# Patient Record
Sex: FEMALE | Race: WHITE | NOT HISPANIC OR LATINO | Employment: OTHER | URBAN - METROPOLITAN AREA
[De-identification: names, ages, dates, MRNs, and addresses within clinical notes are randomized per-mention and may not be internally consistent; named-entity substitution may affect disease eponyms.]

---

## 2017-09-27 ENCOUNTER — TRANSFERRED RECORDS (OUTPATIENT)
Dept: HEALTH INFORMATION MANAGEMENT | Facility: CLINIC | Age: 62
End: 2017-09-27

## 2017-11-28 ENCOUNTER — TRANSFERRED RECORDS (OUTPATIENT)
Dept: HEALTH INFORMATION MANAGEMENT | Facility: CLINIC | Age: 62
End: 2017-11-28

## 2017-11-29 ENCOUNTER — TRANSFERRED RECORDS (OUTPATIENT)
Dept: HEALTH INFORMATION MANAGEMENT | Facility: CLINIC | Age: 62
End: 2017-11-29

## 2017-12-29 ENCOUNTER — TRANSFERRED RECORDS (OUTPATIENT)
Dept: HEALTH INFORMATION MANAGEMENT | Facility: CLINIC | Age: 62
End: 2017-12-29

## 2018-01-22 ENCOUNTER — DOCUMENTATION ONLY (OUTPATIENT)
Dept: TRANSPLANT | Facility: CLINIC | Age: 63
End: 2018-01-22

## 2018-01-22 DIAGNOSIS — J44.9 CHRONIC OBSTRUCTIVE PULMONARY DISEASE, UNSPECIFIED COPD TYPE (H): Primary | ICD-10-CM

## 2018-01-22 NOTE — PROGRESS NOTES
"SOT LUNG INTAKE    2018    Melissa Elder  [id number]  Referring Provider:Janette Hamlin  Source/Facility: [facility]    Diagnosis: COPD  62 year old    Height: 5'2\" Weight: 150 lbs BMI: 27.43    Social History:  Spouse, Tyrese is in good health. Hx of heart stents and knee replacement.  2 grown kids, each live 4 hours part. See them often.  Social History     Social History     Marital status:      Spouse name: N/A     Number of children: N/A     Years of education: N/A     Occupational History     Not on file.     Social History Main Topics     Smoking status: Former Smoker     Packs/day: 1.50     Years: 36.00     Types: Cigarettes     Quit date: 2006     Smokeless tobacco: Never Used     Alcohol use Yes      Comment: stated beer and wine occ     Drug use: Yes      Comment: stated hx of marijuana, quit in      Sexual activity: Not on file     Other Topics Concern     Not on file     Social History Narrative     No narrative on file       Smoking History:Former smoker, 1 ppd age 14 to 2006.    Quit Date: , no use of gum/lozenges/chew        Street Drug Use: Marijuana when much younger.      ETOH Use:\"couple of beers a couple times a week\"   Quit Date: States has had to cut back when taking antibiotics in the past and will not be an issue in not using post transplant.    Second-hand Smoke exposure: None    Family History:  Father - of lung cancer at 53 year old  Mother- Hx of breast and colon cancer and possible lung? Passed in 2017.    Family History   Problem Relation Age of Onset     Breast Cancer Mother      Colon Cancer Mother      Lung Cancer Mother      Lung Cancer Father      Lung Cancer Maternal Aunt      Pancreatic Cancer Maternal Uncle        Past Medical History  Pulmonary Manifestations date: Thinks started around -, noticed taking longer to recover from illnesses and more SOB. Had not seen a pulmonologist until consulted with Right middle lobe collapse on " "chest CT in 2013. Has followed with pulmonology since then.       Diabetes: None.  Coronary Artery Disease: States has not been told she has this. Never had ischemic workup. On a statin.  Hypertension: \"It goes up every now and then but comes back down.\" Never bee diagnosed with this per Melissa.    Previous transfusion(s): None  History of Cancer: None   GERD: Occasional symptoms of heart burn with spicy food.   Bleeding Disorders: None known    Other Past Medical History:   Paroxysmal A fib  Bifascicular heart block  Osteoarthritis  HTN?  Asthma? Was told she had this \"10 years ago when sick\" never formally worked up, diagnosed.    Past Medical History:   Diagnosis Date     Atrial fibrillation with RVR (H)     hx of A fib related to severe COPD, does not meet anticoagulation criteria as noted     COPD, severe (H)      Osteoporosis        Surgical History   Lung Biopsy: N/A  Pneumothorax: N/A  Chest Surgery: N/A    No past surgical history on file.    Mental Health History  Depression: Situational at times. Met with counselor a few times after death of mother.   Anxiety: Denies  Other: Denies (In chart history of mood changes with a negative CT head?)    Medications  Aspirin, Atorvastatin, Calcium chew, Cetirizine PRN, Flonase PRN, Xopenex nebulizer, Xopenex inhaler, Dulera, Prednisone PRN, Spiriva Respimat,  No current outpatient prescriptions on file.     No current facility-administered medications for this visit.      Blood thinner hx: Aspirin  Prednisone hx: PRN  Antibiotic hx: Azithromycin PRN  Narcotic hx: None    Allergies  Fosamax [alendronic acid] and Sulfa drugs  Fosamax (dizzy), Sulfa (rash), metal allergy    Pulmonary Tests and Status  PFT's   Date:09/16/2014  FVC 1.81 (63%)  FEV1 0.73 (34%) DLCO 7.53 (34%) TLC 6.07 (127%)    ABG's ?    6 Minute Walk:6/5/2014- O2to 82% on RA. With O2 titration, 2 liters to keep >88%. 1160 ft.    Oxygen use   At rest: 2 liters intermittently   Sleep: 2 liters   With " Activity: 2-3 L   Date of O2 initiation: About 3-4 years ago    Oxygen Company: [employer] [place].   Contact name/number:     Sleep Study: 04/2013 - Frequent desats as low as 82%. Almost an hour <88%. Repeated on 2 liters and majority of night maintained 90% or greater.  Nocturnal desats 2/2 COPD. Per Apnea eval. Minimal snoring and apneic episodes witnessed.    BiPAP/CPAP: None    Pulmonary Rehab: Completed what she called cardiac rehab about 1 year ago (early 2017?). Found it helpful   Date: Location:      Current activity: minimal per pt. Walks short distances in house. Will walk short distances in [employer].Tries to bike for a couple of minutes most days.    Current activity:  Basic ADLs    Feeding no problems  Toileting no problems  Selecting proper attire, dressing wears her out and she needs to take it slow  Grooming no issues  Maintaining continence ??  Putting on clothing   Bathing Mainly washes her hair in sink as showering has become difficult. She does not have a shower chair and does wear oxygen in the shower  Walking & Transferring minimal distances    Instrumental ADLs    Managing Finances Working on getting supplemental insurance  Handling Transportation Tyrese her  does most of driviing  Shopping minimal, takes breaks  Preparing meals cooks most days  Using telephone & communication devices no issues  Managing medications no issues  Housework & basic home maintenance minimal, able to vacuum and do light cleaning      Hospitalizations in prior 12 months  Date:  11/30/2017 Location: [place] Reason: COPD exacerbation. + H influenzae. Received prednisone, bronchodilators, doxycycline        Diagnostic Tests/Imaging  Heart cath: none  Stress Test: none  ECHO: 2013 -Study is technically difficult. Rhythm A fib. Normal left ventricular chamber size, wall thickness, and with low normal left ventricular systolic performance. Mild right sided chamber enlargement. Inferior vena cava findings suggest  mildly elevated right atrial perssure. Prominent moderator band is seem within the right atrial cavity.    Chest CT: 4/2/2013 (comparison 01/2013) Right middle lobe collapse demonstrated prevouisly has resolved. Minimal density within the lateral aspect of the middle lobe adjacent to the major interlobar fissure. No mediastinal adenopathy or masses. Pulmonary vasculature is unremarkable. No emboli. Atheromatous calcifications within the aorta and its tributaries. Heart and pericardium appear unremarkable. Upper abdominal solid organs are normal.  DEXA: 12/6/2011 - (hx remote tailbone fx) OSteroporosis.  Upper GI: never    Primary Care  Health Maintenance   Topic Date Due     HEPATITIS C SCREENING  11/27/1973     PAP SCREENING Q3 YR (SYSTEM ASSIGNED)  11/27/1976     LIPID SCREEN Q5 YR FEMALE (SYSTEM ASSIGNED)  11/27/2000     MAMMO SCREEN Q2 YR (SYSTEM ASSIGNED)  11/27/2005     COLON CANCER SCREEN (SYSTEM ASSIGNED)  11/27/2005     ADVANCE DIRECTIVE PLANNING Q5 YRS  11/27/2010     INFLUENZA VACCINE (SYSTEM ASSIGNED)  09/01/2017     TETANUS IMMUNIZATION (SYSTEM ASSIGNED)  09/16/2023     Mammogram:9/27/2017 - negative. F/u 1 year  PAP/PSA: Is scheduled for PAP coming up in the next month or two at [facility].   Colonoscopy: 3/31/2016 x2 polyps removed. Transverse colon, sigmoid colon. No malignancy.  Dental: Goes yearly, had some crowns and has some receding gumline but no active issues  Hepatitis A/B: Received 1st dose, will complete series  Pneumovax: 09/2013  Prevnar: 04/2015  TDAP 2013  FLu vaccine 2017    Labs  A1AT:Normal  Rheumatology:     Cultures: ???        Psycho-Social Assessment  Spouse/Significant Other/Partner: Tyrese,    Location:   Distance from [facility]: 4.5 hours  Support System: 2 kids, live about 4 hours from [place]   Location   Distance from [facility]:     Employment Status: On disability  (Full-time, part-time, disabled, etc.)  Occupation:   Work history: ,   Toxic Substance Exposure:  Numerous /chemicals. Didn't wear mask or gloves.  (Factory work, asbestos, pesticides, dust, etc.)    Home Environment: 3 stairs to get into home, everything else is on one level  (Apartment, house, stairs, mold, etc.)  Pets/Birds: dog    Home Health care utilized: None    Financial Concerns: None voiced      Notes:      Plan: NPV with Christin 3/5/18 w labs, PFTs, 6MW.    Concerns: Ischemic work up? Osteoporosis? Needs PAP. When to repeat colonoscopy with family hx and personal hx of polyps.

## 2018-01-26 ENCOUNTER — TELEPHONE (OUTPATIENT)
Dept: TRANSPLANT | Facility: CLINIC | Age: 63
End: 2018-01-26

## 2018-01-26 NOTE — TELEPHONE ENCOUNTER
Received the CD requested on the TTE image and report on 5/1/2013 from [facility]. Sent to Radiology.

## 2018-02-09 ENCOUNTER — TELEPHONE (OUTPATIENT)
Dept: TRANSPLANT | Facility: CLINIC | Age: 63
End: 2018-02-09

## 2018-02-26 NOTE — TELEPHONE ENCOUNTER
Pt was registered here as a pre kidney transplant candidate in error - crt in Nove 2017 was 0.6. Pt should be a pre lung transplant candidate only. Kidney Referral epidsode deleted.  Staff message sent to Kate Batres RN Lung Transplant Coordinator today.

## 2018-03-02 DIAGNOSIS — J44.9 COPD (CHRONIC OBSTRUCTIVE PULMONARY DISEASE) (H): Primary | ICD-10-CM

## 2018-03-02 DIAGNOSIS — Z51.81 ENCOUNTER FOR THERAPEUTIC DRUG LEVEL MONITORING: ICD-10-CM

## 2018-03-05 ENCOUNTER — OFFICE VISIT (OUTPATIENT)
Dept: TRANSPLANT | Facility: CLINIC | Age: 63
End: 2018-03-05
Attending: INTERNAL MEDICINE
Payer: MEDICARE

## 2018-03-05 VITALS
TEMPERATURE: 98.3 F | WEIGHT: 147 LBS | DIASTOLIC BLOOD PRESSURE: 83 MMHG | SYSTOLIC BLOOD PRESSURE: 153 MMHG | BODY MASS INDEX: 27.05 KG/M2 | HEIGHT: 62 IN | OXYGEN SATURATION: 96 % | HEART RATE: 91 BPM

## 2018-03-05 DIAGNOSIS — Z76.82 LUNG TRANSPLANT CANDIDATE: Primary | ICD-10-CM

## 2018-03-05 DIAGNOSIS — J44.9 COPD (CHRONIC OBSTRUCTIVE PULMONARY DISEASE) (H): ICD-10-CM

## 2018-03-05 DIAGNOSIS — Z51.81 ENCOUNTER FOR THERAPEUTIC DRUG LEVEL MONITORING: ICD-10-CM

## 2018-03-05 DIAGNOSIS — J43.9 PULMONARY EMPHYSEMA, UNSPECIFIED EMPHYSEMA TYPE (H): ICD-10-CM

## 2018-03-05 DIAGNOSIS — J44.9 CHRONIC OBSTRUCTIVE PULMONARY DISEASE, UNSPECIFIED COPD TYPE (H): ICD-10-CM

## 2018-03-05 DIAGNOSIS — J44.9 COPD (CHRONIC OBSTRUCTIVE PULMONARY DISEASE) (H): Primary | ICD-10-CM

## 2018-03-05 LAB
6 MIN WALK (FT): 550 FT
6 MIN WALK (M): 168 M
ALBUMIN SERPL-MCNC: 3.8 G/DL (ref 3.4–5)
ALP SERPL-CCNC: 100 U/L (ref 40–150)
ALT SERPL W P-5'-P-CCNC: 25 U/L (ref 0–50)
ANION GAP SERPL CALCULATED.3IONS-SCNC: 6 MMOL/L (ref 3–14)
AST SERPL W P-5'-P-CCNC: 16 U/L (ref 0–45)
BASE EXCESS BLDV CALC-SCNC: 4.4 MMOL/L
BILIRUB SERPL-MCNC: 0.4 MG/DL (ref 0.2–1.3)
BUN SERPL-MCNC: 11 MG/DL (ref 7–30)
CALCIUM SERPL-MCNC: 9.3 MG/DL (ref 8.5–10.1)
CHLORIDE SERPL-SCNC: 103 MMOL/L (ref 94–109)
CO2 SERPL-SCNC: 31 MMOL/L (ref 20–32)
CREAT SERPL-MCNC: 0.54 MG/DL (ref 0.52–1.04)
ERYTHROCYTE [DISTWIDTH] IN BLOOD BY AUTOMATED COUNT: 13.8 % (ref 10–15)
GFR SERPL CREATININE-BSD FRML MDRD: >90 ML/MIN/1.7M2
GLUCOSE SERPL-MCNC: 104 MG/DL (ref 70–99)
HCO3 BLDV-SCNC: 32 MMOL/L (ref 21–28)
HCT VFR BLD AUTO: 40.8 % (ref 35–47)
HGB BLD-MCNC: 13.3 G/DL (ref 11.7–15.7)
MCH RBC QN AUTO: 28.2 PG (ref 26.5–33)
MCHC RBC AUTO-ENTMCNC: 32.6 G/DL (ref 31.5–36.5)
MCV RBC AUTO: 87 FL (ref 78–100)
O2/TOTAL GAS SETTING VFR VENT: ABNORMAL %
PCO2 BLDV: 59 MM HG (ref 40–50)
PH BLDV: 7.34 PH (ref 7.32–7.43)
PLATELET # BLD AUTO: 183 10E9/L (ref 150–450)
PO2 BLDV: 26 MM HG (ref 25–47)
POTASSIUM SERPL-SCNC: 3.6 MMOL/L (ref 3.4–5.3)
PROT SERPL-MCNC: 7.6 G/DL (ref 6.8–8.8)
RBC # BLD AUTO: 4.71 10E12/L (ref 3.8–5.2)
SODIUM SERPL-SCNC: 139 MMOL/L (ref 133–144)
WBC # BLD AUTO: 8.9 10E9/L (ref 4–11)

## 2018-03-05 PROCEDURE — 82803 BLOOD GASES ANY COMBINATION: CPT | Performed by: INTERNAL MEDICINE

## 2018-03-05 PROCEDURE — 80053 COMPREHEN METABOLIC PANEL: CPT | Performed by: INTERNAL MEDICINE

## 2018-03-05 PROCEDURE — 85027 COMPLETE CBC AUTOMATED: CPT | Performed by: INTERNAL MEDICINE

## 2018-03-05 PROCEDURE — 36415 COLL VENOUS BLD VENIPUNCTURE: CPT | Performed by: INTERNAL MEDICINE

## 2018-03-05 PROCEDURE — 80323 ALKALOIDS NOS: CPT | Performed by: INTERNAL MEDICINE

## 2018-03-05 PROCEDURE — G0463 HOSPITAL OUTPT CLINIC VISIT: HCPCS | Mod: ZF

## 2018-03-05 RX ORDER — CETIRIZINE HYDROCHLORIDE 10 MG/1
10 TABLET ORAL
COMMUNITY
Start: 2017-03-06 | End: 2021-06-21

## 2018-03-05 RX ORDER — PREDNISONE 10 MG/1
10 TABLET ORAL
COMMUNITY
Start: 2017-12-29 | End: 2021-06-21

## 2018-03-05 RX ORDER — LEVALBUTEROL TARTRATE 45 UG/1
2 AEROSOL, METERED ORAL EVERY 4 HOURS PRN
Status: ON HOLD | COMMUNITY
Start: 2017-12-29 | End: 2022-03-16

## 2018-03-05 RX ORDER — LEVALBUTEROL INHALATION SOLUTION 1.25 MG/3ML
1.25 SOLUTION RESPIRATORY (INHALATION)
COMMUNITY
Start: 2017-12-29 | End: 2021-06-21

## 2018-03-05 RX ORDER — ATORVASTATIN CALCIUM 20 MG/1
20 TABLET, FILM COATED ORAL
COMMUNITY
Start: 2017-05-08 | End: 2021-06-21

## 2018-03-05 RX ORDER — BUDESONIDE AND FORMOTEROL FUMARATE DIHYDRATE 160; 4.5 UG/1; UG/1
2 AEROSOL RESPIRATORY (INHALATION)
COMMUNITY
Start: 2018-02-20 | End: 2019-12-09

## 2018-03-05 RX ORDER — FLUTICASONE PROPIONATE 50 MCG
1 SPRAY, SUSPENSION (ML) NASAL
COMMUNITY
End: 2021-06-21

## 2018-03-05 ASSESSMENT — PAIN SCALES - GENERAL: PAINLEVEL: NO PAIN (0)

## 2018-03-05 NOTE — MR AVS SNAPSHOT
After Visit Summary   3/5/2018    Melissa Elder    MRN: [id number]           Patient Information     Date Of Birth          1955        Visit Information        Provider Department      3/5/2018 10:20 AM Gabe Waller MD [facility] Solid Organ Transplant        Care Instructions    Type of Donors:   Post brain death   Post cardiac death   Increased risk donor    Type of Procedures:   Double or single   Sternotomy, clam shell or lateral thoracotomies    Median survival after transplant: 50% people will be alive   Primary - 5.7 years   Conditional - 7.9 years     Common causes of sickness / death after lung transplant:   Within 1 year: Infections, Graft failure   Within 5 years: Chronic rejection, Infections   Within 10 years: Chronic rejection, Infections, Malignancy      Common complications after lung transplant:    Complications 1 year 5 years 10 years    Hypertension 51% 80%    Chronic Kidney Disease 22% 53% 72%   Chronic Dialysis 1.5% 3% 7%   Hyperlipidemia 26% 58%    Diabetes Mellitus 23% 39%    Chronic Rejection 9% 41%    Malignancy (Skin cancers - mostly, Lymphoma - rarely) 3.5% 16% 29%     Medications:   Immunesupression   Prophylactic antibiotics   Acid suppression    Anti hypertensives, anti-arrythmics   Anticoagulation    Expectations:   1. Live within 50 miles of the hospital for at least 3 months.  2. Have a family member or friend 24 hours a day for 3 months.   3. Possible prolonged time on the ventilator and need for temporary tracheostomy.  4. Multiple medications several times a day.  5. Frequent clinic follow ups: 1-2 times per week.    6. Frequent appointments at other clinics, rehab.  7. Possible need for frequent hospitalizations due to complications.  8. Frequent bronchoscopies and biopsies - once a month for first 3 to 6 months.   9. Frequent lab draws, chest x ray or CT scans, lung function tests several times a month.       Process:    Evaluation - 1 week    Transplant  "surgeon    Social work    Lung transplant support group    Labs    Colonoscopy    Cardiac evaluation    Possible transplant pulmonologist       Transplant committee review   Listing - Lung Allocation Score (LAS) for transplant urgency and position on the wait list   Follow up while on wait list - every 3 to 6 months      Pulmonary Rehab: Please remember to stay active.  Continue exercises learned in pulmonary rehab or continue participating in pulmonary rehab, if able.     Current oxygen use: Rest 2L Activity 3L Sleep 2L     5' 2\" 147 lbs 0 oz Body mass index is 26.89 kg/(m^2).  Goal BMI greater than 18, less than 30 for lung transplant.     Medication changes:  No changes made this visit  Future orders: Patient to call when ready to schedule a transplant evaluation  Antibody blood test (PRA) due every 3 months: Will be completed during lung transplant evaluation  Next appointment: 6 month RTC visit     Test or procedures to be complete prior to next appointment:   PFTs: Completed today, spirometry only   6MW:  Completed today   CT Scan: Will complete during lung transplant or at the recommendation of the transplant pulmonologist.      Please remember to stay up to date with your primary care requirements including: Annual check-ups with primary care physician   Mammogram   Pap smear (as appropriate for women)    PSA (for men over 50)   Dental visits   Annual flu shots/ immunizations as needed   Colonoscopy (patients over 50).     Thank-you for allowing us to participate in your care.    If your condition should change, please contact your transplant coordinator. This includes: worsening symptoms, need for antibiotics, hospitalizations, transfusions.    Thoracic Transplant Office phone [phone], option 2, fax [phone]    Office Hours 8:30 - 5:00 pm                             Follow-ups after your visit        Who to contact     If you have questions or need follow up information about today's clinic visit " "or your schedule please contact [facility] directly at [phone].  Normal or non-critical lab and imaging results will be communicated to you by Laserlikehart, letter or phone within 4 business days after the clinic has received the results. If you do not hear from us within 7 days, please contact the clinic through Laserlikehart or phone. If you have a critical or abnormal lab result, we will notify you by phone as soon as possible.  Submit refill requests through Ulule or call your pharmacy and they will forward the refill request to us. Please allow 3 business days for your refill to be completed.          Additional Information About Your Visit        LaserlikeharIsomark Information     Ulule lets you send messages to your doctor, view your test results, renew your prescriptions, schedule appointments and more. To sign up, go to [email or web address] . Click on \"Log in\" on the left side of the screen, which will take you to the Welcome page. Then click on \"Sign up Now\" on the right side of the page.     You will be asked to enter the access code listed below, as well as some personal information. Please follow the directions to create your username and password.     Your access code is: [email or web address]  Expires: 6/3/2018 11:55 AM     Your access code will  in 90 days. If you need help or a new code, please call your [place] clinic or [phone].        Care EveryWhere ID     This is your Care EveryWhere ID. This could be used by other organizations to access your [place] medical records  [id number]        Your Vitals Were     Pulse Temperature Height Pulse Oximetry BMI (Body Mass Index)       91 98.3  F (36.8  C) (Oral) 1.575 m (5' 2\") 96% 26.89 kg/m2        Blood Pressure from Last 3 Encounters:   18 153/83    Weight from Last 3 Encounters:   18 66.7 kg (147 lb)   18 68 kg (150 lb)              Today, you had the following     No orders found for display       Primary Care " Provider Office Phone # Fax #    Natasha Rm [phone]       [address] [address]        Equal Access to Services     KAMI SARMIENTO : Hadalpa shawanda deleon michaelo Sogloriaali, waaxda luqadaha, qaybta kaalmada adesergeda, terence piedra laLoidaelvira pierre. So [facility] [phone].    ATENCIÓN: Si habla español, tiene a rincon disposición servicios gratuitos de asistencia lingüística. Llame al [phone].    We comply with applicable federal civil rights laws and [place] laws. We do not discriminate on the basis of race, color, national origin, age, disability, sex, sexual orientation, or gender identity.            Thank you!     Thank you for choosing [facility]  for your care. Our goal is always to provide you with excellent care. Hearing back from our patients is one way we can continue to improve our services. Please take a few minutes to complete the written survey that you may receive in the mail after your visit with us. Thank you!             Your Updated Medication List - Protect others around you: Learn how to safely use, store and throw away your medicines at www.disposemymeds.org.          This list is accurate as of 3/5/18 11:55 AM.  Always use your most recent med list.                   Brand Name Dispense Instructions for use Diagnosis    aspirin 81 MG EC tablet      Take 81 mg by mouth        atorvastatin 20 MG tablet    LIPITOR     Take 20 mg by mouth        budesonide-formoterol 160-4.5 MCG/ACT Inhaler    SYMBICORT     Inhale 2 puffs into the lungs        calcium 500 MG Chew           cetirizine 10 MG tablet    zyrTEC     Take 10 mg by mouth        fluticasone 50 MCG/ACT spray    FLONASE     Spray 1 spray in nostril        levalbuterol 1.25 MG/3ML neb solution    XOPENEX     Inhale 1.25 mg into the lungs        levalbuterol 45 MCG/ACT Inhaler    XOPENEX HFA     Inhale 2 puffs into the lungs        OXYGEN-HELIUM IN      2 lpm per nasal  canula nocturnal and with activity may use portable w/ conserving device        predniSONE 10 MG tablet    DELTASONE     Take 10 mg by mouth        tiotropium 2.5 MCG/ACT inhalation aerosol    SPIRIVA RESPIMAT     Inhale 2 puffs into the lungs

## 2018-03-05 NOTE — NURSING NOTE
"Chief Complaint   Patient presents with     Consult     consult with pre lung transplant, isatu hutchison       Initial /83  Pulse 91  Temp 98.3  F (36.8  C) (Oral)  Ht 1.575 m (5' 2\")  Wt 66.7 kg (147 lb)  SpO2 96%  BMI 26.89 kg/m2 Estimated body mass index is 26.89 kg/(m^2) as calculated from the following:    Height as of this encounter: 1.575 m (5' 2\").    Weight as of this encounter: 66.7 kg (147 lb).  Medication Reconciliation: complete    "

## 2018-03-05 NOTE — LETTER
3/5/2018       RE: Melissa Elder  [address] \[facility] [place] [facility]\"" [id number]     Dear Colleague,    Thank you for referring your patient, Melissa Elder, to the [facility] SOLID ORGAN TRANSPLANT at [facility]. Please see a copy of my visit note below.    [facility]  Melissa Elder 62 year old female  : 1955  MRN: [id number]  DATE OF SERVICE 3/5/2018      PRIMARY CARE PROVIDER: Natasha Rm    REFERRING PROVIDER: [place] [address]        ASSESSMENT AND PLAN:  The patient is a 62-year-old female with end-stage lung disease due to COPD.  She is currently on maximal medical therapy with a significant drop in lung function and significant restriction of her activities.  Her CALLI index at this point is 7 which suggests less than 20% survival probability over the next 4 years.  This makes lung transplantation a viable option for her.  The patient appears to be a good candidate for transplantation without any contraindication at this stage.     In addition to a complete history and physical, I discussed transplant at length with the patient.  We reviewed the risks and benefits of transplantation.  Our discussion included the recent survival statistics.  I discussed rejection, including hyperacute, acute, chronic and antibody mediated.  I discussed the need for surveillance biopsies.  I reviewed the standard immunosuppression and typical side effects, including renal failure.  I discussed the increased risk of infection and the use of prophylactic antibiotics. I reviewed the increased risk of malignancy. I discussed the listing system, including the Lung Allocation Score.  I discussed the typical operative and postoperative course.  I reviewed the usual clinic followup.  I explained to the patient that she is required to stay in the [facility] for three months  following transplantation and that she  will need to have someone accompanying her during that time.  I discussed the importance of strict medication and clinic adherence after transplantation. The patient had a number of questions which were answered to her apparent satisfaction. The patient was advised to discuss among family members regarding willingness to undergo the procedure in light of this new information that was provided today and stay in touch with our team through this process. We encouraged the patient to call us with any questions that may arise in future.      REASON FOR VISIT: Evaluation for lung transplantation for end-stage lung disease due to COPD.      HISTORY OF PRESENT ILLNESS:  The patient is a very pleasant 62-year-old female who is being evaluated for lung transplant candidacy for end-stage lung disease.  Her other medical history includes hyperlipidemia, history of paroxysmal atrial fibrillation, occasional GERD and hypertension, not currently on any medications for this.  She reports that she was diagnosed with COPD after an admission for pneumonia approximately 5 years ago.  Prior to this she had been having shortness of breath and was treated for asthma for at least 10 years.  She reports her current status as no shortness of breath at rest but dyspnea on even minimal exertion including dressing and showering.  She uses 2 liters of oxygen continuously, but goes up to 3 liters with significant exertion.  She denies daily cough and only coughs during exacerbations.  She gets 2-3 exacerbations per year requiring steroids and antibiotics.  Her last hospital admission was approximately 4 months ago.  She has never been intubated for COPD exacerbation or for other respiratory reasons.  She currently takes Spiriva daily, Dulera twice a day, Xopenex twice a day for shortness of breath, and a nebulizer with beta agonist up to 2 times a day.  She reports rare wheezing.  She denies any hemoptysis.  "     REVIEW OF SYSTEMS:     1.  She denies any recent fevers, chills or night sweats.  Her weight has been stable.  She has a good appetite.     2.  She denies any visual changes, hearing deficit or frequent upper airway congestion.     3.  She denies chest pain, palpitations or lightheadedness.   4.  She denies any nausea, vomiting or changes in stool.   5.  She denies any urinary symptoms.     6.  She denies frequent arthralgias or myalgias.     7.  She reports no significant anxiety or depression.     8.  She reports sleeping well at night.  She does take naps during the day and has some excessive daytime sleepiness.   9.  She denies frequent bruises or bleeding.     10.  She denies any numbness, weakness of any parts of her body or headaches.   11.  Rest of the review of system is negative except as noted above.      PULMONARY FUNCTION TESTS:  The patient's FEV1 is 0.41 liters, which is 18% predicted, FVC is 1.21 liters, which is 42% predicted, FEV1/FVC ratio is 34%.  Lung volumes and diffusion capacity is not available for review.  This test shows very severe airflow obstruction.  A 6-minute walk test was performed and this showed pre-walk oxygen saturation of 98%.  Post-walk minimal oxygen saturation of 84%.  This test was performed on 2-3 liters of supplemental oxygen.  The patient walked 168 meters with the lower limit of normal being 396 meters.  Compared to previous spirometry approximately 3 years ago, this test showed significant decline in FEV1 and FVC.  Her past FEV1 was 0.7 liters or 34% predicted.      CHEST IMAGING:  No chest x-ray or CT scan is available for review.      PHYSICAL EXAMINATION:   /83  Pulse 91  Temp 98.3  F (36.8  C) (Oral)  Ht 1.575 m (5' 2\")  Wt 66.7 kg (147 lb)  SpO2 96%  BMI 26.89 kg/m2  GENERAL:  Pleasant female sitting comfortably at rest, speaking in full sentences without any discomfort.     EYES:  No scleral icterus or conjunctival is present.   HENT:  Moist " mucous membranes.  No obstruction to flow in the nares.  No cervical or submandibular lymphadenopathy.   PULMONARY:  Normal intensity breath sounds bilaterally with significantly distant sounds.  No added sounds.   CARDIOVASCULAR:  S1, S2 normal with regular rate and rhythm.  No murmur, rubs or gallops.   ABDOMEN:  Soft, nontender, normoactive bowel sounds, no organomegaly appreciated.   MUSCULOSKELETAL:  No lower extremity edema, no upper extremity tremors, mild upper extremity clubbing noted.   SKIN:  No rashes on limited exam.   PSYCHIATRIC:  Normal affect.   NEUROLOGIC:  Grossly intact.       PMH:  Past Medical History:   Diagnosis Date     Atrial fibrillation with RVR (H)     hx of A fib related to severe COPD, does not meet anticoagulation criteria as noted     COPD, severe (H)      Osteoporosis    Hyperlipidemia, possible hypertension and GERD.     PAST SURGICAL HISTORY:  Includes tonsillectomy and adenoidectomy as a child, pilonidal cyst removal from buttocks, sebaceous cyst removal from the back.  No thoracic surgery is noted.       Allergies:  Allergies   Allergen Reactions     Fosamax [Alendronic Acid] Other (See Comments)     dizziness     Sulfa Drugs Rash       Social History: The patient is from [place], [place].  She has a 20-25 pack year history of smoking but quit 15 years ago.  She lives at home with her .  She has 2 grown children who live in different cities.  She reports up to 2 alcoholic drinks per week and reports a history of marijuana use, last use was 2 years ago.  Denies any illicit drug use.  She is currently retired due to health reasons.  Her previous occupations included nurse's aide,  or cleaning aide.       Medications:  Current Outpatient Prescriptions   Medication Sig Dispense Refill     aspirin 81 MG EC tablet Take 81 mg by mouth       atorvastatin (LIPITOR) 20 MG tablet Take 20 mg by mouth       budesonide-formoterol (SYMBICORT) 160-4.5 MCG/ACT Inhaler Inhale  2 puffs into the lungs       calcium 500 MG CHEW        cetirizine (ZYRTEC) 10 MG tablet Take 10 mg by mouth       fluticasone (FLONASE) 50 MCG/ACT spray Spray 1 spray in nostril       levalbuterol (XOPENEX HFA) 45 MCG/ACT Inhaler Inhale 2 puffs into the lungs       levalbuterol (XOPENEX) 1.25 MG/3ML neb solution Inhale 1.25 mg into the lungs       predniSONE (DELTASONE) 10 MG tablet Take 10 mg by mouth       OXYGEN-HELIUM IN 2 lpm per nasal canula nocturnal and with activity may use portable w/ conserving device       tiotropium (SPIRIVA RESPIMAT) 2.5 MCG/ACT inhalation aerosol Inhale 2 puffs into the lungs         Family History:  Family History   Problem Relation Age of Onset     Breast Cancer Mother      Colon Cancer Mother      Lung Cancer Mother      Lung Cancer Father      Lung Cancer Maternal Aunt      Pancreatic Cancer Maternal Uncle    She reports that her brother has bladder cancer.  Her father passed away from lung cancer in his 50s.  He was a smoker but also had asbestos exposure.  Her mother passed away recently with breast cancer and possible lung cancer.  She denies any history of coronary artery disease or neurological illnesses in her family.       I spent a total of 80 minutes face to face with Melissa Elder during today's office visit. Over 50% of this time was spent counseling the patient and/or coordinating care regarding lung transplant evaluation    Gabe Waller MD   of Medicine  Pulmonary, Critical Care and Sleep Medicine  [facility]  Pager: [phone]          Gabe Waller MD   of Medicine  Pulmonary, Critical Care and Sleep Medicine  [facility]  Pager: [phone]

## 2018-03-05 NOTE — PATIENT INSTRUCTIONS
Type of Donors:   Post brain death   Post cardiac death   Increased risk donor    Type of Procedures:   Double or single   Sternotomy, clam shell or lateral thoracotomies    Median survival after transplant: 50% people will be alive   Primary - 5.7 years   Conditional - 7.9 years     Common causes of sickness / death after lung transplant:   Within 1 year: Infections, Graft failure   Within 5 years: Chronic rejection, Infections   Within 10 years: Chronic rejection, Infections, Malignancy      Common complications after lung transplant:    Complications 1 year 5 years 10 years    Hypertension 51% 80%    Chronic Kidney Disease 22% 53% 72%   Chronic Dialysis 1.5% 3% 7%   Hyperlipidemia 26% 58%    Diabetes Mellitus 23% 39%    Chronic Rejection 9% 41%    Malignancy (Skin cancers - mostly, Lymphoma - rarely) 3.5% 16% 29%     Medications:   Immunesupression   Prophylactic antibiotics   Acid suppression    Anti hypertensives, anti-arrythmics   Anticoagulation    Expectations:   1. Live within 50 miles of the hospital for at least 3 months.  2. Have a family member or friend 24 hours a day for 3 months.   3. Possible prolonged time on the ventilator and need for temporary tracheostomy.  4. Multiple medications several times a day.  5. Frequent clinic follow ups: 1-2 times per week.    6. Frequent appointments at other clinics, rehab.  7. Possible need for frequent hospitalizations due to complications.  8. Frequent bronchoscopies and biopsies - once a month for first 3 to 6 months.   9. Frequent lab draws, chest x ray or CT scans, lung function tests several times a month.       Process:    Evaluation - 1 week    Transplant surgeon    Social work    Lung transplant support group    Labs    Colonoscopy    Cardiac evaluation    Possible transplant pulmonologist       Transplant committee review   Listing - Lung Allocation Score (LAS) for transplant urgency and position on the wait list   Follow up while on wait list - every 3  "to 6 months      Pulmonary Rehab: Please remember to stay active.  Continue exercises learned in pulmonary rehab or continue participating in pulmonary rehab, if able.     Current oxygen use: Rest 2L Activity 3L Sleep 2L     5' 2\" 147 lbs 0 oz Body mass index is 26.89 kg/(m^2).  Goal BMI greater than 18, less than 30 for lung transplant.     Medication changes:  No changes made this visit  Future orders: Patient to call when ready to schedule a transplant evaluation  Antibody blood test (PRA) due every 3 months: Will be completed during lung transplant evaluation  Next appointment: 6 month RTC visit     Test or procedures to be complete prior to next appointment:   PFTs: Completed today, spirometry only   6MW:  Completed today   CT Scan: Will complete during lung transplant or at the recommendation of the transplant pulmonologist.      Please remember to stay up to date with your primary care requirements including: Annual check-ups with primary care physician   Mammogram   Pap smear (as appropriate for women)    PSA (for men over 50)   Dental visits   Annual flu shots/ immunizations as needed   Colonoscopy (patients over 50).     Thank-you for allowing us to participate in your care.    If your condition should change, please contact your transplant coordinator. This includes: worsening symptoms, need for antibiotics, hospitalizations, transfusions.    Thoracic Transplant Office phone [phone], option 2, fax [phone]    Office Hours 8:30 - 5:00 pm                     "

## 2018-03-05 NOTE — PROGRESS NOTES
[facility]  Melissa Elder 62 year old female  : 1955  MRN: [id number]  DATE OF SERVICE 3/5/2018      PRIMARY CARE PROVIDER: Natasha Rm    REFERRING PROVIDER: Janette Hamlin MD  [address] [address]        ASSESSMENT AND PLAN:  The patient is a 62-year-old female with end-stage lung disease due to COPD.  She is currently on maximal medical therapy with a significant drop in lung function and significant restriction of her activities.  Her CALLI index at this point is 7 which suggests less than 20% survival probability over the next 4 years.  This makes lung transplantation a viable option for her.  The patient appears to be a good candidate for transplantation without any contraindication at this stage.     In addition to a complete history and physical, I discussed transplant at length with the patient.  We reviewed the risks and benefits of transplantation.  Our discussion included the recent survival statistics.  I discussed rejection, including hyperacute, acute, chronic and antibody mediated.  I discussed the need for surveillance biopsies.  I reviewed the standard immunosuppression and typical side effects, including renal failure.  I discussed the increased risk of infection and the use of prophylactic antibiotics. I reviewed the increased risk of malignancy. I discussed the listing system, including the Lung Allocation Score.  I discussed the typical operative and postoperative course.  I reviewed the usual clinic followup.  I explained to the patient that she is required to stay in the [facility] for three months following transplantation and that she  will need to have someone accompanying her during that time.  I discussed the importance of strict medication and clinic adherence after transplantation. The patient had a number of questions which were answered to her apparent  satisfaction. The patient was advised to discuss among family members regarding willingness to undergo the procedure in light of this new information that was provided today and stay in touch with our team through this process. We encouraged the patient to call us with any questions that may arise in future.      REASON FOR VISIT: Evaluation for lung transplantation for end-stage lung disease due to COPD.      HISTORY OF PRESENT ILLNESS:  The patient is a very pleasant 62-year-old female who is being evaluated for lung transplant candidacy for end-stage lung disease.  Her other medical history includes hyperlipidemia, history of paroxysmal atrial fibrillation, occasional GERD and hypertension, not currently on any medications for this.  She reports that she was diagnosed with COPD after an admission for pneumonia approximately 5 years ago.  Prior to this she had been having shortness of breath and was treated for asthma for at least 10 years.  She reports her current status as no shortness of breath at rest but dyspnea on even minimal exertion including dressing and showering.  She uses 2 liters of oxygen continuously, but goes up to 3 liters with significant exertion.  She denies daily cough and only coughs during exacerbations.  She gets 2-3 exacerbations per year requiring steroids and antibiotics.  Her last hospital admission was approximately 4 months ago.  She has never been intubated for COPD exacerbation or for other respiratory reasons.  She currently takes Spiriva daily, Dulera twice a day, Xopenex twice a day for shortness of breath, and a nebulizer with beta agonist up to 2 times a day.  She reports rare wheezing.  She denies any hemoptysis.      REVIEW OF SYSTEMS:     1.  She denies any recent fevers, chills or night sweats.  Her weight has been stable.  She has a good appetite.     2.  She denies any visual changes, hearing deficit or frequent upper airway congestion.     3.  She denies chest pain,  "palpitations or lightheadedness.   4.  She denies any nausea, vomiting or changes in stool.   5.  She denies any urinary symptoms.     6.  She denies frequent arthralgias or myalgias.     7.  She reports no significant anxiety or depression.     8.  She reports sleeping well at night.  She does take naps during the day and has some excessive daytime sleepiness.   9.  She denies frequent bruises or bleeding.     10.  She denies any numbness, weakness of any parts of her body or headaches.   11.  Rest of the review of system is negative except as noted above.      PULMONARY FUNCTION TESTS:  The patient's FEV1 is 0.41 liters, which is 18% predicted, FVC is 1.21 liters, which is 42% predicted, FEV1/FVC ratio is 34%.  Lung volumes and diffusion capacity is not available for review.  This test shows very severe airflow obstruction.  A 6-minute walk test was performed and this showed pre-walk oxygen saturation of 98%.  Post-walk minimal oxygen saturation of 84%.  This test was performed on 2-3 liters of supplemental oxygen.  The patient walked 168 meters with the lower limit of normal being 396 meters.  Compared to previous spirometry approximately 3 years ago, this test showed significant decline in FEV1 and FVC.  Her past FEV1 was 0.7 liters or 34% predicted.      CHEST IMAGING:  No chest x-ray or CT scan is available for review.      PHYSICAL EXAMINATION:   /83  Pulse 91  Temp 98.3  F (36.8  C) (Oral)  Ht 1.575 m (5' 2\")  Wt 66.7 kg (147 lb)  SpO2 96%  BMI 26.89 kg/m2  GENERAL:  Pleasant female sitting comfortably at rest, speaking in full sentences without any discomfort.     EYES:  No scleral icterus or conjunctival is present.   HENT:  Moist mucous membranes.  No obstruction to flow in the nares.  No cervical or submandibular lymphadenopathy.   PULMONARY:  Normal intensity breath sounds bilaterally with significantly distant sounds.  No added sounds.   CARDIOVASCULAR:  S1, S2 normal with regular rate and " rhythm.  No murmur, rubs or gallops.   ABDOMEN:  Soft, nontender, normoactive bowel sounds, no organomegaly appreciated.   MUSCULOSKELETAL:  No lower extremity edema, no upper extremity tremors, mild upper extremity clubbing noted.   SKIN:  No rashes on limited exam.   PSYCHIATRIC:  Normal affect.   NEUROLOGIC:  Grossly intact.       PMH:  Past Medical History:   Diagnosis Date     Atrial fibrillation with RVR (H)     hx of A fib related to severe COPD, does not meet anticoagulation criteria as noted     COPD, severe (H)      Osteoporosis    Hyperlipidemia, possible hypertension and GERD.     PAST SURGICAL HISTORY:  Includes tonsillectomy and adenoidectomy as a child, pilonidal cyst removal from buttocks, sebaceous cyst removal from the back.  No thoracic surgery is noted.       Allergies:  Allergies   Allergen Reactions     Fosamax [Alendronic Acid] Other (See Comments)     dizziness     Sulfa Drugs Rash       Social History: The patient is from [place], [place].  She has a 20-25 pack year history of smoking but quit 15 years ago.  She lives at home with her .  She has 2 grown children who live in different cities.  She reports up to 2 alcoholic drinks per week and reports a history of marijuana use, last use was 2 years ago.  Denies any illicit drug use.  She is currently retired due to health reasons.  Her previous occupations included nurse's aide,  or cleaning aide.       Medications:  Current Outpatient Prescriptions   Medication Sig Dispense Refill     aspirin 81 MG EC tablet Take 81 mg by mouth       atorvastatin (LIPITOR) 20 MG tablet Take 20 mg by mouth       budesonide-formoterol (SYMBICORT) 160-4.5 MCG/ACT Inhaler Inhale 2 puffs into the lungs       calcium 500 MG CHEW        cetirizine (ZYRTEC) 10 MG tablet Take 10 mg by mouth       fluticasone (FLONASE) 50 MCG/ACT spray Spray 1 spray in nostril       levalbuterol (XOPENEX HFA) 45 MCG/ACT Inhaler Inhale 2 puffs into the lungs        levalbuterol (XOPENEX) 1.25 MG/3ML neb solution Inhale 1.25 mg into the lungs       predniSONE (DELTASONE) 10 MG tablet Take 10 mg by mouth       OXYGEN-HELIUM IN 2 lpm per nasal canula nocturnal and with activity may use portable w/ conserving device       tiotropium (SPIRIVA RESPIMAT) 2.5 MCG/ACT inhalation aerosol Inhale 2 puffs into the lungs         Family History:  Family History   Problem Relation Age of Onset     Breast Cancer Mother      Colon Cancer Mother      Lung Cancer Mother      Lung Cancer Father      Lung Cancer Maternal Aunt      Pancreatic Cancer Maternal Uncle    She reports that her brother has bladder cancer.  Her father passed away from lung cancer in his 50s.  He was a smoker but also had asbestos exposure.  Her mother passed away recently with breast cancer and possible lung cancer.  She denies any history of coronary artery disease or neurological illnesses in her family.       I spent a total of 80 minutes face to face with Melissa Elder during today's office visit. Over 50% of this time was spent counseling the patient and/or coordinating care regarding lung transplant evaluation    Gabe Waller MD   of Medicine  Pulmonary, Critical Care and Sleep Medicine  [facility]  Pager: [phone]          Gabe Waller MD   of Medicine  Pulmonary, Critical Care and Sleep Medicine  [facility]  Pager: [phone]

## 2018-03-10 LAB
COTININE SERPL-MCNC: NORMAL NG/ML
NICOTINE SERPL-MCNC: NORMAL NG/ML

## 2018-03-12 ENCOUNTER — TELEPHONE (OUTPATIENT)
Dept: TRANSPLANT | Facility: CLINIC | Age: 63
End: 2018-03-12

## 2018-03-12 LAB
ERV-%PRED-PRE: 58 %
ERV-PRE: 0.38 L
ERV-PRED: 0.66 L
EXPTIME-PRE: 8.85 SEC
FEF2575-%PRED-PRE: 7 %
FEF2575-PRE: 0.16 L/SEC
FEF2575-PRED: 2.08 L/SEC
FEFMAX-%PRED-PRE: 30 %
FEFMAX-PRE: 1.8 L/SEC
FEFMAX-PRED: 5.86 L/SEC
FEV1-%PRED-PRE: 18 %
FEV1-PRE: 0.41 L
FEV1FEV6-PRE: 39 %
FEV1FEV6-PRED: 80 %
FEV1FVC-PRE: 34 %
FEV1FVC-PRED: 78 %
FEV1SVC-PRE: 36 %
FEV1SVC-PRED: 76 %
FIFMAX-PRE: 1.72 L/SEC
FVC-%PRED-PRE: 42 %
FVC-PRE: 1.21 L
FVC-PRED: 2.88 L
IC-%PRED-PRE: 33 %
IC-PRE: 0.77 L
IC-PRED: 2.31 L
VC-%PRED-PRE: 38 %
VC-PRE: 1.15 L
VC-PRED: 2.97 L

## 2018-03-12 NOTE — TELEPHONE ENCOUNTER
"Contacted Melissa for follow up post NPV and to inquire whether she would like to proceed with transplant evaluation.    She stated she is till watching the \"my transplant place\" videos and has a lot to think about.  She also stated that her brother has just started chem treatment for cancer and that she does not want to be gone for a full week at this time.  She would like to call the coordinator when she is ready to proceed with evaluation.      "

## 2019-02-07 DIAGNOSIS — Z51.81 ENCOUNTER FOR THERAPEUTIC DRUG LEVEL MONITORING: ICD-10-CM

## 2019-02-07 DIAGNOSIS — R06.09 DYSPNEA ON EXERTION: ICD-10-CM

## 2019-02-07 DIAGNOSIS — R06.02 SOB (SHORTNESS OF BREATH): ICD-10-CM

## 2019-02-07 DIAGNOSIS — R93.89 ABNORMAL CHEST CT: ICD-10-CM

## 2019-02-07 DIAGNOSIS — Z76.82 ORGAN TRANSPLANT CANDIDATE: ICD-10-CM

## 2019-02-07 DIAGNOSIS — E78.5 HYPERLIPIDEMIA, UNSPECIFIED HYPERLIPIDEMIA TYPE: ICD-10-CM

## 2019-02-07 DIAGNOSIS — R06.02 SHORTNESS OF BREATH: ICD-10-CM

## 2019-02-07 DIAGNOSIS — Z11.59 ENCOUNTER FOR SCREENING FOR OTHER VIRAL DISEASES: ICD-10-CM

## 2019-02-07 DIAGNOSIS — Z48.22 ENCOUNTER FOR AFTERCARE FOLLOWING KIDNEY TRANSPLANT: ICD-10-CM

## 2019-02-07 DIAGNOSIS — Z79.51 LONG TERM (CURRENT) USE OF INHALED STEROIDS: ICD-10-CM

## 2019-02-07 DIAGNOSIS — M12.9 ARTHROPATHY: ICD-10-CM

## 2019-02-07 DIAGNOSIS — J44.9 COPD (CHRONIC OBSTRUCTIVE PULMONARY DISEASE) (H): Primary | ICD-10-CM

## 2019-02-07 DIAGNOSIS — Z79.52 LONG TERM CURRENT USE OF SYSTEMIC STEROIDS: ICD-10-CM

## 2019-02-18 PROBLEM — R06.02 SOB (SHORTNESS OF BREATH): Status: ACTIVE | Noted: 2019-02-18

## 2019-02-18 PROBLEM — J44.9 COPD (CHRONIC OBSTRUCTIVE PULMONARY DISEASE) (H): Status: ACTIVE | Noted: 2019-02-18

## 2019-03-19 ENCOUNTER — TELEPHONE (OUTPATIENT)
Dept: TRANSPLANT | Facility: CLINIC | Age: 64
End: 2019-03-19

## 2019-03-25 ENCOUNTER — ANCILLARY PROCEDURE (OUTPATIENT)
Dept: BONE DENSITY | Facility: CLINIC | Age: 64
End: 2019-03-25
Attending: INTERNAL MEDICINE
Payer: COMMERCIAL

## 2019-03-25 ENCOUNTER — ANCILLARY PROCEDURE (OUTPATIENT)
Dept: GENERAL RADIOLOGY | Facility: CLINIC | Age: 64
End: 2019-03-25
Attending: INTERNAL MEDICINE
Payer: COMMERCIAL

## 2019-03-25 ENCOUNTER — HOSPITAL ENCOUNTER (OUTPATIENT)
Dept: CARDIOLOGY | Facility: CLINIC | Age: 64
Discharge: HOME | End: 2019-03-25
Attending: INTERNAL MEDICINE | Admitting: INTERNAL MEDICINE
Payer: MEDICARE

## 2019-03-25 ENCOUNTER — HOSPITAL ENCOUNTER (OUTPATIENT)
Dept: NUCLEAR MEDICINE | Facility: CLINIC | Age: 64
Setting detail: NUCLEAR MEDICINE
Discharge: HOME | End: 2019-03-25
Attending: INTERNAL MEDICINE | Admitting: INTERNAL MEDICINE
Payer: MEDICARE

## 2019-03-25 ENCOUNTER — ALLIED HEALTH/NURSE VISIT (OUTPATIENT)
Dept: TRANSPLANT | Facility: CLINIC | Age: 64
End: 2019-03-25
Attending: INTERNAL MEDICINE
Payer: MEDICARE

## 2019-03-25 VITALS
BODY MASS INDEX: 28.03 KG/M2 | SYSTOLIC BLOOD PRESSURE: 159 MMHG | TEMPERATURE: 97.5 F | OXYGEN SATURATION: 93 % | HEIGHT: 62 IN | HEART RATE: 92 BPM | WEIGHT: 152.3 LBS | DIASTOLIC BLOOD PRESSURE: 80 MMHG

## 2019-03-25 DIAGNOSIS — M12.9 ARTHROPATHY: ICD-10-CM

## 2019-03-25 DIAGNOSIS — Z76.82 ORGAN TRANSPLANT CANDIDATE: ICD-10-CM

## 2019-03-25 DIAGNOSIS — J44.9 COPD (CHRONIC OBSTRUCTIVE PULMONARY DISEASE) (H): ICD-10-CM

## 2019-03-25 DIAGNOSIS — Z79.52 LONG TERM CURRENT USE OF SYSTEMIC STEROIDS: ICD-10-CM

## 2019-03-25 DIAGNOSIS — Z51.81 ENCOUNTER FOR THERAPEUTIC DRUG LEVEL MONITORING: ICD-10-CM

## 2019-03-25 DIAGNOSIS — R06.02 SOB (SHORTNESS OF BREATH): ICD-10-CM

## 2019-03-25 DIAGNOSIS — Z11.59 ENCOUNTER FOR SCREENING FOR OTHER VIRAL DISEASES: ICD-10-CM

## 2019-03-25 DIAGNOSIS — R06.09 DYSPNEA ON EXERTION: ICD-10-CM

## 2019-03-25 DIAGNOSIS — E78.5 HYPERLIPIDEMIA, UNSPECIFIED HYPERLIPIDEMIA TYPE: ICD-10-CM

## 2019-03-25 DIAGNOSIS — Z79.51 LONG TERM (CURRENT) USE OF INHALED STEROIDS: ICD-10-CM

## 2019-03-25 DIAGNOSIS — Z48.22 ENCOUNTER FOR AFTERCARE FOLLOWING KIDNEY TRANSPLANT: ICD-10-CM

## 2019-03-25 DIAGNOSIS — R06.02 SHORTNESS OF BREATH: ICD-10-CM

## 2019-03-25 LAB
ABO + RH BLD: NORMAL
ALBUMIN SERPL-MCNC: 3.9 G/DL (ref 3.4–5)
ALBUMIN UR-MCNC: NEGATIVE MG/DL
ALP SERPL-CCNC: 104 U/L (ref 40–150)
ALT SERPL W P-5'-P-CCNC: 30 U/L (ref 0–50)
AMORPH CRY #/AREA URNS HPF: ABNORMAL /HPF
AMYLASE SERPL-CCNC: 63 U/L (ref 30–110)
ANION GAP SERPL CALCULATED.3IONS-SCNC: 6 MMOL/L (ref 3–14)
APPEARANCE UR: ABNORMAL
APTT PPP: 22 SEC (ref 22–37)
AST SERPL W P-5'-P-CCNC: 20 U/L (ref 0–45)
BACTERIA #/AREA URNS HPF: ABNORMAL /HPF
BASE EXCESS BLDV CALC-SCNC: 5.4 MMOL/L
BASOPHILS # BLD AUTO: 0 10E9/L (ref 0–0.2)
BASOPHILS NFR BLD AUTO: 0.5 %
BILIRUB SERPL-MCNC: 0.6 MG/DL (ref 0.2–1.3)
BILIRUB UR QL STRIP: NEGATIVE
BLD GP AB SCN SERPL QL: NORMAL
BLOOD BANK CMNT PATIENT-IMP: NORMAL
BLOOD BANK CMNT PATIENT-IMP: NORMAL
BUN SERPL-MCNC: 10 MG/DL (ref 7–30)
CALCIUM SERPL-MCNC: 9.2 MG/DL (ref 8.5–10.1)
CHLORIDE SERPL-SCNC: 103 MMOL/L (ref 94–109)
CHOLEST SERPL-MCNC: 155 MG/DL
CMV IGG SERPL QL IA: 0.2 AI (ref 0–0.8)
CO2 SERPL-SCNC: 29 MMOL/L (ref 20–32)
COLOR UR AUTO: YELLOW
CREAT SERPL-MCNC: 0.56 MG/DL (ref 0.52–1.04)
DEPRECATED CALCIDIOL+CALCIFEROL SERPL-MC: 49 UG/L (ref 20–75)
DIFFERENTIAL METHOD BLD: NORMAL
EBV VCA IGG SER QL IA: >8 AI (ref 0–0.8)
EOSINOPHIL # BLD AUTO: 0.1 10E9/L (ref 0–0.7)
EOSINOPHIL NFR BLD AUTO: 1.1 %
ERYTHROCYTE [DISTWIDTH] IN BLOOD BY AUTOMATED COUNT: 12.5 % (ref 10–15)
GFR SERPL CREATININE-BSD FRML MDRD: >90 ML/MIN/{1.73_M2}
GLUCOSE SERPL-MCNC: 102 MG/DL (ref 70–99)
GLUCOSE UR STRIP-MCNC: NEGATIVE MG/DL
HAV IGG SER QL IA: NONREACTIVE
HBV CORE AB SERPL QL IA: NONREACTIVE
HBV SURFACE AB SERPL IA-ACNC: 77.39 M[IU]/ML
HBV SURFACE AG SERPL QL IA: NONREACTIVE
HCO3 BLDV-SCNC: 31 MMOL/L (ref 21–28)
HCT VFR BLD AUTO: 41.5 % (ref 35–47)
HCV AB SERPL QL IA: NONREACTIVE
HDLC SERPL-MCNC: 64 MG/DL
HGB BLD-MCNC: 13.4 G/DL (ref 11.7–15.7)
HGB UR QL STRIP: NEGATIVE
HIV 1+2 AB+HIV1 P24 AG SERPL QL IA: NONREACTIVE
HSV1 IGG SERPL QL IA: <0.2 AI (ref 0–0.8)
HSV2 IGG SERPL QL IA: <0.2 AI (ref 0–0.8)
IGA SERPL-MCNC: 190 MG/DL (ref 70–380)
IGM SERPL-MCNC: 265 MG/DL (ref 60–265)
IMM GRANULOCYTES # BLD: 0 10E9/L (ref 0–0.4)
IMM GRANULOCYTES NFR BLD: 0.5 %
INR PPP: 0.96 (ref 0.86–1.14)
KETONES UR STRIP-MCNC: NEGATIVE MG/DL
LDLC SERPL CALC-MCNC: 74 MG/DL
LEUKOCYTE ESTERASE UR QL STRIP: ABNORMAL
LYMPHOCYTES # BLD AUTO: 1 10E9/L (ref 0.8–5.3)
LYMPHOCYTES NFR BLD AUTO: 12.8 %
MAGNESIUM SERPL-MCNC: 1.9 MG/DL (ref 1.6–2.3)
MCH RBC QN AUTO: 28.8 PG (ref 26.5–33)
MCHC RBC AUTO-ENTMCNC: 32.3 G/DL (ref 31.5–36.5)
MCV RBC AUTO: 89 FL (ref 78–100)
MONOCYTES # BLD AUTO: 0.4 10E9/L (ref 0–1.3)
MONOCYTES NFR BLD AUTO: 4.4 %
MUCOUS THREADS #/AREA URNS LPF: PRESENT /LPF
NEUTROPHILS # BLD AUTO: 6.5 10E9/L (ref 1.6–8.3)
NEUTROPHILS NFR BLD AUTO: 80.7 %
NITRATE UR QL: NEGATIVE
NONHDLC SERPL-MCNC: 91 MG/DL
NRBC # BLD AUTO: 0 10*3/UL
NRBC BLD AUTO-RTO: 0 /100
O2/TOTAL GAS SETTING VFR VENT: ABNORMAL %
OXYHGB MFR BLDV: 73 %
PCO2 BLDV: 50 MM HG (ref 40–50)
PH BLDV: 7.41 PH (ref 7.32–7.43)
PH UR STRIP: 5 PH (ref 5–7)
PHOSPHATE SERPL-MCNC: 3.7 MG/DL (ref 2.5–4.5)
PLATELET # BLD AUTO: 207 10E9/L (ref 150–450)
PO2 BLDV: 39 MM HG (ref 25–47)
POTASSIUM SERPL-SCNC: 3.3 MMOL/L (ref 3.4–5.3)
PREALB SERPL IA-MCNC: 23 MG/DL (ref 15–45)
PROT SERPL-MCNC: 7.8 G/DL (ref 6.8–8.8)
RBC # BLD AUTO: 4.65 10E12/L (ref 3.8–5.2)
RBC #/AREA URNS AUTO: 1 /HPF (ref 0–2)
SODIUM SERPL-SCNC: 139 MMOL/L (ref 133–144)
SOURCE: ABNORMAL
SP GR UR STRIP: 1.01 (ref 1–1.03)
SPECIMEN EXP DATE BLD: NORMAL
SPECIMEN EXP DATE BLD: NORMAL
SQUAMOUS #/AREA URNS AUTO: 1 /HPF (ref 0–1)
T GONDII IGG SER QL: <3 IU/ML (ref 0–7.1)
TRANSFERRIN SERPL-MCNC: 258 MG/DL (ref 210–360)
TRIGL SERPL-MCNC: 83 MG/DL
TSH SERPL DL<=0.005 MIU/L-ACNC: 1.38 MU/L (ref 0.4–4)
UROBILINOGEN UR STRIP-MCNC: 0 MG/DL (ref 0–2)
VZV IGG SER QL IA: 3.2 AI (ref 0–0.8)
WBC # BLD AUTO: 8.1 10E9/L (ref 4–11)
WBC #/AREA URNS AUTO: 1 /HPF (ref 0–5)

## 2019-03-25 PROCEDURE — 86706 HEP B SURFACE ANTIBODY: CPT | Performed by: INTERNAL MEDICINE

## 2019-03-25 PROCEDURE — 81001 URINALYSIS AUTO W/SCOPE: CPT | Performed by: INTERNAL MEDICINE

## 2019-03-25 PROCEDURE — 86708 HEPATITIS A ANTIBODY: CPT | Performed by: INTERNAL MEDICINE

## 2019-03-25 PROCEDURE — 84466 ASSAY OF TRANSFERRIN: CPT | Performed by: INTERNAL MEDICINE

## 2019-03-25 PROCEDURE — 86787 VARICELLA-ZOSTER ANTIBODY: CPT | Performed by: INTERNAL MEDICINE

## 2019-03-25 PROCEDURE — 80061 LIPID PANEL: CPT | Performed by: INTERNAL MEDICINE

## 2019-03-25 PROCEDURE — 86901 BLOOD TYPING SEROLOGIC RH(D): CPT | Performed by: INTERNAL MEDICINE

## 2019-03-25 PROCEDURE — 83735 ASSAY OF MAGNESIUM: CPT | Performed by: INTERNAL MEDICINE

## 2019-03-25 PROCEDURE — 40000269 ZZH STATISTIC NO CHARGE FACILITY FEE: Mod: ZF

## 2019-03-25 PROCEDURE — 86777 TOXOPLASMA ANTIBODY: CPT | Performed by: INTERNAL MEDICINE

## 2019-03-25 PROCEDURE — 82805 BLOOD GASES W/O2 SATURATION: CPT | Performed by: INTERNAL MEDICINE

## 2019-03-25 PROCEDURE — G0472 HEP C SCREEN HIGH RISK/OTHER: HCPCS | Performed by: INTERNAL MEDICINE

## 2019-03-25 PROCEDURE — 25500064 ZZH RX 255 OP 636: Performed by: INTERNAL MEDICINE

## 2019-03-25 PROCEDURE — 86696 HERPES SIMPLEX TYPE 2 TEST: CPT | Performed by: INTERNAL MEDICINE

## 2019-03-25 PROCEDURE — 86803 HEPATITIS C AB TEST: CPT | Performed by: INTERNAL MEDICINE

## 2019-03-25 PROCEDURE — 40000866 ZZHCL STATISTIC HIV 1/2 ANTIGEN/ANTIBODY PRETRANSPLANT ONLY: Performed by: INTERNAL MEDICINE

## 2019-03-25 PROCEDURE — 85610 PROTHROMBIN TIME: CPT | Performed by: INTERNAL MEDICINE

## 2019-03-25 PROCEDURE — 82306 VITAMIN D 25 HYDROXY: CPT | Performed by: INTERNAL MEDICINE

## 2019-03-25 PROCEDURE — 93306 TTE W/DOPPLER COMPLETE: CPT | Mod: 26 | Performed by: INTERNAL MEDICINE

## 2019-03-25 PROCEDURE — 78580 LUNG PERFUSION IMAGING: CPT

## 2019-03-25 PROCEDURE — 82150 ASSAY OF AMYLASE: CPT | Performed by: INTERNAL MEDICINE

## 2019-03-25 PROCEDURE — 80323 ALKALOIDS NOS: CPT | Performed by: INTERNAL MEDICINE

## 2019-03-25 PROCEDURE — 84134 ASSAY OF PREALBUMIN: CPT | Performed by: INTERNAL MEDICINE

## 2019-03-25 PROCEDURE — 86900 BLOOD TYPING SEROLOGIC ABO: CPT | Performed by: INTERNAL MEDICINE

## 2019-03-25 PROCEDURE — 40000264 ECHOCARDIOGRAM COMPLETE

## 2019-03-25 PROCEDURE — 82787 IGG 1 2 3 OR 4 EACH: CPT | Performed by: INTERNAL MEDICINE

## 2019-03-25 PROCEDURE — 85730 THROMBOPLASTIN TIME PARTIAL: CPT | Performed by: INTERNAL MEDICINE

## 2019-03-25 PROCEDURE — 86905 BLOOD TYPING RBC ANTIGENS: CPT | Performed by: INTERNAL MEDICINE

## 2019-03-25 PROCEDURE — 86644 CMV ANTIBODY: CPT | Performed by: INTERNAL MEDICINE

## 2019-03-25 PROCEDURE — 82784 ASSAY IGA/IGD/IGG/IGM EACH: CPT | Performed by: INTERNAL MEDICINE

## 2019-03-25 PROCEDURE — 86695 HERPES SIMPLEX TYPE 1 TEST: CPT | Performed by: INTERNAL MEDICINE

## 2019-03-25 PROCEDURE — 84443 ASSAY THYROID STIM HORMONE: CPT | Performed by: INTERNAL MEDICINE

## 2019-03-25 PROCEDURE — 85025 COMPLETE CBC W/AUTO DIFF WBC: CPT | Performed by: INTERNAL MEDICINE

## 2019-03-25 PROCEDURE — 86481 TB AG RESPONSE T-CELL SUSP: CPT | Performed by: INTERNAL MEDICINE

## 2019-03-25 PROCEDURE — 86886 COOMBS TEST INDIRECT TITER: CPT | Performed by: INTERNAL MEDICINE

## 2019-03-25 PROCEDURE — 36415 COLL VENOUS BLD VENIPUNCTURE: CPT | Performed by: INTERNAL MEDICINE

## 2019-03-25 PROCEDURE — A9540 TC99M MAA: HCPCS | Performed by: INTERNAL MEDICINE

## 2019-03-25 PROCEDURE — 34300033 ZZH RX 343: Performed by: INTERNAL MEDICINE

## 2019-03-25 PROCEDURE — 80053 COMPREHEN METABOLIC PANEL: CPT | Performed by: INTERNAL MEDICINE

## 2019-03-25 PROCEDURE — 87340 HEPATITIS B SURFACE AG IA: CPT | Performed by: INTERNAL MEDICINE

## 2019-03-25 PROCEDURE — 86850 RBC ANTIBODY SCREEN: CPT | Performed by: INTERNAL MEDICINE

## 2019-03-25 PROCEDURE — 84100 ASSAY OF PHOSPHORUS: CPT | Performed by: INTERNAL MEDICINE

## 2019-03-25 PROCEDURE — 40000141 ZZH STATISTIC PERIPHERAL IV START W/O US GUIDANCE

## 2019-03-25 PROCEDURE — 82785 ASSAY OF IGE: CPT | Performed by: INTERNAL MEDICINE

## 2019-03-25 PROCEDURE — 86704 HEP B CORE ANTIBODY TOTAL: CPT | Performed by: INTERNAL MEDICINE

## 2019-03-25 PROCEDURE — 86665 EPSTEIN-BARR CAPSID VCA: CPT | Performed by: INTERNAL MEDICINE

## 2019-03-25 RX ADMIN — HUMAN ALBUMIN MICROSPHERES AND PERFLUTREN 5 ML: 10; .22 INJECTION, SOLUTION INTRAVENOUS at 14:00

## 2019-03-25 RX ADMIN — Medication 6.3 MILLICURIE: at 14:30

## 2019-03-25 ASSESSMENT — MIFFLIN-ST. JEOR: SCORE: 1199.08

## 2019-03-25 NOTE — PROGRESS NOTES
"Chief Complaint   Patient presents with     Allied Health Visit     Pre Lung Eval     /80   Pulse 92   Temp 97.5  F (36.4  C) (Oral)   Ht 1.575 m (5' 2\")   Wt 69.1 kg (152 lb 4.8 oz)   SpO2 93%   BMI 27.86 kg/m    Talita Cannon CMA    "

## 2019-03-26 ENCOUNTER — ALLIED HEALTH/NURSE VISIT (OUTPATIENT)
Dept: TRANSPLANT | Facility: CLINIC | Age: 64
End: 2019-03-26
Attending: INTERNAL MEDICINE
Payer: MEDICARE

## 2019-03-26 ENCOUNTER — ALLIED HEALTH/NURSE VISIT (OUTPATIENT)
Dept: TRANSPLANT | Facility: CLINIC | Age: 64
End: 2019-03-26
Attending: INTERNAL MEDICINE
Payer: COMMERCIAL

## 2019-03-26 DIAGNOSIS — J44.9 COPD (CHRONIC OBSTRUCTIVE PULMONARY DISEASE) (H): ICD-10-CM

## 2019-03-26 DIAGNOSIS — J44.9 COPD (CHRONIC OBSTRUCTIVE PULMONARY DISEASE) (H): Primary | ICD-10-CM

## 2019-03-26 DIAGNOSIS — R07.9 CHEST PAIN: ICD-10-CM

## 2019-03-26 DIAGNOSIS — J44.9 CHRONIC OBSTRUCTIVE PULMONARY DISEASE, UNSPECIFIED COPD TYPE (H): ICD-10-CM

## 2019-03-26 DIAGNOSIS — Z01.818 PRE-TRANSPLANT EVALUATION FOR LUNG TRANSPLANT: Primary | ICD-10-CM

## 2019-03-26 DIAGNOSIS — Z76.82 ORGAN TRANSPLANT CANDIDATE: ICD-10-CM

## 2019-03-26 DIAGNOSIS — R93.89 ABNORMAL CHEST CT: ICD-10-CM

## 2019-03-26 LAB
6 MIN WALK (FT): 730 FT
6 MIN WALK (M): 223 M
A* LOCUS: NORMAL
ABTEST METHOD: NORMAL
B* LOCUS: NORMAL
B*: NORMAL
BW-1: NORMAL
C* LOCUS: NORMAL
C*: NORMAL
DLCOCOR-%PRED-PRE: 35 %
DLCOCOR-PRE: 6.69 ML/MIN/MMHG
DLCOUNC-%PRED-PRE: 35 %
DLCOUNC-PRE: 6.69 ML/MIN/MMHG
DLCOUNC-PRED: 18.66 ML/MIN/MMHG
DPA1* NMDP: NORMAL
DPA1*: NORMAL
DPB1* LOCUS NMDP: NORMAL
DPB1* NMDP: NORMAL
DPB1*: NORMAL
DPB1*LOCUS: NORMAL
DQA1*LOCUS: NORMAL
DQB1* LOCUS: NORMAL
DQB1*: NORMAL
DRB1* LOCUS: NORMAL
DRB1*: NORMAL
DRB3* LOCUS: NORMAL
DRB3*: NORMAL
DRSSO TEST METHOD: NORMAL
ERV-%PRED-PRE: 86 %
ERV-PRE: 0.54 L
ERV-PRED: 0.62 L
EXPTIME-PRE: 11.69 SEC
FEF2575-%PRED-PRE: 8 %
FEF2575-PRE: 0.18 L/SEC
FEF2575-PRED: 2.05 L/SEC
FEFMAX-%PRED-PRE: 33 %
FEFMAX-PRE: 1.92 L/SEC
FEFMAX-PRED: 5.82 L/SEC
FEV1-%PRED-PRE: 22 %
FEV1-PRE: 0.5 L
FEV1FEV6-PRE: 38 %
FEV1FEV6-PRED: 80 %
FEV1FVC-PRE: 30 %
FEV1FVC-PRED: 79 %
FEV1SVC-PRE: 35 %
FEV1SVC-PRED: 76 %
FIFMAX-PRE: 1.56 L/SEC
FRCPLETH-%PRED-PRE: 175 %
FRCPLETH-PRE: 4.55 L
FRCPLETH-PRED: 2.59 L
FVC-%PRED-PRE: 59 %
FVC-PRE: 1.69 L
FVC-PRED: 2.86 L
GAMMA INTERFERON BACKGROUND BLD IA-ACNC: 0.03 IU/ML
IC-%PRED-PRE: 38 %
IC-PRE: 0.89 L
IC-PRED: 2.33 L
IGG SERPL-MCNC: 901 MG/DL (ref 695–1620)
IGG1 SER-MCNC: 435 MG/DL (ref 300–856)
IGG2 SER-MCNC: 363 MG/DL (ref 158–761)
IGG3 SER-MCNC: 131 MG/DL (ref 24–192)
IGG4 SER-MCNC: 32 MG/DL (ref 11–86)
M TB IFN-G BLD-IMP: NEGATIVE
M TB IFN-G CD4+ BCKGRND COR BLD-ACNC: 4.96 IU/ML
MITOGEN IGNF BCKGRD COR BLD-ACNC: 0 IU/ML
MITOGEN IGNF BCKGRD COR BLD-ACNC: 0 IU/ML
RVPLETH-%PRED-PRE: 216 %
RVPLETH-PRE: 4.01 L
RVPLETH-PRED: 1.86 L
TLCPLETH-%PRED-PRE: 118 %
TLCPLETH-PRE: 5.44 L
TLCPLETH-PRED: 4.6 L
VA-%PRED-PRE: 71 %
VA-PRE: 3.15 L
VC-%PRED-PRE: 48 %
VC-PRE: 1.43 L
VC-PRED: 2.95 L

## 2019-03-26 PROCEDURE — 40000269 ZZH STATISTIC NO CHARGE FACILITY FEE: Mod: ZF

## 2019-03-26 NOTE — NURSING NOTE
Met with Melissa and  Tyrese to discuss the coronary angiogram/right heart cath scheduled for 03/27/2019. Will check in at 0830 to [facility] Waiting Room.  Reviewed rationale,  procedure and post care. Provided Coronary Angiogram booklet/Right heart cath booklet.  Instructed pt to continue aspirin 81 mg night before and morning of procedure, per Heart Cath Lab protocol.  K+ 3.3 (just slightly low).  Instructed patient not to eat or drink after midnight, but could take meds in AM with sips clear liquids.  Tyrese,  will be available to drive and care for patient after procedure.

## 2019-03-26 NOTE — NURSING NOTE
"Patient and spouse came to the pre lung class, content per [email or web address] videos. They were attentive, stated understanding, and asked good questions. They were given all relevant handouts.   Verified that patient has received the following items:      \"Questions and Answers for Transplant Candidates and Families about Multiple Listing Waiting Time Transfer\"       One-Year Survival Rates for Heart and Lung Transplant  for [facility].      Reviewed the following documents with the patient:      \"Guidelines for Being on the Transplant List\".      \" What You Need to Know about a Lung and Heart-Lung Transplant .      Provided Thoracic Transplant Patient Handbook.     Required signatures obtained and forms are scanned to EMR.  Addressed patient questions and concerns regarding transplant.    Discussed donor offers including increased risk, and DCD donors.     Discussed physician and coordinator management pre to post lung transplant.     HLA results pending.  Discussed PRA monitoring with patient.     Will review with Lung Transplant Team for transplant candidacy when evaluation complete.      Pt and family were encouraged to create login/password for [employer] to continue to view videos to reinforce education.      Patient received form for information for donor family at time of donation/transplant.  Will collect during closure visit.    "

## 2019-03-27 ENCOUNTER — HOSPITAL ENCOUNTER (OUTPATIENT)
Facility: CLINIC | Age: 64
Discharge: HOME | End: 2019-03-27
Attending: INTERNAL MEDICINE | Admitting: INTERNAL MEDICINE
Payer: MEDICARE

## 2019-03-27 ENCOUNTER — APPOINTMENT (OUTPATIENT)
Dept: MEDSURG UNIT | Facility: CLINIC | Age: 64
End: 2019-03-27
Attending: INTERNAL MEDICINE
Payer: MEDICARE

## 2019-03-27 VITALS
TEMPERATURE: 98.2 F | DIASTOLIC BLOOD PRESSURE: 83 MMHG | SYSTOLIC BLOOD PRESSURE: 156 MMHG | RESPIRATION RATE: 18 BRPM | HEART RATE: 89 BPM | OXYGEN SATURATION: 97 %

## 2019-03-27 DIAGNOSIS — Z98.890 STATUS POST CORONARY ANGIOGRAM: Primary | ICD-10-CM

## 2019-03-27 DIAGNOSIS — R06.02 SOB (SHORTNESS OF BREATH): ICD-10-CM

## 2019-03-27 DIAGNOSIS — J44.9 COPD (CHRONIC OBSTRUCTIVE PULMONARY DISEASE) (H): ICD-10-CM

## 2019-03-27 DIAGNOSIS — R07.9 CHEST PAIN: ICD-10-CM

## 2019-03-27 DIAGNOSIS — R93.89 ABNORMAL CHEST CT: ICD-10-CM

## 2019-03-27 LAB
BLD GP AB SCN TITR SERPL: NORMAL {TITER}
IGE SERPL-ACNC: 22 KIU/L (ref 0–114)
POTASSIUM SERPL-SCNC: 3.8 MMOL/L (ref 3.4–5.3)
PROTOCOL CUTOFF: NORMAL
SA1 CELL: NORMAL
SA1 COMMENTS: NORMAL
SA1 HI RISK ABY: NORMAL
SA1 MOD RISK ABY: NORMAL
SA1 TEST METHOD: NORMAL
SA2 CELL: NORMAL
SA2 COMMENTS: NORMAL
SA2 HI RISK ABY UA: NORMAL
SA2 MOD RISK ABY: NORMAL
SA2 TEST METHOD: NORMAL
UNACCEPTABLE ANTIGEN: NORMAL
UNOS CPRA: 0

## 2019-03-27 PROCEDURE — C1894 INTRO/SHEATH, NON-LASER: HCPCS | Performed by: INTERNAL MEDICINE

## 2019-03-27 PROCEDURE — 93005 ELECTROCARDIOGRAM TRACING: CPT

## 2019-03-27 PROCEDURE — 25000125 ZZHC RX 250: Performed by: INTERNAL MEDICINE

## 2019-03-27 PROCEDURE — 40000065 ZZH STATISTIC EKG NON-CHARGEABLE

## 2019-03-27 PROCEDURE — 99152 MOD SED SAME PHYS/QHP 5/>YRS: CPT | Performed by: INTERNAL MEDICINE

## 2019-03-27 PROCEDURE — 25000132 ZZH RX MED GY IP 250 OP 250 PS 637: Mod: GY | Performed by: INTERNAL MEDICINE

## 2019-03-27 PROCEDURE — 93010 ELECTROCARDIOGRAM REPORT: CPT | Performed by: INTERNAL MEDICINE

## 2019-03-27 PROCEDURE — 25000128 H RX IP 250 OP 636: Performed by: INTERNAL MEDICINE

## 2019-03-27 PROCEDURE — 93456 R HRT CORONARY ARTERY ANGIO: CPT | Mod: 26 | Performed by: INTERNAL MEDICINE

## 2019-03-27 PROCEDURE — 40000172 ZZH STATISTIC PROCEDURE PREP ONLY

## 2019-03-27 PROCEDURE — 27210794 ZZH OR GENERAL SUPPLY STERILE: Performed by: INTERNAL MEDICINE

## 2019-03-27 PROCEDURE — A9270 NON-COVERED ITEM OR SERVICE: HCPCS | Mod: GY | Performed by: INTERNAL MEDICINE

## 2019-03-27 PROCEDURE — C1769 GUIDE WIRE: HCPCS | Performed by: INTERNAL MEDICINE

## 2019-03-27 PROCEDURE — 84132 ASSAY OF SERUM POTASSIUM: CPT | Performed by: INTERNAL MEDICINE

## 2019-03-27 PROCEDURE — 93456 R HRT CORONARY ARTERY ANGIO: CPT | Performed by: INTERNAL MEDICINE

## 2019-03-27 RX ORDER — EPTIFIBATIDE 2 MG/ML
2 INJECTION, SOLUTION INTRAVENOUS CONTINUOUS PRN
Status: DISCONTINUED | OUTPATIENT
Start: 2019-03-27 | End: 2019-03-27 | Stop reason: HOSPADM

## 2019-03-27 RX ORDER — FENTANYL CITRATE 50 UG/ML
INJECTION, SOLUTION INTRAMUSCULAR; INTRAVENOUS
Status: DISCONTINUED | OUTPATIENT
Start: 2019-03-27 | End: 2019-03-27 | Stop reason: HOSPADM

## 2019-03-27 RX ORDER — NALOXONE HYDROCHLORIDE 0.4 MG/ML
.2-.4 INJECTION, SOLUTION INTRAMUSCULAR; INTRAVENOUS; SUBCUTANEOUS
Status: DISCONTINUED | OUTPATIENT
Start: 2019-03-27 | End: 2019-03-27 | Stop reason: HOSPADM

## 2019-03-27 RX ORDER — FENTANYL CITRATE 50 UG/ML
25-50 INJECTION, SOLUTION INTRAMUSCULAR; INTRAVENOUS
Status: DISCONTINUED | OUTPATIENT
Start: 2019-03-27 | End: 2019-03-27 | Stop reason: HOSPADM

## 2019-03-27 RX ORDER — NITROGLYCERIN 5 MG/ML
VIAL (ML) INTRAVENOUS
Status: DISCONTINUED | OUTPATIENT
Start: 2019-03-27 | End: 2019-03-27 | Stop reason: HOSPADM

## 2019-03-27 RX ORDER — SODIUM CHLORIDE 9 MG/ML
INJECTION, SOLUTION INTRAVENOUS CONTINUOUS
Status: DISCONTINUED | OUTPATIENT
Start: 2019-03-27 | End: 2019-03-27 | Stop reason: HOSPADM

## 2019-03-27 RX ORDER — LIDOCAINE 40 MG/G
CREAM TOPICAL
Status: DISCONTINUED | OUTPATIENT
Start: 2019-03-27 | End: 2019-03-27 | Stop reason: HOSPADM

## 2019-03-27 RX ORDER — NALOXONE HYDROCHLORIDE 0.4 MG/ML
.1-.4 INJECTION, SOLUTION INTRAMUSCULAR; INTRAVENOUS; SUBCUTANEOUS
Status: DISCONTINUED | OUTPATIENT
Start: 2019-03-27 | End: 2019-03-27 | Stop reason: HOSPADM

## 2019-03-27 RX ORDER — ACETAMINOPHEN 325 MG/1
650 TABLET ORAL EVERY 4 HOURS PRN
Status: DISCONTINUED | OUTPATIENT
Start: 2019-03-27 | End: 2019-03-27 | Stop reason: HOSPADM

## 2019-03-27 RX ORDER — NOREPINEPHRINE BITARTRATE/D5W 16MG/250ML
.03-.4 PLASTIC BAG, INJECTION (ML) INTRAVENOUS CONTINUOUS PRN
Status: DISCONTINUED | OUTPATIENT
Start: 2019-03-27 | End: 2019-03-27 | Stop reason: HOSPADM

## 2019-03-27 RX ORDER — HEPARIN SODIUM 1000 [USP'U]/ML
INJECTION, SOLUTION INTRAVENOUS; SUBCUTANEOUS
Status: DISCONTINUED | OUTPATIENT
Start: 2019-03-27 | End: 2019-03-27 | Stop reason: HOSPADM

## 2019-03-27 RX ORDER — FENTANYL CITRATE 50 UG/ML
25 INJECTION, SOLUTION INTRAMUSCULAR; INTRAVENOUS EVERY 5 MIN PRN
Status: DISCONTINUED | OUTPATIENT
Start: 2019-03-27 | End: 2019-03-27 | Stop reason: HOSPADM

## 2019-03-27 RX ORDER — DOPAMINE HYDROCHLORIDE 160 MG/100ML
2-20 INJECTION, SOLUTION INTRAVENOUS CONTINUOUS PRN
Status: DISCONTINUED | OUTPATIENT
Start: 2019-03-27 | End: 2019-03-27 | Stop reason: HOSPADM

## 2019-03-27 RX ORDER — FLUMAZENIL 0.1 MG/ML
0.2 INJECTION, SOLUTION INTRAVENOUS
Status: DISCONTINUED | OUTPATIENT
Start: 2019-03-27 | End: 2019-03-27 | Stop reason: HOSPADM

## 2019-03-27 RX ORDER — ARGATROBAN 1 MG/ML
350 INJECTION, SOLUTION INTRAVENOUS
Status: DISCONTINUED | OUTPATIENT
Start: 2019-03-27 | End: 2019-03-27 | Stop reason: HOSPADM

## 2019-03-27 RX ORDER — EPTIFIBATIDE 2 MG/ML
180 INJECTION, SOLUTION INTRAVENOUS EVERY 10 MIN PRN
Status: DISCONTINUED | OUTPATIENT
Start: 2019-03-27 | End: 2019-03-27 | Stop reason: HOSPADM

## 2019-03-27 RX ORDER — IOPAMIDOL 755 MG/ML
INJECTION, SOLUTION INTRAVASCULAR
Status: DISCONTINUED | OUTPATIENT
Start: 2019-03-27 | End: 2019-03-27 | Stop reason: HOSPADM

## 2019-03-27 RX ORDER — NICARDIPINE HYDROCHLORIDE 2.5 MG/ML
INJECTION INTRAVENOUS
Status: DISCONTINUED | OUTPATIENT
Start: 2019-03-27 | End: 2019-03-27 | Stop reason: HOSPADM

## 2019-03-27 RX ORDER — ATROPINE SULFATE 0.1 MG/ML
0.5 INJECTION INTRAVENOUS EVERY 5 MIN PRN
Status: DISCONTINUED | OUTPATIENT
Start: 2019-03-27 | End: 2019-03-27 | Stop reason: HOSPADM

## 2019-03-27 RX ORDER — NITROGLYCERIN 20 MG/100ML
.07-2 INJECTION INTRAVENOUS CONTINUOUS PRN
Status: DISCONTINUED | OUTPATIENT
Start: 2019-03-27 | End: 2019-03-27 | Stop reason: HOSPADM

## 2019-03-27 RX ORDER — DOBUTAMINE HYDROCHLORIDE 200 MG/100ML
2-20 INJECTION INTRAVENOUS CONTINUOUS PRN
Status: DISCONTINUED | OUTPATIENT
Start: 2019-03-27 | End: 2019-03-27 | Stop reason: HOSPADM

## 2019-03-27 RX ORDER — ARGATROBAN 1 MG/ML
150 INJECTION, SOLUTION INTRAVENOUS
Status: DISCONTINUED | OUTPATIENT
Start: 2019-03-27 | End: 2019-03-27 | Stop reason: HOSPADM

## 2019-03-27 RX ORDER — ONDANSETRON 2 MG/ML
4 INJECTION INTRAMUSCULAR; INTRAVENOUS EVERY 6 HOURS PRN
Status: DISCONTINUED | OUTPATIENT
Start: 2019-03-27 | End: 2019-03-27 | Stop reason: HOSPADM

## 2019-03-27 RX ORDER — FENTANYL CITRATE 50 UG/ML
50 INJECTION, SOLUTION INTRAMUSCULAR; INTRAVENOUS
Status: DISCONTINUED | OUTPATIENT
Start: 2019-03-27 | End: 2019-03-27 | Stop reason: HOSPADM

## 2019-03-27 RX ADMIN — ASPIRIN 325 MG: 325 TABLET, DELAYED RELEASE ORAL at 09:46

## 2019-03-27 NOTE — Clinical Note
Potential access sites were evaluated for patency using ultrasound.   The left radial artery was selected. Access was obtained under with Sonosite guidance using a micropuncture 21 guage needle with direct visualization of needle entry.

## 2019-03-27 NOTE — Clinical Note
dry, intact, no bleeding and no hematoma. 7Fr RIJV sheath removed, manual pressure held until hemostasis. 6Fr LRA sheath removed, TR band placed.

## 2019-03-27 NOTE — H&P
[facility]   Interventional Cardiology   History and Physical     Melissa Elder MRN# [id number]   YOB: 1955 Age: 63 year old     Chief Complaint: Presenting for coronary angiogram for transplant lanre    HPI: 64 yo female with PMH of COPD (on home O2), ongoing evaluation for lung transplant presenting today for coronary angiogram as part of transplant evaluation.    Pt has not had any changes to her health since seeing her doctor in January. She continues to have her stable shortness of breath which she has been managing as usual with her home regimen of inhalers and home O2. She did have a bout of diarrhea about 3 days ago which she attributed to nerves for upcoming transplant evaluation, but she has recovered and she has no ongoing symptoms. She took ASA 81 mg last night and this morning. Not on other anticoagulation.      ROS: The patient is currently denying fever/chills, chest pain, changes to cough, nausea/vomiting/diarrhea, and swelling in the legs. All other systems were reviewed and were negative.      Consenting/Education for Cardiac Cath Lab Procedure: Coronary Angiogram and Possible Percutaneous Intervention     Patient understands we would like to perform .Coronary Angiogram and Possible Percutaneous Intervention due to lung transplant evaluation. This procedure will be performed by Dr. Serrano.    The patient understands the following:     Coronary Angiogram with Possible Percutaneous Intervention: During the portion for the coronary angiogram a fine tube (catheter) is put into the artery in the groin/arm. The tube is carefully passed into each coronary artery. A series of pictures are taken using x-rays and a contrast medium (x-ray dye). The contrast medium is injected to look for any blockages or narrowing in the arteries of the heart. If necessary and indicated, a stent may be deployed during this procedure.  At the end of the procedure the artery may be  closed with a special plug, Angio Seal, to stop the bleeding.     Moderate sedation is required for this procedure, which the patient understands. Patient also understands risks and complications of the procedure which include, but are not limited to bruising/swelling around the incision site, infection, bleeding, allergic reaction to local anesthetic, air embolism, arterial puncture, stroke, heart attack.       Patient verbalized understanding of risks and benefits and has elected to proceed with the procedure or procedures listed above.      Past Medical History:   Diagnosis Date     Atrial fibrillation with RVR (H)     hx of A fib related to severe COPD, does not meet anticoagulation criteria as noted     COPD, severe (H)      Osteoporosis        History reviewed. No pertinent surgical history.      No current facility-administered medications on file prior to encounter.   Current Outpatient Medications on File Prior to Encounter:  aspirin 81 MG EC tablet Take 81 mg by mouth   atorvastatin (LIPITOR) 20 MG tablet Take 20 mg by mouth   budesonide-formoterol (SYMBICORT) 160-4.5 MCG/ACT Inhaler Inhale 2 puffs into the lungs   calcium 500 MG CHEW    cetirizine (ZYRTEC) 10 MG tablet Take 10 mg by mouth   fluticasone (FLONASE) 50 MCG/ACT spray Spray 1 spray in nostril   levalbuterol (XOPENEX HFA) 45 MCG/ACT Inhaler Inhale 2 puffs into the lungs   levalbuterol (XOPENEX) 1.25 MG/3ML neb solution Inhale 1.25 mg into the lungs   predniSONE (DELTASONE) 10 MG tablet Take 10 mg by mouth   tiotropium (SPIRIVA RESPIMAT) 2.5 MCG/ACT inhalation aerosol Inhale 2 puffs into the lungs   OXYGEN-HELIUM IN 2 lpm per nasal canula nocturnal and with activity may use portable w/ conserving device       Family History   Problem Relation Age of Onset     Breast Cancer Mother      Colon Cancer Mother      Lung Cancer Mother      Lung Cancer Father      Lung Cancer Maternal Aunt      Pancreatic Cancer Maternal Uncle        Social History      Tobacco Use     Smoking status: Former Smoker     Packs/day: 1.50     Years: 36.00     Pack years: 54.00     Types: Cigarettes     Last attempt to quit: 2006     Years since quittin.2     Smokeless tobacco: Never Used   Substance Use Topics     Alcohol use: Yes     Comment: stated beer and wine occ       Allergies   Allergen Reactions     Fosamax [Alendronic Acid] Other (See Comments)     dizziness     Sulfa Drugs Rash         Physical Examination:  Vitals: /70 (BP Location: Left arm)   Pulse 90   Temp 98.3  F (36.8  C) (Oral)   Resp 20   SpO2 98%   BMI= There is no height or weight on file to calculate BMI.    Constitutional: Alert and awake, chronically-ill appearing, in no significant pain or respiratory distress, NC in place, speaking in full sentences and able to communicate all needs  ENT: Mallampati III  Skin: No erythema or signs of infection, ecchymosis on left dorsal wrist  Cardiac: RRR, no r/m/g  Pulmonary: Reasonable air movement, bibasilar rhonchi, no wheezes, no areas of decreased lung sounds  GI: +BS, NTTP  Extremities: All extremities are warm and well-perfused, no significant edema    Laboratory:  CMP  Recent Labs   Lab 19  0834      POTASSIUM 3.3*   CHLORIDE 103   CO2 29   ANIONGAP 6   *   BUN 10   CR 0.56   GFRESTIMATED >90   GFRESTBLACK >90   MINISTERIO 9.2   MAG 1.9   PHOS 3.7   PROTTOTAL 7.8   ALBUMIN 3.9   BILITOTAL 0.6   ALKPHOS 104   AST 20   ALT 30     CBC  Recent Labs   Lab 19  0834   WBC 8.1   RBC 4.65   HGB 13.4   HCT 41.5   MCV 89   MCH 28.8   MCHC 32.3   RDW 12.5          No results found for: TROPI, TROPONIN, TROPR, TROPN      EKG RBBB with possible left posterior fascicular block, rate of 85, , no ischemic signs    Assessment and plan:     - Proceed with CORS with possible PCI as planned  - Will give  prior to procedure  - K low 3/25, rechecked today and within acceptable range  - Patient not on coumadin or other  AC    Lisa Catherine PA-C  [facility] Cardiology

## 2019-03-27 NOTE — H&P
[facility]   Interventional Cardiology   History and Physical     Melissa Elder MRN# [id number]   YOB: 1955 Age: 63 year old     Chief Complaint: Presenting for coronary angiogram for transplant lanre    HPI: 62 yo female with PMH of COPD (on home O2), ongoing evaluation for lung transplant presenting today for coronary angiogram as part of transplant evaluation.    Pt has not had any changes to her health since seeing her doctor in January. She continues to have her stable shortness of breath which she has been managing as usual with her home regimen of inhalers and home O2. She did have a bout of diarrhea about 3 days ago which she attributed to nerves for upcoming transplant evaluation, but she has recovered and she has no ongoing symptoms. She took ASA 81 mg last night and this morning. Not on other anticoagulation.      ROS: The patient is currently denying fever/chills, chest pain, changes to cough, nausea/vomiting/diarrhea, and swelling in the legs. All other systems were reviewed and were negative.      Consenting/Education for Cardiac Cath Lab Procedure: Coronary Angiogram and Possible Percutaneous Intervention     Patient understands we would like to perform .Coronary Angiogram and Possible Percutaneous Intervention due to lung transplant evaluation. This procedure will be performed by Dr. Serrano.    The patient understands the following:     Coronary Angiogram with Possible Percutaneous Intervention: During the portion for the coronary angiogram a fine tube (catheter) is put into the artery in the groin/arm. The tube is carefully passed into each coronary artery. A series of pictures are taken using x-rays and a contrast medium (x-ray dye). The contrast medium is injected to look for any blockages or narrowing in the arteries of the heart. If necessary and indicated, a stent may be deployed during this procedure.  At the end of the procedure the artery may be  closed with a special plug, Angio Seal, to stop the bleeding.     Moderate sedation is required for this procedure, which the patient understands. Patient also understands risks and complications of the procedure which include, but are not limited to bruising/swelling around the incision site, infection, bleeding, allergic reaction to local anesthetic, air embolism, arterial puncture, stroke, heart attack.       Patient verbalized understanding of risks and benefits and has elected to proceed with the procedure or procedures listed above.      Past Medical History:   Diagnosis Date     Atrial fibrillation with RVR (H)     hx of A fib related to severe COPD, does not meet anticoagulation criteria as noted     COPD, severe (H)      Osteoporosis        History reviewed. No pertinent surgical history.      No current facility-administered medications on file prior to encounter.   Current Outpatient Medications on File Prior to Encounter:  aspirin 81 MG EC tablet Take 81 mg by mouth   atorvastatin (LIPITOR) 20 MG tablet Take 20 mg by mouth   budesonide-formoterol (SYMBICORT) 160-4.5 MCG/ACT Inhaler Inhale 2 puffs into the lungs   calcium 500 MG CHEW    cetirizine (ZYRTEC) 10 MG tablet Take 10 mg by mouth   fluticasone (FLONASE) 50 MCG/ACT spray Spray 1 spray in nostril   levalbuterol (XOPENEX HFA) 45 MCG/ACT Inhaler Inhale 2 puffs into the lungs   levalbuterol (XOPENEX) 1.25 MG/3ML neb solution Inhale 1.25 mg into the lungs   predniSONE (DELTASONE) 10 MG tablet Take 10 mg by mouth   tiotropium (SPIRIVA RESPIMAT) 2.5 MCG/ACT inhalation aerosol Inhale 2 puffs into the lungs   OXYGEN-HELIUM IN 2 lpm per nasal canula nocturnal and with activity may use portable w/ conserving device       Family History   Problem Relation Age of Onset     Breast Cancer Mother      Colon Cancer Mother      Lung Cancer Mother      Lung Cancer Father      Lung Cancer Maternal Aunt      Pancreatic Cancer Maternal Uncle        Social History      Tobacco Use     Smoking status: Former Smoker     Packs/day: 1.50     Years: 36.00     Pack years: 54.00     Types: Cigarettes     Last attempt to quit: 2006     Years since quittin.2     Smokeless tobacco: Never Used   Substance Use Topics     Alcohol use: Yes     Comment: stated beer and wine occ       Allergies   Allergen Reactions     Fosamax [Alendronic Acid] Other (See Comments)     dizziness     Sulfa Drugs Rash         Physical Examination:  Vitals: /70 (BP Location: Left arm)   Pulse 90   Temp 98.3  F (36.8  C) (Oral)   Resp 20   SpO2 98%   BMI= There is no height or weight on file to calculate BMI.    Constitutional: Alert and awake, chronically-ill appearing, in no significant pain or respiratory distress, NC in place, speaking in full sentences and able to communicate all needs  ENT: Mallampati III  Skin: No erythema or signs of infection, ecchymosis on left dorsal wrist  Cardiac: RRR, no r/m/g  Pulmonary: Reasonable air movement, bibasilar rhonchi, no wheezes, no areas of decreased lung sounds  GI: +BS, NTTP  Extremities: All extremities are warm and well-perfused, no significant edema    Laboratory:  CMP  Recent Labs   Lab 19  0834      POTASSIUM 3.3*   CHLORIDE 103   CO2 29   ANIONGAP 6   *   BUN 10   CR 0.56   GFRESTIMATED >90   GFRESTBLACK >90   MINISTERIO 9.2   MAG 1.9   PHOS 3.7   PROTTOTAL 7.8   ALBUMIN 3.9   BILITOTAL 0.6   ALKPHOS 104   AST 20   ALT 30     CBC  Recent Labs   Lab 19  0834   WBC 8.1   RBC 4.65   HGB 13.4   HCT 41.5   MCV 89   MCH 28.8   MCHC 32.3   RDW 12.5          No results found for: TROPI, TROPONIN, TROPR, TROPN      EKG RBBB with possible left posterior fascicular block, rate of 85, , no ischemic signs    Assessment and plan:     - Proceed with CORS with possible PCI as planned  - Will give  prior to procedure  - K low 3/25, rechecked today and within acceptable range  - Patient not on coumadin or other  AC    Lisa Catherine PA-C  [facility] Cardiology

## 2019-03-27 NOTE — DISCHARGE INSTRUCTIONS
Going Home after Coronary Angiogram     FOR 24 HOURS:         Have an adult stay with you for 24 hours.         Relax and take it easy.         Drink plenty of fluids.         Do NOT make any important or legal decisions.         Do NOT drive or operate machines at home or at work.         Do NOT drink alcohol.     PROCEDURE SITE:  Care of wrist or arm site:         It is normal to have soreness at the puncture site and mild tingling in your hand for up to 3 days.           Remove the Band-Aid after 24 hours. If there is minor oozing, apply another Band-aid and remove it after 12 hours.          Do NOT take a bath, or use a hot tub or pool for the next 48 hours. You may shower.          It is normal to have a small bruise.  There should not be a lump at the site.         Do not scrub the site.         Do not use lotion or powder near the puncture site for 3 days.         For 2 days, do not use your hand or arm to support your weight (such as rising from a chair) or bend your wrist (such as lifting a garage door).         For 2 days, do not lift more than 5 pounds or exercise your arm (tennis, golf or bowling).    If you start bleeding from the site in your arm: Sit down and press firmly on the site with your fingers for 10 minutes. Call your doctor as soon as you can.    Call 911 right away if you have bleeding that is heavy or does not stop.      MEDICATIONS:  1. If you are on metformin (Glucophage), do not restart it until you have blood tests (within 2 to 3 days after discharge). When your doctor tells you it is safe, you may restart the metformin.   2. Please review your medication instructions in your discharge paperwork. If you have not been continued on other medications you were previously taking at home it is probably for reason though please discuss your concerns with any of your doctors.     DIET:  We recommend a diet low in saturated fat, trans fat and cholesterol. In addition it will be helpful to be  cautious of sodium intake, sugar and carbohydrates. Try to increase the amount of lean meats you eat like fish and chicken, but avoid frying; and reduce the amount of red meat you eat. Eat more fresh fruits and vegetables and try to avoid canned and processed food. Please reference the handouts you received for more specific information.    OTHER INFORMATION:  1. Consider having your family members learn CPR if they do not know it already.  2. If you are a smoker, quitting smoking will be one of the most important things you can do for yourself. There are nicotine replacement options or medications they might be able to be prescribed. Please discuss this with your doctors. Consider calling the [employer] at [phone]-ZYMQ ([phone]) as they can offer ongoing support after discharge. ***    CALL YOUR DOCTOR IF:  -You have a large or growing lump/bump around the procedure site  -The site is red, swollen, hot, tender or has drainage  -You have hives, a rash or unusual itching  -You have increasing or worsening shortness of breath or chest pain    FOLLOW UP:  We prefer you to follow up with your primary care provider within one week. You will be arranged to see the cardiologist (heart doctor) in clinic in about one month.     Should you need to contact us:  Cardiology clinic for scheduling or triage nurse questions/concerns:  [phone]

## 2019-03-27 NOTE — PROGRESS NOTES
Cors pt ok to dc home.   Is tolerating fluid and food, voiding spontaenously, and ambulated in hallway.  Site CDI, no hematoma noted at this time.  DC instructions given to pt and pt's , IV DC'd and documented, all belongings with patient.      /83 (BP Location: Right arm)   Pulse 89   Temp 98.2  F (36.8  C) (Oral)   Resp 18   SpO2 97%

## 2019-03-28 ENCOUNTER — OFFICE VISIT (OUTPATIENT)
Dept: CARDIOLOGY | Facility: CLINIC | Age: 64
End: 2019-03-28
Attending: INTERNAL MEDICINE
Payer: MEDICARE

## 2019-03-28 ENCOUNTER — HOSPITAL ENCOUNTER (OUTPATIENT)
Facility: CLINIC | Age: 64
Discharge: HOME | End: 2019-03-28
Attending: INTERNAL MEDICINE | Admitting: INTERNAL MEDICINE
Payer: MEDICARE

## 2019-03-28 VITALS
SYSTOLIC BLOOD PRESSURE: 159 MMHG | HEIGHT: 62 IN | OXYGEN SATURATION: 94 % | WEIGHT: 160.8 LBS | DIASTOLIC BLOOD PRESSURE: 81 MMHG | HEART RATE: 84 BPM | BODY MASS INDEX: 29.59 KG/M2

## 2019-03-28 DIAGNOSIS — J43.9 PULMONARY EMPHYSEMA, UNSPECIFIED EMPHYSEMA TYPE (H): Primary | ICD-10-CM

## 2019-03-28 LAB — INTERPRETATION ECG - MUSE: NORMAL

## 2019-03-28 PROCEDURE — 91038 ESOPH IMPED FUNCT TEST > 1HR: CPT | Performed by: INTERNAL MEDICINE

## 2019-03-28 ASSESSMENT — MIFFLIN-ST. JEOR: SCORE: 1237.63

## 2019-03-28 ASSESSMENT — PAIN SCALES - GENERAL: PAINLEVEL: NO PAIN (0)

## 2019-03-28 NOTE — LETTER
3/28/2019      RE: Melissa Elder  [address]  [facility] [address]       Dear Colleague,    Thank you for the opportunity to participate in the care of your patient, Melissa Elder, at the [facility]. Please see a copy of my visit note below.    I did not see the patient on this visit.    Please do not hesitate to contact me if you have any questions/concerns.     Sincerely,     Alberto Estevez MD

## 2019-03-28 NOTE — DISCHARGE INSTRUCTIONS
Motility Discharge Instructions    1. Resume regular diet.  2. You may have a bloody nose or sore throat after the procedure.  3. If you had a 24 hour probe placed, return  the next day to have the probe removed.  Removal will take 5 minutes.  4. If you have questions call [phone] from 7:00am-4:30pm.  For after hours questions call GI doctor on call at [phone].    Jelly Ray RN

## 2019-03-28 NOTE — OR NURSING
Pt here for manometry/ 24 hr ph. Pt arrived via w/c and on 3l 02 via NC and kept on O2 throughout procedure. Procedure explained and consent given. Manometry catheter placed easily via L nare to 52 cm for swallows. NS swallows given per protocol and pt tolerated well. Catheter then removed. Ph catheter then placed easily to 37 cm for study. Reviewed diary and discharge instructions with pt. Pt will return tomorrow to have catheter removed. Discharge to home without complaint on 2l 02 via NC and via w/c.

## 2019-03-29 ENCOUNTER — ALLIED HEALTH/NURSE VISIT (OUTPATIENT)
Dept: TRANSPLANT | Facility: CLINIC | Age: 64
End: 2019-03-29
Attending: INTERNAL MEDICINE
Payer: MEDICARE

## 2019-03-29 ENCOUNTER — HOSPITAL ENCOUNTER (OUTPATIENT)
Dept: CARDIAC REHAB | Facility: CLINIC | Age: 64
End: 2019-03-29
Attending: INTERNAL MEDICINE
Payer: MEDICARE

## 2019-03-29 ENCOUNTER — ANCILLARY PROCEDURE (OUTPATIENT)
Dept: CT IMAGING | Facility: CLINIC | Age: 64
End: 2019-03-29
Attending: INTERNAL MEDICINE
Payer: COMMERCIAL

## 2019-03-29 DIAGNOSIS — J44.9 COPD (CHRONIC OBSTRUCTIVE PULMONARY DISEASE) (H): Primary | ICD-10-CM

## 2019-03-29 DIAGNOSIS — J44.9 COPD (CHRONIC OBSTRUCTIVE PULMONARY DISEASE) (H): ICD-10-CM

## 2019-03-29 DIAGNOSIS — Z76.82 ORGAN TRANSPLANT CANDIDATE: ICD-10-CM

## 2019-03-29 DIAGNOSIS — Z76.82 ORGAN TRANSPLANT CANDIDATE: Primary | ICD-10-CM

## 2019-03-29 DIAGNOSIS — J44.9 CHRONIC OBSTRUCTIVE PULMONARY DISEASE, UNSPECIFIED COPD TYPE (H): ICD-10-CM

## 2019-03-29 DIAGNOSIS — R06.02 SOB (SHORTNESS OF BREATH): ICD-10-CM

## 2019-03-29 PROCEDURE — 40000269 ZZH STATISTIC NO CHARGE FACILITY FEE: Mod: ZF

## 2019-03-29 PROCEDURE — G0238 OTH RESP PROC, INDIV: HCPCS

## 2019-03-29 PROCEDURE — 40000244 ZZH STATISTIC VISIT PULM REHAB

## 2019-03-29 NOTE — NURSING NOTE
CLOSURE VISIT    Met with Melissa and  at completion of the evaluation testing. Reviewed lab and imaging results. Noted Osteoporosis.    Reviewed PRA results which showed 05 UNOS PRA.  Will continue to monitor PRA results every 3 months when listed for lung transplant.  Confirmed that pH study results will be reviewed when available.    Sputum culture:  Not provided  FIT test:  N/A   Diabetic status: Not diabetic    Pt functional status: 60%  (Patient status report updated)  Colonoscopy: 2016 with adenomas. Need to see when recc'd for repeat. Possibly 2019? Colon CA in Mother  Mammogram: 10/9/2018 Negative  PAP/PSA: 04/30/2018  Dental: Due for cleaning, native teeth. Has appt scheduled but sees dentist regularly  Immunizations: Needs Hep A series, shingrix series, updated pneumo 23 vaccine. Will facilitate locally.    Oxygen use: 2 L NOC, 2 L pulsed at rest, 3 L pulsed with activity.   Prednisone use: None daily. Has Rx for PRN  Ventilation status: No assisted ventilation  Prior chest surgery?: None per patient report  Need for carotid/LE ultrasounds?: No    ETOH intake: Wine x2/week. Aware of abstinence post txp and is agreeable     Pulmonary rehab: None. Previously attended a few years ago. Has equipment and weights at home and motivated to begin regiment. Defer at this time  My Chart: No                   Care Everywhere: Yes    OTC Medications/Herbal Supplements: None    Reinforced need for staying locally with full time caregiver for 3 months after transplant.  Encouraged patient and family to review [email or web address] for education reinforcement.    Did NOT collect information form for donor family at time of donation/transplant from patient.   Will contact Melissa with transplant team recommendation following committee review.    Can get updated records as needed from [facility]. Preferred location for vaccinations.

## 2019-03-29 NOTE — PROGRESS NOTES
OUTPATIENT PRE-LUNG TRANSPLANT EXERCISE EVALUATION        Medical Diagnosis: COPD    Medical History:  Pulmonologist: Dr. Hamlin  Current Signs and Symptoms: denies    Living Environment:  Living Arrangement: House  Number of stairs to enter home: 3  Number of stairs within home:   ADL Limitations: limited  Occupation: not employed  Social Status:     Current Home Exercise:  Type of Exercise: none, but some walking at [employer]  Frequency (days per week): 0  Duration (minutes per session): 5-10    Oxygen Usage:  Supplemental Oxygen Needed:  Yes,   Oxygen at Rest: 2  Oxygen with Activity: 4    Modalities Performed: Stair climbing   LE strengthening   UE muscle conditioning, Calisthenics    Exercise Prescription (Aerobic Exercise):  Mode: Treadmill, Recumbent bike,   Duration/Time: 15-30, intermittent  Rating of Perceived Exertion: 4-6 out of 10 OMNI Effort Scale and SOB scale  Progression of Exercise: Increase 0.1-0.2 METs per week  Oxygen Titration with Exercise: >88%    Exercise Prescription (Muscle Conditioning):  Mode: Upper and lower body muscle conditioning  Frequency (days per week): 2-3  Weights: 2-3      Reps: 0-15  Progress to: Increase by 1-2 pounds when 2-3 sets of 10-15 repetitions are no longer a challenge    Patient Education: PLB, Diaphragmatic Breathing, Work Simplification/Energy Conservation techniques, Home Exercise Program,      Outpatient Pulmonary Rehab/Respiratory Care Services:  Pt recommended to attend:  Yes    Location: [place], [place]   Comments: Patient has attended some rehab before, but not sure that local hospital offers pulmonary rehab. Patient may also be limited to rehab visits based on diagnosis. Patient is encouraged to attend rehab to assist in establishing an exercise program. If patient is unable to attend rehab, patient provided education on exercise principles, muscle conditioning exercise, and how to safely progress workloads to maintain an exercise program to improve  quality of life in preparation for possible lung transplant candidate. Patient also instructed on PLB technique, and proper inhaler with spacer use.

## 2019-03-29 NOTE — PROGRESS NOTES
Outpatient MNT: Lung Transplant Evaluation    Current BMI: 27.9 (HT 62 in,  lbs/69 kg)  BMI is within criteria of <30 for lung transplant     Time Spent: 15 minutes  Visit Type: Initial  Referring Physician: Christin   Pt accompanied by: her , Tyrese     History of previous txp: none    Nutrition Assessment  Appetite: good    Vitamins, Supplements, Pertinent Meds: calcium, prednisone   Herbal Medicines/Supplements: none     Diet Recall  Breakfast Raisin toast or 1-2 eggs or oatmeal    Lunch Yogurt or s/w or leftovers    Dinner Roast with potatoes and carrots or pork with sweet potato or coleslaw, some veggies or crackers with spinach dip    Snacks Ice cream, yogurt    Beverages Water, coffee, tea, milk, juice   Alcohol 1-2 glasses of wine/week    Dining out A few times/week      Physical Activity  None currently, but plans to change oxygen machines to allow better activity  Likes to walk around [employer]  Has stationary bike at home and may purchase a treadmill      Anthropometrics  Height:   62 in   BMI:    27.9    Weight Status:Overweight BMI 25-29.9   Weight:  152 lbs            IBW (lb): 110  % IBW: 138    Wt Hx: Wt stable     Adj/dosing BW: 121 lbs/55 kg       Frailty Screening   Weakness Meets criteria for frailty if  strength (average of 3 trials, dominant hand) is:    Men  ?29 kg for BMI ?24  ?30 kg for BMI 24.1-26  ?30 kg for BMI 26.1-28  ?32 kg for BMI >28 Women  ?17 kg for BMI ?23  ?17.3 kg for BMI 23.1-26  ?18 kg for BMI 26.1-29  ?21 kg for BMI >29    Equipment: Gerardo hand dynamometer  Participant attempts to squeeze the dynamometer maximally 3 times with the dominant hand.   Average of 3 trials: 18 kg with BMI of 27.9  Meets criteria for frailty based on handgrip strength: yes    Labs  Recent Labs   Lab Test 03/25/19  0834   CHOL 155   HDL 64   LDL 74   TRIG 83     No results found for: A1C    Malnutrition  % Intake: No decreased intake noted  % Weight Loss: None noted  Subcutaneous Fat  Loss: None  Muscle Loss: None  Fluid Accumulation/Edema: None noted  Malnutrition Diagnosis: Patient does not meet two of the above criteria necessary for diagnosing malnutrition    Estimated Nutrition Needs  Energy  [phone]     (25-30 kcal/kg for maintenance)     Protein  44-55    (0.8-1 g/kg for maintenance)         Fluid  1 ml/kcal or per MD     Nutrition Diagnosis  Food and nutrition related knowledge deficit r/t pre lung transplant eval AEB pt verbalized not hearing pre/post transplant diet guidelines.    Nutrition Intervention  Nutrition education provided:  Reviewed current diet. Pt has no concerns or questions at this time. We did discuss consuming adequate protein, especially with borderline hand  strength in addition to pt report of upper extremity muscle loss.     Reviewed post txp diet guidelines in brief (will review in further detail post txp):  (1) Review of proper food safety measures d/t immunosuppressant therapy post-op and increased risk for food-borne illness    (2) Avoid the following post txp d/t risk for rejection, unknown effects on the organs, and/or potential interactions with immunosuppressants:  - Herbal, Chinese, holistic, chiropractic, natural, alternative medicines and supplements  - Detoxes and cleanses  - Weight loss pills  - Protein powders or other products with extracts or herbs (ie green tea extract)    (3) Med regimen and possible side effects    Patient Understanding: Pt verbalized understanding of education provided.  Expected Compliance: Good  Follow-Up Plans: PRN     Nutrition Goals  1. Pt to verbalize understanding of 3 aspects of post txp education provided  2. Aim for minimum of 44 g protein/day     Provided pt with contact info.   Elisa Maria RD, LD  [facility] [phone]

## 2019-04-01 LAB
COTININE SERPL-MCNC: NORMAL NG/ML
NICOTINE SERPL-MCNC: NORMAL NG/ML

## 2019-04-19 ENCOUNTER — HEALTH MAINTENANCE LETTER (OUTPATIENT)
Age: 64
End: 2019-04-19

## 2019-04-25 ENCOUNTER — OFFICE VISIT (OUTPATIENT)
Dept: CARDIOLOGY | Facility: CLINIC | Age: 64
End: 2019-04-25
Attending: SURGERY
Payer: MEDICARE

## 2019-04-25 VITALS
WEIGHT: 154.3 LBS | HEIGHT: 62 IN | BODY MASS INDEX: 28.39 KG/M2 | DIASTOLIC BLOOD PRESSURE: 72 MMHG | HEART RATE: 95 BPM | OXYGEN SATURATION: 96 % | SYSTOLIC BLOOD PRESSURE: 148 MMHG

## 2019-04-25 DIAGNOSIS — J43.1 PANLOBULAR EMPHYSEMA (H): Primary | ICD-10-CM

## 2019-04-25 PROCEDURE — G0463 HOSPITAL OUTPT CLINIC VISIT: HCPCS | Mod: ZF

## 2019-04-25 ASSESSMENT — ENCOUNTER SYMPTOMS
SWOLLEN GLANDS: 0
MUSCLE WEAKNESS: 1
BACK PAIN: 1
NECK PAIN: 1
DYSPNEA ON EXERTION: 1
BRUISES/BLEEDS EASILY: 1
STIFFNESS: 1
WHEEZING: 1
SWOLLEN GLANDS: 0
HEMOPTYSIS: 0
SPUTUM PRODUCTION: 0
BRUISES/BLEEDS EASILY: 1
HEMOPTYSIS: 0
SHORTNESS OF BREATH: 1
MUSCLE CRAMPS: 1
COUGH: 0
COUGH: 0
SNORES LOUDLY: 1
BACK PAIN: 1
ARTHRALGIAS: 0
NECK PAIN: 1
MYALGIAS: 1
POSTURAL DYSPNEA: 0
JOINT SWELLING: 1
MYALGIAS: 1
ARTHRALGIAS: 0
WHEEZING: 1
MUSCLE CRAMPS: 1
SNORES LOUDLY: 1
MUSCLE WEAKNESS: 1
COUGH DISTURBING SLEEP: 0
SHORTNESS OF BREATH: 1
COUGH DISTURBING SLEEP: 0
JOINT SWELLING: 1
STIFFNESS: 1
DYSPNEA ON EXERTION: 1
SPUTUM PRODUCTION: 0
POSTURAL DYSPNEA: 0

## 2019-04-25 ASSESSMENT — MIFFLIN-ST. JEOR: SCORE: 1208.15

## 2019-04-25 ASSESSMENT — PAIN SCALES - GENERAL: PAINLEVEL: NO PAIN (0)

## 2019-04-25 NOTE — NURSING NOTE
Vitals completed successfully and medication reconciled.     Ruth Dunn, ELIOT  10:56 AM  Chief Complaint   Patient presents with     New Patient     pre lung eval

## 2019-04-25 NOTE — LETTER
4/25/2019      RE: Melissa Elder  [address]  [facility] [address]       Dear Colleague,    Thank you for the opportunity to participate in the care of your patient, Melissa Elder, at the [facility]. Please see a copy of my visit note below.    Cardiothoracic surgery lung transplant consultation      Melissa Elder MRN# [id number]   YOB: 1955 Age: 63 year old   Date of Service: April 25, 2019         Reason for consult: Melissa Elder is a 63 year old  female with life threatening end stage lung disease who is undergoing evaluation for lung transplant.           Assessment and Plan:   I recommend lung transplantation. I discussed the technical details of the procedure with the patient including the possibility of single versus double lung transplant, as well as the possibility of a sternotomy incision, bilateral anterolateral thoracotomy with transverse sternotomy (clamshell), and thoracotomy incisions. In her particular case, a bilateral lung transplant through a sternotomy is probably the best option. If her condition worsens or she is not getting any offers as she ages, a single lung transplant would be an option.    I also discussed the possibility of pre- or post-operative extracorporeal membrane oxygenation. I also explained the expected postoperative course and recovery, including the likelihood of a prolonged hospitalization. The patient understands the risks and benefits of the procedure. The risks include but are not limited to bleeding, infection including opportunistic infection, stroke, primary graft dysfunction, respiratory failure possibly requiring prolonged ventilation and tracheostomy, lifelong need for immunosuppression, acute or chronic rejection, renal failure, hepatic insufficiency, visceral or limb ischemia, and death. The patient understands these risks and wishes to undergo the operation. After discussion in  multidisciplinary lung transplant conference, listing may be finalized.             Chief Complaint:   Melissa Elder is a 63 year old female who complains of chronic dyspnea.         History of Present Illness:   This patient is a 63 year old female who presents with chronic dyspnea and hypoxia due to COPD. She was diagnosed with COPD about 5 years ago after she developed pneumonia. She had a history of asthma prior to that. She is now on 2 LPM O2 NC at rest and this increases to 3 LPM with activity. She has about 2-3 COPD exacerbations a year and this is treated with steroids and antibiotics. She is on Spiriva, Dulera and Xoponex. She endorses occasional wheezing. She has a chronic cough that is minimally productive and she denies hemoptysis. She has comorbidities including GERD, hypertension, and atrial fibrillation.             Past Medical History:     Past Medical History:   Diagnosis Date     Atrial fibrillation with RVR (H)     hx of A fib related to severe COPD, does not meet anticoagulation criteria as noted     COPD, severe (H)      Osteoporosis              Past Surgical History:     Past Surgical History:   Procedure Laterality Date     CV CORONARY ANGIOGRAM N/A 3/27/2019    Procedure: CV CORONARY ANGIOGRAM;  Surgeon: Thierry Serrano MD;  Location:  HEART CARDIAC CATH LAB     CV RIGHT HEART CATH N/A 3/27/2019    Procedure: CV RIGHT HEART CATH;  Surgeon: Thierry Serrano MD;  Location:  HEART CARDIAC CATH LAB     ESOPHAGEAL IMPEDENCE FUNCTION TEST WITH 24 HOUR PH GREATER THAN 1 HOUR N/A 3/28/2019    Procedure: ESOPHAGEAL IMPEDENCE FUNCTION TEST WITH 24 HOUR PH GREATER THAN 1 HOUR;  Surgeon: Mike Henry MD;  Location:  GI     EXCISE PILONIDAL CYST, SIMPLE       TONSILLECTOMY & ADENOIDECTOMY                 Social History:     Social History     Tobacco Use     Smoking status: Former Smoker     Packs/day: 1.50     Years: 36.00     Pack years: 54.00     Types: Cigarettes     Last attempt to  quit: 2006     Years since quittin.3     Smokeless tobacco: Never Used   Substance Use Topics     Alcohol use: Yes     Comment: stated beer and wine occ             Family History:     Family History   Problem Relation Age of Onset     Breast Cancer Mother      Colon Cancer Mother      Lung Cancer Mother      Lung Cancer Father      Lung Cancer Maternal Aunt      Pancreatic Cancer Maternal Uncle              Immunizations:     Immunization History   Administered Date(s) Administered     Influenza (IIV3) PF 10/02/2013, 2014, 2015     Pneumo Conj 13-V (2010&after) 04/15/2015     Pneumococcal 23 valent 10/30/2013     TDAP Vaccine (Adacel) 10/15/2008, 2012, 2013             Allergies:     Allergies   Allergen Reactions     Fosamax [Alendronic Acid] Other (See Comments)     dizziness     Sulfa Drugs Rash             Medications:     Current Outpatient Medications Ordered in Epic   Medication     aspirin 81 MG EC tablet     atorvastatin (LIPITOR) 20 MG tablet     budesonide-formoterol (SYMBICORT) 160-4.5 MCG/ACT Inhaler     calcium 500 MG CHEW     levalbuterol (XOPENEX HFA) 45 MCG/ACT Inhaler     OXYGEN-HELIUM IN     predniSONE (DELTASONE) 10 MG tablet     tiotropium (SPIRIVA RESPIMAT) 2.5 MCG/ACT inhalation aerosol     cetirizine (ZYRTEC) 10 MG tablet     fluticasone (FLONASE) 50 MCG/ACT spray     levalbuterol (XOPENEX) 1.25 MG/3ML neb solution     No current [facility]-ordered facility-administered medications on file.              Review of Systems:   The 10 point Review of Systems is negative other than noted in the HPI            Physical Exam:   Vitals were reviewed      BP: (!) 153/92 Pulse: 95     SpO2: 96 %(2 liters)      Constitutional:   awake, alert, cooperative, no apparent distress, and appears stated age     Eyes:   Lids and lashes normal, pupils equal, round and reactive to light, extra ocular muscles intact, sclera clear, conjunctiva normal     ENT:   normocepalic, without  obvious abnormality     Lungs:   no increased work of breathing, good air exchange and no retractions     Cardiovascular:   regular rate and rhythm     Abdomen:   soft and non-distended     Musculoskeletal:   no lower extremity pitting edema present  there is no redness, warmth, or swelling of the joints  full range of motion noted     Neurologic:   Mental Status Exam:  Level of Alertness:   awake  Orientation:   person, place, time  Memory:   normal  Motor Exam:  moves all extremities well and symmetrically     Neuropsychiatric:   General: normal, calm and normal eye contact  Level of consciousness: alert / normal  Affect: normal     Skin:   no bruising or bleeding, normal skin color, texture, turgor and no redness, warmth, or swelling          Data:   All laboratory data reviewed  All cardiac studies reviewed by me.  All imaging studies reviewed by me.    CT CHEST:  1. No acute airspace disease. No findings to suggest infection.  2. Moderate apical predominant centrilobular emphysematous changes. No  findings to suggest interstitial lung disease.  3. Scattered sub-4 mm solid pulmonary nodules are stable from outside  comparison dated 1/30/2013. These are statistically benign. No new or  enlarging pulmonary nodule.    NUCLEAR MEDICINE LUNG PERFUSION:     1. Quantitative evaluation shows 46% contribution of the right lung as  compared to 54% contribution of the left lung.     2. Diffuse inhomogeneous perfusion of the lungs bilaterally.      ECHOCARDIOGRAM:  Global and regional left ventricular function is normal with an EF of 60-65%.  Right ventricular function, chamber size, wall motion, and thickness are  normal.  The atrial septum is intact as assessed by color Doppler and agitated saline  bubble study .  Pulmonary artery systolic pressure cannot be assessed.  There is no prior study for direct comparison.    CARDIAC CATHETERIZATION:  Left Main   The vessel is angiographically normal.   Left Anterior Descending    The vessel is moderate in size.   Left Circumflex   The vessel is moderate in size.   Ost Cx to Prox Cx lesion is 30% stenosed.   First Obtuse Marginal Branch   The vessel is large.   Second Obtuse Marginal Branch   Right Coronary Artery   The vessel is large.   Prox RCA to Mid RCA lesion is 25% stenosed.   Mid RCA to Dist RCA lesion is 30% stenosed.     RA  A-wave: 13 mmHg  V-wave: 11 mmHg  Mean: 12 mmHg   HR: 82 bpm  RV  Systolic: 38 mmHg  Diastolic:  HR: 93 bpm    PA  Systolic:38 mmHg  Diasotlic: 20 mmHg  Mean: 27 mmHg  HR: 82 bpm      PCW  A-wave: 14 mmHg  V-wave: 15 mmHg  Mean: 13 mmHg  HR: 84 bpm      Cardiac Output  CO Yanet: 3.55 L/min  CI Yanet: 2.09 L/min/m2  CO TD: 3.37 L/min  CI TD: 1.98 L/min/m2    Vitals  BP: 206 mmHg / 93 mmHg  SpO2: 98 %      Resistance Metric  SVR: 1758.6 dsc-5  PVR HANNAH: 6.7 HANNAH/m2  Right sided filling pressures are high.Left sided filling pressures normal. Mild pulmonary artery hypertension.Normal cardiac output level.    Conflicting answers have been found for some questions. Please document the patient's answers manually.     Please do not hesitate to contact me if you have any questions/concerns.     Sincerely,     Raul Robin MD

## 2019-04-25 NOTE — PROGRESS NOTES
Cardiothoracic surgery lung transplant consultation      Melissa Elder MRN# [id number]   YOB: 1955 Age: 63 year old   Date of Service: April 25, 2019         Reason for consult: Melissa Elder is a 63 year old  female with life threatening end stage lung disease who is undergoing evaluation for lung transplant.           Assessment and Plan:   I recommend lung transplantation. I discussed the technical details of the procedure with the patient including the possibility of single versus double lung transplant, as well as the possibility of a sternotomy incision, bilateral anterolateral thoracotomy with transverse sternotomy (clamshell), and thoracotomy incisions. In her particular case, a bilateral lung transplant through a sternotomy is probably the best option. If her condition worsens or she is not getting any offers as she ages, a single lung transplant would be an option.    I also discussed the possibility of pre- or post-operative extracorporeal membrane oxygenation. I also explained the expected postoperative course and recovery, including the likelihood of a prolonged hospitalization. The patient understands the risks and benefits of the procedure. The risks include but are not limited to bleeding, infection including opportunistic infection, stroke, primary graft dysfunction, respiratory failure possibly requiring prolonged ventilation and tracheostomy, lifelong need for immunosuppression, acute or chronic rejection, renal failure, hepatic insufficiency, visceral or limb ischemia, and death. The patient understands these risks and wishes to undergo the operation. After discussion in multidisciplinary lung transplant conference, listing may be finalized.             Chief Complaint:   Melissa Elder is a 63 year old female who complains of chronic dyspnea.         History of Present Illness:   This patient is a 63 year old female who presents with chronic dyspnea and hypoxia due to COPD. She  was diagnosed with COPD about 5 years ago after she developed pneumonia. She had a history of asthma prior to that. She is now on 2 LPM O2 NC at rest and this increases to 3 LPM with activity. She has about 2-3 COPD exacerbations a year and this is treated with steroids and antibiotics. She is on Spiriva, Dulera and Xoponex. She endorses occasional wheezing. She has a chronic cough that is minimally productive and she denies hemoptysis. She has comorbidities including GERD, hypertension, and atrial fibrillation.             Past Medical History:     Past Medical History:   Diagnosis Date     Atrial fibrillation with RVR (H)     hx of A fib related to severe COPD, does not meet anticoagulation criteria as noted     COPD, severe (H)      Osteoporosis              Past Surgical History:     Past Surgical History:   Procedure Laterality Date     CV CORONARY ANGIOGRAM N/A 3/27/2019    Procedure: CV CORONARY ANGIOGRAM;  Surgeon: Thierry Serrano MD;  Location:  HEART CARDIAC CATH LAB     CV RIGHT HEART CATH N/A 3/27/2019    Procedure: CV RIGHT HEART CATH;  Surgeon: Thierry Serrano MD;  Location:  HEART CARDIAC CATH LAB     ESOPHAGEAL IMPEDENCE FUNCTION TEST WITH 24 HOUR PH GREATER THAN 1 HOUR N/A 3/28/2019    Procedure: ESOPHAGEAL IMPEDENCE FUNCTION TEST WITH 24 HOUR PH GREATER THAN 1 HOUR;  Surgeon: Mike Henry MD;  Location:  GI     EXCISE PILONIDAL CYST, SIMPLE       TONSILLECTOMY & ADENOIDECTOMY                 Social History:     Social History     Tobacco Use     Smoking status: Former Smoker     Packs/day: 1.50     Years: 36.00     Pack years: 54.00     Types: Cigarettes     Last attempt to quit: 2006     Years since quittin.3     Smokeless tobacco: Never Used   Substance Use Topics     Alcohol use: Yes     Comment: stated beer and wine occ             Family History:     Family History   Problem Relation Age of Onset     Breast Cancer Mother      Colon Cancer Mother      Lung Cancer  Mother      Lung Cancer Father      Lung Cancer Maternal Aunt      Pancreatic Cancer Maternal Uncle              Immunizations:     Immunization History   Administered Date(s) Administered     Influenza (IIV3) PF 10/02/2013, 09/16/2014, 11/04/2015     Pneumo Conj 13-V (2010&after) 04/15/2015     Pneumococcal 23 valent 10/30/2013     TDAP Vaccine (Adacel) 10/15/2008, 09/04/2012, 09/16/2013             Allergies:     Allergies   Allergen Reactions     Fosamax [Alendronic Acid] Other (See Comments)     dizziness     Sulfa Drugs Rash             Medications:     Current Outpatient Medications Ordered in Epic   Medication     aspirin 81 MG EC tablet     atorvastatin (LIPITOR) 20 MG tablet     budesonide-formoterol (SYMBICORT) 160-4.5 MCG/ACT Inhaler     calcium 500 MG CHEW     levalbuterol (XOPENEX HFA) 45 MCG/ACT Inhaler     OXYGEN-HELIUM IN     predniSONE (DELTASONE) 10 MG tablet     tiotropium (SPIRIVA RESPIMAT) 2.5 MCG/ACT inhalation aerosol     cetirizine (ZYRTEC) 10 MG tablet     fluticasone (FLONASE) 50 MCG/ACT spray     levalbuterol (XOPENEX) 1.25 MG/3ML neb solution     No current [facility]-ordered facility-administered medications on file.              Review of Systems:   The 10 point Review of Systems is negative other than noted in the HPI            Physical Exam:   Vitals were reviewed      BP: (!) 153/92 Pulse: 95     SpO2: 96 %(2 liters)      Constitutional:   awake, alert, cooperative, no apparent distress, and appears stated age     Eyes:   Lids and lashes normal, pupils equal, round and reactive to light, extra ocular muscles intact, sclera clear, conjunctiva normal     ENT:   normocepalic, without obvious abnormality     Lungs:   no increased work of breathing, good air exchange and no retractions     Cardiovascular:   regular rate and rhythm     Abdomen:   soft and non-distended     Musculoskeletal:   no lower extremity pitting edema present  there is no redness, warmth, or swelling of the joints  full  range of motion noted     Neurologic:   Mental Status Exam:  Level of Alertness:   awake  Orientation:   person, place, time  Memory:   normal  Motor Exam:  moves all extremities well and symmetrically     Neuropsychiatric:   General: normal, calm and normal eye contact  Level of consciousness: alert / normal  Affect: normal     Skin:   no bruising or bleeding, normal skin color, texture, turgor and no redness, warmth, or swelling          Data:   All laboratory data reviewed  All cardiac studies reviewed by me.  All imaging studies reviewed by me.    CT CHEST:  1. No acute airspace disease. No findings to suggest infection.  2. Moderate apical predominant centrilobular emphysematous changes. No  findings to suggest interstitial lung disease.  3. Scattered sub-4 mm solid pulmonary nodules are stable from outside  comparison dated 1/30/2013. These are statistically benign. No new or  enlarging pulmonary nodule.    NUCLEAR MEDICINE LUNG PERFUSION:     1. Quantitative evaluation shows 46% contribution of the right lung as  compared to 54% contribution of the left lung.     2. Diffuse inhomogeneous perfusion of the lungs bilaterally.      ECHOCARDIOGRAM:  Global and regional left ventricular function is normal with an EF of 60-65%.  Right ventricular function, chamber size, wall motion, and thickness are  normal.  The atrial septum is intact as assessed by color Doppler and agitated saline  bubble study .  Pulmonary artery systolic pressure cannot be assessed.  There is no prior study for direct comparison.    CARDIAC CATHETERIZATION:  Left Main   The vessel is angiographically normal.   Left Anterior Descending   The vessel is moderate in size.   Left Circumflex   The vessel is moderate in size.   Ost Cx to Prox Cx lesion is 30% stenosed.   First Obtuse Marginal Branch   The vessel is large.   Second Obtuse Marginal Branch   Right Coronary Artery   The vessel is large.   Prox RCA to Mid RCA lesion is 25% stenosed.    Mid RCA to Dist RCA lesion is 30% stenosed.     RA  A-wave: 13 mmHg  V-wave: 11 mmHg  Mean: 12 mmHg   HR: 82 bpm  RV  Systolic: 38 mmHg  Diastolic:  HR: 93 bpm    PA  Systolic:38 mmHg  Diasotlic: 20 mmHg  Mean: 27 mmHg  HR: 82 bpm      PCW  A-wave: 14 mmHg  V-wave: 15 mmHg  Mean: 13 mmHg  HR: 84 bpm      Cardiac Output  CO Yanet: 3.55 L/min  CI Yanet: 2.09 L/min/m2  CO TD: 3.37 L/min  CI TD: 1.98 L/min/m2    Vitals  BP: 206 mmHg / 93 mmHg  SpO2: 98 %      Resistance Metric  SVR: 1758.6 dsc-5  PVR HANNAH: 6.7 HANNAH/m2  Right sided filling pressures are high.Left sided filling pressures normal. Mild pulmonary artery hypertension.Normal cardiac output level.  Answers for HPI/ROS submitted by the patient on 4/25/2019   Cough: No  Sputum or phlegm: No  Coughing up blood: No  Difficulty breating or shortness of breath: Yes  Snoring: Yes  Wheezing: Yes  Difficulty breathing on exertion: Yes  Nighttime Cough: No  Difficulty breathing when lying flat: No  Back pain: Yes  Muscle aches: Yes  Neck pain: Yes  Swollen joints: Yes  Joint pain: No  Bone pain: No  Muscle cramps: Yes  Muscle weakness: Yes  Joint stiffness: Yes  Bone fracture: No  Anemia: No  Swollen glands: No  Easy bleeding or bruising: Yes  Edema or swelling: Yes    Conflicting answers have been found for some questions. Please document the patient's answers manually.

## 2019-04-26 ENCOUNTER — OFFICE VISIT (OUTPATIENT)
Dept: PALLIATIVE CARE | Facility: CLINIC | Age: 64
End: 2019-04-26
Attending: INTERNAL MEDICINE
Payer: COMMERCIAL

## 2019-04-26 VITALS
HEIGHT: 62 IN | WEIGHT: 156 LBS | BODY MASS INDEX: 28.71 KG/M2 | DIASTOLIC BLOOD PRESSURE: 82 MMHG | TEMPERATURE: 98.2 F | OXYGEN SATURATION: 97 % | SYSTOLIC BLOOD PRESSURE: 148 MMHG | HEART RATE: 88 BPM

## 2019-04-26 DIAGNOSIS — Z76.82 ORGAN TRANSPLANT CANDIDATE: ICD-10-CM

## 2019-04-26 DIAGNOSIS — J43.8 OTHER EMPHYSEMA (H): Primary | ICD-10-CM

## 2019-04-26 DIAGNOSIS — Z71.89 ADVANCE CARE PLANNING: ICD-10-CM

## 2019-04-26 PROCEDURE — 40000114 ZZH STATISTIC NO CHARGE CLINIC VISIT

## 2019-04-26 PROCEDURE — G0463 HOSPITAL OUTPT CLINIC VISIT: HCPCS | Mod: ZF

## 2019-04-26 PROCEDURE — 99204 OFFICE O/P NEW MOD 45 MIN: CPT | Performed by: INTERNAL MEDICINE

## 2019-04-26 ASSESSMENT — MIFFLIN-ST. JEOR: SCORE: 1215.86

## 2019-04-26 ASSESSMENT — PAIN SCALES - GENERAL: PAINLEVEL: NO PAIN (0)

## 2019-04-26 NOTE — LETTER
4/26/2019       RE: Melissa Elder  [address]  [facility] [address]     Dear Colleague,    Thank you for referring your patient, Melissa Elder, to the [facility]. Please see a copy of my visit note below.    Palliative Care Outpatient Clinic    (This note was transcribed using voice recognition software. While I review and edit the transcription, I may miss errors, and the software sometimes does unexpected capitalizations and formatting that I miss. Please let me know of any serious mistranscriptions and I will addend this note.)    Patient ID:  She has severe COPD with chronic hypoxic respiratory failure, being evaluated for lung transplantation    History:  She is with her  today who supplements the history.  She reports they have a healthcare directive at home, I asked them to bring it into the [employer] system at their convenience.  She would want her  to be her primary medical decision maker    I discussed with them the role of palliative care in seeing patients who are being evaluated for lung transplantation.  We talked about preparedness planning.      I had an extensive discussion about what the complications can look like sometimes after transplantation.  This included chronic ventilator dependence and needing tracheostomy and long-term ventilation including sometimes LTAC placement, other serious comorbidities including becoming severely debilitated after transplant, often including multiple infections as well, leading to the patient being unable to return to living independently in the community, but instead spending time in facilities, in and out of the hospital and going back and forth between hospital and rehab facilities, without making any sort of real recovery (to being able to live outside of a facility), and this going on for many months or even over a year.  When this happens, occasionally  patients do make good a recovery eventually long-term, but most often patients end up dying without ever having being able to return to independent living in the community.     Discussed with them that when this happens sometimes we can talk with the patient him or herself about what to do but other times we are really relying on family decision makers which can make these difficult decisions, of when enough is enough, and what sort of burdensome treatments someone would be willing to go through for a low but really uncertain chance of good recovery.    It was pretty clear she was flustered and somewhat overwhelmed by these questions.  Her  asked good, insightful questions.  I challenged her to think about a couple things and to talk with her  about a couple things. In particular, if she was not going to make a full recovery-what would be acceptable to her.  Some people say that they need for recovery, other people are okay with a less than full recovery as long as they can live at home, some people would rather be let go to live in a nursing home, other people are okay with living in a nursing home permanently if they could communicate, etc.  It is clear she is been trying to take in all the information about lung transplantation and is aware of the gravity of the decision and expresses ambivalence about it.  She also considers her quality of life right now to be poor and unacceptable and is really interested in it option which may very well significantly improve her quality of life such as a successful lung transplant.    Also discussed patients often have a lot of mucus, and constriction, around the time of transplant causing anxiety that can be quite problematic.  Can cause a lot of anxiety coughing and dyspnea which can lead a lot of suffering as well as complicate their recovery course.  Talked with them about how they typically have managed anxiety in the past including anxiety related to  "dyspnea and any concern they might have about doing that in the hospitalized setting. Discussed any history of chemical coping.  I was not able to identify any concerns here    SH: Lives in [facility].  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  2 adult children and one grandchild.  Heavy tobacco use in the past.     ROS: Patient completed comprehensive electronic ROS form reviewed and confirmed key results with patient today.     PE: /82   Pulse 88   Temp 98.2  F (36.8  C) (Oral)   Ht 1.575 m (5' 2\")   Wt 70.8 kg (156 lb)   SpO2 97%   Breastfeeding? No   BMI 28.53 kg/m      Wt Readings from Last 3 Encounters:   04/26/19 70.8 kg (156 lb)   04/25/19 70 kg (154 lb 4.8 oz)   03/28/19 72.9 kg (160 lb 12.8 oz)     Alert, comfortable appearing, NAD. Wearing O2.  Head NCAT.  Eyes anicteric without injection  Face symmetric, eyes conjugate  Mouth pink, moist, no lesions.  Neck supple without masses, thyromegaly, LAD  Lungs unlabored, diminished bilat; no wheezes, no prolonged exp  CV rrr s1s2  Abd soft, ntnd, benign  Back well aligned, no masses, tenderness  Small pitting LLE edema, none on R  Skin warm, dry, without lesions  MSK joints of hand normal; no clubbing  Neuro Face symmetricc  Neuropsych affect slightly anxious; thought processes sl tangential; clear sensorium    Data reviewed:  I reviewed recent labs and imaging, my comments:  Chest CT shows empysematous changes  PFTs FEV1 0.5L/22%  6MWT 223m    Impression & Recommendations:  63-year-old woman with advanced COPD, and chronic hypoxemic respiratory failure, being evaluated here for lung transportation.  Advance care planning, end-of-life preparedness planning discussion as above.  She is very friendly during the discussion but is clear she is overwhelmed by this entire process, including my questions regarding advance care planning.  Her  was very calm and supportive and asked insightful questions " and is able to have a good conversation with him.  I asked her to think about things to talk about it give her  any guidance that she can think of as to what she would, would be willing to go through, if she was not having a good outcome from the transplant.  Discussed end-of-life care and the possibility of not to get transplant or not getting one in time.    Chart data reviewed today:  Advance care planning:     Family History   Problem Relation Age of Onset     Breast Cancer Mother      Colon Cancer Mother      Lung Cancer Mother      Lung Cancer Father      Lung Cancer Maternal Aunt      Pancreatic Cancer Maternal Uncle      Past Medical History:   Diagnosis Date     Atrial fibrillation with RVR (H)     hx of A fib related to severe COPD, does not meet anticoagulation criteria as noted     COPD, severe (H)      Osteoporosis      Past Surgical History:   Procedure Laterality Date     CV CORONARY ANGIOGRAM N/A 3/27/2019    Procedure: CV CORONARY ANGIOGRAM;  Surgeon: Thierry Serrano MD;  Location:  HEART CARDIAC CATH LAB     CV RIGHT HEART CATH N/A 3/27/2019    Procedure: CV RIGHT HEART CATH;  Surgeon: Thierry Serrano MD;  Location:  HEART CARDIAC CATH LAB     ESOPHAGEAL IMPEDENCE FUNCTION TEST WITH 24 HOUR PH GREATER THAN 1 HOUR N/A 3/28/2019    Procedure: ESOPHAGEAL IMPEDENCE FUNCTION TEST WITH 24 HOUR PH GREATER THAN 1 HOUR;  Surgeon: Mike Henry MD;  Location:  GI     EXCISE PILONIDAL CYST, SIMPLE       TONSILLECTOMY & ADENOIDECTOMY       Allergies   Allergen Reactions     Alendronic Acid Other (See Comments)     dizziness  Other reaction(s): Dizziness     Nickel Rash     Sulfa Drugs Rash     Medications: I have reviewed the patient's medication profile.     Thank you for involving us in the patient's care.   Yogesh Li MD / Palliative Medicine / Pager [phone] / [facility] Inpatient Team Consult Pager [phone] (answered 8am-430pm M-F) - ok to text page via [employer] /  After-Hours Answering Service [phone] / Palliative Clinic in the [facility] at the [facility] - [phone] (scheduling); [phone] (triage).

## 2019-04-26 NOTE — PROGRESS NOTES
Palliative Care Outpatient Clinic    (This note was transcribed using voice recognition software. While I review and edit the transcription, I may miss errors, and the software sometimes does unexpected capitalizations and formatting that I miss. Please let me know of any serious mistranscriptions and I will addend this note.)    Patient ID:  She has severe COPD with chronic hypoxic respiratory failure, being evaluated for lung transplantation    History:  She is with her  today who supplements the history.  She reports they have a healthcare directive at home, I asked them to bring it into the [employer] system at their convenience.  She would want her  to be her primary medical decision maker    I discussed with them the role of palliative care in seeing patients who are being evaluated for lung transplantation.  We talked about preparedness planning.      I had an extensive discussion about what the complications can look like sometimes after transplantation.  This included chronic ventilator dependence and needing tracheostomy and long-term ventilation including sometimes LTAC placement, other serious comorbidities including becoming severely debilitated after transplant, often including multiple infections as well, leading to the patient being unable to return to living independently in the community, but instead spending time in facilities, in and out of the hospital and going back and forth between hospital and rehab facilities, without making any sort of real recovery (to being able to live outside of a facility), and this going on for many months or even over a year.  When this happens, occasionally patients do make good a recovery eventually long-term, but most often patients end up dying without ever having being able to return to independent living in the community.     Discussed with them that when this happens sometimes we can talk with the patient him or herself about what to do  but other times we are really relying on family decision makers which can make these difficult decisions, of when enough is enough, and what sort of burdensome treatments someone would be willing to go through for a low but really uncertain chance of good recovery.    It was pretty clear she was flustered and somewhat overwhelmed by these questions.  Her  asked good, insightful questions.  I challenged her to think about a couple things and to talk with her  about a couple things. In particular, if she was not going to make a full recovery-what would be acceptable to her.  Some people say that they need for recovery, other people are okay with a less than full recovery as long as they can live at home, some people would rather be let go to live in a nursing home, other people are okay with living in a nursing home permanently if they could communicate, etc.  It is clear she is been trying to take in all the information about lung transplantation and is aware of the gravity of the decision and expresses ambivalence about it.  She also considers her quality of life right now to be poor and unacceptable and is really interested in it option which may very well significantly improve her quality of life such as a successful lung transplant.    Also discussed patients often have a lot of mucus, and constriction, around the time of transplant causing anxiety that can be quite problematic.  Can cause a lot of anxiety coughing and dyspnea which can lead a lot of suffering as well as complicate their recovery course.  Talked with them about how they typically have managed anxiety in the past including anxiety related to dyspnea and any concern they might have about doing that in the hospitalized setting. Discussed any history of chemical coping.  I was not able to identify any concerns here    SH: Lives in [facility].  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  2  "adult children and one grandchild.  Heavy tobacco use in the past.     ROS: Patient completed comprehensive electronic ROS form reviewed and confirmed key results with patient today.     PE: /82   Pulse 88   Temp 98.2  F (36.8  C) (Oral)   Ht 1.575 m (5' 2\")   Wt 70.8 kg (156 lb)   SpO2 97%   Breastfeeding? No   BMI 28.53 kg/m     Wt Readings from Last 3 Encounters:   04/26/19 70.8 kg (156 lb)   04/25/19 70 kg (154 lb 4.8 oz)   03/28/19 72.9 kg (160 lb 12.8 oz)     Alert, comfortable appearing, NAD. Wearing O2.  Head NCAT.  Eyes anicteric without injection  Face symmetric, eyes conjugate  Mouth pink, moist, no lesions.  Neck supple without masses, thyromegaly, LAD  Lungs unlabored, diminished bilat; no wheezes, no prolonged exp  CV rrr s1s2  Abd soft, ntnd, benign  Back well aligned, no masses, tenderness  Small pitting LLE edema, none on R  Skin warm, dry, without lesions  MSK joints of hand normal; no clubbing  Neuro Face symmetricc  Neuropsych affect slightly anxious; thought processes sl tangential; clear sensorium    Data reviewed:  I reviewed recent labs and imaging, my comments:  Chest CT shows empysematous changes  PFTs FEV1 0.5L/22%  6MWT 223m    Impression & Recommendations:  63-year-old woman with advanced COPD, and chronic hypoxemic respiratory failure, being evaluated here for lung transportation.  Advance care planning, end-of-life preparedness planning discussion as above.  She is very friendly during the discussion but is clear she is overwhelmed by this entire process, including my questions regarding advance care planning.  Her  was very calm and supportive and asked insightful questions and is able to have a good conversation with him.  I asked her to think about things to talk about it give her  any guidance that she can think of as to what she would, would be willing to go through, if she was not having a good outcome from the transplant.  Discussed end-of-life care " and the possibility of not to get transplant or not getting one in time.    Chart data reviewed today:  Advance care planning:     Family History   Problem Relation Age of Onset     Breast Cancer Mother      Colon Cancer Mother      Lung Cancer Mother      Lung Cancer Father      Lung Cancer Maternal Aunt      Pancreatic Cancer Maternal Uncle      Past Medical History:   Diagnosis Date     Atrial fibrillation with RVR (H)     hx of A fib related to severe COPD, does not meet anticoagulation criteria as noted     COPD, severe (H)      Osteoporosis      Past Surgical History:   Procedure Laterality Date     CV CORONARY ANGIOGRAM N/A 3/27/2019    Procedure: CV CORONARY ANGIOGRAM;  Surgeon: Thierry Serrano MD;  Location:  HEART CARDIAC CATH LAB     CV RIGHT HEART CATH N/A 3/27/2019    Procedure: CV RIGHT HEART CATH;  Surgeon: Thierry Serrano MD;  Location:  HEART CARDIAC CATH LAB     ESOPHAGEAL IMPEDENCE FUNCTION TEST WITH 24 HOUR PH GREATER THAN 1 HOUR N/A 3/28/2019    Procedure: ESOPHAGEAL IMPEDENCE FUNCTION TEST WITH 24 HOUR PH GREATER THAN 1 HOUR;  Surgeon: Mike Henry MD;  Location:  GI     EXCISE PILONIDAL CYST, SIMPLE       TONSILLECTOMY & ADENOIDECTOMY       Allergies   Allergen Reactions     Alendronic Acid Other (See Comments)     dizziness  Other reaction(s): Dizziness     Nickel Rash     Sulfa Drugs Rash     Medications: I have reviewed the patient's medication profile.     Thank you for involving us in the patient's care.   Yogesh Li MD / Palliative Medicine / Pager [phone] / [facility] Inpatient Team Consult Pager [phone] (answered 8am-430pm M-F) - ok to text page via [employer] / After-Hours Answering Service [phone] / Palliative Clinic in the [facility] at the [facility] - [phone] (scheduling); [phone] (triage).

## 2019-05-01 ENCOUNTER — COMMITTEE REVIEW (OUTPATIENT)
Dept: TRANSPLANT | Facility: CLINIC | Age: 64
End: 2019-05-01

## 2019-05-01 NOTE — COMMITTEE REVIEW
Committee Review Note      Evaluation Date: 3/25/2019  Committee Review Date: 5/2/2019    Organ being evaluated for: Lung    Transplant Phase: Evaluation  Transplant Status: Active    Transplant Coordinator: Kate Batres  Transplant Surgeon:       Referring Physician: Janette Hamlin    Primary Diagnosis: COPD/Emphysema  Secondary Diagnosis:     Committee Review Members:  Nutrition Elisa Maria, RD   Pharmacy Meng Rosales, [facility]   Pulmonary Geovanny Kumar MD    - Clinical Larissa Asif, [facility]   Transplant Patience Osorio RN, Ritika Porter, JENNIFER, Kelly Vega MD, Kate Batres, JENNIFER, Carson Feng MD, Adela Gutierrez, JENNIFER, Fortunato Owen MD, Luz Marina Burger APRN CNP, Gerard Murray, JENNIFER, Jane Noyola, JENNIFER, Gabe Waller MD   Transplant Surgery Andrez Mosley MD, Raul Robin MD       Transplant Eligibility: Eligible Native Organ Diagnosis, Age 16 years or greater, Meets following physiologic criteria: FEV1<30% predicted, Meets following physiologic criteria: DLCO<40% predicted, Meets following physiologic criteria: Requires supplemental oxygen, Objective evidence of progressive disease despite optimal medical mgnt and rehab, Functional deterioration despite optimal med management and rehabilitation, Meets following physiologic criteria: Arterial pCO2>50 or venous pCO2>60, Meets following physiologic criteria: 6 min walk<800 feet    Committee Review Decision: Approved    Relative Contraindications: Current mild-moderate coronary, cerebral or PVD or hx of severe disease , Osteoporosis, severe and/or with symptomatic fractures    Absolute Contraindications:     Committee Chair Fortunato Owen MD verbally attested to the committee's decision.    Committee Discussion Details:     Approved for bilateral sequential lung transplant. Sternotomy approach.    Things to address however does not need to delay listing.  Endocrinology  consult for osteoporosis.  Vaccinations    Will need close follow up especially with dermatology and repeat colonoscopy with strong family history of cancer.    Pulm rehab as able as fraility noted on dietary assessment.

## 2019-05-01 NOTE — PROGRESS NOTES
Patient of Dr. Waller seen in clinic for psychosocial assessment.   60 minutes spent with patient. 100% of visit consisted of counseling related to issues surrounding COPD and Lung Transplant.    Psychosocial Assessment   Name: Melissa Elder     MRN:  [id number]        Patient Name / Age / Race: Melissa Elder 63 year old  and    Source of Information: Patient and Caregiver   : Larissa Asif   Interview Date: 2019   Reason for Referral: Lung Transplant     Current Living Situation   Location ([facility] [address] [place] [id number]   With Whom: Living arrangements - the patient lives with their spouse.   Type of Home: single family   Working Phone? Yes    [employer]? Yes    Distance from Hospital: 4.5 hours   Need to Relocate Post Surgery? Yes    Discussed? 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 No   Income: SSD and spouse's income   Insurance: Medicare, [employer] and [employer]   Name of Private Insurance: Has [employer] as her supplement   Medication Coverage: [employer]    In current situation, pt. can afford medication and supply costs:  Yes    Other Financial Concerns/Issues: Has money in savings and feels they can afford the costs.     Family/Social Support   Marital Status:    Partner Name: Tyrese   Length of Time : 41 years   Partner s Employment: On SSDI- no longer working. Was a .    Relationship: stable/supportive   Children: Son, Star in [place], [place] and Dtr- Kriss in [place].   Parents:    Siblings: 1 brother, Eusebio in [place], [place]   Other Family or Friends Close by: Her 's 7 siblings all live locally.   Support System: available, helpful   Primary Support Person: Her Tyrese   Issues: None      Activities/Functional Ability   Current Level: ambulatory and independent with ADL's   Daily Activities: Is able to complete her ADLs with time.    Transportation: self      Medical Status   Patient s understanding of need for surgical intervention: Melissa understands that she will need a lung transplant if she has any hope of meaningful recovery.   Advanced Directive? Yes     Details: Lists her  Tyrese as her decision maker.   Adherence History: Good.   Ability to Adhere to Complex Medical Regime: Good.     Behavioral   Chemical Dependency Issues: Melissa stated that she has no problems giving up alcohol. She stated that she drinks 1-2 glasses of wine a couple times per week currently.    Smoker? No   Psychiatric impairment: Has some situational depression but is not on any medications. Also stated that she often feels anxious about her breathing.,   Coping Style/Strategies: Melissa stated that her creative outlets help her manage. She also loves her dog.   Recent Legal History: No   Teaching Completed During Assessment Related To Transplant: 1. Housing and relocation needs post surgery.  2. Caregiver needs post surgery.  3. Financial issues related to surgery.  4. Risks of alcohol use post surgery.  5. Common psychosocial stressors pre/post surgery.  6. Support group availability.   Psychosocial Risks Reviewed Related To Transplant: 1. Increased stress related to your emotional, family, social, employment, or financial situation.  2. Affect on work and/or disability benefits.  3. Affect on future health and life insurance.  4. Outcome expectations may not be met.  5. Mental Health risks: anxiety, depression, PTSD, guilt, grief and chronic fatigue.     Observed Behavior   Well informed? Yes  Angry? No   Process info well? Yes  Hostile? No   Evasive? No Oriented X3? Yes    Cautious/Suspicious? No Motivated? Yes    Appropriate Behavior? Yes  Depressed? No   Appropriate Affect? Yes  Anxious? Yes    Interview  Observations: Melissa and her  asked good questions and were thoughtful about transplant.     Selection Criteria Met   Plan for Support Yes    Chemical Dependence No   Smoking No   Mental Health Yes    Adequate Finances Yes      Risk Issues:    1.) She has anxiety that will need to be closely monitored.    Final Evaluation/Assessment:     Melissa has great support from her  and family. She has adequate insurance and financial stability. She has some anxiety that she feels is directly correlated to her inability to breath. She has no chemical dependency issues but has been advised to stop drinking wine. She stated this would not be a problem. She will need to relocate to [facility] for her transplant.    Patient understands risks and benefits of Lung Transplant: Yes     Recommendation:    good    Signature: Larissa Asif     Title: Licensed Independent Clinical                Interview Date: March 26, 2019

## 2019-05-02 ENCOUNTER — TELEPHONE (OUTPATIENT)
Dept: TRANSPLANT | Facility: CLINIC | Age: 64
End: 2019-05-02

## 2019-05-03 NOTE — TELEPHONE ENCOUNTER
In conversation with Melissa morning of 05/15 she had agreed that she was ready and would like to be actively listed for lung transplant.     Upon calling back to review LAS, checklist and to activate, Melissa seemed overwhelmed, flustered and anxious. She would like some time to think about things and make sure she is ready to proceed.     She will contact coordinator after continued discussion with her family and when she is prepared to actively list.

## 2019-05-15 ENCOUNTER — TELEPHONE (OUTPATIENT)
Dept: TRANSPLANT | Facility: CLINIC | Age: 64
End: 2019-05-15

## 2019-05-15 NOTE — TELEPHONE ENCOUNTER
"Received insurance approval to list for lung transplant.  Confirmed that Melissa is ready to proceed with listing for lung transplant.  Verified blood type as B per transplant office protocol.   Listed in UNOS for bilateral lung transplant as was recommended by the lung transplant team.    Data used for listing:  Ht: 5'2\" Wt: 154 lbs Date: 04/25/2019  Data source: Surgeon/Sharda Note  Oxygen requirements: 2 L rest, closure visit 03/29/2019  Diabetic status: Not diabetic  Prior malignancies: None  Smoking quit date: 2006  On life support at time of listing: No  Prior cardiac surgery: None  Updated transplant tab phase status:Yes    Contacted patient to confirm listing and verified that LAS score is 32.7615 which puts her the only person on the transplant B list.  Melissa will require a virtual crossmatch, and is aware that we will continue to monitor HLA antibodies with quarterly PRA levels.    Reminded Melissa that the call may come at any time and that she will need to be ready when called. Melissa plans to drive 4 hours if time allows, with a flight plan as back up.  Melissa is aware that she should have medications and oxygen ready to bring to the hospital.    Discussed contacting coordinator for change in medical status such as treatment with steroids or antibiotics, need for hospitalization, or new blood transfusion; and with any travel plans.  Melissa is aware that she will need to be seen a minimum of every 6 months at [facility].  Next clinic appointment is scheduled on 06/03/2019  Notified surgeons and coordinators on call of new listing.   Wait list notification letter sent to Melissa and referring and primary care physicians.            "

## 2019-06-03 ENCOUNTER — OFFICE VISIT (OUTPATIENT)
Dept: TRANSPLANT | Facility: CLINIC | Age: 64
End: 2019-06-03
Attending: INTERNAL MEDICINE
Payer: MEDICARE

## 2019-06-03 VITALS
DIASTOLIC BLOOD PRESSURE: 81 MMHG | BODY MASS INDEX: 27.98 KG/M2 | SYSTOLIC BLOOD PRESSURE: 160 MMHG | TEMPERATURE: 98.3 F | OXYGEN SATURATION: 96 % | WEIGHT: 153 LBS | HEART RATE: 93 BPM

## 2019-06-03 DIAGNOSIS — J44.9 COPD (CHRONIC OBSTRUCTIVE PULMONARY DISEASE) (H): Primary | ICD-10-CM

## 2019-06-03 DIAGNOSIS — J44.9 COPD (CHRONIC OBSTRUCTIVE PULMONARY DISEASE) (H): ICD-10-CM

## 2019-06-03 DIAGNOSIS — Z76.82 LUNG TRANSPLANT CANDIDATE: Primary | ICD-10-CM

## 2019-06-03 DIAGNOSIS — Z76.82 ORGAN TRANSPLANT CANDIDATE: ICD-10-CM

## 2019-06-03 DIAGNOSIS — R06.02 SOB (SHORTNESS OF BREATH): Primary | ICD-10-CM

## 2019-06-03 LAB
6 MIN WALK (FT): 800 FT
6 MIN WALK (M): 244 M
BASE EXCESS BLDV CALC-SCNC: 4.6 MMOL/L
HCO3 BLDV-SCNC: 31 MMOL/L (ref 21–28)
O2/TOTAL GAS SETTING VFR VENT: ABNORMAL %
PCO2 BLDV: 52 MM HG (ref 40–50)
PH BLDV: 7.39 PH (ref 7.32–7.43)
PO2 BLDV: 44 MM HG (ref 25–47)

## 2019-06-03 PROCEDURE — G0463 HOSPITAL OUTPT CLINIC VISIT: HCPCS | Mod: ZF

## 2019-06-03 PROCEDURE — 82657 ENZYME CELL ACTIVITY: CPT | Performed by: INTERNAL MEDICINE

## 2019-06-03 PROCEDURE — 36415 COLL VENOUS BLD VENIPUNCTURE: CPT | Performed by: INTERNAL MEDICINE

## 2019-06-03 PROCEDURE — 82803 BLOOD GASES ANY COMBINATION: CPT | Performed by: INTERNAL MEDICINE

## 2019-06-03 ASSESSMENT — PAIN SCALES - GENERAL: PAINLEVEL: NO PAIN (0)

## 2019-06-03 NOTE — PATIENT INSTRUCTIONS
Pulmonary Rehab: Please remember to stay active.  Continue exercises learned in pulmonary rehab or continue participating in pulmonary rehab, if able.     Current oxygen use: Rest 2-3 L Activity 3 L Sleep 2 L     Data Unavailable 153 lbs 0 oz Body mass index is 27.98 kg/m .  Goal BMI greater than 18, less than 30 for lung transplant.     Medication changes:  None today  Future orders: Will complete PFTs, six minute walk and labs today.  Antibody blood test (PRA) due every 3 months - will receive  to complete  Next appointment: 6 months with Dr Waller    Test or procedures to be complete prior to next appointment:   PFTs: today    6MW: today    CT Scan: not due    Please remember to stay up to date with your primary care requirements including:  Annual check-ups with primary care physician   Mammogram   Pap smear (as appropriate for women)    Dental visits   Annual flu shots/ immunizations as needed   Colonoscopy (patients over 50).     Thank-you for allowing us to participate in your care.    If your condition should change, please contact your transplant coordinator. This includes: worsening symptoms, need for antibiotics, hospitalizations, transfusions.    Thoracic Transplant Office phone [phone], option 2, fax [phone]    Office Hours 8:30 - 5:00 pm

## 2019-06-03 NOTE — NURSING NOTE
"PRE LUNG TRANSPLANT FOLLOW UP CLINIC VISIT              TRANSPLANT COORDINATOR NURSE NOTE    Melissa Elder 63 year old  [id number]    Data Unavailable  153 lbs 0 oz  Body mass index is 27.98 kg/m .    Is patient currently active on lung transplant list: Tentative listing .       PSYCH/SOCIAL:  Social Support Present: Patient arrived to clinic with  , Tyrese and 11 year old therapy dog, David. Patient and support person were actively engaged in patient care and asking appropriate questions: yes    Denies need or desire for mental health counseling. Tyrese,  agrees and is very supportive. Discussed palliative social workers provide ongoing counseling, health psychology verus local therapist as might be helpful in expressing anxieties. She is aware of available resources. States she prefers \"natural remedies\" like her 11 year old dog who they recently had certified as a therapy dog (so she is allowed at [facility] in event of transplant). Of note David, her therapy pet recently had 21 kidney stones removed and a large mass removed.       NICOTINE USE: None   ETOH USE: Rarely   DIABETIC STATUS: Not diabetic  CURRENT OXYGEN USE:   Rest: 2-3 L  Activity: 3 L  Sleep:2 L    PRE-LUNG TRANSPLANT TESTIN.) PFT: today   Scheduled Next:6 months  2.) 6MW: today   Scheduled Next:6 months  3.) LABS: 2019   Scheduled Next:2019  4.) CT CHEST:    Last Completed: 2019    Scheduled Next:TBD        PHYSICAL STATUS (Karnofsky Score/Physical Capacity) 60%    CURRENT ACTIVITY TOLERATED PATIENT SUMMARY: Using light weights few days a week, walks at [employer] weekly, using at home bike few days/week.    MEDICATIONS:   Steroid Use: Just finished prednisone taper  Antibiotic Use: Just finished z pack  Narcotic Use (Pain Management): None  Medication Changes: None    PRIMARY CARE:   Dentist:    Last Completed:    Findings: No interventions needed. Has native teeth.   Next Scheduled: Sees dentist " regularly  Mammo:   Last Completed: 10/9/2018   Findings: negative   Next Scheduled: due in 10/2019  Pap Smear:    Last Completed: 04/30/2018   Findings: negative   Next Scheduled: 04/2021  Colonoscopy:    Last Completed: 2016   Findings: tubular adenomas (hx of colon cancer in mother) Recc'd 5 year follow up   Next Scheduled: TBD  Recent Hospitalizations: None in > 1 year       Plan: List, f/u with Dr Waller 6 months. Needs PRA .    Follow-up/records needed: 6 months with Christin.  Melissa is scheduled for an endocrinology work up locally in the next month to address her osteoporosis.  She will complete vaccination series locally.

## 2019-06-03 NOTE — PROGRESS NOTES
Chief Complaint   Patient presents with     RECHECK     1 year f/u for lung tx     Blood pressure 160/81, pulse 93, temperature 98.3  F (36.8  C), temperature source Oral, weight 69.4 kg (153 lb), SpO2 96 %, not currently breastfeeding.    Nona Bettencourt, CMA

## 2019-06-03 NOTE — LETTER
6/3/2019      RE: Melissa Elder  [address]  [facility] [address]       Chief Complaint   Patient presents with     RECHECK     1 year f/u for lung tx     Blood pressure 160/81, pulse 93, temperature 98.3  F (36.8  C), temperature source Oral, weight 69.4 kg (153 lb), SpO2 96 %, not currently breastfeeding.    Nona Bettencourt CMA      [facility]  Melissa Elder 62 year old female  : 1955  MRN: [id number]  DATE OF SERVICE Maria Fernanda 3, 2019        PRIMARY CARE PROVIDER: aNtasha Rm    REFERRING PROVIDER: [place] [address]      ASSESSMENT AND PLAN: The patient is a 63-year-old female with end-stage lung disease due to COPD.  Her lung function appears to be stable over last 1 year however her symptoms have worsened.  She recently completed her lung transplant evaluation and was found to be a good candidate for bilateral lung transplant.  The patient is agreeable to be put on lung transplant wait list.  Her lung allocation score is 32.874.  Her blood group is B.  She does not have significant HLA antibodies.  We will continue to follow her every 6 months in clinic while she is on the wait list.  She was advised to continue her current COPD treatment and inform us of any major changes to her health.  The patient does have significant anxiety, she was advised to primary care provider and consider establishing care with a mental health provider.    REASON FOR VISIT: Follow-up of lung transplant candidacy.     HISTORY OF PRESENT ILLNESS: The patient is a very pleasant 63-year-old female with history of end-stage lung disease due to COPD, hyperlipidemia, paroxysmal atrial fibrillation, occasional GERD and hypertension who was initially seen in clinic approximately 1 year ago for evaluation of lung transplant candidacy.  She underwent formal lung transplant evaluation earlier this year and  was approved for bilateral sequential lung transplant.      She reports that she has noticed significant decline in her exertional capacity and worsening of symptoms over last 1 year.  She reports at least one episode of COPD exacerbation for the last 6 months requiring prednisone and antibiotics.  She continues to be on inhalational medications for COPD.  She reports significant dyspnea while performing her ADLs.  She does remain physically active using a stationary bike at home.  She continues to have a cough which was initially productive of yellowish sputum however after recent antibiotic use the sputum has changed to clear.  She denies any wheezing or hemoptysis.  She is currently using 2 L of supplemental oxygen at rest and increases it up to 3 to 4 L with exertion.    REVIEW OF SYSTEMS:     1.  She denies any recent fevers, chills or night sweats.  Her appetite is good and weight has been stable.  2.  She denies any chest pains, palpitations or lightheadedness.  3.  She denies any nausea or vomiting.  She has chronic constipation.  4.  She reports some excessive daytime sleepiness.  She reports snoring and witnessed apneas although these are rare.  5.  She reports significant anxiety related to her medical illness.  Rest of the review of systems is negative except as noted above.     PULMONARY FUNCTION TESTS:    FEV1 0.47 L, 20% predicted.  FVC 1.68 L, 58% predicted.  FEV1/FVC 28%.  This test shows a very severe obstructive lung disease.  Both FEV1 and FVC are stable compared to 3 months ago.    6-minute walk test was performed with the use of 3.5 L of supplemental oxygen continuously.  She walked 244 m which is lower than the lower limit of normal.       PHYSICAL EXAMINATION:   /81   Pulse 93   Temp 98.3  F (36.8  C) (Oral)   Wt 69.4 kg (153 lb)   SpO2 96%   BMI 27.98 kg/m     General: Pleasant female sitting comfortably at rest, speaking in full sentences without any discomfort.  Eyes: No scleral  icterus or conjunctival pallor present.  HEENT: Moist mucous membranes.  No cervical or submandibular lymphadenopathy.  Pulmonary: Significantly decreased intensity of breath sounds bilaterally without any added sounds.  Cardiovascular: S1, S2 normal with regular rate and rhythm.  Distant heart sounds.  No murmurs noted.  Abdomen: Soft, nontender, nondistended.  Normoactive bowel sounds.  Musculoskeletal: No lower extremities edema noted.  No upper extremity tremors noted.  Skin: No rashes on limited exam.  Neurological: Grossly normal.  Psychiatric: Appears anxious.      Allergies:  Allergies   Allergen Reactions     Alendronic Acid Other (See Comments)     dizziness  Other reaction(s): Dizziness     Nickel Rash     Sulfa Drugs Rash       Medications:  Current Outpatient Medications   Medication Sig Dispense Refill     aspirin 81 MG EC tablet Take 81 mg by mouth       atorvastatin (LIPITOR) 20 MG tablet Take 20 mg by mouth       budesonide-formoterol (SYMBICORT) 160-4.5 MCG/ACT Inhaler Inhale 2 puffs into the lungs       calcium 500 MG CHEW        cetirizine (ZYRTEC) 10 MG tablet Take 10 mg by mouth       fluticasone (FLONASE) 50 MCG/ACT spray Spray 1 spray in nostril       levalbuterol (XOPENEX HFA) 45 MCG/ACT Inhaler Inhale 2 puffs into the lungs       levalbuterol (XOPENEX) 1.25 MG/3ML neb solution Inhale 1.25 mg into the lungs       OXYGEN-HELIUM IN 2 lpm per nasal canula nocturnal and with activity may use portable w/ conserving device       predniSONE (DELTASONE) 10 MG tablet Take 10 mg by mouth       tiotropium (SPIRIVA RESPIMAT) 2.5 MCG/ACT inhalation aerosol Inhale 2 puffs into the lungs           The above note was dictated using voice recognition software and may include typographical errors. Please contact the author for any clarifications.    Gabe Waller MD   of Medicine  Pulmonary, Critical Care and Sleep Medicine  [facility]  Pager: [phone]

## 2019-06-04 LAB
EXPTIME-PRE: 11.96 SEC
FEF2575-%PRED-PRE: 8 %
FEF2575-PRE: 0.18 L/SEC
FEF2575-PRED: 2.05 L/SEC
FEFMAX-%PRED-PRE: 29 %
FEFMAX-PRE: 1.72 L/SEC
FEFMAX-PRED: 5.82 L/SEC
FEV1-%PRED-PRE: 20 %
FEV1-PRE: 0.47 L
FEV1FEV6-PRE: 36 %
FEV1FEV6-PRED: 80 %
FEV1FVC-PRE: 28 %
FEV1FVC-PRED: 79 %
FIFMAX-PRE: 1.63 L/SEC
FVC-%PRED-PRE: 58 %
FVC-PRE: 1.68 L
FVC-PRED: 2.86 L

## 2019-06-04 NOTE — TELEPHONE ENCOUNTER
"Received insurance approval to list for lung transplant.  Confirmed that Melissa is ready to proceed with listing for lung transplant.  Verified blood type as B per transplant office protocol.   Listed in UNOS for bilateral lung transplant as was recommended by the lung transplant team.     Data used for listing:  Ht: 5'2\" Wt: 153 lbs Date: 06/03/2019  Data source: PFTS 06/03/2019  Oxygen requirements: 3 L rest, office visit with Dr Waller 06/03/2019  Diabetic status: Not diabetic  Prior malignancies: None  Smoking quit date: 2006  On life support at time of listing: No  Prior cardiac surgery: None  Updated transplant tab phase status:Yes     Contacted patient to confirm listing and verified that LAS score is 32.874 which puts her the top of the B list.  Melissa will require a virtual crossmatch, and is aware that we will continue to monitor HLA antibodies with quarterly PRA levels.    Reminded Melissa that the call may come at any time and that she will need to be ready when called. Melissa plans to drive 4 hours if time allows, with a flight plan as back up.  Melissa is aware that she should have medications and oxygen ready to bring to the hospital.    Discussed contacting coordinator for change in medical status such as treatment with steroids or antibiotics, need for hospitalization, or new blood transfusion; and with any travel plans.  Melissa is aware that she will need to be seen a minimum of every 6 months at [facility].  Next clinic appointment is scheduled on TBD, 6 months from 06/03/2019  Notified surgeons and coordinators on call of new listing.   Wait list notification letter sent to Melissa and referring and primary care physicians.        "

## 2019-06-06 LAB — TPMT BLD-CCNC: 25.1 U/ML (ref 24–44)

## 2019-06-06 NOTE — PROGRESS NOTES
[facility]  Melissa Elder 62 year old female  : 1955  MRN: [id number]  DATE OF SERVICE Maria Efrnanda 3, 2019        PRIMARY CARE PROVIDER: Natasha Rm    REFERRING PROVIDER: Janette Hamlin MD  [address] [address]      ASSESSMENT AND PLAN: The patient is a 63-year-old female with end-stage lung disease due to COPD.  Her lung function appears to be stable over last 1 year however her symptoms have worsened.  She recently completed her lung transplant evaluation and was found to be a good candidate for bilateral lung transplant.  The patient is agreeable to be put on lung transplant wait list.  Her lung allocation score is 32.874.  Her blood group is B.  She does not have significant HLA antibodies.  We will continue to follow her every 6 months in clinic while she is on the wait list.  She was advised to continue her current COPD treatment and inform us of any major changes to her health.  The patient does have significant anxiety, she was advised to primary care provider and consider establishing care with a mental health provider.    REASON FOR VISIT: Follow-up of lung transplant candidacy.     HISTORY OF PRESENT ILLNESS: The patient is a very pleasant 63-year-old female with history of end-stage lung disease due to COPD, hyperlipidemia, paroxysmal atrial fibrillation, occasional GERD and hypertension who was initially seen in clinic approximately 1 year ago for evaluation of lung transplant candidacy.  She underwent formal lung transplant evaluation earlier this year and was approved for bilateral sequential lung transplant.      She reports that she has noticed significant decline in her exertional capacity and worsening of symptoms over last 1 year.  She reports at least one episode of COPD exacerbation for the last 6 months requiring prednisone and antibiotics.  She continues to be on inhalational  medications for COPD.  She reports significant dyspnea while performing her ADLs.  She does remain physically active using a stationary bike at home.  She continues to have a cough which was initially productive of yellowish sputum however after recent antibiotic use the sputum has changed to clear.  She denies any wheezing or hemoptysis.  She is currently using 2 L of supplemental oxygen at rest and increases it up to 3 to 4 L with exertion.    REVIEW OF SYSTEMS:     1.  She denies any recent fevers, chills or night sweats.  Her appetite is good and weight has been stable.  2.  She denies any chest pains, palpitations or lightheadedness.  3.  She denies any nausea or vomiting.  She has chronic constipation.  4.  She reports some excessive daytime sleepiness.  She reports snoring and witnessed apneas although these are rare.  5.  She reports significant anxiety related to her medical illness.  Rest of the review of systems is negative except as noted above.     PULMONARY FUNCTION TESTS:    FEV1 0.47 L, 20% predicted.  FVC 1.68 L, 58% predicted.  FEV1/FVC 28%.  This test shows a very severe obstructive lung disease.  Both FEV1 and FVC are stable compared to 3 months ago.    6-minute walk test was performed with the use of 3.5 L of supplemental oxygen continuously.  She walked 244 m which is lower than the lower limit of normal.       PHYSICAL EXAMINATION:   /81   Pulse 93   Temp 98.3  F (36.8  C) (Oral)   Wt 69.4 kg (153 lb)   SpO2 96%   BMI 27.98 kg/m    General: Pleasant female sitting comfortably at rest, speaking in full sentences without any discomfort.  Eyes: No scleral icterus or conjunctival pallor present.  HEENT: Moist mucous membranes.  No cervical or submandibular lymphadenopathy.  Pulmonary: Significantly decreased intensity of breath sounds bilaterally without any added sounds.  Cardiovascular: S1, S2 normal with regular rate and rhythm.  Distant heart sounds.  No murmurs noted.  Abdomen:  Soft, nontender, nondistended.  Normoactive bowel sounds.  Musculoskeletal: No lower extremities edema noted.  No upper extremity tremors noted.  Skin: No rashes on limited exam.  Neurological: Grossly normal.  Psychiatric: Appears anxious.      Allergies:  Allergies   Allergen Reactions     Alendronic Acid Other (See Comments)     dizziness  Other reaction(s): Dizziness     Nickel Rash     Sulfa Drugs Rash       Medications:  Current Outpatient Medications   Medication Sig Dispense Refill     aspirin 81 MG EC tablet Take 81 mg by mouth       atorvastatin (LIPITOR) 20 MG tablet Take 20 mg by mouth       budesonide-formoterol (SYMBICORT) 160-4.5 MCG/ACT Inhaler Inhale 2 puffs into the lungs       calcium 500 MG CHEW        cetirizine (ZYRTEC) 10 MG tablet Take 10 mg by mouth       fluticasone (FLONASE) 50 MCG/ACT spray Spray 1 spray in nostril       levalbuterol (XOPENEX HFA) 45 MCG/ACT Inhaler Inhale 2 puffs into the lungs       levalbuterol (XOPENEX) 1.25 MG/3ML neb solution Inhale 1.25 mg into the lungs       OXYGEN-HELIUM IN 2 lpm per nasal canula nocturnal and with activity may use portable w/ conserving device       predniSONE (DELTASONE) 10 MG tablet Take 10 mg by mouth       tiotropium (SPIRIVA RESPIMAT) 2.5 MCG/ACT inhalation aerosol Inhale 2 puffs into the lungs           The above note was dictated using voice recognition software and may include typographical errors. Please contact the author for any clarifications.    Gabe Waller MD   of Medicine  Pulmonary, Critical Care and Sleep Medicine  [facility]  Pager: [phone]

## 2019-06-14 ENCOUNTER — TELEPHONE (OUTPATIENT)
Dept: TRANSPLANT | Facility: CLINIC | Age: 64
End: 2019-06-14

## 2019-06-14 NOTE — TELEPHONE ENCOUNTER
Call back made Melissa. She has started taking Ibandronate 150 mg once a month after seeing her physician as we recommended for osteoporosis found during eval week. No side effects at this point. Thanked Adri for the update.

## 2019-06-15 NOTE — TELEPHONE ENCOUNTER
Patient called back and confirmed for Follow-up Christin appt on Mon, Dec 2 and mailing schedule to patient

## 2019-07-26 DIAGNOSIS — J44.9 COPD (CHRONIC OBSTRUCTIVE PULMONARY DISEASE) (H): Primary | ICD-10-CM

## 2019-07-30 DIAGNOSIS — J44.9 COPD (CHRONIC OBSTRUCTIVE PULMONARY DISEASE) (H): ICD-10-CM

## 2019-08-05 LAB
PROTOCOL CUTOFF: NORMAL
SA1 CELL: NORMAL
SA1 COMMENTS: NORMAL
SA1 HI RISK ABY: NORMAL
SA1 MOD RISK ABY: NORMAL
SA1 TEST METHOD: NORMAL
SA2 CELL: NORMAL
SA2 COMMENTS: NORMAL
SA2 HI RISK ABY UA: NORMAL
SA2 MOD RISK ABY: NORMAL
SA2 TEST METHOD: NORMAL
UNACCEPTABLE ANTIGEN: NORMAL
UNOS CPRA: 0

## 2019-10-23 ENCOUNTER — DOCUMENTATION ONLY (OUTPATIENT)
Dept: PULMONOLOGY | Facility: CLINIC | Age: 64
End: 2019-10-23

## 2019-10-23 RX ORDER — FUROSEMIDE 20 MG
20 TABLET ORAL DAILY
Status: ON HOLD | COMMUNITY
Start: 2019-10-08 | End: 2022-03-16

## 2019-10-23 NOTE — NURSING NOTE
Call from Melissa with update. She has been feeling more SOB and fatigued with less of an activity tolerance. She was seen by her pulm Dr Hamlin recently in follow up. She was started on daily lasix for increased LE swelling and COPD medications changed to nebulizer forms and will continue on spiriva inhaler daily. She also recently received 2nd dose of Hep A vaccination on 10/14/2019 and will be due for an updated PRA around 11/14/2019.

## 2019-11-25 DIAGNOSIS — Z76.82 ORGAN TRANSPLANT CANDIDATE: Primary | ICD-10-CM

## 2019-11-25 DIAGNOSIS — J44.9 COPD (CHRONIC OBSTRUCTIVE PULMONARY DISEASE) (H): ICD-10-CM

## 2019-12-06 ENCOUNTER — TELEPHONE (OUTPATIENT)
Dept: PULMONOLOGY | Facility: CLINIC | Age: 64
End: 2019-12-06

## 2019-12-09 ENCOUNTER — APPOINTMENT (OUTPATIENT)
Dept: LAB | Facility: CLINIC | Age: 64
End: 2019-12-09
Payer: COMMERCIAL

## 2019-12-09 ENCOUNTER — OFFICE VISIT (OUTPATIENT)
Dept: TRANSPLANT | Facility: CLINIC | Age: 64
End: 2019-12-09
Attending: INTERNAL MEDICINE
Payer: MEDICARE

## 2019-12-09 VITALS
SYSTOLIC BLOOD PRESSURE: 133 MMHG | TEMPERATURE: 98.5 F | OXYGEN SATURATION: 96 % | HEART RATE: 97 BPM | DIASTOLIC BLOOD PRESSURE: 95 MMHG

## 2019-12-09 DIAGNOSIS — G25.81 RLS (RESTLESS LEGS SYNDROME): Primary | ICD-10-CM

## 2019-12-09 DIAGNOSIS — J44.9 COPD (CHRONIC OBSTRUCTIVE PULMONARY DISEASE) (H): Primary | ICD-10-CM

## 2019-12-09 DIAGNOSIS — Z76.82 ORGAN TRANSPLANT CANDIDATE: ICD-10-CM

## 2019-12-09 DIAGNOSIS — J44.9 COPD (CHRONIC OBSTRUCTIVE PULMONARY DISEASE) (H): ICD-10-CM

## 2019-12-09 DIAGNOSIS — J44.9 CHRONIC OBSTRUCTIVE PULMONARY DISEASE, UNSPECIFIED COPD TYPE (H): ICD-10-CM

## 2019-12-09 DIAGNOSIS — D50.9 IRON DEFICIENCY ANEMIA, UNSPECIFIED IRON DEFICIENCY ANEMIA TYPE: ICD-10-CM

## 2019-12-09 LAB
6 MIN WALK (FT): 670 FT
6 MIN WALK (M): 204 M
ALBUMIN SERPL-MCNC: 3.6 G/DL (ref 3.4–5)
ALP SERPL-CCNC: 94 U/L (ref 40–150)
ALT SERPL W P-5'-P-CCNC: 34 U/L (ref 0–50)
ANION GAP SERPL CALCULATED.3IONS-SCNC: 4 MMOL/L (ref 3–14)
AST SERPL W P-5'-P-CCNC: 17 U/L (ref 0–45)
BASE EXCESS BLDV CALC-SCNC: 5.9 MMOL/L
BASE EXCESS BLDV CALC-SCNC: 6.5 MMOL/L
BILIRUB SERPL-MCNC: 0.4 MG/DL (ref 0.2–1.3)
BUN SERPL-MCNC: 10 MG/DL (ref 7–30)
CALCIUM SERPL-MCNC: 9 MG/DL (ref 8.5–10.1)
CHLORIDE SERPL-SCNC: 102 MMOL/L (ref 94–109)
CO2 SERPL-SCNC: 32 MMOL/L (ref 20–32)
CREAT SERPL-MCNC: 0.51 MG/DL (ref 0.52–1.04)
ERYTHROCYTE [DISTWIDTH] IN BLOOD BY AUTOMATED COUNT: 13.2 % (ref 10–15)
EXPTIME-PRE: 9.7 SEC
FEF2575-%PRED-PRE: 8 %
FEF2575-PRE: 0.18 L/SEC
FEF2575-PRED: 2.01 L/SEC
FEFMAX-%PRED-PRE: 30 %
FEFMAX-PRE: 1.77 L/SEC
FEFMAX-PRED: 5.76 L/SEC
FEV1-%PRED-PRE: 21 %
FEV1-PRE: 0.49 L
FEV1FEV6-PRE: 37 %
FEV1FEV6-PRED: 80 %
FEV1FVC-PRE: 31 %
FEV1FVC-PRED: 79 %
FIFMAX-PRE: 1.48 L/SEC
FVC-%PRED-PRE: 56 %
FVC-PRE: 1.59 L
FVC-PRED: 2.83 L
GFR SERPL CREATININE-BSD FRML MDRD: >90 ML/MIN/{1.73_M2}
GLUCOSE SERPL-MCNC: 146 MG/DL (ref 70–99)
HCO3 BLDV-SCNC: 32 MMOL/L (ref 21–28)
HCO3 BLDV-SCNC: 33 MMOL/L (ref 21–28)
HCT VFR BLD AUTO: 40.2 % (ref 35–47)
HGB BLD-MCNC: 13 G/DL (ref 11.7–15.7)
MCH RBC QN AUTO: 28.2 PG (ref 26.5–33)
MCHC RBC AUTO-ENTMCNC: 32.3 G/DL (ref 31.5–36.5)
MCV RBC AUTO: 87 FL (ref 78–100)
O2/TOTAL GAS SETTING VFR VENT: ABNORMAL %
O2/TOTAL GAS SETTING VFR VENT: ABNORMAL %
PCO2 BLDV: 51 MM HG (ref 40–50)
PCO2 BLDV: 53 MM HG (ref 40–50)
PH BLDV: 7.4 PH (ref 7.32–7.43)
PH BLDV: 7.4 PH (ref 7.32–7.43)
PLATELET # BLD AUTO: 180 10E9/L (ref 150–450)
PO2 BLDV: 34 MM HG (ref 25–47)
PO2 BLDV: 44 MM HG (ref 25–47)
POTASSIUM SERPL-SCNC: 3.6 MMOL/L (ref 3.4–5.3)
PROT SERPL-MCNC: 7.5 G/DL (ref 6.8–8.8)
RBC # BLD AUTO: 4.61 10E12/L (ref 3.8–5.2)
SODIUM SERPL-SCNC: 138 MMOL/L (ref 133–144)
WBC # BLD AUTO: 6.7 10E9/L (ref 4–11)

## 2019-12-09 PROCEDURE — 85027 COMPLETE CBC AUTOMATED: CPT | Performed by: INTERNAL MEDICINE

## 2019-12-09 PROCEDURE — 80053 COMPREHEN METABOLIC PANEL: CPT | Performed by: INTERNAL MEDICINE

## 2019-12-09 PROCEDURE — 36415 COLL VENOUS BLD VENIPUNCTURE: CPT | Performed by: INTERNAL MEDICINE

## 2019-12-09 PROCEDURE — 82803 BLOOD GASES ANY COMBINATION: CPT | Performed by: INTERNAL MEDICINE

## 2019-12-09 PROCEDURE — G0463 HOSPITAL OUTPT CLINIC VISIT: HCPCS | Mod: ZF

## 2019-12-09 RX ORDER — AZITHROMYCIN 250 MG/1
TABLET, FILM COATED ORAL
COMMUNITY
Start: 2019-09-26 | End: 2021-06-21

## 2019-12-09 RX ORDER — BUDESONIDE 1 MG/2ML
1 INHALANT ORAL
COMMUNITY
Start: 2019-10-04 | End: 2021-06-21

## 2019-12-09 RX ORDER — ARFORMOTEROL TARTRATE 15 UG/2ML
15 SOLUTION RESPIRATORY (INHALATION)
COMMUNITY
Start: 2019-10-04 | End: 2021-06-21

## 2019-12-09 RX ORDER — IPRATROPIUM BROMIDE AND ALBUTEROL SULFATE 2.5; .5 MG/3ML; MG/3ML
3 SOLUTION RESPIRATORY (INHALATION)
COMMUNITY
Start: 2019-10-04 | End: 2021-06-21

## 2019-12-09 RX ORDER — BUDESONIDE 1 MG/2ML
INHALANT ORAL
Refills: 11 | COMMUNITY
Start: 2019-11-25 | End: 2021-06-21

## 2019-12-09 RX ORDER — GABAPENTIN 100 MG/1
CAPSULE ORAL
Qty: 30 CAPSULE | Refills: 11 | Status: SHIPPED | OUTPATIENT
Start: 2019-12-09 | End: 2021-06-21

## 2019-12-09 RX ORDER — ARFORMOTEROL TARTRATE 15 UG/2ML
SOLUTION RESPIRATORY (INHALATION)
Refills: 11 | COMMUNITY
Start: 2019-11-25 | End: 2021-06-21

## 2019-12-09 ASSESSMENT — PAIN SCALES - GENERAL: PAINLEVEL: NO PAIN (0)

## 2019-12-09 NOTE — LETTER
2019      RE: Melissa Elder  [address]  [facility] [address]       [facility]  Melissa Elder 62 year old female  : 1955  MRN: [id number]  DATE OF SERVICE 2019        PRIMARY CARE PROVIDER: Natasha Rm    REFERRING PROVIDER: [place] [address]      ASSESSMENT AND PLAN: The patient is a 64-year-old female with end-stage lung disease due to COPD, hyperlipidemia, paroxysmal A. fib, osteoporosis, hypertension and occasional GERD.  She remains active on lung transplant wait list as of 2019.  We will update her lung allocation score based on the recent PFTs.  Her COPD remained stable and she has not had any significant exacerbations within last 6 months.  Her blood group is B and she does not have any significant anti-HLA antibodies.  She will continue to use Brovana, Pulmicort, Spiriva for management of her COPD.  She is up-to-date on her vaccinations.    Given symptoms consistent of restless leg syndrome, we will start her on gabapentin 300 mg to be taken 2 hours before bedtime.  We will also check a ferritin level.    The patient will return to clinic in approximately 6 months.    REASON FOR VISIT: Follow-up of lung transplant candidacy.     HISTORY OF PRESENT ILLNESS:     The patient was last seen in our clinic approximately 6 months ago and comes in today for follow-up.  She has severe COPD with end-stage lung disease, hyperlipidemia, paroxysmal atrial fibrillation, osteoporosis, occasional GERD and hypertension.  She has completed the lung transplant evaluation and has been on lung transplant wait list for last 6 months.    She reports no shortness of breath at rest but significant limitation of her exertional capacity due to dyspnea.  She particularly reports difficulty with taking a shower.  She is not very physically active through the day.  She is  now using 3 L of supplemental oxygen at rest but her saturations remain in the 90s without movement.  She reports chronic dry cough and occasional wheezing.  She has not had any episodes of pulmonary exacerbation requiring antibiotics/prednisone or hospital admissions since her last clinic visit.    She was recently started on Lasix by her pulmonologist due to mild pedal edema.    REVIEW OF SYSTEMS:       1.  He denies any recent fevers, chills or night sweats.  2.  Her appetite is good and weight is stable.  3.  She denies any chest pain.  She reports occasional palpitations.  She reports occasional lightheadedness.  4.  She reports lower back pain which is improved by using a warm pack.  5.  She reports mild lower extremities edema.  6.  She reports significant motor restlessness symptoms which occur nightly and result in sleep maintenance insomnia.  These are typical of restless leg syndrome.  She had previous episodes of these during pregnancies.  7.  She denies any new rashes or bruises on her body.  8.  Her mood is good.    Rest of the review of systems as noted negative except as above.     PULMONARY FUNCTION TESTS:    FEV1 0.49 L, 21% predicted.  FVC 1.59, 56% predicted.  FEV1/FVC 31%.  This test shows very severe obstructive lung disease.  Both FEV1 and FVC are stable compared to last PFTs.    6-minute walk test performed on 3 L supplemental oxygen showed 670ft with lower limit of normal being 1252 feet. Kyrie oxygen saturation 93%, maximum heart rate 118 bpm.  Walk distance is significantly reduced and lower than previous PFTs.       PHYSICAL EXAMINATION:   BP (!) 133/95   Pulse 97   Temp 98.5  F (36.9  C)   SpO2 96%   General: Pleasant female, sitting comfortably, speaking in full sentences without any discomfort.  Eyes: No scleral icterus present.  Mild conjunctival pallor noted.  HEENT: Moist mucous membranes, no signs of oral candidiasis.  Lymphatics: No cervical or submandibular  lymphadenopathy.  Pulmonary: Significantly reduced breath sounds bilaterally.  No added sounds.  Cardiovascular: S1, S2 normal.  Regular rate and rhythm.  Distant heart sounds.  No murmurs identified.  Abdomen: Soft, nontender, nondistended.  Normoactive bowel sounds.  Musculoskeletal: No lower extremities edema noted.  No upper extremity tremors noted.  Skin: No rashes on limited exam.  Neurological: Grossly intact.  Psychiatric: Normal affect.    Allergies:  Allergies   Allergen Reactions     Alendronic Acid Other (See Comments)     dizziness  Other reaction(s): Dizziness     Nickel Rash     Sulfa Drugs Rash       Medications:  Current Outpatient Medications   Medication Sig Dispense Refill     arformoterol (BROVANA) 15 MCG/2ML NEBU neb solution Inhale 15 mcg into the lungs       aspirin 81 MG EC tablet Take 81 mg by mouth       atorvastatin (LIPITOR) 20 MG tablet Take 20 mg by mouth       azithromycin (ZITHROMAX) 250 MG tablet TAKE TWO (2) TABLETS BY MOUTH ON DAY 1, THEN TAKE ONE (1) TABLET ONCE DAILY FOR 4 DAYS.       BROVANA 15 MCG/2ML NEBU neb solution   11     budesonide (PULMICORT) 1 MG/2ML neb solution Inhale 1 mg into the lungs       budesonide (PULMICORT) 1 MG/2ML neb solution   11     calcium 500 MG CHEW        cetirizine (ZYRTEC) 10 MG tablet Take 10 mg by mouth       fluticasone (FLONASE) 50 MCG/ACT spray Spray 1 spray in nostril       furosemide (LASIX) 20 MG tablet Take 20 mg by mouth       gabapentin (NEURONTIN) 100 MG capsule Take 1 capsule at 8 PM each night 30 capsule 11     ipratropium - albuterol 0.5 mg/2.5 mg/3 mL (DUONEB) 0.5-2.5 (3) MG/3ML neb solution Inhale 3 mLs into the lungs       levalbuterol (XOPENEX HFA) 45 MCG/ACT Inhaler Inhale 2 puffs into the lungs       levalbuterol (XOPENEX) 1.25 MG/3ML neb solution Inhale 1.25 mg into the lungs       OXYGEN-HELIUM IN 2 lpm per nasal canula nocturnal and with activity may use portable w/ conserving device       predniSONE (DELTASONE) 10 MG  tablet Take 10 mg by mouth       tiotropium (SPIRIVA RESPIMAT) 2.5 MCG/ACT inhalation aerosol Inhale 2 puffs into the lungs           The above note was dictated using voice recognition software and may include typographical errors. Please contact the author for any clarifications.    Gabe Waller MD   of Medicine  Pulmonary, Critical Care and Sleep Medicine  [facility]  Pager: [phone]

## 2019-12-09 NOTE — NURSING NOTE
"Chief Complaint   Patient presents with     RECHECK     Follow up Pre Lung TX     Vital signs:  Temp: 98.5  F (36.9  C)   BP: (!) 133/95 Pulse: 97     SpO2: 96 %          Estimated body mass index is 27.98 kg/m  as calculated from the following:    Height as of 4/26/19: 1.575 m (5' 2\").    Weight as of 6/3/19: 69.4 kg (153 lb).      aSrah Gregorio, CMA    "

## 2019-12-09 NOTE — NURSING NOTE
"PRE LUNG TRANSPLANT WAITLISTED FOLLOW UP CLINIC VISIT              TRANSPLANT COORDINATOR NURSE NOTE    Melissa Elder 64 year old  [id number]    Is patient currently active on lung transplant list: Yes, list date 2019  LAS Score: 32.8914 Updated 2019    PSYCH/SOCIAL:  Social Support Present: Patient arrived to clinic with   Tyrese and therapy dog David. Patient and support person were actively engaged in patient care and asking appropriate questions: Yes    NICOTINE USE: None   ETOH USE: 1-2 drinks weekly   DIABETIC STATUS: Not diabetic  CURRENT OXYGEN USE:   Rest: 2-3 L  Activity: 2-4 L  Sleep:2 L  No assisted ventilation.    PRE-LUNG TRANSPLANT TESTIN.) PFT: 2019   Scheduled Next:2019  2.) 6MW: 2019   Scheduled Next:2019  3.) LABS: 2019   Scheduled Next:2019    PHYSICAL STATUS (Karnofsky Score/Physical Capacity) 60% Limited mobility    CURRENT ACTIVITY TOLERATED PATIENT SUMMARY: Leaves house once daily to run errands and for some walking. Typically entails a few errands such a post office, grocery shopping, etc but states gets around 20 minutes of walking. Using 3 lb weights most days for upper arm strength. Not using stationary bike as in basement and stairs difficult. Showers and daily activities have become increasingly difficult since last time seen in clinic.    MEDICATIONS:   Steroid Use: None  Antibiotic Use: None  Narcotic Use (Pain Management): None, does report increased lower back pain relieved by heat packs  Medication Changes: Start gabapentin 300 mg nightly to help with signs of RLS, \"cramping, jyoti horse pain in arch of foot and sometimes in legs mostly at night.\"    PRIMARY CARE:   Dentist:               Last completed: 2018   Next Scheduled: 2019  Mammo:   Last Completed: 2019   Findings: Negative   Next Scheduled: TBD  Pap Smear:    Last Completed: 2018   Findings: Negative   Next Scheduled: 2021  Colonoscopy:    Last Completed: " 2016   Findings: Adenomas with recc 5 year repeat   Next Scheduled: Due in 2021 with history of tubular adenomas in 2016.  Recent Hospitalizations: None    Plan:   Will add on iron studies today.  Follow up in transplant clinic in 6 months with Dr Waller.    Notes: ADLS increasingly difficult. Recent visit with Dr Hamlin, mediations changed to nebulizer form (initailly think it helped and now hard to tell if any improvement) and started lasix for increased LE swelling.

## 2019-12-09 NOTE — PATIENT INSTRUCTIONS
Transplant Instructions  Pulmonary Rehab: Please remember to stay active.  Continue exercises learned in pulmonary rehab or continue participating in pulmonary rehab, if able.     Current oxygen use: Rest 2 liters Activity 2-4 liters Sleep 2 liters    Data Unavailable 0 lbs 0 oz There is no height or weight on file to calculate BMI.  Goal BMI greater than 18, less than 30 for lung transplant.     Medication changes:  Start gabapentin 300 mg nightly. Take about an hour prior to sleep for relief of signs of restless legs syndrome.  Future orders: Testing in 6 months  Antibody blood test (PRA) due every 3 months:  03/09/2019  Next appointment: 6 months (June 2020)    Test or procedures to be complete prior to next appointment:      Please remember to stay up to date with your primary care requirements including:  Annual check-ups with primary care physician   Mammogram   Pap smear (as appropriate for women)    Dental visits   Annual flu shots/ immunizations as needed   Colonoscopy (patients over 50).     Thank-you for allowing us to participate in your care.    If your condition should change, please contact your transplant coordinator. This includes: worsening symptoms, need for antibiotics, hospitalizations, transfusions.    Thoracic Transplant Office phone [phone], option 2, fax [phone]    Office Hours 8:30 - 5:00 pm

## 2019-12-10 NOTE — PROGRESS NOTES
[facility]  Melissa Elder 62 year old female  : 1955  MRN: [id number]  DATE OF SERVICE 2019        PRIMARY CARE PROVIDER: Natasha Rm    REFERRING PROVIDER: Janette Hamlin MD  [address] [address]      ASSESSMENT AND PLAN: The patient is a 64-year-old female with end-stage lung disease due to COPD, hyperlipidemia, paroxysmal A. fib, osteoporosis, hypertension and occasional GERD.  She remains active on lung transplant wait list as of 2019.  We will update her lung allocation score based on the recent PFTs.  Her COPD remained stable and she has not had any significant exacerbations within last 6 months.  Her blood group is B and she does not have any significant anti-HLA antibodies.  She will continue to use Brovana, Pulmicort, Spiriva for management of her COPD.  She is up-to-date on her vaccinations.    Given symptoms consistent of restless leg syndrome, we will start her on gabapentin 300 mg to be taken 2 hours before bedtime.  We will also check a ferritin level.    The patient will return to clinic in approximately 6 months.    REASON FOR VISIT: Follow-up of lung transplant candidacy.     HISTORY OF PRESENT ILLNESS:     The patient was last seen in our clinic approximately 6 months ago and comes in today for follow-up.  She has severe COPD with end-stage lung disease, hyperlipidemia, paroxysmal atrial fibrillation, osteoporosis, occasional GERD and hypertension.  She has completed the lung transplant evaluation and has been on lung transplant wait list for last 6 months.    She reports no shortness of breath at rest but significant limitation of her exertional capacity due to dyspnea.  She particularly reports difficulty with taking a shower.  She is not very physically active through the day.  She is now using 3 L of supplemental oxygen at rest but her saturations remain in  the 90s without movement.  She reports chronic dry cough and occasional wheezing.  She has not had any episodes of pulmonary exacerbation requiring antibiotics/prednisone or hospital admissions since her last clinic visit.    She was recently started on Lasix by her pulmonologist due to mild pedal edema.    REVIEW OF SYSTEMS:       1.  He denies any recent fevers, chills or night sweats.  2.  Her appetite is good and weight is stable.  3.  She denies any chest pain.  She reports occasional palpitations.  She reports occasional lightheadedness.  4.  She reports lower back pain which is improved by using a warm pack.  5.  She reports mild lower extremities edema.  6.  She reports significant motor restlessness symptoms which occur nightly and result in sleep maintenance insomnia.  These are typical of restless leg syndrome.  She had previous episodes of these during pregnancies.  7.  She denies any new rashes or bruises on her body.  8.  Her mood is good.    Rest of the review of systems as noted negative except as above.     PULMONARY FUNCTION TESTS:    FEV1 0.49 L, 21% predicted.  FVC 1.59, 56% predicted.  FEV1/FVC 31%.  This test shows very severe obstructive lung disease.  Both FEV1 and FVC are stable compared to last PFTs.    6-minute walk test performed on 3 L supplemental oxygen showed 670ft with lower limit of normal being 1252 feet. Kyrie oxygen saturation 93%, maximum heart rate 118 bpm.  Walk distance is significantly reduced and lower than previous PFTs.       PHYSICAL EXAMINATION:   BP (!) 133/95   Pulse 97   Temp 98.5  F (36.9  C)   SpO2 96%   General: Pleasant female, sitting comfortably, speaking in full sentences without any discomfort.  Eyes: No scleral icterus present.  Mild conjunctival pallor noted.  HEENT: Moist mucous membranes, no signs of oral candidiasis.  Lymphatics: No cervical or submandibular lymphadenopathy.  Pulmonary: Significantly reduced breath sounds bilaterally.  No added  sounds.  Cardiovascular: S1, S2 normal.  Regular rate and rhythm.  Distant heart sounds.  No murmurs identified.  Abdomen: Soft, nontender, nondistended.  Normoactive bowel sounds.  Musculoskeletal: No lower extremities edema noted.  No upper extremity tremors noted.  Skin: No rashes on limited exam.  Neurological: Grossly intact.  Psychiatric: Normal affect.    Allergies:  Allergies   Allergen Reactions     Alendronic Acid Other (See Comments)     dizziness  Other reaction(s): Dizziness     Nickel Rash     Sulfa Drugs Rash       Medications:  Current Outpatient Medications   Medication Sig Dispense Refill     arformoterol (BROVANA) 15 MCG/2ML NEBU neb solution Inhale 15 mcg into the lungs       aspirin 81 MG EC tablet Take 81 mg by mouth       atorvastatin (LIPITOR) 20 MG tablet Take 20 mg by mouth       azithromycin (ZITHROMAX) 250 MG tablet TAKE TWO (2) TABLETS BY MOUTH ON DAY 1, THEN TAKE ONE (1) TABLET ONCE DAILY FOR 4 DAYS.       BROVANA 15 MCG/2ML NEBU neb solution   11     budesonide (PULMICORT) 1 MG/2ML neb solution Inhale 1 mg into the lungs       budesonide (PULMICORT) 1 MG/2ML neb solution   11     calcium 500 MG CHEW        cetirizine (ZYRTEC) 10 MG tablet Take 10 mg by mouth       fluticasone (FLONASE) 50 MCG/ACT spray Spray 1 spray in nostril       furosemide (LASIX) 20 MG tablet Take 20 mg by mouth       gabapentin (NEURONTIN) 100 MG capsule Take 1 capsule at 8 PM each night 30 capsule 11     ipratropium - albuterol 0.5 mg/2.5 mg/3 mL (DUONEB) 0.5-2.5 (3) MG/3ML neb solution Inhale 3 mLs into the lungs       levalbuterol (XOPENEX HFA) 45 MCG/ACT Inhaler Inhale 2 puffs into the lungs       levalbuterol (XOPENEX) 1.25 MG/3ML neb solution Inhale 1.25 mg into the lungs       OXYGEN-HELIUM IN 2 lpm per nasal canula nocturnal and with activity may use portable w/ conserving device       predniSONE (DELTASONE) 10 MG tablet Take 10 mg by mouth       tiotropium (SPIRIVA RESPIMAT) 2.5 MCG/ACT inhalation  aerosol Inhale 2 puffs into the lungs           The above note was dictated using voice recognition software and may include typographical errors. Please contact the author for any clarifications.    Gabe Waller MD   of Medicine  Pulmonary, Critical Care and Sleep Medicine  [facility]  Pager: [phone]

## 2019-12-23 ENCOUNTER — TELEPHONE (OUTPATIENT)
Dept: TRANSPLANT | Facility: CLINIC | Age: 64
End: 2019-12-23

## 2019-12-23 NOTE — TELEPHONE ENCOUNTER
Called patient to schedule June 2020 6 month Follow-up appt w/Christin, she confirmed for Mon, June 1, mailing schedule

## 2019-12-30 RX ORDER — IBANDRONATE SODIUM 150 MG/1
150 TABLET, FILM COATED ORAL
COMMUNITY
Start: 2019-06-15 | End: 2022-05-23

## 2020-03-11 ENCOUNTER — HEALTH MAINTENANCE LETTER (OUTPATIENT)
Age: 65
End: 2020-03-11

## 2020-03-17 ENCOUNTER — TELEPHONE (OUTPATIENT)
Dept: TRANSPLANT | Facility: CLINIC | Age: 65
End: 2020-03-17

## 2020-03-17 NOTE — LETTER
March 18, 2020    Melissa Elder  [address] [place] [id number]      Dear Ms. Elder,    This letter is sent to notify you that your listing status was changed from active to Inactive on the lung transplant waitlist at the [facility].      As we discussed on 3/17/2020 your status was changed in response to the ongoing COVID-19 (coronavirus) pandemic. At this time we're taking extra precaution to protect and care for our patients, staff, and community. Patients recovering from transplants are at higher risk of getting sick from COVID-19. For that reason, we're reviewing patients on the transplant list.     Our transplant team reviewed your medical history, and the team has decided to change your lung transplant wait-list status from  active  to  temporarily inactive .  Your name will remain on the waiting list, but you will not be receiving organ offers for lung transplant while you are  temporarily inactive .     As we continue to monitor the impact of COVID-19, we will reach out to you with an updated plan within the next two weeks.  If you have questions between now and then, please do not hesitate to call us.     We also encourage you to follow these self-care practices:    Self-quarantine and stay at home as much as possible.     Avoid close contact with people who are sick.    Practice good hand hygiene.     Avoid touching your eyes, nose, and mouth.     Plan how you will take care of sick family members. Make plans for childcare if you are sick or if your child is sick. Have a thermometer at home so you can check for fever if you or a loved one feels ill.     Head to [email or web address]/[email or web address]-19 or visit the CDC website for the latest information.    During this waiting period, we may still request periodic laboratory tests with your primary physician.  It will be your responsibility to remind your physician to forward your results to the Transplant  Office.    We still need to be kept informed of any changes such as:    o changes in your insurance coverage  o change in your phone number, address or emergency contact  o significant changes in your health  o significant infections (such as pneumonia or abscesses)  o blood transfusions  o any condition which requires hospitalization or surgery    Sincerely,        Lung Transplant Program    Enclosures: UNOS Letter    CC: MD Janette Hernandez MD Umesh Goswami, MD                        The Organ Procurement and Transplantation Network  Toll-free patient services line:     Your resource for organ transplant information    If you have a question regarding your own medical care, you always should call your transplant hospital first. However, for general organ transplant-related information, you can call the Organ Procurement and Transplantation Network (OPTN) toll-free patient services line at [phone]- [phone]. Anyone, including potential transplant candidates, candidates, recipients, family members, friends, living donors, and donor family members, can call this number to:          Talk about organ donation, living donation, the transplant process, the donation process, and transplant policies.    Get a free patient information kit with helpful booklets, waiting list and transplant information, and a list of all transplant hospitals.    Ask questions about the OPTN website (https://optn.transplant.hrsa.gov/), the United Network for Organ Sharing s (UNOS) website (https://unos.org/), or the UNOS website for living donors and transplant recipients. (https://www.transplantliving.org/).    Learn how the OPTN can help you.    Talk about any concerns that you may have with a transplant hospital.    The [facility] s transplant system, the OPTN, is managed under federal contract by the [employer] Network for Organ Sharing (UNOS), which is a non-profit charitable organization. The OPTN helps create and define  organ sharing policies that make the best use of donated organs. This process continuously evaluating new advances and discoveries so policies can be adapted to best serve patients waiting for transplants. To do so, the OPTN works closely with transplant professionals, transplant patients, transplant candidates, donor families, living donors, and the public. All transplant programs and organ procurement organizations throughout the country are OPTN members and are obligated to follow the policies the OPTN creates for allocating organs.    The OPTN also is responsible for:      Providing educational material for patients, the public, and professionals.    Raising awareness of the need for donated organs and tissue.    Coordinating organ procurement, matching, and placement.    Collecting information about every organ transplant and donation that occurs in the [place].    Remember, you should contact your transplant hospital directly if you have questions or concerns about your own medical care including medical records, work-up progress, and test results.    We are not your transplant hospital, and our staff will not be able to answer questions about your case, so please keep your transplant hospital s phone number handy.    However, while you research your transplant needs and learn as much as you can about transplantation and donation, we welcome your call to our toll-free patient services line at [phone].          Updated 4/1/2019

## 2020-03-17 NOTE — TELEPHONE ENCOUNTER
"At the guidance of the COVID19 command center leadership at the [facility] and the lung transplant team, the patient was contacted to provide the following update:     In response to the ongoing COVID-19 (coronavirus) pandemic, we're taking extra precaution to protect and care for our patients, staff, and community.   Patients recovering from transplants are at higher risk of getting sick from COVID-19.     For that reason, we're reviewing patients on the transplant list. Our transplant team reviewed your medical history, and the team has decided to change your lung transplant wait-list status from  active  to  temporarily inactive .  Your name will remain on the waiting list, but you will not be receiving organ offers for lung transplant while you are  temporarily inactive .     As we continue to monitor the impact of COVID-19, we will reach out to you with an updated plan within the next two weeks.  If you have questions between now and then, please do not hesitate to call us.     We also encourage you to follow these self-care practices:    Self-quarantine and stay at home as much as possible.     Avoid close contact with people who are sick.    Practice good hand hygiene.     Avoid touching your eyes, nose, and mouth.     Plan how you will take care of sick family members. Make plans for childcare if you are sick or if your child is sick. Have a thermometer at home so you can check for fever if you or a loved one feels ill.     Head to [email or web address]/Conditions/[email or web address] or visit the CDC website for the latest information.    Patent made inactive using reason: \"Too Sick for Transplant\" due to increased risk of elin COVID 19.       Per protocol an inactivation letter was sent to the patient, referring pulmonologist and primary care physician.     "

## 2020-04-27 ENCOUNTER — DOCUMENTATION ONLY (OUTPATIENT)
Dept: TRANSPLANT | Facility: CLINIC | Age: 65
End: 2020-04-27

## 2020-04-27 NOTE — PROGRESS NOTES
"Based on the following recommendation from [employer] on behalf of lung transplant candidates, the lung transplant team recommended that lung candidates currently listed as inactive due to COVID precautions be changed to active with the recommended acceptance criteria below:    \"To allow them to continue to accrue waiting time but temporarily render them unable to receive organ offers, programs may adjust their organ acceptance criteria instead of using the COVID-19 inactivation code using the following instructions.   Candidates can be listed at the appropriate medical urgency status, and transplant programs can eliminate organ offers for these candidates by ensuring that they are screened from matches. The recommendation is to set the acceptable donor age acceptance criteria to 98 years (minimum) and 99 years (maximum).\"    This candidate's [facility] listing status was changed to active with donor age criteria 98 years (minimum) and 99 years (maximum).    A status change letter is not being sent to patient at this time. It will be sent when the team recommends that candidate be activated on wait list and donor age acceptance criteria is changed to allow for donor offer.    Based on the following recommendation from [employer] on behalf of lung transplant candidates, the lung transplant team recommended that lung candidates currently listed as inactive due to COVID precautions be changed to active with the recommended acceptance criteria below:    \"To allow them to continue to accrue waiting time but temporarily render them unable to receive organ offers, programs may adjust their organ acceptance criteria instead of using the COVID-19 inactivation code using the following instructions.   Candidates can be listed at the appropriate medical urgency status, and transplant programs can eliminate organ offers for these candidates by ensuring that they are screened from matches. The recommendation is to set the acceptable donor age " "acceptance criteria to 98 years (minimum) and 99 years (maximum).\"    This candidate's [facility] listing status was changed to active with donor age criteria 98 years (minimum) and 99 years (maximum).    A status change letter is not being sent to patient at this time. It will be sent when the team recommends that candidate be activated on wait list and donor age acceptance criteria is changed to allow for donor offer.      "

## 2020-05-28 ENCOUNTER — TELEPHONE (OUTPATIENT)
Dept: TRANSPLANT | Facility: CLINIC | Age: 65
End: 2020-05-28

## 2020-06-01 ENCOUNTER — VIRTUAL VISIT (OUTPATIENT)
Dept: TRANSPLANT | Facility: CLINIC | Age: 65
End: 2020-06-01
Attending: INTERNAL MEDICINE
Payer: COMMERCIAL

## 2020-06-01 DIAGNOSIS — Z76.82 LUNG TRANSPLANT CANDIDATE: Primary | ICD-10-CM

## 2020-06-01 NOTE — NURSING NOTE
PRE LUNG TRANSPLANT WAITLISTED FOLLOW UP CLINIC VISIT              TRANSPLANT COORDINATOR NURSE NOTE    Melissa Elder 64 year old  [id number]    Is patient currently active on lung transplant list: Yes, temporarily inactivated due to COVID virus.  LAS Score:     PSYCH/SOCIAL:  Last seen by Transplant Social Work: 2019 Issues Noted: Anxiety.      NICOTINE USE: None   ETOH USE: Infrequent   DIABETIC STATUS: Not diabetic  CURRENT OXYGEN USE:   Rest: 2-3 L  Activity: 2-3 L  Sleep:2 L  No assisted ventilation    PRE-LUNG TRANSPLANT TESTIN.) PFT: 2019  2.) 6MW: 2019  3.) LABS: 2019  4.) CT CHEST: 2019  5.)ECHO: 2019       PHYSICAL STATUS (Karnofsky Score/Physical Capacity) 60%    MEDICATIONS:   Steroid Use: Prednisone PRN  Antibiotic Use: PRN  Narcotic Use (Pain Management): None  Medication Changes: None    PRIMARY CARE:   Dentist:  (Due )  Mammo: 2019 (Due 2020)  Pap Smear: 2018 (Due )  Colonoscopy:    Last Completed: 3/31/2016   Findings: One serrated adenoma, one tubular adenoma   Next Scheduled: Due 2021  Recent Hospitalizations: None     Plan: F/u 6 months with Dr Waller    Follow-up/records needed:   PFTs, 6MW, labs, echocardiogram, low dose screening CT chest to be completed as able locally. Coordinator to send orders to Vinh Schroeder for completion over the next few weeks.    PRA is overdue. Would need at least this updated prior to reactivating.    Per discussion with Melissa 2020- Requesting to remain inactive on lung txp list. She does not think she would want to undergo transplant and hospitalization without a caregiver (Tyrese) present.

## 2020-06-01 NOTE — PROGRESS NOTES
[facility]  [facility]  Melissa Elder 62 year old female  : 1955  MRN: [id number]  2020          PRIMARY CARE PROVIDER: Natasha Rm    REFERRING PROVIDER: Janette Hamlin MD  [address] [address]      ASSESSMENT AND PLAN: The patient is a 64-year-old female with end-stage lung disease due to COPD, hyperlipidemia, paroxysmal atrial fibrillation, osteoporosis, hypertension and occasional GERD.  She was evaluated for lung transplantation last year and was placed on wait list but subsequently made inactive on the wait list due to coronavirus problem according to our centers protocol.  We will offered her to be relisted at this time if she is willing.  Her PFTs and PRA will be updated in our system.  She will also undergo a low-dose chest CT per our protocol on an annual basis while on the wait list.    As far as her COPD is concerned, this appears to be under good control with Brovana, Pulmicort, Spiriva.    She will return to clinic in approximately 6 months.      REASON FOR TELEPHONE VISIT: Follow-up of lung transplant candidacy.     HISTORY OF PRESENT ILLNESS:     The patient is a 64-year-old female with end-stage lung disease due to very severe COPD, hyperlipidemia, paroxysmal atrial fibrillation, osteoporosis, occasional GERD and hypertension.  She has been on lung transplant wait list for approximately 1 year but was recently inactivated due to coronavirus pandemic.    She reports that her breathing has been stable within last 6 months and she has not had any episodes of COPD exacerbation during this period.  She describes occasional shortness of breath at rest, particularly with talking and eating.  She reports significant dyspnea with any exertion.  She continues to have limitations of her ADLs due to dyspnea.  She uses 2.5 to 3 L of oxygen during the day both at rest and activity and uses 2 L of  supplemental oxygen at night.  She has a dry cough that usually happens after she completes her neb treatment.  She denies any wheezes or hemoptysis.  She reports an episode of sinus congestion earlier this year which resolved on its own.  She has not had any changes in her medications since she was last seen in our clinic.      REVIEW OF SYSTEMS:       1.  She denies any recent fevers, chills or night sweats.  2.  Her appetite is good and weight is stable around 153 pounds.  3.  She denies any chest pains or lightheadedness.  She reports rare palpitations which are usually precipitated by anxiety.  4.  She reports left ankle swelling which improves by reducing salt intake in her diet.  5.  She reports frequent bruising.  She denies any rashes.  6.  She denies GERD, abdominal cramps, diarrhea or constipation.  7.  She reports sleeping well at night.  She does take naps during the day usually inadvertently.  8.  She reports significant situational anxiety.    Rest of the review of systems is negative except as noted above.    Allergies:  Allergies   Allergen Reactions     Alendronic Acid Other (See Comments)     dizziness  Other reaction(s): Dizziness     Nickel Rash     Sulfa Drugs Rash       Medications:  Current Outpatient Medications   Medication Sig Dispense Refill     arformoterol (BROVANA) 15 MCG/2ML NEBU neb solution Inhale 15 mcg into the lungs       aspirin 81 MG EC tablet Take 81 mg by mouth       atorvastatin (LIPITOR) 20 MG tablet Take 20 mg by mouth       BROVANA 15 MCG/2ML NEBU neb solution   11     budesonide (PULMICORT) 1 MG/2ML neb solution Inhale 1 mg into the lungs       budesonide (PULMICORT) 1 MG/2ML neb solution   11     calcium 500 MG CHEW        fluticasone (FLONASE) 50 MCG/ACT spray Spray 1 spray in nostril       furosemide (LASIX) 20 MG tablet Take 20 mg by mouth       IBANdronate (BONIVA) 150 MG tablet Take 150 mg by mouth every 30 days       ipratropium - albuterol 0.5 mg/2.5 mg/3 mL  (DUONEB) 0.5-2.5 (3) MG/3ML neb solution Inhale 3 mLs into the lungs       levalbuterol (XOPENEX HFA) 45 MCG/ACT Inhaler Inhale 2 puffs into the lungs       levalbuterol (XOPENEX) 1.25 MG/3ML neb solution Inhale 1.25 mg into the lungs       OXYGEN-HELIUM IN 2 lpm per nasal canula nocturnal and with activity may use portable w/ conserving device       tiotropium (SPIRIVA RESPIMAT) 2.5 MCG/ACT inhalation aerosol Inhale 2 puffs into the lungs       azithromycin (ZITHROMAX) 250 MG tablet TAKE TWO (2) TABLETS BY MOUTH ON DAY 1, THEN TAKE ONE (1) TABLET ONCE DAILY FOR 4 DAYS.       cetirizine (ZYRTEC) 10 MG tablet Take 10 mg by mouth       gabapentin (NEURONTIN) 100 MG capsule Take 1 capsule at 8 PM each night (Patient not taking: Reported on 6/1/2020) 30 capsule 11     predniSONE (DELTASONE) 10 MG tablet Take 10 mg by mouth           Gabe Waller MD   of Medicine  Pulmonary, Critical Care and Sleep Medicine  [facility]  Pager: [phone]

## 2020-06-01 NOTE — PROGRESS NOTES
"Melissa Elder is a 64 year old female who is being evaluated via a billable telephone visit.      The patient has been notified of following:     \"This telephone visit will be conducted via a call between you and your physician/provider. We have found that certain health care needs can be provided without the need for a physical exam.  This service lets us provide the care you need with a short phone conversation.  If a prescription is necessary we can send it directly to your pharmacy.  If lab work is needed we can place an order for that and you can then stop by our lab to have the test done at a later time.    Telephone visits are billed at different rates depending on your insurance coverage. During this emergency period, for some insurers they may be billed the same as an in-person visit.  Please reach out to your insurance provider with any questions.    If during the course of the call the physician/provider feels a telephone visit is not appropriate, you will not be charged for this service.\"    Patient has given verbal consent for Telephone visit?  Yes    What phone number would you like to be contacted at? [phone]    How would you like to [facility] your [facility]    Phone call duration: 26 minutes    Gabe Waller MD      "

## 2020-06-01 NOTE — LETTER
"    2020         RE: Melissa Elder  [address]  [facility] [address]        Dear Colleague,    Thank you for referring your patient, Melissa Elder, to the [facility]GAN TRANSPLANT. Please see a copy of my visit note below.    Melissa Elder is a 64 year old female who is being evaluated via a billable telephone visit.      The patient has been notified of following:     \"This telephone visit will be conducted via a call between you and your physician/provider. We have found that certain health care needs can be provided without the need for a physical exam.  This service lets us provide the care you need with a short phone conversation.  If a prescription is necessary we can send it directly to your pharmacy.  If lab work is needed we can place an order for that and you can then stop by our lab to have the test done at a later time.    Telephone visits are billed at different rates depending on your insurance coverage. During this emergency period, for some insurers they may be billed the same as an in-person visit.  Please reach out to your insurance provider with any questions.    If during the course of the call the physician/provider feels a telephone visit is not appropriate, you will not be charged for this service.\"    Patient has given verbal consent for Telephone visit?  Yes    What phone number would you like to be contacted at? [phone]    How would you [facility]    Phone call duration: 26 minutes    Gabe Waller MD        [facility]  [facility]  Melissa Elder 62 year old female  : 1955  MRN: [id number]  2020          PRIMARY CARE PROVIDER: Natasha Rm    REFERRING PROVIDER: Janette Hamlin MD  [address] [address]      ASSESSMENT AND PLAN: The patient is a 64-year-old female with end-stage lung disease due to COPD, hyperlipidemia, paroxysmal " atrial fibrillation, osteoporosis, hypertension and occasional GERD.  She was evaluated for lung transplantation last year and was placed on wait list but subsequently made inactive on the wait list due to coronavirus problem according to our centers protocol.  We will offered her to be relisted at this time if she is willing.  Her PFTs and PRA will be updated in our system.  She will also undergo a low-dose chest CT per our protocol on an annual basis while on the wait list.    As far as her COPD is concerned, this appears to be under good control with Brovana, Pulmicort, Spiriva.    She will return to clinic in approximately 6 months.      REASON FOR TELEPHONE VISIT: Follow-up of lung transplant candidacy.     HISTORY OF PRESENT ILLNESS:     The patient is a 64-year-old female with end-stage lung disease due to very severe COPD, hyperlipidemia, paroxysmal atrial fibrillation, osteoporosis, occasional GERD and hypertension.  She has been on lung transplant wait list for approximately 1 year but was recently inactivated due to coronavirus pandemic.    She reports that her breathing has been stable within last 6 months and she has not had any episodes of COPD exacerbation during this period.  She describes occasional shortness of breath at rest, particularly with talking and eating.  She reports significant dyspnea with any exertion.  She continues to have limitations of her ADLs due to dyspnea.  She uses 2.5 to 3 L of oxygen during the day both at rest and activity and uses 2 L of supplemental oxygen at night.  She has a dry cough that usually happens after she completes her neb treatment.  She denies any wheezes or hemoptysis.  She reports an episode of sinus congestion earlier this year which resolved on its own.  She has not had any changes in her medications since she was last seen in our clinic.      REVIEW OF SYSTEMS:       1.  She denies any recent fevers, chills or night sweats.  2.  Her appetite is good and  weight is stable around 153 pounds.  3.  She denies any chest pains or lightheadedness.  She reports rare palpitations which are usually precipitated by anxiety.  4.  She reports left ankle swelling which improves by reducing salt intake in her diet.  5.  She reports frequent bruising.  She denies any rashes.  6.  She denies GERD, abdominal cramps, diarrhea or constipation.  7.  She reports sleeping well at night.  She does take naps during the day usually inadvertently.  8.  She reports significant situational anxiety.    Rest of the review of systems is negative except as noted above.    Allergies:  Allergies   Allergen Reactions     Alendronic Acid Other (See Comments)     dizziness  Other reaction(s): Dizziness     Nickel Rash     Sulfa Drugs Rash       Medications:  Current Outpatient Medications   Medication Sig Dispense Refill     arformoterol (BROVANA) 15 MCG/2ML NEBU neb solution Inhale 15 mcg into the lungs       aspirin 81 MG EC tablet Take 81 mg by mouth       atorvastatin (LIPITOR) 20 MG tablet Take 20 mg by mouth       BROVANA 15 MCG/2ML NEBU neb solution   11     budesonide (PULMICORT) 1 MG/2ML neb solution Inhale 1 mg into the lungs       budesonide (PULMICORT) 1 MG/2ML neb solution   11     calcium 500 MG CHEW        fluticasone (FLONASE) 50 MCG/ACT spray Spray 1 spray in nostril       furosemide (LASIX) 20 MG tablet Take 20 mg by mouth       IBANdronate (BONIVA) 150 MG tablet Take 150 mg by mouth every 30 days       ipratropium - albuterol 0.5 mg/2.5 mg/3 mL (DUONEB) 0.5-2.5 (3) MG/3ML neb solution Inhale 3 mLs into the lungs       levalbuterol (XOPENEX HFA) 45 MCG/ACT Inhaler Inhale 2 puffs into the lungs       levalbuterol (XOPENEX) 1.25 MG/3ML neb solution Inhale 1.25 mg into the lungs       OXYGEN-HELIUM IN 2 lpm per nasal canula nocturnal and with activity may use portable w/ conserving device       tiotropium (SPIRIVA RESPIMAT) 2.5 MCG/ACT inhalation aerosol Inhale 2 puffs into the lungs        azithromycin (ZITHROMAX) 250 MG tablet TAKE TWO (2) TABLETS BY MOUTH ON DAY 1, THEN TAKE ONE (1) TABLET ONCE DAILY FOR 4 DAYS.       cetirizine (ZYRTEC) 10 MG tablet Take 10 mg by mouth       gabapentin (NEURONTIN) 100 MG capsule Take 1 capsule at 8 PM each night (Patient not taking: Reported on 6/1/2020) 30 capsule 11     predniSONE (DELTASONE) 10 MG tablet Take 10 mg by mouth           Gabe Waller MD   of Medicine  Pulmonary, Critical Care and Sleep Medicine  [facility]  Pager: [phone]

## 2020-06-11 DIAGNOSIS — R06.00 DYSPNEA: ICD-10-CM

## 2020-06-11 DIAGNOSIS — J44.9 COPD (CHRONIC OBSTRUCTIVE PULMONARY DISEASE) (H): Primary | ICD-10-CM

## 2020-06-11 DIAGNOSIS — Z76.82 ORGAN TRANSPLANT CANDIDATE: ICD-10-CM

## 2020-06-30 ENCOUNTER — TELEPHONE (OUTPATIENT)
Dept: TRANSPLANT | Facility: CLINIC | Age: 65
End: 2020-06-30

## 2020-06-30 NOTE — TELEPHONE ENCOUNTER
Second attempt to reach out to patient with updated visitor information. Had previously left messages with no call back. Was able to have a good conversation today with Jesus. Discussed loosened visitor restrictions for transplant/surgical patients. Discussed Jesus's concerns and nervousness around COVID and relocating to [place]. Did discuss that our hospital and clinic feel confident in our infection control practices as far as preventing transmission of COVID during hospital stay/follow up. D[place] Discussed their fears of transplant outcomes as they know someone who resides near to them who recently passed away after a poor lung transplant outcome.    Adri, although limited feels she is most comfortable at home currently and with her current routine and the prospect of transplant and relocation is not something she is wanting to pursue at the moment. She will keep coordinator updated with condition and will reach out when she feels comfortable reactivating on the lung transplant list.

## 2020-08-14 ENCOUNTER — TELEPHONE (OUTPATIENT)
Dept: TRANSPLANT | Facility: CLINIC | Age: 65
End: 2020-08-14

## 2020-09-28 DIAGNOSIS — J44.9 COPD (CHRONIC OBSTRUCTIVE PULMONARY DISEASE) (H): ICD-10-CM

## 2020-09-28 DIAGNOSIS — Z76.82 ORGAN TRANSPLANT CANDIDATE: ICD-10-CM

## 2020-10-02 ENCOUNTER — TRANSFERRED RECORDS (OUTPATIENT)
Dept: HEALTH INFORMATION MANAGEMENT | Facility: CLINIC | Age: 65
End: 2020-10-02

## 2021-01-03 ENCOUNTER — HEALTH MAINTENANCE LETTER (OUTPATIENT)
Age: 66
End: 2021-01-03

## 2021-05-12 ENCOUNTER — TELEPHONE (OUTPATIENT)
Dept: TRANSPLANT | Facility: CLINIC | Age: 66
End: 2021-05-12

## 2021-05-12 DIAGNOSIS — Z01.818 ENCOUNTER FOR PRE-TRANSPLANT EVALUATION FOR LUNG TRANSPLANT: Primary | ICD-10-CM

## 2021-05-12 DIAGNOSIS — J44.9 COPD (CHRONIC OBSTRUCTIVE PULMONARY DISEASE) (H): ICD-10-CM

## 2021-05-12 NOTE — TELEPHONE ENCOUNTER
"Follow up call as coordinator out last week and Adri had called in wondering about working towards reactivation on the transplant list.    She is feeling more limited over the past 6 months to a year, now using nebulizers vs just inhalers which she feels is helpful.     She will need a new transplant MD as Gabe Méndez has since left. She will also need updated PFTs, 6MW, labs and we will have an echocardiogram completed for an inperson clinic visit.    Adri remains quite anxious but describes this as \"very excited\" at times and I again encouraged possible counseling as well as participation in the lung transplant support group to get more exposure and maybe try to alleviate some anxiety around transplant with some involvement and exposure to others undergoing the same process as her.    She reports Tyrese and David (15 year old dog) both doing well.  "

## 2021-05-12 NOTE — Clinical Note
Please contact patient to schedule an RTC with Nicole Knox (next available that works for patient) along with PFTs, 6MW, lab and echocardiogram. Thank you.  Kate

## 2021-06-04 ENCOUNTER — TELEPHONE (OUTPATIENT)
Dept: TRANSPLANT | Facility: CLINIC | Age: 66
End: 2021-06-04

## 2021-06-20 NOTE — PROGRESS NOTES
Reason for Visit  Melissa Elder is a 65 year old year old female who is being seen for RECHECK (annual pre lung tx follow up)      Assessment and plan:   Melissa Elder is a 65-year-old female with COPD with very severe obstructive lung disease as well as a history of hyperlipidemia, paroxysmal atrial fibrillation, osteoporosis, reflux and hypertension.  She reports gradual progression in her dyspnea on exertion.  She requires constant supplemental oxygen with 2 L at rest at night and 3 L with activity.  PFTs show very severe obstructive lung disease although not significantly changed in the past few years.  Based on history and previous transplant evaluation, the patient appears to be a very good candidate for lung transplantation.  She will need to complete a follow-up colonoscopy due to polyps noted on her last exam and family history.  She should also review with her primary care to be sure she is up-to-date with other healthcare maintenance concerns.  On laboratory today, potassium is low, possibly related to her as needed use of diuretics.  She also notes constipation since starting diltiazem.  I have encouraged her to review both of these issues with her primary care physician who she will be seeing next week.  She was encouraged to remain active to maintain her general conditioning.  Unless there is a change in her health, she will be reactivated following her colonoscopy assuming there are no significant findings.    The patient appears to be on appropriate medication for her COPD.  I will defer to her primary care physician and primary pulmonologist for management of her COPD.    Follow-up in 6 months with labs, PFTs and 6-minute walk.    44 minutes required on the day of the visit to review chart, interview and examine patient, review labs and imaging, formulate a plan, submit orders and document.    Carson Feng MD           Lung TX HPI  Transplants:  N/A, Postoperative day:      The patient was  seen and examined by Carson Feng MD   Melissa Elder is a 65-year-old female with COPD with very severe obstructive lung disease as well as a history of hyperlipidemia, paroxysmal atrial fibrillation, osteoporosis, reflux and hypertension.  She has completed lung transplantation evaluation and was listed for transplantation but inactivated during the Covid pandemic.  She presents now to be considered for reactivation.  Patient reports gradual progression in her respiratory symptoms.  She denies dyspnea at rest but reports dyspnea with minimal activity and sometimes just with talking.  She tries to exercise regularly walking about 1 block but requires frequent stops.  She reports a dry cough mostly with her nebs and inhalers.  This is chronic, longstanding and unchanged.  No sputum production.  No hemoptysis.  No chest pain.  No recent fever, chills or night sweats.    Review of systems:  Appetite is good  Seasonal allergies with rhinorrhea itchy eyes and nasal congestion partially relieved with over-the-counter allergy medications.  Occasional palpitations, recently started on diltiazem through her primary care physician.  Constipation possibly related to initiation of diltiazem, partially relieved with increased fiber in the diet  Chronic low back pain  Easy bruising  A complete ROS was otherwise negative except as noted in the HPI.    Current Outpatient Medications   Medication     arformoterol (BROVANA) 15 MCG/2ML NEBU neb solution     atorvastatin (LIPITOR) 20 MG tablet     budesonide (PULMICORT) 1 MG/2ML neb solution     diltiazem ER (TIAZAC) 240 MG 24 hr ER beaded capsule     fexofenadine (ALLEGRA) 180 MG tablet     fluticasone (FLONASE) 50 MCG/ACT nasal spray     ipratropium - albuterol 0.5 mg/2.5 mg/3 mL (DUONEB) 0.5-2.5 (3) MG/3ML neb solution     predniSONE (DELTASONE) 10 MG tablet     aspirin 81 MG EC tablet     calcium 500 MG CHEW     furosemide (LASIX) 20 MG tablet     IBANdronate (BONIVA) 150  MG tablet     levalbuterol (XOPENEX HFA) 45 MCG/ACT Inhaler     OXYGEN-HELIUM IN     tiotropium (SPIRIVA RESPIMAT) 2.5 MCG/ACT inhalation aerosol     No current facility-administered medications for this visit.      Facility-Administered Medications Ordered in Other Visits   Medication     sodium chloride (PF) 0.9% PF flush 10 mL     Allergies   Allergen Reactions     Alendronic Acid Other (See Comments)     dizziness  Other reaction(s): Dizziness     Nickel Rash     Sulfa Drugs Rash     Past Medical History:   Diagnosis Date     Atrial fibrillation with RVR (H)     hx of A fib related to severe COPD, does not meet anticoagulation criteria as noted     COPD, severe (H)      Osteoporosis        Past Surgical History:   Procedure Laterality Date     CV CORONARY ANGIOGRAM N/A 3/27/2019    Procedure: CV CORONARY ANGIOGRAM;  Surgeon: Thierry Serrano MD;  Location:  [facility]ATH [facility]     CV RIGHT HEART CATH MEASUREMENTS RECORDED N/A 3/27/2019    Procedure: CV RIGHT HEART CATH;  Surgeon: Thierry Serrano MD;  Location:  [facility]ATH [facility]     ESOPHAGEAL IMPEDENCE FUNCTION TEST WITH 24 HOUR PH GREATER THAN 1 HOUR N/A 3/28/2019    Procedure: ESOPHAGEAL IMPEDENCE FUNCTION TEST WITH 24 HOUR PH GREATER THAN 1 HOUR;  Surgeon: Mike Henry MD;  Location:  GI     EXCISE PILONIDAL CYST, SIMPLE       TONSILLECTOMY & ADENOIDECTOMY         Social History     Socioeconomic History     Marital status:      Spouse name: Not on file     Number of children: Not on file     Years of education: Not on file     Highest education level: Not on file   Occupational History     Not on file   Social Needs     Financial resource strain: Not on file     Food insecurity     Worry: Not on file     Inability: Not on file     Transportation needs     Medical: Not on file     Non-medical: Not on file   Tobacco Use     Smoking status: Former Smoker     Packs/day: 1.50     Years: 36.00     Pack years: 54.00     Types:  Cigarettes     Quit date: 1/1/2006     Years since quitting: 15.4     Smokeless tobacco: Never Used   Substance and Sexual Activity     Alcohol use: Yes     Comment: stated beer and wine occ     Drug use: Yes     Comment: stated hx of marijuana, quit in 2006     Sexual activity: Not on file   Lifestyle     Physical activity     Days per week: Not on file     Minutes per session: Not on file     Stress: Not on file   Relationships     Social connections     Talks on phone: Not on file     Gets together: Not on file     Attends Mandaen service: Not on file     Active member of club or organization: Not on file     Attends meetings of clubs or organizations: Not on file     Relationship status: Not on file     Intimate partner violence     Fear of current or ex partner: Not on file     Emotionally abused: Not on file     Physically abused: Not on file     Forced sexual activity: Not on file   Other Topics Concern     Parent/sibling w/ CABG, MI or angioplasty before 65F 55M? Not Asked   Social History Narrative     Not on file         BP (!) 168/78   Pulse 86   Temp 98.3  F (36.8  C)   Wt 68 kg (150 lb)   SpO2 100%   BMI 27.44 kg/m    Body mass index is 27.44 kg/m .  Exam:   GENERAL APPEARANCE: Well developed, well nourished, alert, and in no apparent distress.  EYES: PERRL, EOMI  HENT: Nasal mucosa with no edema and no hyperemia. No nasal polyps.  EARS: Canals and tympanic membranes partially obscured by cerumen.  MOUTH: Oral mucosa is moist, without any lesions, no tonsillar enlargement, no oropharyngeal exudate.  NECK: supple, no masses, no thyromegaly.  LYMPHATICS: No significant axillary, cervical, or supraclavicular nodes.  RESP: normal percussion, markedly diminished air flow throughout.  No crackles. No rhonchi. No wheezes.  CV: Normal S1, S2, regular rhythm, normal rate. No murmur.  No rub. No gallop.  1+ LE edema.   ABDOMEN:  Bowel sounds normal, soft, nontender, no HSM or masses.   MS: extremities  normal. No clubbing. No cyanosis.  SKIN: no rash on limited exam  NEURO: Mentation intact, speech normal, normal strength and tone, normal gait and stance  PSYCH: mentation appears normal. and affect normal/bright  Results:  Recent Results (from the past 168 hour(s))   6 minute walk test    Collection Time: 06/21/21 12:00 AM   Result Value Ref Range    6 min walk (FT) 500 ft    6 Min Walk (M) 152 m   General PFT Lab (Please always keep checked)    Collection Time: 06/21/21  8:02 AM   Result Value Ref Range    FVC-Pred 2.80 L    FVC-Pre 1.46 L    FVC-%Pred-Pre 52 %    FEV1-Pre 0.50 L    FEV1-%Pred-Pre 22 %    FEV1FVC-Pred 79 %    FEV1FVC-Pre 34 %    FEFMax-Pred 5.69 L/sec    FEFMax-Pre 1.69 L/sec    FEFMax-%Pred-Pre 29 %    FEF2575-Pred 1.97 L/sec    FEF2575-Pre 0.19 L/sec    BVJ4499-%Pred-Pre 9 %    ExpTime-Pre 10.40 sec    FIFMax-Pre 2.32 L/sec    FEV1FEV6-Pred 80 %    FEV1FEV6-Pre 40 %   Comprehensive metabolic panel    Collection Time: 06/21/21  9:02 AM   Result Value Ref Range    Sodium 137 133 - 144 mmol/L    Potassium 3.0 (L) 3.4 - 5.3 mmol/L    Chloride 104 94 - 109 mmol/L    Carbon Dioxide 34 (H) 20 - 32 mmol/L    Anion Gap <1 (L) 3 - 14 mmol/L    Glucose 131 (H) 70 - 99 mg/dL    Urea Nitrogen 8 7 - 30 mg/dL    Creatinine 0.56 0.52 - 1.04 mg/dL    GFR Estimate >90 >60 mL/min/[1.73_m2]    GFR Estimate If Black >90 >60 mL/min/[1.73_m2]    Calcium 8.5 8.5 - 10.1 mg/dL    Bilirubin Total 0.3 0.2 - 1.3 mg/dL    Albumin 3.7 3.4 - 5.0 g/dL    Protein Total 7.6 6.8 - 8.8 g/dL    Alkaline Phosphatase 92 40 - 150 U/L    ALT 25 0 - 50 U/L    AST 17 0 - 45 U/L   CBC with platelets    Collection Time: 06/21/21  9:02 AM   Result Value Ref Range    WBC 8.4 4.0 - 11.0 10e9/L    RBC Count 4.41 3.8 - 5.2 10e12/L    Hemoglobin 12.5 11.7 - 15.7 g/dL    Hematocrit 38.8 35.0 - 47.0 %    MCV 88 78 - 100 fl    MCH 28.3 26.5 - 33.0 pg    MCHC 32.2 31.5 - 36.5 g/dL    RDW 12.4 10.0 - 15.0 %    Platelet Count 220 150 - 450 10e9/L    Blood gas venous    Collection Time: 06/21/21  9:03 AM   Result Value Ref Range    Ph Venous 7.36 7.32 - 7.43 pH    PCO2 Venous 65 (H) 40 - 50 mm Hg    PO2 Venous 36 25 - 47 mm Hg    Bicarbonate Venous 37 (H) 21 - 28 mmol/L    Base Excess Venous 8.8 mmol/L    FIO2 3liter                          Results as noted above.    PFT Interpretation:  Very severe obstructive ventilatory defect.  Unchanged from previous.   Similar to recent baseline.  Valid Maneuver

## 2021-06-21 ENCOUNTER — APPOINTMENT (OUTPATIENT)
Dept: TRANSPLANT | Facility: CLINIC | Age: 66
End: 2021-06-21
Attending: INTERNAL MEDICINE
Payer: MEDICARE

## 2021-06-21 ENCOUNTER — ANCILLARY PROCEDURE (OUTPATIENT)
Dept: CARDIOLOGY | Facility: CLINIC | Age: 66
End: 2021-06-21
Attending: INTERNAL MEDICINE
Payer: COMMERCIAL

## 2021-06-21 ENCOUNTER — OFFICE VISIT (OUTPATIENT)
Dept: TRANSPLANT | Facility: CLINIC | Age: 66
End: 2021-06-21
Attending: INTERNAL MEDICINE
Payer: COMMERCIAL

## 2021-06-21 VITALS
SYSTOLIC BLOOD PRESSURE: 168 MMHG | HEART RATE: 86 BPM | WEIGHT: 150 LBS | BODY MASS INDEX: 27.44 KG/M2 | DIASTOLIC BLOOD PRESSURE: 78 MMHG | TEMPERATURE: 98.3 F | OXYGEN SATURATION: 100 %

## 2021-06-21 DIAGNOSIS — Z01.818 ENCOUNTER FOR PRE-TRANSPLANT EVALUATION FOR LUNG TRANSPLANT: ICD-10-CM

## 2021-06-21 DIAGNOSIS — J44.9 COPD (CHRONIC OBSTRUCTIVE PULMONARY DISEASE) (H): ICD-10-CM

## 2021-06-21 DIAGNOSIS — J43.2 CENTRILOBULAR EMPHYSEMA (H): Primary | ICD-10-CM

## 2021-06-21 LAB
6 MIN WALK (FT): 500 FT
6 MIN WALK (M): 152 M
ALBUMIN SERPL-MCNC: 3.7 G/DL (ref 3.4–5)
ALP SERPL-CCNC: 92 U/L (ref 40–150)
ALT SERPL W P-5'-P-CCNC: 25 U/L (ref 0–50)
ANION GAP SERPL CALCULATED.3IONS-SCNC: <1 MMOL/L (ref 3–14)
AST SERPL W P-5'-P-CCNC: 17 U/L (ref 0–45)
BASE EXCESS BLDV CALC-SCNC: 8.8 MMOL/L
BILIRUB SERPL-MCNC: 0.3 MG/DL (ref 0.2–1.3)
BUN SERPL-MCNC: 8 MG/DL (ref 7–30)
CALCIUM SERPL-MCNC: 8.5 MG/DL (ref 8.5–10.1)
CHLORIDE SERPL-SCNC: 104 MMOL/L (ref 94–109)
CO2 SERPL-SCNC: 34 MMOL/L (ref 20–32)
CREAT SERPL-MCNC: 0.56 MG/DL (ref 0.52–1.04)
ERYTHROCYTE [DISTWIDTH] IN BLOOD BY AUTOMATED COUNT: 12.4 % (ref 10–15)
GFR SERPL CREATININE-BSD FRML MDRD: >90 ML/MIN/{1.73_M2}
GLUCOSE SERPL-MCNC: 131 MG/DL (ref 70–99)
HCO3 BLDV-SCNC: 37 MMOL/L (ref 21–28)
HCT VFR BLD AUTO: 38.8 % (ref 35–47)
HGB BLD-MCNC: 12.5 G/DL (ref 11.7–15.7)
MCH RBC QN AUTO: 28.3 PG (ref 26.5–33)
MCHC RBC AUTO-ENTMCNC: 32.2 G/DL (ref 31.5–36.5)
MCV RBC AUTO: 88 FL (ref 78–100)
O2/TOTAL GAS SETTING VFR VENT: ABNORMAL %
PCO2 BLDV: 65 MM HG (ref 40–50)
PH BLDV: 7.36 PH (ref 7.32–7.43)
PLATELET # BLD AUTO: 220 10E9/L (ref 150–450)
PO2 BLDV: 36 MM HG (ref 25–47)
POTASSIUM SERPL-SCNC: 3 MMOL/L (ref 3.4–5.3)
PROT SERPL-MCNC: 7.6 G/DL (ref 6.8–8.8)
RBC # BLD AUTO: 4.41 10E12/L (ref 3.8–5.2)
SODIUM SERPL-SCNC: 137 MMOL/L (ref 133–144)
WBC # BLD AUTO: 8.4 10E9/L (ref 4–11)

## 2021-06-21 PROCEDURE — 85027 COMPLETE CBC AUTOMATED: CPT | Performed by: PATHOLOGY

## 2021-06-21 PROCEDURE — 94375 RESPIRATORY FLOW VOLUME LOOP: CPT | Performed by: INTERNAL MEDICINE

## 2021-06-21 PROCEDURE — 99207 PR STATISTIC IV PUSH SINGLE INITIAL SUBSTANCE: CPT | Performed by: INTERNAL MEDICINE

## 2021-06-21 PROCEDURE — 99215 OFFICE O/P EST HI 40 MIN: CPT | Mod: 25 | Performed by: INTERNAL MEDICINE

## 2021-06-21 PROCEDURE — 80053 COMPREHEN METABOLIC PANEL: CPT | Performed by: PATHOLOGY

## 2021-06-21 PROCEDURE — 36415 COLL VENOUS BLD VENIPUNCTURE: CPT | Performed by: PATHOLOGY

## 2021-06-21 PROCEDURE — G0463 HOSPITAL OUTPT CLINIC VISIT: HCPCS

## 2021-06-21 PROCEDURE — 99207 PR NO CHARGE LOS: CPT

## 2021-06-21 PROCEDURE — 82803 BLOOD GASES ANY COMBINATION: CPT | Performed by: PATHOLOGY

## 2021-06-21 PROCEDURE — 93306 TTE W/DOPPLER COMPLETE: CPT | Performed by: INTERNAL MEDICINE

## 2021-06-21 PROCEDURE — 94618 PULMONARY STRESS TESTING: CPT | Performed by: INTERNAL MEDICINE

## 2021-06-21 RX ORDER — ATORVASTATIN CALCIUM 10 MG/1
10 TABLET, FILM COATED ORAL DAILY
Status: ON HOLD | COMMUNITY
Start: 2021-06-21 | End: 2022-03-16

## 2021-06-21 RX ORDER — FLUTICASONE PROPIONATE 50 MCG
1 SPRAY, SUSPENSION (ML) NASAL DAILY PRN
Status: ON HOLD | COMMUNITY
Start: 2021-06-21 | End: 2022-02-20

## 2021-06-21 RX ORDER — IPRATROPIUM BROMIDE AND ALBUTEROL SULFATE 2.5; .5 MG/3ML; MG/3ML
3 SOLUTION RESPIRATORY (INHALATION) 2 TIMES DAILY
Status: ON HOLD | COMMUNITY
Start: 2021-06-21 | End: 2022-03-16

## 2021-06-21 RX ORDER — DILTIAZEM HYDROCHLORIDE 240 MG/1
240 CAPSULE, EXTENDED RELEASE ORAL DAILY
COMMUNITY
End: 2022-03-21

## 2021-06-21 RX ORDER — BUDESONIDE 1 MG/2ML
1 INHALANT ORAL 2 TIMES DAILY
Status: ON HOLD | COMMUNITY
Start: 2021-06-21 | End: 2022-03-16

## 2021-06-21 RX ORDER — ACYCLOVIR 200 MG/1
16 CAPSULE ORAL ONCE
Status: COMPLETED | OUTPATIENT
Start: 2021-06-21 | End: 2021-06-21

## 2021-06-21 RX ORDER — PREDNISONE 10 MG/1
TABLET ORAL
Status: ON HOLD | COMMUNITY
Start: 2021-06-21 | End: 2022-02-20

## 2021-06-21 RX ORDER — FEXOFENADINE HCL 180 MG/1
180 TABLET ORAL DAILY PRN
Status: ON HOLD | COMMUNITY
End: 2022-02-20

## 2021-06-21 RX ORDER — ARFORMOTEROL TARTRATE 15 UG/2ML
15 SOLUTION RESPIRATORY (INHALATION) 2 TIMES DAILY
Status: ON HOLD | COMMUNITY
Start: 2021-06-21 | End: 2022-03-16

## 2021-06-21 RX ADMIN — Medication 5 ML: at 11:01

## 2021-06-21 RX ADMIN — ACYCLOVIR 14 ML: 200 CAPSULE ORAL at 11:01

## 2021-06-21 NOTE — PATIENT INSTRUCTIONS
Transplant Clinic Discharge Instructions    1. Please contact Kate, your transplant coordinator once you have been able to complete your colonoscopy and we can reactivate you on the lung transplant waiting list.    2. Please discuss low potassium and constipation issues (diltiazem has been known to cause constipation) with your upcoming primary care provider appointment.    3. Physical Activity/Pulmonary Rehab: Please remember to stay active.  Continue exercises learned in pulmonary rehab or continue participating in pulmonary rehab, if able. We encourage you to try and be active on days that you are not in pulmonary rehab as well. Walking is a great form of exercise. We encourage you to continue light weights as well to maintain muscle mass.    Weight Management:   Body mass index is 27.44 kg/m .  Goal BMI greater than 18, less than 30 for lung transplant.     Medication changes: None    Follow Up/Next appointment: 6 months with Dr Fneg along with pulmonary function testing, a six minute walk and labs.    Please remember to stay up to date with your primary care requirements including:                Annual check-ups with primary care physician   Mammogram   Pap smear (as appropriate for women)    PSA (for men over 50)   Dental visits   Annual flu shots/ immunizations as needed   Colonoscopy (patients over 50).     Thank-you for allowing us to participate in your care.    Please contact your transplant coordinator, Kate Batres at [phone] with any questions, concerns or updates (change in medications, illness, hospital admission, change in oxygen needs, change in insurance coverage, change of phone number or address, etc).    Thoracic Transplant Office phone [phone], fax [phone]    Office Hours 8:30 - 5:00 pm

## 2021-06-21 NOTE — NURSING NOTE
Chief Complaint   Patient presents with     RECHECK     annual pre lung tx follow up       BP (!) 168/78   Pulse 86   Temp 98.3  F (36.8  C)   Wt 68 kg (150 lb)   SpO2 100%   BMI 27.44 kg/m        Sara GRAJEDA, ELIOT

## 2021-06-21 NOTE — LETTER
6/21/2021         RE: Melissa Elder  [address] \[facility]\"" [id number]        Dear Colleague,    Thank you for referring your patient, Melissa Elder, to the [facility]. Please see a copy of my visit note below.    Reason for Visit  Melissa Elder is a 65 year old year old female who is being seen for RECHECK (annual pre lung tx follow up)      Assessment and plan:   Melissa Elder is a 65-year-old female with COPD with very severe obstructive lung disease as well as a history of hyperlipidemia, paroxysmal atrial fibrillation, osteoporosis, reflux and hypertension.  She reports gradual progression in her dyspnea on exertion.  She requires constant supplemental oxygen with 2 L at rest at night and 3 L with activity.  PFTs show very severe obstructive lung disease although not significantly changed in the past few years.  Based on history and previous transplant evaluation, the patient appears to be a very good candidate for lung transplantation.  She will need to complete a follow-up colonoscopy due to polyps noted on her last exam and family history.  She should also review with her primary care to be sure she is up-to-date with other healthcare maintenance concerns.  On laboratory today, potassium is low, possibly related to her as needed use of diuretics.  She also notes constipation since starting diltiazem.  I have encouraged her to review both of these issues with her primary care physician who she will be seeing next week.  She was encouraged to remain active to maintain her general conditioning.  Unless there is a change in her health, she will be reactivated following her colonoscopy assuming there are no significant findings.    The patient appears to be on appropriate medication for her COPD.  I will defer to her primary care physician and primary pulmonologist for management of her COPD.    Follow-up in 6 months with labs, PFTs and 6-minute walk.    44 minutes required on the  day of the visit to review chart, interview and examine patient, review labs and imaging, formulate a plan, submit orders and document.    Carson Feng MD           Lung TX HPI  Transplants:  N/A, Postoperative day:      The patient was seen and examined by Carson Feng MD   Melissa Elder is a 65-year-old female with COPD with very severe obstructive lung disease as well as a history of hyperlipidemia, paroxysmal atrial fibrillation, osteoporosis, reflux and hypertension.  She has completed lung transplantation evaluation and was listed for transplantation but inactivated during the Covid pandemic.  She presents now to be considered for reactivation.  Patient reports gradual progression in her respiratory symptoms.  She denies dyspnea at rest but reports dyspnea with minimal activity and sometimes just with talking.  She tries to exercise regularly walking about 1 block but requires frequent stops.  She reports a dry cough mostly with her nebs and inhalers.  This is chronic, longstanding and unchanged.  No sputum production.  No hemoptysis.  No chest pain.  No recent fever, chills or night sweats.    Review of systems:  Appetite is good  Seasonal allergies with rhinorrhea itchy eyes and nasal congestion partially relieved with over-the-counter allergy medications.  Occasional palpitations, recently started on diltiazem through her primary care physician.  Constipation possibly related to initiation of diltiazem, partially relieved with increased fiber in the diet  Chronic low back pain  Easy bruising  A complete ROS was otherwise negative except as noted in the HPI.    Current Outpatient Medications   Medication     arformoterol (BROVANA) 15 MCG/2ML NEBU neb solution     atorvastatin (LIPITOR) 20 MG tablet     budesonide (PULMICORT) 1 MG/2ML neb solution     diltiazem ER (TIAZAC) 240 MG 24 hr ER beaded capsule     fexofenadine (ALLEGRA) 180 MG tablet     fluticasone (FLONASE) 50 MCG/ACT nasal  spray     ipratropium - albuterol 0.5 mg/2.5 mg/3 mL (DUONEB) 0.5-2.5 (3) MG/3ML neb solution     predniSONE (DELTASONE) 10 MG tablet     aspirin 81 MG EC tablet     calcium 500 MG CHEW     furosemide (LASIX) 20 MG tablet     IBANdronate (BONIVA) 150 MG tablet     levalbuterol (XOPENEX HFA) 45 MCG/ACT Inhaler     OXYGEN-HELIUM IN     tiotropium (SPIRIVA RESPIMAT) 2.5 MCG/ACT inhalation aerosol     No current facility-administered medications for this visit.      Facility-Administered Medications Ordered in Other Visits   Medication     sodium chloride (PF) 0.9% PF flush 10 mL     Allergies   Allergen Reactions     Alendronic Acid Other (See Comments)     dizziness  Other reaction(s): Dizziness     Nickel Rash     Sulfa Drugs Rash     Past Medical History:   Diagnosis Date     Atrial fibrillation with RVR (H)     hx of A fib related to severe COPD, does not meet anticoagulation criteria as noted     COPD, severe (H)      Osteoporosis        Past Surgical History:   Procedure Laterality Date     CV CORONARY ANGIOGRAM N/A 3/27/2019    Procedure: CV CORONARY ANGIOGRAM;  Surgeon: Thierry Serrano MD;  Location:  HE[facility]ARD[facility] CATH LAB     CV RIGHT HEART CATH MEASUREMENTS RECORDED N/A 3/27/2019    Procedure: CV RIGHT HEART CATH;  Surgeon: Thierry Serrano MD;  Location:  HE[facility]ARD[facility] CATH LAB     ESOPHAGEAL IMPEDENCE FUNCTION TEST WITH 24 HOUR PH GREATER THAN 1 HOUR N/A 3/28/2019    Procedure: ESOPHAGEAL IMPEDENCE FUNCTION TEST WITH 24 HOUR PH GREATER THAN 1 HOUR;  Surgeon: Mike Henry MD;  Location:  GI     EXCISE PILONIDAL CYST, SIMPLE       TONSILLECTOMY & ADENOIDECTOMY         Social History     Socioeconomic History     Marital status:      Spouse name: Not on file     Number of children: Not on file     Years of education: Not on file     Highest education level: Not on file   Occupational History     Not on file   Social Needs     Financial resource strain: Not on file     Food insecurity      Worry: Not on file     Inability: Not on file     Transportation needs     Medical: Not on file     Non-medical: Not on file   Tobacco Use     Smoking status: Former Smoker     Packs/day: 1.50     Years: 36.00     Pack years: 54.00     Types: Cigarettes     Quit date: 1/1/2006     Years since quitting: 15.4     Smokeless tobacco: Never Used   Substance and Sexual Activity     Alcohol use: Yes     Comment: stated beer and wine occ     Drug use: Yes     Comment: stated hx of marijuana, quit in 2006     Sexual activity: Not on file   Lifestyle     Physical activity     Days per week: Not on file     Minutes per session: Not on file     Stress: Not on file   Relationships     Social connections     Talks on phone: Not on file     Gets together: Not on file     Attends Roman Catholic service: Not on file     Active member of club or organization: Not on file     Attends meetings of clubs or organizations: Not on file     Relationship status: Not on file     Intimate partner violence     Fear of current or ex partner: Not on file     Emotionally abused: Not on file     Physically abused: Not on file     Forced sexual activity: Not on file   Other Topics Concern     Parent/sibling w/ CABG, MI or angioplasty before 65F 55M? Not Asked   Social History Narrative     Not on file         BP (!) 168/78   Pulse 86   Temp 98.3  F (36.8  C)   Wt 68 kg (150 lb)   SpO2 100%   BMI 27.44 kg/m    Body mass index is 27.44 kg/m .  Exam:   GENERAL APPEARANCE: Well developed, well nourished, alert, and in no apparent distress.  EYES: PERRL, EOMI  HENT: Nasal mucosa with no edema and no hyperemia. No nasal polyps.  EARS: Canals and tympanic membranes partially obscured by cerumen.  MOUTH: Oral mucosa is moist, without any lesions, no tonsillar enlargement, no oropharyngeal exudate.  NECK: supple, no masses, no thyromegaly.  LYMPHATICS: No significant axillary, cervical, or supraclavicular nodes.  RESP: normal percussion, markedly  diminished air flow throughout.  No crackles. No rhonchi. No wheezes.  CV: Normal S1, S2, regular rhythm, normal rate. No murmur.  No rub. No gallop.  1+ LE edema.   ABDOMEN:  Bowel sounds normal, soft, nontender, no HSM or masses.   MS: extremities normal. No clubbing. No cyanosis.  SKIN: no rash on limited exam  NEURO: Mentation intact, speech normal, normal strength and tone, normal gait and stance  PSYCH: mentation appears normal. and affect normal/bright  Results:  Recent Results (from the past 168 hour(s))   6 minute walk test    Collection Time: 06/21/21 12:00 AM   Result Value Ref Range    6 min walk (FT) 500 ft    6 Min Walk (M) 152 m   General PFT Lab (Please always keep checked)    Collection Time: 06/21/21  8:02 AM   Result Value Ref Range    FVC-Pred 2.80 L    FVC-Pre 1.46 L    FVC-%Pred-Pre 52 %    FEV1-Pre 0.50 L    FEV1-%Pred-Pre 22 %    FEV1FVC-Pred 79 %    FEV1FVC-Pre 34 %    FEFMax-Pred 5.69 L/sec    FEFMax-Pre 1.69 L/sec    FEFMax-%Pred-Pre 29 %    FEF2575-Pred 1.97 L/sec    FEF2575-Pre 0.19 L/sec    [id number]-%Pred-Pre 9 %    ExpTime-Pre 10.40 sec    FIFMax-Pre 2.32 L/sec    FEV1FEV6-Pred 80 %    FEV1FEV6-Pre 40 %   Comprehensive metabolic panel    Collection Time: 06/21/21  9:02 AM   Result Value Ref Range    Sodium 137 133 - 144 mmol/L    Potassium 3.0 (L) 3.4 - 5.3 mmol/L    Chloride 104 94 - 109 mmol/L    Carbon Dioxide 34 (H) 20 - 32 mmol/L    Anion Gap <1 (L) 3 - 14 mmol/L    Glucose 131 (H) 70 - 99 mg/dL    Urea Nitrogen 8 7 - 30 mg/dL    Creatinine 0.56 0.52 - 1.04 mg/dL    GFR Estimate >90 >60 mL/min/[1.73_m2]    GFR Estimate If Black >90 >60 mL/min/[1.73_m2]    Calcium 8.5 8.5 - 10.1 mg/dL    Bilirubin Total 0.3 0.2 - 1.3 mg/dL    Albumin 3.7 3.4 - 5.0 g/dL    Protein Total 7.6 6.8 - 8.8 g/dL    Alkaline Phosphatase 92 40 - 150 U/L    ALT 25 0 - 50 U/L    AST 17 0 - 45 U/L   CBC with platelets    Collection Time: 06/21/21  9:02 AM   Result Value Ref Range    WBC 8.4 4.0 - 11.0 10e9/L     RBC Count 4.41 3.8 - 5.2 10e12/L    Hemoglobin 12.5 11.7 - 15.7 g/dL    Hematocrit 38.8 35.0 - 47.0 %    MCV 88 78 - 100 fl    MCH 28.3 26.5 - 33.0 pg    MCHC 32.2 31.5 - 36.5 g/dL    RDW 12.4 10.0 - 15.0 %    Platelet Count 220 150 - 450 10e9/L   Blood gas venous    Collection Time: 06/21/21  9:03 AM   Result Value Ref Range    Ph Venous 7.36 7.32 - 7.43 pH    PCO2 Venous 65 (H) 40 - 50 mm Hg    PO2 Venous 36 25 - 47 mm Hg    Bicarbonate Venous 37 (H) 21 - 28 mmol/L    Base Excess Venous 8.8 mmol/L    FIO2 3liter                          Results as noted above.    PFT Interpretation:  Very severe obstructive ventilatory defect.  Unchanged from previous.   Similar to recent baseline.  Valid Maneuver        Again, thank you for allowing me to participate in the care of your patient.        Sincerely,        Carson Feng MD

## 2021-06-21 NOTE — NURSING NOTE
Transplant Nurse Coordinator Note  Melissa Elder  1955    150 lbs 0 oz  Body mass index is 27.44 kg/m .     Patient has remained inactive on transplant list since COVID 03/2020 by choice. Now would like to reconsider reactivation.    Patient accompanied by: Patient came to NPT appointment with , Tyrese.     Current activity level: Feels very limited, all ADLs difficult, she tries to stay active but is only able to walk 1-2 blocks daily for intentional exercise.    SW: Will follow up with Oanh Asif today.     Pulmonary Rehab: Last completed 2016?  Karnofsky Score: 60%    PFT: 6/21/21 stable since 2018  6MW: 6/21/21 500 ft on 4 L w lowest sat 95%  Labs: 6/21/21  CT Scan: 10/1/2020  Echo: 6/21/21     Current oxygen use:    Rest 2-3   Activity 3  NOC 2       Assisted ventilation: No    Diabetic status: Not diabetic  Prednisone: No daily dose  GERD: Occasional symptoms w spicy foods  Smoking: Quit 2006  Drug use: Previous marijuana, denies any use in years  ETOH: beers occasionally  Mental Health concerns: anxiety     Primary Care:   Established with a PCP provider: Yes, Dr Rm  Mammogram: 11/2020  PAP: 2018 - will follow up with PCP this month and inquire if do now   Colonoscopy: 2016 x two small polyps (one tub adenoma, one serrated adenoma) Will arrange locally as long as providers feel comfortable doing so with currently lung fxn.  Dental: UTD, sees q 6 months consistently.    Vaccinations:  Pneumococcal: UTD  Prevnar 13: UTD  Hep A/B: UTD  Shingrix: UTD  COVID: Fully vaccinated    Patient status/transplant tab updated.     MD Recommendations:   Follow up 6 months with required testing

## 2021-06-22 LAB
EXPTIME-PRE: 10.4 SEC
FEF2575-%PRED-PRE: 9 %
FEF2575-PRE: 0.19 L/SEC
FEF2575-PRED: 1.97 L/SEC
FEFMAX-%PRED-PRE: 29 %
FEFMAX-PRE: 1.69 L/SEC
FEFMAX-PRED: 5.69 L/SEC
FEV1-%PRED-PRE: 22 %
FEV1-PRE: 0.5 L
FEV1FEV6-PRE: 40 %
FEV1FEV6-PRED: 80 %
FEV1FVC-PRE: 34 %
FEV1FVC-PRED: 79 %
FIFMAX-PRE: 2.32 L/SEC
FVC-%PRED-PRE: 52 %
FVC-PRE: 1.46 L
FVC-PRED: 2.8 L
PROTOCOL CUTOFF: NORMAL
SA1 CELL: NORMAL
SA1 COMMENTS: NORMAL
SA1 HI RISK ABY: NORMAL
SA1 MOD RISK ABY: NORMAL
SA1 TEST METHOD: NORMAL
SA2 CELL: NORMAL
SA2 COMMENTS: NORMAL
SA2 HI RISK ABY UA: NORMAL
SA2 MOD RISK ABY: NORMAL
SA2 TEST METHOD: NORMAL
UNACCEPTABLE ANTIGEN: NORMAL
UNOS CPRA: 0

## 2021-09-17 ENCOUNTER — TELEPHONE (OUTPATIENT)
Dept: TRANSPLANT | Facility: CLINIC | Age: 66
End: 2021-09-17

## 2021-09-17 NOTE — TELEPHONE ENCOUNTER
Patient Call: FYI:   called to let us know Melissa had coloscopy [facility] in [facility]; 08/31/2021    Call back needed? Yes    Return Call Needed  Same as documented in contacts section  When to return call?: Greater than one day: Route standard priority

## 2021-09-28 ENCOUNTER — LAB (OUTPATIENT)
Dept: LAB | Facility: CLINIC | Age: 66
End: 2021-09-28
Payer: MEDICARE

## 2021-09-28 DIAGNOSIS — J43.2 CENTRILOBULAR EMPHYSEMA (H): ICD-10-CM

## 2021-09-28 DIAGNOSIS — Z01.818 ENCOUNTER FOR PRE-TRANSPLANT EVALUATION FOR LUNG TRANSPLANT: ICD-10-CM

## 2021-09-28 PROCEDURE — 86832 HLA CLASS I HIGH DEFIN QUAL: CPT

## 2021-09-28 PROCEDURE — 86833 HLA CLASS II HIGH DEFIN QUAL: CPT

## 2021-10-04 LAB
SA 1 CELL: NORMAL
SA 1 TEST METHOD: NORMAL
SA1 HI RISK ABY: NORMAL
SA1 MOD RISK ABY: NORMAL
ZZZSA 1  COMMENTS: NORMAL

## 2021-10-05 LAB
PROTOCOL CUTOFF: NORMAL
SA 2 CELL: NORMAL
SA 2 TEST METHOD: NORMAL
SA2 HI RISK ABY: NORMAL
SA2 MOD RISK ABY: NORMAL
UNACCEPTABLE ANTIGENS: NORMAL
UNOS CPRA: 0
ZZZSA 2 COMMENTS: NORMAL

## 2021-10-10 ENCOUNTER — HEALTH MAINTENANCE LETTER (OUTPATIENT)
Age: 66
End: 2021-10-10

## 2021-11-02 ENCOUNTER — TELEPHONE (OUTPATIENT)
Dept: TRANSPLANT | Facility: CLINIC | Age: 66
End: 2021-11-02

## 2021-11-17 DIAGNOSIS — J44.9 CHRONIC OBSTRUCTIVE PULMONARY DISEASE, UNSPECIFIED COPD TYPE (H): Primary | ICD-10-CM

## 2021-11-17 DIAGNOSIS — Z01.818 ENCOUNTER FOR PRE-TRANSPLANT EVALUATION FOR LUNG TRANSPLANT: ICD-10-CM

## 2021-12-17 NOTE — PROGRESS NOTES
RD assessing frailty. Saw pt for txp eval in 2019.     Frail (3/5 points)- scored for reported exhaustion, slowness, and reduced          -- Weight: stable per pt  -- Exhaustion/Energy Levels: reports all ADLs are challenging and take a lot out of her; showering, shaking out a rug, etc require a break to regain energy  -- Activity: daily activity x 2-3 years:  - stationary bike and/or treadmill for 30 min/day  - weights/upper body activities 5-10 min 2x/week     Recommended Interventions to Address Frailty:  - Continue with activity to remain functional and active  - Plan to see pt again in 6 months to retest frailty  - Other components of frailty test may not be able to improve (exhaustion, walk speed) until txp

## 2021-12-20 ENCOUNTER — OFFICE VISIT (OUTPATIENT)
Dept: TRANSPLANT | Facility: CLINIC | Age: 66
End: 2021-12-20
Attending: INTERNAL MEDICINE
Payer: MEDICARE

## 2021-12-20 ENCOUNTER — LAB (OUTPATIENT)
Dept: LAB | Facility: CLINIC | Age: 66
End: 2021-12-20
Attending: INTERNAL MEDICINE
Payer: COMMERCIAL

## 2021-12-20 VITALS
WEIGHT: 147 LBS | SYSTOLIC BLOOD PRESSURE: 134 MMHG | BODY MASS INDEX: 26.89 KG/M2 | DIASTOLIC BLOOD PRESSURE: 77 MMHG | OXYGEN SATURATION: 97 % | HEART RATE: 92 BPM

## 2021-12-20 DIAGNOSIS — J43.2 CENTRILOBULAR EMPHYSEMA (H): ICD-10-CM

## 2021-12-20 DIAGNOSIS — J44.9 CHRONIC OBSTRUCTIVE PULMONARY DISEASE, UNSPECIFIED COPD TYPE (H): ICD-10-CM

## 2021-12-20 DIAGNOSIS — Z01.818 ENCOUNTER FOR PRE-TRANSPLANT EVALUATION FOR LUNG TRANSPLANT: ICD-10-CM

## 2021-12-20 DIAGNOSIS — Z76.82 LUNG TRANSPLANT CANDIDATE: ICD-10-CM

## 2021-12-20 DIAGNOSIS — J43.8 OTHER EMPHYSEMA (H): Primary | ICD-10-CM

## 2021-12-20 LAB
6 MIN WALK (FT): 585 FT
6 MIN WALK (M): 178 M
ALBUMIN SERPL-MCNC: 3.8 G/DL (ref 3.4–5)
ALP SERPL-CCNC: 89 U/L (ref 40–150)
ALT SERPL W P-5'-P-CCNC: 28 U/L (ref 0–50)
ANION GAP SERPL CALCULATED.3IONS-SCNC: 5 MMOL/L (ref 3–14)
AST SERPL W P-5'-P-CCNC: 16 U/L (ref 0–45)
BASE EXCESS BLDV CALC-SCNC: 8.5 MMOL/L (ref -7.7–1.9)
BILIRUB SERPL-MCNC: 0.5 MG/DL (ref 0.2–1.3)
BUN SERPL-MCNC: 11 MG/DL (ref 7–30)
CALCIUM SERPL-MCNC: 9.2 MG/DL (ref 8.5–10.1)
CHLORIDE BLD-SCNC: 102 MMOL/L (ref 94–109)
CO2 SERPL-SCNC: 34 MMOL/L (ref 20–32)
CREAT SERPL-MCNC: 0.54 MG/DL (ref 0.52–1.04)
ERYTHROCYTE [DISTWIDTH] IN BLOOD BY AUTOMATED COUNT: 12.4 % (ref 10–15)
EXPTIME-PRE: 12.56 SEC
FEF2575-%PRED-PRE: 8 %
FEF2575-PRE: 0.16 L/SEC
FEF2575-PRED: 1.93 L/SEC
FEFMAX-%PRED-PRE: 25 %
FEFMAX-PRE: 1.42 L/SEC
FEFMAX-PRED: 5.63 L/SEC
FEV1-%PRED-PRE: 22 %
FEV1-PRE: 0.49 L
FEV1FEV6-PRE: 37 %
FEV1FEV6-PRED: 80 %
FEV1FVC-PRE: 27 %
FEV1FVC-PRED: 79 %
FIFMAX-PRE: 1.7 L/SEC
FVC-%PRED-PRE: 65 %
FVC-PRE: 1.81 L
FVC-PRED: 2.77 L
GFR SERPL CREATININE-BSD FRML MDRD: >90 ML/MIN/1.73M2
GLUCOSE BLD-MCNC: 116 MG/DL (ref 70–99)
HCO3 BLDV-SCNC: 37 MMOL/L (ref 21–28)
HCT VFR BLD AUTO: 40.9 % (ref 35–47)
HGB BLD-MCNC: 12.9 G/DL (ref 11.7–15.7)
MCH RBC QN AUTO: 28.1 PG (ref 26.5–33)
MCHC RBC AUTO-ENTMCNC: 31.5 G/DL (ref 31.5–36.5)
MCV RBC AUTO: 89 FL (ref 78–100)
O2/TOTAL GAS SETTING VFR VENT: 2 %
PCO2 BLDV: 73 MM HG (ref 40–50)
PH BLDV: 7.32 [PH] (ref 7.32–7.43)
PLATELET # BLD AUTO: 186 10E3/UL (ref 150–450)
PO2 BLDV: 28 MM HG (ref 25–47)
POTASSIUM BLD-SCNC: 3.3 MMOL/L (ref 3.4–5.3)
PROT SERPL-MCNC: 7.7 G/DL (ref 6.8–8.8)
RBC # BLD AUTO: 4.59 10E6/UL (ref 3.8–5.2)
SODIUM SERPL-SCNC: 141 MMOL/L (ref 133–144)
WBC # BLD AUTO: 8.3 10E3/UL (ref 4–11)

## 2021-12-20 PROCEDURE — 86832 HLA CLASS I HIGH DEFIN QUAL: CPT | Performed by: INTERNAL MEDICINE

## 2021-12-20 PROCEDURE — 36415 COLL VENOUS BLD VENIPUNCTURE: CPT | Performed by: PATHOLOGY

## 2021-12-20 PROCEDURE — 85027 COMPLETE CBC AUTOMATED: CPT | Performed by: PATHOLOGY

## 2021-12-20 PROCEDURE — 80053 COMPREHEN METABOLIC PANEL: CPT | Performed by: PATHOLOGY

## 2021-12-20 PROCEDURE — 99207 PR NON-BILLABLE SERV PER CHARTING: CPT

## 2021-12-20 PROCEDURE — 86833 HLA CLASS II HIGH DEFIN QUAL: CPT | Performed by: INTERNAL MEDICINE

## 2021-12-20 PROCEDURE — 999N000207 HC UMP OPEN ENCOUNTER >50 DAYS

## 2021-12-20 PROCEDURE — 82803 BLOOD GASES ANY COMBINATION: CPT | Performed by: PATHOLOGY

## 2021-12-20 RX ORDER — POTASSIUM CHLORIDE 1500 MG/1
20 TABLET, EXTENDED RELEASE ORAL DAILY
COMMUNITY
Start: 2021-12-20 | End: 2022-04-14

## 2021-12-20 RX ORDER — POTASSIUM CHLORIDE 1500 MG/1
20 TABLET, EXTENDED RELEASE ORAL DAILY
COMMUNITY
End: 2021-12-20

## 2021-12-20 NOTE — NURSING NOTE
Chief Complaint   Patient presents with     RECHECK     pre-lung tx follow up        /77   Pulse 92   Wt 66.7 kg (147 lb)   SpO2 97%   BMI 26.89 kg/m        Sara GRAJEDA CMA

## 2021-12-20 NOTE — PROGRESS NOTES
Reason for Visit  Melissa Elder is a 66 year old year old female who is being seen for RECHECK (pre-lung tx follow up )      Assessment and plan: ***    Lung TX HPI  Transplants:  N/A, Postoperative day:      The patient was seen and examined by Carson Feng MD     A complete ROS was otherwise negative except as noted in the HPI.    Current Outpatient Medications   Medication     potassium chloride ER (KLOR-CON M) 20 MEQ CR tablet     arformoterol (BROVANA) 15 MCG/2ML NEBU neb solution     aspirin 81 MG EC tablet     atorvastatin (LIPITOR) 20 MG tablet     budesonide (PULMICORT) 1 MG/2ML neb solution     calcium 500 MG CHEW     diltiazem ER (TIAZAC) 240 MG 24 hr ER beaded capsule     fexofenadine (ALLEGRA) 180 MG tablet     fluticasone (FLONASE) 50 MCG/ACT nasal spray     furosemide (LASIX) 20 MG tablet     IBANdronate (BONIVA) 150 MG tablet     ipratropium - albuterol 0.5 mg/2.5 mg/3 mL (DUONEB) 0.5-2.5 (3) MG/3ML neb solution     levalbuterol (XOPENEX HFA) 45 MCG/ACT Inhaler     OXYGEN-HELIUM IN     predniSONE (DELTASONE) 10 MG tablet     tiotropium (SPIRIVA RESPIMAT) 2.5 MCG/ACT inhalation aerosol     No current facility-administered medications for this visit.     Facility-Administered Medications Ordered in Other Visits   Medication     sodium chloride (PF) 0.9% PF flush 10 mL     Allergies   Allergen Reactions     Alendronic Acid Other (See Comments)     dizziness  Other reaction(s): Dizziness     Nickel Rash     Sulfa Drugs Rash     Past Medical History:   Diagnosis Date     Atrial fibrillation with RVR (H)     hx of A fib related to severe COPD, does not meet anticoagulation criteria as noted     COPD, severe (H)      Osteoporosis        Past Surgical History:   Procedure Laterality Date     CV CORONARY ANGIOGRAM N/A 3/27/2019    Procedure: CV CORONARY ANGIOGRAM;  Surgeon: Thierry Serrano MD;  Location:  HEART CARDIAC CATH LAB     CV RIGHT HEART CATH MEASUREMENTS RECORDED N/A 3/27/2019     Procedure: CV RIGHT HEART CATH;  Surgeon: Thieryr Serrano MD;  Location:  HEART CARDIAC CATH LAB     ESOPHAGEAL IMPEDENCE FUNCTION TEST WITH 24 HOUR PH GREATER THAN 1 HOUR N/A 3/28/2019    Procedure: ESOPHAGEAL IMPEDENCE FUNCTION TEST WITH 24 HOUR PH GREATER THAN 1 HOUR;  Surgeon: Mike Henry MD;  Location:  GI     EXCISE PILONIDAL CYST, SIMPLE       TONSILLECTOMY & ADENOIDECTOMY         Social History     Socioeconomic History     Marital status:      Spouse name: Not on file     Number of children: Not on file     Years of education: Not on file     Highest education level: Not on file   Occupational History     Not on file   Tobacco Use     Smoking status: Former Smoker     Packs/day: 1.50     Years: 36.00     Pack years: 54.00     Types: Cigarettes     Quit date: 1/1/2006     Years since quitting: 15.9     Smokeless tobacco: Never Used   Substance and Sexual Activity     Alcohol use: Yes     Comment: stated beer and wine occ     Drug use: Yes     Comment: stated hx of marijuana, quit in 2006     Sexual activity: Not on file   Other Topics Concern     Parent/sibling w/ CABG, MI or angioplasty before 65F 55M? Not Asked   Social History Narrative     Not on file     Social Determinants of Health     Financial Resource Strain: Not on file   Food Insecurity: Not on file   Transportation Needs: Not on file   Physical Activity: Not on file   Stress: Not on file   Social Connections: Not on file   Intimate Partner Violence: Not on file   Housing Stability: Not on file         /77   Pulse 92   Wt 66.7 kg (147 lb)   SpO2 97%   BMI 26.89 kg/m    Body mass index is 26.89 kg/m .  Exam:   GENERAL APPEARANCE: Well developed, well nourished, alert, and in no apparent distress.  EYES: PERRL, EOMI  HENT: Nasal mucosa with no edema and no hyperemia. No nasal polyps.  EARS: Left  Canal clear, TM normal. rightTM and canal obscured by cerumen  MOUTH: Oral mucosa is moist, without any lesions, no  tonsillar enlargement, no oropharyngeal exudate.  NECK: supple, no masses, no thyromegaly.  LYMPHATICS: No significant axillary, cervical, or supraclavicular nodes.  RESP: normal percussion, diminished air flow throughout.  Faint basilar crackles. No rhonchi. No wheezes.  CV: Distant hrt sounds. Normal S1, S2, regular rhythm, normal rate. No murmur.  No rub. No gallop. No LE edema.   ABDOMEN:  Bowel sounds normal, soft, nontender, no HSM or masses.   MS: extremities normal. (+) clubbing. No cyanosis.  SKIN: no rash on limited exam  NEURO: Mentation intact, speech normal, normal strength and tone, normal gait and stance  PSYCH: mentation appears normal. and affect normal/bright  Results:  Recent Results (from the past 168 hour(s))   6 minute walk test    Collection Time: 12/20/21 12:00 AM   Result Value Ref Range    6 min walk (FT) 585 ft    6 Min Walk (M) 178 m   Comprehensive metabolic panel    Collection Time: 12/20/21  9:04 AM   Result Value Ref Range    Sodium 141 133 - 144 mmol/L    Potassium 3.3 (L) 3.4 - 5.3 mmol/L    Chloride 102 94 - 109 mmol/L    Carbon Dioxide (CO2) 34 (H) 20 - 32 mmol/L    Anion Gap 5 3 - 14 mmol/L    Urea Nitrogen 11 7 - 30 mg/dL    Creatinine 0.54 0.52 - 1.04 mg/dL    Calcium 9.2 8.5 - 10.1 mg/dL    Glucose 116 (H) 70 - 99 mg/dL    Alkaline Phosphatase 89 40 - 150 U/L    AST 16 0 - 45 U/L    ALT 28 0 - 50 U/L    Protein Total 7.7 6.8 - 8.8 g/dL    Albumin 3.8 3.4 - 5.0 g/dL    Bilirubin Total 0.5 0.2 - 1.3 mg/dL    GFR Estimate >90 >60 mL/min/1.73m2   CBC with platelets    Collection Time: 12/20/21  9:04 AM   Result Value Ref Range    WBC Count 8.3 4.0 - 11.0 10e3/uL    RBC Count 4.59 3.80 - 5.20 10e6/uL    Hemoglobin 12.9 11.7 - 15.7 g/dL    Hematocrit 40.9 35.0 - 47.0 %    MCV 89 78 - 100 fL    MCH 28.1 26.5 - 33.0 pg    MCHC 31.5 31.5 - 36.5 g/dL    RDW 12.4 10.0 - 15.0 %    Platelet Count 186 150 - 450 10e3/uL   Blood gas venous    Collection Time: 12/20/21  9:04 AM   Result Value  Ref Range    pH Venous 7.32 7.32 - 7.43    pCO2 Venous 73 (H) 40 - 50 mm Hg    pO2 Venous 28 25 - 47 mm Hg    Bicarbonate Venous 37 (H) 21 - 28 mmol/L    Base Excess/Deficit (+/-) 8.5 (H) -7.7 - 1.9 mmol/L    FIO2 2    General PFT Lab (Please always keep checked)    Collection Time: 12/20/21  9:32 AM   Result Value Ref Range    FVC-Pred 2.77 L    FVC-Pre 1.81 L    FVC-%Pred-Pre 65 %    FEV1-Pre 0.49 L    FEV1-%Pred-Pre 22 %    FEV1FVC-Pred 79 %    FEV1FVC-Pre 27 %    FEFMax-Pred 5.63 L/sec    FEFMax-Pre 1.42 L/sec    FEFMax-%Pred-Pre 25 %    FEF2575-Pred 1.93 L/sec    FEF2575-Pre 0.16 L/sec    APC7475-%Pred-Pre 8 %    ExpTime-Pre 12.56 sec    FIFMax-Pre 1.70 L/sec    FEV1FEV6-Pred 80 %    FEV1FEV6-Pre 37 %                         Results as noted above.    PFT Interpretation:  {Ping PFT interpretation:812265}  {Increase/decrease:735989}  {JDPFT:154320}  Valid Maneuver

## 2021-12-20 NOTE — PATIENT INSTRUCTIONS
Increase potassium to 1 1/2 tablets daily.  Otherwise continue current medication and exercise.  Send Kate your mammogram results.

## 2021-12-21 LAB
PROTOCOL CUTOFF: NORMAL
SA 1 CELL: NORMAL
SA 1 TEST METHOD: NORMAL
SA 2 CELL: NORMAL
SA 2 TEST METHOD: NORMAL
SA1 HI RISK ABY: NORMAL
SA1 MOD RISK ABY: NORMAL
SA2 HI RISK ABY: NORMAL
SA2 MOD RISK ABY: NORMAL
UNACCEPTABLE ANTIGENS: NORMAL
UNOS CPRA: 0
ZZZSA 1  COMMENTS: NORMAL
ZZZSA 2 COMMENTS: NORMAL

## 2022-01-29 ENCOUNTER — HEALTH MAINTENANCE LETTER (OUTPATIENT)
Age: 67
End: 2022-01-29

## 2022-02-01 ENCOUNTER — TELEPHONE (OUTPATIENT)
Dept: TRANSPLANT | Facility: CLINIC | Age: 67
End: 2022-02-01

## 2022-02-01 DIAGNOSIS — J44.9 COPD (CHRONIC OBSTRUCTIVE PULMONARY DISEASE) (H): Primary | ICD-10-CM

## 2022-02-01 DIAGNOSIS — Z01.818 ENCOUNTER FOR PRE-TRANSPLANT EVALUATION FOR LUNG TRANSPLANT: ICD-10-CM

## 2022-02-01 DIAGNOSIS — Z76.82 ORGAN TRANSPLANT CANDIDATE: ICD-10-CM

## 2022-02-01 DIAGNOSIS — R06.09 DYSPNEA ON EXERTION: ICD-10-CM

## 2022-02-01 NOTE — TELEPHONE ENCOUNTER
Adri Elder  COPD  Actively listed: YES    Primary Care Testing  Colonoscopy: 8/31/21  Due 08/2024  Dental:   Due   PAP: 04/2018  Due 04/2023 (Hx of abnormal)  Mammogram: 11/2020  Due 11/2021  DEXA 04/2021 Osteoporosis with further significant decline  Recommended repeat 04/2023  Follows with Endocrinology?  Medications?    Chest CT 10/2021  Due 10/2022    Echo 06/2021

## 2022-02-15 ENCOUNTER — ALLIED HEALTH/NURSE VISIT (OUTPATIENT)
Dept: RESEARCH | Facility: CLINIC | Age: 67
End: 2022-02-15
Payer: COMMERCIAL

## 2022-02-15 DIAGNOSIS — Z00.6 EXAMINATION OF PARTICIPANT OR CONTROL IN CLINICAL RESEARCH: Primary | ICD-10-CM

## 2022-02-15 NOTE — PROGRESS NOTES
"Surgery Clinical Trials Office Informed Consent Process Documentation  Tracking Graft Antigen-Specific CD4+ T-cells in Lung Transplant Recipients.  IRB#[id number]    ICF Version Date: 8/27/2021 / IRB Approval Date: 10/11/2021    Date of Approach/Consent: 2 /15/2022    The patient was approached regarding consent for the BAL  study, but declined to participate.     Patient stated they \"are not up for it right now.\"      Study Coordinators:  Madison Saba  [phone]    [email or web address]                                     Gladys Powers      [phone]    [email or web address]  :      Nehemias Hardwick       [phone]    [email or web address]    PI:  Sharda             "

## 2022-02-19 ENCOUNTER — ORGAN (OUTPATIENT)
Dept: TRANSPLANT | Facility: CLINIC | Age: 67
End: 2022-02-19
Payer: COMMERCIAL

## 2022-02-19 DIAGNOSIS — Z76.82 AWAITING ORGAN TRANSPLANT: Primary | ICD-10-CM

## 2022-02-20 ENCOUNTER — APPOINTMENT (OUTPATIENT)
Dept: GENERAL RADIOLOGY | Facility: CLINIC | Age: 67
DRG: 007 | End: 2022-02-20
Attending: SURGERY
Payer: MEDICARE

## 2022-02-20 ENCOUNTER — HOSPITAL ENCOUNTER (EMERGENCY)
Facility: CLINIC | Age: 67
Discharge: STILL ADMITTED OR ADMINISTRATIVE | End: 2022-02-20
Payer: COMMERCIAL

## 2022-02-20 ENCOUNTER — ANESTHESIA EVENT (OUTPATIENT)
Dept: SURGERY | Facility: CLINIC | Age: 67
DRG: 007 | End: 2022-02-20
Payer: MEDICARE

## 2022-02-20 ENCOUNTER — ANESTHESIA (OUTPATIENT)
Dept: SURGERY | Facility: CLINIC | Age: 67
DRG: 007 | End: 2022-02-20
Payer: MEDICARE

## 2022-02-20 ENCOUNTER — HOSPITAL ENCOUNTER (INPATIENT)
Facility: CLINIC | Age: 67
LOS: 24 days | Discharge: HOME | DRG: 007 | End: 2022-03-17
Attending: SURGERY | Admitting: SURGERY
Payer: MEDICARE

## 2022-02-20 DIAGNOSIS — Z94.2 S/P LUNG TRANSPLANT (H): Primary | ICD-10-CM

## 2022-02-20 DIAGNOSIS — E83.42 HYPOMAGNESEMIA: ICD-10-CM

## 2022-02-20 DIAGNOSIS — R73.9 STEROID-INDUCED HYPERGLYCEMIA: ICD-10-CM

## 2022-02-20 DIAGNOSIS — T38.0X5A STEROID-INDUCED HYPERGLYCEMIA: ICD-10-CM

## 2022-02-20 PROBLEM — Z76.82 LUNG TRANSPLANT CANDIDATE: Status: ACTIVE | Noted: 2022-02-20

## 2022-02-20 LAB
ABO/RH(D): NORMAL
ALBUMIN SERPL-MCNC: 3.7 G/DL (ref 3.4–5)
ALBUMIN UR-MCNC: 10 MG/DL
ALP SERPL-CCNC: 95 U/L (ref 40–150)
ALT SERPL W P-5'-P-CCNC: 25 U/L (ref 0–50)
ANION GAP SERPL CALCULATED.3IONS-SCNC: 7 MMOL/L (ref 3–14)
ANTIBODY SCREEN: NEGATIVE
APPEARANCE UR: CLEAR
APTT PPP: 31 SECONDS (ref 22–38)
AST SERPL W P-5'-P-CCNC: 14 U/L (ref 0–45)
BACTERIA SPT CULT: NORMAL
BASOPHILS # BLD AUTO: 0 10E3/UL (ref 0–0.2)
BASOPHILS NFR BLD AUTO: 0 %
BILIRUB DIRECT SERPL-MCNC: <0.1 MG/DL (ref 0–0.2)
BILIRUB SERPL-MCNC: 0.5 MG/DL (ref 0.2–1.3)
BILIRUB UR QL STRIP: NEGATIVE
BLD PROD TYP BPU: NORMAL
BLOOD COMPONENT TYPE: NORMAL
BUN SERPL-MCNC: 9 MG/DL (ref 7–30)
CALCIUM SERPL-MCNC: 9 MG/DL (ref 8.5–10.1)
CHLORIDE BLD-SCNC: 102 MMOL/L (ref 94–109)
CO2 SERPL-SCNC: 31 MMOL/L (ref 20–32)
CODING SYSTEM: NORMAL
COLOR UR AUTO: ABNORMAL
CREAT SERPL-MCNC: 0.45 MG/DL (ref 0.52–1.04)
CROSSMATCH: NORMAL
EOSINOPHIL # BLD AUTO: 0 10E3/UL (ref 0–0.7)
EOSINOPHIL NFR BLD AUTO: 0 %
ERYTHROCYTE [DISTWIDTH] IN BLOOD BY AUTOMATED COUNT: 12.8 % (ref 10–15)
GFR SERPL CREATININE-BSD FRML MDRD: >90 ML/MIN/1.73M2
GLUCOSE BLD-MCNC: 139 MG/DL (ref 70–99)
GLUCOSE UR STRIP-MCNC: NEGATIVE MG/DL
GRAM STAIN RESULT: NORMAL
HBA1C MFR BLD: 5.8 % (ref 0–5.6)
HBV CORE AB SERPL QL IA: NONREACTIVE
HBV SURFACE AB SERPL IA-ACNC: 3.16 M[IU]/ML
HBV SURFACE AG SERPL QL IA: NONREACTIVE
HCT VFR BLD AUTO: 40.5 % (ref 35–47)
HCV AB SERPL QL IA: NONREACTIVE
HGB BLD-MCNC: 13.2 G/DL (ref 11.7–15.7)
HGB UR QL STRIP: NEGATIVE
HIV 1+2 AB+HIV1 P24 AG SERPL QL IA: NONREACTIVE
HOLD SPECIMEN: NORMAL
HOLD SPECIMEN: NORMAL
IMM GRANULOCYTES # BLD: 0 10E3/UL
IMM GRANULOCYTES NFR BLD: 0 %
INR PPP: 1.02 (ref 0.85–1.15)
ISSUE DATE AND TIME: NORMAL
KETONES UR STRIP-MCNC: NEGATIVE MG/DL
LACTATE SERPL-SCNC: 0.7 MMOL/L (ref 0.7–2)
LEUKOCYTE ESTERASE UR QL STRIP: NEGATIVE
LYMPHOCYTES # BLD AUTO: 0.6 10E3/UL (ref 0.8–5.3)
LYMPHOCYTES NFR BLD AUTO: 6 %
MAGNESIUM SERPL-MCNC: 1.7 MG/DL (ref 1.8–2.6)
MCH RBC QN AUTO: 28.3 PG (ref 26.5–33)
MCHC RBC AUTO-ENTMCNC: 32.6 G/DL (ref 31.5–36.5)
MCV RBC AUTO: 87 FL (ref 78–100)
MONOCYTES # BLD AUTO: 0.1 10E3/UL (ref 0–1.3)
MONOCYTES NFR BLD AUTO: 1 %
MUCOUS THREADS #/AREA URNS LPF: PRESENT /LPF
NEUTROPHILS # BLD AUTO: 8.9 10E3/UL (ref 1.6–8.3)
NEUTROPHILS NFR BLD AUTO: 93 %
NITRATE UR QL: NEGATIVE
NRBC # BLD AUTO: 0 10E3/UL
NRBC BLD AUTO-RTO: 0 /100
PH UR STRIP: 7 [PH] (ref 5–7)
PHOSPHATE SERPL-MCNC: 3.5 MG/DL (ref 2.5–4.5)
PLATELET # BLD AUTO: 237 10E3/UL (ref 150–450)
POTASSIUM BLD-SCNC: 4 MMOL/L (ref 3.4–5.3)
PROT SERPL-MCNC: 7.8 G/DL (ref 6.8–8.8)
RBC # BLD AUTO: 4.66 10E6/UL (ref 3.8–5.2)
RBC URINE: <1 /HPF
SARS-COV-2 RNA RESP QL NAA+PROBE: NEGATIVE
SODIUM SERPL-SCNC: 140 MMOL/L (ref 133–144)
SP GR UR STRIP: 1.02 (ref 1–1.03)
SPECIMEN EXPIRATION DATE: NORMAL
SQUAMOUS EPITHELIAL: <1 /HPF
UNIT ABO/RH: NORMAL
UNIT NUMBER: NORMAL
UNIT STATUS: NORMAL
UNIT TYPE ISBT: 1700
UROBILINOGEN UR STRIP-MCNC: NORMAL MG/DL
WBC # BLD AUTO: 9.6 10E3/UL (ref 4–11)
WBC URINE: <1 /HPF

## 2022-02-20 PROCEDURE — 999N000128 HC STATISTIC PERIPHERAL IV START W/O US GUIDANCE

## 2022-02-20 PROCEDURE — 258N000003 HC RX IP 258 OP 636

## 2022-02-20 PROCEDURE — 82784 ASSAY IGA/IGD/IGG/IGM EACH: CPT

## 2022-02-20 PROCEDURE — 86901 BLOOD TYPING SEROLOGIC RH(D): CPT

## 2022-02-20 PROCEDURE — 999N000157 HC STATISTIC RCP TIME EA 10 MIN

## 2022-02-20 PROCEDURE — 250N000009 HC RX 250: Performed by: SURGERY

## 2022-02-20 PROCEDURE — 80053 COMPREHEN METABOLIC PANEL: CPT

## 2022-02-20 PROCEDURE — 87536 HIV-1 QUANT&REVRSE TRNSCRPJ: CPT

## 2022-02-20 PROCEDURE — 83735 ASSAY OF MAGNESIUM: CPT

## 2022-02-20 PROCEDURE — 87205 SMEAR GRAM STAIN: CPT

## 2022-02-20 PROCEDURE — 86704 HEP B CORE ANTIBODY TOTAL: CPT

## 2022-02-20 PROCEDURE — 87389 HIV-1 AG W/HIV-1&-2 AB AG IA: CPT

## 2022-02-20 PROCEDURE — 93005 ELECTROCARDIOGRAM TRACING: CPT

## 2022-02-20 PROCEDURE — 94640 AIRWAY INHALATION TREATMENT: CPT | Mod: 76

## 2022-02-20 PROCEDURE — 250N000012 HC RX MED GY IP 250 OP 636 PS 637

## 2022-02-20 PROCEDURE — 250N000011 HC RX IP 250 OP 636

## 2022-02-20 PROCEDURE — 87522 HEPATITIS C REVRS TRNSCRPJ: CPT

## 2022-02-20 PROCEDURE — 85730 THROMBOPLASTIN TIME PARTIAL: CPT

## 2022-02-20 PROCEDURE — 94640 AIRWAY INHALATION TREATMENT: CPT

## 2022-02-20 PROCEDURE — 85610 PROTHROMBIN TIME: CPT

## 2022-02-20 PROCEDURE — 83036 HEMOGLOBIN GLYCOSYLATED A1C: CPT

## 2022-02-20 PROCEDURE — 250N000009 HC RX 250

## 2022-02-20 PROCEDURE — 86696 HERPES SIMPLEX TYPE 2 TEST: CPT

## 2022-02-20 PROCEDURE — 81001 URINALYSIS AUTO W/SCOPE: CPT

## 2022-02-20 PROCEDURE — 86706 HEP B SURFACE ANTIBODY: CPT

## 2022-02-20 PROCEDURE — 86923 COMPATIBILITY TEST ELECTRIC: CPT | Performed by: SURGERY

## 2022-02-20 PROCEDURE — 82248 BILIRUBIN DIRECT: CPT

## 2022-02-20 PROCEDURE — 999N000248 HC STATISTIC IV INSERT WITH US BY RN

## 2022-02-20 PROCEDURE — 93010 ELECTROCARDIOGRAM REPORT: CPT | Performed by: INTERNAL MEDICINE

## 2022-02-20 PROCEDURE — 86825 HLA X-MATH NON-CYTOTOXIC: CPT

## 2022-02-20 PROCEDURE — 36415 COLL VENOUS BLD VENIPUNCTURE: CPT | Performed by: SURGERY

## 2022-02-20 PROCEDURE — 83605 ASSAY OF LACTIC ACID: CPT | Performed by: SURGERY

## 2022-02-20 PROCEDURE — 84100 ASSAY OF PHOSPHORUS: CPT

## 2022-02-20 PROCEDURE — 250N000013 HC RX MED GY IP 250 OP 250 PS 637

## 2022-02-20 PROCEDURE — 36415 COLL VENOUS BLD VENIPUNCTURE: CPT

## 2022-02-20 PROCEDURE — U0005 INFEC AGEN DETEC AMPLI PROBE: HCPCS

## 2022-02-20 PROCEDURE — 86644 CMV ANTIBODY: CPT

## 2022-02-20 PROCEDURE — 85025 COMPLETE CBC W/AUTO DIFF WBC: CPT

## 2022-02-20 PROCEDURE — 86665 EPSTEIN-BARR CAPSID VCA: CPT

## 2022-02-20 PROCEDURE — 71046 X-RAY EXAM CHEST 2 VIEWS: CPT | Mod: 26 | Performed by: STUDENT IN AN ORGANIZED HEALTH CARE EDUCATION/TRAINING PROGRAM

## 2022-02-20 PROCEDURE — 86833 HLA CLASS II HIGH DEFIN QUAL: CPT

## 2022-02-20 PROCEDURE — 86803 HEPATITIS C AB TEST: CPT

## 2022-02-20 PROCEDURE — 71046 X-RAY EXAM CHEST 2 VIEWS: CPT

## 2022-02-20 PROCEDURE — 86832 HLA CLASS I HIGH DEFIN QUAL: CPT

## 2022-02-20 PROCEDURE — 87517 HEPATITIS B DNA QUANT: CPT

## 2022-02-20 PROCEDURE — 87521 HEPATITIS C PROBE&RVRS TRNSC: CPT

## 2022-02-20 PROCEDURE — 87340 HEPATITIS B SURFACE AG IA: CPT

## 2022-02-20 PROCEDURE — 250N000013 HC RX MED GY IP 250 OP 250 PS 637: Performed by: SURGERY

## 2022-02-20 RX ORDER — DILTIAZEM HYDROCHLORIDE 240 MG/1
240 CAPSULE, COATED, EXTENDED RELEASE ORAL DAILY
Status: DISCONTINUED | OUTPATIENT
Start: 2022-02-20 | End: 2022-02-21

## 2022-02-20 RX ORDER — MYCOPHENOLATE MOFETIL 250 MG/1
1500 CAPSULE ORAL EVERY 12 HOURS
Status: DISCONTINUED | OUTPATIENT
Start: 2022-02-20 | End: 2022-02-21 | Stop reason: HOSPADM

## 2022-02-20 RX ORDER — LIDOCAINE 40 MG/G
CREAM TOPICAL
Status: DISCONTINUED | OUTPATIENT
Start: 2022-02-20 | End: 2022-02-21 | Stop reason: HOSPADM

## 2022-02-20 RX ORDER — IPRATROPIUM BROMIDE AND ALBUTEROL SULFATE 2.5; .5 MG/3ML; MG/3ML
3 SOLUTION RESPIRATORY (INHALATION) 2 TIMES DAILY
Status: DISCONTINUED | OUTPATIENT
Start: 2022-02-20 | End: 2022-02-21

## 2022-02-20 RX ORDER — CEFTAZIDIME 1 G/1
1 INJECTION, POWDER, FOR SOLUTION INTRAMUSCULAR; INTRAVENOUS SEE ADMIN INSTRUCTIONS
Status: DISCONTINUED | OUTPATIENT
Start: 2022-02-20 | End: 2022-02-21 | Stop reason: HOSPADM

## 2022-02-20 RX ORDER — BUDESONIDE 1 MG/2ML
1 INHALANT ORAL 2 TIMES DAILY
Status: DISCONTINUED | OUTPATIENT
Start: 2022-02-20 | End: 2022-02-21

## 2022-02-20 RX ORDER — SODIUM CHLORIDE, SODIUM GLUCONATE, SODIUM ACETATE, POTASSIUM CHLORIDE AND MAGNESIUM CHLORIDE 526; 502; 368; 37; 30 MG/100ML; MG/100ML; MG/100ML; MG/100ML; MG/100ML
250 INJECTION, SOLUTION INTRAVENOUS EVERY 5 MIN PRN
Status: DISCONTINUED | OUTPATIENT
Start: 2022-02-20 | End: 2022-02-21 | Stop reason: HOSPADM

## 2022-02-20 RX ORDER — SODIUM CHLORIDE, SODIUM GLUCONATE, SODIUM ACETATE, POTASSIUM CHLORIDE AND MAGNESIUM CHLORIDE 526; 502; 368; 37; 30 MG/100ML; MG/100ML; MG/100ML; MG/100ML; MG/100ML
250 INJECTION, SOLUTION INTRAVENOUS ONCE
Status: DISCONTINUED | OUTPATIENT
Start: 2022-02-20 | End: 2022-02-21 | Stop reason: HOSPADM

## 2022-02-20 RX ORDER — DEXMEDETOMIDINE HYDROCHLORIDE 4 UG/ML
.1-1.2 INJECTION, SOLUTION INTRAVENOUS CONTINUOUS
Status: DISCONTINUED | OUTPATIENT
Start: 2022-02-20 | End: 2022-02-21 | Stop reason: HOSPADM

## 2022-02-20 RX ORDER — FLUTICASONE PROPIONATE 50 MCG
1 SPRAY, SUSPENSION (ML) NASAL DAILY PRN
Status: DISCONTINUED | OUTPATIENT
Start: 2022-02-20 | End: 2022-03-01

## 2022-02-20 RX ORDER — POTASSIUM CHLORIDE 750 MG/1
30 TABLET, EXTENDED RELEASE ORAL DAILY
Status: DISCONTINUED | OUTPATIENT
Start: 2022-02-20 | End: 2022-02-21

## 2022-02-20 RX ORDER — NOREPINEPHRINE BITARTRATE 0.06 MG/ML
.01-.6 INJECTION, SOLUTION INTRAVENOUS CONTINUOUS
Status: DISCONTINUED | OUTPATIENT
Start: 2022-02-20 | End: 2022-02-21 | Stop reason: HOSPADM

## 2022-02-20 RX ORDER — FUROSEMIDE 20 MG
20 TABLET ORAL DAILY
Status: DISCONTINUED | OUTPATIENT
Start: 2022-02-20 | End: 2022-02-21

## 2022-02-20 RX ORDER — CEFTAZIDIME 1 G/1
1 INJECTION, POWDER, FOR SOLUTION INTRAMUSCULAR; INTRAVENOUS
Status: COMPLETED | OUTPATIENT
Start: 2022-02-20 | End: 2022-02-21

## 2022-02-20 RX ORDER — ARFORMOTEROL TARTRATE 15 UG/2ML
15 SOLUTION RESPIRATORY (INHALATION) 2 TIMES DAILY
Status: DISCONTINUED | OUTPATIENT
Start: 2022-02-20 | End: 2022-02-20

## 2022-02-20 RX ORDER — DILTIAZEM HYDROCHLORIDE 240 MG/1
240 CAPSULE, EXTENDED RELEASE ORAL DAILY
Status: DISCONTINUED | OUTPATIENT
Start: 2022-02-20 | End: 2022-02-20

## 2022-02-20 RX ORDER — FORMOTEROL FUMARATE DIHYDRATE 20 UG/2ML
20 SOLUTION RESPIRATORY (INHALATION) EVERY 12 HOURS
Status: DISCONTINUED | OUTPATIENT
Start: 2022-02-20 | End: 2022-02-21

## 2022-02-20 RX ADMIN — POTASSIUM CHLORIDE 30 MEQ: 750 TABLET, EXTENDED RELEASE ORAL at 08:01

## 2022-02-20 RX ADMIN — FORMOTEROL FUMARATE DIHYDRATE 20 MCG: 20 SOLUTION RESPIRATORY (INHALATION) at 09:17

## 2022-02-20 RX ADMIN — FUROSEMIDE 20 MG: 20 TABLET ORAL at 08:01

## 2022-02-20 RX ADMIN — DILTIAZEM HYDROCHLORIDE 240 MG: 120 CAPSULE, COATED, EXTENDED RELEASE ORAL at 08:01

## 2022-02-20 RX ADMIN — SODIUM CHLORIDE 1000 MG: 9 INJECTION, SOLUTION INTRAVENOUS at 03:20

## 2022-02-20 RX ADMIN — FORMOTEROL FUMARATE DIHYDRATE 20 MCG: 20 SOLUTION RESPIRATORY (INHALATION) at 20:06

## 2022-02-20 RX ADMIN — MYCOPHENOLATE MOFETIL 1500 MG: 250 CAPSULE ORAL at 15:57

## 2022-02-20 RX ADMIN — BUDESONIDE 1 MG: 1 SUSPENSION RESPIRATORY (INHALATION) at 09:18

## 2022-02-20 RX ADMIN — IPRATROPIUM BROMIDE AND ALBUTEROL SULFATE 3 ML: 2.5; .5 SOLUTION RESPIRATORY (INHALATION) at 09:17

## 2022-02-20 RX ADMIN — MYCOPHENOLATE MOFETIL 1500 MG: 250 CAPSULE ORAL at 03:24

## 2022-02-20 RX ADMIN — IPRATROPIUM BROMIDE AND ALBUTEROL SULFATE 3 ML: 2.5; .5 SOLUTION RESPIRATORY (INHALATION) at 20:06

## 2022-02-20 RX ADMIN — BUDESONIDE 1 MG: 1 SUSPENSION RESPIRATORY (INHALATION) at 20:06

## 2022-02-20 ASSESSMENT — COPD QUESTIONNAIRES
CAT_SEVERITY: SEVERE
COPD: 1

## 2022-02-20 ASSESSMENT — ENCOUNTER SYMPTOMS: DYSRHYTHMIAS: 1

## 2022-02-20 NOTE — PROGRESS NOTES
Final Crossmatch   Final crossmatch results for UNOS ID [id number] and recipient sample date 02/20/2022 and 12/20/2021  were both T cell and B cell, allo and auto negative.  DSA not noted.

## 2022-02-20 NOTE — TELEPHONE ENCOUNTER
BILATERAL LUNG donor was identified by Gonzalez Ashley and reviewed with Dr. Robin. The organ was accepted and pt was called in for transplant.    Donor UNOS ID [id number] and Match ID [id number] confirmed with Dr. Robin.    Donor and recipient blood type reviewed and found to be IDENTICAL.  Recipient with HLA antibodies: YES  Crossmatch required   Prospective: N/A   Virtual: Yes  Crossmatch reviewed with Dr. Fisher, immunology staff on call and deemed negative based on organ specific protocol.     Donor specific antibodies present, notified Dr. Robin.  Donor DOES NOT meet criteria to be classified as PHS INCREASED RISK.    Pt was contacted AT HOME.  Verified pt has not had any blood transfusions since their last PRA sample on 12/20/2021.   Pt Instructed to remain NPO for pre-op prep.  Instructed to bring medications, oxygen, or equipment.  Pt instructed to come to the [facility]  ER.   Pt on Coumadin: NO   Intervention: n/a  Pt has had a positive COVID test: No               Date of last positive COVID test: N/A  LUNG RECIPIENT: For CF patients:  contacted to discuss antibiotic therapy orders. - NA    ABO/CMV/EBV status note:   UNOS donor ID [id number]  Donor blood type is B: Verified by donor records   Recipient blood type is B: verified by blood bank [facility].   Donor CMV status is negative. Verified by donor records.   Recipient CMV status is negative. Verified in [facility] lab results.   Donor EBV status is positive. Verified by donor records.   Recipient EBV status is positive. Verified in [facility] lab results.   Recipient HSV status is negative. verified in [facility] lab results.   Recipient Toxoplasma status is negative. Verified in [facility] lab results.  Donor Toxoplasma status is negative. Verified by donor records.

## 2022-02-20 NOTE — PHARMACY-ADMISSION MEDICATION HISTORY
Admission Medication History Completed by Pharmacy    See [facility] Admission Navigator for allergy information, preferred outpatient pharmacy, prior to admission medications and immunization status.     Medication History Sources:     Patient    Dispense History    Changes made to PTA medication list (reason):    Added: None    Deleted:   o Fexofenadine 180 mg by mouth daily as needed for allergies; self-reported medication that patient said she has not used in awhile   o Fluticasone 50 mcg/act nasal spray; Spray 1 spray into both nostrils daily as needed for rhinitis or allergies; self-reported medication that patient said she does not use anymore    Changed: None    Additional Information:    Patient could confirm all of her medications with me (dose, frequency, last dose).    Prior to Admission medications    Medication Sig Last Dose Taking? Auth Provider   arformoterol (BROVANA) 15 MCG/2ML NEBU neb solution Take 2 mLs (15 mcg) by nebulization 2 times daily 2/18/2022 Yes Carson Feng MD   aspirin 81 MG EC tablet Take 81 mg by mouth daily  2/18/2022 Yes Reported, Patient   atorvastatin (LIPITOR) 10 MG tablet Take 10 mg by mouth daily  2/18/2022 Yes Carson Feng MD   budesonide (PULMICORT) 1 MG/2ML neb solution Take 2 mLs (1 mg) by nebulization 2 times daily 2/18/2022 Yes Carson Feng MD   calcium 500 MG CHEW Take 1 tablet by mouth daily  2/18/2022 Yes Reported, Patient   diltiazem ER (TIAZAC) 240 MG 24 hr ER beaded capsule Take 240 mg by mouth daily 2/18/2022 Yes Reported, Patient   furosemide (LASIX) 20 MG tablet Take 20 mg by mouth daily  2/18/2022 Yes Reported, Patient   IBANdronate (BONIVA) 150 MG tablet Take 150 mg by mouth every 30 days 2/1/2022 Yes Reported, Patient   ipratropium - albuterol 0.5 mg/2.5 mg/3 mL (DUONEB) 0.5-2.5 (3) MG/3ML neb solution Take 1 vial (3 mLs) by nebulization 2 times daily 2/18/2022 Yes Carson Feng MD   levalbuterol (XOPENEX HFA) 45  MCG/ACT Inhaler Inhale 2 puffs into the lungs every 4 hours as needed for shortness of breath / dyspnea or wheezing  Past Month Yes Reported, Patient   potassium chloride ER (KLOR-CON M) 20 MEQ CR tablet Take 20 mEq by mouth daily  2/18/2022 Yes Carson Feng MD   tiotropium (SPIRIVA RESPIMAT) 2.5 MCG/ACT inhalation aerosol Inhale 2 puffs into the lungs daily  2/18/2022 Yes Reported, Patient   OXYGEN-HELIUM IN 2 lpm per nasal canula nocturnal and with activity may use portable w/ conserving device   Reported, Patient       Date completed: 02/20/22    Medication history completed by: DENISE Sahu, Pharmacy Intern

## 2022-02-20 NOTE — PLAN OF CARE
Dx: Lung transplant work up.    Neuro: A&O x4.   Cardiac: No tele Order. S1, S2 audible. Hypertensive. Denied CP and palpitations.  Respiratory: Tolerating 2L NC at rest and 3-4L with activity. Very tachypnea. LS with crackles.   GI/: Denied nausea, bowel sounds normoactive. LBM PTA; 2/19. Voiding with good UO.  Diet: Reg diet; plan for NPO at 1500.   Skin: Bruising at LLE.    Pain: Denied  LDAs: R PIV  Electrolytes: Await AM Labs.   Mobility: SBA  Social:  in room with patient since admission    Plan: AM Chest Xray. Transplant meds given at 0330; next 12h would be for 1500. Continue to monitor and follow POC.

## 2022-02-20 NOTE — PLAN OF CARE
Admission          2/20/2022  1:39 AM  -----------------------------------------------------------  Diagnosis: Bilateral Lung transplant work up    Admitted from: Home [facility]  Report given from: Na  Via: Vehicle  Accompanied by: Spouse (Tyrese)  Family Aware of Admission: Yes  Belongings: With patients  Admission Profile: Complete  Teaching: Orientation to unit, call don't fall, use of call light, meal times, visiting hours,  when to call for the RN (angina/sob/dizzyness, etc.), and enforced importance of safety.  Access:   Telemetry: Placed on pt  Ht./Wt.: Complete  2 RN skin assessment: Ney RN  Skin assessment findings: Couple bruises on LLE.     Temp:  [98  F (36.7  C)] 98  F (36.7  C)  Pulse:  [110-117] 110  Resp:  [24] 24  BP: (166-203)/() 166/98  SpO2:  [95 %-96 %] 95 %

## 2022-02-20 NOTE — PLAN OF CARE
DX: lung transplant candidate  PMH:  severe COPD, HTN, HLD, paroxysmal Afib, osteoporosis, acid reflux.       Code Status: full  Team: CVTS    Cardiac: No tele orders. VSS, but BP is high   Respiratory: diminished lung sounds throughout lungs  Neuro: AxO x4  Pain: denies  GI/: urinates well and has adequate Bms   Diet: NPO  Skin: trace edema in BLE  Activity: independent    Gtts/fluid: n/a    Plan: transplant at 0200

## 2022-02-20 NOTE — ANESTHESIA PREPROCEDURE EVALUATION
Anesthesia Pre-Procedure Evaluation    Patient: Melissa Elder   MRN: [id number] : 1955        Preoperative Diagnosis: End stage chronic obstructive pulmonary disease (H) [J44.9]    Procedure : Procedure(s):  TRANSPLANT, LUNG, RECIPIENT, BILATERAL          Past Medical History:   Diagnosis Date     Atrial fibrillation with RVR (H)     hx of A fib related to severe COPD, does not meet anticoagulation criteria as noted     COPD, severe (H)      Osteoporosis       Past Surgical History:   Procedure Laterality Date     CV CORONARY ANGIOGRAM N/A 3/27/2019    Procedure: CV CORONARY ANGIOGRAM;  Surgeon: Thierry Serrano MD;  Location:  HEART CARDIAC CATH LAB     CV RIGHT HEART CATH MEASUREMENTS RECORDED N/A 3/27/2019    Procedure: CV RIGHT HEART CATH;  Surgeon: Thierry Serrano MD;  Location:  HEART CARDIAC CATH LAB     ESOPHAGEAL IMPEDENCE FUNCTION TEST WITH 24 HOUR PH GREATER THAN 1 HOUR N/A 3/28/2019    Procedure: ESOPHAGEAL IMPEDENCE FUNCTION TEST WITH 24 HOUR PH GREATER THAN 1 HOUR;  Surgeon: Mike Henry MD;  Location:  GI     EXCISE PILONIDAL CYST, SIMPLE       TONSILLECTOMY & ADENOIDECTOMY        Allergies   Allergen Reactions     Alendronic Acid Other (See Comments)     dizziness  Other reaction(s): Dizziness     Nickel Rash     Sulfa Drugs Rash      Social History     Tobacco Use     Smoking status: Former Smoker     Packs/day: 1.50     Years: 36.00     Pack years: 54.00     Types: Cigarettes     Quit date: 2006     Years since quittin.1     Smokeless tobacco: Never Used   Substance Use Topics     Alcohol use: Yes     Comment: stated beer and wine occ      Wt Readings from Last 1 Encounters:   22 66.7 kg (147 lb)        Anesthesia Evaluation   Pt has had prior anesthetic. Type: General.    No history of anesthetic complications       ROS/MED HX  ENT/Pulmonary:  - neg pulmonary ROS   (+) severe,  COPD, O2 dependent, during Both, 3 liters/min,  (-) recent URI and cystic  fibrosis   Neurologic:  - neg neurologic ROS     Cardiovascular: Comment: Paroxysmal afib    (+) hypertension-----dysrhythmias, a-fib,  (-) murmur   METS/Exercise Tolerance: 1 - Eating, dressing    Hematologic:  - neg hematologic  ROS     Musculoskeletal:  - neg musculoskeletal ROS     GI/Hepatic:     (+) GERD, Asymptomatic on medication,     Renal/Genitourinary:  - neg Renal ROS     Endo:  - neg endo ROS     Psychiatric/Substance Use:  - neg psychiatric ROS     Infectious Disease:  - neg infectious disease ROS     Malignancy:  - neg malignancy ROS     Other:            Physical Exam    Airway      Comment: Mucous membranes dry    Mallampati: III   TM distance: > 3 FB   Neck ROM: full   Mouth opening: > 3 cm    Respiratory Devices and Support     Face Mask 4  l/min.     Dental  no notable dental history         Cardiovascular          Rhythm and rate: regular and normal (-) no murmur    Pulmonary           breath sounds clear to auscultation   (+) decreased breath sounds           OUTSIDE LABS:  CBC:   Lab Results   Component Value Date    WBC 9.6 02/20/2022    WBC 8.3 12/20/2021    HGB 13.2 02/20/2022    HGB 12.9 12/20/2021    HCT 40.5 02/20/2022    HCT 40.9 12/20/2021     02/20/2022     12/20/2021     BMP:   Lab Results   Component Value Date     02/20/2022     12/20/2021    POTASSIUM 4.0 02/20/2022    POTASSIUM 3.3 (L) 12/20/2021    CHLORIDE 102 02/20/2022    CHLORIDE 102 12/20/2021    CO2 31 02/20/2022    CO2 34 (H) 12/20/2021    BUN 9 02/20/2022    BUN 11 12/20/2021    CR 0.45 (L) 02/20/2022    CR 0.54 12/20/2021     (H) 02/20/2022     (H) 12/20/2021     COAGS:   Lab Results   Component Value Date    PTT 31 02/20/2022    INR 1.02 02/20/2022     POC: No results found for: BGM, HCG, HCGS  HEPATIC:   Lab Results   Component Value Date    ALBUMIN 3.7 02/20/2022    PROTTOTAL 7.8 02/20/2022    ALT 25 02/20/2022    AST 14 02/20/2022    ALKPHOS 95 02/20/2022    BILITOTAL 0.5  02/20/2022     OTHER:   Lab Results   Component Value Date    LACT 0.7 02/20/2022    A1C 5.8 (H) 02/20/2022    MINISTERIO 9.0 02/20/2022    PHOS 3.5 02/20/2022    MAG 1.7 (L) 02/20/2022    AMYLASE 63 03/25/2019    TSH 1.38 03/25/2019       Anesthesia Plan    ASA Status:  4   NPO Status:  NPO Appropriate    Anesthesia Type: General.     - Airway: ETT   Induction: Intravenous, RSI.   Maintenance: Inhalation.   Techniques and Equipment:     - Airway: Video-Laryngoscope, Fiberoptic Bronchoscope, Double lumen ETT     - Lines/Monitors: 2nd IV, Arterial Line, Central Line, PAC, CVP, BIS, NIRS, SCOTT            SCOTT Absolute Contra-indication: NONE            SCOTT Relative Contra-indication: NONE     - Blood: Blood in Room, PRBC     - Drips/Meds: Norepi, Epinephrine, Nicardipine     Consents    Anesthesia Plan(s) and associated risks, benefits, and realistic alternatives discussed. Questions answered and patient/representative(s) expressed understanding.    - Discussed:     - Discussed with:  Patient      - Extended Intubation/Ventilatory Support Discussed: Yes.      - Patient is DNR/DNI Status: No    Use of blood products discussed: Yes.     - Discussed with: Patient.     - Consented: consented to blood products            Reason for refusal: other.     Postoperative Care    Pain management: IV analgesics, Oral pain medications, Peripheral nerve block (Continuous).   PONV prophylaxis: Ondansetron (or other 5HT-3), Dexamethasone or Solumedrol     Comments:    Other Comments: Patient seen and examined.  Risks, benefits and alternatives to GETA with ROSLYN maradiaga catheter discussed.  Questions answered and patient wishes to proceed            Jammie Cortez MD

## 2022-02-20 NOTE — H&P
Cardiothoracic Surgery   History and Physical     Melissa Elder   1955   MRN: [id number]             Reason for Admission: Lung transplant candidate         Assessment and Plan:     Melissa Elder is a 66 year old female with severe COPD, currently requiring 2-3L NC O2 at rest.  Other past medical history includes HTN, HLD, paroxysmal Afib, osteoporosis, acid reflux.  Patient was notified as acceptable donor lungs became available and were accepted, presented from home for further pre-operative work-up.       Plan:  -[place] to outpatient for pre-op work-up  -NPO   -Pre-op labs, including BMP, CBC, coag panel, viral serologies, cross match  -Covid PCR  -EKG, CXR  -Immunosuppression induction per protocol  -Operating surgeon to complete informed consent prior to OR     Patient discussed with staff.       Stephy Padilla MD  General Surgery PGY-1             History of Present Illness:     Melissa Elder is a 66 year old female with a history of COPD with very severe obstructive lung disease, HTN, HLD, paroxysmal Afib, osteoporosis, acid reflex, presenting for pre-operative work up prior to planned bilateral lung transplant. She has was first listed on lung transplant list in June 2019.   Patient seen at bedside with , Tyrese. She reports no recent changes in her health. She is requiring 2L NC at rest, up to 3L with activity. She continues to have dyspnea on exertion, no recent change from baseline. She tries to walk about a block outside regularly, but requires frequent breaks, often requires use of a wheelchair when she goes out due to the frequency she needs to stop when walking. Reports she has not been coughing or wheezing. She has been eating and drinking well with good appetite, having regular bowel function. Denies fever/chills, night sweats, chest pain, muscle aches. She does endorse some increased fatigue over the past few weeks, which she attributes to the weather and not wanting to go outside.  She received Covid-19 vaccine x3 doses as well as seasonal flue vaccine.       Home Meds:  Medications Prior to Admission   Medication Sig Dispense Refill Last Dose     arformoterol (BROVANA) 15 MCG/2ML NEBU neb solution Take 2 mLs (15 mcg) by nebulization 2 times daily        aspirin 81 MG EC tablet Take 81 mg by mouth        atorvastatin (LIPITOR) 20 MG tablet Take 0.5 tablets (10 mg) by mouth daily        budesonide (PULMICORT) 1 MG/2ML neb solution Take 2 mLs (1 mg) by nebulization 2 times daily        calcium 500 MG CHEW         diltiazem ER (TIAZAC) 240 MG 24 hr ER beaded capsule Take 240 mg by mouth daily        fexofenadine (ALLEGRA) 180 MG tablet Take 180 mg by mouth daily as needed for allergies        fluticasone (FLONASE) 50 MCG/ACT nasal spray Spray 1 spray into both nostrils daily as needed for rhinitis or allergies        furosemide (LASIX) 20 MG tablet Take 20 mg by mouth        IBANdronate (BONIVA) 150 MG tablet Take 150 mg by mouth every 30 days        ipratropium - albuterol 0.5 mg/2.5 mg/3 mL (DUONEB) 0.5-2.5 (3) MG/3ML neb solution Take 1 vial (3 mLs) by nebulization 2 times daily        levalbuterol (XOPENEX HFA) 45 MCG/ACT Inhaler Inhale 2 puffs into the lungs        OXYGEN-HELIUM IN 2 lpm per nasal canula nocturnal and with activity may use portable w/ conserving device        potassium chloride ER (KLOR-CON M) 20 MEQ CR tablet 30meq daily        predniSONE (DELTASONE) 10 MG tablet With exacerbations        tiotropium (SPIRIVA RESPIMAT) 2.5 MCG/ACT inhalation aerosol Inhale 2 puffs into the lungs          Allergies:  Allergies   Allergen Reactions     Alendronic Acid Other (See Comments)     dizziness  Other reaction(s): Dizziness     Nickel Rash     Sulfa Drugs Rash       PMH:  Past Medical History:   Diagnosis Date     Atrial fibrillation with RVR (H)     hx of A fib related to severe COPD, does not meet anticoagulation criteria as noted     COPD, severe (H)      Osteoporosis         PSH:  Past Surgical History:   Procedure Laterality Date     CV CORONARY ANGIOGRAM N/A 3/27/2019    Procedure: CV CORONARY ANGIOGRAM;  Surgeon: Thierry Serrano MD;  Location:  [facility]     CV RIGHT HEART CATH MEASUREMENTS RECORDED N/A 3/27/2019    Procedure: CV RIGHT HEART CATH;  Surgeon: Thierry Serrano MD;  Location:  [facility]ATH [facility]     ESOPHAGEAL IMPEDENCE FUNCTION TEST WITH 24 HOUR PH GREATER THAN 1 HOUR N/A 3/28/2019    Procedure: ESOPHAGEAL IMPEDENCE FUNCTION TEST WITH 24 HOUR PH GREATER THAN 1 HOUR;  Surgeon: Mike Henry MD;  Location:  GI     EXCISE PILONIDAL CYST, SIMPLE       TONSILLECTOMY & ADENOIDECTOMY         FH:  Family History   Problem Relation Age of Onset     Breast Cancer Mother      Colon Cancer Mother      Lung Cancer Mother      Lung Cancer Father      Lung Cancer Maternal Aunt      Pancreatic Cancer Maternal Uncle        SH:  Social History     Socioeconomic History     Marital status:      Spouse name: Not on file     Number of children: Not on file     Years of education: Not on file     Highest education level: Not on file   Occupational History     Not on file   Tobacco Use     Smoking status: Former Smoker     Packs/day: 1.50     Years: 36.00     Pack years: 54.00     Types: Cigarettes     Quit date: 2006     Years since quittin.1     Smokeless tobacco: Never Used   Substance and Sexual Activity     Alcohol use: Yes     Comment: stated beer and wine occ     Drug use: Yes     Comment: stated hx of marijuana, quit in      Sexual activity: Not on file   Other Topics Concern     Parent/sibling w/ CABG, MI or angioplasty before 65F 55M? Not Asked   Social History Narrative     Not on file     Social Determinants of Health     Financial Resource Strain: Not on file   Food Insecurity: Not on file   Transportation Needs: Not on file   Physical Activity: Not on file   Stress: Not on file   Social Connections: Not on file    Intimate Partner Violence: Not on file   Housing Stability: Not on file                Review of Systems:   A 10 point review of systems was completed and is negative unless otherwise noted in HPI.            Physical Exam:   Temp:  [98  F (36.7  C)] 98  F (36.7  C)  Pulse:  [110-117] 110  Resp:  [24] 24  BP: (166-203)/() 166/98  SpO2:  [95 %-96 %] 95 %  Gen: NAD, appears comfortable  Skin: no rashes or bruises, warm, dry  HEENT: normocephalic, atraumatic cranium, sclerae anicteric. Oral mucosa pink and moist, midline trachea.   Lungs: Breathing non-labored on 2L NC, no audible wheezing  CV: RRR. Trace LE edema bilaterally.   Abd:Soft, NT, ND, no guarding/rebound.   MSK: grossly intact, moves all extremities spontaneously  Neuro: AOx3, no gross deficits  Mental: normal mood and affect, appropriately anxious, regular rate of speech           LABS/IMAGING:     Labs and imaging ordered, pending.          Problem List:     Patient Active Problem List   Diagnosis     Other emphysema (H)     SOB (shortness of breath)     Chest pain     Abnormal chest CT     Status post coronary angiogram             2:14 AM   February 20, 2022

## 2022-02-21 ENCOUNTER — APPOINTMENT (OUTPATIENT)
Dept: GENERAL RADIOLOGY | Facility: CLINIC | Age: 67
DRG: 007 | End: 2022-02-21
Attending: SURGERY
Payer: MEDICARE

## 2022-02-21 ENCOUNTER — APPOINTMENT (OUTPATIENT)
Dept: GENERAL RADIOLOGY | Facility: CLINIC | Age: 67
DRG: 007 | End: 2022-02-21
Attending: ANESTHESIOLOGY
Payer: MEDICARE

## 2022-02-21 ENCOUNTER — APPOINTMENT (OUTPATIENT)
Dept: GENERAL RADIOLOGY | Facility: CLINIC | Age: 67
DRG: 007 | End: 2022-02-21
Attending: PHYSICIAN ASSISTANT
Payer: MEDICARE

## 2022-02-21 PROBLEM — Z94.2 S/P LUNG TRANSPLANT (H): Status: ACTIVE | Noted: 2022-02-21

## 2022-02-21 LAB
ALBUMIN SERPL-MCNC: 2.7 G/DL (ref 3.4–5)
ALBUMIN SERPL-MCNC: NORMAL G/DL
ALP SERPL-CCNC: 33 U/L (ref 40–150)
ALP SERPL-CCNC: NORMAL U/L
ALT SERPL W P-5'-P-CCNC: 15 U/L (ref 0–50)
ALT SERPL W P-5'-P-CCNC: NORMAL U/L
ANION GAP SERPL CALCULATED.3IONS-SCNC: 6 MMOL/L (ref 3–14)
ANION GAP SERPL CALCULATED.3IONS-SCNC: NORMAL MMOL/L
APTT PPP: 106 SECONDS (ref 22–38)
APTT PPP: 29 SECONDS (ref 22–38)
AST SERPL W P-5'-P-CCNC: 32 U/L (ref 0–45)
AST SERPL W P-5'-P-CCNC: NORMAL U/L
ATRIAL RATE - MUSE: 71 BPM
ATRIAL RATE - MUSE: 97 BPM
BASE EXCESS BLDA CALC-SCNC: 0.2 MMOL/L (ref -9.6–2)
BASE EXCESS BLDA CALC-SCNC: 0.2 MMOL/L (ref -9–1.8)
BASE EXCESS BLDA CALC-SCNC: 0.4 MMOL/L (ref -9.6–2)
BASE EXCESS BLDA CALC-SCNC: 1 MMOL/L (ref -9.6–2)
BASE EXCESS BLDA CALC-SCNC: 2.7 MMOL/L (ref -9.6–2)
BASE EXCESS BLDA CALC-SCNC: 3.7 MMOL/L (ref -9–1.8)
BASE EXCESS BLDA CALC-SCNC: 6.8 MMOL/L (ref -9.6–2)
BASE EXCESS BLDA CALC-SCNC: 7.4 MMOL/L (ref -9.6–2)
BASE EXCESS BLDV CALC-SCNC: -0.1 MMOL/L (ref -7.7–1.9)
BASE EXCESS BLDV CALC-SCNC: -2.9 MMOL/L (ref -7.7–1.9)
BASE EXCESS BLDV CALC-SCNC: 2.2 MMOL/L (ref -7.7–1.9)
BASE EXCESS BLDV CALC-SCNC: 3.2 MMOL/L (ref -7.7–1.9)
BASOPHILS # BLD AUTO: 0 10E3/UL (ref 0–0.2)
BASOPHILS NFR BLD AUTO: 0 %
BILIRUB SERPL-MCNC: 0.6 MG/DL (ref 0.2–1.3)
BILIRUB SERPL-MCNC: NORMAL MG/DL
BLD PROD TYP BPU: NORMAL
BLD PROD TYP BPU: NORMAL
BLOOD COMPONENT TYPE: NORMAL
BLOOD COMPONENT TYPE: NORMAL
BUN SERPL-MCNC: 11 MG/DL (ref 7–30)
BUN SERPL-MCNC: NORMAL MG/DL
CA-I BLD-MCNC: 3.9 MG/DL (ref 4.4–5.2)
CA-I BLD-MCNC: 4 MG/DL (ref 4.4–5.2)
CA-I BLD-MCNC: 4.6 MG/DL (ref 4.4–5.2)
CA-I BLD-MCNC: 4.9 MG/DL (ref 4.4–5.2)
CA-I BLD-MCNC: 5 MG/DL (ref 4.4–5.2)
CA-I BLD-MCNC: 6.3 MG/DL (ref 4.4–5.2)
CALCIUM SERPL-MCNC: 8.1 MG/DL (ref 8.5–10.1)
CALCIUM SERPL-MCNC: NORMAL MG/DL
CF REDUC 30M P MA P HEP LENFR BLD TEG: 0 % (ref 0–8)
CF REDUC 60M P MA LENFR BLD TEG: 0.1 % (ref 0–15)
CF REDUC 60M P MA LENFR BLD TEG: 0.8 % (ref 0–15)
CF REDUC 60M P MA P HEPASE LENFR BLD TEG: 0.1 % (ref 0–15)
CFT BLD TEG: 0.8 MINUTE (ref 1–3)
CFT BLD TEG: 1 MINUTE (ref 1–3)
CFT P HPASE BLD TEG: 0.8 MINUTE (ref 1–3)
CHLORIDE BLD-SCNC: 111 MMOL/L (ref 94–109)
CHLORIDE BLD-SCNC: NORMAL MMOL/L
CI (COAGULATION INDEX)(Z) NON NATIVE: 4.3 (ref -3–3)
CI (COAGULATION INDEX)(Z) NON NATIVE: 4.9 (ref -3–3)
CI (COAGULATION INDEX)(Z): 5.2 (ref -3–3)
CLOT ANGLE BLD TEG: 75.2 DEGREES (ref 53–72)
CLOT ANGLE BLD TEG: 78.3 DEGREES (ref 53–72)
CLOT ANGLE P HPASE BLD TEG: 79.3 DEGREES (ref 53–72)
CLOT INIT BLD TEG: 2.6 MINUTE (ref 5–10)
CLOT INIT BLD TEG: 3.6 MINUTE (ref 5–10)
CLOT INIT P HPASE BLD TEG: 2.7 MINUTE (ref 5–10)
CLOT LYSIS 30M P MA LENFR BLD TEG: 0 % (ref 0–8)
CLOT LYSIS 30M P MA LENFR BLD TEG: 0 % (ref 0–8)
CLOT STRENGTH BLD TEG: 12.6 KD/SC (ref 4.5–11)
CLOT STRENGTH BLD TEG: 14.6 KD/SC (ref 4.5–11)
CLOT STRENGTH P HPASE BLD TEG: 14.1 KD/SC (ref 4.5–11)
CMV IGG SERPL IA-ACNC: <0.2 U/ML
CMV IGG SERPL IA-ACNC: NORMAL
CO2 SERPL-SCNC: 27 MMOL/L (ref 20–32)
CO2 SERPL-SCNC: NORMAL MMOL/L
CODING SYSTEM: NORMAL
CODING SYSTEM: NORMAL
CREAT SERPL-MCNC: 0.41 MG/DL (ref 0.52–1.04)
CREAT SERPL-MCNC: NORMAL MG/DL
CROSSMATCH: NORMAL
CROSSMATCH: NORMAL
DIASTOLIC BLOOD PRESSURE - MUSE: NORMAL MMHG
DIASTOLIC BLOOD PRESSURE - MUSE: NORMAL MMHG
DONOR IDENTIFICATION: NORMAL
DSA COMMENTS: NORMAL
DSA PRESENT: NO
DSA TEST METHOD: NORMAL
EBV VCA IGG SER IA-ACNC: 300 U/ML
EBV VCA IGG SER IA-ACNC: POSITIVE
EOSINOPHIL # BLD AUTO: 0 10E3/UL (ref 0–0.7)
EOSINOPHIL NFR BLD AUTO: 0 %
ERYTHROCYTE [DISTWIDTH] IN BLOOD BY AUTOMATED COUNT: 12.7 % (ref 10–15)
ERYTHROCYTE [DISTWIDTH] IN BLOOD BY AUTOMATED COUNT: 12.7 % (ref 10–15)
ERYTHROCYTE [DISTWIDTH] IN BLOOD BY AUTOMATED COUNT: 13.4 % (ref 10–15)
FIBRINOGEN PPP-MCNC: 182 MG/DL (ref 170–490)
GFR SERPL CREATININE-BSD FRML MDRD: >90 ML/MIN/1.73M2
GFR SERPL CREATININE-BSD FRML MDRD: NORMAL ML/MIN/{1.73_M2}
GLUCOSE BLD-MCNC: 168 MG/DL (ref 70–99)
GLUCOSE BLD-MCNC: 204 MG/DL (ref 70–99)
GLUCOSE BLD-MCNC: 236 MG/DL (ref 70–99)
GLUCOSE BLD-MCNC: 253 MG/DL (ref 70–99)
GLUCOSE BLD-MCNC: 276 MG/DL (ref 70–99)
GLUCOSE BLD-MCNC: 285 MG/DL (ref 70–99)
GLUCOSE BLD-MCNC: 92 MG/DL (ref 70–99)
GLUCOSE BLD-MCNC: NORMAL MG/DL
GLUCOSE BLDC GLUCOMTR-MCNC: 109 MG/DL (ref 70–99)
GLUCOSE BLDC GLUCOMTR-MCNC: 109 MG/DL (ref 70–99)
GLUCOSE BLDC GLUCOMTR-MCNC: 128 MG/DL (ref 70–99)
GLUCOSE BLDC GLUCOMTR-MCNC: 135 MG/DL (ref 70–99)
GLUCOSE BLDC GLUCOMTR-MCNC: 139 MG/DL (ref 70–99)
GLUCOSE BLDC GLUCOMTR-MCNC: 145 MG/DL (ref 70–99)
GLUCOSE BLDC GLUCOMTR-MCNC: 161 MG/DL (ref 70–99)
GLUCOSE BLDC GLUCOMTR-MCNC: 189 MG/DL (ref 70–99)
GLUCOSE BLDC GLUCOMTR-MCNC: 201 MG/DL (ref 70–99)
GLUCOSE BLDC GLUCOMTR-MCNC: 237 MG/DL (ref 70–99)
GLUCOSE BLDC GLUCOMTR-MCNC: 242 MG/DL (ref 70–99)
GLUCOSE BLDC GLUCOMTR-MCNC: 86 MG/DL (ref 70–99)
GRAM STAIN RESULT: NORMAL
HBV DNA SERPL NAA+PROBE-ACNC: NOT DETECTED IU/ML
HCO3 BLD-SCNC: 23 MMOL/L (ref 21–28)
HCO3 BLD-SCNC: 28 MMOL/L (ref 21–28)
HCO3 BLDA-SCNC: 24 MMOL/L (ref 21–28)
HCO3 BLDA-SCNC: 26 MMOL/L (ref 21–28)
HCO3 BLDA-SCNC: 26 MMOL/L (ref 21–28)
HCO3 BLDA-SCNC: 28 MMOL/L (ref 21–28)
HCO3 BLDA-SCNC: 31 MMOL/L (ref 21–28)
HCO3 BLDA-SCNC: 34 MMOL/L (ref 21–28)
HCO3 BLDV-SCNC: 22 MMOL/L (ref 21–28)
HCO3 BLDV-SCNC: 24 MMOL/L (ref 21–28)
HCO3 BLDV-SCNC: 27 MMOL/L (ref 21–28)
HCO3 BLDV-SCNC: 27 MMOL/L (ref 21–28)
HCT VFR BLD AUTO: 21.8 % (ref 35–47)
HCT VFR BLD AUTO: 24.8 % (ref 35–47)
HCT VFR BLD AUTO: 29.1 % (ref 35–47)
HCV RNA SERPL NAA+PROBE-ACNC: NOT DETECTED IU/ML
HGB BLD-MCNC: 10 G/DL (ref 11.7–15.7)
HGB BLD-MCNC: 10.7 G/DL (ref 11.7–15.7)
HGB BLD-MCNC: 13.4 G/DL (ref 11.7–15.7)
HGB BLD-MCNC: 7 G/DL (ref 11.7–15.7)
HGB BLD-MCNC: 7.3 G/DL (ref 11.7–15.7)
HGB BLD-MCNC: 8.1 G/DL (ref 11.7–15.7)
HGB BLD-MCNC: 8.3 G/DL (ref 11.7–15.7)
HGB BLD-MCNC: 8.4 G/DL (ref 11.7–15.7)
HGB BLD-MCNC: 9.3 G/DL (ref 11.7–15.7)
HGB BLD-MCNC: 9.5 G/DL (ref 11.7–15.7)
HGB BLD-MCNC: 9.8 G/DL (ref 11.7–15.7)
HOLD SPECIMEN: NORMAL
HOLD SPECIMEN: NORMAL
HSV1 IGG SERPL QL IA: 1.3 INDEX
HSV1 IGG SERPL QL IA: ABNORMAL
HSV2 IGG SERPL QL IA: 0.07 INDEX
HSV2 IGG SERPL QL IA: ABNORMAL
IGG SERPL-MCNC: 1023 MG/DL (ref 610–1616)
IMM GRANULOCYTES # BLD: 0.1 10E3/UL
IMM GRANULOCYTES NFR BLD: 1 %
INR PPP: 1.58 (ref 0.85–1.15)
INR PPP: 1.96 (ref 0.85–1.15)
INR PPP: 5.23 (ref 0.85–1.15)
INTERPRETATION ECG - MUSE: NORMAL
INTERPRETATION ECG - MUSE: NORMAL
ISSUE DATE AND TIME: NORMAL
ISSUE DATE AND TIME: NORMAL
LACTATE BLD-SCNC: 0.8 MMOL/L
LACTATE BLD-SCNC: 1.2 MMOL/L
LACTATE BLD-SCNC: 2.1 MMOL/L
LACTATE BLD-SCNC: 2.2 MMOL/L
LACTATE BLD-SCNC: 2.5 MMOL/L
LACTATE BLD-SCNC: 2.9 MMOL/L
LACTATE SERPL-SCNC: 1.4 MMOL/L (ref 0.7–2)
LACTATE SERPL-SCNC: 2.9 MMOL/L (ref 0.7–2)
LACTATE SERPL-SCNC: 3.9 MMOL/L (ref 0.7–2)
LACTATE SERPL-SCNC: 4.4 MMOL/L (ref 0.7–2)
LYMPHOCYTES # BLD AUTO: 0.8 10E3/UL (ref 0.8–5.3)
LYMPHOCYTES NFR BLD AUTO: 8 %
MAGNESIUM SERPL-MCNC: 2 MG/DL (ref 1.8–2.6)
MAGNESIUM SERPL-MCNC: <0.8 MG/DL (ref 1.8–2.6)
MCF BLD TEG: 71.7 MM (ref 50–70)
MCF BLD TEG: 74.5 MM (ref 50–70)
MCF P HPASE BLD TEG: 73.8 MM (ref 50–70)
MCH RBC QN AUTO: 28.4 PG (ref 26.5–33)
MCH RBC QN AUTO: 28.6 PG (ref 26.5–33)
MCH RBC QN AUTO: 28.9 PG (ref 26.5–33)
MCHC RBC AUTO-ENTMCNC: 32.7 G/DL (ref 31.5–36.5)
MCHC RBC AUTO-ENTMCNC: 33.5 G/DL (ref 31.5–36.5)
MCHC RBC AUTO-ENTMCNC: 34.4 G/DL (ref 31.5–36.5)
MCV RBC AUTO: 84 FL (ref 78–100)
MCV RBC AUTO: 85 FL (ref 78–100)
MCV RBC AUTO: 88 FL (ref 78–100)
MONOCYTES # BLD AUTO: 0.3 10E3/UL (ref 0–1.3)
MONOCYTES NFR BLD AUTO: 3 %
MRSA DNA SPEC QL NAA+PROBE: NEGATIVE
NEUTROPHILS # BLD AUTO: 8.6 10E3/UL (ref 1.6–8.3)
NEUTROPHILS NFR BLD AUTO: 88 %
NRBC # BLD AUTO: 0 10E3/UL
NRBC BLD AUTO-RTO: 0 /100
O2/TOTAL GAS SETTING VFR VENT: 100 %
O2/TOTAL GAS SETTING VFR VENT: 30 %
O2/TOTAL GAS SETTING VFR VENT: 40 %
O2/TOTAL GAS SETTING VFR VENT: 40 %
O2/TOTAL GAS SETTING VFR VENT: 50 %
O2/TOTAL GAS SETTING VFR VENT: 60 %
ORGAN: NORMAL
OXYHGB MFR BLDA: 97 % (ref 92–100)
OXYHGB MFR BLDA: 98 % (ref 92–100)
OXYHGB MFR BLDV: 72 % (ref 70–75)
OXYHGB MFR BLDV: 73 % (ref 70–75)
OXYHGB MFR BLDV: 74 % (ref 70–75)
P AXIS - MUSE: 80 DEGREES
P AXIS - MUSE: 86 DEGREES
PCO2 BLD: 28 MM HG (ref 35–45)
PCO2 BLD: 39 MM HG (ref 35–45)
PCO2 BLDA: 37 MM HG (ref 35–45)
PCO2 BLDA: 42 MM HG (ref 35–45)
PCO2 BLDA: 43 MM HG (ref 35–45)
PCO2 BLDA: 46 MM HG (ref 35–45)
PCO2 BLDA: 47 MM HG (ref 35–45)
PCO2 BLDA: 55 MM HG (ref 35–45)
PCO2 BLDV: 35 MM HG (ref 40–50)
PCO2 BLDV: 37 MM HG (ref 40–50)
PCO2 BLDV: 38 MM HG (ref 40–50)
PCO2 BLDV: 45 MM HG (ref 40–50)
PH BLD: 7.46 [PH] (ref 7.35–7.45)
PH BLD: 7.52 [PH] (ref 7.35–7.45)
PH BLDA: 7.36 [PH] (ref 7.35–7.45)
PH BLDA: 7.38 [PH] (ref 7.35–7.45)
PH BLDA: 7.39 [PH] (ref 7.35–7.45)
PH BLDA: 7.4 [PH] (ref 7.35–7.45)
PH BLDA: 7.43 [PH] (ref 7.35–7.45)
PH BLDA: 7.48 [PH] (ref 7.35–7.45)
PH BLDV: 7.4 [PH] (ref 7.32–7.43)
PH BLDV: 7.4 [PH] (ref 7.32–7.43)
PH BLDV: 7.43 [PH] (ref 7.32–7.43)
PH BLDV: 7.46 [PH] (ref 7.32–7.43)
PHOSPHATE SERPL-MCNC: 1 MG/DL (ref 2.5–4.5)
PHOSPHATE SERPL-MCNC: 1.2 MG/DL (ref 2.5–4.5)
PHOSPHATE SERPL-MCNC: 2.8 MG/DL (ref 2.5–4.5)
PLAT MORPH BLD: NORMAL
PLATELET # BLD AUTO: 109 10E3/UL (ref 150–450)
PLATELET # BLD AUTO: 155 10E3/UL (ref 150–450)
PLATELET # BLD AUTO: 217 10E3/UL (ref 150–450)
PO2 BLD: 182 MM HG (ref 80–105)
PO2 BLD: 232 MM HG (ref 80–105)
PO2 BLDA: 122 MM HG (ref 80–105)
PO2 BLDA: 197 MM HG (ref 80–105)
PO2 BLDA: 258 MM HG (ref 80–105)
PO2 BLDA: 292 MM HG (ref 80–105)
PO2 BLDA: 493 MM HG (ref 80–105)
PO2 BLDA: 584 MM HG (ref 80–105)
PO2 BLDV: 38 MM HG (ref 25–47)
PO2 BLDV: 40 MM HG (ref 25–47)
PO2 BLDV: 40 MM HG (ref 25–47)
PO2 BLDV: 63 MM HG (ref 25–47)
POTASSIUM BLD-SCNC: 2.9 MMOL/L (ref 3.5–5)
POTASSIUM BLD-SCNC: 3 MMOL/L (ref 3.4–5.3)
POTASSIUM BLD-SCNC: 3 MMOL/L (ref 3.5–5)
POTASSIUM BLD-SCNC: 3.4 MMOL/L (ref 3.5–5)
POTASSIUM BLD-SCNC: 3.7 MMOL/L (ref 3.5–5)
POTASSIUM BLD-SCNC: 3.9 MMOL/L (ref 3.5–5)
POTASSIUM BLD-SCNC: 4.4 MMOL/L (ref 3.4–5.3)
POTASSIUM BLD-SCNC: 5 MMOL/L (ref 3.5–5)
POTASSIUM BLD-SCNC: NORMAL MMOL/L
PR INTERVAL - MUSE: 162 MS
PR INTERVAL - MUSE: 164 MS
PROT SERPL-MCNC: 4.3 G/DL (ref 6.8–8.8)
PROT SERPL-MCNC: NORMAL G/DL
QRS DURATION - MUSE: 140 MS
QRS DURATION - MUSE: 152 MS
QT - MUSE: 400 MS
QT - MUSE: 478 MS
QTC - MUSE: 508 MS
QTC - MUSE: 519 MS
R AXIS - MUSE: 123 DEGREES
R AXIS - MUSE: 97 DEGREES
RBC # BLD AUTO: 2.57 10E6/UL (ref 3.8–5.2)
RBC # BLD AUTO: 2.83 10E6/UL (ref 3.8–5.2)
RBC # BLD AUTO: 3.46 10E6/UL (ref 3.8–5.2)
RBC MORPH BLD: NORMAL
SA 1 CELL: NORMAL
SA 1 TEST METHOD: NORMAL
SA 2 CELL: NORMAL
SA 2 TEST METHOD: NORMAL
SA TARGET DNA: NEGATIVE
SA1 HI RISK ABY: NORMAL
SA1 MOD RISK ABY: NORMAL
SA2 HI RISK ABY: NORMAL
SA2 MOD RISK ABY: NORMAL
SODIUM BLD-SCNC: 138 MMOL/L (ref 133–144)
SODIUM BLD-SCNC: 140 MMOL/L (ref 133–144)
SODIUM BLD-SCNC: 141 MMOL/L (ref 133–144)
SODIUM BLD-SCNC: 144 MMOL/L (ref 133–144)
SODIUM SERPL-SCNC: 144 MMOL/L (ref 133–144)
SODIUM SERPL-SCNC: NORMAL MMOL/L
SYSTOLIC BLOOD PRESSURE - MUSE: NORMAL MMHG
SYSTOLIC BLOOD PRESSURE - MUSE: NORMAL MMHG
T AXIS - MUSE: 22 DEGREES
T AXIS - MUSE: 63 DEGREES
UNACCEPTABLE ANTIGENS: NORMAL
UNIT ABO/RH: NORMAL
UNIT ABO/RH: NORMAL
UNIT NUMBER: NORMAL
UNIT NUMBER: NORMAL
UNIT STATUS: NORMAL
UNIT STATUS: NORMAL
UNIT TYPE ISBT: 1700
UNIT TYPE ISBT: 1700
UNOS CPRA: 0
VENTRICULAR RATE- MUSE: 71 BPM
VENTRICULAR RATE- MUSE: 97 BPM
WBC # BLD AUTO: 11.8 10E3/UL (ref 4–11)
WBC # BLD AUTO: 17.7 10E3/UL (ref 4–11)
WBC # BLD AUTO: 9.8 10E3/UL (ref 4–11)
ZZZSA 1  COMMENTS: NORMAL
ZZZSA 2 COMMENTS: NORMAL

## 2022-02-21 PROCEDURE — 85384 FIBRINOGEN ACTIVITY: CPT | Performed by: ANESTHESIOLOGY

## 2022-02-21 PROCEDURE — 99291 CRITICAL CARE FIRST HOUR: CPT | Mod: 24 | Performed by: ANESTHESIOLOGY

## 2022-02-21 PROCEDURE — 258N000003 HC RX IP 258 OP 636: Performed by: STUDENT IN AN ORGANIZED HEALTH CARE EDUCATION/TRAINING PROGRAM

## 2022-02-21 PROCEDURE — 94668 MNPJ CHEST WALL SBSQ: CPT

## 2022-02-21 PROCEDURE — 85396 CLOTTING ASSAY WHOLE BLOOD: CPT | Performed by: ANESTHESIOLOGY

## 2022-02-21 PROCEDURE — 87106 FUNGI IDENTIFICATION YEAST: CPT | Performed by: SURGERY

## 2022-02-21 PROCEDURE — 258N000003 HC RX IP 258 OP 636: Performed by: NURSE ANESTHETIST, CERTIFIED REGISTERED

## 2022-02-21 PROCEDURE — 250N000013 HC RX MED GY IP 250 OP 250 PS 637: Performed by: ANESTHESIOLOGY

## 2022-02-21 PROCEDURE — 82803 BLOOD GASES ANY COMBINATION: CPT

## 2022-02-21 PROCEDURE — 250N000011 HC RX IP 250 OP 636: Performed by: STUDENT IN AN ORGANIZED HEALTH CARE EDUCATION/TRAINING PROGRAM

## 2022-02-21 PROCEDURE — 999N000065 XR ABDOMEN PORT 1 VIEWS

## 2022-02-21 PROCEDURE — 82805 BLOOD GASES W/O2 SATURATION: CPT | Performed by: PHYSICIAN ASSISTANT

## 2022-02-21 PROCEDURE — 812N000008 HC ACQUISITION LUNG CADAVER

## 2022-02-21 PROCEDURE — 0BYM0Z0 TRANSPLANTATION OF BILATERAL LUNGS, ALLOGENEIC, OPEN APPROACH: ICD-10-PCS | Performed by: SURGERY

## 2022-02-21 PROCEDURE — 83735 ASSAY OF MAGNESIUM: CPT | Performed by: SURGERY

## 2022-02-21 PROCEDURE — 99207 PR NON-BILLABLE SERV PER CHARTING: CPT | Performed by: PHYSICIAN ASSISTANT

## 2022-02-21 PROCEDURE — 85027 COMPLETE CBC AUTOMATED: CPT | Performed by: NURSE PRACTITIONER

## 2022-02-21 PROCEDURE — 272N000001 HC OR GENERAL SUPPLY STERILE: Performed by: SURGERY

## 2022-02-21 PROCEDURE — 84100 ASSAY OF PHOSPHORUS: CPT | Performed by: SURGERY

## 2022-02-21 PROCEDURE — 370N000017 HC ANESTHESIA TECHNICAL FEE, PER MIN: Performed by: SURGERY

## 2022-02-21 PROCEDURE — 99291 CRITICAL CARE FIRST HOUR: CPT | Mod: GC | Performed by: ANESTHESIOLOGY

## 2022-02-21 PROCEDURE — 32854 LUNG TRANSPLANT WITH BYPASS: CPT | Mod: 82 | Performed by: THORACIC SURGERY (CARDIOTHORACIC VASCULAR SURGERY)

## 2022-02-21 PROCEDURE — 94667 MNPJ CHEST WALL 1ST: CPT

## 2022-02-21 PROCEDURE — 250N000009 HC RX 250: Performed by: SURGERY

## 2022-02-21 PROCEDURE — 44500 INTRO GASTROINTESTINAL TUBE: CPT

## 2022-02-21 PROCEDURE — 85027 COMPLETE CBC AUTOMATED: CPT | Performed by: ANESTHESIOLOGY

## 2022-02-21 PROCEDURE — 94640 AIRWAY INHALATION TREATMENT: CPT | Mod: 76

## 2022-02-21 PROCEDURE — 82805 BLOOD GASES W/O2 SATURATION: CPT | Performed by: STUDENT IN AN ORGANIZED HEALTH CARE EDUCATION/TRAINING PROGRAM

## 2022-02-21 PROCEDURE — P9041 ALBUMIN (HUMAN),5%, 50ML: HCPCS | Performed by: NURSE ANESTHETIST, CERTIFIED REGISTERED

## 2022-02-21 PROCEDURE — 87641 MR-STAPH DNA AMP PROBE: CPT | Performed by: SURGERY

## 2022-02-21 PROCEDURE — 85730 THROMBOPLASTIN TIME PARTIAL: CPT | Performed by: ANESTHESIOLOGY

## 2022-02-21 PROCEDURE — P9016 RBC LEUKOCYTES REDUCED: HCPCS | Performed by: SURGERY

## 2022-02-21 PROCEDURE — 82810 BLOOD GASES O2 SAT ONLY: CPT

## 2022-02-21 PROCEDURE — 999N000141 HC STATISTIC PRE-PROCEDURE NURSING ASSESSMENT: Performed by: SURGERY

## 2022-02-21 PROCEDURE — 0BJ08ZZ INSPECTION OF TRACHEOBRONCHIAL TREE, VIA NATURAL OR ARTIFICIAL OPENING ENDOSCOPIC: ICD-10-PCS | Performed by: SURGERY

## 2022-02-21 PROCEDURE — 999N000065 XR CHEST PORT 1 VIEW

## 2022-02-21 PROCEDURE — 93005 ELECTROCARDIOGRAM TRACING: CPT

## 2022-02-21 PROCEDURE — 999N000157 HC STATISTIC RCP TIME EA 10 MIN

## 2022-02-21 PROCEDURE — 250N000011 HC RX IP 250 OP 636: Performed by: SURGERY

## 2022-02-21 PROCEDURE — 250N000009 HC RX 250: Performed by: STUDENT IN AN ORGANIZED HEALTH CARE EDUCATION/TRAINING PROGRAM

## 2022-02-21 PROCEDURE — 250N000012 HC RX MED GY IP 250 OP 636 PS 637: Performed by: SURGERY

## 2022-02-21 PROCEDURE — 82955 ASSAY OF G6PD ENZYME: CPT | Performed by: SURGERY

## 2022-02-21 PROCEDURE — 258N000003 HC RX IP 258 OP 636: Performed by: PHYSICIAN ASSISTANT

## 2022-02-21 PROCEDURE — 250N000025 HC SEVOFLURANE, PER MIN: Performed by: SURGERY

## 2022-02-21 PROCEDURE — 84100 ASSAY OF PHOSPHORUS: CPT | Performed by: ANESTHESIOLOGY

## 2022-02-21 PROCEDURE — 83605 ASSAY OF LACTIC ACID: CPT | Performed by: PHYSICIAN ASSISTANT

## 2022-02-21 PROCEDURE — C2618 PROBE/NEEDLE, CRYO: HCPCS | Performed by: SURGERY

## 2022-02-21 PROCEDURE — 74018 RADEX ABDOMEN 1 VIEW: CPT | Mod: 26 | Performed by: RADIOLOGY

## 2022-02-21 PROCEDURE — 84132 ASSAY OF SERUM POTASSIUM: CPT

## 2022-02-21 PROCEDURE — 250N000011 HC RX IP 250 OP 636: Performed by: NURSE ANESTHETIST, CERTIFIED REGISTERED

## 2022-02-21 PROCEDURE — 83605 ASSAY OF LACTIC ACID: CPT | Performed by: SURGERY

## 2022-02-21 PROCEDURE — 271N000002 HC RX 271

## 2022-02-21 PROCEDURE — 99223 1ST HOSP IP/OBS HIGH 75: CPT | Mod: 24 | Performed by: INTERNAL MEDICINE

## 2022-02-21 PROCEDURE — 87205 SMEAR GRAM STAIN: CPT | Performed by: SURGERY

## 2022-02-21 PROCEDURE — 87077 CULTURE AEROBIC IDENTIFY: CPT | Performed by: SURGERY

## 2022-02-21 PROCEDURE — 87070 CULTURE OTHR SPECIMN AEROBIC: CPT | Performed by: SURGERY

## 2022-02-21 PROCEDURE — 250N000009 HC RX 250: Performed by: NURSE ANESTHETIST, CERTIFIED REGISTERED

## 2022-02-21 PROCEDURE — 94002 VENT MGMT INPAT INIT DAY: CPT

## 2022-02-21 PROCEDURE — P9041 ALBUMIN (HUMAN),5%, 50ML: HCPCS | Performed by: SURGERY

## 2022-02-21 PROCEDURE — 250N000009 HC RX 250: Performed by: ANESTHESIOLOGY

## 2022-02-21 PROCEDURE — C9113 INJ PANTOPRAZOLE SODIUM, VIA: HCPCS | Performed by: SURGERY

## 2022-02-21 PROCEDURE — 85014 HEMATOCRIT: CPT | Performed by: ANESTHESIOLOGY

## 2022-02-21 PROCEDURE — 32854 LUNG TRANSPLANT WITH BYPASS: CPT | Mod: GC | Performed by: SURGERY

## 2022-02-21 PROCEDURE — 82803 BLOOD GASES ANY COMBINATION: CPT | Performed by: SURGERY

## 2022-02-21 PROCEDURE — 85730 THROMBOPLASTIN TIME PARTIAL: CPT | Performed by: SURGERY

## 2022-02-21 PROCEDURE — 85610 PROTHROMBIN TIME: CPT | Performed by: PHYSICIAN ASSISTANT

## 2022-02-21 PROCEDURE — 250N000013 HC RX MED GY IP 250 OP 250 PS 637: Performed by: SURGERY

## 2022-02-21 PROCEDURE — 200N000002 HC R&B ICU UMMC

## 2022-02-21 PROCEDURE — 83735 ASSAY OF MAGNESIUM: CPT | Performed by: ANESTHESIOLOGY

## 2022-02-21 PROCEDURE — 250N000011 HC RX IP 250 OP 636

## 2022-02-21 PROCEDURE — 94640 AIRWAY INHALATION TREATMENT: CPT

## 2022-02-21 PROCEDURE — 71045 X-RAY EXAM CHEST 1 VIEW: CPT | Mod: 26 | Performed by: RADIOLOGY

## 2022-02-21 PROCEDURE — 250N000013 HC RX MED GY IP 250 OP 250 PS 637: Performed by: PHYSICIAN ASSISTANT

## 2022-02-21 PROCEDURE — 82803 BLOOD GASES ANY COMBINATION: CPT | Performed by: ANESTHESIOLOGY

## 2022-02-21 PROCEDURE — 36415 COLL VENOUS BLD VENIPUNCTURE: CPT | Performed by: ANESTHESIOLOGY

## 2022-02-21 PROCEDURE — 3E043XZ INTRODUCTION OF VASOPRESSOR INTO CENTRAL VEIN, PERCUTANEOUS APPROACH: ICD-10-PCS | Performed by: STUDENT IN AN ORGANIZED HEALTH CARE EDUCATION/TRAINING PROGRAM

## 2022-02-21 PROCEDURE — 82962 GLUCOSE BLOOD TEST: CPT

## 2022-02-21 PROCEDURE — 250N000009 HC RX 250

## 2022-02-21 PROCEDURE — 87206 SMEAR FLUORESCENT/ACID STAI: CPT | Performed by: SURGERY

## 2022-02-21 PROCEDURE — 87252 VIRUS INOCULATION TISSUE: CPT | Performed by: SURGERY

## 2022-02-21 PROCEDURE — 85018 HEMOGLOBIN: CPT | Performed by: STUDENT IN AN ORGANIZED HEALTH CARE EDUCATION/TRAINING PROGRAM

## 2022-02-21 PROCEDURE — 85610 PROTHROMBIN TIME: CPT | Performed by: ANESTHESIOLOGY

## 2022-02-21 PROCEDURE — 84132 ASSAY OF SERUM POTASSIUM: CPT | Performed by: PHYSICIAN ASSISTANT

## 2022-02-21 PROCEDURE — 258N000003 HC RX IP 258 OP 636: Performed by: SURGERY

## 2022-02-21 PROCEDURE — 85018 HEMOGLOBIN: CPT | Performed by: PHYSICIAN ASSISTANT

## 2022-02-21 PROCEDURE — 84132 ASSAY OF SERUM POTASSIUM: CPT | Performed by: STUDENT IN AN ORGANIZED HEALTH CARE EDUCATION/TRAINING PROGRAM

## 2022-02-21 PROCEDURE — 88309 TISSUE EXAM BY PATHOLOGIST: CPT | Mod: TC | Performed by: SURGERY

## 2022-02-21 PROCEDURE — 93010 ELECTROCARDIOGRAM REPORT: CPT | Mod: 59 | Performed by: INTERNAL MEDICINE

## 2022-02-21 PROCEDURE — 82955 ASSAY OF G6PD ENZYME: CPT | Performed by: STUDENT IN AN ORGANIZED HEALTH CARE EDUCATION/TRAINING PROGRAM

## 2022-02-21 PROCEDURE — 258N000003 HC RX IP 258 OP 636

## 2022-02-21 PROCEDURE — 360N000079 HC SURGERY LEVEL 6, PER MIN: Performed by: SURGERY

## 2022-02-21 PROCEDURE — 85610 PROTHROMBIN TIME: CPT | Performed by: SURGERY

## 2022-02-21 RX ORDER — POTASSIUM CHLORIDE 750 MG/1
20 TABLET, EXTENDED RELEASE ORAL ONCE
Status: DISCONTINUED | OUTPATIENT
Start: 2022-02-21 | End: 2022-02-21

## 2022-02-21 RX ORDER — METHOCARBAMOL 500 MG/1
500 TABLET, FILM COATED ORAL EVERY 6 HOURS PRN
Status: DISCONTINUED | OUTPATIENT
Start: 2022-02-21 | End: 2022-03-04

## 2022-02-21 RX ORDER — FAMOTIDINE 10 MG
10 TABLET ORAL 2 TIMES DAILY
Status: DISPENSED | OUTPATIENT
Start: 2022-02-21 | End: 2022-03-03

## 2022-02-21 RX ORDER — NALOXONE HYDROCHLORIDE 0.4 MG/ML
0.2 INJECTION, SOLUTION INTRAMUSCULAR; INTRAVENOUS; SUBCUTANEOUS
Status: DISCONTINUED | OUTPATIENT
Start: 2022-02-21 | End: 2022-03-17 | Stop reason: HOSPADM

## 2022-02-21 RX ORDER — NOREPINEPHRINE BITARTRATE 0.06 MG/ML
.01-.6 INJECTION, SOLUTION INTRAVENOUS CONTINUOUS
Status: DISCONTINUED | OUTPATIENT
Start: 2022-02-21 | End: 2022-02-22

## 2022-02-21 RX ORDER — HEPARIN SODIUM 5000 [USP'U]/.5ML
5000 INJECTION, SOLUTION INTRAVENOUS; SUBCUTANEOUS EVERY 8 HOURS
Status: DISCONTINUED | OUTPATIENT
Start: 2022-02-22 | End: 2022-03-17 | Stop reason: HOSPADM

## 2022-02-21 RX ORDER — TACROLIMUS 0.5 MG/1
1 CAPSULE ORAL
Status: DISCONTINUED | OUTPATIENT
Start: 2022-02-21 | End: 2022-02-22

## 2022-02-21 RX ORDER — ACYCLOVIR 200 MG/1
400 CAPSULE ORAL 2 TIMES DAILY
Status: DISCONTINUED | OUTPATIENT
Start: 2022-03-01 | End: 2022-02-21

## 2022-02-21 RX ORDER — DEXTROSE MONOHYDRATE 25 G/50ML
25-50 INJECTION, SOLUTION INTRAVENOUS
Status: DISCONTINUED | OUTPATIENT
Start: 2022-02-21 | End: 2022-02-22

## 2022-02-21 RX ORDER — METHYLPREDNISOLONE SODIUM SUCCINATE 500 MG/8ML
INJECTION INTRAMUSCULAR; INTRAVENOUS PRN
Status: DISCONTINUED | OUTPATIENT
Start: 2022-02-21 | End: 2022-02-21

## 2022-02-21 RX ORDER — AMOXICILLIN 250 MG
1 CAPSULE ORAL 2 TIMES DAILY
Status: DISCONTINUED | OUTPATIENT
Start: 2022-02-21 | End: 2022-03-01

## 2022-02-21 RX ORDER — PROTAMINE SULFATE 10 MG/ML
INJECTION, SOLUTION INTRAVENOUS PRN
Status: DISCONTINUED | OUTPATIENT
Start: 2022-02-21 | End: 2022-02-21

## 2022-02-21 RX ORDER — ACETYLCYSTEINE 200 MG/ML
2 SOLUTION ORAL; RESPIRATORY (INHALATION) 4 TIMES DAILY
Status: DISCONTINUED | OUTPATIENT
Start: 2022-02-21 | End: 2022-03-11

## 2022-02-21 RX ORDER — ONDANSETRON 2 MG/ML
4 INJECTION INTRAMUSCULAR; INTRAVENOUS EVERY 6 HOURS PRN
Status: DISCONTINUED | OUTPATIENT
Start: 2022-02-21 | End: 2022-03-17 | Stop reason: HOSPADM

## 2022-02-21 RX ORDER — METHYLPREDNISOLONE SODIUM SUCCINATE 125 MG/2ML
125 INJECTION, POWDER, LYOPHILIZED, FOR SOLUTION INTRAMUSCULAR; INTRAVENOUS EVERY 8 HOURS
Status: COMPLETED | OUTPATIENT
Start: 2022-02-21 | End: 2022-02-21

## 2022-02-21 RX ORDER — OXYCODONE HYDROCHLORIDE 10 MG/1
10 TABLET ORAL EVERY 4 HOURS PRN
Status: DISCONTINUED | OUTPATIENT
Start: 2022-02-21 | End: 2022-03-12

## 2022-02-21 RX ORDER — HYDROMORPHONE HCL IN WATER/PF 6 MG/30 ML
0.4 PATIENT CONTROLLED ANALGESIA SYRINGE INTRAVENOUS
Status: DISCONTINUED | OUTPATIENT
Start: 2022-02-21 | End: 2022-03-01

## 2022-02-21 RX ORDER — SODIUM CHLORIDE, SODIUM GLUCONATE, SODIUM ACETATE, POTASSIUM CHLORIDE AND MAGNESIUM CHLORIDE 526; 502; 368; 37; 30 MG/100ML; MG/100ML; MG/100ML; MG/100ML; MG/100ML
INJECTION, SOLUTION INTRAVENOUS CONTINUOUS PRN
Status: DISCONTINUED | OUTPATIENT
Start: 2022-02-21 | End: 2022-02-21

## 2022-02-21 RX ORDER — ACETAMINOPHEN 325 MG/1
650 TABLET ORAL EVERY 4 HOURS PRN
Status: DISCONTINUED | OUTPATIENT
Start: 2022-02-24 | End: 2022-03-01

## 2022-02-21 RX ORDER — POTASSIUM PHOS IN 0.9 % NACL 15MMOL/250
15 PLASTIC BAG, INJECTION (ML) INTRAVENOUS
Status: DISCONTINUED | OUTPATIENT
Start: 2022-02-21 | End: 2022-02-21

## 2022-02-21 RX ORDER — LIDOCAINE HYDROCHLORIDE 20 MG/ML
5 SOLUTION OROPHARYNGEAL ONCE
Status: DISCONTINUED | OUTPATIENT
Start: 2022-02-21 | End: 2022-02-22

## 2022-02-21 RX ORDER — PREDNISOLONE SODIUM PHOSPHATE 15 MG/5ML
17.5 SOLUTION ORAL 2 TIMES DAILY
Status: COMPLETED | OUTPATIENT
Start: 2022-02-22 | End: 2022-02-28

## 2022-02-21 RX ORDER — GABAPENTIN 100 MG/1
100 CAPSULE ORAL AT BEDTIME
Status: DISCONTINUED | OUTPATIENT
Start: 2022-02-21 | End: 2022-03-11

## 2022-02-21 RX ORDER — POTASSIUM CHLORIDE 750 MG/1
40 TABLET, EXTENDED RELEASE ORAL ONCE
Status: DISCONTINUED | OUTPATIENT
Start: 2022-02-21 | End: 2022-02-21

## 2022-02-21 RX ORDER — POLYETHYLENE GLYCOL 3350 17 G/17G
17 POWDER, FOR SOLUTION ORAL DAILY
Status: DISCONTINUED | OUTPATIENT
Start: 2022-02-22 | End: 2022-03-01

## 2022-02-21 RX ORDER — ALBUMIN, HUMAN INJ 5% 5 %
SOLUTION INTRAVENOUS CONTINUOUS PRN
Status: DISCONTINUED | OUTPATIENT
Start: 2022-02-21 | End: 2022-02-21

## 2022-02-21 RX ORDER — ACYCLOVIR 200 MG/5ML
400 SUSPENSION ORAL 2 TIMES DAILY
Status: DISCONTINUED | OUTPATIENT
Start: 2022-03-01 | End: 2022-02-21

## 2022-02-21 RX ORDER — ACYCLOVIR 200 MG/1
400 CAPSULE ORAL 2 TIMES DAILY
Status: DISCONTINUED | OUTPATIENT
Start: 2022-02-22 | End: 2022-02-27 | Stop reason: ALTCHOICE

## 2022-02-21 RX ORDER — PROPOFOL 10 MG/ML
INJECTION, EMULSION INTRAVENOUS CONTINUOUS PRN
Status: DISCONTINUED | OUTPATIENT
Start: 2022-02-21 | End: 2022-02-21

## 2022-02-21 RX ORDER — NALOXONE HYDROCHLORIDE 0.4 MG/ML
0.4 INJECTION, SOLUTION INTRAMUSCULAR; INTRAVENOUS; SUBCUTANEOUS
Status: DISCONTINUED | OUTPATIENT
Start: 2022-02-21 | End: 2022-03-17 | Stop reason: HOSPADM

## 2022-02-21 RX ORDER — SODIUM CHLORIDE 9 MG/ML
INJECTION, SOLUTION INTRAVENOUS CONTINUOUS
Status: DISCONTINUED | OUTPATIENT
Start: 2022-02-21 | End: 2022-03-01

## 2022-02-21 RX ORDER — CEFTAZIDIME 2 G/1
2 INJECTION, POWDER, FOR SOLUTION INTRAVENOUS EVERY 8 HOURS
Status: DISCONTINUED | OUTPATIENT
Start: 2022-02-21 | End: 2022-02-22

## 2022-02-21 RX ORDER — PROPOFOL 10 MG/ML
5-75 INJECTION, EMULSION INTRAVENOUS CONTINUOUS
Status: DISCONTINUED | OUTPATIENT
Start: 2022-02-21 | End: 2022-02-22

## 2022-02-21 RX ORDER — MYCOPHENOLATE MOFETIL 200 MG/ML
1500 POWDER, FOR SUSPENSION ORAL 2 TIMES DAILY
Status: DISCONTINUED | OUTPATIENT
Start: 2022-02-21 | End: 2022-02-25

## 2022-02-21 RX ORDER — ONDANSETRON 4 MG/1
4 TABLET, ORALLY DISINTEGRATING ORAL EVERY 6 HOURS PRN
Status: DISCONTINUED | OUTPATIENT
Start: 2022-02-21 | End: 2022-03-17 | Stop reason: HOSPADM

## 2022-02-21 RX ORDER — OXYCODONE HYDROCHLORIDE 5 MG/1
5 TABLET ORAL EVERY 4 HOURS PRN
Status: DISCONTINUED | OUTPATIENT
Start: 2022-02-21 | End: 2022-03-12

## 2022-02-21 RX ORDER — DEXTROSE MONOHYDRATE 100 MG/ML
INJECTION, SOLUTION INTRAVENOUS CONTINUOUS PRN
Status: DISCONTINUED | OUTPATIENT
Start: 2022-02-21 | End: 2022-02-28

## 2022-02-21 RX ORDER — POTASSIUM CHLORIDE 1.5 G/1.58G
20 POWDER, FOR SOLUTION ORAL ONCE
Status: CANCELLED | OUTPATIENT
Start: 2022-02-21 | End: 2022-02-21

## 2022-02-21 RX ORDER — POTASSIUM CHLORIDE 1.5 G/1.58G
40 POWDER, FOR SOLUTION ORAL ONCE
Status: CANCELLED | OUTPATIENT
Start: 2022-02-21 | End: 2022-02-21

## 2022-02-21 RX ORDER — FENTANYL CITRATE 50 UG/ML
INJECTION, SOLUTION INTRAMUSCULAR; INTRAVENOUS PRN
Status: DISCONTINUED | OUTPATIENT
Start: 2022-02-21 | End: 2022-02-21

## 2022-02-21 RX ORDER — ACYCLOVIR 200 MG/5ML
400 SUSPENSION ORAL 2 TIMES DAILY
Status: DISCONTINUED | OUTPATIENT
Start: 2022-02-22 | End: 2022-02-27 | Stop reason: ALTCHOICE

## 2022-02-21 RX ORDER — LEVALBUTEROL INHALATION SOLUTION 1.25 MG/3ML
1.25 SOLUTION RESPIRATORY (INHALATION) 4 TIMES DAILY
Status: DISCONTINUED | OUTPATIENT
Start: 2022-02-21 | End: 2022-03-11

## 2022-02-21 RX ORDER — BISACODYL 10 MG
10 SUPPOSITORY, RECTAL RECTAL DAILY PRN
Status: DISCONTINUED | OUTPATIENT
Start: 2022-02-21 | End: 2022-03-17 | Stop reason: HOSPADM

## 2022-02-21 RX ORDER — LIDOCAINE 40 MG/G
CREAM TOPICAL
Status: DISCONTINUED | OUTPATIENT
Start: 2022-02-21 | End: 2022-03-11

## 2022-02-21 RX ORDER — HYDROMORPHONE HCL IN WATER/PF 6 MG/30 ML
0.2 PATIENT CONTROLLED ANALGESIA SYRINGE INTRAVENOUS
Status: DISCONTINUED | OUTPATIENT
Start: 2022-02-21 | End: 2022-03-01

## 2022-02-21 RX ORDER — FIBRINOGEN (HUMAN) 700-1300MG
1 KIT INTRAVENOUS ONCE
Status: DISCONTINUED | OUTPATIENT
Start: 2022-02-21 | End: 2022-02-21 | Stop reason: CLARIF

## 2022-02-21 RX ORDER — ACETAMINOPHEN 325 MG/1
975 TABLET ORAL EVERY 8 HOURS
Status: DISCONTINUED | OUTPATIENT
Start: 2022-02-21 | End: 2022-02-22

## 2022-02-21 RX ORDER — POTASSIUM CHLORIDE 29.8 MG/ML
INJECTION INTRAVENOUS PRN
Status: DISCONTINUED | OUTPATIENT
Start: 2022-02-21 | End: 2022-02-21

## 2022-02-21 RX ORDER — NYSTATIN 100000/ML
1000000 SUSPENSION, ORAL (FINAL DOSE FORM) ORAL 4 TIMES DAILY
Status: DISCONTINUED | OUTPATIENT
Start: 2022-02-21 | End: 2022-03-17 | Stop reason: HOSPADM

## 2022-02-21 RX ORDER — MYCOPHENOLATE MOFETIL 250 MG/1
1500 CAPSULE ORAL 2 TIMES DAILY
Status: DISCONTINUED | OUTPATIENT
Start: 2022-02-21 | End: 2022-02-25

## 2022-02-21 RX ORDER — NICOTINE POLACRILEX 4 MG
15-30 LOZENGE BUCCAL
Status: DISCONTINUED | OUTPATIENT
Start: 2022-02-21 | End: 2022-02-22

## 2022-02-21 RX ORDER — PROPOFOL 10 MG/ML
INJECTION, EMULSION INTRAVENOUS PRN
Status: DISCONTINUED | OUTPATIENT
Start: 2022-02-21 | End: 2022-02-21

## 2022-02-21 RX ORDER — ALBUMIN, HUMAN INJ 5% 5 %
500 SOLUTION INTRAVENOUS ONCE
Status: COMPLETED | OUTPATIENT
Start: 2022-02-21 | End: 2022-02-21

## 2022-02-21 RX ORDER — POTASSIUM CHLORIDE 1.5 G/1.58G
40 POWDER, FOR SOLUTION ORAL ONCE
Status: COMPLETED | OUTPATIENT
Start: 2022-02-21 | End: 2022-02-21

## 2022-02-21 RX ORDER — POTASSIUM CHLORIDE 1.5 G/1.58G
20 POWDER, FOR SOLUTION ORAL ONCE
Status: COMPLETED | OUTPATIENT
Start: 2022-02-21 | End: 2022-02-21

## 2022-02-21 RX ORDER — LIDOCAINE HYDROCHLORIDE 20 MG/ML
INJECTION, SOLUTION INFILTRATION; PERINEURAL PRN
Status: DISCONTINUED | OUTPATIENT
Start: 2022-02-21 | End: 2022-02-21

## 2022-02-21 RX ORDER — CALCIUM CHLORIDE 100 MG/ML
INJECTION INTRAVENOUS; INTRAVENTRICULAR PRN
Status: DISCONTINUED | OUTPATIENT
Start: 2022-02-21 | End: 2022-02-21

## 2022-02-21 RX ORDER — HEPARIN SODIUM 1000 [USP'U]/ML
INJECTION, SOLUTION INTRAVENOUS; SUBCUTANEOUS PRN
Status: DISCONTINUED | OUTPATIENT
Start: 2022-02-21 | End: 2022-02-21

## 2022-02-21 RX ORDER — MAGNESIUM SULFATE HEPTAHYDRATE 40 MG/ML
INJECTION, SOLUTION INTRAVENOUS PRN
Status: DISCONTINUED | OUTPATIENT
Start: 2022-02-21 | End: 2022-02-21

## 2022-02-21 RX ADMIN — ROCURONIUM BROMIDE 100 MG: 50 INJECTION, SOLUTION INTRAVENOUS at 01:29

## 2022-02-21 RX ADMIN — MYCOPHENOLATE MOFETIL 1500 MG: 200 POWDER, FOR SUSPENSION ORAL at 20:33

## 2022-02-21 RX ADMIN — CEFTAZIDIME 2 G: 2 INJECTION, POWDER, FOR SOLUTION INTRAVENOUS at 21:11

## 2022-02-21 RX ADMIN — TACROLIMUS 1 MG: 1 CAPSULE ORAL at 09:27

## 2022-02-21 RX ADMIN — TACROLIMUS 1 MG: 1 CAPSULE ORAL at 17:38

## 2022-02-21 RX ADMIN — FENTANYL CITRATE 150 MCG: 50 INJECTION, SOLUTION INTRAMUSCULAR; INTRAVENOUS at 02:21

## 2022-02-21 RX ADMIN — SODIUM CHLORIDE, POTASSIUM CHLORIDE, SODIUM LACTATE AND CALCIUM CHLORIDE 500 ML: 600; 310; 30; 20 INJECTION, SOLUTION INTRAVENOUS at 11:14

## 2022-02-21 RX ADMIN — SUGAMMADEX 200 MG: 100 INJECTION, SOLUTION INTRAVENOUS at 07:01

## 2022-02-21 RX ADMIN — MAGNESIUM SULFATE HEPTAHYDRATE 1 G: 40 INJECTION, SOLUTION INTRAVENOUS at 05:09

## 2022-02-21 RX ADMIN — FAMOTIDINE 10 MG: 10 TABLET, FILM COATED ORAL at 20:33

## 2022-02-21 RX ADMIN — LEVALBUTEROL HYDROCHLORIDE 1.25 MG: 1.25 SOLUTION RESPIRATORY (INHALATION) at 16:14

## 2022-02-21 RX ADMIN — FENTANYL CITRATE 150 MCG: 50 INJECTION, SOLUTION INTRAMUSCULAR; INTRAVENOUS at 02:15

## 2022-02-21 RX ADMIN — PHENYLEPHRINE HYDROCHLORIDE 200 MCG: 10 INJECTION INTRAVENOUS at 06:10

## 2022-02-21 RX ADMIN — CEFTAZIDIME 1 G: 1 INJECTION, POWDER, FOR SOLUTION INTRAMUSCULAR; INTRAVENOUS at 02:05

## 2022-02-21 RX ADMIN — ACETAMINOPHEN 975 MG: 325 TABLET, FILM COATED ORAL at 23:18

## 2022-02-21 RX ADMIN — POTASSIUM CHLORIDE 20 MEQ: 1.5 POWDER, FOR SOLUTION ORAL at 14:11

## 2022-02-21 RX ADMIN — SODIUM CHLORIDE, SODIUM GLUCONATE, SODIUM ACETATE, POTASSIUM CHLORIDE AND MAGNESIUM CHLORIDE: 526; 502; 368; 37; 30 INJECTION, SOLUTION INTRAVENOUS at 02:02

## 2022-02-21 RX ADMIN — LEVALBUTEROL HYDROCHLORIDE 1.25 MG: 1.25 SOLUTION RESPIRATORY (INHALATION) at 12:05

## 2022-02-21 RX ADMIN — DEXMEDETOMIDINE HYDROCHLORIDE 0.4 MCG/KG/HR: 400 INJECTION INTRAVENOUS at 02:26

## 2022-02-21 RX ADMIN — POTASSIUM CHLORIDE 20 MEQ: 29.8 INJECTION, SOLUTION INTRAVENOUS at 05:20

## 2022-02-21 RX ADMIN — Medication 0.03 MCG/KG/MIN: at 02:49

## 2022-02-21 RX ADMIN — GABAPENTIN 100 MG: 100 CAPSULE ORAL at 21:45

## 2022-02-21 RX ADMIN — ALBUMIN HUMAN 500 ML: 0.05 INJECTION, SOLUTION INTRAVENOUS at 07:42

## 2022-02-21 RX ADMIN — PROPOFOL 50 MCG/KG/MIN: 10 INJECTION, EMULSION INTRAVENOUS at 06:00

## 2022-02-21 RX ADMIN — ACETAMINOPHEN 975 MG: 325 TABLET, FILM COATED ORAL at 15:17

## 2022-02-21 RX ADMIN — PROPOFOL 50 MCG/KG/MIN: 10 INJECTION, EMULSION INTRAVENOUS at 09:25

## 2022-02-21 RX ADMIN — VANCOMYCIN HYDROCHLORIDE 1000 MG: 10 INJECTION, POWDER, LYOPHILIZED, FOR SOLUTION INTRAVENOUS at 14:12

## 2022-02-21 RX ADMIN — SODIUM CHLORIDE, POTASSIUM CHLORIDE, SODIUM LACTATE AND CALCIUM CHLORIDE 500 ML: 600; 310; 30; 20 INJECTION, SOLUTION INTRAVENOUS at 22:45

## 2022-02-21 RX ADMIN — SODIUM CHLORIDE, SODIUM GLUCONATE, SODIUM ACETATE, POTASSIUM CHLORIDE AND MAGNESIUM CHLORIDE: 526; 502; 368; 37; 30 INJECTION, SOLUTION INTRAVENOUS at 01:10

## 2022-02-21 RX ADMIN — LIDOCAINE HYDROCHLORIDE 100 MG: 20 INJECTION, SOLUTION INFILTRATION; PERINEURAL at 01:28

## 2022-02-21 RX ADMIN — Medication 10 MG: at 13:10

## 2022-02-21 RX ADMIN — Medication: at 12:38

## 2022-02-21 RX ADMIN — NYSTATIN 1000000 UNITS: 100000 SUSPENSION ORAL at 20:32

## 2022-02-21 RX ADMIN — ROCURONIUM BROMIDE 20 MG: 50 INJECTION, SOLUTION INTRAVENOUS at 05:44

## 2022-02-21 RX ADMIN — CEFTAZIDIME 2 G: 2 INJECTION, POWDER, FOR SOLUTION INTRAVENOUS at 12:09

## 2022-02-21 RX ADMIN — POTASSIUM PHOSPHATE, MONOBASIC AND POTASSIUM PHOSPHATE, DIBASIC 15 MMOL: 224; 236 INJECTION, SOLUTION, CONCENTRATE INTRAVENOUS at 15:01

## 2022-02-21 RX ADMIN — Medication 1 UNITS: at 06:23

## 2022-02-21 RX ADMIN — FENTANYL CITRATE 200 MCG: 50 INJECTION, SOLUTION INTRAMUSCULAR; INTRAVENOUS at 01:28

## 2022-02-21 RX ADMIN — ROCURONIUM BROMIDE 50 MG: 50 INJECTION, SOLUTION INTRAVENOUS at 02:15

## 2022-02-21 RX ADMIN — POTASSIUM PHOSPHATE, MONOBASIC AND POTASSIUM PHOSPHATE, DIBASIC 15 MMOL: 224; 236 INJECTION, SOLUTION, CONCENTRATE INTRAVENOUS at 12:57

## 2022-02-21 RX ADMIN — CEFTAZIDIME 1 G: 1 INJECTION, POWDER, FOR SOLUTION INTRAMUSCULAR; INTRAVENOUS at 05:05

## 2022-02-21 RX ADMIN — SENNOSIDES AND DOCUSATE SODIUM 1 TABLET: 8.6; 5 TABLET ORAL at 20:31

## 2022-02-21 RX ADMIN — PANTOPRAZOLE SODIUM 40 MG: 40 INJECTION, POWDER, FOR SOLUTION INTRAVENOUS at 12:08

## 2022-02-21 RX ADMIN — FAMOTIDINE 10 MG: 10 TABLET, FILM COATED ORAL at 10:51

## 2022-02-21 RX ADMIN — EPINEPHRINE 0.01 MCG/KG/MIN: 1 INJECTION PARENTERAL at 02:51

## 2022-02-21 RX ADMIN — NYSTATIN 1000000 UNITS: 100000 SUSPENSION ORAL at 15:29

## 2022-02-21 RX ADMIN — LEVALBUTEROL HYDROCHLORIDE 1.25 MG: 1.25 SOLUTION RESPIRATORY (INHALATION) at 19:35

## 2022-02-21 RX ADMIN — MYCOPHENOLATE MOFETIL 1500 MG: 200 POWDER, FOR SUSPENSION ORAL at 08:16

## 2022-02-21 RX ADMIN — HUMAN INSULIN 2.5 UNITS/HR: 100 INJECTION, SOLUTION SUBCUTANEOUS at 17:30

## 2022-02-21 RX ADMIN — ACETAMINOPHEN 975 MG: 325 TABLET, FILM COATED ORAL at 08:16

## 2022-02-21 RX ADMIN — VANCOMYCIN HYDROCHLORIDE 1000 MG: 10 INJECTION, POWDER, LYOPHILIZED, FOR SOLUTION INTRAVENOUS at 02:05

## 2022-02-21 RX ADMIN — FENTANYL CITRATE 200 MCG: 50 INJECTION, SOLUTION INTRAMUSCULAR; INTRAVENOUS at 04:41

## 2022-02-21 RX ADMIN — ACETYLCYSTEINE 2 ML: 200 SOLUTION ORAL; RESPIRATORY (INHALATION) at 16:15

## 2022-02-21 RX ADMIN — NYSTATIN 1000000 UNITS: 100000 SUSPENSION ORAL at 12:08

## 2022-02-21 RX ADMIN — HEPARIN SODIUM 10000 UNITS: 1000 INJECTION INTRAVENOUS; SUBCUTANEOUS at 04:00

## 2022-02-21 RX ADMIN — PROPOFOL 40 MCG/KG/MIN: 10 INJECTION, EMULSION INTRAVENOUS at 20:48

## 2022-02-21 RX ADMIN — CALCIUM CHLORIDE 1 G: 100 INJECTION INTRAVENOUS; INTRAVENTRICULAR at 05:03

## 2022-02-21 RX ADMIN — METHYLPREDNISOLONE SODIUM SUCCINATE 500 MG: 500 INJECTION, POWDER, FOR SOLUTION INTRAMUSCULAR; INTRAVENOUS at 03:55

## 2022-02-21 RX ADMIN — Medication 2 UNITS: at 05:53

## 2022-02-21 RX ADMIN — HEPARIN SODIUM 5000 UNITS: 1000 INJECTION INTRAVENOUS; SUBCUTANEOUS at 02:43

## 2022-02-21 RX ADMIN — Medication 25 MCG: at 13:31

## 2022-02-21 RX ADMIN — METHYLPREDNISOLONE SODIUM SUCCINATE 125 MG: 125 INJECTION, POWDER, FOR SOLUTION INTRAMUSCULAR; INTRAVENOUS at 08:15

## 2022-02-21 RX ADMIN — POTASSIUM CHLORIDE 40 MEQ: 1.5 POWDER, FOR SOLUTION ORAL at 12:08

## 2022-02-21 RX ADMIN — POTASSIUM CHLORIDE 20 MEQ: 29.8 INJECTION, SOLUTION INTRAVENOUS at 05:09

## 2022-02-21 RX ADMIN — PROPOFOL 40 MCG/KG/MIN: 10 INJECTION, EMULSION INTRAVENOUS at 16:37

## 2022-02-21 RX ADMIN — FENTANYL CITRATE 50 MCG/HR: 50 INJECTION INTRAVENOUS at 08:23

## 2022-02-21 RX ADMIN — METHYLPREDNISOLONE SODIUM SUCCINATE 125 MG: 125 INJECTION, POWDER, FOR SOLUTION INTRAMUSCULAR; INTRAVENOUS at 15:17

## 2022-02-21 RX ADMIN — SODIUM CHLORIDE, POTASSIUM CHLORIDE, SODIUM LACTATE AND CALCIUM CHLORIDE 500 ML: 600; 310; 30; 20 INJECTION, SOLUTION INTRAVENOUS at 09:30

## 2022-02-21 RX ADMIN — PROPOFOL 150 MG: 10 INJECTION, EMULSION INTRAVENOUS at 01:28

## 2022-02-21 RX ADMIN — ACETYLCYSTEINE 2 ML: 200 SOLUTION ORAL; RESPIRATORY (INHALATION) at 12:05

## 2022-02-21 RX ADMIN — SODIUM CHLORIDE 20 MG: 9 INJECTION, SOLUTION INTRAVENOUS at 02:18

## 2022-02-21 RX ADMIN — ALBUMIN (HUMAN): 12.5 SOLUTION INTRAVENOUS at 06:13

## 2022-02-21 RX ADMIN — HUMAN INSULIN 3 UNITS/HR: 100 INJECTION, SOLUTION SUBCUTANEOUS at 04:15

## 2022-02-21 RX ADMIN — ACETYLCYSTEINE 2 ML: 200 SOLUTION ORAL; RESPIRATORY (INHALATION) at 19:35

## 2022-02-21 RX ADMIN — ROCURONIUM BROMIDE 50 MG: 50 INJECTION, SOLUTION INTRAVENOUS at 03:45

## 2022-02-21 RX ADMIN — SENNOSIDES AND DOCUSATE SODIUM 1 TABLET: 8.6; 5 TABLET ORAL at 08:15

## 2022-02-21 RX ADMIN — PROTAMINE SULFATE 80 MG: 10 INJECTION, SOLUTION INTRAVENOUS at 05:16

## 2022-02-21 RX ADMIN — METHYLPREDNISOLONE SODIUM SUCCINATE 125 MG: 125 INJECTION, POWDER, FOR SOLUTION INTRAMUSCULAR; INTRAVENOUS at 23:18

## 2022-02-21 ASSESSMENT — ACTIVITIES OF DAILY LIVING (ADL)
ADLS_ACUITY_SCORE: 7
ADLS_ACUITY_SCORE: 5
ADLS_ACUITY_SCORE: 7

## 2022-02-21 NOTE — PROGRESS NOTES
Donor Culture Results:    Final positive donor urine/sputum culture results have been uploaded into [facility]. Donor ID [id number].  Dr. Lala notified of results.

## 2022-02-21 NOTE — H&P
CV ICU H&P  2/21/2022      CO-MORBIDITIES:   Patient Active Problem List   Diagnosis     Other emphysema (H)     SOB (shortness of breath)     Chest pain     Abnormal chest CT     Status post coronary angiogram     Lung transplant candidate     S/P lung transplant (H)       ASSESSMENT: Melissa Elder is a 66 year old female with PMHx HTN, HLD, paroxysmal Afib, osteoporosis, GERD, severe COPD, previously requiring 2-3L NC O2 at rest.  Underwent b/l lung transplant with Dr. Robin on 02/21.    INTRAOPERATIVE EVENTS:  - Procedure was uncomplicated  - Got 1 L LR bolus and 500 mL albumin post procedure  - Propofol, insulin, fentanyl gtt  - Norepinephrine at 0.02  - Cellsaver 250 during procedure    Goals 02/21  - ESPs   - f/u elevated INR 02/21 AM  - Bronch on 02/22  - NJ  - Start subcutaneous heparin 02/22 AM (24h post-procedure)  - Another 500 mL LR and f/u lactate    PLAN:  Neuro/ pain/ sedation:  Acute Postoperative pain  - Monitor neurological status. Notify the MD for any acute changes in exam.  - Pain:    - fentanyl gtt   - scheduled acetaminophen, gabapentin   - PRN tylenol, dilaudid, oxycodone, robaxin   - E/S catheters placed 02/21  - Sedation: propofol gtt, fentanyl gtt  - PRN bolus propofol and fentanyl    Pulmonary care:   Postoperative ventilation management  - Intubated, ventilated  - Titrate supplemental oxygen to maintain saturation above 92%.  - Pulmonary hygiene: Incentive spirometer every 15- 30 minutes when awake, flutter valve, C&DB   - Mucomyst QID, levalbuterol QID   - discontinue budesonide and formoterol    - bronch planned 02/22  - Immune suppressive regimen: tacrolimus, MMF, pred---> methylpred, basiliximab    Cardiovascular:    History of HTN, HLD  Recent echo on 06/2021 with LVEF of 55-60%  - Monitor hemodynamic status.   - Goal MAP>65, SBP<140  - Giving 500mL LR for high lactate   - f/u lactate and pressor requirements post LR bolus  - hold PTA atorvastatin, hold diltiazem,  furosemide  - ASA: plan to re-start ASA 02/22  - Norepi gtt   - wean as tolerated  - lactate q6    GI care/ Nutrition:   - NPO   - NJ 02/21  - PPI (pantoprazole)   - no PTA PPI, will bridge with famotidine (end on 03/03/21)  - Continue bowel regimen: miralax, senna    Renal/ Fluid Balance/ Electrolytes:   BL creat appears to be ~ 0.5  - Strict I/O, daily weights   - + 1.3L net since admission as of 02/21   - getting 500mL LR for lactate 3.9  - Avoid/limit nephrotoxins as able  - Replete lytes PRN per protocol  - f/u CMP (lab error, labs are delayed this AM)    Endocrine:    Stress induced hyperglycemia  Preop A1c 5.8%  - Insulin gtt  - Goal BG <180 for optimal healing   - PTA meds: no Hx T2DM    ID/ Antibiotics:  Stress induced leukocytosis  Donor had staph  - To complete perioperative regimen (ceftaz + vanc for 48hr postop)  - Dapsone, pending G6PD screen   - patient has Sulfa allergy  - Prophylaxis:   - acyclovir starting 02/22 (does not need valacyclovir)   - hold dapsone    - awaiting G6PD screen before starting dapsone for PJP   - donor lung cultured (+) for staph, continue Vanco until speciates   - nystatin  - Continue to monitor fever curve, WBC and inflammatory markers as appropriate    Heme:     Stress induced leukocytosis  Acute blood loss anemia  Acute blood loss thrombocytopenia  No s/sx active bleeding  - Continue to monitor  - CBC and platelets every day    # Elevated INR  - ordered re-check, will f/u and reach out to anesthesia pain team as indicated    MSK/ Skin:  Sternotomy  Surgical Incision  - Sternal precautions  - Postoperative incision management per protocol  - PT/OT/CR    Prophylaxis:    - Mechanical prophylaxis for DVT  - Chemical DVT prophylaxis - start subcutaneous heparin 02/22 AM (24 hr after procedure)  - PPI    Lines/ tubes/ drains:  - Arterial Line, PIV x 2, ETT, CTs x 5, PA catheter, RIJ, gallo, OG    Disposition:  - CVICU    Patient seen, findings and plan discussed with CVICU  staff    Froylan Mchugh, MS4    Resident/Fellow Attestation   I, Raudel Webster, was present with Michael Mchugh who participated in the service and in the documentation of the note.  I have verified the history and personally performed the physical exam and medical decision making.  I agree with the assessment and plan of care as documented in the note.      Raudel Webster MD  Anesthesiology, CA-2, PGY3    ====================================    HPI:   Melissa Elder is a 66 year old female with PMHx HTN, HLD, paroxysmal Afib, osteoporosis, GERD, severe COPD, previously requiring 2-3L NC O2 at rest.  Underwent b/l lung transplant with Dr. Robin on 02/21.    PAST MEDICAL HISTORY:   Past Medical History:   Diagnosis Date     Atrial fibrillation with RVR (H)     hx of A fib related to severe COPD, does not meet anticoagulation criteria as noted     COPD, severe (H)      Osteoporosis        PAST SURGICAL HISTORY:   Past Surgical History:   Procedure Laterality Date     CV CORONARY ANGIOGRAM N/A 3/27/2019    Procedure: CV CORONARY ANGIOGRAM;  Surgeon: Thierry Serrano MD;  Location:  [facility]     CV RIGHT HEART CATH MEASUREMENTS RECORDED N/A 3/27/2019    Procedure: CV RIGHT HEART CATH;  Surgeon: Thierry Serrano MD;  Location:  [facility]ATH [facility]     ESOPHAGEAL IMPEDENCE FUNCTION TEST WITH 24 HOUR PH GREATER THAN 1 HOUR N/A 3/28/2019    Procedure: ESOPHAGEAL IMPEDENCE FUNCTION TEST WITH 24 HOUR PH GREATER THAN 1 HOUR;  Surgeon: Mike Henry MD;  Location:  GI     EXCISE PILONIDAL CYST, SIMPLE       TONSILLECTOMY & ADENOIDECTOMY         FAMILY HISTORY:   Family History   Problem Relation Age of Onset     Breast Cancer Mother      Colon Cancer Mother      Lung Cancer Mother      Lung Cancer Father      Lung Cancer Maternal Aunt      Pancreatic Cancer Maternal Uncle        SOCIAL HISTORY:   Social History     Tobacco Use     Smoking status: Former Smoker     Packs/day: 1.50      "Years: 36.00     Pack years: 54.00     Types: Cigarettes     Quit date: 2006     Years since quittin.1     Smokeless tobacco: Never Used   Substance Use Topics     Alcohol use: Yes     Comment: stated beer and wine occ       OBJECTIVE:   1. VITAL SIGNS:    BP (!) 163/78   Pulse 71   Temp 36.7  C (98  F) (Axillary)   Resp 16   Ht 1.575 m (5' 2\")   Wt 66.7 kg (147 lb)   SpO2 100%   BMI 26.89 kg/m      2. INTAKE/ OUTPUT:      Intake/Output Summary (Last 24 hours) at 2022 0807  Last data filed at 2022 0800  Gross per 24 hour   Intake 3178.75 ml   Output 1440 ml   Net 1738.75 ml       In +670ml  Out -650ml  UOP -650ml  CT n/a  Net +20ml      In +2.7L  Out -1.4L  UOP -250ml  CT -190ml  EBL -1L  Net +1.3L    3. PHYSICAL EXAMINATION:     General: sedated female patient lying flat in bed  Neuro: deeply sedated  Resp: intubated, ventilated, there is a fixed wheeze (inspiratory > expiratory) in all lung fields  CV: S1, S2, RRR, there is a soft pericardial rub audible  Abdomen: Soft, non-distended, non-tender  Incisions: c/d/i  Extremities: warm and well perfused, pulses 3+ x4  CT: To suction, serosang output, no airleak, crepitus    4. INVESTIGATIONS:   Arterial Blood Gases   Recent Labs   Lab 22  1527 22  0713 22  0625 22  0548   PH 7.46* 7.52* 7.43 7.36   PCO2 39 28* 37 46*   PO2 182* 232* 197* 258*   HCO3 28 23 24 26     Complete Blood Count   Recent Labs   Lab 22  1135 22  0809 22  0625 22  0548 22  0530 22  0123 22  0617   WBC  --  9.8  --   --  17.7*  --  9.6   HGB 7.0* 7.3* 9.3* 9.8* 8.1*   < > 13.2   PLT  --  109*  --   --  217  --  237    < > = values in this interval not displayed.     Basic Metabolic Panel  Recent Labs   Lab 22  1530 22  1450 22  1325 22  1118 22  1045 22  0717 22  0625 22  0548 22  0528 22  0123 22  0617   NA  --   --   --   --  144  " --  141 141 138   < > 140   POTASSIUM 4.4  --   --   --  3.0*  --  3.0* 3.4* 5.0   < > 4.0   CHLORIDE  --   --   --   --  111*  --   --   --   --   --  102   CO2  --   --   --   --  27  --   --   --   --   --  31   BUN  --   --   --   --  11  --   --   --   --   --  9   CR  --   --   --   --  0.41*  --   --   --   --   --  0.45*   GLC  --  189* 135* 86 92   < > 204* 253* 276*   < > 139*    < > = values in this interval not displayed.     Liver Function Tests  Recent Labs   Lab 02/21/22  1045 02/21/22  0808 02/21/22  0714 02/21/22  0530 02/20/22  0617   AST 32  --   --   --  14   ALT 15  --   --   --  25   ALKPHOS 33*  --   --   --  95   BILITOTAL 0.6  --   --   --  0.5   ALBUMIN 2.7*  --   --   --  3.7   INR  --  1.58* 5.23* 1.96* 1.02     Pancreatic Enzymes  No lab results found in last 7 days.  Coagulation Profile  Recent Labs   Lab 02/21/22  0808 02/21/22  0714 02/21/22  0530 02/20/22  0617   INR 1.58* 5.23* 1.96* 1.02   PTT  --  106* 29 31         5. RADIOLOGY:   Recent Results (from the past 24 hour(s))   XR Abdomen Port 1 View    Narrative    Exam: XR ABDOMEN PORT 1 VIEWS, 2/21/2022 7:48 AM    Indication: OG placement    Comparison: X-ray 3/25/2019    Findings:   Frontal radiograph of the abdomen. Patient is slightly rotated.  Gastric tube tip and sidehole project over the stomach. [place]  catheter. Mediastinal drain and chest tubes are partially visualized.  Nonobstructive bowel gas pattern. Scattered hemostatic clips overlying  the right and left mid abdomen. No pneumatosis or portal venous gas.  No aggressive osseous lesions.      Impression    Impression:   Gastric tube tip and sidehole project over the stomach.    I have personally reviewed the examination and initial interpretation  and I agree with the findings.    SATNAM SIMON MD         SYSTEM ID:  [id number]   XR Chest Port 1 View    Narrative    Exam: XR CHEST PORT 1 VIEW, 2/21/2022 7:48 AM    Comparison: 2/20/2022    History: post lung  transplant    Findings:  AP view of the chest. Endotracheal tube projects just above the level  of the jaimee. [place] catheter is in the pulmonary artery. Enteric  tube is in stomach. Right right chest tubes x2 projected over the  right hemithorax. Left chest tubes x2 with more lateral chest tube  with sidehole projecting over the subcutaneous tissue. Pericardial  chest tube along the base of the heart.    Trachea is midline. Cardiac silhouette is within normal limits. Aortic  knob calcifications.. Intimal perihilar and bibasilar opacities. There  is no pneumothorax or pleural effusion. Small amount of left chest  wall subcutaneous emphysema.      Impression    Impression:   1. Status post bilateral lung transplant  2. Endotracheal tube at the level of the jaimee, recommend slight  retraction.  3. Left chest tubes with left lateral chest tube sidehole projecting  over the subcutaneous tissue and associated subcutaneous emphysema.  Recommend reposition.  4. [place] catheter with tip in the main pulmonary artery.  5. Minimal perihilar and bibasilar atelectasis.    Findings FINDINGS discussed with Dr. Raudel Webster by Dr. Meng Suh at 810 AM on 2/21/2022.    I have personally reviewed the examination and initial interpretation  and I agree with the findings.    FELIPA MARIE MD         SYSTEM ID:  [id number]   POC US Guidance Needle Placement    Narrative    Bilateral erector spinae plane block   XR Chest Port 1 View    Narrative    Portable chest 2/21/22 at 1105 hours    INDICATION: Endotracheal tube pulled back    COMPARISON: Earlier same day 0735 hours    FINDINGS: Heart size normal. A few patchy densities in the lower lungs  appear similar. An endotracheal tube tip is approximately 2.2 cm above  the jaimee. Right IJ [place] catheter tip is in the main pulmonary  artery. There is calcification at the aortic knob. Clamshell  sternotomy. Enteric tube progress beyond the inferior margin of  the  image.      Impression    IMPRESSION: Endotracheal tube tip approximately 2.2 cm above the  jaimee. Atherosclerosis. Few patchy densities in the lower lungs which  may indicate atelectasis or edema, recommend follow-up to clearing to  exclude infection. [place] catheter tip in the main pulmonary  artery.    FELIPA MARIE MD         SYSTEM ID:  [id number]   XR Abdomen Port 1 View    Narrative    EXAMINATION:  XR ABDOMEN PORT 1 VIEWS 2/21/2022 3:31 PM     INDICATION: Verify small bowel feeding tube bedside placement    COMPARISON: Abdominal radiograph 2/21/2022    FINDINGS: Single AP supine view of the abdomen.     Feeding tube tip projects over the right upper quadrant, not  definitively postpyloric. Enteric tube with tip projecting in the  stomach. The small intestine and colon are nondistended. Scattered  hemostatic colonoscopy clips in the bowel. No evidence of pneumatosis  or portal venous gas. Multiple chest tubes visualized. The lung bases  are unremarkable.      Impression    IMPRESSION:  Feeding tube with tip projected in the right upper quadrant. Not  definitively postpyloric.    I have personally reviewed the examination and initial interpretation  and I agree with the findings.    SATNAM SIMON MD         SYSTEM ID:  [id number]       =========================================

## 2022-02-21 NOTE — PLAN OF CARE
SLP: SLP orders received.  Pt remains intubated s/p BSLT.  SLP cancel, will re-attempt SLP swallow assessment 2/22/22 (POD 2-3) as appropriate.

## 2022-02-21 NOTE — CONSULTS
Pulmonary Medicine  Cystic Fibrosis - Lung Transplant Team  Initial Consultation  2022      Patient: Melissa Elder  MRN: [id number]  : 1955 (age 66 year old)  Transplant: 2022 (Lung), POD#0  Admission date: 2022  Primary Care Provider: Natasha Rm    Assessment & Plan:     Melissa Elder is a 66 year old female with a PMH significant for severe COPD, HTN, mild non-obstructive CAD, paroxysmal afib, osteoporosis, GERD, and colonic polyps.  Pt. is now s/p BSLT on 2022.  Surgery relatively uncomplicated.  The patient is currently intubated and sedated, POD #0 in the CVICU.    S/p bilateral sequential lung transplant for COPD:  Acute hypoxic/hypercapneic respiratory failure: Scant pleural-pleural adhesions and mild-moderate bilateral hilar lymphadenopathy per op note.  Patient is currently on norepinephrine, oxygenating well on CMV 16/350/8/40.  Lactic acid elevated to 4.4 (from 3.9).  - Nebs: levalbuterol and Mucomyst QID  - Aggressive pulmonary toilet with chest physiotherapy QID (addition of Aerobika and incentive spirometry once extubated)  - DSA at one week (ordered ) then one month post-transplant, additionally per protocol  - Ventilator management per ICU team, defer extubation today, revisit tomorrow  - Our team will perform bronchoscopy on day of anticipated extubation, tentative plan for tomorrow  - Tentative plan for anesthesia to place erector spinae catheters today pending INR  - Chest tubes managed by surgical team  - Daily CXR, today with few patchy densities in lower lungs, may indicate atelectasis or edema (personally reviewed with Dr. Lala)  - Volume management per ICU team  - Post-pyloric nasal feeding tube placement today for enteral nutrition, trickle feed rate only (max 20 ml/hr) with gastric access  - SLP consult as pt. nears extubation for swallowing evaluation  - Await explant pathology    Immunosuppression:  - Induction therapy with basiliximab (and  high dose IV steroid) given intraoperatively, repeating basiliximab dose on POD#4 (ordered 2/25)  - Tacrolimus 1 mg SL BID.  Goal level 8-12.  Continuing SL dosing until goal is achieved (target: within 7 days post-op) and return of bowel function post-op.  Daily tacrolimus levels for the first 1-2 weeks with dose adjustments prn based on levels and changes in medications/patient condition.  See below for initial levels and dosing trends:  Date [place]ro Level Intervention   2/22 ordered              - MMF 1500 mg BID  - Methylprednisolone 125 mg x 3 doses, then prednisolone 17.5 mg BID ordered to begin 2/22 with taper per lung transplant protocol (due 3/1, not yet ordered)  Date AM dose (mg) PM dose (mg)   2/22 17.5 17.5   3/1 15 15   3/8 15 12.5   3/15 12.5 12.5   3/29 12.5 10   4/12 10 10   5/10 10 7.5   6/7 7.5 7.5   7/5 7.5 5   8/2 5 5   8/30 5 2.5     Prophylaxis:   - Daponse for PJP ppx on hold pending G6PD (2/21), noted: sulfa allergy  - Nystatin for oral candidiasis ppx, 6 month course  - See below for serologies and viral ppx:   Donor Recipient Intervention   CMV status Negative Negative N/A, CMV monthly (3/21, not yet ordered)   EBV status Positive Positive N/A, EBV at one month (3/21, not yet ordered) then q3 months   HSV status N/A Positive (as of 2/20 labs) Acyclovir POD #1-30 (given ?recently positive)     Primary graft dysfunction (per ISHLT guidelines):  See chart below:   POD #0  (~0 hours) POD #1  (~24 hours) POD #2   (~48 hours) POD#3   (~72 hours)   Date 2/21      Time 0713      Intubated Y Y/N Y/N Y/N   PaO2 232      FiO2 50      P/F Ratio 464      PGD Grade   (0=mild, 3=severe) 0      ECMO N Y/N Y/N Y/N   Inhaled NO/Flolan N Y/N Y/N Y/N   ISHLT PGD Scoring  Grade 0 - PaO2/FiO2 >300 and normal chest radiograph (absence of diffuse allograft infiltrates)  Grade 1 - PaO2/FiO2 >300 and diffuse allograft infiltrates on chest radiograph  Grade 2 - PaO2/FiO2 between 200 and 300  Grade 3 - PaO2/FiO2 <200  and/or on ECMO    ID: Prior history of infection/colonization with Haemophilus influenzae (2017).  IgG adequate at 1,023 on 2/20.  MRSA nares negative 2/21.      - IgG repeat at one month (3/21, not yet ordered)  - Donor (OSH) respiratory culture with MSSA, urine culture with P-S Enterococcus faecalis, and blood cultures NGTD (personally reviewed in [facility])  - Donor cultures ([facility]) pending  - Recipient cultures pending  - Continue IV ceftaz/vancomycin for at least 48h per protocol, will adjust antibiotic plan based on results of the above cultures    PHS risk criteria donor: Additional labs required post-transplant (between 4-8 weeks post-op): Hepatitis B, Hepatitis C, and HIV by COLT ([id number], ordered POD #30), also plan to repeat hepatitis B surface antibody at that time (ordered) given discrepancy with recent result.    Other relevant problems managed by primary team:    CAD: Noted to have mild non-obstructive CAD without hemodynamically significant lesions on cardiac cath 3/27/19.  PTA ASA 81 mg and atorvastatin.   - Once appropriate to resume statin recommend rosuvastatin (less interaction with immunosuppression)    Paroxysmal afib: PTA diltiazem, not on AC.    - Management per primary team, will need to closely monitor tacrolimus levels/dosing if diltiazem resumed    GERD: Not on PPI PTA.  Negative pH/manometry study 3/28/19, noted small hiatal hernia.   - PPI daily (2/21), famotidine BID (2/21) x10d as bridge    Anxiety: Per lung transplant committee review, noted high anxiety in anticipation of transplant.   - Monitor need for palliative care and/or health psychology consult post-op    We appreciate the excellent care provided by the CVTS and CVICU teams.  Recommendations communicated via in person rounding and this note.  Will continue to follow along closely, please do not hesitate to call with any questions or concerns.    Patient seen and discussed with Dr. Lala.    Lola Mann PA-C  Inpatient  [facility]  Pulmonary CF/Transplant     Chief Complaint:     S/p BSLT    History of Present Illness:     History obtained from chart review as patient intubated and sedated.    History of COPD, PFTs with very severe obstructive lung disease, listed for lung transplant in 2019 (LAS 32.2265).  On Duoneb, Brovana, and Pulmicort nebs and Xopenex and Spiriva inhalers PTA.  Also with paroxysmal afib on diltiazem (no AC) and mild non-obstructive CAD on atorvastatin and ASA 81 mg PTA.  Requiring 2L NC at rest, 3L NC with activity.  Noted to have LUTHER, able to walk about a block outside regularly although with frequent breaks and often using wheelchair.  Endorsed increased fatigue over the past few weeks, attributed to weather/not wanting to go outside.  Denied cough/wheezing, fevers/chills, or decreased appetite at time of admission.  S/p COVID-19 vaccine x3 and seasonal influenza vaccine.     Now s/p BSLT earlier today, seen in the CVICU this morning.  Currently sedated with propofol and fentanyl, oxygenating well on full mechanical ventilation with CMV settings 16/350/8/40.  On norepinephrine and receiving IV fluids, lactic acid elevated (3.9 --> 4.4).  Hemoglobin down to 7.3.  Afebrile, improved leukocytosis.       Review of Systems:     Complete ROS as above, otherwise severely limited by sedation and intubation.    Medical and Surgical History:     Past Medical History:   Diagnosis Date     Atrial fibrillation with RVR (H)     hx of A fib related to severe COPD, does not meet anticoagulation criteria as noted     COPD, severe (H)      Osteoporosis      Past Surgical History:   Procedure Laterality Date     CV CORONARY ANGIOGRAM N/A 3/27/2019    Procedure: CV CORONARY ANGIOGRAM;  Surgeon: Thierry Serrano MD;  Location:  HEART CARDIAC CATH LAB     CV RIGHT HEART CATH MEASUREMENTS RECORDED N/A 3/27/2019    Procedure: CV RIGHT HEART CATH;  Surgeon: Thierry Serrano MD;  Location:  HEART CARDIAC CATH LAB     ESOPHAGEAL  IMPEDENCE FUNCTION TEST WITH 24 HOUR PH GREATER THAN 1 HOUR N/A 3/28/2019    Procedure: ESOPHAGEAL IMPEDENCE FUNCTION TEST WITH 24 HOUR PH GREATER THAN 1 HOUR;  Surgeon: Mike Henry MD;  Location:  GI     EXCISE PILONIDAL CYST, SIMPLE       TONSILLECTOMY & ADENOIDECTOMY       Social and Family History:     Social History     Socioeconomic History     Marital status:      Spouse name: Not on file     Number of children: Not on file     Years of education: Not on file     Highest education level: Not on file   Occupational History     Not on file   Tobacco Use     Smoking status: Former Smoker     Packs/day: 1.50     Years: 36.00     Pack years: 54.00     Types: Cigarettes     Quit date: 2006     Years since quittin.1     Smokeless tobacco: Never Used   Substance and Sexual Activity     Alcohol use: Yes     Comment: stated beer and wine occ     Drug use: Yes     Comment: stated hx of marijuana, quit in      Sexual activity: Not on file   Other Topics Concern     Parent/sibling w/ CABG, MI or angioplasty before 65F 55M? Not Asked   Social History Narrative     Not on file     Social Determinants of Health     Financial Resource Strain: Not on file   Food Insecurity: Not on file   Transportation Needs: Not on file   Physical Activity: Not on file   Stress: Not on file   Social Connections: Not on file   Intimate Partner Violence: Not on file   Housing Stability: Not on file     Family History   Problem Relation Age of Onset     Breast Cancer Mother      Colon Cancer Mother      Lung Cancer Mother      Lung Cancer Father      Lung Cancer Maternal Aunt      Pancreatic Cancer Maternal Uncle      Allergies and Home Medications:     Allergies   Allergen Reactions     Alendronic Acid Other (See Comments)     dizziness  Other reaction(s): Dizziness     Nickel Rash     Sulfa Drugs Rash     Medications Prior to Admission   Medication Sig Dispense Refill Last Dose     arformoterol (BROVANA) 15  MCG/2ML NEBU neb solution Take 2 mLs (15 mcg) by nebulization 2 times daily   2/18/2022     aspirin 81 MG EC tablet Take 81 mg by mouth daily    2/18/2022     atorvastatin (LIPITOR) 10 MG tablet Take 10 mg by mouth daily    2/18/2022     budesonide (PULMICORT) 1 MG/2ML neb solution Take 2 mLs (1 mg) by nebulization 2 times daily   2/18/2022     calcium 500 MG CHEW Take 1 tablet by mouth daily    2/18/2022     diltiazem ER (TIAZAC) 240 MG 24 hr ER beaded capsule Take 240 mg by mouth daily   2/18/2022     furosemide (LASIX) 20 MG tablet Take 20 mg by mouth daily    2/18/2022     IBANdronate (BONIVA) 150 MG tablet Take 150 mg by mouth every 30 days   2/1/2022     ipratropium - albuterol 0.5 mg/2.5 mg/3 mL (DUONEB) 0.5-2.5 (3) MG/3ML neb solution Take 1 vial (3 mLs) by nebulization 2 times daily   2/18/2022     levalbuterol (XOPENEX HFA) 45 MCG/ACT Inhaler Inhale 2 puffs into the lungs every 4 hours as needed for shortness of breath / dyspnea or wheezing    Past Month     potassium chloride ER (KLOR-CON M) 20 MEQ CR tablet Take 20 mEq by mouth daily    2/18/2022     tiotropium (SPIRIVA RESPIMAT) 2.5 MCG/ACT inhalation aerosol Inhale 2 puffs into the lungs daily    2/18/2022     OXYGEN-HELIUM IN 2 lpm per nasal canula nocturnal and with activity may use portable w/ conserving device        Current Scheduled Meds    acetaminophen  975 mg Oral or Feeding Tube Q8H     acetylcysteine  2 mL Nebulization 4x Daily     [START ON 2/22/2022] acyclovir  400 mg Oral or NG Tube BID    Or     [START ON 2/22/2022] acyclovir  400 mg Oral or NG Tube BID     [START ON 2/25/2022] basiliximab (SIMULECT) infusion  20 mg Intravenous Once     [START ON 3/1/2022] calcium carbonate 600 mg-vitamin D 400 units  1 tablet Oral BID w/meals     cefTAZidime  2 g Intravenous Q8H     [Held by provider] dapsone  50 mg Oral or NG Tube Once per day on Mon Wed Fri     famotidine  10 mg Oral or Feeding Tube BID     gabapentin  100 mg Oral or Feeding Tube At  "Bedtime     [START ON 2/22/2022] heparin ANTICOAGULANT  5,000 Units Subcutaneous Q8H     levalbuterol  1.25 mg Nebulization 4x Daily     lidocaine (viscous)  5 mL Topical Once     methylPREDNISolone  125 mg Intravenous Q8H    Followed by     [START ON 2/22/2022] prednisoLONE  17.5 mg Oral or NG Tube BID     mycophenolate  1,500 mg Oral BID    Or     mycophenolate  1,500 mg Oral or NG Tube BID     nystatin  1,000,000 Units Swish & Swallow 4x Daily     pantoprazole (PROTONIX) IV  40 mg Intravenous Daily     [START ON 2/22/2022] polyethylene glycol  17 g Oral or Feeding Tube Daily     senna-docusate  1 tablet Oral or Feeding Tube BID     sodium chloride (PF)  3 mL Intracatheter Q8H     tacrolimus  1 mg Sublingual BID IS     vancomycin  1,000 mg Intravenous Q12H      Current PRN Meds  [START ON 2/24/2022] acetaminophen, bisacodyl, dextrose, glucose **OR** dextrose **OR** glucagon, fentaNYL, fluticasone, HYDROmorphone **OR** HYDROmorphone, lidocaine 4%, lidocaine (buffered or not buffered), magnesium hydroxide, methocarbamol, naloxone **OR** naloxone **OR** naloxone **OR** naloxone, ondansetron **OR** ondansetron, oxyCODONE **OR** oxyCODONE, propofol, BETA BLOCKER NOT PRESCRIBED, sodium chloride (PF)     Physical Exam:     Vital signs:  Temp: 98  F (36.7  C) Temp src: Axillary BP: (!) 163/78 Pulse: 64   Resp: 12 SpO2: 100 % O2 Device: Oxymask Oxygen Delivery: 4 LPM Height: 157.5 cm (5' 2\") Weight: 66.7 kg (147 lb)  I/O:     Intake/Output Summary (Last 24 hours) at 2/21/2022 1031  Last data filed at 2/21/2022 1000  Gross per 24 hour   Intake 3793.07 ml   Output 2000 ml   Net 1793.07 ml     Constitutional: Lying in bed, in no apparent distress.   HEENT: Eyes with pink conjunctivae, anicteric.  Pupils equal.  Orally intubated.  OG tube.  PULM: Good air flow bilaterally.  Few crackles on left.  No rhonchi, no wheezes.  Non-labored breathing on full vent support.  Chest tubes without air leak.  CV: Normal S1 and S2.  RRR.  No " murmur, gallop, or rub.  No peripheral edema.   ABD: BS hypoactive, soft, nondistended.    MSK: No apparent muscle wasting.   NEURO: Sedated.   SKIN: Warm, dry. No rash on limited exam.  Incision covered.  PSYCH: Calm. ?     Lines, Drains, and Devices:  Peripheral IV 02/20/22 Anterior;Right Lower forearm (Active)   Site Assessment [facility] 02/21/22 0800   Line Status Saline locked 02/21/22 0800   Dressing Intervention New dressing  02/20/22 2140   Phlebitis Scale 0-->no symptoms 02/21/22 0800   Infiltration Scale 0 02/21/22 0800   Infiltration Site Treatment Method  None 02/21/22 0800   Number of days: 1       Peripheral IV 02/20/22 Posterior Lower forearm (Active)   Site Assessment [facility] 02/21/22 0800   Line Status Saline locked 02/21/22 0800   Phlebitis Scale 0-->no symptoms 02/21/22 0800   Infiltration Scale 0 02/21/22 0800   Number of days: 1       Introducer 02/21/22 Internal jugular Right (Active)   Specific Qualities [place] in place 02/21/22 0800   (Retired) Dressing Status Clean, dry, intact 02/21/22 0800   Dressing Change Due 02/27/22 02/21/22 0800   Number of days: 0       Arterial Line 02/21/22 (Active)   Site Assessment [facility] 02/21/22 0800   Line Status Pulsatile blood flow 02/21/22 0800   Arterine Line Cap Change Due 02/22/22 02/21/22 0800   Art Line Waveform Appropriate 02/21/22 0800   Art Line Interventions Zeroed and calibrated;Leveled;Connections checked and tightened 02/21/22 0800   Color/Movement/Sensation Capillary refill less than 3 sec 02/21/22 0800   Line Necessity Yes, meets criteria 02/21/22 0800   Dressing Type Transparent 02/21/22 0800   Dressing Status Clean, dry, intact 02/21/22 0800   Number of days: 0       CVC Double Lumen Right Internal jugular (Active)   Site Assessment [facility] 02/21/22 0800   Dressing Type Chlorhexidine sponge;Transparent 02/21/22 0800   Dressing Status clean;dry;intact 02/21/22 0800   Dressing Change Due 02/27/22 02/21/22 0800   Line Necessity yes, meets criteria 02/21/22 0800    Brown - Status infusing 02/21/22 0800   Brown - Cap Change Due 02/22/22 02/21/22 0800   White - Status infusing 02/21/22 0800   White - Cap Change Due 02/22/22 02/21/22 0800   Number of days: 0       Pulmonary Artery Catheter - Single Lumen 02/21/22 0330 Right internal jugular vein continuous hemodynamic catheter (Active)   Dressing dressing dry and intact 02/21/22 0800   Securement secured with sutures 02/21/22 0800   PA Catheter Markings (cm) 51 02/21/22 0800   Phlebitis 0-->no symptoms 02/21/22 0800   Infiltration 0-->no symptoms 02/21/22 0800   Waveform normal 02/21/22 0800   Number of days: 0     Results:     LABS    CMP:   Recent Labs   Lab 02/21/22  0910 02/21/22  0808 02/21/22  0717 02/21/22  0625 02/21/22  0548 02/21/22  0528 02/21/22  0455 02/21/22  0123 02/20/22  0617   NA  --   --   --  141 141 138 140   < > 140   POTASSIUM  --   --   --  3.0* 3.4* 5.0 2.9*   < > 4.0   CHLORIDE  --   --   --   --   --   --   --   --  102   CO2  --   --   --   --   --   --   --   --  31   ANIONGAP  --   --   --   --   --   --   --   --  7   *  --  145* 204* 253* 276* 285*   < > 139*   BUN  --   --   --   --   --   --   --   --  9   CR  --   --   --   --   --   --   --   --  0.45*   GFRESTIMATED  --   --   --   --   --   --   --   --  >90   MINISTERIO  --   --   --   --   --   --   --   --  9.0   MAG  --   --   --   --   --   --   --   --  1.7*   PHOS  --  1.2*  --   --   --   --   --   --  3.5   PROTTOTAL  --   --   --   --   --   --   --   --  7.8   ALBUMIN  --   --   --   --   --   --   --   --  3.7   BILITOTAL  --   --   --   --   --   --   --   --  0.5   ALKPHOS  --   --   --   --   --   --   --   --  95   AST  --   --   --   --   --   --   --   --  14   ALT  --   --   --   --   --   --   --   --  25    < > = values in this interval not displayed.     CBC:   Recent Labs   Lab 02/21/22  0809 02/21/22  0625 02/21/22  0548 02/21/22  0530 02/21/22  0123 02/20/22  0617   WBC 9.8  --   --  17.7*  --  9.6   RBC 2.57*  --    --  2.83*  --  4.66   HGB 7.3* 9.3* 9.8* 8.1*   < > 13.2   HCT 21.8*  --   --  24.8*  --  40.5   MCV 85  --   --  88  --  87   MCH 28.4  --   --  28.6  --  28.3   MCHC 33.5  --   --  32.7  --  32.6   RDW 12.7  --   --  12.7  --  12.8   *  --   --  217  --  237    < > = values in this interval not displayed.       INR:   Recent Labs   Lab 02/21/22  0808 02/21/22  0714 02/21/22  0530 02/20/22  0617   INR 1.58* 5.23* 1.96* 1.02       Glucose:   Recent Labs   Lab 02/21/22  0910 02/21/22  0717 02/21/22  0625 02/21/22  0548 02/21/22  0528 02/21/22  0455   * 145* 204* 253* 276* 285*       Blood Gas:   Recent Labs   Lab 02/21/22  0714 02/21/22  0713 02/21/22  0625   PHV 7.46*  --   --    PCO2V 38*  --   --    PO2V 63*  --   --    HCO3V 27  --   --    ARIEL 3.2*  --   --    O2PER 50 50 50.0       Culture Data No results for input(s): CULT in the last 168 hours.    Virology Data: No results found for: INFLUA, FLUAH1, FLUAH3, TH3686, IFLUB, RSVA, RSVB, PIV1, PIV2, PIV3, HMPV, HRVS, ADVBE, ADVC    Historical CMV results (last 3 of prior testing):  No results found for: CMVQNT  No results found for: CMVLOG    Urine Studies    Recent Labs   Lab Test 02/20/22  0248 03/25/19  1043   URINEPH 7.0 5.0   NITRITE Negative Negative   LEUKEST Negative Trace*   WBCU <1 1       Most Recent [place] Pulmonary Function Testing (FVC/FEV1 only)  FVC-Pre   Date Value Ref Range Status   12/20/2021 1.81 L    06/21/2021 1.46 L    12/09/2019 1.59 L    06/03/2019 1.68 L      FVC-%Pred-Pre   Date Value Ref Range Status   12/20/2021 65 %    06/21/2021 52 %    12/09/2019 56 %    06/03/2019 58 %      FEV1-Pre   Date Value Ref Range Status   12/20/2021 0.49 L    06/21/2021 0.50 L    12/09/2019 0.49 L    06/03/2019 0.47 L      FEV1-%Pred-Pre   Date Value Ref Range Status   12/20/2021 22 %    06/21/2021 22 %    12/09/2019 21 %    06/03/2019 20 %        IMAGING    Recent Results (from the past 48 hour(s))   XR Chest 2 Views    Narrative    EXAM: XR  CHEST 2 VW  2/20/2022 7:29 AM     HISTORY:  pre-op lung transplant recipient       COMPARISON:  3/29/2019    TECHNIQUE: PA and lateral radiographs of the chest    FINDINGS:     Midline trachea. Well-defined cardiomediastinal silhouette. Distinct  pulmonary vasculature. No consolidation, pleural effusion or  appreciable pneumothorax.      Impression    IMPRESSION: No acute consolidation.     I have personally reviewed the examination and initial interpretation  and I agree with the findings.    ADRIANA VELAZQUEZ MD         SYSTEM ID:  [id number]   XR Abdomen Port 1 View    Narrative    Exam: XR ABDOMEN PORT 1 VIEWS, 2/21/2022 7:48 AM    Indication: OG placement    Comparison: X-ray 3/25/2019    Findings:   Frontal radiograph of the abdomen. Patient is slightly rotated.  Gastric tube tip and sidehole project over the stomach. Ratliff  catheter. Mediastinal drain and chest tubes are partially visualized.  Nonobstructive bowel gas pattern. Scattered hemostatic clips overlying  the right and left mid abdomen. No pneumatosis or portal venous gas.  No aggressive osseous lesions.      Impression    Impression:   Gastric tube tip and sidehole project over the stomach.    I have personally reviewed the examination and initial interpretation  and I agree with the findings.    SATNAM SIMON MD         SYSTEM ID:  [id number]

## 2022-02-21 NOTE — ANESTHESIA PROCEDURE NOTES
Arterial Line Procedure Note    Pre-Procedure   Staff -        Anesthesiologist:  Gerard Coleman MD       Resident/Fellow: Gerard Marin MD       Performed By: resident       Location: OR       Pre-Anesthestic Checklist: patient identified, IV checked, risks and benefits discussed, informed consent, monitors and equipment checked, pre-op evaluation and at physician/surgeon's request  Timeout:       Correct Patient: Yes        Correct Procedure: Yes        Correct Site: Yes        Correct Position: Yes   Line Placement:   This line was placed Pre Induction starting at 2/21/2022 1:15 AM and ending at 2/21/2022 1:20 AM  Procedure   Procedure: arterial line       Laterality: left       Insertion Site: radial.  Sterile Prep        Standard elements of sterile barrier followed       Skin prep: Chloraprep  Insertion/Injection        Technique: ultrasound guided and Seldinger Technique        1. Ultrasound was used to evaluate the access site.       2. Artery evaluated via ultrasound for patency/adequacy.       3. Using real-time ultrasound the needle/catheter was observed entering the artery/vein.       Catheter Type/Size: 20 G, 12 cm  Narrative        Tegaderm and Biopatch dressing used.       Complications: None apparent,        Arterial waveform: Yes        IBP within 10% of NIBP: Yes

## 2022-02-21 NOTE — OP NOTE
Procedure Date: 02/21/2022    PREOPERATIVE DIAGNOSIS:  End-stage lung disease secondary to chronic obstructive lung disease (COPD).    POSTOPERATIVE DIAGNOSIS:  End-stage lung disease secondary to chronic obstructive lung disease (COPD).    PROCEDURES PERFORMED:    1.  Flexible bronchoscopy.  2.  Bilateral anterolateral thoracotomy with transverse sternotomy (clamshell incision).  3.  Bilateral recipient pneumonectomies.  4.  Off pump bilateral single lung transplant.  4.  Back table preparation of organ donor.  5.  Intercostal nerve cryoablation of spaces 3 through 5.    ANESTHESIA:  General endotracheal anesthesia.    SURGEON:  Raul Robin MD    COSURGEON:  Faby Maguire MD    Two staff surgeons were required given the high complexity of the case and given the fact that there was no qualified resident or fellow available.  Please see separate dictation for Dr. Robin.  I personally performed the pneumonectomy and lung implantation on the left side and assisted Dr. Robin throughout the case.  I also performed intercostal nerve cryoablation of the intercostal nerves.    DESCRIPTION OF PROCEDURE IN DETAIL:  After the right lung had been implanted, we proceeded to dissect out the left lung.  We took down the inferior pulmonary ligament, dissected out the left superior and inferior pulmonary veins.  These were then divided with a vascular load of the Ethicon stapler.  Next, the left main pulmonary artery was dissected out.  Cedar clamps was applied proximally and the pulmonary artery was divided distally.  The peribronchial tissue was divided with LigaSure device.  The left main stem bronchus was divided at the bifurcation.  Hemostasis was confirmed at the hilum.  The lung was then resected and sent to pathology.  We did not palpate any masses in the lung.  The left lung was brought to the field after it was prepared on the back table.    First, we started with the bronchial anastomosis, which  was done with a 4-0 Prolene in a running fashion.  A bronchoscopy was performed at the completion of the anastomosis to confirm patency.  Next, a pericardial hilar release was performed around the pulmonary veins.  A K clamp clamp was applied at its base and the staple lines were excised.  The 2 openings were connected creating a common sewing ring.  Pulmonary vein, left atrial anastomosis was performed in an end-to-end fashion with 4-0 Prolene.  Next, the left main pulmonary artery was cut to appropriate length and an end-to-end anastomosis was done with 5-0 Prolene.  Deairing maneuvers were performed.  Hemostasis was confirmed, which was excellent.    We then proceeded to perform intercostal nerve cryoablation of the spaces, 3, 4, and 5 bilaterally.  Once this was performed, we placed pleural tubes and closed the clamshell in the usual fashion.  Please see separate dictation from Dr. Robin for details of this part of the operation.  We did not require cardiopulmonary bypass.  All instrument and sponge counts were correct at the end of the case.    Faby Maguire MD        D: 2022   T: 2022   MT: HARLAN    Name:     DONYA CASTELLANO  MRN:      [id number]        Account:        [id number]   :      1955           Procedure Date: 2022     Document: [id number]

## 2022-02-21 NOTE — OP NOTE
DATE OF SERVICE:  2/21/2022.      PREOPERATIVE DIAGNOSES:   1.  Severe end-stage life-threatening lung disease.   2.  Chronic obstructive pulmonary disease.      POSTOPERATIVE DIAGNOSES:     1.  Severe end-stage life-threatening lung disease.   2.  Chronic obstructive pulmonary disease. .      PROCEDURES:   1.  Bilateral anterolateral thoracotomies with transverse sternotomy.   2.  Bilateral transplant pneumonectomies.   3.  Back table preparation of bilateral single lung for sequential lung transplantation.   4.  Bilateral sequential lung transplantation.      SURGEON:  Raul Robin MD, PhD.    CO-SURGEON: Faby Maguire MD.      RESIDENT SURGEON:  Ruth Tong MD.      ANESTHESIA:  General endotracheal anesthesia with a double-lumen endotracheal tube.      ESTIMATED BLOOD LOSS:  1  liter.      SPECIMENS:  Bilateral native lung.      INDICATIONS:  Mrs. Elder is a 66-year-old woman with progressively worsening chronic obstructive pulmonary diease.  She has undergone multidisciplinary evaluation by the lung transplant team and is found to be an appropriate candidate for lung transplant.  A suitable donor is available.  She understands the risks and benefits of the procedure.       OPERATIVE FINDINGS:  There were scant pleural-pleural adhesions.  There was a mild-moderate bilateral hilar lymphadenopathy.  After the completion of each bronchial anastomosis, flexible fiberoptic bronchoscopy was performed and demonstrated intact patent anastomoses.  The patient was ventilating and oxygenating well at the end of the procedure.  Hemostasis was noted at the end of the procedure.  She was is and out of paroxysmal atrial fibrillation (this is baseline for her) and she required moderate dose inotropic support.      FINAL ABO CROSSMATCH VERIFICATION:  After the donor organ arrived to the operating room and prior to anastomosis, I participated in the transplant preverification upon organ receipt timeout by  visually verifying the donor organ, organ and laterality, donor blood type, recipient unique identifier, recipient blood type, and that the donor and recipient are blood type compatible.      OPERATIVE DESCRIPTION:  After obtaining informed consent, the patient was brought to the operating room and placed in the supine position on the operating room table.  Appropriate lines and devices for monitoring were placed by the anesthesia team.  The patient underwent smooth induction of general anesthesia and the trachea was intubated orally with a double-lumen endotracheal tube.  A timeout was performed to confirm the correct patient identity as well as the procedure to be performed. The arms were raised, padded, and secured to the ether screen.      Bilateral anterolateral thoracotomies and a transverse sternotomy were performed.  The pericardium was opened.       The right inferior pulmonary ligament was divided with electrocautery.  Hilar dissection was carried out around the right side circumferentially.  The patient was given 5000 units of intravenousd heparin. The right superior, middle and inferior pulmonary veins were ligated and divided with a vascular load of an endoscopic stapler.  The pulmonary artery was dissected out circumferentially, and a Derra clamp was placed as far proximally as possible.  The pulmonary artery was divided distally at the point of takeoff of the anterior apical trunk.  The lymphatics surrounding the right main stem bronchus were ligated with Hemoclips and divided distally with electrocautery.  The right main stem bronchus was divided with a #10 blade scalpel.  Hemostasis of the bronchiolar arteries was achieved with the LigaSure device and Hemoclips.  The pulmonary vein stumps were grasped and retracted laterally.  The pericardium was opened circumferentially around the pulmonary veins.      The donor lungs were prepared on the back table as follows:  The attached pericardium and the  lymphatics were divided in the midline.  The left atrial cuff was divided in the midline.  The main pulmonary artery was divided along the raphe.  The left main stem bronchus was divided at the level of jaimee.  The right lung was then prepared by  the right pulmonary artery from the atrial cuff with sharp dissection and the bronchus was divided 2 tracheal rings distal to the takeoff of the right upper lobe bronchus.      The right donor lung was brought into the right pleural space in appropriate orientation.  The bronchial anastomosis was performed in end-to-end fashion using running 4-0 Prolene.  A Tammy clamp was applied proximally across the left atrial cuff.  The pulmonary veins stumps were resected sharply and connected to form a sewing cuff.  The pulmonary vein/left atrial sewing cuff anastomosis was then sewn in an end-to-end fashion using running 4-0 Prolene, taking care to imbricate the posterior wall while the anterior wall was completed in running continuous fashion. The right pulmonary artery anastomosis was performed in end-to-end fashion using running 5-0 Prolene.   The pulmonary vein cuff anastomosis was deaired by releasing the Derra clamp on the pulmonary artery and then the Tammy clamp before tying down the sutures.  Hemostasis was confirmed.     The left donor lung was prepared on the back table by  the left main pulmonary artery from the pulmonary veins sharply.  The left main stem bronchus was divided with a #11 blade scalpel 2 rings distal to the bifurcation.      A left native lung pneumonectomy was performed as follows:  The left inferior pulmonary vein was divided with electrocautery.  The left superior and inferior pulmonary veins were dissected out circumferentially and ligated and divided with endoscopic staplers.  A Derra clamp was placed proximally on the left pulmonary artery and this was divided distally at the point of takeoff of the anteroapical trunk.  Once  lymphatics were dissected off of the left main stem bronchus, the left main stem bronchus was divided sharply with a #10 blade scalpel.  Hemostasis of the bronchiolar arteries was achieved with the LigaSure device and Hemoclips.  The left native lung was delivered from the field as a specimen.  The left pulmonary vein stumps were grasped with Allis clamps and retracted laterally.  The pericardium was opened circumferentially around the sewing cuff and the ligament of Fortunato was divided with electrocautery.      The left lung was brought into left pleural space in appropriate orientation.  The left bronchial anastomosis was performed in end-to-end fashion using running 4-0 Prolene.  A Tammy clamp was applied proximally across the left atrium and the left pulmonary vein stumps were resected sharply and the venotomies were connected sharply to create a left atrial sewing cuff.  This was sewn in an end-to-end fashion to the donor left pulmonary vein sewing cuff with 4-0 Prolene.  The posterior wall was imbricated while the anterior wall was run in continuous fashion.   The left pulmonary artery anastomosis was performed in end-to-end fashion using running 5-0 Prolene. The pulmonary vein cuff anastomosis was deaired by releasing the Derra clamp on the pulmonary artery to confirm flow through the pulmonary artery anastomosis.  The Tammy clamp was released on the left atrium for further de-airing before tying down the sutures.      Ventilation was commenced.  Protamine was given.  All bleeding points were controlled.  Topical hemostatic agents were applied.  A 28-Afghan straight chest tube was placed anteriorly and apically on the right side and brought out through a separate stab incision.  A 28-Afghan right-angle chest tube was placed in the diaphragmatic sulcus on the right side and brought out through a separate stab incision.  A 28-Afghan straight chest tube was placed anteriorly and apically on the left side and  brought out through a separate stab incision.  A 28-Bermudian right-angle chest tube was placed in the left diaphragmatic sulcus and brought out through a separate stab incision.  A 24-Bermudian Prince drain was placed in the pericardial space and brought out through a separate stab incision.  The transverse sternotomy was closed with 3 simple interrupted #6 stainless steel sternal wires to reapproximate the edges of the sternum. Heavy pericostal sutures were placed in figure-of-eight fashion around the ribs on each side.  The muscle, fascia and skin were closed in layers with absorbable suture.  Dermabond was applied.  All sponge, needle and instrument counts were correct at the end of case. Right lung total ischemic time was 2 hours and 28 minutes and warm ischemic time was 40 minutes. Left lung total ischemic time was 3 hours and 45 minutes and cold ischemic time was 36 minutes.        RAMON ROSA MD

## 2022-02-21 NOTE — ANESTHESIA PROCEDURE NOTES
Airway       Patient location during procedure: OR       Procedure Start/Stop Times: 2/21/2022 1:35 AM  Staff -        Anesthesiologist:  Gerard Coleman MD       Resident/Fellow: Gerard Marin MD       Performed By: resident  Consent for Airway        Urgency: elective  Indications and Patient Condition       Indications for airway management: kashif-procedural       Induction type:intravenous       Mask difficulty assessment: 1 - vent by mask    Final Airway Details       Final airway type: endotracheal airway       Successful airway: ETT - double lumen left  Endotracheal Airway Details        Cuffed: yes       Successful intubation technique: video laryngoscopy       VL Blade Size: MAC 4       Grade View of Cords: 2       Adjucts: stylet       Position: Right       Measured from: lips       ETT Double lumen (fr): 37    Post intubation assessment        Placement verified by: capnometry, equal breath sounds and chest rise        Number of attempts at approach: 2       Secured with: pink tape       Ease of procedure: easy       Dentition: Intact and Unchanged

## 2022-02-21 NOTE — ANESTHESIA PROCEDURE NOTES
Erector spinae Procedure Note    Pre-Procedure   Staff -        Anesthesiologist:  Thierry Smart MD       Resident/Fellow: Bindu Fisher MD       Performed By: resident       Location: ICU       Pre-Anesthestic Checklist: patient identified, IV checked, site marked, risks and benefits discussed, informed consent, monitors and equipment checked, pre-op evaluation, at physician/surgeon's request and post-op pain management  Timeout:       Correct Patient: Yes        Correct Procedure: Yes        Correct Site: Yes        Correct Position: Yes        Correct Laterality: Yes        Site Marked: Yes  Procedure Documentation  Procedure: Erector spinae       Diagnosis: POSTOP PAIN       Laterality: bilateral       Patient Position: lateral       Patient Prep/Sterile Barriers: sterile gloves, mask, patient draped       Skin prep: Betadine       Local skin infiltrated with 3 mL of 2% lidocaine.        Insertion Site: T6-7.       Needle Type: Touhy needle       Needle Gauge: 17.        Needle Length (Inches): 3.13        Catheter: 19 G.         Catheter threaded easily.         Ultrasound guided       1. Ultrasound was used to identify targeted nerve, plexus, vascular marker, or fascial plane and place a needle adjacent to it in real-time.       2. Ultrasound was used to visualize the spread of anesthetic in close proximity to the above referenced structure.       3. A permanent image is entered into the patient's record.    Assessment/Narrative         The placement was negative for: blood aspirated, painful injection and site bleeding       Paresthesias: No.      Bolus given via. No blood aspirated via catheter.        Secured via Tegaderm and Dermabond.        Complications: none

## 2022-02-21 NOTE — ANESTHESIA PROCEDURE NOTES
Central Line/PA Catheter Placement    Pre-Procedure   Staff -        Anesthesiologist:  Gerard Coleman MD       Resident/Fellow: Gerard Marin MD       Performed By: resident       Location: OR       Pre-Anesthestic Checklist: patient identified, IV checked, site marked, risks and benefits discussed, informed consent, monitors and equipment checked, pre-op evaluation and at physician/surgeon's request  Timeout:       Correct Patient: Yes        Correct Procedure: Yes        Correct Site: Yes        Correct Position: Yes        Correct Laterality: Yes   Line Placement:   This line was placed Post Induction    Procedure   Procedure: central line       Laterality: right       Insertion Site: internal jugular.  Sterile Prep        All elements of maximal sterile barrier technique followed       Patient Prep/Sterile Barriers: draped, hand hygiene, gloves , hat , mask , draped, gown, sterile gel and probe cover       Skin prep: Chloraprep  Insertion/Injection        Technique: ultrasound guided and Seldinger Technique        1. Ultrasound was used to evaluate the access site.       2. Vein evaluated via ultrasound for patency/adequacy.       3. Using real-time ultrasound the needle/catheter was observed entering the artery/vein.       Introducer Type: 9 Fr, 2-lumen MAC        Type: PA/CVC with Introducer       Number of Lumens: double lumen       PA Catheter Type: CCO         Appropriate RV, RA and PA waveforms noted:  Yes            Withdrawn and Locked at cm: 49  Narrative         Secured by: suture       Tegaderm and Biopatch dressing used.       Complications: None apparent,        blood aspirated from all lumens,        All lumens flushed: Yes       Verification method: Ultrasound and Placement to be verified post-op

## 2022-02-21 NOTE — ANESTHESIA PROCEDURE NOTES
Airway       Patient location during procedure: OR       Procedure Start/Stop Times: 2/21/2022 1:35 AM  Staff -        CRNA: Ursula Mcghee APRN CRNA       Performed By: CRNA  Consent for Airway        Urgency: elective  Indications and Patient Condition       Indications for airway management: kashif-procedural       Induction type:other (comments) (tube exchange)       Mask difficulty assessment: 0 - not attempted    Final Airway Details       Final airway type: endotracheal airway       Successful airway: ETT - single and Oral  Endotracheal Airway Details        ETT size (mm): 7.5       Cuffed: yes       Successful intubation technique: video laryngoscopy       VL Blade Size: MAC 4       Grade View of Cords: 1       Adjucts: exchange catheter       Position: Right       Measured from: gums/teeth       Secured at (cm): 23       Bite block used: None    Post intubation assessment        Placement verified by: capnometry and equal breath sounds        Number of attempts at approach: 1       Secured with: pink tape       Ease of procedure: easy       Dentition: Intact and Unchanged

## 2022-02-21 NOTE — PROGRESS NOTES
"CLINICAL NUTRITION SERVICES - ASSESSMENT NOTE     Nutrition Prescription    Malnutrition Status:    Does not meet malnutrition criteria     Future Recommendations:   Pending hemodynamics, begin enteral nutrition support with Osmolite 1.5 Attila @ 50ml/hr  (1200ml/day) + Prosource (1 pkt TID) will provide: 1920 kcals (36 kcal/kg), 108 g protein (2 g/kg), 914 ml free H20, 244 g CHO, and 0 g fiber daily.       REASON FOR ASSESSMENT  Melissa Elder is a 66 year old female assessed by dietitian for Provider order - RD to assess and order     NUTRITION HISTORY  Pt met with outpatient dietitian (Dec 2021), at that time, pt met criteria for frailty d/t reported exhaustion, slowness and reduced . Unable to obtain further nutrition hx from patient at present.     CURRENT NUTRITION ORDERS  Diet: NPO (intubated)     LABS  Labs reviewed  K+ 3.0, Cr 0.41, Phos 1.0, lactate 4.4 (rising)     MEDICATIONS  Medications reviewed    ANTHROPOMETRICS  Height: 157.5 cm (5' 2\")  Most Recent Weight: 66.7 kg (147 lb)    IBW: 50 kg  BMI: Overweight BMI 25-29.9  Weight History: Weight appears stable around ~67 kg.   Wt Readings from Last 10 Encounters:   02/20/22 66.7 kg (147 lb)   12/20/21 66.7 kg (147 lb)   06/21/21 68 kg (150 lb)   06/03/19 69.4 kg (153 lb)   04/26/19 70.8 kg (156 lb)   04/25/19 70 kg (154 lb 4.8 oz)   03/28/19 72.9 kg (160 lb 12.8 oz)   03/25/19 69.1 kg (152 lb 4.8 oz)   03/05/18 66.7 kg (147 lb)   01/12/18 68 kg (150 lb)       Dosing Weight: 54 kg (adjusted for overweight status, based on IBW of 50 kg and admit weight of 66.7 kg on 2/20)     ASSESSED NUTRITION NEEDS  Estimated Energy Needs: 1600 - 1900 kcals/day (30 - 35 kcals/kg )  Justification: Increased needs post-transplant   Estimated Protein Needs: 80 - 110 grams protein/day (1.5 - 2 grams of pro/kg)  Justification: Increased needs post-transplant   Estimated Fluid Needs: 1 mL/kcal  Justification: Maintenance    PHYSICAL FINDINGS  See malnutrition section " below.  OGT in place   NGT placed at bedside (AXR pending)      MALNUTRITION  % Intake: Unable to assess - Lack of hx   % Weight Loss: None noted  Subcutaneous Fat Loss: None observed  Muscle Loss: Upper arm (bicep, tricep): Mild, Lower arm  (forearm): Mild, Dorsal hand: Mild and Posterior calf: Mild  Fluid Accumulation/Edema: Mild  Malnutrition Diagnosis: Patient does not meet two of the established criteria necessary for diagnosing malnutrition    NUTRITION DIAGNOSIS  Inadequate protein-energy intake related to NPO (intubated) as evidenced by without enteral nutrition support       INTERVENTIONS  See interventions at top of progress note.     Goals  Total avg nutritional intake to meet a minimum of 30 kcal/kg and 1.5 g PRO/kg daily (per dosing wt 54 kg).     Monitoring/Evaluation  Progress toward goals will be monitored and evaluated per protocol.  Krystyna Schreiber RD, LD  [id number]  Pgr: [phone]

## 2022-02-21 NOTE — PROCEDURES
Small Bowel Feeding Tube Placement Assessment  Reason for Feeding Tube Placement: Team request for post pyloric FT   Cortrak Start Time: 1330   Cortrak End Time: 1420  Medicine Delivered During Procedure: Lubricating jelly   Placement Successful: Presume FT tip within distal stomach (AXR pending)     Procedure Complications: Unable to advance to small bowel, repeatedly coiling within stomach   Final Placement Sreekanth at exit of nare: 89 cm  Face to Face time with patient: 50 min       Bridle Placement:   Reason for bridle placement: Securement of FT    Medicine delivered during procedure: lubricating jelly    Procedure: Successful  Location of top of clip on FT: @ 90 cm marker   Condition of nose/skin at time of bridle placement: Unremarkable   Face to Face time with patient: 5 minutes.    Krystyna Schreiber, LULU, LD  [id number]  Pgr: [phone]

## 2022-02-21 NOTE — PHARMACY-TRANSPLANT NOTE
Adult Lung Transplant Post Operative Note    66 year old female s/p lung transplant on 02/21/22 for end stage COPD.      Planned immunosuppression regimen to include basilixumab POD #0 & #4, tacrolimus (goal 8-12 mcg/L), mycophenolate and prednisone.      Opportunistic pathogen prophylaxis includes: nystatin, dapsone (sulfa allergy) and acyclovir.  Patient is not enrolled in medication study.    Patient with planned immunosuppression and prophylaxis as above.  Pharmacy will monitor for medication interactions and immunosuppression levels in conjunction with the team. Medication therapy needs for discharge planning will continue to be addressed throughout the current admission via multidisciplinary rounds and order review. Pharmacy will make recommendations as appropriate.    Isis Mendez, Lv  [facility] and Advanced Heart Failure Pharmacist  Pager [phone]

## 2022-02-21 NOTE — PLAN OF CARE
Transfer  Transferred to: preop  Via: bed with RN  Reason for transfer: Lung Transplant  Family: Aware of transfer  Belongings:  on 6C  Chart: Sent with pt    See Flowsheet for full assessment

## 2022-02-21 NOTE — ANESTHESIA CARE TRANSFER NOTE
Patient: Melissa Elder    Procedure: Procedure(s):  Bilateral Sequential Lung Transplantation, Clamshell Incision, Extracorporeal Membrane Oxgenation, Bronchoscopy, Cryoablation of Intercostal Nerves       Diagnosis: End stage chronic obstructive pulmonary disease (H) [J44.9]  Diagnosis Additional Information: No value filed.    Anesthesia Type:   General     Note:    Oropharynx: endotracheal tube in place and ventilatory support  Level of Consciousness: iatrogenic sedation    Level of Supplemental Oxygen (L/min / FiO2): 50%  Independent Airway: airway patency not satisfactory and stable  Dentition: dentition unchanged  Vital Signs Stable: post-procedure vital signs reviewed and stable  Report to RN Given: handoff report given  Patient transferred to: ICU  Comments: Patient transported to ICU without complication. Report given by fellow and all questions/concerned addressed. See chart for vitals/medications.   ICU Handoff: Call for PAUSE to initiate/utilize ICU HANDOFF, Identified Patient, Identified Responsible Provider, Reviewed the Pertinent Medical History, Discussed Surgical Course, Reviewed Intra-OP Anesthesia Management and Issues during Anesthesia, Set Expectations for Post Procedure Period and Allowed Opportunity for Questions and Acknowledgement of Understanding      Vitals:  Vitals Value Taken Time   BP 99/48 02/21/22 0703   Temp     Pulse 69 02/21/22 0703   Resp 20 02/21/22 0703   SpO2 100 % 02/21/22 0703   Vitals shown include unvalidated device data.    Electronically Signed By: MANUEL Rahman CRNA  February 21, 2022  7:04 AM

## 2022-02-21 NOTE — PROGRESS NOTES
Pt received bilateral lung transplant on 02/21/2022, donor ID [id number], removed from [facility] transplant waitlist.

## 2022-02-21 NOTE — BRIEF OP NOTE
[facility]    Brief Operative Note    Pre-operative diagnosis: End stage chronic obstructive pulmonary disease (H) [J44.9]  Post-operative diagnosis Same as pre-operative diagnosis    Procedure: Procedure(s):  TRANSPLANT, LUNG, RECIPIENT, BILATERAL  Surgeon: Surgeon(s) and Role:     * Raul Robin MD - Primary     * Faby Novak MD - Assisting     * Ruth Agosto MD - Fellow - Assisting     * Sherman Wu MD - Fellow - Assisting  Anesthesia: General   Estimated Blood Loss: 500 ml    Drains: Bilateral straight pleural, bilateral angled pleural, mediastinal  Specimens:   ID Type Source Tests Collected by Time Destination   1 : NATIVE RIGHT LUNG Tissue Lung, Right SURGICAL PATHOLOGY EXAM Raul Robin MD 2/21/2022  3:04 AM    2 : NATIVE LEFT LUNG Tissue Lung, Left SURGICAL PATHOLOGY EXAM Rual Robin MD 2/21/2022  3:05 AM    A : DONOR RIGHT LUNG Washings Lung, Right VIRAL CULTURE RESPIRATORY Raul Robin MD 2/21/2022  2:51 AM    B : DONOR RIGHT LUNG Washings Lung, Right AFB CULTURE AND STAIN NON BLOOD, GRAM STAIN, FUNGAL OR YEAST CULTURE ROUTINE, AEROBIC BACTERIAL CULTURE ROUTINE Raul Robin MD 2/21/2022  2:57 AM    C : NATIVE RIGHT LUNG Washings Lung, Right VIRAL CULTURE RESPIRATORY Raul Robin MD 2/21/2022  3:02 AM    D : NATIVE RIGHT LUNG Washings Lung, Right AFB CULTURE AND STAIN NON BLOOD, GRAM STAIN, FUNGAL OR YEAST CULTURE ROUTINE, AEROBIC BACTERIAL CULTURE ROUTINE Raul Robin MD 2/21/2022  3:02 AM      Findings:   see op note.  Complications: None.  Implants: * No implants in log *

## 2022-02-22 ENCOUNTER — APPOINTMENT (OUTPATIENT)
Dept: PHYSICAL THERAPY | Facility: CLINIC | Age: 67
DRG: 007 | End: 2022-02-22
Attending: SURGERY
Payer: MEDICARE

## 2022-02-22 ENCOUNTER — APPOINTMENT (OUTPATIENT)
Dept: SPEECH THERAPY | Facility: CLINIC | Age: 67
DRG: 007 | End: 2022-02-22
Attending: SURGERY
Payer: MEDICARE

## 2022-02-22 ENCOUNTER — APPOINTMENT (OUTPATIENT)
Dept: GENERAL RADIOLOGY | Facility: CLINIC | Age: 67
DRG: 007 | End: 2022-02-22
Attending: SURGERY
Payer: MEDICARE

## 2022-02-22 ENCOUNTER — APPOINTMENT (OUTPATIENT)
Dept: GENERAL RADIOLOGY | Facility: CLINIC | Age: 67
DRG: 007 | End: 2022-02-22
Attending: PHYSICIAN ASSISTANT
Payer: MEDICARE

## 2022-02-22 PROBLEM — R06.02 SOB (SHORTNESS OF BREATH): Status: RESOLVED | Noted: 2019-02-18 | Resolved: 2022-02-22

## 2022-02-22 PROBLEM — R93.89 ABNORMAL CHEST CT: Status: RESOLVED | Noted: 2019-02-18 | Resolved: 2022-02-22

## 2022-02-22 PROBLEM — J43.8 OTHER EMPHYSEMA (H): Status: RESOLVED | Noted: 2019-02-18 | Resolved: 2022-02-22

## 2022-02-22 PROBLEM — Z98.890 STATUS POST CORONARY ANGIOGRAM: Status: RESOLVED | Noted: 2019-03-27 | Resolved: 2022-02-22

## 2022-02-22 PROBLEM — Z76.82 LUNG TRANSPLANT CANDIDATE: Status: RESOLVED | Noted: 2022-02-20 | Resolved: 2022-02-22

## 2022-02-22 PROBLEM — R07.9 CHEST PAIN: Status: RESOLVED | Noted: 2019-02-18 | Resolved: 2022-02-22

## 2022-02-22 LAB
ALBUMIN SERPL-MCNC: 2.3 G/DL (ref 3.4–5)
ALP SERPL-CCNC: 36 U/L (ref 40–150)
ALT SERPL W P-5'-P-CCNC: 17 U/L (ref 0–50)
ANION GAP SERPL CALCULATED.3IONS-SCNC: 3 MMOL/L (ref 3–14)
APPEARANCE FLD: ABNORMAL
AST SERPL W P-5'-P-CCNC: 40 U/L (ref 0–45)
BASE EXCESS BLDA CALC-SCNC: -1.3 MMOL/L (ref -9–1.8)
BASE EXCESS BLDA CALC-SCNC: -3.4 MMOL/L (ref -9–1.8)
BASE EXCESS BLDA CALC-SCNC: 1.4 MMOL/L (ref -9–1.8)
BASE EXCESS BLDA CALC-SCNC: 3.6 MMOL/L (ref -9–1.8)
BASE EXCESS BLDV CALC-SCNC: -5.5 MMOL/L (ref -7.7–1.9)
BASE EXCESS BLDV CALC-SCNC: 1.9 MMOL/L (ref -7.7–1.9)
BASE EXCESS BLDV CALC-SCNC: 3.8 MMOL/L (ref -7.7–1.9)
BASE EXCESS BLDV CALC-SCNC: 3.8 MMOL/L (ref -7.7–1.9)
BILIRUB SERPL-MCNC: 0.4 MG/DL (ref 0.2–1.3)
BUN SERPL-MCNC: 10 MG/DL (ref 7–30)
CA-I BLD-MCNC: 4.2 MG/DL (ref 4.4–5.2)
CALCIUM SERPL-MCNC: 7.2 MG/DL (ref 8.5–10.1)
CELL TYPE ALLO: NORMAL
CELL TYPE AUTO: NORMAL
CHANNELSHIFTALLOB1: -35
CHANNELSHIFTALLOB2: -39
CHANNELSHIFTALLOT1: -20
CHANNELSHIFTALLOT2: -22
CHANNELSHIFTAUTOB1: -44
CHANNELSHIFTAUTOB2: -42
CHANNELSHIFTAUTOT1: -30
CHANNELSHIFTAUTOT2: -32
CHLORIDE BLD-SCNC: 111 MMOL/L (ref 94–109)
CO2 SERPL-SCNC: 26 MMOL/L (ref 20–32)
COLOR FLD: ABNORMAL
CREAT SERPL-MCNC: 0.53 MG/DL (ref 0.52–1.04)
CROSSMATCHDATEALLO: NORMAL
CROSSMATCHDATEAUTO: NORMAL
DONOR ALLO: NORMAL
DONOR AUTO: NORMAL
DONORCELLDATE ALLO: NORMAL
DONORCELLDATE AUTO: NORMAL
ERYTHROCYTE [DISTWIDTH] IN BLOOD BY AUTOMATED COUNT: 13.5 % (ref 10–15)
ERYTHROCYTE [DISTWIDTH] IN BLOOD BY AUTOMATED COUNT: 13.9 % (ref 10–15)
GFR SERPL CREATININE-BSD FRML MDRD: >90 ML/MIN/1.73M2
GLUCOSE BLD-MCNC: 135 MG/DL (ref 70–99)
GLUCOSE BLDC GLUCOMTR-MCNC: 101 MG/DL (ref 70–99)
GLUCOSE BLDC GLUCOMTR-MCNC: 114 MG/DL (ref 70–99)
GLUCOSE BLDC GLUCOMTR-MCNC: 117 MG/DL (ref 70–99)
GLUCOSE BLDC GLUCOMTR-MCNC: 118 MG/DL (ref 70–99)
GLUCOSE BLDC GLUCOMTR-MCNC: 119 MG/DL (ref 70–99)
GLUCOSE BLDC GLUCOMTR-MCNC: 119 MG/DL (ref 70–99)
GLUCOSE BLDC GLUCOMTR-MCNC: 130 MG/DL (ref 70–99)
GLUCOSE BLDC GLUCOMTR-MCNC: 152 MG/DL (ref 70–99)
GLUCOSE BLDC GLUCOMTR-MCNC: 157 MG/DL (ref 70–99)
GLUCOSE BLDC GLUCOMTR-MCNC: 169 MG/DL (ref 70–99)
GLUCOSE BLDC GLUCOMTR-MCNC: 213 MG/DL (ref 70–99)
GRAM STAIN RESULT: ABNORMAL
GRAM STAIN RESULT: ABNORMAL
HBA1C MFR BLD: 5.7 % (ref 0–5.6)
HBV DNA SERPL QL NAA+PROBE: NORMAL
HCO3 BLD-SCNC: 21 MMOL/L (ref 21–28)
HCO3 BLD-SCNC: 25 MMOL/L (ref 21–28)
HCO3 BLD-SCNC: 26 MMOL/L (ref 21–28)
HCO3 BLD-SCNC: 27 MMOL/L (ref 21–28)
HCO3 BLDV-SCNC: 21 MMOL/L (ref 21–28)
HCO3 BLDV-SCNC: 26 MMOL/L (ref 21–28)
HCO3 BLDV-SCNC: 28 MMOL/L (ref 21–28)
HCO3 BLDV-SCNC: 28 MMOL/L (ref 21–28)
HCT VFR BLD AUTO: 26.2 % (ref 35–47)
HCT VFR BLD AUTO: 30.3 % (ref 35–47)
HCV RNA SERPL QL NAA+PROBE: NORMAL
HGB BLD-MCNC: 10.2 G/DL (ref 11.7–15.7)
HGB BLD-MCNC: 8.8 G/DL (ref 11.7–15.7)
HIV1 RNA # PLAS NAA DL=20: NOT DETECTED COPIES/ML
HIV1+2 RNA SERPL QL NAA+PROBE: NORMAL
INR PPP: 1.31 (ref 0.85–1.15)
KOH PREPARATION: NORMAL
KOH PREPARATION: NORMAL
LACTATE SERPL-SCNC: 1.1 MMOL/L (ref 0.7–2)
LACTATE SERPL-SCNC: 1.2 MMOL/L (ref 0.7–2)
LACTATE SERPL-SCNC: 2.4 MMOL/L (ref 0.7–2)
LYMPHOCYTES NFR FLD MANUAL: 1 %
MAGNESIUM SERPL-MCNC: 1.7 MG/DL (ref 1.8–2.6)
MCH RBC QN AUTO: 28.4 PG (ref 26.5–33)
MCH RBC QN AUTO: 29.1 PG (ref 26.5–33)
MCHC RBC AUTO-ENTMCNC: 33.6 G/DL (ref 31.5–36.5)
MCHC RBC AUTO-ENTMCNC: 33.7 G/DL (ref 31.5–36.5)
MCV RBC AUTO: 85 FL (ref 78–100)
MCV RBC AUTO: 87 FL (ref 78–100)
MONOS+MACROS NFR FLD MANUAL: 10 %
NEUTS BAND NFR FLD MANUAL: 90 %
O2/TOTAL GAS SETTING VFR VENT: 30 %
O2/TOTAL GAS SETTING VFR VENT: 40 %
OXYHGB MFR BLD: 96 % (ref 92–100)
OXYHGB MFR BLD: 98 % (ref 92–100)
OXYHGB MFR BLD: 99 % (ref 92–100)
OXYHGB MFR BLDV: 71 % (ref 70–75)
OXYHGB MFR BLDV: 73 % (ref 70–75)
OXYHGB MFR BLDV: 74 % (ref 70–75)
OXYHGB MFR BLDV: 76 % (ref 70–75)
PATH REPORT.COMMENTS IMP SPEC: ABNORMAL
PATH REPORT.COMMENTS IMP SPEC: ABNORMAL
PATH REPORT.COMMENTS IMP SPEC: YES
PATH REPORT.FINAL DX SPEC: ABNORMAL
PATH REPORT.GROSS SPEC: ABNORMAL
PATH REPORT.MICROSCOPIC SPEC OTHER STN: ABNORMAL
PATH REPORT.RELEVANT HX SPEC: ABNORMAL
PCO2 BLD: 35 MM HG (ref 35–45)
PCO2 BLD: 35 MM HG (ref 35–45)
PCO2 BLD: 37 MM HG (ref 35–45)
PCO2 BLD: 47 MM HG (ref 35–45)
PCO2 BLDV: 37 MM HG (ref 40–50)
PCO2 BLDV: 38 MM HG (ref 40–50)
PCO2 BLDV: 40 MM HG (ref 40–50)
PCO2 BLDV: 42 MM HG (ref 40–50)
PH BLD: 7.33 [PH] (ref 7.35–7.45)
PH BLD: 7.39 [PH] (ref 7.35–7.45)
PH BLD: 7.45 [PH] (ref 7.35–7.45)
PH BLD: 7.49 [PH] (ref 7.35–7.45)
PH BLDV: 7.3 [PH] (ref 7.32–7.43)
PH BLDV: 7.45 [PH] (ref 7.32–7.43)
PH BLDV: 7.45 [PH] (ref 7.32–7.43)
PH BLDV: 7.48 [PH] (ref 7.32–7.43)
PHOSPHATE SERPL-MCNC: 3 MG/DL (ref 2.5–4.5)
PLATELET # BLD AUTO: 128 10E3/UL (ref 150–450)
PLATELET # BLD AUTO: 135 10E3/UL (ref 150–450)
PO2 BLD: 127 MM HG (ref 80–105)
PO2 BLD: 152 MM HG (ref 80–105)
PO2 BLD: 184 MM HG (ref 80–105)
PO2 BLD: 98 MM HG (ref 80–105)
PO2 BLDV: 36 MM HG (ref 25–47)
PO2 BLDV: 38 MM HG (ref 25–47)
PO2 BLDV: 40 MM HG (ref 25–47)
PO2 BLDV: 43 MM HG (ref 25–47)
POS CUT OFF ALLO B: >93
POS CUT OFF ALLO T: >79
POS CUT OFF AUTO B: >93
POS CUT OFF AUTO T: >79
POTASSIUM BLD-SCNC: 4.6 MMOL/L (ref 3.4–5.3)
PROT SERPL-MCNC: 4.2 G/DL (ref 6.8–8.8)
RBC # BLD AUTO: 3.1 10E6/UL (ref 3.8–5.2)
RBC # BLD AUTO: 3.5 10E6/UL (ref 3.8–5.2)
RESULT ALLO B1: NORMAL
RESULT ALLO B2: NORMAL
RESULT ALLO T1: NORMAL
RESULT ALLO T2: NORMAL
RESULT AUTO B1: NORMAL
RESULT AUTO B2: NORMAL
RESULT AUTO T1: NORMAL
RESULT AUTO T2: NORMAL
SERUM DATE ALLO B1: NORMAL
SERUM DATE ALLO B2: NORMAL
SERUM DATE ALLO T1: NORMAL
SERUM DATE ALLO T2: NORMAL
SERUM DATE AUTO B1: NORMAL
SERUM DATE AUTO B2: NORMAL
SERUM DATE AUTO T1: NORMAL
SERUM DATE AUTO T2: NORMAL
SODIUM SERPL-SCNC: 140 MMOL/L (ref 133–144)
TACROLIMUS BLD-MCNC: 3.3 UG/L (ref 5–15)
TESTMETHODALLO: NORMAL
TESTMETHODAUTO: NORMAL
TME LAST DOSE: ABNORMAL H
TME LAST DOSE: ABNORMAL H
TREATMENT ALLO B1: NORMAL
TREATMENT ALLO B2: NORMAL
TREATMENT ALLO T1: NORMAL
TREATMENT ALLO T2: NORMAL
TREATMENT AUTO B1: NORMAL
TREATMENT AUTO B2: NORMAL
TREATMENT AUTO T1: NORMAL
TREATMENT AUTO T2: NORMAL
WBC # BLD AUTO: 11.5 10E3/UL (ref 4–11)
WBC # BLD AUTO: 18.4 10E3/UL (ref 4–11)
WBC # FLD AUTO: ABNORMAL /UL
ZZZCOMMENT ALLOB2: NORMAL

## 2022-02-22 PROCEDURE — 82805 BLOOD GASES W/O2 SATURATION: CPT | Performed by: PHYSICIAN ASSISTANT

## 2022-02-22 PROCEDURE — 87070 CULTURE OTHR SPECIMN AEROBIC: CPT | Performed by: STUDENT IN AN ORGANIZED HEALTH CARE EDUCATION/TRAINING PROGRAM

## 2022-02-22 PROCEDURE — 999N000045 HC STATISTIC DAILY SWAN MONITORING

## 2022-02-22 PROCEDURE — 999N000155 HC STATISTIC RAPCV CVP MONITORING

## 2022-02-22 PROCEDURE — 80197 ASSAY OF TACROLIMUS: CPT | Performed by: PHYSICIAN ASSISTANT

## 2022-02-22 PROCEDURE — 97530 THERAPEUTIC ACTIVITIES: CPT | Mod: GP

## 2022-02-22 PROCEDURE — 88312 SPECIAL STAINS GROUP 1: CPT | Mod: 26 | Performed by: PATHOLOGY

## 2022-02-22 PROCEDURE — 97162 PT EVAL MOD COMPLEX 30 MIN: CPT | Mod: GP

## 2022-02-22 PROCEDURE — 250N000009 HC RX 250: Performed by: SURGERY

## 2022-02-22 PROCEDURE — 250N000013 HC RX MED GY IP 250 OP 250 PS 637: Performed by: PHYSICIAN ASSISTANT

## 2022-02-22 PROCEDURE — 200N000002 HC R&B ICU UMMC

## 2022-02-22 PROCEDURE — 258N000003 HC RX IP 258 OP 636: Performed by: PHYSICIAN ASSISTANT

## 2022-02-22 PROCEDURE — 82330 ASSAY OF CALCIUM: CPT | Performed by: STUDENT IN AN ORGANIZED HEALTH CARE EDUCATION/TRAINING PROGRAM

## 2022-02-22 PROCEDURE — 87106 FUNGI IDENTIFICATION YEAST: CPT | Performed by: STUDENT IN AN ORGANIZED HEALTH CARE EDUCATION/TRAINING PROGRAM

## 2022-02-22 PROCEDURE — 85014 HEMATOCRIT: CPT | Performed by: PHYSICIAN ASSISTANT

## 2022-02-22 PROCEDURE — 71045 X-RAY EXAM CHEST 1 VIEW: CPT

## 2022-02-22 PROCEDURE — 99291 CRITICAL CARE FIRST HOUR: CPT | Mod: 24 | Performed by: ANESTHESIOLOGY

## 2022-02-22 PROCEDURE — 88312 SPECIAL STAINS GROUP 1: CPT | Mod: TC | Performed by: STUDENT IN AN ORGANIZED HEALTH CARE EDUCATION/TRAINING PROGRAM

## 2022-02-22 PROCEDURE — 82803 BLOOD GASES ANY COMBINATION: CPT | Performed by: PHYSICIAN ASSISTANT

## 2022-02-22 PROCEDURE — 94640 AIRWAY INHALATION TREATMENT: CPT | Mod: 76

## 2022-02-22 PROCEDURE — 83605 ASSAY OF LACTIC ACID: CPT | Performed by: PHYSICIAN ASSISTANT

## 2022-02-22 PROCEDURE — 258N000003 HC RX IP 258 OP 636: Performed by: STUDENT IN AN ORGANIZED HEALTH CARE EDUCATION/TRAINING PROGRAM

## 2022-02-22 PROCEDURE — 85027 COMPLETE CBC AUTOMATED: CPT | Performed by: PHYSICIAN ASSISTANT

## 2022-02-22 PROCEDURE — C9113 INJ PANTOPRAZOLE SODIUM, VIA: HCPCS | Performed by: SURGERY

## 2022-02-22 PROCEDURE — 250N000011 HC RX IP 250 OP 636: Performed by: SURGERY

## 2022-02-22 PROCEDURE — 76000 FLUOROSCOPY <1 HR PHYS/QHP: CPT

## 2022-02-22 PROCEDURE — 99233 SBSQ HOSP IP/OBS HIGH 50: CPT | Performed by: NURSE PRACTITIONER

## 2022-02-22 PROCEDURE — 250N000009 HC RX 250: Performed by: PHYSICIAN ASSISTANT

## 2022-02-22 PROCEDURE — 87206 SMEAR FLUORESCENT/ACID STAI: CPT | Performed by: STUDENT IN AN ORGANIZED HEALTH CARE EDUCATION/TRAINING PROGRAM

## 2022-02-22 PROCEDURE — 999N000253 HC STATISTIC WEANING TRIALS

## 2022-02-22 PROCEDURE — 999N000157 HC STATISTIC RCP TIME EA 10 MIN

## 2022-02-22 PROCEDURE — 250N000011 HC RX IP 250 OP 636: Performed by: PHYSICIAN ASSISTANT

## 2022-02-22 PROCEDURE — 94003 VENT MGMT INPAT SUBQ DAY: CPT

## 2022-02-22 PROCEDURE — 84100 ASSAY OF PHOSPHORUS: CPT | Performed by: SURGERY

## 2022-02-22 PROCEDURE — 88108 CYTOPATH CONCENTRATE TECH: CPT | Mod: 26 | Performed by: PATHOLOGY

## 2022-02-22 PROCEDURE — 258N000003 HC RX IP 258 OP 636: Performed by: SURGERY

## 2022-02-22 PROCEDURE — 89051 BODY FLUID CELL COUNT: CPT | Performed by: STUDENT IN AN ORGANIZED HEALTH CARE EDUCATION/TRAINING PROGRAM

## 2022-02-22 PROCEDURE — 94668 MNPJ CHEST WALL SBSQ: CPT

## 2022-02-22 PROCEDURE — 250N000011 HC RX IP 250 OP 636: Performed by: STUDENT IN AN ORGANIZED HEALTH CARE EDUCATION/TRAINING PROGRAM

## 2022-02-22 PROCEDURE — 87205 SMEAR GRAM STAIN: CPT | Performed by: STUDENT IN AN ORGANIZED HEALTH CARE EDUCATION/TRAINING PROGRAM

## 2022-02-22 PROCEDURE — 43761 REPOSITION GASTROSTOMY TUBE: CPT | Mod: GC | Performed by: RADIOLOGY

## 2022-02-22 PROCEDURE — 31645 BRNCHSC W/THER ASPIR 1ST: CPT | Mod: GC | Performed by: INTERNAL MEDICINE

## 2022-02-22 PROCEDURE — 83036 HEMOGLOBIN GLYCOSYLATED A1C: CPT | Performed by: STUDENT IN AN ORGANIZED HEALTH CARE EDUCATION/TRAINING PROGRAM

## 2022-02-22 PROCEDURE — 250N000009 HC RX 250: Performed by: RADIOLOGY

## 2022-02-22 PROCEDURE — 94640 AIRWAY INHALATION TREATMENT: CPT

## 2022-02-22 PROCEDURE — 250N000012 HC RX MED GY IP 250 OP 636 PS 637: Performed by: STUDENT IN AN ORGANIZED HEALTH CARE EDUCATION/TRAINING PROGRAM

## 2022-02-22 PROCEDURE — 92610 EVALUATE SWALLOWING FUNCTION: CPT | Mod: GN

## 2022-02-22 PROCEDURE — 999N000015 HC STATISTIC ARTERIAL MONITORING DAILY

## 2022-02-22 PROCEDURE — 250N000013 HC RX MED GY IP 250 OP 250 PS 637: Performed by: SURGERY

## 2022-02-22 PROCEDURE — 71045 X-RAY EXAM CHEST 1 VIEW: CPT | Mod: 26 | Performed by: RADIOLOGY

## 2022-02-22 PROCEDURE — 250N000012 HC RX MED GY IP 250 OP 636 PS 637: Performed by: NURSE PRACTITIONER

## 2022-02-22 PROCEDURE — 92526 ORAL FUNCTION THERAPY: CPT | Mod: GN

## 2022-02-22 PROCEDURE — 83735 ASSAY OF MAGNESIUM: CPT | Performed by: SURGERY

## 2022-02-22 PROCEDURE — 80053 COMPREHEN METABOLIC PANEL: CPT | Performed by: PHYSICIAN ASSISTANT

## 2022-02-22 PROCEDURE — 94667 MNPJ CHEST WALL 1ST: CPT

## 2022-02-22 PROCEDURE — 85610 PROTHROMBIN TIME: CPT | Performed by: PHYSICIAN ASSISTANT

## 2022-02-22 PROCEDURE — 82805 BLOOD GASES W/O2 SATURATION: CPT | Performed by: STUDENT IN AN ORGANIZED HEALTH CARE EDUCATION/TRAINING PROGRAM

## 2022-02-22 PROCEDURE — 87210 SMEAR WET MOUNT SALINE/INK: CPT | Performed by: STUDENT IN AN ORGANIZED HEALTH CARE EDUCATION/TRAINING PROGRAM

## 2022-02-22 PROCEDURE — 250N000009 HC RX 250

## 2022-02-22 PROCEDURE — 250N000012 HC RX MED GY IP 250 OP 636 PS 637: Performed by: SURGERY

## 2022-02-22 PROCEDURE — 76000 FLUOROSCOPY <1 HR PHYS/QHP: CPT | Mod: 26 | Performed by: RADIOLOGY

## 2022-02-22 PROCEDURE — 99233 SBSQ HOSP IP/OBS HIGH 50: CPT | Mod: FS | Performed by: INTERNAL MEDICINE

## 2022-02-22 RX ORDER — LABETALOL HYDROCHLORIDE 5 MG/ML
20 INJECTION, SOLUTION INTRAVENOUS EVERY 4 HOURS PRN
Status: DISCONTINUED | OUTPATIENT
Start: 2022-02-22 | End: 2022-02-24

## 2022-02-22 RX ORDER — NICOTINE POLACRILEX 4 MG
15-30 LOZENGE BUCCAL
Status: DISCONTINUED | OUTPATIENT
Start: 2022-02-22 | End: 2022-02-23

## 2022-02-22 RX ORDER — LIDOCAINE HYDROCHLORIDE 10 MG/ML
INJECTION, SOLUTION EPIDURAL; INFILTRATION; INTRACAUDAL; PERINEURAL
Status: COMPLETED
Start: 2022-02-22 | End: 2022-02-22

## 2022-02-22 RX ORDER — ACETAMINOPHEN 325 MG/1
975 TABLET ORAL EVERY 8 HOURS SCHEDULED
Status: DISCONTINUED | OUTPATIENT
Start: 2022-02-22 | End: 2022-02-27

## 2022-02-22 RX ORDER — CEFAZOLIN SODIUM 1 G/50ML
2 SOLUTION INTRAVENOUS EVERY 8 HOURS
Status: DISCONTINUED | OUTPATIENT
Start: 2022-02-22 | End: 2022-02-23

## 2022-02-22 RX ORDER — HYDRALAZINE HYDROCHLORIDE 20 MG/ML
10 INJECTION INTRAMUSCULAR; INTRAVENOUS
Status: DISCONTINUED | OUTPATIENT
Start: 2022-02-22 | End: 2022-02-23

## 2022-02-22 RX ORDER — MAGNESIUM SULFATE HEPTAHYDRATE 40 MG/ML
2 INJECTION, SOLUTION INTRAVENOUS ONCE
Status: COMPLETED | OUTPATIENT
Start: 2022-02-22 | End: 2022-02-22

## 2022-02-22 RX ORDER — DEXTROSE MONOHYDRATE 25 G/50ML
25-50 INJECTION, SOLUTION INTRAVENOUS
Status: DISCONTINUED | OUTPATIENT
Start: 2022-02-22 | End: 2022-02-23

## 2022-02-22 RX ORDER — CALCIUM GLUCONATE 20 MG/ML
2 INJECTION, SOLUTION INTRAVENOUS ONCE
Status: COMPLETED | OUTPATIENT
Start: 2022-02-22 | End: 2022-02-22

## 2022-02-22 RX ORDER — LIDOCAINE HYDROCHLORIDE 20 MG/ML
5 SOLUTION OROPHARYNGEAL ONCE
Status: COMPLETED | OUTPATIENT
Start: 2022-02-22 | End: 2022-02-22

## 2022-02-22 RX ORDER — TACROLIMUS 0.5 MG/1
2 CAPSULE ORAL
Status: DISCONTINUED | OUTPATIENT
Start: 2022-02-22 | End: 2022-02-25

## 2022-02-22 RX ADMIN — ACETYLCYSTEINE 2 ML: 200 SOLUTION ORAL; RESPIRATORY (INHALATION) at 11:58

## 2022-02-22 RX ADMIN — NYSTATIN 1000000 UNITS: 100000 SUSPENSION ORAL at 07:57

## 2022-02-22 RX ADMIN — ACETAMINOPHEN 975 MG: 325 TABLET, FILM COATED ORAL at 22:04

## 2022-02-22 RX ADMIN — PROPOFOL 40 MCG/KG/MIN: 10 INJECTION, EMULSION INTRAVENOUS at 04:49

## 2022-02-22 RX ADMIN — LIDOCAINE HYDROCHLORIDE 30 MG: 10 INJECTION, SOLUTION EPIDURAL; INFILTRATION; INTRACAUDAL; PERINEURAL at 10:02

## 2022-02-22 RX ADMIN — NYSTATIN 1000000 UNITS: 100000 SUSPENSION ORAL at 12:02

## 2022-02-22 RX ADMIN — VANCOMYCIN HYDROCHLORIDE 1000 MG: 10 INJECTION, POWDER, LYOPHILIZED, FOR SOLUTION INTRAVENOUS at 02:13

## 2022-02-22 RX ADMIN — MYCOPHENOLATE MOFETIL 1500 MG: 200 POWDER, FOR SUSPENSION ORAL at 07:57

## 2022-02-22 RX ADMIN — CEFTAZIDIME 2 G: 2 INJECTION, POWDER, FOR SOLUTION INTRAVENOUS at 04:49

## 2022-02-22 RX ADMIN — NYSTATIN 1000000 UNITS: 100000 SUSPENSION ORAL at 19:56

## 2022-02-22 RX ADMIN — LEVALBUTEROL HYDROCHLORIDE 1.25 MG: 1.25 SOLUTION RESPIRATORY (INHALATION) at 08:01

## 2022-02-22 RX ADMIN — HYDRALAZINE HYDROCHLORIDE 10 MG: 20 INJECTION INTRAMUSCULAR; INTRAVENOUS at 13:36

## 2022-02-22 RX ADMIN — CEFAZOLIN SODIUM 2 G: 10 INJECTION, POWDER, FOR SOLUTION INTRAVENOUS at 12:02

## 2022-02-22 RX ADMIN — FAMOTIDINE 10 MG: 10 TABLET, FILM COATED ORAL at 07:58

## 2022-02-22 RX ADMIN — ACETAMINOPHEN 975 MG: 325 TABLET, FILM COATED ORAL at 13:23

## 2022-02-22 RX ADMIN — LEVALBUTEROL HYDROCHLORIDE 1.25 MG: 1.25 SOLUTION RESPIRATORY (INHALATION) at 20:00

## 2022-02-22 RX ADMIN — PROPOFOL 40 MCG/KG/MIN: 10 INJECTION, EMULSION INTRAVENOUS at 07:58

## 2022-02-22 RX ADMIN — ACETYLCYSTEINE 2 ML: 200 SOLUTION ORAL; RESPIRATORY (INHALATION) at 16:31

## 2022-02-22 RX ADMIN — FAMOTIDINE 10 MG: 10 TABLET, FILM COATED ORAL at 19:55

## 2022-02-22 RX ADMIN — LEVALBUTEROL HYDROCHLORIDE 1.25 MG: 1.25 SOLUTION RESPIRATORY (INHALATION) at 11:58

## 2022-02-22 RX ADMIN — PANTOPRAZOLE SODIUM 40 MG: 40 INJECTION, POWDER, FOR SOLUTION INTRAVENOUS at 07:58

## 2022-02-22 RX ADMIN — LIDOCAINE HYDROCHLORIDE 15 ML: 20 SOLUTION ORAL; TOPICAL at 11:17

## 2022-02-22 RX ADMIN — MYCOPHENOLATE MOFETIL 1500 MG: 200 POWDER, FOR SUSPENSION ORAL at 19:55

## 2022-02-22 RX ADMIN — ACETAMINOPHEN 975 MG: 325 TABLET, FILM COATED ORAL at 07:26

## 2022-02-22 RX ADMIN — PREDNISOLONE 17.5 MG: 15 SOLUTION ORAL at 19:57

## 2022-02-22 RX ADMIN — GABAPENTIN 100 MG: 100 CAPSULE ORAL at 22:05

## 2022-02-22 RX ADMIN — LEVALBUTEROL HYDROCHLORIDE 1.25 MG: 1.25 SOLUTION RESPIRATORY (INHALATION) at 16:31

## 2022-02-22 RX ADMIN — SENNOSIDES AND DOCUSATE SODIUM 1 TABLET: 8.6; 5 TABLET ORAL at 07:56

## 2022-02-22 RX ADMIN — HEPARIN SODIUM 5000 UNITS: 5000 INJECTION, SOLUTION INTRAVENOUS; SUBCUTANEOUS at 22:05

## 2022-02-22 RX ADMIN — CEFAZOLIN SODIUM 2 G: 10 INJECTION, POWDER, FOR SOLUTION INTRAVENOUS at 20:26

## 2022-02-22 RX ADMIN — HYDROMORPHONE HYDROCHLORIDE 0.4 MG: 0.2 INJECTION, SOLUTION INTRAMUSCULAR; INTRAVENOUS; SUBCUTANEOUS at 22:05

## 2022-02-22 RX ADMIN — SENNOSIDES AND DOCUSATE SODIUM 1 TABLET: 8.6; 5 TABLET ORAL at 19:56

## 2022-02-22 RX ADMIN — HEPARIN SODIUM 5000 UNITS: 5000 INJECTION, SOLUTION INTRAVENOUS; SUBCUTANEOUS at 13:23

## 2022-02-22 RX ADMIN — SODIUM CHLORIDE: 9 INJECTION, SOLUTION INTRAVENOUS at 04:46

## 2022-02-22 RX ADMIN — TACROLIMUS 1 MG: 1 CAPSULE ORAL at 09:18

## 2022-02-22 RX ADMIN — POLYETHYLENE GLYCOL 3350 17 G: 17 POWDER, FOR SOLUTION ORAL at 07:57

## 2022-02-22 RX ADMIN — ACYCLOVIR 400 MG: 200 SUSPENSION ORAL at 19:56

## 2022-02-22 RX ADMIN — ACETYLCYSTEINE 2 ML: 200 SOLUTION ORAL; RESPIRATORY (INHALATION) at 08:02

## 2022-02-22 RX ADMIN — NYSTATIN 1000000 UNITS: 100000 SUSPENSION ORAL at 16:12

## 2022-02-22 RX ADMIN — ACYCLOVIR 400 MG: 200 SUSPENSION ORAL at 07:57

## 2022-02-22 RX ADMIN — TACROLIMUS 2 MG: 1 CAPSULE ORAL at 18:42

## 2022-02-22 RX ADMIN — MAGNESIUM SULFATE IN WATER 2 G: 40 INJECTION, SOLUTION INTRAVENOUS at 09:16

## 2022-02-22 RX ADMIN — HEPARIN SODIUM 5000 UNITS: 5000 INJECTION, SOLUTION INTRAVENOUS; SUBCUTANEOUS at 05:57

## 2022-02-22 RX ADMIN — PREDNISOLONE 17.5 MG: 15 SOLUTION ORAL at 07:59

## 2022-02-22 RX ADMIN — CALCIUM GLUCONATE 2 G: 20 INJECTION, SOLUTION INTRAVENOUS at 09:18

## 2022-02-22 RX ADMIN — ACETYLCYSTEINE 2 ML: 200 SOLUTION ORAL; RESPIRATORY (INHALATION) at 20:00

## 2022-02-22 RX ADMIN — INSULIN ASPART 2 UNITS: 100 INJECTION, SOLUTION INTRAVENOUS; SUBCUTANEOUS at 22:57

## 2022-02-22 RX ADMIN — HYDRALAZINE HYDROCHLORIDE 10 MG: 20 INJECTION INTRAMUSCULAR; INTRAVENOUS at 19:55

## 2022-02-22 ASSESSMENT — ACTIVITIES OF DAILY LIVING (ADL)
ADLS_ACUITY_SCORE: 7
ADLS_ACUITY_SCORE: 8
ADLS_ACUITY_SCORE: 7
ADLS_ACUITY_SCORE: 8
ADLS_ACUITY_SCORE: 7
ADLS_ACUITY_SCORE: 7
ADLS_ACUITY_SCORE: 10
ADLS_ACUITY_SCORE: 8
ADLS_ACUITY_SCORE: 7
ADLS_ACUITY_SCORE: 5
ADLS_ACUITY_SCORE: 7
ADLS_ACUITY_SCORE: 7
ADLS_ACUITY_SCORE: 8
ADLS_ACUITY_SCORE: 10

## 2022-02-22 NOTE — PLAN OF CARE
Goal Outcome Evaluation:    Plan of Care Reviewed With: patient, spouse     Overall Patient Progress: as expected    Outcome Evaluation: Patient tolerated procedure well, and remained normothermic.    D: s/p bilateral lung transplant 2/2 COPD (2/21) w/ Dr. Robin. Follows commands, moves all extremities. Successful PST following bronch by pulm and feeding tube repositioning by IR. Now extubated (2/22), on 4L NC. Off norepi this AM. Hypertensive at time of extubation, prn IV hydralazine given, effective. Swallow study by speech, icechips for now. Chest tube output ~500 ml so far. Oliguric 15-20 ml/h, MD notified. Pt denied pain. [place] cath removed per order. Insulin S/S. Up in chair.  I: As above. MD updated re: status. Spouse updated re: plan/status at bedside. Tube feeding initiated. Lytes replaced as ordered.  A: No acute event this shift.  P: Continue to monitor. Notify MD of changes/concerns. Encourage pulm toilet/rehab.

## 2022-02-22 NOTE — PROGRESS NOTES
"Pain Service Progress Note  [facility]  Date: February 22, 2022      Patient Name: Melissa Elder  MRN: [id number]  Age: 66 year old  Sex: female      Assessment:  Melissa Elder is a 66 year old female with a PMH significant for severe COPD on chronic home O2, mild non-obstructive CAD, paroxysmal A-fib, osteoporosis on treatment    Procedure: bilateral lung transplant    Date of Surgery: 2/21/22    Date of Bilateral ES (erector spinae) T6-7 Catheter Placement: 2/21/22     Plan/Recommendations:  1. Regional Anesthesia/Analgesia  -Continuous Catheter Type/Site: Bilateral ES T6-7 catheters   Continuous infusion of Ropvicaine 0.2% at 7 mL/hr each catheter, total 14mL/hr    Plan to maintain catheters while chest tubes in place, max of 7 days    2. Anticoagulation  Okay to continue heparin SC 5,000 units Q 8 hrs   -Please contact Inpatient Pain Service (pager [phone]) before ordering or making any medication changes    3. Multimodal Analgesia  - per primary service, currently on Propofol and Fentanyl    Pain Service will continue to follow.    Discussed with attending anesthesiologist      Overnight Events: None, remains intubated    Tubes/Drains: Yes  Chest tubes x 5    Subjective: Intubated, on CMV. Awake and able to clearly communicate with mouthing words and gestures, moving upper and lower extremities independently. Denies pain  Nausea: No  Symptoms of LAST: No      Diet: NPO for Medical/Clinical Reasons Except for: Meds    Relevant Labs:  Recent Labs   Lab Test 02/22/22  0355 02/21/22  0808 02/21/22  0714   INR 1.31*   < > 5.23*   *   < >  --    PTT  --   --  106*   BUN 10   < >  --     < > = values in this interval not displayed.       Physical Exam:  Vitals: /60   Pulse 111   Temp 99.5  F (37.5  C)   Resp 17   Ht 1.575 m (5' 2\")   Wt 72.3 kg (159 lb 6.3 oz)   SpO2 100%   BMI 29.15 kg/m      Physical Exam:   Orientation:  Alert, oriented, and in no acute distress: " "Yes  Sedation: No    Motor Examination:  5/5 Strength in lower extremities: Yes    Catheter Site:   Catheter entry site is clean/dry/intact: Yes    Tender: No      Relevant Medications:  Current Pain Medications:  Medications related to Pain Management (From now, onward)    Start     Dose/Rate Route Frequency Ordered Stop    02/24/22 0000  acetaminophen (TYLENOL) tablet 650 mg         650 mg Oral EVERY 4 HOURS PRN 02/21/22 0712      02/22/22 1400  acetaminophen (TYLENOL) tablet 975 mg         975 mg Oral or Feeding Tube EVERY 8 HOURS SCHEDULED 02/22/22 0840      02/22/22 0800  polyethylene glycol (MIRALAX) Packet 17 g         17 g Oral or Feeding Tube DAILY 02/21/22 0712      02/21/22 2200  gabapentin (NEURONTIN) capsule 100 mg         100 mg Oral or Feeding Tube AT BEDTIME 02/21/22 0712      02/21/22 1130  ropivacaine 0.2% (NAROPIN) 750 mL in ON-Q C-Bloc select flow (BP1240 holds 600-750 mL) dual cath disposable pump         7 mL/hr  Irrigation CONTINUOUS 02/21/22 1106      02/21/22 0915  propofol (DIPRIVAN) bolus from infusion pump 10-20 mg         10-20 mg Intravenous EVERY 30 MIN PRN 02/21/22 0919      02/21/22 0909  fentaNYL (SUBLIMAZE) 50 mcg/mL bolus from infusion pump 25-50 mcg         25-50 mcg Intravenous EVERY 1 HOUR PRN 02/21/22 0919      02/21/22 0800  senna-docusate (SENOKOT-S/PERICOLACE) 8.6-50 MG per tablet 1 tablet         1 tablet Oral or Feeding Tube 2 TIMES DAILY 02/21/22 0712      02/21/22 0800  fentaNYL (SUBLIMAZE) infusion          mcg/hr  0.5-4 mL/hr  Intravenous CONTINUOUS 02/21/22 0752      02/21/22 0800  propofol (DIPRIVAN) infusion         5-75 mcg/kg/min × 66.7 kg (Dosing Weight)  2-30 mL/hr  Intravenous CONTINUOUS 02/21/22 0752      02/21/22 0712  HYDROmorphone (DILAUDID) injection 0.2 mg        \"Or\" Linked Group Details    0.2 mg Intravenous EVERY 2 HOURS PRN 02/21/22 0712      02/21/22 0712  HYDROmorphone (DILAUDID) injection 0.4 mg        \"Or\" Linked Group Details    0.4 mg " "Intravenous EVERY 2 HOURS PRN 02/21/22 0712      02/21/22 0712  oxyCODONE (ROXICODONE) tablet 5 mg        \"Or\" Linked Group Details    5 mg Oral EVERY 4 HOURS PRN 02/21/22 0712      02/21/22 0712  oxyCODONE IR (ROXICODONE) tablet 10 mg        \"Or\" Linked Group Details    10 mg Oral EVERY 4 HOURS PRN 02/21/22 0712      02/21/22 0712  magnesium hydroxide (MILK OF MAGNESIA) suspension 30 mL         30 mL Oral DAILY PRN 02/21/22 0712      02/21/22 0712  bisacodyl (DULCOLAX) Suppository 10 mg         10 mg Rectal DAILY PRN 02/21/22 0712      02/21/22 0712  methocarbamol (ROBAXIN) tablet 500 mg         500 mg Oral EVERY 6 HOURS PRN 02/21/22 0712      02/21/22 0712  lidocaine 1 % 0.1-1 mL         0.1-1 mL Other EVERY 1 HOUR PRN 02/21/22 0712      02/21/22 0712  lidocaine (LMX4) cream          Topical EVERY 1 HOUR PRN 02/21/22 0712            Primary Service Contacted with Recommendations? No    Perla Rivera, APRN CNP  2/22/2022    Time/Communication:  I personally spent 15 minutes with greater than 50% in consultation, education, counseliing and coordination of care.  Also discussed with RN    Contact Info (24 hour job code pager is the last 4 digits) For in-house use only:   Job code ID: [id number]   [facility] [id number]  [facility] [address]  [employer] phone: dial * * * [phone], enter jobcode ID, then enter call-back number.    Text: Use [employer] on the Intranet <Paging/Directory> tab and enter Jobcode ID.   If no call back at any time, contact the hospital  and ask for Regional Anesthesia attending or backup     "

## 2022-02-22 NOTE — PROGRESS NOTES
"Bronchoscopy Risk Assessment Guidelines      A. Patient symptoms to consider when assessing pulmonary TB risk are:    I. Cough greater than 3 weeks; and fever, hemoptysis, pleuritic chest    pain, weight loss greater than 10 lbs, night sweats, fatigue, infiltrates on    upper lobes or superior segments of lower lobes, cavitation on chest    x-ray.   B. Patient risk factors to consider when assessing pulmonary TB risk are:    I. Exposure to known TB case, foreign-born persons (within 5 years of    arrival to [place]), residence in a crowded setting (correctional facility,     long-term care center, etc.), persons with HIV or immunosuppression.    Patients with symptoms and risk factors should generally be considered \"suspect risk\" and bronchoscopies should be performed in airborne precautions.    This patient has NO KNOWN RISK of Tuberculosis (proceed with bronchoscopy)    Specimens sent: yes  Complications: None  Scope used: #[id number]  Slim  Attending Physician: Dr. Spike Keita, RT on 2/22/2022 at 10:28 AM  "

## 2022-02-22 NOTE — CONSULTS
"Pain Service - Regional Anesthesia Note  Provider ordering consult: CVICU Service  Reason for consult: \"Requesting BL SANDER catheters s/p BL lung transplant\"  Location of patient:  4501-01  Date of encounter: 2/21/22    Briefly, Melissa Elder is a 66 year old female with a PMH significant for severe COPD on chronic home O2, mild non-obstructive CAD, paroxysmal A-fib, osteoporosis on treatment who underwent bilateral lung transplantation of 2/21/22    Plan  - Bilateral ES T6-7 catheters were placed with continuous infusion of Ropvicaine 0.2% at 7 mL/hr each catheter, total 14mL/hr as part of her analgesia plan  - Please see Anesthesia Procedure Note on 2/21/22 by CHRISTOPHE Smart MD for full details of the procedure  - RAPS will continue to follow       Thank you for the opportunity to participate in the care of Melissa Elder      MANUEL Zheng [facility]  Regional Anesthesia Pain Service  2/22/2022 10:10 AM    Contact Info (for in-house use only):  Job code ID: [id number]    [employer] phone: dial [phone], enter jobcode ID, then enter call-back number.    Text: Use [employer] on the Intranet <Paging/Directory> tab and enter Jobcode ID.   If no call back at any time, contact the hospital  and ask for RAPS attending or backup             "

## 2022-02-22 NOTE — PLAN OF CARE
Major Shift Events:  Continues on Propofol and Fentanyl. Moves all extremities with cares and opens her eyes. Was following commands at 0500. Lungs are clear to coarse; minimum ETT secretions/moderate oral/OP secretions. T-max 37.1 Sinus rhythm with occasional PVCs. Dopplerable pedal pulses (weaker on the right) Continues on nor-Epi of 0.04 to keep MAPs above 65. 500ml LR bolus given for increased lactic acid. Last CVP 9, Pa=21/10 and SVR= 831 Ci=3.4/3.5/and most recent 3.6 Svo2s have been in the 70s. Calcium 7.2 this am so sent ionized calcium. Urine output 30-75ml/hr most of the night but trending down. Off insulin since midnight.   Plan: Bronch           Advance NG to post pyloric           Extubate?  For vital signs and complete assessments, please see documentation flowsheets.

## 2022-02-22 NOTE — PROGRESS NOTES
CV ICU PROGRESS NOTE  February 22, 2022      CO-MORBIDITIES:   No diagnosis found.    ASSESSMENT: Melissa Elder is a 66 year old female with PMHx HTN, HLD, paroxysmal Afib, osteoporosis, GERD, severe COPD, previously requiring 2-3L NC O2 at rest.  Underwent b/l lung transplant with Dr. Robin on 02/21.    TODAY'S PROGRESS:     Overnight events:  - Got 500 LR overnight for lactate of 2.4  - Had resolution on re-check (1.2)    Goals:  - Ensure XR to place NJ (tentative for 2PM)  - Bronch (9AM)   - Add ancef 2g q8 for coverage of MSSA in donor lung   - discontinue vancomycin, ceftazidime  - Extubated  - f/u G6PD assay if it becomes available (start dapsone if negative)     PLAN:     Neuro/ pain/ sedation:  Acute Postoperative pain  - Pain:               - E/S catheters              - scheduled acetaminophen, gabapentin              - PRN tylenol, dilaudid, oxycodone, robaxin  - Sedation: fentanyl gtt, propofol gtt   - Weaning propofol gtt as above after bronch  - PRN bolus propofol and fentanyl    Pulmonary care:   Postoperative ventilation management  - Titrate supplemental oxygen to maintain saturation above 92%.  - Pulmonary hygiene: Incentive spirometer every 15- 30 minutes when awake, flutter valve, C&DB              - Mucomyst QID, levalbuterol QID              - Bronch planned 02/22    - plan extubation ~12PM pending bronch  - Immune suppressive regimen: tacrolimus, MMF, steroid, basiliximab     Cardiovascular:    History of HTN, HLD  Recent echo on 06/2021 with LVEF of 55-60%  - Goal MAP>65, SBP<140  - hold PTA atorvastatin, diltiazem, & furosemide  - ASA 81  - Norepi gtt              - wean as tolerated while coming off sedation   - lactate q6   - lactate trended past peak    - likely 2/2 vasoplegia (which is persisting in s/o sedation)     GI care/ Nutrition:   - NPO              - XR to place NJ 02/22   - stay NPO until after extubation  - PPI (pantoprazole)              - no PTA PPI, bridging with  famotidine (end on 03/03/21)  - Continue bowel regimen: miralax, senna    Renal/ Fluid Balance/ Electrolytes:   BL creat appears to be ~ 0.5  - Strict I/O, daily weights   - giving LR for lactate elevations   - otherwise net 0 fluid balance   - 300 mL UOP since midnight (~50/hr)    - continue to monitor UOP  - Avoid/limit nephrotoxins as able  - Replete lytes PRN per protocol (high dose protocol for K, Mg, Phos)   - Repleting Calcium manually (2g given 02/22)    Endocrine:    Stress induced hyperglycemia  Preop A1c 5.8%  - Insulin gtt  - Goal BG <180 for optimal healing  - PTA meds: no Hx T2DM    ID/ Antibiotics:  Stress induced leukocytosis  # Donor Lung Growing Staph - Speciated to MSSA  - discontinue Vancomycin, Ceftazidime  - Ancef 2g q8 for 13d for MSSA coverage  - Holding off on dapsone, pending G6PD screen (still in process 02/22)  - Prophylaxis:              - acyclovir starting 02/22 - to continue for 30d post-op              - nystatin  - Continue to monitor fever curve, WBC and inflammatory markers as appropriate    Heme:     Stress induced leukocytosis  Acute blood loss anemia  Acute blood loss thrombocytopenia  Epistaxis - resolved  Had an episode of profuse epistaxis with attempt at NJ placement 02/21. Was given 1u pRBC 02/21, monitoring Hgb.   - CBC and platelets q12  - transfuse for Hgb goal > 7    MSK/ Skin:  Sternotomy  Surgical Incision  - Sternal precautions  - Postoperative incision management per protocol  - PT/OT/CR     Prophylaxis:    - Mechanical prophylaxis for DVT  - Chemical DVT prophylaxis - subcutaneous heparin  - PPI     Lines/ tubes/ drains:  - Arterial Line, PIV x 2, ETT, CTs x 5, PA catheter, RIJ, gallo, NG     Disposition:  - CVICU     Patient seen, findings and plan discussed with CVICU staff     Froylan Mchugh, MS4    ====================================    SUBJECTIVE:   Had a high lactate overnight (2.4) got 500 LR with resolution on recheck.    OBJECTIVE:   1. VITAL SIGNS:    Temp:  [96.3  F (35.7  C)-98.8  F (37.1  C)] 98.8  F (37.1  C)  Pulse:  [60-91] 85  Resp:  [12-30] 23  BP: ()/(53-58) 116/57  MAP:  [55 mmHg-104 mmHg] 80 mmHg  Arterial Line BP: ()/(39-70) 120/56  FiO2 (%):  [30 %-50 %] 30 %  SpO2:  [95 %-100 %] 100 %  Vent Mode: CMV/AC  (Continuous Mandatory Ventilation/ Assist Control)  FiO2 (%): 30 %  Resp Rate (Set): 16 breaths/min  Tidal Volume (Set, mL): 350 mL  PEEP (cm H2O): 8 cmH2O  Resp: 23      2. INTAKE/ OUTPUT:   I/O last 3 completed shifts:  In: 5741.28 [I.V.:4111.28; Other:230; NG/GT:300; IV Piggyback:500]  Out: 3683 [Urine:1473; Blood:1000; Chest Tube:1210]    3. PHYSICAL EXAMINATION:   General: sedated female patient lying flat in bed  Neuro: deeply sedated  Resp: intubated, ventilated, there is a fixed wheeze (inspiratory > expiratory) in all lung fields  CV: S1, S2, RRR, there is a soft pericardial rub audible  Abdomen: Soft, non-distended, non-tender  Incisions: c/d/i  Extremities: warm and well perfused, pulses 3+ x4  CT: To suction, serosang output, no airleak, crepitus    4. INVESTIGATIONS:   Arterial Blood Gases   Recent Labs   Lab 02/21/22  2351 02/21/22  1527 02/21/22  0713 02/21/22  0625   PH 7.49* 7.46* 7.52* 7.43   PCO2 35 39 28* 37   PO2 152* 182* 232* 197*   HCO3 27 28 23 24     Complete Blood Count   Recent Labs   Lab 02/22/22  0355 02/21/22 2012 02/21/22  1621 02/21/22  1135 02/21/22  0809 02/21/22  0548 02/21/22  0530   WBC 11.5* 11.8*  --   --  9.8  --  17.7*   HGB 8.8* 10.0* 8.4* 7.0* 7.3*   < > 8.1*   * 155  --   --  109*  --  217    < > = values in this interval not displayed.     Basic Metabolic Panel  Recent Labs   Lab 02/22/22  0543 02/22/22  0402 02/22/22  0355 02/22/22  0222 02/21/22  1616 02/21/22  1530 02/21/22  1118 02/21/22  1045 02/21/22  0717 02/21/22  0625 02/21/22  0548 02/21/22  0123 02/20/22  0617   NA  --   --  140  --   --   --   --  144  --  141 141   < > 140   POTASSIUM  --   --  4.6  --   --  4.4  --   3.0*  --  3.0* 3.4*   < > 4.0   CHLORIDE  --   --  111*  --   --   --   --  111*  --   --   --   --  102   CO2  --   --  26  --   --   --   --  27  --   --   --   --  31   BUN  --   --  10  --   --   --   --  11  --   --   --   --  9   CR  --   --  0.53  --   --   --   --  0.41*  --   --   --   --  0.45*   * 119* 135* 118*   < >  --    < > 92   < > 204* 253*   < > 139*    < > = values in this interval not displayed.     Liver Function Tests  Recent Labs   Lab 02/22/22  0355 02/21/22  1045 02/21/22  0808 02/21/22  0714 02/21/22  0530 02/20/22  0617   AST 40 32  --   --   --  14   ALT 17 15  --   --   --  25   ALKPHOS 36* 33*  --   --   --  95   BILITOTAL 0.4 0.6  --   --   --  0.5   ALBUMIN 2.3* 2.7*  --   --   --  3.7   INR 1.31*  --  1.58* 5.23* 1.96* 1.02     Pancreatic Enzymes  No lab results found in last 7 days.  Coagulation Profile  Recent Labs   Lab 02/22/22  0355 02/21/22  0808 02/21/22  0714 02/21/22  0530 02/20/22  0617   INR 1.31* 1.58* 5.23* 1.96* 1.02   PTT  --   --  106* 29 31         5. RADIOLOGY:   Recent Results (from the past 24 hour(s))   XR Abdomen Port 1 View    Narrative    Exam: XR ABDOMEN PORT 1 VIEWS, 2/21/2022 7:48 AM    Indication: OG placement    Comparison: X-ray 3/25/2019    Findings:   Frontal radiograph of the abdomen. Patient is slightly rotated.  Gastric tube tip and sidehole project over the stomach. Ratliff  catheter. Mediastinal drain and chest tubes are partially visualized.  Nonobstructive bowel gas pattern. Scattered hemostatic clips overlying  the right and left mid abdomen. No pneumatosis or portal venous gas.  No aggressive osseous lesions.      Impression    Impression:   Gastric tube tip and sidehole project over the stomach.    I have personally reviewed the examination and initial interpretation  and I agree with the findings.    SATNAM SIMON MD         SYSTEM ID:  [id number]   XR Chest Port 1 View    Narrative    Exam: XR CHEST PORT 1 VIEW, 2/21/2022 7:48  AM    Comparison: 2/20/2022    History: post lung transplant    Findings:  AP view of the chest. Endotracheal tube projects just above the level  of the jaimee. [place] catheter is in the pulmonary artery. Enteric  tube is in stomach. Right right chest tubes x2 projected over the  right hemithorax. Left chest tubes x2 with more lateral chest tube  with sidehole projecting over the subcutaneous tissue. Pericardial  chest tube along the base of the heart.    Trachea is midline. Cardiac silhouette is within normal limits. Aortic  knob calcifications.. Intimal perihilar and bibasilar opacities. There  is no pneumothorax or pleural effusion. Small amount of left chest  wall subcutaneous emphysema.      Impression    Impression:   1. Status post bilateral lung transplant  2. Endotracheal tube at the level of the jaimee, recommend slight  retraction.  3. Left chest tubes with left lateral chest tube sidehole projecting  over the subcutaneous tissue and associated subcutaneous emphysema.  Recommend reposition.  4. [place] catheter with tip in the main pulmonary artery.  5. Minimal perihilar and bibasilar atelectasis.    Findings FINDINGS discussed with Dr. Raudel Webster by Dr. Meng Suh at 810 AM on 2/21/2022.    I have personally reviewed the examination and initial interpretation  and I agree with the findings.    FELIPA MARIE MD         SYSTEM ID:  [id number]   POC US Guidance Needle Placement    Narrative    Bilateral erector spinae plane block   XR Chest Port 1 View    Narrative    Portable chest 2/21/22 at 1105 hours    INDICATION: Endotracheal tube pulled back    COMPARISON: Earlier same day 0735 hours    FINDINGS: Heart size normal. A few patchy densities in the lower lungs  appear similar. An endotracheal tube tip is approximately 2.2 cm above  the jaimee. Right IJ [place] catheter tip is in the main pulmonary  artery. There is calcification at the aortic knob. Clamshell  sternotomy. Enteric  tube progress beyond the inferior margin of the  image.      Impression    IMPRESSION: Endotracheal tube tip approximately 2.2 cm above the  jaimee. Atherosclerosis. Few patchy densities in the lower lungs which  may indicate atelectasis or edema, recommend follow-up to clearing to  exclude infection. [place] catheter tip in the main pulmonary  artery.    FELIPA MARIE MD         SYSTEM ID:  [id number]   XR Abdomen Port 1 View    Narrative    EXAMINATION:  XR ABDOMEN PORT 1 VIEWS 2/21/2022 3:31 PM     INDICATION: Verify small bowel feeding tube bedside placement    COMPARISON: Abdominal radiograph 2/21/2022    FINDINGS: Single AP supine view of the abdomen.     Feeding tube tip projects over the right upper quadrant, not  definitively postpyloric. Enteric tube with tip projecting in the  stomach. The small intestine and colon are nondistended. Scattered  hemostatic colonoscopy clips in the bowel. No evidence of pneumatosis  or portal venous gas. Multiple chest tubes visualized. The lung bases  are unremarkable.      Impression    IMPRESSION:  Feeding tube with tip projected in the right upper quadrant. Not  definitively postpyloric.    I have personally reviewed the examination and initial interpretation  and I agree with the findings.    SATNAM SIMON MD         SYSTEM ID:  [id number]   XR Chest Port 1 View    Impression    RESIDENT PRELIMINARY INTERPRETATION  IMPRESSION: Postoperative sequelae of bilateral lung transplant with  intact clamshell sternotomy wires and lines/tubes as below. Probable  small left pleural effusion. No new airspace disease, pneumothorax, or  overt abnormality of the heart, bones, or upper abdomen..    Lines/tubes/Devices as follows:   --Endotracheal tube tip is 6.2 cm above the jaimee.   --[place] catheter tip projects near the proximal right main  pulmonary artery.   --Mediastinal and pleural drains.   --Partially visualized enteric and feeding tubes.   --Spinal analgesia  catheters       =========================================

## 2022-02-22 NOTE — PLAN OF CARE
ICU End of Shift Summary. See flowsheets for vital signs and detailed assessment.    Changes this shift:     Arrival from OR this AM with double lung transplant. Fluid resuscitated with 1.5 liters LR, 500cc Albumin for rising lactic acid and hypotension. Both fluid responsive. Hemodynamics trended as ordered via flotrac and CCO monitor. Initial labs contaminated, all redrawn and electrolytes replaced: K 60meq and Phos 30mmol.     Neuro: Sedation holiday x 2, purposeful movement x 4, nods appropriately, follows commands. Sedated on propofol 40, fentanyl 50. Awake and agitated at 30mck/kg/min. ES catheters placed by anesthesia.     Respiratory: initially alkalotic, responsive to ventilator changes. FiO2 titrated to 30% per ABGs. PA pressures trended. Chest tubes x 5. Team notified with right atrium output of 210cc (serousanguineous) following turn.     Cardiac: CVP trended, Norepi 0-0.04mcg/kg/min.     Endo: continuous insulin, currently advancing algorithms with BG still > 200 but trending down.     GI: bowel sounds faint but hypoactive and audible. Senna-S started. NJ attempted but x-ray confirm gastric.     : Ratliff in place, UOP adequate.     Plan: NG reposition to NJ tomorrow via IR, bronchoscopy and possible extubation tomorrow. CVP remains 6-8 and intermittently hypotensive, per team, favor SVR via norepi vs fluids.

## 2022-02-22 NOTE — PROGRESS NOTES
Pulmonary Medicine  Cystic Fibrosis - Lung Transplant Team  Progress Note  2022       Patient: Melissa Elder  MRN: [id number]  : 1955 (age 66 year old)  Transplant: 2022 (Lung), POD#1  Admission date: 2022    Assessment & Plan:     Melissa Elder is a 66 year old female with a PMH significant for severe COPD, HTN, mild non-obstructive CAD, paroxysmal afib, osteoporosis, GERD, and colonic polyps.  Pt. is now s/p BSLT on 22, surgery relatively uncomplicated.  The patient is currently intubated and sedated on one pressor, POD #1 in the CVICU.    Today's recommendations:  - Continue aggressive airway clearance QID (addition of Aerobika and incentive spirometry once extubated, ordered)  - Our team will perform bronchoscopy today in anticipation of extubation  - Post-pyloric nasal feeding tube placement today for enteral nutrition, trickle feed rate only (max 20 ml/hr) with gastric access  - SLP consult post-extubation for swallowing evaluation  - Await explant pathology  - Repeat basiliximab   - Tacro level today to trend, subtherapeutic, dose increased, daily levels  - Prednisolone started today with taper  - Daponse for PJP ppx on hold pending G6PD  - Acyclovir for HSV ppx (newly positive )  - Following cultures ([facility] personally reviewed), okay to narrow today to cover donor MSSA (per Dr. Lala), plan to complete 2 week course based on results of cultures  - Once appropriate to resume statin recommend rosuvastatin (less interaction with immunosuppression)  - Resume afib treatment when able (metoprolol vs diltiazem - close monitoring of tacro if diltiazem is resumed)     S/p bilateral sequential lung transplant for COPD:  Acute hypoxic/hypercapneic respiratory failure: Scant pleural-pleural adhesions and mild-moderate bilateral hilar lymphadenopathy per op note.  Patient is currently on norepinephrine, oxygenating well on CMV 16/350/8/40.  Lactic acid elevated to 4.4 (from  3.9).  - Nebs: levalbuterol and Mucomyst QID  - Aggressive pulmonary toilet with chest physiotherapy QID (addition of Aerobika and incentive spirometry once extubated, ordered)  - DSA at one week (ordered 2/28) then one month post-transplant, additionally per protocol  - Ventilator management per ICU team  - Our team will perform bronchoscopy today in anticipation of extubation (notable only for slight edema on the left)  - Anesthesia to manage erector spinae catheters (placed 2/21)  - Chest tubes managed by surgical team  - Daily CXR, today with stable slight left pleural effusion (personally reviewed)  - Volume management per primary team  - Post-pyloric nasal feeding tube placement today for enteral nutrition, trickle feed rate only (max 20 ml/hr) with gastric access  - SLP consult post-extubation for swallowing evaluation  - Await explant pathology     Immunosuppression:  - Induction therapy with basiliximab (and high dose IV steroid) given intraoperatively, repeating basiliximab dose on POD #4 (ordered 2/25)  - Tacrolimus SL BID.  Goal level 8-12.  Continuing SL dosing until goal is achieved (target: within 7 days post-op) and return of bowel function post-op.  Daily tacrolimus levels for the first 1-2 weeks with dose adjustments prn based on levels and changes in medications/patient condition.  See below for initial levels and dosing trends:  Date Tacro Level Intervention   2/22 3.3 Dose increased from 1 mg to 2 mg BID   2/23 ordered             - MMF 1500 mg BID  - Prednisolone 17.5 mg BID ordered to begin 2/22 with taper per lung transplant protocol (due 3/1, not yet ordered)  Date AM dose (mg) PM dose (mg)   2/22 17.5 17.5   3/1 15 15   3/8 15 12.5   3/15 12.5 12.5   3/29 12.5 10   4/12 10 10   5/10 10 7.5   6/7 7.5 7.5   7/5 7.5 5   8/2 5 5   8/30 5 2.5      Prophylaxis:   - Daponse for PJP ppx on hold pending G6PD (2/21), noted: sulfa allergy  - Acyclovir for HSV ppx (newly positive 2/20)  - Nystatin for  oral candidiasis ppx, 6 month course  - See below for serologies and viral ppx:    Donor Recipient Intervention   CMV status Negative Negative N/A, CMV monthly (3/21)   EBV status Positive Positive EBV at one month (3/21) then q3 months   HSV status N/A (Newly) Positive Acyclovir POD #1-30      Primary graft dysfunction (per ISHLT guidelines):  See chart below:    POD #0  (~0 hours) POD #1  (~24 hours) POD #2   (~48 hours) POD#3   (~72 hours)   Date 2/21 2/22 2/23 2/24   Time 0713 0843       Intubated Y Y Y/N Y/N   PaO2 232 127       FiO2 50 30       P/F Ratio 464 423       PGD Grade   (0=mild, 3=severe) 0 0       ECMO N N N N   Inhaled NO/Flolan N N N N   ISHLT PGD Scoring  Grade 0 - PaO2/FiO2 >300 and normal chest radiograph (absence of diffuse allograft infiltrates)  Grade 1 - PaO2/FiO2 >300 and diffuse allograft infiltrates on chest radiograph  Grade 2 - PaO2/FiO2 between 200 and 300  Grade 3 - PaO2/FiO2 <200 and/or on ECMO     ID: Prior history of infection/colonization with Haemophilus influenzae (2017).  IgG adequate (2/20).  MRSA nares negative 2/21.      - IgG repeat at one month (3/21, not yet ordered)  - Donor ([facility]) respiratory culture with P-S MSSA, urine culture with P-S VSE, and blood cultures NGTD (personally reviewed in [facility])  - Donor cultures ([facility]) with yeast, following  - Recipient cultures NGTD  - IV ceftaz/vancomycin originally planned for at least 48h per protocol, okay to narrow today to Ancef to cover donor MSSA (per Dr. Lala), plan to complete 2 week course based on results of cultures     PHS risk criteria donor: Additional labs required post-transplant (between 4-8 weeks post-op): Hepatitis B, Hepatitis C, and HIV by COLT ([id number], ordered POD #30), also plan to repeat hepatitis B surface antibody at that time (ordered) given discrepancy with recent result.     Other relevant problems managed by primary team:     CAD: Noted to have mild non-obstructive CAD without hemodynamically  "significant lesions on cardiac cath 3/27/19.  PTA ASA 81 mg and atorvastatin.   - Once appropriate to resume statin recommend rosuvastatin (less interaction with immunosuppression)     Paroxysmal afib: PTA diltiazem, not on AC.    - Resume afib treatment when able (metoprolol vs diltiazem - close monitoring of tacro if diltiazem is resumed)     GERD: Not on PPI PTA.  Negative pH/manometry study 3/28/19, noted small hiatal hernia.   - PPI daily (2/21), famotidine BID (2/21) x10d as bridge     Anxiety: Per lung transplant committee review, noted high anxiety in anticipation of transplant.   - Monitor need for palliative care and/or health psychology consult post-op     We appreciate the excellent care provided by the CVTS and CVICU teams.  Recommendations communicated via in person rounding and this note.  Will continue to follow along closely, please do not hesitate to call with any questions or concerns.    Patient discussed with Dr. Lala.  Medical decision making by me (except for ventilator management and extubation timing and ABX narrowing, per staff).    Iman Jane, DNP, APRN, CNP  Inpatient Nurse Practitioner  Pulmonary CF/Transplant     Subjective & Interval History:     Remains intubated and lightly sedated.  Minimal secretions from ETT.  Continues on norepinephrine.  ES catheters placed by anesthesia yesterday in anticipation of extubation.    Review of Systems:     ROS limited by intubation and sedation    C: No fever, + increased weight  INTEGUMENTARY/SKIN: No rash or obvious new lesions  ENT/MOUTH: No nasal drainage  RESP: See interval history  CV: No orthopnea  GI: No vomiting, no post-op stools  : UO adequate  ENDOCRINE: Blood sugars intermittently elevated  PSYCHIATRIC: Mood stable    Physical Exam:     Vital signs:  Temp: 98.8  F (37.1  C) Temp src: Bladder BP: 112/55 Pulse: 80   Resp: 16 SpO2: 100 % O2 Device: Mechanical Ventilator Oxygen Delivery: 4 LPM Height: 157.5 cm (5' 2\") Weight: 72.3 kg " (159 lb 6.3 oz)  I/O:     Intake/Output Summary (Last 24 hours) at 2/22/2022 0741  Last data filed at 2/22/2022 0700  Gross per 24 hour   Intake 4183.55 ml   Output 3105 ml   Net 1078.55 ml     Constitutional: Lying supine in bed, in no apparent distress.   HEENT: Eyes with pink conjunctivae, anicteric.  Orally intubated.  PULM: Diminished bases bilaterally.  No crackles, no rhonchi, no wheezes.  Non-labored breathing on full vent support --> PST.  CV: Normal S1 and S2.  RRR.  No murmur, gallop.  Loud pericardial rub.  Trace peripheral edema.   ABD: BS hypoactive, soft, nontender, nondistended.    MSK: Moves all extremities.  No apparent muscle wasting.   NEURO: Lightly sedated, answering simple questions.   SKIN: Warm, dry.  No rash on limited exam.   PSYCH: Mood stable.     Lines, Drains, and Devices:  Peripheral IV 02/20/22 Anterior;Right Lower forearm (Active)   Site Assessment WDL 02/22/22 0400   Line Status Saline locked 02/22/22 0400   Dressing Intervention New dressing  02/22/22 0400   Phlebitis Scale 0-->no symptoms 02/22/22 0400   Infiltration Scale 0 02/22/22 0400   Infiltration Site Treatment Method  None 02/21/22 1600   Number of days: 2       Peripheral IV 02/20/22 Posterior Lower forearm (Active)   Site Assessment [facility] 02/22/22 0400   Line Status Saline locked 02/22/22 0400   Phlebitis Scale 0-->no symptoms 02/22/22 0400   Infiltration Scale 0 02/22/22 0400   Number of days: 2       Introducer 02/21/22 Internal jugular Right (Active)   Specific Qualities [place] in place 02/22/22 0400   (Retired) Dressing Status Clean, dry, intact 02/22/22 0400   Dressing Change Due 02/27/22 02/22/22 0400   Number of days: 1       Arterial Line 02/21/22 (Active)   Site Assessment [facility] 02/22/22 0400   Line Status Pulsatile blood flow 02/22/22 0400   Arterine Line Cap Change Due 02/25/22 02/22/22 0400   Art Line Waveform Dampened 02/22/22 0400   Art Line Interventions Zeroed and calibrated;Leveled;Connections checked and  tightened;Flushed per protocol;Line pulled back 02/22/22 0400   Color/Movement/Sensation Capillary refill less than 3 sec 02/22/22 0400   Line Necessity Yes, meets criteria 02/22/22 0400   Dressing Type Transparent 02/22/22 0400   Dressing Status Clean, dry, intact 02/22/22 0400   Dressing Intervention Dressing changed/new dressing 02/22/22 0400   Number of days: 1       CVC Double Lumen Right Internal jugular (Active)   Site Assessment WDL 02/22/22 0400   Dressing Type Chlorhexidine sponge;Transparent 02/22/22 0400   Dressing Status clean;dry;intact 02/22/22 0400   Dressing Change Due 02/27/22 02/22/22 0400   Line Necessity yes, meets criteria 02/21/22 0800   Brown - Status infusing 02/22/22 0400   Brown - Cap Change Due 02/25/22 02/22/22 0400   White - Status infusing 02/22/22 0400   White - Cap Change Due 02/25/22 02/22/22 0400   Infiltration? no 02/22/22 0400   Infiltration Scale 0 02/22/22 0400   Number of days: 1       Pulmonary Artery Catheter - Single Lumen 02/21/22 0330 Right internal jugular vein continuous hemodynamic catheter (Active)   Dressing dressing dry and intact 02/22/22 0400   Securement secured with sutures 02/22/22 0400   PA Catheter Markings (cm) 51 02/22/22 0400   Phlebitis 0-->no symptoms 02/22/22 0400   Infiltration 0-->no symptoms 02/22/22 0400   Waveform normal 02/22/22 0400   Number of days: 1     Data:     LABS    CMP:   Recent Labs   Lab 02/22/22  0543 02/22/22  0402 02/22/22  0355 02/22/22  0222 02/21/22  2016 02/21/22  2013 02/21/22  1616 02/21/22  1530 02/21/22  1325 02/21/22  1254 02/21/22  1118 02/21/22  1045 02/21/22  0910 02/21/22  0808 02/21/22  0717 02/21/22  0714 02/21/22  0625 02/21/22  0548 02/21/22  0123 02/20/22  0617   NA  --   --  140  --   --   --   --   --   --   --   --  144  --   --   --   --  141 141   < > 140   POTASSIUM  --   --  4.6  --   --   --   --  4.4  --   --   --  3.0*  --   --   --   --  3.0* 3.4*   < > 4.0   CHLORIDE  --   --  111*  --   --   --   --    --   --   --   --  111*  --   --   --   --   --   --   --  102   CO2  --   --  26  --   --   --   --   --   --   --   --  27  --   --   --   --   --   --   --  31   ANIONGAP  --   --  3  --   --   --   --   --   --   --   --  6  --   --   --   --   --   --   --  7   * 119* 135* 118*   < >  --    < >  --    < >  --    < > 92   < >  --    < >  --  204* 253*   < > 139*   BUN  --   --  10  --   --   --   --   --   --   --   --  11  --   --   --   --   --   --   --  9   CR  --   --  0.53  --   --   --   --   --   --   --   --  0.41*  --   --   --   --   --   --   --  0.45*   GFRESTIMATED  --   --  >90  --   --   --   --   --   --   --   --  >90  --   --   --   --   --   --   --  >90   MINISTERIO  --   --  7.2*  --   --   --   --   --   --   --   --  8.1*  --   --   --   --   --   --   --  9.0   MAG  --   --  1.7*  --   --   --   --   --   --  2.0  --   --   --   --   --  <0.8*  --   --   --  1.7*   PHOS  --   --  3.0  --   --  2.8  --   --   --   --   --  1.0*  --  1.2*  --   --   --   --   --  3.5   PROTTOTAL  --   --  4.2*  --   --   --   --   --   --   --   --  4.3*  --   --   --   --   --   --   --  7.8   ALBUMIN  --   --  2.3*  --   --   --   --   --   --   --   --  2.7*  --   --   --   --   --   --   --  3.7   BILITOTAL  --   --  0.4  --   --   --   --   --   --   --   --  0.6  --   --   --   --   --   --   --  0.5   ALKPHOS  --   --  36*  --   --   --   --   --   --   --   --  33*  --   --   --   --   --   --   --  95   AST  --   --  40  --   --   --   --   --   --   --   --  32  --   --   --   --   --   --   --  14   ALT  --   --  17  --   --   --   --   --   --   --   --  15  --   --   --   --   --   --   --  25    < > = values in this interval not displayed.     CBC:   Recent Labs   Lab 02/22/22  0355 02/21/22 2012 02/21/22  1621 02/21/22  1135 02/21/22  0809 02/21/22  0548 02/21/22  0530   WBC 11.5* 11.8*  --   --  9.8  --  17.7*   RBC 3.10* 3.46*  --   --  2.57*  --  2.83*   HGB 8.8* 10.0* 8.4* 7.0* 7.3*   <  > 8.1*   HCT 26.2* 29.1*  --   --  21.8*  --  24.8*   MCV 85 84  --   --  85  --  88   MCH 28.4 28.9  --   --  28.4  --  28.6   MCHC 33.6 34.4  --   --  33.5  --  32.7   RDW 13.5 13.4  --   --  12.7  --  12.7   * 155  --   --  109*  --  217    < > = values in this interval not displayed.       INR:   Recent Labs   Lab 02/22/22  0355 02/21/22  0808 02/21/22  0714 02/21/22  0530   INR 1.31* 1.58* 5.23* 1.96*       Glucose:   Recent Labs   Lab 02/22/22  0543 02/22/22  0402 02/22/22  0355 02/22/22  0222 02/22/22  0052 02/22/22  0006   * 119* 135* 118* 114* 101*       Blood Gas:   Recent Labs   Lab 02/22/22  0356 02/21/22  2352 02/21/22  2351 02/21/22 2012   PHV 7.48* 7.45*  --  7.40   PCO2V 37* 40  --  45   PO2V 36 38  --  40   HCO3V 28 28  --  27   ARIEL 3.8* 3.8*  --  2.2*   O2PER 30 30 30 30       Culture Data No results for input(s): CULT in the last 168 hours.    Virology Data: No results found for: INFLUA, FLUAH1, FLUAH3, AL1481, IFLUB, RSVA, RSVB, PIV1, PIV2, PIV3, HMPV, HRVS, ADVBE, ADVC    Historical CMV results (last 3 of prior testing):  No results found for: CMVQNT  No results found for: CMVLOG    Urine Studies    Recent Labs   Lab Test 02/20/22  0248 03/25/19  1043   URINEPH 7.0 5.0   NITRITE Negative Negative   LEUKEST Negative Trace*   WBCU <1 1       Most Recent [place] Pulmonary Function Testing (FVC/FEV1 only)  FVC-Pre   Date Value Ref Range Status   12/20/2021 1.81 L    06/21/2021 1.46 L    12/09/2019 1.59 L    06/03/2019 1.68 L      FVC-%Pred-Pre   Date Value Ref Range Status   12/20/2021 65 %    06/21/2021 52 %    12/09/2019 56 %    06/03/2019 58 %      FEV1-Pre   Date Value Ref Range Status   12/20/2021 0.49 L    06/21/2021 0.50 L    12/09/2019 0.49 L    06/03/2019 0.47 L      FEV1-%Pred-Pre   Date Value Ref Range Status   12/20/2021 22 %    06/21/2021 22 %    12/09/2019 21 %    06/03/2019 20 %        IMAGING    Recent Results (from the past 48 hour(s))   XR Abdomen Port 1 View     Narrative    Exam: XR ABDOMEN PORT 1 VIEWS, 2/21/2022 7:48 AM    Indication: OG placement    Comparison: X-ray 3/25/2019    Findings:   Frontal radiograph of the abdomen. Patient is slightly rotated.  Gastric tube tip and sidehole project over the stomach. Ratliff  catheter. Mediastinal drain and chest tubes are partially visualized.  Nonobstructive bowel gas pattern. Scattered hemostatic clips overlying  the right and left mid abdomen. No pneumatosis or portal venous gas.  No aggressive osseous lesions.      Impression    Impression:   Gastric tube tip and sidehole project over the stomach.    I have personally reviewed the examination and initial interpretation  and I agree with the findings.    SATNAM SIMON MD         SYSTEM ID:  [id number]   XR Chest Port 1 View    Narrative    Exam: XR CHEST PORT 1 VIEW, 2/21/2022 7:48 AM    Comparison: 2/20/2022    History: post lung transplant    Findings:  AP view of the chest. Endotracheal tube projects just above the level  of the jaimee. [place]-Brenden catheter is in the pulmonary artery. Enteric  tube is in stomach. Right right chest tubes x2 projected over the  right hemithorax. Left chest tubes x2 with more lateral chest tube  with sidehole projecting over the subcutaneous tissue. Pericardial  chest tube along the base of the heart.    Trachea is midline. Cardiac silhouette is within normal limits. Aortic  knob calcifications.. Intimal perihilar and bibasilar opacities. There  is no pneumothorax or pleural effusion. Small amount of left chest  wall subcutaneous emphysema.      Impression    Impression:   1. Status post bilateral lung transplant  2. Endotracheal tube at the level of the jaimee, recommend slight  retraction.  3. Left chest tubes with left lateral chest tube sidehole projecting  over the subcutaneous tissue and associated subcutaneous emphysema.  Recommend reposition.  4. Fl[place]-Brenden catheter with tip in the main pulmonary artery.  5. Minimal perihilar and  bibasilar atelectasis.    Findings FINDINGS discussed with Dr. Raudel Webster by Dr. Meng Suh at 810 AM on 2/21/2022.    I have personally reviewed the examination and initial interpretation  and I agree with the findings.    FELIPA MARIE MD         SYSTEM ID:  [id number]   POC US Guidance Needle Placement    Narrative    Bilateral erector spinae plane block   XR Chest Port 1 View    Narrative    Portable chest 2/21/22 at 1105 hours    INDICATION: Endotracheal tube pulled back    COMPARISON: Earlier same day 0735 hours    FINDINGS: Heart size normal. A few patchy densities in the lower lungs  appear similar. An endotracheal tube tip is approximately 2.2 cm above  the jaimee. Right IJ [place]-Brenden catheter tip is in the main pulmonary  artery. There is calcification at the aortic knob. Clamshell  sternotomy. Enteric tube progress beyond the inferior margin of the  image.      Impression    IMPRESSION: Endotracheal tube tip approximately 2.2 cm above the  jaimee. Atherosclerosis. Few patchy densities in the lower lungs which  may indicate atelectasis or edema, recommend follow-up to clearing to  exclude infection. [place]-Brenden catheter tip in the main pulmonary  artery.    FELIPA MARIE MD         SYSTEM ID:  [id number]   XR Abdomen Port 1 View    Narrative    EXAMINATION:  XR ABDOMEN PORT 1 VIEWS 2/21/2022 3:31 PM     INDICATION: Verify small bowel feeding tube bedside placement    COMPARISON: Abdominal radiograph 2/21/2022    FINDINGS: Single AP supine view of the abdomen.     Feeding tube tip projects over the right upper quadrant, not  definitively postpyloric. Enteric tube with tip projecting in the  stomach. The small intestine and colon are nondistended. Scattered  hemostatic colonoscopy clips in the bowel. No evidence of pneumatosis  or portal venous gas. Multiple chest tubes visualized. The lung bases  are unremarkable.      Impression    IMPRESSION:  Feeding tube with tip projected in the right  upper quadrant. Not  definitively postpyloric.    I have personally reviewed the examination and initial interpretation  and I agree with the findings.    SATNAM SIMON MD         SYSTEM ID:  [id number]   XR Chest Port 1 View    Impression    RESIDENT PRELIMINARY INTERPRETATION  IMPRESSION: Postoperative sequelae of bilateral lung transplant with  intact clamshell sternotomy wires and lines/tubes as below. Probable  small left pleural effusion. No new airspace disease, pneumothorax, or  overt abnormality of the heart, bones, or upper abdomen..    Lines/tubes/Devices as follows:   --Endotracheal tube tip is 6.2 cm above the jaimee.   --[place]-Brenden catheter tip projects near the proximal right main  pulmonary artery.   --Mediastinal and pleural drains.   --Partially visualized enteric and feeding tubes.   --Spinal analgesia catheters

## 2022-02-22 NOTE — CONSULTS
Transplant Admission Psychosocial Assessment    Patient Name: Melissa Elder  : 1955  Age: 66 year old  MRN: [id number]  Date of Initial Social Work Evaluation: 22    Patient underwent bilateral lung transplant on 22.  Met with Pt's  and then eventually Melissa to update psychosocial assessment and provide education about SW role while inpatient, and to begin discussion of expectations/requirements, caregiver needs and follow up needs post-transplant.     Presenting Information   Living Situation: At home with her .  If not local, plans for short term stay:  Tyrese is working on renting an apartment locally. [place] [address]  Previous Functional Status: Independent with ADLs  Cultural/Language/Spiritual Considerations: Pt is Buddhism    Support System  Primary Support Person , Tyrese  Other support:  Daughter- Kriss  Plan for support in immediate post-transplant period: , Tyrese will do the majority.    Health Care Directive  Decision Maker: The patient.  Alternate Decision Maker: , Tyrese.  Health Care Directive: Will bring in copy    Mental Health/Coping:   History of Mental Health: History of situational depression but overall very stable.   History of Chemical Health: none  Current status: Stable  Coping: Melissa seems to be coping well. She is very happy to have her transplant and to be recovering well.   Services Needed/Recommended: None at this time. Will continue to monitor.    Financial   Income: Social Security and husbands income. He is also retired.  Impact of transplant on income: Pt will have some out of pocket costs but is not concerned at this time.   Insurance and medication coverage: Pt has Medicare and [employer].   Financial concerns: None at this time.  Resources needed: None at this time.    Education provided by SW: Writer reminded pt and her  of Social Work role inpatient setting, availability of support groups, parking  information and availability of grants and other funds if they need it. Writer also briefly discussed discharge planning with the intent to address more specific needs as patient progresses.    Assessment and recommendations and plan:  Overall, pt is doing very well with recovery. She states that her pain is well managed and she is so glad to be through surgery and extubated. She is hopeful she will be able to work well with therapies and will move through her hospitalization quickly.    Care Management Initial Consult    General Information  Assessment completed with: Patient,  (Melissa )  Type of CM/SW Visit: Initial Assessment    Primary Care Provider verified and updated as needed: No   Readmission within the last 30 days: no previous admission in last 30 days   Return Category: Planned Surgery  Reason for Consult: other (see comments) (Post Transplant Psychosocial Assessment)  Advance Care Planning: Advance Care Planning Reviewed: no concerns identified          Communication Assessment  Patient's communication style: spoken language (English or Bilingual)    Hearing Difficulty or Deaf: no   Wear Glasses or Blind: yes    Cognitive  Cognitive/Neuro/Behavioral: WDL  Level of Consciousness: alert  Arousal Level: opens eyes spontaneously  Orientation: oriented x 4  Mood/Behavior: calm, cooperative  Best Language: 0 - No aphasia  Speech: slow, hoarse    Living Environment:   People in home: spouse  Tyrese  Current living Arrangements: house      Able to return to prior arrangements:    Living Arrangement Comments:  (Pt will need to remain local for the next 3 months)    Family/Social Support:  Care provided by: spouse/significant other, self  Provides care for:    Marital Status:     Tyrese       Description of Support System: Supportive, Involved    Support Assessment: Adequate family and caregiver support    Current Resources:   Patient receiving home care services:       Community Resources:     Equipment currently used at home: walker, rolling, shower chair  Supplies currently used at home:      Employment/Financial:  Employment Status:          Financial Concerns: No concerns identified           Lifestyle & Psychosocial Needs:  Social Determinants of Health     Tobacco Use: Medium Risk     Smoking Tobacco Use: Former Smoker     Smokeless Tobacco Use: Never Used   Alcohol Use: Not on file   Financial Resource Strain: Not on file   Food Insecurity: Not on file   Transportation Needs: Not on file   Physical Activity: Not on file   Stress: Not on file   Social Connections: Not on file   Intimate Partner Violence: Not on file   Depression: Not at risk     PHQ-2 Score: 0   Housing Stability: Not on file       Functional Status:  Prior to admission patient needed assistance: with chores that involved a lot of walking. Was able to do basic ADLs             Mental Health Status:   See above SOT SW assessment.                Values/Beliefs:  Spiritual, Cultural Beliefs, Judaism Practices, Values that affect care:    Description of Beliefs that Will Affect Care: Jewish            Additional Information:  See above assessment.    ANTONIO George , [facility] HospitalSW  [id number]

## 2022-02-22 NOTE — PROGRESS NOTES
02/22/22 1400   Quick Adds   Type of Visit Initial PT Evaluation   Living Environment   People in Home spouse   Current Living Arrangements house   Home Accessibility stairs to enter home   Number of Stairs, Main Entrance 3   Stair Railings, Main Entrance railings on both sides of stairs   Transportation Anticipated family or friend will provide   Living Environment Comments Patient lives with spouse in single story home with basement, all needs on main level. Patient will be staying locally after discharge. Both patient and  are retired. Have 2 adult children in [place], [place] and [place], [place].   Self-Care   Usual Activity Tolerance moderate   Current Activity Tolerance poor   Equipment Currently Used at Home walker, rolling;shower chair  (4WW)   Fall history within last six months no   Activity/Exercise/Self-Care Comment Patient is IND with all mobility and self cares at baseline, owns 4WW but has not used. Limited by actvity tolerance/LUTHER, completes UE/LE exercises and walking as able, on 2-3L O2 at baseline.    General Information   Onset of Illness/Injury or Date of Surgery 02/21/22   Referring Physician Ruth Agosto MD   Pertinent History of Current Problem (include personal factors and/or comorbidities that impact the POC) Melissa Elder is a 66 year old female with a PMH significant for severe COPD, HTN, mild non-obstructive CAD, paroxysmal afib, osteoporosis, GERD, and colonic polyps.  Pt. is now s/p BSLT on 2/21/2022.   Existing Precautions/Restrictions fall  (Bilateral thorac/clamshell)   Cognition   Affect/Mental Status (Cognition) WFL;low arousal/lethargic   Orientation Status (Cognition) oriented x 4   Pain Assessment   Patient Currently in Pain Yes, see Vital Sign flowsheet   Posture    Posture Protracted shoulders   Range of Motion (ROM)   ROM Comment BLE WFL   Strength (Manual Muscle Testing)   Strength Comments generalized post-op deconditioning, BLE WFL with mobility   Bed  Mobility   Comment, (Bed Mobility) supine>sit mod A   Transfers   Comment, (Transfers) sit<>stand min Ax2   Gait/Stairs (Locomotion)   Comment, (Gait/Stairs) Small steps in room min Ax2   Balance   Balance Comments good sitting balance, fair standing balance- min Ax2 for UE support   Sensory Examination   Sensory Perception patient reports no sensory changes   Clinical Impression   Criteria for Skilled Therapeutic Intervention Yes, treatment indicated   PT Diagnosis (PT) impaired functional mobility   Influenced by the following impairments strength, balance, activity tolerance, pain, post-op precautions   Functional limitations due to impairments bed mobility, transfers, gait, stairs, functional endurance   Clinical Presentation (PT Evaluation Complexity) Evolving/Changing   Clinical Presentation Rationale PMH/comorbidities, clinical judgement   Clinical Decision Making (Complexity) moderate complexity   Planned Therapy Interventions (PT) balance training;bed mobility training;gait training;home exercise program;patient/family education;postural re-education;stair training;strengthening;transfer training   Risk & Benefits of therapy have been explained evaluation/treatment results reviewed;care plan/treatment goals reviewed;risks/benefits reviewed;participants voiced agreement with care plan;participants included;patient;spouse/significant other   PT Discharge Planning   PT Discharge Recommendation (DC Rec) [facility] patient will benefit from intensive, interdisciplinary therapy.  Anticipate will be able to tolerate 3 hours of therapy per day   PT Rationale for DC Rec Patient well below IND baseline, motivated for therapy. WIll benefit from intesive therapy to maximize strength, activity tolerance and functional independence.    Plan of Care Review   Plan of Care Reviewed With patient;spouse   Total Evaluation Time   Total Evaluation Time (Minutes) 10   Physical Therapy Goals   PT Frequency  6x/week   PT Predicated Duration/Target Date for Goal Attainment 03/08/22   PT Goals Bed Mobility;Transfers;Gait;Aerobic Activity   PT: Bed Mobility Independent;Supine to/from sit;Within precautions   PT: Transfers Independent;Sit to/from stand;Within precautions   PT: Gait Independent;Greater than 200 feet   PT: Perform aerobic activity with stable cardiovascular response intermittent activity;25 minutes;ambulation;NuStep;treadmill

## 2022-02-22 NOTE — PROGRESS NOTES
Patient suctioned and electively extubated per physician order. Placed on 4L nasal cannula, breath sounds were diminished bilaterally, labs pending. Patient tolerated procedure well without any immediate complications.    Britta Valdez, RT, RT  2/22/2022 1:35 PM

## 2022-02-22 NOTE — PROGRESS NOTES
02/22/22 1430   General Information   Onset of Illness/Injury or Date of Surgery 02/21/22   Referring Physician Ruth Agosto MD   Patient/Family Therapy Goal Statement (SLP) Pt did not state   Pertinent History of Current Problem Melissa Elder is a 66 year old female with a PMH significant for severe COPD, HTN, mild non-obstructive CAD, paroxysmal afib, osteoporosis, GERD, and colonic polyps.  Pt. is now s/p BSLT on 2/21/22, surgery relatively uncomplicated.  The patient is currently intubated and sedated on one pressor, POD #1 in the CVICU.  Clinical swallow assessment completed per MD order.   General Observations Pt awake and alert   Past History of Dysphagia Pt without hx of oropharyngeal dysphagia per EMR or pt report   Type of Evaluation   Type of Evaluation Swallow Evaluation   Oral Motor   Oral Musculature generally intact   Structural Abnormalities none present   Dentition (Oral Motor)   Dentition (Oral Motor) natural dentition;adequate dentition   Facial Symmetry (Oral Motor)   Facial Symmetry (Oral Motor) WNL   Lip Function (Oral Motor)   Lip Range of Motion (Oral Motor) WNL   Tongue Function (Oral Motor)   Tongue ROM (Oral Motor) WNL   Jaw Function (Oral Motor)   Jaw Function (Oral Motor) WNL   Cough/Swallow/Gag Reflex (Oral Motor)   Volitional Throat Clear/Cough (Oral Motor) WNL   Volitional Swallow (Oral Motor) WNL   Vocal Quality/Secretion Management (Oral Motor)   Vocal Quality (Oral Motor) dysphonic  (mild)   General Swallowing Observations   Current Diet/Method of Nutritional Intake (General Swallowing Observations, NIS) NPO;nasogastric tube (NG)   Respiratory Support (General Swallowing Observations) nasal cannula  (4 LPM)   Swallowing Evaluation Clinical swallow evaluation   Clinical Swallow Evaluation   Feeding Assistance frequent cues/help required   Clinical Swallow Evaluation Textures Trialed thin liquids   Clinical Swallow Eval: Thin Liquid Texture Trial   Mode of Presentation,  Thin Liquids spoon;cup   Volume of Liquid or Food Presented 3 ice chips, less than 2 oz thin water   Oral Phase of Swallow WFL   Pharyngeal Phase of Swallow intact   Diagnostic Statement No overt s/sx of aspiration, high risk for silent aspiration s/p BSLT   Esophageal Phase of Swallow   Patient reports or presents with symptoms of esophageal dysphagia Yes   Esophageal comments Hx of GERD per EMR   Swallowing Recommendations   Diet Consistency Recommendations NPO;ice chips only   Medication Administration Recommendations, Swallowing (SLP) Defer to MD   Instrumental Assessment Recommendations FEES (fiberoptic endoscopic evaluation of swallowing)   General Therapy Interventions   Planned Therapy Interventions Dysphagia Treatment   Dysphagia treatment Oropharyngeal exercise training;Modified diet education;Instruction of safe swallow strategies;Compensatory strategies for swallowing   Clinical Impression   Criteria for Skilled Therapeutic Interventions Met (SLP Eval) Yes, treatment indicated   SLP Diagnosis Functional oropharyngeal swallowing mechanism, high risk for silent aspiration s/p BSLT   Risks & Benefits of therapy have been explained evaluation/treatment results reviewed;care plan/treatment goals reviewed;current/potential barriers reviewed;risks/benefits reviewed;participants voiced agreement with care plan;participants included;patient   Clinical Impression Comments Clinical swallow assessment completed per MD order.  Pt presents with a high silent aspiration risk s/p BSLT.  Recommend NPO status, with excellent oral cares 2-3x per day.  Pt okay for ice chips after oral cares when alert and upright.  Pt awake and alert, able to engage in conversation.  Mildly dysphonic, otherwise oral mechanism unremarkable.   No overt s/sx of aspiration with ice chips, thin water via spoon/small cup sips.  Oral phase WFL.  Recommend FEES to rule out silent aspiration s/p BSLT.  SLP to follow.   SLP Discharge Planning   SLP  Discharge Recommendation Acute [facility]hab [facility]-Motivated patient will benefit from intensive, interdisciplinary therapy.  Anticipate will be able to tolerate 3 hours of therapy per day;Transitional Care Facility   SLP Rationale for DC Rec Swallow below baseline   SLP Brief overview of current status  Recommend NPO status, with excellent oral cares 2-3x per day.  Pt okay for ice chips after oral cares when alert and upright.  Recommend FEES to rule out silent aspiration s/p BSLT.  SLP to follow.    Total Evaluation Time   Total Evaluation Time (Minutes) 12   SLP Goals   Therapy Frequency (SLP Eval) daily   SLP Predicated Duration/Target Date for Goal Attainment 03/01/22   SLP Goals Swallow   SLP: Safely tolerate diet without signs/symptoms of aspiration Regular diet;Independently;With use of swallow precautions;Thin liquids

## 2022-02-23 ENCOUNTER — APPOINTMENT (OUTPATIENT)
Dept: GENERAL RADIOLOGY | Facility: CLINIC | Age: 67
DRG: 007 | End: 2022-02-23
Attending: SURGERY
Payer: MEDICARE

## 2022-02-23 ENCOUNTER — APPOINTMENT (OUTPATIENT)
Dept: OCCUPATIONAL THERAPY | Facility: CLINIC | Age: 67
DRG: 007 | End: 2022-02-23
Attending: SURGERY
Payer: MEDICARE

## 2022-02-23 ENCOUNTER — APPOINTMENT (OUTPATIENT)
Dept: GENERAL RADIOLOGY | Facility: CLINIC | Age: 67
DRG: 007 | End: 2022-02-23
Attending: NURSE PRACTITIONER
Payer: MEDICARE

## 2022-02-23 ENCOUNTER — APPOINTMENT (OUTPATIENT)
Dept: PHYSICAL THERAPY | Facility: CLINIC | Age: 67
DRG: 007 | End: 2022-02-23
Attending: SURGERY
Payer: MEDICARE

## 2022-02-23 ENCOUNTER — APPOINTMENT (OUTPATIENT)
Dept: SPEECH THERAPY | Facility: CLINIC | Age: 67
DRG: 007 | End: 2022-02-23
Attending: SURGERY
Payer: MEDICARE

## 2022-02-23 LAB
ALBUMIN SERPL-MCNC: 2.5 G/DL (ref 3.4–5)
ALP SERPL-CCNC: 46 U/L (ref 40–150)
ALT SERPL W P-5'-P-CCNC: 27 U/L (ref 0–50)
ANION GAP SERPL CALCULATED.3IONS-SCNC: 3 MMOL/L (ref 3–14)
AST SERPL W P-5'-P-CCNC: 36 U/L (ref 0–45)
BACTERIA SPEC CULT: ABNORMAL
BASE EXCESS BLDA CALC-SCNC: -0.1 MMOL/L (ref -9–1.8)
BASE EXCESS BLDV CALC-SCNC: 1.2 MMOL/L (ref -7.7–1.9)
BILIRUB SERPL-MCNC: 0.2 MG/DL (ref 0.2–1.3)
BUN SERPL-MCNC: 25 MG/DL (ref 7–30)
CALCIUM SERPL-MCNC: 7.7 MG/DL (ref 8.5–10.1)
CHLORIDE BLD-SCNC: 110 MMOL/L (ref 94–109)
CO2 SERPL-SCNC: 27 MMOL/L (ref 20–32)
CREAT SERPL-MCNC: 0.68 MG/DL (ref 0.52–1.04)
ERYTHROCYTE [DISTWIDTH] IN BLOOD BY AUTOMATED COUNT: 14.2 % (ref 10–15)
ERYTHROCYTE [DISTWIDTH] IN BLOOD BY AUTOMATED COUNT: 14.3 % (ref 10–15)
G6PD RBC-CCNT: 13.3 U/G HB
G6PD RBC-CCNT: 14.3 U/G HB
GFR SERPL CREATININE-BSD FRML MDRD: >90 ML/MIN/1.73M2
GLUCOSE BLD-MCNC: 168 MG/DL (ref 70–99)
GLUCOSE BLDC GLUCOMTR-MCNC: 129 MG/DL (ref 70–99)
GLUCOSE BLDC GLUCOMTR-MCNC: 148 MG/DL (ref 70–99)
GLUCOSE BLDC GLUCOMTR-MCNC: 150 MG/DL (ref 70–99)
GLUCOSE BLDC GLUCOMTR-MCNC: 156 MG/DL (ref 70–99)
GLUCOSE BLDC GLUCOMTR-MCNC: 158 MG/DL (ref 70–99)
GLUCOSE BLDC GLUCOMTR-MCNC: 160 MG/DL (ref 70–99)
GLUCOSE BLDC GLUCOMTR-MCNC: 162 MG/DL (ref 70–99)
GLUCOSE BLDC GLUCOMTR-MCNC: 184 MG/DL (ref 70–99)
HCO3 BLD-SCNC: 27 MMOL/L (ref 21–28)
HCO3 BLDV-SCNC: 28 MMOL/L (ref 21–28)
HCT VFR BLD AUTO: 28.8 % (ref 35–47)
HCT VFR BLD AUTO: 29.6 % (ref 35–47)
HGB BLD-MCNC: 9.2 G/DL (ref 11.7–15.7)
HGB BLD-MCNC: 9.7 G/DL (ref 11.7–15.7)
LACTATE SERPL-SCNC: 0.9 MMOL/L (ref 0.7–2)
LACTATE SERPL-SCNC: 2.3 MMOL/L (ref 0.7–2)
MAGNESIUM SERPL-MCNC: 3 MG/DL (ref 1.6–2.3)
MCH RBC QN AUTO: 28.9 PG (ref 26.5–33)
MCH RBC QN AUTO: 29 PG (ref 26.5–33)
MCHC RBC AUTO-ENTMCNC: 31.9 G/DL (ref 31.5–36.5)
MCHC RBC AUTO-ENTMCNC: 32.8 G/DL (ref 31.5–36.5)
MCV RBC AUTO: 89 FL (ref 78–100)
MCV RBC AUTO: 91 FL (ref 78–100)
O2/TOTAL GAS SETTING VFR VENT: 2 %
O2/TOTAL GAS SETTING VFR VENT: 30 %
OXYHGB MFR BLDV: 79 % (ref 70–75)
PCO2 BLD: 54 MM HG (ref 35–45)
PCO2 BLDV: 55 MM HG (ref 40–50)
PH BLD: 7.3 [PH] (ref 7.35–7.45)
PH BLDV: 7.32 [PH] (ref 7.32–7.43)
PHOSPHATE SERPL-MCNC: 3.8 MG/DL (ref 2.5–4.5)
PLATELET # BLD AUTO: 142 10E3/UL (ref 150–450)
PLATELET # BLD AUTO: 153 10E3/UL (ref 150–450)
PO2 BLD: 142 MM HG (ref 80–105)
PO2 BLDV: 46 MM HG (ref 25–47)
POTASSIUM BLD-SCNC: 4.5 MMOL/L (ref 3.4–5.3)
PROT SERPL-MCNC: 5.3 G/DL (ref 6.8–8.8)
RBC # BLD AUTO: 3.18 10E6/UL (ref 3.8–5.2)
RBC # BLD AUTO: 3.34 10E6/UL (ref 3.8–5.2)
SODIUM SERPL-SCNC: 140 MMOL/L (ref 133–144)
TACROLIMUS BLD-MCNC: 10.6 UG/L (ref 5–15)
TME LAST DOSE: NORMAL H
TME LAST DOSE: NORMAL H
WBC # BLD AUTO: 18.5 10E3/UL (ref 4–11)
WBC # BLD AUTO: 18.7 10E3/UL (ref 4–11)

## 2022-02-23 PROCEDURE — 250N000013 HC RX MED GY IP 250 OP 250 PS 637: Performed by: PHYSICIAN ASSISTANT

## 2022-02-23 PROCEDURE — 85027 COMPLETE CBC AUTOMATED: CPT | Performed by: PHYSICIAN ASSISTANT

## 2022-02-23 PROCEDURE — 250N000011 HC RX IP 250 OP 636: Performed by: SURGERY

## 2022-02-23 PROCEDURE — 250N000013 HC RX MED GY IP 250 OP 250 PS 637: Performed by: STUDENT IN AN ORGANIZED HEALTH CARE EDUCATION/TRAINING PROGRAM

## 2022-02-23 PROCEDURE — 71045 X-RAY EXAM CHEST 1 VIEW: CPT | Mod: 77

## 2022-02-23 PROCEDURE — 250N000012 HC RX MED GY IP 250 OP 636 PS 637: Performed by: SURGERY

## 2022-02-23 PROCEDURE — 82805 BLOOD GASES W/O2 SATURATION: CPT | Performed by: STUDENT IN AN ORGANIZED HEALTH CARE EDUCATION/TRAINING PROGRAM

## 2022-02-23 PROCEDURE — 36415 COLL VENOUS BLD VENIPUNCTURE: CPT | Performed by: STUDENT IN AN ORGANIZED HEALTH CARE EDUCATION/TRAINING PROGRAM

## 2022-02-23 PROCEDURE — 250N000011 HC RX IP 250 OP 636: Performed by: NURSE PRACTITIONER

## 2022-02-23 PROCEDURE — 250N000012 HC RX MED GY IP 250 OP 636 PS 637: Performed by: NURSE PRACTITIONER

## 2022-02-23 PROCEDURE — 94668 MNPJ CHEST WALL SBSQ: CPT

## 2022-02-23 PROCEDURE — 84100 ASSAY OF PHOSPHORUS: CPT | Performed by: SURGERY

## 2022-02-23 PROCEDURE — C9113 INJ PANTOPRAZOLE SODIUM, VIA: HCPCS | Performed by: SURGERY

## 2022-02-23 PROCEDURE — 97530 THERAPEUTIC ACTIVITIES: CPT | Mod: GO

## 2022-02-23 PROCEDURE — 99233 SBSQ HOSP IP/OBS HIGH 50: CPT | Mod: FS | Performed by: INTERNAL MEDICINE

## 2022-02-23 PROCEDURE — 99233 SBSQ HOSP IP/OBS HIGH 50: CPT | Performed by: NURSE PRACTITIONER

## 2022-02-23 PROCEDURE — 94640 AIRWAY INHALATION TREATMENT: CPT

## 2022-02-23 PROCEDURE — 999N000128 HC STATISTIC PERIPHERAL IV START W/O US GUIDANCE

## 2022-02-23 PROCEDURE — 999N000155 HC STATISTIC RAPCV CVP MONITORING

## 2022-02-23 PROCEDURE — 250N000011 HC RX IP 250 OP 636: Performed by: PHYSICIAN ASSISTANT

## 2022-02-23 PROCEDURE — 92526 ORAL FUNCTION THERAPY: CPT | Mod: GN

## 2022-02-23 PROCEDURE — 94640 AIRWAY INHALATION TREATMENT: CPT | Mod: 76

## 2022-02-23 PROCEDURE — 80053 COMPREHEN METABOLIC PANEL: CPT | Performed by: PHYSICIAN ASSISTANT

## 2022-02-23 PROCEDURE — 250N000013 HC RX MED GY IP 250 OP 250 PS 637: Performed by: NURSE PRACTITIONER

## 2022-02-23 PROCEDURE — 97530 THERAPEUTIC ACTIVITIES: CPT | Mod: GP

## 2022-02-23 PROCEDURE — 250N000013 HC RX MED GY IP 250 OP 250 PS 637: Performed by: SURGERY

## 2022-02-23 PROCEDURE — 71045 X-RAY EXAM CHEST 1 VIEW: CPT | Mod: 26 | Performed by: RADIOLOGY

## 2022-02-23 PROCEDURE — 92612 ENDOSCOPY SWALLOW (FEES) VID: CPT | Mod: GN

## 2022-02-23 PROCEDURE — 999N000157 HC STATISTIC RCP TIME EA 10 MIN

## 2022-02-23 PROCEDURE — 999N000015 HC STATISTIC ARTERIAL MONITORING DAILY

## 2022-02-23 PROCEDURE — 258N000003 HC RX IP 258 OP 636: Performed by: STUDENT IN AN ORGANIZED HEALTH CARE EDUCATION/TRAINING PROGRAM

## 2022-02-23 PROCEDURE — 99207 PR NON-BILLABLE SERV PER CHARTING: CPT | Performed by: NURSE PRACTITIONER

## 2022-02-23 PROCEDURE — 82803 BLOOD GASES ANY COMBINATION: CPT | Performed by: PHYSICIAN ASSISTANT

## 2022-02-23 PROCEDURE — 99233 SBSQ HOSP IP/OBS HIGH 50: CPT | Performed by: STUDENT IN AN ORGANIZED HEALTH CARE EDUCATION/TRAINING PROGRAM

## 2022-02-23 PROCEDURE — 71045 X-RAY EXAM CHEST 1 VIEW: CPT | Mod: 26 | Performed by: STUDENT IN AN ORGANIZED HEALTH CARE EDUCATION/TRAINING PROGRAM

## 2022-02-23 PROCEDURE — 83735 ASSAY OF MAGNESIUM: CPT | Performed by: SURGERY

## 2022-02-23 PROCEDURE — 258N000003 HC RX IP 258 OP 636: Performed by: PHYSICIAN ASSISTANT

## 2022-02-23 PROCEDURE — 120N000003 HC R&B IMCU UMMC

## 2022-02-23 PROCEDURE — 999N000065 XR CHEST PORT 1 VIEW

## 2022-02-23 PROCEDURE — 94667 MNPJ CHEST WALL 1ST: CPT

## 2022-02-23 PROCEDURE — 97116 GAIT TRAINING THERAPY: CPT | Mod: GP

## 2022-02-23 PROCEDURE — 250N000011 HC RX IP 250 OP 636: Performed by: STUDENT IN AN ORGANIZED HEALTH CARE EDUCATION/TRAINING PROGRAM

## 2022-02-23 PROCEDURE — 999N000045 HC STATISTIC DAILY SWAN MONITORING

## 2022-02-23 PROCEDURE — 83605 ASSAY OF LACTIC ACID: CPT | Performed by: STUDENT IN AN ORGANIZED HEALTH CARE EDUCATION/TRAINING PROGRAM

## 2022-02-23 PROCEDURE — 83605 ASSAY OF LACTIC ACID: CPT | Performed by: NURSE PRACTITIONER

## 2022-02-23 PROCEDURE — 80197 ASSAY OF TACROLIMUS: CPT | Performed by: PHYSICIAN ASSISTANT

## 2022-02-23 PROCEDURE — 97165 OT EVAL LOW COMPLEX 30 MIN: CPT | Mod: GO

## 2022-02-23 PROCEDURE — 250N000009 HC RX 250: Performed by: SURGERY

## 2022-02-23 PROCEDURE — 250N000009 HC RX 250: Performed by: NURSE PRACTITIONER

## 2022-02-23 RX ORDER — AMINO AC/PROTEIN HYDR/WHEY PRO 10G-100/30
1 LIQUID (ML) ORAL DAILY
Status: DISCONTINUED | OUTPATIENT
Start: 2022-02-23 | End: 2022-02-23

## 2022-02-23 RX ORDER — DAPSONE 25 MG/1
50 TABLET ORAL
Status: DISCONTINUED | OUTPATIENT
Start: 2022-02-23 | End: 2022-03-01

## 2022-02-23 RX ORDER — AMINO AC/PROTEIN HYDR/WHEY PRO 10G-100/30
1 LIQUID (ML) ORAL 3 TIMES DAILY
Status: DISCONTINUED | OUTPATIENT
Start: 2022-02-23 | End: 2022-03-07

## 2022-02-23 RX ORDER — CALCIUM GLUCONATE 20 MG/ML
1 INJECTION, SOLUTION INTRAVENOUS ONCE
Status: COMPLETED | OUTPATIENT
Start: 2022-02-23 | End: 2022-02-23

## 2022-02-23 RX ORDER — BUPIVACAINE HYDROCHLORIDE 2.5 MG/ML
12 INJECTION, SOLUTION EPIDURAL; INFILTRATION; INTRACAUDAL ONCE
Status: COMPLETED | OUTPATIENT
Start: 2022-02-23 | End: 2022-02-23

## 2022-02-23 RX ORDER — CEFTRIAXONE 2 G/1
2 INJECTION, POWDER, FOR SOLUTION INTRAMUSCULAR; INTRAVENOUS EVERY 24 HOURS
Status: COMPLETED | OUTPATIENT
Start: 2022-02-23 | End: 2022-03-08

## 2022-02-23 RX ORDER — DEXTROSE MONOHYDRATE 25 G/50ML
25-50 INJECTION, SOLUTION INTRAVENOUS
Status: DISCONTINUED | OUTPATIENT
Start: 2022-02-23 | End: 2022-03-17 | Stop reason: HOSPADM

## 2022-02-23 RX ORDER — NICOTINE POLACRILEX 4 MG
15-30 LOZENGE BUCCAL
Status: DISCONTINUED | OUTPATIENT
Start: 2022-02-23 | End: 2022-03-17 | Stop reason: HOSPADM

## 2022-02-23 RX ORDER — CALCIUM GLUCONATE 20 MG/ML
1 INJECTION, SOLUTION INTRAVENOUS ONCE
Status: DISCONTINUED | OUTPATIENT
Start: 2022-02-23 | End: 2022-02-23

## 2022-02-23 RX ORDER — HYDRALAZINE HYDROCHLORIDE 20 MG/ML
10 INJECTION INTRAMUSCULAR; INTRAVENOUS
Status: DISCONTINUED | OUTPATIENT
Start: 2022-02-23 | End: 2022-02-24

## 2022-02-23 RX ORDER — GUAIFENESIN 600 MG/1
15 TABLET, EXTENDED RELEASE ORAL DAILY
Status: DISCONTINUED | OUTPATIENT
Start: 2022-02-23 | End: 2022-03-07

## 2022-02-23 RX ADMIN — CEFAZOLIN SODIUM 2 G: 10 INJECTION, POWDER, FOR SOLUTION INTRAVENOUS at 04:04

## 2022-02-23 RX ADMIN — SODIUM CHLORIDE, POTASSIUM CHLORIDE, SODIUM LACTATE AND CALCIUM CHLORIDE 500 ML: 600; 310; 30; 20 INJECTION, SOLUTION INTRAVENOUS at 10:12

## 2022-02-23 RX ADMIN — PREDNISOLONE 17.5 MG: 15 SOLUTION ORAL at 08:17

## 2022-02-23 RX ADMIN — FAMOTIDINE 10 MG: 10 TABLET, FILM COATED ORAL at 08:28

## 2022-02-23 RX ADMIN — TACROLIMUS 2 MG: 1 CAPSULE ORAL at 10:47

## 2022-02-23 RX ADMIN — ACETYLCYSTEINE 2 ML: 200 SOLUTION ORAL; RESPIRATORY (INHALATION) at 12:15

## 2022-02-23 RX ADMIN — ACETAMINOPHEN 975 MG: 325 TABLET, FILM COATED ORAL at 14:27

## 2022-02-23 RX ADMIN — BUPIVACAINE HYDROCHLORIDE 30 MG: 2.5 INJECTION, SOLUTION EPIDURAL; INFILTRATION; INTRACAUDAL at 07:58

## 2022-02-23 RX ADMIN — SENNOSIDES AND DOCUSATE SODIUM 1 TABLET: 8.6; 5 TABLET ORAL at 19:39

## 2022-02-23 RX ADMIN — TACROLIMUS 2 MG: 1 CAPSULE ORAL at 18:30

## 2022-02-23 RX ADMIN — ACETAMINOPHEN 975 MG: 325 TABLET, FILM COATED ORAL at 21:50

## 2022-02-23 RX ADMIN — HEPARIN SODIUM 5000 UNITS: 5000 INJECTION, SOLUTION INTRAVENOUS; SUBCUTANEOUS at 14:28

## 2022-02-23 RX ADMIN — Medication 1 PACKET: at 19:49

## 2022-02-23 RX ADMIN — GABAPENTIN 100 MG: 100 CAPSULE ORAL at 21:50

## 2022-02-23 RX ADMIN — PREDNISOLONE 17.5 MG: 15 SOLUTION ORAL at 21:50

## 2022-02-23 RX ADMIN — NYSTATIN 1000000 UNITS: 100000 SUSPENSION ORAL at 12:17

## 2022-02-23 RX ADMIN — LEVALBUTEROL HYDROCHLORIDE 1.25 MG: 1.25 SOLUTION RESPIRATORY (INHALATION) at 12:15

## 2022-02-23 RX ADMIN — OXYCODONE HYDROCHLORIDE 5 MG: 5 TABLET ORAL at 12:16

## 2022-02-23 RX ADMIN — CEFTRIAXONE SODIUM 2 G: 2 INJECTION, POWDER, FOR SOLUTION INTRAMUSCULAR; INTRAVENOUS at 10:11

## 2022-02-23 RX ADMIN — FAMOTIDINE 10 MG: 10 TABLET, FILM COATED ORAL at 19:39

## 2022-02-23 RX ADMIN — Medication 15 ML: at 12:17

## 2022-02-23 RX ADMIN — HEPARIN SODIUM 5000 UNITS: 5000 INJECTION, SOLUTION INTRAVENOUS; SUBCUTANEOUS at 05:56

## 2022-02-23 RX ADMIN — NYSTATIN 1000000 UNITS: 100000 SUSPENSION ORAL at 16:20

## 2022-02-23 RX ADMIN — ACYCLOVIR 400 MG: 200 SUSPENSION ORAL at 08:17

## 2022-02-23 RX ADMIN — ACETAMINOPHEN 975 MG: 325 TABLET, FILM COATED ORAL at 05:56

## 2022-02-23 RX ADMIN — PANTOPRAZOLE SODIUM 40 MG: 40 INJECTION, POWDER, FOR SOLUTION INTRAVENOUS at 08:07

## 2022-02-23 RX ADMIN — MYCOPHENOLATE MOFETIL 1500 MG: 250 CAPSULE ORAL at 19:39

## 2022-02-23 RX ADMIN — SENNOSIDES AND DOCUSATE SODIUM 1 TABLET: 8.6; 5 TABLET ORAL at 08:28

## 2022-02-23 RX ADMIN — Medication 1 PACKET: at 16:05

## 2022-02-23 RX ADMIN — MYCOPHENOLATE MOFETIL 1500 MG: 200 POWDER, FOR SUSPENSION ORAL at 08:17

## 2022-02-23 RX ADMIN — HEPARIN SODIUM 5000 UNITS: 5000 INJECTION, SOLUTION INTRAVENOUS; SUBCUTANEOUS at 21:50

## 2022-02-23 RX ADMIN — ACYCLOVIR 400 MG: 200 CAPSULE ORAL at 19:39

## 2022-02-23 RX ADMIN — Medication 12.5 MG: at 10:23

## 2022-02-23 RX ADMIN — LEVALBUTEROL HYDROCHLORIDE 1.25 MG: 1.25 SOLUTION RESPIRATORY (INHALATION) at 09:14

## 2022-02-23 RX ADMIN — Medication 12.5 MG: at 19:38

## 2022-02-23 RX ADMIN — CALCIUM GLUCONATE 1 G: 20 INJECTION, SOLUTION INTRAVENOUS at 18:36

## 2022-02-23 RX ADMIN — INSULIN ASPART 1 UNITS: 100 INJECTION, SOLUTION INTRAVENOUS; SUBCUTANEOUS at 02:02

## 2022-02-23 RX ADMIN — NYSTATIN 1000000 UNITS: 100000 SUSPENSION ORAL at 08:07

## 2022-02-23 RX ADMIN — ACETYLCYSTEINE 2 ML: 200 SOLUTION ORAL; RESPIRATORY (INHALATION) at 09:14

## 2022-02-23 RX ADMIN — POLYETHYLENE GLYCOL 3350 17 G: 17 POWDER, FOR SOLUTION ORAL at 08:28

## 2022-02-23 RX ADMIN — INSULIN ASPART 1 UNITS: 100 INJECTION, SOLUTION INTRAVENOUS; SUBCUTANEOUS at 10:16

## 2022-02-23 RX ADMIN — NYSTATIN 1000000 UNITS: 100000 SUSPENSION ORAL at 19:38

## 2022-02-23 RX ADMIN — DAPSONE 50 MG: 25 TABLET ORAL at 14:27

## 2022-02-23 ASSESSMENT — ACTIVITIES OF DAILY LIVING (ADL)
ADLS_ACUITY_SCORE: 10
IADLS,_PREVIOUS_FUNCTIONAL_LEVEL: INDEPENDENT
ADLS_ACUITY_SCORE: 10
BADLS,_PREVIOUS_FUNCTIONAL_LEVEL: INDEPENDENT
ADLS_ACUITY_SCORE: 10

## 2022-02-23 NOTE — PROGRESS NOTES
CLINICAL NUTRITION SERVICES - BRIEF NOTE   (see RD note on 2/21 for full assessment)    Post pyloric FT placed by Radiology (2/22). Currently receiving trophic feeds. Plan to advance toward goal rate.     Nutrition changes today:  Adv Osmolite 1.5 by 10 ml q8h to eventual goal @ 50 ml/hr + Prosource (1 pkt TID)     Krystyna Schreiber, LULU, LD  [id number]  Pgr: [id number]

## 2022-02-23 NOTE — PROGRESS NOTES
Pulmonary Medicine  Cystic Fibrosis - Lung Transplant Team  Progress Note  2022       Patient: Melissa Elder  MRN: [id number]  : 1955 (age 66 year old)  Transplant: 2022 (Lung), POD#2  Admission date: 2022    Assessment & Plan:     Melissa Elder is a 66 year old female with a PMH significant for severe COPD, HTN, mild non-obstructive CAD, paroxysmal afib, osteoporosis, GERD, and colonic polyps.  Pt. is now s/p BSLT on 22, surgery relatively uncomplicated.  The patient was extubated POD #1 with minimal hypoxia.     Today's recommendations:  - Continue aggressive airway clearance QID with Aerobika and incentive spirometry q1h w/a  - Wean O2 to keep >92%  - DSA ordered   - Tube feeding should be advanced to goal now that pt. has post-pyloric nasal feeding tube (placed by radiology yesterday)  - FEES today per SLP (ordered)  - ENT consult  if vocal changes post-extubation persist  - Await explant pathology  - Repeat basiliximab   - Tacro level today to trend (near steady state) therapeutic, defer dose adjustment (continue SL dosing until return of bowel function), daily levels continue  - Prednisolone taper next due 3/1  - Daponse for PJP ppx to start today at decreased MWF dose, increase to daily after one week if CBC remains stable  - Acyclovir for HSV ppx through POD #30  - ABX modified today based on new culture updates, plan for 2 week course   - Once appropriate to resume statin recommend rosuvastatin (less interaction with immunosuppression)  - Resume afib treatment (metoprolol vs diltiazem - close monitoring of tacro if diltiazem is resumed)     S/p bilateral sequential lung transplant for COPD:  Acute hypoxic/hypercapneic respiratory failure: Scant pleural-pleural adhesions and mild-moderate bilateral hilar lymphadenopathy per op note.  Pressor weaned off  and pt. extubated.  Able to be weaned to RA at rest during morning rounds.   - Nebs: levalbuterol and  Mucomyst QID  - Aggressive pulmonary toilet with chest physiotherapy QID as well as Aerobika and incentive spirometry q1h w/a  - Wean supplemental O2 to keep >92%  - DSA at one week (ordered 2/28) then one month post-transplant, additionally per protocol  - Anesthesia to manage erector spinae catheters (placed 2/21)  - Chest tubes managed by surgical team  - Daily CXR, today with stable small left pleural effusion (personally reviewed)  - Volume management per primary team  - Tube feeding should be advanced to goal now that pt. has post-pyloric nasal feeding tube (placed by radiology yesterday)  - FEES today per SLP (ordered)  - ENT consult 2/24 if vocal changes post-extubation persist  - Await explant pathology     Immunosuppression:  - Induction therapy with basiliximab (and high dose IV steroid) given intraoperatively, repeating basiliximab dose on POD #4 (ordered 2/25)  - Tacrolimus SL BID.  Goal level 8-12.  Continuing SL dosing until return of bowel function post-op.  Tacro level today to trend (near steady state) therapeutic, defer dose adjustment, daily levels continue as below:  Date Tacro Level Intervention   2/22 3.3 Dose increased from 1 mg to 2 mg BID   2/23 10.6 Near steady state, no dose change    2/24 ordered     - MMF 1500 mg BID  - Prednisolone 17.5 mg BID ordered to begin 2/22 with taper per lung transplant protocol (due 3/1, not yet ordered):  Date AM dose (mg) PM dose (mg)   2/22 17.5 17.5   3/1 15 15   3/8 15 12.5   3/15 12.5 12.5   3/29 12.5 10   4/12 10 10   5/10 10 7.5   6/7 7.5 7.5   7/5 7.5 5   8/2 5 5   8/30 5 2.5      Prophylaxis:   - Daponse for PJP ppx to start today at decreased MWF dose, increase to daily after one week if CBC remains stable  - Acyclovir for HSV ppx (newly positive 2/20)  - Nystatin for oral candidiasis ppx, 6 month course  - See below for serologies and viral ppx:    Donor Recipient Intervention   CMV status Negative Negative N/A, CMV monthly (3/21)   EBV status  Positive Positive EBV at one month (3/21) then q3 months   HSV status N/A (Newly) Positive Acyclovir POD #1-30      Primary graft dysfunction (per ISHLT guidelines):  See chart below:    POD #0  (~0 hours) POD #1  (~24 hours) POD #2   (~48 hours) POD#3   (~72 hours)   Date 2/21 2/22 2/23 2/24   Time 0713 0843  0347     Intubated Y Y N Y/N   PaO2 232 127 142     FiO2 50% 30% 27%     P/F Ratio 464 423 526     PGD Grade   (0=mild, 3=severe) 0 0 1     ECMO N N N N   Inhaled NO/Flolan N N N N   ISHLT PGD Scoring  Grade 0 - PaO2/FiO2 >300 and normal chest radiograph (absence of diffuse allograft infiltrates)  Grade 1 - PaO2/FiO2 >300 and diffuse allograft infiltrates on chest radiograph  Grade 2 - PaO2/FiO2 between 200 and 300  Grade 3 - PaO2/FiO2 <200 and/or on ECMO     ID: Prior history of infection/colonization with Haemophilus influenzae (2017).  IgG adequate (2/20).  MRSA nares negative 2/21.  Broad spectrum ABX empirically post-op with vanc and ceftaz (2/21-2/22), stopped after 24h per Dr. Lala to target known donor cultures on POD #1 (transitioned to cefazolin).  Now with new updates to cultures as below.  - IgG repeat at one month (3/21, not yet ordered)  - Donor ([facility]) respiratory culture with P-S MSSA (final)  - Donor cultures ([facility]) with Strep mitis, Actinomyces odontolyticus, and Kenyatta kruseii  - Recipient cultures with Actinomyces odonotolyticus  - Bronch cultures (2/22) with GPC, following  - Recommend to re-broaden ABX to ceftriaxone to cover both donor Strep and Actinomyces, low threshold to also add vancomycin for clinical decline to cover pending GPC on bronch culture from yesterday, plan to complete 2 week course      PHS risk criteria donor: Additional labs required post-transplant (between 4-8 weeks post-op): Hepatitis B, Hepatitis C, and HIV by COLT ([id number], ordered POD #30), also plan to repeat hepatitis B surface antibody at that time (ordered) given discrepancy with recent result.     Other  relevant problems managed by primary team:     CAD: Noted to have mild non-obstructive CAD without hemodynamically significant lesions on cardiac cath 3/27/19.  PTA ASA 81 mg and atorvastatin.   - Once appropriate to resume statin recommend rosuvastatin (less interaction with immunosuppression)     Paroxysmal afib: PTA diltiazem, not on AC.  Post-op tachycardia, off pressor since 2/22.  - Resume afib treatment (metoprolol vs diltiazem - close monitoring of tacro if diltiazem is resumed)     GERD: Not on PPI PTA.  Negative pH/manometry study 3/28/19, noted small hiatal hernia.   - PPI daily (2/21), famotidine BID (2/21) x10d as bridge     Anxiety: Per lung transplant committee review, noted high anxiety in anticipation of transplant.   - Monitor need for palliative care and/or health psychology consult post-op     We appreciate the excellent care provided by the CVTS and CVICU teams.  Recommendations communicated via in person rounding and this note.  Will continue to follow along closely, please do not hesitate to call with any questions or concerns.     Patient discussed with Dr. Lala.  Medical decision making in this note by me.    Iman Jane, DNP, APRN, CNP  Inpatient Nurse Practitioner  Pulmonary CF/Transplant     Subjective & Interval History:     Extubated yesterday to 4L NC, weaned to 1.5L NC this morning but easily decreased to RA without desaturation at rest.  Cough effort very weak but tolerating CPT, IS and Aerobika effort also weak.  Persistently tachycardic, off pressor and now borderline hypertensive.  XR tube feeding advancement yesterday, now post-pyloric but remains on half strength trickle feed rate of 10 ml/hr.  Chest tube output moderate.  Slept well, good pain management.    Review of Systems:     C: + intermittent low grade fever, no chills, + increased weight  INTEGUMENTARY/SKIN: No rash or obvious new lesions  ENT/MOUTH: No sore throat, no nasal congestion or drainage  RESP: See interval  "history  CV: No chest pain, no palpitations, no peripheral edema, no orthopnea  GI: No nausea, no vomiting, no post-op BM  : + decreased UO  MUSCULOSKELETAL: N+ incisional/chest tube site pain  ENDOCRINE: Blood sugars with adequate control  NEURO: No headache  PSYCHIATRIC: Mood stable    Physical Exam:     Vital signs:  Temp: 97.9  F (36.6  C) Temp src: Axillary BP: (!) 160/74 Pulse: 103   Resp: 26 SpO2: 100 % O2 Device: Nasal cannula Oxygen Delivery: 1.5 LPM Height: 157.5 cm (5' 2\") Weight: 72.3 kg (159 lb 6.3 oz)  I/O:     Intake/Output Summary (Last 24 hours) at 2/23/2022 0727  Last data filed at 2/23/2022 0700  Gross per 24 hour   Intake 1043.18 ml   Output 1429 ml   Net -385.82 ml     Constitutional: Sitting up in a chair, spouse at bedside, in no apparent distress.   HEENT: Eyes with pink conjunctivae, anicteric.  Oral mucosa moist without lesions.  Voice hoarse/squeaky  PULM: Diminished bases bilaterally.  No crackles, no rhonchi, no wheezes.  Non-labored breathing on 1.5L NC --> weaned to RA.  Chest tubes without air leak or tidaling.  CV: Normal S1 and S2.  Tachycardia.  No murmur, gallop, or rub.  No peripheral edema.   ABD: BS hypoactive, soft, nontender, nondistended.    MSK: Moves all extremities.  No apparent muscle wasting.   NEURO: Alert and oriented, conversant.   SKIN: Warm, dry.  No rash on limited exam.   PSYCH: Mood stable.     Lines, Drains, and Devices:  Peripheral IV 02/20/22 Anterior;Right Lower forearm (Active)   Site Assessment [facility] 02/23/22 0400   Line Status Saline locked 02/23/22 0400   Dressing Intervention New dressing  02/22/22 0800   Phlebitis Scale 0-->no symptoms 02/23/22 0400   Infiltration Scale 0 02/23/22 0400   Infiltration Site Treatment Method  None 02/21/22 1600   Number of days: 3       Peripheral IV 02/20/22 Posterior Lower forearm (Active)   Site Assessment [facility] 02/23/22 0400   Line Status Saline locked 02/23/22 0400   Phlebitis Scale 0-->no symptoms 02/23/22 0400 "   Infiltration Scale 0 02/23/22 0400   Number of days: 3       Arterial Line 02/21/22 (Active)   Site Assessment [facility] 02/23/22 0400   Line Status Pulsatile blood flow 02/23/22 0400   Arterine Line Cap Change Due 02/25/22 02/23/22 0400   Art Line Waveform Appropriate 02/23/22 0400   Art Line Interventions Zeroed and calibrated;Leveled;Connections checked and tightened;Flushed per protocol 02/23/22 0400   Color/Movement/Sensation Capillary refill less than 3 sec 02/23/22 0400   Line Necessity Yes, meets criteria 02/23/22 0400   Dressing Type Transparent 02/23/22 0400   Dressing Status Clean, dry, intact 02/23/22 0400   Dressing Intervention Dressing changed/new dressing 02/22/22 0800   Dressing Change Due 02/27/22 02/23/22 0400   Number of days: 2       CVC Double Lumen Right Internal jugular (Active)   Site Assessment [facility]DL 02/23/22 0400   Dressing Type Chlorhexidine sponge;Transparent 02/23/22 0400   Dressing Status clean;dry;intact 02/23/22 0400   Dressing Change Due 02/27/22 02/23/22 0400   Line Necessity yes, meets criteria 02/21/22 0800   Brown - Status saline locked 02/23/22 0400   Brown - Cap Change Due 02/25/22 02/23/22 0400   White - Status infusing 02/23/22 0400   White - Cap Change Due 02/25/22 02/23/22 0400   Phlebitis Scale 0-->no symptoms 02/23/22 0400   Infiltration? no 02/23/22 0400   Infiltration Scale 0 02/23/22 0400   CVC Comment Cordis 02/23/22 0400   Number of days: 2     Data:     LABS    CMP:   Recent Labs   Lab 02/23/22  0611 02/23/22  0403 02/23/22  0346 02/23/22  0200 02/22/22  0402 02/22/22  0355 02/21/22  2016 02/21/22  2013 02/21/22  1616 02/21/22  1530 02/21/22  1325 02/21/22  1254 02/21/22  1118 02/21/22  1045 02/21/22  0717 02/21/22  0714 02/21/22  0625 02/21/22  0123 02/20/22  0617   NA  --   --  140  --   --  140  --   --   --   --   --   --   --  144  --   --  141   < > 140   POTASSIUM  --   --  4.5  --   --  4.6  --   --   --  4.4  --   --   --  3.0*  --   --  3.0*   < > 4.0    CHLORIDE  --   --  110*  --   --  111*  --   --   --   --   --   --   --  111*  --   --   --   --  102   CO2  --   --  27  --   --  26  --   --   --   --   --   --   --  27  --   --   --   --  31   ANIONGAP  --   --  3  --   --  3  --   --   --   --   --   --   --  6  --   --   --   --  7   * 150* 168* 160*   < > 135*   < >  --    < >  --    < >  --    < > 92   < >  --  204*   < > 139*   BUN  --   --  25  --   --  10  --   --   --   --   --   --   --  11  --   --   --   --  9   CR  --   --  0.68  --   --  0.53  --   --   --   --   --   --   --  0.41*  --   --   --   --  0.45*   GFRESTIMATED  --   --  >90  --   --  >90  --   --   --   --   --   --   --  >90  --   --   --   --  >90   MINISTERIO  --   --  7.7*  --   --  7.2*  --   --   --   --   --   --   --  8.1*  --   --   --   --  9.0   MAG  --   --  3.0*  --   --  1.7*  --   --   --   --   --  2.0  --   --   --  <0.8*  --   --  1.7*   PHOS  --   --  3.8  --   --  3.0  --  2.8  --   --   --   --   --  1.0*   < >  --   --   --  3.5   PROTTOTAL  --   --  5.3*  --   --  4.2*  --   --   --   --   --   --   --  4.3*  --   --   --   --  7.8   ALBUMIN  --   --  2.5*  --   --  2.3*  --   --   --   --   --   --   --  2.7*  --   --   --   --  3.7   BILITOTAL  --   --  0.2  --   --  0.4  --   --   --   --   --   --   --  0.6  --   --   --   --  0.5   ALKPHOS  --   --  46  --   --  36*  --   --   --   --   --   --   --  33*  --   --   --   --  95   AST  --   --  36  --   --  40  --   --   --   --   --   --   --  32  --   --   --   --  14   ALT  --   --  27  --   --  17  --   --   --   --   --   --   --  15  --   --   --   --  25    < > = values in this interval not displayed.     CBC:   Recent Labs   Lab 02/23/22  0346 02/22/22  1746 02/22/22  0355 02/21/22 2012   WBC 18.5* 18.4* 11.5* 11.8*   RBC 3.34* 3.50* 3.10* 3.46*   HGB 9.7* 10.2* 8.8* 10.0*   HCT 29.6* 30.3* 26.2* 29.1*   MCV 89 87 85 84   MCH 29.0 29.1 28.4 28.9   MCHC 32.8 33.7 33.6 34.4   RDW 14.2 13.9 13.5 13.4    * 135* 128* 155       INR:   Recent Labs   Lab 02/22/22  0355 02/21/22  0808 02/21/22  0714 02/21/22  0530   INR 1.31* 1.58* 5.23* 1.96*       Glucose:   Recent Labs   Lab 02/23/22  0611 02/23/22  0403 02/23/22  0346 02/23/22  0200 02/23/22  0014 02/22/22  2256   * 150* 168* 160* 184* 213*       Blood Gas:   Recent Labs   Lab 02/23/22  0347 02/22/22  1747 02/22/22  1304 02/22/22  0843 02/22/22  0800 02/22/22  0356   PHV  --  7.30*  --   --  7.45* 7.48*   PCO2V  --  42  --   --  38* 37*   PO2V  --  43  --   --  40 36   HCO3V  --  21  --   --  26 28   ARIEL  --  -5.5  --   --  1.9 3.8*   O2PER 2 30  30 40   < > 30 30    < > = values in this interval not displayed.       Culture Data No results for input(s): CULT in the last 168 hours.    Virology Data: No results found for: INFLUA, FLUAH1, FLUAH3, EQ9737, IFLUB, RSVA, RSVB, PIV1, PIV2, PIV3, HMPV, HRVS, ADVBE, ADVC    Historical CMV results (last 3 of prior testing):  No results found for: CMVQNT  No results found for: CMVLOG    Urine Studies    Recent Labs   Lab Test 02/20/22  0248 03/25/19  1043   URINEPH 7.0 5.0   NITRITE Negative Negative   LEUKEST Negative Trace*   WBCU <1 1       Most Recent [place] Pulmonary Function Testing (FVC/FEV1 only)  FVC-Pre   Date Value Ref Range Status   12/20/2021 1.81 L    06/21/2021 1.46 L    12/09/2019 1.59 L    06/03/2019 1.68 L      FVC-%Pred-Pre   Date Value Ref Range Status   12/20/2021 65 %    06/21/2021 52 %    12/09/2019 56 %    06/03/2019 58 %      FEV1-Pre   Date Value Ref Range Status   12/20/2021 0.49 L    06/21/2021 0.50 L    12/09/2019 0.49 L    06/03/2019 0.47 L      FEV1-%Pred-Pre   Date Value Ref Range Status   12/20/2021 22 %    06/21/2021 22 %    12/09/2019 21 %    06/03/2019 20 %        IMAGING    Recent Results (from the past 48 hour(s))   XR Abdomen Port 1 View    Narrative    Exam: XR ABDOMEN PORT 1 VIEWS, 2/21/2022 7:48 AM    Indication: OG placement    Comparison: X-ray 3/25/2019    Findings:    Frontal radiograph of the abdomen. Patient is slightly rotated.  Gastric tube tip and sidehole project over the stomach. Ratliff  catheter. Mediastinal drain and chest tubes are partially visualized.  Nonobstructive bowel gas pattern. Scattered hemostatic clips overlying  the right and left mid abdomen. No pneumatosis or portal venous gas.  No aggressive osseous lesions.      Impression    Impression:   Gastric tube tip and sidehole project over the stomach.    I have personally reviewed the examination and initial interpretation  and I agree with the findings.    SATNAM SIMON MD         SYSTEM ID:  [id number]   XR Chest Port 1 View    Narrative    Exam: XR CHEST PORT 1 VIEW, 2/21/2022 7:48 AM    Comparison: 2/20/2022    History: post lung transplant    Findings:  AP view of the chest. Endotracheal tube projects just above the level  of the jaimee. [place]-Brenden catheter is in the pulmonary artery. Enteric  tube is in stomach. Right right chest tubes x2 projected over the  right hemithorax. Left chest tubes x2 with more lateral chest tube  with sidehole projecting over the subcutaneous tissue. Pericardial  chest tube along the base of the heart.    Trachea is midline. Cardiac silhouette is within normal limits. Aortic  knob calcifications.. Intimal perihilar and bibasilar opacities. There  is no pneumothorax or pleural effusion. Small amount of left chest  wall subcutaneous emphysema.      Impression    Impression:   1. Status post bilateral lung transplant  2. Endotracheal tube at the level of the jaimee, recommend slight  retraction.  3. Left chest tubes with left lateral chest tube sidehole projecting  over the subcutaneous tissue and associated subcutaneous emphysema.  Recommend reposition.  4. [place]-Brenden catheter with tip in the main pulmonary artery.  5. Minimal perihilar and bibasilar atelectasis.    Findings FINDINGS discussed with Dr. Raudel Webster by Dr. Meng Suh at 810 AM on 2/21/2022.    I  have personally reviewed the examination and initial interpretation  and I agree with the findings.    FELIPA MARIE MD         SYSTEM ID:  [id number]   POC US Guidance Needle Placement    Narrative    Bilateral erector spinae plane block   XR Chest Port 1 View    Narrative    Portable chest 2/21/22 at 1105 hours    INDICATION: Endotracheal tube pulled back    COMPARISON: Earlier same day 0735 hours    FINDINGS: Heart size normal. A few patchy densities in the lower lungs  appear similar. An endotracheal tube tip is approximately 2.2 cm above  the jaimee. Right IJ [place]-Brenden catheter tip is in the main pulmonary  artery. There is calcification at the aortic knob. Clamshell  sternotomy. Enteric tube progress beyond the inferior margin of the  image.      Impression    IMPRESSION: Endotracheal tube tip approximately 2.2 cm above the  jaimee. Atherosclerosis. Few patchy densities in the lower lungs which  may indicate atelectasis or edema, recommend follow-up to clearing to  exclude infection. [place]-Brenden catheter tip in the main pulmonary  artery.    FELIPA MARIE MD         SYSTEM ID:  [id number]   XR Abdomen Port 1 View    Narrative    EXAMINATION:  XR ABDOMEN PORT 1 VIEWS 2/21/2022 3:31 PM     INDICATION: Verify small bowel feeding tube bedside placement    COMPARISON: Abdominal radiograph 2/21/2022    FINDINGS: Single AP supine view of the abdomen.     Feeding tube tip projects over the right upper quadrant, not  definitively postpyloric. Enteric tube with tip projecting in the  stomach. The small intestine and colon are nondistended. Scattered  hemostatic colonoscopy clips in the bowel. No evidence of pneumatosis  or portal venous gas. Multiple chest tubes visualized. The lung bases  are unremarkable.      Impression    IMPRESSION:  Feeding tube with tip projected in the right upper quadrant. Not  definitively postpyloric.    I have personally reviewed the examination and initial interpretation  and I agree  with the findings.    SATNAM SIMON MD         SYSTEM ID:  [id number]   XR Chest Port 1 View    Narrative    EXAM: XR CHEST 1 VW 2/22/2022      HISTORY: Post-Op Lung.    COMPARISON: Previous day.     TECHNIQUE: Frontal view of the chest.    FINDINGS/    Impression    IMPRESSION: Postoperative sequelae of bilateral lung transplant with  intact clamshell sternotomy wires and lines/tubes as below. Probable  small left pleural effusion. No new airspace disease, pneumothorax, or  overt abnormality of the heart, bones, or upper abdomen.    Lines/tubes/Devices as follows:   --Endotracheal tube tip is 6.2 cm above the jaimee.   --[place]-Brenden catheter tip projects near the main pulmonary artery.   --Mediastinal and pleural drains.   --Partially visualized enteric and feeding tubes.   --Spinal analgesia catheters    I have personally reviewed the examination and initial interpretation  and I agree with the findings.    MURIEL WALLACE MD         SYSTEM ID:  [id number]   XR Feeding Tube Reposition    Narrative    Feeding tube placement. 2/22/2022 12:59 PM    Comparison: Radiograph 2/21/2022. CT 3/29/2019.    History: Dietitian placed NG but needs to be advanced a postpyloric  positioning.    Fluoroscopy time: 0.3 minutes    Technique: After injection of Xylocaine gel into the right nostril,  the existing feeding tube was advanced under fluoroscopic guidance.    Findings: The feeding tube is advanced with the tip in the third  portion of the duodenum. A small amount of barium was injected to  demonstrate placement within the small bowel. The feeding tube was  then flushed with normal saline. The feeding tube was secured using a  nasal bridle.      Impression    Impression:   Uncomplicated feeding tube repositioning with tip in the distal third  portion of the duodenum.    I, KEN CHAVARRIA MD, attest that I was present for all critical  portions of the procedure and was immediately available to provide  guidance and  assistance during the remainder of the procedure.    I have personally reviewed the examination and initial interpretation  and I agree with the findings.    KEN CHAVARRIA MD         SYSTEM ID:  [id number]   XR Chest Port 1 View    Impression    RESIDENT PRELIMINARY INTERPRETATION  IMPRESSION: Postoperative sequelae of bilateral lung transplant with  intact clamshell sternotomy wires and lines/tubes as below. Unchanged  small left pleural effusion. Low lung volumes with increased streaky  opacities, likely atelectasis. No convincing new airspace disease,  pneumothorax, or overt abnormality of the heart, bones, or upper  abdomen.    Lines/tubes/Devices as follows:   --Endotracheal tube, [place]-Brenden catheter, and enteric tube have been  removed.   --Mediastinal and pleural drains.   --Partially visualized feeding tubes.   --Spinal analgesia catheters

## 2022-02-23 NOTE — PLAN OF CARE
Major Shift Events:  Alert and oriented x 4. Pleasant and cooperative. Lungs are coarse/diminished. On 1.5 LPM Oxygen with sats %. Tachypneic at times but RR mostly in the teens/mid 20s and shallow. Encouraged coughing and deep breathing. Weak/ineffective cough. Incentive Spirometer done x 4 during the shift; up to 250ml max. MD updated on ABG results this morning. In Sinus tach; no ectopy. Hydralazine given x 1 for Sustained SBP greater than 140. Warm and well perfused. T-max 37 Low UO 15-25ml/hr; MD aware. Tolerates ice chips well. Feeds at 10ml/hr per order. No new skin issues. No drips.    Plan: continue aggressive pulmonary toilet.    For vital signs and complete assessments, please see documentation flowsheets.

## 2022-02-23 NOTE — PROGRESS NOTES
02/23/22 0900   Quick Adds   Type of Visit Initial Occupational Therapy Evaluation   Living Environment   People in Home spouse   Current Living Arrangements house   Home Accessibility stairs to enter home   Number of Stairs, Main Entrance 3   Stair Railings, Main Entrance railings on both sides of stairs   Transportation Anticipated family or friend will provide   Living Environment Comments Patient lives with spouse in single story home with basement, all needs on main level. Patient will be staying locally after discharge. Both patient and  are retired. Have 2 adult children in [place], [place] and [place], [place].   Self-Care   Usual Activity Tolerance moderate   Current Activity Tolerance poor   Equipment Currently Used at Home walker, rolling;shower chair   Fall history within last six months no   Activity/Exercise/Self-Care Comment Patient is IND with all mobility and self cares at baseline, owns 4WW but has not used. Limited by actvity tolerance/LUTHER, completes UE/LE exercises and walking as able, on 2-3L O2 at baseline.    Post-Acute Assessment Only   Post-Acute Functional Assessment See IP Rehab Daily Documentation Flowsheet for Functional Mobility/ADL Assessment   Previous Level of Function/Home Environm   Bathing/Grooming, Premorbid Functional Level uses device or equipment   Dressing, Premorbid Functional Level independent   Eating/Feeding, Premorbid Functional Level independent   Toileting, Premorbid Functional Level independent   BADLs, Premorbid Functional Level independent   IADLs, Premorbid Functional Level independent   Bed Mobility, Premorbid Functional Level independent   Transfers, Premorbid Functional Level independent   Household Ambulation, Premorbid Functional Level uses device or equipment   Stairs, Premorbid Functional Level uses device or equipment   Community Ambulation, Premorbid Functional Level uses device or equipment   General Information   Onset of Illness/Injury or Date of  Surgery 02/21/22   Referring Physician Ruth Agosto   Patient/Family Therapy Goal Statement (OT) none stated    Additional Occupational Profile Info/Pertinent History of Current Problem Melissa Elder is a 66 year old female with a PMH significant for severe COPD, HTN, mild non-obstructive CAD, paroxysmal afib, osteoporosis, GERD, and colonic polyps.  Pt. is now s/p BSLT on 2/21/22, surgery relatively uncomplicated.  The patient is currently intubated and sedated on one pressor, POD #1 in the CVICU.   Existing Precautions/Restrictions   (clamshell incision)   Cognitive Status Examination   Orientation Status orientation to person, place and time   Affect/Mental Status (Cognitive) WNL   Follows Commands follows one-step commands;delayed response/completion;increased processing time needed   Visual Perception   Visual Impairment/Limitations WNL   Sensory   Sensory Quick Adds No deficits were identified   Pain Assessment   Patient Currently in Pain Yes, see Vital Sign flowsheet   Posture   Posture protracted shoulders   Range of Motion Comprehensive   Comment, General Range of Motion BUEs WFL    Strength Comprehensive (MMT)   Comment, General Manual Muscle Testing (MMT) Assessment Generalized weakness, grossly 3/5 in UEs    Bed Mobility   Comment (Bed Mobility) supine > sit mod A    Transfers   Transfer Comments min A x2 for sit > stand and transfer bed > chair, min A x1 for static balance    Activities of Daily Living   BADL Assessment/Intervention lower body dressing   Lower Body Dressing Assessment/Training   Bacon Level (Lower Body Dressing) maximum assist (25% patient effort)   Clinical Impression   Criteria for Skilled Therapeutic Interventions Met (OT) Yes, treatment indicated   OT Diagnosis decreased ADL I    OT Problem List-Impairments impacting ADL activity tolerance impaired;balance;strength;pain;post-surgical precautions   Assessment of Occupational Performance 5 or more Performance Deficits    Identified Performance Deficits bed mobility, g/h, dressing, bathing, toileting, home management    Planned Therapy Interventions (OT) ADL retraining;IADL retraining;balance training;bed mobility training;strengthening;transfer training;home program guidelines;progressive activity/exercise;risk factor education   Clinical Decision Making Complexity (OT) low complexity   Risk & Benefits of therapy have been explained evaluation/treatment results reviewed   Clinical Impression Comments Pt presents below baseline in ADLs. Currently recommend ARU to maximize ADL I. Pending further progress with therapies pt may be safe to discharge locally with assist of .    OT Discharge Planning   OT Discharge Recommendation (DC Rec) [facility]-Motivated patient will benefit from intensive, interdisciplinary therapy.  Anticipate will be able to tolerate 3 hours of therapy per day   OT Rationale for DC Rec Pt presents below baseline in ADLs. Currently recommend ARU to maximize ADL I. Pending further progress with therapies pt may be safe to discharge locally with assist of .    OT Brief overview of current status Ax2 for line management    Total Evaluation Time (Minutes)   Total Evaluation Time (Minutes) 5   OT Goals   Therapy Frequency (OT) 6 times/wk   OT Predicated Duration/Target Date for Goal Attainment 03/09/22   OT Goals Hygiene/Grooming;Upper Body Dressing;Lower Body Dressing;Toilet Transfer/Toileting;Cognition   OT: Hygiene/Grooming modified independent;within precautions;while standing   OT: Upper Body Dressing Modified independent;within precautions;using adaptive equipment;including set-up/clothing retrieval   OT: Lower Body Dressing Modified independent;within precautions;including set-up/clothing retrieval;using adaptive equipment   OT: Toilet Transfer/Toileting Modified independent;toilet transfer;cleaning and garment management;using adaptive equipment;within precautions   OT: Cognitive  Patient/caregiver will verbalize understanding of cognitive assessment results/recommendations as needed for safe discharge planning

## 2022-02-23 NOTE — PROGRESS NOTES
CV ICU PROGRESS NOTE  February 23, 2022      CO-MORBIDITIES:   No diagnosis found.    ASSESSMENT: Melissa Elder is a 66 year old female with PMHx HTN, HLD, paroxysmal Afib, osteoporosis, GERD, severe COPD, previously requiring 2-3L NC O2 at rest.  Underwent b/l lung transplant with Dr. Robin on 02/21. Got 1u pRBC post-op, no overt bleeding source, monitoring. Extubated 02//22 to O2 NC.    TODAY'S PROGRESS:     Overnight events:  - No acute events, hypercapnic overnight (pCO2 in 50's)     Goals:  - Aggressive pulmonary toileting, up to chair w/PT, Incentive Spirometry   - POCUS for size of pleural effusion on L  - SLP to do FEEs   - Advance diet to goal for fluid intake   - 500 LR to encourage UOP  - f/u G6PD assay if it becomes available (start dapsone if negative)  - Ceftriaxone for anaerobic coverage of Actinomyces  - Metoprolol BID for Atrial Fibrillation     PLAN:    Neuro/ pain/ sedation:  # Acute Postoperative pain  - Pain:              - E/S catheters    - take out after chest tubes are out              - Scheduled acetaminophen, gabapentin              - PRN tylenol, dilaudid, oxycodone, robaxin  - Sedation: none    Pulmonary care:   # S/p Extubation  # Respiratory Acidosis  # S/p b/l Lung Transplant  - Extubated saturating well on minimal O2 by NC  - Titrate supplemental oxygen to maintain saturation above 92%.  - Pulmonary hygiene: Incentive spirometer every 15- 30 minutes when awake, flutter valve, C&DB  - CXR with progression of L pleural effusion, no e/o consolidation              - Mucomyst QID, levalbuterol QID   - Flutter, inspiratory spirometry, PT, up to chair   - POCUS for L pleural effusion  - Immune suppressive regimen: tacrolimus, MMF, steroid, basiliximab      Cardiovascular:    History of HTN, HLD  Recent echo on 06/2021 with LVEF of 55-60%  - Goal MAP>65, SBP<130   - hydralazine PRN for HTN   - no PTA HTN medications  - hold PTA atorvastatin, diltiazem, & furosemide   - resume  atorvastatin after transfer   - start ASA 81 after transfer  - change lactate to qday  Paroxysmal Atrial Fibrillation  - Rate control with metoprolol BID    GI care/ Nutrition:   - NJ   - advance feeds to goal per Nutrition   - FEEs by SLP 02/23 to ADAT  - PPI (pantoprazole)              - no PTA PPI, bridging with famotidine (end on 03/03/21)  - Continue bowel regimen: miralax, senna   - No charted BMs in last 2 shifts    Renal/ Fluid Balance/ Electrolytes:   BL creat appears to be ~ 0.5  - Strict I/O, daily weights              - decreasing 100 mL UOP since midnight (~20/hr)                          - give 500 LR 02/23    - will get more fluid with advancement of feeds per nutrition  - Avoid/limit nephrotoxins as able  - Replete lytes PRN per protocol (high dose protocol for K, Mg, Phos)              - Replete Ca again 02/23 (give 2g)    Endocrine:    Stress induced hyperglycemia  Preop A1c 5.8%  - MDSSI  - Goal BG <180 for optimal healing  - PTA meds: no Hx T2DM    ID/ Antibiotics:  # Stress induced leukocytosis  # Donor Lung Growing Staph - Speciated to MSSA  # Budding Yeast in Respiratory Bronch Sample - Rare  # Actinomyces in Respiratory Culture  - Ancef (13d) for MSSA coverage  - Actinomyces resulted this AM   - Add ceftriaxone for coverage of anaerobes  - Prophylaxis:   - Holding off on dapsone prophylaxis, pending G6PD screen              - acyclovir starting 02/22 - to continue for 30d post-op              - nystatin swish and swallow  - Rare budding yeast in BAL is likely colonization, asymptomatic, no e/o consolidations   - Continue to monitor  - Continue to monitor fever curve, WBC and inflammatory markers as appropriate   - WBC likely also elevated from steroid doses    Heme:     Stress induced leukocytosis  Acute blood loss anemia  Acute blood loss thrombocytopenia  Epistaxis - resolved  Had an episode of profuse epistaxis with attempt at NJ placement 02/21. Was given 1u pRBC 02/21, monitoring Hgb.  -  CBC and platelets q12  - transfuse for Hgb goal > 7    MSK/ Skin:  Sternotomy  Surgical Incision  - Sternal precautions  - Postoperative incision management per protocol  - PT/OT/CR     Prophylaxis:    - Abx prophylaxis as above  - Mechanical prophylaxis for DVT  - Chemical DVT prophylaxis - subcutaneous heparin  - PPI     Lines/ tubes/ drains:  - Arterial Line, PIV x 2,, CTs x 5, RIJ, gallo, NJ   - keep Gallo for now (monitoring UOP closely)   - remove Arterial line     Disposition:  - Transfer     Patient seen, findings and plan discussed with CVICU staff    HILARIO Galindo    Resident/Fellow Attestation   I, Raudel Webster, was present with HILARIO Galindo who participated in the service and in the documentation of the note.  I have verified the history and personally performed the physical exam and medical decision making.  I agree with the assessment and plan of care as documented in the note.      Raudel Webster MD  Anesthesiology, CA-2, PGY3    ====================================    SUBJECTIVE:   Feeling good this AM, just feels tired. No issues with pain management.    OBJECTIVE:   1. VITAL SIGNS:   Temp:  [97.9  F (36.6  C)-100  F (37.8  C)] 97.9  F (36.6  C)  Pulse:  [] 103  Resp:  [12-33] 26  BP: (127-167)/(60-75) 160/74  MAP:  [67 mmHg-146 mmHg] 79 mmHg  Arterial Line BP: ()/() 127/52  SpO2:  [97 %-100 %] 100 %  Vent Mode: (S) CPAP/PS  (Continuous positive airway pressure with Pressure Support)  FiO2 (%): 30 %  Resp Rate (Set): (S) 14 breaths/min (per MD)  Tidal Volume (Set, mL): 350 mL  PEEP (cm H2O): (S) 5 cmH2O (PEEP changed by MD)  Pressure Support (cm H2O): 8 cmH2O  Resp: 26      2. INTAKE/ OUTPUT:   I/O last 3 completed shifts:  In: 1063.68 [I.V.:753.68; NG/GT:195]  Out: 1464 [Urine:474; Chest Tube:990]    3. PHYSICAL EXAMINATION:   General: awake female patient seated at an angle in bed  Neuro: AOx3, NAD  Resp: intubated, ventilated, there is a fixed wheeze  (inspiratory > expiratory) in all lung fields  CV: S1, S2, RRR, there is a soft pericardial rub audible  Abdomen: Soft, non-distended, non-tender  Incisions: c/d/i  Extremities: warm and well perfused, pulses 3+ x4  CT: To suction, serosang output, no airleak, crepitus    4. INVESTIGATIONS:   Arterial Blood Gases   Recent Labs   Lab 02/23/22  0347 02/22/22  1747 02/22/22  1304 02/22/22  0843   PH 7.30* 7.33* 7.39 7.45   PCO2 54* 47* 35 37   PO2 142* 98 184* 127*   HCO3 27 25 21 26     Complete Blood Count   Recent Labs   Lab 02/23/22  0346 02/22/22  1746 02/22/22  0355 02/21/22 2012   WBC 18.5* 18.4* 11.5* 11.8*   HGB 9.7* 10.2* 8.8* 10.0*   * 135* 128* 155     Basic Metabolic Panel  Recent Labs   Lab 02/23/22  0611 02/23/22  0403 02/23/22  0346 02/23/22  0200 02/22/22  0402 02/22/22  0355 02/21/22  1616 02/21/22  1530 02/21/22  1118 02/21/22  1045 02/21/22  0717 02/21/22  0625 02/21/22  0123 02/20/22  0617   NA  --   --  140  --   --  140  --   --   --  144  --  141   < > 140   POTASSIUM  --   --  4.5  --   --  4.6  --  4.4  --  3.0*  --  3.0*   < > 4.0   CHLORIDE  --   --  110*  --   --  111*  --   --   --  111*  --   --   --  102   CO2  --   --  27  --   --  26  --   --   --  27  --   --   --  31   BUN  --   --  25  --   --  10  --   --   --  11  --   --   --  9   CR  --   --  0.68  --   --  0.53  --   --   --  0.41*  --   --   --  0.45*   * 150* 168* 160*   < > 135*   < >  --    < > 92   < > 204*   < > 139*    < > = values in this interval not displayed.     Liver Function Tests  Recent Labs   Lab 02/23/22  0346 02/22/22  0355 02/21/22  1045 02/21/22  0808 02/21/22  0714 02/21/22  0530 02/20/22  0617   AST 36 40 32  --   --   --  14   ALT 27 17 15  --   --   --  25   ALKPHOS 46 36* 33*  --   --   --  95   BILITOTAL 0.2 0.4 0.6  --   --   --  0.5   ALBUMIN 2.5* 2.3* 2.7*  --   --   --  3.7   INR  --  1.31*  --  1.58* 5.23* 1.96* 1.02     Pancreatic Enzymes  No lab results found in last 7  days.  Coagulation Profile  Recent Labs   Lab 02/22/22  0355 02/21/22  0808 02/21/22  0714 02/21/22  0530 02/20/22  0617   INR 1.31* 1.58* 5.23* 1.96* 1.02   PTT  --   --  106* 29 31         5. RADIOLOGY:   Recent Results (from the past 24 hour(s))   XR Feeding Tube Reposition    Narrative    Feeding tube placement. 2/22/2022 12:59 PM    Comparison: Radiograph 2/21/2022. CT 3/29/2019.    History: Dietitian placed NG but needs to be advanced a postpyloric  positioning.    Fluoroscopy time: 0.3 minutes    Technique: After injection of Xylocaine gel into the right nostril,  the existing feeding tube was advanced under fluoroscopic guidance.    Findings: The feeding tube is advanced with the tip in the third  portion of the duodenum. A small amount of barium was injected to  demonstrate placement within the small bowel. The feeding tube was  then flushed with normal saline. The feeding tube was secured using a  nasal bridle.      Impression    Impression:   Uncomplicated feeding tube repositioning with tip in the distal third  portion of the duodenum.    I, KEN CHAVARRIA MD, attest that I was present for all critical  portions of the procedure and was immediately available to provide  guidance and assistance during the remainder of the procedure.    I have personally reviewed the examination and initial interpretation  and I agree with the findings.    KEN CHAVARRIA MD         SYSTEM ID:  [id number]   XR Chest Port 1 View    Impression    RESIDENT PRELIMINARY INTERPRETATION  IMPRESSION: Postoperative sequelae of bilateral lung transplant with  intact clamshell sternotomy wires and lines/tubes as below. Unchanged  small left pleural effusion. Low lung volumes with increased streaky  opacities, likely atelectasis. No convincing new airspace disease,  pneumothorax, or overt abnormality of the heart, bones, or upper  abdomen.    Lines/tubes/Devices as follows:   --Endotracheal tube, Peoa-Brenden catheter, and enteric  tube have been  removed.   --Mediastinal and pleural drains.   --Partially visualized feeding tubes.   --Spinal analgesia catheters       =========================================

## 2022-02-23 NOTE — PROGRESS NOTES
Pain Service Progress Note  [facility]  Date: February 23, 2022      Patient Name: Melissa Elder  MRN: [id number]  Age: 66 year old  Sex: female      Assessment:  Melissa Elder is a 66 year old female with a PMH significant for severe COPD on chronic home O2, mild non-obstructive CAD, paroxysmal A-fib, osteoporosis on treatment    Procedure: bilateral lung transplant    Date of Surgery: 2/21/22    Date of Bilateral ES (erector spinae) T6-7 Catheter Placement: 2/21/22     Plan/Recommendations:  1. Regional Anesthesia/Analgesia  -Continuous Catheter Type/Site: Bilateral ES T6-7 catheters     0805 Cinician administered nerve block bolus. VSS, MAP 83.  PF BUPivacaine 0.25%, total bolus 12 mL, 6 mL via right and left catheters, administered without complication, negative aspirate before and during administration.  No symptoms of local anesthetic systemic toxicity (LAST). Remained with and assessed patient for 10 min post-injection. BP, P, and MAP stable.  Bedside RN aware of need to continue to monitoring and document BP, P, and MAP Q 10 min for an additional 20 min. Contact RAPS (jobcode ID [id number]) if any of the following: patient experiencing any untoward effects, SBP < 90, P < 50 or > 120, MAP < 60     1045 Follow up: Patient sitting in recliner at bedside, reports pain adequately controlled at rest and with activity.      - Continuous infusion of Ropvicaine 0.2% at 7 mL/hr each catheter, total 14mL/hr    - patient can be evaluated to receive local anesthetic bolus Q 12 hr PRN pain not controlled with continuous infusion.  Bedside RN must page RAPS to request bolus    Plan to maintain catheters while chest tubes in place, max of 7 days    2. Anticoagulation  Okay to continue heparin SC 5,000 units Q 8 hrs   -Please contact Inpatient Pain Service (pager [phone]) before ordering or making any medication changes     3. Multimodal Analgesia  - per primary service.  Since extubated yesterday, has  "received one dose of Dilaudid 0.4 mg IV, on scheduled Tylenol Q 8 hr and gabapentin at bedtime, also has PRN oxycodone and robaxin available if needed.      Pain Service will continue to follow.    Discussed with attending anesthesiologist      Overnight Events: None, extubated yesterday at 1335    Tubes/Drains: Yes  CT x 5    Subjective: Reports she has upper abdomen pain \"only a small amount of discomfort, its not bad\". Discussed use of local anesthetic bolus through nerve catheters for nonopioid analgesia, patient agrees.  Bolus given at 0805.        Diet: NPO for Medical/Clinical Reasons Except for: Ice Chips  Adult Formula Drip Feeding: Continuous Osmolite 1.5; Nasojejunal; Goal Rate: 10; mL/hr; Medication - Feeding Tube Flush Frequency: At least 15-30 mL water before and after medication administration and with tube clogging; Trophic feeds 10 only do ...    Relevant Labs:  Recent Labs   Lab Test 02/23/22  0346 02/22/22  1746 02/22/22  0355 02/21/22  0808 02/21/22  0714   INR  --   --  1.31*   < > 5.23*   *   < > 128*   < >  --    PTT  --   --   --   --  106*   BUN 25  --  10   < >  --     < > = values in this interval not displayed.       Physical Exam:  Vitals: BP (!) 160/74 (BP Location: Right arm)   Pulse 103   Temp 97.9  F (36.6  C) (Axillary)   Resp 26   Ht 1.575 m (5' 2\")   Wt 72.3 kg (159 lb 6.3 oz)   SpO2 100%   BMI 29.15 kg/m      Physical Exam:   Orientation:  Alert, oriented, and in no acute distress: Yes  Sedation: No    Motor Examination:  5/5 Strength in lower extremities: Yes    Catheter Site:   Catheter entry site is clean/dry/intact: Yes    Tender: No      Relevant Medications:  Current Pain Medications:  Medications related to Pain Management (From now, onward)    Start     Dose/Rate Route Frequency Ordered Stop    02/24/22 0000  acetaminophen (TYLENOL) tablet 650 mg         650 mg Oral EVERY 4 HOURS PRN 02/21/22 0712      02/22/22 1400  acetaminophen (TYLENOL) tablet 975 mg     " "    975 mg Oral or Feeding Tube EVERY 8 HOURS SCHEDULED 02/22/22 0840      02/22/22 0800  polyethylene glycol (MIRALAX) Packet 17 g         17 g Oral or Feeding Tube DAILY 02/21/22 0712      02/21/22 2200  gabapentin (NEURONTIN) capsule 100 mg         100 mg Oral or Feeding Tube AT BEDTIME 02/21/22 0712      02/21/22 1130  ropivacaine 0.2% (NAROPIN) 750 mL in ON-Q C-Bloc select flow ([id number] holds 600-750 mL) dual cath disposable pump         7 mL/hr  Irrigation CONTINUOUS 02/21/22 1106      02/21/22 0800  senna-docusate (SENOKOT-S/PERICOLACE) 8.6-50 MG per tablet 1 tablet         1 tablet Oral or Feeding Tube 2 TIMES DAILY 02/21/22 0712      02/21/22 0712  HYDROmorphone (DILAUDID) injection 0.2 mg        \"Or\" Linked Group Details    0.2 mg Intravenous EVERY 2 HOURS PRN 02/21/22 0712      02/21/22 0712  HYDROmorphone (DILAUDID) injection 0.4 mg        \"Or\" Linked Group Details    0.4 mg Intravenous EVERY 2 HOURS PRN 02/21/22 0712      02/21/22 0712  oxyCODONE (ROXICODONE) tablet 5 mg        \"Or\" Linked Group Details    5 mg Oral EVERY 4 HOURS PRN 02/21/22 0712      02/21/22 0712  oxyCODONE IR (ROXICODONE) tablet 10 mg        \"Or\" Linked Group Details    10 mg Oral EVERY 4 HOURS PRN 02/21/22 0712      02/21/22 0712  magnesium hydroxide (MILK OF MAGNESIA) suspension 30 mL         30 mL Oral DAILY PRN 02/21/22 0712      02/21/22 0712  bisacodyl (DULCOLAX) Suppository 10 mg         10 mg Rectal DAILY PRN 02/21/22 0712      02/21/22 0712  methocarbamol (ROBAXIN) tablet 500 mg         500 mg Oral EVERY 6 HOURS PRN 02/21/22 0712      02/21/22 0712  lidocaine 1 % 0.1-1 mL         0.1-1 mL Other EVERY 1 HOUR PRN 02/21/22 0712      02/21/22 0712  lidocaine (LMX4) cream          Topical EVERY 1 HOUR PRN 02/21/22 0712            Primary Service Contacted with Recommendations? No    Perla Rivera, MANUEL CNP  2/23/2022    Time/Communication:  I personally spent 20 minutes with greater than 50% in consultation, education, " medication administration, counseliing and coordination of care.  Also discussed with RN      Contact Info (24 hour job code pager is the last 4 digits) For in-house use only:   Job code ID: [id number]   [place] [id number]  Peds [phone]  [place] phone: dial * [phone] * [phone], enter jobcode ID, then enter call-back number.    Text: Use [employer] on the Intranet <Paging/Directory> tab and enter Jobcode ID.   If no call back at any time, contact the hospital  and ask for Regional Anesthesia attending or backup

## 2022-02-23 NOTE — ANESTHESIA POSTPROCEDURE EVALUATION
Patient: Melissa Elder    Procedure: Procedure(s):  Bilateral Sequential Lung Transplantation, Clamshell Incision, Extracorporeal Membrane Oxgenation, Bronchoscopy, Cryoablation of Intercostal Nerves       Anesthesia Type:  General    Note:  Disposition: ICU            ICU Sign Out: Unable to perform physician to physician sign out   Postop Pain Control: Uneventful            Sign Out: Well controlled pain   PONV: No   Neuro/Psych: Uneventful            Sign Out: Acceptable/Baseline neuro status   Airway/Respiratory: Uneventful            Sign Out: Acceptable/Baseline resp. status; O2 supplementation               Oxygen: Nasal Cannula   CV/Hemodynamics: Uneventful            Sign Out: Acceptable CV status; No obvious hypovolemia; No obvious fluid overload   Other NRE: NONE   DID A NON-ROUTINE EVENT OCCUR? No    Event details/Postop Comments:  Seen up in bedside chair           Last vitals:  Vitals Value Taken Time   /53 02/22/22 2023   Temp 36.9  C (98.4  F) 02/23/22 0000   Pulse 102 02/23/22 0229   Resp 15 02/23/22 0229   SpO2 99 % 02/23/22 0229   Vitals shown include unvalidated device data.    Electronically Signed By: Gerard Coleman MD  February 23, 2022  2:30 AM

## 2022-02-23 NOTE — SIGNIFICANT EVENT
SPIRITUAL HEALTH SERVICES Significant Event  [employer] [facility] [employer]  [facility] 4e    Pt anointed by Father Esther Perdomo   Pager [phone]

## 2022-02-23 NOTE — PLAN OF CARE
Transferred to: Unit 6B rm 23  at 1545.   Belongings: Hospital supplies sent with patients. Personal belongings brought home by cleve  Evy removed? No: monitor UOP closely  Central line removed? Yes  Chart and medications sent with patient Yes  Family notified: Yes- Tyrese notified of transfer- will be back at hospital around 1700.    Up to chair this morning. Tolerating activity well. Pain managed with robupivicaine. Givenb 5mg oxycodone x1 with relief. Continues to be tachypneic rates up 40s- did improve to upper 20s-low 30s after oxy. IS poor up to 250, using aerobika and acapella therapy with nebs.   BP stable. Central lines out.   TF going 20/hr (goal 50/hr). Next increase to 30ml @ 1600. Soft diet- thin liquids. Ate jello and half a bowl of broth.   2 CT pulled- 3 remaining hooked to 3 atriums. -20 waterseal. Serosang drainage.   Up Ax2.      Problem: Respiratory Compromise COPD (Chronic Obstructive Pulmonary Disease)  Goal: Effective Oxygenation and Ventilation  Outcome: Ongoing, Progressing  Intervention: Promote Airway Secretion Clearance  Recent Flowsheet Documentation  Taken 2/23/2022 0800 by Ivon Limon, RN  Cough And Deep Breathing: done with encouragement  Activity Management:   standing at bedside   activity adjusted per tolerance   activity encouraged  Intervention: Optimize Oxygenation and Ventilation  Recent Flowsheet Documentation  Taken 2/23/2022 0800 by Ivon Limon, RN  Head of Bed (HOB) Positioning: HOB at 30-45 degrees   Goal Outcome Evaluation:    Plan of Care Reviewed With: patient, spouse     Overall Patient Progress: improving    Outcome Evaluation: Patient tolerated procedure well, and remained normothermic.

## 2022-02-23 NOTE — PROGRESS NOTES
[facility]    Medicine Progress Note - Hospitalist Service, GOLD TEAM 10    Date of Admission:  2/20/2022    Assessment & Plan        Melissa Elder is a 66 year old female with PMHx HTN, HLD, paroxysmal Afib, osteoporosis, GERD, severe COPD, previously requiring 2-3L NC O2 at rest.  Underwent b/l lung transplant with Dr. Robin on 02/21. Got 1u pRBC post-op, no overt bleeding source, monitoring. Extubated 02//22 to O2 NC.      S/p bilateral sequential lung transplant for COPD:  Acute hypoxic/hypercapneic respiratory failure  Donor lung growing MSSA   Actinomyces in respiratory culture  Scant pleural-pleural adhesions and mild-moderate bilateral hilar lymphadenopathy per op note.  Pressor weaned off 2/22 and pt. extubated. Prior history of infection/colonization with Haemophilus influenzae (2017).  IgG adequate (2/20).  MRSA nares negative 2/21.  Broad spectrum ABX empirically post-op with vanc and ceftaz (2/21-2/22), stopped after 24h per Dr. Lala to target known donor cultures on POD #1 (transitioned to cefazolin).  Now with new updates to cultures as below. Donor cultures ([facility]) with Strep mitis, Actinomyces odontolyticus, and Kenyatta kruseii. Recipient cultures with Actinomyces odonotolyticus. She has now been weaned down to room air.  - Nebs: levalbuterol and Mucomyst QID  - Aggressive pulmonary toilet with chest physiotherapy QID as well as Aerobika and incentive spirometry q1h w/a  - Wean supplemental O2 to keep >92%  - Anesthesia to manage erector spinae catheters (placed 2/21)  - Chest tubes managed by surgical team  - Daily CXR  - Tube feeding should be advanced to goal now that pt. has post-pyloric nasal feeding tube (placed by radiology yesterday)  - FEES today per SLP (ordered)  - Await explant pathology  - Continue ceftriaxone, low threshold for vanco plan for two week course  - Immune suppression per daily transplant pulm note     ID  Prophylaxis  - Daponse for PJP ppx to start today at decreased MWF dose, increase to daily after one week if CBC remains stable  - Acyclovir for HSV ppx (newly positive 2/20)  - Nystatin for oral candidiasis ppx, 6 month course    Post op pain   - Erector spinae catheters placed 2/21 managed by anesthesia   - gabapentin 100 mg nightly  - tylenol 975 mg Q8H   - Dilaudid 0.2-0.4 mg Q2H PRN   - oxycodone 5-10 mg Q4H PRN   - robaxin 500 mg Q6H PRN     Paroxysmal Afib   She was on diltiazem prior to lung transplantation and had not been on anticoagulation. She has not been transitioned to metoprolol which will be continued.  - Metoprolol tartrate 12.5 mg BID   - no anticoagulation at this time     Stress induced hyperglycemia  Will continue to monitor currently not needing any insulin but placed on LDISS    HTN   Hx of htn but was recently on pressors will continue to monitor     HLD  Mild CAD   Noted on transplant workup. Will restart statin as able     Acute blood loss anemia  Acute blood loss thrombocytopenia  Secondary to surgery. Will continue to monitor   - Transfuse Hgb < 7, platelets < 50     Sternotomy  Surgical Incision  - Sternal precautions  - Postoperative incision management per protocol  - PT/OT/CR       Diet: Adult Formula Drip Feeding: Continuous Osmolite 1.5; Nasoduodenal tube; Goal Rate: 50; mL/hr; Medication - Feeding Tube Flush Frequency: At least 15-30 mL water before and after medication administration and with tube clogging; Amount to Send (Nut...  Soft & Bite Sized Diet (level 6) Thin Liquids (level 0)    DVT Prophylaxis: Pneumatic Compression Devices  [place] Catheter: PRESENT, indication: Strict 1-2 Hour I&O  Central Lines: None  Cardiac Monitoring: None  Code Status: Full Code      Disposition Plan   Expected Discharge:    Anticipated discharge location: home;inpatient rehabilitation facility    Delays:            The patient's care was discussed with the Bedside Nurse, Patient and transplant  "pulm  Consultant.    Ross Farrar DO  Hospitalist Service, GOLD TEAM 10  [facility]  Securely message with the [employer] Web Console (learn more here)  Text page via [facility] Paging/Directory   Please see signed in provider for up to date coverage information      Clinically Significant Risk Factors Present on Admission             # Overweight: Estimated body mass index is 29.15 kg/m  as calculated from the following:    Height as of this encounter: 1.575 m (5' 2\").    Weight as of this encounter: 72.3 kg (159 lb 6.3 oz).      ______________________________________________________________________    Interval History     Patient being transferred out of the ICU after lung transplantation. She is doing remarkably well and told me she was feeling quite good when I spoke to her. She is on room air right now. Pain is tolerable, breathing rapidly but comfortably, no LE edema.     Four point ROS completed and negative unless listed above     Data reviewed today: I reviewed all medications, new labs and imaging results over the last 24 hours. I personally reviewed the chest x-ray image(s) showing small left pleural effusion.    Physical Exam   Vital Signs: Temp: 98.2  F (36.8  C) Temp src: Oral BP: 119/57 Pulse: 96   Resp: (!) 33 SpO2: 98 % O2 Device: Nasal cannula Oxygen Delivery: 1 LPM  Weight: 159 lbs 6.28 oz    Constitutional: Sitting up in a chair, spouse at bedside, in no apparent distress.   HEENT: Eyes with pink conjunctivae, anicteric.  Oral mucosa moist without lesions.  Voice hoarse/squeaky  PULM: Diminished bases bilaterally.  No crackles, no rhonchi, no wheezes.  Chest tubes without air leak or tidaling.  CV: Normal S1 and S2.  Tachycardia.  No murmur, gallop, or rub.  No peripheral edema.   ABD: BS hypoactive, soft, nontender, nondistended.    MSK: Moves all extremities.  No apparent muscle wasting.   NEURO: Alert and oriented, conversant.   SKIN: Warm, dry.  No rash on " limited exam.   PSYCH: Mood stable.     Data   Recent Labs   Lab 02/23/22  1015 02/23/22  0611 02/23/22  0403 02/23/22  0346 02/22/22  1806 02/22/22  1746 02/22/22  0402 02/22/22  0355 02/21/22  1616 02/21/22  1530 02/21/22  1118 02/21/22  1045 02/21/22  0809 02/21/22  0808 02/21/22  0717 02/21/22  0714   WBC  --   --   --  18.5*  --  18.4*  --  11.5*   < >  --   --   --    < >  --   --   --    HGB  --   --   --  9.7*  --  10.2*  --  8.8*   < >  --    < >  --    < >  --   --   --    MCV  --   --   --  89  --  87  --  85   < >  --   --   --    < >  --   --   --    PLT  --   --   --  142*  --  135*  --  128*   < >  --   --   --    < >  --   --   --    INR  --   --   --   --   --   --   --  1.31*  --   --   --   --   --  1.58*  --  5.23*   NA  --   --   --  140  --   --   --  140  --   --   --  144  --   --   --   --    POTASSIUM  --   --   --  4.5  --   --   --  4.6  --  4.4  --  3.0*  --   --   --   --    CHLORIDE  --   --   --  110*  --   --   --  111*  --   --   --  111*  --   --   --   --    CO2  --   --   --  27  --   --   --  26  --   --   --  27  --   --   --   --    BUN  --   --   --  25  --   --   --  10  --   --   --  11  --   --   --   --    CR  --   --   --  0.68  --   --   --  0.53  --   --   --  0.41*  --   --   --   --    ANIONGAP  --   --   --  3  --   --   --  3  --   --   --  6  --   --   --   --    MINISTERIO  --   --   --  7.7*  --   --   --  7.2*  --   --   --  8.1*  --   --   --   --    * 129* 150* 168*   < >  --    < > 135*   < >  --    < > 92   < >  --    < >  --    ALBUMIN  --   --   --  2.5*  --   --   --  2.3*  --   --   --  2.7*  --   --   --   --    PROTTOTAL  --   --   --  5.3*  --   --   --  4.2*  --   --   --  4.3*  --   --   --   --    BILITOTAL  --   --   --  0.2  --   --   --  0.4  --   --   --  0.6  --   --   --   --    ALKPHOS  --   --   --  46  --   --   --  36*  --   --   --  33*  --   --   --   --    ALT  --   --   --  27  --   --   --  17  --   --   --  15  --   --   --   --     AST  --   --   --  36  --   --   --  40  --   --   --  32  --   --   --   --     < > = values in this interval not displayed.     Recent Results (from the past 24 hour(s))   XR Chest Port 1 View    Narrative    EXAM: XR CHEST 1 VW 2/23/2022      HISTORY: Post-Op Lung.    COMPARISON: X-ray. 2/22/2022    TECHNIQUE: Frontal view of the chest.    FINDINGS/    Impression    IMPRESSION: Postoperative sequelae of  lung transplant with intact  clamshell sternotomy wires and lines/tubes as below. Unchanged small  left pleural effusion. Low lung volumes with increased streaky  opacities, likely atelectasis. No convincing new airspace disease,  pneumothorax, or overt abnormality of the heart, bones, or upper  abdomen.    Lines/tubes/Devices as follows:   --Endotracheal tube, [place] catheter, and enteric tube have been  removed.   --Unchanged Mediastinal and pleural drains.   --Partially visualized feeding tube.   -- Unchanged Spinal analgesia catheters  -Right IJ venous sheath projecting over the brachiocephalic  confluence/proximal SVC.    I have personally reviewed the examination and initial interpretation  and I agree with the findings.    ADRIANA VELAZQUEZ MD         SYSTEM ID:  [id number]     Medications     dextrose 10 mL/hr at 02/21/22 1900     BETA BLOCKER NOT PRESCRIBED       disposable pump w/ anesthetic 7 mL/hr at 02/21/22 1238     sodium chloride 10 mL/hr at 02/22/22 0446       acetaminophen  975 mg Oral or Feeding Tube Q8H JUANA     acetylcysteine  2 mL Nebulization 4x Daily     acyclovir  400 mg Oral or NG Tube BID    Or     acyclovir  400 mg Oral or NG Tube BID     [START ON 2/25/2022] basiliximab (SIMULECT) infusion  20 mg Intravenous Once     [START ON 3/1/2022] calcium carbonate 600 mg-vitamin D 400 units  1 tablet Oral BID w/meals     cefTRIAXone  2 g Intravenous Q24H     dapsone  50 mg Oral Once per day on Mon Wed Fri     [Held by provider] dapsone  50 mg Oral or NG Tube Once per day on Mon Wed Fri      famotidine  10 mg Oral or Feeding Tube BID     gabapentin  100 mg Oral or Feeding Tube At Bedtime     heparin ANTICOAGULANT  5,000 Units Subcutaneous Q8H     insulin aspart  1-4 Units Subcutaneous Q4H     insulin aspart  1-6 Units Subcutaneous Q4H     levalbuterol  1.25 mg Nebulization 4x Daily     metoprolol tartrate  12.5 mg Oral or Feeding Tube BID     multivitamins w/minerals  15 mL Oral Daily     mycophenolate  1,500 mg Oral BID    Or     mycophenolate  1,500 mg Oral or NG Tube BID     nystatin  1,000,000 Units Swish & Swallow 4x Daily     [START ON 2/24/2022] pantoprazole  40 mg Oral or Feeding Tube Daily     polyethylene glycol  17 g Oral or Feeding Tube Daily     prednisoLONE  17.5 mg Oral or NG Tube BID     protein modular  1 packet Per Feeding Tube TID     senna-docusate  1 tablet Oral or Feeding Tube BID     sodium chloride (PF)  3 mL Intracatheter Q8H     tacrolimus  2 mg Sublingual BID IS

## 2022-02-24 ENCOUNTER — APPOINTMENT (OUTPATIENT)
Dept: GENERAL RADIOLOGY | Facility: CLINIC | Age: 67
DRG: 007 | End: 2022-02-24
Attending: SURGERY
Payer: MEDICARE

## 2022-02-24 ENCOUNTER — APPOINTMENT (OUTPATIENT)
Dept: GENERAL RADIOLOGY | Facility: CLINIC | Age: 67
DRG: 007 | End: 2022-02-24
Attending: PHYSICIAN ASSISTANT
Payer: MEDICARE

## 2022-02-24 ENCOUNTER — APPOINTMENT (OUTPATIENT)
Dept: PHYSICAL THERAPY | Facility: CLINIC | Age: 67
DRG: 007 | End: 2022-02-24
Attending: SURGERY
Payer: MEDICARE

## 2022-02-24 ENCOUNTER — APPOINTMENT (OUTPATIENT)
Dept: SPEECH THERAPY | Facility: CLINIC | Age: 67
DRG: 007 | End: 2022-02-24
Attending: SURGERY
Payer: MEDICARE

## 2022-02-24 LAB
ALBUMIN SERPL-MCNC: 2.3 G/DL (ref 3.4–5)
ALP SERPL-CCNC: 58 U/L (ref 40–150)
ALT SERPL W P-5'-P-CCNC: 24 U/L (ref 0–50)
AMMONIA PLAS-SCNC: 43 UMOL/L (ref 10–50)
ANION GAP SERPL CALCULATED.3IONS-SCNC: 4 MMOL/L (ref 3–14)
AST SERPL W P-5'-P-CCNC: 26 U/L (ref 0–45)
BACTERIA SPEC CULT: ABNORMAL
BACTERIA SPEC CULT: NORMAL
BILIRUB DIRECT SERPL-MCNC: <0.1 MG/DL (ref 0–0.2)
BILIRUB SERPL-MCNC: 0.2 MG/DL (ref 0.2–1.3)
BUN SERPL-MCNC: 28 MG/DL (ref 7–30)
CALCIUM SERPL-MCNC: 8.2 MG/DL (ref 8.5–10.1)
CHLORIDE BLD-SCNC: 107 MMOL/L (ref 94–109)
CO2 SERPL-SCNC: 26 MMOL/L (ref 20–32)
CREAT SERPL-MCNC: 0.53 MG/DL (ref 0.52–1.04)
ERYTHROCYTE [DISTWIDTH] IN BLOOD BY AUTOMATED COUNT: 14.1 % (ref 10–15)
GFR SERPL CREATININE-BSD FRML MDRD: >90 ML/MIN/1.73M2
GLUCOSE BLD-MCNC: 200 MG/DL (ref 70–99)
GLUCOSE BLDC GLUCOMTR-MCNC: 162 MG/DL (ref 70–99)
GLUCOSE BLDC GLUCOMTR-MCNC: 193 MG/DL (ref 70–99)
GLUCOSE BLDC GLUCOMTR-MCNC: 201 MG/DL (ref 70–99)
GLUCOSE BLDC GLUCOMTR-MCNC: 215 MG/DL (ref 70–99)
GLUCOSE BLDC GLUCOMTR-MCNC: 236 MG/DL (ref 70–99)
GLUCOSE BLDC GLUCOMTR-MCNC: 237 MG/DL (ref 70–99)
GLUCOSE BLDC GLUCOMTR-MCNC: 259 MG/DL (ref 70–99)
HCT VFR BLD AUTO: 27.9 % (ref 35–47)
HGB BLD-MCNC: 8.9 G/DL (ref 11.7–15.7)
LACTATE SERPL-SCNC: 1.2 MMOL/L (ref 0.7–2)
LACTATE SERPL-SCNC: 2.6 MMOL/L (ref 0.7–2)
MAGNESIUM SERPL-MCNC: 2.7 MG/DL (ref 1.6–2.3)
MCH RBC QN AUTO: 29.2 PG (ref 26.5–33)
MCHC RBC AUTO-ENTMCNC: 31.9 G/DL (ref 31.5–36.5)
MCV RBC AUTO: 92 FL (ref 78–100)
PHOSPHATE SERPL-MCNC: 2.1 MG/DL (ref 2.5–4.5)
PLATELET # BLD AUTO: 136 10E3/UL (ref 150–450)
POTASSIUM BLD-SCNC: 4.8 MMOL/L (ref 3.4–5.3)
PROT SERPL-MCNC: 5.2 G/DL (ref 6.8–8.8)
RADIOLOGIST FLAGS: ABNORMAL
RBC # BLD AUTO: 3.05 10E6/UL (ref 3.8–5.2)
SODIUM SERPL-SCNC: 137 MMOL/L (ref 133–144)
TACROLIMUS BLD-MCNC: 8.7 UG/L (ref 5–15)
TME LAST DOSE: NORMAL H
TME LAST DOSE: NORMAL H
WBC # BLD AUTO: 15.9 10E3/UL (ref 4–11)

## 2022-02-24 PROCEDURE — 83735 ASSAY OF MAGNESIUM: CPT | Performed by: SURGERY

## 2022-02-24 PROCEDURE — 83605 ASSAY OF LACTIC ACID: CPT | Performed by: STUDENT IN AN ORGANIZED HEALTH CARE EDUCATION/TRAINING PROGRAM

## 2022-02-24 PROCEDURE — 92526 ORAL FUNCTION THERAPY: CPT | Mod: GN

## 2022-02-24 PROCEDURE — 250N000009 HC RX 250

## 2022-02-24 PROCEDURE — 99233 SBSQ HOSP IP/OBS HIGH 50: CPT | Performed by: STUDENT IN AN ORGANIZED HEALTH CARE EDUCATION/TRAINING PROGRAM

## 2022-02-24 PROCEDURE — 94640 AIRWAY INHALATION TREATMENT: CPT

## 2022-02-24 PROCEDURE — 250N000012 HC RX MED GY IP 250 OP 636 PS 637: Performed by: SURGERY

## 2022-02-24 PROCEDURE — 250N000011 HC RX IP 250 OP 636: Performed by: STUDENT IN AN ORGANIZED HEALTH CARE EDUCATION/TRAINING PROGRAM

## 2022-02-24 PROCEDURE — 97530 THERAPEUTIC ACTIVITIES: CPT | Mod: GP | Performed by: PHYSICAL THERAPIST

## 2022-02-24 PROCEDURE — 94640 AIRWAY INHALATION TREATMENT: CPT | Mod: 76

## 2022-02-24 PROCEDURE — 250N000013 HC RX MED GY IP 250 OP 250 PS 637: Performed by: STUDENT IN AN ORGANIZED HEALTH CARE EDUCATION/TRAINING PROGRAM

## 2022-02-24 PROCEDURE — 271N000002 HC RX 271

## 2022-02-24 PROCEDURE — 250N000013 HC RX MED GY IP 250 OP 250 PS 637: Performed by: SURGERY

## 2022-02-24 PROCEDURE — 97116 GAIT TRAINING THERAPY: CPT | Mod: GP | Performed by: PHYSICAL THERAPIST

## 2022-02-24 PROCEDURE — 36415 COLL VENOUS BLD VENIPUNCTURE: CPT | Performed by: PHYSICIAN ASSISTANT

## 2022-02-24 PROCEDURE — 82140 ASSAY OF AMMONIA: CPT | Performed by: PHYSICIAN ASSISTANT

## 2022-02-24 PROCEDURE — 84100 ASSAY OF PHOSPHORUS: CPT | Performed by: SURGERY

## 2022-02-24 PROCEDURE — 258N000003 HC RX IP 258 OP 636: Performed by: INTERNAL MEDICINE

## 2022-02-24 PROCEDURE — 36415 COLL VENOUS BLD VENIPUNCTURE: CPT | Performed by: STUDENT IN AN ORGANIZED HEALTH CARE EDUCATION/TRAINING PROGRAM

## 2022-02-24 PROCEDURE — 80197 ASSAY OF TACROLIMUS: CPT | Performed by: PHYSICIAN ASSISTANT

## 2022-02-24 PROCEDURE — 80053 COMPREHEN METABOLIC PANEL: CPT | Performed by: PHYSICIAN ASSISTANT

## 2022-02-24 PROCEDURE — 250N000012 HC RX MED GY IP 250 OP 636 PS 637: Performed by: NURSE PRACTITIONER

## 2022-02-24 PROCEDURE — 99233 SBSQ HOSP IP/OBS HIGH 50: CPT | Mod: 24 | Performed by: PHYSICIAN ASSISTANT

## 2022-02-24 PROCEDURE — 71045 X-RAY EXAM CHEST 1 VIEW: CPT | Mod: 26 | Performed by: STUDENT IN AN ORGANIZED HEALTH CARE EDUCATION/TRAINING PROGRAM

## 2022-02-24 PROCEDURE — 250N000011 HC RX IP 250 OP 636: Performed by: PHYSICIAN ASSISTANT

## 2022-02-24 PROCEDURE — 94668 MNPJ CHEST WALL SBSQ: CPT

## 2022-02-24 PROCEDURE — 999N000157 HC STATISTIC RCP TIME EA 10 MIN

## 2022-02-24 PROCEDURE — 250N000011 HC RX IP 250 OP 636: Performed by: SURGERY

## 2022-02-24 PROCEDURE — 83605 ASSAY OF LACTIC ACID: CPT | Performed by: NURSE PRACTITIONER

## 2022-02-24 PROCEDURE — 250N000012 HC RX MED GY IP 250 OP 636 PS 637: Performed by: PHYSICIAN ASSISTANT

## 2022-02-24 PROCEDURE — 99233 SBSQ HOSP IP/OBS HIGH 50: CPT | Performed by: PHYSICIAN ASSISTANT

## 2022-02-24 PROCEDURE — 250N000013 HC RX MED GY IP 250 OP 250 PS 637: Performed by: PHYSICIAN ASSISTANT

## 2022-02-24 PROCEDURE — 250N000009 HC RX 250: Performed by: SURGERY

## 2022-02-24 PROCEDURE — 71045 X-RAY EXAM CHEST 1 VIEW: CPT | Mod: 77

## 2022-02-24 PROCEDURE — 36415 COLL VENOUS BLD VENIPUNCTURE: CPT | Performed by: NURSE PRACTITIONER

## 2022-02-24 PROCEDURE — 71045 X-RAY EXAM CHEST 1 VIEW: CPT | Mod: 26 | Performed by: RADIOLOGY

## 2022-02-24 PROCEDURE — 120N000003 HC R&B IMCU UMMC

## 2022-02-24 PROCEDURE — 85027 COMPLETE CBC AUTOMATED: CPT | Performed by: PHYSICIAN ASSISTANT

## 2022-02-24 PROCEDURE — 82248 BILIRUBIN DIRECT: CPT | Performed by: SURGERY

## 2022-02-24 PROCEDURE — 71045 X-RAY EXAM CHEST 1 VIEW: CPT

## 2022-02-24 RX ORDER — FUROSEMIDE 10 MG/ML
40 INJECTION INTRAMUSCULAR; INTRAVENOUS ONCE
Status: COMPLETED | OUTPATIENT
Start: 2022-02-24 | End: 2022-02-24

## 2022-02-24 RX ORDER — BUPIVACAINE HYDROCHLORIDE 2.5 MG/ML
10 INJECTION, SOLUTION EPIDURAL; INFILTRATION; INTRACAUDAL ONCE
Status: COMPLETED | OUTPATIENT
Start: 2022-02-24 | End: 2022-02-24

## 2022-02-24 RX ORDER — LABETALOL HYDROCHLORIDE 5 MG/ML
20 INJECTION, SOLUTION INTRAVENOUS EVERY 6 HOURS PRN
Status: DISCONTINUED | OUTPATIENT
Start: 2022-02-24 | End: 2022-02-26

## 2022-02-24 RX ORDER — FUROSEMIDE 10 MG/ML
20 INJECTION INTRAMUSCULAR; INTRAVENOUS ONCE
Status: COMPLETED | OUTPATIENT
Start: 2022-02-24 | End: 2022-02-24

## 2022-02-24 RX ORDER — ROSUVASTATIN CALCIUM 40 MG/1
40 TABLET, COATED ORAL DAILY
Status: DISCONTINUED | OUTPATIENT
Start: 2022-02-24 | End: 2022-03-12

## 2022-02-24 RX ADMIN — Medication 40 MG: at 09:13

## 2022-02-24 RX ADMIN — PREDNISOLONE 17.5 MG: 15 SOLUTION ORAL at 09:13

## 2022-02-24 RX ADMIN — ACETAMINOPHEN 975 MG: 325 TABLET, FILM COATED ORAL at 13:08

## 2022-02-24 RX ADMIN — HEPARIN SODIUM 5000 UNITS: 5000 INJECTION, SOLUTION INTRAVENOUS; SUBCUTANEOUS at 21:37

## 2022-02-24 RX ADMIN — FUROSEMIDE 40 MG: 10 INJECTION, SOLUTION INTRAVENOUS at 15:32

## 2022-02-24 RX ADMIN — FUROSEMIDE 20 MG: 10 INJECTION, SOLUTION INTRAVENOUS at 12:53

## 2022-02-24 RX ADMIN — MYCOPHENOLATE MOFETIL 1500 MG: 250 CAPSULE ORAL at 19:45

## 2022-02-24 RX ADMIN — NYSTATIN 1000000 UNITS: 100000 SUSPENSION ORAL at 11:57

## 2022-02-24 RX ADMIN — ACETAMINOPHEN 975 MG: 325 TABLET, FILM COATED ORAL at 21:36

## 2022-02-24 RX ADMIN — BUPIVACAINE HYDROCHLORIDE 25 MG: 2.5 INJECTION, SOLUTION EPIDURAL; INFILTRATION; INTRACAUDAL at 10:43

## 2022-02-24 RX ADMIN — PREDNISOLONE 17.5 MG: 15 SOLUTION ORAL at 19:44

## 2022-02-24 RX ADMIN — POLYETHYLENE GLYCOL 3350 17 G: 17 POWDER, FOR SOLUTION ORAL at 09:11

## 2022-02-24 RX ADMIN — LABETALOL HYDROCHLORIDE 20 MG: 5 INJECTION, SOLUTION INTRAVENOUS at 05:58

## 2022-02-24 RX ADMIN — SODIUM CHLORIDE, POTASSIUM CHLORIDE, SODIUM LACTATE AND CALCIUM CHLORIDE 250 ML: 600; 310; 30; 20 INJECTION, SOLUTION INTRAVENOUS at 02:59

## 2022-02-24 RX ADMIN — Medication 15 ML: at 09:13

## 2022-02-24 RX ADMIN — ACYCLOVIR 400 MG: 200 CAPSULE ORAL at 09:12

## 2022-02-24 RX ADMIN — TACROLIMUS 2 MG: 1 CAPSULE ORAL at 10:14

## 2022-02-24 RX ADMIN — ACETYLCYSTEINE 2 ML: 200 SOLUTION ORAL; RESPIRATORY (INHALATION) at 21:16

## 2022-02-24 RX ADMIN — Medication 1 PACKET: at 09:14

## 2022-02-24 RX ADMIN — TACROLIMUS 2 MG: 1 CAPSULE ORAL at 18:27

## 2022-02-24 RX ADMIN — ACETYLCYSTEINE 2 ML: 200 SOLUTION ORAL; RESPIRATORY (INHALATION) at 12:02

## 2022-02-24 RX ADMIN — POTASSIUM & SODIUM PHOSPHATES POWDER PACK 280-160-250 MG 1 PACKET: 280-160-250 PACK at 13:09

## 2022-02-24 RX ADMIN — GABAPENTIN 100 MG: 100 CAPSULE ORAL at 21:37

## 2022-02-24 RX ADMIN — LEVALBUTEROL HYDROCHLORIDE 1.25 MG: 1.25 SOLUTION RESPIRATORY (INHALATION) at 12:02

## 2022-02-24 RX ADMIN — HEPARIN SODIUM 5000 UNITS: 5000 INJECTION, SOLUTION INTRAVENOUS; SUBCUTANEOUS at 05:58

## 2022-02-24 RX ADMIN — Medication 1 PACKET: at 13:09

## 2022-02-24 RX ADMIN — FAMOTIDINE 10 MG: 10 TABLET, FILM COATED ORAL at 09:11

## 2022-02-24 RX ADMIN — POTASSIUM & SODIUM PHOSPHATES POWDER PACK 280-160-250 MG 1 PACKET: 280-160-250 PACK at 09:12

## 2022-02-24 RX ADMIN — ONDANSETRON 4 MG: 2 INJECTION INTRAMUSCULAR; INTRAVENOUS at 06:14

## 2022-02-24 RX ADMIN — LEVALBUTEROL HYDROCHLORIDE 1.25 MG: 1.25 SOLUTION RESPIRATORY (INHALATION) at 16:12

## 2022-02-24 RX ADMIN — Medication: at 20:10

## 2022-02-24 RX ADMIN — NYSTATIN 1000000 UNITS: 100000 SUSPENSION ORAL at 15:34

## 2022-02-24 RX ADMIN — POTASSIUM & SODIUM PHOSPHATES POWDER PACK 280-160-250 MG 1 PACKET: 280-160-250 PACK at 19:44

## 2022-02-24 RX ADMIN — LEVALBUTEROL HYDROCHLORIDE 1.25 MG: 1.25 SOLUTION RESPIRATORY (INHALATION) at 21:16

## 2022-02-24 RX ADMIN — FAMOTIDINE 10 MG: 10 TABLET, FILM COATED ORAL at 19:44

## 2022-02-24 RX ADMIN — Medication 12.5 MG: at 19:44

## 2022-02-24 RX ADMIN — NYSTATIN 1000000 UNITS: 100000 SUSPENSION ORAL at 09:14

## 2022-02-24 RX ADMIN — HYDRALAZINE HYDROCHLORIDE 10 MG: 20 INJECTION INTRAMUSCULAR; INTRAVENOUS at 04:49

## 2022-02-24 RX ADMIN — LABETALOL HYDROCHLORIDE 20 MG: 5 INJECTION, SOLUTION INTRAVENOUS at 11:49

## 2022-02-24 RX ADMIN — Medication 12.5 MG: at 09:11

## 2022-02-24 RX ADMIN — ACETYLCYSTEINE 2 ML: 200 SOLUTION ORAL; RESPIRATORY (INHALATION) at 16:12

## 2022-02-24 RX ADMIN — NYSTATIN 1000000 UNITS: 100000 SUSPENSION ORAL at 19:45

## 2022-02-24 RX ADMIN — CEFTRIAXONE SODIUM 2 G: 2 INJECTION, POWDER, FOR SOLUTION INTRAMUSCULAR; INTRAVENOUS at 10:15

## 2022-02-24 RX ADMIN — SENNOSIDES AND DOCUSATE SODIUM 1 TABLET: 8.6; 5 TABLET ORAL at 19:44

## 2022-02-24 RX ADMIN — SENNOSIDES AND DOCUSATE SODIUM 1 TABLET: 8.6; 5 TABLET ORAL at 09:11

## 2022-02-24 RX ADMIN — Medication 1 PACKET: at 19:45

## 2022-02-24 RX ADMIN — MYCOPHENOLATE MOFETIL 1500 MG: 250 CAPSULE ORAL at 09:08

## 2022-02-24 RX ADMIN — ACYCLOVIR 400 MG: 200 SUSPENSION ORAL at 19:44

## 2022-02-24 RX ADMIN — ROSUVASTATIN CALCIUM 40 MG: 10 TABLET, FILM COATED ORAL at 15:32

## 2022-02-24 ASSESSMENT — ACTIVITIES OF DAILY LIVING (ADL)
ADLS_ACUITY_SCORE: 11
ADLS_ACUITY_SCORE: 12
ADLS_ACUITY_SCORE: 11
ADLS_ACUITY_SCORE: 12
ADLS_ACUITY_SCORE: 11
ADLS_ACUITY_SCORE: 11
ADLS_ACUITY_SCORE: 12
ADLS_ACUITY_SCORE: 11
ADLS_ACUITY_SCORE: 12
ADLS_ACUITY_SCORE: 11
ADLS_ACUITY_SCORE: 11
ADLS_ACUITY_SCORE: 12
ADLS_ACUITY_SCORE: 11
ADLS_ACUITY_SCORE: 12
ADLS_ACUITY_SCORE: 12

## 2022-02-24 NOTE — PROGRESS NOTES
Pulmonary Medicine  Cystic Fibrosis - Lung Transplant Team  Progress Note  2022       Patient: Melissa Elder  MRN: [id number]  : 1955 (age 66 year old)  Transplant: 2022 (Lung), POD#3  Admission date: 2022    Assessment & Plan:     Melissa Elder is a 66 year old female with a PMH significant for severe COPD, HTN, mild non-obstructive CAD, paroxysmal afib, osteoporosis, GERD, and colonic polyps.  Pt. is now s/p BSLT on 22, surgery relatively uncomplicated.  The patient was extubated , intermittently on RA otherwise using 1-2L NC.     Today's recommendations:  - Continue chest physiotherapy QID with Aerobika and incentive spirometry q1h w/a  - Wean O2 to keep >92%  - DSA ordered   - CXR daily, 2 view ordered tomorrow, discussed left chest tube findings with CVTS who will evaluate  - Agree with diuresis today  - Defer ENT consult today, revisit tomorrow if vocal changes persist  - Await explant pathology  - Inspection bronch next week, timing TBD  - Basiliximab ordered tomorrow  - Tacrolimus level today likely nearly therapeutic (10h), defer dose adjustment (continue SL dosing until return of bowel function), daily levels continue  - Prednisone taper ordered 3/1  - Consider increasing dapsone to daily 3/2 if CBC remains stable  - Acyclovir for HSV ppx through POD #30  - CMV, EBV, and IgG 3/21 (not yet ordered)  - Follow pending cultures  - Continue ceftrixaone for 2 week course through 3/8     S/p bilateral sequential lung transplant for COPD:  Acute hypoxic/hypercapneic respiratory failure: Scant pleural-pleural adhesions and mild-moderate bilateral hilar lymphadenopathy per op note.  Pressor weaned off  and pt. extubated.  Able to be weaned to RA at rest during morning rounds, intermittently using 1-2L NC.   - Nebs: levalbuterol and Mucomyst QID  - Aggressive pulmonary toilet with chest physiotherapy QID as well as Aerobika and incentive spirometry q1h w/a  - Wean  supplemental O2 to keep >92%  - DSA at one week (ordered 2/28) then one month post-transplant, additionally per protocol  - Anesthesia to manage erector spinae catheters (placed 2/21)  - Chest tubes managed by surgical team, CXR today noted left basilar tube with sidehole projecting over subcutaneous tissue, CVTS to evaluate  - Daily CXR, today with findings as above and low lung volumes with bibasilar and perihilar atelectasis/edema, small bilateral pleural effusions, and small right pneumothorax (personally reviewed with Dr. Lala), adjust to 2 view 2/25 (ordered)  - Volume management per primary team, agree with diuresis  - Post-pyloric nasal FT (repositioned by radiology 2/22), TF per RD and primary team  - SLP following for diet advacement  - Consider ENT consult 2/25 if vocal changes post-extubation persist (starting to improve 2/24 per pt.)  - Await explant pathology  - Inspection bronch next week, timing TBD     Immunosuppression:  - Induction therapy with basiliximab (and high dose IV steroid) given intraoperatively, repeating basiliximab dose on POD #4 (ordered 2/25)  - Tacrolimus SL BID.  Goal level 8-12.  Continuing SL dosing until return of bowel function post-op, daily levels continue as below:  Date [place] Level Intervention   2/22 3.3 Dose increased from 1 mg to 2 mg BID   2/23 10.6 Near steady state, no dose change    2/24 8.7 (10.5h)  Continue current dose   2/25 ordered    - MMF 1500 mg BID  - Prednisolone 17.5 mg BID with taper per lung transplant protocol (due 3/1, ordered):  Date AM dose (mg) PM dose (mg)   2/22 17.5 17.5   3/1 15 15   3/8 15 12.5   3/15 12.5 12.5   3/29 12.5 10   4/12 10 10   5/10 10 7.5   6/7 7.5 7.5   7/5 7.5 5   8/2 5 5   8/30 5 2.5      Prophylaxis:   - Daponse for PJP ppx (started 2/23 at decreased MWF dose, G6PD 13.3 2/21), increase to daily after one week (3/2) if CBC remains stable  - Acyclovir for HSV ppx (newly positive 2/20)  - Nystatin for oral candidiasis ppx, 6  month course  - See below for serologies and viral ppx:    Donor Recipient Intervention   CMV status Negative Negative N/A, CMV monthly (3/21, not yet ordered)   EBV status Positive Positive EBV at one month (3/21, not yet ordered) then q3 months   HSV status N/A (Newly) Positive Acyclovir POD #1-30      Primary graft dysfunction (per ISHLT guidelines):  See chart below:    POD #0  (~0 hours) POD #1  (~24 hours) POD #2   (~48 hours) POD#3   (~72 hours)   Date 2/21 2/22 2/23 2/24   Time 0713 0843  0347 N/A    Intubated Y Y N N   PaO2 232 127 142     FiO2 50% 30% 27%     P/F Ratio 464 423 526     PGD Grade   (0=mild, 3=severe) 0 0 1     ECMO N N N N   Inhaled NO/Flolan N N N N   ISHLT PGD Scoring  Grade 0 - PaO2/FiO2 >300 and normal chest radiograph (absence of diffuse allograft infiltrates)  Grade 1 - PaO2/FiO2 >300 and diffuse allograft infiltrates on chest radiograph  Grade 2 - PaO2/FiO2 between 200 and 300  Grade 3 - PaO2/FiO2 <200 and/or on ECMO     ID: Prior history of infection/colonization with Haemophilus influenzae (2017).  IgG adequate (2/20).  MRSA nares negative 2/21.  Broad spectrum ABX empirically post-op with vanc and ceftaz (2/21-2/22), stopped after 24h per Dr. Lala to target known donor cultures on POD #1 (transitioned to cefazolin).  Now with new updates to cultures as below.  - IgG repeat at one month (3/21, not yet ordered)  - Donor ([facility]) respiratory culture with P-S MSSA (final)  - Donor cultures ([facility]) with Strep mitis, Actinomyces odontolyticus, MSSA x2, and Candida kruseii  - Recipient cultures with Actinomyces odonotolyticus  - Bronch cultures (2/22) with GPC and yeast  - ABX: IV ceftriaxone (2/23) to cover donor cultures as above, low threshold to also add vancomycin for clinical decline (GPC on bronch culture 2/22), plan to complete 2 week course through 3/8; s/p cefazolin 2/22-2/23, ceftazidime 2/21-2/22     PHS risk criteria donor: Additional labs required post-transplant (between 4-8  weeks post-op): Hepatitis B, Hepatitis C, and HIV by COLT ([id number], ordered POD #30), also plan to repeat hepatitis B surface antibody at that time (ordered) given discrepancy with recent result.     Other relevant problems managed by primary team:     CAD: Noted to have mild non-obstructive CAD without hemodynamically significant lesions on cardiac cath 3/27/19.  PTA ASA 81 mg and atorvastatin.   - Rosuvastatin (2/24, less interaction with immunosuppression)     Paroxysmal afib: PTA diltiazem, not on AC.  Post-op tachycardia, off pressor since 2/22.  Metoprolol started 2/23.     GERD: Not on PPI PTA.  Negative pH/manometry study 3/28/19, noted small hiatal hernia.   - PPI daily (2/21), famotidine BID (2/21) x10d as bridge     Anxiety: Per lung transplant committee review, noted high anxiety in anticipation of transplant.   - Monitor need for palliative care and/or health psychology consult post-op    We appreciate the excellent care provided by the [place] [id number] team.  Recommendations communicated via in person rounding and this note.  Will continue to follow along closely, please do not hesitate to call with any questions or concerns.    Patient seen and discussed with Dr. Lala.    Lola Mann PA-C  Inpatient [facility]  Pulmonary CF/Transplant     Subjective & Interval History:     RRT called for lactic acid of 2.6 overnight, received 250 ml LR bolus, lactic acid normal this morning.  Placed on 2L NC overnight, able to wean to RA during visit and maintained SpO2 >95%.  Endorses dyspnea and taking shallow, rapid breaths during visit.  Voice still soft although improving per pt.  Cough weak, brown sputum.  Received chest physiotherapy TID yesterday.  Incision pain managed.  Reports abdominal pain and nausea, + flatus although no BM yet post-op.  TF advancing toward goal, diet advanced per SLP recommendations.    Review of Systems:     C: + intermittent low grade temps, + increased weight, + decreased  "appetite  INTEGUMENTARY/SKIN: No rash or obvious new lesions  ENT/MOUTH: + dry throat, no sinus pain, no nasal congestion or drainage  RESP: See interval history  CV: No chest pain, no palpitations, no peripheral edema, no orthopnea  GI: No vomiting, no reflux symptoms  : + Ratliff  MUSCULOSKELETAL: See interval history  ENDOCRINE: + blood sugars intermittently elevated  NEURO: No headache, no numbness or tingling  PSYCHIATRIC: Mood stable    Physical Exam:     Vital signs:  Temp: (!) 95.9  F (35.5  C) Temp src: Axillary BP: 128/67 Pulse: 95   Resp: (!) 34 SpO2: 92 %   Oxygen Delivery: 1 LPM Height: 157.5 cm (5' 2\") Weight: 72.3 kg (159 lb 6.3 oz)  I/O:     Intake/Output Summary (Last 24 hours) at 2/24/2022 1508  Last data filed at 2/24/2022 1200  Gross per 24 hour   Intake 2210 ml   Output 1340 ml   Net 870 ml     Constitutional: Lying in bed, in no apparent distress.   HEENT: Eyes with pink conjunctivae, anicteric.  Oral mucosa moist without lesions.  Voice hoarse/squeaky.  Nasal FT.  PULM: Diminished air flow to bases bilaterally.  No crackles, no rhonchi, no wheezes.  Mildly labored breathing (with talking) on 1L NC --> RA.  Chest tubes without air leak.  CV: Normal S1 and S2.  Tachycardic.  No murmur, gallop, or rub.  1+ BLE edema.   ABD: NABS, soft, nontender, nondistended.    MSK: Moves all extremities.  No apparent muscle wasting.   NEURO: Alert, conversant.   SKIN: Warm, dry.  No rash on limited exam.   PSYCH: Mood stable, calm.     Lines, Drains, and Devices:  Peripheral IV 02/20/22 Posterior Lower forearm (Active)   Site Assessment [facility] 02/24/22 0754   Line Status Saline locked 02/24/22 0754   Phlebitis Scale 0-->no symptoms 02/24/22 0754   Infiltration Scale 0 02/24/22 0754   Number of days: 4       Peripheral IV 02/23/22 Left;Anterior Upper forearm (Active)   Site Assessment [facility] 02/24/22 0754   Line Status Saline locked 02/24/22 0754   Phlebitis Scale 0-->no symptoms 02/24/22 0754   Infiltration Scale 0 " 02/24/22 0754   Number of days: 1     Data:     LABS    CMP:   Recent Labs   Lab 02/24/22  1200 02/24/22  0923 02/24/22  0722 02/24/22  0454 02/24/22  0449 02/23/22  0403 02/23/22  0346 02/22/22  0402 02/22/22  0355 02/21/22 2016 02/21/22 2013 02/21/22  1616 02/21/22  1530 02/21/22  1325 02/21/22  1254 02/21/22  1118 02/21/22  1045   NA  --   --   --   --  137  --  140  --  140  --   --   --   --   --   --   --  144   POTASSIUM  --   --   --   --  4.8  --  4.5  --  4.6  --   --   --  4.4  --   --   --  3.0*   CHLORIDE  --   --   --   --  107  --  110*  --  111*  --   --   --   --   --   --   --  111*   CO2  --   --   --   --  26  --  27  --  26  --   --   --   --   --   --   --  27   ANIONGAP  --   --   --   --  4  --  3  --  3  --   --   --   --   --   --   --  6   * 201* 162* 193* 200*   < > 168*   < > 135*   < >  --    < >  --    < >  --    < > 92   BUN  --   --   --   --  28  --  25  --  10  --   --   --   --   --   --   --  11   CR  --   --   --   --  0.53  --  0.68  --  0.53  --   --   --   --   --   --   --  0.41*   GFRESTIMATED  --   --   --   --  >90  --  >90  --  >90  --   --   --   --   --   --   --  >90   MINISTERIO  --   --   --   --  8.2*  --  7.7*  --  7.2*  --   --   --   --   --   --   --  8.1*   MAG  --   --   --   --  2.7*  --  3.0*  --  1.7*  --   --   --   --   --  2.0  --   --    PHOS  --   --   --   --  2.1*  --  3.8  --  3.0  --  2.8  --   --   --   --   --  1.0*   PROTTOTAL  --   --   --   --  5.2*  --  5.3*  --  4.2*  --   --   --   --   --   --   --  4.3*   ALBUMIN  --   --   --   --  2.3*  --  2.5*  --  2.3*  --   --   --   --   --   --   --  2.7*   BILITOTAL  --   --   --   --  0.2  --  0.2  --  0.4  --   --   --   --   --   --   --  0.6   ALKPHOS  --   --   --   --  58  --  46  --  36*  --   --   --   --   --   --   --  33*   AST  --   --   --   --  26  --  36  --  40  --   --   --   --   --   --   --  32   ALT  --   --   --   --  24  --  27  --  17  --   --   --   --   --   --   --   15    < > = values in this interval not displayed.     CBC:   Recent Labs   Lab 02/24/22  0449 02/23/22  2054 02/23/22  0346 02/22/22  1746   WBC 15.9* 18.7* 18.5* 18.4*   RBC 3.05* 3.18* 3.34* 3.50*   HGB 8.9* 9.2* 9.7* 10.2*   HCT 27.9* 28.8* 29.6* 30.3*   MCV 92 91 89 87   MCH 29.2 28.9 29.0 29.1   MCHC 31.9 31.9 32.8 33.7   RDW 14.1 14.3 14.2 13.9   * 153 142* 135*       INR:   Recent Labs   Lab 02/22/22  0355 02/21/22  0808 02/21/22  0714 02/21/22  0530   INR 1.31* 1.58* 5.23* 1.96*       Glucose:   Recent Labs   Lab 02/24/22  1200 02/24/22  0923 02/24/22  0722 02/24/22  0454 02/24/22  0449 02/23/22  2312   * 201* 162* 193* 200* 148*       Blood Gas:   Recent Labs   Lab 02/23/22  0834 02/23/22  0347 02/22/22  1747 02/22/22  0843 02/22/22  0800   PHV 7.32  --  7.30*  --  7.45*   PCO2V 55*  --  42  --  38*   PO2V 46  --  43  --  40   HCO3V 28  --  21  --  26   ARIEL 1.2  --  -5.5  --  1.9   O2PER 30 2 30  30   < > 30    < > = values in this interval not displayed.       Culture Data No results for input(s): CULT in the last 168 hours.    Virology Data: No results found for: INFLUA, FLUAH1, FLUAH3, EO3621, IFLUB, RSVA, RSVB, PIV1, PIV2, PIV3, HMPV, HRVS, ADVBE, ADVC    Historical CMV results (last 3 of prior testing):  No results found for: CMVQNT  No results found for: CMVLOG    Urine Studies    Recent Labs   Lab Test 02/20/22  0248 03/25/19  1043   URINEPH 7.0 5.0   NITRITE Negative Negative   LEUKEST Negative Trace*   WBCU <1 1       Most Recent [place] Pulmonary Function Testing (FVC/FEV1 only)  FVC-Pre   Date Value Ref Range Status   12/20/2021 1.81 L    06/21/2021 1.46 L    12/09/2019 1.59 L    06/03/2019 1.68 L      FVC-%Pred-Pre   Date Value Ref Range Status   12/20/2021 65 %    06/21/2021 52 %    12/09/2019 56 %    06/03/2019 58 %      FEV1-Pre   Date Value Ref Range Status   12/20/2021 0.49 L    06/21/2021 0.50 L    12/09/2019 0.49 L    06/03/2019 0.47 L      FEV1-%Pred-Pre   Date Value Ref  Range Status   12/20/2021 22 %    06/21/2021 22 %    12/09/2019 21 %    06/03/2019 20 %        IMAGING    Recent Results (from the past 48 hour(s))   XR Chest Port 1 View    Narrative    EXAM: XR CHEST 1 VW 2/23/2022      HISTORY: Post-Op Lung.    COMPARISON: X-ray. 2/22/2022    TECHNIQUE: Frontal view of the chest.    FINDINGS/    Impression    IMPRESSION: Postoperative sequelae of  lung transplant with intact  clamshell sternotomy wires and lines/tubes as below. Unchanged small  left pleural effusion. Low lung volumes with increased streaky  opacities, likely atelectasis. No convincing new airspace disease,  pneumothorax, or overt abnormality of the heart, bones, or upper  abdomen.    Lines/tubes/Devices as follows:   --Endotracheal tube, Azle-Brenden catheter, and enteric tube have been  removed.   --Unchanged Mediastinal and pleural drains.   --Partially visualized feeding tube.   -- Unchanged Spinal analgesia catheters  -Right IJ venous sheath projecting over the brachiocephalic  confluence/proximal SVC.    I have personally reviewed the examination and initial interpretation  and I agree with the findings.    ADRIANA VELAZQUEZ MD         SYSTEM ID:  [id number]   XR Chest Port 1 View    Narrative    XR CHEST PORT 1 VIEW on 2/23/2022 5:56 PM.    INDICATION: Discontinuation of chest tubes.    COMPARISON: Radiograph same day    FINDINGS:   Portable AP semiupright radiograph of the chest. Clamshell sternotomy  wires are intact. Mediastinal surgical clips. Interval removal of the  bilateral vertically oriented chest tubes. Bibasilar chest tubes are  stable. Mediastinal drain. Analgesic catheters. Partially visualized  NG tube course into the left upper quadrant of the field-of-view.    Trachea is clear. Cardiac silhouette is stable. Pulmonary vasculature  is somewhat indistinct. Trace right apical pneumothorax. No  appreciable left pneumothorax. No large pleural effusion. Diffuse  interstitial and airspace opacities are  unchanged. Upper abdomen is  unremarkable.      Impression    IMPRESSION:     1. Interval removal of bilateral vertically oriented chest tubes.  Increased tiny right apical pneumothorax. No appreciable left  pneumothorax.  2. Unchanged diffuse interstitial and airspace opacities.    I have personally reviewed the examination and initial interpretation  and I agree with the findings.    DANIELA BERKOWITZ MD         SYSTEM ID:  [id number]   XR Chest Port 1 View    Narrative    EXAM: XR CHEST 1 VW 2/23/2022      HISTORY: tachypnea, dyspnea s/p Lung transplant. please comment on  volume status.    COMPARISON: Multiple radiographs from same day    TECHNIQUE: Frontal view of the chest.    FINDINGS: Postoperative sequelae of  lung transplant with intact  clamshell sternotomy wires. Pericardial drains. Partially visualized  feeding tube. Spinal analgesia catheter. Change small left pleural  effusion. Unchanged streaky basilar atelectasis. No convincing new  airspace disease, pneumothorax, or overt abnormality of the heart,  bones. Partially visualized gaseous distention.      Impression    Impression: Lung volume appears adequate and unchanged.    I have personally reviewed the examination and initial interpretation  and I agree with the findings.    KEN CHAVARRIA MD         SYSTEM ID:  [id number]   XR Chest Port 1 View   Result Value    Radiologist flags (Urgent)     Left basilar tube with sidehole projecting over the    Narrative    Exam: XR CHEST PORT 1 VIEW, 2/24/2022 8:57 AM    Comparison: 2/23/2019    History: Post-Op Lung    Findings:  AP view the chest. Status post bilateral lung transplant with intact  clam shell sternotomy wires. Right basilar chest tube. Left basilar  chest tube with sidehole not visualized Cardiomegaly. Enteric tube  traverses below the field of view.    Trachea is midline. Stable cardiomediastinal silhouette. Bilateral  perihilar and bibasilar opacities. Blunting of the right and  left  costophrenic angles.. Small right pneumothorax. No left pneumothorax.  No acute osseous abnormalities.      Impression    Impression:   1. Low lung volumes with bibasilar and perihilar atelectasis/edema and  small right and left pleural effusions.   2. Small right pneumothorax.  3. Stable left basilar chest tube with sidehole not visualized and  likely projecting over the subcutaneous tissues.    [Access Center: Left basilar tube with sidehole projecting over the  subcutaneous tissues.]    This report will be copied to the [facility] [place] to ensure a  provider acknowledges the finding. Access Center is available Monday  through Friday 8am-3:30 pm.     I have personally reviewed the examination and initial interpretation  and I agree with the findings.    VARGAS DUKE MD         SYSTEM ID:  [id number]

## 2022-02-24 NOTE — PROVIDER NOTIFICATION
2330: Paged Gold cross-coverage to update regarding code sepsis called at 2100 for lactic acid 2.3. RR 28-30 and WBC 18.7.    0225: Paged Gold cross-coverage to update regarding lactic acid recheck increasing to 2.6.

## 2022-02-24 NOTE — CODE/RAPID RESPONSE
"   02/23/22 2100   Call Information   Date of Call 02/23/22   Time of Call 2108   Name of person requesting the team Reginald MCDONOUGH   Title of person requesting team RN   RRT Arrival time 2111   Time RRT ended 2125   Reason for call   Type of RRT Adult   Primary reason for call Sepsis suspected   Sepsis Suspected Elevated Lactate level;Heart Rate > 100;RR > 20, SaO2 <90% OR increasing O2 need;WBC <4 or >12   Was patient transferred from the ED, ICU, or PACU within last 24 hours prior to RRT call? Yes   SBAR   Situation Lactic Acid 2.3   Background Per provier note: \"PMHx HTN, HLD, paroxysmal Afib, osteoporosis, GERD, severe COPD, previously requiring 2-3L NC O2 at rest.  Underwent b/l lung transplant with Dr. Robin on 02/21. Extubated 02//22.\" Transfer to  2/23   Notable History/Conditions COPD;End-Stage disease;Hypertension;Recent surgery;Transplant;Cardiac   Assessment Alert and able to make needs known. Lungs clear and diminshed in the bases. Tachypnea with increased work of breathing. Chest tubes patent. Ratliff patent with good output. Lower extremites with edema.   Interventions CXR;Labs   Patient Outcome   Patient Outcome Stabilized on unit   RRT Team   Date Attending Physician notified 02/23/22   Physician(s) MANUEL Gutierrez CNP   Lead RN Austin Perdomo   Post RRT Intervention Assessment   Post RRT Assessment Stable/Improved   Date Follow Up Done 02/24/22   Time Follow Up Done 0020   Comments Plan to place on O2 overnight while sleeping       "

## 2022-02-24 NOTE — PLAN OF CARE
"/68   Pulse 90   Temp 97.8  F (36.6  C) (Oral)   Resp (!) 32   Ht 1.575 m (5' 2\")   Wt 72.3 kg (159 lb 6.3 oz)   SpO2 97%   BMI 29.15 kg/m      Shift events: Code sepsis called in evening d/t lactic acid 2.3. Lactic 1.3 in AM. 250 mL LR bolus. BP elevated after bolus; PRN hydralazine and labetalol.     Admission diagnoses: S/p bilateral lung transplant    Neuro: A&Ox4  CV: SR/ST. Blood pressures elevated starting approx. 0400. PRN hydralazine ineffective. PRN labetalol effective.  Resp: Shallow breaths with abdominal breathing. RA when awake; 2L NC while sleeping.  GI: No BM since surgery. Endorses flatus. Nausea in AM; zofran effective.  : Evy. Good UOP.  Endo: Q4 BG monitoring with sliding scale.  Skin: Clamshell incision covered with minimal drainage visible. Bruising to extremities.  LDA: PIVx2. Chest tube x3. Ratliff.       Reginald De Paz RN on 2/24/2022 at 6:50 AM            "

## 2022-02-24 NOTE — PROGRESS NOTES
[facility]    Medicine Progress Note - Hospitalist Service, GOLD TEAM 10    Date of Admission:  2/20/2022    Assessment & Plan          Melissa Elder is a 66 year old female with PMHx HTN, HLD, paroxysmal Afib, osteoporosis, GERD, severe COPD, previously requiring 2-3L NC O2 at rest.  Underwent b/l lung transplant with Dr. Robin on 02/21. Got 1u pRBC post-op, no overt bleeding source, monitoring. Extubated 02//22 to O2 NC. She is now transferred onto the floor. Patient still has two chest tubes in but pericardial drain was removed 2/24 without issues. Continuing on antibiotics for cultures growing from donor and recipient lungs.     S/p bilateral sequential lung transplant for COPD:  Acute hypoxic/hypercapneic respiratory failure  Donor lung growing MSSA   Actinomyces in respiratory culture  Scant pleural-pleural adhesions and mild-moderate bilateral hilar lymphadenopathy per op note.  Pressor weaned off 2/22 and pt. extubated. Prior history of infection/colonization with Haemophilus influenzae (2017).  IgG adequate (2/20).  MRSA nares negative 2/21.  Broad spectrum ABX empirically post-op with vanc and ceftaz (2/21-2/22), stopped after 24h per Dr. Lala to target known donor cultures on POD #1 (transitioned to cefazolin).  Now with new updates to cultures as below. Donor cultures ([facility]) with Strep mitis, Actinomyces odontolyticus, and Kenyatta kruseii. Recipient cultures with Actinomyces odonotolyticus. She has now been weaned down to room air.  - Nebs: levalbuterol and Mucomyst QID  - Aggressive pulmonary toilet with chest physiotherapy QID as well as Aerobika and incentive spirometry q1h w/a  - Wean supplemental O2 to keep >92%  - Anesthesia to manage erector spinae catheters (placed 2/21)  - Chest tubes managed by surgical team  - Daily CXR  - Tube feeding should be advanced to goal now that pt. has post-pyloric nasal feeding tube (placed by radiology  yesterday)  - Await explant pathology  - Continue ceftriaxone, low threshold for vanco plan for two week course  - Immune suppression per daily transplant pulm note  - 20 mg IV lasix x 1 with minimal response so 40 mg IV given today    ID Prophylaxis  - Daponse for PJP ppx to start today at decreased MWF dose, increase to daily after one week if CBC remains stable  - Acyclovir for HSV ppx (newly positive 2/20)  - Nystatin for oral candidiasis ppx, 6 month course    Post op pain   - Erector spinae catheters placed 2/21 managed by anesthesia   - gabapentin 100 mg nightly  - tylenol 975 mg Q8H   - Dilaudid 0.2-0.4 mg Q2H PRN   - oxycodone 5-10 mg Q4H PRN   - robaxin 500 mg Q6H PRN     Paroxysmal Afib   She was on diltiazem prior to lung transplantation and had not been on anticoagulation. She has not been transitioned to metoprolol which will be continued.  - Metoprolol tartrate 12.5 mg BID   - no anticoagulation at this time     Stress induced hyperglycemia  Did not need insulin prior to admission.   - Started on LDISS now increased to MDISS  - sugar slowly rising will consider starting longer acting agent tomorrow    HTN   Hx of htn but was recently on pressors will continue to monitor     HLD  Mild CAD   Noted on transplant workup.   - started rosuvastatin 40 mg daily     Acute blood loss anemia  Acute blood loss thrombocytopenia  Secondary to surgery. Will continue to monitor   - Transfuse Hgb < 7, platelets < 50     Sternotomy  Surgical Incision  - Sternal precautions  - Postoperative incision management per protocol  - PT/OT/CR       Diet: Adult Formula Drip Feeding: Continuous Osmolite 1.5; Nasoduodenal tube; Goal Rate: 50; mL/hr; Medication - Feeding Tube Flush Frequency: At least 15-30 mL water before and after medication administration and with tube clogging; Amount to Send (Nut...  Soft & Bite Sized Diet (level 6) Thin Liquids (level 0)    DVT Prophylaxis: Pneumatic Compression Devices  [place] Catheter: Not  "present  Central Lines: None  Cardiac Monitoring: None  Code Status: Full Code      Disposition Plan   Expected Discharge: 03/04/2022   Anticipated discharge location: home;inpatient rehabilitation facility    Delays:            The patient's care was discussed with the Bedside Nurse, Patient and transplant pulm  Consultant.    Ross Farrar DO  Hospitalist Service, GOLD TEAM 10  [facility]  Securely message with the Vocera Web Console (learn more here)  Text page via [facility] [facility]TR Paging/Directory   Please see signed in provider for up to date coverage information      Clinically Significant Risk Factors Present on Admission             # Overweight: Estimated body mass index is 29.15 kg/m  as calculated from the following:    Height as of this encounter: 1.575 m (5' 2\").    Weight as of this encounter: 72.3 kg (159 lb 6.3 oz).      ______________________________________________________________________    Interval History     Had a rapid last night for tachypnea and lactic acid elevation which improved without intervention. She is doing well today from a pain stand point but feels as if her breathing is a little labored and fast. No chest pain, no change in cough. Does feel warm but non febrile.    Four point ROS completed and negative unless listed above     Data reviewed today: I reviewed all medications, new labs and imaging results over the last 24 hours.     Physical Exam   Vital Signs: Temp: 97.6  F (36.4  C) Temp src: Oral BP: 128/67 Pulse: 95   Resp: 30 SpO2: 92 %   Oxygen Delivery: 1 LPM  Weight: 159 lbs 6.28 oz    Constitutional: Laying in bed breathing rapidly but no distress  HEENT: Eyes with pink conjunctivae, anicteric.  Oral mucosa moist without lesions.  Voice hoarse/squeaky  PULM: Diminished bases bilaterally.  No crackles, no rhonchi, no wheezes.  Chest tubes without air leak or tidaling.  CV: Normal S1 and S2.  Tachycardia.  No murmur, gallop, or rub.  No " peripheral edema.   ABD: BS hypoactive, soft, nontender, nondistended.    MSK: Moves all extremities.  No apparent muscle wasting.   NEURO: Alert and oriented, conversant.   SKIN: Warm, dry.  No rash on limited exam.   PSYCH: Mood stable.     Data   Recent Labs   Lab 02/24/22  1200 02/24/22  0923 02/24/22  0722 02/24/22  0454 02/24/22  0449 02/23/22  2312 02/23/22  2054 02/23/22  0403 02/23/22  0346 02/22/22  0402 02/22/22  0355 02/21/22  0809 02/21/22  0808 02/21/22  0717 02/21/22  0714   WBC  --   --   --   --  15.9*  --  18.7*  --  18.5*   < > 11.5*   < >  --   --   --    HGB  --   --   --   --  8.9*  --  9.2*  --  9.7*   < > 8.8*   < >  --   --   --    MCV  --   --   --   --  92  --  91  --  89   < > 85   < >  --   --   --    PLT  --   --   --   --  136*  --  153  --  142*   < > 128*   < >  --   --   --    INR  --   --   --   --   --   --   --   --   --   --  1.31*  --  1.58*  --  5.23*   NA  --   --   --   --  137  --   --   --  140  --  140   < >  --   --   --    POTASSIUM  --   --   --   --  4.8  --   --   --  4.5  --  4.6   < >  --   --   --    CHLORIDE  --   --   --   --  107  --   --   --  110*  --  111*   < >  --   --   --    CO2  --   --   --   --  26  --   --   --  27  --  26   < >  --   --   --    BUN  --   --   --   --  28  --   --   --  25  --  10   < >  --   --   --    CR  --   --   --   --  0.53  --   --   --  0.68  --  0.53   < >  --   --   --    ANIONGAP  --   --   --   --  4  --   --   --  3  --  3   < >  --   --   --    MINISTERIO  --   --   --   --  8.2*  --   --   --  7.7*  --  7.2*   < >  --   --   --    * 201* 162*   < > 200*   < >  --    < > 168*   < > 135*   < >  --    < >  --    ALBUMIN  --   --   --   --  2.3*  --   --   --  2.5*  --  2.3*   < >  --   --   --    PROTTOTAL  --   --   --   --  5.2*  --   --   --  5.3*  --  4.2*   < >  --   --   --    BILITOTAL  --   --   --   --  0.2  --   --   --  0.2  --  0.4   < >  --   --   --    ALKPHOS  --   --   --   --  58  --   --   --  46  --   36*   < >  --   --   --    ALT  --   --   --   --  24  --   --   --  27  --  17   < >  --   --   --    AST  --   --   --   --  26  --   --   --  36  --  40   < >  --   --   --     < > = values in this interval not displayed.     Recent Results (from the past 24 hour(s))   XR Chest Port 1 View    Narrative    XR CHEST PORT 1 VIEW on 2/23/2022 5:56 PM.    INDICATION: Discontinuation of chest tubes.    COMPARISON: Radiograph same day    FINDINGS:   Portable AP semiupright radiograph of the chest. Clamshell sternotomy  wires are intact. Mediastinal surgical clips. Interval removal of the  bilateral vertically oriented chest tubes. Bibasilar chest tubes are  stable. Mediastinal drain. Analgesic catheters. Partially visualized  NG tube course into the left upper quadrant of the field-of-view.    Trachea is clear. Cardiac silhouette is stable. Pulmonary vasculature  is somewhat indistinct. Trace right apical pneumothorax. No  appreciable left pneumothorax. No large pleural effusion. Diffuse  interstitial and airspace opacities are unchanged. Upper abdomen is  unremarkable.      Impression    IMPRESSION:     1. Interval removal of bilateral vertically oriented chest tubes.  Increased tiny right apical pneumothorax. No appreciable left  pneumothorax.  2. Unchanged diffuse interstitial and airspace opacities.    I have personally reviewed the examination and initial interpretation  and I agree with the findings.    DANIELA BERKOWITZ MD         SYSTEM ID:  [id number]   XR Chest Port 1 View    Narrative    EXAM: XR CHEST 1 VW 2/23/2022      HISTORY: tachypnea, dyspnea s/p Lung transplant. please comment on  volume status.    COMPARISON: Multiple radiographs from same day    TECHNIQUE: Frontal view of the chest.    FINDINGS: Postoperative sequelae of  lung transplant with intact  clamshell sternotomy wires. Pericardial drains. Partially visualized  feeding tube. Spinal analgesia catheter. Change small left pleural  effusion.  Unchanged streaky basilar atelectasis. No convincing new  airspace disease, pneumothorax, or overt abnormality of the heart,  bones. Partially visualized gaseous distention.      Impression    Impression: Lung volume appears adequate and unchanged.    I have personally reviewed the examination and initial interpretation  and I agree with the findings.    KEN CHAVARRIA MD         SYSTEM ID:  [id number]   XR Chest Port 1 View   Result Value    Radiologist flags (Urgent)     Left basilar tube with sidehole projecting over the    Narrative    Exam: XR CHEST PORT 1 VIEW, 2/24/2022 8:57 AM    Comparison: 2/23/2019    History: Post-Op Lung    Findings:  AP view the chest. Status post bilateral lung transplant with intact  clam shell sternotomy wires. Right basilar chest tube. Left basilar  chest tube with sidehole not visualized Cardiomegaly. Enteric tube  traverses below the field of view.    Trachea is midline. Stable cardiomediastinal silhouette. Bilateral  perihilar and bibasilar opacities. Blunting of the right and left  costophrenic angles.. Small right pneumothorax. No left pneumothorax.  No acute osseous abnormalities.      Impression    Impression:   1. Low lung volumes with bibasilar and perihilar atelectasis/edema and  small right and left pleural effusions.   2. Small right pneumothorax.  3. Stable left basilar chest tube with sidehole not visualized and  likely projecting over the subcutaneous tissues.    [Access Center: Left basilar tube with sidehole projecting over the  subcutaneous tissues.]    This report will be copied to the [facility] to ensure a  provider acknowledges the finding. Access Center is available Monday  through Friday 8am-3:30 pm.     I have personally reviewed the examination and initial interpretation  and I agree with the findings.    VARGAS DUKE MD         SYSTEM ID:  [id number]     Medications     dextrose 10 mL/hr at 02/21/22 1900     BETA BLOCKER NOT PRESCRIBED        disposable pump w/ anesthetic 7 mL/hr at 02/24/22 0958     sodium chloride 10 mL/hr at 02/23/22 1947       acetaminophen  975 mg Oral or Feeding Tube Q8H JUANA     acetylcysteine  2 mL Nebulization 4x Daily     acyclovir  400 mg Oral or NG Tube BID    Or     acyclovir  400 mg Oral or NG Tube BID     [START ON 2/25/2022] basiliximab (SIMULECT) infusion  20 mg Intravenous Once     [START ON 3/1/2022] calcium carbonate 600 mg-vitamin D 400 units  1 tablet Oral BID w/meals     cefTRIAXone  2 g Intravenous Q24H     dapsone  50 mg Oral Once per day on Mon Wed Fri     [Held by provider] dapsone  50 mg Oral or NG Tube Once per day on Mon Wed Fri     famotidine  10 mg Oral or Feeding Tube BID     furosemide  40 mg Intravenous Once     gabapentin  100 mg Oral or Feeding Tube At Bedtime     heparin ANTICOAGULANT  5,000 Units Subcutaneous Q8H     insulin aspart  1-6 Units Subcutaneous Q4H     levalbuterol  1.25 mg Nebulization 4x Daily     metoprolol tartrate  12.5 mg Oral or Feeding Tube BID     multivitamins w/minerals  15 mL Oral Daily     mycophenolate  1,500 mg Oral BID    Or     mycophenolate  1,500 mg Oral or NG Tube BID     nystatin  1,000,000 Units Swish & Swallow 4x Daily     pantoprazole  40 mg Oral or Feeding Tube Daily     polyethylene glycol  17 g Oral or Feeding Tube Daily     potassium & sodium phosphates  1 packet Oral TID     prednisoLONE  17.5 mg Oral or NG Tube BID     [START ON 3/1/2022] predniSONE  15 mg Oral or Feeding Tube BID     protein modular  1 packet Per Feeding Tube TID     rosuvastatin  40 mg Oral Daily     senna-docusate  1 tablet Oral or Feeding Tube BID     sodium chloride (PF)  3 mL Intracatheter Q8H     tacrolimus  2 mg Sublingual BID IS

## 2022-02-24 NOTE — PROGRESS NOTES
Pain Service Progress Note  [facility]  Date: February 24, 2022      Patient Name: Melissa Elder  MRN: [id number]  Age: 66 year old  Sex: female      Assessment:  Melissa Elder is a 66 year old female with a PMH significant for severe COPD on chronic home O2, mild non-obstructive CAD, paroxysmal A-fib, osteoporosis on treatment     Procedure: bilateral lung transplant     Date of Surgery: 2/21/22     Date of Bilateral ES (erector spinae) T6-7 Catheter Placement: 2/21/22      Plan/Recommendations:  1.-Continuous infusion of Ropvicaine 0.2% at 7 mL/hr each catheter, total 14mL/hrPlan to maintain catheters, max of 7 days    Bolus administered at 1045    MEDICATION: PF bupivacaine  0.25%, total bolus 10mL, via 5ml each catheter.    PROCEDURE: Clinician bolus administered via left and right nerve block catheter, without complication, negative aspirate before and between each mL.  No symptoms of local anesthetic systemic toxicity (LAST). Remained with and assessed patient for 10 min post-injection. BP, P and MAP stable    POST-PROCEDURE: Bedside RN aware of need to continue BP, P and MAP monitoring Q 10 min for an additional 20 min. Contact RAPS (jobcode ID [id number]) if any of the following: patient experiencing any untoward effects, SBP< 90, P < 50 or > 120, MAP < 60     - patient can be evaluated to receive local anesthetic bolus Q 12 hr PRN pain not controlled with continuous infusion.  Bedside nurse must page RAPS to request bolus.    2. Anticoagulation   -heparin SC 5,000 units Q 8 hrs   -Please contact Inpatient Pain Service (pager [phone]) before ordering or making any medication changes     3. Multimodal Analgesia  Per primary team: PO oxycodone 5-10mg Q 4 hours PRN.    Pain Service will continue to follow.    Discussed with attending anesthesiologist      Overnight Events: No acute event.    Tubes/Drains: Yes: 2 chest tubes    Subjective: She states her pain is more at midline of chest.  "States pain is 4/10 and is tolerable.  She feels nerve block boluses are helpful.    Nausea: No  Vomiting: No  Pruritus: No  Symptoms of LAST: No    Pain Location:  Midline chest    Pain Intensity:    Pain at Rest: 4/10   Pain with Activity: 5/10    Satisfied with your level of pain control: Yes    Diet: Adult Formula Drip Feeding: Continuous Osmolite 1.5; Nasoduodenal tube; Goal Rate: 50; mL/hr; Medication - Feeding Tube Flush Frequency: At least 15-30 mL water before and after medication administration and with tube clogging; Amount to Send (Nut...  Soft & Bite Sized Diet (level 6) Thin Liquids (level 0)    Relevant Labs:  Recent Labs   Lab Test 02/24/22  0449 02/22/22  1746 02/22/22  0355 02/21/22  0808 02/21/22  0714   INR  --   --  1.31*   < > 5.23*   *   < > 128*   < >  --    PTT  --   --   --   --  106*   BUN 28   < > 10   < >  --     < > = values in this interval not displayed.       Physical Exam:  Vitals: /63 (BP Location: Right arm)   Pulse 94   Temp (!) 95.9  F (35.5  C) (Axillary)   Resp (!) 32   Ht 1.575 m (5' 2\")   Wt 72.3 kg (159 lb 6.3 oz)   SpO2 97%   BMI 29.15 kg/m      Physical Exam:   Orientation:  Alert, oriented, and in no acute distress: Yes  Sedation: No    Motor Examination:  5/5 Strength in lower extremities: Yes      Catheter Site:   Catheter entry site is clean/dry/intact: Yes    Tender: No      Relevant Medications:  Current Pain Medications:  Medications related to Pain Management (From now, onward)    Start     Dose/Rate Route Frequency Ordered Stop    02/24/22 0000  acetaminophen (TYLENOL) tablet 650 mg         650 mg Oral EVERY 4 HOURS PRN 02/21/22 0712      02/22/22 1400  acetaminophen (TYLENOL) tablet 975 mg         975 mg Oral or Feeding Tube EVERY 8 HOURS SCHEDULED 02/22/22 0840      02/22/22 0800  polyethylene glycol (MIRALAX) Packet 17 g         17 g Oral or Feeding Tube DAILY 02/21/22 0712      02/21/22 2200  gabapentin (NEURONTIN) capsule 100 mg         " "100 mg Oral or Feeding Tube AT BEDTIME 02/21/22 0712      02/21/22 1130  ropivacaine 0.2% (NAROPIN) 750 mL in ON-Q C-Bloc select flow ([id number] holds 600-750 mL) dual cath disposable pump         7 mL/hr  Irrigation CONTINUOUS 02/21/22 1106      02/21/22 0800  senna-docusate (SENOKOT-S/PERICOLACE) 8.6-50 MG per tablet 1 tablet         1 tablet Oral or Feeding Tube 2 TIMES DAILY 02/21/22 0712      02/21/22 0712  HYDROmorphone (DILAUDID) injection 0.2 mg        \"Or\" Linked Group Details    0.2 mg Intravenous EVERY 2 HOURS PRN 02/21/22 0712      02/21/22 0712  HYDROmorphone (DILAUDID) injection 0.4 mg        \"Or\" Linked Group Details    0.4 mg Intravenous EVERY 2 HOURS PRN 02/21/22 0712      02/21/22 0712  oxyCODONE (ROXICODONE) tablet 5 mg        \"Or\" Linked Group Details    5 mg Oral EVERY 4 HOURS PRN 02/21/22 0712      02/21/22 0712  oxyCODONE IR (ROXICODONE) tablet 10 mg        \"Or\" Linked Group Details    10 mg Oral EVERY 4 HOURS PRN 02/21/22 0712      02/21/22 0712  magnesium hydroxide (MILK OF MAGNESIA) suspension 30 mL         30 mL Oral DAILY PRN 02/21/22 0712      02/21/22 0712  bisacodyl (DULCOLAX) Suppository 10 mg         10 mg Rectal DAILY PRN 02/21/22 0712      02/21/22 0712  methocarbamol (ROBAXIN) tablet 500 mg         500 mg Oral EVERY 6 HOURS PRN 02/21/22 0712      02/21/22 0712  lidocaine 1 % 0.1-1 mL         0.1-1 mL Other EVERY 1 HOUR PRN 02/21/22 0712      02/21/22 0712  lidocaine (LMX4) cream          Topical EVERY 1 HOUR PRN 02/21/22 0712            Primary Service Contacted with Recommendations? Yes    Mis Tobar PA-C  2/24/2022    Time/Communication:  I personally spent 20  minutes with greater than 50% in consultation, education, counseliing and coordination of care.  Also discussed with RN.      Contact Info (24 hour job code pager is the last 4 digits) For in-house use only:   Job code ID: [place] [id number]   [facility] 0599  Peds [phone]  [place] phone: dial * * * [phone], enter jobcode ID, then " enter call-back number.    Text: Use [employer] on the Intranet <Paging/Directory> tab and enter Jobcode ID.   If no call back at any time, contact the hospital  and ask for Regional Anesthesia attending or backup

## 2022-02-24 NOTE — PROGRESS NOTES
Pain Service Progress Note  [facility]  Date: February 23, 2022      Patient Name: Melissa Elder  MRN: [id number]  Age: 66 year old  Sex: female      Assessment:  Melissa Elder is a 66 year old female with a PMH significant for severe COPD on chronic home O2, mild non-obstructive CAD, paroxysmal A-fib, osteoporosis on treatment    Procedure: bilateral lung transplant    Date of Surgery: 2/21/22    Date of Bilateral ES (erector spinae) T6-7 Catheter Placement: 2/21/22     Plan/Recommendations:  1. Regional Anesthesia/Analgesia  -Continuous Catheter Type/Site: Bilateral ES T6-7 catheters     1900 Cinician administered nerve block bolus. VSS.  PF BUPivacaine 0.25%, total bolus 10 mL, 5 mL via right and left catheters, administered without complication, negative aspirate before and during administration.  No symptoms of local anesthetic systemic toxicity (LAST). Remained with and assessed patient for 10 min post-injection. BP, P, and MAP stable.  Bedside RN aware of need to continue to monitoring and document BP, P, and MAP Q 10 min for an additional 20 min. Contact RAPS (jobcode ID [id number]) if any of the following: patient experiencing any untoward effects, SBP < 90, P < 50 or > 120, MAP < 60    - Continuous infusion of Ropvicaine 0.2% at 7 mL/hr each catheter, total 14mL/hr    - patient can be evaluated to receive local anesthetic bolus Q 12 hr PRN pain not controlled with continuous infusion.  Bedside RN must page RAPS to request bolus    Plan to maintain catheters while chest tubes in place, max of 7 days    2. Anticoagulation  Okay to continue heparin SC 5,000 units Q 8 hrs   -Please contact Inpatient Pain Service (pager [phone]) before ordering or making any medication changes     3. Multimodal Analgesia  - per primary service.  Since extubated yesterday, has received one dose of Dilaudid 0.4 mg IV, on scheduled Tylenol Q 8 hr and gabapentin at bedtime, also has PRN oxycodone and robaxin  "available if needed.      Pain Service will continue to follow.    Discussed with attending anesthesiologist      Overnight Events: None, extubated yesterday at 1335    Tubes/Drains: Yes  CT x 5    Subjective: Reports good pain control but exacerbated with movement. Didn't necessarily need bolus patient said but may keep her more comfy over night.       Diet: Adult Formula Drip Feeding: Continuous Osmolite 1.5; Nasoduodenal tube; Goal Rate: 50; mL/hr; Medication - Feeding Tube Flush Frequency: At least 15-30 mL water before and after medication administration and with tube clogging; Amount to Send (Nut...  Soft & Bite Sized Diet (level 6) Thin Liquids (level 0)    Relevant Labs:  Recent Labs   Lab Test 02/23/22  0346 02/22/22  1746 02/22/22  0355 02/21/22  0808 02/21/22  0714   INR  --   --  1.31*   < > 5.23*   *   < > 128*   < >  --    PTT  --   --   --   --  106*   BUN 25  --  10   < >  --     < > = values in this interval not displayed.       Physical Exam:  Vitals: /66   Pulse 108   Temp 36.4  C (97.5  F) (Oral)   Resp 30   Ht 1.575 m (5' 2\")   Wt 72.3 kg (159 lb 6.3 oz)   SpO2 95%   BMI 29.15 kg/m      Physical Exam:   Orientation:  Alert, oriented, and in no acute distress: Yes  Sedation: No    Motor Examination:  5/5 Strength in lower extremities: Yes    Catheter Site:   Catheter entry site is clean/dry/intact: Yes    Tender: No      Relevant Medications:  Current Pain Medications:  Medications related to Pain Management (From now, onward)    Start     Dose/Rate Route Frequency Ordered Stop    02/24/22 0000  acetaminophen (TYLENOL) tablet 650 mg         650 mg Oral EVERY 4 HOURS PRN 02/21/22 0712      02/22/22 1400  acetaminophen (TYLENOL) tablet 975 mg         975 mg Oral or Feeding Tube EVERY 8 HOURS SCHEDULED 02/22/22 0840      02/22/22 0800  polyethylene glycol (MIRALAX) Packet 17 g         17 g Oral or Feeding Tube DAILY 02/21/22 0712      02/21/22 2200  gabapentin (NEURONTIN) capsule " "100 mg         100 mg Oral or Feeding Tube AT BEDTIME 02/21/22 0712      02/21/22 1130  ropivacaine 0.2% (NAROPIN) 750 mL in ON-Q C-Bloc select flow ([id number] holds 600-750 mL) dual cath disposable pump         7 mL/hr  Irrigation CONTINUOUS 02/21/22 1106      02/21/22 0800  senna-docusate (SENOKOT-S/PERICOLACE) 8.6-50 MG per tablet 1 tablet         1 tablet Oral or Feeding Tube 2 TIMES DAILY 02/21/22 0712      02/21/22 0712  HYDROmorphone (DILAUDID) injection 0.2 mg        \"Or\" Linked Group Details    0.2 mg Intravenous EVERY 2 HOURS PRN 02/21/22 0712      02/21/22 0712  HYDROmorphone (DILAUDID) injection 0.4 mg        \"Or\" Linked Group Details    0.4 mg Intravenous EVERY 2 HOURS PRN 02/21/22 0712      02/21/22 0712  oxyCODONE (ROXICODONE) tablet 5 mg        \"Or\" Linked Group Details    5 mg Oral EVERY 4 HOURS PRN 02/21/22 0712      02/21/22 0712  oxyCODONE IR (ROXICODONE) tablet 10 mg        \"Or\" Linked Group Details    10 mg Oral EVERY 4 HOURS PRN 02/21/22 0712      02/21/22 0712  magnesium hydroxide (MILK OF MAGNESIA) suspension 30 mL         30 mL Oral DAILY PRN 02/21/22 0712      02/21/22 0712  bisacodyl (DULCOLAX) Suppository 10 mg         10 mg Rectal DAILY PRN 02/21/22 0712      02/21/22 0712  methocarbamol (ROBAXIN) tablet 500 mg         500 mg Oral EVERY 6 HOURS PRN 02/21/22 0712      02/21/22 0712  lidocaine 1 % 0.1-1 mL         0.1-1 mL Other EVERY 1 HOUR PRN 02/21/22 0712      02/21/22 0712  lidocaine (LMX4) cream          Topical EVERY 1 HOUR PRN 02/21/22 0712            Primary Service Contacted with Recommendations? No    Lj Bradshaw MD, CA-2  2/23/2022    Time/Communication:  I personally spent 20 minutes with greater than 50% in consultation, education, medication administration, counseliing and coordination of care.  Also discussed with RN      Contact Info (24 hour job code pager is the last 4 digits) For in-house use only:   Job code ID: [id number]   [facility] 8127  Peds [phone]  [place] phone: " dial * * * [phone], enter jobcode ID, then enter call-back number.    Text: Use [employer] on the Intranet <Paging/Directory> tab and enter Jobcode ID.   If no call back at any time, contact the hospital  and ask for Regional Anesthesia attending or backup

## 2022-02-24 NOTE — PROGRESS NOTES
CVTS Progress note 2/24/22:    Melissa Elder is a 66 year old female with PMHx HTN, HLD, paroxysmal Afib, osteoporosis, GERD, severe COPD, previously requiring 2-3L NC O2 at rest. She underwent b/l lung transplant with Dr. Robin on 02/21. Got 1u pRBC post-op, no overt bleeding source, monitoring. Extubated 02/22 to O2 NC.    CVTS is following in consideration of the clamshell incision as well as chest tube management.    A/P: # S/p Bilateral lung transplant  # Clamshell incision  - Continue to adhere to sternal precautions  - Postoperative incision management, continue open to air. Exofin applied  - Clamshell incision appears clean, dry. Incision appears without erythema, or drainage. Wound edges are well approximated.  - Encourage activity/OOB, IS/pulmonary toilet.  Maintain strict sleep hygiene and delirium precautions.    # Chest tube management  - Continue to suction.  - Left Pleural output: 380mL no air leak, serosanguinous output  - Right Pleural output: 360mL no air leak, serosanguinous output  - Pericardial output: Removed 2/24/22 without immediate complication  - Continue diuresis and nutrition per primary team  - Other cares per primary team    S: Patient fatigued and feeling hot, but not febrile. Feels slightly SOB, but no chest pain and no increased incisional pain. Conversational while  Lying in bed    O: GENERAL: Alert and oriented x 3. NAD. Feels good today.  HEENT: Anicteric sclera. Mucous membranes moist. NC. AT.   CV: Tachycardic. S1, S2. No murmurs appreciated.   RESPIRATORY: Resp non-labored on room air.  Chest tubes in place.  GI: Abdomen soft and non distended with normoactive bowel sounds present in all quadrants. No tenderness, rebound, guarding. No lesions. Small BM today. Nauseated.   NEUROLOGICAL: No focal deficits. Moves all extremities.  CN 2-12 grossly intact.  EXTREMITIES: No peripheral edema. Intact bilateral pedal pulses.   SKIN: No jaundice. No rashes.  Clamshell  incision: Clean, dry, without erythema, and wound edges well approximated.   Chest tube sites: clean, dry, without extra-tubular drainage    Discussed with Surgeon, Dr. Robin via written and verbal commnication.     Venessa Amaya PA-c  Pager: [phone]  11:48 AM February 24, 2022

## 2022-02-24 NOTE — PLAN OF CARE
Neuro: A&Ox4. Speech is slow, mildly hoarse  Cardiac: SR. VSS. Pericardial chest tube removed today by MD.  Respiratory: Sating >92% on 1L NC. LS clear/diminished. Chest tube x 2, increased output after activity. IS and acapella encouraged.  GI/: Ratliff removed at 1030am, met DTV with adequate urine output. IV lasix x 2 this shift per MD orders.  Loose/watery BM x 3.  Diet/appetite: Tolerating soft, bite sized diet with thin liquids. Fair appetite. Tube feed at goal rate of 50cc/hr with FWF 30cc Q4.   Activity:  Assist of 1-2, up to chair and ambulated in halls with therapy  Pain: At acceptable level on current regimen.   Skin: No new deficits noted. Per MD, clamshell incision is ANGELA as long as there is no drainage. Chest tube dressing changed by MD today.  LDA's: LUE PIV x 2 - SL    Plan: Continue with POC. Notify primary team with changes.

## 2022-02-24 NOTE — CODE/RAPID RESPONSE
"Rapid Response Team Note    Assessment   In assessment a rapid response was called on Melissa Elder due to lactic acidosis. This presentation is likely due toRecent lung transplant surgery for COPD, now 2 days post operative. In addition, patient donor with MSSA; patient on ceftriaxone. Patient I/O net positive 2.5L. Sepsis code called for lactic acidosis 2.4. LA previously wnl.    Plan   -  CXR  -  Based on CXR results, consider giving fluid [not currently ordered]  -  Repeat lactic acid in 2 hr    -  The Internal Medicine primary team was able to be reached and they are in agreement with the above plan.  -  Disposition: The patient will remain on the current unit. We will continue to monitor this patient closely.  -  Reassessment and plan follow-up will be performed by the primary team    MANUEL Johns [facility] RRT AMCOM Job Code Contact #[phone]    Hospital Course   Brief Summary of events leading to rapid response:   Tachypnea, tachycardia led to sepsis protocol trigger. Tachypneic to 30 with accessory muscle use. Feeling \"tired\".    Admission Diagnosis:   Lung transplant candidate [Z76.82]  S/P lung transplant (H) [Z94.2]     Physical Exam   Temp: 99.3  F (37.4  C) Temp  Min: 97.5  F (36.4  C)  Max: 99.3  F (37.4  C)  Resp: 30 Resp  Min: 14  Max: 41  SpO2: 95 % SpO2  Min: 95 %  Max: 100 %  Pulse: 98 Pulse  Min: 91  Max: 116    No data recorded  BP: 122/49 Systolic (24hrs), Av , Min:118 , Max:132   Diastolic (24hrs), Av, Min:49, Max:76     I/Os: I/O last 3 completed shifts:  In: 1623 [I.V.:348; NG/GT:550; IV Piggyback:500]  Out: 1257 [Urine:447; Chest Tube:810]     Physical Exam  Eyes:      Pupils: Pupils are equal, round, and reactive to light.   Cardiovascular:      Rate and Rhythm: Regular rhythm. Tachycardia present.      Heart sounds: No murmur heard.  Pulmonary:      Breath sounds: No wheezing.      Comments: Coarse lung sounds. Tachypnea, accessory muscle use  Abdominal:      " "General: There is no distension.      Palpations: Abdomen is soft.   Musculoskeletal:         General: Swelling present.   Neurological:      General: No focal deficit present.      Mental Status: She is alert.   Psychiatric:         Mood and Affect: Mood normal.         Behavior: Behavior normal.       Significant Results and Procedures   Lactic Acid:   Recent Labs   Lab Test 02/23/22 2054 02/23/22 0347 02/22/22  1304   LACT 2.3* 0.9 1.1     CBC:   Recent Labs   Lab Test 02/23/22 2054 02/23/22  0346 02/22/22  1746   WBC 18.7* 18.5* 18.4*   HGB 9.2* 9.7* 10.2*   HCT 28.8* 29.6* 30.3*    142* 135*      Sepsis Evaluation   The patient is known to have an infection.  Melissa Elder meets SIRS criteria AND has a lactate >2 or other evidence of acute organ damage.  These vital signs, lab and physical exam findings constitute a diagnosis of SEVERE SEPSIS.    Sepsis Time-Zero (time severe sepsis diagnosis confirmed):   02/23/22 as this was the time when Lactate resulted, and the level was > 2.0     Anti-infectives (From now, onward)    Start     Dose/Rate Route Frequency Ordered Stop    02/23/22 1330  dapsone (ACZONE) tablet 50 mg         50 mg Oral Once per day on Mon Wed Fri 02/23/22 1328      02/23/22 1000  cefTRIAXone (ROCEPHIN) 2 g vial to attach to  ml bag for ADULTS or NS 50 ml bag for PEDS         2 g  over 30 Minutes Intravenous EVERY 24 HOURS 02/23/22 0951 03/08/22 0959    02/22/22 0800  acyclovir (ZOVIRAX) capsule 400 mg        Note to Pharmacy: Please change start date of acyclovir to POD #2 for HSV antibody negative patients.   \"Or\" Linked Group Details    400 mg Oral or NG Tube 2 TIMES DAILY 02/21/22 0919 03/24/22 0759    02/22/22 0800  acyclovir (ZOVIRAX) suspension 400 mg        Note to Pharmacy: Please change start date of acyclovir to POD #2 for HSV antibody negative patients.   \"Or\" Linked Group Details    400 mg Oral or NG Tube 2 TIMES DAILY 02/21/22 0919 03/24/22 0759    02/21/22 0900  " [Held by provider]  dapsone suspension 50 mg        (Held by provider since Mon 2/21/2022 at 0747 by Lola Mann PA-C.Hold Reason: Other.)    50 mg Oral or NG Tube Once per day on Mon Wed Fri 02/21/22 0712          Current antibiotic coverage is appropriate for source of infection.    3 Hour Severe Sepsis Bundle Completion:  1. Initial Lactic Acid result shown above. Repeat lactic acid ordered for 2 hours from now.   2. Blood Cultures before Antibiotics: No, antibiotics were started prior to BCx collection b/c waiting for BCx to be collected would have been detrimental to the patient  3. Broad Spectrum Antibiotics Administered: yes  4. Fluids: Fluid bolus not indicated due to: c/f volume overload

## 2022-02-24 NOTE — PROGRESS NOTES
Patient transferred to  with previous RN receiving. 2 RN skin assessment performed by Reginald MCDONOUGH and Corie LUNA

## 2022-02-25 ENCOUNTER — APPOINTMENT (OUTPATIENT)
Dept: GENERAL RADIOLOGY | Facility: CLINIC | Age: 67
DRG: 007 | End: 2022-02-25
Attending: PHYSICIAN ASSISTANT
Payer: MEDICARE

## 2022-02-25 ENCOUNTER — APPOINTMENT (OUTPATIENT)
Dept: CT IMAGING | Facility: CLINIC | Age: 67
DRG: 007 | End: 2022-02-25
Attending: SURGERY
Payer: MEDICARE

## 2022-02-25 ENCOUNTER — APPOINTMENT (OUTPATIENT)
Dept: PHYSICAL THERAPY | Facility: CLINIC | Age: 67
DRG: 007 | End: 2022-02-25
Attending: SURGERY
Payer: MEDICARE

## 2022-02-25 LAB
ALBUMIN SERPL-MCNC: 2 G/DL (ref 3.4–5)
ALP SERPL-CCNC: 80 U/L (ref 40–150)
ALT SERPL W P-5'-P-CCNC: 31 U/L (ref 0–50)
ANION GAP SERPL CALCULATED.3IONS-SCNC: 4 MMOL/L (ref 3–14)
AST SERPL W P-5'-P-CCNC: 28 U/L (ref 0–45)
BILIRUB SERPL-MCNC: 0.2 MG/DL (ref 0.2–1.3)
BRONCHOSCOPY: NORMAL
BUN SERPL-MCNC: 26 MG/DL (ref 7–30)
CALCIUM SERPL-MCNC: 7.7 MG/DL (ref 8.5–10.1)
CHLORIDE BLD-SCNC: 105 MMOL/L (ref 94–109)
CO2 SERPL-SCNC: 31 MMOL/L (ref 20–32)
CREAT SERPL-MCNC: 0.46 MG/DL (ref 0.52–1.04)
ERYTHROCYTE [DISTWIDTH] IN BLOOD BY AUTOMATED COUNT: 13.9 % (ref 10–15)
GFR SERPL CREATININE-BSD FRML MDRD: >90 ML/MIN/1.73M2
GLUCOSE BLD-MCNC: 200 MG/DL (ref 70–99)
GLUCOSE BLDC GLUCOMTR-MCNC: 153 MG/DL (ref 70–99)
GLUCOSE BLDC GLUCOMTR-MCNC: 192 MG/DL (ref 70–99)
GLUCOSE BLDC GLUCOMTR-MCNC: 192 MG/DL (ref 70–99)
GLUCOSE BLDC GLUCOMTR-MCNC: 203 MG/DL (ref 70–99)
GLUCOSE BLDC GLUCOMTR-MCNC: 214 MG/DL (ref 70–99)
HCT VFR BLD AUTO: 27.7 % (ref 35–47)
HGB BLD-MCNC: 8.7 G/DL (ref 11.7–15.7)
HOLD SPECIMEN: NORMAL
LACTATE SERPL-SCNC: 1.3 MMOL/L (ref 0.7–2)
MAGNESIUM SERPL-MCNC: 2.4 MG/DL (ref 1.6–2.3)
MCH RBC QN AUTO: 29.5 PG (ref 26.5–33)
MCHC RBC AUTO-ENTMCNC: 31.4 G/DL (ref 31.5–36.5)
MCV RBC AUTO: 94 FL (ref 78–100)
PHOSPHATE SERPL-MCNC: 2.1 MG/DL (ref 2.5–4.5)
PLATELET # BLD AUTO: 160 10E3/UL (ref 150–450)
POTASSIUM BLD-SCNC: 4.1 MMOL/L (ref 3.4–5.3)
PROT SERPL-MCNC: 5 G/DL (ref 6.8–8.8)
RADIOLOGIST FLAGS: ABNORMAL
RBC # BLD AUTO: 2.95 10E6/UL (ref 3.8–5.2)
SODIUM SERPL-SCNC: 140 MMOL/L (ref 133–144)
TACROLIMUS BLD-MCNC: 8.5 UG/L (ref 5–15)
TME LAST DOSE: NORMAL H
TME LAST DOSE: NORMAL H
WBC # BLD AUTO: 12.2 10E3/UL (ref 4–11)

## 2022-02-25 PROCEDURE — 250N000009 HC RX 250: Performed by: SURGERY

## 2022-02-25 PROCEDURE — 250N000013 HC RX MED GY IP 250 OP 250 PS 637: Performed by: STUDENT IN AN ORGANIZED HEALTH CARE EDUCATION/TRAINING PROGRAM

## 2022-02-25 PROCEDURE — 36415 COLL VENOUS BLD VENIPUNCTURE: CPT | Performed by: PHYSICIAN ASSISTANT

## 2022-02-25 PROCEDURE — 999N000128 HC STATISTIC PERIPHERAL IV START W/O US GUIDANCE

## 2022-02-25 PROCEDURE — 250N000011 HC RX IP 250 OP 636: Performed by: PHYSICIAN ASSISTANT

## 2022-02-25 PROCEDURE — 999N000157 HC STATISTIC RCP TIME EA 10 MIN

## 2022-02-25 PROCEDURE — 97530 THERAPEUTIC ACTIVITIES: CPT | Mod: GP | Performed by: PHYSICAL THERAPIST

## 2022-02-25 PROCEDURE — 71250 CT THORAX DX C-: CPT | Mod: 26 | Performed by: RADIOLOGY

## 2022-02-25 PROCEDURE — 250N000012 HC RX MED GY IP 250 OP 636 PS 637: Performed by: STUDENT IN AN ORGANIZED HEALTH CARE EDUCATION/TRAINING PROGRAM

## 2022-02-25 PROCEDURE — 84100 ASSAY OF PHOSPHORUS: CPT | Performed by: SURGERY

## 2022-02-25 PROCEDURE — 250N000013 HC RX MED GY IP 250 OP 250 PS 637: Performed by: NURSE PRACTITIONER

## 2022-02-25 PROCEDURE — 71045 X-RAY EXAM CHEST 1 VIEW: CPT | Mod: 26 | Performed by: RADIOLOGY

## 2022-02-25 PROCEDURE — 250N000012 HC RX MED GY IP 250 OP 636 PS 637: Performed by: SURGERY

## 2022-02-25 PROCEDURE — 80053 COMPREHEN METABOLIC PANEL: CPT | Performed by: PHYSICIAN ASSISTANT

## 2022-02-25 PROCEDURE — 94668 MNPJ CHEST WALL SBSQ: CPT

## 2022-02-25 PROCEDURE — 71250 CT THORAX DX C-: CPT

## 2022-02-25 PROCEDURE — 250N000013 HC RX MED GY IP 250 OP 250 PS 637: Performed by: SURGERY

## 2022-02-25 PROCEDURE — 250N000011 HC RX IP 250 OP 636: Performed by: STUDENT IN AN ORGANIZED HEALTH CARE EDUCATION/TRAINING PROGRAM

## 2022-02-25 PROCEDURE — 120N000005 HC R&B MS OVERFLOW UMMC

## 2022-02-25 PROCEDURE — 71046 X-RAY EXAM CHEST 2 VIEWS: CPT

## 2022-02-25 PROCEDURE — 71046 X-RAY EXAM CHEST 2 VIEWS: CPT | Mod: 26 | Performed by: RADIOLOGY

## 2022-02-25 PROCEDURE — 85027 COMPLETE CBC AUTOMATED: CPT | Performed by: STUDENT IN AN ORGANIZED HEALTH CARE EDUCATION/TRAINING PROGRAM

## 2022-02-25 PROCEDURE — 250N000012 HC RX MED GY IP 250 OP 636 PS 637: Performed by: PHYSICIAN ASSISTANT

## 2022-02-25 PROCEDURE — 258N000003 HC RX IP 258 OP 636: Performed by: SURGERY

## 2022-02-25 PROCEDURE — 83735 ASSAY OF MAGNESIUM: CPT | Performed by: SURGERY

## 2022-02-25 PROCEDURE — 99233 SBSQ HOSP IP/OBS HIGH 50: CPT | Mod: 24 | Performed by: INTERNAL MEDICINE

## 2022-02-25 PROCEDURE — 999N000065 XR CHEST PORT 1 VIEW

## 2022-02-25 PROCEDURE — 250N000013 HC RX MED GY IP 250 OP 250 PS 637: Performed by: PHYSICIAN ASSISTANT

## 2022-02-25 PROCEDURE — 94640 AIRWAY INHALATION TREATMENT: CPT

## 2022-02-25 PROCEDURE — 94640 AIRWAY INHALATION TREATMENT: CPT | Mod: 76

## 2022-02-25 PROCEDURE — 97116 GAIT TRAINING THERAPY: CPT | Mod: GP | Performed by: PHYSICAL THERAPIST

## 2022-02-25 PROCEDURE — 250N000011 HC RX IP 250 OP 636: Performed by: SURGERY

## 2022-02-25 PROCEDURE — 80197 ASSAY OF TACROLIMUS: CPT | Performed by: PHYSICIAN ASSISTANT

## 2022-02-25 PROCEDURE — 99233 SBSQ HOSP IP/OBS HIGH 50: CPT | Performed by: STUDENT IN AN ORGANIZED HEALTH CARE EDUCATION/TRAINING PROGRAM

## 2022-02-25 PROCEDURE — 250N000012 HC RX MED GY IP 250 OP 636 PS 637: Performed by: NURSE PRACTITIONER

## 2022-02-25 RX ORDER — FUROSEMIDE 10 MG/ML
40 INJECTION INTRAMUSCULAR; INTRAVENOUS ONCE
Status: COMPLETED | OUTPATIENT
Start: 2022-02-25 | End: 2022-02-25

## 2022-02-25 RX ORDER — TACROLIMUS 1 MG/1
4 CAPSULE ORAL
Status: DISCONTINUED | OUTPATIENT
Start: 2022-02-25 | End: 2022-02-27

## 2022-02-25 RX ORDER — MYCOPHENOLATE MOFETIL 250 MG/1
1000 CAPSULE ORAL 2 TIMES DAILY
Status: DISCONTINUED | OUTPATIENT
Start: 2022-02-25 | End: 2022-03-11

## 2022-02-25 RX ORDER — MYCOPHENOLATE MOFETIL 200 MG/ML
1000 POWDER, FOR SUSPENSION ORAL 2 TIMES DAILY
Status: DISCONTINUED | OUTPATIENT
Start: 2022-02-25 | End: 2022-03-11 | Stop reason: CLARIF

## 2022-02-25 RX ORDER — GUAR GUM
1 PACKET (EA) ORAL 2 TIMES DAILY
Status: DISCONTINUED | OUTPATIENT
Start: 2022-02-25 | End: 2022-02-28

## 2022-02-25 RX ADMIN — LEVALBUTEROL HYDROCHLORIDE 1.25 MG: 1.25 SOLUTION RESPIRATORY (INHALATION) at 13:02

## 2022-02-25 RX ADMIN — ACETYLCYSTEINE 2 ML: 200 SOLUTION ORAL; RESPIRATORY (INHALATION) at 13:02

## 2022-02-25 RX ADMIN — OXYCODONE HYDROCHLORIDE 5 MG: 5 TABLET ORAL at 18:50

## 2022-02-25 RX ADMIN — POTASSIUM & SODIUM PHOSPHATES POWDER PACK 280-160-250 MG 1 PACKET: 280-160-250 PACK at 16:57

## 2022-02-25 RX ADMIN — ONDANSETRON 4 MG: 2 INJECTION INTRAMUSCULAR; INTRAVENOUS at 17:29

## 2022-02-25 RX ADMIN — CEFTRIAXONE SODIUM 2 G: 2 INJECTION, POWDER, FOR SOLUTION INTRAMUSCULAR; INTRAVENOUS at 10:23

## 2022-02-25 RX ADMIN — ACYCLOVIR 400 MG: 200 SUSPENSION ORAL at 21:20

## 2022-02-25 RX ADMIN — Medication 1 PACKET: at 21:21

## 2022-02-25 RX ADMIN — NYSTATIN 1000000 UNITS: 100000 SUSPENSION ORAL at 16:57

## 2022-02-25 RX ADMIN — HEPARIN SODIUM 5000 UNITS: 5000 INJECTION, SOLUTION INTRAVENOUS; SUBCUTANEOUS at 05:56

## 2022-02-25 RX ADMIN — TACROLIMUS 2 MG: 1 CAPSULE ORAL at 08:20

## 2022-02-25 RX ADMIN — ACETAMINOPHEN 975 MG: 325 TABLET, FILM COATED ORAL at 13:12

## 2022-02-25 RX ADMIN — ACETAMINOPHEN 975 MG: 325 TABLET, FILM COATED ORAL at 21:20

## 2022-02-25 RX ADMIN — PREDNISOLONE 17.5 MG: 15 SOLUTION ORAL at 21:20

## 2022-02-25 RX ADMIN — FUROSEMIDE 40 MG: 10 INJECTION, SOLUTION INTRAVENOUS at 13:13

## 2022-02-25 RX ADMIN — INSULIN HUMAN 5 UNITS: 100 INJECTION, SUSPENSION SUBCUTANEOUS at 13:24

## 2022-02-25 RX ADMIN — HEPARIN SODIUM 5000 UNITS: 5000 INJECTION, SOLUTION INTRAVENOUS; SUBCUTANEOUS at 13:13

## 2022-02-25 RX ADMIN — Medication 1 PACKET: at 08:24

## 2022-02-25 RX ADMIN — POTASSIUM & SODIUM PHOSPHATES POWDER PACK 280-160-250 MG 1 PACKET: 280-160-250 PACK at 11:44

## 2022-02-25 RX ADMIN — TACROLIMUS 4 MG: 5 CAPSULE ORAL at 18:36

## 2022-02-25 RX ADMIN — ACYCLOVIR 400 MG: 200 CAPSULE ORAL at 08:22

## 2022-02-25 RX ADMIN — MYCOPHENOLATE MOFETIL 1500 MG: 250 CAPSULE ORAL at 08:20

## 2022-02-25 RX ADMIN — ACETAMINOPHEN 975 MG: 325 TABLET, FILM COATED ORAL at 05:55

## 2022-02-25 RX ADMIN — LEVALBUTEROL HYDROCHLORIDE 1.25 MG: 1.25 SOLUTION RESPIRATORY (INHALATION) at 08:00

## 2022-02-25 RX ADMIN — ROSUVASTATIN CALCIUM 40 MG: 10 TABLET, FILM COATED ORAL at 08:19

## 2022-02-25 RX ADMIN — NYSTATIN 1000000 UNITS: 100000 SUSPENSION ORAL at 21:19

## 2022-02-25 RX ADMIN — ACETYLCYSTEINE 2 ML: 200 SOLUTION ORAL; RESPIRATORY (INHALATION) at 19:23

## 2022-02-25 RX ADMIN — ACETYLCYSTEINE 2 ML: 200 SOLUTION ORAL; RESPIRATORY (INHALATION) at 08:00

## 2022-02-25 RX ADMIN — MYCOPHENOLATE MOFETIL 1000 MG: 250 CAPSULE ORAL at 21:27

## 2022-02-25 RX ADMIN — Medication 40 MG: at 08:23

## 2022-02-25 RX ADMIN — PREDNISOLONE 17.5 MG: 15 SOLUTION ORAL at 08:23

## 2022-02-25 RX ADMIN — LEVALBUTEROL HYDROCHLORIDE 1.25 MG: 1.25 SOLUTION RESPIRATORY (INHALATION) at 16:47

## 2022-02-25 RX ADMIN — FAMOTIDINE 10 MG: 10 TABLET, FILM COATED ORAL at 21:20

## 2022-02-25 RX ADMIN — Medication 15 ML: at 08:23

## 2022-02-25 RX ADMIN — Medication 1 PACKET: at 13:25

## 2022-02-25 RX ADMIN — HEPARIN SODIUM 5000 UNITS: 5000 INJECTION, SOLUTION INTRAVENOUS; SUBCUTANEOUS at 21:20

## 2022-02-25 RX ADMIN — SODIUM CHLORIDE 20 MG: 9 INJECTION, SOLUTION INTRAVENOUS at 09:46

## 2022-02-25 RX ADMIN — Medication 12.5 MG: at 08:19

## 2022-02-25 RX ADMIN — POTASSIUM & SODIUM PHOSPHATES POWDER PACK 280-160-250 MG 1 PACKET: 280-160-250 PACK at 21:19

## 2022-02-25 RX ADMIN — Medication 1 PACKET: at 21:28

## 2022-02-25 RX ADMIN — NYSTATIN 1000000 UNITS: 100000 SUSPENSION ORAL at 11:44

## 2022-02-25 RX ADMIN — NYSTATIN 1000000 UNITS: 100000 SUSPENSION ORAL at 08:24

## 2022-02-25 RX ADMIN — DAPSONE 50 MG: 25 TABLET ORAL at 09:46

## 2022-02-25 RX ADMIN — ACETYLCYSTEINE 2 ML: 200 SOLUTION ORAL; RESPIRATORY (INHALATION) at 16:47

## 2022-02-25 RX ADMIN — FAMOTIDINE 10 MG: 10 TABLET, FILM COATED ORAL at 08:21

## 2022-02-25 RX ADMIN — GABAPENTIN 100 MG: 100 CAPSULE ORAL at 21:20

## 2022-02-25 RX ADMIN — LEVALBUTEROL HYDROCHLORIDE 1.25 MG: 1.25 SOLUTION RESPIRATORY (INHALATION) at 19:22

## 2022-02-25 RX ADMIN — OXYCODONE HYDROCHLORIDE 5 MG: 5 TABLET ORAL at 04:06

## 2022-02-25 RX ADMIN — Medication 12.5 MG: at 21:20

## 2022-02-25 ASSESSMENT — ACTIVITIES OF DAILY LIVING (ADL)
ADLS_ACUITY_SCORE: 11
ADLS_ACUITY_SCORE: 13
ADLS_ACUITY_SCORE: 11
ADLS_ACUITY_SCORE: 13
ADLS_ACUITY_SCORE: 11

## 2022-02-25 NOTE — PROGRESS NOTES
CVTS Progress note 2/25/22:     Melissa Elder is a 66 year old female with PMHx HTN, HLD, paroxysmal Afib, osteoporosis, GERD, severe COPD, previously requiring 2-3L NC O2 at rest. She underwent b/l lung transplant with Dr. Robin on 02/21. Got 1u pRBC post-op, no overt bleeding source, monitoring. Extubated 02/22 to O2 NC.     CVTS is following in consideration of the clamshell incision as well as chest tube management.     A/P: # S/p Bilateral lung transplant  # Clamshell incision  - Continue to adhere to sternal precautions  - Postoperative incision management, continue open to air. Exofin applied  - Clamshell incision appears clean, dry. Incision appears without erythema, or drainage. Wound edges are well approximated.  - Encourage activity/OOB, IS/pulmonary toilet.  Maintain strict sleep hygiene and delirium precautions.     # Chest tube management  - Continue to suction.  - Left Pleural output: 370mL no air leak, serosanguinous output. Concern for chest tube slowly slipping out. Anchored with gallo anchor and will continue.  - Right Pleural output: 640mL no air leak, serosanguinous output  - Pericardial output: Removed 2/24/22 without immediate complication  - Continue diuresis and nutrition per primary team  - Other cares per primary team     S: Patient fatigued and feeling hot, but not febrile. Feels slightly SOB, but no chest pain and no increased incisional pain. Conversational while  Lying in bed     O: GENERAL: Alert and oriented x 3. NAD. Feels good today.  HEENT: Anicteric sclera. Mucous membranes moist. NC. AT.   CV: Tachycardic. S1, S2. No murmurs appreciated.   RESPIRATORY: Resp non-labored on room air.  Chest tubes in place.  GI: Abdomen soft and non distended with normoactive bowel sounds present in all quadrants. No tenderness, rebound, guarding. No lesions. Small BM today. Nauseated.   NEUROLOGICAL: No focal deficits. Moves all extremities.  CN 2-12 grossly intact.  EXTREMITIES: No  peripheral edema. Intact bilateral pedal pulses.   SKIN: No jaundice. No rashes.  Clamshell incision: Clean, dry, without erythema, and wound edges well approximated.   Chest tube sites: clean, dry, without extra-tubular drainage      Discussed with Surgeon, Dr. Robin via written and verbal commnication.     Venessa Amaya PA-c  Pager: [phone]  10:56 AM February 25, 2022

## 2022-02-25 NOTE — PROGRESS NOTES
Pain Service Progress Note  [facility]  Date: February 25, 2022      Patient Name: Melissa Elder  MRN: [id number]  Age: 66 year old  Sex: female      Assessment:  Melissa Elder is a 66 year old female with a PMH significant for severe COPD on chronic home O2, mild non-obstructive CAD, paroxysmal A-fib, osteoporosis on treatment     Procedure: bilateral lung transplant     Date of Surgery: 2/21/22     Date of Bilateral ES (erector spinae) T6-7 Catheter Placement: 2/21/22     Plan/Recommendations:  1. Regional Anesthesia/Analgesia  1.-Continuous infusion of Ropvicaine 0.2% at 7 mL/hr each catheter, total 14mL/hr    Due to questionable tinnitus (baseline tinnitus vs new) will decrease rate to 5 ml/hr and continue to monitor closely. No other s/s of LAST at this time.     Plan to maintain catheters, max of 7 days    2. Anticoagulation  -heparin SC 5,000 units Q 8 hrs   -Please contact Inpatient Pain Service (pager [phone]) before ordering or making any medication changes       3. Multimodal Analgesia  - Per primary service    Pain Service will continue to follow.    Discussed with attending anesthesiologist      Overnight Events: No acute events overnight; received bolus at 1800    Tubes/Drains: Yes  Chest tubes    Subjective: Reports mild ringing in ears. Does state that she has history of ear ringing at home prior to admission. Is unsure if this is similar to ringing prior to admission or this is new.     Nausea: No  Vomiting: No  Pruritus: No  Symptoms of LAST: No other than baseline ringing in ears, which she is unsure is baseline or new. Will decrease rate and continue to monitor closely.     Pain Location:  Midline, sternal     Pain Intensity:    Pain at Rest: 4/10   Pain with Activity: 4/10    Satisfied with your level of pain control: Yes    Diet: Adult Formula Drip Feeding: Continuous Osmolite 1.5; Nasoduodenal tube; Goal Rate: 50; mL/hr; Medication - Feeding Tube Flush Frequency: At  "least 15-30 mL water before and after medication administration and with tube clogging; Amount to Send (Nut...  Soft & Bite Sized Diet (level 6) Thin Liquids (level 0)    Relevant Labs:  Recent Labs   Lab Test 02/25/22  0727 02/22/22  1746 02/22/22  0355 02/21/22  0808 02/21/22  0714   INR  --   --  1.31*   < > 5.23*      < > 128*   < >  --    PTT  --   --   --   --  106*   BUN 26   < > 10   < >  --     < > = values in this interval not displayed.       Physical Exam:  Vitals: BP (!) 149/73 (BP Location: Left arm)   Pulse 97   Temp 98.3  F (36.8  C) (Oral)   Resp 24   Ht 1.575 m (5' 2\")   Wt 75 kg (165 lb 4.8 oz)   SpO2 98%   BMI 30.23 kg/m      Physical Exam:   Orientation:  Alert, oriented, and in no acute distress: Yes  Sedation: No    Motor Examination:  5/5 Strength in lower extremities: Yes    Sensory Level:   Decrease in sensation: Yes    Catheter Site:   Catheter entry site is clean/dry/intact: Yes    Tender: No      Relevant Medications:  Current Pain Medications:  Medications related to Pain Management (From now, onward)    Start     Dose/Rate Route Frequency Ordered Stop    02/24/22 0000  acetaminophen (TYLENOL) tablet 650 mg         650 mg Oral EVERY 4 HOURS PRN 02/21/22 0712      02/22/22 1400  acetaminophen (TYLENOL) tablet 975 mg         975 mg Oral or Feeding Tube EVERY 8 HOURS SCHEDULED 02/22/22 0840      02/22/22 0800  polyethylene glycol (MIRALAX) Packet 17 g         17 g Oral or Feeding Tube DAILY 02/21/22 0712      02/21/22 2200  gabapentin (NEURONTIN) capsule 100 mg         100 mg Oral or Feeding Tube AT BEDTIME 02/21/22 0712      02/21/22 1130  ropivacaine 0.2% (NAROPIN) 750 mL in ON-Q C-Bloc select flow ([id number] holds 600-750 mL) dual cath disposable pump         7 mL/hr  Irrigation CONTINUOUS 02/21/22 1106      02/21/22 0800  senna-docusate (SENOKOT-S/PERICOLACE) 8.6-50 MG per tablet 1 tablet         1 tablet Oral or Feeding Tube 2 TIMES DAILY 02/21/22 0712 02/21/22 0712  " "HYDROmorphone (DILAUDID) injection 0.2 mg        \"Or\" Linked Group Details    0.2 mg Intravenous EVERY 2 HOURS PRN 02/21/22 0712      02/21/22 0712  HYDROmorphone (DILAUDID) injection 0.4 mg        \"Or\" Linked Group Details    0.4 mg Intravenous EVERY 2 HOURS PRN 02/21/22 0712      02/21/22 0712  oxyCODONE (ROXICODONE) tablet 5 mg        \"Or\" Linked Group Details    5 mg Oral EVERY 4 HOURS PRN 02/21/22 0712      02/21/22 0712  oxyCODONE IR (ROXICODONE) tablet 10 mg        \"Or\" Linked Group Details    10 mg Oral EVERY 4 HOURS PRN 02/21/22 0712      02/21/22 0712  magnesium hydroxide (MILK OF MAGNESIA) suspension 30 mL         30 mL Oral DAILY PRN 02/21/22 0712      02/21/22 0712  bisacodyl (DULCOLAX) Suppository 10 mg         10 mg Rectal DAILY PRN 02/21/22 0712      02/21/22 0712  methocarbamol (ROBAXIN) tablet 500 mg         500 mg Oral EVERY 6 HOURS PRN 02/21/22 0712      02/21/22 0712  lidocaine 1 % 0.1-1 mL         0.1-1 mL Other EVERY 1 HOUR PRN 02/21/22 0712      02/21/22 0712  lidocaine (LMX4) cream          Topical EVERY 1 HOUR PRN 02/21/22 0712            Primary Service Contacted with Recommendations? Yes    Ashanti Iniguez MD  2/25/2022    Time/Communication:  I personally spent 30 minutes with greater than 50% in consultation, education, counseliing and coordination of care.      Ashanti Iniguez MD    Pain Medicine  Department of Anesthesiology  [facility]        Contact Info (24 hour job code pager is the last 4 digits) For in-house use only:   Job code ID: [id number]   [place] 0599  Peds [phone]  [employer] phone: dial * * * [phone], enter jobcode ID, then enter call-back number.    Text: Use AMCOM on the Intranet <Paging/Directory> tab and enter Jobcode ID.   If no call back at any time, contact the hospital  and ask for Regional Anesthesia attending or backup     "

## 2022-02-25 NOTE — PLAN OF CARE
"BP (!) 143/67 (BP Location: Right arm)   Pulse 102   Temp 98.6  F (37  C) (Oral)   Resp 26   Ht 1.575 m (5' 2\")   Wt 75 kg (165 lb 4.8 oz)   SpO2 100%   BMI 30.23 kg/m      Admission diagnoses: S/p BSLT    Neuro: A&Ox4  CV: SR overnight. ST with turning/activity.  Resp: 2L overnight, %. Tachypneic and shallow respirations.  GI: Loose stool x2. Continent. TF at goal 50 mL/hr. 30 FWF q4 hours  : Voiding spontaneously with good UOP. Continent.  Endo: Q4 hour BS with sliding scale  Skin: Clamshell incision ANGELA with no drainage. Moderate drainage from R remove CT sites; dressings changed x1.  LDA: PIV x2. NJ. Pleural chest tube x2.      Regianld De Paz RN on 2/25/2022 at 7:33 AM          "

## 2022-02-25 NOTE — PLAN OF CARE
Neuro: A&Ox4 c/o ringing in the ears, ropivacaine adjusted by anesthesia with resolution of symptoms noted.  Cardiac: HR . VSS.   Respiratory: Sating >92% on RA. Upper lung sound clear, Lower lungs coarse/crackles. Chest tube x 2 to suction with serosanguinous drainage. CT showed increased hydropneumothorax and concern for malpositioned chest tubes.  GI/: Adequate urine output, IV lasix x 1. Loose/watery BM x 3 (MD aware)  Diet/appetite: Tolerating soft, bite sized diet with thin liquids. Fair appetite. Tube feed at goal rate of 50cc/hr with FWF 30cc Q4.   Activity:  Assist of 1-2, up to chair and ambulated in halls with therapy  Pain: At acceptable level on current regimen.   Skin: No new deficits noted.   LDA's: RUE PIV x 1 - SL     Plan: Plan for IR this afternoon to replace chest tubes. Notify primary team with changes.

## 2022-02-25 NOTE — PROGRESS NOTES
[facility]    Medicine Progress Note - Hospitalist Service, GOLD TEAM 10    Date of Admission:  2/20/2022    Assessment & Plan          Melissa Elder is a 66 year old female with PMHx HTN, HLD, paroxysmal Afib, osteoporosis, GERD, severe COPD, previously requiring 2-3L NC O2 at rest.  Underwent b/l lung transplant with Dr. Robin on 02/21. Got 1u pRBC post-op, no overt bleeding source, monitoring. Extubated 02//22 to O2 NC. She is now transferred onto the floor. Patient still has two chest tubes in but pericardial drain was removed 2/24 without issues. Continuing on antibiotics for cultures growing from donor and recipient lungs.     S/p bilateral sequential lung transplant for COPD  Acute hypoxic/hypercapneic respiratory failure  Donor lung growing MSSA   Actinomyces in respiratory culture  Scant pleural-pleural adhesions and mild-moderate bilateral hilar lymphadenopathy per op note.  Pressor weaned off 2/22 and pt. extubated. Prior history of infection/colonization with Haemophilus influenzae (2017).  IgG adequate (2/20).  MRSA nares negative 2/21.  Broad spectrum ABX empirically post-op with vanc and ceftaz (2/21-2/22), stopped after 24h per Dr. Lala to target known donor cultures on POD #1. Donor cultures ([facility]) with Strep mitis, Actinomyces odontolyticus, and Kenyatta kruseii. Recipient cultures with Actinomyces odonotolyticus.  So currently on ceftriaxone for coverage. She has now been weaned down to room air intermittently needing 1 L O2.  - Nebs: levalbuterol and Mucomyst QID  - Aggressive pulmonary toilet with chest physiotherapy QID as well as Aerobika and incentive spirometry q1h w/a  - Wean supplemental O2 to keep >92%  - Anesthesia to manage erector spinae catheters (placed 2/21)  - Chest tubes managed by surgical team  - Daily CXR  - Tube feeding per nutirtion    - Adding fiber today for loose stools   - Await explant pathology  - Continue ceftriaxone,  low threshold for vanco plan for two week course  - Immune suppression per daily transplant pulm note  -  40 mg IV given today    ID Prophylaxis  - Daponse for PJP ppx MWF dose, increase to daily after one week (on 2/28) CBC remains stable   - Acyclovir for HSV ppx (newly positive 2/20)  - Nystatin for oral candidiasis ppx, 6 month course    Non infectious diarrhea   Noted to have increased loose stools 2/25 likely due to mmf and TF. No rise in white count or abdominal pain to suggest c. Diff.   - discussed with dietician and will add fiber     Post op pain   - Erector spinae catheters placed 2/21 managed by anesthesia   - gabapentin 100 mg nightly  - tylenol 975 mg Q8H   - Dilaudid 0.2-0.4 mg Q2H PRN   - oxycodone 5-10 mg Q4H PRN   - robaxin 500 mg Q6H PRN     Paroxysmal Afib   She was on diltiazem prior to lung transplantation and had not been on anticoagulation. She has not been transitioned to metoprolol which will be continued.  - Metoprolol tartrate 12.5 mg BID   - no anticoagulation at this time     Stress induced hyperglycemia  Did not need insulin prior to admission but sugars have been rising despite sliding scale coverage.  - Endocrinology consulted for assistance    HTN   Hx of htn but was recently on pressors will continue to monitor     HLD  Mild CAD   Noted on transplant workup.   - started rosuvastatin 40 mg daily     Acute blood loss anemia  Acute blood loss thrombocytopenia  Secondary to surgery. Will continue to monitor   - Transfuse Hgb < 7, platelets < 50     Sternotomy  Surgical Incision  - Sternal precautions  - Postoperative incision management per protocol  - PT/OT/CR       Diet: Adult Formula Drip Feeding: Continuous Osmolite 1.5; Nasoduodenal tube; Goal Rate: 50; mL/hr; Medication - Feeding Tube Flush Frequency: At least 15-30 mL water before and after medication administration and with tube clogging; Amount to Send (Nut...  Soft & Bite Sized Diet (level 6) Thin Liquids (level 0)    DVT  Prophylaxis: Pneumatic Compression Devices  R[place]iff Catheter: Not present  Central Lines: None  Cardiac Monitoring: None  Code Status: Full Code      Disposition Plan   Expected Discharge: 03/04/2022   Anticipated discharge location: home;inpatient rehabilitation facility    Delays:            The patient's care was discussed with the Bedside Nurse, Patient and transplant pulm  Consultant.    Ross Farrar DO  Hospitalist Service, GOLD TEAM 10  [facility]  Securely message with the Vocera Web Console (learn more here)  Text page via [facility] Paging/Directory   Please see signed in provider for up to date coverage information      Clinically Significant Risk Factors Present on Admission                    ______________________________________________________________________    Interval History     No acute events overnight. Her breathing feels improved today. Pain is tolerable with current regimen. She endorses new bad diarrhea that feels like stomach flu almost. No pain in abdomen, no fevers or chills. Voice is improving.    Four point ROS completed and negative unless listed above     Data reviewed today: I reviewed all medications, new labs and imaging results over the last 24 hours.     Physical Exam   Vital Signs: Temp: 98.3  F (36.8  C) Temp src: Oral BP: (!) 149/73 Pulse: 97   Resp: 24 SpO2: 100 % O2 Device: Nasal cannula Oxygen Delivery: 1 LPM  Weight: 165 lbs 4.8 oz    Constitutional: Laying in bed breathing rapidly but no distress  HEENT: Eyes with pink conjunctivae, anicteric.  Oral mucosa moist without lesions.  Voice hoarse/squeaky  PULM: Diminished bases bilaterally.  No crackles, no rhonchi, no wheezes.  Chest tubes without air leak or tidaling.  CV: Normal S1 and S2.  Tachycardia.  No murmur, gallop, or rub.  No peripheral edema.   ABD: BS hypoactive, soft, nontender, nondistended.    MSK: Moves all extremities.  No apparent muscle wasting.   NEURO: Alert and  oriented, conversant.   SKIN: Warm, dry.  No rash on limited exam.   PSYCH: Mood stable.     Data   Recent Labs   Lab 02/25/22  1153 02/25/22  0912 02/25/22  0727 02/24/22  0454 02/24/22  0449 02/23/22  2312 02/23/22  2054 02/23/22  0403 02/23/22  0346 02/22/22  0402 02/22/22  0355 02/21/22  0809 02/21/22  0808 02/21/22  0717 02/21/22  0714   WBC  --   --  12.2*  --  15.9*  --  18.7*  --  18.5*   < > 11.5*   < >  --   --   --    HGB  --   --  8.7*  --  8.9*  --  9.2*  --  9.7*   < > 8.8*   < >  --   --   --    MCV  --   --  94  --  92  --  91  --  89   < > 85   < >  --   --   --    PLT  --   --  160  --  136*  --  153  --  142*   < > 128*   < >  --   --   --    INR  --   --   --   --   --   --   --   --   --   --  1.31*  --  1.58*  --  5.23*   NA  --   --  140  --  137  --   --   --  140  --  140   < >  --   --   --    POTASSIUM  --   --  4.1  --  4.8  --   --   --  4.5  --  4.6   < >  --   --   --    CHLORIDE  --   --  105  --  107  --   --   --  110*  --  111*   < >  --   --   --    CO2  --   --  31  --  26  --   --   --  27  --  26   < >  --   --   --    BUN  --   --  26  --  28  --   --   --  25  --  10   < >  --   --   --    CR  --   --  0.46*  --  0.53  --   --   --  0.68  --  0.53   < >  --   --   --    ANIONGAP  --   --  4  --  4  --   --   --  3  --  3   < >  --   --   --    MINISTERIO  --   --  7.7*  --  8.2*  --   --   --  7.7*  --  7.2*   < >  --   --   --    * 153* 200*   < > 200*   < >  --    < > 168*   < > 135*   < >  --    < >  --    ALBUMIN  --   --  2.0*  --  2.3*  --   --   --  2.5*  --  2.3*   < >  --   --   --    PROTTOTAL  --   --  5.0*  --  5.2*  --   --   --  5.3*  --  4.2*   < >  --   --   --    BILITOTAL  --   --  0.2  --  0.2  --   --   --  0.2  --  0.4   < >  --   --   --    ALKPHOS  --   --  80  --  58  --   --   --  46  --  36*   < >  --   --   --    ALT  --   --  31  --  24  --   --   --  27  --  17   < >  --   --   --    AST  --   --  28  --  26  --   --   --  36  --  40   < >  --    --   --     < > = values in this interval not displayed.     Recent Results (from the past 24 hour(s))   XR Chest Port 1 View    Narrative    XR CHEST PORT 1 VIEW  2/24/2022 2:14 PM     HISTORY:  chest tube removal       COMPARISON:  Checks x-ray 2/24/2022, 2/23/2022    FINDINGS:   Frontal view of the chest. Interval removal of left basilar chest  tube. Right basilar chest tube appears slightly retracted compared to  prior. Feeding tube courses below the diaphragm and beyond the  field-of-view. Post sternotomy wires. Mediastinum surgical  clips.Trachea is midline. Cardiomediastinal silhouette and pulmonary  vasculature are within normal limits. Unchanged small right apical  pneumothorax. Mildly decreased perihilar opacities. Unchanged small  right pleural effusion with associated atelectasis.      Impression    IMPRESSION:    1. Interval removal of left chest tube without appreciable  pneumothorax.  2. Slight retraction of right basilar chest tube with unchanged right  hydropneumothorax with small apical pneumothorax component.  3. Mildly decreased perihilar opacities..    I have personally reviewed the examination and initial interpretation  and I agree with the findings.    ADRIANA VELAZQUEZ MD         SYSTEM ID:  [id number]   XR Chest 2 Views    Narrative    EXAM: XR CHEST 2 VW  2/25/2022 8:58 AM     HISTORY:  interval follow up, post-op lung transplant       COMPARISON:  Chest x-ray 2/24/2022    FINDINGS: AP radiograph of the chest. Postoperative changes bilateral  lung transplant with intact median clamshell sternotomy wires. Stable  positioning of bibasilar chest tubes. Partly visualized feeding tube  with the tip overlying the distal duodenum/proximal jejunum. Left  anterior loculated hydropneumothorax.    Stable cardiomediastinal silhouette. Atherosclerotic calcifications of  the aortic arch. Unchanged small right greater than left pleural  effusions. Decreased hazy and streaky opacities in the right lung  base  with continued perihilar interstitial opacities and Kerley B lines at  the lung bases. Visualized upper abdomen is unremarkable.      Impression    IMPRESSION:  1. Decreased perihilar and right basilar interstitial opacities  suggesting improving pulmonary edema.  2. Unchanged small right greater than left pleural effusions.  3. Stable support devices.    I have personally reviewed the examination and initial interpretation  and I agree with the findings.    VARGAS DUKE MD         SYSTEM ID:  [id number]     Medications     dextrose 10 mL/hr at 02/21/22 1900     BETA BLOCKER NOT PRESCRIBED       disposable pump w/ anesthetic 7 mL/hr at 02/24/22 2010     sodium chloride 10 mL/hr at 02/23/22 1947       acetaminophen  975 mg Oral or Feeding Tube Q8H JUANA     acetylcysteine  2 mL Nebulization 4x Daily     acyclovir  400 mg Oral or NG Tube BID    Or     acyclovir  400 mg Oral or NG Tube BID     [START ON 3/1/2022] calcium carbonate 600 mg-vitamin D 400 units  1 tablet Oral BID w/meals     cefTRIAXone  2 g Intravenous Q24H     dapsone  50 mg Oral Once per day on Mon Wed Fri     [Held by provider] dapsone  50 mg Oral or NG Tube Once per day on Mon Wed Fri     famotidine  10 mg Oral or Feeding Tube BID     furosemide  40 mg Intravenous Once     gabapentin  100 mg Oral or Feeding Tube At Bedtime     heparin ANTICOAGULANT  5,000 Units Subcutaneous Q8H     insulin aspart  1-7 Units Subcutaneous TID AC     insulin aspart  1-5 Units Subcutaneous BID     [START ON 2/26/2022] insulin NPH  4 Units Subcutaneous Daily with supper     insulin NPH  5 Units Subcutaneous Once     [START ON 2/26/2022] insulin NPH  6 Units Subcutaneous QAM     levalbuterol  1.25 mg Nebulization 4x Daily     metoprolol tartrate  12.5 mg Oral or Feeding Tube BID     multivitamins w/minerals  15 mL Oral Daily     mycophenolate  1,500 mg Oral BID    Or     mycophenolate  1,500 mg Oral or NG Tube BID     nystatin  1,000,000 Units Swish & Swallow 4x Daily      pantoprazole  40 mg Oral or Feeding Tube Daily     polyethylene glycol  17 g Oral or Feeding Tube Daily     potassium & sodium phosphates  1 packet Oral TID     prednisoLONE  17.5 mg Oral or NG Tube BID     [START ON 3/1/2022] predniSONE  15 mg Oral or Feeding Tube BID     protein modular  1 packet Per Feeding Tube TID     rosuvastatin  40 mg Oral Daily     senna-docusate  1 tablet Oral or Feeding Tube BID     sodium chloride (PF)  3 mL Intracatheter Q8H     tacrolimus  4 mg Oral BID IS    Or     tacrolimus  4 mg Per Feeding Tube BID IS

## 2022-02-25 NOTE — PROGRESS NOTES
Pulmonary Medicine  Cystic Fibrosis - Lung Transplant Team  Progress Note  2022       Patient: Melissa Elder  MRN: [id number]  : 1955 (age 66 year old)  Transplant: 2022 (Lung), POD#4  Admission date: 2022    Assessment & Plan:     Melissa Elder is a 66 year old female with a PMH significant for severe COPD, HTN, mild non-obstructive CAD, paroxysmal afib, osteoporosis, GERD, and colonic polyps.  Pt. is now s/p BSLT on 22, surgery relatively uncomplicated.  The patient was extubated , intermittently on RA otherwise using 1-2L NC.     Today's recommendations:  - Continue chest physiotherapy QID with Aerobika and incentive spirometry q1h w/a  - Wean O2 to keep >92%  - DSA ordered   - CXR daily  - Agree with diuresis today  - Await explant pathology  - Inspection bronch next week, timing TBD  - Basiliximab today  - Tacrolimus level today therapeutic.  Transitioning to PO at dose equivalence.  - Prednisone taper ordered 3/1  - Consider increasing dapsone to daily 3/ if CBC remains stable  - Acyclovir for HSV ppx through POD #30  - CMV, EBV, and IgG 3/21 (not yet ordered)  - Follow pending cultures  - Continue ceftrixaone for 2 week course through 3/8     S/p bilateral sequential lung transplant for COPD:  Acute hypoxic/hypercapneic respiratory failure: Scant pleural-pleural adhesions and mild-moderate bilateral hilar lymphadenopathy per op note.  Pressor weaned off  and pt. extubated.  Able to be weaned to RA at rest during morning rounds, intermittently using 1-2L NC.   - Nebs: levalbuterol and Mucomyst QID  - Aggressive pulmonary toilet with chest physiotherapy QID as well as Aerobika and incentive spirometry q1h w/a  - Wean supplemental O2 to keep >92%  - DSA at one week (ordered ) then one month post-transplant, additionally per protocol  - Anesthesia to manage erector spinae catheters (placed )  - Chest tubes managed by surgical team.  - Daily CXR, today with  anterior hydropneumothorax.  Left chest tube appears out of pleural cavity.  Obtained CT.  - Chest CT today: showing anterior hydropneumothorax.  Right chest tube in fissure.  Left chest tube with functioning port outside of pleural cavity.  CVTS team aware.  Plan for IR guided chest tube placement.  - Volume management per primary team, agree with diuresis  - Post-pyloric nasal FT (repositioned by radiology 2/22), TF per RD and primary team  - SLP following for diet advacement  - Await explant pathology  - Inspection bronch next week, timing TBD     Immunosuppression:  - Induction therapy with basiliximab (and high dose IV steroid) given intraoperatively, repeating basiliximab dose today  - Transition from 2mg SL to PO Tacrolimus 4 BID.  Goal level 8-12.  Date [place] Level Intervention   2/22 3.3 Dose increased from 1 mg to 2 mg BID   2/23 10.6 Near steady state, no dose change    2/24 8.7 (10.5h)  Continue current dose   2/25 8.5    - MMF 1000 mg BID (dec due to diarrhea)  - Prednisolone 17.5 mg BID with taper per lung transplant protocol (due 3/1, ordered):  Date AM dose (mg) PM dose (mg)   2/22 17.5 17.5   3/1 15 15   3/8 15 12.5   3/15 12.5 12.5   3/29 12.5 10   4/12 10 10   5/10 10 7.5   6/7 7.5 7.5   7/5 7.5 5   8/2 5 5   8/30 5 2.5      Prophylaxis:   - Daponse for PJP ppx (started 2/23 at decreased MWF dose, G6PD 13.3 2/21), increase to daily after one week (3/2) if CBC remains stable  - Acyclovir for HSV ppx (newly positive 2/20)  - Nystatin for oral candidiasis ppx, 6 month course  - See below for serologies and viral ppx:    Donor Recipient Intervention   CMV status Negative Negative N/A, CMV monthly (3/21, not yet ordered)   EBV status Positive Positive EBV at one month (3/21, not yet ordered) then q3 months   HSV status N/A (Newly) Positive Acyclovir POD #1-30      Primary graft dysfunction (per ISHLT guidelines):  See chart below:    POD #0  (~0 hours) POD #1  (~24 hours) POD #2   (~48 hours) POD#3    (~72 hours)   Date 2/21 2/22 2/23 2/24   Time 0713 0843  0347 N/A    Intubated Y Y N N   PaO2 232 127 142     FiO2 50% 30% 27%     P/F Ratio 464 423 526     PGD Grade   (0=mild, 3=severe) 0 0 1     ECMO N N N N   Inhaled NO/Flolan N N N N   ISHLT PGD Scoring  Grade 0 - PaO2/FiO2 >300 and normal chest radiograph (absence of diffuse allograft infiltrates)  Grade 1 - PaO2/FiO2 >300 and diffuse allograft infiltrates on chest radiograph  Grade 2 - PaO2/FiO2 between 200 and 300  Grade 3 - PaO2/FiO2 <200 and/or on ECMO     ID: Prior history of infection/colonization with Haemophilus influenzae (2017).  IgG adequate (2/20).  MRSA nares negative 2/21.  Broad spectrum ABX empirically post-op with vanc and ceftaz (2/21-2/22), stopped after 24h to target known donor cultures on POD #1 (transitioned to cefazolin).   - IgG repeat at one month (3/21, not yet ordered)  - Donor ([facility]) respiratory culture with P-S MSSA (final)  - Donor cultures ([facility]) with Strep mitis, Actinomyces odontolyticus, MSSA x2, and Candida kruseii  - Recipient cultures with Actinomyces odonotolyticus  - Bronch cultures (2/22) with GPC and yeast  - ABX: IV ceftriaxone (2/23) to cover donor cultures as above, low threshold to also add vancomycin for clinical decline (GPC on bronch culture 2/22), plan to complete 2 week course through 3/8; s/p cefazolin 2/22-2/23, ceftazidime 2/21-2/22     PHS risk criteria donor: Additional labs required post-transplant (between 4-8 weeks post-op): Hepatitis B, Hepatitis C, and HIV by COLT ([id number], ordered POD #30), also plan to repeat hepatitis B surface antibody at that time (ordered) given discrepancy with recent result.     Other relevant problems managed by primary team:    Diarrhea: decreased MMF to 1000, will plan on Myfortiq when able to take large pills orally.  Fiber added to tube feeds.     CAD: Noted to have mild non-obstructive CAD without hemodynamically significant lesions on cardiac cath 3/27/19.  PTA ASA 81  "mg and atorvastatin.   - Rosuvastatin (2/24, less interaction with immunosuppression)     Paroxysmal afib: PTA diltiazem, not on AC.  Post-op tachycardia, off pressor since 2/22.  Metoprolol started 2/23.     GERD: Not on PPI PTA.  Negative pH/manometry study 3/28/19, noted small hiatal hernia.   - PPI daily (2/21), famotidine BID (2/21) x10d as bridge     Anxiety: Per lung transplant committee review, noted high anxiety in anticipation of transplant.   - Monitor need for palliative care and/or health psychology consult post-op    We appreciate the excellent care provided by the [place] [id number] team.  Recommendations communicated via in person rounding and this note.  Will continue to follow along closely, please do not hesitate to call with any questions or concerns.    Lupe Guerrero MD  Pulmonary / Critical Care Fellow  02/25/2022 at 3:03 PM      Subjective & Interval History:     Patient reports feeling like her breathing is getting stronger each day.  Her cough is still weak, but she is able to get up secretions with chest physiotherapy.  She isn't getting much up with her Aerobika.  She is taking small volumes with her incentive spirometer, but is doing it regularly.  She denies any chest pain or pain around her chest tubes.  She reports having diarrhea which started yesterday evening.    Review of Systems:     Complete 10-system review of systems negative except where noted in HPI above.    Physical Exam:     Vital signs:  Temp: 98.3  F (36.8  C) Temp src: Oral BP: (!) 149/73 Pulse: 97   Resp: 24 SpO2: 98 % O2 Device: Nasal cannula Oxygen Delivery: 1 LPM Height: 157.5 cm (5' 2\") Weight: 75 kg (165 lb 4.8 oz)    I/O:   I/O last 3 completed shifts:  In: 2750 [P.O.:1200; NG/GT:570]  Out: 2035 [Urine:985; Chest Tube:1050]    Constitutional: sitting in bedside chair, in no apparent distress.   HEENT: Eyes with pink conjunctivae, anicteric.  Oral mucosa moist without lesions.  Voice hoarse.  Nasal FT.  PULM: " coarse bilaterally with pleural rub.  Very weak cough.    Chest: Chest tubes without air leak, draining serosang fluid  CV: Normal S1 and S2.  Tachycardic.  No murmur, gallop, or rub.  1+ BLE edema.   ABD: NABS, soft, nontender, nondistended.    MSK: Moves all extremities.  No apparent muscle wasting.   NEURO: Alert, conversant. Moving all 4 extremities spontaneously.  No tremors.  SKIN: Warm, dry.  No rash on limited exam.   PSYCH: Mood stable, calm.     Data:     LABS    CMP:   Recent Labs   Lab 02/25/22  1153 02/25/22  0912 02/25/22  0727 02/25/22  0339 02/24/22  0454 02/24/22  0449 02/23/22  0403 02/23/22  0346 02/22/22  0402 02/22/22  0355   NA  --   --  140  --   --  137  --  140  --  140   POTASSIUM  --   --  4.1  --   --  4.8  --  4.5  --  4.6   CHLORIDE  --   --  105  --   --  107  --  110*  --  111*   CO2  --   --  31  --   --  26  --  27  --  26   ANIONGAP  --   --  4  --   --  4  --  3  --  3   * 153* 200* 214*   < > 200*   < > 168*   < > 135*   BUN  --   --  26  --   --  28  --  25  --  10   CR  --   --  0.46*  --   --  0.53  --  0.68  --  0.53   GFRESTIMATED  --   --  >90  --   --  >90  --  >90  --  >90   MINISTERIO  --   --  7.7*  --   --  8.2*  --  7.7*  --  7.2*   MAG  --   --  2.4*  --   --  2.7*  --  3.0*  --  1.7*   PHOS  --   --  2.1*  --   --  2.1*  --  3.8  --  3.0   PROTTOTAL  --   --  5.0*  --   --  5.2*  --  5.3*  --  4.2*   ALBUMIN  --   --  2.0*  --   --  2.3*  --  2.5*  --  2.3*   BILITOTAL  --   --  0.2  --   --  0.2  --  0.2  --  0.4   ALKPHOS  --   --  80  --   --  58  --  46  --  36*   AST  --   --  28  --   --  26  --  36  --  40   ALT  --   --  31  --   --  24  --  27  --  17    < > = values in this interval not displayed.     CBC:   Recent Labs   Lab 02/25/22  0727 02/24/22  0449 02/23/22 2054 02/23/22  8806   WBC 12.2* 15.9* 18.7* 18.5*   RBC 2.95* 3.05* 3.18* 3.34*   HGB 8.7* 8.9* 9.2* 9.7*   HCT 27.7* 27.9* 28.8* 29.6*   MCV 94 92 91 89   MCH 29.5 29.2 28.9 29.0   MCHC 31.4*  31.9 31.9 32.8   RDW 13.9 14.1 14.3 14.2    136* 153 142*       INR:   Recent Labs   Lab 02/22/22  0355 02/21/22  0808 02/21/22  0714 02/21/22  0530   INR 1.31* 1.58* 5.23* 1.96*       Glucose:   Recent Labs   Lab 02/25/22  1153 02/25/22  0912 02/25/22  0727 02/25/22  0339 02/24/22  2321 02/24/22  1941   * 153* 200* 214* 237* 259*       Blood Gas:   Recent Labs   Lab 02/23/22  0834 02/23/22  0347 02/22/22  1747 02/22/22  0843 02/22/22  0800   PHV 7.32  --  7.30*  --  7.45*   PCO2V 55*  --  42  --  38*   PO2V 46  --  43  --  40   HCO3V 28  --  21  --  26   ARIEL 1.2  --  -5.5  --  1.9   O2PER 30 2 30  30   < > 30    < > = values in this interval not displayed.       Culture Data No results for input(s): CULT in the last 168 hours.    Virology Data: No results found for: INFLUA, FLUAH1, FLUAH3, EO8431, IFLUB, RSVA, RSVB, PIV1, PIV2, PIV3, HMPV, HRVS, ADVBE, ADVC    Historical CMV results (last 3 of prior testing):  No results found for: CMVQNT  No results found for: CMVLOG    Urine Studies    Recent Labs   Lab Test 02/20/22  0248 03/25/19  1043   URINEPH 7.0 5.0   NITRITE Negative Negative   LEUKEST Negative Trace*   WBCU <1 1       Most Recent [place] Pulmonary Function Testing (FVC/FEV1 only)  FVC-Pre   Date Value Ref Range Status   12/20/2021 1.81 L    06/21/2021 1.46 L    12/09/2019 1.59 L    06/03/2019 1.68 L      FVC-%Pred-Pre   Date Value Ref Range Status   12/20/2021 65 %    06/21/2021 52 %    12/09/2019 56 %    06/03/2019 58 %      FEV1-Pre   Date Value Ref Range Status   12/20/2021 0.49 L    06/21/2021 0.50 L    12/09/2019 0.49 L    06/03/2019 0.47 L      FEV1-%Pred-Pre   Date Value Ref Range Status   12/20/2021 22 %    06/21/2021 22 %    12/09/2019 21 %    06/03/2019 20 %        IMAGING    Recent Results (from the past 48 hour(s))   XR Chest Port 1 View    Narrative    EXAM: XR CHEST 1 VW 2/23/2022      HISTORY: Post-Op Lung.    COMPARISON: X-ray. 2/22/2022    TECHNIQUE: Frontal view of the  chest.    FINDINGS/    Impression    IMPRESSION: Postoperative sequelae of  lung transplant with intact  clamshell sternotomy wires and lines/tubes as below. Unchanged small  left pleural effusion. Low lung volumes with increased streaky  opacities, likely atelectasis. No convincing new airspace disease,  pneumothorax, or overt abnormality of the heart, bones, or upper  abdomen.    Lines/tubes/Devices as follows:   --Endotracheal tube, [place] catheter, and enteric tube have been  removed.   --Unchanged Mediastinal and pleural drains.   --Partially visualized feeding tube.   -- Unchanged Spinal analgesia catheters  -Right IJ venous sheath projecting over the brachiocephalic  confluence/proximal SVC.    I have personally reviewed the examination and initial interpretation  and I agree with the findings.    ADRIANA VELAZQUEZ MD         SYSTEM ID:  [id number]   XR Chest Port 1 View    Narrative    XR CHEST PORT 1 VIEW on 2/23/2022 5:56 PM.    INDICATION: Discontinuation of chest tubes.    COMPARISON: Radiograph same day    FINDINGS:   Portable AP semiupright radiograph of the chest. Clamshell sternotomy  wires are intact. Mediastinal surgical clips. Interval removal of the  bilateral vertically oriented chest tubes. Bibasilar chest tubes are  stable. Mediastinal drain. Analgesic catheters. Partially visualized  NG tube course into the left upper quadrant of the field-of-view.    Trachea is clear. Cardiac silhouette is stable. Pulmonary vasculature  is somewhat indistinct. Trace right apical pneumothorax. No  appreciable left pneumothorax. No large pleural effusion. Diffuse  interstitial and airspace opacities are unchanged. Upper abdomen is  unremarkable.      Impression    IMPRESSION:     1. Interval removal of bilateral vertically oriented chest tubes.  Increased tiny right apical pneumothorax. No appreciable left  pneumothorax.  2. Unchanged diffuse interstitial and airspace opacities.    I have personally reviewed  the examination and initial interpretation  and I agree with the findings.    DANIELA BERKOWITZ MD         SYSTEM ID:  [id number]   XR Chest Port 1 View    Narrative    EXAM: XR CHEST 1 VW 2/23/2022      HISTORY: tachypnea, dyspnea s/p Lung transplant. please comment on  volume status.    COMPARISON: Multiple radiographs from same day    TECHNIQUE: Frontal view of the chest.    FINDINGS: Postoperative sequelae of  lung transplant with intact  clamshell sternotomy wires. Pericardial drains. Partially visualized  feeding tube. Spinal analgesia catheter. Change small left pleural  effusion. Unchanged streaky basilar atelectasis. No convincing new  airspace disease, pneumothorax, or overt abnormality of the heart,  bones. Partially visualized gaseous distention.      Impression    Impression: Lung volume appears adequate and unchanged.    I have personally reviewed the examination and initial interpretation  and I agree with the findings.    KEN CHAVARRIA MD         SYSTEM ID:  [id number]   XR Chest Port 1 View   Result Value    Radiologist flags (Urgent)     Left basilar tube with sidehole projecting over the    Narrative    Exam: XR CHEST PORT 1 VIEW, 2/24/2022 8:57 AM    Comparison: 2/23/2019    History: Post-Op Lung    Findings:  AP view the chest. Status post bilateral lung transplant with intact  clam shell sternotomy wires. Right basilar chest tube. Left basilar  chest tube with sidehole not visualized Cardiomegaly. Enteric tube  traverses below the field of view.    Trachea is midline. Stable cardiomediastinal silhouette. Bilateral  perihilar and bibasilar opacities. Blunting of the right and left  costophrenic angles.. Small right pneumothorax. No left pneumothorax.  No acute osseous abnormalities.      Impression    Impression:   1. Low lung volumes with bibasilar and perihilar atelectasis/edema and  small right and left pleural effusions.   2. Small right pneumothorax.  3. Stable left basilar chest tube with  sidehole not visualized and  likely projecting over the subcutaneous tissues.    [Access Center: Left basilar tube with sidehole projecting over the  subcutaneous tissues.]    This report will be copied to the [facility] to ensure a  provider acknowledges the finding. Access Center is available Monday  through Friday 8am-3:30 pm.     I have personally reviewed the examination and initial interpretation  and I agree with the findings.    VARGAS DUKE MD         SYSTEM ID:  [id number]

## 2022-02-25 NOTE — CONSULTS
"    Interventional Radiology Consult Service Note    Consult Request: hydropneumothorax    History: Melissa is a 66 year old female with PMHx HTN, HLD, paroxysmal Afib, osteoporosis, GERD, severe COPD, now s/p bilateral lung transplant 2/21/22. She has two pleural based surgical chest tubes in place which are reportedly still having high volume outputs with relatively small effusions present. The pneumothorax is small on the left and right. There is air in the mediastinum. Request for review of placement of additional chest tubes. Patient is currently stable on 1 liter O2.    Imaging Reviewed: CT CHEST W/O CONTRAST 2/25/2022. XR CHEST 2 VW  2/25/2022, XR CHEST PORT 1 VIEW  2/24/2022     Vitals:   BP (!) 149/73 (BP Location: Left arm)   Pulse 97   Temp 98.3  F (36.8  C) (Oral)   Resp 24   Ht 1.575 m (5' 2\")   Wt 75 kg (165 lb 4.8 oz)   SpO2 98%   BMI 30.23 kg/m      Pertinent Labs:     Lab Results   Component Value Date    WBC 12.2 (H) 02/25/2022    WBC 15.9 (H) 02/24/2022    WBC 18.7 (H) 02/23/2022    WBC 8.4 06/21/2021    WBC 6.7 12/09/2019    WBC 8.1 03/25/2019       Lab Results   Component Value Date    HGB 8.7 02/25/2022    HGB 8.9 02/24/2022    HGB 9.2 02/23/2022    HGB 12.5 06/21/2021    HGB 13.0 12/09/2019    HGB 13.4 03/25/2019       Lab Results   Component Value Date     02/25/2022     02/24/2022     02/23/2022     06/21/2021     12/09/2019     03/25/2019       Lab Results   Component Value Date    INR 1.31 (H) 02/22/2022    INR 0.96 03/25/2019     (H) 02/21/2022    PTT 22 03/25/2019       Plan:  The pleural based tubes are draining well and the effusions do not look enlarged over time. The pneumothorax is small bilaterally with a portion of the air in the mediastinum. The patient appears stable at this time on 1L O2. There is risk to injury of the lung with aspiration of the small pneumothorax. No indication for additional chest tube at this time.  " Please contact IR again if any further concerns.    Case discussed with Dr. Farrar and Dr. Weems.     Adilia Castellon PA-C  Interventional Radiology  Pager: [phone]

## 2022-02-25 NOTE — PROGRESS NOTES
Pain Service Progress Note  [facility]  Date: February 24, 2022      Patient Name: Melissa Elder  MRN: [id number]  Age: 66 year old  Sex: female      Assessment:  Melissa Elder is a 66 year old female with a PMH significant for severe COPD on chronic home O2, mild non-obstructive CAD, paroxysmal A-fib, osteoporosis on treatment     Procedure: bilateral lung transplant     Date of Surgery: 2/21/22     Date of Bilateral ES (erector spinae) T6-7 Catheter Placement: 2/21/22      Plan/Recommendations:  1.-Continuous infusion of Ropvicaine 0.2% at 7 mL/hr each catheter, total 14mL/hrPlan to maintain catheters, max of 7 days    Bolus administered at 1800    MEDICATION: PF bupivacaine  0.25%, total bolus 10mL, via 5ml each catheter.    PROCEDURE: Clinician bolus administered via left and right nerve block catheter, without complication, negative aspirate before and between each mL.  No symptoms of local anesthetic systemic toxicity (LAST). Remained with and assessed patient for 10 min post-injection. BP, P and MAP stable    POST-PROCEDURE: Bedside RN aware of need to continue BP, P and MAP monitoring Q 10 min for an additional 20 min. Contact RAPS (jobcode ID [id number]) if any of the following: patient experiencing any untoward effects, SBP< 90, P < 50 or > 120, MAP < 60     - patient can be evaluated to receive local anesthetic bolus Q 12 hr PRN pain not controlled with continuous infusion.  Bedside nurse must page RAPS to request bolus.    2. Anticoagulation   -heparin SC 5,000 units Q 8 hrs   -Please contact Inpatient Pain Service (pager [phone]) before ordering or making any medication changes     3. Multimodal Analgesia  Per primary team: PO oxycodone 5-10mg Q 4 hours PRN.    Pain Service will continue to follow.    Discussed with attending anesthesiologist      Overnight Events: Got up and walked for fisrt time earlier today, pain exacerbated with walking but overall well controlled.  "    Tubes/Drains: Yes: 2 chest tubes    Subjective: She states her pain is more at midline of chest. States pain is 4/10 and is tolerable.  She feels nerve block boluses are helpful.    Nausea: No  Vomiting: No  Pruritus: No  Symptoms of LAST: No    Pain Location:  Midline chest    Pain Intensity:    Pain at Rest: 4/10   Pain with Activity: 5/10    Satisfied with your level of pain control: Yes    Diet: Adult Formula Drip Feeding: Continuous Osmolite 1.5; Nasoduodenal tube; Goal Rate: 50; mL/hr; Medication - Feeding Tube Flush Frequency: At least 15-30 mL water before and after medication administration and with tube clogging; Amount to Send (Nut...  Soft & Bite Sized Diet (level 6) Thin Liquids (level 0)    Relevant Labs:  Recent Labs   Lab Test 02/24/22  0449 02/22/22  1746 02/22/22  0355 02/21/22  0808 02/21/22  0714   INR  --   --  1.31*   < > 5.23*   *   < > 128*   < >  --    PTT  --   --   --   --  106*   BUN 28   < > 10   < >  --     < > = values in this interval not displayed.       Physical Exam:  Vitals: /67 (BP Location: Right arm)   Pulse 101   Temp 37.1  C (98.8  F) (Oral)   Resp 24   Ht 1.575 m (5' 2\")   Wt 72.3 kg (159 lb 6.3 oz)   SpO2 98%   BMI 29.15 kg/m      Physical Exam:   Orientation:  Alert, oriented, and in no acute distress: Yes  Sedation: No    Motor Examination:  5/5 Strength in lower extremities: Yes      Catheter Site:   Catheter entry site is clean/dry/intact: Yes    Tender: No      Relevant Medications:  Current Pain Medications:  Medications related to Pain Management (From now, onward)    Start     Dose/Rate Route Frequency Ordered Stop    02/24/22 0000  acetaminophen (TYLENOL) tablet 650 mg         650 mg Oral EVERY 4 HOURS PRN 02/21/22 0712      02/22/22 1400  acetaminophen (TYLENOL) tablet 975 mg         975 mg Oral or Feeding Tube EVERY 8 HOURS SCHEDULED 02/22/22 0840      02/22/22 0800  polyethylene glycol (MIRALAX) Packet 17 g         17 g Oral or Feeding " "Tube DAILY 02/21/22 0712      02/21/22 2200  gabapentin (NEURONTIN) capsule 100 mg         100 mg Oral or Feeding Tube AT BEDTIME 02/21/22 0712      02/21/22 1130  ropivacaine 0.2% (NAROPIN) 750 mL in ON-Q C-Bloc select flow ([id number] holds 600-750 mL) dual cath disposable pump         7 mL/hr  Irrigation CONTINUOUS 02/21/22 1106      02/21/22 0800  senna-docusate (SENOKOT-S/PERICOLACE) 8.6-50 MG per tablet 1 tablet         1 tablet Oral or Feeding Tube 2 TIMES DAILY 02/21/22 0712      02/21/22 0712  HYDROmorphone (DILAUDID) injection 0.2 mg        \"Or\" Linked Group Details    0.2 mg Intravenous EVERY 2 HOURS PRN 02/21/22 0712      02/21/22 0712  HYDROmorphone (DILAUDID) injection 0.4 mg        \"Or\" Linked Group Details    0.4 mg Intravenous EVERY 2 HOURS PRN 02/21/22 0712      02/21/22 0712  oxyCODONE (ROXICODONE) tablet 5 mg        \"Or\" Linked Group Details    5 mg Oral EVERY 4 HOURS PRN 02/21/22 0712      02/21/22 0712  oxyCODONE IR (ROXICODONE) tablet 10 mg        \"Or\" Linked Group Details    10 mg Oral EVERY 4 HOURS PRN 02/21/22 0712      02/21/22 0712  magnesium hydroxide (MILK OF MAGNESIA) suspension 30 mL         30 mL Oral DAILY PRN 02/21/22 0712      02/21/22 0712  bisacodyl (DULCOLAX) Suppository 10 mg         10 mg Rectal DAILY PRN 02/21/22 0712      02/21/22 0712  methocarbamol (ROBAXIN) tablet 500 mg         500 mg Oral EVERY 6 HOURS PRN 02/21/22 0712      02/21/22 0712  lidocaine 1 % 0.1-1 mL         0.1-1 mL Other EVERY 1 HOUR PRN 02/21/22 0712      02/21/22 0712  lidocaine (LMX4) cream          Topical EVERY 1 HOUR PRN 02/21/22 0712            Primary Service Contacted with Recommendations? Yes  Lj Bradshaw MD, CA-2  2/24/2022    Time/Communication:  I personally spent 20  minutes with greater than 50% in consultation, education, counseliing and coordination of care.  Also discussed with RN.      Contact Info (24 hour job code pager is the last 4 digits) For in-house use only:   Job code ID: [place] " [address] [place] [id number]  [employer] phone: dial * [phone] * [phone], enter jobcode ID, then enter call-back number.    Text: Use [employer]! on the Intranet <Paging/Directory> tab and enter Jobcode ID.   If no call back at any time, contact the hospital  and ask for Regional Anesthesia attending or backup

## 2022-02-25 NOTE — PROVIDER NOTIFICATION
Time of notification: 11:17 AM  Provider notified: Pain Service  Patient status: Serous drainage noted from upper ropivacaine insertion site  Temp:  [97.6  F (36.4  C)-98.8  F (37.1  C)] 98.5  F (36.9  C)  Pulse:  [] 106  Resp:  [22-30] 28  BP: (130-144)/(60-77) 138/75  SpO2:  [92 %-100 %] 100 %  Orders received: will come to bedside to assess and change dressing.

## 2022-02-25 NOTE — CONSULTS
"NEW INPATIENT DIABETES MANAGEMENT CONSULT  Melissa Elder  Age: 66 year old  MRN # [id number]   YOB: 1955    Chief Complaint: lung transplant   Reason for Consult: \"post transplant hyperglycemia on tube feeds\"  Consulting Provider: Ross Farrar DO    History of Present Illness:   Melissa is a 66 year old female with a past medical history of severe COPD, HTN, paroxysmal a fib, GERD, osteoporosis, who was admitted 2/20/22 for BSLT.     Melissa does not carry a known prior history of diabetes, nor a family history. I do see, however, pre diabetic range A1c done preoperatively (5.8%). She has been intermittently on chronic steroids for years, due to her underlying COPD.     Following transplant, she was started on Methylprednisolone taper, along with IV insulin. A feeding tube was placed 2/21 and she was started on continuous TF. The feeding tube was replaced t o post pyloric FT on 2/23, and TF have been tolerated at goal rate of 50cc/hour (for 244g CHO daily). Her diet is being slowly advanced, but her appetite is poor and she has not been eating much. She is now on PO Pred taper, with next taper on 3/1.    Following transition from Methylpred to prednisone, her IV insulin drip was stopped. She was started on q4h sliding scale Novolog. Her BG were initially reasonably controlled with just the sliding scale (before the tube feeds reached goal rate). Since reaching goal rate consistently, her BG have persisted in the 200's, not improved with sliding scale.         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   Current Diet: Orders Placed This Encounter      Soft & Bite Sized Diet (level 6) Thin Liquids (level 0)     10 point ROS completed with pertinent positives and negatives noted in the HPI  Past medical, family and social histories " "are reviewed and updated.    Social History    Tobacco: former    Alcohol: occasional     Marital Status: , Tyrese    Place of Residence: [place], [place]    Family History   denies FHx diabetes     Physical Exam   BP (!) 149/73 (BP Location: Left arm)   Pulse 97   Temp 98.3  F (36.8  C) (Oral)   Resp 24   Ht 1.575 m (5' 2\")   Wt 75 kg (165 lb 4.8 oz)   SpO2 98%   BMI 30.23 kg/m    General: pleasant, in no distress, sitting up in bed.    HEENT: normocephalic, atraumatic. Oral mucous membranes moist. NGT in place.   Lungs: mildly labored respiration on NC 2, no cough  ABD: rounded, nondistended  Skin: warm and dry, no obvious lesions  MSK:  moves all extremities  Lymp:  no LE edema   Mental status:  alert, oriented to self, place, time  Psych: calm and appropriate interaction     Most Recent Laboratory Tests:  Recent Labs   Lab 02/25/22  0727   HGB 8.7*     Recent Labs   Lab 02/22/22  1946   A1C 5.7*     Recent Labs   Lab 02/25/22  0727   CR 0.46*     Recent Labs   Lab 02/25/22  1153 02/25/22  0912 02/25/22  0727 02/25/22  0339 02/24/22  2321 02/24/22  1941   * 153* 200* 214* 237* 259*       Assessment:   1) Steroid and TF induced hyperglycemia   2) Underlying pre-DM, versus steroid diabetes     Plan:    -today, NPH 5 units x1- late in the day, will not give 2 doses.   -tomorrow, anticipating NPH 6 units AM (give with AM Prednisone) and 4 units PM (give with dinner   -monitor need to initiate Novolog CHO coverage   -Novolog moderate intensity sliding scale >140 AC/HS/0200, on continuous TF   -BG monitoring TID AC, HS, 0200   -hypoglycemia protocol   -recommend regular diet with carb counting protocol   -diabetes education needs will be assessed closer to discharge   -on discharge, will recommend outpatient follow up with [facility]ology service, if requiring insulin     Discussed plan of care with patient, nursing, and primary team via this note.     Thank you for this consult; Inpatient Diabetes " will continue to follow.     To contact Endocrine Diabetes service:   From 8AM-4PM: page inpatient diabetes provider that is following the patient  For questions or updates from 4PM-8AM: page the diabetes job code for on call fellow: [phone]    80 minutes spent on the date of the encounter doing chart review, history and exam, documentation and further activities per the note      Over 50% of my time on the unit was spent counseling the patient and/or coordinating care regarding acute hyperglycemia management.  See note for details.    Sherie Lira PA-C  Inpatient Diabetes Management Service  Pager [phone]

## 2022-02-25 NOTE — PROGRESS NOTES
CVTS:     Reviewed CT scan. L pleural tube appears to be slipping out of pleural space.   Removed L pleural ch tube.     Recommend IR pigtail with CT guided placement.   Cannot place bedside CT in this fresh lung transplant patient.   Discussed with CVTS Fellow.     Luis A Nava PA-C  Cardiothoracic Surgery  Pager [phone]    4:49 PM   February 25, 2022

## 2022-02-25 NOTE — PROVIDER NOTIFICATION
Time of notification: 2:30 PM  Provider notified: CVTS  Patient status: Per CT scan, pt has increased hydropneumothorax and concern for nonfunctional bilateral chest tubes.   Temp:  [98.3  F (36.8  C)-98.8  F (37.1  C)] 98.3  F (36.8  C)  Pulse:  [] 97  Resp:  [22-28] 24  BP: (130-149)/(60-73) 149/73  SpO2:  [95 %-100 %] 98 %  Orders received: Per CVTS, medicine team also notified. Possible plan to replace tubes in IR   Patient/Family/Nursing

## 2022-02-25 NOTE — PROGRESS NOTES
CLINICAL NUTRITION SERVICES - BRIEF NOTE     Nutrition Prescription    Recommendations already ordered by Registered Dietitian (RD):  1. Continue TFs and flushes as presently ordered  2. Add Nutrisource fiber modular, 2 pkts daily to help reduce stooling frequency (adds 30 Kcals, 8 gm CHO, 6 gm fiber)  3. Send Glucerna ONS, once daily at 10 AM [220 Kcals, 10 gm protein, 26 gm CHO per carton]  4. Post SOT education materials given, but needs verbal ed when appropriate     Future/Additional Recommendations:  Monitor PO intake, appropriate time to transition TFs, labs, BG trends, ONS preferences, POC   For last full RD assessment, see note dated 2/21/22     NEW FINDINGS   --[place] [facility] [place]  --Diet advanced to level-6 soft-and-bite-sized on 2/23 with level 0/thin liquids. Appetite so far poor to fair; eating small amounts 1-2 times per day. Is on continuous TFs to meet nutrition needs. Does not feel ready to make modifications to her TF regimen yet. Would like to try an ONS once daily over weekend.   --Will be starting a fiber modular with TFs per provider request; increased loose stooling over past 1-2 days. Monitor for improvements.   --Provided pt with menu for current diet order. Provided pt with post txp diet education materials but did not review yet. Will assess appropriateness for full post SOT diet education once pt is taking more PO.     INTERVENTIONS  Implementation  Collaboration with other providers - MD, RN  Enteral nutrition - continue as ordered, + fiber modular   Medical food supplement therapy - ONS once daily   Nutrition education for recommended modifications - Handouts provided, but not full education yet. Will add discharge instructions for high Kcal/high protein diet.     Monitoring/Evaluation  Will continue to monitor and evaluate per protocol.    Alen Johnson, DEEPIKA, LD, CNSC  [address] RD pager: [phone]   [address] Phone: *[phone]

## 2022-02-26 ENCOUNTER — APPOINTMENT (OUTPATIENT)
Dept: ULTRASOUND IMAGING | Facility: CLINIC | Age: 67
DRG: 007 | End: 2022-02-26
Attending: STUDENT IN AN ORGANIZED HEALTH CARE EDUCATION/TRAINING PROGRAM
Payer: MEDICARE

## 2022-02-26 ENCOUNTER — APPOINTMENT (OUTPATIENT)
Dept: INTERVENTIONAL RADIOLOGY/VASCULAR | Facility: CLINIC | Age: 67
DRG: 007 | End: 2022-02-26
Attending: RADIOLOGY
Payer: MEDICARE

## 2022-02-26 ENCOUNTER — APPOINTMENT (OUTPATIENT)
Dept: GENERAL RADIOLOGY | Facility: CLINIC | Age: 67
DRG: 007 | End: 2022-02-26
Attending: PHYSICIAN ASSISTANT
Payer: MEDICARE

## 2022-02-26 ENCOUNTER — APPOINTMENT (OUTPATIENT)
Dept: PHYSICAL THERAPY | Facility: CLINIC | Age: 67
DRG: 007 | End: 2022-02-26
Attending: SURGERY
Payer: MEDICARE

## 2022-02-26 LAB
ALBUMIN SERPL-MCNC: 1.9 G/DL (ref 3.4–5)
ALP SERPL-CCNC: 89 U/L (ref 40–150)
ALT SERPL W P-5'-P-CCNC: 53 U/L (ref 0–50)
ANION GAP SERPL CALCULATED.3IONS-SCNC: 2 MMOL/L (ref 3–14)
AST SERPL W P-5'-P-CCNC: 33 U/L (ref 0–45)
BILIRUB SERPL-MCNC: 0.3 MG/DL (ref 0.2–1.3)
BUN SERPL-MCNC: 20 MG/DL (ref 7–30)
CALCIUM SERPL-MCNC: 7.4 MG/DL (ref 8.5–10.1)
CHLORIDE BLD-SCNC: 102 MMOL/L (ref 94–109)
CO2 SERPL-SCNC: 37 MMOL/L (ref 20–32)
CREAT SERPL-MCNC: 0.41 MG/DL (ref 0.52–1.04)
ERYTHROCYTE [DISTWIDTH] IN BLOOD BY AUTOMATED COUNT: 13.3 % (ref 10–15)
GFR SERPL CREATININE-BSD FRML MDRD: >90 ML/MIN/1.73M2
GLUCOSE BLD-MCNC: 173 MG/DL (ref 70–99)
GLUCOSE BLDC GLUCOMTR-MCNC: 106 MG/DL (ref 70–99)
GLUCOSE BLDC GLUCOMTR-MCNC: 117 MG/DL (ref 70–99)
GLUCOSE BLDC GLUCOMTR-MCNC: 157 MG/DL (ref 70–99)
GLUCOSE BLDC GLUCOMTR-MCNC: 180 MG/DL (ref 70–99)
GLUCOSE BLDC GLUCOMTR-MCNC: 188 MG/DL (ref 70–99)
GLUCOSE BLDC GLUCOMTR-MCNC: 215 MG/DL (ref 70–99)
HCT VFR BLD AUTO: 26.7 % (ref 35–47)
HGB BLD-MCNC: 8.4 G/DL (ref 11.7–15.7)
LACTATE SERPL-SCNC: 1.3 MMOL/L (ref 0.7–2)
MAGNESIUM SERPL-MCNC: 2 MG/DL (ref 1.6–2.3)
MCH RBC QN AUTO: 29.1 PG (ref 26.5–33)
MCHC RBC AUTO-ENTMCNC: 31.5 G/DL (ref 31.5–36.5)
MCV RBC AUTO: 92 FL (ref 78–100)
ORGANISM (ARUP): ABNORMAL
PHOSPHATE SERPL-MCNC: 2.3 MG/DL (ref 2.5–4.5)
PLATELET # BLD AUTO: 165 10E3/UL (ref 150–450)
POTASSIUM BLD-SCNC: 4.2 MMOL/L (ref 3.4–5.3)
PROT SERPL-MCNC: 4.9 G/DL (ref 6.8–8.8)
RBC # BLD AUTO: 2.89 10E6/UL (ref 3.8–5.2)
SODIUM SERPL-SCNC: 141 MMOL/L (ref 133–144)
TACROLIMUS BLD-MCNC: 6.1 UG/L (ref 5–15)
TME LAST DOSE: NORMAL H
TME LAST DOSE: NORMAL H
WBC # BLD AUTO: 9.6 10E3/UL (ref 4–11)

## 2022-02-26 PROCEDURE — 94668 MNPJ CHEST WALL SBSQ: CPT

## 2022-02-26 PROCEDURE — 36415 COLL VENOUS BLD VENIPUNCTURE: CPT | Performed by: STUDENT IN AN ORGANIZED HEALTH CARE EDUCATION/TRAINING PROGRAM

## 2022-02-26 PROCEDURE — 32557 INSERT CATH PLEURA W/ IMAGE: CPT

## 2022-02-26 PROCEDURE — 36415 COLL VENOUS BLD VENIPUNCTURE: CPT | Performed by: INTERNAL MEDICINE

## 2022-02-26 PROCEDURE — 250N000013 HC RX MED GY IP 250 OP 250 PS 637: Performed by: PHYSICIAN ASSISTANT

## 2022-02-26 PROCEDURE — 250N000012 HC RX MED GY IP 250 OP 636 PS 637: Performed by: STUDENT IN AN ORGANIZED HEALTH CARE EDUCATION/TRAINING PROGRAM

## 2022-02-26 PROCEDURE — 272N000196 HC ACCESSORY CR5

## 2022-02-26 PROCEDURE — 80197 ASSAY OF TACROLIMUS: CPT | Performed by: PHYSICIAN ASSISTANT

## 2022-02-26 PROCEDURE — 99233 SBSQ HOSP IP/OBS HIGH 50: CPT | Mod: 24 | Performed by: INTERNAL MEDICINE

## 2022-02-26 PROCEDURE — 84100 ASSAY OF PHOSPHORUS: CPT | Performed by: SURGERY

## 2022-02-26 PROCEDURE — 250N000013 HC RX MED GY IP 250 OP 250 PS 637: Performed by: STUDENT IN AN ORGANIZED HEALTH CARE EDUCATION/TRAINING PROGRAM

## 2022-02-26 PROCEDURE — 97116 GAIT TRAINING THERAPY: CPT | Mod: GP | Performed by: PHYSICAL THERAPIST

## 2022-02-26 PROCEDURE — 32557 INSERT CATH PLEURA W/ IMAGE: CPT | Mod: XS | Performed by: RADIOLOGY

## 2022-02-26 PROCEDURE — 272N000500 HC NEEDLE CR2

## 2022-02-26 PROCEDURE — 97530 THERAPEUTIC ACTIVITIES: CPT | Mod: GP | Performed by: PHYSICAL THERAPIST

## 2022-02-26 PROCEDURE — 93010 ELECTROCARDIOGRAM REPORT: CPT | Performed by: INTERNAL MEDICINE

## 2022-02-26 PROCEDURE — 80053 COMPREHEN METABOLIC PANEL: CPT | Performed by: PHYSICIAN ASSISTANT

## 2022-02-26 PROCEDURE — 272N000566 HC SHEATH CR3

## 2022-02-26 PROCEDURE — 250N000011 HC RX IP 250 OP 636: Performed by: SURGERY

## 2022-02-26 PROCEDURE — 250N000013 HC RX MED GY IP 250 OP 250 PS 637: Performed by: SURGERY

## 2022-02-26 PROCEDURE — 71046 X-RAY EXAM CHEST 2 VIEWS: CPT | Mod: 26 | Performed by: RADIOLOGY

## 2022-02-26 PROCEDURE — 94640 AIRWAY INHALATION TREATMENT: CPT | Mod: 76

## 2022-02-26 PROCEDURE — 250N000011 HC RX IP 250 OP 636: Performed by: STUDENT IN AN ORGANIZED HEALTH CARE EDUCATION/TRAINING PROGRAM

## 2022-02-26 PROCEDURE — 93971 EXTREMITY STUDY: CPT | Mod: 26 | Performed by: RADIOLOGY

## 2022-02-26 PROCEDURE — 120N000005 HC R&B MS OVERFLOW UMMC

## 2022-02-26 PROCEDURE — 250N000012 HC RX MED GY IP 250 OP 636 PS 637: Performed by: PHYSICIAN ASSISTANT

## 2022-02-26 PROCEDURE — 71046 X-RAY EXAM CHEST 2 VIEWS: CPT

## 2022-02-26 PROCEDURE — 93971 EXTREMITY STUDY: CPT | Mod: LT

## 2022-02-26 PROCEDURE — 250N000009 HC RX 250: Performed by: STUDENT IN AN ORGANIZED HEALTH CARE EDUCATION/TRAINING PROGRAM

## 2022-02-26 PROCEDURE — 32557 INSERT CATH PLEURA W/ IMAGE: CPT | Mod: LT | Performed by: RADIOLOGY

## 2022-02-26 PROCEDURE — C1729 CATH, DRAINAGE: HCPCS

## 2022-02-26 PROCEDURE — 250N000009 HC RX 250: Performed by: SURGERY

## 2022-02-26 PROCEDURE — C1769 GUIDE WIRE: HCPCS

## 2022-02-26 PROCEDURE — 0W9B30Z DRAINAGE OF LEFT PLEURAL CAVITY WITH DRAINAGE DEVICE, PERCUTANEOUS APPROACH: ICD-10-PCS | Performed by: RADIOLOGY

## 2022-02-26 PROCEDURE — 250N000011 HC RX IP 250 OP 636: Performed by: PHYSICIAN ASSISTANT

## 2022-02-26 PROCEDURE — 83735 ASSAY OF MAGNESIUM: CPT | Performed by: SURGERY

## 2022-02-26 PROCEDURE — 272N000192 HC ACCESSORY CR2

## 2022-02-26 PROCEDURE — 93005 ELECTROCARDIOGRAM TRACING: CPT

## 2022-02-26 PROCEDURE — 85027 COMPLETE CBC AUTOMATED: CPT | Performed by: STUDENT IN AN ORGANIZED HEALTH CARE EDUCATION/TRAINING PROGRAM

## 2022-02-26 PROCEDURE — 83605 ASSAY OF LACTIC ACID: CPT | Performed by: INTERNAL MEDICINE

## 2022-02-26 PROCEDURE — 99233 SBSQ HOSP IP/OBS HIGH 50: CPT | Performed by: STUDENT IN AN ORGANIZED HEALTH CARE EDUCATION/TRAINING PROGRAM

## 2022-02-26 PROCEDURE — 999N000157 HC STATISTIC RCP TIME EA 10 MIN

## 2022-02-26 PROCEDURE — 94640 AIRWAY INHALATION TREATMENT: CPT

## 2022-02-26 RX ORDER — LABETALOL HYDROCHLORIDE 5 MG/ML
10 INJECTION, SOLUTION INTRAVENOUS EVERY 6 HOURS PRN
Status: DISCONTINUED | OUTPATIENT
Start: 2022-02-26 | End: 2022-03-17 | Stop reason: HOSPADM

## 2022-02-26 RX ORDER — METOPROLOL TARTRATE 25 MG/1
25 TABLET, FILM COATED ORAL 2 TIMES DAILY
Status: DISCONTINUED | OUTPATIENT
Start: 2022-02-26 | End: 2022-03-11

## 2022-02-26 RX ORDER — LIDOCAINE HYDROCHLORIDE 10 MG/ML
1-30 INJECTION, SOLUTION EPIDURAL; INFILTRATION; INTRACAUDAL; PERINEURAL
Status: COMPLETED | OUTPATIENT
Start: 2022-02-26 | End: 2022-02-26

## 2022-02-26 RX ADMIN — ACETYLCYSTEINE 2 ML: 200 SOLUTION ORAL; RESPIRATORY (INHALATION) at 08:33

## 2022-02-26 RX ADMIN — NYSTATIN 1000000 UNITS: 100000 SUSPENSION ORAL at 08:23

## 2022-02-26 RX ADMIN — ACETYLCYSTEINE 2 ML: 200 SOLUTION ORAL; RESPIRATORY (INHALATION) at 19:35

## 2022-02-26 RX ADMIN — TACROLIMUS 4 MG: 1 CAPSULE ORAL at 08:23

## 2022-02-26 RX ADMIN — ROSUVASTATIN CALCIUM 40 MG: 10 TABLET, FILM COATED ORAL at 08:23

## 2022-02-26 RX ADMIN — LEVALBUTEROL HYDROCHLORIDE 1.25 MG: 1.25 SOLUTION RESPIRATORY (INHALATION) at 19:35

## 2022-02-26 RX ADMIN — GABAPENTIN 100 MG: 100 CAPSULE ORAL at 21:27

## 2022-02-26 RX ADMIN — Medication 1 PACKET: at 20:21

## 2022-02-26 RX ADMIN — ONDANSETRON 4 MG: 2 INJECTION INTRAMUSCULAR; INTRAVENOUS at 06:41

## 2022-02-26 RX ADMIN — FAMOTIDINE 10 MG: 10 TABLET, FILM COATED ORAL at 20:21

## 2022-02-26 RX ADMIN — ACYCLOVIR 400 MG: 200 SUSPENSION ORAL at 08:23

## 2022-02-26 RX ADMIN — METOPROLOL TARTRATE 25 MG: 25 TABLET, FILM COATED ORAL at 20:21

## 2022-02-26 RX ADMIN — LEVALBUTEROL HYDROCHLORIDE 1.25 MG: 1.25 SOLUTION RESPIRATORY (INHALATION) at 12:54

## 2022-02-26 RX ADMIN — MYCOPHENOLATE MOFETIL 1000 MG: 200 POWDER, FOR SUSPENSION ORAL at 08:22

## 2022-02-26 RX ADMIN — Medication 15 ML: at 08:23

## 2022-02-26 RX ADMIN — LEVALBUTEROL HYDROCHLORIDE 1.25 MG: 1.25 SOLUTION RESPIRATORY (INHALATION) at 08:33

## 2022-02-26 RX ADMIN — OXYCODONE HYDROCHLORIDE 5 MG: 5 TABLET ORAL at 20:22

## 2022-02-26 RX ADMIN — OXYCODONE HYDROCHLORIDE 5 MG: 5 TABLET ORAL at 15:20

## 2022-02-26 RX ADMIN — PREDNISOLONE 17.5 MG: 15 SOLUTION ORAL at 20:21

## 2022-02-26 RX ADMIN — ACETAMINOPHEN 975 MG: 325 TABLET, FILM COATED ORAL at 14:20

## 2022-02-26 RX ADMIN — METHOCARBAMOL 500 MG: 500 TABLET ORAL at 04:30

## 2022-02-26 RX ADMIN — Medication 1 PACKET: at 08:24

## 2022-02-26 RX ADMIN — CEFTRIAXONE SODIUM 2 G: 2 INJECTION, POWDER, FOR SOLUTION INTRAMUSCULAR; INTRAVENOUS at 10:24

## 2022-02-26 RX ADMIN — ACETYLCYSTEINE 2 ML: 200 SOLUTION ORAL; RESPIRATORY (INHALATION) at 12:54

## 2022-02-26 RX ADMIN — LIDOCAINE HYDROCHLORIDE 25 ML: 10 INJECTION, SOLUTION EPIDURAL; INFILTRATION; INTRACAUDAL; PERINEURAL at 17:07

## 2022-02-26 RX ADMIN — NYSTATIN 1000000 UNITS: 100000 SUSPENSION ORAL at 11:41

## 2022-02-26 RX ADMIN — METHOCARBAMOL 500 MG: 500 TABLET ORAL at 18:32

## 2022-02-26 RX ADMIN — HEPARIN SODIUM 5000 UNITS: 5000 INJECTION, SOLUTION INTRAVENOUS; SUBCUTANEOUS at 06:30

## 2022-02-26 RX ADMIN — Medication 40 MG: at 08:23

## 2022-02-26 RX ADMIN — PREDNISOLONE 17.5 MG: 15 SOLUTION ORAL at 08:23

## 2022-02-26 RX ADMIN — NYSTATIN 1000000 UNITS: 100000 SUSPENSION ORAL at 20:20

## 2022-02-26 RX ADMIN — ACETAMINOPHEN 975 MG: 325 TABLET, FILM COATED ORAL at 21:27

## 2022-02-26 RX ADMIN — ACETAMINOPHEN 975 MG: 325 TABLET, FILM COATED ORAL at 06:30

## 2022-02-26 RX ADMIN — FAMOTIDINE 10 MG: 10 TABLET, FILM COATED ORAL at 08:23

## 2022-02-26 RX ADMIN — OXYCODONE HYDROCHLORIDE 5 MG: 5 TABLET ORAL at 00:06

## 2022-02-26 RX ADMIN — MYCOPHENOLATE MOFETIL 1000 MG: 200 POWDER, FOR SUSPENSION ORAL at 20:21

## 2022-02-26 RX ADMIN — HEPARIN SODIUM 5000 UNITS: 5000 INJECTION, SOLUTION INTRAVENOUS; SUBCUTANEOUS at 14:20

## 2022-02-26 RX ADMIN — ACYCLOVIR 400 MG: 200 SUSPENSION ORAL at 20:20

## 2022-02-26 RX ADMIN — TACROLIMUS 4 MG: 5 CAPSULE ORAL at 18:32

## 2022-02-26 RX ADMIN — Medication 1 PACKET: at 14:20

## 2022-02-26 RX ADMIN — ACETAMINOPHEN 650 MG: 325 TABLET ORAL at 18:32

## 2022-02-26 RX ADMIN — Medication 12.5 MG: at 08:23

## 2022-02-26 ASSESSMENT — ACTIVITIES OF DAILY LIVING (ADL)
ADLS_ACUITY_SCORE: 11

## 2022-02-26 NOTE — PROGRESS NOTES
[facility]    Medicine Progress Note - Hospitalist Service, GOLD TEAM 10    Date of Admission:  2/20/2022    Assessment & Plan          Melissa Elder is a 66 year old female with PMHx HTN, HLD, paroxysmal Afib, osteoporosis, GERD, severe COPD, previously requiring 2-3L NC O2 at rest.  Underwent b/l lung transplant with Dr. Robin on 02/21. Got 1u pRBC post-op, no overt bleeding source, monitoring. Extubated 02//22 to O2 NC. She is now transferred onto the floor. Patient still has two chest tubes in but pericardial drain was removed 2/24 without issues. Continuing on antibiotics for cultures growing from donor and recipient lungs. Patient now with increasing hydropneumothorax with left pleural chest tube being pulled after it was falling out. IR consulted but felt no need for further intervention as two chest tubes still in but will talk to them again as one is out now.    S/p bilateral sequential lung transplant for COPD  Acute hypoxic/hypercapneic respiratory failure  Donor lung growing MSSA   Actinomyces in respiratory culture  Scant pleural-pleural adhesions and mild-moderate bilateral hilar lymphadenopathy per op note.  Pressor weaned off 2/22 and pt. extubated. Prior history of infection/colonization with Haemophilus influenzae (2017).  IgG adequate (2/20).  MRSA nares negative 2/21.  Broad spectrum ABX empirically post-op with vanc and ceftaz (2/21-2/22), stopped after 24h per Dr. Lala to target known donor cultures on POD #1. Donor cultures ([facility]) with Strep mitis, Actinomyces odontolyticus, and Kenyatta kruseii. Recipient cultures with Actinomyces odonotolyticus.  So currently on ceftriaxone for coverage. She has now been weaned down to room air intermittently needing 1 L O2.  - Nebs: levalbuterol and Mucomyst QID  - Aggressive pulmonary toilet with chest physiotherapy QID as well as Aerobika and incentive spirometry q1h w/a  - Wean supplemental O2 to keep  >92%  - Anesthesia to manage erector spinae catheters (placed 2/21)  - Chest tubes managed by surgical team   - left pleural tube out will need new chest tube placed by IR   - Daily CXR  - Tube feeding per nutirtion    - Adding fiber today for loose stools   - Await explant pathology  - Continue ceftriaxone for 2 weeks through 3/8 followed by amoxicillin for 3 months  - Immune suppression per daily transplant pulm note  - no lasix today     ID Prophylaxis  - Daponse for PJP ppx MWF dose, increase to daily after one week (on 2/28) CBC remains stable   - Acyclovir for HSV ppx (newly positive 2/20)  - Nystatin for oral candidiasis ppx, 6 month course    Non infectious diarrhea   Noted to have increased loose stools 2/25 likely due to mmf and TF. No rise in white count or abdominal pain to suggest c. Diff.   - discussed with dietician and will add fiber     Post op pain   - Erector spinae catheters placed 2/21 managed by anesthesia   - gabapentin 100 mg nightly  - tylenol 975 mg Q8H   - Dilaudid 0.2-0.4 mg Q2H PRN   - oxycodone 5-10 mg Q4H PRN   - robaxin 500 mg Q6H PRN     Paroxysmal Afib   She was on diltiazem prior to lung transplantation and had not been on anticoagulation. She has not been transitioned to metoprolol which will be continued.  -  Increased Metoprolol tartrate to 25 mg BID   - no anticoagulation at this time     Stress induced hyperglycemia  Did not need insulin prior to admission but sugars have been rising despite sliding scale coverage.  - Endocrinology consulted for assistance    HTN   Hx of htn but was recently on pressors will continue to monitor     HLD  Mild CAD   Noted on transplant workup.   - started rosuvastatin 40 mg daily     Acute blood loss anemia  Acute blood loss thrombocytopenia  Secondary to surgery. Will continue to monitor   - Transfuse Hgb < 7, platelets < 50     Sternotomy  Surgical Incision  - Sternal precautions  - Postoperative incision management per protocol  -  PT/OT/CR       Diet: Soft & Bite Sized Diet (level 6) Thin Liquids (level 0)  Adult Formula Drip Feeding: Continuous Osmolite 1.5; Nasoduodenal tube; Goal Rate: 50; mL/hr; Medication - Feeding Tube Flush Frequency: At least 15-30 mL water before and after medication administration and with tube clogging; Amount to Send (Nut...  Snacks/Supplements Adult: Glucerna; Between Meals    DVT Prophylaxis: Pneumatic Compression Devices  [place]iff Catheter: Not present  Central Lines: None  Cardiac Monitoring: None  Code Status: Full Code      Disposition Plan   Expected Discharge: 03/04/2022   Anticipated discharge location: home;inpatient rehabilitation facility    Delays:            The patient's care was discussed with the Bedside Nurse, Patient and transplant pulm  Consultant.    Ross Farrar DO  Hospitalist Service, GOLD TEAM [address] [place] [address]  Securely message with the Vocera Web Console (learn more here)  Text page via [employer] Paging/Directory   Please see signed in provider for up to date coverage information      Clinically Significant Risk Factors Present on Admission                    ______________________________________________________________________    Interval History     No acute events overnight. Feels like diarrhea is improving. Breathing is about the same. Pain is about the same.    Four point ROS completed and negative unless listed above     Data reviewed today: I reviewed all medications, new labs and imaging results over the last 24 hours.     Physical Exam   Vital Signs: Temp: 98.5  F (36.9  C) Temp src: Oral BP: (!) 146/57 Pulse: 106   Resp: 24 SpO2: 95 % O2 Device: Nasal cannula Oxygen Delivery: 1 LPM  Weight: 162 lbs 11.19 oz    Constitutional: Laying in bed breathing rapidly but no distress  HEENT: Eyes with pink conjunctivae, anicteric.  Oral mucosa moist without lesions.  Voice hoarse/squeaky  PULM: Diminished bases bilaterally.  No crackles, no rhonchi, no  wheezes.  Chest tubes without air leak or tidaling.  CV: Normal S1 and S2.  Tachycardia.  No murmur, gallop, or rub.  No peripheral edema.   ABD: BS hypoactive, soft, nontender, nondistended.    MSK: Moves all extremities.  No apparent muscle wasting.   NEURO: Alert and oriented, conversant.   SKIN: Warm, dry.  No rash on limited exam.   PSYCH: Mood stable.     Data   Recent Labs   Lab 02/26/22  1136 02/26/22  0752 02/26/22  0530 02/25/22  0912 02/25/22  0727 02/24/22  0454 02/24/22  0449 02/22/22  0402 02/22/22  0355 02/21/22  0809 02/21/22  0808 02/21/22  0717 02/21/22  0714   WBC  --   --  9.6  --  12.2*  --  15.9*   < > 11.5*   < >  --   --   --    HGB  --   --  8.4*  --  8.7*  --  8.9*   < > 8.8*   < >  --   --   --    MCV  --   --  92  --  94  --  92   < > 85   < >  --   --   --    PLT  --   --  165  --  160  --  136*   < > 128*   < >  --   --   --    INR  --   --   --   --   --   --   --   --  1.31*  --  1.58*  --  5.23*   NA  --   --  141  --  140  --  137   < > 140   < >  --   --   --    POTASSIUM  --   --  4.2  --  4.1  --  4.8   < > 4.6   < >  --   --   --    CHLORIDE  --   --  102  --  105  --  107   < > 111*   < >  --   --   --    CO2  --   --  37*  --  31  --  26   < > 26   < >  --   --   --    BUN  --   --  20  --  26  --  28   < > 10   < >  --   --   --    CR  --   --  0.41*  --  0.46*  --  0.53   < > 0.53   < >  --   --   --    ANIONGAP  --   --  2*  --  4  --  4   < > 3   < >  --   --   --    MINISTERIO  --   --  7.4*  --  7.7*  --  8.2*   < > 7.2*   < >  --   --   --    * 157* 173*   < > 200*   < > 200*   < > 135*   < >  --    < >  --    ALBUMIN  --   --  1.9*  --  2.0*  --  2.3*   < > 2.3*   < >  --   --   --    PROTTOTAL  --   --  4.9*  --  5.0*  --  5.2*   < > 4.2*   < >  --   --   --    BILITOTAL  --   --  0.3  --  0.2  --  0.2   < > 0.4   < >  --   --   --    ALKPHOS  --   --  89  --  80  --  58   < > 36*   < >  --   --   --    ALT  --   --  53*  --  31  --  24   < > 17   < >  --   --   --     AST  --   --  33  --  28  --  26   < > 40   < >  --   --   --     < > = values in this interval not displayed.     Recent Results (from the past 24 hour(s))   XR Chest Port 1 View    Narrative    XR CHEST PORT 1 VIEW  2/25/2022 4:30 PM     HISTORY:  Chest tube removal       COMPARISON:  Chest CT 2/25/2022, chest x-ray 2/25/2022    FINDINGS:   Frontal view of the chest. Interval removal of left basilar chest  tube. Possible residual small medial left pneumothorax. Small right  apical pneumothorax with right basilar chest tube in similar position.  Feeding tube courses below the diaphragm and beyond the field of view.  Postoperative changes of bilateral lung transplant with clamshell  sternotomy wires and mediastinal clips.  Trachea is midline. Cardiac silhouette is stable. Mild perihilar and  bibasilar streaky opacities. Possible trace bilateral pleural  effusions.      Impression    IMPRESSION:  1. Interval removal of left chest tube with residual small medial left  pneumothorax.  2. Small right apical pneumothorax with right chest tube in place.  3. Trace bilateral pleural effusions with associated atelectasis.    I have personally reviewed the examination and initial interpretation  and I agree with the findings.    KEN CHAVARRIA MD         SYSTEM ID:  [id number]   XR Chest 2 Views    Narrative    Exam: XR CHEST 2 VW, 2/26/2022 9:08 AM    Indication: interval follow up, post-op lung transplant    Comparison: 2/25/2022 chest x-ray at 16:20    Findings:   Upright, PA and lateral views of the chest. Enteric tube tip appears  to be postpyloric. Right basilar chest tube is stable in position.  Midline trachea. Stable elevation of the left hemidiaphragm. Stable  cardiac silhouette. Mild pulmonary venous congestion with small  bilateral pleural effusions. Stable size of right apical pneumothorax.  No discernible left-sided pneumothorax.       Impression    Impression:   1. Stable size of right apical pneumothorax.  No discernible left-sided  pneumothorax.  2. Small bilateral pleural effusions with new, mild pulmonary venous  congestion.     I have personally reviewed the examination and initial interpretation  and I agree with the findings.    FELIPA MARIE MD         SYSTEM ID:  [id number]     Medications     dextrose 10 mL/hr at 02/21/22 1900     BETA BLOCKER NOT PRESCRIBED       disposable pump w/ anesthetic 10 mL/hr at 02/26/22 0000     sodium chloride 10 mL/hr at 02/23/22 1947       acetaminophen  975 mg Oral or Feeding Tube Q8H JUANA     acetylcysteine  2 mL Nebulization 4x Daily     acyclovir  400 mg Oral or NG Tube BID    Or     acyclovir  400 mg Oral or NG Tube BID     [START ON 3/1/2022] calcium carbonate 600 mg-vitamin D 400 units  1 tablet Oral BID w/meals     cefTRIAXone  2 g Intravenous Q24H     dapsone  50 mg Oral Once per day on Mon Wed Fri     [Held by provider] dapsone  50 mg Oral or NG Tube Once per day on Mon Wed Fri     famotidine  10 mg Oral or Feeding Tube BID     fiber modular (NUTRISOURCE FIBER)  1 packet Per Feeding Tube BID     gabapentin  100 mg Oral or Feeding Tube At Bedtime     heparin ANTICOAGULANT  5,000 Units Subcutaneous Q8H     insulin aspart  1-7 Units Subcutaneous TID AC     insulin aspart  1-5 Units Subcutaneous BID     insulin NPH  4 Units Subcutaneous Daily with supper     insulin NPH  6 Units Subcutaneous QAM     levalbuterol  1.25 mg Nebulization 4x Daily     metoprolol tartrate  25 mg Oral or Feeding Tube BID     multivitamins w/minerals  15 mL Oral Daily     mycophenolate  1,000 mg Oral BID    Or     mycophenolate  1,000 mg Oral or NG Tube BID     nystatin  1,000,000 Units Swish & Swallow 4x Daily     pantoprazole  40 mg Oral or Feeding Tube Daily     polyethylene glycol  17 g Oral or Feeding Tube Daily     prednisoLONE  17.5 mg Oral or NG Tube BID     [START ON 3/1/2022] predniSONE  15 mg Oral or Feeding Tube BID     protein modular  1 packet Per Feeding Tube TID     rosuvastatin   40 mg Oral Daily     senna-docusate  1 tablet Oral or Feeding Tube BID     sodium chloride (PF)  3 mL Intracatheter Q8H     tacrolimus  4 mg Oral BID IS    Or     tacrolimus  4 mg Per Feeding Tube BID IS

## 2022-02-26 NOTE — IR NOTE
Patient Name: Melissa Elder  Medical Record Number: [id number]  Today's Date: 2/26/2022    Procedure: Image guided left chest tube placement  Proceduralist: Dr. Weems and Dr. John  Pathology present: No    Procedure Start: 1650  Procedure end: 1730  Sedation medications administered: None     Report given to: Pza RODRIGUEZ 6B  : None    Other Notes: Pt arrived to IR room CT 2 from . Consent reviewed. Pt denies any questions or concerns regarding procedure. Pt positioned supine and monitored per protocol. Pt tolerated procedure without any noted complications. Pt transferred back to .

## 2022-02-26 NOTE — PLAN OF CARE
Neuro: A&Ox4. Voice hoarse.   Cardiac: ST, HR 100s. /70s- gold CC notified, PRN labetalol order modified.    Respiratory: Sating 90-93% on RA, > 94% on 1L NC- required after activity. Tachypneic, RR 20s, up to 30s w/ activity, improved with 1L NC. LUTHER, denies shortness of breath at rest.   GI/: Adequate urine output. BM X3, watery. Fiber packets started this evening. PRN zofran given for nausea this AM.   Diet/appetite: Tolerating soft, bite sized diet with thin liquids. Fair appetite. TF @ 50 mL/hr with 30 mL FWF Q4.   Activity:  Assist of 1 with walker, up to commode.   Pain: At acceptable level on current regimen. C/o generalized back pain/abdominal discomfort, PRN oxy x1 and robaxin x1 with relief.  Skin: No new deficits noted. Clamshell incision ANGELA. R CT and old CT sites CDI.   LDA's: PIV x1. R CT to -20 suction, sero sang output. On-Q sites CDI. NJ with cont feeds.     Plan: Continue with POC. Notify primary team with changes.

## 2022-02-26 NOTE — PROGRESS NOTES
Diabetes Consult Daily  Progress Note          Assessment/Plan:     1) Steroid and TF induced hyperglycemia   2) Underlying pre-DM, versus steroid diabetes      Plan:     Started NPH 6 units QAM and 4 units QPM today    Will monitor need to initiate Novolog CHO coverage    Novolog moderate intensity sliding scale >140 AC/HS/0200, on continuous TF    BG monitoring TID AC, HS, 0200    Patient care was discussed with MD Maria Julian MD  Endocrinology fellow           Interval History:     Glycemic control over the last 24 hours was reviewed  adjustment made to basal insulin starting today  will monitor the effect of these changes  continues to have poor oral intake, on continues tube feeds    The last 24 hours progress and nursing notes reviewed.    PTA Regimen:      Recent Labs   Lab 02/26/22  1136 02/26/22  0752 02/26/22  0530 02/26/22  0155 02/25/22  2201 02/25/22  1845   * 157* 173* 180* 192* 192*               Review of Systems:   See interval hx          Medications:     Orders Placed This Encounter      Soft & Bite Sized Diet (level 6) Thin Liquids (level 0)    Physical Exam:  not performed          Data:     Lab Results   Component Value Date    A1C 5.7 02/22/2022    A1C 5.8 02/20/2022            Recent Labs   Lab Test 02/26/22  1136 02/26/22  0752 02/26/22  0530 02/25/22  0912 02/25/22  0727   NA  --   --  141  --  140   POTASSIUM  --   --  4.2  --  4.1   CHLORIDE  --   --  102  --  105   CO2  --   --  37*  --  31   ANIONGAP  --   --  2*  --  4   * 157* 173*   < > 200*   BUN  --   --  20  --  26   CR  --   --  0.41*  --  0.46*   MINISTERIO  --   --  7.4*  --  7.7*    < > = values in this interval not displayed.

## 2022-02-26 NOTE — CONSULTS
"INTERVENTIONAL RADIOLOGY CONSULT    HPI: Patient is a 66 yo F HTN, HLD, paroxysmal Afib, osteoporosis, GERD, s/p lung transplant 2/21 for severe CPOD. Had bilateral surgical chest tubes for hydropneumothoracis with small air component. Left chest tube was \"slipping out\" so it was removed. Team requests left chest tube. Today's CXR after left chest tube removal shows no appreciable pneumothorax and small residual pleural fluid.     Patient is on IR schedule for 14 [place] left chest tube placement, if CT during procedure shows adequate fluid to place the drain. No sedation planned.    Consent will be done prior to procedure.     Lab Results   Component Value Date    INR 1.31 02/22/2022    INR 1.58 02/21/2022    INR 0.96 03/25/2019     Lab Results   Component Value Date     02/26/2022     02/25/2022     06/21/2021     12/09/2019       Selma Weems MD  Interventional Radiology   Service pager [phone]    "

## 2022-02-26 NOTE — PLAN OF CARE
[id number]    Neuro: A&Ox4 , voice hoarse, improving per pt   Cardiac: ST, HR 100s,  OVSS.   Respiratory: Sating 90-92% on RA, 95% on 1L NC, tachypneic at times, RR 24, improved w 1L NC O2  GI/: Adequate urine output, has been having loose stools  Diet/appetite: Tolerating soft, bite sized diet with thin liquids. Fair appetite. Tube feed at goal rate of 50mL/hr with FWF 30 mL Q4.   Activity:  Assist of 1-2, up to commode   Pain: C/o nausea, zofran given with some relief, oxy x 1 for abd fullness/cramping   Skin: Clamshell incision, CDI, ANGELA. Old CT sites and current R CT, covered, CDI.   LDA's: R PIV SL. R CT -20 suction, no air leak noted. On-Q sites CDI      Plan: Continue POC. Notify primary team with changes.

## 2022-02-26 NOTE — PROGRESS NOTES
Cardiovascular Surgery Progress Note  02/26/2022         Assessment and Plan:     Melissa Elder is a 66 year old female with PMHx HTN, HLD, paroxysmal Afib, osteoporosis, GERD, severe COPD, previously requiring 2-3L NC O2 at rest. She underwent b/l lung transplant with Dr. Robin on 02/21. Got 1u pRBC post-op, no overt bleeding source, monitoring. Extubated 02/22 to O2 NC.    CVTS is following in consideration of the clamshell incision as well as chest tube management.    # Clamshell incision  - Continue to adhere to sternal precautions  - Postoperative incision management, continue open to air but needs to be kept very dry under her breasts. Skin glue applied  - Clamshell incision appears clean, dry. Incision appears without erythema, or drainage. Wound edges are well approximated.  - Encourage activity/OOB, IS/pulmonary toilet.  Maintain strict sleep hygiene and delirium precautions.     # Chest tube management  - Continue to suction.  - Left Pleural output: 370mL no air leak, serosanguinous output. Concern for chest tube slowly slipping out. Removed 2/25 due to new pneumothorax. Will need new Left pleural placed with IR, she cannot have bedside CT placement.   - Right Pleural output: 690 mL no air leak, serosanguinous output. Await <200 mL daily before removing.   - Pericardial output: Removed 2/24/22 without immediate complication  - Continue diuresis and nutrition per primary team  - Other cares per primary team    Discussed with Dr Vargas through both written and verbal communication.    20 minutes was spent reviewing chart, imaging, labs, and in discussion with patient.     Luis A Nava PA-C  Cardiothoracic Surgery  Pager [phone]    7:30 AM   February 26, 2022          Interval History:     No overnight events.  States pain is well managed on current regimen. Slept well overnight.  Getting up to commode a lot. Loose stools.         Physical Exam:   Blood pressure (!) 146/63, pulse 106, temperature  "98.6  F (37  C), temperature source Oral, resp. rate 30, height 1.575 m (5' 2\"), weight 73.8 kg (162 lb 11.2 oz), SpO2 94 %, not currently breastfeeding.  Vitals:    02/23/22 0000 02/25/22 0400 02/26/22 0000   Weight: 72.3 kg (159 lb 6.3 oz) 75 kg (165 lb 4.8 oz) 73.8 kg (162 lb 11.2 oz)      Gen: A&Ox4, NAD  Neuro: no focal deficits   CV: HD stable   Pulm: normal breathing on 1 lpm  Abd: nondistended, normal BS, soft, nontender  Ext: trace peripheral edema, 1+ pitting  Incision: clean, dry, intact, no erythema, sternum stable  Tubes/drain sites: dressing clean and dry, serosanguinous output, no air leak. 24 hr output 690 mL.          Data:    Imaging:  reviewed recent imaging, recent increased pneumothorax. Await AM CXR.     Labs:  BMP  Recent Labs   Lab 02/26/22  0530 02/26/22  0155 02/25/22  2201 02/25/22  1845 02/25/22  0912 02/25/22  0727 02/24/22  0454 02/24/22  0449 02/23/22  0403 02/23/22  0346     --   --   --   --  140  --  137  --  140   POTASSIUM 4.2  --   --   --   --  4.1  --  4.8  --  4.5   CHLORIDE 102  --   --   --   --  105  --  107  --  110*   MINISTERIO 7.4*  --   --   --   --  7.7*  --  8.2*  --  7.7*   CO2 37*  --   --   --   --  31  --  26  --  27   BUN 20  --   --   --   --  26  --  28  --  25   CR 0.41*  --   --   --   --  0.46*  --  0.53  --  0.68   * 180* 192* 192*   < > 200*   < > 200*   < > 168*    < > = values in this interval not displayed.     CBC  Recent Labs   Lab 02/26/22  0530 02/25/22  0727 02/24/22  0449 02/23/22 2054   WBC 9.6 12.2* 15.9* 18.7*   RBC 2.89* 2.95* 3.05* 3.18*   HGB 8.4* 8.7* 8.9* 9.2*   HCT 26.7* 27.7* 27.9* 28.8*   MCV 92 94 92 91   MCH 29.1 29.5 29.2 28.9   MCHC 31.5 31.4* 31.9 31.9   RDW 13.3 13.9 14.1 14.3    160 136* 153     "

## 2022-02-26 NOTE — PROGRESS NOTES
Lung Transplant Consult Follow Up Note   February 26, 2022            Assessment and Plan:   Melissa Elder is a 66 year old female with a PMH significant for severe COPD, HTN, mild non-obstructive CAD, paroxysmal afib, osteoporosis, GERD, and colonic polyps.  Pt. is now s/p BSLT on 2/21/22, surgery relatively uncomplicated.  The patient was extubated 2/22, intermittently on RA otherwise using 1-2L NC.     Today's recommendations:  - Continue chest physiotherapy QID with Aerobika and incentive spirometry q1h w/a  - Wean O2 to keep >92%  - DSA ordered 2/28  - CXR daily  - Reassess daily for diuresis  - Await explant pathology  - Inspection bronch next week, timing TBD  - Tacrolimus level low but not steady state,  Continue current dose and daily levels.  - Prednisone taper ordered 3/1  - Consider increasing dapsone to daily 3/2 if CBC remains stable  - Acyclovir for HSV ppx through POD #30  - CMV, EBV, and IgG 3/21 (not yet ordered)  - Follow pending cultures  - Continue ceftrixaone for 2 week course through 3/8 then amoxicillin for total of 3 months for actinomyces.  - LLE Doppler to assess asymmetric LE edema     S/p bilateral sequential lung transplant for COPD:  Acute hypoxic/hypercapneic respiratory failure: Scant pleural-pleural adhesions and mild-moderate bilateral hilar lymphadenopathy per op note.  Pressor weaned off 2/22 and pt. extubated.  Able to be weaned to RA at rest during morning rounds, intermittently using 1-2L NC. Gradual clinical improvement.  - Nebs: levalbuterol and Mucomyst QID  - Aggressive pulmonary toilet with chest physiotherapy QID as well as Aerobika and incentive spirometry q1h w/a  - Wean supplemental O2 to keep >92%  - DSA at one week (ordered 2/28) then one month post-transplant, additionally per protocol  - Anesthesia to manage erector spinae catheters (placed 2/21)  - 2/26 CXR reviewed by me with slight increase in anterior hydropneumothorax, most evident on lateral CXR.   - Chest  CT (2/25): showing anterior hydropneumothorax.  Right chest tube in fissure.  Left chest tube with functioning port outside of pleural cavity.  CVTS team aware.  Recommend IR guided chest tube placement for anterior air/fluid colledtion.  - Volume management per primary team.  - Post-pyloric nasal FT (repositioned by radiology 2/22), TF per RD and primary team  - SLP following for diet advacement  - Await explant pathology  - Inspection bronch next week, timing TBD     Immunosuppression:  - Induction therapy with basiliximab (and high dose IV steroid) given intraoperatively, repeating basiliximab dose today  - Tacrolimus goal level 8-12.  Subtherapeutic today but not steady state.  Continue current dose and follow daily level.  Date Tacro Level Intervention   2/22 3.3 Dose increased from 1 mg to 2 mg BID   2/23 10.6 Near steady state, no dose change    2/24 8.7 (10.5h)  Continue current dose   2/25 8.5 Transitioned from SL to PO    2/26 6.1 Not [place], con't current dose             - MMF 1000 mg BID (dec due to diarrhea)  - Prednisolone 17.5 mg BID with taper per lung transplant protocol (due 3/1, ordered):  Date AM dose (mg) PM dose (mg)   2/22 17.5 17.5   3/1 15 15   3/8 15 12.5   3/15 12.5 12.5   3/29 12.5 10   4/12 10 10   5/10 10 7.5   6/7 7.5 7.5   7/5 7.5 5   8/2 5 5   8/30 5 2.5      Prophylaxis:   - Daponse for PJP ppx (started 2/23 at decreased MWF dose, G6PD 13.3 2/21), increase to daily after one week (3/2) if CBC remains stable  - Acyclovir for HSV ppx (newly positive 2/20)  - Nystatin for oral candidiasis ppx, 6 month course  - See below for serologies and viral ppx:    Donor Recipient Intervention   CMV status Negative Negative N/A, CMV monthly (3/21, not yet ordered)   EBV status Positive Positive EBV at one month (3/21, not yet ordered) then q3 months   HSV status N/A (Newly) Positive Acyclovir POD #1-30      Primary graft dysfunction (per ISHLT guidelines):  See chart below:    POD #0  (~0  hours) POD #1  (~24 hours) POD #2   (~48 hours) POD#3   (~72 hours)   Date 2/21 2/22 2/23 2/24   Time 0713 0843  0347 N/A    Intubated Y Y N N   PaO2 232 127 142     FiO2 50% 30% 27%     P/F Ratio 464 423 526     PGD Grade   (0=mild, 3=severe) 0 0 1     ECMO N N N N   Inhaled NO/Flolan N N N N   ISHLT PGD Scoring  Grade 0 - PaO2/FiO2 >300 and normal chest radiograph (absence of diffuse allograft infiltrates)  Grade 1 - PaO2/FiO2 >300 and diffuse allograft infiltrates on chest radiograph  Grade 2 - PaO2/FiO2 between 200 and 300  Grade 3 - PaO2/FiO2 <200 and/or on ECMO     ID: Prior history of infection/colonization with Haemophilus influenzae (2017).  IgG adequate (2/20).  MRSA nares negative 2/21.  Broad spectrum ABX empirically post-op with vanc and ceftaz (2/21-2/22), stopped after 24h to target known donor cultures on POD #1 (transitioned to cefazolin).   - IgG repeat at one month (3/21, not yet ordered)  - Donor (OSH) respiratory culture with P-S MSSA (final)  - Donor cultures ([facility]) with Strep mitis, Actinomyces odontolyticus, MSSA x2, and Candida kruseii  - Recipient cultures with Actinomyces odonotolyticus  - Bronch cultures (2/22) with GPC and yeast  - ABX: Continue ceftriaxone (2/23) for 2 week course through 3/8 then amoxicillin for total of 3 months for actinomyces. Low threshold to also add vancomycin for clinical decline (GPC on bronch culture 2/22), plan to complete 2 week course through 3/8; s/p cefazolin 2/22-2/23, ceftazidime 2/21-2/22     PHS risk criteria donor: Additional labs required post-transplant (between 4-8 weeks post-op): Hepatitis B, Hepatitis C, and HIV by COLT ([id number], ordered POD #30), also plan to repeat hepatitis B surface antibody at that time (ordered) given discrepancy with recent result.    Asymmetric LE edema: Recommend LLE doppler to assess for DVT.     Other relevant problems managed by primary team:     Diarrhea: decreased MMF to 1000, will plan on Myfortic when able to take  large pills orally.  Fiber added to tube feeds.     CAD: Noted to have mild non-obstructive CAD without hemodynamically significant lesions on cardiac cath 3/27/19.  PTA ASA 81 mg and atorvastatin.   - Rosuvastatin (2/24, less interaction with immunosuppression)     Paroxysmal afib: PTA diltiazem, not on AC.  Post-op tachycardia, off pressor since 2/22.  Metoprolol started 2/23.     GERD: Not on PPI PTA.  Negative pH/manometry study 3/28/19, noted small hiatal hernia.   - PPI daily (2/21), famotidine BID (2/21) x10d as bridge     Anxiety: Per lung transplant committee review, noted high anxiety in anticipation of transplant.   - Monitor need for palliative care and/or health psychology consult post-op     We appreciate the excellent care provided by the [place] [id number] team.  Recommendations communicated via in person rounding and this note.  Will continue to follow along closely, please do not hesitate to call with any questions or concerns.    Carson Feng MD  [phone]            Interval History:   Frequent loose stool with incontinence.   Dyspnea at rest: Mild intermittent  Dyspnea on exertion: with mild exertion and when supine  Cough:  inermittent  Sputum: small amt of tan  Hemoptysis: none   Chest Pain: Incisional, adequately controlled with current medication            Review of Systems:   C: NEGATIVE for fever, chills  INTEGUMENTARY/SKIN: no rash or obvious new lesions  ENT/MOUTH: no sore throat, new sinus pain or nasal drainage  RESP: see interval history  CV: NEGATIVE for chest pain, palpitations or peripheral edema  GI: nausea and abd pain yesterday, better today.  Frequent loose stool with incontinence  : no dysuria  MUSCULOSKELETAL: no myalgias, arthralgias            Medications:       acetaminophen  975 mg Oral or Feeding Tube Q8H JUANA     acetylcysteine  2 mL Nebulization 4x Daily     acyclovir  400 mg Oral or NG Tube BID    Or     acyclovir  400 mg Oral or NG Tube BID     [START ON 3/1/2022]  calcium carbonate 600 mg-vitamin D 400 units  1 tablet Oral BID w/meals     cefTRIAXone  2 g Intravenous Q24H     dapsone  50 mg Oral Once per day on Mon Wed Fri     [Held by provider] dapsone  50 mg Oral or NG Tube Once per day on Mon Wed Fri     famotidine  10 mg Oral or Feeding Tube BID     fiber modular (NUTRISOURCE FIBER)  1 packet Per Feeding Tube BID     gabapentin  100 mg Oral or Feeding Tube At Bedtime     heparin ANTICOAGULANT  5,000 Units Subcutaneous Q8H     insulin aspart  1-7 Units Subcutaneous TID AC     insulin aspart  1-5 Units Subcutaneous BID     insulin NPH  4 Units Subcutaneous Daily with supper     insulin NPH  6 Units Subcutaneous QAM     levalbuterol  1.25 mg Nebulization 4x Daily     metoprolol tartrate  12.5 mg Oral or Feeding Tube BID     multivitamins w/minerals  15 mL Oral Daily     mycophenolate  1,000 mg Oral BID    Or     mycophenolate  1,000 mg Oral or NG Tube BID     nystatin  1,000,000 Units Swish & Swallow 4x Daily     pantoprazole  40 mg Oral or Feeding Tube Daily     polyethylene glycol  17 g Oral or Feeding Tube Daily     prednisoLONE  17.5 mg Oral or NG Tube BID     [START ON 3/1/2022] predniSONE  15 mg Oral or Feeding Tube BID     protein modular  1 packet Per Feeding Tube TID     rosuvastatin  40 mg Oral Daily     senna-docusate  1 tablet Oral or Feeding Tube BID     sodium chloride (PF)  3 mL Intracatheter Q8H     tacrolimus  4 mg Oral BID IS    Or     tacrolimus  4 mg Per Feeding Tube BID IS     acetaminophen, bisacodyl, dextrose, glucose **OR** dextrose **OR** glucagon, fluticasone, HYDROmorphone **OR** HYDROmorphone, labetalol, lidocaine 4%, lidocaine (buffered or not buffered), magnesium hydroxide, methocarbamol, naloxone **OR** naloxone **OR** naloxone **OR** naloxone, ondansetron **OR** ondansetron, oxyCODONE **OR** oxyCODONE, BETA BLOCKER NOT PRESCRIBED, sodium chloride (PF)         Physical Exam:   Temp:  [98.2  F (36.8  C)-98.7  F (37.1  C)] 98.6  F (37  C)  Pulse:   [] 106  Resp:  [22-32] 30  BP: (133-158)/() 146/63  SpO2:  [89 %-100 %] 94 %    Intake/Output Summary (Last 24 hours) at 2/26/2022 0728  Last data filed at 2/26/2022 0700  Gross per 24 hour   Intake 2580 ml   Output 2860 ml   Net -280 ml     Constitutional:   Awake, alert and in no apparent distress     Eyes:   nonicteric     ENT:    oral mucosa moist without lesions     Neck:   Supple without supraclavicular or cervical lymphadenopathy     Lungs:   Diminished air flow on the right. Left pleural rub.  No crackles. No rhonchi.  No wheezes.     Cardiovascular:   Normal S1 and S2.  RRR.  No murmur. No gallop. No rub.     Abdomen:   NABS, soft, nondistended, nontender.  No HSM.     Musculoskeletal:   Tr edema on the right, 1+ on the left     Neurologic:   Alert and conversant.     Skin:   Warm, dry.  No rash on limited exam.             Data:   All laboratory and imaging data reviewed.    Results for orders placed or performed during the hospital encounter of 02/20/22 (from the past 24 hour(s))   XR Chest 2 Views    Narrative    EXAM: XR CHEST 2 VW  2/25/2022 8:58 AM     HISTORY:  interval follow up, post-op lung transplant       COMPARISON:  Chest x-ray 2/24/2022    FINDINGS: AP radiograph of the chest. Postoperative changes bilateral  lung transplant with intact median clamshell sternotomy wires. Stable  positioning of bibasilar chest tubes. Partly visualized feeding tube  with the tip overlying the distal duodenum/proximal jejunum. Left  anterior loculated hydropneumothorax.    Stable cardiomediastinal silhouette. Atherosclerotic calcifications of  the aortic arch. Unchanged small right greater than left pleural  effusions. Decreased hazy and streaky opacities in the right lung base  with continued perihilar interstitial opacities and Kerley B lines at  the lung bases. Visualized upper abdomen is unremarkable.      Impression    IMPRESSION:  1. Decreased perihilar and right basilar interstitial  opacities  suggesting improving pulmonary edema.  2. Unchanged small right greater than left pleural effusions.  3. Stable support devices.    I have personally reviewed the examination and initial interpretation  and I agree with the findings.    VARGAS DUKE MD         SYSTEM ID:  [id number]   Glucose by meter   Result Value Ref Range    GLUCOSE BY METER POCT 153 (H) 70 - 99 mg/dL   Glucose by meter   Result Value Ref Range    GLUCOSE BY METER POCT 203 (H) 70 - 99 mg/dL   CT Chest w/o Contrast   Result Value Ref Range    Radiologist flags (Urgent)      intercommunicating pneumothorax and pneumomediastinum    Narrative    CT CHEST W/O CONTRAST 2/25/2022 12:21 PM    History: Pneumothorax; Abnormal xray - pleural effusion; s/p lung  transplant 2/21/2022, new hydropneumothorax evolving on chest x-ray    Comparison: CT chest 3/29/2019    Technique: CT of the chest was obtained without intravenous contrast.  Axial, coronal, and sagittal reconstructions were obtained and  reviewed.    Contrast: None    Findings:     Lungs: Postoperative changes of bilateral lung transplantation,  clamshell sternotomy wires are intact. There is a confluent  hydropneumothorax and pneumomediastinum best appreciated on axial  series 4 image 105 which intercommunicates between each hemithorax and  the mediastinum. Bilateral chest tubes are in place, however the  right-sided chest tube is placed within the right major fissure and  the left-sided chest tube has been retracted is within the most  inferior portion of the anterior diaphragmatic recess with a sidehole  external to the pleural space. Small amount of bibasilar atelectasis.  Groundglass the lung bases may represent pulmonary edema in the  immediate postoperative period. No suspicious pulmonary nodule. Focus  of loculated fluid containing gas along the right hilum (series 2  image 30) measures 1.5 x 2 cm.  Airways: Mucous plugging within the lower lobes bilaterally (series 4  image 123  and series 4 image 125)  Vessels: Main pulmonary artery and aorta are normal in caliber. Normal  three-vessel arch. Aortic atherosclerosis  Heart: Heart size is normal without pericardial effusion mild coronary  calcium.  Lymph nodes: Mildly prominent mediastinal nodes are likely reactive.  Thyroid: Within normal limits.  Esophagus: Gastric tube courses through the esophagus.    Upper abdomen: Within normal limits.    Bones and soft tissues: No suspicious axillary lymphadenopathy or soft  tissue mass. No suspicious osseous lesion. Partially visualized spinal  stimulator.        Impression    Impression:   1. Increase in size of confluent hydropneumothorax with  intercommunication between both hemithoraces anteriorly, and  pneumomediastinum. The right-sided chest tube is located within the  major fissure and therefore may be nonfunctional. The left-sided chest  tube has been retracted and is in the most inferior aspect of the  anterior costophrenic sulcus with a sidehole in the subcutaneous soft  tissues and therefore is nonfunctional.  2. Somewhat loculated appearing fluid and gas collection along the  right hilum measures up to 2 cm this may represents postsurgical fluid  collection short interval follow-up is recommended.  3. Bibasilar atelectasis and mucous plugging.      [Access Center: intercommunicating pneumothorax and pneumomediastinum  with both chest tubes suboptimally positioned, with the left below the  diaphragm]    This report will be copied to the [facility] to ensure a  provider acknowledges the finding. Access Center is available Monday  through Friday 8am-3:30 pm.     I have personally reviewed the examination and initial interpretation  and I agree with the findings.    VARGAS DUKE MD         SYSTEM ID:  [id number]   XR Chest Port 1 View    Narrative    XR CHEST PORT 1 VIEW  2/25/2022 4:30 PM     HISTORY:  Chest tube removal       COMPARISON:  Chest CT 2/25/2022, chest x-ray  2/25/2022    FINDINGS:   Frontal view of the chest. Interval removal of left basilar chest  tube. Possible residual small medial left pneumothorax. Small right  apical pneumothorax with right basilar chest tube in similar position.  Feeding tube courses below the diaphragm and beyond the field of view.  Postoperative changes of bilateral lung transplant with clamshell  sternotomy wires and mediastinal clips.  Trachea is midline. Cardiac silhouette is stable. Mild perihilar and  bibasilar streaky opacities. Possible trace bilateral pleural  effusions.      Impression    IMPRESSION:  1. Interval removal of left chest tube with residual small medial left  pneumothorax.  2. Small right apical pneumothorax with right chest tube in place.  3. Trace bilateral pleural effusions with associated atelectasis.    I have personally reviewed the examination and initial interpretation  and I agree with the findings.    KEN CHAVARRIA MD         SYSTEM ID:  [id number]   Glucose by meter   Result Value Ref Range    GLUCOSE BY METER POCT 192 (H) 70 - 99 mg/dL   Glucose by meter   Result Value Ref Range    GLUCOSE BY METER POCT 192 (H) 70 - 99 mg/dL   Lactic Acid STAT   Result Value Ref Range    Lactic Acid 1.3 0.7 - 2.0 mmol/L   Glucose by meter   Result Value Ref Range    GLUCOSE BY METER POCT 180 (H) 70 - 99 mg/dL   Magnesium   Result Value Ref Range    Magnesium 2.0 1.6 - 2.3 mg/dL   Phosphorus   Result Value Ref Range    Phosphorus 2.3 (L) 2.5 - 4.5 mg/dL   Comprehensive metabolic panel   Result Value Ref Range    Sodium 141 133 - 144 mmol/L    Potassium 4.2 3.4 - 5.3 mmol/L    Chloride 102 94 - 109 mmol/L    Carbon Dioxide (CO2) 37 (H) 20 - 32 mmol/L    Anion Gap 2 (L) 3 - 14 mmol/L    Urea Nitrogen 20 7 - 30 mg/dL    Creatinine 0.41 (L) 0.52 - 1.04 mg/dL    Calcium 7.4 (L) 8.5 - 10.1 mg/dL    Glucose 173 (H) 70 - 99 mg/dL    Alkaline Phosphatase 89 40 - 150 U/L    AST 33 0 - 45 U/L    ALT 53 (H) 0 - 50 U/L    Protein Total  4.9 (L) 6.8 - 8.8 g/dL    Albumin 1.9 (L) 3.4 - 5.0 g/dL    Bilirubin Total 0.3 0.2 - 1.3 mg/dL    GFR Estimate >90 >60 mL/min/1.73m2   CBC with platelets   Result Value Ref Range    WBC Count 9.6 4.0 - 11.0 10e3/uL    RBC Count 2.89 (L) 3.80 - 5.20 10e6/uL    Hemoglobin 8.4 (L) 11.7 - 15.7 g/dL    Hematocrit 26.7 (L) 35.0 - 47.0 %    MCV 92 78 - 100 fL    MCH 29.1 26.5 - 33.0 pg    MCHC 31.5 31.5 - 36.5 g/dL    RDW 13.3 10.0 - 15.0 %    Platelet Count 165 150 - 450 10e3/uL

## 2022-02-26 NOTE — PROGRESS NOTES
Pain Service Progress Note  [facility]      Patient Name: Melissa Elder  MRN: [id number]  Age: 66 year old  Sex: female      Assessment:  Melissa Elder is a 66 year old female with a PMH significant for severe COPD on chronic home O2, mild non-obstructive CAD, paroxysmal A-fib, osteoporosis on treatment     Procedure: bilateral lung transplant     Date of Surgery: 2/21/22     Date of Bilateral ES (erector spinae) T6-7 Catheter Placement: 2/21/22     Plan/Recommendations:  1. Regional Anesthesia/Analgesia  1.-Continuous infusion of Ropvicaine 0.2% at 7 mL/hr each catheter, total 14mL/hr    Due to questionable tinnitus (baseline tinnitus vs new) will decrease rate to 5 ml/hr and continue to monitor closely. No other s/s of LAST at this time.     Plan to maintain catheters, max of 7 days    2. Anticoagulation  -heparin SC 5,000 units Q 8 hrs   -Please contact Inpatient Pain Service (pager [phone]) before ordering or making any medication changes       3. Multimodal Analgesia  - Per primary service    Pain Service will continue to follow.    Discussed with attending anesthesiologist      Overnight Events: No acute events overnight; No evening bolus.    Tubes/Drains: Yes  Chest tubes    Subjective: Patient is doing well this morning. Reports adequate pain relief. Denies any ringing in her ears. Tolerating PO intake and ambulating with assistance.     Nausea: No  Vomiting: No  Pruritus: No  Symptoms of LAST: No other than baseline ringing in ears, which she is unsure is baseline or new. Will decrease rate and continue to monitor closely.     Pain Location:  Midline, sternal     Pain Intensity:    Pain at Rest: 0/10   Pain with Activity: 3/10    Satisfied with your level of pain control: Yes    Diet: Soft & Bite Sized Diet (level 6) Thin Liquids (level 0)  Adult Formula Drip Feeding: Continuous Osmolite 1.5; Nasoduodenal tube; Goal Rate: 50; mL/hr; Medication - Feeding Tube Flush Frequency: At  "least 15-30 mL water before and after medication administration and with tube clogging; Amount to Send (Nut...  Snacks/Supplements Adult: Glucerna; Between Meals    Relevant Labs:  Recent Labs   Lab Test 02/25/22  0727 02/22/22  1746 02/22/22  0355 02/21/22  0808 02/21/22  0714   INR  --   --  1.31*   < > 5.23*      < > 128*   < >  --    PTT  --   --   --   --  106*   BUN 26   < > 10   < >  --     < > = values in this interval not displayed.       Physical Exam:  Vitals: BP (!) 141/72 (BP Location: Left arm)   Pulse 112   Temp 36.3  C (97.3  F) (Oral)   Resp 22   Ht 1.575 m (5' 2\")   Wt 73.8 kg (162 lb 11.2 oz)   SpO2 97%   BMI 29.76 kg/m      Physical Exam:   Orientation:  Alert, oriented, and in no acute distress: Yes  Sedation: No    Motor Examination:  5/5 Strength in lower extremities: Yes    Sensory Level:   Decrease in sensation: Yes    Catheter Site:   Catheter entry site is clean/dry/intact: Yes    Tender: No      Relevant Medications:  Current Pain Medications:  Medications related to Pain Management (From now, onward)    Start     Dose/Rate Route Frequency Ordered Stop    02/24/22 0000  acetaminophen (TYLENOL) tablet 650 mg         650 mg Oral EVERY 4 HOURS PRN 02/21/22 0712      02/22/22 1400  acetaminophen (TYLENOL) tablet 975 mg         975 mg Oral or Feeding Tube EVERY 8 HOURS SCHEDULED 02/22/22 0840      02/22/22 0800  polyethylene glycol (MIRALAX) Packet 17 g         17 g Oral or Feeding Tube DAILY 02/21/22 0712      02/21/22 2200  gabapentin (NEURONTIN) capsule 100 mg         100 mg Oral or Feeding Tube AT BEDTIME 02/21/22 0712      02/21/22 1130  ropivacaine 0.2% (NAROPIN) 750 mL in ON-Q C-Bloc select flow ([id number] holds 600-750 mL) dual cath disposable pump         7 mL/hr  Irrigation CONTINUOUS 02/21/22 1106      02/21/22 0800  senna-docusate (SENOKOT-S/PERICOLACE) 8.6-50 MG per tablet 1 tablet         1 tablet Oral or Feeding Tube 2 TIMES DAILY 02/21/22 0712 02/21/22 0712  " "HYDROmorphone (DILAUDID) injection 0.2 mg        \"Or\" Linked Group Details    0.2 mg Intravenous EVERY 2 HOURS PRN 02/21/22 0712      02/21/22 0712  HYDROmorphone (DILAUDID) injection 0.4 mg        \"Or\" Linked Group Details    0.4 mg Intravenous EVERY 2 HOURS PRN 02/21/22 0712      02/21/22 0712  oxyCODONE (ROXICODONE) tablet 5 mg        \"Or\" Linked Group Details    5 mg Oral EVERY 4 HOURS PRN 02/21/22 0712      02/21/22 0712  oxyCODONE IR (ROXICODONE) tablet 10 mg        \"Or\" Linked Group Details    10 mg Oral EVERY 4 HOURS PRN 02/21/22 0712      02/21/22 0712  magnesium hydroxide (MILK OF MAGNESIA) suspension 30 mL         30 mL Oral DAILY PRN 02/21/22 0712      02/21/22 0712  bisacodyl (DULCOLAX) Suppository 10 mg         10 mg Rectal DAILY PRN 02/21/22 0712      02/21/22 0712  methocarbamol (ROBAXIN) tablet 500 mg         500 mg Oral EVERY 6 HOURS PRN 02/21/22 0712      02/21/22 0712  lidocaine 1 % 0.1-1 mL         0.1-1 mL Other EVERY 1 HOUR PRN 02/21/22 0712      02/21/22 0712  lidocaine (LMX4) cream          Topical EVERY 1 HOUR PRN 02/21/22 0712            Primary Service Contacted with Recommendations? Yes    Noy Ballesteros MD  Anesthesiology Resident PGY4  Noy Ballesteros MD on 2/26/2022 at 10:58 AM          Contact Info (24 hour job code pager is the last 4 digits) For in-house use only:   Job code ID: [id number]   [place] [id number]  Peds [phone]  [place] phone: dial * * * [phone], enter jobcode ID, then enter call-back number.    Text: Use [employer] on the Intranet <Paging/Directory> tab and enter Jobcode ID.   If no call back at any time, contact the hospital  and ask for Regional Anesthesia attending or backup     "

## 2022-02-26 NOTE — PROGRESS NOTES
Medicine cross cover note  -I changed the limit on the beta-blocker intravenous to be administered when systolic blood pressure is above 150.

## 2022-02-27 ENCOUNTER — APPOINTMENT (OUTPATIENT)
Dept: SPEECH THERAPY | Facility: CLINIC | Age: 67
DRG: 007 | End: 2022-02-27
Attending: SURGERY
Payer: MEDICARE

## 2022-02-27 ENCOUNTER — APPOINTMENT (OUTPATIENT)
Dept: GENERAL RADIOLOGY | Facility: CLINIC | Age: 67
DRG: 007 | End: 2022-02-27
Attending: PHYSICIAN ASSISTANT
Payer: MEDICARE

## 2022-02-27 LAB
ALBUMIN SERPL-MCNC: 1.9 G/DL (ref 3.4–5)
ALP SERPL-CCNC: 106 U/L (ref 40–150)
ALT SERPL W P-5'-P-CCNC: 79 U/L (ref 0–50)
ANION GAP SERPL CALCULATED.3IONS-SCNC: 2 MMOL/L (ref 3–14)
AST SERPL W P-5'-P-CCNC: 41 U/L (ref 0–45)
BILIRUB SERPL-MCNC: 0.2 MG/DL (ref 0.2–1.3)
BUN SERPL-MCNC: 20 MG/DL (ref 7–30)
CALCIUM SERPL-MCNC: 7.9 MG/DL (ref 8.5–10.1)
CHLORIDE BLD-SCNC: 101 MMOL/L (ref 94–109)
CO2 SERPL-SCNC: 36 MMOL/L (ref 20–32)
CREAT SERPL-MCNC: 0.41 MG/DL (ref 0.52–1.04)
ERYTHROCYTE [DISTWIDTH] IN BLOOD BY AUTOMATED COUNT: 13.2 % (ref 10–15)
GFR SERPL CREATININE-BSD FRML MDRD: >90 ML/MIN/1.73M2
GLUCOSE BLD-MCNC: 184 MG/DL (ref 70–99)
GLUCOSE BLDC GLUCOMTR-MCNC: 171 MG/DL (ref 70–99)
GLUCOSE BLDC GLUCOMTR-MCNC: 178 MG/DL (ref 70–99)
GLUCOSE BLDC GLUCOMTR-MCNC: 181 MG/DL (ref 70–99)
GLUCOSE BLDC GLUCOMTR-MCNC: 183 MG/DL (ref 70–99)
GLUCOSE BLDC GLUCOMTR-MCNC: 220 MG/DL (ref 70–99)
GLUCOSE BLDC GLUCOMTR-MCNC: 247 MG/DL (ref 70–99)
HCT VFR BLD AUTO: 28.2 % (ref 35–47)
HGB BLD-MCNC: 8.9 G/DL (ref 11.7–15.7)
LACTATE SERPL-SCNC: 1 MMOL/L (ref 0.7–2)
MAGNESIUM SERPL-MCNC: 1.9 MG/DL (ref 1.6–2.3)
MCH RBC QN AUTO: 29.5 PG (ref 26.5–33)
MCHC RBC AUTO-ENTMCNC: 31.6 G/DL (ref 31.5–36.5)
MCV RBC AUTO: 93 FL (ref 78–100)
PATH REPORT.COMMENTS IMP SPEC: NORMAL
PATH REPORT.FINAL DX SPEC: NORMAL
PATH REPORT.GROSS SPEC: NORMAL
PATH REPORT.MICROSCOPIC SPEC OTHER STN: NORMAL
PATH REPORT.RELEVANT HX SPEC: NORMAL
PHOSPHATE SERPL-MCNC: 2 MG/DL (ref 2.5–4.5)
PHOTO IMAGE: NORMAL
PLATELET # BLD AUTO: 191 10E3/UL (ref 150–450)
POTASSIUM BLD-SCNC: 4.2 MMOL/L (ref 3.4–5.3)
PROT SERPL-MCNC: 5.2 G/DL (ref 6.8–8.8)
RBC # BLD AUTO: 3.02 10E6/UL (ref 3.8–5.2)
SARS-COV-2 RNA RESP QL NAA+PROBE: NEGATIVE
SODIUM SERPL-SCNC: 139 MMOL/L (ref 133–144)
TACROLIMUS BLD-MCNC: 4.7 UG/L (ref 5–15)
TME LAST DOSE: ABNORMAL H
TME LAST DOSE: ABNORMAL H
WBC # BLD AUTO: 14.6 10E3/UL (ref 4–11)

## 2022-02-27 PROCEDURE — 250N000013 HC RX MED GY IP 250 OP 250 PS 637: Performed by: SURGERY

## 2022-02-27 PROCEDURE — 36415 COLL VENOUS BLD VENIPUNCTURE: CPT | Performed by: NURSE PRACTITIONER

## 2022-02-27 PROCEDURE — 120N000005 HC R&B MS OVERFLOW UMMC

## 2022-02-27 PROCEDURE — 250N000012 HC RX MED GY IP 250 OP 636 PS 637: Performed by: STUDENT IN AN ORGANIZED HEALTH CARE EDUCATION/TRAINING PROGRAM

## 2022-02-27 PROCEDURE — 94640 AIRWAY INHALATION TREATMENT: CPT | Mod: 76

## 2022-02-27 PROCEDURE — 85027 COMPLETE CBC AUTOMATED: CPT | Performed by: STUDENT IN AN ORGANIZED HEALTH CARE EDUCATION/TRAINING PROGRAM

## 2022-02-27 PROCEDURE — 83735 ASSAY OF MAGNESIUM: CPT | Performed by: SURGERY

## 2022-02-27 PROCEDURE — 80197 ASSAY OF TACROLIMUS: CPT | Performed by: PHYSICIAN ASSISTANT

## 2022-02-27 PROCEDURE — 94668 MNPJ CHEST WALL SBSQ: CPT

## 2022-02-27 PROCEDURE — 82040 ASSAY OF SERUM ALBUMIN: CPT | Performed by: PHYSICIAN ASSISTANT

## 2022-02-27 PROCEDURE — 250N000009 HC RX 250: Performed by: SURGERY

## 2022-02-27 PROCEDURE — 83605 ASSAY OF LACTIC ACID: CPT | Performed by: NURSE PRACTITIONER

## 2022-02-27 PROCEDURE — 80053 COMPREHEN METABOLIC PANEL: CPT | Performed by: PHYSICIAN ASSISTANT

## 2022-02-27 PROCEDURE — 250N000012 HC RX MED GY IP 250 OP 636 PS 637: Performed by: PHYSICIAN ASSISTANT

## 2022-02-27 PROCEDURE — 84100 ASSAY OF PHOSPHORUS: CPT | Performed by: SURGERY

## 2022-02-27 PROCEDURE — 71046 X-RAY EXAM CHEST 2 VIEWS: CPT | Mod: 26 | Performed by: RADIOLOGY

## 2022-02-27 PROCEDURE — 250N000012 HC RX MED GY IP 250 OP 636 PS 637: Performed by: INTERNAL MEDICINE

## 2022-02-27 PROCEDURE — U0005 INFEC AGEN DETEC AMPLI PROBE: HCPCS | Performed by: STUDENT IN AN ORGANIZED HEALTH CARE EDUCATION/TRAINING PROGRAM

## 2022-02-27 PROCEDURE — 999N000157 HC STATISTIC RCP TIME EA 10 MIN

## 2022-02-27 PROCEDURE — 88309 TISSUE EXAM BY PATHOLOGIST: CPT | Mod: 26 | Performed by: PATHOLOGY

## 2022-02-27 PROCEDURE — 99233 SBSQ HOSP IP/OBS HIGH 50: CPT | Performed by: STUDENT IN AN ORGANIZED HEALTH CARE EDUCATION/TRAINING PROGRAM

## 2022-02-27 PROCEDURE — 250N000011 HC RX IP 250 OP 636: Performed by: INTERNAL MEDICINE

## 2022-02-27 PROCEDURE — 71046 X-RAY EXAM CHEST 2 VIEWS: CPT

## 2022-02-27 PROCEDURE — 250N000011 HC RX IP 250 OP 636: Performed by: STUDENT IN AN ORGANIZED HEALTH CARE EDUCATION/TRAINING PROGRAM

## 2022-02-27 PROCEDURE — 94667 MNPJ CHEST WALL 1ST: CPT

## 2022-02-27 PROCEDURE — 92526 ORAL FUNCTION THERAPY: CPT | Mod: GN

## 2022-02-27 PROCEDURE — 250N000013 HC RX MED GY IP 250 OP 250 PS 637: Performed by: PHYSICIAN ASSISTANT

## 2022-02-27 PROCEDURE — 94640 AIRWAY INHALATION TREATMENT: CPT

## 2022-02-27 PROCEDURE — 250N000013 HC RX MED GY IP 250 OP 250 PS 637: Performed by: INTERNAL MEDICINE

## 2022-02-27 PROCEDURE — 250N000013 HC RX MED GY IP 250 OP 250 PS 637: Performed by: STUDENT IN AN ORGANIZED HEALTH CARE EDUCATION/TRAINING PROGRAM

## 2022-02-27 PROCEDURE — 36415 COLL VENOUS BLD VENIPUNCTURE: CPT | Performed by: STUDENT IN AN ORGANIZED HEALTH CARE EDUCATION/TRAINING PROGRAM

## 2022-02-27 PROCEDURE — 99233 SBSQ HOSP IP/OBS HIGH 50: CPT | Mod: 24 | Performed by: INTERNAL MEDICINE

## 2022-02-27 RX ORDER — ALBUTEROL SULFATE 90 UG/1
2 AEROSOL, METERED RESPIRATORY (INHALATION) EVERY 6 HOURS PRN
Status: DISCONTINUED | OUTPATIENT
Start: 2022-02-27 | End: 2022-03-17 | Stop reason: HOSPADM

## 2022-02-27 RX ORDER — VALACYCLOVIR HYDROCHLORIDE 1 G/1
1000 TABLET, FILM COATED ORAL EVERY 8 HOURS
Status: DISCONTINUED | OUTPATIENT
Start: 2022-02-27 | End: 2022-03-03 | Stop reason: DRUGHIGH

## 2022-02-27 RX ORDER — ACETAMINOPHEN 325 MG/1
975 TABLET ORAL EVERY 8 HOURS PRN
Status: DISCONTINUED | OUTPATIENT
Start: 2022-02-27 | End: 2022-03-11

## 2022-02-27 RX ADMIN — Medication 40 MG: at 08:54

## 2022-02-27 RX ADMIN — TACROLIMUS 4 MG: 1 CAPSULE ORAL at 08:54

## 2022-02-27 RX ADMIN — METOPROLOL TARTRATE 25 MG: 25 TABLET, FILM COATED ORAL at 19:44

## 2022-02-27 RX ADMIN — GABAPENTIN 100 MG: 100 CAPSULE ORAL at 22:18

## 2022-02-27 RX ADMIN — METHOCARBAMOL 500 MG: 500 TABLET ORAL at 22:17

## 2022-02-27 RX ADMIN — LEVALBUTEROL HYDROCHLORIDE 1.25 MG: 1.25 SOLUTION RESPIRATORY (INHALATION) at 16:16

## 2022-02-27 RX ADMIN — OXYCODONE HYDROCHLORIDE 5 MG: 5 TABLET ORAL at 14:02

## 2022-02-27 RX ADMIN — ACETYLCYSTEINE 2 ML: 200 SOLUTION ORAL; RESPIRATORY (INHALATION) at 11:22

## 2022-02-27 RX ADMIN — LEVALBUTEROL HYDROCHLORIDE 1.25 MG: 1.25 SOLUTION RESPIRATORY (INHALATION) at 11:22

## 2022-02-27 RX ADMIN — OXYCODONE HYDROCHLORIDE 5 MG: 5 TABLET ORAL at 03:52

## 2022-02-27 RX ADMIN — OXYCODONE HYDROCHLORIDE 5 MG: 5 TABLET ORAL at 00:33

## 2022-02-27 RX ADMIN — ACETYLCYSTEINE 2 ML: 200 SOLUTION ORAL; RESPIRATORY (INHALATION) at 20:08

## 2022-02-27 RX ADMIN — METHOCARBAMOL 500 MG: 500 TABLET ORAL at 00:33

## 2022-02-27 RX ADMIN — ACETAMINOPHEN 975 MG: 325 TABLET, FILM COATED ORAL at 08:54

## 2022-02-27 RX ADMIN — METOPROLOL TARTRATE 25 MG: 25 TABLET, FILM COATED ORAL at 08:55

## 2022-02-27 RX ADMIN — PREDNISOLONE 17.5 MG: 15 SOLUTION ORAL at 19:43

## 2022-02-27 RX ADMIN — VALACYCLOVIR HYDROCHLORIDE 1000 MG: 1 TABLET, FILM COATED ORAL at 17:04

## 2022-02-27 RX ADMIN — METHOCARBAMOL 500 MG: 500 TABLET ORAL at 08:55

## 2022-02-27 RX ADMIN — FAMOTIDINE 10 MG: 10 TABLET, FILM COATED ORAL at 08:54

## 2022-02-27 RX ADMIN — LABETALOL HYDROCHLORIDE 10 MG: 5 INJECTION, SOLUTION INTRAVENOUS at 18:35

## 2022-02-27 RX ADMIN — LEVALBUTEROL HYDROCHLORIDE 1.25 MG: 1.25 SOLUTION RESPIRATORY (INHALATION) at 07:53

## 2022-02-27 RX ADMIN — POTASSIUM & SODIUM PHOSPHATES POWDER PACK 280-160-250 MG 1 PACKET: 280-160-250 PACK at 19:44

## 2022-02-27 RX ADMIN — ACETYLCYSTEINE 2 ML: 200 SOLUTION ORAL; RESPIRATORY (INHALATION) at 07:53

## 2022-02-27 RX ADMIN — Medication 15 ML: at 08:54

## 2022-02-27 RX ADMIN — NYSTATIN 1000000 UNITS: 100000 SUSPENSION ORAL at 19:43

## 2022-02-27 RX ADMIN — ACETYLCYSTEINE 2 ML: 200 SOLUTION ORAL; RESPIRATORY (INHALATION) at 16:16

## 2022-02-27 RX ADMIN — Medication 1 PACKET: at 14:09

## 2022-02-27 RX ADMIN — LEVALBUTEROL HYDROCHLORIDE 1.25 MG: 1.25 SOLUTION RESPIRATORY (INHALATION) at 20:08

## 2022-02-27 RX ADMIN — Medication 1 PACKET: at 09:02

## 2022-02-27 RX ADMIN — Medication 1 PACKET: at 19:44

## 2022-02-27 RX ADMIN — OXYCODONE HYDROCHLORIDE 10 MG: 10 TABLET ORAL at 19:44

## 2022-02-27 RX ADMIN — NYSTATIN 1000000 UNITS: 100000 SUSPENSION ORAL at 17:05

## 2022-02-27 RX ADMIN — MYCOPHENOLATE MOFETIL 1000 MG: 200 POWDER, FOR SUSPENSION ORAL at 08:54

## 2022-02-27 RX ADMIN — PREDNISOLONE 17.5 MG: 15 SOLUTION ORAL at 08:54

## 2022-02-27 RX ADMIN — MYCOPHENOLATE MOFETIL 1000 MG: 250 CAPSULE ORAL at 19:43

## 2022-02-27 RX ADMIN — ROSUVASTATIN CALCIUM 40 MG: 10 TABLET, FILM COATED ORAL at 08:55

## 2022-02-27 RX ADMIN — VALACYCLOVIR HYDROCHLORIDE 1000 MG: 1 TABLET, FILM COATED ORAL at 09:08

## 2022-02-27 RX ADMIN — CEFTRIAXONE SODIUM 2 G: 2 INJECTION, POWDER, FOR SOLUTION INTRAMUSCULAR; INTRAVENOUS at 09:23

## 2022-02-27 RX ADMIN — FAMOTIDINE 10 MG: 10 TABLET, FILM COATED ORAL at 19:44

## 2022-02-27 RX ADMIN — TACROLIMUS 6 MG: 5 CAPSULE ORAL at 17:33

## 2022-02-27 RX ADMIN — NYSTATIN 1000000 UNITS: 100000 SUSPENSION ORAL at 09:02

## 2022-02-27 ASSESSMENT — ACTIVITIES OF DAILY LIVING (ADL)
ADLS_ACUITY_SCORE: 11

## 2022-02-27 NOTE — PLAN OF CARE
Cared for pt from [id number]  Neuro: A&Ox4. Voice hoarse, improving.   Cardiac: SR/ST, HR 90-100s. VSS.   Respiratory: Sating > 92% on RA/1L NC. Remains tachypneic, RR 20s, up to 30s with activity.   GI/: Adequate urine output. No stool this shift.   Diet/appetite: Tolerating soft, bite sized diet with thin liquids. Fair appetite. TF @ 50 mL/hr with 30 mL FWF Q4.   Activity:  Assist of 1 with walker, up to commode.   Pain: At acceptable level on current regimen. C/o pain/tenderness at new CT site on L breast, PRN oxy and robaxin given with relief.   Skin: No new deficits noted. Clamshell incision ANGELA. CT dressings CDI- L axillary CT having mild leakage at site, gold CC notified.   LDA's: PIV x1. CT x3, all to -20 suction, see flowsheets for output. On-Q sites CDI. NJ with cont feeds.     Plan: Continue with POC. Notify primary team with changes.

## 2022-02-27 NOTE — PROGRESS NOTES
Diabetes Consult Daily  Progress Note          Assessment/Plan:   Melissa Elder is a 66 year old female with PMHx HTN, HLD, paroxysmal Afib, osteoporosis, GERD, severe COPD, previously requiring 2-3L NC O2 at rest.  Underwent b/l lung transplant with Dr. Robin on 02/21  has ongoing issues with Hydropneumothorax, chest tubes in place. 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    1) Steroid and TF induced hyperglycemia   2) Underlying pre-DM, versus steroid diabetes      on prednisolone 17.5 mg BID    Plan:     Continue NPH 6 units QAM and 4 units QPM today    Will monitor need to initiate Novolog CHO coverage    Novolog moderate intensity sliding scale >140 AC/HS/0200, on continuous TF    BG monitoring TID AC, HS, 0200    Patient care was discussed with MD Maria Julian MD  Endocrinology fellow           Interval History:     Glycemic control over the last 24 hours was reviewed  did not receive PM dose of NPH yesterday  will monitor the effect of these changes  continues to have poor oral intake, on continues tube feeds    The last 24 hours progress and nursing notes reviewed.    PTA Regimen:      Recent Labs   Lab 02/27/22  1115 02/27/22  0813 02/27/22  0732 02/27/22  0601 02/27/22  0245 02/26/22  2209   * 171* 184* 181* 183* 215*               Review of Systems:   See interval hx          Medications:     Orders Placed This Encounter      Soft & Bite Sized Diet (level 6) Thin Liquids (level 0)    Physical Exam:  not performed          Data:     Lab Results   Component Value Date    A1C 5.7 02/22/2022    A1C 5.8 02/20/2022            Recent Labs   Lab Test 02/26/22  1136 02/26/22  0752 02/26/22  0530 02/25/22  0912 02/25/22  0727   NA  --   --  141  --  140   POTASSIUM  --   --  4.2  --  4.1   CHLORIDE  --   --  102  --  105   CO2  --   --  37*  --  31   ANIONGAP  --   --  2*  --  4   * 157* 173*   < > 200*   BUN  --   --  20  --  26   CR  --   --   0.41*  --  0.46*   MINISTERIO  --   --  7.4*  --  7.7*    < > = values in this interval not displayed.

## 2022-02-27 NOTE — PROCEDURES
[facility]    Procedure: IR Procedure Note    Date/Time: 2/26/2022 6:11 PM  Performed by: Selma Weems MD  Authorized by: Selma Weems MD       UNIVERSAL PROTOCOL   Site Marked: NA  Prior Images Obtained and Reviewed:  Yes  Required items: Required blood products, implants, devices and special equipment available    Patient identity confirmed:  Verbally with patient, arm band, provided demographic data and hospital-assigned identification number  Patient was reevaluated immediately before administering moderate or deep sedation or anesthesia  Confirmation Checklist:  Patient's identity using two indicators, relevant allergies, procedure was appropriate and matched the consent or emergent situation and correct equipment/implants were available  Time out: Immediately prior to the procedure a time out was called    Universal Protocol: the Joint Commission Universal Protocol was followed    Preparation: Patient was prepped and draped in usual sterile fashion    ESBL (mL):  2     ANESTHESIA    Anesthesia: Local infiltration  Local Anesthetic:  Lidocaine 1% without epinephrine  Anesthetic Total (mL):  15      SEDATION    Patient Sedated: No    See dictated procedure note for full details.  Findings: 12 Fr anterior apical chest tube into pneumothorax  14 Fr lateral basilar  Chest tube into small effusion    Specimens: none    Complications: None    Condition: Stable    Plan: Both tubes to pleurVAC at -20 mmHg suction  Tube cares per orders  Consult IR for tube dysfunction and once removal is desired. Please do not remove these tubes without discussing with IR      PROCEDURE  Describe Procedure: 12 Fr anterior apical chest tube into pneumothorax  14 Fr lateral basilar  Chest tube into small effusion  Patient Tolerance:  Patient tolerated the procedure well with no immediate complications  Length of time physician/provider present for 1:1 monitoring during  sedation: 0

## 2022-02-27 NOTE — PROGRESS NOTES
Cardiovascular Surgery Progress Note  02/27/2022         Assessment and Plan:     Melissa Elder is a 66 year old female with PMHx HTN, HLD, paroxysmal Afib, osteoporosis, GERD, severe COPD, previously requiring 2-3L NC O2 at rest. She underwent b/l lung transplant with Dr. Robin on 02/21. Got 1u pRBC post-op, no overt bleeding source, monitoring. Extubated 02/22 to O2 NC.    CVTS is following in consideration of the clamshell incision as well as chest tube management.    # Clamshell incision  - Continue to adhere to sternal precautions  - Postoperative incision management, continue open to air but needs to be kept very dry under her breasts. Would closed with skin glue.  - Clamshell incision appears clean, dry. Incision appears without erythema, or drainage. Wound edges are well approximated.  - Encourage activity/OOB, IS/pulmonary toilet.  Maintain strict sleep hygiene and delirium precautions.     # Chest tube management  - Continue to suction while in room, can ambulate off suction.  - Left Pleural output: 295 mL no air leak, serosanguinous output. L chest tube slowly slipped out, removed 2/25 due to new pneumothorax.   - IR placed 2 new Left pleural tubes 2/26. Consult IR for tube dysfunction and once removal is desired.   * * Please do not remove these tubes without discussing with IR * *  - Right Pleural output: 690 mL no air leak, serosanguinous output. Await <200 mL daily before removing.   - Pericardial output: Removed 2/24/22 without immediate complication    Discussed with Dr Vargas through both written and verbal communication.    20 minutes was spent reviewing chart, imaging, labs, and in discussion with patient.     Luis A Nava PA-C  Cardiothoracic Surgery  Pager [phone]    7:30 AM   February 26, 2022          Interval History:     No overnight events.  Tolerated chest tube placement.   States pain is well managed on current regimen. Slept well overnight.         Physical Exam:   Blood  "pressure 131/58, pulse 103, temperature 97.7  F (36.5  C), temperature source Oral, resp. rate 24, height 1.575 m (5' 2\"), weight 73.1 kg (161 lb 2.5 oz), SpO2 (P) 98 %, not currently breastfeeding.  Vitals:    02/25/22 0400 02/26/22 0000 02/27/22 0030   Weight: 75 kg (165 lb 4.8 oz) 73.8 kg (162 lb 11.2 oz) 73.1 kg (161 lb 2.5 oz)      Gen: A&Ox4, NAD  Neuro: no focal deficits   CV: HD stable   Pulm: normal breathing on 1 lpm  Abd: nondistended, normal BS, soft, nontender  Ext: trace peripheral edema, 1+ pitting  Incision: clean, dry, intact, no erythema, sternum stable  Tubes/drain sites: dressing clean and dry, serosanguinous output, no air leak. 24 hr output 690 mL. Left anterior CT placed through anterior chest wall.          Data:    Imaging:  reviewed recent imaging, recent increased pneumothorax. Await AM CXR.     Labs:  BMP  Recent Labs   Lab 02/27/22  0601 02/27/22  0245 02/26/22  2209 02/26/22  1908 02/26/22  0752 02/26/22  0530 02/25/22  0912 02/25/22  0727 02/24/22  0454 02/24/22  0449 02/23/22  0403 02/23/22  0346   NA  --   --   --   --   --  141  --  140  --  137  --  140   POTASSIUM  --   --   --   --   --  4.2  --  4.1  --  4.8  --  4.5   CHLORIDE  --   --   --   --   --  102  --  105  --  107  --  110*   MINISTERIO  --   --   --   --   --  7.4*  --  7.7*  --  8.2*  --  7.7*   CO2  --   --   --   --   --  37*  --  31  --  26  --  27   BUN  --   --   --   --   --  20  --  26  --  28  --  25   CR  --   --   --   --   --  0.41*  --  0.46*  --  0.53  --  0.68   * 183* 215* 117*   < > 173*   < > 200*   < > 200*   < > 168*    < > = values in this interval not displayed.     CBC  Recent Labs   Lab 02/26/22  0530 02/25/22  0727 02/24/22  0449 02/23/22 2054   WBC 9.6 12.2* 15.9* 18.7*   RBC 2.89* 2.95* 3.05* 3.18*   HGB 8.4* 8.7* 8.9* 9.2*   HCT 26.7* 27.7* 27.9* 28.8*   MCV 92 94 92 91   MCH 29.1 29.5 29.2 28.9   MCHC 31.5 31.4* 31.9 31.9   RDW 13.3 13.9 14.1 14.3    160 136* 153     "

## 2022-02-27 NOTE — PROGRESS NOTES
[facility]    Medicine Progress Note - Hospitalist Service, GOLD TEAM 10    Date of Admission:  2/20/2022    Assessment & Plan          Melissa Elder is a 66 year old female with PMHx HTN, HLD, paroxysmal Afib, osteoporosis, GERD, severe COPD, previously requiring 2-3L NC O2 at rest.  Underwent b/l lung transplant with Dr. Robin on 02/21. Got 1u pRBC post-op, no overt bleeding source, monitoring. Extubated 02//22 to O2 NC. She is now transferred onto the floor. Patient still has two chest tubes in but pericardial drain was removed 2/24 without issues. Continuing on antibiotics for cultures growing from donor and recipient lungs. Patient now with increasing hydropneumothorax with left pleural chest tube being pulled after it was falling out. IR consulted and two new chest tubes placed and draining. Now cultures from bronch growing HSV so increased valtrex to treatment dose.    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  Pressor weaned off 2/22 and pt. extubated. Prior history of infection/colonization with Haemophilus influenzae (2017).  IgG adequate (2/20).  MRSA nares negative 2/21.  Broad spectrum ABX empirically post-op with vanc and ceftaz (2/21-2/22), stopped after 24h per Dr. Lala to target known donor cultures on POD #1. Donor cultures ([facility]) with Strep mitis, Actinomyces odontolyticus, and Kenyatta kruseii. Recipient cultures with Actinomyces odonotolyticus.  So currently on ceftriaxone for coverage. She has now been weaned down to room air intermittently needing 1 L O2.  - Nebs: levalbuterol and Mucomyst QID  - Aggressive pulmonary toilet with chest physiotherapy QID as well as Aerobika and incentive spirometry q1h w/a  -  Wean supplemental O2 to keep >92%  - Anesthesia to manage erector spinae catheters (placed 2/21)  - Chest tubes managed by surgical team   - Right pleural chest tube by CT surgery   - Two left pleural tubes placed 2/26 by IR  - Daily CXR  - Tube feeding per nutirtion   - Await explant pathology  - Continue ceftriaxone for 2 weeks through 3/8 followed by amoxicillin for 3 months  - Immune suppression per daily transplant pulm note  - increased valtrex to 1000 mg TID x 14 days     ID Prophylaxis  - Daponse for PJP ppx MWF dose, increase to daily after one week (on 2/28) CBC remains stable   - Acyclovir increased to treatment dose x 14 days   - Nystatin for oral candidiasis ppx, 6 month course    Non infectious diarrhea   Noted to have increased loose stools 2/25 likely due to mmf and TF. No rise in white count or abdominal pain to suggest c. Diff.   - discussed with dietician and will add fiber     Post op pain   - Erector spinae catheters placed 2/21 managed by anesthesia   - gabapentin 100 mg nightly  - tylenol 975 mg Q8H   - Dilaudid 0.2-0.4 mg Q2H PRN   - oxycodone 5-10 mg Q4H PRN   - robaxin 500 mg Q6H PRN     Paroxysmal Afib   She was on diltiazem prior to lung transplantation and had not been on anticoagulation. She has not been transitioned to metoprolol which will be continued.  -  Increased Metoprolol tartrate to 25 mg BID   - no anticoagulation at this time     Stress induced hyperglycemia  Did not need insulin prior to admission but sugars have been rising despite sliding scale coverage.  - Endocrinology consulted for assistance    HTN   Hx of htn but was recently on pressors will continue to monitor     HLD  Mild CAD   Noted on transplant workup.   - started rosuvastatin 40 mg daily     Acute blood loss anemia  Acute blood loss thrombocytopenia  Secondary to surgery. Will continue to monitor   - Transfuse Hgb < 7, platelets < 50     Sternotomy  Surgical Incision  - Sternal precautions  - Postoperative  incision management per protocol  - PT/OT/CR    Elevated ALT   Noted on labs 2/26 and rising. AST, ALP, Bili WNL. No RUQ or abdominal pain. Will continue to monitor.  - CMP in AM        Diet: Soft & Bite Sized Diet (level 6) Thin Liquids (level 0)  Adult Formula Drip Feeding: Continuous Osmolite 1.5; Nasoduodenal tube; Goal Rate: 50; mL/hr; Medication - Feeding Tube Flush Frequency: At least 15-30 mL water before and after medication administration and with tube clogging; Amount to Send (Nut...  Snacks/Supplements Adult: Glucerna; Between Meals    DVT Prophylaxis: Pneumatic Compression Devices  [place]iff Catheter: Not present  Central Lines: None  Cardiac Monitoring: None  Code Status: Full Code      Disposition Plan   Expected Discharge: 03/04/2022   Anticipated discharge location: home;inpatient rehabilitation facility    Delays:            The patient's care was discussed with the Bedside Nurse, Patient and transplant pulm  Consultant.    Ross Farrar DO  Hospitalist Service, GOLD TEAM [address] [place] [address]  Securely message with the Vocera Web Console (learn more here)  Text page via [facility] Paging/Directory   Please see signed in provider for up to date coverage information      Clinically Significant Risk Factors Present on Admission                    ______________________________________________________________________    Interval History     Two new chest  Tubes placed by IR yesterday. Breathing feels about the same. Some pain over chest tube site but manageable. No fevers or chills.     Four point ROS completed and negative unless listed above     Data reviewed today: I reviewed all medications, new labs and imaging results over the last 24 hours.     Physical Exam   Vital Signs: Temp: 98.1  F (36.7  C) Temp src: Oral BP: (!) 144/72 Pulse: 104   Resp: 30 SpO2: 96 % O2 Device: None (Room air) Oxygen Delivery: 1 LPM  Weight: 161 lbs 2.5 oz    Constitutional: Laying in bed  breathing rapidly but no distress  HEENT: Eyes with pink conjunctivae, anicteric.  Oral mucosa moist without lesions.  Voice hoarse/squeaky  PULM: Diminished bases bilaterally.  No crackles, no rhonchi, no wheezes.  Chest tubes without air leak or tidaling.  CV: Normal S1 and S2.  Tachycardia.  No murmur, gallop, or rub.  No peripheral edema.   ABD: BS hypoactive, soft, nontender, nondistended.    MSK: Moves all extremities.  No apparent muscle wasting.   NEURO: Alert and oriented, conversant.   SKIN: Warm, dry.  No rash on limited exam.   PSYCH: Mood stable.     Data   Recent Labs   Lab 02/27/22  1115 02/27/22  0813 02/27/22  0732 02/26/22  0752 02/26/22  0530 02/25/22  0912 02/25/22  0727 02/22/22  0402 02/22/22  0355 02/21/22  0809 02/21/22  0808 02/21/22  0717 02/21/22  0714   WBC  --   --  14.6*  --  9.6  --  12.2*   < > 11.5*   < >  --   --   --    HGB  --   --  8.9*  --  8.4*  --  8.7*   < > 8.8*   < >  --   --   --    MCV  --   --  93  --  92  --  94   < > 85   < >  --   --   --    PLT  --   --  191  --  165  --  160   < > 128*   < >  --   --   --    INR  --   --   --   --   --   --   --   --  1.31*  --  1.58*  --  5.23*   NA  --   --  139  --  141  --  140   < > 140   < >  --   --   --    POTASSIUM  --   --  4.2  --  4.2  --  4.1   < > 4.6   < >  --   --   --    CHLORIDE  --   --  101  --  102  --  105   < > 111*   < >  --   --   --    CO2  --   --  36*  --  37*  --  31   < > 26   < >  --   --   --    BUN  --   --  20  --  20  --  26   < > 10   < >  --   --   --    CR  --   --  0.41*  --  0.41*  --  0.46*   < > 0.53   < >  --   --   --    ANIONGAP  --   --  2*  --  2*  --  4   < > 3   < >  --   --   --    MINISTERIO  --   --  7.9*  --  7.4*  --  7.7*   < > 7.2*   < >  --   --   --    * 171* 184*   < > 173*   < > 200*   < > 135*   < >  --    < >  --    ALBUMIN  --   --  1.9*  --  1.9*  --  2.0*   < > 2.3*   < >  --   --   --    PROTTOTAL  --   --  5.2*  --  4.9*  --  5.0*   < > 4.2*   < >  --   --   --     BILITOTAL  --   --  0.2  --  0.3  --  0.2   < > 0.4   < >  --   --   --    ALKPHOS  --   --  106  --  89  --  80   < > 36*   < >  --   --   --    ALT  --   --  79*  --  53*  --  31   < > 17   < >  --   --   --    AST  --   --  41  --  33  --  28   < > 40   < >  --   --   --     < > = values in this interval not displayed.     Recent Results (from the past 24 hour(s))   US Lower Extremity Venous Duplex Left    Narrative    EXAMINATION: DOPPLER VENOUS ULTRASOUND OF THE LEFT LOWER EXTREMITY,  2/26/2022 3:11 PM     COMPARISON: None.    HISTORY: Left greater than right lower extremity swelling concern for  DVT    TECHNIQUE:  Gray-scale evaluation with compression, spectral flow, and  color Doppler assessment of the deep venous system of the left leg  from groin to knee, and then at the ankle.    FINDINGS:  In the left lower extremity, the common femoral, femoral, popliteal  and posterior tibial veins demonstrate normal compressibility and  blood flow.      Impression    IMPRESSION:  No evidence left lower extremity deep venous thrombosis.    I have personally reviewed the examination and initial interpretation  and I agree with the findings.    MURIEL WALLACE MD         SYSTEM ID:  [id number]   CT Chest Tube with Cath Placement    Narrative    PROCEDURES 2/26/22:  1. CT guided basal chest tube placement.  2. CT guided apical chest tube placement.     Clinical History: Left chest tube pulled out. Has residual fluid  post-transplant.     Comparisons: 2/26/22    Staff Radiologist: Selma Weems MD    Resident(s): Rangel John MD      Medications: The patient was placed on continuous monitoring. No  intravenous sedation administered. Vital signs monitored by nursing  staff under Interventional Radiologists supervision. The patient  remained stable throughout the procedure.    CTDIvol: 261.85 mGy.    Contrast: No intravenous contrast administered.    PROCEDURE: The patient understood the limitations, alternatives,  and  risks of the procedure and requested the procedure be performed. Both  written and oral consent were obtained.    The patient was placed in the supine position. Preprocedural scanning  demonstrated adequate window for basilar and apical drain placement  with associated fluid and air.    The left chest was prepped and draped in the usual sterile fashion. 1%  lidocaine without epinephrine was used for local anesthesia.     Under CT fluoroscopy guidance, a [employer] [employer] centesis  catheter needle was advanced into the left basilar pleural  space.Needle removed. Guidewire coiled within the pleural cavity.  Tract was dilated up to 10 Sudanese over guidewire. Catheter exchanged  over guidewire for a 10 F catheter. Guidewire removed. Catheter placed  to water seal chest tube drainage. 2-0 nylon catheter-retaining suture  and sterile dressing applied.     Next attention was directed to the left apical air collection. Under  CT fluoroscopy guidance, a [employer] [employer] centesis  catheter needle was advanced into the left apical pleural space.Needle  removed. Guidewire coiled within the pleural cavity. Tract was dilated  up to 10 Sudanese over guidewire. Catheter exchanged over guidewire for  a 12 F catheter. Guidewire removed. Catheter placed to water seal  chest tube drainage. 2-0 nylon catheter-retaining suture and sterile  dressing applied.     No immediate complication.      Impression    IMPRESSION:    1. Successful CT guided left chest tubes placed as detailed above.  Catheters placed to water seal chest drainage, 20 cm water suction.    PLAN:  -Return to patient care unit.  -Catheters placed to water seal chest drainage, 20 cm water suction.       XR Chest 2 Views    Narrative    Chest 2 views    INDICATION: Interval follow-up, post lung transplant    COMPARISON: 2/26/2022    FINDINGS: Clamshell sternotomy from prior bilateral lung transplant  again noted. Left chest catheters are present. Heart  size normal.  Atherosclerosis at the aortic arch. Haziness and costophrenic angle  blunting bilaterally, suggestive of atelectasis and/or small pleural  effusions. Feeding tube tip appears in the duodenal/jejunal junction.      Impression    IMPRESSION: Bibasilar trace pleural effusions or atelectasis.  Atherosclerosis. Prior bilateral lung transplant.    FELIPA MARIE MD         SYSTEM ID:  [id number]     Medications     dextrose 10 mL/hr at 02/21/22 1900     BETA BLOCKER NOT PRESCRIBED       disposable pump w/ anesthetic 10 mL/hr at 02/26/22 0000     sodium chloride 10 mL/hr at 02/23/22 1947       acetylcysteine  2 mL Nebulization 4x Daily     [START ON 3/1/2022] calcium carbonate 600 mg-vitamin D 400 units  1 tablet Oral BID w/meals     cefTRIAXone  2 g Intravenous Q24H     dapsone  50 mg Oral Once per day on Mon Wed Fri     [Held by provider] dapsone  50 mg Oral or NG Tube Once per day on Mon Wed Fri     famotidine  10 mg Oral or Feeding Tube BID     fiber modular (NUTRISOURCE FIBER)  1 packet Per Feeding Tube BID     gabapentin  100 mg Oral or Feeding Tube At Bedtime     [Held by provider] heparin ANTICOAGULANT  5,000 Units Subcutaneous Q8H     insulin aspart  1-7 Units Subcutaneous TID AC     insulin aspart  1-5 Units Subcutaneous BID     insulin NPH  4 Units Subcutaneous Daily with supper     insulin NPH  6 Units Subcutaneous QAM     levalbuterol  1.25 mg Nebulization 4x Daily     metoprolol tartrate  25 mg Oral or Feeding Tube BID     multivitamins w/minerals  15 mL Oral Daily     mycophenolate  1,000 mg Oral BID    Or     mycophenolate  1,000 mg Oral or NG Tube BID     nystatin  1,000,000 Units Swish & Swallow 4x Daily     pantoprazole  40 mg Oral or Feeding Tube Daily     polyethylene glycol  17 g Oral or Feeding Tube Daily     prednisoLONE  17.5 mg Oral or NG Tube BID     [START ON 3/1/2022] predniSONE  15 mg Oral or Feeding Tube BID     protein modular  1 packet Per Feeding Tube TID     [Held by  provider] rosuvastatin  40 mg Oral Daily     senna-docusate  1 tablet Oral or Feeding Tube BID     sodium chloride (PF)  3 mL Intracatheter Q8H     tacrolimus  6 mg Oral BID IS    Or     tacrolimus  6 mg Per Feeding Tube BID IS     valACYclovir  1,000 mg Oral Q8H

## 2022-02-27 NOTE — PROGRESS NOTES
Lung Transplant Consult Follow Up Note   February 27, 2022            Assessment and Plan:   Melissa Elder is a 66 year old female with a PMH significant for severe COPD, HTN, mild non-obstructive CAD, paroxysmal afib, osteoporosis, GERD, and colonic polyps.  Pt. is now s/p BSLT on 2/21/22, surgery relatively uncomplicated.  The patient was extubated 2/22, intermittently on RA otherwise using 1-2L NC.     Today's recommendations:  - 2/21 bronchoscopy with HSV - Switch to valtrex 1000mg TID X 14d (ordered). Then complete acyclovir prophylaxis per protocol.  - ALT increasing, consider reducing frequency of acetaminophen and/or dose of Crestor.  - Tacrolimus level 4.7, increase dose to 6mg BID and con't following levels closely (ordered)  - Continue chest physiotherapy QID with Aerobika and incentive spirometry q1h w/a  - Wean O2 to keep >92%  - DSA ordered 2/28  - CXR daily  - Reassess daily for diuresis  - Await explant pathology  - Inspection bronch next week, timing TBD  - Prednisone taper ordered 3/1  - Consider increasing dapsone to daily 3/2 if CBC remains stable  - CMV, EBV, and IgG 3/21 (not yet ordered)  - Follow pending cultures  - Continue ceftrixaone for 2 week course through 3/8 then amoxicillin for total of 3 months for actinomyces.       S/p bilateral sequential lung transplant for COPD:  Acute hypoxic/hypercapneic respiratory failure: Scant pleural-pleural adhesions and mild-moderate bilateral hilar lymphadenopathy per op note.  Pressor weaned off 2/22 and pt. extubated.  Able to be weaned to RA at rest during morning rounds, intermittently using 1-2L NC. Gradual clinical improvement.  - 2/27 CXR, reviewed by me. 2 new chest tubes on the left, in good position. Small increase in right effusion. No acute infiltrate, no other change from previous.   - Nebs: levalbuterol and Mucomyst QID  - Aggressive pulmonary toilet with chest physiotherapy QID as well as Aerobika and incentive spirometry q1h w/a  -  Wean supplemental O2 to keep >92%  - DSA at one week (ordered 2/28) then one month post-transplant, additionally per protocol  - Anesthesia to manage erector spinae catheters (placed 2/21)  - Chest CT (2/25): showing anterior hydropneumothorax.  Right chest tube in fissure.  Left chest tube with functioning port outside of pleural cavity. Left chest tube removed and replaced with 2 left sided pigtails (2/26).  - Volume management per primary team.  - Post-pyloric nasal FT (repositioned by radiology 2/22), TF per RD and primary team  - SLP following for diet advacement  - Await explant pathology  - Inspection bronch next week, timing TBD     Immunosuppression:  - Induction therapy with basiliximab X 2 doses (and high dose IV steroid).  - Tacrolimus goal level 8-12.  - Tacrolimus level 4.7, increase dose to 6mg BID and con't following levels closely (ordered)  Date [place] Level Intervention   2/22 3.3 Dose increased from 1 mg to 2 mg BID   2/23 10.6 Near steady state, no dose change    2/24 8.7 (10.5h)  Continue current dose   2/25 8.5 Transitioned from SL to PO    2/26 6.1 Not [place], con't current dose    2/27 4.7 Increase to 6mg BID           - MMF 1000 mg BID (dec due to diarrhea)  - Prednisolone 17.5 mg BID with taper per lung transplant protocol (due 3/1, ordered):  Date AM dose (mg) PM dose (mg)   2/22 17.5 17.5   3/1 15 15   3/8 15 12.5   3/15 12.5 12.5   3/29 12.5 10   4/12 10 10   5/10 10 7.5   6/7 7.5 7.5   7/5 7.5 5   8/2 5 5   8/30 5 2.5      Prophylaxis:   - Daponse for PJP ppx (started 2/23 at decreased MWF dose, G6PD 13.3 2/21), increase to daily after one week (3/2) if CBC remains stable  - 2/21 bronchoscopy with HSV - Switch to valtrex 1000mg TID X 14d (ordered). Then complete acyclovir prophylaxis per protocol.  - Nystatin for oral candidiasis ppx, 6 month course  - See below for serologies and viral ppx:    Donor Recipient Intervention   CMV status Negative Negative N/A, CMV monthly (3/21,  not yet ordered)   EBV status Positive Positive EBV at one month (3/21, not yet ordered) then q3 months   HSV status N/A (Newly) Positive Valtrex x 14d for HSV on bronch then acyclovir thru d30      Primary graft dysfunction (per ISHLT guidelines):  See chart below:    POD #0  (~0 hours) POD #1  (~24 hours) POD #2   (~48 hours) POD#3   (~72 hours)   Date 2/21 2/22 2/23 2/24   Time 0713 0843  0347 N/A    Intubated Y Y N N   PaO2 232 127 142     FiO2 50% 30% 27%     P/F Ratio 464 423 526     PGD Grade   (0=mild, 3=severe) 0 0 1     ECMO N N N N   Inhaled NO/Flolan N N N N   ISHLT PGD Scoring  Grade 0 - PaO2/FiO2 >300 and normal chest radiograph (absence of diffuse allograft infiltrates)  Grade 1 - PaO2/FiO2 >300 and diffuse allograft infiltrates on chest radiograph  Grade 2 - PaO2/FiO2 between 200 and 300  Grade 3 - PaO2/FiO2 <200 and/or on ECMO     ID: Prior history of infection/colonization with Haemophilus influenzae (2017).  IgG adequate (2/20).  MRSA nares negative 2/21.  Broad spectrum ABX empirically post-op with vanc and ceftaz (2/21-2/22), stopped after 24h to target known donor cultures on POD #1 (transitioned to cefazolin).   - IgG repeat at one month (3/21, not yet ordered)  - Donor ([facility]) respiratory culture with P-S MSSA (final)  - Donor cultures ([facility]) with Strep mitis, Actinomyces odontolyticus, MSSA x2, and Candida kruseii  - Recipient cultures with Actinomyces odonotolyticus  - Bronch cultures (2/22) with GPC and yeast  - ABX: Continue ceftriaxone (2/23) for 2 week course through 3/8 then amoxicillin for total of 3 months for actinomyces.   --Low threshold to also add vancomycin for clinical decline (GPC on bronch culture 2/22), plan to complete 2 week course through 3/8; s/p cefazolin 2/22-2/23, ceftazidime 2/21-2/22  - 2/21 bronchoscopy with HSV - Switch to valtrex 1000mg TID X 14d (ordered). Then complete acyclovir prophylaxis per protocol.     PHS risk criteria donor: Additional labs required  post-transplant (between 4-8 weeks post-op): Hepatitis B, Hepatitis C, and HIV by COLT ([id number], ordered POD #30), also plan to repeat hepatitis B surface antibody at that time (ordered) given discrepancy with recent result.     Asymmetric LE edema: Recommend LLE doppler to assess for DVT.  - No DVT on LE doppler    Increasing LFTS: ALT increasing.  Alk phos and AST increasing but still WNL.  Asymptomatic.   - Consider reducing frequency of acetaminophen and/or dose of Crestor.   - If progressive increase consider hepatic imaging, viral serology and pharm to review med list.      Other relevant problems managed by primary team:     Diarrhea: decreased MMF to 1000, will plan on Myfortic when able to take large pills orally.  Fiber added to tube feeds.     CAD: Noted to have mild non-obstructive CAD without hemodynamically significant lesions on cardiac cath 3/27/19.  PTA ASA 81 mg and atorvastatin.   - Rosuvastatin (2/24, less interaction with immunosuppression)     Paroxysmal afib: PTA diltiazem, not on AC.  Post-op tachycardia, off pressor since 2/22.  Metoprolol started 2/23.     GERD: Not on PPI PTA.  Negative pH/manometry study 3/28/19, noted small hiatal hernia.   - PPI daily (2/21), famotidine BID (2/21) x10d as bridge     Anxiety: Per lung transplant committee review, noted high anxiety in anticipation of transplant.   - Monitor need for palliative care and/or health psychology consult post-op     We appreciate the excellent care provided by the [place] [id number] team.  Recommendations communicated via in person rounding and this note.  Will continue to follow along closely, please do not hesitate to call with any questions or concerns.     Carson Feng MD  [phone]                Interval History:   IR Chest tube placed X 2 yesterday afternoon.   Dyspnea at rest: Mild, intermittent, unchanged  Dyspnea on exertion: with mild exertion, gradually improving  Cough: intermittent catia with activity  Sputum: small  amt of brown  Hemoptysis: none   Chest Pain: Incisional, increased with new chest tubes, adequately controlled with current medication            Review of Systems:   C: NEGATIVE for fever, chills, appetite improving  INTEGUMENTARY/SKIN: no rash or obvious new lesions  ENT/MOUTH: no sore throat, new sinus pain or nasal drainage  RESP: see interval history  CV: NEGATIVE for palpitations   GI: nauseam, no vomiting yesterday, better today. Abd discomfort yesterday, better today.  Loose stool, last BM yesterday  : no dysuria  MUSCULOSKELETAL: no myalgias, arthralgias            Medications:       acetaminophen  975 mg Oral or Feeding Tube Q8H JUANA     acetylcysteine  2 mL Nebulization 4x Daily     acyclovir  400 mg Oral or NG Tube BID    Or     acyclovir  400 mg Oral or NG Tube BID     [START ON 3/1/2022] calcium carbonate 600 mg-vitamin D 400 units  1 tablet Oral BID w/meals     cefTRIAXone  2 g Intravenous Q24H     dapsone  50 mg Oral Once per day on Mon Wed Fri     [Held by provider] dapsone  50 mg Oral or NG Tube Once per day on Mon Wed Fri     famotidine  10 mg Oral or Feeding Tube BID     fiber modular (NUTRISOURCE FIBER)  1 packet Per Feeding Tube BID     gabapentin  100 mg Oral or Feeding Tube At Bedtime     [Held by provider] heparin ANTICOAGULANT  5,000 Units Subcutaneous Q8H     insulin aspart  1-7 Units Subcutaneous TID AC     insulin aspart  1-5 Units Subcutaneous BID     insulin NPH  4 Units Subcutaneous Daily with supper     insulin NPH  6 Units Subcutaneous QAM     levalbuterol  1.25 mg Nebulization 4x Daily     metoprolol tartrate  25 mg Oral or Feeding Tube BID     multivitamins w/minerals  15 mL Oral Daily     mycophenolate  1,000 mg Oral BID    Or     mycophenolate  1,000 mg Oral or NG Tube BID     nystatin  1,000,000 Units Swish & Swallow 4x Daily     pantoprazole  40 mg Oral or Feeding Tube Daily     polyethylene glycol  17 g Oral or Feeding Tube Daily     prednisoLONE  17.5 mg Oral or NG Tube BID      [START ON 3/1/2022] predniSONE  15 mg Oral or Feeding Tube BID     protein modular  1 packet Per Feeding Tube TID     rosuvastatin  40 mg Oral Daily     senna-docusate  1 tablet Oral or Feeding Tube BID     sodium chloride (PF)  3 mL Intracatheter Q8H     tacrolimus  4 mg Oral BID IS    Or     tacrolimus  4 mg Per Feeding Tube BID IS     acetaminophen, bisacodyl, dextrose, glucose **OR** dextrose **OR** glucagon, fluticasone, HYDROmorphone **OR** HYDROmorphone, labetalol, lidocaine 4%, lidocaine (buffered or not buffered), magnesium hydroxide, methocarbamol, naloxone **OR** naloxone **OR** naloxone **OR** naloxone, ondansetron **OR** ondansetron, oxyCODONE **OR** oxyCODONE, BETA BLOCKER NOT PRESCRIBED, sodium chloride (PF)         Physical Exam:   Temp:  [97.3  F (36.3  C)-98.5  F (36.9  C)] 97.7  F (36.5  C)  Pulse:  [] 103  Resp:  [22-32] 24  BP: (114-176)/(55-94) 131/58  SpO2:  [95 %-100 %] (P) 98 %    Intake/Output Summary (Last 24 hours) at 2/27/2022 0733  Last data filed at 2/27/2022 0600  Gross per 24 hour   Intake 2390 ml   Output 1455 ml   Net 935 ml     Constitutional:   Awake, alert and in no apparent distress     Eyes:   nonicteric     ENT:    oral mucosa moist without lesions     Neck:   Supple without supraclavicular or cervical lymphadenopathy     Lungs:   Diminished air flow throughout.  No crackles. No rhonchi.  No wheezes.     Cardiovascular:   Normal S1 and S2.  RRR. Pericardial rub.   No murmur. No gallop.      Abdomen:   NABS, softly distended, nontender.  No HSM.     Musculoskeletal:   1+ edema     Neurologic:   Alert and conversant.     Skin:   Warm, dry.  No rash on limited exam.             Data:   All laboratory and imaging data reviewed.    Results for orders placed or performed during the hospital encounter of 02/20/22 (from the past 24 hour(s))   Glucose by meter   Result Value Ref Range    GLUCOSE BY METER POCT 157 (H) 70 - 99 mg/dL   XR Chest 2 Views    Narrative    Exam: XR  CHEST 2 VW, 2/26/2022 9:08 AM    Indication: interval follow up, post-op lung transplant    Comparison: 2/25/2022 chest x-ray at 16:20    Findings:   Upright, PA and lateral views of the chest. Enteric tube tip appears  to be postpyloric. Right basilar chest tube is stable in position.  Midline trachea. Stable elevation of the left hemidiaphragm. Stable  cardiac silhouette. Mild pulmonary venous congestion with small  bilateral pleural effusions. Stable size of right apical pneumothorax.  No discernible left-sided pneumothorax.       Impression    Impression:   1. Stable size of right apical pneumothorax. No discernible left-sided  pneumothorax.  2. Small bilateral pleural effusions with new, mild pulmonary venous  congestion.     I have personally reviewed the examination and initial interpretation  and I agree with the findings.    FELIPA MARIE MD         SYSTEM ID:  [id number]   EKG 12-lead, complete   Result Value Ref Range    Systolic Blood Pressure  mmHg    Diastolic Blood Pressure  mmHg    Ventricular Rate 116 BPM    Atrial Rate 116 BPM    PA Interval 122 ms    QRS Duration 130 ms     ms    QTc 494 ms    P Axis 52 degrees    R AXIS 82 degrees    T Axis -18 degrees    Interpretation ECG       Sinus tachycardia with occasional Premature ventricular complexes  Possible Left atrial enlargement  Right bundle branch block  T wave abnormality, consider inferolateral ischemia  Abnormal ECG  When compared with ECG of 21-FEB-2022 07:29,  Significant changes have occurred     Glucose by meter   Result Value Ref Range    GLUCOSE BY METER POCT 188 (H) 70 - 99 mg/dL   US Lower Extremity Venous Duplex Left    Narrative    EXAMINATION: DOPPLER VENOUS ULTRASOUND OF THE LEFT LOWER EXTREMITY,  2/26/2022 3:11 PM     COMPARISON: None.    HISTORY: Left greater than right lower extremity swelling concern for  DVT    TECHNIQUE:  Gray-scale evaluation with compression, spectral flow, and  color Doppler assessment of the  deep venous system of the left leg  from groin to knee, and then at the ankle.    FINDINGS:  In the left lower extremity, the common femoral, femoral, popliteal  and posterior tibial veins demonstrate normal compressibility and  blood flow.      Impression    IMPRESSION:  No evidence left lower extremity deep venous thrombosis.    I have personally reviewed the examination and initial interpretation  and I agree with the findings.    MURIEL WALLACE MD         SYSTEM ID:  [id number]   CT Chest Tube with Cath Placement    Narrative    PROCEDURES 2/26/22:  1. CT guided basal chest tube placement.  2. CT guided apical chest tube placement.     Clinical History: Left chest tube pulled out. Has residual fluid  post-transplant.     Comparisons: 2/26/22    Staff Radiologist: Selma Weems MD    Resident(s): Rangel John MD      Medications: The patient was placed on continuous monitoring. No  intravenous sedation administered. Vital signs monitored by nursing  staff under Interventional Radiologists supervision. The patient  remained stable throughout the procedure.    CTDIvol: 261.85 mGy.    Contrast: No intravenous contrast administered.    PROCEDURE: The patient understood the limitations, alternatives, and  risks of the procedure and requested the procedure be performed. Both  written and oral consent were obtained.    The patient was placed in the supine position. Preprocedural scanning  demonstrated adequate window for basilar and apical drain placement  with associated fluid and air.    The left chest was prepped and draped in the usual sterile fashion. 1%  lidocaine without epinephrine was used for local anesthesia.     Under CT fluoroscopy guidance, a 5 [place] imbookin ([place]) centesis  catheter needle was advanced into the left basilar pleural  space.Needle removed. Guidewire coiled within the pleural cavity.  Tract was dilated up to 10 Cuban over guidewire. Catheter exchanged  over guidewire for a 10 F  catheter. Guidewire removed. Catheter placed  to water seal chest tube drainage. 2-0 nylon catheter-retaining suture  and sterile dressing applied.     Next attention was directed to the left apical air collection. Under  CT fluoroscopy guidance, a 5 Citizen [place] Quanterix centesis  catheter needle was advanced into the left apical pleural space.Needle  removed. Guidewire coiled within the pleural cavity. Tract was dilated  up to 10 Citizen [place] over guidewire. Catheter exchanged over guidewire for  a 12 F catheter. Guidewire removed. Catheter placed to water seal  chest tube drainage. 2-0 nylon catheter-retaining suture and sterile  dressing applied.     No immediate complication.      Impression    IMPRESSION:    1. Successful CT guided left chest tubes placed as detailed above.  Catheters placed to water seal chest drainage, 20 cm water suction.    PLAN:  -Return to patient care unit.  -Catheters placed to water seal chest drainage, 20 cm water suction.       IR Procedure Note    Narrative    Selma Weems MD     2/26/2022  6:15 PM  [facility]    Procedure: IR Procedure Note    Date/Time: 2/26/2022 6:11 PM  Performed by: Selma Weems MD  Authorized by: Selma Weems MD       UNIVERSAL PROTOCOL   Site Marked: NA  Prior Images Obtained and Reviewed:  Yes  Required items: Required blood products, implants, devices and special   equipment available    Patient identity confirmed:  Verbally with patient, arm band, provided   demographic data and hospital-assigned identification number  Patient was reevaluated immediately before administering moderate or deep   sedation or anesthesia  Confirmation Checklist:  Patient's identity using two indicators, relevant   allergies, procedure was appropriate and matched the consent or emergent   situation and correct equipment/implants were available  Time out: Immediately prior to the procedure a time out  was called    Universal Protocol: the Joint Commission Universal Protocol was followed    Preparation: Patient was prepped and draped in usual sterile fashion    ESBL (mL):  2     ANESTHESIA    Anesthesia: Local infiltration  Local Anesthetic:  Lidocaine 1% without epinephrine  Anesthetic Total (mL):  15      SEDATION    Patient Sedated: No    See dictated procedure note for full details.  Findings: 12 Fr anterior apical chest tube into pneumothorax  14 Fr lateral basilar  Chest tube into small effusion    Specimens: none    Complications: None    Condition: Stable    Plan: Both tubes to pleurVAC at -20 mmHg suction  Tube cares per orders  Consult IR for tube dysfunction and once removal is desired. Please do not   remove these tubes without discussing with IR      PROCEDURE  Describe Procedure: 12 Fr anterior apical chest tube into pneumothorax  14 Fr lateral basilar  Chest tube into small effusion  Patient Tolerance:  Patient tolerated the procedure well with no immediate   complications  Length of time physician/provider present for 1:1 monitoring during   sedation: 0   Glucose by meter   Result Value Ref Range    GLUCOSE BY METER POCT 106 (H) 70 - 99 mg/dL   Glucose by meter   Result Value Ref Range    GLUCOSE BY METER POCT 117 (H) 70 - 99 mg/dL   Glucose by meter   Result Value Ref Range    GLUCOSE BY METER POCT 215 (H) 70 - 99 mg/dL   Lactic Acid STAT   Result Value Ref Range    Lactic Acid 1.0 0.7 - 2.0 mmol/L   Glucose by meter   Result Value Ref Range    GLUCOSE BY METER POCT 183 (H) 70 - 99 mg/dL   Glucose by meter   Result Value Ref Range    GLUCOSE BY METER POCT 181 (H) 70 - 99 mg/dL

## 2022-02-27 NOTE — PLAN OF CARE
Neuro: A&Ox4.   Cardiac: ST, rates in 100s.  VSS.   Respiratory: Sating 97% on RA-1L.   GI/: Adequate urine output. BM still loose.   Diet/appetite: Tolerating diet. Eating fair. TF going via NJ at goal.  Activity:  Assist of 1, up to chair and in halls.  Pain: At acceptable level on current regimen. Oxycodone given x1 prior to going down to IR, upon return pt given tylenol and robaxin. Pt pretty sore after CT insertions.   Skin: Clamshell incision ANGELA, C/D/I. Chest tube sites dressed.   LDA's: PIV, NJ now CT x3 to 3 atriums all to suction.     Plan: Pt down to IR around 1600 returning around 1800 with 2 additional CT. Both placed to suctions. See flow sheet for output. Pt up in chair almost entire shift.  at bedside most of day. Did call  after pt returned from IR to inform him of pt's new CT. Continue with POC. Notify primary team with changes.

## 2022-02-27 NOTE — PROGRESS NOTES
REGIONAL ANESTHESIA PAIN SERVICE FOLLOW UP NOTE  Patient Name: Melissa Elder  MRN: [id number]  Age: 66 year old  Sex: female    Assessment:  Melissa Elder is a 66 year old female with a PMH significant for severe COPD on chronic home O2, mild non-obstructive CAD, paroxysmal A-fib, osteoporosis on treatment     Procedure: bilateral lung transplant     Date of Surgery: 2/21/22     Date of Bilateral ES (erector spinae) T6-7 Catheter Placement: 2/21/22   Has been on continuous infusion of Ropvicaine 0.2% at 7 mL/hr each catheter, total 14mL/hr however due to questionable tinnitus (baseline tinnitus vs new) her rate was decreased to 5 ml/hr and plan to hold additional boluses and continuing to monitor closely. No s/s of LAST at this time. Plan to maintain catheters, max of 7 days    2. Anticoagulation  -heparin SC 5,000 units Q 8 hrs      3. Multimodal Analgesia  - Per primary service    Subjective and Interval History:     Overnight Events: No acute events overnight; She did receive 2 new CT placed by IR yesterday afternoon for pneumothorax after displacement of previous CT. 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    Tubes/Drains: Yes  Chest tubes    Subjective: Patient is doing well this morning. Reports adequate pain relief. Denies any ringing in her ears. Tolerating PO intake, getting up and ambulating with assistance. States her pain is localized to her back and mild and she feels good when standing up.     Antithrombotic/Thrombolytic Therapy: Last dose Heparin SQ q8h    Medications related to Pain Management (From now, onward)    Start     Dose/Rate Route Frequency Ordered Stop    02/27/22 1300  acetaminophen (TYLENOL) tablet 975 mg         975 mg Oral or Feeding Tube EVERY 8 HOURS PRN 02/27/22 1257      02/25/22 2000  fiber modular (NUTRISOURCE FIBER) packet 1 packet         1 packet Per Feeding Tube 2 TIMES DAILY 02/25/22 1509      02/24/22 0000  acetaminophen (TYLENOL) tablet 650 mg          "650 mg Oral EVERY 4 HOURS PRN 02/21/22 0712      02/22/22 0800  polyethylene glycol (MIRALAX) Packet 17 g         17 g Oral or Feeding Tube DAILY 02/21/22 0712      02/21/22 2200  gabapentin (NEURONTIN) capsule 100 mg         100 mg Oral or Feeding Tube AT BEDTIME 02/21/22 0712      02/21/22 1130  ropivacaine 0.2% (NAROPIN) 750 mL in ON-Q C-Bloc select flow ([id number] holds 600-750 mL) dual cath disposable pump         10 mL/hr  Irrigation CONTINUOUS 02/21/22 1106      02/21/22 0800  senna-docusate (SENOKOT-S/PERICOLACE) 8.6-50 MG per tablet 1 tablet         1 tablet Oral or Feeding Tube 2 TIMES DAILY 02/21/22 0712      02/21/22 0712  HYDROmorphone (DILAUDID) injection 0.2 mg        \"Or\" Linked Group Details    0.2 mg Intravenous EVERY 2 HOURS PRN 02/21/22 0712      02/21/22 0712  HYDROmorphone (DILAUDID) injection 0.4 mg        \"Or\" Linked Group Details    0.4 mg Intravenous EVERY 2 HOURS PRN 02/21/22 0712      02/21/22 0712  oxyCODONE (ROXICODONE) tablet 5 mg        \"Or\" Linked Group Details    5 mg Oral EVERY 4 HOURS PRN 02/21/22 0712      02/21/22 0712  oxyCODONE IR (ROXICODONE) tablet 10 mg        \"Or\" Linked Group Details    10 mg Oral EVERY 4 HOURS PRN 02/21/22 0712      02/21/22 0712  magnesium hydroxide (MILK OF MAGNESIA) suspension 30 mL         30 mL Oral DAILY PRN 02/21/22 0712      02/21/22 0712  bisacodyl (DULCOLAX) Suppository 10 mg         10 mg Rectal DAILY PRN 02/21/22 0712      02/21/22 0712  methocarbamol (ROBAXIN) tablet 500 mg         500 mg Oral EVERY 6 HOURS PRN 02/21/22 0712      02/21/22 0712  lidocaine 1 % 0.1-1 mL         0.1-1 mL Other EVERY 1 HOUR PRN 02/21/22 0712      02/21/22 0712  lidocaine (LMX4) cream          Topical EVERY 1 HOUR PRN 02/21/22 0712              Objective  Lab Results     Recent Labs   Lab Test 02/27/22  0732   WBC 14.6*   RBC 3.02*   HGB 8.9*   HCT 28.2*   MCV 93   MCH 29.5   MCHC 31.6   RDW 13.2        Lab Results   Component Value Date    INR 1.31 02/22/2022 " "   INR 1.58 02/21/2022    INR 5.23 02/21/2022    INR 1.96 02/21/2022    INR 0.96 03/25/2019         Physical Exam:  Vitals: BP (!) 146/68 (BP Location: Left arm)   Pulse 103   Temp 37.1  C (98.8  F) (Oral)   Resp 22   Ht 1.575 m (5' 2\")   Wt 73.1 kg (161 lb 2.5 oz)   SpO2 98%   BMI 29.48 kg/m      Exam:  Constitutional: alert, no distress and cooperative  Neuro/MSK:  Strength E 5/5  and overall symmetric  Skin: erector spinae (ES) catheter sites c/d/i, no tenderness, erythema, heme, edema.      Assessment  Patient currently achieving adequate pain control with erector spinae (ES) catheter /infusion and multimodal analgesia.  Moving towards meeting activity goals. No weakness or paresthesias.  evidence of adverse side effects associated with local anesthetic. She is now at day 7, which is the maximum number of days we will leave her catheters in. As such, after communication with primary team, we will proceed with removal. Last dose of heparin 5k units subcutaneous was 2/26/22 @ 1420    Plan    Time 12:45. B/L ES catheters removed without complication, dark tip intact.  Insertion site c/d/i. Small bandage applied    Okay to administer Heparin SQ at 2 hours after catheter removal.    o Primary service and Bedside RN aware of plans.    RAPS will sign off    Thank you for allowing us the opportunity to participate in the care of Melissa Elder  - discussed plan with attending anesthesiologist    Nancy Castillo MD   Regional Anesthesia Pain Service    RAPS Contact Info (for in-house use only):  Job code ID: [id number]   [place] 0599  Peds [phone]  [employer] phone: dial * [phone], enter jobcode ID, then enter call-back number.    Text: Use [employer]COM on the Intranet <Paging/Directory> tab and enter Jobcode ID.   If no call back at any time, contact the hospital  and ask for RAPS attending or backup   "

## 2022-02-28 ENCOUNTER — APPOINTMENT (OUTPATIENT)
Dept: GENERAL RADIOLOGY | Facility: CLINIC | Age: 67
DRG: 007 | End: 2022-02-28
Attending: PHYSICIAN ASSISTANT
Payer: MEDICARE

## 2022-02-28 ENCOUNTER — APPOINTMENT (OUTPATIENT)
Dept: SPEECH THERAPY | Facility: CLINIC | Age: 67
DRG: 007 | End: 2022-02-28
Attending: SURGERY
Payer: MEDICARE

## 2022-02-28 ENCOUNTER — APPOINTMENT (OUTPATIENT)
Dept: PHYSICAL THERAPY | Facility: CLINIC | Age: 67
DRG: 007 | End: 2022-02-28
Attending: SURGERY
Payer: MEDICARE

## 2022-02-28 LAB
ALBUMIN SERPL-MCNC: 1.9 G/DL (ref 3.4–5)
ALP SERPL-CCNC: 117 U/L (ref 40–150)
ALT SERPL W P-5'-P-CCNC: 87 U/L (ref 0–50)
ANION GAP SERPL CALCULATED.3IONS-SCNC: 5 MMOL/L (ref 3–14)
AST SERPL W P-5'-P-CCNC: 36 U/L (ref 0–45)
ATRIAL RATE - MUSE: 116 BPM
BILIRUB DIRECT SERPL-MCNC: <0.1 MG/DL (ref 0–0.2)
BILIRUB SERPL-MCNC: 0.4 MG/DL (ref 0.2–1.3)
BUN SERPL-MCNC: 18 MG/DL (ref 7–30)
CALCIUM SERPL-MCNC: 8 MG/DL (ref 8.5–10.1)
CHLORIDE BLD-SCNC: 99 MMOL/L (ref 94–109)
CO2 SERPL-SCNC: 34 MMOL/L (ref 20–32)
CREAT SERPL-MCNC: 0.36 MG/DL (ref 0.52–1.04)
DIASTOLIC BLOOD PRESSURE - MUSE: NORMAL MMHG
ERYTHROCYTE [DISTWIDTH] IN BLOOD BY AUTOMATED COUNT: 13.2 % (ref 10–15)
GFR SERPL CREATININE-BSD FRML MDRD: >90 ML/MIN/1.73M2
GLUCOSE BLD-MCNC: 225 MG/DL (ref 70–99)
GLUCOSE BLDC GLUCOMTR-MCNC: 197 MG/DL (ref 70–99)
GLUCOSE BLDC GLUCOMTR-MCNC: 219 MG/DL (ref 70–99)
GLUCOSE BLDC GLUCOMTR-MCNC: 241 MG/DL (ref 70–99)
GLUCOSE BLDC GLUCOMTR-MCNC: 276 MG/DL (ref 70–99)
HCT VFR BLD AUTO: 27 % (ref 35–47)
HGB BLD-MCNC: 8.8 G/DL (ref 11.7–15.7)
INTERPRETATION ECG - MUSE: NORMAL
LACTATE SERPL-SCNC: 1.5 MMOL/L (ref 0.7–2)
MAGNESIUM SERPL-MCNC: 1.6 MG/DL (ref 1.6–2.3)
MCH RBC QN AUTO: 29.5 PG (ref 26.5–33)
MCHC RBC AUTO-ENTMCNC: 32.6 G/DL (ref 31.5–36.5)
MCV RBC AUTO: 91 FL (ref 78–100)
P AXIS - MUSE: 52 DEGREES
PHOSPHATE SERPL-MCNC: 2.8 MG/DL (ref 2.5–4.5)
PLATELET # BLD AUTO: 247 10E3/UL (ref 150–450)
POTASSIUM BLD-SCNC: 4.2 MMOL/L (ref 3.4–5.3)
PR INTERVAL - MUSE: 122 MS
PROT SERPL-MCNC: 5.2 G/DL (ref 6.8–8.8)
QRS DURATION - MUSE: 130 MS
QT - MUSE: 356 MS
QTC - MUSE: 494 MS
R AXIS - MUSE: 82 DEGREES
RBC # BLD AUTO: 2.98 10E6/UL (ref 3.8–5.2)
SODIUM SERPL-SCNC: 138 MMOL/L (ref 133–144)
SYSTOLIC BLOOD PRESSURE - MUSE: NORMAL MMHG
T AXIS - MUSE: -18 DEGREES
TACROLIMUS BLD-MCNC: 7.2 UG/L (ref 5–15)
TME LAST DOSE: NORMAL H
TME LAST DOSE: NORMAL H
VENTRICULAR RATE- MUSE: 116 BPM
WBC # BLD AUTO: 17.8 10E3/UL (ref 4–11)

## 2022-02-28 PROCEDURE — 71046 X-RAY EXAM CHEST 2 VIEWS: CPT

## 2022-02-28 PROCEDURE — 99233 SBSQ HOSP IP/OBS HIGH 50: CPT | Mod: 24 | Performed by: CLINICAL NURSE SPECIALIST

## 2022-02-28 PROCEDURE — 94640 AIRWAY INHALATION TREATMENT: CPT | Mod: 76

## 2022-02-28 PROCEDURE — 36415 COLL VENOUS BLD VENIPUNCTURE: CPT | Performed by: PHYSICIAN ASSISTANT

## 2022-02-28 PROCEDURE — 250N000012 HC RX MED GY IP 250 OP 636 PS 637: Performed by: INTERNAL MEDICINE

## 2022-02-28 PROCEDURE — 250N000013 HC RX MED GY IP 250 OP 250 PS 637: Performed by: STUDENT IN AN ORGANIZED HEALTH CARE EDUCATION/TRAINING PROGRAM

## 2022-02-28 PROCEDURE — 94667 MNPJ CHEST WALL 1ST: CPT

## 2022-02-28 PROCEDURE — 250N000011 HC RX IP 250 OP 636: Performed by: PHYSICIAN ASSISTANT

## 2022-02-28 PROCEDURE — 250N000013 HC RX MED GY IP 250 OP 250 PS 637: Performed by: SURGERY

## 2022-02-28 PROCEDURE — 82248 BILIRUBIN DIRECT: CPT | Performed by: SURGERY

## 2022-02-28 PROCEDURE — 71046 X-RAY EXAM CHEST 2 VIEWS: CPT | Mod: 26 | Performed by: RADIOLOGY

## 2022-02-28 PROCEDURE — 80197 ASSAY OF TACROLIMUS: CPT | Performed by: PHYSICIAN ASSISTANT

## 2022-02-28 PROCEDURE — 250N000013 HC RX MED GY IP 250 OP 250 PS 637: Performed by: NURSE PRACTITIONER

## 2022-02-28 PROCEDURE — 86833 HLA CLASS II HIGH DEFIN QUAL: CPT | Performed by: SURGERY

## 2022-02-28 PROCEDURE — 82040 ASSAY OF SERUM ALBUMIN: CPT | Performed by: PHYSICIAN ASSISTANT

## 2022-02-28 PROCEDURE — 250N000009 HC RX 250: Performed by: SURGERY

## 2022-02-28 PROCEDURE — 250N000013 HC RX MED GY IP 250 OP 250 PS 637: Performed by: INTERNAL MEDICINE

## 2022-02-28 PROCEDURE — 83735 ASSAY OF MAGNESIUM: CPT | Performed by: SURGERY

## 2022-02-28 PROCEDURE — 99233 SBSQ HOSP IP/OBS HIGH 50: CPT | Mod: 24 | Performed by: INTERNAL MEDICINE

## 2022-02-28 PROCEDURE — 250N000011 HC RX IP 250 OP 636: Performed by: STUDENT IN AN ORGANIZED HEALTH CARE EDUCATION/TRAINING PROGRAM

## 2022-02-28 PROCEDURE — 36415 COLL VENOUS BLD VENIPUNCTURE: CPT | Performed by: SURGERY

## 2022-02-28 PROCEDURE — 250N000011 HC RX IP 250 OP 636: Performed by: INTERNAL MEDICINE

## 2022-02-28 PROCEDURE — 86832 HLA CLASS I HIGH DEFIN QUAL: CPT | Performed by: SURGERY

## 2022-02-28 PROCEDURE — 999N000248 HC STATISTIC IV INSERT WITH US BY RN

## 2022-02-28 PROCEDURE — 97116 GAIT TRAINING THERAPY: CPT | Mod: GP | Performed by: PHYSICAL THERAPIST

## 2022-02-28 PROCEDURE — 83605 ASSAY OF LACTIC ACID: CPT | Performed by: STUDENT IN AN ORGANIZED HEALTH CARE EDUCATION/TRAINING PROGRAM

## 2022-02-28 PROCEDURE — 84100 ASSAY OF PHOSPHORUS: CPT | Performed by: SURGERY

## 2022-02-28 PROCEDURE — 250N000012 HC RX MED GY IP 250 OP 636 PS 637: Performed by: PHYSICIAN ASSISTANT

## 2022-02-28 PROCEDURE — 250N000013 HC RX MED GY IP 250 OP 250 PS 637: Performed by: PHYSICIAN ASSISTANT

## 2022-02-28 PROCEDURE — 120N000005 HC R&B MS OVERFLOW UMMC

## 2022-02-28 PROCEDURE — 92526 ORAL FUNCTION THERAPY: CPT | Mod: GN

## 2022-02-28 PROCEDURE — 36415 COLL VENOUS BLD VENIPUNCTURE: CPT | Performed by: STUDENT IN AN ORGANIZED HEALTH CARE EDUCATION/TRAINING PROGRAM

## 2022-02-28 PROCEDURE — 99233 SBSQ HOSP IP/OBS HIGH 50: CPT | Performed by: STUDENT IN AN ORGANIZED HEALTH CARE EDUCATION/TRAINING PROGRAM

## 2022-02-28 PROCEDURE — 94640 AIRWAY INHALATION TREATMENT: CPT

## 2022-02-28 PROCEDURE — 94668 MNPJ CHEST WALL SBSQ: CPT

## 2022-02-28 PROCEDURE — 999N000157 HC STATISTIC RCP TIME EA 10 MIN

## 2022-02-28 PROCEDURE — 85027 COMPLETE CBC AUTOMATED: CPT | Performed by: STUDENT IN AN ORGANIZED HEALTH CARE EDUCATION/TRAINING PROGRAM

## 2022-02-28 PROCEDURE — 250N000012 HC RX MED GY IP 250 OP 636 PS 637: Performed by: STUDENT IN AN ORGANIZED HEALTH CARE EDUCATION/TRAINING PROGRAM

## 2022-02-28 PROCEDURE — 80053 COMPREHEN METABOLIC PANEL: CPT | Performed by: PHYSICIAN ASSISTANT

## 2022-02-28 PROCEDURE — 97110 THERAPEUTIC EXERCISES: CPT | Mod: GP | Performed by: PHYSICAL THERAPIST

## 2022-02-28 RX ORDER — LORATADINE 10 MG/1
10 TABLET ORAL DAILY
Status: DISCONTINUED | OUTPATIENT
Start: 2022-02-28 | End: 2022-03-17 | Stop reason: HOSPADM

## 2022-02-28 RX ORDER — MAGNESIUM SULFATE HEPTAHYDRATE 40 MG/ML
2 INJECTION, SOLUTION INTRAVENOUS ONCE
Status: COMPLETED | OUTPATIENT
Start: 2022-02-28 | End: 2022-02-28

## 2022-02-28 RX ORDER — GUAR GUM
1 PACKET (EA) ORAL 3 TIMES DAILY
Status: DISCONTINUED | OUTPATIENT
Start: 2022-02-28 | End: 2022-03-07 | Stop reason: DRUGHIGH

## 2022-02-28 RX ORDER — DEXTROSE MONOHYDRATE 100 MG/ML
INJECTION, SOLUTION INTRAVENOUS CONTINUOUS PRN
Status: DISCONTINUED | OUTPATIENT
Start: 2022-02-28 | End: 2022-03-17 | Stop reason: HOSPADM

## 2022-02-28 RX ADMIN — METOPROLOL TARTRATE 25 MG: 25 TABLET, FILM COATED ORAL at 08:15

## 2022-02-28 RX ADMIN — ACETAMINOPHEN 975 MG: 325 TABLET, FILM COATED ORAL at 04:24

## 2022-02-28 RX ADMIN — PREDNISOLONE 17.5 MG: 15 SOLUTION ORAL at 20:10

## 2022-02-28 RX ADMIN — CEFTRIAXONE SODIUM 2 G: 2 INJECTION, POWDER, FOR SOLUTION INTRAMUSCULAR; INTRAVENOUS at 10:01

## 2022-02-28 RX ADMIN — LABETALOL HYDROCHLORIDE 10 MG: 5 INJECTION, SOLUTION INTRAVENOUS at 00:55

## 2022-02-28 RX ADMIN — FAMOTIDINE 10 MG: 10 TABLET, FILM COATED ORAL at 20:10

## 2022-02-28 RX ADMIN — Medication 1 PACKET: at 08:17

## 2022-02-28 RX ADMIN — FAMOTIDINE 10 MG: 10 TABLET, FILM COATED ORAL at 08:15

## 2022-02-28 RX ADMIN — LEVALBUTEROL HYDROCHLORIDE 1.25 MG: 1.25 SOLUTION RESPIRATORY (INHALATION) at 12:17

## 2022-02-28 RX ADMIN — NYSTATIN 1000000 UNITS: 100000 SUSPENSION ORAL at 11:50

## 2022-02-28 RX ADMIN — ACETYLCYSTEINE 2 ML: 200 SOLUTION ORAL; RESPIRATORY (INHALATION) at 17:04

## 2022-02-28 RX ADMIN — PREDNISOLONE 17.5 MG: 15 SOLUTION ORAL at 08:13

## 2022-02-28 RX ADMIN — Medication 15 ML: at 08:13

## 2022-02-28 RX ADMIN — POTASSIUM & SODIUM PHOSPHATES POWDER PACK 280-160-250 MG 1 PACKET: 280-160-250 PACK at 14:37

## 2022-02-28 RX ADMIN — LEVALBUTEROL HYDROCHLORIDE 1.25 MG: 1.25 SOLUTION RESPIRATORY (INHALATION) at 08:08

## 2022-02-28 RX ADMIN — NYSTATIN 1000000 UNITS: 100000 SUSPENSION ORAL at 15:30

## 2022-02-28 RX ADMIN — METOPROLOL TARTRATE 25 MG: 25 TABLET, FILM COATED ORAL at 20:10

## 2022-02-28 RX ADMIN — HEPARIN SODIUM 5000 UNITS: 5000 INJECTION, SOLUTION INTRAVENOUS; SUBCUTANEOUS at 14:37

## 2022-02-28 RX ADMIN — Medication 1 PACKET: at 14:37

## 2022-02-28 RX ADMIN — Medication 40 MG: at 08:13

## 2022-02-28 RX ADMIN — MAGNESIUM SULFATE IN WATER 2 G: 40 INJECTION, SOLUTION INTRAVENOUS at 08:12

## 2022-02-28 RX ADMIN — VALACYCLOVIR HYDROCHLORIDE 1000 MG: 1 TABLET, FILM COATED ORAL at 15:31

## 2022-02-28 RX ADMIN — LEVALBUTEROL HYDROCHLORIDE 1.25 MG: 1.25 SOLUTION RESPIRATORY (INHALATION) at 17:05

## 2022-02-28 RX ADMIN — DAPSONE 50 MG: 25 TABLET ORAL at 08:15

## 2022-02-28 RX ADMIN — POTASSIUM & SODIUM PHOSPHATES POWDER PACK 280-160-250 MG 1 PACKET: 280-160-250 PACK at 08:13

## 2022-02-28 RX ADMIN — VALACYCLOVIR HYDROCHLORIDE 1000 MG: 1 TABLET, FILM COATED ORAL at 00:03

## 2022-02-28 RX ADMIN — LORATADINE 10 MG: 10 TABLET ORAL at 14:37

## 2022-02-28 RX ADMIN — ACETYLCYSTEINE 2 ML: 200 SOLUTION ORAL; RESPIRATORY (INHALATION) at 08:08

## 2022-02-28 RX ADMIN — GABAPENTIN 100 MG: 100 CAPSULE ORAL at 22:02

## 2022-02-28 RX ADMIN — VALACYCLOVIR HYDROCHLORIDE 1000 MG: 1 TABLET, FILM COATED ORAL at 08:16

## 2022-02-28 RX ADMIN — MYCOPHENOLATE MOFETIL 1000 MG: 250 CAPSULE ORAL at 20:10

## 2022-02-28 RX ADMIN — Medication 1 PACKET: at 20:11

## 2022-02-28 RX ADMIN — MYCOPHENOLATE MOFETIL 1000 MG: 200 POWDER, FOR SUSPENSION ORAL at 08:13

## 2022-02-28 RX ADMIN — NYSTATIN 1000000 UNITS: 100000 SUSPENSION ORAL at 20:10

## 2022-02-28 RX ADMIN — ACETYLCYSTEINE 2 ML: 200 SOLUTION ORAL; RESPIRATORY (INHALATION) at 21:15

## 2022-02-28 RX ADMIN — ACETYLCYSTEINE 2 ML: 200 SOLUTION ORAL; RESPIRATORY (INHALATION) at 12:18

## 2022-02-28 RX ADMIN — LEVALBUTEROL HYDROCHLORIDE 1.25 MG: 1.25 SOLUTION RESPIRATORY (INHALATION) at 21:15

## 2022-02-28 RX ADMIN — HEPARIN SODIUM 5000 UNITS: 5000 INJECTION, SOLUTION INTRAVENOUS; SUBCUTANEOUS at 22:02

## 2022-02-28 RX ADMIN — NYSTATIN 1000000 UNITS: 100000 SUSPENSION ORAL at 08:13

## 2022-02-28 RX ADMIN — TACROLIMUS 6 MG: 5 CAPSULE ORAL at 08:14

## 2022-02-28 RX ADMIN — TACROLIMUS 6 MG: 5 CAPSULE ORAL at 18:53

## 2022-02-28 ASSESSMENT — ACTIVITIES OF DAILY LIVING (ADL)
ADLS_ACUITY_SCORE: 11

## 2022-02-28 NOTE — PLAN OF CARE
Neuro: A&Ox4. Calm and able to follow commands. Able to make needs known.   Cardiac: SR- ST HR . Hypertensive. -164. PRN labetalol given 1x with no improvement.   Respiratory: Sating >92% on RA. Tachypneic. RR 20s.   GI/: Adequate urine output. BM X1, watery.   Diet/appetite: Tolerating soft, bite sized diet with thin liquids. Fair appetite. Able to swallow pills better when taken with applesauce. TF running at 50ml/hr with 30ml FWF q4hr.   Activity:  Assist of 1 with walker, up to bedside commode.   Pain: At acceptable level on current regimen. PRN tylenol given 1x, see MAR  Skin: No new deficits noted. Clamshell incision ANGELA. CT x3   LDA's: Chest tubes x3 to -20 suction, see flowsheets for output. PIV x1, NJ with TF.     Infection Prevention to address today what isolation is needed d/t HSV growing from bronch.     Plan: Continue with POC. Notify primary team with changes.

## 2022-02-28 NOTE — PROGRESS NOTES
Lung Transplant Consult Follow Up Note   February 27, 2022            Assessment and Plan:   Melissa Elder is a 66 year old female with a PMH significant for severe COPD, HTN, mild non-obstructive CAD, paroxysmal afib, osteoporosis, GERD, and colonic polyps.  Pt. is now s/p BSLT on 2/21/22, surgery relatively uncomplicated.  The patient was extubated 2/22, intermittently on RA otherwise using 1-2L NC.  Post-op course complicated by right chest tube lodged into fissure and left chest tube pulled out requiring placement of bilateral pigtail chest tubes to drain anterior hydropneumothorax and bilateral effusions.     Today's recommendations:  - ALT increasing, consider reducing frequency of acetaminophen and/or dose of Crestor.  - Tacrolimus level 7.2, continue current dose, following levels closely (ordered)  - Continue chest physiotherapy QID with Aerobika and incentive spirometry q1h w/a  - Wean O2 to keep >92%  - DSA pending  - CXR daily  - Reassess daily for diuresis  - Inspection bronch 3/1 10:00 AM  - Prednisone taper ordered 3/1  - Consider increasing dapsone to daily 3/2 if CBC remains stable  - CMV, EBV, and IgG 3/21 (not yet ordered)  - Follow pending cultures  - Continue ceftrixaone for 2 week course through 3/8 then amoxicillin for total of 3 months for actinomyces.       COPD s/p bilateral sequential lung transplant (2/21/2022):  Acute hypoxic/hypercapneic respiratory failure: Scant pleural-pleural adhesions and mild-moderate bilateral hilar lymphadenopathy per op note.  Pressor weaned off 2/22 and pt. extubated.  Able to be weaned to RA at rest during morning rounds, intermittently using 1-2L NC. Gradual clinical improvement.  Pathology positive for emphysema, consistent with COPD.  - 2/28 CXR, reviewed by me. Improvement in effusions, but effusions still persist.  - Nebs: levalbuterol and Mucomyst QID  - Aggressive pulmonary toilet with chest physiotherapy QID as well as Aerobika and incentive  spirometry q1h w/a  - Wean supplemental O2 to keep >92%  - DSA at one week (ordered 2/28) then one month post-transplant, additionally per protocol  - Anesthesia to manage erector spinae catheters (placed 2/21)  - Chest CT (2/25): showing anterior hydropneumothorax.  Right chest tube in fissure.  Left chest tube with functioning port outside of pleural cavity. Left chest tube removed and replaced with 2 left sided pigtails (2/26).  - Volume management per primary team.  - Post-pyloric nasal FT (repositioned by radiology 2/22), TF per RD and primary team  - SLP following for diet advacement  - Inspection bronch 3/1 10:00 am - NPO after 0400     Immunosuppression:  - Induction therapy with basiliximab X 2 doses (and high dose IV steroid).  - Tacrolimus goal level 8-12.  - Tacrolimus level 7.2, continue 6mg BID and con't following levels closely (ordered)  Date [place] Level Intervention   2/22 3.3 Dose increased from 1 mg to 2 mg BID   2/23 10.6 Near steady state, no dose change    2/24 8.7 (10.5h)  Continue current dose   2/25 8.5 Transitioned from SL to PO    2/26 6.1 Not Steady State, con't current dose    2/27 4.7 Increase to 6mg BID   2/28 7.2 Not steady state, cont current dose   - MMF 1000 mg BID (dec due to diarrhea)  - Prednisolone 17.5 mg BID with taper per lung transplant protocol (due 3/1, ordered):  Date AM dose (mg) PM dose (mg)   2/22 17.5 17.5   3/1 15 15   3/8 15 12.5   3/15 12.5 12.5   3/29 12.5 10   4/12 10 10   5/10 10 7.5   6/7 7.5 7.5   7/5 7.5 5   8/2 5 5   8/30 5 2.5      Prophylaxis:   - Daponse for PJP ppx (started 2/23 at decreased MWF dose, G6PD 13.3 2/21), increase to daily after one week (3/2) if CBC remains stable  - 2/21 bronchoscopy with HSV - Switch to valtrex 1000mg TID X 14d (ordered). Then complete acyclovir prophylaxis per protocol.  - Nystatin for oral candidiasis ppx, 6 month course  - See below for serologies and viral ppx:    Donor Recipient Intervention   CMV status Negative  Negative N/A, CMV monthly (3/21, not yet ordered)   EBV status Positive Positive EBV at one month (3/21, not yet ordered) then q3 months   HSV status N/A (Newly) Positive Valtrex x 14d for HSV on bronch then acyclovir thru d30      Primary graft dysfunction (per ISHLT guidelines):  See chart below:    POD #0  (~0 hours) POD #1  (~24 hours) POD #2   (~48 hours) POD#3   (~72 hours)   Date 2/21 2/22 2/23 2/24   Time 0713 0843  0347 N/A    Intubated Y Y N N   PaO2 232 127 142     FiO2 50% 30% 27%     P/F Ratio 464 423 526     PGD Grade   (0=mild, 3=severe) 0 0 1     ECMO N N N N   Inhaled NO/Flolan N N N N   ISHLT PGD Scoring  Grade 0 - PaO2/FiO2 >300 and normal chest radiograph (absence of diffuse allograft infiltrates)  Grade 1 - PaO2/FiO2 >300 and diffuse allograft infiltrates on chest radiograph  Grade 2 - PaO2/FiO2 between 200 and 300  Grade 3 - PaO2/FiO2 <200 and/or on ECMO     ID: Prior history of infection/colonization with Haemophilus influenzae (2017).  IgG adequate (2/20).  MRSA nares negative 2/21.  Broad spectrum ABX empirically post-op with vanc and ceftaz (2/21-2/22), stopped after 24h to target known donor cultures on POD #1 (transitioned to cefazolin).   - IgG repeat at one month (3/21, not yet ordered)  - Donor ([facility]) respiratory culture with P-S MSSA (final)  - Donor cultures ([facility]) with Strep mitis, Actinomyces odontolyticus, MSSA x2, and Candida kruseii  - Recipient cultures with Actinomyces odonotolyticus  - Bronch cultures (2/22) with GPC and yeast  - ABX: Continue ceftriaxone (2/23-3/8) then amoxicillin for total of 3 months for actinomyces.   --Low threshold to also add vancomycin for clinical decline (GPC on bronch culture 2/22), plan to complete 2 week course through 3/8; s/p cefazolin 2/22-2/23, ceftazidime 2/21-2/22  - 2/21 bronchoscopy with HSV - Switched to valtrex 1000mg TID X 14d (2/27-3/12), then complete acyclovir prophylaxis (3/13-) per protocol.     [facility] risk criteria donor: Additional  labs required post-transplant (between 4-8 weeks post-op): Hepatitis B, Hepatitis C, and HIV by COLT ([id number], ordered POD #30), also plan to repeat hepatitis B surface antibody at that time (ordered) given discrepancy with recent result.     Asymmetric LE edema:  No DVT on LE doppler    Increasing LFTS: ALT increasing.  Alk phos and AST increasing but still WNL.  Asymptomatic.   - Consider reducing frequency of acetaminophen and/or dose of Crestor.   - If progressive increase consider hepatic imaging, viral serology and pharm to review med list.      Other relevant problems managed by primary team:     Diarrhea: decreased MMF to 1000, will plan on Myfortic when able to take large pills orally.  Fiber added to tube feeds.     CAD: Noted to have mild non-obstructive CAD without hemodynamically significant lesions on cardiac cath 3/27/19.  PTA ASA 81 mg and atorvastatin.   - Rosuvastatin (2/24, less interaction with immunosuppression)     Paroxysmal afib: PTA diltiazem, not on AC.  Post-op tachycardia, off pressor since 2/22.  Metoprolol started 2/23.  Persistent low level tachycardia, but currently sinus tach.     GERD: Not on PPI PTA.  Negative pH/manometry study 3/28/19, noted small hiatal hernia.   - PPI daily (2/21), famotidine BID (2/21) x10d as bridge     Anxiety: Per lung transplant committee review, noted high anxiety in anticipation of transplant.   - Monitor need for palliative care and/or health psychology consult post-op     We appreciate the excellent care provided by the [place] [address] team.  Recommendations communicated via in person rounding and this note.  Will continue to follow along closely, please do not hesitate to call with any questions or concerns.     Lupe Guerrero MD  Pulmonary / Critical Care Fellow         Interval History:     Left chest tube into her breast is uncomfortable.  Having drainage around the right chest tube.  Having some sputum production, having difficulty bringing it  up.  Using her IS, Aerobika.  Having chest physio about 2 times a day.  Has been up ambulating with PT, but has only gone to the bathroom so far today.           Review of Systems:     Complete 10-system ROS completed with pertinent positives and negatives noted in HPI.          Medications:       acetylcysteine  2 mL Nebulization 4x Daily     [START ON 3/1/2022] calcium carbonate 600 mg-vitamin D 400 units  1 tablet Oral BID w/meals     cefTRIAXone  2 g Intravenous Q24H     dapsone  50 mg Oral Once per day on Mon Wed Fri     [Held by provider] dapsone  50 mg Oral or NG Tube Once per day on Mon Wed Fri     famotidine  10 mg Oral or Feeding Tube BID     fiber modular (NUTRISOURCE FIBER)  1 packet Per Feeding Tube BID     gabapentin  100 mg Oral or Feeding Tube At Bedtime     heparin ANTICOAGULANT  5,000 Units Subcutaneous Q8H     insulin aspart  1-7 Units Subcutaneous TID AC     insulin aspart  1-5 Units Subcutaneous BID     insulin NPH  4 Units Subcutaneous Daily with supper     insulin NPH  8 Units Subcutaneous QAM     levalbuterol  1.25 mg Nebulization 4x Daily     metoprolol tartrate  25 mg Oral or Feeding Tube BID     multivitamins w/minerals  15 mL Oral Daily     mycophenolate  1,000 mg Oral BID    Or     mycophenolate  1,000 mg Oral or NG Tube BID     nystatin  1,000,000 Units Swish & Swallow 4x Daily     pantoprazole  40 mg Oral or Feeding Tube Daily     polyethylene glycol  17 g Oral or Feeding Tube Daily     potassium & sodium phosphates  1 packet Oral TID     prednisoLONE  17.5 mg Oral or NG Tube BID     [START ON 3/1/2022] predniSONE  15 mg Oral or Feeding Tube BID     protein modular  1 packet Per Feeding Tube TID     [Held by provider] rosuvastatin  40 mg Oral Daily     senna-docusate  1 tablet Oral or Feeding Tube BID     sodium chloride (PF)  3 mL Intracatheter Q8H     tacrolimus  6 mg Oral BID IS    Or     tacrolimus  6 mg Per Feeding Tube BID IS     valACYclovir  1,000 mg Oral Q8H     acetaminophen,  acetaminophen, albuterol, bisacodyl, dextrose, glucose **OR** dextrose **OR** glucagon, fluticasone, HYDROmorphone **OR** HYDROmorphone, labetalol, lidocaine 4%, lidocaine (buffered or not buffered), magnesium hydroxide, methocarbamol, naloxone **OR** naloxone **OR** naloxone **OR** naloxone, ondansetron **OR** ondansetron, oxyCODONE **OR** oxyCODONE, BETA BLOCKER NOT PRESCRIBED, sodium chloride (PF)         Physical Exam:   Temp:  [98.1  F (36.7  C)-98.6  F (37  C)] 98.3  F (36.8  C)  Pulse:  [] 108  Resp:  [20-26] 22  BP: (138-171)/(67-84) 171/84  SpO2:  [89 %-98 %] 94 %    I/O last 3 completed shifts:  In: 1220 [P.O.:480; NG/GT:90]  Out: 1805 [Urine:700; Other:690; Chest Tube:415]    Constitutional:   Awake, alert and in no apparent distress     Eyes:   nonicteric     ENT:    oral mucosa moist without lesions     Neck:   Supple without supraclavicular or cervical lymphadenopathy     Lungs:   Diminished air flow throughout.  No crackles. No rhonchi.  No wheezes.     Cardiovascular:   Normal S1 and S2.  RRR. Pericardial rub.   No murmur. No gallop.      Abdomen:   NABS, softly distended, nontender.  No HSM.     Musculoskeletal:   2+ bilateral LE edema, no significant muscle wasting     Neurologic:   Alert and conversant.     Skin:   Warm, dry.  No rash on limited exam.             Data:   All laboratory and imaging data reviewed.    Results for orders placed or performed during the hospital encounter of 02/20/22 (from the past 24 hour(s))   Glucose by meter   Result Value Ref Range    GLUCOSE BY METER POCT 247 (H) 70 - 99 mg/dL   Glucose by meter   Result Value Ref Range    GLUCOSE BY METER POCT 220 (H) 70 - 99 mg/dL   PRA Donor Specific Antibody    Narrative    The following orders were created for panel order PRA Donor Specific Antibody.  Procedure                               Abnormality         Status                     ---------                               -----------         ------                      PRA Donor Specific Antibody[[id number]]                      In process                   Please view results for these tests on the individual orders.   Lactic Acid STAT   Result Value Ref Range    Lactic Acid 1.5 0.7 - 2.0 mmol/L   Glucose by meter   Result Value Ref Range    GLUCOSE BY METER POCT 219 (H) 70 - 99 mg/dL   Magnesium   Result Value Ref Range    Magnesium 1.6 1.6 - 2.3 mg/dL   Phosphorus   Result Value Ref Range    Phosphorus 2.8 2.5 - 4.5 mg/dL   Comprehensive metabolic panel   Result Value Ref Range    Sodium 138 133 - 144 mmol/L    Potassium 4.2 3.4 - 5.3 mmol/L    Chloride 99 94 - 109 mmol/L    Carbon Dioxide (CO2) 34 (H) 20 - 32 mmol/L    Anion Gap 5 3 - 14 mmol/L    Urea Nitrogen 18 7 - 30 mg/dL    Creatinine 0.36 (L) 0.52 - 1.04 mg/dL    Calcium 8.0 (L) 8.5 - 10.1 mg/dL    Glucose 225 (H) 70 - 99 mg/dL    Alkaline Phosphatase 117 40 - 150 U/L    AST 36 0 - 45 U/L    ALT 87 (H) 0 - 50 U/L    Protein Total 5.2 (L) 6.8 - 8.8 g/dL    Albumin 1.9 (L) 3.4 - 5.0 g/dL    Bilirubin Total 0.4 0.2 - 1.3 mg/dL    GFR Estimate >90 >60 mL/min/1.73m2   CBC with platelets   Result Value Ref Range    WBC Count 17.8 (H) 4.0 - 11.0 10e3/uL    RBC Count 2.98 (L) 3.80 - 5.20 10e6/uL    Hemoglobin 8.8 (L) 11.7 - 15.7 g/dL    Hematocrit 27.0 (L) 35.0 - 47.0 %    MCV 91 78 - 100 fL    MCH 29.5 26.5 - 33.0 pg    MCHC 32.6 31.5 - 36.5 g/dL    RDW 13.2 10.0 - 15.0 %    Platelet Count 247 150 - 450 10e3/uL   Bilirubin direct   Result Value Ref Range    Bilirubin Direct <0.1 0.0 - 0.2 mg/dL   Tacrolimus by Tandem Mass Spectrometry   Result Value Ref Range    Tacrolimus by Tandem Mass Spectrometry 7.2 5.0 - 15.0 ug/L    Tacrolimus Last Dose Date      Tacrolimus Last Dose Time      Narrative    This test was developed and its performance characteristics determined by the [facility],  Special Chemistry Laboratory. It has not been cleared or approved by the FDA. The laboratory is regulated under  CLIA as qualified to perform high-complexity testing. This test is used for clinical purposes. It should not be regarded as investigational or for research.   Glucose by meter   Result Value Ref Range    GLUCOSE BY METER POCT 197 (H) 70 - 99 mg/dL

## 2022-02-28 NOTE — PLAN OF CARE
Neuro: A&Ox4.   Cardiac: SR-ST. VSS. Afebrile.    Respiratory: Sating >92% on RA.  GI/: Adequate urine output. BM X1  Diet/appetite: Tolerating level 6 diet, fair appetite. TF vvia NJ @ 50mL/hr w/ q4hr 30mL FWF.  Activity:  Assist of 1 w/ gait belt and walker, up to chair and in halls.  Pain: At acceptable level on current regimen.   Skin: No new deficits noted.  LDA's: PIV TKO    Plan: Continue with POC. Notify primary team with changes.

## 2022-02-28 NOTE — PLAN OF CARE
"Goal Outcome Evaluation:    Plan of Care Reviewed With: patient     Overall Patient Progress: improving    Outcome Evaluation: Patient tolerated procedure well, and remained normothermic.    Blood pressure (!) 163/79, pulse 107, temperature 98.6  F (37  C), temperature source Oral, resp. rate 26, height 1.575 m (5' 2\"), weight 73.1 kg (161 lb 2.5 oz), SpO2 95 %, not currently breastfeeding.  Neuro: A&Ox4.   Cardiac: ST with HR 100s -120s, hypertensive this evening- Labetalol IV given once with some improvement then B/P went back up, patient tachypneic all day.   Respiratory: Sating >92%  on RA.  GI/: Adequate urine output- c/o frequenc- may benefit from UA/UC. BM X1  Diet/appetite: Tolerating soft diet. Fair intake. Tolerating tube feeds as ordered.   Activity:  Assist of 1, up to chair.  Pain: At acceptable level on current regimen.   Skin: No new deficits noted.  LDA's:  Chest tubes X 3 to suction- no air leak, PIV x1, NJ tube with feeds.    Plan: Phosphorus low today, see replacement protocol. Continue with POC. Notify primary team with changes.        "

## 2022-02-28 NOTE — PROGRESS NOTES
"Melissa Elder is a 66 year old female with PMHx HTN, HLD, paroxysmal Afib, osteoporosis, GERD, severe COPD, previously requiring 2-3L NC O2 at rest. She underwent b/l lung transplant with Dr. Robin on 02/21. Got 1u pRBC post-op, no overt bleeding source, monitoring. Extubated 02/22 to O2 NC.     CVTS is following in consideration of the clamshell incision as well as chest tube management.     # Clamshell incision  - Continue to adhere to sternal precautions  - Postoperative incision management, continue open to air but needs to be kept very dry under her breasts. Would closed with skin glue.  - Clamshell incision appears clean, dry. Incision appears without erythema, or drainage. Wound edges are well approximated.  - Encourage activity/OOB, IS/pulmonary toilet.  Maintain strict sleep hygiene and delirium precautions.     # Chest tube management  - Continue to suction while in room, can ambulate off suction.  - Left Pleural output: 295 mL no air leak, serosanguinous output. L chest tube slowly slipped out, removed 2/25 due to new pneumothorax.   - IR placed 2 new Left pleural tubes 2/26. Consult IR for tube dysfunction and once removal is desired.   * * Please do not remove these tubes without discussing with IR * *  - Right Pleural output:150 mL no air leak, serosanguinous output. Await <200 mL daily for 3 consecutive days before removing.   - Pericardial output: Removed 2/24/22 without immediate complication    Discussed with Surgeon, Dr. Robin via written and verbal commnication.     Venessa Amaya PA-c  Pager: [phone]  12:11 PM February 28, 2022           Interval History:      No overnight events.  Tolerated chest tube placement.   States pain is well managed on current regimen. Slept well overnight.          Physical Exam:   Blood pressure 131/58, pulse 103, temperature 97.7  F (36.5  C), temperature source Oral, resp. rate 24, height 1.575 m (5' 2\"), weight 73.1 kg (161 lb 2.5 oz), SpO2 (P) 98 %, " not currently breastfeeding.        Vitals:     02/25/22 0400 02/26/22 0000 02/27/22 0030   Weight: 75 kg (165 lb 4.8 oz) 73.8 kg (162 lb 11.2 oz) 73.1 kg (161 lb 2.5 oz)      Gen: A&Ox4, NAD  Neuro: no focal deficits   CV: HD stable   Pulm: normal breathing on 1 lpm  Abd: nondistended, normal BS, soft, nontender  Ext: trace peripheral edema, 1+ pitting  Incision: clean, dry, intact, no erythema, sternum stable  Tubes/drain sites: dressing clean and dry, serosanguinous output, no air leak. 24 hr output 150mL. Left anterior CT placed through anterior chest wall.

## 2022-02-28 NOTE — PROGRESS NOTES
[facility]    Medicine Progress Note - Hospitalist Service, GOLD TEAM 10    Date of Admission:  2/20/2022    Assessment & Plan          Melissa Elder is a 66 year old female with PMHx HTN, HLD, paroxysmal Afib, osteoporosis, GERD, severe COPD, previously requiring 2-3L NC O2 at rest.  Underwent b/l lung transplant with Dr. Robin on 02/21. Got 1u pRBC post-op, no overt bleeding source, monitoring. Extubated 02//22 to O2 NC. She is now transferred onto the floor. Patient still has two chest tubes in but pericardial drain was removed 2/24 without issues. Continuing on antibiotics for cultures growing from donor and recipient lungs. Patient now with increasing hydropneumothorax with left pleural chest tube being pulled after it was falling out. IR consulted and two new chest tubes placed and draining. Now cultures from bronch growing HSV so increased valtrex to treatment dose.    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  Pressor weaned off 2/22 and pt. extubated. Prior history of infection/colonization with Haemophilus influenzae (2017).  IgG adequate (2/20).  MRSA nares negative 2/21.  Broad spectrum ABX empirically post-op with vanc and ceftaz (2/21-2/22), stopped after 24h per Dr. Lala to target known donor cultures on POD #1. Donor cultures ([facility]) with Strep mitis, Actinomyces odontolyticus, and Kenyatta kruseii. Recipient cultures with Actinomyces odonotolyticus.  So currently on ceftriaxone for coverage. She has now been weaned down to room air intermittently needing 1 L O2.  - Nebs: levalbuterol and Mucomyst QID  - Aggressive pulmonary toilet with chest physiotherapy QID as well as Aerobika and incentive spirometry q1h w/a  -  Wean supplemental O2 to keep >92%  - Anesthesia to manage erector spinae catheters (placed 2/21)  - Chest tubes managed by surgical team   - Right pleural chest tube by CT surgery   - Two left pleural tubes placed 2/26 by IR  - Daily CXR  - Tube feeding per nutirtion   - Await explant pathology  - Continue ceftriaxone for 2 weeks through 3/8 followed by amoxicillin for 3 months  - Immune suppression per daily transplant pulm note  - increased valtrex to 1000 mg TID x 14 days   - No diuresis today but will evaluate daily for need     ID Prophylaxis  - Daponse for PJP ppx MWF dose, increase to daily after one week (on 2/28) CBC remains stable   - Acyclovir increased to treatment dose x 14 days   - Nystatin for oral candidiasis ppx, 6 month course    Non infectious diarrhea   Noted to have increased loose stools 2/25 likely due to mmf and TF. No rise in white count or abdominal pain to suggest c. Diff.   - discussed with dietician and will add fiber     Post op pain   - Erector spinae catheters placed 2/21 managed by anesthesia   - gabapentin 100 mg nightly  - tylenol 975 mg Q8H   - Dilaudid 0.2-0.4 mg Q2H PRN   - oxycodone 5-10 mg Q4H PRN   - robaxin 500 mg Q6H PRN     Paroxysmal Afib   She was on diltiazem prior to lung transplantation and had not been on anticoagulation. She has not been transitioned to metoprolol which will be continued.  - Continue Metoprolol tartrate to 25 mg BID    - will increase if HR consitently > 100  - no anticoagulation at this time     Stress induced hyperglycemia  Did not need insulin prior to admission but sugars have been rising despite sliding scale coverage.  - Endocrinology consulted for assistance    HTN   Hx of htn but was recently on pressors will continue to monitor     HLD  Mild CAD   Noted on transplant workup.   - started rosuvastatin 40 mg daily     Acute blood loss anemia  Acute blood loss thrombocytopenia  Secondary to surgery. Will continue to monitor   - Transfuse Hgb <  7, platelets < 50     Sternotomy  Surgical Incision  - Sternal precautions  - Postoperative incision management per protocol  - PT/OT/CR    Elevated ALT   Noted on labs 2/26 and rising. AST, ALP, Bili WNL. No RUQ or abdominal pain. Will continue to monitor.  - CMP in AM        Diet: Soft & Bite Sized Diet (level 6) Thin Liquids (level 0)  Snacks/Supplements Adult: Glucerna; Between Meals  NPO per Anesthesia Guidelines for Procedure/Surgery Except for: No Exceptions  Adult Formula Drip Feeding: Continuous Osmolite 1.5; Nasoduodenal tube; Goal Rate: 50; mL/hr; Medication - Feeding Tube Flush Frequency: At least 15-30 mL water before and after medication administration and with tube clogging; Amount to Send (Nut...    DVT Prophylaxis: Pneumatic Compression Devices  Ratliff Catheter: Not present  Central Lines: None  Cardiac Monitoring: None  Code Status: Full Code      Disposition Plan   Expected Discharge: 03/04/2022   Anticipated discharge location: home;inpatient rehabilitation facility    Delays:            The patient's care was discussed with the Bedside Nurse, Patient and transplant pulm  Consultant.    Ross Farrar DO  Hospitalist Service, GOLD TEAM [address] [place] [address]  Securely message with the Vocera Web Console (learn more here)  Text page via [facility] Paging/Directory   Please see signed in provider for up to date coverage information      Clinically Significant Risk Factors Present on Admission                    ______________________________________________________________________    Interval History     No acute events overnight. Having a little extra nasal congestion. Breathing is the same. Her lower extremity edema is a little increased. There is some pain around chest tubes but that is manageable. No fevers or chills.    Four point ROS completed and negative unless listed above     Data reviewed today: I reviewed all medications, new labs and imaging results over  the last 24 hours.     Physical Exam   Vital Signs: Temp: 98.3  F (36.8  C) Temp src: Oral BP: (!) 171/84 Pulse: 108   Resp: 22 SpO2: 94 % O2 Device: None (Room air)    Weight: 162 lbs 4.14 oz    Constitutional: Laying in bed breathing rapidly but no distress  HEENT: Eyes with pink conjunctivae, anicteric.  Oral mucosa moist without lesions.  Voice hoarse/squeaky  PULM: Diminished bases bilaterally.  No crackles, no rhonchi, no wheezes.  Chest tubes without air leak or tidaling.  CV: Normal S1 and S2.  Tachycardia.  No murmur, gallop, or rub.  1-2+ bilateral LE edema   ABD: BS hypoactive, soft, nontender, nondistended.    MSK: Moves all extremities.  No apparent muscle wasting.   NEURO: Alert and oriented, conversant.   SKIN: Warm, dry.  No rash on limited exam.   PSYCH: Mood stable.     Data   Recent Labs   Lab 02/28/22  0727 02/28/22  0623 02/28/22  0156 02/27/22  0813 02/27/22  0732 02/26/22  0752 02/26/22  0530 02/22/22  0402 02/22/22  0355   WBC  --  17.8*  --   --  14.6*  --  9.6   < > 11.5*   HGB  --  8.8*  --   --  8.9*  --  8.4*   < > 8.8*   MCV  --  91  --   --  93  --  92   < > 85   PLT  --  247  --   --  191  --  165   < > 128*   INR  --   --   --   --   --   --   --   --  1.31*   NA  --  138  --   --  139  --  141   < > 140   POTASSIUM  --  4.2  --   --  4.2  --  4.2   < > 4.6   CHLORIDE  --  99  --   --  101  --  102   < > 111*   CO2  --  34*  --   --  36*  --  37*   < > 26   BUN  --  18  --   --  20  --  20   < > 10   CR  --  0.36*  --   --  0.41*  --  0.41*   < > 0.53   ANIONGAP  --  5  --   --  2*  --  2*   < > 3   MINISTERIO  --  8.0*  --   --  7.9*  --  7.4*   < > 7.2*   * 225* 219*   < > 184*   < > 173*   < > 135*   ALBUMIN  --  1.9*  --   --  1.9*  --  1.9*   < > 2.3*   PROTTOTAL  --  5.2*  --   --  5.2*  --  4.9*   < > 4.2*   BILITOTAL  --  0.4  --   --  0.2  --  0.3   < > 0.4   ALKPHOS  --  117  --   --  106  --  89   < > 36*   ALT  --  87*  --   --  79*  --  53*   < > 17   AST  --  36  --    --  41  --  33   < > 40    < > = values in this interval not displayed.     Recent Results (from the past 24 hour(s))   XR Chest 2 Views    Narrative    Exam: XR CHEST 2 VW, 2/28/2022 10:34 AM    Comparison: 2/27/2022    History: interval follow up, post-op lung transplant    Findings:  PA and lateral views the chest. Postoperative chest status post  bilateral lung transplant with intact clam shell sternotomy wires. 2  left chest tubes. Right basilar chest tube. Enteric tube traverses  below the field-of-view.    Trachea is midline. Mediastinum is within normal limits.  Cardiopulmonary silhouette is within normal limits. Aortic arch  calcifications. Perihilar and bibasilar opacities. No pneumothorax.  Probable small bilateral pleural effusions.. The upper abdomen is  unremarkable.      Impression    Impression: Bibasilar and perihilar atelectasis/edema with probable  small bilateral pleural effusions. Status post bilateral lung  transplant.    I have personally reviewed the examination and initial interpretation  and I agree with the findings.    FELIPA MARIE MD         SYSTEM ID:  [id number]     Medications     dextrose 10 mL/hr at 02/21/22 1900     BETA BLOCKER NOT PRESCRIBED       disposable pump w/ anesthetic 10 mL/hr at 02/26/22 0000     sodium chloride 10 mL/hr at 02/23/22 1947       acetylcysteine  2 mL Nebulization 4x Daily     [START ON 3/1/2022] calcium carbonate 600 mg-vitamin D 400 units  1 tablet Oral BID w/meals     cefTRIAXone  2 g Intravenous Q24H     dapsone  50 mg Oral Once per day on Mon Wed Fri     [Held by provider] dapsone  50 mg Oral or NG Tube Once per day on Mon Wed Fri     famotidine  10 mg Oral or Feeding Tube BID     fiber modular (NUTRISOURCE FIBER)  1 packet Per Feeding Tube TID     gabapentin  100 mg Oral or Feeding Tube At Bedtime     heparin ANTICOAGULANT  5,000 Units Subcutaneous Q8H     insulin aspart  1-7 Units Subcutaneous TID AC     insulin aspart  1-5 Units Subcutaneous  BID     insulin NPH  4 Units Subcutaneous Daily with supper     insulin NPH  8 Units Subcutaneous QAM     levalbuterol  1.25 mg Nebulization 4x Daily     loratadine  10 mg Oral Daily     metoprolol tartrate  25 mg Oral or Feeding Tube BID     multivitamins w/minerals  15 mL Oral Daily     mycophenolate  1,000 mg Oral BID    Or     mycophenolate  1,000 mg Oral or NG Tube BID     nystatin  1,000,000 Units Swish & Swallow 4x Daily     pantoprazole  40 mg Oral or Feeding Tube Daily     polyethylene glycol  17 g Oral or Feeding Tube Daily     potassium & sodium phosphates  1 packet Oral TID     prednisoLONE  17.5 mg Oral or NG Tube BID     [START ON 3/1/2022] predniSONE  15 mg Oral or Feeding Tube BID     protein modular  1 packet Per Feeding Tube TID     [Held by provider] rosuvastatin  40 mg Oral Daily     senna-docusate  1 tablet Oral or Feeding Tube BID     sodium chloride (PF)  3 mL Intracatheter Q8H     tacrolimus  6 mg Oral BID IS    Or     tacrolimus  6 mg Per Feeding Tube BID IS     valACYclovir  1,000 mg Oral Q8H

## 2022-02-28 NOTE — PROGRESS NOTES
CLINICAL NUTRITION SERVICES - REASSESSMENT NOTE     Nutrition Prescription    RECOMMENDATIONS FOR MDs/PROVIDERS TO ORDER:  -Keep continuous TFs per pt request. RD following for appropriate time to cycle.     Malnutrition Status:    Moderate malnutrition in the context of acute illness     Recommendations already ordered by Registered Dietitian (RD):  -Continue TFs as presently ordered  -Increase fiber modular by 1 pkt/day  -Continue ONS once daily per current orders (does not want to increase)    Future/Additional Recommendations:  --Monitor PO intake, ability to cycle TFs once pt interested (wants to re-address mid-week)  --Once transitions to cycled TF, start attila cts, consider increasing ONS frequency  --Monitor stooling trends for improvement w/ fiber modular     EVALUATION OF THE PROGRESS TOWARD GOALS   Diet: Level 6: Soft & Bite-Sized Dysphagia Diet, level 0 thin liquids; Glucerna at 10 AM between meals.     PO Intake: % of meals consumed, per I/O. Had ONS added once daily 2/25. Is ordering 2-3 meals per day, per review of room-service selections. Wants to take PO slow, knowing TFs meeting her needs. Is not interested in transitioning to a cycled TF regimen yet.    Nutrition Support:   EN access via NDT (bridled)   Osmolite 1.5 Attila @ 50ml/hr (1200ml/day) + Prosource (1 pkt TID) will provide: 1920 kcals (36 kcal/kg), 108 g protein (2 g/kg), 914 ml free H20, 244 g CHO, and 0 g fiber daily. FWF currently 30 mL Q4hrs.     EN Intake: Average 7-day TF intakes: 634 mL formula, 2 packets Prosource =  1031 Kcals (19 Kcal/kg), and 62 g pro (1.1 g/kg). This is meeting 64% of low-end est Kcal needs, and 76% of low-end est protein needs + PO intake.      NEW FINDINGS   General:    Endo team now following for BG management     New pneumothorax noted; 2 new CTs placed 2/26    Nutrition/GI:    Nutrisource fiber modular added BID 2/25 per C/O loose stools, provider request.    0-3 BMs per day recorded to I/O since fiber  modular added (today has had more frequent) Will increase by 1 packet today.     Weights:    Weight up 6.9 kg since admission. Unsure of admit wt accuracy (appears may have been a reported wt), if disregarding admit wt then trends stable.     Pt up 3.7L fluid since admit, so wt gain possibly fluid-related, if admit wt is accurate.    Labs:    Na 138 (WNL), stable    BUN WNL, creatinine low    PO4 levels low until AM draw (2.8), monitor for stability    BG levels >200, endo now following     Medications:    Caltrate, 1 tablet BID    Nutrisource fiber, 1 pkt BID     Medium sliding scale insulin (TF and before meals), NPH with supper and AM (see MAR)    MVI with minerals, 15 mL    Mycophenolate    Miralax    Neutraphos TID (end date today)    Prosource, TID     Prednisone    Senna (not given)     Tacrolimus     MALNUTRITION  % Intake: < 75% for > 7 days (moderate) --Per TF infusion, but also takes PO diet  % Weight Loss: None noted  Subcutaneous Fat Loss: Facial region:  Mild, Upper arm:  Mild to moderate and Thoracic/intercostal:  Mild   Muscle Loss: Upper arm (bicep, tricep):  Mild and Lower arm  (forearm):  Mild  Fluid Accumulation/Edema: Mild  Malnutrition Diagnosis: Moderate malnutrition in the context of acute illness     Previous Goals   Total avg nutritional intake to meet a minimum of 30 kcal/kg and 1.5 g PRO/kg daily (per dosing wt 54 kg).  Evaluation: Not met    Previous Nutrition Diagnosis  Inadequate protein-energy intake related to NPO (intubated) as evidenced by without enteral nutrition support     Evaluation: Improving    CURRENT NUTRITION DIAGNOSIS  Increased nutrient needs (Kcals/protein) related to s/p bilateral lung txp as evidenced by est nutrition needs 30-35 Kcal/kg and 1.5-2 gm/kg protein daily.     INTERVENTIONS  Implementation  Collaboration with other providers - Paged MD FYI of pt not interested in cycled TF yet  Enteral Nutrition - Continue, increase fiber modular by 1 pkt   Nutrition  education for nutrition relationship to health/disease - Pt/ have started to look at post SOT diet handouts provided at last assess (diet post txp and food safety book). Have not gone through education in detail yet verbally, too early.    Goals  Total avg nutritional intake to meet a minimum of 30 kcal/kg and 1.5 g PRO/kg daily (per dosing wt 54 kg).    Monitoring/Evaluation  Progress toward goals will be monitored and evaluated per protocol.    Alen Johnson RDN, LD, [facility]  [address] RD pager: [phone] Phone: *[phone]

## 2022-02-28 NOTE — PROGRESS NOTES
Diabetes Consult Daily  Progress Note          Assessment/Plan:   Melissa Elder is a 66 year old female with PMHx HTN, HLD, paroxysmal Afib, osteoporosis, GERD, severe COPD, previously requiring 2-3L NC O2 at rest.  Underwent b/l lung transplant with Dr. Robin on 02/21  has ongoing issues with Hydropneumothorax, chest tubes in place. 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    1) Steroid and TF induced hyperglycemia   2) Underlying pre-DM, versus steroid diabetes          Plan:     NPH 6 units QAM and 4 units QPM today--> increase to 8 units in AM and PM dose TBD once another glucose is filed    Will monitor for need to initiate Novolog CHO coverage    Novolog moderate intensity sliding scale >140 AC/HS/0200, on continuous TF    BG monitoring TID AC, HS, 0200    Hypoglycemia protocol    PRN D10W for hypoglyc prevention if TF stops-- rate is  70 to replace about 70% of TF carbs-- will be needed when NPO for bronch                   Interval History:     Glycemic control over the last 24 hours was reviewed    On continuous tube feeds. Osmolite at 50-- provides about 10 grams carb/h  Pred 17.5 BID, tapers tomorrow---> 15mg  - Inspection bronch 3/1 10:00 AM    The last 24 hours progress and nursing notes reviewed.    Expected Discharge: 03/04/2022   Anticipated discharge location: home;inpatient rehabilitation facility        Recent Labs   Lab 02/28/22  0727 02/28/22  0623 02/28/22  0156 02/27/22  2217 02/27/22  1621 02/27/22  1115   * 225* 219* 220* 247* 178*               Review of Systems:   See interval hx          Medications:     Orders Placed This Encounter      NPO per Anesthesia Guidelines for Procedure/Surgery Except for: No Exceptions      Regular Diet Adult    Physical Exam:  Gen: up in wheelchair, NAD  Resp: unlabored, supp O2  Mental status: alert, communicating clearly         Data:     Lab Results   Component Value Date    A1C 5.7 02/22/2022    A1C 5.8  02/20/2022            Recent Labs   Lab Test 02/28/22  0727 02/28/22  0623 02/27/22  0813 02/27/22  0732   NA  --  138  --  139   POTASSIUM  --  4.2  --  4.2   CHLORIDE  --  99  --  101   CO2  --  34*  --  36*   ANIONGAP  --  5  --  2*   * 225*   < > 184*   BUN  --  18  --  20   CR  --  0.36*  --  0.41*   MINISTERIO  --  8.0*  --  7.9*    < > = values in this interval not displayed.     CBC RESULTS: Recent Labs   Lab Test 02/28/22  0623   WBC 17.8*   RBC 2.98*   HGB 8.8*   HCT 27.0*   MCV 91   MCH 29.5   MCHC 32.6   RDW 13.2      I spent a total of 35 minutes bedside and on the inpatient unit managing the glycemic care of Melissa Elder. Over 50% of my time on the unit was spent counseling the patient  and/or coordinating care regarding acute TF/prednisone hyperglycemia plan.  See note for details.    Zita Dominguez APRN -[id number]  To contact Endocrine Diabetes service:   From 8AM-4PM: page inpatient diabetes provider that is following the patient that day (see filed or incomplete progress notes.consult notes).  If uncertain of provider assignment: page job code [phone].  For questions or updates from 4PM-8AM: page the diabetes job code for on call fellow: [phone]    Please notify inpatient diabetes service if changes are planned to steroids, enteral feeding, parenteral feeding, or if procedures are planned requiring prolonged NPO status.

## 2022-03-01 ENCOUNTER — APPOINTMENT (OUTPATIENT)
Dept: GENERAL RADIOLOGY | Facility: CLINIC | Age: 67
DRG: 007 | End: 2022-03-01
Attending: PHYSICIAN ASSISTANT
Payer: MEDICARE

## 2022-03-01 LAB
ALBUMIN SERPL-MCNC: 2 G/DL (ref 3.4–5)
ALBUMIN UR-MCNC: 30 MG/DL
ALP SERPL-CCNC: 164 U/L (ref 40–150)
ALT SERPL W P-5'-P-CCNC: 140 U/L (ref 0–50)
ANION GAP SERPL CALCULATED.3IONS-SCNC: <1 MMOL/L (ref 3–14)
APPEARANCE UR: CLEAR
AST SERPL W P-5'-P-CCNC: 56 U/L (ref 0–45)
BASE EXCESS BLDA CALC-SCNC: 14.9 MMOL/L (ref -9–1.8)
BASE EXCESS BLDV CALC-SCNC: 14.1 MMOL/L (ref -7.7–1.9)
BASE EXCESS BLDV CALC-SCNC: 15 MMOL/L (ref -7.7–1.9)
BILIRUB SERPL-MCNC: 0.2 MG/DL (ref 0.2–1.3)
BILIRUB UR QL STRIP: NEGATIVE
BRONCHOSCOPY: NORMAL
BUN SERPL-MCNC: 22 MG/DL (ref 7–30)
CALCIUM SERPL-MCNC: 8.2 MG/DL (ref 8.5–10.1)
CHLORIDE BLD-SCNC: 97 MMOL/L (ref 94–109)
CO2 SERPL-SCNC: 40 MMOL/L (ref 20–32)
COLOR UR AUTO: YELLOW
CREAT SERPL-MCNC: 0.37 MG/DL (ref 0.52–1.04)
CRP SERPL-MCNC: 66 MG/L (ref 0–8)
DONOR IDENTIFICATION: NORMAL
DSA COMMENTS: NORMAL
DSA PRESENT: NO
DSA TEST METHOD: NORMAL
ERYTHROCYTE [DISTWIDTH] IN BLOOD BY AUTOMATED COUNT: 13.6 % (ref 10–15)
GFR SERPL CREATININE-BSD FRML MDRD: >90 ML/MIN/1.73M2
GLUCOSE BLD-MCNC: 259 MG/DL (ref 70–99)
GLUCOSE BLDC GLUCOMTR-MCNC: 124 MG/DL (ref 70–99)
GLUCOSE BLDC GLUCOMTR-MCNC: 181 MG/DL (ref 70–99)
GLUCOSE BLDC GLUCOMTR-MCNC: 185 MG/DL (ref 70–99)
GLUCOSE BLDC GLUCOMTR-MCNC: 237 MG/DL (ref 70–99)
GLUCOSE BLDC GLUCOMTR-MCNC: 242 MG/DL (ref 70–99)
GLUCOSE UR STRIP-MCNC: >=1000 MG/DL
HCO3 BLD-SCNC: 43 MMOL/L (ref 21–28)
HCO3 BLDV-SCNC: 44 MMOL/L (ref 21–28)
HCO3 BLDV-SCNC: 45 MMOL/L (ref 21–28)
HCT VFR BLD AUTO: 27.3 % (ref 35–47)
HGB BLD-MCNC: 8.9 G/DL (ref 11.7–15.7)
HGB UR QL STRIP: NEGATIVE
HOLD SPECIMEN: NORMAL
HOLD SPECIMEN: NORMAL
HYALINE CASTS: 6 /LPF
KETONES UR STRIP-MCNC: NEGATIVE MG/DL
LACTATE SERPL-SCNC: 1.3 MMOL/L (ref 0.7–2)
LEUKOCYTE ESTERASE UR QL STRIP: NEGATIVE
MAGNESIUM SERPL-MCNC: 1.8 MG/DL (ref 1.6–2.3)
MCH RBC QN AUTO: 29.4 PG (ref 26.5–33)
MCHC RBC AUTO-ENTMCNC: 32.6 G/DL (ref 31.5–36.5)
MCV RBC AUTO: 90 FL (ref 78–100)
NITRATE UR QL: NEGATIVE
O2/TOTAL GAS SETTING VFR VENT: 4 %
O2/TOTAL GAS SETTING VFR VENT: 45 %
O2/TOTAL GAS SETTING VFR VENT: 5 %
ORGAN: NORMAL
PCO2 BLD: 79 MM HG (ref 35–45)
PCO2 BLDV: 91 MM HG (ref 40–50)
PCO2 BLDV: 99 MM HG (ref 40–50)
PH BLD: 7.34 [PH] (ref 7.35–7.45)
PH BLDV: 7.26 [PH] (ref 7.32–7.43)
PH BLDV: 7.3 [PH] (ref 7.32–7.43)
PH UR STRIP: 6 [PH] (ref 5–7)
PHOSPHATE SERPL-MCNC: 3.4 MG/DL (ref 2.5–4.5)
PLATELET # BLD AUTO: 305 10E3/UL (ref 150–450)
PO2 BLD: 149 MM HG (ref 80–105)
PO2 BLDV: 62 MM HG (ref 25–47)
PO2 BLDV: 64 MM HG (ref 25–47)
POTASSIUM BLD-SCNC: 4.4 MMOL/L (ref 3.4–5.3)
PROCALCITONIN SERPL-MCNC: 0.18 NG/ML
PROT SERPL-MCNC: 5.7 G/DL (ref 6.8–8.8)
RBC # BLD AUTO: 3.03 10E6/UL (ref 3.8–5.2)
RBC URINE: 0 /HPF
SA 1 CELL: NORMAL
SA 1 TEST METHOD: NORMAL
SA 2 CELL: NORMAL
SA 2 TEST METHOD: NORMAL
SA1 HI RISK ABY: NORMAL
SA1 MOD RISK ABY: NORMAL
SA2 HI RISK ABY: NORMAL
SA2 MOD RISK ABY: NORMAL
SODIUM SERPL-SCNC: 137 MMOL/L (ref 133–144)
SP GR UR STRIP: 1.03 (ref 1–1.03)
TACROLIMUS BLD-MCNC: 10.1 UG/L (ref 5–15)
TME LAST DOSE: NORMAL H
TME LAST DOSE: NORMAL H
TRANSITIONAL EPI: 2 /HPF
UNACCEPTABLE ANTIGENS: NORMAL
UNOS CPRA: 0
UROBILINOGEN UR STRIP-MCNC: NORMAL MG/DL
WBC # BLD AUTO: 21.2 10E3/UL (ref 4–11)
WBC URINE: 0 /HPF
ZZZSA 1  COMMENTS: NORMAL
ZZZSA 2 COMMENTS: NORMAL

## 2022-03-01 PROCEDURE — 250N000011 HC RX IP 250 OP 636: Performed by: INTERNAL MEDICINE

## 2022-03-01 PROCEDURE — 31622 DX BRONCHOSCOPE/WASH: CPT | Mod: GC | Performed by: INTERNAL MEDICINE

## 2022-03-01 PROCEDURE — 99153 MOD SED SAME PHYS/QHP EA: CPT | Performed by: INTERNAL MEDICINE

## 2022-03-01 PROCEDURE — 36415 COLL VENOUS BLD VENIPUNCTURE: CPT | Performed by: STUDENT IN AN ORGANIZED HEALTH CARE EDUCATION/TRAINING PROGRAM

## 2022-03-01 PROCEDURE — 272N000054 HC CANNULA HIGH FLOW, ADULT

## 2022-03-01 PROCEDURE — 84145 PROCALCITONIN (PCT): CPT | Performed by: PHYSICIAN ASSISTANT

## 2022-03-01 PROCEDURE — 99233 SBSQ HOSP IP/OBS HIGH 50: CPT | Mod: FS | Performed by: INTERNAL MEDICINE

## 2022-03-01 PROCEDURE — 94640 AIRWAY INHALATION TREATMENT: CPT

## 2022-03-01 PROCEDURE — 82803 BLOOD GASES ANY COMBINATION: CPT | Performed by: INTERNAL MEDICINE

## 2022-03-01 PROCEDURE — 81001 URINALYSIS AUTO W/SCOPE: CPT | Performed by: NURSE PRACTITIONER

## 2022-03-01 PROCEDURE — 94660 CPAP INITIATION&MGMT: CPT

## 2022-03-01 PROCEDURE — 250N000009 HC RX 250: Performed by: INTERNAL MEDICINE

## 2022-03-01 PROCEDURE — 71046 X-RAY EXAM CHEST 2 VIEWS: CPT

## 2022-03-01 PROCEDURE — 0BJ08ZZ INSPECTION OF TRACHEOBRONCHIAL TREE, VIA NATURAL OR ARTIFICIAL OPENING ENDOSCOPIC: ICD-10-PCS | Performed by: INTERNAL MEDICINE

## 2022-03-01 PROCEDURE — 999N000157 HC STATISTIC RCP TIME EA 10 MIN

## 2022-03-01 PROCEDURE — 80197 ASSAY OF TACROLIMUS: CPT | Performed by: PHYSICIAN ASSISTANT

## 2022-03-01 PROCEDURE — 94640 AIRWAY INHALATION TREATMENT: CPT | Mod: 76

## 2022-03-01 PROCEDURE — 99233 SBSQ HOSP IP/OBS HIGH 50: CPT | Performed by: STUDENT IN AN ORGANIZED HEALTH CARE EDUCATION/TRAINING PROGRAM

## 2022-03-01 PROCEDURE — 250N000013 HC RX MED GY IP 250 OP 250 PS 637: Performed by: NURSE PRACTITIONER

## 2022-03-01 PROCEDURE — 36415 COLL VENOUS BLD VENIPUNCTURE: CPT | Performed by: PHYSICIAN ASSISTANT

## 2022-03-01 PROCEDURE — 99207 PR NON-BILLABLE SERV PER CHARTING: CPT | Performed by: NURSE PRACTITIONER

## 2022-03-01 PROCEDURE — 250N000013 HC RX MED GY IP 250 OP 250 PS 637: Performed by: SURGERY

## 2022-03-01 PROCEDURE — 36415 COLL VENOUS BLD VENIPUNCTURE: CPT | Performed by: NURSE PRACTITIONER

## 2022-03-01 PROCEDURE — 250N000013 HC RX MED GY IP 250 OP 250 PS 637: Performed by: STUDENT IN AN ORGANIZED HEALTH CARE EDUCATION/TRAINING PROGRAM

## 2022-03-01 PROCEDURE — 250N000012 HC RX MED GY IP 250 OP 636 PS 637: Performed by: INTERNAL MEDICINE

## 2022-03-01 PROCEDURE — 250N000011 HC RX IP 250 OP 636: Performed by: PHYSICIAN ASSISTANT

## 2022-03-01 PROCEDURE — 85027 COMPLETE CBC AUTOMATED: CPT | Performed by: STUDENT IN AN ORGANIZED HEALTH CARE EDUCATION/TRAINING PROGRAM

## 2022-03-01 PROCEDURE — 80053 COMPREHEN METABOLIC PANEL: CPT | Performed by: PHYSICIAN ASSISTANT

## 2022-03-01 PROCEDURE — 87040 BLOOD CULTURE FOR BACTERIA: CPT | Performed by: NURSE PRACTITIONER

## 2022-03-01 PROCEDURE — 71046 X-RAY EXAM CHEST 2 VIEWS: CPT | Mod: 26 | Performed by: RADIOLOGY

## 2022-03-01 PROCEDURE — 250N000013 HC RX MED GY IP 250 OP 250 PS 637: Performed by: PHYSICIAN ASSISTANT

## 2022-03-01 PROCEDURE — 250N000012 HC RX MED GY IP 250 OP 636 PS 637: Performed by: PHYSICIAN ASSISTANT

## 2022-03-01 PROCEDURE — 82803 BLOOD GASES ANY COMBINATION: CPT | Performed by: STUDENT IN AN ORGANIZED HEALTH CARE EDUCATION/TRAINING PROGRAM

## 2022-03-01 PROCEDURE — 5A09357 ASSISTANCE WITH RESPIRATORY VENTILATION, LESS THAN 24 CONSECUTIVE HOURS, CONTINUOUS POSITIVE AIRWAY PRESSURE: ICD-10-PCS | Performed by: STUDENT IN AN ORGANIZED HEALTH CARE EDUCATION/TRAINING PROGRAM

## 2022-03-01 PROCEDURE — G0500 MOD SEDAT ENDO SERVICE >5YRS: HCPCS | Performed by: INTERNAL MEDICINE

## 2022-03-01 PROCEDURE — 82803 BLOOD GASES ANY COMBINATION: CPT | Performed by: NURSE PRACTITIONER

## 2022-03-01 PROCEDURE — 99233 SBSQ HOSP IP/OBS HIGH 50: CPT | Mod: 24 | Performed by: CLINICAL NURSE SPECIALIST

## 2022-03-01 PROCEDURE — 31622 DX BRONCHOSCOPE/WASH: CPT | Performed by: INTERNAL MEDICINE

## 2022-03-01 PROCEDURE — 83605 ASSAY OF LACTIC ACID: CPT | Performed by: STUDENT IN AN ORGANIZED HEALTH CARE EDUCATION/TRAINING PROGRAM

## 2022-03-01 PROCEDURE — 120N000005 HC R&B MS OVERFLOW UMMC

## 2022-03-01 PROCEDURE — 258N000001 HC RX 258: Performed by: CLINICAL NURSE SPECIALIST

## 2022-03-01 PROCEDURE — 250N000009 HC RX 250: Performed by: SURGERY

## 2022-03-01 PROCEDURE — 250N000011 HC RX IP 250 OP 636: Performed by: STUDENT IN AN ORGANIZED HEALTH CARE EDUCATION/TRAINING PROGRAM

## 2022-03-01 PROCEDURE — 250N000012 HC RX MED GY IP 250 OP 636 PS 637: Performed by: STUDENT IN AN ORGANIZED HEALTH CARE EDUCATION/TRAINING PROGRAM

## 2022-03-01 PROCEDURE — 83735 ASSAY OF MAGNESIUM: CPT | Performed by: SURGERY

## 2022-03-01 PROCEDURE — 84100 ASSAY OF PHOSPHORUS: CPT | Performed by: SURGERY

## 2022-03-01 PROCEDURE — 86140 C-REACTIVE PROTEIN: CPT | Performed by: PHYSICIAN ASSISTANT

## 2022-03-01 RX ORDER — CARBOXYMETHYLCELLULOSE SODIUM 5 MG/ML
1 SOLUTION/ DROPS OPHTHALMIC
Status: DISCONTINUED | OUTPATIENT
Start: 2022-03-01 | End: 2022-03-17 | Stop reason: HOSPADM

## 2022-03-01 RX ORDER — FENTANYL CITRATE 50 UG/ML
INJECTION, SOLUTION INTRAMUSCULAR; INTRAVENOUS PRN
Status: COMPLETED | OUTPATIENT
Start: 2022-03-01 | End: 2022-03-01

## 2022-03-01 RX ORDER — DILTIAZEM HYDROCHLORIDE 30 MG/1
30 TABLET, FILM COATED ORAL EVERY 6 HOURS SCHEDULED
Status: DISCONTINUED | OUTPATIENT
Start: 2022-03-01 | End: 2022-03-07

## 2022-03-01 RX ORDER — NALOXONE HYDROCHLORIDE 0.4 MG/ML
INJECTION, SOLUTION INTRAMUSCULAR; INTRAVENOUS; SUBCUTANEOUS PRN
Status: COMPLETED | OUTPATIENT
Start: 2022-03-01 | End: 2022-03-01

## 2022-03-01 RX ORDER — LIDOCAINE HYDROCHLORIDE 40 MG/ML
INJECTION, SOLUTION RETROBULBAR PRN
Status: COMPLETED | OUTPATIENT
Start: 2022-03-01 | End: 2022-03-01

## 2022-03-01 RX ORDER — LIDOCAINE HYDROCHLORIDE 10 MG/ML
INJECTION, SOLUTION INFILTRATION; PERINEURAL PRN
Status: COMPLETED | OUTPATIENT
Start: 2022-03-01 | End: 2022-03-01

## 2022-03-01 RX ORDER — POLYETHYLENE GLYCOL 3350 17 G/17G
17 POWDER, FOR SOLUTION ORAL DAILY PRN
Status: DISCONTINUED | OUTPATIENT
Start: 2022-03-01 | End: 2022-03-11

## 2022-03-01 RX ORDER — DAPSONE 25 MG/1
50 TABLET ORAL DAILY
Status: DISCONTINUED | OUTPATIENT
Start: 2022-03-01 | End: 2022-03-17 | Stop reason: HOSPADM

## 2022-03-01 RX ORDER — AMOXICILLIN 250 MG
1 CAPSULE ORAL 2 TIMES DAILY PRN
Status: DISCONTINUED | OUTPATIENT
Start: 2022-03-01 | End: 2022-03-11

## 2022-03-01 RX ORDER — ASPIRIN 81 MG/1
81 TABLET ORAL DAILY
Status: CANCELLED | OUTPATIENT
Start: 2022-03-01

## 2022-03-01 RX ORDER — MAGNESIUM SULFATE HEPTAHYDRATE 40 MG/ML
2 INJECTION, SOLUTION INTRAVENOUS ONCE
Status: COMPLETED | OUTPATIENT
Start: 2022-03-01 | End: 2022-03-01

## 2022-03-01 RX ORDER — ASPIRIN 81 MG/1
81 TABLET ORAL DAILY
Status: DISCONTINUED | OUTPATIENT
Start: 2022-03-01 | End: 2022-03-17 | Stop reason: HOSPADM

## 2022-03-01 RX ADMIN — LEVALBUTEROL HYDROCHLORIDE 1.25 MG: 1.25 SOLUTION RESPIRATORY (INHALATION) at 08:08

## 2022-03-01 RX ADMIN — CALCIUM CARBONATE 600 MG (1,500 MG)-VITAMIN D3 400 UNIT TABLET 1 TABLET: at 17:09

## 2022-03-01 RX ADMIN — ACETYLCYSTEINE 2 ML: 200 SOLUTION ORAL; RESPIRATORY (INHALATION) at 08:08

## 2022-03-01 RX ADMIN — NALOXONE HYDROCHLORIDE 0.2 MG: 0.4 INJECTION, SOLUTION INTRAMUSCULAR; INTRAVENOUS; SUBCUTANEOUS at 10:32

## 2022-03-01 RX ADMIN — TACROLIMUS 6 MG: 5 CAPSULE ORAL at 17:29

## 2022-03-01 RX ADMIN — LIDOCAINE HYDROCHLORIDE 9 ML: 10 INJECTION, SOLUTION INFILTRATION; PERINEURAL at 10:20

## 2022-03-01 RX ADMIN — ASPIRIN 81 MG: 81 TABLET, COATED ORAL at 15:36

## 2022-03-01 RX ADMIN — PREDNISONE 15 MG: 5 TABLET ORAL at 20:18

## 2022-03-01 RX ADMIN — ACETYLCYSTEINE 2 ML: 200 SOLUTION ORAL; RESPIRATORY (INHALATION) at 20:41

## 2022-03-01 RX ADMIN — Medication 1 PACKET: at 15:43

## 2022-03-01 RX ADMIN — ACETYLCYSTEINE 2 ML: 200 SOLUTION ORAL; RESPIRATORY (INHALATION) at 11:46

## 2022-03-01 RX ADMIN — ACETYLCYSTEINE 2 ML: 200 SOLUTION ORAL; RESPIRATORY (INHALATION) at 15:51

## 2022-03-01 RX ADMIN — LABETALOL HYDROCHLORIDE 10 MG: 5 INJECTION, SOLUTION INTRAVENOUS at 00:08

## 2022-03-01 RX ADMIN — METOPROLOL TARTRATE 25 MG: 25 TABLET, FILM COATED ORAL at 12:20

## 2022-03-01 RX ADMIN — FAMOTIDINE 10 MG: 10 TABLET, FILM COATED ORAL at 20:18

## 2022-03-01 RX ADMIN — DAPSONE 50 MG: 25 TABLET ORAL at 12:31

## 2022-03-01 RX ADMIN — GABAPENTIN 100 MG: 100 CAPSULE ORAL at 22:15

## 2022-03-01 RX ADMIN — VALACYCLOVIR HYDROCHLORIDE 1000 MG: 1 TABLET, FILM COATED ORAL at 00:08

## 2022-03-01 RX ADMIN — HEPARIN SODIUM 5000 UNITS: 5000 INJECTION, SOLUTION INTRAVENOUS; SUBCUTANEOUS at 15:36

## 2022-03-01 RX ADMIN — LIDOCAINE HYDROCHLORIDE 10 ML: 40 INJECTION, SOLUTION RETROBULBAR; TOPICAL at 10:13

## 2022-03-01 RX ADMIN — Medication 1 PACKET: at 20:24

## 2022-03-01 RX ADMIN — VALACYCLOVIR HYDROCHLORIDE 1000 MG: 1 TABLET, FILM COATED ORAL at 16:14

## 2022-03-01 RX ADMIN — TOPICAL ANESTHETIC 1 SPRAY: 200 SPRAY DENTAL; PERIODONTAL at 10:14

## 2022-03-01 RX ADMIN — PREDNISONE 15 MG: 5 TABLET ORAL at 12:20

## 2022-03-01 RX ADMIN — FENTANYL CITRATE 50 MCG: 50 INJECTION, SOLUTION INTRAMUSCULAR; INTRAVENOUS at 10:16

## 2022-03-01 RX ADMIN — LEVALBUTEROL HYDROCHLORIDE 1.25 MG: 1.25 SOLUTION RESPIRATORY (INHALATION) at 11:46

## 2022-03-01 RX ADMIN — MIDAZOLAM 0.5 MG: 1 INJECTION INTRAMUSCULAR; INTRAVENOUS at 10:16

## 2022-03-01 RX ADMIN — TACROLIMUS 6 MG: 5 CAPSULE ORAL at 12:22

## 2022-03-01 RX ADMIN — DEXTROSE MONOHYDRATE: 100 INJECTION, SOLUTION INTRAVENOUS at 08:48

## 2022-03-01 RX ADMIN — HEPARIN SODIUM 5000 UNITS: 5000 INJECTION, SOLUTION INTRAVENOUS; SUBCUTANEOUS at 05:53

## 2022-03-01 RX ADMIN — Medication 40 MG: at 12:22

## 2022-03-01 RX ADMIN — LEVALBUTEROL HYDROCHLORIDE 1.25 MG: 1.25 SOLUTION RESPIRATORY (INHALATION) at 15:51

## 2022-03-01 RX ADMIN — NALOXONE HYDROCHLORIDE 0.2 MG: 0.4 INJECTION, SOLUTION INTRAMUSCULAR; INTRAVENOUS; SUBCUTANEOUS at 10:35

## 2022-03-01 RX ADMIN — NYSTATIN 1000000 UNITS: 100000 SUSPENSION ORAL at 20:18

## 2022-03-01 RX ADMIN — FENTANYL CITRATE 50 MCG: 50 INJECTION, SOLUTION INTRAMUSCULAR; INTRAVENOUS at 10:20

## 2022-03-01 RX ADMIN — ACETAMINOPHEN 975 MG: 325 TABLET, FILM COATED ORAL at 12:30

## 2022-03-01 RX ADMIN — METOPROLOL TARTRATE 25 MG: 25 TABLET, FILM COATED ORAL at 20:18

## 2022-03-01 RX ADMIN — LIDOCAINE HYDROCHLORIDE 6 ML: 40 INJECTION, SOLUTION RETROBULBAR; TOPICAL at 10:18

## 2022-03-01 RX ADMIN — MYCOPHENOLATE MOFETIL 1000 MG: 200 POWDER, FOR SUSPENSION ORAL at 12:22

## 2022-03-01 RX ADMIN — MYCOPHENOLATE MOFETIL 1000 MG: 200 POWDER, FOR SUSPENSION ORAL at 20:21

## 2022-03-01 RX ADMIN — NYSTATIN 1000000 UNITS: 100000 SUSPENSION ORAL at 15:36

## 2022-03-01 RX ADMIN — LEVALBUTEROL HYDROCHLORIDE 1.25 MG: 1.25 SOLUTION RESPIRATORY (INHALATION) at 20:41

## 2022-03-01 RX ADMIN — MAGNESIUM SULFATE IN WATER 2 G: 40 INJECTION, SOLUTION INTRAVENOUS at 08:05

## 2022-03-01 RX ADMIN — HEPARIN SODIUM 5000 UNITS: 5000 INJECTION, SOLUTION INTRAVENOUS; SUBCUTANEOUS at 22:15

## 2022-03-01 RX ADMIN — NALOXONE HYDROCHLORIDE 0.4 MG: 0.4 INJECTION, SOLUTION INTRAMUSCULAR; INTRAVENOUS; SUBCUTANEOUS at 10:46

## 2022-03-01 ASSESSMENT — ACTIVITIES OF DAILY LIVING (ADL)
ADLS_ACUITY_SCORE: 11

## 2022-03-01 NOTE — PROVIDER NOTIFICATION
Notified provider that VBG are back and CO2 91. Pt placed on HFNC by RT for 2 hours and will then recheck VBG at 1500.   Pt still lethargic, but arousable since returning from endo at 1135. Pt is able to answer all orientation questions, but will also talk nonsensical. Pt on frequent vitals. TF restarted and D10 stopped. Will cont to monitor.

## 2022-03-01 NOTE — PROGRESS NOTES
Pulmonary Medicine  Cystic Fibrosis - Lung Transplant Team  Progress Note  2022       Patient: Melissa Elder  MRN: [id number]  : 1955 (age 66 year old)  Transplant: 2022 (Lung), POD#8  Admission date: 2022    Assessment & Plan:     Melissa Elder is a 66 year old female with a PMH significant for severe COPD, HTN, mild non-obstructive CAD, paroxysmal afib, osteoporosis, GERD, and colonic polyps.  Pt. is now s/p BSLT on 22, surgery relatively uncomplicated.  The patient was extubated , post-op course complicated by right chest tube lodged into fissure and left chest tube displacement s/p bilateral pigtail chest tube placement in IR to drain anterior hydropneumothorax and bilateral effusions.  Only intermittently requiring supplemental oxygen.      Today's recommendations:  - Continue aggressive airway clearance  - DSA pending  - Anesthesia managing ES cath  - Daily CXR with chest tubes  - Consider gentle diuresis today  - Inspection bronch this morning  - Tacro level today collected too early, repeat level tomorrow  - Next prednisone taper due 3/8  - Dapsone dose increased today  - CMV, EBV, IgG, and donor labs (+hep B) due 3/21  - Ceftriaxone through 3/8, then transition to amoxicillin for total of 3 months course (through ) for actinomyces treatment  - VACV through 3/12, then resume acyclovir prophylaxis through 3/23 per protocol  - Consider hepatic imaging, viral serology and pharm to review med list if LFTs are further elevated tomorrow  - Consider resuming PTA ASA 81 mg  - Consider resuming PTA diltiazem, and if resumed will need to closely monitor for interaction with tacrolimus (increased drug level)    COPD s/p bilateral sequential lung transplant (BSLT):  Acute hypoxic/hypercapneic respiratory failure:   Bilateral pleural effusions:  Scant pleural-pleural adhesions and mild-moderate bilateral hilar lymphadenopathy per op note.  Pathology with CPFE.  Pressor weaned off  and pt. extubated on 2/22.  Left chest tube inadvertently dislodged, f/u chest CT (2/25) with anterior hydroPTX, right chest tube in fissure.  Left chest tube removed and replaced with 2 left-sided pigtail chest tubes by IR (2/26).  Hypoxia generally resolved, only intermittently requiring supplemental oxygen.     - Nebs: levalbuterol and Mucomyst QID  - Aggressive pulmonary toilet with chest physiotherapy QID as well as Aerobika and incentive spirometry q1h w/a  - Wean supplemental O2 to keep >92%  - DSA pending 2/28, repeat one month post-transplant  - Chest tube management by CVTS, IR has requested to be contacted prior to removal of left chest tube(s)  - CXR daily with chest tubes, today with stable BLL atelectasis and pleural effusions (trace to small)  - Volume management per primary team, consider gentle diuresis today  - Post-pyloric nasal FT, TF per RD and primary team  - SLP following for diet advacement  - Inspection bronch this morning (notable for small RLL secretions, no cultures sent, procedure complicated by oversedation)     Immunosuppression:  S/p induction therapy with basiliximab x2 doses (with high dose IV steroid).  - Tacrolimus 6 mg BID.  Goal level 8-12.  Level today to trend collected inappropriately early so will be disregarded.  Repeat level tomorrow for steady state (ordered).  - MMF 1000 mg BID (decreased 2/25 due to diarrhea)  - Prednisone 15 mg BID with taper per lung transplant protocol (due 3/8, not ordered):  Date AM dose (mg) PM dose (mg)   3/1 15 15   3/8 15 12.5   3/15 12.5 12.5   3/29 12.5 10   4/12 10 10   5/10 10 7.5   6/7 7.5 7.5   7/5 7.5 5   8/2 5 5   8/30 5 2.5      Prophylaxis:   - Daponse for PJP ppx (started 2/23 at decreased MWF dose, G6PD 13.3 2/21) increased to daily today (ordered)  - Nystatin for oral candidiasis ppx, 6 month course  - See below for serologies and viral ppx:    Donor Recipient Intervention   CMV status Negative Negative CMV monthly (3/21, not yet  ordered)   EBV status Positive Positive EBV at one month (3/21, not yet ordered)   HSV status N/A (Newly) Positive As below      ID: Prior history of infection/colonization with Haemophilus influenzae (2017).  Broad spectrum ABX empirically post-op with vanc and ceftaz (2/21-2/22), stopped after 24h to target known donor cultures at that time on POD #1 per Dr. Lala (transitioned to cefazolin).  Broadened again on POD #2 with culture updates as below.  Donor ([facility]) cultures with P-S MSSA; ([facility]) with Strep mitis, Actinomyces odontolyticus, MSSA x2, and C. kruseii.  Recipient cultures at time of transplant with Actinomyces odonotolyticus.  Bronch cultures (2/22) with Saccharomyces cerevisiae (no indication to treat per Dr. Ghosh unless pt. acutely declines clinically, 3/1) and C. albicans  - IgG repeat at one month (3/21, not yet ordered)  - ABX: ceftriaxone (2/23-3/8), then transition to amoxicillin for total of 3 months course (through 5/23) for actinomyces treatment    HSV: Pt. with recent seroconversion at time of transplant.  HSV also noted on donor cultures.  Initial plan for 30 days of ppx with ACV post-op per Dr. Lala.  Then with HSV+ bronch at time of transplant (2/21), transitioned to treatment dosing with VACV  - VACV 1000 mg TID X 14d (2/27-3/12), then resume acyclovir prophylaxis (3/13-3/23) per protocol     PHS risk criteria donor: Additional labs required post-transplant (between 4-8 weeks post-op): Hepatitis B, Hepatitis C, and HIV by COLT ([id number], ordered POD #30), also plan to repeat hepatitis B surface antibody at that time (ordered) given discrepancy with recent result.     Other relevant problems managed by primary team:     Diarrhea: Likely 2/2 medications and tube feedings.  Fiber added to tube feeds.  MMF decreased as above.  Loose stools now improved.    - Will consider transition to Myfortic if loose stools increase again, defer for now    Increasing LFTS: Rising 3/1 despite medication  adjustments (APAP and statin stopped 2/27).  Of note, pt. had a discrepant hep B surface Ab at time of transplant as above.  - Daily hepatic panel  - Consider hepatic imaging, viral serology and pharm to review med list if further elevated tomorrow    Leukocytosis: WBC trending upward now on POD #10, frequently noted at this stage post-op (pathology not entirely clear, but may be at least partially d/t bone marrow surge post-transplant).  Procal moderately elevated at 0.18, but may still be somewhat non-specific at this point post-op.  - Pan culture ordered (no bronch cultures obtained this morning)     CAD: Noted to have mild non-obstructive CAD without hemodynamically significant lesions on cardiac cath 3/27/19.  PTA ASA 81 mg and atorvastatin, started on rosuvastatin post-op but now held for elevated LFTs (as above).   - Consider resuming PTA ASA 81 mg     Paroxysmal afib:   HTN: PTA diltiazem, not on AC.  Post-op tachycardia, off pressor since 2/22.  Metoprolol started 2/23.  Persistent low level tachycardia, but currently sinus tach with continued HTN.  - Consider resuming PTA diltiazem, and if resumed will need to closely monitor for interaction with tacrolimus (increased drug level)     GERD: Not on PPI PTA.  Negative pH/manometry study 3/28/19, noted small hiatal hernia.   - PPI daily (2/21), famotidine BID (2/21-3/2) x10d as bridge     We appreciate the excellent care provided by the [place] [id number] team.  Recommendations communicated via in person rounding and this note.  Will continue to follow along closely, please do not hesitate to call with any questions or concerns.    Patient discussed with Dr. Knox.    Iman Jane, DNP, APRN, CNP  Inpatient Nurse Practitioner  Pulmonary CF/Transplant     Subjective & Interval History:     Generally on RA with occasional cough, minimal sputum.  Primary c/o sinus drainage and PND.  Pain minimal.  Chest tubes x3 with moderate output.  PO intake remains poor to fair,  "on cycled TF, loose stool frequency greatly improved (~2 episodes yesterday).    Review of Systems:     C: No fever, no chills, + slight increase in weight,  INTEGUMENTARY/SKIN: No rash or obvious new lesions  ENT/MOUTH: See interval history  RESP: See interval history  CV: No chest pain, no palpitations  GI: No nausea, no vomiting, no reflux symptoms  : No dysuria  MUSCULOSKELETAL: No myalgias, no arthralgias  ENDOCRINE: Blood sugars intermittently elevated  NEURO: No headache  PSYCHIATRIC: Mood stable    Physical Exam:     All notes, images, and labs from past 24 hours (at minimum) were reviewed.    Vital signs:  Temp: 98.5  F (36.9  C) Temp src: Oral BP: (!) 162/81 Pulse: 106   Resp: 20 SpO2: 91 % O2 Device: None (Room air) Oxygen Delivery: 3 LPM Height: 157.5 cm (5' 2\") Weight: 74.8 kg (164 lb 14.5 oz)  I/O:     Intake/Output Summary (Last 24 hours) at 3/1/2022 0712  Last data filed at 3/1/2022 0700  Gross per 24 hour   Intake 1785 ml   Output 1760 ml   Net 25 ml     Constitutional: Lying in bed, in no apparent distress.   HEENT: Eyes with pink conjunctivae, anicteric.  Oral mucosa moist without lesions.  Voice soft/hoarse.  PULM: Diminished bases bilaterally.  Scattered coarse crackles, no rhonchi, no wheezes.  Non-labored breathing on RA.  Chest tubes x3, to suction, no air leak noted.  CV: Normal S1 and S2.  Trachycardia.  No murmur, gallop, or rub.  Trace peripheral edema.   ABD: NABS, soft, nontender, nondistended.    MSK: Moves all extremities.  No apparent muscle wasting.   NEURO: Alert and oriented, conversant.   SKIN: Warm, dry.  No rash on limited exam.   PSYCH: Mood stable.     Lines, Drains, and Devices:  Peripheral IV 02/28/22 Left;Lateral Lower forearm (Active)   Site Assessment WDL 03/01/22 0400   Line Status Infusing 03/01/22 0400   Dressing Intervention New dressing  02/28/22 0051   Phlebitis Scale 0-->no symptoms 03/01/22 0400   Infiltration Scale 0 03/01/22 0400   Number of days: 1     Data: "     LABS    CMP:   Recent Labs   Lab 03/01/22  0318 03/01/22  0203 02/28/22  2142 02/28/22  1601 02/28/22  0727 02/28/22  0623 02/27/22  0813 02/27/22  0732 02/26/22  0752 02/26/22  0530     --   --   --   --  138  --  139  --  141   POTASSIUM 4.4  --   --   --   --  4.2  --  4.2  --  4.2   CHLORIDE 97  --   --   --   --  99  --  101  --  102   CO2 40*  --   --   --   --  34*  --  36*  --  37*   ANIONGAP <1*  --   --   --   --  5  --  2*  --  2*   * 242* 241* 276*   < > 225*   < > 184*   < > 173*   BUN 22  --   --   --   --  18  --  20  --  20   CR 0.37*  --   --   --   --  0.36*  --  0.41*  --  0.41*   GFRESTIMATED >90  --   --   --   --  >90  --  >90  --  >90   MINISTERIO 8.2*  --   --   --   --  8.0*  --  7.9*  --  7.4*   MAG 1.8  --   --   --   --  1.6  --  1.9  --  2.0   PHOS 3.4  --   --   --   --  2.8  --  2.0*  --  2.3*   PROTTOTAL 5.7*  --   --   --   --  5.2*  --  5.2*  --  4.9*   ALBUMIN 2.0*  --   --   --   --  1.9*  --  1.9*  --  1.9*   BILITOTAL 0.2  --   --   --   --  0.4  --  0.2  --  0.3   ALKPHOS 164*  --   --   --   --  117  --  106  --  89   AST 56*  --   --   --   --  36  --  41  --  33   *  --   --   --   --  87*  --  79*  --  53*    < > = values in this interval not displayed.     CBC:   Recent Labs   Lab 03/01/22  0318 02/28/22  0623 02/27/22  0732 02/26/22  0530   WBC 21.2* 17.8* 14.6* 9.6   RBC 3.03* 2.98* 3.02* 2.89*   HGB 8.9* 8.8* 8.9* 8.4*   HCT 27.3* 27.0* 28.2* 26.7*   MCV 90 91 93 92   MCH 29.4 29.5 29.5 29.1   MCHC 32.6 32.6 31.6 31.5   RDW 13.6 13.2 13.2 13.3    247 191 165       INR: No lab results found in last 7 days.    Glucose:   Recent Labs   Lab 03/01/22  0318 03/01/22  0203 02/28/22  2142 02/28/22  1601 02/28/22  0727 02/28/22  0623   * 242* 241* 276* 197* 225*       Blood Gas:   Recent Labs   Lab 02/23/22  0834 02/23/22  0347 02/22/22  1747 02/22/22  0843 02/22/22  0800   PHV 7.32  --  7.30*  --  7.45*   PCO2V 55*  --  42  --  38*   PO2V 46  --   43  --  40   HCO3V 28  --  21  --  26   ARIEL 1.2  --  -5.5  --  1.9   O2PER 30 2 30  30   < > 30    < > = values in this interval not displayed.       Culture Data No results for input(s): CULT in the last 168 hours.    Virology Data: No results found for: INFLUA, FLUAH1, FLUAH3, LR6264, IFLUB, RSVA, RSVB, PIV1, PIV2, PIV3, HMPV, HRVS, ADVBE, ADVC    Historical CMV results (last 3 of prior testing):  No results found for: CMVQNT  No results found for: CMVLOG    Urine Studies    Recent Labs   Lab Test 02/20/22  0248 03/25/19  1043   URINEPH 7.0 5.0   NITRITE Negative Negative   LEUKEST Negative Trace*   WBCU <1 1       Most Recent [place] Pulmonary Function Testing (FVC/FEV1 only)  FVC-Pre   Date Value Ref Range Status   12/20/2021 1.81 L    06/21/2021 1.46 L    12/09/2019 1.59 L    06/03/2019 1.68 L      FVC-%Pred-Pre   Date Value Ref Range Status   12/20/2021 65 %    06/21/2021 52 %    12/09/2019 56 %    06/03/2019 58 %      FEV1-Pre   Date Value Ref Range Status   12/20/2021 0.49 L    06/21/2021 0.50 L    12/09/2019 0.49 L    06/03/2019 0.47 L      FEV1-%Pred-Pre   Date Value Ref Range Status   12/20/2021 22 %    06/21/2021 22 %    12/09/2019 21 %    06/03/2019 20 %        IMAGING    Recent Results (from the past 48 hour(s))   XR Chest 2 Views    Narrative    Chest 2 views    INDICATION: Interval follow-up, post lung transplant    COMPARISON: 2/26/2022    FINDINGS: Clamshell sternotomy from prior bilateral lung transplant  again noted. Left chest catheters are present. Heart size normal.  Atherosclerosis at the aortic arch. Haziness and costophrenic angle  blunting bilaterally, suggestive of atelectasis and/or small pleural  effusions. Feeding tube tip appears in the duodenal/jejunal junction.      Impression    IMPRESSION: Bibasilar trace pleural effusions or atelectasis.  Atherosclerosis. Prior bilateral lung transplant.    FELIPA MARIE MD         SYSTEM ID:  [id number]   XR Chest 2 Views    Narrative     Exam: XR CHEST 2 VW, 2/28/2022 10:34 AM    Comparison: 2/27/2022    History: interval follow up, post-op lung transplant    Findings:  PA and lateral views the chest. Postoperative chest status post  bilateral lung transplant with intact clam shell sternotomy wires. 2  left chest tubes. Right basilar chest tube. Enteric tube traverses  below the field-of-view.    Trachea is midline. Mediastinum is within normal limits.  Cardiopulmonary silhouette is within normal limits. Aortic arch  calcifications. Perihilar and bibasilar opacities. No pneumothorax.  Probable small bilateral pleural effusions.. The upper abdomen is  unremarkable.      Impression    Impression: Bibasilar and perihilar atelectasis/edema with probable  small bilateral pleural effusions. Status post bilateral lung  transplant.    I have personally reviewed the examination and initial interpretation  and I agree with the findings.    FELIPA MARIE MD         SYSTEM ID:  [id number]

## 2022-03-01 NOTE — PROGRESS NOTES
Cardiovascular Surgery Progress Note  03/01/2022         Assessment and Plan:     Melissa Elder is a 66 year old female with PMHx HTN, HLD, paroxysmal Afib, osteoporosis, GERD, severe COPD, previously requiring 2-3L NC O2 at rest. She underwent b/l lung transplant with Dr. Robin on 02/21. Got 1u pRBC post-op, no overt bleeding source, monitoring. Extubated 02/22 to O2 NC.     CVTS is following in consideration of the clamshell incision as well as chest tube management.    Leukocytosis  - WBC up to 21 this am.   - Consider pan-culture     Clamshell incision  - Continue to adhere to sternal precautions  - Postoperative incision management, continue open to air but needs to be kept very dry under her breasts. Would closed with skin glue.  - Clamshell incision appears clean, dry. Incision appears without erythema, or drainage. Wound edges are well approximated.  - Encourage activity/OOB, IS/pulmonary toilet.  Maintain strict sleep hygiene and delirium precautions.     Chest tube management  - Continue to suction while in room, can ambulate off suction.  - Pericardial output: Removed 2/24/22 without immediate complication  - L chest tube slowly slipped out, removed 2/25 due to new pneumothorax.   - IR placed 2 new Left pleural tubes 2/26. Consult IR for tube dysfunction and once removal is desired.   * * Please do not remove these tubes without discussing with IR * *  - Left Pleural output: 175 mL no air leak, serosanguinous output.  Consider asking IR for removal of anterior pleural tube.  - Right Pleural output: 280 mL no air leak, serosanguinous output. Await <200 mL daily for 3 consecutive days before removing.     Discussed with Dr Robin through both written and verbal communication.      Luis A Nava PA-C  Cardiothoracic Surgery  Pager [phone]    8:14 AM   March 1, 2022          Interval History:     No overnight events.  Bronch this AM and required some narcan x 3 this AM.   States pain is well  "managed on current regimen. Slept well overnight.         Physical Exam:   Blood pressure (!) 151/67, pulse 103, temperature 98.3  F (36.8  C), temperature source Oral, resp. rate 28, height 1.575 m (5' 2\"), weight 74.8 kg (164 lb 14.5 oz), SpO2 95 %, not currently breastfeeding.  Vitals:    02/27/22 0030 02/28/22 0722 03/01/22 0334   Weight: 73.1 kg (161 lb 2.5 oz) 73.6 kg (162 lb 4.1 oz) 74.8 kg (164 lb 14.5 oz)      Gen: A&Ox4, NAD  Neuro: no focal deficits, still sleepy from sedation   CV: RRR, normal S1 S2, no murmurs, rubs or gallops.   Pulm: CTA, no gross wheezing or rhonchi  Abd: nondistended, normal BS, soft, nontender  Incision: clean, dry, intact, no erythema, sternum stable  Tubes/drain sites: dressing clean and dry, serosanguinous output, no air leak. 24 hr output 455 mL total from all tubes.         Data:    Imaging:  reviewed recent imaging, no acute concerns    Labs:  BMP  Recent Labs   Lab 03/01/22  0802 03/01/22  0318 03/01/22  0203 02/28/22  2142 02/28/22  0727 02/28/22  0623 02/27/22  0813 02/27/22  0732 02/26/22  0752 02/26/22  0530   NA  --  137  --   --   --  138  --  139  --  141   POTASSIUM  --  4.4  --   --   --  4.2  --  4.2  --  4.2   CHLORIDE  --  97  --   --   --  99  --  101  --  102   MINISTERIO  --  8.2*  --   --   --  8.0*  --  7.9*  --  7.4*   CO2  --  40*  --   --   --  34*  --  36*  --  37*   BUN  --  22  --   --   --  18  --  20  --  20   CR  --  0.37*  --   --   --  0.36*  --  0.41*  --  0.41*   * 259* 242* 241*   < > 225*   < > 184*   < > 173*    < > = values in this interval not displayed.     CBC  Recent Labs   Lab 03/01/22  0318 02/28/22  0623 02/27/22  0732 02/26/22  0530   WBC 21.2* 17.8* 14.6* 9.6   RBC 3.03* 2.98* 3.02* 2.89*   HGB 8.9* 8.8* 8.9* 8.4*   HCT 27.3* 27.0* 28.2* 26.7*   MCV 90 91 93 92   MCH 29.4 29.5 29.5 29.1   MCHC 32.6 32.6 31.6 31.5   RDW 13.6 13.2 13.2 13.3    247 191 165     INR  No lab results found in last 7 days.   Hepatic Panel  Recent " Labs   Lab 03/01/22  0318 02/28/22  0623 02/27/22  0732 02/26/22  0530   AST 56* 36 41 33   * 87* 79* 53*   ALKPHOS 164* 117 106 89   BILITOTAL 0.2 0.4 0.2 0.3   ALBUMIN 2.0* 1.9* 1.9* 1.9*     GLUCOSE:   Recent Labs   Lab 03/01/22  0802 03/01/22  0318 03/01/22  0203 02/28/22  2142 02/28/22  1601 02/28/22  0727   * 259* 242* 241* 276* 197*

## 2022-03-01 NOTE — PROGRESS NOTES
[facility]    Medicine Progress Note - Hospitalist Service, GOLD TEAM 10    Date of Admission:  2/20/2022    Assessment & Plan            Melissa Elder is a 66 year old female with PMHx HTN, HLD, paroxysmal Afib, osteoporosis, GERD, severe COPD, previously requiring 2-3L NC O2 at rest.  Underwent b/l lung transplant with Dr. Robin on 02/21. Got 1u pRBC post-op, no overt bleeding source, monitoring. Extubated 02//22 to O2 NC and transferred to the floor. Pericardial drain pulled 2/24/22 without issue. Continuing on antibiotics for cultures growing from donor and recipient lungs. Has bilateral pleural effusions and anterior hydropneumothorax requiring 3 chest tubes currently.      Changes today:  - VBG checked for rising bicarb, somnolence after procedure (required narcan). CO2 91. Start HFNC and repeat gas in 2 hours; if still elevated will start Bipap  - Pan-culture for rising WBC (can be expected during this time post-op, but to be cautious).   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  Pressor weaned off 2/22 and pt. extubated. Prior history of infection/colonization with Haemophilus influenzae (2017).  IgG adequate (2/20).  MRSA nares negative 2/21.  Broad spectrum ABX empirically post-op with vanc and ceftaz (2/21-2/22), stopped after 24h per Dr. Lala to target known donor cultures on POD #1. Donor cultures ([facility]) with Strep mitis, Actinomyces odontolyticus, and Kenyatta kruseii. Recipient cultures with Actinomyces odonotolyticus.  So currently on ceftriaxone for coverage.  She has now been weaned down to room air intermittently needing 1 L O2.  - Nebs: levalbuterol and Mucomyst QID  - Aggressive pulmonary toilet with chest physiotherapy QID as well as Aerobika and incentive spirometry q1h w/a  - Wean supplemental O2 to keep >92%  - Chest tubes managed by surgical team   - Right pleural chest tube by CT surgery   - Two left pleural tubes placed 2/26 by IR  - Daily CXR  - Tube feeding per nutrition   - Await explant pathology  - Continue ceftriaxone for 2 weeks through 3/8 followed by amoxicillin for 3 months  - Immune suppression per daily transplant pulm note  - increased valtrex to 1000 mg TID x 14 days (through 3/11) then complete ppx per protocol (see pulm note)  - Inspection bronch today     Hypercarbic respiratory failure 3/1  Likely worsened in the setting of oversedation for bronchoscopy (required multiple doses of narcan), however it has been noted that she has a metabolic alkalosis for the last few days with rising bicarb (today 40). Could have a more chronic CO2 retention underlying.   - Repeat VBG at 1500; if CO2 still elevated, start BiPAP  - Consider starting diuresis tomorrow     ID Prophylaxis  - Dapsone for PJP ppx, CBC remains stable.   - Valtrex TID x 14 days for HSV + from bronchoscopy. Then resume ppx acyclovir per protocol.   - Nystatin for oral candidiasis ppx, 6 month course    Non infectious diarrhea   Noted to have increased loose stools 2/25 likely due to mmf and TF. No rise in white count or abdominal pain to suggest C. Diff.   - discussed with dietician and will add fiber     Post op pain   - Erector spinae catheters removed   - gabapentin 100 mg nightly  - tylenol 975 mg Q8H   - Dilaudid 0.2-0.4 mg Q2H PRN   - oxycodone 5-10 mg Q4H PRN   - robaxin 500 mg Q6H PRN     Paroxysmal Afib   She was on diltiazem prior to lung transplantation and had not been on anticoagulation. She was  transitioned to metoprolol in the ICU.  - Continue Metoprolol tartrate to 25  mg BID    - will increase if HR consitently > 100  - Restart PTA diltiazem at lower dose (PTA on 240 mg XL)  - No anticoagulation at this time     Stress induced hyperglycemia  Did not need insulin prior to admission but sugars have been rising despite sliding scale coverage.  - Endocrinology consulted for assistance, appreciate recommendations   - Please see DM team daily note     HTN   Hx of htn but was required pressors in ICU.  - Will continue to monitor     HLD  Mild CAD   Noted on transplant workup.   - started rosuvastatin 40 mg daily--Held 2/28 due to rising LFTs      Acute blood loss anemia  Acute blood loss thrombocytopenia  Secondary to surgery. Will continue to monitor   - Transfuse Hgb < 7, platelets < 50     Sternotomy  Surgical Incision  - Sternal precautions  - Postoperative incision management per protocol  - PT/OT/CR    Elevated ALT   Noted on labs 2/26 and rising. AST, ALP, Bili WNL. No RUQ or abdominal pain. Will continue to monitor.  - CMP daily   -Consider viral studies and RUQ US tomorrow if LFTs continue to rise  - Hold statin        Diet: Snacks/Supplements Adult: Glucerna; Between Meals  NPO per Anesthesia Guidelines for Procedure/Surgery Except for: No Exceptions  Adult Formula Drip Feeding: Continuous Osmolite 1.5; Nasoduodenal tube; Goal Rate: 50; mL/hr; Medication - Feeding Tube Flush Frequency: At least 15-30 mL water before and after medication administration and with tube clogging; Amount to Send (Nut...    DVT Prophylaxis: Pneumatic Compression Devices  Ratliff Catheter: Not present  Central Lines: None  Cardiac Monitoring: None  Code Status: Full Code      Disposition Plan   Expected Discharge: 03/04/2022   Anticipated discharge location: home;inpatient rehabilitation facility    Delays:            The patient's care was discussed with the Bedside Nurse, Patient and transplant pulm  Consultant.    Sharri Townsend MD  Hospitalist Service, GOLD TEAM 10  [facility]  [facility]  Securely message with the [employer] Web Console (learn more here)  Text page via [facility] Paging/Directory   Please see signed in provider for up to date coverage information      Clinically Significant Risk Factors Present on Admission                    ______________________________________________________________________    Interval History     No acute events overnight. Feeling well this morning, but tired. Not sleeping well at night. Has some pain at the site of the chest tubes but managed okay with  Pain medications. Denies changes in breathing, no worse or better, just the same. No fevers or chills. No abdominal pain. Sometimes has indigestion, was relieved with an IV medication earlier.     Four point ROS completed and negative unless listed above     Data reviewed today: I reviewed all medications, new labs and imaging results over the last 24 hours.     Physical Exam   Vital Signs: Temp: 98.5  F (36.9  C) Temp src: Oral BP: (!) 162/81 Pulse: 106   Resp: 20 SpO2: 91 % O2 Device: None (Room air) Oxygen Delivery: 3 LPM  Weight: 164 lbs 14.47 oz    Constitutional: Laying in bed sleeping, wakes to voice  HEENT: MMM. Sclera clear.   PULM: Diminished bases bilaterally.  No crackles, no rhonchi, no wheezes.  Chest tubes without air leak or tidaling.  CV: Normal S1 and S2.  Tachycardia.  No murmur, gallop, or rub.  1-2+ bilateral LE edema   ABD: BS hypoactive, soft, nontender, nondistended.    MSK: Moves all extremities.  No apparent muscle wasting.   NEURO: Alert and oriented, conversant.   SKIN: Warm, dry.  No rash on limited exam.   PSYCH: Mood stable.     Data   Recent Labs   Lab 03/01/22  0318 03/01/22  0203 02/28/22  2142 02/28/22  0727 02/28/22  0623 02/27/22  0813 02/27/22  0732   WBC 21.2*  --   --   --  17.8*  --  14.6*   HGB 8.9*  --   --   --  8.8*  --  8.9*   MCV 90  --   --   --  91  --  93     --   --   --  247  --  191     --   --   --  138  --  139   POTASSIUM 4.4   --   --   --  4.2  --  4.2   CHLORIDE 97  --   --   --  99  --  101   CO2 40*  --   --   --  34*  --  36*   BUN 22  --   --   --  18  --  20   CR 0.37*  --   --   --  0.36*  --  0.41*   ANIONGAP <1*  --   --   --  5  --  2*   MINISTERIO 8.2*  --   --   --  8.0*  --  7.9*   * 242* 241*   < > 225*   < > 184*   ALBUMIN 2.0*  --   --   --  1.9*  --  1.9*   PROTTOTAL 5.7*  --   --   --  5.2*  --  5.2*   BILITOTAL 0.2  --   --   --  0.4  --  0.2   ALKPHOS 164*  --   --   --  117  --  106   *  --   --   --  87*  --  79*   AST 56*  --   --   --  36  --  41    < > = values in this interval not displayed.     Recent Results (from the past 24 hour(s))   XR Chest 2 Views    Narrative    Exam: XR CHEST 2 VW, 2/28/2022 10:34 AM    Comparison: 2/27/2022    History: interval follow up, post-op lung transplant    Findings:  PA and lateral views the chest. Postoperative chest status post  bilateral lung transplant with intact clam shell sternotomy wires. 2  left chest tubes. Right basilar chest tube. Enteric tube traverses  below the field-of-view.    Trachea is midline. Mediastinum is within normal limits.  Cardiopulmonary silhouette is within normal limits. Aortic arch  calcifications. Perihilar and bibasilar opacities. No pneumothorax.  Probable small bilateral pleural effusions.. The upper abdomen is  unremarkable.      Impression    Impression: Bibasilar and perihilar atelectasis/edema with probable  small bilateral pleural effusions. Status post bilateral lung  transplant.    I have personally reviewed the examination and initial interpretation  and I agree with the findings.    FELIPA MARIE MD         SYSTEM ID:  [id number]     Medications     dextrose       BETA BLOCKER NOT PRESCRIBED       disposable pump w/ anesthetic 10 mL/hr at 02/26/22 0000     sodium chloride 10 mL/hr at 02/23/22 1947       acetylcysteine  2 mL Nebulization 4x Daily     calcium carbonate 600 mg-vitamin D 400 units  1 tablet Oral BID w/meals      cefTRIAXone  2 g Intravenous Q24H     dapsone  50 mg Oral Once per day on Mon Wed Fri     [Held by provider] dapsone  50 mg Oral or NG Tube Once per day on Mon Wed Fri     famotidine  10 mg Oral or Feeding Tube BID     fiber modular (NUTRISOURCE FIBER)  1 packet Per Feeding Tube TID     gabapentin  100 mg Oral or Feeding Tube At Bedtime     heparin ANTICOAGULANT  5,000 Units Subcutaneous Q8H     insulin aspart  1-7 Units Subcutaneous TID AC     insulin aspart  1-5 Units Subcutaneous BID     insulin NPH  8 Units Subcutaneous QAM     insulin NPH  8 Units Subcutaneous Daily with supper     levalbuterol  1.25 mg Nebulization 4x Daily     loratadine  10 mg Oral Daily     metoprolol tartrate  25 mg Oral or Feeding Tube BID     multivitamins w/minerals  15 mL Oral Daily     mycophenolate  1,000 mg Oral BID    Or     mycophenolate  1,000 mg Oral or NG Tube BID     nystatin  1,000,000 Units Swish & Swallow 4x Daily     pantoprazole  40 mg Oral or Feeding Tube Daily     polyethylene glycol  17 g Oral or Feeding Tube Daily     predniSONE  15 mg Oral or Feeding Tube BID     protein modular  1 packet Per Feeding Tube TID     [Held by provider] rosuvastatin  40 mg Oral Daily     senna-docusate  1 tablet Oral or Feeding Tube BID     sodium chloride (PF)  3 mL Intracatheter Q8H     tacrolimus  6 mg Oral BID IS    Or     tacrolimus  6 mg Per Feeding Tube BID IS     valACYclovir  1,000 mg Oral Q8H

## 2022-03-01 NOTE — PROGRESS NOTES
Diabetes Consult Daily  Progress Note          Assessment/Plan:   Melissa Elder is a 66 year old female with PMHx HTN, HLD, paroxysmal Afib, osteoporosis, GERD, severe COPD, previously requiring 2-3L NC O2 at rest.  Underwent b/l lung transplant with Dr. Robin on 02/21  has ongoing issues with Hydropneumothorax, chest tubes in place. 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  Increase dose to 12 units timed at noon and 2200 today      initiate Novolog CHO coverage-- 1 unit per 15 grams    Novolog moderate intensity sliding scale >140 AC/HS/0200, on continuous TF--> to custom 1 per 35 mg/dL Q4h until BG intoi target    BG monitoring TID AC, HS, 0200    Hypoglycemia protocol    PRN D10W for hypoglyc prevention if TF stops-- rate is  70 to replace about 70% of TF carbs-- will be needed when NPO for bronch---> given persistently high glucose, rate adjusted to 50 ml/h--- Note this was not started until 0900, while TF stopped at 0400.   Now on higher dose insulin, will return PRN rate to 70.      Discussed w/ RN, primary, pulm, CVTS             Interval History:     Glycemic control over the last 24 hours was reviewed    On continuous tube feeds. Osmolite at 50-- provides about 10 grams carb/h  Pred 17.5 BID, tapers today> >15mg  - Inspection bronch 3/1 10:00 AM    The last 24 hours progress and nursing notes reviewed.  TF stopped 0400, but D10W not started.  BG dropped 135 mg/dL.  D10W started at 0900.  TF back on around 1130.  Oversedated during bronch, not reporting much.  RN observation of pt's intake is that she will sip on supplement over extended time.  Not a lot of  volume of oral intake.  That level of PO intake doesn't explain the increasing insulin need.  WBC rise notable.  May be just the typical for POD#10,  per pulm.        Expected Discharge: 03/04/2022   Anticipated discharge location: home;inpatient rehabilitation facility        Recent Labs   Lab 03/01/22  0802 03/01/22  0318 03/01/22  0203 02/28/22  2142 02/28/22  1601 02/28/22  0727   * 259* 242* 241* 276* 197*               Review of Systems:   See interval hx          Medications:     Orders Placed This Encounter      NPO per Anesthesia Guidelines for Procedure/Surgery Except for: No Exceptions    Physical Exam:  Gen: up in bed NAD,   Resp: unlabored  Mental status: alert to drowsy         Data:     Lab Results   Component Value Date    A1C 5.7 02/22/2022    A1C 5.8 02/20/2022            Recent Labs   Lab Test 03/01/22  0802 03/01/22 0318 02/28/22  0727 02/28/22  0623   NA  --  137  --  138   POTASSIUM  --  4.4  --  4.2   CHLORIDE  --  97  --  99   CO2  --  40*  --  34*   ANIONGAP  --  <1*  --  5   * 259*   < > 225*   BUN  --  22  --  18   CR  --  0.37*  --  0.36*   MINISTERIO  --  8.2*  --  8.0*    < > = values in this interval not displayed.     CBC RESULTS: Recent Labs   Lab Test 03/01/22 0318   WBC 21.2*   RBC 3.03*   HGB 8.9*   HCT 27.3*   MCV 90   MCH 29.4   MCHC 32.6   RDW 13.6          I spent a total of 35 minutes bedside and on the inpatient unit managing the glycemic care of Melissa Elder. Over 50% of my time on the unit was spent counseling the patient  and/or coordinating care regarding acute TF/prednisone hyperglycemia plan.  See note for details.    Zita Dominguez APRN -[id number]  To contact Endocrine Diabetes service:   From 8AM-4PM: page inpatient diabetes provider that is following the patient that day (see filed or incomplete progress notes.consult notes).  If uncertain of provider assignment: page job code [phone].  For questions or updates from 4PM-8AM: page the diabetes job  code for on call fellow: [phone]    Please notify inpatient diabetes service if changes are planned to steroids, enteral feeding, parenteral feeding, or if procedures are planned requiring prolonged NPO status.

## 2022-03-01 NOTE — PLAN OF CARE
"BP (!) 158/80 (BP Location: Left arm)   Pulse 108   Temp 98.1  F (36.7  C) (Oral)   Resp 22   Ht 1.575 m (5' 2\")   Wt 73.6 kg (162 lb 4.1 oz)   SpO2 94%   BMI 29.68 kg/m      Neuro: A&Ox4  Cardiac: ST  Respiratory:  Sating 95% on RA  GI/: voiding spontaneously, BM x1  Diet/appetite:  Regular diet orders, poor appetite   Activity:  Assist of 1  Pain: Denies  Skin: No new deficits noted  LDA's: PIV, NG- TF, CT x3 -20 suc    Plan:  Nursing will continue to follow the POC and update the MD team with concerns.    Nicole Sethi RN  6B Intermediate Care      "

## 2022-03-01 NOTE — PLAN OF CARE
Neuro: A&Ox4. Calm and able to follow commands. Able to make needs known.   Cardiac: SR- ST HR . Hypertensive. -164. PRN labetalol given 1x with no improvement.   Respiratory: Sating >92% on RA. Tachypneic. RR 20s.   GI/: Adequate urine output. BM X1, watery.   Diet/appetite: Tolerating regular diet. NPO at 0400 for procedure. Fair appetite. Able to swallow pills better when taken with applesauce. TF running at 50ml/hr with 30ml FWF q4hr. TF also stopped for procedure  Activity:  Assist of 1 with walker, up to bedside commode.   Pain: Denied pain during shift.   Skin: No new deficits noted. Clamshell incision ANGELA. CT x3   LDA's: Chest tubes x3 to -20 suction, see flowsheets for output. PIV x1, NJ with TF.     Inspection Bronch scheduled for 10am today.     Plan: Continue with POC. Notify primary team with changes.

## 2022-03-01 NOTE — PLAN OF CARE
Pt returned from [facility] oversedated. Pt able to answer all orientation questions, but would then talk about something very unrelated. Pt stated she her brain was foggy. Pt on 5L oxyplus mask, then placed on HFNC after CO2 91. Plan for 2 hours on HFNC, then recheck VBG.   Dressings to CT site and old CT sites changed. All 3 CT remain to -20 suctions. Clamshell incision C/D/I and open to air. UA to be collected with next void.   Report given at 1430 to oncoming nurse.  called and updated with plan of care and daughter at bedside.

## 2022-03-01 NOTE — OR NURSING
"Bronch inspection under conscious sedation.  Pt was oversedated, narcan 0.2mg x 2, 0.4mg x 1.  Pt responded well to 3rd dose of narcan.  Pt CT\"s to suction during procedure and for transporting to room.  Resource RN given report, she's taking pt to room.  Report called to Samina at 1146hrs.    "

## 2022-03-02 ENCOUNTER — APPOINTMENT (OUTPATIENT)
Dept: PHYSICAL THERAPY | Facility: CLINIC | Age: 67
DRG: 007 | End: 2022-03-02
Attending: SURGERY
Payer: MEDICARE

## 2022-03-02 ENCOUNTER — APPOINTMENT (OUTPATIENT)
Dept: CT IMAGING | Facility: CLINIC | Age: 67
DRG: 007 | End: 2022-03-02
Attending: STUDENT IN AN ORGANIZED HEALTH CARE EDUCATION/TRAINING PROGRAM
Payer: MEDICARE

## 2022-03-02 ENCOUNTER — APPOINTMENT (OUTPATIENT)
Dept: GENERAL RADIOLOGY | Facility: CLINIC | Age: 67
DRG: 007 | End: 2022-03-02
Attending: HOSPITALIST
Payer: MEDICARE

## 2022-03-02 LAB
ALBUMIN SERPL-MCNC: 1.9 G/DL (ref 3.4–5)
ALP SERPL-CCNC: 153 U/L (ref 40–150)
ALT SERPL W P-5'-P-CCNC: 130 U/L (ref 0–50)
AMMONIA PLAS-SCNC: 49 UMOL/L (ref 10–50)
AMMONIA PLAS-SCNC: 57 UMOL/L (ref 10–50)
ANION GAP SERPL CALCULATED.3IONS-SCNC: 3 MMOL/L (ref 3–14)
APPEARANCE FLD: ABNORMAL
AST SERPL W P-5'-P-CCNC: 41 U/L (ref 0–45)
BASE EXCESS BLDA CALC-SCNC: 13.5 MMOL/L (ref -9–1.8)
BASE EXCESS BLDV CALC-SCNC: 15 MMOL/L (ref -7.7–1.9)
BASE EXCESS BLDV CALC-SCNC: 15.9 MMOL/L (ref -7.7–1.9)
BASE EXCESS BLDV CALC-SCNC: 17 MMOL/L (ref -7.7–1.9)
BASOPHILS # BLD AUTO: 0 10E3/UL (ref 0–0.2)
BASOPHILS NFR BLD AUTO: 0 %
BILIRUB SERPL-MCNC: 0.2 MG/DL (ref 0.2–1.3)
BUN SERPL-MCNC: 27 MG/DL (ref 7–30)
CALCIUM SERPL-MCNC: 8.2 MG/DL (ref 8.5–10.1)
CHLORIDE BLD-SCNC: 98 MMOL/L (ref 94–109)
CO2 SERPL-SCNC: 39 MMOL/L (ref 20–32)
COLOR FLD: YELLOW
CREAT SERPL-MCNC: 0.56 MG/DL (ref 0.52–1.04)
EOSINOPHIL # BLD AUTO: 0 10E3/UL (ref 0–0.7)
EOSINOPHIL NFR BLD AUTO: 0 %
ERYTHROCYTE [DISTWIDTH] IN BLOOD BY AUTOMATED COUNT: 13.8 % (ref 10–15)
GFR SERPL CREATININE-BSD FRML MDRD: >90 ML/MIN/1.73M2
GLUCOSE BLD-MCNC: 194 MG/DL (ref 70–99)
GLUCOSE BLDC GLUCOMTR-MCNC: 150 MG/DL (ref 70–99)
GLUCOSE BLDC GLUCOMTR-MCNC: 161 MG/DL (ref 70–99)
GLUCOSE BLDC GLUCOMTR-MCNC: 179 MG/DL (ref 70–99)
GLUCOSE BLDC GLUCOMTR-MCNC: 180 MG/DL (ref 70–99)
GLUCOSE BLDC GLUCOMTR-MCNC: 194 MG/DL (ref 70–99)
GLUCOSE BLDC GLUCOMTR-MCNC: 195 MG/DL (ref 70–99)
GLUCOSE BLDC GLUCOMTR-MCNC: 211 MG/DL (ref 70–99)
HCO3 BLD-SCNC: 43 MMOL/L (ref 21–28)
HCO3 BLDV-SCNC: 43 MMOL/L (ref 21–28)
HCO3 BLDV-SCNC: 44 MMOL/L (ref 21–28)
HCO3 BLDV-SCNC: 45 MMOL/L (ref 21–28)
HCT VFR BLD AUTO: 25.9 % (ref 35–47)
HGB BLD-MCNC: 7.9 G/DL (ref 11.7–15.7)
IMM GRANULOCYTES # BLD: 0.6 10E3/UL
IMM GRANULOCYTES NFR BLD: 2 %
LACTATE SERPL-SCNC: 1.1 MMOL/L (ref 0.7–2)
LDH FLD L TO P-CCNC: 266 U/L
LDH SERPL L TO P-CCNC: 344 U/L (ref 81–234)
LYMPHOCYTES # BLD AUTO: 1.3 10E3/UL (ref 0.8–5.3)
LYMPHOCYTES NFR BLD AUTO: 6 %
LYMPHOCYTES NFR FLD MANUAL: 4 %
MAGNESIUM SERPL-MCNC: 2.2 MG/DL (ref 1.6–2.3)
MCH RBC QN AUTO: 28.9 PG (ref 26.5–33)
MCHC RBC AUTO-ENTMCNC: 30.5 G/DL (ref 31.5–36.5)
MCV RBC AUTO: 95 FL (ref 78–100)
MONOCYTES # BLD AUTO: 1.1 10E3/UL (ref 0–1.3)
MONOCYTES NFR BLD AUTO: 5 %
MONOS+MACROS NFR FLD MANUAL: 29 %
NEUTROPHILS # BLD AUTO: 20.2 10E3/UL (ref 1.6–8.3)
NEUTROPHILS NFR BLD AUTO: 87 %
NEUTS BAND NFR FLD MANUAL: 67 %
NRBC # BLD AUTO: 0 10E3/UL
NRBC BLD AUTO-RTO: 0 /100
O2/TOTAL GAS SETTING VFR VENT: 40 %
O2/TOTAL GAS SETTING VFR VENT: 45 %
PCO2 BLD: 98 MM HG (ref 35–45)
PCO2 BLDV: 81 MM HG (ref 40–50)
PCO2 BLDV: 81 MM HG (ref 40–50)
PCO2 BLDV: 97 MM HG (ref 40–50)
PH BLD: 7.26 [PH] (ref 7.35–7.45)
PH BLDV: 7.27 [PH] (ref 7.32–7.43)
PH BLDV: 7.34 [PH] (ref 7.32–7.43)
PH BLDV: 7.35 [PH] (ref 7.32–7.43)
PHOSPHATE SERPL-MCNC: 3.7 MG/DL (ref 2.5–4.5)
PLATELET # BLD AUTO: 347 10E3/UL (ref 150–450)
PO2 BLD: 96 MM HG (ref 80–105)
PO2 BLDV: 22 MM HG (ref 25–47)
PO2 BLDV: 27 MM HG (ref 25–47)
PO2 BLDV: 34 MM HG (ref 25–47)
POTASSIUM BLD-SCNC: 4.8 MMOL/L (ref 3.4–5.3)
PROT SERPL-MCNC: 5.3 G/DL (ref 6.8–8.8)
RBC # BLD AUTO: 2.73 10E6/UL (ref 3.8–5.2)
SODIUM SERPL-SCNC: 140 MMOL/L (ref 133–144)
TACROLIMUS BLD-MCNC: 11.5 UG/L (ref 5–15)
TME LAST DOSE: NORMAL H
TME LAST DOSE: NORMAL H
WBC # BLD AUTO: 23.1 10E3/UL (ref 4–11)
WBC # FLD AUTO: 282 /UL

## 2022-03-02 PROCEDURE — 82803 BLOOD GASES ANY COMBINATION: CPT | Performed by: NURSE PRACTITIONER

## 2022-03-02 PROCEDURE — 97110 THERAPEUTIC EXERCISES: CPT | Mod: GP | Performed by: PHYSICAL THERAPIST

## 2022-03-02 PROCEDURE — 74177 CT ABD & PELVIS W/CONTRAST: CPT | Mod: 26 | Performed by: RADIOLOGY

## 2022-03-02 PROCEDURE — 83605 ASSAY OF LACTIC ACID: CPT | Performed by: INTERNAL MEDICINE

## 2022-03-02 PROCEDURE — 87102 FUNGUS ISOLATION CULTURE: CPT | Performed by: NURSE PRACTITIONER

## 2022-03-02 PROCEDURE — 250N000013 HC RX MED GY IP 250 OP 250 PS 637: Performed by: STUDENT IN AN ORGANIZED HEALTH CARE EDUCATION/TRAINING PROGRAM

## 2022-03-02 PROCEDURE — 250N000011 HC RX IP 250 OP 636: Performed by: STUDENT IN AN ORGANIZED HEALTH CARE EDUCATION/TRAINING PROGRAM

## 2022-03-02 PROCEDURE — 82803 BLOOD GASES ANY COMBINATION: CPT | Performed by: HOSPITALIST

## 2022-03-02 PROCEDURE — 85025 COMPLETE CBC W/AUTO DIFF WBC: CPT | Performed by: NURSE PRACTITIONER

## 2022-03-02 PROCEDURE — 99233 SBSQ HOSP IP/OBS HIGH 50: CPT | Mod: FS | Performed by: INTERNAL MEDICINE

## 2022-03-02 PROCEDURE — 250N000009 HC RX 250: Performed by: STUDENT IN AN ORGANIZED HEALTH CARE EDUCATION/TRAINING PROGRAM

## 2022-03-02 PROCEDURE — 999N000043 HC STATISTIC CTO2 CONT OXYGEN TECH TIME EA 90 MIN

## 2022-03-02 PROCEDURE — 99223 1ST HOSP IP/OBS HIGH 75: CPT | Mod: 24 | Performed by: INTERNAL MEDICINE

## 2022-03-02 PROCEDURE — 94660 CPAP INITIATION&MGMT: CPT

## 2022-03-02 PROCEDURE — 83735 ASSAY OF MAGNESIUM: CPT | Performed by: STUDENT IN AN ORGANIZED HEALTH CARE EDUCATION/TRAINING PROGRAM

## 2022-03-02 PROCEDURE — 250N000013 HC RX MED GY IP 250 OP 250 PS 637: Performed by: HOSPITALIST

## 2022-03-02 PROCEDURE — 250N000011 HC RX IP 250 OP 636: Performed by: PHYSICIAN ASSISTANT

## 2022-03-02 PROCEDURE — 71275 CT ANGIOGRAPHY CHEST: CPT

## 2022-03-02 PROCEDURE — 120N000005 HC R&B MS OVERFLOW UMMC

## 2022-03-02 PROCEDURE — 71045 X-RAY EXAM CHEST 1 VIEW: CPT | Mod: 26 | Performed by: STUDENT IN AN ORGANIZED HEALTH CARE EDUCATION/TRAINING PROGRAM

## 2022-03-02 PROCEDURE — 99207 PR NON-BILLABLE SERV PER CHARTING: CPT | Performed by: PHYSICIAN ASSISTANT

## 2022-03-02 PROCEDURE — 250N000009 HC RX 250: Performed by: SURGERY

## 2022-03-02 PROCEDURE — 74177 CT ABD & PELVIS W/CONTRAST: CPT

## 2022-03-02 PROCEDURE — 94640 AIRWAY INHALATION TREATMENT: CPT | Mod: 76

## 2022-03-02 PROCEDURE — 99233 SBSQ HOSP IP/OBS HIGH 50: CPT | Mod: 24 | Performed by: CLINICAL NURSE SPECIALIST

## 2022-03-02 PROCEDURE — 999N000157 HC STATISTIC RCP TIME EA 10 MIN

## 2022-03-02 PROCEDURE — 87109 MYCOPLASMA: CPT | Performed by: PHYSICIAN ASSISTANT

## 2022-03-02 PROCEDURE — 999N000127 HC STATISTIC PERIPHERAL IV START W US GUIDANCE

## 2022-03-02 PROCEDURE — 87205 SMEAR GRAM STAIN: CPT | Performed by: NURSE PRACTITIONER

## 2022-03-02 PROCEDURE — 94640 AIRWAY INHALATION TREATMENT: CPT

## 2022-03-02 PROCEDURE — 250N000012 HC RX MED GY IP 250 OP 636 PS 637: Performed by: INTERNAL MEDICINE

## 2022-03-02 PROCEDURE — 87798 DETECT AGENT NOS DNA AMP: CPT | Performed by: PHYSICIAN ASSISTANT

## 2022-03-02 PROCEDURE — 999N000215 HC STATISTIC HFNC ADULT NON-CPAP

## 2022-03-02 PROCEDURE — 80197 ASSAY OF TACROLIMUS: CPT | Performed by: NURSE PRACTITIONER

## 2022-03-02 PROCEDURE — 82040 ASSAY OF SERUM ALBUMIN: CPT | Performed by: NURSE PRACTITIONER

## 2022-03-02 PROCEDURE — 99233 SBSQ HOSP IP/OBS HIGH 50: CPT | Performed by: STUDENT IN AN ORGANIZED HEALTH CARE EDUCATION/TRAINING PROGRAM

## 2022-03-02 PROCEDURE — 83615 LACTATE (LD) (LDH) ENZYME: CPT | Performed by: PHYSICIAN ASSISTANT

## 2022-03-02 PROCEDURE — 82803 BLOOD GASES ANY COMBINATION: CPT | Performed by: STUDENT IN AN ORGANIZED HEALTH CARE EDUCATION/TRAINING PROGRAM

## 2022-03-02 PROCEDURE — 250N000013 HC RX MED GY IP 250 OP 250 PS 637: Performed by: SURGERY

## 2022-03-02 PROCEDURE — 36415 COLL VENOUS BLD VENIPUNCTURE: CPT | Performed by: PHYSICIAN ASSISTANT

## 2022-03-02 PROCEDURE — 89051 BODY FLUID CELL COUNT: CPT | Performed by: PHYSICIAN ASSISTANT

## 2022-03-02 PROCEDURE — 250N000012 HC RX MED GY IP 250 OP 636 PS 637: Performed by: PHYSICIAN ASSISTANT

## 2022-03-02 PROCEDURE — 36415 COLL VENOUS BLD VENIPUNCTURE: CPT | Performed by: NURSE PRACTITIONER

## 2022-03-02 PROCEDURE — 250N000012 HC RX MED GY IP 250 OP 636 PS 637: Performed by: STUDENT IN AN ORGANIZED HEALTH CARE EDUCATION/TRAINING PROGRAM

## 2022-03-02 PROCEDURE — 71275 CT ANGIOGRAPHY CHEST: CPT | Mod: 26 | Performed by: RADIOLOGY

## 2022-03-02 PROCEDURE — 82140 ASSAY OF AMMONIA: CPT | Performed by: PHYSICIAN ASSISTANT

## 2022-03-02 PROCEDURE — 84100 ASSAY OF PHOSPHORUS: CPT | Performed by: STUDENT IN AN ORGANIZED HEALTH CARE EDUCATION/TRAINING PROGRAM

## 2022-03-02 PROCEDURE — 250N000013 HC RX MED GY IP 250 OP 250 PS 637: Performed by: NURSE PRACTITIONER

## 2022-03-02 PROCEDURE — 80053 COMPREHEN METABOLIC PANEL: CPT | Performed by: NURSE PRACTITIONER

## 2022-03-02 PROCEDURE — 87070 CULTURE OTHR SPECIMN AEROBIC: CPT | Performed by: PHYSICIAN ASSISTANT

## 2022-03-02 PROCEDURE — 71045 X-RAY EXAM CHEST 1 VIEW: CPT

## 2022-03-02 PROCEDURE — 250N000013 HC RX MED GY IP 250 OP 250 PS 637: Performed by: PHYSICIAN ASSISTANT

## 2022-03-02 PROCEDURE — 87102 FUNGUS ISOLATION CULTURE: CPT | Performed by: PHYSICIAN ASSISTANT

## 2022-03-02 RX ORDER — FUROSEMIDE 10 MG/ML
40 INJECTION INTRAMUSCULAR; INTRAVENOUS ONCE
Status: COMPLETED | OUTPATIENT
Start: 2022-03-02 | End: 2022-03-02

## 2022-03-02 RX ORDER — LEVALBUTEROL INHALATION SOLUTION 1.25 MG/3ML
1.25 SOLUTION RESPIRATORY (INHALATION) ONCE
Status: DISCONTINUED | OUTPATIENT
Start: 2022-03-02 | End: 2022-03-03 | Stop reason: CLARIF

## 2022-03-02 RX ORDER — DOXYCYCLINE 100 MG/10ML
100 INJECTION, POWDER, LYOPHILIZED, FOR SOLUTION INTRAVENOUS EVERY 12 HOURS
Status: DISCONTINUED | OUTPATIENT
Start: 2022-03-02 | End: 2022-03-02

## 2022-03-02 RX ORDER — TACROLIMUS 5 MG/1
5 CAPSULE ORAL
Status: DISCONTINUED | OUTPATIENT
Start: 2022-03-02 | End: 2022-03-07

## 2022-03-02 RX ORDER — HYDROXYZINE HYDROCHLORIDE 25 MG/1
25 TABLET, FILM COATED ORAL EVERY 6 HOURS PRN
Status: DISCONTINUED | OUTPATIENT
Start: 2022-03-02 | End: 2022-03-17 | Stop reason: HOSPADM

## 2022-03-02 RX ORDER — IOPAMIDOL 755 MG/ML
99 INJECTION, SOLUTION INTRAVASCULAR ONCE
Status: COMPLETED | OUTPATIENT
Start: 2022-03-02 | End: 2022-03-02

## 2022-03-02 RX ADMIN — Medication 1 PACKET: at 13:01

## 2022-03-02 RX ADMIN — ACETAMINOPHEN 975 MG: 325 TABLET, FILM COATED ORAL at 09:27

## 2022-03-02 RX ADMIN — ACETYLCYSTEINE 2 ML: 200 SOLUTION ORAL; RESPIRATORY (INHALATION) at 07:30

## 2022-03-02 RX ADMIN — VALACYCLOVIR HYDROCHLORIDE 1000 MG: 1 TABLET, FILM COATED ORAL at 16:05

## 2022-03-02 RX ADMIN — NYSTATIN 1000000 UNITS: 100000 SUSPENSION ORAL at 20:24

## 2022-03-02 RX ADMIN — FAMOTIDINE 10 MG: 10 TABLET, FILM COATED ORAL at 20:24

## 2022-03-02 RX ADMIN — TACROLIMUS 5 MG: 5 CAPSULE ORAL at 17:38

## 2022-03-02 RX ADMIN — HEPARIN SODIUM 5000 UNITS: 5000 INJECTION, SOLUTION INTRAVENOUS; SUBCUTANEOUS at 06:14

## 2022-03-02 RX ADMIN — DILTIAZEM HYDROCHLORIDE 30 MG: 30 TABLET, FILM COATED ORAL at 23:22

## 2022-03-02 RX ADMIN — LEVALBUTEROL HYDROCHLORIDE 1.25 MG: 1.25 SOLUTION RESPIRATORY (INHALATION) at 12:36

## 2022-03-02 RX ADMIN — LEVALBUTEROL HYDROCHLORIDE 1.25 MG: 1.25 SOLUTION RESPIRATORY (INHALATION) at 16:35

## 2022-03-02 RX ADMIN — GABAPENTIN 100 MG: 100 CAPSULE ORAL at 22:26

## 2022-03-02 RX ADMIN — CEFTRIAXONE SODIUM 2 G: 2 INJECTION, POWDER, FOR SOLUTION INTRAMUSCULAR; INTRAVENOUS at 09:08

## 2022-03-02 RX ADMIN — Medication 40 MG: at 09:36

## 2022-03-02 RX ADMIN — NYSTATIN 1000000 UNITS: 100000 SUSPENSION ORAL at 09:13

## 2022-03-02 RX ADMIN — Medication 1 PACKET: at 09:28

## 2022-03-02 RX ADMIN — Medication 1 PACKET: at 09:27

## 2022-03-02 RX ADMIN — TACROLIMUS 6 MG: 5 CAPSULE ORAL at 09:28

## 2022-03-02 RX ADMIN — PREDNISONE 15 MG: 5 TABLET ORAL at 09:13

## 2022-03-02 RX ADMIN — ACETAMINOPHEN 975 MG: 325 TABLET, FILM COATED ORAL at 16:02

## 2022-03-02 RX ADMIN — PREDNISONE 15 MG: 5 TABLET ORAL at 20:24

## 2022-03-02 RX ADMIN — METHOCARBAMOL 500 MG: 500 TABLET ORAL at 23:22

## 2022-03-02 RX ADMIN — ACETYLCYSTEINE 2 ML: 200 SOLUTION ORAL; RESPIRATORY (INHALATION) at 19:37

## 2022-03-02 RX ADMIN — METHOCARBAMOL 500 MG: 500 TABLET ORAL at 04:37

## 2022-03-02 RX ADMIN — ACETYLCYSTEINE 2 ML: 200 SOLUTION ORAL; RESPIRATORY (INHALATION) at 12:36

## 2022-03-02 RX ADMIN — DILTIAZEM HYDROCHLORIDE 30 MG: 30 TABLET, FILM COATED ORAL at 12:24

## 2022-03-02 RX ADMIN — CALCIUM CARBONATE 600 MG (1,500 MG)-VITAMIN D3 400 UNIT TABLET 1 TABLET: at 17:38

## 2022-03-02 RX ADMIN — DOXYCYCLINE 100 MG: 100 INJECTION, POWDER, LYOPHILIZED, FOR SOLUTION INTRAVENOUS at 13:01

## 2022-03-02 RX ADMIN — METOPROLOL TARTRATE 25 MG: 25 TABLET, FILM COATED ORAL at 20:24

## 2022-03-02 RX ADMIN — MYCOPHENOLATE MOFETIL 1000 MG: 200 POWDER, FOR SUSPENSION ORAL at 20:24

## 2022-03-02 RX ADMIN — LEVALBUTEROL HYDROCHLORIDE 1.25 MG: 1.25 SOLUTION RESPIRATORY (INHALATION) at 19:37

## 2022-03-02 RX ADMIN — ACETYLCYSTEINE 2 ML: 200 SOLUTION ORAL; RESPIRATORY (INHALATION) at 16:35

## 2022-03-02 RX ADMIN — NYSTATIN 1000000 UNITS: 100000 SUSPENSION ORAL at 16:02

## 2022-03-02 RX ADMIN — DILTIAZEM HYDROCHLORIDE 30 MG: 30 TABLET, FILM COATED ORAL at 00:00

## 2022-03-02 RX ADMIN — LORATADINE 10 MG: 10 TABLET ORAL at 09:14

## 2022-03-02 RX ADMIN — VALACYCLOVIR HYDROCHLORIDE 1000 MG: 1 TABLET, FILM COATED ORAL at 00:00

## 2022-03-02 RX ADMIN — METOPROLOL TARTRATE 25 MG: 25 TABLET, FILM COATED ORAL at 09:13

## 2022-03-02 RX ADMIN — LEVALBUTEROL HYDROCHLORIDE 1.25 MG: 1.25 SOLUTION RESPIRATORY (INHALATION) at 07:29

## 2022-03-02 RX ADMIN — IOPAMIDOL 99 ML: 755 INJECTION, SOLUTION INTRAVENOUS at 17:15

## 2022-03-02 RX ADMIN — DILTIAZEM HYDROCHLORIDE 30 MG: 30 TABLET, FILM COATED ORAL at 17:38

## 2022-03-02 RX ADMIN — FUROSEMIDE 40 MG: 10 INJECTION, SOLUTION INTRAVENOUS at 12:50

## 2022-03-02 RX ADMIN — DILTIAZEM HYDROCHLORIDE 30 MG: 30 TABLET, FILM COATED ORAL at 06:14

## 2022-03-02 RX ADMIN — MYCOPHENOLATE MOFETIL 1000 MG: 250 CAPSULE ORAL at 09:14

## 2022-03-02 RX ADMIN — ASPIRIN 81 MG: 81 TABLET, COATED ORAL at 09:14

## 2022-03-02 RX ADMIN — HYDROXYZINE HYDROCHLORIDE 25 MG: 25 TABLET ORAL at 04:37

## 2022-03-02 RX ADMIN — FAMOTIDINE 10 MG: 10 TABLET, FILM COATED ORAL at 09:14

## 2022-03-02 RX ADMIN — DAPSONE 50 MG: 25 TABLET ORAL at 09:14

## 2022-03-02 RX ADMIN — NYSTATIN 1000000 UNITS: 100000 SUSPENSION ORAL at 12:23

## 2022-03-02 RX ADMIN — CALCIUM CARBONATE 600 MG (1,500 MG)-VITAMIN D3 400 UNIT TABLET 1 TABLET: at 09:14

## 2022-03-02 RX ADMIN — VALACYCLOVIR HYDROCHLORIDE 1000 MG: 1 TABLET, FILM COATED ORAL at 09:27

## 2022-03-02 RX ADMIN — Medication 1 PACKET: at 20:27

## 2022-03-02 RX ADMIN — Medication 15 ML: at 09:14

## 2022-03-02 ASSESSMENT — ACTIVITIES OF DAILY LIVING (ADL)
ADLS_ACUITY_SCORE: 11

## 2022-03-02 NOTE — PROGRESS NOTES
Pulmonary Medicine  Cystic Fibrosis - Lung Transplant Team  Progress Note  2022       Patient: Melissa Elder  MRN: [id number]  : 1955 (age 66 year old)  Transplant: 2022 (Lung), POD#9  Admission date: 2022    Assessment & Plan:     Melissa Elder is a 66 year old female with a PMH significant for severe COPD, HTN, mild non-obstructive CAD, paroxysmal afib, osteoporosis, GERD, and colonic polyps.  Pt. is now s/p BSLT on 22, surgery relatively uncomplicated.  The patient was extubated , post-op course complicated by right chest tube lodged into fissure and left chest tube displacement s/p bilateral pigtail chest tube placement in IR to drain anterior hydropneumothorax and bilateral effusions.  Intermittently requiring supplemental oxygen although with hypoventilation in setting of oversedation during bronch 3/1, received multiple Narcan doses with some improvement although persistently hypercapnic, started on BiPAP.     Today's recommendations:  - Continue aggressive airway clearance  - Continue BiPAP, repeat VBG PRN with mentation changes and again 3/3 AM  - DSA, CMV, EBV, IgG all due 3/21 (not yet ordered)  - Daily CXR with chest tubes  - Chest CT with and without contrast  - Diuresis today  - Tacrolimus level therapeutic, dose decreased with diltiazem start, repeat level ordered tomorrow for close monitoring  - Prednisone taper due 3/8 (not yet ordered)  - Ceftriaxone through 3/8, then transition to amoxicillin for total of 3 months course (through ) for Actinomyces treatment  - Valacyclovir through 3/12, then resume acyclovir prophylaxis through 3/23 per protocol  - Obtain Mycoplasma and Ureaplasma studies/cultures as below given mildly elevated ammonia level this morning (although normal repeat ammonia level this afternoon, head CT deferred and doxycycline discontinued given normal repeat level), repeat ammonia level PRN with mentation changes  - Follow pending cultures,  pleural cultures ordered  - BDG fungitell and Aspergillus galactomannan ordered tomorrow  - Agree with hepatic imaging  - Hemolysis work up     COPD s/p bilateral sequential lung transplant (BSLT):  Acute hypoxic/hypercapneic respiratory failure:   Bilateral pleural effusions:   Right apical PTX: Scant pleural-pleural adhesions and mild-moderate bilateral hilar lymphadenopathy per op note.  Pathology with CPFE.  Pressor weaned off and pt. extubated on 2/22.  Left chest tube inadvertently dislodged, f/u chest CT (2/25) with anterior hydroPTX, right chest tube in fissure.  Left chest tube removed and replaced with 2 left-sided pigtail chest tubes by IR (2/26).  DSA negative 2/28.  Hypoxia had generally resolved, only intermittently requiring supplemental oxygen up until bronch 3/1.  S/p bronch 3/1 without evidence of dehiscence, mild amount of thin pale/tan secretions noted in RLL bronchus.  Noted hypoventilating with oversedation during bronch, received Narcan x3 with improvement.  Although with persistent hypercapnia (unclear etiology), started on BiPAP following procedure, most recent pCO2 81 (from 97).       - Nebs: levalbuterol and Mucomyst QID  - Aggressive pulmonary toilet with chest physiotherapy QID as well as Aerobika and incentive spirometry q1h w/a  - Wean FiO2 to keep >92%, continue BiPAP given hypercapnia, repeat VBG PRN with mentation changes and again 3/3 AM  - DSA 3/21 (not yet ordered), sooner if indicated  - Chest tube management by CVTS, IR has requested to be contacted prior to removal of left chest tube(s)  - CXR daily with chest tubes, today with small right apical PTX and increased bibasilar streaky opacities (personally reviewed with Dr. Knox)  - Chest CT with and without contrast for further evaluation given hypercapnia/leukocytosis  - Volume management per primary team, diuresis today  - Post-pyloric nasal FT, TF per RD and primary team  - SLP following for diet  advacement     Immunosuppression:  S/p induction therapy with basiliximab x2 doses (with high dose IV steroid).  - Tacrolimus 6 mg BID.  Goal level 8-12.  Level 3/2 therapeutic at 11.5, dose decreased to 5 mg BID given diltiazem start, repeat level 3/3 for close monitoring (ordered).  - MMF 1000 mg BID (decreased 2/25 due to diarrhea)  - Prednisone 15 mg BID with taper per lung transplant protocol (due 3/8, not yet ordered):  Date AM dose (mg) PM dose (mg)   3/1 15 15   3/8 15 12.5   3/15 12.5 12.5   3/29 12.5 10   4/12 10 10   5/10 10 7.5   6/7 7.5 7.5   7/5 7.5 5   8/2 5 5   8/30 5 2.5      Prophylaxis:   - Dapsone for PJP ppx (started 2/23 at decreased MWF dose and increased to daily 3/1, G6PD 13.3 2/21)  - Nystatin for oral candidiasis ppx, 6 month course  - See below for serologies and viral ppx:    Donor Recipient Intervention   CMV status Negative Negative CMV monthly (3/21, not yet ordered)   EBV status Positive Positive EBV at one month (3/21, not yet ordered)   HSV status N/A (Newly) Positive As below      ID: Prior history of infection/colonization with Haemophilus influenzae (2017).  Broad spectrum ABX empirically post-op with vanc and ceftaz (2/21-2/22), stopped after 24h to target known donor cultures at that time on POD #1 per Dr. Lala (transitioned to cefazolin).  Broadened again on POD #2 with culture updates as below.  Donor ([facility]) cultures with P-S MSSA; ([facility]) with Strep mitis, Actinomyces odontolyticus, MSSA x2, and C. kruseii.  Recipient cultures at time of transplant with Actinomyces odonotolyticus.  Bronch cultures (2/22) with Saccharomyces cerevisiae (no indication to treat per Dr. Ghosh unless pt. acutely declines clinically, 3/1) and C. albicans.  - IgG repeat at one month (3/21, not yet ordered)  - ABX: ceftriaxone (2/23-3/8), then transition to amoxicillin for total of 3 months course (through 5/23) for Actinomyces treatment     HSV: Pt. with recent seroconversion at time of transplant.   HSV also noted on donor cultures.  Initial plan for 30 days of ppx with ACV post-op per Dr. Lala.  Then with HSV+ bronch at time of transplant (2/21), transitioned to treatment dosing with VACV  - Valacyclovir 1000 mg TID X 14d (2/27-3/12), then resume acyclovir prophylaxis (3/13-3/23) per protocol    Hyperammonemia: Ammonia initially normal (43) on 2/24.  Had been somnolent with some confusion/visual hallucinations in setting of hypercapnia as above.  Repeat ammonia mildly elevated (57) then normal (49) on 3/2.  - Repeat ammonia level PRN with change in mentation  - Ureaplasma PCR urine (ordered)  - Mycoplasma and Ureaplasma sputum, pleural fluid, and urine cultures (ordered)  - Head CT deferred for now and empiric doxycycline discontinued given normal repeat ammonia level   - Transplant ID consult pending repeat ammonia levels/cultures     Leukocytosis: WBC trending upward now on POD #10, frequently noted at this stage post-op (pathology not entirely clear, but may be at least partially d/t bone marrow surge post-transplant).  Procal moderately elevated at 0.18, but may still be somewhat non-specific at this point post-op.  - Blood cultures (3/1) NGTD  - Sputum cultures (3/2) pending  - Pleural cultures (ordered)  - BDG fugitell and Aspergillus galactomannan (ordered)  - ABX as above    PHS risk criteria donor: Additional labs required post-transplant (between 4-8 weeks post-op): Hepatitis B, Hepatitis C, and HIV by COLT ([id number], ordered POD #30), also plan to repeat hepatitis B surface antibody at that time (ordered) given discrepancy with recent result.     Other relevant problems managed by primary team:     Diarrhea: Likely 2/2 medications and tube feedings.  Fiber added to tube feeds.  MMF decreased as above.  Loose stools now improved.    - Will consider transition to Myfortic if loose stools increase again, defer for now    Increasing LFTS: Rising 3/1 despite medication adjustments (APAP and statin  stopped 2/27).  Of note, pt. had a discrepant hep B surface Ab at time of transplant as above.  LFTs remain elevated although improved 3/2.  Also with elevated ammonia as above.  - Daily hepatic panel  - Agree with RUQ US, defer viral serology and pharm to review med list for now given some improvement in LFTs     CAD: Noted to have mild non-obstructive CAD without hemodynamically significant lesions on cardiac cath 3/27/19.  PTA ASA 81 mg and atorvastatin, started on rosuvastatin post-op but held 2/28 for elevated LFTs (as above).  ASA resumed 3/1.  - Management per primary team     Paroxysmal afib:   HTN: PTA diltiazem, not on AC.  Post-op tachycardia, off pressor since 2/22.  Metoprolol started 2/23.  Persistent low level tachycardia, but currently sinus tach with continued HTN.  Diltiazem resumed 3/2.  - Tacrolimus monitoring as above (with diltiazem)  - Management per primary team     GERD: Not on PPI PTA.  Negative pH/manometry study 3/28/19, noted small hiatal hernia.   - PPI daily (2/21), famotidine BID (2/21-3/2) x10d as bridge    We appreciate the excellent care provided by the [place] [id number] team.  Recommendations communicated via in person rounding and this note.  Will continue to follow along closely, please do not hesitate to call with any questions or concerns.    Patient discussed with Dr. Knox.    Lola Mann PA-C  [facility]patient [facility]  Pulmonary CF/Transplant     Subjective & Interval History:     Remains on continuous BiPAP with brief breaks following bronch yesterday.  VBG with ongoing hypercapnia 97 --> 81.  Some confusion and visual hallucinations upon waking.  Endorses ongoing dyspnea.  Cough weak, unable to produce much sputum although denies chest congestion.  Received chest physiotherapy TID yesterday.  Endorses back pain and abdominal fullness/discomfort.  NPO given BiPAP requirements, receiving TF.  Chest tubes x3 with decreased output.    Review of Systems:     C: No fever, no chills, +  "overall increase in weight from admission  INTEGUMENTARY/SKIN: No rash or obvious new lesions  ENT/MOUTH: + dry mouth and throat, no sinus pain, no nasal congestion or drainage  RESP: See interval history  CV: No chest pain, no palpitations, + peripheral edema  GI: No nausea, no vomiting, + improving loose stools, no reflux symptoms  : No dysuria  MUSCULOSKELETAL: See interval history  ENDOCRINE: + blood sugars intermittently elevated  NEURO: No headache, no numbness or tingling  PSYCHIATRIC: See interval history    Physical Exam:     All notes, images, and labs from past 24 hours (at minimum) were reviewed.    Vital signs:  Temp: 98.5  F (36.9  C) Temp src: Axillary BP: 128/67 Pulse: 95   Resp: 24 SpO2: 98 % O2 Device: BiPAP/CPAP Oxygen Delivery: 6 LPM Height: 157.5 cm (5' 2\") Weight: 73.9 kg (162 lb 14.7 oz)  I/O:     Intake/Output Summary (Last 24 hours) at 3/2/2022 1217  Last data filed at 3/2/2022 0952  Gross per 24 hour   Intake 1133 ml   Output 680 ml   Net 453 ml     Constitutional: Lying in bed, in no apparent distress.   HEENT: Eyes with pink conjunctivae, anicteric.  Oral mucosa moist without obvious lesions.  Voice soft/hoarse.  Nasal FT.  PULM: Diminished air flow, left > right.  Scattered coarse crackles.  No rhonchi, no wheezes.  Non-labored breathing with BiPAP --> oxyplus mask.  CV: Normal S1 and S2.  Tachycardic.  No murmur, gallop, or rub.  1+ BLE edema.   ABD: NABS, soft, mildly tender across lower abdomen, nondistended.    MSK: Moves all extremities.  No apparent muscle wasting.   NEURO: Alert, oriented to person, place, and month (reports year as 2020), conversant.  SKIN: Warm, dry.  No rash on limited exam.  Clamshell incision intact without drainage.  PSYCH: Mood stable.     Lines, Drains, and Devices:  Peripheral IV 02/28/22 Left;Lateral Lower forearm (Active)   Site Assessment WDL 03/02/22 0950   Line Status Saline locked 03/02/22 0950   Dressing Intervention New dressing  02/28/22 0051 "   Phlebitis Scale 0-->no symptoms 03/02/22 0950   Infiltration Scale 0 03/02/22 0400   Number of days: 2     Data:     LABS    CMP:   Recent Labs   Lab 03/02/22  1125 03/02/22  0933 03/02/22  0455 03/02/22  0444 03/01/22  0802 03/01/22  0318 02/28/22  0727 02/28/22  0623 02/27/22  0813 02/27/22  0732   NA  --   --  140  --   --  137  --  138  --  139   POTASSIUM  --   --  4.8  --   --  4.4  --  4.2  --  4.2   CHLORIDE  --   --  98  --   --  97  --  99  --  101   CO2  --   --  39*  --   --  40*  --  34*  --  36*   ANIONGAP  --   --  3  --   --  <1*  --  5  --  2*   * 150* 194* 180*   < > 259*   < > 225*   < > 184*   BUN  --   --  27  --   --  22  --  18  --  20   CR  --   --  0.56  --   --  0.37*  --  0.36*  --  0.41*   GFRESTIMATED  --   --  >90  --   --  >90  --  >90  --  >90   MINISTERIO  --   --  8.2*  --   --  8.2*  --  8.0*  --  7.9*   MAG  --   --  2.2  --   --  1.8  --  1.6  --  1.9   PHOS  --   --  3.7  --   --  3.4  --  2.8  --  2.0*   PROTTOTAL  --   --  5.3*  --   --  5.7*  --  5.2*  --  5.2*   ALBUMIN  --   --  1.9*  --   --  2.0*  --  1.9*  --  1.9*   BILITOTAL  --   --  0.2  --   --  0.2  --  0.4  --  0.2   ALKPHOS  --   --  153*  --   --  164*  --  117  --  106   AST  --   --  41  --   --  56*  --  36  --  41   ALT  --   --  130*  --   --  140*  --  87*  --  79*    < > = values in this interval not displayed.     CBC:   Recent Labs   Lab 03/02/22  0455 03/01/22  0318 02/28/22  0623 02/27/22  0732   WBC 23.1* 21.2* 17.8* 14.6*   RBC 2.73* 3.03* 2.98* 3.02*   HGB 7.9* 8.9* 8.8* 8.9*   HCT 25.9* 27.3* 27.0* 28.2*   MCV 95 90 91 93   MCH 28.9 29.4 29.5 29.5   MCHC 30.5* 32.6 32.6 31.6   RDW 13.8 13.6 13.2 13.2    305 247 191       INR: No lab results found in last 7 days.    Glucose:   Recent Labs   Lab 03/02/22  1125 03/02/22  0933 03/02/22  0455 03/02/22  0444 03/02/22  0006 03/01/22  2030   * 150* 194* 180* 194* 181*       Blood Gas:   Recent Labs   Lab 03/02/22  0852 03/02/22  0455  03/01/22  2347 03/01/22  1731 03/01/22  1508   PHV 7.35 7.27*  --   --  7.26*   PCO2V 81* 97*  --   --  99*   PO2V 27 34  --   --  64*   HCO3V 44* 45*  --   --  44*   ARIEL 17.0* 15.0*  --   --  14.1*   O2PER 45 45 40   < > 4    < > = values in this interval not displayed.       Culture Data No results for input(s): CULT in the last 168 hours.    Virology Data: No results found for: INFLUA, FLUAH1, FLUAH3, II3940, IFLUB, RSVA, RSVB, PIV1, PIV2, PIV3, HMPV, HRVS, ADVBE, ADVC    Historical CMV results (last 3 of prior testing):  No results found for: CMVQNT  No results found for: CMVLOG    Urine Studies    Recent Labs   Lab Test 03/01/22  1549 02/20/22  0248   URINEPH 6.0 7.0   NITRITE Negative Negative   LEUKEST Negative Negative   WBCU 0 <1       Most Recent [place] Pulmonary Function Testing (FVC/FEV1 only)  FVC-Pre   Date Value Ref Range Status   12/20/2021 1.81 L    06/21/2021 1.46 L    12/09/2019 1.59 L    06/03/2019 1.68 L      FVC-%Pred-Pre   Date Value Ref Range Status   12/20/2021 65 %    06/21/2021 52 %    12/09/2019 56 %    06/03/2019 58 %      FEV1-Pre   Date Value Ref Range Status   12/20/2021 0.49 L    06/21/2021 0.50 L    12/09/2019 0.49 L    06/03/2019 0.47 L      FEV1-%Pred-Pre   Date Value Ref Range Status   12/20/2021 22 %    06/21/2021 22 %    12/09/2019 21 %    06/03/2019 20 %        IMAGING    Recent Results (from the past 48 hour(s))   XR Chest 2 Views    Narrative    Chest 2 views    INDICATION: Postop one transplant    COMPARISON: 2/28/2022    FINDINGS: Heart borderline enlarged. Clamshell sternotomy from prior  bilateral lung transplant. Left chest catheters again present.  Calcification at the aortic knob. Osteopenia. Bibasilar atelectasis  unchanged. Feeding tube beyond the inferior margin of the image.      Impression    IMPRESSION: Prior bilateral lung transplant. Atherosclerosis.    FELIPA MARIE MD         SYSTEM ID:  [id number]   XR Chest Port 1 View    Narrative    EXAM: XR CHEST  PORT 1 VIEW  3/2/2022 6:10 AM     HISTORY:  Persistent htypoxia/hypercapnia post-bronch on 3/1/22-r/o  collapse/ PTX or other causes       COMPARISON:  Chest radiograph 3/1/2022    FINDINGS:     Portable semiupright view of the chest. Postsurgical changes of  bilateral lung transplant with intact sternotomy wires. Pleural drains  in similar position. Feeding tube tip courses below the diaphragm and  beyond field of view. Stable cardiac silhouette. Increased bibasilar  streaky opacities. Possible small pleural effusions. Small right  apical pneumothorax.    No acute osseous abnormality. Visualized upper abdomen shows partially  visualized prominent bowel loops.        Impression    IMPRESSION:     1. Small right apical pneumothorax. Right-sided chest tube in place.  2. Postoperative chest with increased bibasilar streaky opacities more  likely to represent atelectasis versus pulmonary edema or infection.      Findings discussed with patient's Nurse by Braydon at 10:29 AM 3/2/2022     I have personally reviewed the examination and initial interpretation  and I agree with the findings.    ADRIANA VELAZQUEZ MD         SYSTEM ID:  [id number]

## 2022-03-02 NOTE — PLAN OF CARE
Neuro: A&Ox4. Able to make needs known. Speech garbled/hoarse.   Cardiac: SR/ST. 90s-115s. VSS.   Respiratory: Sating >92% on Bipap.   GI/: Adequate urine output. BM X1. Uses BSC at bedside.   Diet/appetite: NPO d/t bipap.   Activity:  Assist of 1 with GB  Pain: At acceptable level on current regimen. Robaxin x1  Skin: No new deficits noted. Incision approximated ANGELA.   LDA's: Chest tube x3 to -20 suction.   PIV L arm SL      Plan: Continue with POC. Notify primary team with changes.

## 2022-03-02 NOTE — PROVIDER NOTIFICATION
-------------------CRITICAL LAB VALUE-------------------    Lab Value: ABG CO2  Time of notification: 12:27 AM  MD notified: Gold 10 CC  Patient status: CO2 98. Pt alert. Oriented.   Temp:  [97  F (36.1  C)-98.5  F (36.9  C)] 98  F (36.7  C)  Pulse:  [] 102  Resp:  [14-40] 30  BP: (100-165)/(38-88) 116/62  FiO2 (%):  [45 %-60 %] 45 %  SpO2:  [88 %-99 %] 96 %  Orders received: Bipap overnight. Re check in the AM. PRN atarax ordered for anxiety related to the bipap.     -------------------CRITICAL LAB VALUE-------------------    Lab Value: VBG CO2   Time of notification: 5:20 AM  MD notified: Gold 10 CC  Patient status: CO2 97, despite wearing BiPAP overnight.    Temp:  [97  F (36.1  C)-98.3  F (36.8  C)] 98.3  F (36.8  C)  Pulse:  [] 103  Resp:  [14-40] 25  BP: (100-165)/(38-88) 125/47  FiO2 (%):  [45 %-60 %] 45 %  SpO2:  [88 %-99 %] 99 %  Orders received: CC called writer back and discussed pt status. Writer also informed Dr. Rubio pt feeling very anxious about mask and situation. Wanting to eat and drink as she has not been able to since the bronch. Has already received PRN atarax.   -Bipap settings changed  -STAT chest x ray ordered  -Neb treatments

## 2022-03-02 NOTE — PROGRESS NOTES
SPIRITUAL HEALTH SERVICES Progress Note  [facility] ([place]) 6B    Visited patient, Melissa, per length of stay.    Melissa is on day 8 of hospitalization for lung transplant. Engaged her in reflective conversation and offered her emotional/spiritual support. Melissa has a bipap machine so it is difficult to hear her. She says she has had ups and downs and today has been a down. She mentions her family as a source of support and she understands that she is moving in the right direction so this helps her cope.    Will continue to follow this patient as she remains in the unit.    Alhaji Waggoner MDiv  Chaplain Resident  Pager [phone]    * [facility] remains available 24/7 for emergent requests/referrals, either by having the switchboard page the on-call  or by entering an ASAP/STAT consult in Epic (this will also page the on-call ). Routine Epic consults receive an initial response within 24 hours.*

## 2022-03-02 NOTE — PROGRESS NOTES
[facility]    Medicine Progress Note - Hospitalist Service, GOLD TEAM 10    Date of Admission:  2/20/2022    Assessment & Plan            Melissa Elder is a 66 year old female with PMHx HTN, HLD, paroxysmal Afib, osteoporosis, GERD, severe COPD, previously requiring 2-3L NC O2 at rest.  Underwent b/l lung transplant with Dr. Robin on 02/21. Got 1u pRBC post-op, no overt bleeding source, monitoring. Extubated 02//22 to O2 NC and transferred to the floor. Pericardial drain pulled 2/24/22 without issue. Continuing on antibiotics for cultures growing from donor and recipient lungs. Has bilateral pleural effusions and anterior hydropneumothorax requiring 3 chest tubes currently.      Changes today:  - Mildly elevated ammonia today, 57, but on repeat was 49. Continue to monitor carefully. If any worsening encephalopathy will repeat.   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  Pressor weaned off 2/22 and pt. extubated. Prior history of infection/colonization with Haemophilus influenzae (2017).  IgG adequate (2/20).  MRSA nares negative 2/21.  Broad spectrum ABX empirically post-op with vanc and ceftaz (2/21-2/22), stopped after 24h per Dr. Lala to target known donor cultures on POD #1. Donor cultures ([facility]) with Strep mitis, Actinomyces  odontolyticus, and Kenyatta kruseii. Recipient cultures with Actinomyces odonotolyticus.  So currently on ceftriaxone for coverage.   - Continue BiPAP (discussed below)  - Nebs: levalbuterol and Mucomyst QID  - Aggressive pulmonary toilet with chest physiotherapy QID as well as Aerobika and incentive spirometry q1h w/a  - Wean supplemental O2 to keep >92%  - Chest tubes managed by surgical team   - Right pleural chest tube by CT surgery   - Two left pleural tubes placed 2/26 by IR  - Daily CXR  - Tube feeding per nutrition   - Await explant pathology  - Continue ceftriaxone for 2 weeks through 3/8 followed by amoxicillin for 3 months  - Immune suppression per daily transplant pulm note  - Continue Valtrex to 1000 mg TID x 14 days for HSV (through 3/11) then complete ppx per protocol (see pulm note)  - Check CT Chest w/w/o Contrast today     Hypercarbic respiratory failure 3/1  3/1 noted to have slowly rising bicarb on daily labs. Had inspection bronchoscopy 3/1 as well and became oversedated and required multiple doses of narcan. VBG obtained post procedure showed hypercapnea to 91, which did not improve with time after patient had woken up more (repeat CO2 99). Started on BiPAP.   - Continue BiPAP, follow VBG  - Check CT Chest w/w/o Contrast  - Repeat VBG in AM     ID Prophylaxis  - Dapsone for PJP ppx, CBC remains stable.   - Valtrex TID x 14 days for HSV + from bronchoscopy. Then resume ppx acyclovir per protocol.   - Nystatin for oral candidiasis ppx, 6 month course    Non infectious diarrhea   Noted to have increased loose stools 2/25 likely due to mmf and TF. No rise in white count or abdominal pain to suggest C. Diff.   - discussed with dietician and will add fiber     Post op pain   - Erector spinae catheters removed   - gabapentin 100 mg nightly  - tylenol 975 mg Q8H   - Dilaudid 0.2-0.4 mg Q2H PRN   - oxycodone 5-10 mg Q4H PRN   - robaxin 500 mg Q6H PRN     Paroxysmal Afib   She was on diltiazem prior to  lung transplantation and had not been on anticoagulation. She was  transitioned to metoprolol in the ICU.  - Continue Metoprolol tartrate to 25 mg BID   - Restart PTA diltiazem at lower dose (PTA on 240 mg XL) for better BP/HR control. Start at 30mg Q6hr and monitor.   - No anticoagulation at this time     Stress induced hyperglycemia  Did not need insulin prior to admission but sugars have been rising despite sliding scale coverage.  - Endocrinology consulted for assistance, appreciate recommendations   - Please see DM team daily note     HTN   Hx of htn but was required pressors in ICU.  - Will continue to monitor     HLD  Mild CAD   Noted on transplant workup.   - started rosuvastatin 40 mg daily--Held 2/28 due to rising LFTs      Acute blood loss anemia  Acute blood loss thrombocytopenia  Secondary to surgery. Will continue to monitor   - Transfuse Hgb < 7, platelets < 50     Sternotomy  Surgical Incision  - Sternal precautions  - Postoperative incision management per protocol  - PT/OT/CR    Elevated LFTs  Noted on labs 2/26 and rising. AST, ALP, Bili WNL. No RUQ or abdominal pain. Will continue to monitor.  - Stable to slightly improved today   - CMP daily   -Consider viral studies and RUQ US  if LFTs continue to rise  - Hold statin        Diet: Snacks/Supplements Adult: Glucerna; Between Meals  NPO per Anesthesia Guidelines for Procedure/Surgery Except for: No Exceptions  Adult Formula Drip Feeding: Continuous Osmolite 1.5; Nasoduodenal tube; Goal Rate: 50; mL/hr; Medication - Feeding Tube Flush Frequency: At least 15-30 mL water before and after medication administration and with tube clogging; Amount to Send (Nut...    DVT Prophylaxis: Pneumatic Compression Devices  Ratliff Catheter: Not present  Central Lines: None  Cardiac Monitoring: None  Code Status: Full Code      Disposition Plan   Expected Discharge: 03/08/2022   Anticipated discharge location: home;inpatient rehabilitation facility    Delays:             The patient's care was discussed with the Bedside Nurse, Patient and transplant pulm  Consultant.    Sharri Townsend MD  Hospitalist Service, GOLD TEAM [address] [place] [address]  Securely message with the Vocera Web Console (learn more here)  Text page via [facility] Paging/Directory   Please see signed in provider for up to date coverage information      Clinically Significant Risk Factors Present on Admission                    ______________________________________________________________________    Interval History     Continued on BiPAP overnight for persistent hypercarbia  Sleepy this morning  Later in the day was more awake. Has been alert and oriented, but does endorse some visual hallucinations   No chest pain   Does feel short of breath   Leg swelling lightly worse     Four point ROS completed and negative unless listed above     Data reviewed today: I reviewed all medications, new labs and imaging results over the last 24 hours.     Physical Exam   Vital Signs: Temp: 98.3  F (36.8  C) Temp src: Axillary BP: 125/47 Pulse: 103   Resp: 25 SpO2: 99 % O2 Device: Oxymask Oxygen Delivery: 6 LPM  Weight: 162 lbs 14.72 oz    Constitutional: Laying in bed sleeping, wakes to voice and exam   HEENT: MMM. Sclera clear.   PULM: Diminished bases bilaterally.  No crackles, no rhonchi, no wheezes.  Chest tubes in place.  CV: Normal S1 and S2.  Tachycardia.  No murmur, gallop, or rub.  1-2+ bilateral LE edema, slightly increased from yesterday's exam    ABD: BS hypoactive, soft, nontender, nondistended.    MSK: Moves all extremities.  No apparent muscle wasting.   NEURO: Alert and oriented, conversant.   SKIN: Warm, dry.  No rash on limited exam.   PSYCH: Mood stable.     Data   Recent Labs   Lab 03/02/22  0455 03/02/22  0444 03/02/22  0006 03/01/22  0802 03/01/22  0318 02/28/22  0727 02/28/22  0623   WBC 23.1*  --   --   --  21.2*  --  17.8*   HGB 7.9*  --   --   --  8.9*  --  8.8*   MCV  95  --   --   --  90  --  91     --   --   --  305  --  247     --   --   --  137  --  138   POTASSIUM 4.8  --   --   --  4.4  --  4.2   CHLORIDE 98  --   --   --  97  --  99   CO2 39*  --   --   --  40*  --  34*   BUN 27  --   --   --  22  --  18   CR 0.56  --   --   --  0.37*  --  0.36*   ANIONGAP 3  --   --   --  <1*  --  5   MINISTERIO 8.2*  --   --   --  8.2*  --  8.0*   * 180* 194*   < > 259*   < > 225*   ALBUMIN 1.9*  --   --   --  2.0*  --  1.9*   PROTTOTAL 5.3*  --   --   --  5.7*  --  5.2*   BILITOTAL 0.2  --   --   --  0.2  --  0.4   ALKPHOS 153*  --   --   --  164*  --  117   *  --   --   --  140*  --  87*   AST 41  --   --   --  56*  --  36    < > = values in this interval not displayed.     Recent Results (from the past 24 hour(s))   XR Chest 2 Views    Narrative    Chest 2 views    INDICATION: Postop one transplant    COMPARISON: 2/28/2022    FINDINGS: Heart borderline enlarged. Clamshell sternotomy from prior  bilateral lung transplant. Left chest catheters again present.  Calcification at the aortic knob. Osteopenia. Bibasilar atelectasis  unchanged. Feeding tube beyond the inferior margin of the image.      Impression    IMPRESSION: Prior bilateral lung transplant. Atherosclerosis.    FELIPA MARIE MD         SYSTEM ID:  [id number]   XR Chest Port 1 View    Impression    RESIDENT PRELIMINARY INTERPRETATION  IMPRESSION: Postoperative chest with increased bibasilar streaky  atelectasis.     Medications     dextrose Stopped (03/01/22 1125)     - MEDICATION INSTRUCTIONS -       - MEDICATION INSTRUCTIONS -         acetylcysteine  2 mL Nebulization 4x Daily     aspirin  81 mg Oral Daily     calcium carbonate 600 mg-vitamin D 400 units  1 tablet Oral BID w/meals     cefTRIAXone  2 g Intravenous Q24H     dapsone  50 mg Oral Daily     diltiazem  30 mg Oral Q6H JUANA     famotidine  10 mg Oral or Feeding Tube BID     fiber modular (NUTRISOURCE FIBER)  1 packet Per Feeding Tube TID      gabapentin  100 mg Oral or Feeding Tube At Bedtime     heparin ANTICOAGULANT  5,000 Units Subcutaneous Q8H     insulin aspart  1-9 Units Subcutaneous Q4H     insulin aspart   Subcutaneous TID w/meals     insulin NPH  12 Units Subcutaneous Q24H     insulin NPH  12 Units Subcutaneous At Bedtime     levalbuterol  1.25 mg Nebulization Once     levalbuterol  1.25 mg Nebulization 4x Daily     loratadine  10 mg Oral Daily     metoprolol tartrate  25 mg Oral or Feeding Tube BID     multivitamins w/minerals  15 mL Oral Daily     mycophenolate  1,000 mg Oral BID    Or     mycophenolate  1,000 mg Oral or NG Tube BID     nystatin  1,000,000 Units Swish & Swallow 4x Daily     pantoprazole  40 mg Oral or Feeding Tube Daily     predniSONE  15 mg Oral or Feeding Tube BID     protein modular  1 packet Per Feeding Tube TID     [Held by provider] rosuvastatin  40 mg Oral Daily     sodium chloride (PF)  3 mL Intracatheter Q8H     tacrolimus  6 mg Oral BID IS    Or     tacrolimus  6 mg Per Feeding Tube BID IS     valACYclovir  1,000 mg Oral Q8H

## 2022-03-02 NOTE — CONSULTS
[facility]    Transplant Infectious Diseases Inpatient Consultation      Melissa Elder MRN# [id number]   YOB: 1955 Age: 66 year old   Date of Admission and time: 2/20/2022  1:39 AM     Reason for consult: I was asked by Dr. Townsend to evaluate this patient for double coverage for hyperammonemia.             Recommendations:   1. Doxycycline per pulmonary.   2. Consider curbside or consult cardiology regarding the significance of prolonged QTc in the setting of right BBB.   3. Continue to monitor CBC given declining Hgb.   4. Continue ceftriaxone.   5. If with fever or with worsening respiratory status change from ceftriaxone to unasyn and micafungin.   6. Low threshold to repeat CT chest to rule out occult abscess/empyema in the setting of leukocytosis.         Summary of Presentation:   Transplants:  2/21/2022 (Lung), Postoperative day:  9     This patient is a 66 year old female with COPD s/p Bi lung transplant 2/2022. Induction with high dose steroids and basiliximab. On TAC/MMF/prednidonse.   Now with leukocytosis and high ammonia.         Active Problems and Infectious Diseases Issues:   1. High ammonia level in a lung transplant recipient.   I am not sure of the significance of ammonia level of 57 in a lung transplant recipient who's currently not encephalopathic. Whether this represents early hyperammonemia syndrome (HS) is unknown.   Typically the hyperammonemia syndrome occurs with a higher ammonia level of a >=100 umol/L.   I doubt the indications for double coverage for HS.     There are no safe alternative to cyclines in the setting of QTc prolongation. The only drugs available are macrolides, fluoroquinolones and cyclines.     2. QTc prolongation.   In the setting of RBBB.     3. Acute respiratory failure with hypercapnia.   Etiology not known.   The patient has significant LE edema and could be due to fluid overload.   No radiological evidence of new  infiltrate but a repeat CT may be warranted in the future if no improvement.     4. Leukocytosis.   I am not sure of etiology and whether it is due to infection vs bleed and anemia of blood loss.   I am not able to localize any infectious process.   A CT chest may be indicated in the future to rule out occult abscess or empyema.     5. 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   The most significant pathogens are the MSSA and the S constellatus and are covered by ceftriaxone.     Unless the donor aspirated, the significance of Actinomyces sp and S mitis is questionable since they represent oral michael and they did not grow on subsequent respiratory sample the next day.   The C krusei also did not grow again.  Saccharomyces sp is not typically pathogenic.     C albicans may or may not result in empyema.    If the clinical picture deteriorates, may change antimicrobials to unasyn and micafungin from ceftriaxone.       Other Infectious Disease issues include:  - on VACV for viral ppx.   - PCP prophylaxis: dapsone.   - Serostatus: CMV D-/R-, EBV D+/R+, HSV1+/2-, VZV +  - Immunization status: This patient received the third dose of COVID-19 vaccine on 11/29/2021.  - Gamma globulin status: 1000 as of 2/20/2022.       Thank you very much Dr. Townsend for involving me in the care of Mr. Melissa Elder. Please do not hesitate to call me for any question.     Attestation:  I interviewed the patient and obtained history from the patient, the  who's at the bedside, and by reviewing the patient's chart including outside records, microbiological data, and radiological data. All data are summarized in this notes.  Ileana Ghosh MD  [facility]  Contact information available via [facility] Paging/Directory    03/02/2022             History of Present Illness:   Transplants:  2/21/2022 (Lung), Postoperative day:  9     This patient is a 66 year old female with  COPD s/p Bi lung transplant.   The hospital course was not complicated until 3/1/2022 when she suffered from respiratory deterioration with hypercapnia and mental status changes. The encephalopathy resolved when the patient was placed on Bipap. The ammonia was checked and was elevated. The patent was started on doxycycline. ID consulted for double coverage advice in the setting of QTc prolongation.   The patient remains short of breath on Bipap. No significant cough or chest pain. No fever.   The  stated that his wife's mental status are at baseline.             Review of Systems:      As mentioned in the HPI otherwise negative by reviewing constitutional symptoms, central and peripheral neurological systems, respiratory system, cardiac system, GI system,  system, musculoskeletal, skin, allergy, and lymphatics.                  Past Medical History:     Past Medical History:   Diagnosis Date     Atrial fibrillation with RVR (H)     hx of A fib related to severe COPD, does not meet anticoagulation criteria as noted     COPD, severe (H)      Osteoporosis             Past Surgical History:     Past Surgical History:   Procedure Laterality Date     BRONCHOSCOPY FLEXIBLE AND RIGID N/A 3/1/2022    Procedure: BRONCHOSCOPY INSPECTION;  Surgeon: Melissa Landin MD;  Location:  GI     CV CORONARY ANGIOGRAM N/A 3/27/2019    Procedure: CV CORONARY ANGIOGRAM;  Surgeon: Thierry Serrano MD;  Location:  HEART CARDIAC CATH LAB     CV RIGHT HEART CATH MEASUREMENTS RECORDED N/A 3/27/2019    Procedure: CV RIGHT HEART CATH;  Surgeon: Thierry Serrano MD;  Location:  HEART CARDIAC CATH LAB     ESOPHAGEAL IMPEDENCE FUNCTION TEST WITH 24 HOUR PH GREATER THAN 1 HOUR N/A 3/28/2019    Procedure: ESOPHAGEAL IMPEDENCE FUNCTION TEST WITH 24 HOUR PH GREATER THAN 1 HOUR;  Surgeon: Mike Henry MD;  Location:  GI     EXCISE PILONIDAL CYST, SIMPLE       TONSILLECTOMY & ADENOIDECTOMY       TRANSPLANT LUNG RECIPIENT  SINGLE X2 Bilateral 2022    Procedure: Bilateral Sequential Lung Transplantation, Clamshell Incision, Extracorporeal Membrane Oxgenation, Bronchoscopy, Cryoablation of Intercostal Nerves;  Surgeon: Raul Robin MD;  Location:  OR            Social History:     Social History     Tobacco Use     Smoking status: Former Smoker     Packs/day: 1.50     Years: 36.00     Pack years: 54.00     Types: Cigarettes     Quit date: 2006     Years since quittin.1     Smokeless tobacco: Never Used   Substance Use Topics     Alcohol use: Yes     Comment: stated beer and wine occ            Family History:   I have reviewed this patient's family history  Family History   Problem Relation Age of Onset     Breast Cancer Mother      Colon Cancer Mother      Lung Cancer Mother      Lung Cancer Father      Lung Cancer Maternal Aunt      Pancreatic Cancer Maternal Uncle             Immunizations:     Immunization History   Administered Date(s) Administered     COVID-19,PF,Moderna 2010, 2021, 2021     FLU 6-35 months 10/15/2008     Flu, Unspecified 10/02/2013     L5b0-21 Novel Flu 2009     HepA-Adult 2019, 10/14/2019     HepA-ped 2 Dose 10/14/2019     HepB-Adult 2018, 2018, 2018     Influenza (High Dose) 3 valent vaccine 2015, 10/31/2017, 10/04/2019     Influenza (IIV3) PF 2006, 10/19/2010, 2011, 2012, 10/02/2013, 10/05/2013, 2014, 2015     Influenza Vaccine IM > 6 months Valent IIV4 (Alfuria,Fluzone) 2014, 10/27/2016, 2016     Influenza Vaccine, 6+MO IM (QUADRIVALENT W/PRESERVATIVES) 10/11/2018     Influenza, Quad, High Dose, Pf, 65yr+ (Fluzone HD) 2020, 2021     Pneumo Conj 13-V (2010&after) 04/15/2015     Pneumococcal 23 valent 2013, 10/30/2013, 2019     TDAP Vaccine (Adacel) 10/15/2008, 2012, 2013     Tdap (Adacel,Boostrix) 2013     Zoster vaccine recombinant adjuvanted  (SHINGRIX) 04/08/2019, 06/10/2019            Allergies:     Allergies   Allergen Reactions     Alendronic Acid Other (See Comments)     dizziness  Other reaction(s): Dizziness     Nickel Rash     Sulfa Drugs Rash             Medications:   Medications that Require Transfusion:     dextrose Stopped (03/01/22 1125)     - MEDICATION INSTRUCTIONS -       - MEDICATION INSTRUCTIONS -       Scheduled Medications:     acetylcysteine  2 mL Nebulization 4x Daily     aspirin  81 mg Oral Daily     calcium carbonate 600 mg-vitamin D 400 units  1 tablet Oral BID w/meals     cefTRIAXone  2 g Intravenous Q24H     dapsone  50 mg Oral Daily     diltiazem  30 mg Oral Q6H JUANA     doxycycline (VIBRAMYCIN) IV  100 mg Intravenous Q12H     famotidine  10 mg Oral or Feeding Tube BID     fiber modular (NUTRISOURCE FIBER)  1 packet Per Feeding Tube TID     furosemide  40 mg Intravenous Once     gabapentin  100 mg Oral or Feeding Tube At Bedtime     [Held by provider] heparin ANTICOAGULANT  5,000 Units Subcutaneous Q8H     insulin aspart  1-9 Units Subcutaneous Q4H     insulin aspart   Subcutaneous TID w/meals     insulin NPH  12 Units Subcutaneous Q24H     insulin NPH  12 Units Subcutaneous At Bedtime     levalbuterol  1.25 mg Nebulization Once     levalbuterol  1.25 mg Nebulization 4x Daily     loratadine  10 mg Oral Daily     metoprolol tartrate  25 mg Oral or Feeding Tube BID     multivitamins w/minerals  15 mL Oral Daily     mycophenolate  1,000 mg Oral BID    Or     mycophenolate  1,000 mg Oral or NG Tube BID     nystatin  1,000,000 Units Swish & Swallow 4x Daily     pantoprazole  40 mg Oral or Feeding Tube Daily     predniSONE  15 mg Oral or Feeding Tube BID     protein modular  1 packet Per Feeding Tube TID     [Held by provider] rosuvastatin  40 mg Oral Daily     sodium chloride (PF)  3 mL Intracatheter Q8H     tacrolimus  6 mg Oral BID IS    Or     tacrolimus  6 mg Per Feeding Tube BID IS     valACYclovir  1,000 mg Oral Q8H                Physical Exam:   Temp: 98.5  F (36.9  C) Temp src: Axillary BP: 128/67 Pulse: 95   Resp: 24 SpO2: 98 % O2 Device: BiPAP/CPAP Oxygen Delivery: 6 LPM    Wt Readings from Last 4 Encounters:   03/02/22 73.9 kg (162 lb 14.7 oz)   12/20/21 66.7 kg (147 lb)   06/21/21 68 kg (150 lb)   06/03/19 69.4 kg (153 lb)     Constitutional: awake, alert, cooperative, wearing bipap mask, appears at stated age, well nourished.   Head, ENT, Eyes, and Neck: Normocephalic, sinuses non-tender to palpation, external ears without lesions, unable to assess the oral mucosa due to Bipap mask.   PERRL, EOMI, pink conjunctivae, non-icteric sclera.   Neck supple without rigidity, no cervical/axillary/inguinal LA bilaterally.    Neurologic: Patient is moving all extremities without focal deficit, no focal sensory loss.   Lungs: coarse BS bilaterally, no accessory muscle use, no dullness to percussion and no abnormal tactile fremitus.   CVS: RRR, normal S1/S2, no murmur, PMI was not displaced.   Abdomen: non-tender, non-distended, no masses, no bruit, no shifting dullness, normal BS.   Extremities: +4 pitting edema of bilateral lower extremities, no ulcers, normal ROM of all joints, no swelling or erythema of any of joints and no tenderness to palpation.   Skin: no induration, fluctuation or discharge at the surgical site, and no rash            Data:   No results found for: ACD4    Inflammatory Markers    Recent Labs   Lab Test 03/01/22  0318   CRP 66.0*       Immune Globulin Studies     Recent Labs   Lab Test 02/20/22  0617 03/25/19  0834   IGG 1,023 901   IGM  --  265   IGE  --  22   IGA  --  190   IGG1  --  435   IGG2  --  363   IGG3  --  131   IGG4  --  32       Metabolic Studies       Recent Labs   Lab Test 03/02/22  1125 03/02/22  0933 03/02/22  0455 03/02/22  0444 03/02/22  0006 03/01/22  2030 03/01/22  0802 03/01/22  0318 02/28/22  0727 02/28/22  0623 02/27/22  0813 02/27/22  0732 02/26/22  0752 02/26/22  0530 02/25/22  0912  02/25/22  0727 02/22/22  1950 02/22/22 1946   NA  --   --  140  --   --   --   --  137  --  138  --  139  --  141  --  140   < >  --    POTASSIUM  --   --  4.8  --   --   --   --  4.4  --  4.2  --  4.2  --  4.2  --  4.1   < >  --    CHLORIDE  --   --  98  --   --   --   --  97  --  99  --  101  --  102  --  105   < >  --    CO2  --   --  39*  --   --   --   --  40*  --  34*  --  36*  --  37*  --  31   < >  --    ANIONGAP  --   --  3  --   --   --   --  <1*  --  5  --  2*  --  2*  --  4   < >  --    BUN  --   --  27  --   --   --   --  22  --  18  --  20  --  20  --  26   < >  --    CR  --   --  0.56  --   --   --   --  0.37*  --  0.36*  --  0.41*  --  0.41*  --  0.46*   < >  --    GFRESTIMATED  --   --  >90  --   --   --   --  >90  --  >90  --  >90  --  >90  --  >90   < >  --    * 150* 194* 180* 194* 181*   < > 259*   < > 225*   < > 184*   < > 173*   < > 200*   < >  --    A1C  --   --   --   --   --   --   --   --   --   --   --   --   --   --   --   --   --  5.7*   MINISTERIO  --   --  8.2*  --   --   --   --  8.2*  --  8.0*  --  7.9*  --  7.4*  --  7.7*   < >  --    PHOS  --   --  3.7  --   --   --   --  3.4  --  2.8  --  2.0*  --  2.3*  --  2.1*   < >  --    MAG  --   --  2.2  --   --   --   --  1.8  --  1.6  --  1.9  --  2.0  --  2.4*   < >  --    LACT  --   --  1.1  --   --   --   --  1.3  --   --    < >  --    < >  --    < >  --    < >  --     < > = values in this interval not displayed.       Hepatic Studies    Recent Labs   Lab Test 03/02/22 0455 03/01/22 0318 02/28/22  0623 02/27/22  0732 02/26/22  0530 02/25/22  0727   BILITOTAL 0.2 0.2 0.4 0.2 0.3 0.2   ALKPHOS 153* 164* 117 106 89 80   ALBUMIN 1.9* 2.0* 1.9* 1.9* 1.9* 2.0*   AST 41 56* 36 41 33 28   * 140* 87* 79* 53* 31       Pancreatitis testing    Recent Labs   Lab Test 03/25/19  0834   AMYLASE 63   TRIG 83       Hematology Studies      Recent Labs   Lab Test 03/02/22 0455 03/01/22 0318 02/28/22  0623 02/27/22  0732 02/26/22  0530  02/25/22  0727 12/09/19  0923 03/25/19  0834   WBC 23.1* 21.2* 17.8* 14.6* 9.6 12.2*   < > 8.1   ANEU  --   --   --   --   --   --   --  6.5   ALYM  --   --   --   --   --   --   --  1.0   EVAN  --   --   --   --   --   --   --  0.4   AEOS  --   --   --   --   --   --   --  0.1   HGB 7.9* 8.9* 8.8* 8.9* 8.4* 8.7*   < > 13.4   HCT 25.9* 27.3* 27.0* 28.2* 26.7* 27.7*   < > 41.5    305 247 191 165 160   < > 207    < > = values in this interval not displayed.       Clotting Studies    Recent Labs   Lab Test 02/22/22  0355 02/21/22  0808 02/21/22  0714 02/21/22  0530 02/20/22  0617 02/20/22  0617 03/25/19  0834   INR 1.31* 1.58* 5.23* 1.96*   < > 1.02 0.96   PTT  --   --  106* 29  --  31 22    < > = values in this interval not displayed.       Arterial Blood Gas Testing    Recent Labs   Lab Test 03/02/22  0852 03/02/22  0455 03/01/22  2347 03/01/22  1731 02/23/22  0834 02/23/22  0347 02/22/22  1747 02/22/22  1304   PH  --   --  7.26* 7.34*  --  7.30* 7.33* 7.39   PCO2  --   --  98* 79*  --  54* 47* 35   PO2  --   --  96 149*  --  142* 98 184*   HCO3  --   --  43* 43*  --  27 25 21   O2PER 45 45 40 45   < > 2 30  30 40    < > = values in this interval not displayed.        Urine Studies     Recent Labs   Lab Test 03/01/22  1549 02/20/22  0248 03/25/19  1043   URINEPH 6.0 7.0 5.0   NITRITE Negative Negative Negative   LEUKEST Negative Negative Trace*   WBCU 0 <1 1       Tacrolimus levels    Invalid input(s): TACROLIMUS, TAC, TACR  Transplant Immunosuppression Labs Latest Ref Rng & Units 3/2/2022 3/1/2022 2/28/2022 2/27/2022 2/26/2022   Creat 0.52 - 1.04 mg/dL 0.56 0.37(L) 0.36(L) 0.41(L) 0.41(L)   BUN 7 - 30 mg/dL 27 22 18 20 20   WBC 4.0 - 11.0 10e3/uL 23.1(H) 21.2(H) 17.8(H) 14.6(H) 9.6   Neutrophil % 87 - - - -   ANEU 1.6 - 8.3 10e9/L - - - - -       Microbiology:  Blood cx negative.   Last check of C difficile  No results found for: CDBPCT    Virology:  CMV viral loads  No results found for: CMVQNT, CMVRESINST,  32824, 03572, 95166, 77957, CMVQAL  CMV viral loads  No lab results found.    CMV viral loads  No results found for: CMVQNT, CMVRESINST, CMVLOG, 38467, 25258, 67341, 43953    CMV resistance testing  No lab results found.  No results found for: CMVCID, CMVFOS, CMVGAN     No results found for: H6RES    No results found for: EBVDN, EBRES, EBVDN, EBVSP, EBVPC, EBVPCR    CMV Antibody IgG   Date Value Ref Range Status   02/20/2022 No detectable antibody. No detectable antibody.  Final   03/25/2019 0.2 0.0 - 0.8 AI Final     Comment:     Negative  Antibody index (AI) values reflect qualitative changes in antibody   concentration that cannot be directly associated with clinical condition or   disease state.         Toxoplasma Antibody IgG   Date Value Ref Range Status   03/25/2019 <3.0 0.0 - 7.1 IU/mL Final     Comment:     Negative- Absence of detectable Toxoplasma gondii IgG antibodies. A negative   result does not rule out acute infection.  The magnitude of the measured result is not indicative of the amount of   antibody present. The concentrations of anti-Toxoplasma gondii IgG in a given   specimen determined with assays from different manufacturers can vary due to   differences in assay methods and reagent specificity.           Imaging:  CXR 3/2/22  IMPRESSION:   1. Small right apical pneumothorax. Right-sided chest tube in place.  2. Postoperative chest with increased bibasilar streaky opacities more  likely to represent atelectasis versus pulmonary edema or infection.    CT chest 2/25/2022  Impression:   1. Increase in size of confluent hydropneumothorax with  intercommunication between both hemithoraces anteriorly, and  pneumomediastinum. The right-sided chest tube is located within the  major fissure and therefore may be nonfunctional. The left-sided chest  tube has been retracted and is in the most inferior aspect of the  anterior costophrenic sulcus with a sidehole in the subcutaneous soft  tissues and  therefore is nonfunctional.  2. Somewhat loculated appearing fluid and gas collection along the  right hilum measures up to 2 cm this may represents postsurgical fluid  collection short interval follow-up is recommended.  3. Bibasilar atelectasis and mucous plugging.         Ileana Ghosh MD  [facility]  Contact information available via [facility] Paging/Directory     03/02/2022

## 2022-03-02 NOTE — PROGRESS NOTES
Per sign out from swing provider, patient became somnolent after her bronchoscopy on 3/1. She had CO2 retention on her gas and was started on a Bipap.   Sign out from primary team was to review VBG and reassess if Bipap can be removed.   However, around 10pm, per RN report, she was less somnolent, was awake, and agitated about her Bipap. So, bipap was removed as her mental status indicated clinical improvement of CO2.   - However, an ABG that was checked to verify her status, 2 hours from removal of Bipap showed increase in CO2 to 98. An absolute increase from ABG pCO2 of 79 with a drop in pH  - Patient was re-initiated on Bipap at 16/8, given a dose of hydroxyzine to assist with her anxiety, and despite this, her pCO2 continued to remain elevated on repeat assessment at 97. However, the repeat gas was a VBG. So, unclear if this actually indicates a slow improvement (given VBG typically has a higher pCO2).   - I examined the patient who was awake, alert, and oriented x 3. She was mildly tachypneic, reporting anxiety due to not being able to have anything PO since her procedure and also having to wear the Bipap. Reassured patient that she has been receiving nutrition with tube feeds even though no PO  - O/e- Poor air entry on the left compared to right posteriorly, although, patient is unable to move adequately in bed to examine well, anteriorly equal bilateral entry. No wheezing, some bilateral base inspiratory crackles heard  Plan  - Increase Bipap settings to 20/6 to help facilitate ventilation. Plan for repeat VBG in 2 hours. Ordered for 8:00AM- primary team to follow-up  - Stat CXR shows more vascular prominence and increased atelectasis on my review. Chest tubes in appropriate position.  - Administer AM doses of the nebiluzers early in case she has bronchospasm.   - Consider NIF in AM if concern for poor inspiratory effort leading to hypercapnia    - Unclear why patient has such elevated pCO2- ? Post-bronch  derecruitment as noted by pulmonary on 3/1 vs hypoventilation 2/2 poor muscular effort. Other etiologies -good mental status, no stridor/wheeze to indicate upper/lower airway obstruction  Her VBG at 5am on 3/2 indicates that she has an acute respiratory acidosis. In addition, there also appears to be a metabolic alkalosis, possibly from contraction from diuresis that developed over the past several days. So, likely she had developed a compensatory resp acidosis with elevated pCO2 in the range of 70s for a bicarb of 42-45 even prior to her acute decompensation yesterday.   - This AM her bicarb is 39. If primary team plans to diurese patient, she might benefit from co-administration of acetazolamide for preferential diuresis of bicarb

## 2022-03-02 NOTE — PROGRESS NOTES
Cardiovascular Surgery Progress Note  03/02/2022    Melissa Elder is a 66 year old female with PMHx HTN, HLD, paroxysmal Afib, osteoporosis, GERD, severe COPD, previously requiring 2-3L NC O2 at rest. She underwent b/l lung transplant with Dr. Robin on 02/21. Got 1u pRBC post-op, no overt bleeding source, monitoring. Extubated 02/22 to O2 NC.     CVTS is following in consideration of the clamshell incision as well as chest tube management.     Leukocytosis  - WBC up to 23.1 this am.   - Consider pan-culture     Clamshell incision  - Continue to adhere to sternal precautions  - Postoperative incision management, continue open to air but needs to be kept very dry under her breasts. Would closed with skin glue.  - Clamshell incision appears clean, dry. Incision appears without erythema, or drainage. Wound edges are well approximated.  - Encourage activity/OOB, IS/pulmonary toilet.  Maintain strict sleep hygiene and delirium precautions.     Chest tube management  - Continue to suction while in room, can ambulate off suction.  - Pericardial Removed 2/24/22  - L chest tube slowly slipped out, removed 2/25 due to new pneumothorax.   - IR placed 2 new Left pleural tubes 2/26. Consult IR for tube dysfunction and once removal is desired.   * * Please do not remove these tubes without discussing with IR * *  - Left Pleural output: 385 mL no air leak, serosanguinous output.  Consider asking IR for removal of anterior pleural tube.  - Right Pleural PLAN FOR CLAMPING TRIAL morning of 3/3 with chest x-ray 6 hours after     Discussed with Dr Robin through both written and verbal communication.       Luis A Nava PA-C  Cardiothoracic Surgery  Pager [phone]     8:14 AM   March 2, 2022            Interval History:      No overnight events.  Bronch this AM and required some narcan x 3 this AM.   States pain is well managed on current regimen. Slept well overnight.          Physical Exam:   Blood pressure (!) 151/67,  "pulse 103, temperature 98.3  F (36.8  C), temperature source Oral, resp. rate 28, height 1.575 m (5' 2\"), weight 74.8 kg (164 lb 14.5 oz), SpO2 95 %, not currently breastfeeding.        Vitals:     02/27/22 0030 02/28/22 0722 03/01/22 0334   Weight: 73.1 kg (161 lb 2.5 oz) 73.6 kg (162 lb 4.1 oz) 74.8 kg (164 lb 14.5 oz)      Gen: A&Ox4, NAD  Neuro: no focal deficits, still sleepy from sedation   CV: RRR, normal S1 S2, no murmurs, rubs or gallops.   Pulm: CTA, no gross wheezing or rhonchi  Abd: nondistended, normal BS, soft, nontender  Incision: clean, dry, intact, no erythema, sternum stable  Tubes/drain sites: dressing clean and dry, serosanguinous output, no air leak. 24 hr output 455 mL total from all tubes.          Data:    Imaging:  reviewed recent imaging, no acute concerns     Labs:  BMP               Recent Labs   Lab 03/01/22  0802 03/01/22  0318 03/01/22  0203 02/28/22  2142 02/28/22  0727 02/28/22  0623 02/27/22  0813 02/27/22  0732 02/26/22  0752 02/26/22  0530   NA  --  137  --   --   --  138  --  139  --  141   POTASSIUM  --  4.4  --   --   --  4.2  --  4.2  --  4.2   CHLORIDE  --  97  --   --   --  99  --  101  --  102   MINISTERIO  --  8.2*  --   --   --  8.0*  --  7.9*  --  7.4*   CO2  --  40*  --   --   --  34*  --  36*  --  37*   BUN  --  22  --   --   --  18  --  20  --  20   CR  --  0.37*  --   --   --  0.36*  --  0.41*  --  0.41*   * 259* 242* 241*   < > 225*   < > 184*   < > 173*    < > = values in this interval not displayed.      CBC  Recent Labs   Lab 03/01/22 0318 02/28/22  0623 02/27/22  0732 02/26/22  0530   WBC 21.2* 17.8* 14.6* 9.6   RBC 3.03* 2.98* 3.02* 2.89*   HGB 8.9* 8.8* 8.9* 8.4*   HCT 27.3* 27.0* 28.2* 26.7*   MCV 90 91 93 92   MCH 29.4 29.5 29.5 29.1   MCHC 32.6 32.6 31.6 31.5   RDW 13.6 13.2 13.2 13.3    247 191 165      INR  No lab results found in last 7 days.   Hepatic Panel         Recent Labs   Lab 03/01/22 0318 02/28/22 0623 02/27/22  0732 02/26/22  0530 "   AST 56* 36 41 33   * 87* 79* 53*   ALKPHOS 164* 117 106 89   BILITOTAL 0.2 0.4 0.2 0.3   ALBUMIN 2.0* 1.9* 1.9* 1.9*      GLUCOSE:            Recent Labs   Lab 03/01/22  0802 03/01/22  0318 03/01/22  0203 02/28/22  2142 02/28/22  1601 02/28/22  0727   * 259* 242* 241* 276* 197*

## 2022-03-03 ENCOUNTER — APPOINTMENT (OUTPATIENT)
Dept: GENERAL RADIOLOGY | Facility: CLINIC | Age: 67
DRG: 007 | End: 2022-03-03
Attending: PHYSICIAN ASSISTANT
Payer: MEDICARE

## 2022-03-03 ENCOUNTER — APPOINTMENT (OUTPATIENT)
Dept: INTERVENTIONAL RADIOLOGY/VASCULAR | Facility: CLINIC | Age: 67
DRG: 007 | End: 2022-03-03
Attending: STUDENT IN AN ORGANIZED HEALTH CARE EDUCATION/TRAINING PROGRAM
Payer: MEDICARE

## 2022-03-03 ENCOUNTER — APPOINTMENT (OUTPATIENT)
Dept: PHYSICAL THERAPY | Facility: CLINIC | Age: 67
DRG: 007 | End: 2022-03-03
Attending: SURGERY
Payer: MEDICARE

## 2022-03-03 ENCOUNTER — APPOINTMENT (OUTPATIENT)
Dept: GENERAL RADIOLOGY | Facility: CLINIC | Age: 67
DRG: 007 | End: 2022-03-03
Attending: NURSE PRACTITIONER
Payer: MEDICARE

## 2022-03-03 ENCOUNTER — APPOINTMENT (OUTPATIENT)
Dept: ULTRASOUND IMAGING | Facility: CLINIC | Age: 67
DRG: 007 | End: 2022-03-03
Attending: STUDENT IN AN ORGANIZED HEALTH CARE EDUCATION/TRAINING PROGRAM
Payer: MEDICARE

## 2022-03-03 LAB
ABO/RH(D): NORMAL
ALBUMIN SERPL-MCNC: 1.8 G/DL (ref 3.4–5)
ALP SERPL-CCNC: 121 U/L (ref 40–150)
ALT SERPL W P-5'-P-CCNC: 99 U/L (ref 0–50)
ANION GAP SERPL CALCULATED.3IONS-SCNC: 2 MMOL/L (ref 3–14)
ANTIBODY SCREEN: NEGATIVE
APPEARANCE FLD: ABNORMAL
AST SERPL W P-5'-P-CCNC: 26 U/L (ref 0–45)
BASE EXCESS BLDV CALC-SCNC: 16.6 MMOL/L (ref -7.7–1.9)
BASOPHILS # BLD AUTO: 0 10E3/UL (ref 0–0.2)
BASOPHILS # BLD AUTO: 0 10E3/UL (ref 0–0.2)
BASOPHILS NFR BLD AUTO: 0 %
BASOPHILS NFR BLD AUTO: 0 %
BILIRUB SERPL-MCNC: 0.1 MG/DL (ref 0.2–1.3)
BLD PROD TYP BPU: NORMAL
BLOOD COMPONENT TYPE: NORMAL
BUN SERPL-MCNC: 27 MG/DL (ref 7–30)
CALCIUM SERPL-MCNC: 7.8 MG/DL (ref 8.5–10.1)
CHLORIDE BLD-SCNC: 98 MMOL/L (ref 94–109)
CHOLEST FLD-MCNC: <50 MG/DL
CK SERPL-CCNC: 138 U/L (ref 30–225)
CO2 SERPL-SCNC: 40 MMOL/L (ref 20–32)
CODING SYSTEM: NORMAL
COLOR FLD: ABNORMAL
CREAT SERPL-MCNC: 0.49 MG/DL (ref 0.52–1.04)
CROSSMATCH: NORMAL
EOSINOPHIL # BLD AUTO: 0 10E3/UL (ref 0–0.7)
EOSINOPHIL # BLD AUTO: 0 10E3/UL (ref 0–0.7)
EOSINOPHIL NFR BLD AUTO: 0 %
EOSINOPHIL NFR BLD AUTO: 0 %
ERYTHROCYTE [DISTWIDTH] IN BLOOD BY AUTOMATED COUNT: 13.7 % (ref 10–15)
ERYTHROCYTE [DISTWIDTH] IN BLOOD BY AUTOMATED COUNT: 13.8 % (ref 10–15)
GFR SERPL CREATININE-BSD FRML MDRD: >90 ML/MIN/1.73M2
GLUCOSE BLD-MCNC: 166 MG/DL (ref 70–99)
GLUCOSE BLDC GLUCOMTR-MCNC: 194 MG/DL (ref 70–99)
GLUCOSE BLDC GLUCOMTR-MCNC: 195 MG/DL (ref 70–99)
GLUCOSE BLDC GLUCOMTR-MCNC: 206 MG/DL (ref 70–99)
GLUCOSE BLDC GLUCOMTR-MCNC: 231 MG/DL (ref 70–99)
GLUCOSE BLDC GLUCOMTR-MCNC: 97 MG/DL (ref 70–99)
HAPTOGLOB SERPL-MCNC: 439 MG/DL (ref 32–197)
HCO3 BLDV-SCNC: 44 MMOL/L (ref 21–28)
HCT VFR BLD AUTO: 22.5 % (ref 35–47)
HCT VFR BLD AUTO: 24.5 % (ref 35–47)
HGB BLD-MCNC: 6.9 G/DL (ref 11.7–15.7)
HGB BLD-MCNC: 7.6 G/DL (ref 11.7–15.7)
HGB BLD-MCNC: 8.2 G/DL (ref 11.7–15.7)
IMM GRANULOCYTES # BLD: 0.5 10E3/UL
IMM GRANULOCYTES # BLD: 0.6 10E3/UL
IMM GRANULOCYTES NFR BLD: 3 %
IMM GRANULOCYTES NFR BLD: 3 %
ISSUE DATE AND TIME: NORMAL
LACTATE SERPL-SCNC: 0.8 MMOL/L (ref 0.7–2)
LDH FLD L TO P-CCNC: 676 U/L
LDH SERPL L TO P-CCNC: 293 U/L (ref 81–234)
LYMPHOCYTES # BLD AUTO: 1.2 10E3/UL (ref 0.8–5.3)
LYMPHOCYTES # BLD AUTO: 1.6 10E3/UL (ref 0.8–5.3)
LYMPHOCYTES NFR BLD AUTO: 6 %
LYMPHOCYTES NFR BLD AUTO: 8 %
LYMPHOCYTES NFR FLD MANUAL: 12 %
MAGNESIUM SERPL-MCNC: 1.9 MG/DL (ref 1.6–2.3)
MCH RBC QN AUTO: 29.1 PG (ref 26.5–33)
MCH RBC QN AUTO: 29.2 PG (ref 26.5–33)
MCHC RBC AUTO-ENTMCNC: 30.7 G/DL (ref 31.5–36.5)
MCHC RBC AUTO-ENTMCNC: 31 G/DL (ref 31.5–36.5)
MCV RBC AUTO: 94 FL (ref 78–100)
MCV RBC AUTO: 95 FL (ref 78–100)
MONOCYTES # BLD AUTO: 0.8 10E3/UL (ref 0–1.3)
MONOCYTES # BLD AUTO: 1.1 10E3/UL (ref 0–1.3)
MONOCYTES NFR BLD AUTO: 4 %
MONOCYTES NFR BLD AUTO: 5 %
MONOS+MACROS NFR FLD MANUAL: NORMAL %
NEUTROPHILS # BLD AUTO: 16.9 10E3/UL (ref 1.6–8.3)
NEUTROPHILS # BLD AUTO: 17.1 10E3/UL (ref 1.6–8.3)
NEUTROPHILS NFR BLD AUTO: 84 %
NEUTROPHILS NFR BLD AUTO: 87 %
NEUTS BAND NFR FLD MANUAL: 81 %
NRBC # BLD AUTO: 0 10E3/UL
NRBC # BLD AUTO: 0 10E3/UL
NRBC BLD AUTO-RTO: 0 /100
NRBC BLD AUTO-RTO: 0 /100
O2/TOTAL GAS SETTING VFR VENT: 45 %
OTHER CELLS FLD MANUAL: 8 %
PATH REPORT.COMMENTS IMP SPEC: NORMAL
PATH REPORT.COMMENTS IMP SPEC: NORMAL
PATH REPORT.FINAL DX SPEC: NORMAL
PATH REPORT.MICROSCOPIC SPEC OTHER STN: NORMAL
PATH REPORT.MICROSCOPIC SPEC OTHER STN: NORMAL
PATH REPORT.RELEVANT HX SPEC: NORMAL
PCO2 BLDV: 76 MM HG (ref 40–50)
PH BLDV: 7.37 [PH] (ref 7.32–7.43)
PHOSPHATE SERPL-MCNC: 3.2 MG/DL (ref 2.5–4.5)
PLATELET # BLD AUTO: 298 10E3/UL (ref 150–450)
PLATELET # BLD AUTO: 301 10E3/UL (ref 150–450)
PO2 BLDV: 113 MM HG (ref 25–47)
POTASSIUM BLD-SCNC: 4.8 MMOL/L (ref 3.4–5.3)
PROT FLD-MCNC: 1.6 G/DL
PROT SERPL-MCNC: 4.8 G/DL (ref 6.8–8.8)
RBC # BLD AUTO: 2.36 10E6/UL (ref 3.8–5.2)
RBC # BLD AUTO: 2.61 10E6/UL (ref 3.8–5.2)
RETICS # AUTO: 0.09 10E6/UL (ref 0.03–0.1)
RETICS # AUTO: 0.1 10E6/UL (ref 0.03–0.1)
RETICS/RBC NFR AUTO: 3.6 % (ref 0.5–2)
RETICS/RBC NFR AUTO: 3.7 % (ref 0.5–2)
SODIUM SERPL-SCNC: 140 MMOL/L (ref 133–144)
SPECIMEN EXPIRATION DATE: NORMAL
TACROLIMUS BLD-MCNC: 11.7 UG/L (ref 5–15)
TME LAST DOSE: NORMAL H
TME LAST DOSE: NORMAL H
TRIGL FLD-MCNC: 10 MG/DL
UNIT ABO/RH: NORMAL
UNIT NUMBER: NORMAL
UNIT STATUS: NORMAL
UNIT TYPE ISBT: 1700
WBC # BLD AUTO: 19.7 10E3/UL (ref 4–11)
WBC # BLD AUTO: 20.1 10E3/UL (ref 4–11)
WBC # FLD AUTO: 740 /UL

## 2022-03-03 PROCEDURE — 87305 ASPERGILLUS AG IA: CPT | Performed by: PHYSICIAN ASSISTANT

## 2022-03-03 PROCEDURE — 120N000005 HC R&B MS OVERFLOW UMMC

## 2022-03-03 PROCEDURE — 86850 RBC ANTIBODY SCREEN: CPT | Performed by: HOSPITALIST

## 2022-03-03 PROCEDURE — 250N000013 HC RX MED GY IP 250 OP 250 PS 637: Performed by: STUDENT IN AN ORGANIZED HEALTH CARE EDUCATION/TRAINING PROGRAM

## 2022-03-03 PROCEDURE — 250N000012 HC RX MED GY IP 250 OP 636 PS 637: Performed by: PHYSICIAN ASSISTANT

## 2022-03-03 PROCEDURE — P9016 RBC LEUKOCYTES REDUCED: HCPCS | Performed by: HOSPITALIST

## 2022-03-03 PROCEDURE — C1769 GUIDE WIRE: HCPCS

## 2022-03-03 PROCEDURE — 250N000011 HC RX IP 250 OP 636: Performed by: STUDENT IN AN ORGANIZED HEALTH CARE EDUCATION/TRAINING PROGRAM

## 2022-03-03 PROCEDURE — 94640 AIRWAY INHALATION TREATMENT: CPT | Mod: 76

## 2022-03-03 PROCEDURE — 71046 X-RAY EXAM CHEST 2 VIEWS: CPT | Mod: 26 | Performed by: RADIOLOGY

## 2022-03-03 PROCEDURE — 88305 TISSUE EXAM BY PATHOLOGIST: CPT | Mod: TC | Performed by: NURSE PRACTITIONER

## 2022-03-03 PROCEDURE — 250N000009 HC RX 250: Performed by: SURGERY

## 2022-03-03 PROCEDURE — 82803 BLOOD GASES ANY COMBINATION: CPT | Performed by: STUDENT IN AN ORGANIZED HEALTH CARE EDUCATION/TRAINING PROGRAM

## 2022-03-03 PROCEDURE — 83615 LACTATE (LD) (LDH) ENZYME: CPT | Performed by: HOSPITALIST

## 2022-03-03 PROCEDURE — 83605 ASSAY OF LACTIC ACID: CPT | Performed by: INTERNAL MEDICINE

## 2022-03-03 PROCEDURE — 272N000192 HC ACCESSORY CR2

## 2022-03-03 PROCEDURE — 250N000012 HC RX MED GY IP 250 OP 636 PS 637: Performed by: STUDENT IN AN ORGANIZED HEALTH CARE EDUCATION/TRAINING PROGRAM

## 2022-03-03 PROCEDURE — 87109 MYCOPLASMA: CPT | Performed by: PHYSICIAN ASSISTANT

## 2022-03-03 PROCEDURE — 82550 ASSAY OF CK (CPK): CPT | Performed by: STUDENT IN AN ORGANIZED HEALTH CARE EDUCATION/TRAINING PROGRAM

## 2022-03-03 PROCEDURE — 250N000009 HC RX 250: Performed by: PHYSICIAN ASSISTANT

## 2022-03-03 PROCEDURE — 87070 CULTURE OTHR SPECIMN AEROBIC: CPT | Performed by: PHYSICIAN ASSISTANT

## 2022-03-03 PROCEDURE — 88184 FLOWCYTOMETRY/ TC 1 MARKER: CPT | Performed by: NURSE PRACTITIONER

## 2022-03-03 PROCEDURE — 87449 NOS EACH ORGANISM AG IA: CPT | Performed by: PHYSICIAN ASSISTANT

## 2022-03-03 PROCEDURE — 272N000500 HC NEEDLE CR2

## 2022-03-03 PROCEDURE — 99233 SBSQ HOSP IP/OBS HIGH 50: CPT | Performed by: STUDENT IN AN ORGANIZED HEALTH CARE EDUCATION/TRAINING PROGRAM

## 2022-03-03 PROCEDURE — 36415 COLL VENOUS BLD VENIPUNCTURE: CPT | Performed by: HOSPITALIST

## 2022-03-03 PROCEDURE — 87102 FUNGUS ISOLATION CULTURE: CPT | Performed by: NURSE PRACTITIONER

## 2022-03-03 PROCEDURE — 87205 SMEAR GRAM STAIN: CPT | Performed by: NURSE PRACTITIONER

## 2022-03-03 PROCEDURE — 82465 ASSAY BLD/SERUM CHOLESTEROL: CPT | Performed by: NURSE PRACTITIONER

## 2022-03-03 PROCEDURE — 32557 INSERT CATH PLEURA W/ IMAGE: CPT | Mod: RT | Performed by: PHYSICIAN ASSISTANT

## 2022-03-03 PROCEDURE — 84478 ASSAY OF TRIGLYCERIDES: CPT | Performed by: NURSE PRACTITIONER

## 2022-03-03 PROCEDURE — 85060 BLOOD SMEAR INTERPRETATION: CPT | Performed by: PATHOLOGY

## 2022-03-03 PROCEDURE — 71046 X-RAY EXAM CHEST 2 VIEWS: CPT

## 2022-03-03 PROCEDURE — 85018 HEMOGLOBIN: CPT | Performed by: STUDENT IN AN ORGANIZED HEALTH CARE EDUCATION/TRAINING PROGRAM

## 2022-03-03 PROCEDURE — 999N000157 HC STATISTIC RCP TIME EA 10 MIN

## 2022-03-03 PROCEDURE — 76705 ECHO EXAM OF ABDOMEN: CPT

## 2022-03-03 PROCEDURE — 87102 FUNGUS ISOLATION CULTURE: CPT | Performed by: PHYSICIAN ASSISTANT

## 2022-03-03 PROCEDURE — 88189 FLOWCYTOMETRY/READ 16 & >: CPT | Mod: GC | Performed by: PATHOLOGY

## 2022-03-03 PROCEDURE — 80197 ASSAY OF TACROLIMUS: CPT | Performed by: PHYSICIAN ASSISTANT

## 2022-03-03 PROCEDURE — 87206 SMEAR FLUORESCENT/ACID STAI: CPT | Performed by: NURSE PRACTITIONER

## 2022-03-03 PROCEDURE — 250N000013 HC RX MED GY IP 250 OP 250 PS 637: Performed by: SURGERY

## 2022-03-03 PROCEDURE — 82040 ASSAY OF SERUM ALBUMIN: CPT | Performed by: NURSE PRACTITIONER

## 2022-03-03 PROCEDURE — 83615 LACTATE (LD) (LDH) ENZYME: CPT | Performed by: NURSE PRACTITIONER

## 2022-03-03 PROCEDURE — 85025 COMPLETE CBC W/AUTO DIFF WBC: CPT | Performed by: NURSE PRACTITIONER

## 2022-03-03 PROCEDURE — 80053 COMPREHEN METABOLIC PANEL: CPT | Performed by: NURSE PRACTITIONER

## 2022-03-03 PROCEDURE — 94640 AIRWAY INHALATION TREATMENT: CPT

## 2022-03-03 PROCEDURE — 87070 CULTURE OTHR SPECIMN AEROBIC: CPT | Performed by: NURSE PRACTITIONER

## 2022-03-03 PROCEDURE — 71045 X-RAY EXAM CHEST 1 VIEW: CPT | Mod: 26 | Performed by: RADIOLOGY

## 2022-03-03 PROCEDURE — 32557 INSERT CATH PLEURA W/ IMAGE: CPT

## 2022-03-03 PROCEDURE — 83010 ASSAY OF HAPTOGLOBIN QUANT: CPT | Performed by: STUDENT IN AN ORGANIZED HEALTH CARE EDUCATION/TRAINING PROGRAM

## 2022-03-03 PROCEDURE — 89050 BODY FLUID CELL COUNT: CPT | Performed by: NURSE PRACTITIONER

## 2022-03-03 PROCEDURE — 99233 SBSQ HOSP IP/OBS HIGH 50: CPT | Mod: 24 | Performed by: INTERNAL MEDICINE

## 2022-03-03 PROCEDURE — 250N000013 HC RX MED GY IP 250 OP 250 PS 637: Performed by: PHYSICIAN ASSISTANT

## 2022-03-03 PROCEDURE — 250N000013 HC RX MED GY IP 250 OP 250 PS 637: Performed by: NURSE PRACTITIONER

## 2022-03-03 PROCEDURE — 99233 SBSQ HOSP IP/OBS HIGH 50: CPT | Performed by: NURSE PRACTITIONER

## 2022-03-03 PROCEDURE — 97116 GAIT TRAINING THERAPY: CPT | Mod: GP | Performed by: PHYSICAL THERAPIST

## 2022-03-03 PROCEDURE — 86901 BLOOD TYPING SEROLOGIC RH(D): CPT | Performed by: HOSPITALIST

## 2022-03-03 PROCEDURE — 76705 ECHO EXAM OF ABDOMEN: CPT | Mod: 26 | Performed by: RADIOLOGY

## 2022-03-03 PROCEDURE — 86923 COMPATIBILITY TEST ELECTRIC: CPT | Performed by: HOSPITALIST

## 2022-03-03 PROCEDURE — 0W9930Z DRAINAGE OF RIGHT PLEURAL CAVITY WITH DRAINAGE DEVICE, PERCUTANEOUS APPROACH: ICD-10-PCS | Performed by: PHYSICIAN ASSISTANT

## 2022-03-03 PROCEDURE — 84157 ASSAY OF PROTEIN OTHER: CPT | Performed by: NURSE PRACTITIONER

## 2022-03-03 PROCEDURE — 36415 COLL VENOUS BLD VENIPUNCTURE: CPT | Performed by: STUDENT IN AN ORGANIZED HEALTH CARE EDUCATION/TRAINING PROGRAM

## 2022-03-03 PROCEDURE — C1729 CATH, DRAINAGE: HCPCS

## 2022-03-03 PROCEDURE — 94660 CPAP INITIATION&MGMT: CPT

## 2022-03-03 PROCEDURE — 84100 ASSAY OF PHOSPHORUS: CPT | Performed by: NURSE PRACTITIONER

## 2022-03-03 PROCEDURE — 85004 AUTOMATED DIFF WBC COUNT: CPT | Performed by: HOSPITALIST

## 2022-03-03 PROCEDURE — 83735 ASSAY OF MAGNESIUM: CPT | Performed by: NURSE PRACTITIONER

## 2022-03-03 PROCEDURE — 88185 FLOWCYTOMETRY/TC ADD-ON: CPT | Performed by: NURSE PRACTITIONER

## 2022-03-03 PROCEDURE — 85045 AUTOMATED RETICULOCYTE COUNT: CPT | Performed by: STUDENT IN AN ORGANIZED HEALTH CARE EDUCATION/TRAINING PROGRAM

## 2022-03-03 PROCEDURE — 76000 FLUOROSCOPY <1 HR PHYS/QHP: CPT

## 2022-03-03 PROCEDURE — 99233 SBSQ HOSP IP/OBS HIGH 50: CPT | Mod: 24 | Performed by: CLINICAL NURSE SPECIALIST

## 2022-03-03 RX ORDER — FUROSEMIDE 10 MG/ML
40 INJECTION INTRAMUSCULAR; INTRAVENOUS ONCE
Status: COMPLETED | OUTPATIENT
Start: 2022-03-03 | End: 2022-03-03

## 2022-03-03 RX ORDER — MAGNESIUM SULFATE HEPTAHYDRATE 40 MG/ML
2 INJECTION, SOLUTION INTRAVENOUS ONCE
Status: COMPLETED | OUTPATIENT
Start: 2022-03-03 | End: 2022-03-03

## 2022-03-03 RX ORDER — LIDOCAINE HYDROCHLORIDE 10 MG/ML
1-30 INJECTION, SOLUTION EPIDURAL; INFILTRATION; INTRACAUDAL; PERINEURAL
Status: COMPLETED | OUTPATIENT
Start: 2022-03-03 | End: 2022-03-03

## 2022-03-03 RX ORDER — LIDOCAINE 40 MG/G
CREAM TOPICAL
Status: DISCONTINUED | OUTPATIENT
Start: 2022-03-03 | End: 2022-03-17 | Stop reason: HOSPADM

## 2022-03-03 RX ADMIN — METOPROLOL TARTRATE 25 MG: 25 TABLET, FILM COATED ORAL at 19:23

## 2022-03-03 RX ADMIN — MYCOPHENOLATE MOFETIL 1000 MG: 200 POWDER, FOR SUSPENSION ORAL at 07:58

## 2022-03-03 RX ADMIN — LIDOCAINE HYDROCHLORIDE 11 ML: 10 INJECTION, SOLUTION EPIDURAL; INFILTRATION; INTRACAUDAL; PERINEURAL at 14:54

## 2022-03-03 RX ADMIN — Medication 15 ML: at 07:57

## 2022-03-03 RX ADMIN — TACROLIMUS 5 MG: 5 CAPSULE ORAL at 07:59

## 2022-03-03 RX ADMIN — LEVALBUTEROL HYDROCHLORIDE 1.25 MG: 1.25 SOLUTION RESPIRATORY (INHALATION) at 07:03

## 2022-03-03 RX ADMIN — Medication 1 PACKET: at 14:13

## 2022-03-03 RX ADMIN — LEVALBUTEROL HYDROCHLORIDE 1.25 MG: 1.25 SOLUTION RESPIRATORY (INHALATION) at 20:00

## 2022-03-03 RX ADMIN — PREDNISONE 15 MG: 5 TABLET ORAL at 19:23

## 2022-03-03 RX ADMIN — OXYCODONE HYDROCHLORIDE 10 MG: 10 TABLET ORAL at 04:23

## 2022-03-03 RX ADMIN — ACETYLCYSTEINE 2 ML: 200 SOLUTION ORAL; RESPIRATORY (INHALATION) at 11:55

## 2022-03-03 RX ADMIN — PREDNISONE 15 MG: 5 TABLET ORAL at 07:56

## 2022-03-03 RX ADMIN — TACROLIMUS 5 MG: 5 CAPSULE ORAL at 18:17

## 2022-03-03 RX ADMIN — Medication 1 PACKET: at 10:07

## 2022-03-03 RX ADMIN — DAPSONE 50 MG: 25 TABLET ORAL at 08:37

## 2022-03-03 RX ADMIN — OXYCODONE HYDROCHLORIDE 10 MG: 10 TABLET ORAL at 08:37

## 2022-03-03 RX ADMIN — ACETYLCYSTEINE 2 ML: 200 SOLUTION ORAL; RESPIRATORY (INHALATION) at 20:01

## 2022-03-03 RX ADMIN — ASPIRIN 81 MG: 81 TABLET, COATED ORAL at 08:01

## 2022-03-03 RX ADMIN — MAGNESIUM SULFATE IN WATER 2 G: 40 INJECTION, SOLUTION INTRAVENOUS at 12:10

## 2022-03-03 RX ADMIN — GABAPENTIN 100 MG: 100 CAPSULE ORAL at 23:49

## 2022-03-03 RX ADMIN — FUROSEMIDE 40 MG: 10 INJECTION, SOLUTION INTRAVENOUS at 12:09

## 2022-03-03 RX ADMIN — CALCIUM CARBONATE 600 MG (1,500 MG)-VITAMIN D3 400 UNIT TABLET 1 TABLET: at 08:37

## 2022-03-03 RX ADMIN — Medication 1 PACKET: at 19:23

## 2022-03-03 RX ADMIN — LEVALBUTEROL HYDROCHLORIDE 1.25 MG: 1.25 SOLUTION RESPIRATORY (INHALATION) at 11:55

## 2022-03-03 RX ADMIN — INSULIN HUMAN 16 UNITS: 100 INJECTION, SUSPENSION SUBCUTANEOUS at 12:15

## 2022-03-03 RX ADMIN — INSULIN ASPART 2 UNITS: 100 INJECTION, SOLUTION INTRAVENOUS; SUBCUTANEOUS at 18:17

## 2022-03-03 RX ADMIN — NYSTATIN 1000000 UNITS: 100000 SUSPENSION ORAL at 08:04

## 2022-03-03 RX ADMIN — CALCIUM CARBONATE 600 MG (1,500 MG)-VITAMIN D3 400 UNIT TABLET 1 TABLET: at 18:23

## 2022-03-03 RX ADMIN — ACETAMINOPHEN 975 MG: 325 TABLET, FILM COATED ORAL at 07:56

## 2022-03-03 RX ADMIN — INSULIN ASPART 2 UNITS: 100 INJECTION, SOLUTION INTRAVENOUS; SUBCUTANEOUS at 12:17

## 2022-03-03 RX ADMIN — NYSTATIN 1000000 UNITS: 100000 SUSPENSION ORAL at 12:17

## 2022-03-03 RX ADMIN — ACETAMINOPHEN 975 MG: 325 TABLET, FILM COATED ORAL at 19:43

## 2022-03-03 RX ADMIN — METOPROLOL TARTRATE 25 MG: 25 TABLET, FILM COATED ORAL at 07:56

## 2022-03-03 RX ADMIN — DILTIAZEM HYDROCHLORIDE 30 MG: 30 TABLET, FILM COATED ORAL at 06:27

## 2022-03-03 RX ADMIN — NYSTATIN 1000000 UNITS: 100000 SUSPENSION ORAL at 19:23

## 2022-03-03 RX ADMIN — VALACYCLOVIR HYDROCHLORIDE 1000 MG: 1 TABLET, FILM COATED ORAL at 12:10

## 2022-03-03 RX ADMIN — DILTIAZEM HYDROCHLORIDE 30 MG: 30 TABLET, FILM COATED ORAL at 18:17

## 2022-03-03 RX ADMIN — VALACYCLOVIR HYDROCHLORIDE 1000 MG: 1 TABLET, FILM COATED ORAL at 04:00

## 2022-03-03 RX ADMIN — MYCOPHENOLATE MOFETIL 1000 MG: 200 POWDER, FOR SUSPENSION ORAL at 19:23

## 2022-03-03 RX ADMIN — CEFTRIAXONE SODIUM 2 G: 2 INJECTION, POWDER, FOR SOLUTION INTRAMUSCULAR; INTRAVENOUS at 10:04

## 2022-03-03 RX ADMIN — VALACYCLOVIR HYDROCHLORIDE 1000 MG: 1 TABLET, FILM COATED ORAL at 19:23

## 2022-03-03 RX ADMIN — METHOCARBAMOL 500 MG: 500 TABLET ORAL at 19:43

## 2022-03-03 RX ADMIN — DILTIAZEM HYDROCHLORIDE 30 MG: 30 TABLET, FILM COATED ORAL at 12:09

## 2022-03-03 RX ADMIN — ACETYLCYSTEINE 2 ML: 200 SOLUTION ORAL; RESPIRATORY (INHALATION) at 07:03

## 2022-03-03 RX ADMIN — LORATADINE 10 MG: 10 TABLET ORAL at 08:01

## 2022-03-03 RX ADMIN — OXYCODONE HYDROCHLORIDE 5 MG: 5 TABLET ORAL at 12:27

## 2022-03-03 RX ADMIN — Medication 40 MG: at 07:57

## 2022-03-03 ASSESSMENT — ACTIVITIES OF DAILY LIVING (ADL)
ADLS_ACUITY_SCORE: 11
ADLS_ACUITY_SCORE: 15
ADLS_ACUITY_SCORE: 15
ADLS_ACUITY_SCORE: 11
ADLS_ACUITY_SCORE: 11
ADLS_ACUITY_SCORE: 15
ADLS_ACUITY_SCORE: 11
ADLS_ACUITY_SCORE: 15
ADLS_ACUITY_SCORE: 11
ADLS_ACUITY_SCORE: 15
ADLS_ACUITY_SCORE: 11
ADLS_ACUITY_SCORE: 15
ADLS_ACUITY_SCORE: 15
ADLS_ACUITY_SCORE: 11

## 2022-03-03 NOTE — PROGRESS NOTES
[facility]    Transplant Infectious Diseases Inpatient Progress Note      Melissa Elder MRN# [id number]   YOB: 1955 Age: 66 year old   Date of Admission and time: 2/20/2022  1:39 AM             Recommendations:   1. Continue ceftriaxone.   2. Continue to monitor CBC given declining Hgb.   3. Thank you for sending pleural fluid studies. Will follow.   4. If C albicans continues to grow on subsequent respiratory samples, I would consider adding fluconazole or inhaled ampho B.         Summary of Presentation:   Transplants:  2/21/2022 (Lung), Postoperative day:  10     This patient is a 66 year old female with COPD s/p Bi lung transplant 2/2022. Induction with high dose steroids and basiliximab. On TAC/MMF/prednidonse.   Now with leukocytosis and high ammonia.         Active Problems and Infectious Diseases Issues:   1. Leukocytosis.   It is possible to be due to leukomoid reaction to anemia.   Will follow on left pleural fluid cx (3/2/2022) and right pleural effusion analysis and cx (3/3/2022).     2. Acute respiratory failure with hypercapnia.   Etiology not known.   The patient has significant LE edema and could be due to fluid overload.     3. 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   The most significant pathogens are the MSSA and the S constellatus and are covered by ceftriaxone.     Unless the donor aspirated, the significance of Actinomyces sp and S mitis is questionable since they represent oral michael and they did not grow on subsequent respiratory sample the next day.   The C krusei also did not grow again.  Saccharomyces sp is not typically pathogenic.     C albicans may or may not result in empyema. Will follow on pleural fluid studies.   If with further growth on subsequent BALs, would either treat with fluconazole or inhaled ampho B.     If the clinical picture deteriorates, may change antimicrobials to unasyn and micafungin from  ceftriaxone.       Other Infectious Disease issues include:  - on VACV for viral ppx.   - PCP prophylaxis: dapsone.   - Serostatus: CMV D-/R-, EBV D+/R+, HSV1+/2-, VZV +  - Immunization status: This patient received the third dose of COVID-19 vaccine on 11/29/2021.  - Gamma globulin status: 1000 as of 2/20/2022.       Attestation:  I interviewed the patient and obtained history from the patient, the  who's at the bedside, and by reviewing the patient's chart including outside records, microbiological data, and radiological data. All data are summarized in this notes.  Ileana Ghosh MD  [facility]  Contact information available via [facility] Paging/Directory    03/03/2022             Interim History:   On high flow.   Feels that the breathing and the cough have both improved.   Underwent right chest tube placement.          History of Present Illness:   Transplants:  2/21/2022 (Lung), Postoperative day:  10     This patient is a 66 year old female with COPD s/p Bi lung transplant.   The hospital course was not complicated until 3/1/2022 when she suffered from respiratory deterioration with hypercapnia and mental status changes. The encephalopathy resolved when the patient was placed on Bipap. The ammonia was checked and was elevated. The patent was started on doxycycline. ID consulted for double coverage advice in the setting of QTc prolongation.   The patient remains short of breath on Bipap. No significant cough or chest pain. No fever.   The  stated that his wife's mental status are at baseline.             Review of Systems:      As mentioned in the HPI otherwise negative by reviewing constitutional symptoms, central and peripheral neurological systems, respiratory system, cardiac system, GI system,  system, musculoskeletal, skin, allergy, and lymphatics.                Immunizations:     Immunization History   Administered Date(s) Administered      COVID-19,PF,Moderna 11/29/2010, 02/16/2021, 03/12/2021     FLU 6-35 months 10/15/2008     Flu, Unspecified 10/02/2013     [id number] Novel Flu 11/12/2009     HepA-Adult 04/08/2019, 10/14/2019     HepA-ped 2 Dose 10/14/2019     HepB-Adult 01/17/2018, 03/20/2018, 08/20/2018     Influenza (High Dose) 3 valent vaccine 11/04/2015, 10/31/2017, 10/04/2019     Influenza (IIV3) PF 11/13/2006, 10/19/2010, 11/07/2011, 09/04/2012, 10/02/2013, 10/05/2013, 09/16/2014, 11/04/2015     Influenza Vaccine IM > 6 months Valent IIV4 (Alfuria,Fluzone) 09/16/2014, 10/27/2016, 11/07/2016     Influenza Vaccine, 6+MO IM (QUADRIVALENT W/PRESERVATIVES) 10/11/2018     Influenza, Quad, High Dose, Pf, 65yr+ (Fluzone HD) 09/28/2020, 09/28/2021     Pneumo Conj 13-V (2010&after) 04/15/2015     Pneumococcal 23 valent 09/16/2013, 10/30/2013, 04/08/2019     TDAP Vaccine (Adacel) 10/15/2008, 09/04/2012, 09/16/2013     Tdap (Adacel,Boostrix) 09/16/2013     Zoster vaccine recombinant adjuvanted (SHINGRIX) 04/08/2019, 06/10/2019            Allergies:     Allergies   Allergen Reactions     Alendronic Acid Other (See Comments)     dizziness  Other reaction(s): Dizziness     Nickel Rash     Sulfa Drugs Rash             Medications:   Medications that Require Transfusion:     dextrose Stopped (03/01/22 1125)     - MEDICATION INSTRUCTIONS -       - MEDICATION INSTRUCTIONS -       Scheduled Medications:     acetylcysteine  2 mL Nebulization 4x Daily     aspirin  81 mg Oral Daily     calcium carbonate 600 mg-vitamin D 400 units  1 tablet Oral BID w/meals     cefTRIAXone  2 g Intravenous Q24H     dapsone  50 mg Oral Daily     diltiazem  30 mg Oral Q6H JUANA     fiber modular (NUTRISOURCE FIBER)  1 packet Per Feeding Tube TID     gabapentin  100 mg Oral or Feeding Tube At Bedtime     [Held by provider] heparin ANTICOAGULANT  5,000 Units Subcutaneous Q8H     insulin aspart  1-9 Units Subcutaneous Q4H     insulin aspart   Subcutaneous TID w/meals     insulin NPH  16 Units  Subcutaneous Q24H     [START ON 3/4/2022] insulin NPH  16 Units Subcutaneous Daily     levalbuterol  1.25 mg Nebulization 4x Daily     loratadine  10 mg Oral Daily     metoprolol tartrate  25 mg Oral or Feeding Tube BID     multivitamins w/minerals  15 mL Oral Daily     mycophenolate  1,000 mg Oral BID    Or     mycophenolate  1,000 mg Oral or NG Tube BID     nystatin  1,000,000 Units Swish & Swallow 4x Daily     pantoprazole  40 mg Oral or Feeding Tube Daily     predniSONE  15 mg Oral or Feeding Tube BID     protein modular  1 packet Per Feeding Tube TID     [Held by provider] rosuvastatin  40 mg Oral Daily     sodium chloride (PF)  3 mL Intracatheter Q8H     tacrolimus  5 mg Per Feeding Tube BID IS    Or     tacrolimus  5 mg Oral BID IS     valACYclovir  1,000 mg Oral Q8H            Physical Exam:   Temp: 98  F (36.7  C) Temp src: Oral BP: 113/56 Pulse: 95   Resp: 24 SpO2: 96 % O2 Device: High Flow Nasal Cannula (HFNC) Oxygen Delivery: 35 LPM    Wt Readings from Last 4 Encounters:   03/03/22 73.5 kg (162 lb 0.6 oz)   12/20/21 66.7 kg (147 lb)   06/21/21 68 kg (150 lb)   06/03/19 69.4 kg (153 lb)     Constitutional: awake, alert, cooperative, wearing high flow, appears at stated age, well nourished.   Neurologic: Patient is moving all extremities without focal deficit, no focal sensory loss.   Lungs: coarse BS bilaterally with decrease BS on the right, no accessory muscle use, no dullness to percussion and no abnormal tactile fremitus.   CVS: RRR, normal S1/S2, no murmur, PMI was not displaced.   Abdomen: non-tender, non-distended, no masses, no bruit, no shifting dullness, normal BS.   Extremities: +4 pitting edema of bilateral lower extremities, no ulcers, normal ROM of all joints, no swelling or erythema of any of joints and no tenderness to palpation.   Skin: no induration, fluctuation or discharge at the surgical site, and no rash            Data:   No results found for: ACD4    Inflammatory Markers    Recent  Labs   Lab Test 03/01/22  0318   CRP 66.0*       Immune Globulin Studies     Recent Labs   Lab Test 02/20/22  0617 03/25/19  0834   IGG 1,023 901   IGM  --  265   IGE  --  22   IGA  --  190   IGG1  --  435   IGG2  --  363   IGG3  --  131   IGG4  --  32       Metabolic Studies       Recent Labs   Lab Test 03/03/22  1223 03/03/22  1158 03/03/22  0743 03/03/22  0509 03/03/22  0403 03/02/22  2329 03/02/22  2037 03/02/22  0933 03/02/22  0455 03/01/22  0802 03/01/22  0318 02/28/22  0727 02/28/22  0623 02/27/22  0813 02/27/22  0732 02/26/22  0752 02/26/22  0530 02/22/22  1950 02/22/22  1946   NA  --   --   --  140  --   --   --   --  140  --  137  --  138  --  139  --  141   < >  --    POTASSIUM  --   --   --  4.8  --   --   --   --  4.8  --  4.4  --  4.2  --  4.2  --  4.2   < >  --    CHLORIDE  --   --   --  98  --   --   --   --  98  --  97  --  99  --  101  --  102   < >  --    CO2  --   --   --  40*  --   --   --   --  39*  --  40*  --  34*  --  36*  --  37*   < >  --    ANIONGAP  --   --   --  2*  --   --   --   --  3  --  <1*  --  5  --  2*  --  2*   < >  --    BUN  --   --   --  27  --   --   --   --  27  --  22  --  18  --  20  --  20   < >  --    CR  --   --   --  0.49*  --   --   --   --  0.56  --  0.37*  --  0.36*  --  0.41*  --  0.41*   < >  --    GFRESTIMATED  --   --   --  >90  --   --   --   --  >90  --  >90  --  >90  --  >90  --  >90   < >  --    GLC  --  206* 97 166* 195* 195* 161*   < > 194*   < > 259*   < > 225*   < > 184*   < > 173*   < >  --    A1C  --   --   --   --   --   --   --   --   --   --   --   --   --   --   --   --   --   --  5.7*   MINISTERIO  --   --   --  7.8*  --   --   --   --  8.2*  --  8.2*  --  8.0*  --  7.9*  --  7.4*   < >  --    PHOS  --   --   --  3.2  --   --   --   --  3.7  --  3.4  --  2.8  --  2.0*  --  2.3*   < >  --    MAG  --   --   --  1.9  --   --   --   --  2.2  --  1.8  --  1.6  --  1.9  --  2.0   < >  --    LACT  --   --   --  0.8  --   --   --   --  1.1  --  1.3  --   --     < >  --    < >  --    < >  --      --   --   --   --   --   --   --   --   --   --   --   --   --   --   --   --   --   --     < > = values in this interval not displayed.       Hepatic Studies    Recent Labs   Lab Test 03/03/22  0509 03/02/22  0455 03/01/22 0318 02/28/22  0623 02/27/22  0732 02/26/22  0530   BILITOTAL 0.1* 0.2 0.2 0.4 0.2 0.3   ALKPHOS 121 153* 164* 117 106 89   ALBUMIN 1.8* 1.9* 2.0* 1.9* 1.9* 1.9*   AST 26 41 56* 36 41 33   ALT 99* 130* 140* 87* 79* 53*   * 344*  --   --   --   --        Pancreatitis testing    Recent Labs   Lab Test 03/25/19  0834   AMYLASE 63   TRIG 83       Hematology Studies      Recent Labs   Lab Test 03/03/22  1223 03/03/22  0631 03/03/22  0509 03/02/22 0455 03/01/22 0318 02/28/22  0623 02/27/22  0732 12/09/19  0923 03/25/19  0834   WBC  --  20.1* 19.7* 23.1* 21.2* 17.8* 14.6*   < > 8.1   ANEU  --   --   --   --   --   --   --   --  6.5   ALYM  --   --   --   --   --   --   --   --  1.0   EVAN  --   --   --   --   --   --   --   --  0.4   AEOS  --   --   --   --   --   --   --   --  0.1   HGB 8.2* 7.6* 6.9* 7.9* 8.9* 8.8* 8.9*   < > 13.4   HCT  --  24.5* 22.5* 25.9* 27.3* 27.0* 28.2*   < > 41.5   PLT  --  301 298 347 305 247 191   < > 207    < > = values in this interval not displayed.       Clotting Studies    Recent Labs   Lab Test 02/22/22  0355 02/21/22  0808 02/21/22  0714 02/21/22  0530 02/20/22  0617 02/20/22  0617 03/25/19  0834   INR 1.31* 1.58* 5.23* 1.96*   < > 1.02 0.96   PTT  --   --  106* 29  --  31 22    < > = values in this interval not displayed.       Arterial Blood Gas Testing    Recent Labs   Lab Test 03/03/22  0509 03/02/22  1430 03/02/22  0852 03/02/22  0455 03/01/22  2347 03/01/22  1731 02/23/22  0834 02/23/22  0347 02/22/22  1747 02/22/22  1304   PH  --   --   --   --  7.26* 7.34*  --  7.30* 7.33* 7.39   PCO2  --   --   --   --  98* 79*  --  54* 47* 35   PO2  --   --   --   --  96 149*  --  142* 98 184*   HCO3  --   --   --   --  43*  43*  --  27 25 21   O2PER 45 45 45 45 40 45   < > 2 30  30 40    < > = values in this interval not displayed.        Urine Studies     Recent Labs   Lab Test 03/01/22  1549 02/20/22  0248 03/25/19  1043   URINEPH 6.0 7.0 5.0   NITRITE Negative Negative Negative   LEUKEST Negative Negative Trace*   WBCU 0 <1 1       Tacrolimus levels    Invalid input(s): TACROLIMUS, TAC, TACR  Transplant Immunosuppression Labs Latest Ref Rng & Units 3/3/2022 3/3/2022 3/2/2022 3/1/2022 2/28/2022   Creat 0.52 - 1.04 mg/dL - 0.49(L) 0.56 0.37(L) 0.36(L)   BUN 7 - 30 mg/dL - 27 27 22 18   WBC 4.0 - 11.0 10e3/uL 20.1(H) 19.7(H) 23.1(H) 21.2(H) 17.8(H)   Neutrophil % 84 87 87 - -   ANEU 1.6 - 8.3 10e9/L - - - - -       Microbiology:  Blood cx negative.   Last check of C difficile  No results found for: CDBPCT    Virology:  CMV viral loads  No results found for: CMVQNT, CMVRESINST, 31655, 67421, 59960, 77345, CMVQAL  CMV viral loads  No lab results found.    CMV viral loads  No results found for: CMVQNT, CMVRESINST, CMVLOG, 32673, 99423, 84481, 98951    CMV resistance testing  No lab results found.  No results found for: CMVCID, CMVFOS, CMVGAN     No results found for: H6RES    No results found for: EBVDN, EBRES, EBVDN, EBVSP, EBVPC, EBVPCR    CMV Antibody IgG   Date Value Ref Range Status   02/20/2022 No detectable antibody. No detectable antibody.  Final   03/25/2019 0.2 0.0 - 0.8 AI Final     Comment:     Negative  Antibody index (AI) values reflect qualitative changes in antibody   concentration that cannot be directly associated with clinical condition or   disease state.         Toxoplasma Antibody IgG   Date Value Ref Range Status   03/25/2019 <3.0 0.0 - 7.1 IU/mL Final     Comment:     Negative- Absence of detectable Toxoplasma gondii IgG antibodies. A negative   result does not rule out acute infection.  The magnitude of the measured result is not indicative of the amount of   antibody present. The concentrations of  anti-Toxoplasma gondii IgG in a given   specimen determined with assays from different manufacturers can vary due to   differences in assay methods and reagent specificity.           Imaging:  CT c/a/p 3/2/22   IMPRESSION:   1. No central filling defect consistent with a pulmonary embolism.   2. Improvement in the bilateral hydropneumothorax. A right-sided chest  tube is located in the right major fissure. 2 left-sided chest tubes,  one in the apex and one in the left lung base.  3. Again noted is fluid and gas collection along the right hilum,  which may represent postsurgical fluid collection. Follow as needed to  exclude infection.  4. Bibasilar atelectasis with mucus plugging. Groundglass opacities in  the bilateral lung bases again noted.  IMPRESSION: Mild periportal edema. Cystic area in left hemipelvis,  likely adnexal in origin. Fluid-filled small bowel with distended  loops of bowel suggestive of enteritis/ileitis. Recommend continued  follow-up to exclude developing obstruction. Aortoiliac  atherosclerosis. Mild periportal edema with a benign focal fatty  infiltration in the liver. Soft tissue prominence surrounding the  common hepatic artery an above above the level of the pancreatic head,  differential of inflammation/infection as opposed to neoplasm.  Bilateral lung transplant. Possible postoperative changes versus  inflammation/infection no pericardial tissues and right infrahilar  tissues with right larger than left pleural effusions with associated  atelectasis, recommend follow-up to clearing to exclude infection.  CT chest 2/25/2022  Impression:   1. Increase in size of confluent hydropneumothorax with  intercommunication between both hemithoraces anteriorly, and  pneumomediastinum. The right-sided chest tube is located within the  major fissure and therefore may be nonfunctional. The left-sided chest  tube has been retracted and is in the most inferior aspect of the  anterior costophrenic sulcus  with a sidehole in the subcutaneous soft  tissues and therefore is nonfunctional.  2. Somewhat loculated appearing fluid and gas collection along the  right hilum measures up to 2 cm this may represents postsurgical fluid  collection short interval follow-up is recommended.  3. Bibasilar atelectasis and mucous plugging.         Ileana Ghosh MD  [facility]  Contact information available via [facility] Paging/Directory     03/03/2022

## 2022-03-03 NOTE — PROGRESS NOTES
Cardiovascular Surgery Progress Note  03/03/2022         Assessment and Plan:     Melissa Eldre is a 66 year old female with PMHx HTN, HLD, paroxysmal Afib, osteoporosis, GERD, severe COPD, previously requiring 2-3L NC O2 at rest. She underwent b/l lung transplant with Dr. Robin on 02/21. Got 1u pRBC post-op, no overt bleeding source, monitoring. Extubated 02/22 to O2 NC.     CVTS is following in consideration of the clamshell incision as well as chest tube management.     Leukocytosis  - WBC stable around 20.   - Consider pan-culture if febrile     Clamshell incision  - Continue to adhere to sternal precautions.  - Postoperative incision management, continue open to air but needs to be kept very dry under her breasts. Wound closed with skin glue in OR.  - Clamshell incision appears clean, dry. Incision appears without erythema, or drainage. Wound edges are well approximated.  - Encourage activity/OOB, IS/pulmonary toilet.  Maintain strict sleep hygiene and delirium precautions.     Chest tube management  - Continue to suction while in room, can ambulate off suction.  - Pericardial Removed 2/24/22  - L chest tube slowly slipped out, removed 2/25 due to new pneumothorax.   - IR placed 2 new Left pleural tubes 2/26. Consult IR for tube dysfunction and once removal is desired.   * * Please do not remove these tubes without discussing with IR * *  - Left Pleural output: 110 mL no air leak, serosanguinous output.  Consider asking IR for removal of anterior pleural tube.   - Right Pleural removed 3/3 am due to low output.  - Basilar RIGHT pleural effusion, please call IR for pigtail placement.    Discussed with Dr Robin through both written and verbal communication.      Luis A Nava PA-C  Cardiothoracic Surgery  Pager [phone]    9:01 AM   March 3, 2022        Interval History:     No overnight events.  States pain is well managed on current regimen. Slept well overnight.  Tolerating tube feeds, is  "passing flatus, + BM. No nausea or vomiting.  Denies chest pain, palpitations, dizziness, syncopal symptoms, fevers, chills, myalgias, or sternal popping/clicking.         Physical Exam:   Blood pressure 131/55, pulse 85, temperature 97.9  F (36.6  C), temperature source Axillary, resp. rate 24, height 1.575 m (5' 2\"), weight 73.5 kg (162 lb 0.6 oz), SpO2 98 %, not currently breastfeeding.  Vitals:    03/01/22 0334 03/02/22 0633 03/03/22 0627   Weight: 74.8 kg (164 lb 14.5 oz) 73.9 kg (162 lb 14.7 oz) 73.5 kg (162 lb 0.6 oz)      Gen: A&Ox4, NAD  Neuro: no focal deficits, conversational   CV: HD stable  Pulm: stable on BiPAP   Incision: clean, dry, intact, no erythema, sternum stable  Tubes/drain sites: dressing clean and dry, serosanguinous output, no air leaks from any tubes. 24 hr output minimal.      * removed R pleural tube without immediate complication          Data:    Imaging:  reviewed recent imaging, no acute concerns but does have R basilar pleural effusion that may be amenable to Pigtail catheter drainage.     Labs:  [facility]  Recent Labs   Lab 03/03/22  0743 03/03/22  0509 03/03/22  0403 03/02/22  2329 03/02/22  0933 03/02/22  0455 03/01/22  0802 03/01/22  0318 02/28/22  0727 02/28/22  0623   NA  --  140  --   --   --  140  --  137  --  138   POTASSIUM  --  4.8  --   --   --  4.8  --  4.4  --  4.2   CHLORIDE  --  98  --   --   --  98  --  97  --  99   MINISTERIO  --  7.8*  --   --   --  8.2*  --  8.2*  --  8.0*   CO2  --  40*  --   --   --  39*  --  40*  --  34*   BUN  --  27  --   --   --  27  --  22  --  18   CR  --  0.49*  --   --   --  0.56  --  0.37*  --  0.36*   GLC 97 166* 195* 195*   < > 194*   < > 259*   < > 225*    < > = values in this interval not displayed.     CBC  Recent Labs   Lab 03/03/22  0631 03/03/22  0509 03/02/22  0455 03/01/22  0318   WBC 20.1* 19.7* 23.1* 21.2*   RBC 2.61* 2.36* 2.73* 3.03*   HGB 7.6* 6.9* 7.9* 8.9*   HCT 24.5* 22.5* 25.9* 27.3*   MCV 94 95 95 90   MCH 29.1 29.2 28.9 29.4 "   MCHC 31.0* 30.7* 30.5* 32.6   RDW 13.8 13.7 13.8 13.6    298 347 305

## 2022-03-03 NOTE — CONSULTS
Interventional Radiology Inpatient Consult Service Note      ===  CONSULT DETAILS    Reason for Consult R sided pig tail into loculated effusion     Interventional Radiology Adult/Peds IP Consult: Patient to be seen: Routine within 24 hours; Call back #: [phone]; R sided pig tail into loculated effusion; Requesting provider? Attending physician [[id number]]    Electronically signed by: Sharri Townsend MD on 03/03/22 1137  Call back # [phone]     Is the patient on an anticoagulant ? No  Is the patient currently NPO ? No    INR - 1.31  Plts - 305  COVID - neg  AC - heparin q8h    Hx - [  S/p bilateral sequential lung transplant for COPD  Acute hypoxic/hypercapneic respiratory failure ]      ===  IMAGING    CT abdomen pelvis with contrast 3/2/22        ===  PLAN    Add-on TODAY; RIGHT chest tube placement, 14 Fr with stopcock.      CRISTOFER Bruno, PACharlesC  Interventional Radiology  IR pager [phone]

## 2022-03-03 NOTE — PROGRESS NOTES
Diabetes Consult Daily  Progress Note          Assessment/Plan:   Melissa Elder is a 66 year old female with PMHx HTN, HLD, paroxysmal Afib, osteoporosis, GERD, severe COPD, previously requiring 2-3L NC O2 at rest.  Underwent b/l lung transplant with Dr. Robin on 02/21  has ongoing issues with Hydropneumothorax, chest tubes in place. 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    1) Steroid and TF induced hyperglycemia   2) Underlying pre-DM, versus steroid diabetes              Plan:     Humulin N 12 units twice daily, currently 1200 and 2200, dosed for TF and prednisone--> increase night dose to 16 units     Novolog CHO coverage-- 1 unit per 15 grams    Novolog  custom correction 1 per 35 mg/dL Q4h     BG monitoring TID AC, HS, 0200    Hypoglycemia protocol  PRN D10W for hypoglycemia prevention if TF stops-- rate is  70 to replace about 70% of TF carbs                 Interval History:     Glycemic control over the last 24 hours was reviewed    On continuous tube feeds. Osmolite at 50-- provides about 10 grams carb/h.  No interruptions this morning prior to the .    Pred 15 mg BID    Yesterday BG dipped between 0400 and 0800 when TF stopped withOUT the start of D10W.    Worsened respiratory and mental status.  On BiPAP steadily.  WBC rise notable- higher yet.  May be just the typical for POD#10,  per pulm.        Expected Discharge: 03/08/2022   Anticipated discharge location: home;inpatient rehabilitation facility        Recent Labs   Lab 03/02/22  1516 03/02/22  1125 03/02/22  0933 03/02/22  0455 03/02/22  0444 03/02/22  0006   * 179* 150* 194* 180* 194*               Review of Systems:   See interval hx          Medications:     Orders Placed This Encounter      NPO per Anesthesia Guidelines for Procedure/Surgery Except for: No Exceptions    Physical Exam:  Gen: sleeping in bed NAD,   Resp: on BiPAP  Mental status: not easily aroused         Data:     Lab  Results   Component Value Date    A1C 5.7 02/22/2022    A1C 5.8 02/20/2022            Recent Labs   Lab Test 03/02/22  1516 03/02/22  1125 03/02/22  0933 03/02/22  0455 03/01/22  0802 03/01/22  0318   NA  --   --   --  140  --  137   POTASSIUM  --   --   --  4.8  --  4.4   CHLORIDE  --   --   --  98  --  97   CO2  --   --   --  39*  --  40*   ANIONGAP  --   --   --  3  --  <1*   * 179*   < > 194*   < > 259*   BUN  --   --   --  27  --  22   CR  --   --   --  0.56  --  0.37*   MINISTERIO  --   --   --  8.2*  --  8.2*    < > = values in this interval not displayed.     CBC RESULTS: Recent Labs   Lab Test 03/02/22  0455   WBC 23.1*   RBC 2.73*   HGB 7.9*   HCT 25.9*   MCV 95   MCH 28.9   MCHC 30.5*   RDW 13.8          I spent a total of 35 minutes bedside and on the inpatient unit managing the glycemic care of Melissa Elder. Over 50% of my time on the unit was spent counseling the patient  and/or coordinating care regarding acute TF/prednisone hyperglycemia plan.  See note for details.    Zita Dominguez APRN -[id number]  To contact Endocrine Diabetes service:   From 8AM-4PM: page inpatient diabetes provider that is following the patient that day (see filed or incomplete progress notes.consult notes).  If uncertain of provider assignment: page job code [phone].  For questions or updates from 4PM-8AM: page the diabetes job code for on call fellow: [phone]    Please notify inpatient diabetes service if changes are planned to steroids, enteral feeding, parenteral feeding, or if procedures are planned requiring prolonged NPO status.

## 2022-03-03 NOTE — PROGRESS NOTES
[facility]    Medicine Progress Note - Hospitalist Service, GOLD TEAM 10    Date of Admission:  2/20/2022    Assessment & Plan            Melissa Elder is a 66 year old female with PMHx HTN, HLD, paroxysmal Afib, osteoporosis, GERD, severe COPD, previously requiring 2-3L NC O2 at rest.  Underwent b/l lung transplant with Dr. Robin on 02/21. Got 1u pRBC post-op, no overt bleeding source, monitoring. Extubated 02//22 to O2 NC and transferred to the floor. Pericardial drain pulled 2/24/22 without issue. Continuing on antibiotics for cultures growing from donor and recipient lungs. Has bilateral pleural effusions and anterior hydropneumothorax requiring 3 chest tubes currently.      Changes today:  - Hgb 6.9 but on repeat 7.6 and 8.2 so blood transfusion cancelled  - R pleural chest tube removed 3/3 by CT surgery; recommend IR consult for R pleural chest tube placement  - Continue BiPAP, can take breaks during the day, okay to eat, should be on all night   - Check CK to eval for steroid myopathy/myopathy of critical illness possibly contributing to hypercapnea   - Continue diuresis, 40 mg IV Lasix today       S/p bilateral sequential lung transplant for COPD  Acute hypoxic/hypercapneic respiratory failure  Donor lung growing MSSA   Actinomyces in respiratory culture  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  Pressor weaned off 2/22 and pt. extubated. Prior history of infection/colonization with Haemophilus influenzae (2017).  IgG adequate (2/20).  MRSA nares negative 2/21.  Broad spectrum ABX empirically post-op with vanc and ceftaz (2/21-2/22), stopped after 24h per Dr. Lala to target known donor cultures on POD #1. Donor cultures ([facility]) with Strep mitis, Actinomyces odontolyticus, and Kenyatta kruseii. Recipient cultures with Actinomyces odonotolyticus.  So currently on ceftriaxone for  coverage.   - Continue BiPAP (discussed below)  - Nebs: levalbuterol and Mucomyst QID  - Aggressive pulmonary toilet with chest physiotherapy QID as well as Aerobika and incentive spirometry q1h w/a  - Wean supplemental O2 to keep >92%  - Chest tubes managed by surgical team   - Right pleural chest tube by CT surgery   - Two left pleural tubes placed 2/26 by IR   - IR consult for R chest tube placement   - Daily CXR  - Tube feeding per nutrition   - Await explant pathology  - Continue ceftriaxone for 2 weeks through 3/8 followed by amoxicillin for 3 months  - Immune suppression and microbial ppx per daily transplant pulm note  - Continue Valtrex to 1000 mg TID x 14 days for HSV (through 3/11) then complete ppx per protocol (see pulm note)   - Continue intermittent diuresis, give 40 mg Lasix today     Hypercarbic respiratory failure 3/1  3/1 noted to have slowly rising bicarb on daily labs. Had inspection bronchoscopy 3/1 as well and became oversedated and required multiple doses of narcan. VBG obtained post procedure showed hypercapnea to 91, which did not improve with time after patient had woken up more (repeat CO2 99). Started on BiPAP.   - Continue BiPAP, follow VBG  - CT Chest negative for PE, improvement in b/l hydropneumothorax, fluid and gas collection along the right hilum (?) post-surgical change vs. Infection, and bibasilar atelectasis with mucous plugging with GGO bilateral lung bases     Acute toxic metabolic encephalopathy, likely due to hypercapnea  Intermittent somnolence, hallucinations starting around 3/1 with rise in CO2. Ammonia level checked due to concern for post-transplant hyperammonemia which was mildly elevated at 57, but repeat 49.   - Mycoplasma/ureaplasma cultures collected and Transplant ID consulted     Leukocytosis  Afebrile, but WBC started rising 2/27 from 14.6 to 23.1 3/2. Concern for possible lung source given increased hypercapnea and need for BiPAP. CT Chest/A/P check and  pan-cultured. Transplant ID consulted.   - CT chest with findings as above  - CT abdomen shows mild periportal edema, fluid filled small bowel with distended loops suggestive of enteritis/ileitis, soft tissue prominence surrounding the common hepatic artery above the level of the pancreatic head, ddx inflammation/infection vs. Malignancy.    - Radiology report recommends continued follow up to exclude developing obstruction. Will closely monitor abdominal exam and stool output.   - Blood and pleural fluid cultures obtained and NGTD     Non infectious diarrhea, resolved    Noted to have increased loose stools 2/25 likely due to mmf and TF. Fiber added to TF with improvement.     Post op pain   - Erector spinae catheters removed   - gabapentin 100 mg nightly  - tylenol 975 mg Q8H   - Dilaudid 0.2-0.4 mg Q2H PRN   - oxycodone 5-10 mg Q4H PRN   - robaxin 500 mg Q6H PRN     Paroxysmal Afib   She was on diltiazem prior to lung transplantation and had not been on anticoagulation. She was  transitioned to metoprolol in the ICU.  - Continue Metoprolol tartrate to 25 mg BID   - Restart PTA diltiazem at lower dose (PTA on 240 mg XL) for better BP/HR control. Start at 30mg Q6hr and monitor.   - No anticoagulation at this time     Stress induced hyperglycemia  Did not need insulin prior to admission but sugars have been rising despite sliding scale coverage.  - Endocrinology consulted for assistance, appreciate recommendations   - Please see DM team daily note     HTN   Hx of htn but was required pressors in ICU.  - Will continue to monitor     HLD  Mild CAD   Noted on transplant workup.   - started rosuvastatin 40 mg daily--Held 2/28 due to rising LFTs      Acute blood loss anemia  Acute blood loss thrombocytopenia  Secondary to surgery. Will continue to monitor   - Transfuse Hgb < 7, platelets < 50     Sternotomy  Surgical Incision  - Sternal precautions  - Postoperative incision management per protocol  - PT/OT/CR    Elevated  "LFTs  Noted on labs 2/26 and rising. AST, ALP, Bili WNL. No RUQ or abdominal pain. Will continue to monitor.  - Stable to slightly improved today   - CMP daily   -Consider viral studies and RUQ US  if LFTs continue to rise  - Hold statin        Diet: Snacks/Supplements Adult: Glucerna; Between Meals  Adult Formula Drip Feeding: Continuous Osmolite 1.5; Nasoduodenal tube; Goal Rate: 50; mL/hr; Medication - Feeding Tube Flush Frequency: At least 15-30 mL water before and after medication administration and with tube clogging; Amount to Send (Nut...  NPO per Anesthesia Guidelines for Procedure/Surgery Except for: Meds, Ice Chips    DVT Prophylaxis: Pneumatic Compression Devices  [place] Catheter: Not present  Central Lines: None  Cardiac Monitoring: None  Code Status: Full Code      Disposition Plan   Expected Discharge: 03/08/2022   Anticipated discharge location: home;inpatient rehabilitation facility    Delays:            The patient's care was discussed with the Bedside Nurse, Patient and transplant pulm  Consultant.    Sharri Townsend MD  Hospitalist Service, GOLD TEAM [address] [place] [address]  Securely message with the Vocera Web Console (learn more here)  Text page via [facility] Paging/Directory   Please see signed in provider for up to date coverage information      Clinically Significant Risk Factors Present on Admission                    ______________________________________________________________________    Interval History     Feeling better this morning  Would love a break from bipap   Denies abdominal pain, nausea, or vomiting  Wants to try eating   Breathing feels \"ok\"     Four point ROS completed and negative unless listed above     Data reviewed today: I reviewed all medications, new labs and imaging results over the last 24 hours.     Physical Exam   Vital Signs: Temp: 97.5  F (36.4  C) Temp src: Oral BP: 125/73 Pulse: 98   Resp: 24 SpO2: 96 % O2 Device: BiPAP/CPAP " Oxygen Delivery: 4 LPM  Weight: 162 lbs .61 oz    Constitutional: Sitting up in bed, awake and alert   HEENT: MMM. Sclera clear.   PULM: Diminished bases bilaterally.  No crackles, no rhonchi, no wheezes.  Chest tubes in place.  CV: Normal S1 and S2.  Tachycardia.  No murmur, gallop, or rub.  1-2+ bilateral LE edema   ABD: BS hypoactive, soft, nontender, nondistended.    MSK: Moves all extremities.  No apparent muscle wasting.   NEURO: Alert and oriented, conversant.   SKIN: Warm, dry.  No rash on limited exam.   PSYCH: Mood stable.     Data   Recent Labs   Lab 03/03/22  0509 03/03/22  0403 03/02/22  2329 03/02/22  0933 03/02/22  0455 03/01/22  0802 03/01/22  0318   WBC 19.7*  --   --   --  23.1*  --  21.2*   HGB 6.9*  --   --   --  7.9*  --  8.9*   MCV 95  --   --   --  95  --  90     --   --   --  347  --  305     --   --   --  140  --  137   POTASSIUM 4.8  --   --   --  4.8  --  4.4   CHLORIDE 98  --   --   --  98  --  97   CO2 40*  --   --   --  39*  --  40*   BUN 27  --   --   --  27  --  22   CR 0.49*  --   --   --  0.56  --  0.37*   ANIONGAP 2*  --   --   --  3  --  <1*   MINISTERIO 7.8*  --   --   --  8.2*  --  8.2*   * 195* 195*   < > 194*   < > 259*   ALBUMIN 1.8*  --   --   --  1.9*  --  2.0*   PROTTOTAL 4.8*  --   --   --  5.3*  --  5.7*   BILITOTAL 0.1*  --   --   --  0.2  --  0.2   ALKPHOS 121  --   --   --  153*  --  164*   ALT 99*  --   --   --  130*  --  140*   AST 26  --   --   --  41  --  56*    < > = values in this interval not displayed.     Recent Results (from the past 24 hour(s))   CT Chest Pulmonary Embolism w Contrast    Narrative    EXAMINATION: CT CHEST PULMONARY EMBOLISM W CONTRAST on 3/2/2022 5:44  PM.    INDICATION: PE suspected, high prob; Needs CT Chest with and without  contrast to look at lung parenchyma per Pulm    COMPARISON: CT chest 2/26/2022.    TECHNIQUE: CT imaging obtained through the chest with contrast.  Coronal and axial MIP reformatted images obtained.  Three-dimensional  (3D) post-processed angiographic images were reconstructed, archived  to PACS and used in interpretation of this study.     CONTRAST: 99 ml isovue 370 IV    FINDINGS:    Lines and tubes: Right-sided chest tube with the tip in the right  major fissure. 2 left-sided chest tubes, one with the tip in the  superior left lung, one at the base. Enteric tube with the tip looping  around in the stomach.    Vessels: No central filling defect consistent with a pulmonary  embolism. Main pulmonary artery normal caliber of 3.0 cm. No right  heart strain. No reflux of contrast. No aortic aneurysm.     Mediastinum: No thyroid nodules. Mucous plugging within the lower  lobes bilaterally. Heart size is within normal limits. There is a  trace pericardial effusion.  Normal thoracic vasculature.  Atherosclerotic calcifications in the thoracic aorta. Mild prominent  mediastinal lymph nodes, likely reactive.     Lungs: Postoperative changes of bilateral lung transplant. Clamshell  sternotomy wires are intact. Again noted is a small fluid collection  containing gas in the right hilum. Improvement in the bilateral  hydropneumothorax. Small right greater than left pleural effusions  with associated bibasilar atelectasis. Groundglass opacities in the  lung bases.    Bones and soft tissues: No suspicious bone findings.     Upper abdomen: Limited.      Impression    IMPRESSION:   1. No central filling defect consistent with a pulmonary embolism.     2. Improvement in the bilateral hydropneumothorax. A right-sided chest  tube is located in the right major fissure. 2 left-sided chest tubes,  one in the apex and one in the left lung base.    3. Again noted is fluid and gas collection along the right hilum,  which may represent postsurgical fluid collection. Follow as needed to  exclude infection.    4. Bibasilar atelectasis with mucus plugging. Groundglass opacities in  the bilateral lung bases again noted.    I have personally  reviewed the examination and initial interpretation  and I agree with the findings.    FELIPA MARIE MD         SYSTEM ID:  [id number]   CT Abdomen Pelvis w Contrast    Narrative    CT abdomen pelvis with contrast    INDICATION: Checking for intra-abdominal infection post transplant    COMPARISON: CT chest pulmonary angiogram today comment please  correlate with that report. No recent abdomen CT.    FINDINGS: 99 mL intravenous Isovue 370 contrast. Right larger than  left pleural effusions. Associated bilateral atelectasis, recommend  follow-up to clearing to exclude infection. Thoracostomy tube on the  left. Benign focal fatty infiltration in the liver. Periportal edema.  Right thoracostomy tube. Feeding tube tip in the jejunum.  Calcification in the aorta. Iliac atherosclerosis. Fluid-filled small  bowel pelvis could indicate ileus and/or enteritis. Well-circumscribed  pelvic cystic area measuring 8.6 x 5.2 cm. Urinary bladder appears  normal. Pancreas appears grossly normal. Spleen appears normal.  Bilateral adrenals appear normal. 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 Mild body wall edema which may  indicate malnutrition/anasarca. No dominant lymphadenopathy. Bones  show osteopenia. Benign-appearing hemangioma of L2 as well as T12 and  T9.  Also noted is thickening in the right pericardium an right perihilar  region which could be fluid with without inflammation/infection. Air  density noted (series 3 image 13) which could represent small abscess  or necrotic tissue. Gallbladder incompletely distended. Soft tissue  stranding surrounding common hepatic artery adjacent to but above the  pancreatic head pelvis could indicate inflammatory or fibrotic tissue.  Follow-up to clearing however is recommended to exclude underlying  neoplasm. Bilateral lung transplant.      Impression    IMPRESSION: Mild periportal edema. Cystic area in left hemipelvis,  likely adnexal in origin.  Fluid-filled small bowel with distended  loops of bowel suggestive of enteritis/ileitis. Recommend continued  follow-up to exclude developing obstruction. Aortoiliac  atherosclerosis. Mild periportal edema with a benign focal fatty  infiltration in the liver. Soft tissue prominence surrounding the  common hepatic artery an above above the level of the pancreatic head,  differential of inflammation/infection as opposed to neoplasm.  Bilateral lung transplant. Possible postoperative changes versus  inflammation/infection no pericardial tissues and right infrahilar  tissues with right larger than left pleural effusions with associated  atelectasis, recommend follow-up to clearing to exclude infection.    FELIPA MARIE MD         SYSTEM ID:  [id number]     Medications     dextrose Stopped (03/01/22 1125)     - MEDICATION INSTRUCTIONS -       - MEDICATION INSTRUCTIONS -         acetylcysteine  2 mL Nebulization 4x Daily     aspirin  81 mg Oral Daily     calcium carbonate 600 mg-vitamin D 400 units  1 tablet Oral BID w/meals     cefTRIAXone  2 g Intravenous Q24H     dapsone  50 mg Oral Daily     diltiazem  30 mg Oral Q6H JUANA     famotidine  10 mg Oral or Feeding Tube BID     fiber modular (NUTRISOURCE FIBER)  1 packet Per Feeding Tube TID     gabapentin  100 mg Oral or Feeding Tube At Bedtime     [Held by provider] heparin ANTICOAGULANT  5,000 Units Subcutaneous Q8H     insulin aspart  1-9 Units Subcutaneous Q4H     insulin aspart   Subcutaneous TID w/meals     insulin NPH  12 Units Subcutaneous Q24H     insulin NPH  16 Units Subcutaneous At Bedtime     levalbuterol  1.25 mg Nebulization Once     levalbuterol  1.25 mg Nebulization 4x Daily     loratadine  10 mg Oral Daily     metoprolol tartrate  25 mg Oral or Feeding Tube BID     multivitamins w/minerals  15 mL Oral Daily     mycophenolate  1,000 mg Oral BID    Or     mycophenolate  1,000 mg Oral or NG Tube BID     nystatin  1,000,000 Units Swish & Swallow 4x Daily      pantoprazole  40 mg Oral or Feeding Tube Daily     predniSONE  15 mg Oral or Feeding Tube BID     protein modular  1 packet Per Feeding Tube TID     [Held by provider] rosuvastatin  40 mg Oral Daily     sodium chloride (PF)  3 mL Intracatheter Q8H     tacrolimus  5 mg Per Feeding Tube BID IS    Or     tacrolimus  5 mg Oral BID IS     valACYclovir  1,000 mg Oral Q8H

## 2022-03-03 NOTE — PROGRESS NOTES
Patient Name: Melissa Elder  Medical Record Number: [id number]  Today's Date: 3/3/2022    Procedure: Right Sided Chest Tube Placement  Proceduralist: Leo Mckeon PA-C  Pathology present: N/A    Procedure Start: 1453  Procedure end: 1504  Sedation medications administered: N/A     Report given to: Bernice ESTRADA RN  : N/A    Other Notes: Pt arrived to IR room 3 from 6B. Consent reviewed. Pt denies any questions or concerns regarding procedure. Pt positioned sitting and monitored per protocol. Pt tolerated procedure without any noted complications. Pt transferred back to 6B.

## 2022-03-03 NOTE — PROVIDER NOTIFICATION
-------------------CRITICAL LAB VALUE-------------------    Lab Value: Hgb 6.9 and PCO2 76  Time of notification: 5:45 AM  MD notified: Dr. Rubio   Patient status: Stable   Temp:  [97.5  F (36.4  C)-99  F (37.2  C)] 97.5  F (36.4  C)  Pulse:  [] 98  Resp:  [24-26] 24  BP: (114-138)/(48-87) 138/57  FiO2 (%):  [45 %] 45 %  SpO2:  [95 %-100 %] 96 %  Orders received: Prepare and tranfuse 1 unit PRBC   Continue BiPAP

## 2022-03-03 NOTE — PROCEDURES
Interventional Radiology Brief Post Procedure Note    Procedure: IR CHEST TUBE PLACEMENT NON-TUNNELED RIGHT    Proceduralist: Leo Mckeon PA-C    Assistant: None    Time Out: Prior to the start of the procedure and with procedural staff participation, I verbally confirmed the patient s identity using two indicators, relevant allergies, that the procedure was appropriate and matched the consent or emergent situation, and that the correct equipment/implants were available. Immediately prior to starting the procedure I conducted the Time Out with the procedural staff and re-confirmed the patient s name, procedure, and site/side. (The Joint Commission universal protocol was followed.)  Yes    Medications   Medication Event Details Admin User Admin Time       Sedation: None. Local Anesthestic used    Findings: Completed image guided non-tunneled 14 Finnish chest tube placement on the right. Patient tolerated the procedure well. No immediate complication.     Estimated Blood Loss: Minimal    SPECIMENS: None    Complications: 1. None     Condition: Stable    Plan: Follow-up per primary team.     Comments: See dictated procedure note for full details.    Leo Mckeon PA-C

## 2022-03-03 NOTE — PROGRESS NOTES
Pulmonary Medicine  Cystic Fibrosis - Lung Transplant Team  Progress Note  2022       Patient: Melissa Elder  MRN: [id number]  : 1955 (age 66 year old)  Transplant: 2022 (Lung), POD#10  Admission date: 2022    Assessment & Plan:     Melissa Elder is a 66 year old female with a PMH significant for severe COPD, HTN, mild non-obstructive CAD, paroxysmal afib, osteoporosis, GERD, and colonic polyps.  Pt. is now s/p BSLT on 22, surgery relatively uncomplicated.  The patient was extubated , post-op course complicated by right chest tube lodged into fissure and left chest tube displacement s/p bilateral pigtail chest tube placement in IR to drain anterior hydropneumothorax and bilateral effusions.  Routine inspection bronch on 3/1 complicated by hypoventilation in setting of oversedation requiring multiple Narcan doses.  Slow improvement in hypercapnia, continues to require NIPPV.     Today's recommendations:  - Continue aggressive airway clearance  - Strict BiPAP overnight and with daytime naps, prn during the day (may transition to HFNC for PO meds or diet)  - VBG daily in the mornings for now (and additionally prn)   - IR has requested to be contacted prior to removal of left chest tube(s)  - IR consult for new right pigtail chest tube with labs today (ordered for you)  - CXR daily with chest tubes  - SNIFF test today to evaluate diaphragms (grossly normal)  - Tacrolimus level today to trend, therapeutic, repeat level 3/5 (ordered)  - Prednisone taper due 3/8 (not yet ordered)  - Ceftriaxone through 3/8, then transition to amoxicillin for total of 3 months course (through ) for Actinomyces treatment  - Low threshold to treat Candida if it recurs in respiratory cultures, per ID  - Valacyclovir through 3/12, then resume acyclovir prophylaxis through 3/23 per protocol  - Ureaplasma and mycoplasma studies (urine and pleural) pending  - BDG fungitell and Aspergillus galactomannan  pending  - RUQ US today     COPD s/p bilateral sequential lung transplant (BSLT):  Acute hypoxic/hypercapneic respiratory failure:   Bilateral pleural effusions:   Right apical PTX: Scant pleural-pleural adhesions and mild-moderate bilateral hilar lymphadenopathy per op note.  Pathology with CPFE.  Pressor weaned off and pt. extubated on 2/22.  Left chest tube inadvertently dislodged, f/u chest CT (2/25) with anterior hydroPTX, right chest tube in fissure.  Left chest tube removed and replaced with 2 left-sided pigtail chest tubes by IR (2/26).  DSA negative 2/28.  Hypoxia had generally resolved, only intermittently requiring supplemental oxygen up until bronch 3/1.  S/p bronch 3/1 without evidence of dehiscence, mild amount of thin pale/tan secretions noted in RLL bronchus.  Noted hypoventilating with oversedation during bronch, received Narcan x3 with improvement in sedation but hypercapnea initially severe (>90) and persisting with very gradual improvements.  Chest CT (3/2) with improved bilateral hydroPTX, bibasilar atectasis with mucous plugging, and increased right loculated pleural effusion (personally reviewed).  - Nebs: levalbuterol and Mucomyst QID  - Aggressive pulmonary toilet with chest physiotherapy QID as well as Aerobika and incentive spirometry q1h w/a  - Strict BiPAP overnight and with daytime naps, prn during the day (may transition to HFNC for PO meds or diet)  - VBG daily in the mornings for now (and additionally prn)   - DSA 3/21 (not yet ordered)  - Chest tube management by CVTS, IR has requested to be contacted prior to removal of left chest tube(s)  - IR consult for new right pigtail chest tube with labs today (ordered for you)  - CXR daily with chest tubes, today with continued small bibasilar effusions (personally reviewed)  - SNIFF test today to evaluate diaphragms (personally reviewed, sluggish bilaterally but grossly normal)  - Volume management per primary team  - Post-pyloric nasal  FT, TF per RD and primary team  - SLP following for diet advacement     Immunosuppression:  S/p induction therapy with basiliximab x2 doses (with high dose IV steroid).  - Tacrolimus 5 mg BID (decreased 3/2).  Goal level 8-12.  Level today to trend, therapeutic.  Steady state level 3/5 (ordered).   - MMF 1000 mg BID (decreased 2/25 due to diarrhea)  - Prednisone 15 mg BID with taper per lung transplant protocol (due 3/8, not yet ordered):  Date AM dose (mg) PM dose (mg)   3/1 15 15   3/8 15 12.5   3/15 12.5 12.5   3/29 12.5 10   4/12 10 10   5/10 10 7.5   6/7 7.5 7.5   7/5 7.5 5   8/2 5 5   8/30 5 2.5      Prophylaxis:   - Dapsone for PJP ppx (started 2/23 at decreased MWF dose and increased to daily 3/1, G6PD 13.3 2/21)  - Nystatin for oral candidiasis ppx, 6 month course  - See below for serologies and viral ppx:    Donor Recipient Intervention   CMV status Negative Negative CMV monthly (3/21, not yet ordered)   EBV status Positive Positive EBV at one month (3/21, not yet ordered)   HSV status N/A (Newly) Positive As below      ID: Prior history of infection/colonization with Haemophilus influenzae (2017).  Broad spectrum ABX empirically post-op with vanc and ceftaz (2/21-2/22), stopped after 24h to target known donor cultures at that time on POD #1 per Dr. Lala (transitioned to cefazolin).  Broadened again on POD #2 with culture updates as below.  Donor ([facility]) cultures with P-S MSSA; ([facility]) with Strep mitis, Actinomyces odontolyticus, MSSA x2, and C. kruseii (concern for possible aspiration).  Recipient cultures at time of transplant with Actinomyces odonotolyticus.  Bronch cultures (2/22) with Saccharomyces cerevisiae (no indication to treat per Dr. Ghosh unless pt. acutely declines clinically, 3/1) and C. albicans.  - IgG repeat at one month (3/21, not yet ordered)  - ABX: ceftriaxone (2/23-3/8), then transition to amoxicillin for total of 3 months course (through 5/23) for Actinomyces treatment  - Low  threshold to treat Candida if it recurs in respiratory cultures, per ID     HSV: Pt. with recent seroconversion at time of transplant.  HSV also noted on donor cultures.  Initial plan for 30 days of ppx with ACV post-op per Dr. Lala.  Then with HSV+ bronch at time of transplant (2/21), transitioned to treatment dosing with VACV  - Valacyclovir 1000 mg TID X 14d (2/27-3/12), then resume acyclovir prophylaxis (3/13-3/23) per protocol     Hyperammonemia: Ammonia initially normal (43) on 2/24.  Had been somnolent with some confusion/visual hallucinations in setting of hypercapnia as above.  Repeat ammonia mildly elevated on 3/2 but quickly normalized, clinical relevance unclear.  - Repeat ammonia level PRN with change in mentation  - Ureaplasma and mycoplasma studies (urine and pleural) pending     Leukocytosis: WBC trending upward now on POD #10, frequently noted at this stage post-op (pathology not entirely clear, but may be at least partially d/t bone marrow surge post-transplant).  Procal moderately elevated at 0.18, but may still be somewhat non-specific at this point post-op.  - Blood cultures (3/1) NGTD  - Sputum cultures (3/2) NGTD  - Pleural cultures (3/2, 3/3) NGTD  - BDG fugitell and Aspergillus galactomannan pending (3/3)  - ABX as above     PHS risk criteria donor: Additional labs required post-transplant (between 4-8 weeks post-op): Hepatitis B, Hepatitis C, and HIV by COLT ([id number], ordered POD #30), also plan to repeat hepatitis B surface antibody at that time (ordered) given discrepancy with recent result.     Other relevant problems managed by primary team:     Diarrhea: Likely 2/2 medications and tube feedings.  Fiber added to tube feeds.  MMF decreased as above.  Loose stools now improved.    - Will consider transition to Myfortic if loose stools increase again, defer for now     Increasing LFTS: Rising 3/1 despite medication adjustments (APAP and statin stopped 2/27).  Of note, pt. had a discrepant  hep B surface Ab at time of transplant as above.  LFTs remain elevated although improved 3/2.  Also with elevated ammonia as above.  RUQ US (3/3) with only hepatic steatosis.  LFTs gradually improving.  - Daily hepatic panel     CAD: Noted to have mild non-obstructive CAD without hemodynamically significant lesions on cardiac cath 3/27/19.  PTA ASA 81 mg and atorvastatin, started on rosuvastatin post-op but held 2/28 for elevated LFTs (as above).  ASA resumed 3/1.  - Management per primary team     Paroxysmal afib:   HTN: PTA diltiazem, not on AC.  Post-op tachycardia, off pressor since 2/22.  Metoprolol started 2/23.  Persistent low level tachycardia, but currently sinus tach with continued HTN.  Diltiazem resumed 3/2.  - Tacrolimus monitoring as above (with diltiazem)  - Management per primary team     GERD: Not on PPI PTA.  Negative pH/manometry study 3/28/19, noted small hiatal hernia.   - PPI daily     We appreciate the excellent care provided by the [place] [id number] team.  Recommendations communicated via in person rounding and this note.  Will continue to follow along closely, please do not hesitate to call with any questions or concerns.     Patient discussed with Dr. Knox.    Iman Jane, DNP, APRN, CNP  Inpatient Nurse Practitioner  Pulmonary CF/Transplant     Subjective & Interval History:     Stable on BiPAP 18/6 35% this morning, was able to tolerate 1 hour of NC earlier this morning without issue.  Minimal cough and almost no sputum production, cough effort remains weak.  Mental status remains stable, no reports of hallucinations.  Chest tubes (x3) with notable decrease in output, no air leak.  Appetite remains poor, also limited by BiPAP, but tolerating TF with minimal nausea and stable infrequent loose stools.    Review of Systems:     C: No fever, no chills, no change in weight  INTEGUMENTARY/SKIN: No rash or obvious new lesions  ENT/MOUTH: No sore throat, no sinus pain, no nasal congestion or  "drainage  RESP: See interval history  CV: No chest pain, no palpitations, + peripheral edema, no orthopnea  GI: No reflux symptoms  : No dysuria  MUSCULOSKELETAL: No myalgias, no arthralgias  ENDOCRINE: Blood sugars intermittently elevated  NEURO: No headache, no numbness or tingling  PSYCHIATRIC: Mood stable    Physical Exam:     All notes, images, and labs from past 24 hours (at minimum) were reviewed.    Vital signs:  Temp: 98  F (36.7  C) Temp src: Oral BP: 113/56 Pulse: 105   Resp: 24 SpO2: 95 % O2 Device: High Flow Nasal Cannula (HFNC) Oxygen Delivery: 35 LPM Height: 157.5 cm (5' 2\") Weight: 73.5 kg (162 lb 0.6 oz)  I/O:     Intake/Output Summary (Last 24 hours) at 3/3/2022 1444  Last data filed at 3/3/2022 1400  Gross per 24 hour   Intake 2230 ml   Output 1285 ml   Net 945 ml     Constitutional: Lying in bed, in no apparent distress.   HEENT: Eyes with pink conjunctivae, anicteric.  Oral mucosa moist without lesions.    PULM: Very diminished air flow bilaterally to mid and lower lung fields.  No crackles, no rhonchi, no wheezes.  Non-labored breathing on BiPAP 18/6 35%.  CV: Normal S1 and S2.  RRR.  No murmur, gallop, or rub.  + generalized edema.   ABD: NABS, soft, nontender, nondistended.    MSK: Moves all extremities.    NEURO: Alert and oriented, conversant.   SKIN: Warm, dry.  No rash on limited exam.   PSYCH: Mood stable.     Lines, Drains, and Devices:  Peripheral IV 02/28/22 Left;Lateral Lower forearm (Active)   Site Assessment [facility] 03/03/22 1200   Line Status Infusing 03/03/22 1200   Dressing Intervention New dressing  02/28/22 0051   Phlebitis Scale 0-->no symptoms 03/03/22 1200   Infiltration Scale 0 03/03/22 1200   Number of days: 3       Peripheral IV 03/02/22 Right;Anterior;Lateral Upper forearm (Active)   Site Assessment [facility] 03/03/22 1200   Line Status Saline locked 03/03/22 1200   Phlebitis Scale 0-->no symptoms 03/03/22 1200   Infiltration Scale 0 03/03/22 1200   Number of days: 1     Data: "     LABS    CMP:   Recent Labs   Lab 03/03/22  1158 03/03/22  0743 03/03/22  0509 03/03/22  0403 03/02/22  0933 03/02/22  0455 03/01/22  0802 03/01/22  0318 02/28/22  0727 02/28/22  0623   NA  --   --  140  --   --  140  --  137  --  138   POTASSIUM  --   --  4.8  --   --  4.8  --  4.4  --  4.2   CHLORIDE  --   --  98  --   --  98  --  97  --  99   CO2  --   --  40*  --   --  39*  --  40*  --  34*   ANIONGAP  --   --  2*  --   --  3  --  <1*  --  5   * 97 166* 195*   < > 194*   < > 259*   < > 225*   BUN  --   --  27  --   --  27  --  22  --  18   CR  --   --  0.49*  --   --  0.56  --  0.37*  --  0.36*   GFRESTIMATED  --   --  >90  --   --  >90  --  >90  --  >90   MINISTERIO  --   --  7.8*  --   --  8.2*  --  8.2*  --  8.0*   MAG  --   --  1.9  --   --  2.2  --  1.8  --  1.6   PHOS  --   --  3.2  --   --  3.7  --  3.4  --  2.8   PROTTOTAL  --   --  4.8*  --   --  5.3*  --  5.7*  --  5.2*   ALBUMIN  --   --  1.8*  --   --  1.9*  --  2.0*  --  1.9*   BILITOTAL  --   --  0.1*  --   --  0.2  --  0.2  --  0.4   ALKPHOS  --   --  121  --   --  153*  --  164*  --  117   AST  --   --  26  --   --  41  --  56*  --  36   ALT  --   --  99*  --   --  130*  --  140*  --  87*    < > = values in this interval not displayed.     CBC:   Recent Labs   Lab 03/03/22  1223 03/03/22  0631 03/03/22  0509 03/02/22  0455 03/01/22  0318   WBC  --  20.1* 19.7* 23.1* 21.2*   RBC  --  2.61* 2.36* 2.73* 3.03*   HGB 8.2* 7.6* 6.9* 7.9* 8.9*   HCT  --  24.5* 22.5* 25.9* 27.3*   MCV  --  94 95 95 90   MCH  --  29.1 29.2 28.9 29.4   MCHC  --  31.0* 30.7* 30.5* 32.6   RDW  --  13.8 13.7 13.8 13.6   PLT  --  301 298 347 305       INR: No lab results found in last 7 days.    Glucose:   Recent Labs   Lab 03/03/22  1158 03/03/22  0743 03/03/22  0509 03/03/22  0403 03/02/22  2329 03/02/22  2037   * 97 166* 195* 195* 161*       Blood Gas:   Recent Labs   Lab 03/03/22  0509 03/02/22  1430 03/02/22  0852   PHV 7.37 7.34 7.35   PCO2V 76* 81* 81*   PO2V  113* 22* 27   HCO3V 44* 43* 44*   ARIEL 16.6* 15.9* 17.0*   O2PER 45 45 45       Culture Data No results for input(s): CULT in the last 168 hours.    Virology Data: No results found for: INFLUA, FLUAH1, FLUAH3, LT9384, IFLUB, RSVA, RSVB, PIV1, PIV2, PIV3, HMPV, HRVS, ADVBE, ADVC    Historical CMV results (last 3 of prior testing):  No results found for: CMVQNT  No results found for: CMVLOG    Urine Studies    Recent Labs   Lab Test 03/01/22  1549 02/20/22  0248   URINEPH 6.0 7.0   NITRITE Negative Negative   LEUKEST Negative Negative   WBCU 0 <1       Most Recent [place] Pulmonary Function Testing (FVC/FEV1 only)  FVC-Pre   Date Value Ref Range Status   12/20/2021 1.81 L    06/21/2021 1.46 L    12/09/2019 1.59 L    06/03/2019 1.68 L      FVC-%Pred-Pre   Date Value Ref Range Status   12/20/2021 65 %    06/21/2021 52 %    12/09/2019 56 %    06/03/2019 58 %      FEV1-Pre   Date Value Ref Range Status   12/20/2021 0.49 L    06/21/2021 0.50 L    12/09/2019 0.49 L    06/03/2019 0.47 L      FEV1-%Pred-Pre   Date Value Ref Range Status   12/20/2021 22 %    06/21/2021 22 %    12/09/2019 21 %    06/03/2019 20 %        IMAGING    Recent Results (from the past 48 hour(s))   XR Chest Port 1 View    Narrative    EXAM: XR CHEST PORT 1 VIEW  3/2/2022 6:10 AM     HISTORY:  Persistent htypoxia/hypercapnia post-bronch on 3/1/22-r/o  collapse/ PTX or other causes       COMPARISON:  Chest radiograph 3/1/2022    FINDINGS:     Portable semiupright view of the chest. Postsurgical changes of  bilateral lung transplant with intact sternotomy wires. Pleural drains  in similar position. Feeding tube tip courses below the diaphragm and  beyond field of view. Stable cardiac silhouette. Increased bibasilar  streaky opacities. Possible small pleural effusions. Small right  apical pneumothorax.    No acute osseous abnormality. Visualized upper abdomen shows partially  visualized prominent bowel loops.        Impression    IMPRESSION:     1. Small  right apical pneumothorax. Right-sided chest tube in place.  2. Postoperative chest with increased bibasilar streaky opacities more  likely to represent atelectasis versus pulmonary edema or infection.      Findings discussed with patient's Nurse by [place] at 10:29 AM 3/2/2022     I have personally reviewed the examination and initial interpretation  and I agree with the findings.    ADRIANA VELAZQUEZ MD         SYSTEM ID:  [id number]   CT Chest Pulmonary Embolism w Contrast    Narrative    EXAMINATION: CT CHEST PULMONARY EMBOLISM W CONTRAST on 3/2/2022 5:44  PM.    INDICATION: PE suspected, high prob; Needs CT Chest with and without  contrast to look at lung parenchyma per Pulm    COMPARISON: CT chest 2/26/2022.    TECHNIQUE: CT imaging obtained through the chest with contrast.  Coronal and axial MIP reformatted images obtained. Three-dimensional  (3D) post-processed angiographic images were reconstructed, archived  to PACS and used in interpretation of this study.     CONTRAST: 99 ml isovue 370 IV    FINDINGS:    Lines and tubes: Right-sided chest tube with the tip in the right  major fissure. 2 left-sided chest tubes, one with the tip in the  superior left lung, one at the base. Enteric tube with the tip looping  around in the stomach.    Vessels: No central filling defect consistent with a pulmonary  embolism. Main pulmonary artery normal caliber of 3.0 cm. No right  heart strain. No reflux of contrast. No aortic aneurysm.     Mediastinum: No thyroid nodules. Mucous plugging within the lower  lobes bilaterally. Heart size is within normal limits. There is a  trace pericardial effusion.  Normal thoracic vasculature.  Atherosclerotic calcifications in the thoracic aorta. Mild prominent  mediastinal lymph nodes, likely reactive.     Lungs: Postoperative changes of bilateral lung transplant. Clamshell  sternotomy wires are intact. Again noted is a small fluid collection  containing gas in the right hilum. Improvement in  the bilateral  hydropneumothorax. Small right greater than left pleural effusions  with associated bibasilar atelectasis. Groundglass opacities in the  lung bases.    Bones and soft tissues: No suspicious bone findings.     Upper abdomen: Limited.      Impression    IMPRESSION:   1. No central filling defect consistent with a pulmonary embolism.     2. Improvement in the bilateral hydropneumothorax. A right-sided chest  tube is located in the right major fissure. 2 left-sided chest tubes,  one in the apex and one in the left lung base.    3. Again noted is fluid and gas collection along the right hilum,  which may represent postsurgical fluid collection. Follow as needed to  exclude infection.    4. Bibasilar atelectasis with mucus plugging. Groundglass opacities in  the bilateral lung bases again noted.    I have personally reviewed the examination and initial interpretation  and I agree with the findings.    FELIPA MARIE MD         SYSTEM ID:  [id number]   CT Abdomen Pelvis w Contrast    Narrative    CT abdomen pelvis with contrast    INDICATION: Checking for intra-abdominal infection post transplant    COMPARISON: CT chest pulmonary angiogram today comment please  correlate with that report. No recent abdomen CT.    FINDINGS: 99 mL intravenous Isovue 370 contrast. Right larger than  left pleural effusions. Associated bilateral atelectasis, recommend  follow-up to clearing to exclude infection. Thoracostomy tube on the  left. Benign focal fatty infiltration in the liver. Periportal edema.  Right thoracostomy tube. Feeding tube tip in the jejunum.  Calcification in the aorta. Iliac atherosclerosis. Fluid-filled small  bowel pelvis could indicate ileus and/or enteritis. Well-circumscribed  pelvic cystic area measuring 8.6 x 5.2 cm. Urinary bladder appears  normal. Pancreas appears grossly normal. Spleen appears normal.  Bilateral adrenals appear normal. Angiomyolipoma of the right kidney  measuring  approximately 1.2 cm. Mild body wall edema which may  indicate malnutrition/anasarca. No dominant lymphadenopathy. Bones  show osteopenia. Benign-appearing hemangioma of L2 as well as T12 and  T9.  Also noted is thickening in the right pericardium an right perihilar  region which could be fluid with without inflammation/infection. Air  density noted (series 3 image 13) which could represent small abscess  or necrotic tissue. Gallbladder incompletely distended. Soft tissue  stranding surrounding common hepatic artery adjacent to but above the  pancreatic head pelvis could indicate inflammatory or fibrotic tissue.  Follow-up to clearing however is recommended to exclude underlying  neoplasm. Bilateral lung transplant.      Impression    IMPRESSION: Mild periportal edema. Cystic area in left hemipelvis,  likely adnexal in origin. Fluid-filled small bowel with distended  loops of bowel suggestive of enteritis/ileitis. Recommend continued  follow-up to exclude developing obstruction. Aortoiliac  atherosclerosis. Mild periportal edema with a benign focal fatty  infiltration in the liver. Soft tissue prominence surrounding the  common hepatic artery an above above the level of the pancreatic head,  differential of inflammation/infection as opposed to neoplasm.  Bilateral lung transplant. Possible postoperative changes versus  inflammation/infection no pericardial tissues and right infrahilar  tissues with right larger than left pleural effusions with associated  atelectasis, recommend follow-up to clearing to exclude infection.    FELIPA MARIE MD         SYSTEM ID:  [id number]   US Abdomen Limited    Narrative    EXAMINATION: Limited Abdominal Ultrasound, 3/3/2022 7:44 AM     COMPARISON: 3/2/2022    HISTORY: Rising LFTs    FINDINGS:   Fluid: No evidence of ascites or pleural effusions.    Liver: The liver demonstrates hyperechoic and coarsened parenchyma,  measuring 17.7 cm in craniocaudal dimension. There is no focal  mass.     Gallbladder: There is no wall thickening, pericholecystic fluid,  positive sonographic Guo's sign or evidence for cholelithiasis.    Bile Ducts: Both the intra- and extrahepatic biliary system are of  normal caliber.  The common bile duct measures mm in diameter.    Pancreas: Visualized portions of the head and body of the pancreas are  unremarkable.     Kidney: The right kidney measures 11.4 cm long. There is no  hydronephrosis or hydroureter, no shadowing renal calculi. 1.4 cm  cortical hyperechoic mass without significant acoustic shadowing. Most  likely angiomyolipoma. This correlates with the dominant CT  abnormality from yesterday which showed a partially fatty mass in this  area.    Stable small right pleural effusion.      Impression    IMPRESSION:   1.  Hepatic steatosis.  2.  Revisualization of right kidney angiomyolipoma.  3.  Small right pleural effusion.    I have personally reviewed the examination and initial interpretation  and I agree with the findings.    FELIPA MARIE MD         SYSTEM ID:  [id number]   XR Chest 2 Views    Narrative    PA and lateral chest    HISTORY: Follow-up lung transplant    COMPARISON STUDY: 3/2/2022    FINDINGS: Cardiac silhouette is nonenlarged. Surgical changes  bilateral lung transplant. Pigtail chest tubes on the left. Right  chest tube is been removed. Feeding tube collimated off film abdomen.  Small pleural effusions with bibasilar atelectasis and consolidation      Impression    IMPRESSION: Small pleural effusions with bibasilar atelectasis and  consolidation.    VARGAS DUKE MD         SYSTEM ID:  [id number]

## 2022-03-03 NOTE — PROGRESS NOTES
CLINICAL NUTRITION SERVICES - BRIEF NOTE      Nutrition Prescription     RECOMMENDATIONS FOR MDs/PROVIDERS TO ORDER:  Diet advancement as medically able      Recommendations already ordered by Registered Dietitian (RD):  Discontinued Glucerna order due to NPO  Continue Enteral nutrition     Future/Additional Recommendations:  Monitor respiratory status, ability to advance diet order and restart supplements  Monitor ability to achieve goal TF infusion volume  Ability to provide transplant education as appropriate    *Please see full assessment note from 2/28/22    New Findings:  Diet Order: NPO for bipap     Nutrition Support: Osmolite 1.5 Attila @ 50ml/hr (1200ml/day) + Prosource (1 pkt TID) will provide: 1920 kcals (36 kcal/kg), 108 g protein (2 g/kg), 914 ml free H20, 244 g CHO, and 0 g fiber daily.   + 3 packets Nutrisource fiber daily   Intake: Average enteral nutrition received 2/28-3/2 per I/O + porousrce provided 850 mL (TF), 1368 kcal and 79 g protein meeting 70% of goal TF volume, 85% of low end energy needs and 99% of low end protein needs.  --TF intermittently held for Bronch on 3/1 and ultrasound on 3/2 now at goal volume    Interventions  Collaboration with other providers    RD to follow per protocol.    Kelsy Chan RD, LD  5C/BMT pager: [phone]  6B pager [phone]

## 2022-03-03 NOTE — PROGRESS NOTES
Diabetes Consult Daily  Progress Note          Assessment/Plan:   Melissa Elder is a 66 year old female with PMHx HTN, HLD, paroxysmal Afib, osteoporosis, GERD, severe COPD, previously requiring 2-3L NC O2 at rest.  Underwent b/l lung transplant with Dr. Robin on 02/21  has ongoing issues with Hydropneumothorax, chest tubes in place. 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    1) Steroid and TF induced hyperglycemia   2) Underlying pre-DM, versus steroid diabetes              Plan:     Humulin N 12 + 16 units units at 1200 and 2200 respectively, dosed for TF and prednisone--> increase to 16 + 16, spaced 12 hours apart, so 1200 and 0000     Novolog CHO coverage-- 1 unit per 15 grams w/ meals and snacks    Novolog  custom correction 1 per 35 mg/dL Q4h     BG monitoring TID AC, HS, 0200    Hypoglycemia protocol  PRN D10W for hypoglycemia prevention if TF stops-- rate is  70 to replace about 70% of TF carbs                 Interval History:     Glycemic control over the last 24 hours was reviewed    On continuous tube feeds. Osmolite at 50-- provides about 10 grams carb/h.  TF turned off 0400 for US.  NO D10W--> 0500 166 mg/dL on labs---> 97 just before TF resumed.    Tues-- BG dipped between 0400 and 0800 when TF stopped withOUT the start of D10W.      Pred 15 mg BID    Worsened respiratory and mental status yesterday and was on BiPAP steadily.  Much improved today.  On HFNC and moving around room.  Able to tolerate PO.    WBC improved today. (peak on 3/2--  May be just the typical for POD#10,  per pulm.      Pt asks if her need for insulin or other diabetes medications will resolve--  Reasonably good chance and will eval once off TF and on maintenance dose pred.  Could expect to leave ARU w/ BG monitoring, though.      Expected Discharge: 03/08/2022   Anticipated discharge location: home;inpatient rehabilitation facility        Recent Labs   Lab 03/03/22  0509 03/03/22  0407  03/02/22  2329 03/02/22 2037 03/02/22  1516 03/02/22  1125   * 195* 195* 161* 211* 179*               Review of Systems:   See interval hx          Medications:     Orders Placed This Encounter      NPO per Anesthesia Guidelines for Procedure/Surgery Except for: Meds, Ice Chips    Physical Exam:  Gen: up in chair, NAD,  present and engaged  Resp: unlabored, full sentences, HFNC  Mental status: alert, communicating clearly, oriented x 3         Data:     Lab Results   Component Value Date    A1C 5.7 02/22/2022    A1C 5.8 02/20/2022            Recent Labs   Lab Test 03/03/22  0509 03/03/22  0403 03/02/22  0933 03/02/22  0455     --   --  140   POTASSIUM 4.8  --   --  4.8   CHLORIDE 98  --   --  98   CO2 40*  --   --  39*   ANIONGAP 2*  --   --  3   * 195*   < > 194*   BUN 27  --   --  27   CR 0.49*  --   --  0.56   MINISTERIO 7.8*  --   --  8.2*    < > = values in this interval not displayed.     CBC RESULTS: Recent Labs   Lab Test 03/03/22  0509   WBC 19.7*   RBC 2.36*   HGB 6.9*   HCT 22.5*   MCV 95   MCH 29.2   MCHC 30.7*   RDW 13.7          I spent a total of 35 minutes bedside and on the inpatient unit managing the glycemic care of Melissa Elder. Over 50% of my time on the unit was spent counseling the patient  and/or coordinating care regarding acute TF/prednisone hyperglycemia plan.  See note for details.    Zita Dominguez APRN -[phone]  To contact Endocrine Diabetes service:   From 8AM-4PM: page inpatient diabetes provider that is following the patient that day (see filed or incomplete progress notes.consult notes).  If uncertain of provider assignment: page job code [phone].  For questions or updates from 4PM-8AM: page the diabetes job code for on call fellow: [phone]    Please notify inpatient diabetes service if changes are planned to steroids, enteral feeding, parenteral feeding, or if procedures are planned requiring prolonged NPO status.

## 2022-03-03 NOTE — PLAN OF CARE
Neuro: A&Ox4. Able to make needs known.   Cardiac: SR. VSS.    Respiratory: Sating > 92% on Bipap 34%. Can tolerate 3 L oxyplus mask when taking breaks from bipap.   GI/: Adequate urine output. Using purewick. BM X1  Diet/appetite: NPO x meds and ice chips. TF held at 0400 for US.   Activity:  Assist of 1-2 to BSC.   Pain: At acceptable level on current regimen. Robaxin and oxycodone x1 for incisional and back pain.   Skin: No new deficits noted. Incision ANGELA, approximated.   LDA's: CT x3 to -20 suction  PIV x2 R & L SL  Purewick    Plan: Continue with POC. Notify primary team with changes. Abdominal US this AM. Potential for clamping trial of R chest tube and X-ray 6 hours after.

## 2022-03-03 NOTE — PLAN OF CARE
"Goal Outcome Evaluation:  /56 (BP Location: Left arm)   Pulse 105   Temp 98  F (36.7  C) (Oral)   Resp 24   Ht 1.575 m (5' 2\")   Wt 73.5 kg (162 lb 0.6 oz)   SpO2 95%   BMI 29.64 kg/m      VSS. SR. HFNC or Bipap @ 35% Incisional pain tolerable, oxy given x2. A&O x4. NJ with TF @ 50/hr, BS slightly elevated. Diet order placed, pt drank 1 ensure this shift. Voiding adequately. Loose/watery BMs. Clamshell incision with dermabond. L CT x2 to (-20), R CT removed-plan for new CT placement in IR. Mag replaced. Up in chair, SBA with walker. Continue to monitor.                       "

## 2022-03-04 ENCOUNTER — APPOINTMENT (OUTPATIENT)
Dept: SPEECH THERAPY | Facility: CLINIC | Age: 67
DRG: 007 | End: 2022-03-04
Attending: SURGERY
Payer: MEDICARE

## 2022-03-04 ENCOUNTER — APPOINTMENT (OUTPATIENT)
Dept: GENERAL RADIOLOGY | Facility: CLINIC | Age: 67
DRG: 007 | End: 2022-03-04
Attending: NURSE PRACTITIONER
Payer: MEDICARE

## 2022-03-04 LAB
1,3 BETA GLUCAN SER-MCNC: <31 PG/ML
ALBUMIN SERPL-MCNC: 1.9 G/DL (ref 3.4–5)
ALP SERPL-CCNC: 131 U/L (ref 40–150)
ALT SERPL W P-5'-P-CCNC: 84 U/L (ref 0–50)
AMMONIA PLAS-SCNC: 50 UMOL/L (ref 10–50)
ANION GAP SERPL CALCULATED.3IONS-SCNC: 3 MMOL/L (ref 3–14)
AST SERPL W P-5'-P-CCNC: 22 U/L (ref 0–45)
ATRIAL RATE - MUSE: 92 BPM
BACTERIA SPT CULT: NORMAL
BASE EXCESS BLDV CALC-SCNC: 17.4 MMOL/L (ref -7.7–1.9)
BASOPHILS # BLD AUTO: 0 10E3/UL (ref 0–0.2)
BASOPHILS NFR BLD AUTO: 0 %
BILIRUB SERPL-MCNC: 0.2 MG/DL (ref 0.2–1.3)
BUN SERPL-MCNC: 38 MG/DL (ref 7–30)
CALCIUM SERPL-MCNC: 8.4 MG/DL (ref 8.5–10.1)
CHLORIDE BLD-SCNC: 94 MMOL/L (ref 94–109)
CO2 SERPL-SCNC: 39 MMOL/L (ref 20–32)
CREAT SERPL-MCNC: 0.52 MG/DL (ref 0.52–1.04)
DIASTOLIC BLOOD PRESSURE - MUSE: NORMAL MMHG
EOSINOPHIL # BLD AUTO: 0 10E3/UL (ref 0–0.7)
EOSINOPHIL NFR BLD AUTO: 0 %
ERYTHROCYTE [DISTWIDTH] IN BLOOD BY AUTOMATED COUNT: 13.6 % (ref 10–15)
GALACTOMANNAN AG SERPL QL IA: NEGATIVE
GALACTOMANNAN AG SPEC IA-ACNC: 0.04
GFR SERPL CREATININE-BSD FRML MDRD: >90 ML/MIN/1.73M2
GLUCOSE BLD-MCNC: 232 MG/DL (ref 70–99)
GLUCOSE BLDC GLUCOMTR-MCNC: 153 MG/DL (ref 70–99)
GLUCOSE BLDC GLUCOMTR-MCNC: 188 MG/DL (ref 70–99)
GLUCOSE BLDC GLUCOMTR-MCNC: 189 MG/DL (ref 70–99)
GLUCOSE BLDC GLUCOMTR-MCNC: 191 MG/DL (ref 70–99)
GLUCOSE BLDC GLUCOMTR-MCNC: 191 MG/DL (ref 70–99)
GLUCOSE BLDC GLUCOMTR-MCNC: 194 MG/DL (ref 70–99)
GLUCOSE BLDC GLUCOMTR-MCNC: 208 MG/DL (ref 70–99)
GLUCOSE BLDC GLUCOMTR-MCNC: 240 MG/DL (ref 70–99)
GRAM STAIN RESULT: NORMAL
HCO3 BLDV-SCNC: 45 MMOL/L (ref 21–28)
HCT VFR BLD AUTO: 23.8 % (ref 35–47)
HGB BLD-MCNC: 7.3 G/DL (ref 11.7–15.7)
IMM GRANULOCYTES # BLD: 0.5 10E3/UL
IMM GRANULOCYTES NFR BLD: 3 %
INTERPRETATION ECG - MUSE: NORMAL
LACTATE SERPL-SCNC: 1.5 MMOL/L (ref 0.7–2)
LYMPHOCYTES # BLD AUTO: 0.9 10E3/UL (ref 0.8–5.3)
LYMPHOCYTES NFR BLD AUTO: 4 %
MAGNESIUM SERPL-MCNC: 2.1 MG/DL (ref 1.6–2.3)
MCH RBC QN AUTO: 29.4 PG (ref 26.5–33)
MCHC RBC AUTO-ENTMCNC: 30.7 G/DL (ref 31.5–36.5)
MCV RBC AUTO: 96 FL (ref 78–100)
MONOCYTES # BLD AUTO: 0.6 10E3/UL (ref 0–1.3)
MONOCYTES NFR BLD AUTO: 3 %
NEUTROPHILS # BLD AUTO: 17.9 10E3/UL (ref 1.6–8.3)
NEUTROPHILS NFR BLD AUTO: 90 %
NRBC # BLD AUTO: 0 10E3/UL
NRBC BLD AUTO-RTO: 0 /100
O2/TOTAL GAS SETTING VFR VENT: 35 %
OBSERVATION IMP: NEGATIVE
P AXIS - MUSE: 59 DEGREES
PATH REPORT.COMMENTS IMP SPEC: NORMAL
PATH REPORT.FINAL DX SPEC: NORMAL
PATH REPORT.MICROSCOPIC SPEC OTHER STN: NORMAL
PATH REPORT.RELEVANT HX SPEC: NORMAL
PCO2 BLDV: 83 MM HG (ref 40–50)
PH BLDV: 7.35 [PH] (ref 7.32–7.43)
PHOSPHATE SERPL-MCNC: 4.1 MG/DL (ref 2.5–4.5)
PLATELET # BLD AUTO: 309 10E3/UL (ref 150–450)
PO2 BLDV: 34 MM HG (ref 25–47)
POTASSIUM BLD-SCNC: 4.4 MMOL/L (ref 3.4–5.3)
POTASSIUM BLD-SCNC: 5.8 MMOL/L (ref 3.4–5.3)
PR INTERVAL - MUSE: 134 MS
PROT SERPL-MCNC: 5.2 G/DL (ref 6.8–8.8)
QRS DURATION - MUSE: 114 MS
QT - MUSE: 370 MS
QTC - MUSE: 457 MS
R AXIS - MUSE: 91 DEGREES
RBC # BLD AUTO: 2.48 10E6/UL (ref 3.8–5.2)
SODIUM SERPL-SCNC: 136 MMOL/L (ref 133–144)
SPECIMEN SOURCE: NORMAL
SYSTOLIC BLOOD PRESSURE - MUSE: NORMAL MMHG
T AXIS - MUSE: -5 DEGREES
U PARVUM DNA SPEC QL NAA+PROBE: NEGATIVE
U UREALYTICUM DNA SPEC QL NAA+PROBE: NEGATIVE
VENTRICULAR RATE- MUSE: 92 BPM
WBC # BLD AUTO: 19.9 10E3/UL (ref 4–11)

## 2022-03-04 PROCEDURE — 84100 ASSAY OF PHOSPHORUS: CPT | Performed by: NURSE PRACTITIONER

## 2022-03-04 PROCEDURE — 99232 SBSQ HOSP IP/OBS MODERATE 35: CPT | Mod: 24 | Performed by: INTERNAL MEDICINE

## 2022-03-04 PROCEDURE — 250N000013 HC RX MED GY IP 250 OP 250 PS 637: Performed by: STUDENT IN AN ORGANIZED HEALTH CARE EDUCATION/TRAINING PROGRAM

## 2022-03-04 PROCEDURE — 250N000011 HC RX IP 250 OP 636: Performed by: STUDENT IN AN ORGANIZED HEALTH CARE EDUCATION/TRAINING PROGRAM

## 2022-03-04 PROCEDURE — 250N000009 HC RX 250: Performed by: STUDENT IN AN ORGANIZED HEALTH CARE EDUCATION/TRAINING PROGRAM

## 2022-03-04 PROCEDURE — 250N000013 HC RX MED GY IP 250 OP 250 PS 637: Performed by: NURSE PRACTITIONER

## 2022-03-04 PROCEDURE — 999N000157 HC STATISTIC RCP TIME EA 10 MIN

## 2022-03-04 PROCEDURE — 250N000013 HC RX MED GY IP 250 OP 250 PS 637: Performed by: SURGERY

## 2022-03-04 PROCEDURE — 250N000012 HC RX MED GY IP 250 OP 636 PS 637: Performed by: CLINICAL NURSE SPECIALIST

## 2022-03-04 PROCEDURE — 80053 COMPREHEN METABOLIC PANEL: CPT | Performed by: NURSE PRACTITIONER

## 2022-03-04 PROCEDURE — 84132 ASSAY OF SERUM POTASSIUM: CPT | Performed by: HOSPITALIST

## 2022-03-04 PROCEDURE — 85025 COMPLETE CBC W/AUTO DIFF WBC: CPT | Performed by: NURSE PRACTITIONER

## 2022-03-04 PROCEDURE — 250N000011 HC RX IP 250 OP 636: Performed by: HOSPITALIST

## 2022-03-04 PROCEDURE — 83735 ASSAY OF MAGNESIUM: CPT | Performed by: NURSE PRACTITIONER

## 2022-03-04 PROCEDURE — 93005 ELECTROCARDIOGRAM TRACING: CPT

## 2022-03-04 PROCEDURE — 82803 BLOOD GASES ANY COMBINATION: CPT | Performed by: NURSE PRACTITIONER

## 2022-03-04 PROCEDURE — 250N000009 HC RX 250: Performed by: SURGERY

## 2022-03-04 PROCEDURE — 71046 X-RAY EXAM CHEST 2 VIEWS: CPT | Mod: 26 | Performed by: RADIOLOGY

## 2022-03-04 PROCEDURE — 250N000011 HC RX IP 250 OP 636: Performed by: PHYSICIAN ASSISTANT

## 2022-03-04 PROCEDURE — 36415 COLL VENOUS BLD VENIPUNCTURE: CPT | Performed by: HOSPITALIST

## 2022-03-04 PROCEDURE — 71046 X-RAY EXAM CHEST 2 VIEWS: CPT

## 2022-03-04 PROCEDURE — 94660 CPAP INITIATION&MGMT: CPT

## 2022-03-04 PROCEDURE — 250N000012 HC RX MED GY IP 250 OP 636 PS 637: Performed by: PHYSICIAN ASSISTANT

## 2022-03-04 PROCEDURE — 36415 COLL VENOUS BLD VENIPUNCTURE: CPT | Performed by: STUDENT IN AN ORGANIZED HEALTH CARE EDUCATION/TRAINING PROGRAM

## 2022-03-04 PROCEDURE — 99233 SBSQ HOSP IP/OBS HIGH 50: CPT | Performed by: NURSE PRACTITIONER

## 2022-03-04 PROCEDURE — 120N000003 HC R&B IMCU UMMC

## 2022-03-04 PROCEDURE — 250N000012 HC RX MED GY IP 250 OP 636 PS 637: Performed by: STUDENT IN AN ORGANIZED HEALTH CARE EDUCATION/TRAINING PROGRAM

## 2022-03-04 PROCEDURE — 94640 AIRWAY INHALATION TREATMENT: CPT | Mod: 76

## 2022-03-04 PROCEDURE — 93010 ELECTROCARDIOGRAM REPORT: CPT | Performed by: INTERNAL MEDICINE

## 2022-03-04 PROCEDURE — 36415 COLL VENOUS BLD VENIPUNCTURE: CPT | Performed by: NURSE PRACTITIONER

## 2022-03-04 PROCEDURE — 92526 ORAL FUNCTION THERAPY: CPT | Mod: GN

## 2022-03-04 PROCEDURE — 36415 COLL VENOUS BLD VENIPUNCTURE: CPT | Performed by: PHYSICIAN ASSISTANT

## 2022-03-04 PROCEDURE — 82140 ASSAY OF AMMONIA: CPT | Performed by: STUDENT IN AN ORGANIZED HEALTH CARE EDUCATION/TRAINING PROGRAM

## 2022-03-04 PROCEDURE — 99233 SBSQ HOSP IP/OBS HIGH 50: CPT | Performed by: STUDENT IN AN ORGANIZED HEALTH CARE EDUCATION/TRAINING PROGRAM

## 2022-03-04 PROCEDURE — 94640 AIRWAY INHALATION TREATMENT: CPT

## 2022-03-04 PROCEDURE — 250N000013 HC RX MED GY IP 250 OP 250 PS 637: Performed by: PHYSICIAN ASSISTANT

## 2022-03-04 PROCEDURE — 83605 ASSAY OF LACTIC ACID: CPT | Performed by: PHYSICIAN ASSISTANT

## 2022-03-04 RX ORDER — NICOTINE POLACRILEX 4 MG
15-30 LOZENGE BUCCAL
Status: DISCONTINUED | OUTPATIENT
Start: 2022-03-04 | End: 2022-03-04

## 2022-03-04 RX ORDER — FUROSEMIDE 10 MG/ML
40 INJECTION INTRAMUSCULAR; INTRAVENOUS ONCE
Status: COMPLETED | OUTPATIENT
Start: 2022-03-04 | End: 2022-03-04

## 2022-03-04 RX ORDER — DOXYCYCLINE 100 MG/10ML
100 INJECTION, POWDER, LYOPHILIZED, FOR SOLUTION INTRAVENOUS EVERY 12 HOURS
Status: DISCONTINUED | OUTPATIENT
Start: 2022-03-04 | End: 2022-03-10

## 2022-03-04 RX ORDER — DEXTROSE MONOHYDRATE 25 G/50ML
25-50 INJECTION, SOLUTION INTRAVENOUS
Status: DISCONTINUED | OUTPATIENT
Start: 2022-03-04 | End: 2022-03-04

## 2022-03-04 RX ORDER — METHOCARBAMOL 500 MG/1
500 TABLET, FILM COATED ORAL 4 TIMES DAILY
Status: DISCONTINUED | OUTPATIENT
Start: 2022-03-04 | End: 2022-03-17 | Stop reason: HOSPADM

## 2022-03-04 RX ORDER — FUROSEMIDE 10 MG/ML
40 INJECTION INTRAMUSCULAR; INTRAVENOUS 2 TIMES DAILY
Status: DISCONTINUED | OUTPATIENT
Start: 2022-03-04 | End: 2022-03-05

## 2022-03-04 RX ORDER — METHOCARBAMOL 500 MG/1
500 TABLET, FILM COATED ORAL EVERY 6 HOURS
Status: DISCONTINUED | OUTPATIENT
Start: 2022-03-04 | End: 2022-03-04

## 2022-03-04 RX ORDER — LEVOFLOXACIN 5 MG/ML
500 INJECTION, SOLUTION INTRAVENOUS EVERY 24 HOURS
Status: DISCONTINUED | OUTPATIENT
Start: 2022-03-04 | End: 2022-03-07

## 2022-03-04 RX ADMIN — OXYCODONE HYDROCHLORIDE 5 MG: 5 TABLET ORAL at 03:48

## 2022-03-04 RX ADMIN — MYCOPHENOLATE MOFETIL 1000 MG: 200 POWDER, FOR SUSPENSION ORAL at 21:11

## 2022-03-04 RX ADMIN — DOXYCYCLINE 100 MG: 100 INJECTION, POWDER, LYOPHILIZED, FOR SOLUTION INTRAVENOUS at 12:43

## 2022-03-04 RX ADMIN — TACROLIMUS 5 MG: 5 CAPSULE ORAL at 17:44

## 2022-03-04 RX ADMIN — MYCOPHENOLATE MOFETIL 1000 MG: 200 POWDER, FOR SUSPENSION ORAL at 09:34

## 2022-03-04 RX ADMIN — ACETYLCYSTEINE 2 ML: 200 SOLUTION ORAL; RESPIRATORY (INHALATION) at 21:03

## 2022-03-04 RX ADMIN — Medication 1 PACKET: at 20:59

## 2022-03-04 RX ADMIN — METHOCARBAMOL TABLETS 500 MG: 500 TABLET, COATED ORAL at 16:40

## 2022-03-04 RX ADMIN — LEVALBUTEROL HYDROCHLORIDE 1.25 MG: 1.25 SOLUTION RESPIRATORY (INHALATION) at 21:03

## 2022-03-04 RX ADMIN — OXYCODONE HYDROCHLORIDE 5 MG: 5 TABLET ORAL at 13:08

## 2022-03-04 RX ADMIN — ACETAMINOPHEN 975 MG: 325 TABLET, FILM COATED ORAL at 21:11

## 2022-03-04 RX ADMIN — METOPROLOL TARTRATE 25 MG: 25 TABLET, FILM COATED ORAL at 20:59

## 2022-03-04 RX ADMIN — PREDNISONE 15 MG: 5 TABLET ORAL at 20:59

## 2022-03-04 RX ADMIN — ACETYLCYSTEINE 2 ML: 200 SOLUTION ORAL; RESPIRATORY (INHALATION) at 08:13

## 2022-03-04 RX ADMIN — HEPARIN SODIUM 5000 UNITS: 5000 INJECTION, SOLUTION INTRAVENOUS; SUBCUTANEOUS at 13:59

## 2022-03-04 RX ADMIN — LEVALBUTEROL HYDROCHLORIDE 1.25 MG: 1.25 SOLUTION RESPIRATORY (INHALATION) at 15:07

## 2022-03-04 RX ADMIN — GABAPENTIN 100 MG: 100 CAPSULE ORAL at 21:11

## 2022-03-04 RX ADMIN — Medication 15 ML: at 09:27

## 2022-03-04 RX ADMIN — FUROSEMIDE 40 MG: 10 INJECTION, SOLUTION INTRAVENOUS at 09:33

## 2022-03-04 RX ADMIN — METHOCARBAMOL TABLETS 500 MG: 500 TABLET, COATED ORAL at 21:00

## 2022-03-04 RX ADMIN — Medication 1 PACKET: at 13:59

## 2022-03-04 RX ADMIN — Medication 1 PACKET: at 09:28

## 2022-03-04 RX ADMIN — DAPSONE 50 MG: 25 TABLET ORAL at 09:25

## 2022-03-04 RX ADMIN — METHOCARBAMOL 500 MG: 500 TABLET ORAL at 06:26

## 2022-03-04 RX ADMIN — NYSTATIN 1000000 UNITS: 100000 SUSPENSION ORAL at 21:41

## 2022-03-04 RX ADMIN — HEPARIN SODIUM 5000 UNITS: 5000 INJECTION, SOLUTION INTRAVENOUS; SUBCUTANEOUS at 21:11

## 2022-03-04 RX ADMIN — CEFTRIAXONE SODIUM 2 G: 2 INJECTION, POWDER, FOR SOLUTION INTRAMUSCULAR; INTRAVENOUS at 09:34

## 2022-03-04 RX ADMIN — Medication 40 MG: at 09:27

## 2022-03-04 RX ADMIN — INSULIN HUMAN 16 UNITS: 100 INJECTION, SUSPENSION SUBCUTANEOUS at 14:00

## 2022-03-04 RX ADMIN — FUROSEMIDE 40 MG: 10 INJECTION, SOLUTION INTRAVENOUS at 14:00

## 2022-03-04 RX ADMIN — ACETYLCYSTEINE 2 ML: 200 SOLUTION ORAL; RESPIRATORY (INHALATION) at 11:03

## 2022-03-04 RX ADMIN — PREDNISONE 15 MG: 5 TABLET ORAL at 09:04

## 2022-03-04 RX ADMIN — VALACYCLOVIR HYDROCHLORIDE 1000 MG: 1 TABLET, FILM COATED ORAL at 20:59

## 2022-03-04 RX ADMIN — ACETAMINOPHEN 975 MG: 325 TABLET, FILM COATED ORAL at 06:25

## 2022-03-04 RX ADMIN — LEVOFLOXACIN 500 MG: 5 INJECTION, SOLUTION INTRAVENOUS at 14:02

## 2022-03-04 RX ADMIN — VALACYCLOVIR HYDROCHLORIDE 1000 MG: 1 TABLET, FILM COATED ORAL at 12:45

## 2022-03-04 RX ADMIN — DILTIAZEM HYDROCHLORIDE 30 MG: 30 TABLET, FILM COATED ORAL at 17:24

## 2022-03-04 RX ADMIN — LEVALBUTEROL HYDROCHLORIDE 1.25 MG: 1.25 SOLUTION RESPIRATORY (INHALATION) at 11:03

## 2022-03-04 RX ADMIN — DILTIAZEM HYDROCHLORIDE 30 MG: 30 TABLET, FILM COATED ORAL at 12:41

## 2022-03-04 RX ADMIN — ACETAMINOPHEN 975 MG: 325 TABLET, FILM COATED ORAL at 12:41

## 2022-03-04 RX ADMIN — LEVALBUTEROL HYDROCHLORIDE 1.25 MG: 1.25 SOLUTION RESPIRATORY (INHALATION) at 08:13

## 2022-03-04 RX ADMIN — METHOCARBAMOL 500 MG: 500 TABLET ORAL at 09:18

## 2022-03-04 RX ADMIN — NYSTATIN 1000000 UNITS: 100000 SUSPENSION ORAL at 09:04

## 2022-03-04 RX ADMIN — NYSTATIN 1000000 UNITS: 100000 SUSPENSION ORAL at 13:09

## 2022-03-04 RX ADMIN — CALCIUM CARBONATE 600 MG (1,500 MG)-VITAMIN D3 400 UNIT TABLET 1 TABLET: at 09:24

## 2022-03-04 RX ADMIN — INSULIN ASPART: 100 INJECTION, SOLUTION INTRAVENOUS; SUBCUTANEOUS at 12:51

## 2022-03-04 RX ADMIN — CALCIUM CARBONATE 600 MG (1,500 MG)-VITAMIN D3 400 UNIT TABLET 1 TABLET: at 17:25

## 2022-03-04 RX ADMIN — VALACYCLOVIR HYDROCHLORIDE 1000 MG: 1 TABLET, FILM COATED ORAL at 03:48

## 2022-03-04 RX ADMIN — TACROLIMUS 5 MG: 5 CAPSULE ORAL at 09:35

## 2022-03-04 RX ADMIN — ACETYLCYSTEINE 2 ML: 200 SOLUTION ORAL; RESPIRATORY (INHALATION) at 15:07

## 2022-03-04 RX ADMIN — OXYCODONE HYDROCHLORIDE 5 MG: 5 TABLET ORAL at 09:18

## 2022-03-04 RX ADMIN — INSULIN ASPART 1 UNITS: 100 INJECTION, SOLUTION INTRAVENOUS; SUBCUTANEOUS at 19:01

## 2022-03-04 RX ADMIN — LORATADINE 10 MG: 10 TABLET ORAL at 09:05

## 2022-03-04 RX ADMIN — DILTIAZEM HYDROCHLORIDE 30 MG: 30 TABLET, FILM COATED ORAL at 06:01

## 2022-03-04 RX ADMIN — NYSTATIN 1000000 UNITS: 100000 SUSPENSION ORAL at 16:45

## 2022-03-04 RX ADMIN — Medication 1 PACKET: at 09:24

## 2022-03-04 RX ADMIN — METOPROLOL TARTRATE 25 MG: 25 TABLET, FILM COATED ORAL at 09:25

## 2022-03-04 RX ADMIN — ASPIRIN 81 MG: 81 TABLET, COATED ORAL at 09:23

## 2022-03-04 ASSESSMENT — ACTIVITIES OF DAILY LIVING (ADL)
ADLS_ACUITY_SCORE: 11

## 2022-03-04 NOTE — PROVIDER NOTIFICATION
March 2- Positive for Urea Plasma Species in L pleural fluid  March 2- Positive for Urea Plasma Spiecies in suptum  Paged Dr. Townsend  [phone]    MD ordered Doxycycline and will check an ammonia level  1133

## 2022-03-04 NOTE — PROGRESS NOTES
Cardiovascular Surgery Progress Note  03/04/2022          Assessment and Plan:      Melissa Elder is a 66 year old female with PMHx HTN, HLD, paroxysmal Afib, osteoporosis, GERD, severe COPD, previously requiring 2-3L NC O2 at rest. She underwent b/l lung transplant with Dr. Robin on 02/21. Got 1u pRBC post-op, no overt bleeding source, monitoring. Extubated 02/22 to O2 NC.     CVTS is following in consideration of the clamshell incision as well as chest tube management.     # Leukocytosis  - WBC stable 19.9.   - Consider pan-culture if febrile     # Clamshell incision  - Continue to adhere to sternal precautions.  - Postoperative incision management, continue open to air but needs to be kept very dry under her breasts. Wound closed with skin glue in OR.  - Clamshell incision appears clean, dry. Incision appears without erythema, or drainage. Wound edges are well approximated.  - Encourage activity/OOB, IS/pulmonary toilet.  Maintain strict sleep hygiene and delirium precautions.     # Chest tube management  - Continue to suction while in room, can ambulate off suction.  - Pericardial Removed 2/24  - L chest tube removed 2/25  - Right Pleural removed 3/3 am due to low output.  - IR placed 2 new Left pleural tubes 2/26. Consult IR for tube dysfunction and once removal is desired.   * * Please do not remove these tubes without discussing with IR * *  - Left Pleural output: 90 mL no air leak, serosanguinous output.  Consider asking IR for removal of anterior pleural tube.   - Basilar right pleural pigtail chest tube placed 3/3/22. Output: 110mL      Discussed with Surgeon, Dr. Robin via written and verbal commnication.     Venessa Amaya PA-c  Pager: [phone]  10:43 AM March 4, 2022            Interval History:      No overnight events.  States pain is well managed on current regimen. Slept well overnight.  Tolerating tube feeds, is passing flatus, + BM. No nausea or vomiting.  Denies chest pain,  "palpitations, dizziness, syncopal symptoms, fevers, chills, myalgias, or sternal popping/clicking.          Physical Exam:   Blood pressure 131/55, pulse 85, temperature 97.9  F (36.6  C), temperature source Axillary, resp. rate 24, height 1.575 m (5' 2\"), weight 73.5 kg (162 lb 0.6 oz), SpO2 98 %, not currently breastfeeding.        Vitals:     03/01/22 0334 03/02/22 0633 03/03/22 0627   Weight: 74.8 kg (164 lb 14.5 oz) 73.9 kg (162 lb 14.7 oz) 73.5 kg (162 lb 0.6 oz)      Gen: A&Ox4, NAD  Neuro: no focal deficits, conversational   CV: HD stable  Pulm: stable on BiPAP   Incision: clean, dry, intact, no erythema, sternum stable  Tubes/drain sites: dressing clean and dry, serosanguinous output, no air leaks from any tubes.          Data:    Imaging:  reviewed recent imaging, no acute concerns. Right basilar chest tube appreciated.     Labs:  BMP               Recent Labs   Lab 03/03/22  0743 03/03/22  0509 03/03/22  0403 03/02/22  2329 03/02/22  0933 03/02/22  0455 03/01/22  0802 03/01/22  0318 02/28/22  0727 02/28/22  0623   NA  --  140  --   --   --  140  --  137  --  138   POTASSIUM  --  4.8  --   --   --  4.8  --  4.4  --  4.2   CHLORIDE  --  98  --   --   --  98  --  97  --  99   MINISTERIO  --  7.8*  --   --   --  8.2*  --  8.2*  --  8.0*   CO2  --  40*  --   --   --  39*  --  40*  --  34*   BUN  --  27  --   --   --  27  --  22  --  18   CR  --  0.49*  --   --   --  0.56  --  0.37*  --  0.36*   GLC 97 166* 195* 195*   < > 194*   < > 259*   < > 225*    < > = values in this interval not displayed.      CBC         Recent Labs   Lab 03/03/22  0631 03/03/22  0509 03/02/22  0455 03/01/22  0318   WBC 20.1* 19.7* 23.1* 21.2*   RBC 2.61* 2.36* 2.73* 3.03*   HGB 7.6* 6.9* 7.9* 8.9*   HCT 24.5* 22.5* 25.9* 27.3*   MCV 94 95 95 90   MCH 29.1 29.2 28.9 29.4   MCHC 31.0* 30.7* 30.5* 32.6   RDW 13.8 13.7 13.8 13.6    298 347 305              "

## 2022-03-04 NOTE — PROVIDER NOTIFICATION
-------------------CRITICAL LAB VALUE-------------------    Lab Value: CO2 83  Time of notification: 6:10 AM  MD notified: Gold 10 overnight  Patient status: stable  Temp:  [97.4  F (36.3  C)-98.4  F (36.9  C)] 98.1  F (36.7  C)  Pulse:  [] 97  Resp:  [20-24] 20  BP: ()/(36-73) 146/60  Cuff Mean (mmHg):  [83] 83  FiO2 (%):  [35 %] 35 %  SpO2:  [92 %-100 %] 98 %  Orders received: No new orders, will continue to monitor

## 2022-03-04 NOTE — PLAN OF CARE
Neuro: A&Ox4.   Cardiac: SR/ST w/ activity. VSS. Diltiazem held for SBP<100   Respiratory: Sating >92 on 35% HFNC or BiPAP while sleeping  GI/: Adequate urine output via pure wick. No BM this shift  Diet/appetite: Tolerating regular diet. Fair appetite. TF at 50ml/hr w/ q4h 30ml FWF  Activity:  Assist of 1, up to chair and in halls.  Pain: At acceptable level on current regimen. PRN meds per MAR  Skin: No new deficits noted.  LDA's: PIVx2, NJ, CTx3    Plan: Continue with POC. Notify primary team with changes.    Provider notified at 0640-Potassium 5.8. 12 lead EKG ordered as well as orders to shift potassium

## 2022-03-04 NOTE — PROGRESS NOTES
Diabetes Consult Daily  Progress Note          Assessment/Plan:   Melissa Elder is a 66 year old female with PMHx HTN, HLD, paroxysmal Afib, osteoporosis, GERD, severe COPD, previously requiring 2-3L NC O2 at rest.  Underwent b/l lung transplant with Dr. Robin on 02/21  has ongoing issues with Hydropneumothorax, chest tubes in place. 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 Endocrinology is being consulted for DM management.    1) Steroid and TF induced hyperglycemia   2) Underlying pre-DM, versus steroid diabetes      Plan:   Increase Humulin N 20 + 20 units, spaced 12 hours apart, so 1200 and 0000  Novolog CHO coverage-- 1 unit per 15 grams w/ meals and snacks  Novolog  custom correction 1 per 35 mg/dL Q4h   BG monitoring TID AC, HS, 0200  Hypoglycemia protocol    PRN D10W for hypoglycemia prevention if TF stops-- rate is  70 to replace about 70% of TF carbs                 Interval History:   Patient was not seen. Glucose and chart data were reviewed.  Glycemic control over the last 24 hours was reviewed        On continuous tube feeds. Osmolite at 50-- provides about 10 grams carb/h.    Pred 15 mg BID    Expected Discharge: 03/08/2022   Anticipated discharge location: home;inpatient rehabilitation facility        Recent Labs   Lab 03/04/22  1123 03/04/22  0741 03/04/22  0553 03/04/22  0352 03/03/22  2355 03/03/22  2119   * 189* 232* 191* 191* 231*               Review of Systems:   See interval hx          Medications:     Orders Placed This Encounter      Soft & Bite Sized Diet (level 6) Thin Liquids (level 0)    Physical Exam: Not examined          Data:     Lab Results   Component Value Date    A1C 5.7 02/22/2022    A1C 5.8 02/20/2022              Plan of care discussed with Dr. Zaira Monroy.    Jevon Wills MD  Internal Medicine Resident (PGY-1)  [facility]       I reviewed patient's records, discussed care with resident and  reviewed/edited the resident's note. I agree with the plan of care.    Zaira Monroy MD   Division of Diabetes, Endocrinology and Metabolism  Department of Medicine      To contact Endocrine Diabetes service:   From 8AM-4PM: page inpatient diabetes provider that is following the patient that day (see filed or incomplete progress notes.consult notes).  If uncertain of provider assignment: page job code [phone].  For questions or updates from 4PM-8AM: page the diabetes job code for on call fellow: [phone]    Please notify inpatient diabetes service if changes are planned to steroids, enteral feeding, parenteral feeding, or if procedures are planned requiring prolonged NPO status.

## 2022-03-04 NOTE — PROGRESS NOTES
Transplant Social Work Services Progress Note      Date of Initial Social Work Evaluation: Pt is well known to writer through the transplant program  Collaborated with: Writer spoke with patient's , Tyrese via phone.    Data: Tyrese asked to speak with writer about financial assistance with rent while Melissa has to remain locally for the next couple of months.  Intervention: Writer spoke with Tyrsee and offered him a [employer] otto that will assist with one months rent. Tyrese has [address] an [address]  Assessment: Tyrese was thankful for the assistance. He stated that they have some in savings but having the otto will greatly help them manage over the next couple of months.   Education provided by SW: Educated Tyrese (and Melissa) on the otto and how we will get it processed.   Plan:    Discharge Plans in Progress: Ongoing. Current recommendations are for ARU. SW will monitor how she progresses with PT and OT.    Barriers to d/c plan: Medical readiness. Pt still has chest tubes.    Follow up Plan: SW will continue to follow for resources, education and support.    Oanh Asif, [facility]  [id number]

## 2022-03-04 NOTE — PROGRESS NOTES
Pulmonary Medicine  Cystic Fibrosis - Lung Transplant Team  Progress Note  2022       Patient: Melissa Elder  MRN: [id number]  : 1955 (age 66 year old)  Transplant: 2022 (Lung), POD#11  Admission date: 2022    Assessment & Plan:     Melissa Elder is a 66 year old female with a PMH significant for severe COPD, HTN, mild non-obstructive CAD, paroxysmal afib, osteoporosis, GERD, and colonic polyps.  Pt. is now s/p BSLT on 22, surgery relatively uncomplicated.  The patient was extubated , post-op course complicated by right chest tube lodged into fissure and left chest tube displacement s/p bilateral pigtail chest tube placement in IR to drain anterior hydropneumothorax and bilateral effusions.  Routine inspection bronch on 3/1 complicated by hypoventilation in setting of oversedation requiring multiple Narcan doses.  Slow improvement in hypercapnia, continues to require NIPPV with occasional use of HFNC.     Today's recommendations:  - Continue aggressive airway clearance  - Strict BiPAP overnight and with daytime naps, prn during the day (may transition to HFNC for PO meds or diet)  - VBG daily in the mornings for now (and additionally prn)   - CXR daily with chest tubes  - Defer lytics to right chest tube at this time d/t size of effusion on imaging, reevaluate daily  - IR has requested to be contacted prior to removal of left chest tube(s), recommend for primary team to discuss removal of left apical chest tube with IR today, left basilar to remain given ureaplasma empyema  - Repeat chest CT on 3/7 (ordered)  - Agree with increase in diuresis today  - Tacrolimus level 3/5 (ordered)  - Prednisone taper due 3/8 (not yet ordered)  - Ceftriaxone through 3/8, then transition to amoxicillin for total of 3 months course (through ) for Actinomyces treatment  - Low threshold to treat Candida if it recurs in respiratory cultures, per ID  - Valacyclovir through 3/12,  then resume acyclovir prophylaxis through 3/23 per protocol  - Doxycycline and levofloxacin for ureaplasma, course duration TBD, QTc monitoring per primary  - BDG fungitell and Aspergillus galactomannan pending     COPD s/p bilateral sequential lung transplant (BSLT):  Acute hypoxic/hypercapneic respiratory failure:   Bilateral pleural effusions:   Right apical PTX: Scant pleural-pleural adhesions and mild-moderate bilateral hilar lymphadenopathy per op note.  Pathology with CPFE.  Pressor weaned off and pt. extubated on 2/22.  Left chest tube inadvertently dislodged, f/u chest CT (2/25) with anterior hydroPTX, right chest tube in fissure.  Left chest tube removed and replaced with 2 left-sided pigtail chest tubes by IR (2/26).  DSA negative 2/28.  Hypoxia had generally resolved, only intermittently requiring supplemental oxygen up until bronch 3/1.  S/p bronch 3/1 without evidence of dehiscence, mild amount of thin pale/tan secretions noted in RLL bronchus.  Noted hypoventilating with oversedation during bronch, received Narcan x3 with improvement in sedation but hypercapnea initially severe (>90) and persisting with very gradual improvements.  Chest CT (3/2) with improved bilateral hydroPTX, bibasilar atectasis with mucous plugging, and increased right loculated pleural effusion, s/p right pigtail chest tube (3/3, exudative, 81%N, positive for ureaplasma as below).  - Nebs: levalbuterol and Mucomyst QID  - Aggressive pulmonary toilet with chest physiotherapy QID as well as Aerobika and incentive spirometry q1h w/a  - Strict BiPAP overnight and with daytime naps, prn during the day (may transition to HFNC for PO meds or diet)  - VBG daily in the mornings for now (and additionally prn)   - DSA 3/21 (not yet ordered)  - CXR daily with chest tubes, today with improving bibasilar effusions (personally reviewed)  - Chest tube management by CVTS       * Right chest tube to remain to suction, defer lytics at this time d/t  size of effusion on imaging, reevaluate daily       * IR has requested to be contacted prior to removal of left chest tubes, recommend for primary team to discuss removal of left apical chest tube with IR today, left basilar to remain given ureaplasma empyema (as below)  - Repeat chest CT on 3/7 (ordered)  - Volume management per primary team, agree with increase in diuresis today  - Post-pyloric nasal FT, TF per RD and primary team  - SLP following for diet advacement     Immunosuppression:  S/p induction therapy with basiliximab x2 doses (with high dose IV steroid).  - Tacrolimus 5 mg BID (decreased 3/2).  Goal level 8-12.  Steady state level 3/5 (ordered).   - MMF 1000 mg BID (decreased 2/25 due to diarrhea)  - Prednisone 15 mg BID with taper per lung transplant protocol (due 3/8, not yet ordered):  Date AM dose (mg) PM dose (mg)   3/1 15 15   3/8 15 12.5   3/15 12.5 12.5   3/29 12.5 10   4/12 10 10   5/10 10 7.5   6/7 7.5 7.5   7/5 7.5 5   8/2 5 5   8/30 5 2.5      Prophylaxis:   - Dapsone for PJP ppx (started 2/23 at decreased MWF dose and increased to daily 3/1, G6PD 13.3 2/21)  - Nystatin for oral candidiasis ppx, 6 month course  - See below for serologies and viral ppx:    Donor Recipient Intervention   CMV status Negative Negative CMV monthly (3/21, not yet ordered)   EBV status Positive Positive EBV at one month (3/21, not yet ordered)   HSV status N/A (Newly) Positive As below      ID: Prior history of infection/colonization with Haemophilus influenzae (2017).  Broad spectrum ABX empirically post-op with vanc and ceftaz (2/21-2/22), stopped after 24h to target known donor cultures at that time on POD #1 per Dr. Lala (transitioned to cefazolin).  Broadened again on POD #2 with culture updates as below.  Donor ([facility]) cultures with P-S MSSA; ([facility]) with Strep mitis, Actinomyces odontolyticus, MSSA x2, and C. kruseii (concern for possible aspiration).  Recipient cultures at time of transplant with  Actinomyces odonotolyticus.  Bronch cultures (2/22) with Saccharomyces cerevisiae (no indication to treat per Dr. Ghosh unless pt. acutely declines clinically, 3/1) and C. albicans.  - IgG repeat at one month (3/21, not yet ordered)  - ABX: ceftriaxone (2/23-3/8), then transition to amoxicillin for total of 3 months course (through 5/23) for Actinomyces treatment  - Low threshold to treat Candida if it recurs in respiratory cultures, per ID     HSV: Pt. with recent seroconversion at time of transplant.  HSV also noted on donor cultures.  Initial plan for 30 days of ppx with ACV post-op per Dr. Lala.  Then with HSV+ bronch at time of transplant (2/21), transitioned to treatment dosing with VACV  - Valacyclovir 1000 mg TID X 14d (2/27-3/12), then resume acyclovir prophylaxis (3/13-3/23) per protocol     Hyperammonemia:   Disseminated ureaplasma:  Had been somnolent with some confusion/visual hallucinations in setting of hypercapnia as above.  Ammonia mildly elevated on 3/2 but quickly normalized.  Ureaplasma and mycoplasma studies sent, pt. noted to have ureaplasma in both pleural and sputum cultures from 3/2.  Repeat ammonia level remains normal (on the higher end) on 3/4.  - Doxycycline and levofloxacin, course duration TBD, QTc monitoring per primary  - In light of normal ammonia level, defer more aggressive interventions for hyperammonemia at this time (stopping CNI, iHD, etc.)     Leukocytosis: WBC trending upward now on POD #10, frequently noted at this stage post-op (pathology not entirely clear, but may be at least partially d/t bone marrow surge post-transplant).  Procal moderately elevated at 0.18, but may still be somewhat non-specific at this point post-op.  - Blood cultures (3/1) NGTD  - Sputum cultures (3/2) as above, otherwise NGTD  - Pleural cultures (3/2, 3/3) as above, otherwise NGTD  - BDG fugitell and Aspergillus galactomannan pending (3/3)  - ABX as above     PHS risk criteria donor: Additional  labs required post-transplant (between 4-8 weeks post-op): Hepatitis B, Hepatitis C, and HIV by COLT ([id number], ordered POD #30), also plan to repeat hepatitis B surface antibody at that time (ordered) given discrepancy with recent result.     Other relevant problems managed by primary team:     Diarrhea: Likely 2/2 medications and tube feedings.  Fiber added to tube feeds.  MMF decreased as above.  Loose stools now improved.  Will consider transition to Myfortic if loose stools increase again, deferred for now.     Increasing LFTS: Rising 3/1 despite medication adjustments (APAP and statin stopped 2/27).  Of note, pt. had a discrepant hep B surface Ab at time of transplant as above.  LFTs remain elevated although improved 3/2.  Also with elevated ammonia as above.  RUQ US (3/3) with only hepatic steatosis.  LFTs gradually improving, monitoring daily hepatic panel.     CAD: Noted to have mild non-obstructive CAD without hemodynamically significant lesions on cardiac cath 3/27/19.  PTA ASA 81 mg and atorvastatin, started on rosuvastatin post-op but held 2/28 for elevated LFTs (as above).  ASA resumed 3/1.     Paroxysmal afib:   HTN: PTA diltiazem, not on AC.  Post-op tachycardia, off pressor since 2/22.  Metoprolol started 2/23.  Persistent low level tachycardia, but currently sinus tach with continued HTN.  Diltiazem resumed 3/2.     GERD: Not on PPI PTA.  Negative pH/manometry study 3/28/19, noted small hiatal hernia. On PPI daily.     We appreciate the excellent care provided by the [place] [id number] team.  Recommendations communicated via in person rounding and this note.  Will continue to follow along closely, please do not hesitate to call with any questions or concerns.     Pt. discussed with Dr. Knox and as a joint visit with APRN student Franki Stephens.    Iman Jane, DNP, APRN, CNP  Inpatient Nurse Practitioner  Pulmonary CF/Transplant     Subjective & Interval History:     On HFNC during the day and BiPAP  "18/6 with sleep.  Hypercapnea improved some but persisting.  Cough effort remains weak but slowly improving, occasionally able to produce white/thin sputum.  Some LUTHER but not ambulating.  C/o acute on chronic back pain and chest tube site discomfort.  Appetite remains poor, working on supplemental shakes, on continuous TF.      Review of Systems:     C: No fever, no chills, no change in weight, no change in appetite  INTEGUMENTARY/SKIN: No rash or obvious new lesions  ENT/MOUTH: No sore throat, no sinus pain, + rhinorrhea  RESP: See interval history  CV: No chest pain, no palpitations, no peripheral edema, no orthopnea  GI: No nausea, no vomiting, + loose stools, no reflux symptoms  : No dysuria  MUSCULOSKELETAL: No myalgias, no arthralgias  ENDOCRINE: Blood sugars intermittently elevated  NEURO: No headache, no numbness or tingling  PSYCHIATRIC: Tearful when discussing her back pain and breathing effort    Physical Exam:     All notes, images, and labs from past 24 hours (at minimum) were reviewed.    Vital signs:  Temp: 98.1  F (36.7  C) Temp src: Axillary BP: (!) 141/58 Pulse: 91   Resp: 23 SpO2: 98 % O2 Device: BiPAP/CPAP Oxygen Delivery: 35 LPM Height: 157.5 cm (5' 2\") Weight: 76.9 kg (169 lb 8.5 oz)  I/O:     Intake/Output Summary (Last 24 hours) at 3/4/2022 1056  Last data filed at 3/4/2022 1000  Gross per 24 hour   Intake 2440 ml   Output 1490 ml   Net 950 ml     Constitutional: Sitting up in a chair,  at bedside, in no apparent distress.   HEENT: Eyes with pink conjunctivae, anicteric.  Oral mucosa moist without lesions.  Right NJ feeding tube I place.  PULM: Very diminished mid and lower lung fields bilaterally.  No crackles, no rhonchi, no wheezes.  Non-labored breathing on HFNC 35%/35L.  CV: Normal S1 and S2.  RRR.  No murmur, gallop, or rub.  2+ BLE pitting edema.   ABD: NABS, soft, nontender, nondistended.    MSK: Moves all extremities.     NEURO: Alert and oriented, conversant.   SKIN: Warm, " dry.  No rash on limited exam.   PSYCH: Mood stable.     Lines, Drains, and Devices:  Peripheral IV 02/28/22 Left;Lateral Lower forearm (Active)   Site Assessment [facility] 03/04/22 0400   Line Status Saline locked 03/04/22 0400   Dressing Intervention New dressing  02/28/22 0051   Phlebitis Scale 0-->no symptoms 03/04/22 0400   Infiltration Scale 0 03/04/22 0400   Number of days: 4       Peripheral IV 03/02/22 Right;Anterior;Lateral Upper forearm (Active)   Site Assessment [facility] 03/04/22 0400   Line Status Saline locked 03/04/22 0400   Phlebitis Scale 0-->no symptoms 03/04/22 0400   Infiltration Scale 0 03/04/22 0400   Number of days: 2     Data:     LABS    CMP:   Recent Labs   Lab 03/04/22  0741 03/04/22  0553 03/04/22  0352 03/03/22  2355 03/03/22  0743 03/03/22  0509 03/02/22  0933 03/02/22  0455 03/01/22  0802 03/01/22  0318   NA  --  136  --   --   --  140  --  140  --  137   POTASSIUM  --  5.8*  --   --   --  4.8  --  4.8  --  4.4   CHLORIDE  --  94  --   --   --  98  --  98  --  97   CO2  --  39*  --   --   --  40*  --  39*  --  40*   ANIONGAP  --  3  --   --   --  2*  --  3  --  <1*   * 232* 191* 191*   < > 166*   < > 194*   < > 259*   BUN  --  38*  --   --   --  27  --  27  --  22   CR  --  0.52  --   --   --  0.49*  --  0.56  --  0.37*   GFRESTIMATED  --  >90  --   --   --  >90  --  >90  --  >90   MINISTERIO  --  8.4*  --   --   --  7.8*  --  8.2*  --  8.2*   MAG  --  2.1  --   --   --  1.9  --  2.2  --  1.8   PHOS  --  4.1  --   --   --  3.2  --  3.7  --  3.4   PROTTOTAL  --  5.2*  --   --   --  4.8*  --  5.3*  --  5.7*   ALBUMIN  --  1.9*  --   --   --  1.8*  --  1.9*  --  2.0*   BILITOTAL  --  0.2  --   --   --  0.1*  --  0.2  --  0.2   ALKPHOS  --  131  --   --   --  121  --  153*  --  164*   AST  --  22  --   --   --  26  --  41  --  56*   ALT  --  84*  --   --   --  99*  --  130*  --  140*    < > = values in this interval not displayed.     CBC:   Recent Labs   Lab 03/04/22  0553 03/03/22  1222  03/03/22  0631 03/03/22  0509 03/02/22  0455   WBC 19.9*  --  20.1* 19.7* 23.1*   RBC 2.48*  --  2.61* 2.36* 2.73*   HGB 7.3* 8.2* 7.6* 6.9* 7.9*   HCT 23.8*  --  24.5* 22.5* 25.9*   MCV 96  --  94 95 95   MCH 29.4  --  29.1 29.2 28.9   MCHC 30.7*  --  31.0* 30.7* 30.5*   RDW 13.6  --  13.8 13.7 13.8     --  301 298 347       INR: No lab results found in last 7 days.    Glucose:   Recent Labs   Lab 03/04/22  0741 03/04/22  0553 03/04/22  0352 03/03/22  2355 03/03/22  2119 03/03/22  1618   * 232* 191* 191* 231* 194*       Blood Gas:   Recent Labs   Lab 03/04/22  0553 03/03/22  0509 03/02/22  1430   PHV 7.35 7.37 7.34   PCO2V 83* 76* 81*   PO2V 34 113* 22*   HCO3V 45* 44* 43*   ARIEL 17.4* 16.6* 15.9*   O2PER 35 45 45       Culture Data No results for input(s): CULT in the last 168 hours.    Virology Data: No results found for: INFLUA, FLUAH1, FLUAH3, [id number], IFLUB, RSVA, RSVB, PIV1, PIV2, PIV3, HMPV, HRVS, ADVBE, ADVC    Historical CMV results (last 3 of prior testing):  No results found for: CMVQNT  No results found for: CMVLOG    Urine Studies    Recent Labs   Lab Test 03/01/22  1549 02/20/22  0248   URINEPH 6.0 7.0   NITRITE Negative Negative   LEUKEST Negative Negative   WBCU 0 <1       Most Recent [place] Pulmonary Function Testing (FVC/FEV1 only)  FVC-Pre   Date Value Ref Range Status   12/20/2021 1.81 L    06/21/2021 1.46 L    12/09/2019 1.59 L    06/03/2019 1.68 L      FVC-%Pred-Pre   Date Value Ref Range Status   12/20/2021 65 %    06/21/2021 52 %    12/09/2019 56 %    06/03/2019 58 %      FEV1-Pre   Date Value Ref Range Status   12/20/2021 0.49 L    06/21/2021 0.50 L    12/09/2019 0.49 L    06/03/2019 0.47 L      FEV1-%Pred-Pre   Date Value Ref Range Status   12/20/2021 22 %    06/21/2021 22 %    12/09/2019 21 %    06/03/2019 20 %        IMAGING    Recent Results (from the past 48 hour(s))   CT Chest Pulmonary Embolism w Contrast    Narrative    EXAMINATION: CT CHEST PULMONARY EMBOLISM W CONTRAST  on 3/2/2022 5:44  PM.    INDICATION: PE suspected, high prob; Needs CT Chest with and without  contrast to look at lung parenchyma per Pulm    COMPARISON: CT chest 2/26/2022.    TECHNIQUE: CT imaging obtained through the chest with contrast.  Coronal and axial MIP reformatted images obtained. Three-dimensional  (3D) post-processed angiographic images were reconstructed, archived  to PACS and used in interpretation of this study.     CONTRAST: 99 ml isovue 370 IV    FINDINGS:    Lines and tubes: Right-sided chest tube with the tip in the right  major fissure. 2 left-sided chest tubes, one with the tip in the  superior left lung, one at the base. Enteric tube with the tip looping  around in the stomach.    Vessels: No central filling defect consistent with a pulmonary  embolism. Main pulmonary artery normal caliber of 3.0 cm. No right  heart strain. No reflux of contrast. No aortic aneurysm.     Mediastinum: No thyroid nodules. Mucous plugging within the lower  lobes bilaterally. Heart size is within normal limits. There is a  trace pericardial effusion.  Normal thoracic vasculature.  Atherosclerotic calcifications in the thoracic aorta. Mild prominent  mediastinal lymph nodes, likely reactive.     Lungs: Postoperative changes of bilateral lung transplant. Clamshell  sternotomy wires are intact. Again noted is a small fluid collection  containing gas in the right hilum. Improvement in the bilateral  hydropneumothorax. Small right greater than left pleural effusions  with associated bibasilar atelectasis. Groundglass opacities in the  lung bases.    Bones and soft tissues: No suspicious bone findings.     Upper abdomen: Limited.      Impression    IMPRESSION:   1. No central filling defect consistent with a pulmonary embolism.     2. Improvement in the bilateral hydropneumothorax. A right-sided chest  tube is located in the right major fissure. 2 left-sided chest tubes,  one in the apex and one in the left lung  base.    3. Again noted is fluid and gas collection along the right hilum,  which may represent postsurgical fluid collection. Follow as needed to  exclude infection.    4. Bibasilar atelectasis with mucus plugging. Groundglass opacities in  the bilateral lung bases again noted.    I have personally reviewed the examination and initial interpretation  and I agree with the findings.    FELIPA MARIE MD         SYSTEM ID:  [id number]   CT Abdomen Pelvis w Contrast    Narrative    CT abdomen pelvis with contrast    INDICATION: Checking for intra-abdominal infection post transplant    COMPARISON: CT chest pulmonary angiogram today comment please  correlate with that report. No recent abdomen CT.    FINDINGS: 99 mL intravenous Isovue 370 contrast. Right larger than  left pleural effusions. Associated bilateral atelectasis, recommend  follow-up to clearing to exclude infection. Thoracostomy tube on the  left. Benign focal fatty infiltration in the liver. Periportal edema.  Right thoracostomy tube. Feeding tube tip in the jejunum.  Calcification in the aorta. Iliac atherosclerosis. Fluid-filled small  bowel pelvis could indicate ileus and/or enteritis. Well-circumscribed  pelvic cystic area measuring 8.6 x 5.2 cm. Urinary bladder appears  normal. Pancreas appears grossly normal. Spleen appears normal.  Bilateral adrenals appear normal. 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 Mild body wall edema which may  indicate malnutrition/anasarca. No dominant lymphadenopathy. Bones  show osteopenia. Benign-appearing hemangioma of L2 as well as T12 and  T9.  Also noted is thickening in the right pericardium an right perihilar  region which could be fluid with without inflammation/infection. Air  density noted (series 3 image 13) which could represent small abscess  or necrotic tissue. Gallbladder incompletely distended. Soft tissue  stranding surrounding common hepatic artery adjacent to but above  the  pancreatic head pelvis could indicate inflammatory or fibrotic tissue.  Follow-up to clearing however is recommended to exclude underlying  neoplasm. Bilateral lung transplant.      Impression    IMPRESSION: Mild periportal edema. Cystic area in left hemipelvis,  likely adnexal in origin. Fluid-filled small bowel with distended  loops of bowel suggestive of enteritis/ileitis. Recommend continued  follow-up to exclude developing obstruction. Aortoiliac  atherosclerosis. Mild periportal edema with a benign focal fatty  infiltration in the liver. Soft tissue prominence surrounding the  common hepatic artery an above above the level of the pancreatic head,  differential of inflammation/infection as opposed to neoplasm.  Bilateral lung transplant. Possible postoperative changes versus  inflammation/infection no pericardial tissues and right infrahilar  tissues with right larger than left pleural effusions with associated  atelectasis, recommend follow-up to clearing to exclude infection.    FELIPA MARIE MD         SYSTEM ID:  [id number]   US Abdomen Limited    Narrative    EXAMINATION: Limited Abdominal Ultrasound, 3/3/2022 7:44 AM     COMPARISON: 3/2/2022    HISTORY: Rising LFTs    FINDINGS:   Fluid: No evidence of ascites or pleural effusions.    Liver: The liver demonstrates hyperechoic and coarsened parenchyma,  measuring 17.7 cm in craniocaudal dimension. There is no focal mass.     Gallbladder: There is no wall thickening, pericholecystic fluid,  positive sonographic Guo's sign or evidence for cholelithiasis.    Bile Ducts: Both the intra- and extrahepatic biliary system are of  normal caliber.  The common bile duct measures mm in diameter.    Pancreas: Visualized portions of the head and body of the pancreas are  unremarkable.     Kidney: The right kidney measures 11.4 cm long. There is no  hydronephrosis or hydroureter, no shadowing renal calculi. 1.4 cm  cortical hyperechoic mass without significant  acoustic shadowing. Most  likely angiomyolipoma. This correlates with the dominant CT  abnormality from yesterday which showed a partially fatty mass in this  area.    Stable small right pleural effusion.      Impression    IMPRESSION:   1.  Hepatic steatosis.  2.  Revisualization of right kidney angiomyolipoma.  3.  Small right pleural effusion.    I have personally reviewed the examination and initial interpretation  and I agree with the findings.    FELIPA MARIE MD         SYSTEM ID:  [id number]   XR Chest 2 Views    Narrative    PA and lateral chest    HISTORY: Follow-up lung transplant    COMPARISON STUDY: 3/2/2022    FINDINGS: Cardiac silhouette is nonenlarged. Surgical changes  bilateral lung transplant. Pigtail chest tubes on the left. Right  chest tube is been removed. Feeding tube collimated off film abdomen.  Small pleural effusions with bibasilar atelectasis and consolidation      Impression    IMPRESSION: Small pleural effusions with bibasilar atelectasis and  consolidation.    VARGAS DUKE MD         SYSTEM ID:  [id number]   XR Chest/Heart Fluoro    Narrative    Chest fluoroscopy sniff test    INDICATION: Post lung transplant. Evaluate diaphragm function.    COMPARISON: Plain radiographs earlier same day.    FINDINGS: Patient was placed in supine position on the fluoroscopic  table. Clamshell sternotomy from prior bilateral lung transplant  noted. Left basilar chest catheter present. Apparent feeding tube also  present progressing into the upper abdomen beyond the inferior margin  of the image field.  On instruction, the patient attempted inspiratory/sniff maneuvers and  there is very little excursion of either hemidiaphragm. In fact, upon  inspiration, the accessory chest wall musculature was the primary  method of respiratory motion.      Impression    IMPRESSION: Poor diaphragm excursion bilaterally with extensive  accessory respiratory chest wall musculature effort to aid  in  inspiration.    FELIPA MARIE MD         SYSTEM ID:  [id number]   IR Chest Tube Place Non Tunneled Right    Narrative    PRE-PROCEDURE DIAGNOSIS: Pleural effusion    POST-PROCEDURE DIAGNOSIS: Same    PROCEDURE: Chest tube placement non-tunneled    Impression    IMPRESSION: Completed ultrasound-guided nontunneled chest tube  placement. 14 Pan[place]ian chest tube placed in the right pleural space and  connected to water seal drainage. No immediate complication.    ----------    CLINICAL HISTORY: The patient has a history of pleural effusion. Chest  tube placement requested.    PERFORMED BY: Leo Mckeon PA-C    CONSENT: Written informed consent was obtained and is documented in  the patient record.    MEDICATIONS: 1% lidocaine for local anesthesia    NURSING: The patient was placed on continuous vital signs monitoring.  Vital signs were monitored by nursing staff under my supervision.      DESCRIPTION: The skin overlying the pleural effusion was prepped and  draped in the usual sterile fashion. Under continuous ultrasound  visualization, the skin and pleural was anesthetized before a small  skin nick was made with a #11 blade. A 5 Panamanian centesis  needle/catheter was advanced into the pleural space before the  catheter was advanced off the needle and the needle was removed. A  0.035 superstiff Amplatzwire was advanced through the catheter and 5  Panamanian catheter was exchanged over wire for a non-locking pigtail  chest tube after tissue dilation. The drain was secured to the skin  with a 2-0 nylon suture and a sterile bandage was applied. The chest  tube drain was connected to drainage system under water seal.  Ultrasound images were stored in the patient's record.    COMPLICATIONS: No immediate concerns, the patient remained stable  throughout the procedure and tolerated it well.    ESTIMATED BLOOD LOSS: Minimal    SPECIMENS: None    LEO MCKEON PA-C         SYSTEM ID:  [id number]

## 2022-03-04 NOTE — PROGRESS NOTES
[facility]    Medicine Progress Note - Hospitalist Service, GOLD TEAM 10    Date of Admission:  2/20/2022    Assessment & Plan            Melissa Elder is a 66 year old female with PMHx HTN, HLD, paroxysmal Afib, osteoporosis, GERD, severe COPD, previously requiring 2-3L NC O2 at rest.  Underwent b/l lung transplant with Dr. Robin on 02/21. Got 1u pRBC post-op, no overt bleeding source, monitoring. Extubated 02//22 to O2 NC and transferred to the floor. Pericardial drain pulled 2/24/22 without issue. Continuing on antibiotics for cultures growing from donor and recipient lungs. Has bilateral pleural effusions and anterior hydropneumothorax requiring 3 chest tubes currently.      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  Pressor weaned off 2/22 and pt. extubated. Prior history of infection/colonization with Haemophilus influenzae (2017).  IgG adequate (2/20).  MRSA nares negative 2/21.  Broad spectrum ABX empirically post-op with vanc and ceftaz (2/21-2/22), stopped after 24h per Dr. Lala to target known donor cultures on POD #1. Donor cultures ([facility]) with Strep mitis, Actinomyces odontolyticus, and Kenyatta kruseii. Recipient cultures with Actinomyces odonotolyticus.  So currently on ceftriaxone for coverage.   - Continue BiPAP (discussed below)  - Nebs: levalbuterol and Mucomyst QID  - Aggressive pulmonary toilet with  chest physiotherapy QID as well as Aerobika and incentive spirometry q1h w/a  - Wean supplemental O2 to keep >92%  - Chest tubes managed by surgical team   - Right pleural chest tube by CT surgery   - Two left pleural tubes placed 2/26 by IR   - IR consult for R chest tube placement   - Daily CXR  - Tube feeding per nutrition   - Await explant pathology  - Continue ceftriaxone for 2 weeks through 3/8 followed by amoxicillin for 3 months  - Immune suppression and microbial ppx per daily transplant pulm note  - Continue Valtrex to 1000 mg TID x 14 days for HSV (through 3/11) then complete ppx per protocol (see pulm note)   - Continue diuresis, increase to 40 BID today, monitor I/O, weight     Hypercarbic respiratory failure 3/1, improving   3/1 noted to have slowly rising bicarb on daily labs. Had inspection bronchoscopy 3/1 as well and became oversedated and required multiple doses of narcan. VBG obtained post procedure showed hypercapnea to 91, which did not improve with time after patient had woken up more (repeat CO2 99). Started on BiPAP.   - Continue BiPAP, follow VBG  - CT Chest negative for PE, improvement in b/l hydropneumothorax, fluid and gas collection along the right hilum (?) post-surgical change vs. Infection, and bibasilar atelectasis with mucous plugging with GGO bilateral lung bases     Acute toxic metabolic encephalopathy, likely due to hypercapnea, resolved  Mildly elevated ammonia, pleural fluid/sputum +Ureaplasma 3/4   Intermittent somnolence, hallucinations starting around 3/1 with rise in CO2. Ammonia level checked due to concern for post-transplant hyperammonemia which was mildly elevated at 57, but repeat 49.   - Mycoplasma/ureaplasma cultures collected and Transplant ID consulted    - Sputum/Pleural fluid +ureaplasma on 3/4. Started on Levofloxacin/Doxycycline.     Leukocytosis  Afebrile, but WBC started rising 2/27 from 14.6 to 23.1 3/2. Concern for possible lung source given increased  hypercapnea and need for BiPAP. CT Chest/A/P check and pan-cultured. Transplant ID consulted.   - CT chest with findings as above  - CT abdomen shows mild periportal edema, fluid filled small bowel with distended loops suggestive of enteritis/ileitis, soft tissue prominence surrounding the common hepatic artery above the level of the pancreatic head, ddx inflammation/infection vs. Malignancy.    - Radiology report recommends continued follow up to exclude developing obstruction. Will closely monitor abdominal exam and stool output.   - Blood and pleural fluid cultures obtained and NGTD     Non infectious diarrhea, resolved    Noted to have increased loose stools 2/25 likely due to mmf and TF. Fiber added to TF with improvement.     Post op pain   - Erector spinae catheters removed   - gabapentin 100 mg nightly  - tylenol 975 mg Q8H   - Dilaudid 0.2-0.4 mg Q2H PRN   - oxycodone 5-10 mg Q4H PRN   - robaxin 500 mg Q6H scheduled     Paroxysmal Afib   HTN  She was on diltiazem prior to lung transplantation and had not been on anticoagulation. She was  transitioned to metoprolol in the ICU.  - Continue Metoprolol tartrate to 25 mg BID   - Restart PTA diltiazem at lower dose (PTA on 240 mg XL) for better BP/HR control. Start at 30mg Q6hr and monitor.   - No anticoagulation at this time     Stress induced hyperglycemia  Did not need insulin prior to admission but sugars have been rising despite sliding scale coverage.  - Endocrinology consulted for assistance, appreciate recommendations   - Please see DM team daily note     HLD  Mild CAD   Noted on transplant workup.   - started rosuvastatin 40 mg daily--Held 2/28 due to rising LFTs      Acute blood loss anemia  Acute blood loss thrombocytopenia  Secondary to surgery. Will continue to monitor.   - Transfuse Hgb < 7, platelets < 50   - Hemolysis labs negative 3/3, on dapsone, continue to monitor     Sternotomy  Surgical Incision  - Sternal precautions  - Postoperative  "incision management per protocol  - PT/OT/CR     Elevated LFTs, resolved   Noted on labs 2/26 and rising. AST, ALP, Bili WNL. No RUQ or abdominal pain. Imaging including RUQ ultrasound WNL. LFTs have since normalized. Will continue to monitor.  - CMP daily   - Hold statin        Diet: Adult Formula Drip Feeding: Continuous Osmolite 1.5; Nasoduodenal tube; Goal Rate: 50; mL/hr; Medication - Feeding Tube Flush Frequency: At least 15-30 mL water before and after medication administration and with tube clogging; Amount to Send (Nut...  Soft & Bite Sized Diet (level 6) Thin Liquids (level 0)    DVT Prophylaxis: Pneumatic Compression Devices  R[place]iff Catheter: Not present  Central Lines: None  Cardiac Monitoring: None  Code Status: Full Code      Disposition Plan   Expected Discharge: 03/08/2022   Anticipated discharge location: home;inpatient rehabilitation facility    Delays:            The patient's care was discussed with the Bedside Nurse, Patient and transplant pulm  Consultant.    Sharri Townsend MD  Hospitalist Service, GOLD TEAM 10  [facility]  Securely message with the Vocera Web Console (learn more here)  Text page via [facility] Paging/Directory   Please see signed in provider for up to date coverage information      Clinically Significant Risk Factors Present on Admission                    ______________________________________________________________________    Interval History     Feeling better today  Denies abdominal pain, nausea, vomiting  Breathing \"ok\"   Coughing up some sputum   Back hurting, had a hard time sleeping last night     Four point ROS completed and negative unless listed above     Data reviewed today: I reviewed all medications, new labs and imaging results over the last 24 hours.     Physical Exam   Vital Signs: Temp: 98.1  F (36.7  C) Temp src: Oral BP: (!) 146/60 Pulse: 97   Resp: 20 SpO2: 98 % O2 Device: BiPAP/CPAP Oxygen Delivery: 35 " LPM  Weight: 169 lbs 8.54 oz    Constitutional: Sitting up in bed, awake and alert   HEENT: MMM. Sclera clear.   PULM: Diminished bases bilaterally.  No crackles, no rhonchi, no wheezes.  Chest tubes in place.  CV: Normal S1 and S2.  Tachycardia.  No murmur, gallop, or rub.  Significant pitting edema of the bilateral LE   ABD: BS hypoactive, soft, nontender, nondistended.    MSK: Moves all extremities.  No apparent muscle wasting.   NEURO: Alert and oriented, conversant.   SKIN: Warm, dry.  No rash on limited exam.   PSYCH: Mood stable.     Data   Recent Labs   Lab 03/04/22  0553 03/04/22  0352 03/03/22  2355 03/03/22  1618 03/03/22  1223 03/03/22  0743 03/03/22  0631 03/03/22  0509 03/02/22  0933 03/02/22  0455   WBC 19.9*  --   --   --   --   --  20.1* 19.7*  --  23.1*   HGB 7.3*  --   --   --  8.2*  --  7.6* 6.9*  --  7.9*   MCV 96  --   --   --   --   --  94 95  --  95     --   --   --   --   --  301 298  --  347     --   --   --   --   --   --  140  --  140   POTASSIUM 5.8*  --   --   --   --   --   --  4.8  --  4.8   CHLORIDE 94  --   --   --   --   --   --  98  --  98   CO2 39*  --   --   --   --   --   --  40*  --  39*   BUN 38*  --   --   --   --   --   --  27  --  27   CR 0.52  --   --   --   --   --   --  0.49*  --  0.56   ANIONGAP 3  --   --   --   --   --   --  2*  --  3   MINISTERIO 8.4*  --   --   --   --   --   --  7.8*  --  8.2*   * 191* 191*   < >  --    < >  --  166*   < > 194*   ALBUMIN 1.9*  --   --   --   --   --   --  1.8*  --  1.9*   PROTTOTAL 5.2*  --   --   --   --   --   --  4.8*  --  5.3*   BILITOTAL 0.2  --   --   --   --   --   --  0.1*  --  0.2   ALKPHOS 131  --   --   --   --   --   --  121  --  153*   ALT 84*  --   --   --   --   --   --  99*  --  130*   AST 22  --   --   --   --   --   --  26  --  41    < > = values in this interval not displayed.     Recent Results (from the past 24 hour(s))   US Abdomen Limited    Narrative    EXAMINATION: Limited Abdominal  Ultrasound, 3/3/2022 7:44 AM     COMPARISON: 3/2/2022    HISTORY: Rising LFTs    FINDINGS:   Fluid: No evidence of ascites or pleural effusions.    Liver: The liver demonstrates hyperechoic and coarsened parenchyma,  measuring 17.7 cm in craniocaudal dimension. There is no focal mass.     Gallbladder: There is no wall thickening, pericholecystic fluid,  positive sonographic Guo's sign or evidence for cholelithiasis.    Bile Ducts: Both the intra- and extrahepatic biliary system are of  normal caliber.  The common bile duct measures mm in diameter.    Pancreas: Visualized portions of the head and body of the pancreas are  unremarkable.     Kidney: The right kidney measures 11.4 cm long. There is no  hydronephrosis or hydroureter, no shadowing renal calculi. 1.4 cm  cortical hyperechoic mass without significant acoustic shadowing. Most  likely angiomyolipoma. This correlates with the dominant CT  abnormality from yesterday which showed a partially fatty mass in this  area.    Stable small right pleural effusion.      Impression    IMPRESSION:   1.  Hepatic steatosis.  2.  Revisualization of right kidney angiomyolipoma.  3.  Small right pleural effusion.    I have personally reviewed the examination and initial interpretation  and I agree with the findings.    FELIPA MARIE MD         SYSTEM ID:  [id number]   XR Chest 2 Views    Narrative    PA and lateral chest    HISTORY: Follow-up lung transplant    COMPARISON STUDY: 3/2/2022    FINDINGS: Cardiac silhouette is nonenlarged. Surgical changes  bilateral lung transplant. Pigtail chest tubes on the left. Right  chest tube is been removed. Feeding tube collimated off film abdomen.  Small pleural effusions with bibasilar atelectasis and consolidation      Impression    IMPRESSION: Small pleural effusions with bibasilar atelectasis and  consolidation.    VARGAS DUKE MD         SYSTEM ID:  [id number]   IR Chest Tube Place Non Tunneled Right    Narrative     PRE-PROCEDURE DIAGNOSIS: Pleural effusion    POST-PROCEDURE DIAGNOSIS: Same    PROCEDURE: Chest tube placement non-tunneled    Impression    IMPRESSION: Completed ultrasound-guided nontunneled chest tube  placement. 14 Mexican chest tube placed in the right pleural space and  connected to water seal drainage. No immediate complication.    ----------    CLINICAL HISTORY: The patient has a history of pleural effusion. Chest  tube placement requested.    PERFORMED BY: Sandra Mckeon PA-C    CONSENT: Written informed consent was obtained and is documented in  the patient record.    MEDICATIONS: 1% lidocaine for local anesthesia    NURSING: The patient was placed on continuous vital signs monitoring.  Vital signs were monitored by nursing staff under my supervision.      DESCRIPTION: The skin overlying the pleural effusion was prepped and  draped in the usual sterile fashion. Under continuous ultrasound  visualization, the skin and pleural was anesthetized before a small  skin nick was made with a #11 blade. A 5 Mexican centesis  needle/catheter was advanced into the pleural space before the  catheter was advanced off the needle and the needle was removed. A  0.035 superstiff Amplatzwire was advanced through the catheter and 5  Mexican catheter was exchanged over wire for a non-locking pigtail  chest tube after tissue dilation. The drain was secured to the skin  with a 2-0 nylon suture and a sterile bandage was applied. The chest  tube drain was connected to drainage system under water seal.  Ultrasound images were stored in the patient's record.    COMPLICATIONS: No immediate concerns, the patient remained stable  throughout the procedure and tolerated it well.    ESTIMATED BLOOD LOSS: Minimal    SPECIMENS: None    SANDRA MCKEON PA-C         SYSTEM ID:  [id number]     Medications     dextrose Stopped (03/01/22 1795)     - MEDICATION INSTRUCTIONS -       - MEDICATION INSTRUCTIONS -       - MEDICATION INSTRUCTIONS -          acetylcysteine  2 mL Nebulization 4x Daily     aspirin  81 mg Oral Daily     calcium carbonate 600 mg-vitamin D 400 units  1 tablet Oral BID w/meals     cefTRIAXone  2 g Intravenous Q24H     dapsone  50 mg Oral Daily     diltiazem  30 mg Oral Q6H JUANA     fiber modular (NUTRISOURCE FIBER)  1 packet Per Feeding Tube TID     furosemide  40 mg Intravenous Once     gabapentin  100 mg Oral or Feeding Tube At Bedtime     [Held by provider] heparin ANTICOAGULANT  5,000 Units Subcutaneous Q8H     insulin aspart  1-9 Units Subcutaneous Q4H     insulin aspart   Subcutaneous TID w/meals     insulin NPH  16 Units Subcutaneous Q24H     insulin NPH  16 Units Subcutaneous Daily     levalbuterol  1.25 mg Nebulization 4x Daily     loratadine  10 mg Oral Daily     metoprolol tartrate  25 mg Oral or Feeding Tube BID     multivitamins w/minerals  15 mL Oral Daily     mycophenolate  1,000 mg Oral BID    Or     mycophenolate  1,000 mg Oral or NG Tube BID     nystatin  1,000,000 Units Swish & Swallow 4x Daily     pantoprazole  40 mg Oral or Feeding Tube Daily     predniSONE  15 mg Oral or Feeding Tube BID     protein modular  1 packet Per Feeding Tube TID     [Held by provider] rosuvastatin  40 mg Oral Daily     sodium chloride (PF)  3 mL Intracatheter Q8H     sodium chloride (PF)  3 mL Intracatheter Q8H     tacrolimus  5 mg Per Feeding Tube BID IS    Or     tacrolimus  5 mg Oral BID IS     valACYclovir  1,000 mg Oral Q8H

## 2022-03-04 NOTE — PROGRESS NOTES
[facility]    Transplant Infectious Diseases Inpatient Progress Note      Melissa Elder MRN# [id number]   YOB: 1955 Age: 66 year old   Date of Admission and time: 2/20/2022  1:39 AM      Addendum   Left pleural effusion and a sputum sample were positive for Ureaplasma sp.   The patient was started on doxycycline and levofloxacin.   I believe this therapy is empiric since the patient is not encephalopathic.          Recommendations:   1. Continue ceftriaxone.   2. Continue to monitor CBC given declining Hgb.   3. Will continue to follow on pleural fluid studies.   4. If C albicans continues to grow on subsequent respiratory samples, I would consider adding fluconazole or inhaled ampho B.     I am available over the weekend for questions and will resume the patient's care on Monday.         Summary of Presentation:   Transplants:  2/21/2022 (Lung), Postoperative day:  11     This patient is a 66 year old female with COPD s/p Bi lung transplant 2/2022. Induction with high dose steroids and basiliximab. On TAC/MMF/prednidonse.   Now with leukocytosis and high ammonia.         Active Problems and Infectious Diseases Issues:   1. Leukocytosis.   It is possible to be due to leukomoid reaction to anemia.   Will follow on left pleural fluid cx (3/2/2022) and right pleural effusion analysis and cx (3/3/2022).     2. Acute respiratory failure with hypercapnia.   Etiology not known.   The patient has significant LE edema and could be due to fluid overload.     3. 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   The most significant pathogens are the MSSA and the S constellatus and are covered by ceftriaxone.     Unless the donor aspirated, the significance of Actinomyces sp and S mitis is questionable since they represent oral michael and they did not grow on subsequent respiratory sample the next day.   The C krusei also did not grow again.  Saccharomyces sp is not  typically pathogenic.     C albicans may or may not result in empyema. Will follow on pleural fluid studies.   If with further growth on subsequent BALs, would either treat with fluconazole or inhaled ampho B.     If the clinical picture deteriorates, may change antimicrobials to unasyn and micafungin from ceftriaxone.     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 On VACV empirically.       Other Infectious Disease issues include:  - on VACV for viral ppx.   - PCP prophylaxis: dapsone.   - Serostatus: CMV D-/R-, EBV D+/R+, HSV1+/2-, VZV +  - Immunization status: This patient received the third dose of COVID-19 vaccine on 11/29/2021.  - Gamma globulin status: 1000 as of 2/20/2022.       Attestation:  I interviewed the patient and obtained history from the patient, the  who's at the bedside, and by reviewing the patient's chart including outside records, microbiological data, and radiological data. All data are summarized in this notes.  Ileana Ghosh MD  [facility]  Contact information available via [facility] Paging/Directory    03/04/2022             Interim History:   On high flow/.   Feels that the breathing and the cough have both improved.   Underwent right chest tube placement.          History of Present Illness:   Transplants:  2/21/2022 (Lung), Postoperative day:  11     This patient is a 66 year old female with COPD s/p Bi lung transplant.   The hospital course was not complicated until 3/1/2022 when she suffered from respiratory deterioration with hypercapnia and mental status changes. The encephalopathy resolved when the patient was placed on Bipap. The ammonia was checked and was elevated. The patent was started on doxycycline. ID consulted for double coverage advice in the setting of QTc prolongation.   The patient remains short of breath on Bipap. No significant cough or chest pain. No fever.   The  stated that his wife's mental status are at  baseline.             Review of Systems:      As mentioned in the HPI otherwise negative by reviewing constitutional symptoms, central and peripheral neurological systems, respiratory system, cardiac system, GI system,  system, musculoskeletal, skin, allergy, and lymphatics.                Immunizations:     Immunization History   Administered Date(s) Administered     COVID-19,PF,Moderna 11/29/2010, 02/16/2021, 03/12/2021     FLU 6-35 months 10/15/2008     Flu, Unspecified 10/02/2013     [id number] Novel Flu 11/12/2009     HepA-Adult 04/08/2019, 10/14/2019     HepA-ped 2 Dose 10/14/2019     HepB-Adult 01/17/2018, 03/20/2018, 08/20/2018     Influenza (High Dose) 3 valent vaccine 11/04/2015, 10/31/2017, 10/04/2019     Influenza (IIV3) PF 11/13/2006, 10/19/2010, 11/07/2011, 09/04/2012, 10/02/2013, 10/05/2013, 09/16/2014, 11/04/2015     Influenza Vaccine IM > 6 months Valent IIV4 (Alfuria,Fluzone) 09/16/2014, 10/27/2016, 11/07/2016     Influenza Vaccine, 6+MO IM (QUADRIVALENT W/PRESERVATIVES) 10/11/2018     Influenza, Quad, High Dose, Pf, 65yr+ (Fluzone HD) 09/28/2020, 09/28/2021     Pneumo Conj 13-V (2010&after) 04/15/2015     Pneumococcal 23 valent 09/16/2013, 10/30/2013, 04/08/2019     TDAP Vaccine (Adacel) 10/15/2008, 09/04/2012, 09/16/2013     Tdap (Adacel,Boostrix) 09/16/2013     Zoster vaccine recombinant adjuvanted (SHINGRIX) 04/08/2019, 06/10/2019            Allergies:     Allergies   Allergen Reactions     Alendronic Acid Other (See Comments)     dizziness  Other reaction(s): Dizziness     Nickel Rash     Sulfa Drugs Rash             Medications:   Medications that Require Transfusion:     dextrose Stopped (03/01/22 1125)     - MEDICATION INSTRUCTIONS -       - MEDICATION INSTRUCTIONS -       - MEDICATION INSTRUCTIONS -       Scheduled Medications:     acetylcysteine  2 mL Nebulization 4x Daily     aspirin  81 mg Oral Daily     calcium carbonate 600 mg-vitamin D 400 units  1 tablet Oral BID w/meals      cefTRIAXone  2 g Intravenous Q24H     dapsone  50 mg Oral Daily     diltiazem  30 mg Oral Q6H JUANA     fiber modular (NUTRISOURCE FIBER)  1 packet Per Feeding Tube TID     gabapentin  100 mg Oral or Feeding Tube At Bedtime     [Held by provider] heparin ANTICOAGULANT  5,000 Units Subcutaneous Q8H     insulin aspart  1-9 Units Subcutaneous Q4H     insulin aspart   Subcutaneous TID w/meals     insulin NPH  16 Units Subcutaneous Q24H     insulin NPH  16 Units Subcutaneous Daily     levalbuterol  1.25 mg Nebulization 4x Daily     loratadine  10 mg Oral Daily     metoprolol tartrate  25 mg Oral or Feeding Tube BID     multivitamins w/minerals  15 mL Oral Daily     mycophenolate  1,000 mg Oral BID    Or     mycophenolate  1,000 mg Oral or NG Tube BID     nystatin  1,000,000 Units Swish & Swallow 4x Daily     pantoprazole  40 mg Oral or Feeding Tube Daily     predniSONE  15 mg Oral or Feeding Tube BID     protein modular  1 packet Per Feeding Tube TID     [Held by provider] rosuvastatin  40 mg Oral Daily     sodium chloride (PF)  3 mL Intracatheter Q8H     sodium chloride (PF)  3 mL Intracatheter Q8H     tacrolimus  5 mg Per Feeding Tube BID IS    Or     tacrolimus  5 mg Oral BID IS     valACYclovir  1,000 mg Oral Q8H            Physical Exam:   Temp: 98.1  F (36.7  C) Temp src: Axillary BP: (!) 141/58 Pulse: 91   Resp: 23 SpO2: 98 % O2 Device: BiPAP/CPAP Oxygen Delivery: 35 LPM    Wt Readings from Last 4 Encounters:   03/04/22 76.9 kg (169 lb 8.5 oz)   12/20/21 66.7 kg (147 lb)   06/21/21 68 kg (150 lb)   06/03/19 69.4 kg (153 lb)     Constitutional: awake, alert, cooperative, wearing high flow, appears at stated age, well nourished.   Neurologic: Patient is moving all extremities without focal deficit, no focal sensory loss.   Lungs: coarse BS bilaterally with decrease BS on the right, no accessory muscle use, no dullness to percussion and no abnormal tactile fremitus.   CVS: RRR, normal S1/S2, no murmur, PMI was not  displaced.   Abdomen: non-tender, non-distended, no masses, no bruit, no shifting dullness, normal BS.   Extremities: +4 pitting edema of bilateral lower extremities, no ulcers, normal ROM of all joints, no swelling or erythema of any of joints and no tenderness to palpation.   Skin: no induration, fluctuation or discharge at the surgical site, and no rash            Data:   No results found for: ACD4    Inflammatory Markers    Recent Labs   Lab Test 03/01/22  0318   CRP 66.0*       Immune Globulin Studies     Recent Labs   Lab Test 02/20/22  0617 03/25/19  0834   IGG 1,023 901   IGM  --  265   IGE  --  22   IGA  --  190   IGG1  --  435   IGG2  --  363   IGG3  --  131   IGG4  --  32       Metabolic Studies       Recent Labs   Lab Test 03/04/22  0757 03/04/22  0741 03/04/22  0553 03/04/22  0352 03/03/22  2355 03/03/22  2119 03/03/22  1618 03/03/22  1223 03/03/22  0743 03/03/22  0509 03/02/22  0933 03/02/22  0455 03/01/22  0802 03/01/22  0318 02/28/22  0727 02/28/22  0623 02/27/22  0813 02/27/22  0732 02/22/22  1950 02/22/22  1946   NA  --   --  136  --   --   --   --   --   --  140  --  140  --  137  --  138  --  139   < >  --    POTASSIUM  --   --  5.8*  --   --   --   --   --   --  4.8  --  4.8  --  4.4  --  4.2  --  4.2   < >  --    CHLORIDE  --   --  94  --   --   --   --   --   --  98  --  98  --  97  --  99  --  101   < >  --    CO2  --   --  39*  --   --   --   --   --   --  40*  --  39*  --  40*  --  34*  --  36*   < >  --    ANIONGAP  --   --  3  --   --   --   --   --   --  2*  --  3  --  <1*  --  5  --  2*   < >  --    BUN  --   --  38*  --   --   --   --   --   --  27  --  27  --  22  --  18  --  20   < >  --    CR  --   --  0.52  --   --   --   --   --   --  0.49*  --  0.56  --  0.37*  --  0.36*  --  0.41*   < >  --    GFRESTIMATED  --   --  >90  --   --   --   --   --   --  >90  --  >90  --  >90  --  >90  --  >90   < >  --    GLC  --  189* 232* 191* 191* 231* 194*  --    < > 166*   < > 194*   < > 259*    < > 225*   < > 184*   < >  --    A1C  --   --   --   --   --   --   --   --   --   --   --   --   --   --   --   --   --   --   --  5.7*   MINISTERIO  --   --  8.4*  --   --   --   --   --   --  7.8*  --  8.2*  --  8.2*  --  8.0*  --  7.9*   < >  --    PHOS  --   --  4.1  --   --   --   --   --   --  3.2  --  3.7  --  3.4  --  2.8  --  2.0*   < >  --    MAG  --   --  2.1  --   --   --   --   --   --  1.9  --  2.2  --  1.8  --  1.6  --  1.9   < >  --    LACT 1.5  --   --   --   --   --   --   --   --  0.8  --  1.1  --  1.3  --   --    < >  --    < >  --    CKT  --   --   --   --   --   --   --  138  --   --   --   --   --   --   --   --   --   --   --   --     < > = values in this interval not displayed.       Hepatic Studies    Recent Labs   Lab Test 03/04/22  0553 03/03/22  0509 03/02/22 0455 03/01/22 0318 02/28/22  0623 02/27/22  0732   BILITOTAL 0.2 0.1* 0.2 0.2 0.4 0.2   ALKPHOS 131 121 153* 164* 117 106   ALBUMIN 1.9* 1.8* 1.9* 2.0* 1.9* 1.9*   AST 22 26 41 56* 36 41   ALT 84* 99* 130* 140* 87* 79*   LDH  --  293* 344*  --   --   --        Pancreatitis testing    Recent Labs   Lab Test 03/25/19  0834   AMYLASE 63   TRIG 83       Hematology Studies      Recent Labs   Lab Test 03/04/22  0553 03/03/22  1223 03/03/22  0631 03/03/22  0509 03/02/22 0455 03/01/22 0318 02/28/22  0623 12/09/19  0923 03/25/19  0834   WBC 19.9*  --  20.1* 19.7* 23.1* 21.2* 17.8*   < > 8.1   ANEU  --   --   --   --   --   --   --   --  6.5   ALYM  --   --   --   --   --   --   --   --  1.0   EVAN  --   --   --   --   --   --   --   --  0.4   AEOS  --   --   --   --   --   --   --   --  0.1   HGB 7.3* 8.2* 7.6* 6.9* 7.9* 8.9* 8.8*   < > 13.4   HCT 23.8*  --  24.5* 22.5* 25.9* 27.3* 27.0*   < > 41.5     --  301 298 347 305 247   < > 207    < > = values in this interval not displayed.       Clotting Studies    Recent Labs   Lab Test 02/22/22  0355 02/21/22  0808 02/21/22  0714 02/21/22  0530 02/20/22  0617 02/20/22  0617 03/25/19  0834    INR 1.31* 1.58* 5.23* 1.96*   < > 1.02 0.96   PTT  --   --  106* 29  --  31 22    < > = values in this interval not displayed.       Arterial Blood Gas Testing    Recent Labs   Lab Test 03/04/22  0553 03/03/22  0509 03/02/22  1430 03/02/22  0852 03/02/22  0455 03/01/22  2347 03/01/22  1731 02/23/22  0834 02/23/22  0347 02/22/22  1747 02/22/22  1304   PH  --   --   --   --   --  7.26* 7.34*  --  7.30* 7.33* 7.39   PCO2  --   --   --   --   --  98* 79*  --  54* 47* 35   PO2  --   --   --   --   --  96 149*  --  142* 98 184*   HCO3  --   --   --   --   --  43* 43*  --  27 25 21   O2PER 35 45 45 45   < > 40 45   < > 2 30  30 40    < > = values in this interval not displayed.        Urine Studies     Recent Labs   Lab Test 03/01/22  1549 02/20/22  0248 03/25/19  1043   URINEPH 6.0 7.0 5.0   NITRITE Negative Negative Negative   LEUKEST Negative Negative Trace*   WBCU 0 <1 1       Tacrolimus levels    Invalid input(s): TACROLIMUS, TAC, TACR  Transplant Immunosuppression Labs Latest Ref Rng & Units 3/4/2022 3/3/2022 3/3/2022 3/2/2022 3/1/2022   Creat 0.52 - 1.04 mg/dL 0.52 - 0.49(L) 0.56 0.37(L)   BUN 7 - 30 mg/dL 38(H) - 27 27 22   WBC 4.0 - 11.0 10e3/uL 19.9(H) 20.1(H) 19.7(H) 23.1(H) 21.2(H)   Neutrophil % 90 84 87 87 -   ANEU 1.6 - 8.3 10e9/L - - - - -       Microbiology:  Blood cx negative.   Last check of C difficile  No results found for: CDBPCT    Virology:  CMV viral loads  No results found for: CMVQNT, CMVRESINST, 06458, 64375, 40736, 09884, CMVQAL  CMV viral loads  No lab results found.    CMV viral loads  No results found for: CMVQNT, CMVRESINST, CMVLOG, 14661, 65602, 83689, 38741    CMV resistance testing  No lab results found.  No results found for: CMVCID, CMVFOS, CMVGAN     No results found for: H6RES    No results found for: EBVDN, EBRES, EBVDN, EBVSP, EBVPC, EBVPCR    CMV Antibody IgG   Date Value Ref Range Status   02/20/2022 No detectable antibody. No detectable antibody.  Final   03/25/2019 0.2 0.0 -  0.8 AI Final     Comment:     Negative  Antibody index (AI) values reflect qualitative changes in antibody   concentration that cannot be directly associated with clinical condition or   disease state.         Toxoplasma Antibody IgG   Date Value Ref Range Status   03/25/2019 <3.0 0.0 - 7.1 IU/mL Final     Comment:     Negative- Absence of detectable Toxoplasma gondii IgG antibodies. A negative   result does not rule out acute infection.  The magnitude of the measured result is not indicative of the amount of   antibody present. The concentrations of anti-Toxoplasma gondii IgG in a given   specimen determined with assays from different manufacturers can vary due to   differences in assay methods and reagent specificity.           Imaging:  CT c/a/p 3/2/22   IMPRESSION:   1. No central filling defect consistent with a pulmonary embolism.   2. Improvement in the bilateral hydropneumothorax. A right-sided chest  tube is located in the right major fissure. 2 left-sided chest tubes,  one in the apex and one in the left lung base.  3. Again noted is fluid and gas collection along the right hilum,  which may represent postsurgical fluid collection. Follow as needed to  exclude infection.  4. Bibasilar atelectasis with mucus plugging. Groundglass opacities in  the bilateral lung bases again noted.  IMPRESSION: Mild periportal edema. Cystic area in left hemipelvis,  likely adnexal in origin. Fluid-filled small bowel with distended  loops of bowel suggestive of enteritis/ileitis. Recommend continued  follow-up to exclude developing obstruction. Aortoiliac  atherosclerosis. Mild periportal edema with a benign focal fatty  infiltration in the liver. Soft tissue prominence surrounding the  common hepatic artery an above above the level of the pancreatic head,  differential of inflammation/infection as opposed to neoplasm.  Bilateral lung transplant. Possible postoperative changes versus  inflammation/infection no pericardial  tissues and right infrahilar  tissues with right larger than left pleural effusions with associated  atelectasis, recommend follow-up to clearing to exclude infection.  CT chest 2/25/2022  Impression:   1. Increase in size of confluent hydropneumothorax with  intercommunication between both hemithoraces anteriorly, and  pneumomediastinum. The right-sided chest tube is located within the  major fissure and therefore may be nonfunctional. The left-sided chest  tube has been retracted and is in the most inferior aspect of the  anterior costophrenic sulcus with a sidehole in the subcutaneous soft  tissues and therefore is nonfunctional.  2. Somewhat loculated appearing fluid and gas collection along the  right hilum measures up to 2 cm this may represents postsurgical fluid  collection short interval follow-up is recommended.  3. Bibasilar atelectasis and mucous plugging.         Ileana Ghosh MD  [facility]  Contact information available via [facility] Paging/Directory     03/04/2022

## 2022-03-05 ENCOUNTER — APPOINTMENT (OUTPATIENT)
Dept: OCCUPATIONAL THERAPY | Facility: CLINIC | Age: 67
DRG: 007 | End: 2022-03-05
Attending: SURGERY
Payer: MEDICARE

## 2022-03-05 ENCOUNTER — APPOINTMENT (OUTPATIENT)
Dept: GENERAL RADIOLOGY | Facility: CLINIC | Age: 67
DRG: 007 | End: 2022-03-05
Attending: NURSE PRACTITIONER
Payer: MEDICARE

## 2022-03-05 ENCOUNTER — APPOINTMENT (OUTPATIENT)
Dept: GENERAL RADIOLOGY | Facility: CLINIC | Age: 67
DRG: 007 | End: 2022-03-05
Attending: STUDENT IN AN ORGANIZED HEALTH CARE EDUCATION/TRAINING PROGRAM
Payer: MEDICARE

## 2022-03-05 LAB
ALBUMIN SERPL-MCNC: 1.9 G/DL (ref 3.4–5)
ALP SERPL-CCNC: 120 U/L (ref 40–150)
ALT SERPL W P-5'-P-CCNC: 67 U/L (ref 0–50)
ANION GAP SERPL CALCULATED.3IONS-SCNC: 4 MMOL/L (ref 3–14)
AST SERPL W P-5'-P-CCNC: 23 U/L (ref 0–45)
BASE EXCESS BLDV CALC-SCNC: 16.1 MMOL/L (ref -7.7–1.9)
BASE EXCESS BLDV CALC-SCNC: 19.4 MMOL/L (ref -7.7–1.9)
BASOPHILS # BLD AUTO: 0 10E3/UL (ref 0–0.2)
BASOPHILS NFR BLD AUTO: 0 %
BILIRUB SERPL-MCNC: 0.2 MG/DL (ref 0.2–1.3)
BUN SERPL-MCNC: 29 MG/DL (ref 7–30)
CALCIUM SERPL-MCNC: 7.9 MG/DL (ref 8.5–10.1)
CHLORIDE BLD-SCNC: 92 MMOL/L (ref 94–109)
CO2 SERPL-SCNC: 40 MMOL/L (ref 20–32)
CREAT SERPL-MCNC: 0.54 MG/DL (ref 0.52–1.04)
EOSINOPHIL # BLD AUTO: 0 10E3/UL (ref 0–0.7)
EOSINOPHIL NFR BLD AUTO: 0 %
ERYTHROCYTE [DISTWIDTH] IN BLOOD BY AUTOMATED COUNT: 14.1 % (ref 10–15)
GFR SERPL CREATININE-BSD FRML MDRD: >90 ML/MIN/1.73M2
GLUCOSE BLD-MCNC: 202 MG/DL (ref 70–99)
GLUCOSE BLDC GLUCOMTR-MCNC: 151 MG/DL (ref 70–99)
GLUCOSE BLDC GLUCOMTR-MCNC: 156 MG/DL (ref 70–99)
GLUCOSE BLDC GLUCOMTR-MCNC: 158 MG/DL (ref 70–99)
GLUCOSE BLDC GLUCOMTR-MCNC: 163 MG/DL (ref 70–99)
GLUCOSE BLDC GLUCOMTR-MCNC: 177 MG/DL (ref 70–99)
GLUCOSE BLDC GLUCOMTR-MCNC: 178 MG/DL (ref 70–99)
GLUCOSE BLDC GLUCOMTR-MCNC: 215 MG/DL (ref 70–99)
HCO3 BLDV-SCNC: 44 MMOL/L (ref 21–28)
HCO3 BLDV-SCNC: 49 MMOL/L (ref 21–28)
HCT VFR BLD AUTO: 23.5 % (ref 35–47)
HGB BLD-MCNC: 7.5 G/DL (ref 11.7–15.7)
IMM GRANULOCYTES # BLD: 0.6 10E3/UL
IMM GRANULOCYTES NFR BLD: 3 %
LACTATE SERPL-SCNC: 1.4 MMOL/L (ref 0.7–2)
LYMPHOCYTES # BLD AUTO: 0.9 10E3/UL (ref 0.8–5.3)
LYMPHOCYTES NFR BLD AUTO: 5 %
MAGNESIUM SERPL-MCNC: 1.6 MG/DL (ref 1.6–2.3)
MCH RBC QN AUTO: 30 PG (ref 26.5–33)
MCHC RBC AUTO-ENTMCNC: 31.9 G/DL (ref 31.5–36.5)
MCV RBC AUTO: 94 FL (ref 78–100)
MONOCYTES # BLD AUTO: 0.7 10E3/UL (ref 0–1.3)
MONOCYTES NFR BLD AUTO: 3 %
NEUTROPHILS # BLD AUTO: 18.3 10E3/UL (ref 1.6–8.3)
NEUTROPHILS NFR BLD AUTO: 89 %
NRBC # BLD AUTO: 0 10E3/UL
NRBC BLD AUTO-RTO: 0 /100
O2/TOTAL GAS SETTING VFR VENT: 0 %
O2/TOTAL GAS SETTING VFR VENT: 30 %
PCO2 BLDV: 78 MM HG (ref 40–50)
PCO2 BLDV: 78 MM HG (ref 40–50)
PH BLDV: 7.36 [PH] (ref 7.32–7.43)
PH BLDV: 7.4 [PH] (ref 7.32–7.43)
PHOSPHATE SERPL-MCNC: 3.8 MG/DL (ref 2.5–4.5)
PLATELET # BLD AUTO: 315 10E3/UL (ref 150–450)
PO2 BLDV: 22 MM HG (ref 25–47)
PO2 BLDV: 53 MM HG (ref 25–47)
POTASSIUM BLD-SCNC: 4.8 MMOL/L (ref 3.4–5.3)
PROT SERPL-MCNC: 5.1 G/DL (ref 6.8–8.8)
RBC # BLD AUTO: 2.5 10E6/UL (ref 3.8–5.2)
SODIUM SERPL-SCNC: 136 MMOL/L (ref 133–144)
TACROLIMUS BLD-MCNC: 11.6 UG/L (ref 5–15)
TME LAST DOSE: NORMAL H
TME LAST DOSE: NORMAL H
WBC # BLD AUTO: 20.4 10E3/UL (ref 4–11)

## 2022-03-05 PROCEDURE — 36415 COLL VENOUS BLD VENIPUNCTURE: CPT | Performed by: PHYSICIAN ASSISTANT

## 2022-03-05 PROCEDURE — 250N000009 HC RX 250: Performed by: STUDENT IN AN ORGANIZED HEALTH CARE EDUCATION/TRAINING PROGRAM

## 2022-03-05 PROCEDURE — 74018 RADEX ABDOMEN 1 VIEW: CPT

## 2022-03-05 PROCEDURE — 94640 AIRWAY INHALATION TREATMENT: CPT

## 2022-03-05 PROCEDURE — 250N000013 HC RX MED GY IP 250 OP 250 PS 637: Performed by: STUDENT IN AN ORGANIZED HEALTH CARE EDUCATION/TRAINING PROGRAM

## 2022-03-05 PROCEDURE — 250N000011 HC RX IP 250 OP 636: Performed by: PHYSICIAN ASSISTANT

## 2022-03-05 PROCEDURE — 97140 MANUAL THERAPY 1/> REGIONS: CPT | Mod: GO

## 2022-03-05 PROCEDURE — 83605 ASSAY OF LACTIC ACID: CPT | Performed by: STUDENT IN AN ORGANIZED HEALTH CARE EDUCATION/TRAINING PROGRAM

## 2022-03-05 PROCEDURE — 97535 SELF CARE MNGMENT TRAINING: CPT | Mod: GO

## 2022-03-05 PROCEDURE — 99233 SBSQ HOSP IP/OBS HIGH 50: CPT | Mod: 24 | Performed by: PHYSICIAN ASSISTANT

## 2022-03-05 PROCEDURE — 80053 COMPREHEN METABOLIC PANEL: CPT | Performed by: NURSE PRACTITIONER

## 2022-03-05 PROCEDURE — 250N000011 HC RX IP 250 OP 636: Performed by: STUDENT IN AN ORGANIZED HEALTH CARE EDUCATION/TRAINING PROGRAM

## 2022-03-05 PROCEDURE — 94660 CPAP INITIATION&MGMT: CPT

## 2022-03-05 PROCEDURE — 250N000012 HC RX MED GY IP 250 OP 636 PS 637: Performed by: STUDENT IN AN ORGANIZED HEALTH CARE EDUCATION/TRAINING PROGRAM

## 2022-03-05 PROCEDURE — 71046 X-RAY EXAM CHEST 2 VIEWS: CPT

## 2022-03-05 PROCEDURE — 250N000013 HC RX MED GY IP 250 OP 250 PS 637: Performed by: NURSE PRACTITIONER

## 2022-03-05 PROCEDURE — 82803 BLOOD GASES ANY COMBINATION: CPT | Performed by: NURSE PRACTITIONER

## 2022-03-05 PROCEDURE — 250N000009 HC RX 250: Performed by: SURGERY

## 2022-03-05 PROCEDURE — 80197 ASSAY OF TACROLIMUS: CPT | Performed by: NURSE PRACTITIONER

## 2022-03-05 PROCEDURE — 250N000013 HC RX MED GY IP 250 OP 250 PS 637: Performed by: SURGERY

## 2022-03-05 PROCEDURE — 36415 COLL VENOUS BLD VENIPUNCTURE: CPT | Performed by: NURSE PRACTITIONER

## 2022-03-05 PROCEDURE — 250N000013 HC RX MED GY IP 250 OP 250 PS 637: Performed by: PHYSICIAN ASSISTANT

## 2022-03-05 PROCEDURE — 85014 HEMATOCRIT: CPT | Performed by: NURSE PRACTITIONER

## 2022-03-05 PROCEDURE — 120N000003 HC R&B IMCU UMMC

## 2022-03-05 PROCEDURE — 999N000215 HC STATISTIC HFNC ADULT NON-CPAP

## 2022-03-05 PROCEDURE — 83735 ASSAY OF MAGNESIUM: CPT | Performed by: INTERNAL MEDICINE

## 2022-03-05 PROCEDURE — 71046 X-RAY EXAM CHEST 2 VIEWS: CPT | Mod: 26 | Performed by: RADIOLOGY

## 2022-03-05 PROCEDURE — 82803 BLOOD GASES ANY COMBINATION: CPT | Performed by: PHYSICIAN ASSISTANT

## 2022-03-05 PROCEDURE — 999N000157 HC STATISTIC RCP TIME EA 10 MIN

## 2022-03-05 PROCEDURE — 94640 AIRWAY INHALATION TREATMENT: CPT | Mod: 76

## 2022-03-05 PROCEDURE — 250N000012 HC RX MED GY IP 250 OP 636 PS 637: Performed by: PHYSICIAN ASSISTANT

## 2022-03-05 PROCEDURE — 84100 ASSAY OF PHOSPHORUS: CPT | Performed by: INTERNAL MEDICINE

## 2022-03-05 PROCEDURE — 99232 SBSQ HOSP IP/OBS MODERATE 35: CPT | Performed by: STUDENT IN AN ORGANIZED HEALTH CARE EDUCATION/TRAINING PROGRAM

## 2022-03-05 PROCEDURE — 74018 RADEX ABDOMEN 1 VIEW: CPT | Mod: 26 | Performed by: RADIOLOGY

## 2022-03-05 PROCEDURE — 36415 COLL VENOUS BLD VENIPUNCTURE: CPT | Performed by: STUDENT IN AN ORGANIZED HEALTH CARE EDUCATION/TRAINING PROGRAM

## 2022-03-05 RX ORDER — FAMOTIDINE 10 MG
10 TABLET ORAL 2 TIMES DAILY
Status: DISCONTINUED | OUTPATIENT
Start: 2022-03-05 | End: 2022-03-07

## 2022-03-05 RX ORDER — FUROSEMIDE 10 MG/ML
60 INJECTION INTRAMUSCULAR; INTRAVENOUS 2 TIMES DAILY
Status: DISCONTINUED | OUTPATIENT
Start: 2022-03-05 | End: 2022-03-07

## 2022-03-05 RX ORDER — MAGNESIUM SULFATE HEPTAHYDRATE 40 MG/ML
2 INJECTION, SOLUTION INTRAVENOUS ONCE
Status: COMPLETED | OUTPATIENT
Start: 2022-03-05 | End: 2022-03-05

## 2022-03-05 RX ADMIN — LORATADINE 10 MG: 10 TABLET ORAL at 08:41

## 2022-03-05 RX ADMIN — METOPROLOL TARTRATE 25 MG: 25 TABLET, FILM COATED ORAL at 19:47

## 2022-03-05 RX ADMIN — NYSTATIN 1000000 UNITS: 100000 SUSPENSION ORAL at 08:44

## 2022-03-05 RX ADMIN — Medication 1 PACKET: at 15:37

## 2022-03-05 RX ADMIN — HEPARIN SODIUM 5000 UNITS: 5000 INJECTION, SOLUTION INTRAVENOUS; SUBCUTANEOUS at 14:25

## 2022-03-05 RX ADMIN — NYSTATIN 1000000 UNITS: 100000 SUSPENSION ORAL at 19:48

## 2022-03-05 RX ADMIN — PREDNISONE 15 MG: 5 TABLET ORAL at 08:40

## 2022-03-05 RX ADMIN — DOXYCYCLINE 100 MG: 100 INJECTION, POWDER, LYOPHILIZED, FOR SOLUTION INTRAVENOUS at 12:40

## 2022-03-05 RX ADMIN — Medication 1 PACKET: at 08:50

## 2022-03-05 RX ADMIN — MYCOPHENOLATE MOFETIL 1000 MG: 250 CAPSULE ORAL at 08:40

## 2022-03-05 RX ADMIN — ACETYLCYSTEINE 2 ML: 200 SOLUTION ORAL; RESPIRATORY (INHALATION) at 16:31

## 2022-03-05 RX ADMIN — DILTIAZEM HYDROCHLORIDE 30 MG: 30 TABLET, FILM COATED ORAL at 05:48

## 2022-03-05 RX ADMIN — TACROLIMUS 5 MG: 5 CAPSULE ORAL at 17:51

## 2022-03-05 RX ADMIN — LEVOFLOXACIN 500 MG: 5 INJECTION, SOLUTION INTRAVENOUS at 12:40

## 2022-03-05 RX ADMIN — FUROSEMIDE 60 MG: 10 INJECTION, SOLUTION INTRAMUSCULAR; INTRAVENOUS at 08:44

## 2022-03-05 RX ADMIN — VALACYCLOVIR HYDROCHLORIDE 1000 MG: 1 TABLET, FILM COATED ORAL at 12:29

## 2022-03-05 RX ADMIN — METHOCARBAMOL TABLETS 500 MG: 500 TABLET, COATED ORAL at 19:47

## 2022-03-05 RX ADMIN — METOPROLOL TARTRATE 25 MG: 25 TABLET, FILM COATED ORAL at 08:40

## 2022-03-05 RX ADMIN — OXYCODONE HYDROCHLORIDE 5 MG: 5 TABLET ORAL at 04:00

## 2022-03-05 RX ADMIN — LEVALBUTEROL HYDROCHLORIDE 1.25 MG: 1.25 SOLUTION RESPIRATORY (INHALATION) at 16:31

## 2022-03-05 RX ADMIN — Medication 1 PACKET: at 08:49

## 2022-03-05 RX ADMIN — LEVALBUTEROL HYDROCHLORIDE 1.25 MG: 1.25 SOLUTION RESPIRATORY (INHALATION) at 12:52

## 2022-03-05 RX ADMIN — ACETYLCYSTEINE 2 ML: 200 SOLUTION ORAL; RESPIRATORY (INHALATION) at 08:28

## 2022-03-05 RX ADMIN — METHOCARBAMOL TABLETS 500 MG: 500 TABLET, COATED ORAL at 08:41

## 2022-03-05 RX ADMIN — ACETYLCYSTEINE 2 ML: 200 SOLUTION ORAL; RESPIRATORY (INHALATION) at 19:29

## 2022-03-05 RX ADMIN — CALCIUM CARBONATE 600 MG (1,500 MG)-VITAMIN D3 400 UNIT TABLET 1 TABLET: at 17:51

## 2022-03-05 RX ADMIN — Medication 40 MG: at 08:43

## 2022-03-05 RX ADMIN — VALACYCLOVIR HYDROCHLORIDE 1000 MG: 1 TABLET, FILM COATED ORAL at 19:46

## 2022-03-05 RX ADMIN — ACETAMINOPHEN 975 MG: 325 TABLET, FILM COATED ORAL at 19:46

## 2022-03-05 RX ADMIN — Medication 1 PACKET: at 15:36

## 2022-03-05 RX ADMIN — FAMOTIDINE 10 MG: 10 TABLET, FILM COATED ORAL at 19:46

## 2022-03-05 RX ADMIN — ASPIRIN 81 MG: 81 TABLET, COATED ORAL at 08:40

## 2022-03-05 RX ADMIN — Medication 1 PACKET: at 19:49

## 2022-03-05 RX ADMIN — FUROSEMIDE 60 MG: 10 INJECTION, SOLUTION INTRAMUSCULAR; INTRAVENOUS at 14:24

## 2022-03-05 RX ADMIN — DILTIAZEM HYDROCHLORIDE 30 MG: 30 TABLET, FILM COATED ORAL at 00:48

## 2022-03-05 RX ADMIN — VALACYCLOVIR HYDROCHLORIDE 1000 MG: 1 TABLET, FILM COATED ORAL at 04:01

## 2022-03-05 RX ADMIN — INSULIN ASPART 1 UNITS: 100 INJECTION, SOLUTION INTRAVENOUS; SUBCUTANEOUS at 12:43

## 2022-03-05 RX ADMIN — NYSTATIN 1000000 UNITS: 100000 SUSPENSION ORAL at 12:28

## 2022-03-05 RX ADMIN — LEVALBUTEROL HYDROCHLORIDE 1.25 MG: 1.25 SOLUTION RESPIRATORY (INHALATION) at 19:29

## 2022-03-05 RX ADMIN — MYCOPHENOLATE MOFETIL 1000 MG: 200 POWDER, FOR SUSPENSION ORAL at 19:48

## 2022-03-05 RX ADMIN — FAMOTIDINE 10 MG: 10 TABLET, FILM COATED ORAL at 12:29

## 2022-03-05 RX ADMIN — DILTIAZEM HYDROCHLORIDE 30 MG: 30 TABLET, FILM COATED ORAL at 17:51

## 2022-03-05 RX ADMIN — METHOCARBAMOL TABLETS 500 MG: 500 TABLET, COATED ORAL at 15:37

## 2022-03-05 RX ADMIN — HEPARIN SODIUM 5000 UNITS: 5000 INJECTION, SOLUTION INTRAVENOUS; SUBCUTANEOUS at 05:48

## 2022-03-05 RX ADMIN — ACETYLCYSTEINE 2 ML: 200 SOLUTION ORAL; RESPIRATORY (INHALATION) at 12:52

## 2022-03-05 RX ADMIN — CALCIUM CARBONATE 600 MG (1,500 MG)-VITAMIN D3 400 UNIT TABLET 1 TABLET: at 08:41

## 2022-03-05 RX ADMIN — PREDNISONE 15 MG: 5 TABLET ORAL at 19:46

## 2022-03-05 RX ADMIN — DILTIAZEM HYDROCHLORIDE 30 MG: 30 TABLET, FILM COATED ORAL at 12:29

## 2022-03-05 RX ADMIN — Medication 15 ML: at 08:43

## 2022-03-05 RX ADMIN — DOXYCYCLINE 100 MG: 100 INJECTION, POWDER, LYOPHILIZED, FOR SOLUTION INTRAVENOUS at 00:41

## 2022-03-05 RX ADMIN — METHOCARBAMOL TABLETS 500 MG: 500 TABLET, COATED ORAL at 12:29

## 2022-03-05 RX ADMIN — TACROLIMUS 5 MG: 5 CAPSULE ORAL at 08:41

## 2022-03-05 RX ADMIN — DAPSONE 50 MG: 25 TABLET ORAL at 08:41

## 2022-03-05 RX ADMIN — LEVALBUTEROL HYDROCHLORIDE 1.25 MG: 1.25 SOLUTION RESPIRATORY (INHALATION) at 08:28

## 2022-03-05 RX ADMIN — CEFTRIAXONE SODIUM 2 G: 2 INJECTION, POWDER, FOR SOLUTION INTRAMUSCULAR; INTRAVENOUS at 10:57

## 2022-03-05 RX ADMIN — HEPARIN SODIUM 5000 UNITS: 5000 INJECTION, SOLUTION INTRAVENOUS; SUBCUTANEOUS at 21:49

## 2022-03-05 RX ADMIN — MAGNESIUM SULFATE IN WATER 2 G: 40 INJECTION, SOLUTION INTRAVENOUS at 08:38

## 2022-03-05 RX ADMIN — NYSTATIN 1000000 UNITS: 100000 SUSPENSION ORAL at 15:37

## 2022-03-05 RX ADMIN — GABAPENTIN 100 MG: 100 CAPSULE ORAL at 21:49

## 2022-03-05 ASSESSMENT — ACTIVITIES OF DAILY LIVING (ADL)
ADLS_ACUITY_SCORE: 9
ADLS_ACUITY_SCORE: 9
ADLS_ACUITY_SCORE: 10
ADLS_ACUITY_SCORE: 9
ADLS_ACUITY_SCORE: 10
ADLS_ACUITY_SCORE: 9
ADLS_ACUITY_SCORE: 10
ADLS_ACUITY_SCORE: 11
ADLS_ACUITY_SCORE: 10
ADLS_ACUITY_SCORE: 9
ADLS_ACUITY_SCORE: 9
ADLS_ACUITY_SCORE: 10
ADLS_ACUITY_SCORE: 10
ADLS_ACUITY_SCORE: 9
ADLS_ACUITY_SCORE: 10
ADLS_ACUITY_SCORE: 9
ADLS_ACUITY_SCORE: 9
ADLS_ACUITY_SCORE: 10
ADLS_ACUITY_SCORE: 11
ADLS_ACUITY_SCORE: 9

## 2022-03-05 NOTE — PROGRESS NOTES
[facility]    Medicine Progress Note - Hospitalist Service, GOLD TEAM 10    Date of Admission:  2/20/2022    Assessment & Plan            Melissa Elder is a 66 year old female with PMHx HTN, HLD, paroxysmal Afib, osteoporosis, GERD, severe COPD, previously requiring 2-3L NC O2 at rest.  Underwent b/l lung transplant with Dr. Robin on 02/21. Got 1u pRBC post-op, no overt bleeding source, monitoring. Extubated 02//22 to O2 NC and transferred to the floor. Pericardial drain pulled 2/24/22 without issue. Continuing on antibiotics for cultures growing from donor and recipient lungs. Has bilateral pleural effusions and anterior hydropneumothorax requiring 3 chest tubes currently.      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  Pressor weaned off 2/22 and pt. extubated. Prior history of infection/colonization with Haemophilus influenzae (2017).  IgG adequate (2/20).  MRSA nares negative 2/21.  Broad spectrum ABX empirically post-op with vanc and ceftaz (2/21-2/22), stopped after 24h per Dr. Lala to target known donor cultures on POD #1. Donor cultures ([facility]) with Strep mitis, Actinomyces odontolyticus, and Kenyatta kruseii. Recipient cultures with Actinomyces odonotolyticus.  So currently on ceftriaxone for coverage.   - Continue BiPAP (discussed below)  - Nebs: levalbuterol and Mucomyst QID  - Aggressive pulmonary toilet with chest physiotherapy QID as well as Aerobika and incentive spirometry q1h w/a  - Wean supplemental O2  to keep >92%  - Chest tubes managed by surgical team   - Right pleural chest tube by CT surgery   - Two left pleural tubes placed 2/26 by IR   - IR consult for R chest tube placement   - Daily CXR  - Tube feeding per nutrition   - Await explant pathology  - Continue ceftriaxone for 2 weeks through 3/8 followed by amoxicillin for 3 months  - Immune suppression and microbial ppx per daily transplant pulm note  - Continue Valtrex to 1000 mg TID x 14 days for HSV (through 3/11) then complete ppx per protocol (see pulm note)   - Continue diuresis, increase to 60 BID today, monitor I/O, weight     Hypercarbic respiratory failure 3/1, improving   3/1 noted to have slowly rising bicarb on daily labs. Had inspection bronchoscopy 3/1 as well and became oversedated and required multiple doses of narcan. VBG obtained post procedure showed hypercapnea to 91, which did not improve with time after patient had woken up more (repeat CO2 99). Started on BiPAP.   - Continue BiPAP, follow VBG  - CT Chest negative for PE, improvement in b/l hydropneumothorax, fluid and gas collection along the right hilum (?) post-surgical change vs. Infection, and bibasilar atelectasis with mucous plugging with GGO bilateral lung bases     Acute toxic metabolic encephalopathy, likely due to hypercapnea, resolved  Mildly elevated ammonia, pleural fluid/sputum +Ureaplasma 3/4   Intermittent somnolence, hallucinations starting around 3/1 with rise in CO2. Ammonia level checked due to concern for post-transplant hyperammonemia which was mildly elevated at 57, but repeat 49.   - Mycoplasma/ureaplasma cultures collected and Transplant ID consulted    - Sputum/Pleural fluid +ureaplasma on 3/4. Started on Levofloxacin/Doxycycline.    - Monitor QTc, Check ECG Weekly (next 3/7)    Leukocytosis  Afebrile, but WBC started rising 2/27 from 14.6 to 23.1 3/2. Concern for possible lung source given increased hypercapnea and need for BiPAP. CT Chest/A/P check and  pan-cultured. Transplant ID consulted.   - CT chest with findings as above  - CT abdomen shows mild periportal edema, fluid filled small bowel with distended loops suggestive of enteritis/ileitis, soft tissue prominence surrounding the common hepatic artery above the level of the pancreatic head, ddx inflammation/infection vs. Malignancy.    - Radiology report recommends continued follow up to exclude developing obstruction. Will closely monitor abdominal exam and stool output.   - Blood and pleural fluid cultures obtained and NGTD     Diarrhea   Noted to have increased loose stools 2/25 likely due to mmf and TF. Fiber added to TF with improvement.   - Increased loose stools, now with abdominal pain and bloating 3/5, check C.diff  - Abdominal XR     Post op pain   - Erector spinae catheters removed   - gabapentin 100 mg nightly  - tylenol 975 mg Q8H   - Dilaudid 0.2-0.4 mg Q2H PRN   - oxycodone 5-10 mg Q4H PRN   - robaxin 500 mg Q6H scheduled     Paroxysmal Afib   HTN  She was on diltiazem prior to lung transplantation and had not been on anticoagulation. She was  transitioned to metoprolol in the ICU.  - Continue Metoprolol tartrate to 25 mg BID   - Restart PTA diltiazem at lower dose (PTA on 240 mg XL) for better BP/HR control. Start at 30mg Q6hr and monitor.   - No anticoagulation at this time     Stress induced hyperglycemia  Did not need insulin prior to admission but sugars have been rising despite sliding scale coverage.  - Endocrinology consulted for assistance, appreciate recommendations   - Please see DM team daily note     HLD  Mild CAD   Noted on transplant workup.   - started rosuvastatin 40 mg daily--Held 2/28 due to rising LFTs      Acute blood loss anemia  Acute blood loss thrombocytopenia  Secondary to surgery. Will continue to monitor.   - Transfuse Hgb < 7, platelets < 50   - Hemolysis labs negative 3/3, on dapsone, continue to monitor     Sternotomy  Surgical Incision  - Sternal precautions  -  "Postoperative incision management per protocol  - PT/OT/CR     Elevated LFTs, resolved   Noted on labs 2/26 and rising. AST, ALP, Bili WNL. No RUQ or abdominal pain. Imaging including RUQ ultrasound WNL. LFTs have since normalized. Will continue to monitor.  - CMP daily   - Hold statin        Diet: Adult Formula Drip Feeding: Continuous Osmolite 1.5; Nasoduodenal tube; Goal Rate: 50; mL/hr; Medication - Feeding Tube Flush Frequency: At least 15-30 mL water before and after medication administration and with tube clogging; Amount to Send (Nut...  Soft & Bite Sized Diet (level 6) Thin Liquids (level 0)    DVT Prophylaxis: Pneumatic Compression Devices  [place] Catheter: Not present  Central Lines: None  Cardiac Monitoring: None  Code Status: Full Code      Disposition Plan   Expected Discharge: 03/11/2022   Anticipated discharge location: home;inpatient rehabilitation facility    Delays:            The patient's care was discussed with the Bedside Nurse, Patient and transplant pulm  Consultant.    Sharri Townsend MD  Hospitalist Service, [address]  Securely message with the Vocera Web Console (learn more here)  Text page via [facility] Paging/Directory   Please see signed in provider for up to date coverage information      Clinically Significant Risk Factors Present on Admission                    ______________________________________________________________________    Interval History     Feels \"ok\"  +Abdominal bloating, diarrhea  Some cough, weak   +leg swelling     Four point ROS completed and negative unless listed above     Data reviewed today: I reviewed all medications, new labs and imaging results over the last 24 hours.     Physical Exam   Vital Signs: Temp: 98.2  F (36.8  C) Temp src: Axillary BP: 112/62 Pulse: 90   Resp: 18 SpO2: 100 % O2 Device: BiPAP/CPAP Oxygen Delivery: 30 LPM  Weight: 166 lbs .1 oz    Constitutional: Sitting up in bed, awake and " alert   HEENT: MMM. Sclera clear.   PULM: Diminished bases bilaterally.  No crackles, no rhonchi, no wheezes.  Chest tubes in place.  CV: Normal S1 and S2.  Tachycardia.  No murmur, gallop, or rub.  Significant pitting edema of the bilateral LE   ABD: BS hypoactive, soft, nontender, mild distension   MSK: Moves all extremities.  No apparent muscle wasting.   NEURO: Alert and oriented, conversant.   SKIN: Warm, dry.  No rash on limited exam.   EXT: Pitting edema of the lower legs bilaterally   PSYCH: Mood stable.     Data   Recent Labs   Lab 03/05/22  0525 03/04/22  2114 03/04/22  1911 03/04/22  1612 03/04/22  1124 03/04/22  0741 03/04/22  0553 03/03/22  1618 03/03/22  1223 03/03/22  0743 03/03/22  0631 03/03/22  0509   WBC 20.4*  --   --   --   --   --  19.9*  --   --   --  20.1* 19.7*   HGB 7.5*  --   --   --   --   --  7.3*  --  8.2*  --  7.6* 6.9*   MCV 94  --   --   --   --   --  96  --   --   --  94 95     --   --   --   --   --  309  --   --   --  301 298     --   --   --   --   --  136  --   --   --   --  140   POTASSIUM 4.8  --   --   --  4.4  --  5.8*  --   --   --   --  4.8   CHLORIDE 92*  --   --   --   --   --  94  --   --   --   --  98   CO2 40*  --   --   --   --   --  39*  --   --   --   --  40*   BUN 29  --   --   --   --   --  38*  --   --   --   --  27   CR 0.54  --   --   --   --   --  0.52  --   --   --   --  0.49*   ANIONGAP 4  --   --   --   --   --  3  --   --   --   --  2*   MINISTERIO 7.9*  --   --   --   --   --  8.4*  --   --   --   --  7.8*   * 153* 188*   < >  --    < > 232*   < >  --    < >  --  166*   ALBUMIN 1.9*  --   --   --   --   --  1.9*  --   --   --   --  1.8*   PROTTOTAL 5.1*  --   --   --   --   --  5.2*  --   --   --   --  4.8*   BILITOTAL 0.2  --   --   --   --   --  0.2  --   --   --   --  0.1*   ALKPHOS 120  --   --   --   --   --  131  --   --   --   --  121   ALT 67*  --   --   --   --   --  84*  --   --   --   --  99*   AST 23  --   --   --   --   --  22   --   --   --   --  26    < > = values in this interval not displayed.     Recent Results (from the past 24 hour(s))   XR Chest 2 Views    Narrative    Exam: XR CHEST 2 VW, 3/4/2022 9:33 AM    Comparison: Chest x-ray    History: Interval f/u post chest tube placement, h/o lung transplant    Findings:  PA and lateral views of the chest. Post surgical chest status post  bilateral lung transplant with intact clam shell sternotomy wires.  Left chest tubes x2. Right chest tube x1. Enteric tube traverses below  the field of view.    Trachea is midline. Mediastinum is within normal limits.  Cardiopulmonary silhouette is within normal limits. Low lung volumes  with mild bibasilar opacities. No definite pneumothorax. Blunting of  the diaphragms bilaterally may demonstrate scarring versus small  pleural effusions.. Numerous distended loops of small bowel without  definite pneumatosis or portal venous gas.       Impression    Impression:   1. Postoperative chest status post bilateral lung transplant with  improving bibasilar atelectasis/edema.  2. Blunting of the diaphragms bilaterally may demonstrate scarring  versus small pleural effusions.  3. Numerous distended loops of small bowel without obvious obstructive  pattern may represent postoperative ileus. Attention on follow-up.    I have personally reviewed the examination and initial interpretation  and I agree with the findings.    VARGAS DUKE MD         SYSTEM ID:  [id number]     Medications     dextrose Stopped (03/01/22 1125)     - MEDICATION INSTRUCTIONS -       - MEDICATION INSTRUCTIONS -       - MEDICATION INSTRUCTIONS -         acetylcysteine  2 mL Nebulization 4x Daily     aspirin  81 mg Oral Daily     calcium carbonate 600 mg-vitamin D 400 units  1 tablet Oral BID w/meals     cefTRIAXone  2 g Intravenous Q24H     dapsone  50 mg Oral Daily     diltiazem  30 mg Oral Q6H JUANA     doxycycline (VIBRAMYCIN) IV  100 mg Intravenous Q12H     fiber modular (NUTRISOURCE  FIBER)  1 packet Per Feeding Tube TID     furosemide  40 mg Intravenous BID     gabapentin  100 mg Oral or Feeding Tube At Bedtime     heparin ANTICOAGULANT  5,000 Units Subcutaneous Q8H     insulin aspart  1-9 Units Subcutaneous Q4H     insulin aspart   Subcutaneous TID w/meals     insulin NPH  20 Units Subcutaneous Q24H     insulin NPH  20 Units Subcutaneous Daily     levalbuterol  1.25 mg Nebulization 4x Daily     levofloxacin  500 mg Intravenous Q24H     loratadine  10 mg Oral Daily     methocarbamol  500 mg Oral 4x Daily     metoprolol tartrate  25 mg Oral or Feeding Tube BID     multivitamins w/minerals  15 mL Oral Daily     mycophenolate  1,000 mg Oral BID    Or     mycophenolate  1,000 mg Oral or NG Tube BID     nystatin  1,000,000 Units Swish & Swallow 4x Daily     pantoprazole  40 mg Oral or Feeding Tube Daily     predniSONE  15 mg Oral or Feeding Tube BID     protein modular  1 packet Per Feeding Tube TID     [Held by provider] rosuvastatin  40 mg Oral Daily     sodium chloride (PF)  3 mL Intracatheter Q8H     sodium chloride (PF)  3 mL Intracatheter Q8H     tacrolimus  5 mg Per Feeding Tube BID IS    Or     tacrolimus  5 mg Oral BID IS     valACYclovir  1,000 mg Oral Q8H

## 2022-03-05 NOTE — PROGRESS NOTES
Pulmonary Medicine  Cystic Fibrosis - Lung Transplant Team  Progress Note  2022       Patient: Melissa Elder  MRN: [id number]  : 1955 (age 66 year old)  Transplant: 2022 (Lung), POD#12  Admission date: 2022    Assessment & Plan:     Melissa Elder is a 66 year old female with a PMH significant for severe COPD, HTN, mild non-obstructive CAD, paroxysmal afib, osteoporosis, GERD, and colonic polyps.  Pt. is now s/p BSLT on 22, surgery relatively uncomplicated.  The patient was extubated , post-op course complicated by right chest tube lodged into fissure and left chest tube displacement s/p bilateral pigtail chest tube placement in IR to drain anterior hydropneumothorax and bilateral effusions.  Routine inspection bronch on 3/1 complicated by hypoventilation in setting of oversedation requiring multiple Narcan doses.  Slow improvement in hypercapnia, continues to require NIPPV with occasional use of HFNC.       Today's recommendations:  - Continue aggressive airway clearance  - Strict BiPAP overnight and with daytime naps, prn during the day, RT to reevaluate settings (may transition to HFNC for PO meds or diet)  - VBG daily in the mornings for now (and additionally prn)   - DSA, CMV, EBV, and IgG due 3/21 (not yet ordered)  - CXR daily with chest tubes  - Defer lytics to right chest tube at this time d/t size of effusion on imaging, reevaluate daily  - IR has requested to be contacted prior to removal of left chest tube(s), defer today per CVTS, would continue at least left basilar chest tube given Ureaplasma empyema   - Repeat chest CT ordered 3/7  - Agree with increase in diuresis today  - Tacrolimus level therapeutic, no dose adjustment, repeat level ordered 3/7  - Prednisone taper ordered 3/8   - Ceftriaxone through 3/8, then transition to amoxicillin for total of 3 months course (through ) for Actinomyces treatment  - Low threshold to treat Candida if it recurs in  respiratory cultures, per transplant ID  - Valacyclovir through 3/12, then resume acyclovir prophylaxis through 3/23 per protocol  - Doxycycline and levofloxacin for Ureaplasma, course duration TBD, QTc monitoring per primary  - Donor risk labs ordered 3/23  - Check C diff and obtain AXR  - Resume H2 blocker for now     COPD s/p bilateral sequential lung transplant (BSLT):  Acute hypoxic/hypercapneic respiratory failure:   Bilateral pleural effusions:   Right apical PTX: Scant pleural-pleural adhesions and mild-moderate bilateral hilar lymphadenopathy per op note.  Pathology with CPFE.  Pressor weaned off and pt. extubated on 2/22.  Left chest tube inadvertently dislodged, f/u chest CT (2/25) with anterior hydroPTX, right chest tube in fissure.  Left chest tube removed and replaced with 2 left-sided pigtail chest tubes by IR (2/26).  DSA negative 2/28.  Hypoxia had generally resolved, only intermittently requiring supplemental oxygen up until bronch 3/1.  S/p bronch 3/1 without evidence of dehiscence, mild amount of thin pale/tan secretions noted in RLL bronchus.  Noted hypoventilating with oversedation during bronch, received Narcan x3 with improvement in sedation but hypercapnea initially severe (>90) and persisting with very gradual improvements.  Chest CT (3/2) with improved bilateral hydroPTX, bibasilar atectasis with mucous plugging, and increased right loculated pleural effusion, s/p right pigtail chest tube (3/3, exudative, 81%N, positive for ureaplasma as below).  - Nebs: levalbuterol and Mucomyst QID   - Aggressive pulmonary toilet with chest physiotherapy QID as well as Aerobika and incentive spirometry q1h w/a  - Strict BiPAP overnight and with daytime naps, prn during the day, RT to reevaluate settings (may transition to HFNC for PO meds or diet)  - VBG daily in the mornings for now (and additionally prn)   - DSA 3/21 (not yet ordered)  - CXR daily with chest tubes, today with persistent basilar  atelectasis (personally reviewed)  - Chest tube management by CVTS       * Right chest tube to remain to suction, defer lytics at this time d/t size of effusion on imaging, reevaluate daily       * IR has requested to be contacted prior to removal of left chest tubes, defer today per CVTS, left basilar to remain given Ureaplasma empyema (as below)  - Repeat chest CT on 3/7 (ordered)  - Volume management per primary team, agree with increase in diuresis today  - Post-pyloric nasal FT, TF per RD and primary team  - SLP following for diet advacement     Immunosuppression:  S/p induction therapy with basiliximab x2 doses (with high dose IV steroid).  - Tacrolimus 5 mg BID (decreased 3/2).  Goal level 8-12.  Level 3/5 therapeutic at 11.6, no dose adjustment, repeat level 3/7 for close monitoring (ordered).  - MMF 1000 mg BID (decreased 2/25 due to diarrhea)  - Prednisone 15 mg BID with taper per lung transplant protocol (due 3/8, ordered):  Date AM dose (mg) PM dose (mg)   3/1 15 15   3/8 15 12.5   3/15 12.5 12.5   3/29 12.5 10   4/12 10 10   5/10 10 7.5   6/7 7.5 7.5   7/5 7.5 5   8/2 5 5   8/30 5 2.5      Prophylaxis:   - Dapsone for PJP ppx (started 2/23 at decreased MWF dose and increased to daily 3/1, G6PD 13.3 2/21)  - Nystatin for oral candidiasis ppx, 6 month course  - See below for serologies and viral ppx:    Donor Recipient Intervention   CMV status Negative Negative CMV monthly (3/21, not yet ordered)   EBV status Positive Positive EBV at one month (3/21, not yet ordered)   HSV status N/A (Newly) Positive As below      ID: Prior history of infection/colonization with Haemophilus influenzae (2017).  Broad spectrum ABX empirically post-op with vanc and ceftaz (2/21-2/22), stopped after 24h to target known donor cultures at that time on POD #1 per Dr. Lala (transitioned to cefazolin).  Broadened again on POD #2 with culture updates as below.  Donor ([facility]) cultures with P-S MSSA; ([facility]) with Strep mitis,  Actinomyces odontolyticus, MSSA x2, and C. kruseii (concern for possible aspiration).  Recipient cultures at time of transplant with Actinomyces odonotolyticus.  Bronch cultures (2/22) with Saccharomyces cerevisiae (no indication to treat per Dr. Ghosh unless pt. acutely declines clinically, 3/1) and C. albicans.  - IgG repeat at one month (3/21, not yet ordered)  - ABX: ceftriaxone (2/23-3/8), then transition to amoxicillin for total of 3 months course (through 5/23) for Actinomyces treatment  - Low threshold to treat Candida if it recurs in respiratory cultures, per transplant ID     HSV: Pt. with recent seroconversion at time of transplant.  HSV also noted on donor cultures.  Initial plan for 30 days of ppx with ACV post-op per Dr. Lala.  Then with HSV+ bronch at time of transplant (2/21), transitioned to treatment dosing with VACV  - Valacyclovir 1000 mg TID X 14d (2/27-3/12), then resume acyclovir prophylaxis (3/13-3/23) per protocol     Hyperammonemia:   Disseminated Ureaplasma:  Had been somnolent with some confusion/visual hallucinations in setting of hypercapnia as above.  Ammonia mildly elevated on 3/2 but quickly normalized.  Ureaplasma and Mycoplasma studies sent, pt. noted to have Ureaplasma in both pleural and sputum cultures from 3/2.  Repeat ammonia level remains normal (on the higher end) on 3/4.  - Doxycycline and levofloxacin (3/4), course duration TBD, QTc monitoring per primary  - In light of normal ammonia level, defer more aggressive interventions for hyperammonemia at this time (stopping CNI, iHD, etc.)     Leukocytosis: WBC trending upward now on POD #10, frequently noted at this stage post-op (pathology not entirely clear, but may be at least partially d/t bone marrow surge post-transplant).  Procal moderately elevated at 0.18, but may still be somewhat non-specific at this point post-op.  BDG fugitell and Aspergillus galactomannan negative 3/3.  - Blood cultures (3/1) NGTD  - Sputum  cultures (3/2) as above, otherwise NGTD  - Pleural cultures (3/2, 3/3) as above, otherwise NGTD  - ABX as above     PHS risk criteria donor: Additional labs required post-transplant (between 4-8 weeks post-op): Hepatitis B, Hepatitis C, and HIV by COLT ([id number], ordered POD #30), also plan to repeat hepatitis B surface antibody at that time (ordered) given discrepancy with recent result.     Other relevant problems managed by primary team:     Diarrhea: Likely 2/2 medications and tube feedings.  Fiber added to tube feeds.  MMF decreased as above.  Loose stools now improved.  Will consider transition to Myfortic if loose stools increase again, deferred for now.  Again having loose/watery stools, also noting some abdominal bloating/fullness.  - Check C diff  - Obtain AXR     Increasing LFTS: Rising 3/1 despite medication adjustments (APAP and statin stopped 2/27).  Of note, pt. had a discrepant hep B surface Ab at time of transplant as above.  LFTs remain elevated although improved 3/2.  Also with elevated ammonia as above.  RUQ US (3/3) with only hepatic steatosis.  LFTs gradually improving, monitoring daily hepatic panel.     CAD: Noted to have mild non-obstructive CAD without hemodynamically significant lesions on cardiac cath 3/27/19.  PTA ASA 81 mg and atorvastatin, started on rosuvastatin post-op but held 2/28 for elevated LFTs (as above).  ASA resumed 3/1.     Paroxysmal afib:   HTN: PTA diltiazem, not on AC.  Post-op tachycardia, off pressor since 2/22.  Metoprolol started 2/23.  Persistent low level tachycardia, but currently sinus tach with continued HTN.  Diltiazem resumed 3/2.     GERD: Not on PPI PTA.  Negative pH/manometry study 3/28/19, noted small hiatal hernia. On PPI daily since 2/21, H2 blocker bridge discontinued 3/2, some increase in reflux symptoms since.   - Resume H2 blocker for now    We appreciate the excellent care provided by the [place] [id number] team.  Recommendations communicated via in  "person rounding and this note.  Will continue to follow along closely, please do not hesitate to call with any questions or concerns.    Patient discussed with Dr. Steele.    Lola Mann PA-C  [facility]  Pulmonary CF/Transplant     Subjective & Interval History:     Remains on BiPAP overnight 18/6 , on HFNC 30% this morning during visit.  Hypercapnea improved some but persisting.  Dyspnea improving.  Occasionally able to expectorate sputum although feels cough is less productive compared to prior days.  Incisional pain well controlled.  Appetite decreased, having loose/watery stools and some abdominal bloating/fullness.  Denies nausea although endorses some acid reflux.  Tmax 99.4, ongoing leukocytosis.    Review of Systems:     C: + recent decrease in weight although overall up from admission  INTEGUMENTARY/SKIN: No rash or obvious new lesions  ENT/MOUTH: No sore throat, no sinus pain, no nasal congestion or drainage  RESP: See interval history  CV: No chest pain, + peripheral edema  GI: No vomiting  : + Purewick  MUSCULOSKELETAL: + improving back pain  ENDOCRINE: + blood sugars intermittently elevated  NEURO: No headache, no numbness or tingling  PSYCHIATRIC: Mood stable    Physical Exam:     All notes, images, and labs from past 24 hours (at minimum) were reviewed.    Vital signs:  Temp: 99.1  F (37.3  C) Temp src: Oral BP: 132/67 Pulse: 94   Resp: 18 SpO2: 99 % O2 Device: (S) Nasal cannula Oxygen Delivery: 3 LPM Height: 157.5 cm (5' 2\") Weight: 75.3 kg (166 lb 0.1 oz)  I/O:     Intake/Output Summary (Last 24 hours) at 3/5/2022 1327  Last data filed at 3/5/2022 1300  Gross per 24 hour   Intake 2403 ml   Output 3260 ml   Net -857 ml     Constitutional: Sitting up in bed, in no apparent distress.   HEENT: Eyes with pink conjunctivae, anicteric.  Oral mucosa moist without lesions.  Nasal FT.  PULM: Diminished air flow to right > left base.  No crackles, no rhonchi, no wheezes.  Non-labored breathing on HFNC " with FiO2 30%.  Bilateral chest tubes without air leak.  CV: Normal S1 and S2.  RRR.  No murmur, gallop, or rub.  2+ BLE edema.   ABD: NABS, soft, nontender, mildly distended.    MSK: Moves all extremities.   NEURO: Alert, conversant.   SKIN: Warm, dry.  No rash on limited exam.  Clamshell incision intact without drainage.  PSYCH: Mood stable.     Lines, Drains, and Devices:  Peripheral IV 02/28/22 Left;Lateral Lower forearm (Active)   Site Assessment [facility] 03/05/22 1200   Line Status Saline locked 03/05/22 1200   Dressing Intervention New dressing  02/28/22 0051   Phlebitis Scale 0-->no symptoms 03/05/22 1200   Infiltration Scale 0 03/05/22 1200   Number of days: 5       Peripheral IV 03/02/22 Right;Anterior;Lateral Upper forearm (Active)   Site Assessment [facility] 03/05/22 1200   Line Status Infusing 03/05/22 1200   Phlebitis Scale 0-->no symptoms 03/05/22 1200   Infiltration Scale 0 03/05/22 1200   Number of days: 3     Data:     LABS    CMP:   Recent Labs   Lab 03/05/22  0525 03/04/22  2114 03/04/22  1911 03/04/22  1840 03/04/22  1612 03/04/22  1124 03/04/22  0741 03/04/22  0553 03/03/22  0743 03/03/22  0509 03/02/22  0933 03/02/22  0455     --   --   --   --   --   --  136  --  140  --  140   POTASSIUM 4.8  --   --   --   --  4.4  --  5.8*  --  4.8  --  4.8   CHLORIDE 92*  --   --   --   --   --   --  94  --  98  --  98   CO2 40*  --   --   --   --   --   --  39*  --  40*  --  39*   ANIONGAP 4  --   --   --   --   --   --  3  --  2*  --  3   * 153* 188* 208*   < >  --    < > 232*   < > 166*   < > 194*   BUN 29  --   --   --   --   --   --  38*  --  27  --  27   CR 0.54  --   --   --   --   --   --  0.52  --  0.49*  --  0.56   GFRESTIMATED >90  --   --   --   --   --   --  >90  --  >90  --  >90   MINISTERIO 7.9*  --   --   --   --   --   --  8.4*  --  7.8*  --  8.2*   MAG 1.6  --   --   --   --   --   --  2.1  --  1.9  --  2.2   PHOS 3.8  --   --   --   --   --   --  4.1  --  3.2  --  3.7   PROTTOTAL 5.1*  --    --   --   --   --   --  5.2*  --  4.8*  --  5.3*   ALBUMIN 1.9*  --   --   --   --   --   --  1.9*  --  1.8*  --  1.9*   BILITOTAL 0.2  --   --   --   --   --   --  0.2  --  0.1*  --  0.2   ALKPHOS 120  --   --   --   --   --   --  131  --  121  --  153*   AST 23  --   --   --   --   --   --  22  --  26  --  41   ALT 67*  --   --   --   --   --   --  84*  --  99*  --  130*    < > = values in this interval not displayed.     CBC:   Recent Labs   Lab 03/05/22  0525 03/04/22  0553 03/03/22  1223 03/03/22  0631 03/03/22  0509   WBC 20.4* 19.9*  --  20.1* 19.7*   RBC 2.50* 2.48*  --  2.61* 2.36*   HGB 7.5* 7.3* 8.2* 7.6* 6.9*   HCT 23.5* 23.8*  --  24.5* 22.5*   MCV 94 96  --  94 95   MCH 30.0 29.4  --  29.1 29.2   MCHC 31.9 30.7*  --  31.0* 30.7*   RDW 14.1 13.6  --  13.8 13.7    309  --  301 298       INR: No lab results found in last 7 days.    Glucose:   Recent Labs   Lab 03/05/22 0525 03/04/22  2114 03/04/22  1911 03/04/22  1840 03/04/22  1612 03/04/22  1123   * 153* 188* 208* 240* 194*       Blood Gas:   Recent Labs   Lab 03/05/22 0525 03/04/22  0553 03/03/22  0509   PHV 7.36 7.35 7.37   PCO2V 78* 83* 76*   PO2V 53* 34 113*   HCO3V 44* 45* 44*   ARIEL 16.1* 17.4* 16.6*   O2PER 30 35 45       Culture Data No results for input(s): CULT in the last 168 hours.    Virology Data: No results found for: INFLUA, FLUAH1, FLUAH3, QN7149, IFLUB, RSVA, RSVB, PIV1, PIV2, PIV3, HMPV, HRVS, ADVBE, ADVC    Historical CMV results (last 3 of prior testing):  No results found for: CMVQNT  No results found for: CMVLOG    Urine Studies    Recent Labs   Lab Test 03/01/22  1549 02/20/22  0248   URINEPH 6.0 7.0   NITRITE Negative Negative   LEUKEST Negative Negative   WBCU 0 <1       Most Recent [place] Pulmonary Function Testing (FVC/FEV1 only)  FVC-Pre   Date Value Ref Range Status   12/20/2021 1.81 L    06/21/2021 1.46 L    12/09/2019 1.59 L    06/03/2019 1.68 L      FVC-%Pred-Pre   Date Value Ref Range Status   12/20/2021  65 %    06/21/2021 52 %    12/09/2019 56 %    06/03/2019 58 %      FEV1-Pre   Date Value Ref Range Status   12/20/2021 0.49 L    06/21/2021 0.50 L    12/09/2019 0.49 L    06/03/2019 0.47 L      FEV1-%Pred-Pre   Date Value Ref Range Status   12/20/2021 22 %    06/21/2021 22 %    12/09/2019 21 %    06/03/2019 20 %        IMAGING    Recent Results (from the past 48 hour(s))   IR Chest Tube Place Non Tunneled Right    Narrative    PRE-PROCEDURE DIAGNOSIS: Pleural effusion    POST-PROCEDURE DIAGNOSIS: Same    PROCEDURE: Chest tube placement non-tunneled    Impression    IMPRESSION: Completed ultrasound-guided nontunneled chest tube  placement. 14 Kosovan chest tube placed in the right pleural space and  connected to water seal drainage. No immediate complication.    ----------    CLINICAL HISTORY: The patient has a history of pleural effusion. Chest  tube placement requested.    PERFORMED BY: Leo Mckeon PA-C    CONSENT: Written informed consent was obtained and is documented in  the patient record.    MEDICATIONS: 1% lidocaine for local anesthesia    NURSING: The patient was placed on continuous vital signs monitoring.  Vital signs were monitored by nursing staff under my supervision.      DESCRIPTION: The skin overlying the pleural effusion was prepped and  draped in the usual sterile fashion. Under continuous ultrasound  visualization, the skin and pleural was anesthetized before a small  skin nick was made with a #11 blade. A 5 Kosovan centesis  needle/catheter was advanced into the pleural space before the  catheter was advanced off the needle and the needle was removed. A  0.035 superstiff Amplatzwire was advanced through the catheter and 5  Kosovan catheter was exchanged over wire for a non-locking pigtail  chest tube after tissue dilation. The drain was secured to the skin  with a 2-0 nylon suture and a sterile bandage was applied. The chest  tube drain was connected to drainage system under water seal.  Ultrasound  images were stored in the patient's record.    COMPLICATIONS: No immediate concerns, the patient remained stable  throughout the procedure and tolerated it well.    ESTIMATED BLOOD LOSS: Minimal    SPECIMENS: None    SANDRA KOCH PA-C         SYSTEM ID:  [id number]   XR Chest 2 Views    Narrative    Exam: XR CHEST 2 VW, 3/4/2022 9:33 AM    Comparison: Chest x-ray    History: Interval f/u post chest tube placement, h/o lung transplant    Findings:  PA and lateral views of the chest. Post surgical chest status post  bilateral lung transplant with intact clam shell sternotomy wires.  Left chest tubes x2. Right chest tube x1. Enteric tube traverses below  the field of view.    Trachea is midline. Mediastinum is within normal limits.  Cardiopulmonary silhouette is within normal limits. Low lung volumes  with mild bibasilar opacities. No definite pneumothorax. Blunting of  the diaphragms bilaterally may demonstrate scarring versus small  pleural effusions.. Numerous distended loops of small bowel without  definite pneumatosis or portal venous gas.       Impression    Impression:   1. Postoperative chest status post bilateral lung transplant with  improving bibasilar atelectasis/edema.  2. Blunting of the diaphragms bilaterally may demonstrate scarring  versus small pleural effusions.  3. Numerous distended loops of small bowel without obvious obstructive  pattern may represent postoperative ileus. Attention on follow-up.    I have personally reviewed the examination and initial interpretation  and I agree with the findings.    VARGAS DUKE MD         SYSTEM ID:  [id number]   XR Chest 2 Views    Narrative    EXAMINATION: XR CHEST 2 VW, 3/5/2022 12:14 PM    COMPARISON: 3/4/2022    HISTORY: interval follow up, post-op lung transplant    FINDINGS: Changes of bilateral lung transplant. Bibasilar chest tubes  and left apical chest tube are unchanged in position. No significant  pneumothorax. Basilar opacities are unchanged.  Small bowel distention  seen previously has improved.      Impression    IMPRESSION: Changes of bilateral lung transplant with persistent  basilar atelectasis and chest tubes in place.     TONO DUNLAP MD         SYSTEM ID:  [id number]

## 2022-03-05 NOTE — PROVIDER NOTIFICATION
-------------------CRITICAL LAB VALUE-------------------    Lab Value: CO2  Time of notification: 6:07 AM  MD notified: Dr. Aguirre  Patient status:   Temp:  [97.6  F (36.4  C)-99.4  F (37.4  C)] 98.2  F (36.8  C)  Pulse:  [] 90  Resp:  [18-24] 18  BP: (112-156)/(54-72) 112/62  FiO2 (%):  [30 %-35 %] 30 %  SpO2:  [97 %-100 %] 100 %     A&Ox4. VSS. Sating 100% on BiPaP 30% FiO2. Pt denies SOB.

## 2022-03-05 NOTE — PROGRESS NOTES
Diabetes Consult Daily  Progress Note          Assessment/Plan:   Melissa Elder is a 66 year old female with PMHx HTN, HLD, paroxysmal Afib, osteoporosis, GERD, severe COPD, previously requiring 2-3L NC O2 at rest.  Underwent b/l lung transplant with Dr. Robin on 02/21  has ongoing issues with Hydropneumothorax, chest tubes in place. 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 Endocrinology is being consulted for DM management.    1) Steroid and TF induced hyperglycemia   2) Underlying pre-DM, versus steroid diabetes      Plan:   Continue NPH 20 units BID, spaced 12 hours apart, at 1200 and 0000  Novolog CHO coverage-- 1 unit per 15 grams w/ meals and snacks  Novolog  custom correction 1 per 35 mg/dL Q4h   BG monitoring TID AC, HS, 0200  Hypoglycemia protocol    PRN D10W for hypoglycemia prevention if TF stops-- rate is  70 to replace about 70% of TF carbs    Case discussed with MD Maria Henson MD  Endocrinology Fellow  Pager number [phone]    I have reviewed records, discussed patient's care and edited the fellow's note. I agree with the plan of care documented.    Zaira Monroy MD   Division of Diabetes, Endocrinology and Metabolism  Department of Medicine               Interval History:   Patient was not seen. Glucose and chart data were reviewed.  Glycemic control over the last 24 hours was reviewed    Glycemic control has been within the target range    On continuous tube feeds. Osmolite at 50-- provides about 10 grams carb/h.    Pred 15 mg BID    Expected Discharge: 03/08/2022   Anticipated discharge location: home;inpatient rehabilitation facility        Recent Labs   Lab 03/05/22  0525 03/04/22  2114 03/04/22  1911 03/04/22  1840 03/04/22  1612 03/04/22  1123   * 153* 188* 208* 240* 194*               Review of Systems:   See interval hx          Medications:     Orders Placed This Encounter      Soft & Bite Sized Diet (level 6) Thin Liquids  (level 0)    Physical Exam: Not examined          Data:     Lab Results   Component Value Date    A1C 5.7 02/22/2022    A1C 5.8 02/20/2022     Last Comprehensive Metabolic Panel:  Sodium   Date Value Ref Range Status   03/05/2022 136 133 - 144 mmol/L Final   06/21/2021 137 133 - 144 mmol/L Final     Potassium   Date Value Ref Range Status   03/05/2022 4.8 3.4 - 5.3 mmol/L Final   06/21/2021 3.0 (L) 3.4 - 5.3 mmol/L Final     Chloride   Date Value Ref Range Status   03/05/2022 92 (L) 94 - 109 mmol/L Final   06/21/2021 104 94 - 109 mmol/L Final     Carbon Dioxide   Date Value Ref Range Status   06/21/2021 34 (H) 20 - 32 mmol/L Final     Carbon Dioxide (CO2)   Date Value Ref Range Status   03/05/2022 40 (H) 20 - 32 mmol/L Final     Anion Gap   Date Value Ref Range Status   03/05/2022 4 3 - 14 mmol/L Final   06/21/2021 <1 (L) 3 - 14 mmol/L Final     Glucose   Date Value Ref Range Status   03/05/2022 202 (H) 70 - 99 mg/dL Final   06/21/2021 131 (H) 70 - 99 mg/dL Final     Urea Nitrogen   Date Value Ref Range Status   03/05/2022 29 7 - 30 mg/dL Final   06/21/2021 8 7 - 30 mg/dL Final     Creatinine   Date Value Ref Range Status   03/05/2022 0.54 0.52 - 1.04 mg/dL Final   06/21/2021 0.56 0.52 - 1.04 mg/dL Final     GFR Estimate   Date Value Ref Range Status   03/05/2022 >90 >60 mL/min/1.73m2 Final     Comment:     Effective December 21, 2021 eGFRcr in adults is calculated using the 2021 CKD-EPI creatinine equation which includes age and gender (Ines marie al., NEJM, DOI: 10.1056/EYAUzy6675686)   06/21/2021 >90 >60 mL/min/[1.73_m2] Final     Comment:     Non  GFR Calc  Starting 12/18/2018, serum creatinine based estimated GFR (eGFR) will be   calculated using the Chronic Kidney Disease Epidemiology Collaboration   (CKD-EPI) equation.       Calcium   Date Value Ref Range Status   03/05/2022 7.9 (L) 8.5 - 10.1 mg/dL Final   06/21/2021 8.5 8.5 - 10.1 mg/dL Final     Bilirubin Total   Date Value Ref Range Status    03/05/2022 0.2 0.2 - 1.3 mg/dL Final   06/21/2021 0.3 0.2 - 1.3 mg/dL Final     Alkaline Phosphatase   Date Value Ref Range Status   03/05/2022 120 40 - 150 U/L Final   06/21/2021 92 40 - 150 U/L Final     ALT   Date Value Ref Range Status   03/05/2022 67 (H) 0 - 50 U/L Final   06/21/2021 25 0 - 50 U/L Final     AST   Date Value Ref Range Status   03/05/2022 23 0 - 45 U/L Final   06/21/2021 17 0 - 45 U/L Final

## 2022-03-05 NOTE — PLAN OF CARE
B/P: 127/58, T: 99.4, P: 100, R: 24    Neuro: A&Ox4.   Cardiac: SR/ST. VSS.   Respiratory: Sating 99% on 30% fiO2 HFNC. Denies SOB. Tachypneic on exertion. BiPAP while asleep. Chest tube x3 to -20 suction.   GI/: Lasix 40mg given x2. Purewick in place, good UOP. BM x2 watery.   Diet/appetite: Tolerating reg diet. Minimal appetite.   Activity:  Standby assist of 1 to commode and chair.   Pain: Denies   Skin: No new deficits noted.  LDA's: PIV x2, Purewick, Chest tube x3, HFNC 30%    Plan: Continue with POC. Notify primary team with changes.

## 2022-03-05 NOTE — PLAN OF CARE
Neuro: A&Ox4.   Cardiac: SR/ST. VSS.    Respiratory: Sating >95% on 30% FiO2 on HFNC while awake, BiPAP 30% while sleeping. No SOB reported. Chest tube x3 to -20 suction.   GI/: Adequate urine output. Purewick in place. Watery BM X1 overnight.   Diet/appetite: Tolerating regular diet, poor appetite. TF @50mL/hr with 30mL FWF Q4hrs.   Activity:  Assist of 1, up to commode. Some tachypnea with exertion.   Pain: At acceptable level on current regimen. PRN 5mg oxy given x1 for incisional chest and lower back pain.   Skin: No new deficits noted. Generalized bruising. Clamshell incision approximated, open to air, CDI.   LDA's: R PIV: SL, L PIV: TKO, L CT x2, R CT to -20 suction, NJ: continuous enteral TF    Critical lab value: pCO2 venous: 78. MD notified.       Plan: BiPaP while sleeping. Continue with POC. Notify primary team with changes.

## 2022-03-05 NOTE — PROGRESS NOTES
CVTS    Following for incision and chest tube management. Chest tube output still elevated, leaving in place per IR. No plans to remove anything in the next 48 hours, will follow-up on incision and chest tubes Monday. Please call with questions or concerns.     Uche Vásquez PA-C  Cardiothoracic Surgery  p: [phone]

## 2022-03-05 NOTE — PROGRESS NOTES
03/05/22 1723   Quick Adds   Quick Adds Edema   Edema General Information   Onset of Edema 02/20/22   Affected Body Part(s) Left LE;Right LE   Edema Etiology Surgery  (Low Albumin, decreased muscle pump)   Edema Examination/Assessment   Skin Condition Pitting;Intact   Pitting Assessment 2-3+ pitting from MTP to knee creases   Clinical Impression   Criteria for Skilled Therapeutic Interventions Met (OT) Yes, treatment indicated   OT Diagnosis Patient presented with medical edema of the lower extremities limiting ease and independence with function.   Edema: Patient Presentation Edema   Edema: Planned Interventions Gradient compression bandaging;Fit for compression garment;Edema exercises;Precautions to prevent infection/exacerbation;Education   Risk & Benefits of therapy have been explained evaluation/treatment results reviewed   Total Evaluation Time (Minutes)   Total Evaluation Time (Minutes) 5   OT Goals   Therapy Frequency (OT) 5 times/wk   OT Predicated Duration/Target Date for Goal Attainment 03/11/22   OT: Edema education to increase ability to manage edema after discharge from the hospital Patient;Caregiver;Verbalize;Demonstrate;[place];Skin care routine;signs/symptoms of intolerance;wear schedule;limb positioning;garrnet/bandage care;discharge recommendations   OT: Management of edema bandages Patient;Caregiver;Verbalize;Demonstrate;[place];quick wrap;garment(s)   OT: Functional edema exercise program to reduce limb volume, increase activity tolerance and improve independence with ADL Patient;Caregiver;Verbalize;Demonstrates;[place];HEP

## 2022-03-06 ENCOUNTER — APPOINTMENT (OUTPATIENT)
Dept: OCCUPATIONAL THERAPY | Facility: CLINIC | Age: 67
DRG: 007 | End: 2022-03-06
Attending: SURGERY
Payer: MEDICARE

## 2022-03-06 ENCOUNTER — APPOINTMENT (OUTPATIENT)
Dept: SPEECH THERAPY | Facility: CLINIC | Age: 67
DRG: 007 | End: 2022-03-06
Attending: SURGERY
Payer: MEDICARE

## 2022-03-06 ENCOUNTER — APPOINTMENT (OUTPATIENT)
Dept: GENERAL RADIOLOGY | Facility: CLINIC | Age: 67
DRG: 007 | End: 2022-03-06
Attending: NURSE PRACTITIONER
Payer: MEDICARE

## 2022-03-06 ENCOUNTER — APPOINTMENT (OUTPATIENT)
Dept: CT IMAGING | Facility: CLINIC | Age: 67
DRG: 007 | End: 2022-03-06
Attending: STUDENT IN AN ORGANIZED HEALTH CARE EDUCATION/TRAINING PROGRAM
Payer: MEDICARE

## 2022-03-06 LAB
ALBUMIN SERPL-MCNC: 1.8 G/DL (ref 3.4–5)
ALP SERPL-CCNC: 107 U/L (ref 40–150)
ALT SERPL W P-5'-P-CCNC: 58 U/L (ref 0–50)
ANION GAP SERPL CALCULATED.3IONS-SCNC: 2 MMOL/L (ref 3–14)
AST SERPL W P-5'-P-CCNC: 20 U/L (ref 0–45)
BACTERIA BLD CULT: NO GROWTH
BACTERIA BLD CULT: NO GROWTH
BASE EXCESS BLDV CALC-SCNC: 16.1 MMOL/L (ref -7.7–1.9)
BASOPHILS # BLD AUTO: 0 10E3/UL (ref 0–0.2)
BASOPHILS NFR BLD AUTO: 0 %
BILIRUB SERPL-MCNC: 0.2 MG/DL (ref 0.2–1.3)
BUN SERPL-MCNC: 25 MG/DL (ref 7–30)
C DIFF TOX B STL QL: NEGATIVE
CALCIUM SERPL-MCNC: 8.1 MG/DL (ref 8.5–10.1)
CHLORIDE BLD-SCNC: 93 MMOL/L (ref 94–109)
CO2 SERPL-SCNC: 41 MMOL/L (ref 20–32)
CREAT SERPL-MCNC: 0.52 MG/DL (ref 0.52–1.04)
EOSINOPHIL # BLD AUTO: 0 10E3/UL (ref 0–0.7)
EOSINOPHIL NFR BLD AUTO: 0 %
ERYTHROCYTE [DISTWIDTH] IN BLOOD BY AUTOMATED COUNT: 14.8 % (ref 10–15)
GFR SERPL CREATININE-BSD FRML MDRD: >90 ML/MIN/1.73M2
GLUCOSE BLD-MCNC: 191 MG/DL (ref 70–99)
GLUCOSE BLDC GLUCOMTR-MCNC: 156 MG/DL (ref 70–99)
GLUCOSE BLDC GLUCOMTR-MCNC: 161 MG/DL (ref 70–99)
GLUCOSE BLDC GLUCOMTR-MCNC: 191 MG/DL (ref 70–99)
GLUCOSE BLDC GLUCOMTR-MCNC: 200 MG/DL (ref 70–99)
GLUCOSE BLDC GLUCOMTR-MCNC: 210 MG/DL (ref 70–99)
GLUCOSE BLDC GLUCOMTR-MCNC: 270 MG/DL (ref 70–99)
HCO3 BLDV-SCNC: 42 MMOL/L (ref 21–28)
HCT VFR BLD AUTO: 21.8 % (ref 35–47)
HGB BLD-MCNC: 7 G/DL (ref 11.7–15.7)
IMM GRANULOCYTES # BLD: 0.4 10E3/UL
IMM GRANULOCYTES NFR BLD: 3 %
LACTATE SERPL-SCNC: 1.4 MMOL/L (ref 0.7–2)
LYMPHOCYTES # BLD AUTO: 0.9 10E3/UL (ref 0.8–5.3)
LYMPHOCYTES NFR BLD AUTO: 6 %
MAGNESIUM SERPL-MCNC: 1.7 MG/DL (ref 1.6–2.3)
MCH RBC QN AUTO: 29.9 PG (ref 26.5–33)
MCHC RBC AUTO-ENTMCNC: 32.1 G/DL (ref 31.5–36.5)
MCV RBC AUTO: 93 FL (ref 78–100)
MONOCYTES # BLD AUTO: 0.7 10E3/UL (ref 0–1.3)
MONOCYTES NFR BLD AUTO: 5 %
NEUTROPHILS # BLD AUTO: 12.5 10E3/UL (ref 1.6–8.3)
NEUTROPHILS NFR BLD AUTO: 86 %
NRBC # BLD AUTO: 0 10E3/UL
NRBC BLD AUTO-RTO: 0 /100
O2/TOTAL GAS SETTING VFR VENT: 30 %
PCO2 BLDV: 64 MM HG (ref 40–50)
PH BLDV: 7.43 [PH] (ref 7.32–7.43)
PHOSPHATE SERPL-MCNC: 3.5 MG/DL (ref 2.5–4.5)
PLATELET # BLD AUTO: 301 10E3/UL (ref 150–450)
PO2 BLDV: 81 MM HG (ref 25–47)
POTASSIUM BLD-SCNC: 4.3 MMOL/L (ref 3.4–5.3)
PROT SERPL-MCNC: 4.8 G/DL (ref 6.8–8.8)
RBC # BLD AUTO: 2.34 10E6/UL (ref 3.8–5.2)
SARS-COV-2 RNA RESP QL NAA+PROBE: NEGATIVE
SODIUM SERPL-SCNC: 136 MMOL/L (ref 133–144)
WBC # BLD AUTO: 14.5 10E3/UL (ref 4–11)

## 2022-03-06 PROCEDURE — 71046 X-RAY EXAM CHEST 2 VIEWS: CPT | Mod: 26 | Performed by: RADIOLOGY

## 2022-03-06 PROCEDURE — 74177 CT ABD & PELVIS W/CONTRAST: CPT | Mod: 26 | Performed by: RADIOLOGY

## 2022-03-06 PROCEDURE — 250N000013 HC RX MED GY IP 250 OP 250 PS 637: Performed by: SURGERY

## 2022-03-06 PROCEDURE — 84100 ASSAY OF PHOSPHORUS: CPT | Performed by: STUDENT IN AN ORGANIZED HEALTH CARE EDUCATION/TRAINING PROGRAM

## 2022-03-06 PROCEDURE — 74177 CT ABD & PELVIS W/CONTRAST: CPT

## 2022-03-06 PROCEDURE — 87493 C DIFF AMPLIFIED PROBE: CPT | Performed by: STUDENT IN AN ORGANIZED HEALTH CARE EDUCATION/TRAINING PROGRAM

## 2022-03-06 PROCEDURE — 99233 SBSQ HOSP IP/OBS HIGH 50: CPT | Performed by: STUDENT IN AN ORGANIZED HEALTH CARE EDUCATION/TRAINING PROGRAM

## 2022-03-06 PROCEDURE — 92526 ORAL FUNCTION THERAPY: CPT | Mod: GN

## 2022-03-06 PROCEDURE — 94660 CPAP INITIATION&MGMT: CPT

## 2022-03-06 PROCEDURE — 97140 MANUAL THERAPY 1/> REGIONS: CPT | Mod: GO

## 2022-03-06 PROCEDURE — 80053 COMPREHEN METABOLIC PANEL: CPT | Performed by: NURSE PRACTITIONER

## 2022-03-06 PROCEDURE — 250N000012 HC RX MED GY IP 250 OP 636 PS 637: Performed by: STUDENT IN AN ORGANIZED HEALTH CARE EDUCATION/TRAINING PROGRAM

## 2022-03-06 PROCEDURE — 250N000011 HC RX IP 250 OP 636: Performed by: STUDENT IN AN ORGANIZED HEALTH CARE EDUCATION/TRAINING PROGRAM

## 2022-03-06 PROCEDURE — 250N000011 HC RX IP 250 OP 636: Performed by: PHYSICIAN ASSISTANT

## 2022-03-06 PROCEDURE — 250N000009 HC RX 250: Performed by: SURGERY

## 2022-03-06 PROCEDURE — 83605 ASSAY OF LACTIC ACID: CPT | Performed by: STUDENT IN AN ORGANIZED HEALTH CARE EDUCATION/TRAINING PROGRAM

## 2022-03-06 PROCEDURE — 85025 COMPLETE CBC W/AUTO DIFF WBC: CPT | Performed by: NURSE PRACTITIONER

## 2022-03-06 PROCEDURE — 999N000215 HC STATISTIC HFNC ADULT NON-CPAP

## 2022-03-06 PROCEDURE — 250N000012 HC RX MED GY IP 250 OP 636 PS 637: Performed by: PHYSICIAN ASSISTANT

## 2022-03-06 PROCEDURE — 99233 SBSQ HOSP IP/OBS HIGH 50: CPT | Mod: 24 | Performed by: PHYSICIAN ASSISTANT

## 2022-03-06 PROCEDURE — 97535 SELF CARE MNGMENT TRAINING: CPT | Mod: GO | Performed by: OCCUPATIONAL THERAPIST

## 2022-03-06 PROCEDURE — 250N000011 HC RX IP 250 OP 636

## 2022-03-06 PROCEDURE — 250N000013 HC RX MED GY IP 250 OP 250 PS 637: Performed by: NURSE PRACTITIONER

## 2022-03-06 PROCEDURE — 71260 CT THORAX DX C+: CPT | Mod: 26 | Performed by: RADIOLOGY

## 2022-03-06 PROCEDURE — 250N000009 HC RX 250: Performed by: STUDENT IN AN ORGANIZED HEALTH CARE EDUCATION/TRAINING PROGRAM

## 2022-03-06 PROCEDURE — 83735 ASSAY OF MAGNESIUM: CPT | Performed by: PHYSICIAN ASSISTANT

## 2022-03-06 PROCEDURE — 250N000013 HC RX MED GY IP 250 OP 250 PS 637: Performed by: STUDENT IN AN ORGANIZED HEALTH CARE EDUCATION/TRAINING PROGRAM

## 2022-03-06 PROCEDURE — 999N000128 HC STATISTIC PERIPHERAL IV START W/O US GUIDANCE

## 2022-03-06 PROCEDURE — 250N000013 HC RX MED GY IP 250 OP 250 PS 637: Performed by: INTERNAL MEDICINE

## 2022-03-06 PROCEDURE — U0005 INFEC AGEN DETEC AMPLI PROBE: HCPCS | Performed by: STUDENT IN AN ORGANIZED HEALTH CARE EDUCATION/TRAINING PROGRAM

## 2022-03-06 PROCEDURE — 36415 COLL VENOUS BLD VENIPUNCTURE: CPT | Performed by: STUDENT IN AN ORGANIZED HEALTH CARE EDUCATION/TRAINING PROGRAM

## 2022-03-06 PROCEDURE — 120N000003 HC R&B IMCU UMMC

## 2022-03-06 PROCEDURE — 82803 BLOOD GASES ANY COMBINATION: CPT | Performed by: NURSE PRACTITIONER

## 2022-03-06 PROCEDURE — 250N000013 HC RX MED GY IP 250 OP 250 PS 637: Performed by: PHYSICIAN ASSISTANT

## 2022-03-06 PROCEDURE — 71046 X-RAY EXAM CHEST 2 VIEWS: CPT

## 2022-03-06 PROCEDURE — 36415 COLL VENOUS BLD VENIPUNCTURE: CPT | Performed by: NURSE PRACTITIONER

## 2022-03-06 PROCEDURE — 999N000157 HC STATISTIC RCP TIME EA 10 MIN

## 2022-03-06 PROCEDURE — 94640 AIRWAY INHALATION TREATMENT: CPT

## 2022-03-06 PROCEDURE — 97110 THERAPEUTIC EXERCISES: CPT | Mod: GO | Performed by: OCCUPATIONAL THERAPIST

## 2022-03-06 PROCEDURE — 94640 AIRWAY INHALATION TREATMENT: CPT | Mod: 76

## 2022-03-06 RX ORDER — IOPAMIDOL 755 MG/ML
100 INJECTION, SOLUTION INTRAVASCULAR ONCE
Status: COMPLETED | OUTPATIENT
Start: 2022-03-06 | End: 2022-03-06

## 2022-03-06 RX ORDER — MAGNESIUM OXIDE 400 MG/1
400 TABLET ORAL 2 TIMES DAILY
Status: COMPLETED | OUTPATIENT
Start: 2022-03-06 | End: 2022-03-07

## 2022-03-06 RX ADMIN — LEVALBUTEROL HYDROCHLORIDE 1.25 MG: 1.25 SOLUTION RESPIRATORY (INHALATION) at 16:04

## 2022-03-06 RX ADMIN — NYSTATIN 1000000 UNITS: 100000 SUSPENSION ORAL at 15:44

## 2022-03-06 RX ADMIN — Medication 15 ML: at 08:24

## 2022-03-06 RX ADMIN — PREDNISONE 15 MG: 5 TABLET ORAL at 20:01

## 2022-03-06 RX ADMIN — DILTIAZEM HYDROCHLORIDE 30 MG: 30 TABLET, FILM COATED ORAL at 05:20

## 2022-03-06 RX ADMIN — DILTIAZEM HYDROCHLORIDE 30 MG: 30 TABLET, FILM COATED ORAL at 00:50

## 2022-03-06 RX ADMIN — LEVALBUTEROL HYDROCHLORIDE 1.25 MG: 1.25 SOLUTION RESPIRATORY (INHALATION) at 21:26

## 2022-03-06 RX ADMIN — DILTIAZEM HYDROCHLORIDE 30 MG: 30 TABLET, FILM COATED ORAL at 12:05

## 2022-03-06 RX ADMIN — LORATADINE 10 MG: 10 TABLET ORAL at 08:25

## 2022-03-06 RX ADMIN — DAPSONE 50 MG: 25 TABLET ORAL at 08:25

## 2022-03-06 RX ADMIN — METHOCARBAMOL TABLETS 500 MG: 500 TABLET, COATED ORAL at 12:05

## 2022-03-06 RX ADMIN — ASPIRIN 81 MG: 81 TABLET, COATED ORAL at 08:25

## 2022-03-06 RX ADMIN — PREDNISONE 15 MG: 5 TABLET ORAL at 08:26

## 2022-03-06 RX ADMIN — Medication 400 MG: at 20:01

## 2022-03-06 RX ADMIN — CALCIUM CARBONATE 600 MG (1,500 MG)-VITAMIN D3 400 UNIT TABLET 1 TABLET: at 08:27

## 2022-03-06 RX ADMIN — IOPAMIDOL 100 ML: 755 INJECTION, SOLUTION INTRAVENOUS at 16:24

## 2022-03-06 RX ADMIN — CEFTRIAXONE SODIUM 2 G: 2 INJECTION, POWDER, FOR SOLUTION INTRAMUSCULAR; INTRAVENOUS at 10:04

## 2022-03-06 RX ADMIN — MYCOPHENOLATE MOFETIL 1000 MG: 250 CAPSULE ORAL at 08:27

## 2022-03-06 RX ADMIN — DOXYCYCLINE 100 MG: 100 INJECTION, POWDER, LYOPHILIZED, FOR SOLUTION INTRAVENOUS at 12:04

## 2022-03-06 RX ADMIN — METHOCARBAMOL TABLETS 500 MG: 500 TABLET, COATED ORAL at 08:25

## 2022-03-06 RX ADMIN — Medication 1 PACKET: at 20:06

## 2022-03-06 RX ADMIN — METOPROLOL TARTRATE 25 MG: 25 TABLET, FILM COATED ORAL at 20:08

## 2022-03-06 RX ADMIN — ACETYLCYSTEINE 2 ML: 200 SOLUTION ORAL; RESPIRATORY (INHALATION) at 16:04

## 2022-03-06 RX ADMIN — NYSTATIN 1000000 UNITS: 100000 SUSPENSION ORAL at 20:01

## 2022-03-06 RX ADMIN — DILTIAZEM HYDROCHLORIDE 30 MG: 30 TABLET, FILM COATED ORAL at 17:09

## 2022-03-06 RX ADMIN — HEPARIN SODIUM 5000 UNITS: 5000 INJECTION, SOLUTION INTRAVENOUS; SUBCUTANEOUS at 22:04

## 2022-03-06 RX ADMIN — CALCIUM CARBONATE 600 MG (1,500 MG)-VITAMIN D3 400 UNIT TABLET 1 TABLET: at 17:09

## 2022-03-06 RX ADMIN — LEVALBUTEROL HYDROCHLORIDE 1.25 MG: 1.25 SOLUTION RESPIRATORY (INHALATION) at 13:02

## 2022-03-06 RX ADMIN — VALACYCLOVIR HYDROCHLORIDE 1000 MG: 1 TABLET, FILM COATED ORAL at 04:42

## 2022-03-06 RX ADMIN — LEVOFLOXACIN 500 MG: 5 INJECTION, SOLUTION INTRAVENOUS at 13:33

## 2022-03-06 RX ADMIN — Medication 40 MG: at 08:26

## 2022-03-06 RX ADMIN — MYCOPHENOLATE MOFETIL 1000 MG: 200 POWDER, FOR SUSPENSION ORAL at 20:02

## 2022-03-06 RX ADMIN — ACETYLCYSTEINE 2 ML: 200 SOLUTION ORAL; RESPIRATORY (INHALATION) at 13:02

## 2022-03-06 RX ADMIN — LEVALBUTEROL HYDROCHLORIDE 1.25 MG: 1.25 SOLUTION RESPIRATORY (INHALATION) at 07:37

## 2022-03-06 RX ADMIN — ACETYLCYSTEINE 2 ML: 200 SOLUTION ORAL; RESPIRATORY (INHALATION) at 21:26

## 2022-03-06 RX ADMIN — TACROLIMUS 5 MG: 5 CAPSULE ORAL at 08:24

## 2022-03-06 RX ADMIN — HEPARIN SODIUM 5000 UNITS: 5000 INJECTION, SOLUTION INTRAVENOUS; SUBCUTANEOUS at 05:20

## 2022-03-06 RX ADMIN — TACROLIMUS 5 MG: 5 CAPSULE ORAL at 17:10

## 2022-03-06 RX ADMIN — Medication 400 MG: at 08:24

## 2022-03-06 RX ADMIN — HEPARIN SODIUM 5000 UNITS: 5000 INJECTION, SOLUTION INTRAVENOUS; SUBCUTANEOUS at 13:37

## 2022-03-06 RX ADMIN — Medication 1 PACKET: at 08:30

## 2022-03-06 RX ADMIN — DOXYCYCLINE 100 MG: 100 INJECTION, POWDER, LYOPHILIZED, FOR SOLUTION INTRAVENOUS at 00:53

## 2022-03-06 RX ADMIN — VALACYCLOVIR HYDROCHLORIDE 1000 MG: 1 TABLET, FILM COATED ORAL at 20:02

## 2022-03-06 RX ADMIN — ACETYLCYSTEINE 2 ML: 200 SOLUTION ORAL; RESPIRATORY (INHALATION) at 07:37

## 2022-03-06 RX ADMIN — FAMOTIDINE 10 MG: 10 TABLET, FILM COATED ORAL at 08:27

## 2022-03-06 RX ADMIN — VALACYCLOVIR HYDROCHLORIDE 1000 MG: 1 TABLET, FILM COATED ORAL at 12:07

## 2022-03-06 RX ADMIN — NYSTATIN 1000000 UNITS: 100000 SUSPENSION ORAL at 12:04

## 2022-03-06 RX ADMIN — Medication 1 PACKET: at 13:36

## 2022-03-06 RX ADMIN — ALBUTEROL SULFATE 2 PUFF: 90 AEROSOL, METERED RESPIRATORY (INHALATION) at 08:26

## 2022-03-06 RX ADMIN — METHOCARBAMOL TABLETS 500 MG: 500 TABLET, COATED ORAL at 15:44

## 2022-03-06 RX ADMIN — GABAPENTIN 100 MG: 100 CAPSULE ORAL at 22:04

## 2022-03-06 RX ADMIN — NYSTATIN 1000000 UNITS: 100000 SUSPENSION ORAL at 08:25

## 2022-03-06 RX ADMIN — METHOCARBAMOL TABLETS 500 MG: 500 TABLET, COATED ORAL at 20:01

## 2022-03-06 RX ADMIN — FAMOTIDINE 10 MG: 10 TABLET, FILM COATED ORAL at 20:01

## 2022-03-06 RX ADMIN — METOPROLOL TARTRATE 25 MG: 25 TABLET, FILM COATED ORAL at 08:25

## 2022-03-06 ASSESSMENT — ACTIVITIES OF DAILY LIVING (ADL)
ADLS_ACUITY_SCORE: 9

## 2022-03-06 NOTE — PLAN OF CARE
Neuro: A&Ox4.   Cardiac: SR/ST. VSS.   Respiratory: Sating >95% on 21% FiO2 on HFNC while awake, BiPAP 30% while sleeping. No SOB reported. Chest tube x3 to -20 suction.  GI/: Adequate urine output. Purewick in place. Watery BM X1 overnight.   Diet/appetite: Regular diet. TF @50mL/hr with 30mL FWF Q4hrs.   Activity:  Assist of 1, up to commode. Some tachypnea with exertion.   Pain: At acceptable level on current regimen. PRN tylenol given x1 overnight.   Skin: No new deficits noted. Generalized bruising. Clamshell incision approximated, open to air, CDI.   LDA's: R PIV: SL, L PIV: TKO, L CT x2, R CTx1. NJ: continuous enteral TF.     Plan: Strict BiPaP while sleeping. Continue with POC. Notify primary team with changes.

## 2022-03-06 NOTE — PROGRESS NOTES
Pulmonary Medicine  Cystic Fibrosis - Lung Transplant Team  Progress Note  2022       Patient: Melissa Elder  MRN: [id number]  : 1955 (age 66 year old)  Transplant: 2022 (Lung), POD#13  Admission date: 2022    Assessment & Plan:     Melissa Elder is a 66 year old female with a PMH significant for severe COPD, HTN, mild non-obstructive CAD, paroxysmal afib, osteoporosis, GERD, and colonic polyps.  Pt. is now s/p BSLT on 22, surgery relatively uncomplicated.  The patient was extubated , post-op course complicated by right chest tube lodged into fissure and left chest tube displacement s/p bilateral pigtail chest tube placement in IR to drain anterior hydropneumothorax and bilateral effusions.  Routine inspection bronch on 3/1 complicated by hypoventilation in setting of oversedation requiring multiple Narcan doses.  Slow improvement in hypercapnia, continues to require NIPPV overnight with HFNC during the day.       Today's recommendations:  - Continue aggressive airway clearance  - BiPAP overnight and with daytime naps, HFNC during the day and for PO meds or diet (okay for brief breaks with NC)  - VBG daily in the mornings for now (and additionally prn)   - DSA, CMV, EBV, and IgG due 3/21 (not yet ordered)  - CXR daily with chest tubes  - Defer lytics to right chest tube at this time d/t size of effusion on imaging, reevaluate daily  - IR has requested to be contacted prior to removal of left chest tube(s), revisit tomorrow per CVTS, would continue at least left basilar chest tube given Ureaplasma empyema   - Repeat chest CT ordered today (given plan to obtain abdomen/pelvis CT)  - Repeat SNIFF test this week  - Agree with deferring diuresis today  - Tacrolimus level ordered tomorrow  - Prednisone taper ordered 3/8   - Ceftriaxone through 3/8, then transition to amoxicillin for total of 3 months course (through ) for Actinomyces treatment  - Low threshold to treat Candida if  it recurs in respiratory cultures, per transplant ID  - Valacyclovir through 3/12, then resume acyclovir prophylaxis through 3/23 per protocol  - Doxycycline and levofloxacin for Ureaplasma, course duration TBD, QTc monitoring per primary  - Donor risk labs ordered 3/23  - Obtain orthostatic vitals     COPD s/p bilateral sequential lung transplant (BSLT):  Acute hypoxic/hypercapneic respiratory failure:   Bilateral pleural effusions:   Right apical PTX: Scant pleural-pleural adhesions and mild-moderate bilateral hilar lymphadenopathy per op note.  Pathology with CPFE.  Pressor weaned off and pt. extubated on 2/22.  Left chest tube inadvertently dislodged, f/u chest CT (2/25) with anterior hydroPTX, right chest tube in fissure.  Left chest tube removed and replaced with 2 left-sided pigtail chest tubes by IR (2/26).  DSA negative 2/28.  Hypoxia had generally resolved, only intermittently requiring supplemental oxygen up until bronch 3/1.  S/p bronch 3/1 without evidence of dehiscence, mild amount of thin pale/tan secretions noted in RLL bronchus.  Noted hypoventilating with oversedation during bronch, received Narcan x3 with improvement in sedation but hypercapnea initially severe (>90) and persisting with very gradual improvements.  Chest CT (3/2) with improved bilateral hydroPTX, bibasilar atectasis with mucous plugging, and increased right loculated pleural effusion, s/p right pigtail chest tube (3/3, exudative, 81%N, positive for Ureaplasma as below).  Sniff test 3/3 with poor diaphragm excursion bilaterally with extensive accessory respiratory chest wall musculature effort to aid in inspiration.  - Nebs: levalbuterol and Mucomyst QID   - Aggressive pulmonary toilet with chest physiotherapy QID as well as Aerobika and incentive spirometry q1h w/a  - BiPAP overnight and with daytime naps (RT to reevaluate settings), HFNC during the day and for PO meds or diet (okay for brief breaks with NC)  - VBG daily in the  mornings for now (and additionally prn)   - DSA 3/21 (not yet ordered)  - CXR daily with chest tubes, today with ongoing basilar atelectasis (personally reviewed)  - Chest tube management by CVTS       * Right chest tube to remain to suction, defer lytics at this time d/t size of effusion on imaging, reevaluate daily       * IR has requested to be contacted prior to removal of left chest tubes, revisit 3/7 per CVTS, left basilar to remain given Ureaplasma empyema (as below)  - Repeat chest CT without contrast on 3/6 given plan for abdomen/pelvis CT as below  - Repeat SNIFF test this week  - Volume management per primary team, agree with deferring diuresis today  - Post-pyloric nasal FT, TF per RD and primary team  - SLP following for diet advacement     Immunosuppression:  S/p induction therapy with basiliximab x2 doses (with high dose IV steroid).  - Tacrolimus 5 mg BID (decreased 3/2).  Goal level 8-12.  Repeat level 3/7 for close monitoring (ordered).  - MMF 1000 mg BID (decreased 2/25 due to diarrhea)  - Prednisone 15 mg BID with taper per lung transplant protocol (due 3/8, ordered):  Date AM dose (mg) PM dose (mg)   3/1 15 15   3/8 15 12.5   3/15 12.5 12.5   3/29 12.5 10   4/12 10 10   5/10 10 7.5   6/7 7.5 7.5   7/5 7.5 5   8/2 5 5   8/30 5 2.5      Prophylaxis:   - Dapsone for PJP ppx (started 2/23 at decreased MWF dose and increased to daily 3/1, G6PD 13.3 2/21)  - Nystatin for oral candidiasis ppx, 6 month course  - See below for serologies and viral ppx:    Donor Recipient Intervention   CMV status Negative Negative CMV monthly (3/21, not yet ordered)   EBV status Positive Positive EBV at one month (3/21, not yet ordered)   HSV status N/A (Newly) Positive As below      ID: Prior history of infection/colonization with Haemophilus influenzae (2017).  Broad spectrum ABX empirically post-op with vanc and ceftaz (2/21-2/22), stopped after 24h to target known donor cultures at that time on POD #1 per   Spike (transitioned to cefazolin).  Broadened again on POD #2 with culture updates as below.  Donor ([facility]) cultures with P-S MSSA; ([facility]) with Strep mitis, Actinomyces odontolyticus, MSSA x2, and C. kruseii (concern for possible aspiration).  Recipient cultures at time of transplant with Actinomyces odonotolyticus.  Bronch cultures (2/22) with Saccharomyces cerevisiae (no indication to treat per Dr. Ghosh unless pt. acutely declines clinically, 3/1) and C. albicans.  - IgG repeat at one month (3/21, not yet ordered)  - ABX: ceftriaxone (2/23-3/8), then transition to amoxicillin for total of 3 months course (through 5/23) for Actinomyces treatment  - Low threshold to treat Candida if it recurs in respiratory cultures, per transplant ID     HSV: Pt. with recent seroconversion at time of transplant.  HSV also noted on donor cultures.  Initial plan for 30 days of ppx with ACV post-op per Dr. Lala.  Then with HSV+ bronch at time of transplant (2/21), transitioned to treatment dosing with VACV   - Valacyclovir 1000 mg TID X 14d (2/27-3/12), then resume acyclovir prophylaxis (3/13-3/23) per protocol     Hyperammonemia:   Disseminated Ureaplasma:  Had been somnolent with some confusion/visual hallucinations in setting of hypercapnia as above.  Ammonia mildly elevated on 3/2 but quickly normalized.  Ureaplasma and Mycoplasma studies sent, pt. noted to have Ureaplasma in both pleural and sputum cultures from 3/2.  Repeat ammonia level remains normal (on the higher end) on 3/4.  - Doxycycline and levofloxacin (3/4), course duration TBD, QTc monitoring per primary  - In light of normal ammonia level, defer more aggressive interventions for hyperammonemia at this time (stopping CNI, iHD, etc.)     Leukocytosis: WBC trending upward now on POD #10, frequently noted at this stage post-op (pathology not entirely clear, but may be at least partially d/t bone marrow surge post-transplant).  Procal moderately elevated at 0.18, but  may still be somewhat non-specific at this point post-op.  BDG fugitell and Aspergillus galactomannan negative 3/3.  - Blood cultures (3/1) NGTD  - Sputum cultures (3/2) as above, otherwise NGTD  - Pleural cultures (3/2, 3/3) as above, otherwise NGTD  - ABX as above     PHS risk criteria donor: Additional labs required post-transplant (between 4-8 weeks post-op): Hepatitis B, Hepatitis C, and HIV by COLT ([id number], ordered 3/23), also plan to repeat hepatitis B surface antibody at that time (ordered) given discrepancy with recent result.     Other relevant problems managed by primary team:     Diarrhea:   Concern for ileus: Likely 2/2 medications and tube feedings.  Fiber added to tube feeds.  MMF decreased as above.  Loose stools now improved.  Will consider transition to Myfortic if loose stools increase again, deferred for now.  Again having loose/watery stools, also noting some abdominal bloating/fullness.  AXR 3/5 with diffuse distention of small and large bowel suggestive of ileus.  C diff negative 3/6.  - Abdomen/pelvis CT with contrast     Increasing LFTs: Rising 3/1 despite medication adjustments (APAP and statin stopped 2/27).  Of note, pt. had a discrepant hep B surface Ab at time of transplant as above.  LFTs remain elevated although improved 3/2.  Also with elevated ammonia as above.  RUQ US (3/3) with only hepatic steatosis.  LFTs gradually improving, monitoring daily hepatic panel.     CAD: Noted to have mild non-obstructive CAD without hemodynamically significant lesions on cardiac cath 3/27/19.  PTA ASA 81 mg and atorvastatin, started on rosuvastatin post-op but held 2/28 for elevated LFTs (as above).  ASA resumed 3/1.     Paroxysmal afib:   HTN: PTA diltiazem, not on AC.  Post-op tachycardia, off pressor since 2/22.  Metoprolol started 2/23.  Persistent low level tachycardia, but currently sinus tach with continued HTN.  Diltiazem resumed 3/2.     GERD: Not on PPI PTA.  Negative pH/manometry study  "3/28/19, noted small hiatal hernia. On PPI daily since 2/21, H2 blocker bridge discontinued 3/2, some increase in reflux symptoms since, resumed 3/5.     We appreciate the excellent care provided by the [place] [id number] team.  Recommendations communicated via in person rounding and this note.  Will continue to follow along closely, please do not hesitate to call with any questions or concerns.    Patient discussed with Dr. Steele.    Lola Mann PA-C  [facility]patient [facility]  Pulmonary CF/Transplant     Subjective & Interval History:     Remains on BiPAP overnight with HFNC during the day, hypercapnia improved this morning.  Dyspnea gradually improving each day.  Able to wean FiO2 to 21% with HFNC this morning.  Occasionally able to expectorate sputum.  Received chest physiotherapy BID yesterday.  Chest tubes x3 remain.  Appetite decreased from baseline although able to tolerate softer meals yesterday.  Stools remain loose, x3 occurrences yesterday.  Pain well managed, using minimal oxycodone.      Review of Systems:     C: No fever, no chills, + gradual decrease in weight  INTEGUMENTARY/SKIN: No rash or obvious new lesions  ENT/MOUTH: No sore throat, no sinus pain, no nasal congestion or drainage  RESP: See interval history  CV: No chest pain, + peripheral edema  GI: No nausea, no vomiting, + reflux symptoms  : No dysuria  MUSCULOSKELETAL: + improving back pain  ENDOCRINE: + blood sugars intermittently elevated  NEURO: No headache, no numbness or tingling  PSYCHIATRIC: Mood stable    Physical Exam:     All notes, images, and labs from past 24 hours (at minimum) were reviewed.    Vital signs:  Temp: 98.5  F (36.9  C) Temp src: Oral BP: (!) 147/56 Pulse: 92   Resp: 20 SpO2: 95 % O2 Device: High Flow Nasal Cannula (HFNC) Oxygen Delivery: 30 LPM Height: 157.5 cm (5' 2\") Weight: 74.8 kg (164 lb 14.5 oz)  I/O:     Intake/Output Summary (Last 24 hours) at 3/6/2022 1132  Last data filed at 3/6/2022 0800  Gross per 24 hour "   Intake 2223 ml   Output 3940 ml   Net -1717 ml     Constitutional: Sitting on edge of bed, in no apparent distress.   HEENT: Eyes with pink conjunctivae, anicteric.  Oral mucosa moist without lesions.  Nasal FT.  + soft voice.  PULM: Diminished air flow bilaterally.  No crackles, no rhonchi, no wheezes.  Non-labored breathing on HFNC.   CV: Normal S1 and S2.  RRR.  No murmur, gallop, or rub.  + BLE wrapped.   ABD: NABS, soft, nontender, mildly distended.    MSK: Moves all extremities.  + muscle wasting.   NEURO: Alert, conversant.   SKIN: Warm, dry.  No rash on limited exam.  Clamshell incision not visualized.   PSYCH: Mood stable.     Lines, Drains, and Devices:  Peripheral IV 03/06/22 Left;Anterior Lower forearm (Active)   Site Assessment WDL 03/06/22 1000   Number of days: 0     Data:     LABS    CMP:   Recent Labs   Lab 03/06/22  0823 03/06/22  0626 03/06/22  0441 03/06/22  0046 03/05/22  0801 03/05/22  0525 03/04/22  1612 03/04/22  1124 03/04/22  0741 03/04/22  0553 03/03/22  0743 03/03/22  0509   NA  --  136  --   --   --  136  --   --   --  136  --  140   POTASSIUM  --  4.3  --   --   --  4.8  --  4.4  --  5.8*  --  4.8   CHLORIDE  --  93*  --   --   --  92*  --   --   --  94  --  98   CO2  --  41*  --   --   --  40*  --   --   --  39*  --  40*   ANIONGAP  --  2*  --   --   --  4  --   --   --  3  --  2*   * 191* 200* 210*   < > 202*   < >  --    < > 232*   < > 166*   BUN  --  25  --   --   --  29  --   --   --  38*  --  27   CR  --  0.52  --   --   --  0.54  --   --   --  0.52  --  0.49*   GFRESTIMATED  --  >90  --   --   --  >90  --   --   --  >90  --  >90   MINISTERIO  --  8.1*  --   --   --  7.9*  --   --   --  8.4*  --  7.8*   MAG  --  1.7  --   --   --  1.6  --   --   --  2.1  --  1.9   PHOS  --  3.5  --   --   --  3.8  --   --   --  4.1  --  3.2   PROTTOTAL  --  4.8*  --   --   --  5.1*  --   --   --  5.2*  --  4.8*   ALBUMIN  --  1.8*  --   --   --  1.9*  --   --   --  1.9*  --  1.8*   BILITOTAL  --   0.2  --   --   --  0.2  --   --   --  0.2  --  0.1*   ALKPHOS  --  107  --   --   --  120  --   --   --  131  --  121   AST  --  20  --   --   --  23  --   --   --  22  --  26   ALT  --  58*  --   --   --  67*  --   --   --  84*  --  99*    < > = values in this interval not displayed.     CBC:   Recent Labs   Lab 03/06/22  0626 03/05/22  0525 03/04/22  0553 03/03/22  1223 03/03/22  0631   WBC 14.5* 20.4* 19.9*  --  20.1*   RBC 2.34* 2.50* 2.48*  --  2.61*   HGB 7.0* 7.5* 7.3* 8.2* 7.6*   HCT 21.8* 23.5* 23.8*  --  24.5*   MCV 93 94 96  --  94   MCH 29.9 30.0 29.4  --  29.1   MCHC 32.1 31.9 30.7*  --  31.0*   RDW 14.8 14.1 13.6  --  13.8    315 309  --  301       INR: No lab results found in last 7 days.    Glucose:   Recent Labs   Lab 03/06/22  0823 03/06/22  0626 03/06/22  0441 03/06/22  0046 03/05/22  1945 03/05/22  1858   * 191* 200* 210* 163* 177*       Blood Gas:   Recent Labs   Lab 03/06/22  0626 03/05/22  1716 03/05/22  0525   PHV 7.43 7.40 7.36   PCO2V 64* 78* 78*   PO2V 81* 22* 53*   HCO3V 42* 49* 44*   ARIEL 16.1* 19.4* 16.1*   O2PER 30 0 30       Culture Data No results for input(s): CULT in the last 168 hours.    Virology Data: No results found for: INFLUA, FLUAH1, FLUAH3, CU1666, IFLUB, RSVA, RSVB, PIV1, PIV2, PIV3, HMPV, HRVS, ADVBE, ADVC    Historical CMV results (last 3 of prior testing):  No results found for: CMVQNT  No results found for: CMVLOG    Urine Studies    Recent Labs   Lab Test 03/01/22  1549 02/20/22  0248   URINEPH 6.0 7.0   NITRITE Negative Negative   LEUKEST Negative Negative   WBCU 0 <1       Most Recent [place] Pulmonary Function Testing (FVC/FEV1 only)  FVC-Pre   Date Value Ref Range Status   12/20/2021 1.81 L    06/21/2021 1.46 L    12/09/2019 1.59 L    06/03/2019 1.68 L      FVC-%Pred-Pre   Date Value Ref Range Status   12/20/2021 65 %    06/21/2021 52 %    12/09/2019 56 %    06/03/2019 58 %      FEV1-Pre   Date Value Ref Range Status   12/20/2021 0.49 L    06/21/2021  0.50 L    12/09/2019 0.49 L    06/03/2019 0.47 L      FEV1-%Pred-Pre   Date Value Ref Range Status   12/20/2021 22 %    06/21/2021 22 %    12/09/2019 21 %    06/03/2019 20 %        IMAGING    Recent Results (from the past 48 hour(s))   XR Abdomen 1 View    Narrative    EXAMINATION: XR ABDOMEN 1 VIEW, 3/5/2022 12:14 PM    COMPARISON: 2/21/2022    HISTORY: concern for small bowel obstruction    FINDINGS: Diffuse distention of small and large bowel. Feeding tube  tip uncertain, favored to be within the distal duodenum near the  ligament of Treitz.      Impression    IMPRESSION: Diffuse distention of small and large bowel suggestive of  ileus.     TONO DUNLAP MD         SYSTEM ID:  [id number]   XR Chest 2 Views    Narrative    EXAMINATION: XR CHEST 2 VW, 3/5/2022 12:14 PM    COMPARISON: 3/4/2022    HISTORY: interval follow up, post-op lung transplant    FINDINGS: Changes of bilateral lung transplant. Bibasilar chest tubes  and left apical chest tube are unchanged in position. No significant  pneumothorax. Basilar opacities are unchanged. Small bowel distention  seen previously has improved.      Impression    IMPRESSION: Changes of bilateral lung transplant with persistent  basilar atelectasis and chest tubes in place.     TONO DUNLAP MD         SYSTEM ID:  [id number]

## 2022-03-06 NOTE — PLAN OF CARE
Speech Language Therapy Discharge Summary    Reason for therapy discharge:    All goals and outcomes met, no further needs identified.    Progress towards therapy goal(s). See goals on Care Plan in [facility] electronic health record for goal details.  Goals met    Therapy recommendation(s):    No further therapy is recommended.     Recommend regular diet (IDDSI 7) and thin liquids (IDDSI 0).    Ensure pt is fully alert and upright for all PO and slow rate of intake. Pt should remain upright for 30-45 minutes following PO intake.  All swallowing goals met, will complete orders

## 2022-03-06 NOTE — PROGRESS NOTES
Major Shift Events: pt. Went to x-ray and CT scan. Had loose stool, c-diff came back negative.  Plan: continue to get c02 levels down  For vital signs and complete assessments, please see documentation flowsheets.

## 2022-03-06 NOTE — PROGRESS NOTES
[facility]    Medicine Progress Note - Hospitalist Service, GOLD TEAM 10    Date of Admission:  2/20/2022    Assessment & Plan            Melissa Elder is a 66 year old female with PMHx HTN, HLD, paroxysmal Afib, osteoporosis, GERD, severe COPD, previously requiring 2-3L NC O2 at rest.  Underwent b/l lung transplant with Dr. Robin on 02/21. Got 1u pRBC post-op, no overt bleeding source, monitoring. Extubated 02//22 to O2 NC and transferred to the floor. Pericardial drain pulled 2/24/22 without issue. Continuing on antibiotics for cultures growing from donor and recipient lungs. Has bilateral pleural effusions and anterior hydropneumothorax requiring 3 chest tubes currently.      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  Pressor weaned off 2/22 and pt. extubated. Prior history of infection/colonization with Haemophilus influenzae (2017).  IgG adequate (2/20).  MRSA nares negative 2/21.  Broad spectrum ABX empirically post-op with vanc and ceftaz (2/21-2/22), stopped after 24h per Dr. Lala to target known donor cultures on POD #1. Donor cultures ([facility]) with Strep mitis, Actinomyces odontolyticus, and Kenyatta kruseii. Recipient cultures with Actinomyces odonotolyticus.  So currently on ceftriaxone for coverage.   - Continue BiPAP (discussed below)  - Nebs: Levalbuterol and Mucomyst QID  - Aggressive pulmonary toilet with chest physiotherapy  QID as well as Aerobika and incentive spirometry q1h w/a  - Wean supplemental O2 to keep >92%  - Chest tubes managed by surgical and IR team  - Daily CXR while chest tubes in   - Tube feeding per nutrition   - Await explant pathology  - Continue ceftriaxone for 2 weeks through 3/8 followed by amoxicillin for 3 months  - Immune suppression and microbial ppx per daily transplant pulm note  - Continue Valtrex to 1000 mg TID x 14 days for HSV (through 3/11) then complete ppx per protocol (see pulm note)   - Continue diuresis as needed with goal to stay net negative     Hypercarbic respiratory failure 3/1, improving   3/1 noted to have slowly rising bicarb on daily labs. Had inspection bronchoscopy 3/1 as well and became oversedated and required multiple doses of narcan. VBG obtained post procedure showed hypercapnea to 99. Hypercarbic respiratory failure likely multifactorial including oversedation, poor diaphragm excursion (SNIFF test 3/3), volume overload, and atelectasis. Started on BiPAP.   - Continue BiPAP at night, HFNC/NC during the day   - Monitor AM VBG     Acute toxic metabolic encephalopathy, likely due to hypercapnea, resolved  Mildly elevated ammonia, pleural fluid/sputum +Ureaplasma 3/4   Intermittent somnolence, hallucinations starting around 3/1 with rise in CO2. Ammonia level checked due to concern for post-transplant hyperammonemia which was mildly elevated at 57, but repeat 49.   - Mycoplasma/ureaplasma cultures collected and Transplant ID consulted    - Sputum/Pleural fluid +ureaplasma on 3/4. Started on Levofloxacin/Doxycycline.    - Monitor QTc, Check ECG Weekly (next 3/7)    Leukocytosis  Afebrile, but WBC started rising 2/27 from 14.6 to 23.1 3/2. Evaluation included CT chest/abdomen/pelvis without overt evidence of infection, and pan-culture. Pleural and sputum culture did grow +Ureaplasma and she was started on therapy (as above). C. Diff checked and negative. Leukocytosis has since been  improving.   - Repeat CT Abdomen/Pelvis today for interval follow up from initial scan 3/2, as there was concern for enteritis as well as pancreatic/kashif-hepatic artery inflammation.   - Continue antibiotics as above     Diarrhea   Dilated bowel on imaging   Noted to have increased loose stools 2/25 likely due to mmf and TF. Fiber added to TF. C.diff checked and negative. Abdominal imaging including CT and XR have shown diffusely dilated bowel, without concern for obstruction. Possibly ileus or gastroparesis from transplant. However patient not experiencing nausea, vomiting, or decreased stool output.   - Repeat CT today for interval follow up from last scan 3/2   - Continue to monitor abdominal exam, stool output     Post op pain   - Erector spinae catheters removed   - gabapentin 100 mg nightly  - tylenol 975 mg Q8H   - Dilaudid 0.2-0.4 mg Q2H PRN   - oxycodone 5-10 mg Q4H PRN   - robaxin 500 mg Q6H scheduled     Paroxysmal Afib   HTN  She was on diltiazem prior to lung transplantation and had not been on anticoagulation. She was  transitioned to metoprolol in the ICU.  - Continue Metoprolol tartrate to 25 mg BID   - Restart PTA diltiazem at lower dose (PTA on 240 mg XL) for better BP/HR control. Start at 30mg Q6hr and monitor.   - No anticoagulation at this time     Stress induced hyperglycemia  Did not need insulin prior to admission but sugars have been rising despite sliding scale coverage.  - Endocrinology consulted for assistance, appreciate recommendations   - Please see DM team daily note     HLD  Mild CAD   Noted on transplant workup.   - started rosuvastatin 40 mg daily--Held 2/28 due to rising LFTs      Acute blood loss anemia  Acute blood loss thrombocytopenia  Secondary to surgery. Will continue to monitor.   - Transfuse Hgb < 7, platelets < 50   - Hemolysis labs negative 3/3, on dapsone, continue to monitor     Sternotomy  Surgical Incision  - Sternal precautions  - Postoperative incision management  "per protocol  - PT/OT/CR     Elevated LFTs, resolved   Noted on labs 2/26 and rising. AST, ALP, Bili WNL. No RUQ or abdominal pain. Imaging including RUQ ultrasound WNL. LFTs have since normalized. Will continue to monitor.  - CMP daily   - Hold statin        Diet: Adult Formula Drip Feeding: Continuous Osmolite 1.5; Nasoduodenal tube; Goal Rate: 50; mL/hr; Medication - Feeding Tube Flush Frequency: At least 15-30 mL water before and after medication administration and with tube clogging; Amount to Send (Nut...  Regular Diet Adult Thin Liquids (level 0)    DVT Prophylaxis: Pneumatic Compression Devices  Ratliff Catheter: Not present  Central Lines: None  Cardiac Monitoring: None  Code Status: Full Code      Disposition Plan   Expected Discharge: 03/11/2022   Anticipated discharge location: home;inpatient rehabilitation facility    Delays:            The patient's care was discussed with the Bedside Nurse, Patient and transplant pulm  Consultant.    Sharri Townsend MD  Hospitalist Service, GOLD TEAM [address] [place] [address]  Securely message with the Vocera Web Console (learn more here)  Text page via [facility] Paging/Directory   Please see signed in provider for up to date coverage information      Clinically Significant Risk Factors Present on Admission                    ______________________________________________________________________    Interval History     Feeling better today  Feels \"full\" in the mornings especially after taking all her medications  Denies abdominal pain   Still having loose stools   No vomiting or nausea   Able to cough up more mucous today     Four point ROS completed and negative unless listed above     Data reviewed today: I reviewed all medications, new labs and imaging results over the last 24 hours.     Physical Exam   Vital Signs: Temp: 98.5  F (36.9  C) Temp src: Oral BP: (!) 147/56 Pulse: 92   Resp: 20 SpO2: 95 % O2 Device: High Flow Nasal Cannula " ([facility]) Oxygen Delivery: 30 LPM  Weight: 164 lbs 14.47 oz    Constitutional: Sitting up in bed, awake and alert   HEENT: MMM. Sclera clear.   PULM: Diminished bases bilaterally.  No crackles, no rhonchi, no wheezes.  Chest tubes in place.  CV: Normal S1 and S2.  Tachycardia.  No murmur, gallop, or rub.  Significant pitting edema of the bilateral LE   ABD: BS hypoactive, soft, nontender, mild distension   MSK: Moves all extremities.  No apparent muscle wasting.   NEURO: Alert and oriented, conversant.   SKIN: Warm, dry.  No rash on limited exam.   EXT: Pitting edema of the lower legs bilaterally   PSYCH: Mood stable.     Data   Recent Labs   Lab 03/06/22  1203 03/06/22  0823 03/06/22  0626 03/05/22  0801 03/05/22  0525 03/04/22  1612 03/04/22  1124 03/04/22  0741 03/04/22  0553   WBC  --   --  14.5*  --  20.4*  --   --   --  19.9*   HGB  --   --  7.0*  --  7.5*  --   --   --  7.3*   MCV  --   --  93  --  94  --   --   --  96   PLT  --   --  301  --  315  --   --   --  309   NA  --   --  136  --  136  --   --   --  136   POTASSIUM  --   --  4.3  --  4.8  --  4.4  --  5.8*   CHLORIDE  --   --  93*  --  92*  --   --   --  94   CO2  --   --  41*  --  40*  --   --   --  39*   BUN  --   --  25  --  29  --   --   --  38*   CR  --   --  0.52  --  0.54  --   --   --  0.52   ANIONGAP  --   --  2*  --  4  --   --   --  3   MINISTERIO  --   --  8.1*  --  7.9*  --   --   --  8.4*   * 156* 191*   < > 202*   < >  --    < > 232*   ALBUMIN  --   --  1.8*  --  1.9*  --   --   --  1.9*   PROTTOTAL  --   --  4.8*  --  5.1*  --   --   --  5.2*   BILITOTAL  --   --  0.2  --  0.2  --   --   --  0.2   ALKPHOS  --   --  107  --  120  --   --   --  131   ALT  --   --  58*  --  67*  --   --   --  84*   AST  --   --  20  --  23  --   --   --  22    < > = values in this interval not displayed.     Recent Results (from the past 24 hour(s))   XR Chest 2 Views    Narrative    EXAMINATION: XR CHEST 2 VW, 3/6/2022 10:47 AM    COMPARISON:  3/5/2022    HISTORY: interval follow up, post-op lung transplant    FINDINGS: Heart size is normal. Changes of bilateral lung transplant.  Bilateral chest tubes are unchanged. Mild basilar atelectasis.  Pneumothorax      Impression    IMPRESSION: Postop lung transplant with chest tubes in place and no  significant pneumothorax.      TONO DUNLAP MD         SYSTEM ID:  [id number]     Medications     dextrose Stopped (03/01/22 1125)     - MEDICATION INSTRUCTIONS -       - MEDICATION INSTRUCTIONS -       - MEDICATION INSTRUCTIONS -         acetylcysteine  2 mL Nebulization 4x Daily     aspirin  81 mg Oral Daily     calcium carbonate 600 mg-vitamin D 400 units  1 tablet Oral BID w/meals     cefTRIAXone  2 g Intravenous Q24H     dapsone  50 mg Oral Daily     diltiazem  30 mg Oral Q6H JUANA     doxycycline (VIBRAMYCIN) IV  100 mg Intravenous Q12H     famotidine  10 mg Oral BID     fiber modular (NUTRISOURCE FIBER)  1 packet Per Feeding Tube TID     [Held by provider] furosemide  60 mg Intravenous BID     gabapentin  100 mg Oral or Feeding Tube At Bedtime     heparin ANTICOAGULANT  5,000 Units Subcutaneous Q8H     insulin aspart  1-9 Units Subcutaneous Q4H     insulin aspart   Subcutaneous TID w/meals     insulin NPH  22 Units Subcutaneous Q24H     [START ON 3/7/2022] insulin NPH  22 Units Subcutaneous Daily     levalbuterol  1.25 mg Nebulization 4x Daily     levofloxacin  500 mg Intravenous Q24H     loratadine  10 mg Oral Daily     magnesium oxide  400 mg Oral BID     methocarbamol  500 mg Oral 4x Daily     metoprolol tartrate  25 mg Oral or Feeding Tube BID     multivitamins w/minerals  15 mL Oral Daily     mycophenolate  1,000 mg Oral BID    Or     mycophenolate  1,000 mg Oral or NG Tube BID     nystatin  1,000,000 Units Swish & Swallow 4x Daily     pantoprazole  40 mg Oral or Feeding Tube Daily     [START ON 3/8/2022] predniSONE  12.5 mg Oral or Feeding Tube QPM     [START ON 3/8/2022] predniSONE  15 mg Oral or  Feeding Tube Daily     predniSONE  15 mg Oral or Feeding Tube BID     protein modular  1 packet Per Feeding Tube TID     [Held by provider] rosuvastatin  40 mg Oral Daily     sodium chloride (PF)  3 mL Intracatheter Q8H     sodium chloride (PF)  3 mL Intracatheter Q8H     tacrolimus  5 mg Per Feeding Tube BID IS    Or     tacrolimus  5 mg Oral BID IS     valACYclovir  1,000 mg Oral Q8H

## 2022-03-06 NOTE — PROGRESS NOTES
Diabetes Consult Daily  Progress Note          Assessment/Plan:   Melissa Elder is a 66 year old female with PMHx HTN, HLD, paroxysmal Afib, osteoporosis, GERD, severe COPD, previously requiring 2-3L NC O2 at rest.  Underwent b/l lung transplant with Dr. Robin on 02/21  has ongoing issues with Hydropneumothorax, chest tubes in place. 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 Endocrinology is being consulted for DM management.    1) Steroid and TF induced hyperglycemia   2) Underlying pre-DM, versus steroid diabetes      Plan:     Increase Continue NPH to 22 units BID, spaced 12 hours apart, at 1200 and 0000    Novolog CHO coverage-- 1 unit per 15 grams w/ meals and snacks    Novolog  custom correction 1 per 35 mg/dL Q4h     BG monitoring TID AC, HS, 0200    Hypoglycemia protocol    PRN D10W for hypoglycemia prevention if TF stops-- rate is  70 to replace about 70% of TF carbs    Case discussed with MD Maria Henson MD  Endocrinology Fellow  Pager number [phone]    I have reviewed records, discussed patient's care and edited the fellow's note. I agree with the plan of care documented.    Zaira Monroy MD   Division of Diabetes, Endocrinology and Metabolism  Department of Medicine               Interval History:   Patient was not seen. Glucose and chart data were reviewed.  Glycemic control over the last 24 hours was reviewed    Glycemic control has been slightly above the target range    On continuous tube feeds. Osmolite at 50-- provides about 10 grams carb/h.    Pred 15 mg BID    Expected Discharge: 03/08/2022   Anticipated discharge location: home;inpatient rehabilitation facility        Recent Labs   Lab 03/06/22  0626 03/06/22  0441 03/06/22  0046 03/05/22  1945 03/05/22  1858 03/05/22  1524   * 200* 210* 163* 177* 215*               Review of Systems:   NA          Medications:     Orders Placed This Encounter      Soft & Bite Sized Diet (level 6)  Thin Liquids (level 0)    Physical Exam: Not examined          Data:     Lab Results   Component Value Date    A1C 5.7 02/22/2022    A1C 5.8 02/20/2022     Last Comprehensive Metabolic Panel:  Sodium   Date Value Ref Range Status   03/06/2022 136 133 - 144 mmol/L Final   06/21/2021 137 133 - 144 mmol/L Final     Potassium   Date Value Ref Range Status   03/06/2022 4.3 3.4 - 5.3 mmol/L Final   06/21/2021 3.0 (L) 3.4 - 5.3 mmol/L Final     Chloride   Date Value Ref Range Status   03/06/2022 93 (L) 94 - 109 mmol/L Final   06/21/2021 104 94 - 109 mmol/L Final     Carbon Dioxide   Date Value Ref Range Status   06/21/2021 34 (H) 20 - 32 mmol/L Final     Carbon Dioxide (CO2)   Date Value Ref Range Status   03/06/2022 41 (H) 20 - 32 mmol/L Final     Anion Gap   Date Value Ref Range Status   03/06/2022 2 (L) 3 - 14 mmol/L Final   06/21/2021 <1 (L) 3 - 14 mmol/L Final     Glucose   Date Value Ref Range Status   03/06/2022 191 (H) 70 - 99 mg/dL Final   06/21/2021 131 (H) 70 - 99 mg/dL Final     GLUCOSE BY METER POCT   Date Value Ref Range Status   03/06/2022 200 (H) 70 - 99 mg/dL Final     Urea Nitrogen   Date Value Ref Range Status   03/06/2022 25 7 - 30 mg/dL Final   06/21/2021 8 7 - 30 mg/dL Final     Creatinine   Date Value Ref Range Status   03/06/2022 0.52 0.52 - 1.04 mg/dL Final   06/21/2021 0.56 0.52 - 1.04 mg/dL Final     GFR Estimate   Date Value Ref Range Status   03/06/2022 >90 >60 mL/min/1.73m2 Final     Comment:     Effective December 21, 2021 eGFRcr in adults is calculated using the 2021 CKD-EPI creatinine equation which includes age and gender (Ines marie al., NEJM, DOI: 10.1056/AQYIuo8824162)   06/21/2021 >90 >60 mL/min/[1.73_m2] Final     Comment:     Non  GFR Calc  Starting 12/18/2018, serum creatinine based estimated GFR (eGFR) will be   calculated using the Chronic Kidney Disease Epidemiology Collaboration   (CKD-EPI) equation.       Calcium   Date Value Ref Range Status   03/06/2022 8.1 (L)  8.5 - 10.1 mg/dL Final   06/21/2021 8.5 8.5 - 10.1 mg/dL Final     Bilirubin Total   Date Value Ref Range Status   03/06/2022 0.2 0.2 - 1.3 mg/dL Final   06/21/2021 0.3 0.2 - 1.3 mg/dL Final     Alkaline Phosphatase   Date Value Ref Range Status   03/06/2022 107 40 - 150 U/L Final   06/21/2021 92 40 - 150 U/L Final     ALT   Date Value Ref Range Status   03/06/2022 58 (H) 0 - 50 U/L Final   06/21/2021 25 0 - 50 U/L Final     AST   Date Value Ref Range Status   03/06/2022 20 0 - 45 U/L Final   06/21/2021 17 0 - 45 U/L Final

## 2022-03-07 ENCOUNTER — APPOINTMENT (OUTPATIENT)
Dept: GENERAL RADIOLOGY | Facility: CLINIC | Age: 67
DRG: 007 | End: 2022-03-07
Attending: NURSE PRACTITIONER
Payer: MEDICARE

## 2022-03-07 ENCOUNTER — APPOINTMENT (OUTPATIENT)
Dept: PHYSICAL THERAPY | Facility: CLINIC | Age: 67
DRG: 007 | End: 2022-03-07
Attending: SURGERY
Payer: MEDICARE

## 2022-03-07 ENCOUNTER — APPOINTMENT (OUTPATIENT)
Dept: OCCUPATIONAL THERAPY | Facility: CLINIC | Age: 67
DRG: 007 | End: 2022-03-07
Attending: SURGERY
Payer: MEDICARE

## 2022-03-07 LAB
ALBUMIN SERPL-MCNC: 2 G/DL (ref 3.4–5)
ALP SERPL-CCNC: 105 U/L (ref 40–150)
ALT SERPL W P-5'-P-CCNC: 48 U/L (ref 0–50)
ANION GAP SERPL CALCULATED.3IONS-SCNC: 3 MMOL/L (ref 3–14)
AST SERPL W P-5'-P-CCNC: 18 U/L (ref 0–45)
BACTERIA PLR CULT: NO GROWTH
BASE EXCESS BLDV CALC-SCNC: 12.6 MMOL/L (ref -7.7–1.9)
BASOPHILS # BLD AUTO: 0 10E3/UL (ref 0–0.2)
BASOPHILS NFR BLD AUTO: 0 %
BILIRUB SERPL-MCNC: 0.2 MG/DL (ref 0.2–1.3)
BUN SERPL-MCNC: 23 MG/DL (ref 7–30)
CALCIUM SERPL-MCNC: 8 MG/DL (ref 8.5–10.1)
CHLORIDE BLD-SCNC: 98 MMOL/L (ref 94–109)
CO2 SERPL-SCNC: 35 MMOL/L (ref 20–32)
CREAT SERPL-MCNC: 0.49 MG/DL (ref 0.52–1.04)
EOSINOPHIL # BLD AUTO: 0 10E3/UL (ref 0–0.7)
EOSINOPHIL NFR BLD AUTO: 0 %
ERYTHROCYTE [DISTWIDTH] IN BLOOD BY AUTOMATED COUNT: 16.1 % (ref 10–15)
GFR SERPL CREATININE-BSD FRML MDRD: >90 ML/MIN/1.73M2
GLUCOSE BLD-MCNC: 183 MG/DL (ref 70–99)
GLUCOSE BLDC GLUCOMTR-MCNC: 134 MG/DL (ref 70–99)
GLUCOSE BLDC GLUCOMTR-MCNC: 160 MG/DL (ref 70–99)
GLUCOSE BLDC GLUCOMTR-MCNC: 164 MG/DL (ref 70–99)
GLUCOSE BLDC GLUCOMTR-MCNC: 165 MG/DL (ref 70–99)
GLUCOSE BLDC GLUCOMTR-MCNC: 187 MG/DL (ref 70–99)
GLUCOSE BLDC GLUCOMTR-MCNC: 231 MG/DL (ref 70–99)
HCO3 BLDV-SCNC: 39 MMOL/L (ref 21–28)
HCT VFR BLD AUTO: 22.1 % (ref 35–47)
HGB BLD-MCNC: 7 G/DL (ref 11.7–15.7)
IMM GRANULOCYTES # BLD: 0.3 10E3/UL
IMM GRANULOCYTES NFR BLD: 2 %
LACTATE SERPL-SCNC: 1.7 MMOL/L (ref 0.7–2)
LYMPHOCYTES # BLD AUTO: 0.8 10E3/UL (ref 0.8–5.3)
LYMPHOCYTES NFR BLD AUTO: 7 %
MAGNESIUM SERPL-MCNC: 1.6 MG/DL (ref 1.6–2.3)
MCH RBC QN AUTO: 30 PG (ref 26.5–33)
MCHC RBC AUTO-ENTMCNC: 31.7 G/DL (ref 31.5–36.5)
MCV RBC AUTO: 95 FL (ref 78–100)
MONOCYTES # BLD AUTO: 0.5 10E3/UL (ref 0–1.3)
MONOCYTES NFR BLD AUTO: 5 %
NEUTROPHILS # BLD AUTO: 10.3 10E3/UL (ref 1.6–8.3)
NEUTROPHILS NFR BLD AUTO: 86 %
NRBC # BLD AUTO: 0 10E3/UL
NRBC BLD AUTO-RTO: 0 /100
O2/TOTAL GAS SETTING VFR VENT: 30 %
PATH REPORT.COMMENTS IMP SPEC: NORMAL
PATH REPORT.COMMENTS IMP SPEC: NORMAL
PATH REPORT.FINAL DX SPEC: NORMAL
PATH REPORT.GROSS SPEC: NORMAL
PATH REPORT.MICROSCOPIC SPEC OTHER STN: NORMAL
PATH REPORT.RELEVANT HX SPEC: NORMAL
PCO2 BLDV: 62 MM HG (ref 40–50)
PH BLDV: 7.41 [PH] (ref 7.32–7.43)
PHOSPHATE SERPL-MCNC: 3.4 MG/DL (ref 2.5–4.5)
PLATELET # BLD AUTO: 283 10E3/UL (ref 150–450)
PO2 BLDV: 53 MM HG (ref 25–47)
POTASSIUM BLD-SCNC: 4.7 MMOL/L (ref 3.4–5.3)
PROT SERPL-MCNC: 4.9 G/DL (ref 6.8–8.8)
RBC # BLD AUTO: 2.33 10E6/UL (ref 3.8–5.2)
SODIUM SERPL-SCNC: 136 MMOL/L (ref 133–144)
TACROLIMUS BLD-MCNC: 12.1 UG/L (ref 5–15)
TME LAST DOSE: NORMAL H
TME LAST DOSE: NORMAL H
WBC # BLD AUTO: 11.9 10E3/UL (ref 4–11)

## 2022-03-07 PROCEDURE — 80197 ASSAY OF TACROLIMUS: CPT | Performed by: PHYSICIAN ASSISTANT

## 2022-03-07 PROCEDURE — 250N000013 HC RX MED GY IP 250 OP 250 PS 637: Performed by: NURSE PRACTITIONER

## 2022-03-07 PROCEDURE — 97535 SELF CARE MNGMENT TRAINING: CPT | Mod: GO | Performed by: OCCUPATIONAL THERAPIST

## 2022-03-07 PROCEDURE — 94640 AIRWAY INHALATION TREATMENT: CPT

## 2022-03-07 PROCEDURE — 36415 COLL VENOUS BLD VENIPUNCTURE: CPT | Performed by: STUDENT IN AN ORGANIZED HEALTH CARE EDUCATION/TRAINING PROGRAM

## 2022-03-07 PROCEDURE — 36415 COLL VENOUS BLD VENIPUNCTURE: CPT | Performed by: PHYSICIAN ASSISTANT

## 2022-03-07 PROCEDURE — 94640 AIRWAY INHALATION TREATMENT: CPT | Mod: 76

## 2022-03-07 PROCEDURE — 80053 COMPREHEN METABOLIC PANEL: CPT | Performed by: NURSE PRACTITIONER

## 2022-03-07 PROCEDURE — 250N000011 HC RX IP 250 OP 636: Performed by: PHYSICIAN ASSISTANT

## 2022-03-07 PROCEDURE — 94660 CPAP INITIATION&MGMT: CPT

## 2022-03-07 PROCEDURE — 250N000012 HC RX MED GY IP 250 OP 636 PS 637: Performed by: PHYSICIAN ASSISTANT

## 2022-03-07 PROCEDURE — 82040 ASSAY OF SERUM ALBUMIN: CPT | Performed by: NURSE PRACTITIONER

## 2022-03-07 PROCEDURE — 84100 ASSAY OF PHOSPHORUS: CPT | Performed by: NURSE PRACTITIONER

## 2022-03-07 PROCEDURE — 250N000013 HC RX MED GY IP 250 OP 250 PS 637: Performed by: STUDENT IN AN ORGANIZED HEALTH CARE EDUCATION/TRAINING PROGRAM

## 2022-03-07 PROCEDURE — 250N000013 HC RX MED GY IP 250 OP 250 PS 637: Performed by: INTERNAL MEDICINE

## 2022-03-07 PROCEDURE — 250N000011 HC RX IP 250 OP 636: Performed by: STUDENT IN AN ORGANIZED HEALTH CARE EDUCATION/TRAINING PROGRAM

## 2022-03-07 PROCEDURE — 99233 SBSQ HOSP IP/OBS HIGH 50: CPT | Performed by: STUDENT IN AN ORGANIZED HEALTH CARE EDUCATION/TRAINING PROGRAM

## 2022-03-07 PROCEDURE — 83605 ASSAY OF LACTIC ACID: CPT | Performed by: STUDENT IN AN ORGANIZED HEALTH CARE EDUCATION/TRAINING PROGRAM

## 2022-03-07 PROCEDURE — 85025 COMPLETE CBC W/AUTO DIFF WBC: CPT | Performed by: NURSE PRACTITIONER

## 2022-03-07 PROCEDURE — 999N000215 HC STATISTIC HFNC ADULT NON-CPAP

## 2022-03-07 PROCEDURE — 250N000012 HC RX MED GY IP 250 OP 636 PS 637: Performed by: STUDENT IN AN ORGANIZED HEALTH CARE EDUCATION/TRAINING PROGRAM

## 2022-03-07 PROCEDURE — 250N000009 HC RX 250: Performed by: SURGERY

## 2022-03-07 PROCEDURE — 71046 X-RAY EXAM CHEST 2 VIEWS: CPT

## 2022-03-07 PROCEDURE — 97530 THERAPEUTIC ACTIVITIES: CPT | Mod: GP

## 2022-03-07 PROCEDURE — 999N000157 HC STATISTIC RCP TIME EA 10 MIN

## 2022-03-07 PROCEDURE — 71046 X-RAY EXAM CHEST 2 VIEWS: CPT | Mod: 26 | Performed by: RADIOLOGY

## 2022-03-07 PROCEDURE — 250N000013 HC RX MED GY IP 250 OP 250 PS 637: Performed by: SURGERY

## 2022-03-07 PROCEDURE — 250N000009 HC RX 250: Performed by: STUDENT IN AN ORGANIZED HEALTH CARE EDUCATION/TRAINING PROGRAM

## 2022-03-07 PROCEDURE — 88305 TISSUE EXAM BY PATHOLOGIST: CPT | Mod: 26 | Performed by: PATHOLOGY

## 2022-03-07 PROCEDURE — 83735 ASSAY OF MAGNESIUM: CPT | Performed by: NURSE PRACTITIONER

## 2022-03-07 PROCEDURE — 99233 SBSQ HOSP IP/OBS HIGH 50: CPT | Mod: GC | Performed by: INTERNAL MEDICINE

## 2022-03-07 PROCEDURE — 120N000002 HC R&B MED SURG/OB UMMC

## 2022-03-07 PROCEDURE — 250N000012 HC RX MED GY IP 250 OP 636 PS 637: Performed by: NURSE PRACTITIONER

## 2022-03-07 PROCEDURE — 82803 BLOOD GASES ANY COMBINATION: CPT | Performed by: NURSE PRACTITIONER

## 2022-03-07 PROCEDURE — 999N000043 HC STATISTIC CTO2 CONT OXYGEN TECH TIME EA 90 MIN

## 2022-03-07 PROCEDURE — 250N000013 HC RX MED GY IP 250 OP 250 PS 637: Performed by: PHYSICIAN ASSISTANT

## 2022-03-07 PROCEDURE — 97110 THERAPEUTIC EXERCISES: CPT | Mod: GP

## 2022-03-07 PROCEDURE — 93010 ELECTROCARDIOGRAM REPORT: CPT | Performed by: INTERNAL MEDICINE

## 2022-03-07 PROCEDURE — 88112 CYTOPATH CELL ENHANCE TECH: CPT | Mod: 26 | Performed by: PATHOLOGY

## 2022-03-07 PROCEDURE — 99233 SBSQ HOSP IP/OBS HIGH 50: CPT | Mod: 24 | Performed by: NURSE PRACTITIONER

## 2022-03-07 RX ORDER — FAMOTIDINE 20 MG/1
20 TABLET, FILM COATED ORAL 2 TIMES DAILY
Status: DISCONTINUED | OUTPATIENT
Start: 2022-03-07 | End: 2022-03-17 | Stop reason: HOSPADM

## 2022-03-07 RX ORDER — LOPERAMIDE HCL 1 MG/7.5ML
2 SUSPENSION ORAL 4 TIMES DAILY PRN
Status: DISCONTINUED | OUTPATIENT
Start: 2022-03-07 | End: 2022-03-07

## 2022-03-07 RX ORDER — CEFTRIAXONE 2 G/1
2 INJECTION, POWDER, FOR SOLUTION INTRAMUSCULAR; INTRAVENOUS ONCE
Status: COMPLETED | OUTPATIENT
Start: 2022-03-08 | End: 2022-03-08

## 2022-03-07 RX ORDER — MULTIPLE VITAMINS W/ MINERALS TAB 9MG-400MCG
1 TAB ORAL DAILY
Status: DISCONTINUED | OUTPATIENT
Start: 2022-03-08 | End: 2022-03-17 | Stop reason: HOSPADM

## 2022-03-07 RX ORDER — GUAR GUM
1 PACKET (EA) ORAL 2 TIMES DAILY
Status: DISCONTINUED | OUTPATIENT
Start: 2022-03-07 | End: 2022-03-17 | Stop reason: HOSPADM

## 2022-03-07 RX ORDER — FUROSEMIDE 10 MG/ML
40 INJECTION INTRAMUSCULAR; INTRAVENOUS 2 TIMES DAILY
Status: DISCONTINUED | OUTPATIENT
Start: 2022-03-07 | End: 2022-03-13

## 2022-03-07 RX ORDER — AMINO AC/PROTEIN HYDR/WHEY PRO 10G-100/30
1 LIQUID (ML) ORAL 2 TIMES DAILY
Status: DISCONTINUED | OUTPATIENT
Start: 2022-03-07 | End: 2022-03-17 | Stop reason: HOSPADM

## 2022-03-07 RX ORDER — DILTIAZEM HCL 60 MG
60 TABLET ORAL EVERY 6 HOURS SCHEDULED
Status: DISCONTINUED | OUTPATIENT
Start: 2022-03-07 | End: 2022-03-11

## 2022-03-07 RX ORDER — TACROLIMUS 1 MG/1
4 CAPSULE ORAL
Status: DISCONTINUED | OUTPATIENT
Start: 2022-03-07 | End: 2022-03-10

## 2022-03-07 RX ORDER — LOPERAMIDE HCL 2 MG
2 CAPSULE ORAL 4 TIMES DAILY PRN
Status: DISCONTINUED | OUTPATIENT
Start: 2022-03-07 | End: 2022-03-12

## 2022-03-07 RX ORDER — MAGNESIUM SULFATE HEPTAHYDRATE 40 MG/ML
2 INJECTION, SOLUTION INTRAVENOUS ONCE
Status: COMPLETED | OUTPATIENT
Start: 2022-03-07 | End: 2022-03-07

## 2022-03-07 RX ADMIN — LEVALBUTEROL HYDROCHLORIDE 1.25 MG: 1.25 SOLUTION RESPIRATORY (INHALATION) at 19:54

## 2022-03-07 RX ADMIN — TACROLIMUS 4 MG: 1 CAPSULE ORAL at 17:59

## 2022-03-07 RX ADMIN — FAMOTIDINE 10 MG: 10 TABLET, FILM COATED ORAL at 08:02

## 2022-03-07 RX ADMIN — DILTIAZEM HYDROCHLORIDE 60 MG: 60 TABLET ORAL at 23:02

## 2022-03-07 RX ADMIN — CALCIUM CARBONATE 600 MG (1,500 MG)-VITAMIN D3 400 UNIT TABLET 1 TABLET: at 08:03

## 2022-03-07 RX ADMIN — ACETYLCYSTEINE 2 ML: 200 SOLUTION ORAL; RESPIRATORY (INHALATION) at 08:54

## 2022-03-07 RX ADMIN — PREDNISONE 15 MG: 5 TABLET ORAL at 07:59

## 2022-03-07 RX ADMIN — Medication 1 PACKET: at 20:28

## 2022-03-07 RX ADMIN — INSULIN ASPART 1 UNITS: 100 INJECTION, SOLUTION INTRAVENOUS; SUBCUTANEOUS at 18:01

## 2022-03-07 RX ADMIN — HEPARIN SODIUM 5000 UNITS: 5000 INJECTION, SOLUTION INTRAVENOUS; SUBCUTANEOUS at 05:23

## 2022-03-07 RX ADMIN — MAGNESIUM SULFATE IN WATER 2 G: 40 INJECTION, SOLUTION INTRAVENOUS at 09:30

## 2022-03-07 RX ADMIN — VALACYCLOVIR HYDROCHLORIDE 1000 MG: 1 TABLET, FILM COATED ORAL at 23:02

## 2022-03-07 RX ADMIN — HEPARIN SODIUM 5000 UNITS: 5000 INJECTION, SOLUTION INTRAVENOUS; SUBCUTANEOUS at 14:07

## 2022-03-07 RX ADMIN — TACROLIMUS 5 MG: 5 CAPSULE ORAL at 07:54

## 2022-03-07 RX ADMIN — NYSTATIN 1000000 UNITS: 100000 SUSPENSION ORAL at 20:26

## 2022-03-07 RX ADMIN — NYSTATIN 1000000 UNITS: 100000 SUSPENSION ORAL at 08:04

## 2022-03-07 RX ADMIN — MYCOPHENOLATE MOFETIL 1000 MG: 250 CAPSULE ORAL at 20:27

## 2022-03-07 RX ADMIN — DILTIAZEM HYDROCHLORIDE 60 MG: 60 TABLET ORAL at 18:00

## 2022-03-07 RX ADMIN — CALCIUM CARBONATE 600 MG (1,500 MG)-VITAMIN D3 400 UNIT TABLET 1 TABLET: at 17:59

## 2022-03-07 RX ADMIN — Medication 1 PACKET: at 16:22

## 2022-03-07 RX ADMIN — Medication 400 MG: at 10:29

## 2022-03-07 RX ADMIN — Medication 15 ML: at 07:54

## 2022-03-07 RX ADMIN — HEPARIN SODIUM 5000 UNITS: 5000 INJECTION, SOLUTION INTRAVENOUS; SUBCUTANEOUS at 22:14

## 2022-03-07 RX ADMIN — Medication 1 PACKET: at 10:29

## 2022-03-07 RX ADMIN — METHOCARBAMOL TABLETS 500 MG: 500 TABLET, COATED ORAL at 20:26

## 2022-03-07 RX ADMIN — ACETYLCYSTEINE 2 ML: 200 SOLUTION ORAL; RESPIRATORY (INHALATION) at 16:15

## 2022-03-07 RX ADMIN — Medication 40 MG: at 20:26

## 2022-03-07 RX ADMIN — ACETYLCYSTEINE 2 ML: 200 SOLUTION ORAL; RESPIRATORY (INHALATION) at 12:16

## 2022-03-07 RX ADMIN — NYSTATIN 1000000 UNITS: 100000 SUSPENSION ORAL at 12:51

## 2022-03-07 RX ADMIN — METOPROLOL TARTRATE 25 MG: 25 TABLET, FILM COATED ORAL at 07:59

## 2022-03-07 RX ADMIN — OXYCODONE HYDROCHLORIDE 10 MG: 10 TABLET ORAL at 22:14

## 2022-03-07 RX ADMIN — LORATADINE 10 MG: 10 TABLET ORAL at 08:00

## 2022-03-07 RX ADMIN — DAPSONE 50 MG: 25 TABLET ORAL at 07:59

## 2022-03-07 RX ADMIN — FUROSEMIDE 40 MG: 10 INJECTION, SOLUTION INTRAMUSCULAR; INTRAVENOUS at 07:56

## 2022-03-07 RX ADMIN — ASPIRIN 81 MG: 81 TABLET, COATED ORAL at 08:00

## 2022-03-07 RX ADMIN — VALACYCLOVIR HYDROCHLORIDE 1000 MG: 1 TABLET, FILM COATED ORAL at 12:52

## 2022-03-07 RX ADMIN — DOXYCYCLINE 100 MG: 100 INJECTION, POWDER, LYOPHILIZED, FOR SOLUTION INTRAVENOUS at 12:49

## 2022-03-07 RX ADMIN — PREDNISONE 15 MG: 5 TABLET ORAL at 20:26

## 2022-03-07 RX ADMIN — CEFTRIAXONE SODIUM 2 G: 2 INJECTION, POWDER, FOR SOLUTION INTRAMUSCULAR; INTRAVENOUS at 10:37

## 2022-03-07 RX ADMIN — DOXYCYCLINE 100 MG: 100 INJECTION, POWDER, LYOPHILIZED, FOR SOLUTION INTRAVENOUS at 00:12

## 2022-03-07 RX ADMIN — VALACYCLOVIR HYDROCHLORIDE 1000 MG: 1 TABLET, FILM COATED ORAL at 04:01

## 2022-03-07 RX ADMIN — METHOCARBAMOL TABLETS 500 MG: 500 TABLET, COATED ORAL at 16:22

## 2022-03-07 RX ADMIN — Medication 400 MG: at 23:01

## 2022-03-07 RX ADMIN — METHOCARBAMOL TABLETS 500 MG: 500 TABLET, COATED ORAL at 12:51

## 2022-03-07 RX ADMIN — LOPERAMIDE HYDROCHLORIDE 2 MG: 2 CAPSULE ORAL at 08:22

## 2022-03-07 RX ADMIN — ACETYLCYSTEINE 2 ML: 200 SOLUTION ORAL; RESPIRATORY (INHALATION) at 19:54

## 2022-03-07 RX ADMIN — LEVALBUTEROL HYDROCHLORIDE 1.25 MG: 1.25 SOLUTION RESPIRATORY (INHALATION) at 12:16

## 2022-03-07 RX ADMIN — GABAPENTIN 100 MG: 100 CAPSULE ORAL at 22:14

## 2022-03-07 RX ADMIN — LEVALBUTEROL HYDROCHLORIDE 1.25 MG: 1.25 SOLUTION RESPIRATORY (INHALATION) at 08:53

## 2022-03-07 RX ADMIN — INSULIN ASPART 1 UNITS: 100 INJECTION, SOLUTION INTRAVENOUS; SUBCUTANEOUS at 08:10

## 2022-03-07 RX ADMIN — DILTIAZEM HYDROCHLORIDE 30 MG: 30 TABLET, FILM COATED ORAL at 00:15

## 2022-03-07 RX ADMIN — FAMOTIDINE 20 MG: 20 TABLET ORAL at 20:27

## 2022-03-07 RX ADMIN — LEVALBUTEROL HYDROCHLORIDE 1.25 MG: 1.25 SOLUTION RESPIRATORY (INHALATION) at 16:15

## 2022-03-07 RX ADMIN — DILTIAZEM HYDROCHLORIDE 30 MG: 30 TABLET, FILM COATED ORAL at 05:23

## 2022-03-07 RX ADMIN — MYCOPHENOLATE MOFETIL 1000 MG: 200 POWDER, FOR SUSPENSION ORAL at 07:54

## 2022-03-07 RX ADMIN — METOPROLOL TARTRATE 25 MG: 25 TABLET, FILM COATED ORAL at 20:27

## 2022-03-07 RX ADMIN — Medication 40 MG: at 07:54

## 2022-03-07 RX ADMIN — DOXYCYCLINE 100 MG: 100 INJECTION, POWDER, LYOPHILIZED, FOR SOLUTION INTRAVENOUS at 23:02

## 2022-03-07 RX ADMIN — NYSTATIN 1000000 UNITS: 100000 SUSPENSION ORAL at 16:22

## 2022-03-07 RX ADMIN — DILTIAZEM HYDROCHLORIDE 30 MG: 30 TABLET, FILM COATED ORAL at 12:51

## 2022-03-07 RX ADMIN — FUROSEMIDE 40 MG: 10 INJECTION, SOLUTION INTRAMUSCULAR; INTRAVENOUS at 14:07

## 2022-03-07 RX ADMIN — METHOCARBAMOL TABLETS 500 MG: 500 TABLET, COATED ORAL at 08:02

## 2022-03-07 ASSESSMENT — ACTIVITIES OF DAILY LIVING (ADL)
ADLS_ACUITY_SCORE: 9
ADLS_ACUITY_SCORE: 11
ADLS_ACUITY_SCORE: 9
ADLS_ACUITY_SCORE: 11
ADLS_ACUITY_SCORE: 9
ADLS_ACUITY_SCORE: 9
ADLS_ACUITY_SCORE: 11
ADLS_ACUITY_SCORE: 11
ADLS_ACUITY_SCORE: 9
ADLS_ACUITY_SCORE: 9
ADLS_ACUITY_SCORE: 11
ADLS_ACUITY_SCORE: 9
ADLS_ACUITY_SCORE: 11
ADLS_ACUITY_SCORE: 9
ADLS_ACUITY_SCORE: 11
ADLS_ACUITY_SCORE: 11
ADLS_ACUITY_SCORE: 9
ADLS_ACUITY_SCORE: 9
ADLS_ACUITY_SCORE: 11
ADLS_ACUITY_SCORE: 9

## 2022-03-07 NOTE — PLAN OF CARE
Care Provided: 5525-7684 (transfer to 6D)    Temp: 98  F (36.7  C) Temp src: Oral BP: (!) 142/57 Pulse: 103   Resp: 22 SpO2: 95 % O2 Device: High Flow Nasal Cannula (HFNC) Oxygen Delivery: 25 LPM    Neuro: A&Ox4. Able to make needs known.   Cardiac: SR w/RBBB 90-100s. BP stable. Denies chest pain.   Respiratory: Sating > 95% on Airvo HFNC @ 21%. LUTHER. RR 20-24. Fine crackles RML.  GI: BMx 3, loose & watery; PRN imodium given x1 in AM. Tolerating regular diet; ate well for breakfast, declined lunch but drank a Glucerna.   : Adequate urine output. Scheduled lasix x2.   Activity: Up in chair most of the day. Using bedside commode with Ax1 for line management.  Pain: Generalized discomfort, tolerable with scheduled robaxin.   Skin: Transplant incision, CDI and ANGELA. BLE edema; lymph wraps intact. Preventative mepilex on coccyx. No new deficits noted.   Incision/Surgical Site 02/21/22 Bilateral Chest (Active)   Incision Assessment WDL 03/07/22 1245   Closure Approximated;Liquid bandage 03/07/22 1245   Incision Drainage Amount None 03/07/22 1245   Incision Care Wound cleanser 03/06/22 1500   Dressing Intervention Open to air / No Dressing 03/07/22 1245     Lab: Mg 1.6; replaced with 2g IV per replacement protocol.   LDAs:    - CT x3: minimal output from R and L #2; 20cc/8hr per L#1.   - PIV x1, TKO for antibiotics      Plan: Transfer to 6D where they will continue to monitor pt closely and notify primary team with any changes.      Problem: Plan of Care - These are the overarching goals to be used throughout the patient stay.    Goal: Plan of Care Review/Shift Note  Outcome: Ongoing, Progressing     Problem: Respiratory Compromise (Lung Transplant)  Goal: Effective Oxygenation and Ventilation  Outcome: Ongoing, Progressing

## 2022-03-07 NOTE — PLAN OF CARE
Neuro: A&Ox4.   Cardiac: SR/ST. VSS.             Respiratory: Sating >95% on 21% FiO2 on HFNC while awake, BiPAP 30% while sleeping. No SOB reported. Chest tube x3 to -20 suction. Minimal output from chest tubes overnight.   GI/: Adequate urine output. Watery BM X3 overnight.   Diet/appetite: Regular diet. TF @50mL/hr with 30mL FWF Q4hrs.   Activity:  Assist of 1, up to commode. Some tachypnea with exertion.   Pain: At acceptable level on current regimen. PRN tylenol given x1 overnight.   Skin: No new deficits noted. Generalized bruising. Clamshell incision approximated, open to air, CDI.   LDA's: R PIV: SL, L PIV: TKO, L CT x2, R CTx1. NJ: continuous enteral TF.      Plan: Continue with POC. Notify primary team with changes.

## 2022-03-07 NOTE — PROGRESS NOTES
Cardiovascular Surgery Progress Note  03/07/2022         Assessment and Plan:     Melissa Elder is a 66 year old female with PMHx HTN, HLD, paroxysmal Afib, osteoporosis, GERD, severe COPD, previously requiring 2-3L NC O2 at rest. She underwent b/l lung transplant with Dr. Robin on 02/21. Got 1u pRBC post-op, no overt bleeding source, monitoring. Extubated 02/22 to O2 NC.     CVTS is following in consideration of the clamshell incision as well as chest tube management.     # Clamshell incision  - Continue to adhere to sternal precautions.  - Postoperative incision management, continue open to air but needs to be kept very dry under her breasts. Wound closed with skin glue in OR.  - Clamshell incision appears clean, dry. Incision appears without erythema, or drainage. Wound edges are well approximated.  - Encourage activity/OOB, IS/pulmonary toilet.  Maintain strict sleep hygiene and delirium precautions.  - Nutritional supplements and Calorie Counts     # Chest tube management  - Continue to suction while in room, can ambulate off suction.  - Pericardial Removed 2/24, L chest tube removed 2/25, Right Pleural removed 3/3.  - IR placed 2 new Left pleural tubes 2/26 for hydropneumothorax. Consult IR for tube dysfunction and once removal is desired.   * * Please do not remove these tubes without discussing with IR * *  - Left Pleural output: 40 mL no air leak, serosanguinous output. Called 3/7 to ask IR about removal of these left pleural tubes.   - Basilar right pleural pigtail chest tube placed 3/3/22. Output: 0 mL     Discussed with Dr Robin through both written and verbal communication.      Luis A Nava PA-C  Cardiothoracic Surgery  Pager [phone]    10:30 AM   March 7, 2022        Interval History:     No overnight events.  States pain is well managed on current regimen. Slept well overnight.  Tolerating diet and tube feeds, is passing flatus, + BM. No nausea or vomiting.  Breathing well without  "complaints.          Physical Exam:   Blood pressure 135/52, pulse 103, temperature 98.2  F (36.8  C), temperature source Oral, resp. rate 24, height 1.575 m (5' 2\"), weight 73.3 kg (161 lb 9.6 oz), SpO2 96 %, not currently breastfeeding.  Vitals:    03/05/22 0421 03/06/22 0520 03/07/22 0331   Weight: 75.3 kg (166 lb 0.1 oz) 74.8 kg (164 lb 14.5 oz) 73.3 kg (161 lb 9.6 oz)        Gen: A&Ox4, NAD  Neuro: no focal deficits   CV: HD stable  Pulm: no gross wheezing or rhonchi, normal breathing on BiPAP  Abd: nondistended, normal BS, soft, nontender  Tubes/drain sites: dressing clean and dry, serosanguinous output, no air leak. 24 hr output minimal.          Data:    Imaging:  reviewed recent imaging, no acute concerns    Labs:  [facility]  Recent Labs   Lab 03/07/22  0717 03/07/22  0600 03/07/22  0339 03/07/22  0002 03/06/22  0823 03/06/22  0626 03/05/22  0801 03/05/22  0525 03/04/22  1612 03/04/22  1124 03/04/22  0741 03/04/22  0553   NA  --  136  --   --   --  136  --  136  --   --   --  136   POTASSIUM  --  4.7  --   --   --  4.3  --  4.8  --  4.4  --  5.8*   CHLORIDE  --  98  --   --   --  93*  --  92*  --   --   --  94   MINISTERIO  --  8.0*  --   --   --  8.1*  --  7.9*  --   --   --  8.4*   CO2  --  35*  --   --   --  41*  --  40*  --   --   --  39*   BUN  --  23  --   --   --  25  --  29  --   --   --  38*   CR  --  0.49*  --   --   --  0.52  --  0.54  --   --   --  0.52   * 183* 165* 160*   < > 191*   < > 202*   < >  --    < > 232*    < > = values in this interval not displayed.     CBC  Recent Labs   Lab 03/07/22  0600 03/06/22  0626 03/05/22  0525 03/04/22  0553   WBC 11.9* 14.5* 20.4* 19.9*   RBC 2.33* 2.34* 2.50* 2.48*   HGB 7.0* 7.0* 7.5* 7.3*   HCT 22.1* 21.8* 23.5* 23.8*   MCV 95 93 94 96   MCH 30.0 29.9 30.0 29.4   MCHC 31.7 32.1 31.9 30.7*   RDW 16.1* 14.8 14.1 13.6    301 315 309     "

## 2022-03-07 NOTE — PROGRESS NOTES
Pulmonary Medicine  Cystic Fibrosis - Lung Transplant Team  Progress Note  2022       Patient: Melissa Elder  MRN: [id number]  : 1955 (age 66 year old)  Transplant: 2022 (Lung), POD#14  Admission date: 2022    Assessment & Plan:     Melissa Elder is a 66 year old female with a PMH significant for severe COPD, HTN, mild non-obstructive CAD, paroxysmal afib, osteoporosis, GERD, and colonic polyps.  Pt. is now s/p BSLT on 22, surgery relatively uncomplicated.  The patient was extubated , post-op course complicated by right chest tube lodged into fissure and left chest tube displacement s/p bilateral pigtail chest tube placement in IR to drain anterior hydropneumothorax and bilateral effusions, disseminated Ureaplasma, and hyperammonemia.  Routine inspection bronch on 3/1 complicated by hypoventilation in setting of oversedation requiring multiple Narcan doses.  Slow improvement in hypercapnia, continues to require NIPPV overnight with HFNC during the day.       Today's recommendations:  - Continue aggressive airway clearance  - BiPAP overnight and with daytime naps, otherwise on HFNC vs NC as tolerated  - VBG daily in the mornings for now (and additionally prn)   - DSA, CMV, EBV, and IgG due 3/21 (not yet ordered)  - CXR daily with chest tubes  - Defer lytics to right chest tube at this time d/t size of effusion on imaging, reevaluate daily  - IR has requested to be contacted prior to removal of left chest tube(s), would continue at least left basilar chest tube given Ureaplasma empyema   - Repeat SNIFF test this week (when chest tubes have been removed)  - Agree with resuming diuresis today (3/7)  - Tacrolimus level today supratherapeutic, dose aggressively decreased in the setting of diltiazem increase. Repeat level 3/9 to trend, ordered.  - Prednisone taper ordered 3/8   - Ceftriaxone through 3/8, then transition to amoxicillin for total of 3 months course (through  5/23) for Actinomyces treatment  - Low threshold to treat Candida if it recurs in respiratory cultures, per transplant ID  - Valacyclovir through 3/12, then resume acyclovir prophylaxis through 3/23 per protocol  - Doxycycline and levofloxacin for Ureaplasma for 2 week course through 3/17, QTc monitoring per primary  - Donor risk labs ordered 3/23      COPD s/p bilateral sequential lung transplant (BSLT):  Acute hypoxic/hypercapneic respiratory failure:   Bilateral pleural effusions:   Right apical PTX: Scant pleural-pleural adhesions and mild-moderate bilateral hilar lymphadenopathy per op note.  Pathology with CPFE.  Pressor weaned off and pt. extubated on 2/22.  Left chest tube inadvertently dislodged, f/u chest CT (2/25) with anterior hydroPTX, right chest tube in fissure.  Left chest tube removed and replaced with 2 left-sided pigtail chest tubes by IR (2/26).  DSA negative 2/28.  Hypoxia had generally resolved, only intermittently requiring supplemental oxygen up until bronch 3/1.  S/p bronch 3/1 without evidence of dehiscence, mild amount of thin pale/tan secretions noted in RLL bronchus.  Noted hypoventilating with oversedation during bronch, received Narcan x3 with improvement in sedation but hypercapnea initially severe (>90) and persisting with very gradual improvements.  Chest CT (3/2) with improved bilateral hydroPTX, bibasilar atectasis with mucous plugging, and increased right loculated pleural effusion, s/p right pigtail chest tube (3/3, exudative, 81%N, positive for Ureaplasma as below).  Sniff test 3/3 with poor diaphragm excursion bilaterally with extensive accessory respiratory chest wall musculature effort to aid in inspiration. Repeat chest CT (3/6) with trace bilateral pneumothoraces and decreased small volume pleural effusions    - Nebs: levalbuterol and Mucomyst QID   - Aggressive pulmonary toilet with chest physiotherapy QID as well as Aerobika and incentive spirometry q1h w/a  - BiPAP  overnight and with daytime naps, otherwise on HFNC vs NC as tolerated  - VBG daily in the mornings for now (and additionally prn),  improving hypercapnia (3/7)  - DSA 3/21 (not yet ordered)  - CXR daily with chest tubes, today with improving bibasilar opacities (personally reviewed with Dr. Steele)  - Chest tube management by CVTS: defer lytics at this time to right chest tube d/t size of effusion on imaging, reevaluate daily       * IR has requested to be contacted prior to removal of left chest tubes, primary team to revisit 3/7 with CVTS, left basilar to remain given Ureaplasma empyema (as below)  - Primary team to discuss CT imaging with CVTS (Chest CT noted stable anterior mediastinal fluid and decreased right infrahilar fluid and air, likely postsurgical)   - Repeat chest CT without contrast on 3/6 given plan for abdomen/pelvis CT as below  - Repeat SNIFF test this week (when chest tubes have been removed)  - Volume management per primary team, agree with resuming diuresis today (3/7)  - Post-pyloric nasal FT, TF per RD and primary team  - SLP following for diet advacement     Immunosuppression:  S/p induction therapy with basiliximab x2 doses (with high dose IV steroid).  - Tacrolimus 5 mg BID (decreased 3/2).  Goal level 8-12.  Repeat level 3/7 supratherapeutic (12.1 at 13 hrs). Will decrease dose to 4 mg BID, aggressively decreased in the setting of diltiazem increase. Repeat level 3/9 to trend, ordered.  - MMF 1000 mg BID (decreased 2/25 due to diarrhea)  - Prednisone 15 mg BID with taper per lung transplant protocol (due 3/8, ordered):  Date AM dose (mg) PM dose (mg)   3/1 15 15   3/8 15 12.5   3/15 12.5 12.5   3/29 12.5 10   4/12 10 10   5/10 10 7.5   6/7 7.5 7.5   7/5 7.5 5   8/2 5 5   8/30 5 2.5      Prophylaxis:   - Dapsone for PJP ppx (started 2/23 at decreased MWF dose and increased to daily 3/1, G6PD 13.3 2/21)  - Nystatin for oral candidiasis ppx, 6 month course  - See below for serologies and  viral ppx:    Donor Recipient Intervention   CMV status Negative Negative CMV monthly (3/21, not yet ordered)   EBV status Positive Positive EBV at one month (3/21, not yet ordered)   HSV status N/A (Newly) Positive As below      ID: Prior history of infection/colonization with Haemophilus influenzae (2017).  Broad spectrum ABX empirically post-op with vanc and ceftaz (2/21-2/22), stopped after 24h to target known donor cultures at that time on POD #1 per Dr. Lala (transitioned to cefazolin).  Broadened again on POD #2 with culture updates as below.  Donor ([facility]) cultures with P-S MSSA; ([facility]) with Strep mitis, Actinomyces odontolyticus, MSSA x2, and C. kruseii (concern for possible aspiration).  Recipient cultures at time of transplant with Actinomyces odonotolyticus.  Bronch cultures (2/22) with Saccharomyces cerevisiae (no indication to treat per Dr. Ghosh unless pt. acutely declines clinically, 3/1) and C. albicans.  - IgG repeat at one month (3/21, not yet ordered)  - ABX: ceftriaxone (2/23-3/8), then transition to amoxicillin for total of 3 months course (through 5/23) for Actinomyces treatment  - Low threshold to treat Candida if it recurs in respiratory cultures, per transplant ID     HSV: Pt. with recent seroconversion at time of transplant.  HSV also noted on donor cultures.  Initial plan for 30 days of ppx with ACV post-op per Dr. Lala.  Then with HSV+ bronch at time of transplant (2/21), transitioned to treatment dosing with VACV   - Valacyclovir 1000 mg TID X 14d (2/27-3/12), then resume acyclovir prophylaxis (3/13-3/23) per protocol     Hyperammonemia:   Disseminated Ureaplasma:  Had been somnolent with some confusion/visual hallucinations in setting of hypercapnia as above.  Ammonia mildly elevated on 3/2 but quickly normalized.  Ureaplasma and Mycoplasma studies sent, pt. noted to have Ureaplasma in both pleural and sputum cultures from 3/2.  Repeat ammonia level remains normal (on the higher end)  on 3/4.  - Doxycycline and levofloxacin (3/4), for 2 week course through 3/17, QTc monitoring per primary  - In light of normal ammonia level, defer more aggressive interventions for hyperammonemia at this time (stopping CNI, iHD, etc.)     Leukocytosis: WBC trending upward now on POD #10, frequently noted at this stage post-op (pathology not entirely clear, but may be at least partially d/t bone marrow surge post-transplant).  Procal moderately elevated at 0.18, but may still be somewhat non-specific at this point post-op.  BDG fugitell and Aspergillus galactomannan negative 3/3.  - Blood cultures (3/1) NGTD  - Sputum cultures (3/2) as above, otherwise NGTD  - Pleural cultures (3/2, 3/3) as above, otherwise NGTD  - ABX as above     PHS risk criteria donor: Additional labs required post-transplant (between 4-8 weeks post-op): Hepatitis B, Hepatitis C, and HIV by COLT (ordered 3/23), also plan to repeat hepatitis B surface antibody at that time (ordered) given discrepancy with recent result.     Other relevant problems managed by primary team:     Diarrhea:   Concern for ileus: Likely 2/2 medications and tube feedings.  Fiber added to tube feeds.  MMF decreased as above.  Loose stools now improved.  Will consider transition to Myfortic if loose stools increase again, deferred for now.  Again having loose/watery stools, also noting some abdominal bloating/fullness.  AXR 3/5 with diffuse distention of small and large bowel suggestive of ileus.  C diff negative 3/6.  Abdomen/pelvis CT (3/6) with decrease in small bowel distention and perienteric fat stranding.     Elevated LFTs (resolved): Rising 3/1 despite medication adjustments (APAP and statin stopped 2/27).  Of note, pt. had a discrepant hep B surface Ab at time of transplant as above.  LFTs remain elevated although improved 3/2.  Also with elevated ammonia as above.  RUQ US (3/3) with only hepatic steatosis.  LFTs normalized 3/7.     CAD: Noted to have mild  non-obstructive CAD without hemodynamically significant lesions on cardiac cath 3/27/19.  PTA ASA 81 mg and atorvastatin, started on rosuvastatin post-op but held 2/28 for elevated LFTs (as above).  ASA resumed 3/1.     Paroxysmal afib:   HTN: PTA diltiazem, not on AC.  Post-op tachycardia, off pressor since 2/22.  Metoprolol started 2/23.  Persistent low level tachycardia, but currently sinus tach with continued HTN.  Diltiazem resumed 3/2.     GERD: Not on PPI PTA.  Negative pH/manometry study 3/28/19, noted small hiatal hernia. On PPI daily since 2/21, H2 blocker bridge discontinued 3/2, some increase in reflux symptoms since, resumed 3/5, increased to BID 3/7     We appreciate the excellent care provided by the [place] [id number] team.  Recommendations communicated via in person rounding and this note.  Will continue to follow along closely, please do not hesitate to call with any questions or concerns.     Patient discussed with Dr. Steele.    Elsa Felix, MANUEL, CNP   Inpatient Nurse Practitioner  Pulmonary CF/Transplant  Pager #[phone]     Subjective & Interval History:     Remains on BiPAP overnight 18/6 30%, otherwise on HFNC 21%. No SOB at rest, Mild LUTHER with talking, eating, and up to bedside commode. Infrequent cough, minimally productive. Chest tubes x3 remain with output decreasing.   Stools remain loose, x6-7 occurrences yesterday.  Pain well managed, using minimal oxycodone.      Review of Systems:     C: No fever, no chills, + gradual decrease in weight  INTEGUMENTARY/SKIN: No rash or obvious new lesions  ENT/MOUTH: No sore throat, no sinus pain, no nasal congestion or drainage  RESP: See interval history  CV: No chest pain, no palpitations,+ peripheral edema  GI: No nausea, no vomiting, + reflux symptoms  : No dysuria  MUSCULOSKELETAL: see interval history  ENDOCRINE: + blood sugars intermittently elevated  NEURO: No headache, no numbness or tingling  PSYCHIATRIC: Mood stable    Physical Exam:  "    All notes, images, and labs from past 24 hours (at minimum) were reviewed.    Vital signs:  Temp: 98.1  F (36.7  C) Temp src: Oral BP: 136/61 Pulse: 91   Resp: 21 SpO2: 95 % O2 Device: High Flow Nasal Cannula (HFNC) Oxygen Delivery: 25 LPM Height: 157.5 cm (5' 2\") Weight: 73.3 kg (161 lb 9.6 oz)  I/O:     Intake/Output Summary (Last 24 hours) at 3/7/2022 1754  Last data filed at 3/7/2022 1600  Gross per 24 hour   Intake 2780 ml   Output 3930 ml   Net -1150 ml     Constitutional: sitting in chair, in no apparent distress.   HEENT: Eyes with pink conjunctivae, anicteric.  Oral mucosa moist without lesions.  Nasal FT  PULM: Diminished air flow bilaterally.  No crackles, no rhonchi, no wheezes.  Non-labored breathing on HFNC.  CV: Normal S1 and S2.  RRR.  No murmur, gallop, or rub.  2+BLE edema, + BLE wrapped.  ABD: NABS, soft, nontender, nondistended.    MSK: Moves all extremities.  + apparent muscle wasting.   NEURO: Alert and oriented, conversant.   SKIN: Warm, dry.  No rash on limited exam. Clamshell incision not visualized.   PSYCH: Mood stable.     Lines, Drains, and Devices:  Peripheral IV 03/06/22 Right;Anterior Lower forearm (Active)   Site Assessment WDL 03/07/22 1700   Line Status Infusing 03/07/22 1700   Phlebitis Scale 0-->no symptoms 03/07/22 1700   Infiltration Scale 0 03/07/22 1700   Number of days: 1     Data:     LABS    CMP:   Recent Labs   Lab 03/07/22  1301 03/07/22  0717 03/07/22  0600 03/07/22  0339 03/06/22  0823 03/06/22  0626 03/05/22  0801 03/05/22  0525 03/04/22  1612 03/04/22  1124 03/04/22  0741 03/04/22  0553   NA  --   --  136  --   --  136  --  136  --   --   --  136   POTASSIUM  --   --  4.7  --   --  4.3  --  4.8  --  4.4  --  5.8*   CHLORIDE  --   --  98  --   --  93*  --  92*  --   --   --  94   CO2  --   --  35*  --   --  41*  --  40*  --   --   --  39*   ANIONGAP  --   --  3  --   --  2*  --  4  --   --   --  3   * 164* 183* 165*   < > 191*   < > 202*   < >  --    < > " 232*   BUN  --   --  23  --   --  25  --  29  --   --   --  38*   CR  --   --  0.49*  --   --  0.52  --  0.54  --   --   --  0.52   GFRESTIMATED  --   --  >90  --   --  >90  --  >90  --   --   --  >90   MINISTERIO  --   --  8.0*  --   --  8.1*  --  7.9*  --   --   --  8.4*   MAG  --   --  1.6  --   --  1.7  --  1.6  --   --   --  2.1   PHOS  --   --  3.4  --   --  3.5  --  3.8  --   --   --  4.1   PROTTOTAL  --   --  4.9*  --   --  4.8*  --  5.1*  --   --   --  5.2*   ALBUMIN  --   --  2.0*  --   --  1.8*  --  1.9*  --   --   --  1.9*   BILITOTAL  --   --  0.2  --   --  0.2  --  0.2  --   --   --  0.2   ALKPHOS  --   --  105  --   --  107  --  120  --   --   --  131   AST  --   --  18  --   --  20  --  23  --   --   --  22   ALT  --   --  48  --   --  58*  --  67*  --   --   --  84*    < > = values in this interval not displayed.     CBC:   Recent Labs   Lab 03/07/22  0600 03/06/22  0626 03/05/22  0525 03/04/22  0553   WBC 11.9* 14.5* 20.4* 19.9*   RBC 2.33* 2.34* 2.50* 2.48*   HGB 7.0* 7.0* 7.5* 7.3*   HCT 22.1* 21.8* 23.5* 23.8*   MCV 95 93 94 96   MCH 30.0 29.9 30.0 29.4   MCHC 31.7 32.1 31.9 30.7*   RDW 16.1* 14.8 14.1 13.6    301 315 309       INR: No lab results found in last 7 days.    Glucose:   Recent Labs   Lab 03/07/22  1301 03/07/22  0717 03/07/22  0600 03/07/22  0339 03/07/22  0002 03/06/22  1931   * 164* 183* 165* 160* 161*       Blood Gas:   Recent Labs   Lab 03/07/22  0600 03/06/22  0626 03/05/22  1716   PHV 7.41 7.43 7.40   PCO2V 62* 64* 78*   PO2V 53* 81* 22*   HCO3V 39* 42* 49*   ARIEL 12.6* 16.1* 19.4*   O2PER 30 30 0       Culture Data No results for input(s): CULT in the last 168 hours.    Virology Data: No results found for: INFLUA, FLUAH1, FLUAH3, VT6760, IFLUB, RSVA, RSVB, PIV1, PIV2, PIV3, HMPV, HRVS, ADVBE, ADVC    Historical CMV results (last 3 of prior testing):  No results found for: CMVQNT  No results found for: CMVLOG    Urine Studies    Recent Labs   Lab Test 03/01/22  1602  02/20/22  0248   URINEPH 6.0 7.0   NITRITE Negative Negative   LEUKEST Negative Negative   WBCU 0 <1       Most Recent [place] Pulmonary Function Testing (FVC/FEV1 only)  FVC-Pre   Date Value Ref Range Status   12/20/2021 1.81 L    06/21/2021 1.46 L    12/09/2019 1.59 L    06/03/2019 1.68 L      FVC-%Pred-Pre   Date Value Ref Range Status   12/20/2021 65 %    06/21/2021 52 %    12/09/2019 56 %    06/03/2019 58 %      FEV1-Pre   Date Value Ref Range Status   12/20/2021 0.49 L    06/21/2021 0.50 L    12/09/2019 0.49 L    06/03/2019 0.47 L      FEV1-%Pred-Pre   Date Value Ref Range Status   12/20/2021 22 %    06/21/2021 22 %    12/09/2019 21 %    06/03/2019 20 %        IMAGING    Recent Results (from the past 48 hour(s))   XR Chest 2 Views    Narrative    EXAMINATION: XR CHEST 2 VW, 3/6/2022 10:47 AM    COMPARISON: 3/5/2022    HISTORY: interval follow up, post-op lung transplant    FINDINGS: Heart size is normal. Changes of bilateral lung transplant.  Bilateral chest tubes are unchanged. Mild basilar atelectasis.  Pneumothorax      Impression    IMPRESSION: Postop lung transplant with chest tubes in place and no  significant pneumothorax.      TONO DUNLAP MD         SYSTEM ID:  [id number]   CT Chest/Abdomen/Pelvis w Contrast    Narrative    EXAMINATION: CT CHEST/ABDOMEN/PELVIS W CONTRAST, 3/6/2022 4:40 PM    TECHNIQUE:  Helical CT images from the thoracic inlet through the  symphysis pubis were obtained  with contrast. Contrast dose: iopamidol  (ISOVUE-370) solution 100mL    COMPARISON: CT chest and CT abdomen 3/2/2022    HISTORY: Abdominal distension    FINDINGS:    Devices: Two left and one right pigtail pleural chest tubes. Feeding  tube tip is in the distal duodenum.    Lower neck/axillary: Normal thyroid. No supraclavicular or axillary  lymphadenopathy.    Mediastinum: Unchanged fluid density in the anterior  mediastinum/superior anterior pericardium. Decreased fluid and tiny  focus of air in the right  infrahilar region, likely postsurgical.  Normal heart size. Mild aortic annulus calcifications. Stable  prominence right hilar lymph nodes.    Lungs: Surgical changes of bilateral lung transplant. Trace bilateral  pneumothoraces with chest tubes in place. Decreased small volume  bilateral pleural effusions with some anterior loculation on the  right. Mild bibasilar atelectasis. The central tracheobronchial tree  is clear. Interlobular septal thickening in the lingula and right  middle lobe.    Hepatobiliary: Focal low-density lesion in the right hepatic lobe near  the fossa for ligament, likely represents focal fatty infiltration  versus hemangioma. Mild periportal edema, similar to prior.    Spleen: Unremarkable.    Pancreas: Unremarkable.    Adrenals: Unremarkable.    Kidneys: Stable 1.2 cm fat density lesion in the lateral right renal  cortex, likely angiomyolipoma. No stones or hydronephrosis.    Pelvic organs: Left adnexal cyst measuring 8.2 x 5 cm, previously 8.6  x 5.2 cm. Trace free fluid in the pelvis. Unremarkable bladder.    Lymph nodes: No lymphadenopathy.    Vessels: Moderate atherosclerotic calcifications of the abdominal  aorta and iliac arteries without aneurysmal dilation.    Bowel/mesentery: The stomach is distended, although not significantly  changed compared to 3/2/2022. Periampullary duodenal diverticulum.  Decreased small bowel distention. Mild perienteric fat stranding  associated with the proximal to mid jejunum in the left midabdomen  slightly increased since prior also partially accentuated by motion  artifact at this level. Liquid stool throughout the colon. Endoscopy  clips in the ascending and transverse colon. Normal appendix.    Bones and soft tissues: No acute or suspicious osseous abnormalities.  Clamshell sternotomy wires are intact. Slightly increased subcutaneous  edema in the abdominal wall.        Impression    IMPRESSION:   1. Decrease in small bowel distention since 3/2/2022  without evidence  of bowel obstruction. Mild perienteric fat stranding associated with  the proximal to mid jejunum in the left midabdomen, which is slightly  increased since 3/2/2022, though this may be accentuated by motion  artifact at this level. These findings may reflect an infectious or  inflammatory enteritis.  2. Persistent mild periportal edema and gastric distention.  3. Trace bilateral pneumothoraces and decreased small volume pleural  effusions with bilateral pleural chest tubes in place. Bibasilar  atelectasis.  4. Stable anterior mediastinal fluid and decreased right infrahilar  fluid and air, likely postsurgical.  5. Stable left adnexal cyst.    I have personally reviewed the examination and initial interpretation  and I agree with the findings.    CLAU PRATER DO         SYSTEM ID:  [id number]   XR Chest 2 Views    Narrative    Exam: XR CHEST 2 VW, 3/7/2022 9:16 AM    Comparison: CT chest abdomen and pelvis 3/6/2022, chest x-ray  3/6/2022.    History: interval follow up, post-op lung transplant    Findings:  PA and lateral chest. Postoperative chest status post bilateral lung  transplant with intact clam shell sternotomy wires. Right basilar  chest tube. Left chest tubes x2. Enteric tube with tip at the expected  location of the duodenal jejunal juncture.    Trachea is midline. Mediastinum is within normal limits.  Cardiopulmonary silhouette is within normal limits. Aortic knob  calcifications. Bibasilar opacities. There is no pneumothorax or  pleural effusion. Prominent gas-filled stomach.      Impression    Impression:   1. Postoperative changes of bilateral lung transplant.  2. Decreasing bibasilar opacities likely representing resolving  bibasilar atelectasis.  3. No radiographically apparent pneumothorax with bilateral chest  tubes in place.    I have personally reviewed the examination and initial interpretation  and I agree with the findings.    FELIPA MARIE MD         SYSTEM ID:   [id number]

## 2022-03-07 NOTE — PROGRESS NOTES
Diabetes Consult Daily  Progress Note          Assessment/Plan:   Melissa Elder is a 66 year old female with PMHx HTN, HLD, paroxysmal Afib, osteoporosis, GERD, severe COPD, previously requiring 2-3L NC O2 at rest.  Underwent b/l lung transplant with Dr. Robin on 02/21  has ongoing issues with Hydropneumothorax, chest tubes in place. 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 Endocrinology is being consulted for DM management.    1) Steroid and TF induced hyperglycemia   2) Underlying pre-DM, versus steroid diabetes      Plan:     NPH to 22 units BID, spaced 12 hours apart, at 1200 and 0000-->  Noon dose discontinued andrevised to q24h at 1800: 22 units at start of cycled feeds (Vital 1.5 at 55) x 14 hr    Continue Novolog CHO coverage-- 1 unit per 15 grams w/ meals and snacks    Novolog  custom correction 1 per 35 mg/dL Q4h --> schedule changed to align w/ start of TF at 1800    BG monitoring TID AC, HS, 0200    Hypoglycemia protocol    PRN D10W for hypoglycemia prevention if TF stops out side of the scheduled time-- rate is  70 to replace about 70% of TF carbs        Discussed w/ pt, , RN, primary team and dietician             Interval History:       On continuous tube feeds. Osmolite at 50-- provides about 10 grams carb/h.  ---> changing to: Vital 1.5 @ 55 mL/hr x 14 hours/evening (770 mL) will provide: 1155 Kcals, 52 gm protein, 143 gm CHO, 5 gm fiber, and 588 mL free water.   ** same grams of carbohydrate per hour.  New schedule will be 7475-7561.    Eating improving w/ regular diet.  Liked omelette this morning.  Bothered by frequent stooling.      Pred 15 mg BID  Date AM dose (mg) PM dose (mg)   3/1 15 15   3/8 15 12.5   3/15 12.5 12.5   3/29 12.5 10   4/12 10 10   5/10 10 7.5   6/7 7.5 7.5   7/5 7.5 5   8/2 5 5   8/30 5 2.5       Expected Discharge: 03/08/2022   Anticipated discharge location: home;inpatient rehabilitation facility        Recent Labs   Lab  "03/07/22  0717 03/07/22  0600 03/07/22  0339 03/07/22  0002 03/06/22  1931 03/06/22  1521   * 183* 165* 160* 161* 270*               Review of Systems:   See interval hx          Medications:     Orders Placed This Encounter      Regular Diet Adult Thin Liquids (level 0)    Physical Exam:     /52 (BP Location: Left arm)   Pulse 103   Temp 98.2  F (36.8  C) (Oral)   Resp 24   Ht 1.575 m (5' 2\")   Wt 73.3 kg (161 lb 9.6 oz)   SpO2 96%   BMI 29.56 kg/m  ;  General:  pleasant  resting in chair in no distress.   HEENT: NC/AT, PER and anicteric, non-injected, oral mucous membranes moist.   Lungs: unlabored respiration, no cough observed  ABD: rounded  Skin: dry, no obvious lesions  MSK:  fluid movement of all extremities  Lymp:  no LE edema   Mental status:  alert, oriented x3, communicating clearly  Psych:  calm, even mood           Data:     Lab Results   Component Value Date    A1C 5.7 02/22/2022    A1C 5.8 02/20/2022     Last Comprehensive Metabolic Panel:  Sodium   Date Value Ref Range Status   03/07/2022 136 133 - 144 mmol/L Final   06/21/2021 137 133 - 144 mmol/L Final     Potassium   Date Value Ref Range Status   03/07/2022 4.7 3.4 - 5.3 mmol/L Final   06/21/2021 3.0 (L) 3.4 - 5.3 mmol/L Final     Chloride   Date Value Ref Range Status   03/07/2022 98 94 - 109 mmol/L Final   06/21/2021 104 94 - 109 mmol/L Final     Carbon Dioxide   Date Value Ref Range Status   06/21/2021 34 (H) 20 - 32 mmol/L Final     Carbon Dioxide (CO2)   Date Value Ref Range Status   03/07/2022 35 (H) 20 - 32 mmol/L Final     Anion Gap   Date Value Ref Range Status   03/07/2022 3 3 - 14 mmol/L Final   06/21/2021 <1 (L) 3 - 14 mmol/L Final     Glucose   Date Value Ref Range Status   03/07/2022 183 (H) 70 - 99 mg/dL Final   06/21/2021 131 (H) 70 - 99 mg/dL Final     GLUCOSE BY METER POCT   Date Value Ref Range Status   03/07/2022 164 (H) 70 - 99 mg/dL Final     Urea Nitrogen   Date Value Ref Range Status   03/07/2022 23 7 - " 30 mg/dL Final   06/21/2021 8 7 - 30 mg/dL Final     Creatinine   Date Value Ref Range Status   03/07/2022 0.49 (L) 0.52 - 1.04 mg/dL Final   06/21/2021 0.56 0.52 - 1.04 mg/dL Final     GFR Estimate   Date Value Ref Range Status   03/07/2022 >90 >60 mL/min/1.73m2 Final     Comment:     Effective December 21, 2021 eGFRcr in adults is calculated using the 2021 CKD-EPI creatinine equation which includes age and gender (Ines et al., [place], DOI: 10.1056/GBODvg8738955)   06/21/2021 >90 >60 mL/min/[1.73_m2] Final     Comment:     Non  GFR Calc  Starting 12/18/2018, serum creatinine based estimated GFR (eGFR) will be   calculated using the Chronic Kidney Disease Epidemiology Collaboration   (CKD-EPI) equation.       Calcium   Date Value Ref Range Status   03/07/2022 8.0 (L) 8.5 - 10.1 mg/dL Final   06/21/2021 8.5 8.5 - 10.1 mg/dL Final     Bilirubin Total   Date Value Ref Range Status   03/07/2022 0.2 0.2 - 1.3 mg/dL Final   06/21/2021 0.3 0.2 - 1.3 mg/dL Final     Alkaline Phosphatase   Date Value Ref Range Status   03/07/2022 105 40 - 150 U/L Final   06/21/2021 92 40 - 150 U/L Final     ALT   Date Value Ref Range Status   03/07/2022 48 0 - 50 U/L Final   06/21/2021 25 0 - 50 U/L Final     AST   Date Value Ref Range Status   03/07/2022 18 0 - 45 U/L Final   06/21/2021 17 0 - 45 U/L Final       I spent a total of 35 minutes bedside and on the inpatient unit managing the glycemic care of Melissa Elder. Over 50% of my time on the unit was spent counseling the patient  and/or coordinating care regarding acute glycemic management.  See note for details.        Zita Dominguez APRN -[phone]  To contact Endocrine Diabetes service:   From 8AM-4PM: page inpatient diabetes provider that is following the patient that day (see filed or incomplete progress notes.consult notes).  If uncertain of provider assignment: page job code [phone].  For questions or updates from 4PM-8AM: page the diabetes job code for on call  fellow: [phone]    Please notify inpatient diabetes service if changes are planned to steroids, enteral feeding, parenteral feeding, or if procedures are planned requiring prolonged NPO status.

## 2022-03-07 NOTE — PROGRESS NOTES
CLINICAL NUTRITION SERVICES - REASSESSMENT NOTE     Nutrition Prescription    RECOMMENDATIONS FOR MDs/PROVIDERS TO ORDER:  -Recommend continue nutrition support until pt able to consistently consume at least 3700-8528 Kcals and 65 gm protein daily    -See changes to TF regimen below     Malnutrition Status:    Moderate malnutrition in the setting of chronic illness     Recommendations already ordered by Registered Dietitian (RD):  1. Modify patient's TF formula to Vital 1.5, per ongoing loose/frequent BMs (C-diff ruled out, fiber modulars already in place and not helping, no sorbitol containing meds identified)     2. Modify TF schedule to cycled/nocturnal, x 14 hours each evening, run from 8040-4665 as below:   Vital 1.5 @ 55 mL/hr x 14 hours/evening (770 mL) will provide: 1155 Kcals, 52 gm protein, 143 gm CHO, 5 gm fiber, and 588 mL free water.   --Provide 2 pkts Prosource daily for total provisions of 1235 Kcals (23 Kcal/kg), and 74 gm protein (1.4 gm/kg)  --Modify fiber modulars to 2 pkt Nutrisource fiber and 1 pkt of Banatrol daily (provides additional 70 Kcals, 18 gm CHO, 8 gm fiber)  --Switch Multivitamin with minerals to tablet form (take oral or crush and flush via FT)    3. Restart ONS - Send Glucerna ONS, BID between meals at 10 AM and 2 PM [220 Kcals, 10 gm protein, 26 gm CHO per carton]    4. Calorie counts 3/8-3/10    Future/Additional Recommendations:  --Monitor jennie cts, PO intake, supplement preferences, ability to further wean TFs  --Monitor stooling trends for improvement with transition to semi-elemental formula      EVALUATION OF THE PROGRESS TOWARD GOALS   Diet: Regular    PO Intake: Varies; % of meals per food record flowsheet. Was NPO during the week due to increased O2 needs, BiPAP (ONS discontinued by provider when NPO, never replaced). Now back to regular diet order.  Has only been ordering 1 meal per day, per review of room-service selections.      Nutrition Support:   -Osmolite 1.5  "Attila @ 50ml/hr (1200ml/day) + Prosource (1 pkt TID) will provide: 1920 kcals (36 kcal/kg), 108 g protein (2 g/kg), 914 ml free H20, 244 g CHO, and 0 g fiber daily.   -Dosing wt 54 kg (adjusted)  -Modulars: Nutrisource fiber, 3 pkts/day, Prosource 3 pkts/day  -FWF 30 mL Q4hrs  -EN access via NDT.     EN Intake: Average 7-day TF intakes: 986 mL formula, 2.7 packets Prosource =  1587 Kcals (30 Kcal/kg), and 92 g pro (1.7 g/kg). This is meeting 99% of low-end est Kcal needs, and 100% of low-end est protein needs.     NEW FINDINGS   General:    Endocrine team consulted 2/28 for BG management    Respiratory status worsened over past week after bronchoscopy. Now on BiPAP at night, HFNC during the day.     Nutrition/GI:    Remains on continuous TFs at this time but ready to adjust to cycled TF today     Continues having multiple BMs, 2-4 times per day even with Nutrisource fiber given TID.     Per MD note 3/6: \"C.diff checked and negative. Abdominal imaging including CT and XR have shown diffusely dilated bowel, without concern for obstruction. Possibly ileus or gastroparesis from transplant. However patient not experiencing nausea, vomiting, or decreased stool output.\" Will monitor.     Weights:    Weight trends stable since last assessment.     Continue current dosing weight of 54 kg.     Labs:    Glucose trends ~160s-180 for past 5 readings; endo following for insulin management     Medications:    Caltrate 1 tab BID     Nutrisource fiber, 1 pkt  TID     Lasix    Insulin aspart and NPH    MVI with minerals (liquid)    Mycophenolate    Prednisone    Prosource, 1 pkt TID     Tacrolimus     MALNUTRITION  % Intake: No decreased intake noted (EN meeting needs)  % Weight Loss: None noted  Subcutaneous Fat Loss: Facial region:  Mild, Upper arm:  Mild to moderate and Thoracic/intercostal:  Mild   Muscle Loss: Upper arm (bicep, tricep):  Mild and Lower arm  (forearm):  Mild  Fluid Accumulation/Edema: Mild  Malnutrition Diagnosis: " Moderate malnutrition in the context of acute illness     Previous Goals   Total avg nutritional intake to meet a minimum of 30 kcal/kg and 1.5 g PRO/kg daily (per dosing wt 54 kg).  Evaluation: Met    Previous Nutrition Diagnosis  Increased nutrient needs (Kcals/protein) related to s/p bilateral lung txp as evidenced by est nutrition needs 30-35 Kcal/kg and 1.5-2 gm/kg protein daily.   Evaluation: No change    CURRENT NUTRITION DIAGNOSIS  Increased nutrient needs (Kcals/protein) related to s/p bilateral lung txp as evidenced by est nutrition needs 30-35 Kcal/kg and 1.5-2 gm/kg protein daily.     INTERVENTIONS  Implementation  Collaboration with other nutrition professionals  Collaboration with other providers - Endocrine team, PA, bedside RN  Enteral Nutrition - cycled TF to begin  Medical food supplement therapy - BID   Multivitamin/mineral supplement therapy - modify type   Nutrition-related medication management - Adjusting fiber    Goals  Total avg nutritional intake to meet a minimum of 20 kcal/kg and 1.2 g PRO/kg daily (per dosing wt 54 kg).  Continue nutrition support until pt able to consistently consume at least 5046-7253 Kcals and 65 gm protein daily    Monitoring/Evaluation  Progress toward goals will be monitored and evaluated per protocol.    Alen Johnson RDN, LD, CNSC  [address] RD pager: [phone] Phone: [phone]

## 2022-03-07 NOTE — PROGRESS NOTES
Transfer  Transferred to: 6D-15  Via: wheelchair  Reason for transfer: Pt improving and no longer requiring 6B level care.  Family:  Tyrese present on transfer  Belongings: Packed and delivered to new unit  Chart: Delivered with pt to next unit  Medications: Meds delivered to new unit  Report given to: JENNIFER Salinas    Pt status: See end of shift note on 3/7 at 1448 by this writer for systems status on shift. Pt transported after OT therapy and 6B RN helped settle patient on 6D and ensured CTx3 were back to -20sx and patient placed back on Airflo HF @ 21%. BiPAP transferred to new unit for use at night. New TF formula also delivered to 6D to be started this evening per new orders; cycle feeding and formula changed on shift.

## 2022-03-07 NOTE — PROGRESS NOTES
[facility]    Transplant Infectious Diseases Inpatient Progress Note      Melissa Elder MRN# [id number]   YOB: 1955 Age: 66 year old   Date of Admission and time: 2/20/2022  1:39 AM                Recommendations:     1. Double coverage of atypical's for Hyperammonemia syndrome is most indicated for Mycoplasma Hominis due to >10% Resistance rates for various antibiotics.    2. Doxycycline monotherapy for Ureaplasma is preferred due to minimal to no resistance reported in literature with this organism.    3. Advise to stop levofloxacin as double coverage not indicated for this organism ( patient already has long QTc, and at risk for C . Diff, poor wound healing and other side effect of long term quinolones).    4. Has completed course of ceftriaxone,     5. Do not see clear indication to treat actinomyces with amoxicillin ( has not been treated so far- not covered by ceftriaxone, has not been recovered again, is expected oral michael )    5. Will follow susceptibilities in process    Thank you for involving us in the care of this patient, ID will follow, please contact us if any situation arises where we may be of clinical assistance    Signed:  Bulmaro Ly MD , 03/07/22   Infectious Disease Fellow: Pager [phone]   If no response/after hours see [facility]->MEDICINE INFECTIOUS DISEASE ADULT/[facility]  .        Summary of Presentation:   Transplants:  2/21/2022 (Lung), Postoperative day:  14   This patient is a 66 year old female with COPD s/p Bi lung transplant 2/2022. Induction with high dose steroids and basiliximab. On TAC/MMF/prednidonse.   Now with leukocytosis and high ammonia.       Active Problems and Infectious Diseases Issues:     #Culture for Ureaplasma positive  #Hyperammonemia syndrome  Patient with mental status decompensation in setting of elevated CO2 as well as ammonia.  Patient cultures positive for Ureaplasma.  Patient started on empirical double coverage 3/3/2022.   Cultures speciated as Ureaplasma urealyticum.  This organism is almost universally susceptible to doxycycline and does not require double coverage as Mycoplasma hominis may sometimes.      Would continue doxycycline for now while susceptibilities are in process.      Duration to be determined based on subsequent cultures, clinical progress, radiologic progression of pleural effusions.      #Leukocytosis.   # Acute respiratory failure with hypercapnia.   Etiology not known. Could be from respiratory depression from hyperammonemia or due to plerual effusion causing decreased ventilatory capacity. Pulmonary following.  The patient has significant LE edema and could be due to fluid overload.     #. 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   The most significant pathogens are the MSSA and the S constellatus and are covered by ceftriaxone which she completed a 14 day course of    Unless the donor aspirated, the significance of Actinomyces sp and S mitis is questionable since they represent oral michael and they did not grow on subsequent respiratory sample the next day.   The C krusei also did not grow again.  Saccharomyces sp is not typically pathogenic.     C albicans may or may not result in empyema. Will follow on pleural fluid studies.   If with further growth on subsequent BALs, would either treat with fluconazole or inhaled ampho B.     If the clinical picture deteriorates, may change antimicrobials to unasyn and micafungin from ceftriaxone.     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 On VACV empirically.       Other Infectious Disease issues include:  - on VACV for viral ppx.   - PCP prophylaxis: dapsone.   - Serostatus: CMV D-/R-, EBV D+/R+, HSV1+/2-, VZV +  - Immunization status: This patient received the third dose of COVID-19 vaccine on 11/29/2021.  - Gamma globulin status: 1000 as of 2/20/2022.       Bulmaro Ly MD  [facility]  Contact  information available via [facility] Paging/Directory    03/07/2022         Interim History:     Patient remained stable through the weekend. No new culture data. Has developed diarrhea, c diff negative, no other GI symtpoms. Being advanced on diet today. She is awake and oriented getting chest PT during todays encounter. Her questions and those of her spouse were answered.    She has no other new symtpoms.         Review of Systems:      As mentioned in the HPI otherwise negative by reviewing constitutional symptoms, central and peripheral neurological systems, respiratory system, cardiac system, GI system,  system, musculoskeletal, skin, allergy, and lymphatics.                History of Present Illness:   Transplants:  2/21/2022 (Lung), Postoperative day:  14     This patient is a 66 year old female with COPD s/p Bi lung transplant.   The hospital course was not complicated until 3/1/2022 when she suffered from respiratory deterioration with hypercapnia and mental status changes. The encephalopathy resolved when the patient was placed on Bipap. The ammonia was checked and was elevated. The patent was started on doxycycline. ID consulted for double coverage advice in the setting of QTc prolongation.   The patient remains short of breath on Bipap. No significant cough or chest pain. No fever.   The  stated that his wife's mental status are at baseline.               Physical Exam:   Temp: 98.2  F (36.8  C) Temp src: Oral BP: 135/52 Pulse: 103   Resp: 24 SpO2: 96 % O2 Device: High Flow Nasal Cannula (HFNC) Oxygen Delivery: 25 LPM    Wt Readings from Last 4 Encounters:   03/07/22 73.3 kg (161 lb 9.6 oz)   12/20/21 66.7 kg (147 lb)   06/21/21 68 kg (150 lb)   06/03/19 69.4 kg (153 lb)     Constitutional: awake, alert, cooperative, wearing high flow, appears at stated age, well nourished.   Neurologic: Patient is moving all extremities without focal deficit, no focal sensory loss.   Lungs: coarse BS bilaterally  with decrease BS on the right, no accessory muscle use, Chest tubes in place x3 with no tidalling or bubbing in chambers.   CVS: RRR, normal S1/S2, no murmur, PMI was not displaced.   Abdomen: non-tender, non-distended, no masses, no bruit, no shifting dullness, normal BS.   Extremities: +3 pitting edema of bilateral lower extremities, no ulcers, normal ROM of all joints, no swelling or erythema of any of joints and no tenderness to palpation.   Skin: no induration, fluctuation or discharge at the surgical site, and no rash            Data:   No results found for: ACD4    Inflammatory Markers    Recent Labs   Lab Test 03/01/22  0318   CRP 66.0*       Immune Globulin Studies     Recent Labs   Lab Test 02/20/22  0617 03/25/19  0834   IGG 1,023 901   IGM  --  265   IGE  --  22   IGA  --  190   IGG1  --  435   IGG2  --  363   IGG3  --  131   IGG4  --  32       Metabolic Studies       Recent Labs   Lab Test 03/07/22  0717 03/07/22  0600 03/07/22  0339 03/07/22  0002 03/06/22  1941 03/06/22  1931 03/06/22  1521 03/06/22  0823 03/06/22  0626 03/05/22  1945 03/05/22  1941 03/05/22  0801 03/05/22  0525 03/04/22  1612 03/04/22  1124 03/04/22  0741 03/04/22  0553 03/03/22  1618 03/03/22  1223 03/03/22  0743 03/03/22  0509 03/02/22  0933 03/02/22  0455 02/22/22  1950 02/22/22  1946   NA  --  136  --   --   --   --   --   --  136  --   --   --  136  --   --   --  136  --   --   --  140  --  140   < >  --    POTASSIUM  --  4.7  --   --   --   --   --   --  4.3  --   --   --  4.8  --  4.4  --  5.8*  --   --   --  4.8  --  4.8   < >  --    CHLORIDE  --  98  --   --   --   --   --   --  93*  --   --   --  92*  --   --   --  94  --   --   --  98  --  98   < >  --    CO2  --  35*  --   --   --   --   --   --  41*  --   --   --  40*  --   --   --  39*  --   --   --  40*  --  39*   < >  --    ANIONGAP  --  3  --   --   --   --   --   --  2*  --   --   --  4  --   --   --  3  --   --   --  2*  --  3   < >  --    BUN  --  23  --   --   --    --   --   --  25  --   --   --  29  --   --   --  38*  --   --   --  27  --  27   < >  --    CR  --  0.49*  --   --   --   --   --   --  0.52  --   --   --  0.54  --   --   --  0.52  --   --   --  0.49*  --  0.56   < >  --    GFRESTIMATED  --  >90  --   --   --   --   --   --  >90  --   --   --  >90  --   --   --  >90  --   --   --  >90  --  >90   < >  --    * 183* 165* 160*  --  161* 270*   < > 191*   < >  --    < > 202*   < >  --    < > 232*   < >  --    < > 166*   < > 194*   < >  --    A1C  --   --   --   --   --   --   --   --   --   --   --   --   --   --   --   --   --   --   --   --   --   --   --   --  5.7*   MINISTERIO  --  8.0*  --   --   --   --   --   --  8.1*  --   --   --  7.9*  --   --   --  8.4*  --   --   --  7.8*  --  8.2*   < >  --    PHOS  --  3.4  --   --   --   --   --   --  3.5  --   --   --  3.8  --   --   --  4.1  --   --   --  3.2  --  3.7   < >  --    MAG  --  1.6  --   --   --   --   --   --  1.7  --   --   --  1.6  --   --   --  2.1  --   --   --  1.9  --  2.2   < >  --    LACT  --   --   --   --  1.4  --   --   --   --   --  1.4  --   --   --   --    < >  --   --   --   --  0.8  --  1.1   < >  --    CKT  --   --   --   --   --   --   --   --   --   --   --   --   --   --   --   --   --   --  138  --   --   --   --   --   --     < > = values in this interval not displayed.       Hepatic Studies    Recent Labs   Lab Test 03/07/22  0600 03/06/22  0626 03/05/22  0525 03/04/22  0553 03/03/22  0509 03/02/22  0455   BILITOTAL 0.2 0.2 0.2 0.2 0.1* 0.2   ALKPHOS 105 107 120 131 121 153*   ALBUMIN 2.0* 1.8* 1.9* 1.9* 1.8* 1.9*   AST 18 20 23 22 26 41   ALT 48 58* 67* 84* 99* 130*   LDH  --   --   --   --  293* 344*       Pancreatitis testing    Recent Labs   Lab Test 03/25/19  0834   AMYLASE 63   TRIG 83       Hematology Studies      Recent Labs   Lab Test 03/07/22  0600 03/06/22  0626 03/05/22  0525 03/04/22  0553 03/03/22  1223 03/03/22  0631 03/03/22  0509 12/09/19  0923 03/25/19  0834   WBC  11.9* 14.5* 20.4* 19.9*  --  20.1* 19.7*   < > 8.1   ANEU  --   --   --   --   --   --   --   --  6.5   ALYM  --   --   --   --   --   --   --   --  1.0   EVAN  --   --   --   --   --   --   --   --  0.4   AEOS  --   --   --   --   --   --   --   --  0.1   HGB 7.0* 7.0* 7.5* 7.3* 8.2* 7.6* 6.9*   < > 13.4   HCT 22.1* 21.8* 23.5* 23.8*  --  24.5* 22.5*   < > 41.5    301 315 309  --  301 298   < > 207    < > = values in this interval not displayed.       Clotting Studies    Recent Labs   Lab Test 02/22/22  0355 02/21/22  0808 02/21/22  0714 02/21/22  0530 02/20/22  0617 02/20/22  0617 03/25/19  0834   INR 1.31* 1.58* 5.23* 1.96*   < > 1.02 0.96   PTT  --   --  106* 29  --  31 22    < > = values in this interval not displayed.       Arterial Blood Gas Testing    Recent Labs   Lab Test 03/07/22  0600 03/06/22  0626 03/05/22  1716 03/05/22  0525 03/02/22  0455 03/01/22  2347 03/01/22  1731 02/23/22  0834 02/23/22  0347 02/22/22  1747 02/22/22  1304   PH  --   --   --   --   --  7.26* 7.34*  --  7.30* 7.33* 7.39   PCO2  --   --   --   --   --  98* 79*  --  54* 47* 35   PO2  --   --   --   --   --  96 149*  --  142* 98 184*   HCO3  --   --   --   --   --  43* 43*  --  27 25 21   O2PER 30 30 0 30   < > 40 45   < > 2 30  30 40    < > = values in this interval not displayed.        Urine Studies     Recent Labs   Lab Test 03/01/22  1549 02/20/22  0248 03/25/19  1043   URINEPH 6.0 7.0 5.0   NITRITE Negative Negative Negative   LEUKEST Negative Negative Trace*   WBCU 0 <1 1       Tacrolimus levels    Invalid input(s): TACROLIMUS, TAC, TACR  Transplant Immunosuppression Labs Latest Ref Rng & Units 3/7/2022 3/6/2022 3/5/2022 3/4/2022 3/3/2022   Creat 0.52 - 1.04 mg/dL 0.49(L) 0.52 0.54 0.52 -   BUN 7 - 30 mg/dL 23 25 29 38(H) -   WBC 4.0 - 11.0 10e3/uL 11.9(H) 14.5(H) 20.4(H) 19.9(H) 20.1(H)   Neutrophil % 86 86 89 90 84   ANEU 1.6 - 8.3 10e9/L - - - - -       Microbiology:  Blood cx negative.   Last check of C  difficile  C Difficile Toxin B by PCR   Date Value Ref Range Status   03/06/2022 Negative Negative Final     Comment:     A negative result does not exclude actual disease due to C. difficile and may be due to improper collection, handling and storage of the specimen or the number of organisms in the specimen is below the detection limit of the assay.       Virology:  CMV viral loads  No results found for: CMVQNT, CMVRESINST, 14684, 89414, 65432, 42829, CMVQAL  CMV viral loads  No lab results found.    CMV viral loads  No results found for: CMVQNT, CMVRESINST, CMVLOG, 62234, 15881, 69071, 37902    CMV resistance testing  No lab results found.  No results found for: CMVCID, CMVFOS, CMVGAN     No results found for: H6RES    No results found for: EBVDN, EBRES, EBVDN, EBVSP, EBVPC, EBVPCR    CMV Antibody IgG   Date Value Ref Range Status   02/20/2022 No detectable antibody. No detectable antibody.  Final   03/25/2019 0.2 0.0 - 0.8 AI Final     Comment:     Negative  Antibody index (AI) values reflect qualitative changes in antibody   concentration that cannot be directly associated with clinical condition or   disease state.         Toxoplasma Antibody IgG   Date Value Ref Range Status   03/25/2019 <3.0 0.0 - 7.1 IU/mL Final     Comment:     Negative- Absence of detectable Toxoplasma gondii IgG antibodies. A negative   result does not rule out acute infection.  The magnitude of the measured result is not indicative of the amount of   antibody present. The concentrations of anti-Toxoplasma gondii IgG in a given   specimen determined with assays from different manufacturers can vary due to   differences in assay methods and reagent specificity.           Imaging:  CT c/a/p 3/2/22   IMPRESSION:   1. No central filling defect consistent with a pulmonary embolism.   2. Improvement in the bilateral hydropneumothorax. A right-sided chest  tube is located in the right major fissure. 2 left-sided chest tubes,  one in the apex  and one in the left lung base.  3. Again noted is fluid and gas collection along the right hilum,  which may represent postsurgical fluid collection. Follow as needed to  exclude infection.  4. Bibasilar atelectasis with mucus plugging. Groundglass opacities in  the bilateral lung bases again noted.  IMPRESSION: Mild periportal edema. Cystic area in left hemipelvis,  likely adnexal in origin. Fluid-filled small bowel with distended  loops of bowel suggestive of enteritis/ileitis. Recommend continued  follow-up to exclude developing obstruction. Aortoiliac  atherosclerosis. Mild periportal edema with a benign focal fatty  infiltration in the liver. Soft tissue prominence surrounding the  common hepatic artery an above above the level of the pancreatic head,  differential of inflammation/infection as opposed to neoplasm.  Bilateral lung transplant. Possible postoperative changes versus  inflammation/infection no pericardial tissues and right infrahilar  tissues with right larger than left pleural effusions with associated  atelectasis, recommend follow-up to clearing to exclude infection.  CT chest 2/25/2022  Impression:   1. Increase in size of confluent hydropneumothorax with  intercommunication between both hemithoraces anteriorly, and  pneumomediastinum. The right-sided chest tube is located within the  major fissure and therefore may be nonfunctional. The left-sided chest  tube has been retracted and is in the most inferior aspect of the  anterior costophrenic sulcus with a sidehole in the subcutaneous soft  tissues and therefore is nonfunctional.  2. Somewhat loculated appearing fluid and gas collection along the  right hilum measures up to 2 cm this may represents postsurgical fluid  collection short interval follow-up is recommended.  3. Bibasilar atelectasis and mucous plugging.     Attestation:  I interviewed the patient and obtained history from the patient, the  who's at the bedside, and by  reviewing the patient's chart including outside records, microbiological data, and radiological data. All data are summarized in this notes.    Bulmaro Ly MD  [facility]  Contact information available via [facility] Paging/Directory     03/07/2022

## 2022-03-07 NOTE — PROGRESS NOTES
[facility]    Medicine Progress Note - Hospitalist Service, GOLD TEAM 10    Date of Admission:  2/20/2022    Assessment & Plan            Melissa Elder is a 66 year old female with PMHx HTN, HLD, paroxysmal Afib, osteoporosis, GERD, severe COPD, previously requiring 2-3L NC O2 at rest.  Underwent b/l lung transplant with Dr. Robin on 02/21. Got 1u pRBC post-op, no overt bleeding source, monitoring. Extubated 02//22 to O2 NC and transferred to the floor. Pericardial drain pulled 2/24/22 without issue. Continuing on antibiotics for cultures growing from donor and recipient lungs. Has bilateral pleural effusions and anterior hydropneumothorax requiring 3 chest tubes currently.      Changes today:  - Increase Diltiazem to 60 mg q6H (PTA dose). Transplant Pulm aware (for [place] dosing)   - Diuretic holiday yesterday (net negative > 2L day prior). Bicarb downtrending, will restart Lasix at lower dose 40 BID (from 60 BID).   - Discuss with nutrition whether TF can be cycled overnight  - Last day ceftriaxone tomorrow 3/8. 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  Pressor weaned off 2/22 and pt. extubated. Prior history of infection/colonization with Haemophilus influenzae (2017).  IgG adequate (2/20).  MRSA nares negative 2/21.  Broad spectrum ABX empirically post-op with vanc and ceftaz (2/21-2/22), stopped after 24h per Dr. Lala to target known donor cultures on POD #1. Donor cultures ([facility]) with Strep mitis, Actinomyces odontolyticus, and Kenyatta kruseii. Recipient cultures with Actinomyces odonotolyticus.    - Respiratory support with HFNC and BiPAP (discussed below)  - Nebs: Levalbuterol and Mucomyst QID  -  Aggressive pulmonary toilet with chest physiotherapy QID as well as Aerobika and incentive spirometry q1h w/a  - Chest tubes managed by surgical and IR team  - Daily CXR while chest tubes in   - Await explant pathology  - Continue ceftriaxone for 2 weeks through 3/8 followed by amoxicillin for 3 months  - Immune suppression and microbial ppx per daily transplant pulm note  - Continue Valtrex to 1000 mg TID x 14 days for HSV (through 3/11) then complete ppx per protocol (see pulm note)   - Continue diuresis as needed with goal to stay net negative     Acute hypoxic and hypercarbic respiratory failure 3/1, improving   3/1 noted to have slowly rising bicarb on daily labs. Had inspection bronchoscopy 3/1 as well and became oversedated and required multiple doses of narcan. VBG obtained post procedure showed hypercapnea to 99. Hypercarbic respiratory failure likely multifactorial including oversedation, poor diaphragm excursion (SNIFF test 3/3), volume overload, and atelectasis. Started on BiPAP.   - Continue BiPAP at night, HFNC/NC during the day. Wean O2 to keep sats > 92%.   - Monitor AM VBG   - Continue diuresis to maintain net negative   - Repeat SNIFF test later this week per Pulm     Acute toxic metabolic encephalopathy, likely due to hypercapnea, resolved  Mildly elevated ammonia, pleural fluid/sputum +Ureaplasma 3/4   Intermittent somnolence, hallucinations starting around 3/1 with rise in CO2. Ammonia level checked due to concern for post-transplant hyperammonemia which was mildly elevated at 57, but repeat 49.   - Mycoplasma/ureaplasma cultures collected and Transplant ID consulted    - Sputum/Pleural fluid +ureaplasma on 3/4. Started on Levofloxacin/Doxycycline for double coverage.    - ID recommends stopping Levofloxacin (3/7), continue Doxycycline with duration TBD. If mycoplasma grows in addition to ureaplasma, may need to add back double coverage.     Leukocytosis  Afebrile, but WBC started rising 2/27  from 14.6 to 23.1 3/2. Evaluation included CT chest/abdomen/pelvis without overt evidence of infection, and pan-culture. Pleural and sputum culture did grow +Ureaplasma and she was started on therapy (as above). C. Diff checked and negative. Leukocytosis has since been improving.   - Repeat CT Abdomen/Pelvis 3/6/22 for interval follow up from initial scan 3/2, as there was concern for enteritis as well as area of pancreatic/kashif-hepatic artery inflammation vs. Infection vs. malignancy.    - Repeat CT with improved small bowel dilation, some persistent perienteric fat stranding accentuated by motion artifact, and unremarkable appearing pancreas   - Continue antibiotics as above     Diarrhea   Dilated bowel on imaging   Noted to have increased loose stools 2/25 likely due to mmf and TF. Fiber added to TF. C.diff checked and negative. Abdominal imaging including CT and XR have shown diffusely dilated bowel, without concern for obstruction. Possibly ileus or gastroparesis from transplant. However patient not experiencing nausea, vomiting, or decreased stool output.   - Repeat CT 3/6/22 with interval improvement   - Continue to monitor abdominal exam, stool output   - Okay to have immodium PRN     Post op pain   - Erector spinae catheters removed   - gabapentin 100 mg nightly  - tylenol 975 mg Q8H   - Dilaudid 0.2-0.4 mg Q2H PRN   - oxycodone 5-10 mg Q4H PRN   - robaxin 500 mg Q6H scheduled     Paroxysmal Afib   HTN  She was on diltiazem prior to lung transplantation and had not been on anticoagulation. She was  Started on metoprolol in the ICU.  - Continue Metoprolol tartrate to 25 mg BID   - Restarted PTA diltiazem at lower dose for better BP/HR control, 30mg Q6hr. Increase to 60 mg Q6H, if tolerated can transition back to  mg XL.   - No anticoagulation at this time     Moderate protein calorie malnutrition  - Nutrition following   - On TF, will ask about cycling overnight     Stress induced hyperglycemia  Did  not need insulin prior to admission but sugars have been rising despite sliding scale coverage.  - Endocrinology consulted for assistance, appreciate recommendations   - Please see DM team daily note     HLD  Mild CAD   Noted on transplant workup.   - started rosuvastatin 40 mg daily--Held 2/28 due to rising LFTs      Acute blood loss anemia  Acute blood loss thrombocytopenia  Secondary to surgery. Will continue to monitor.   - Transfuse Hgb < 7, platelets < 50   - Hemolysis labs negative 3/3, on dapsone, continue to monitor     Sternotomy  Surgical Incision  - Sternal precautions  - Postoperative incision management per protocol  - PT/OT/CR     Elevated LFTs, resolved   Noted on labs 2/26 and rising. AST, ALP, Bili WNL. No RUQ or abdominal pain. Imaging including RUQ ultrasound WNL. Liza-portal edema noted on CT, and exam shows volume overload. Could be the result of congestion versus medication adverse effect. LFTs have since normalized. Will continue to monitor.  - CMP daily   - Hold statin        Diet: Adult Formula Drip Feeding: Continuous Osmolite 1.5; Nasoduodenal tube; Goal Rate: 50; mL/hr; Medication - Feeding Tube Flush Frequency: At least 15-30 mL water before and after medication administration and with tube clogging; Amount to Send (Nut...  Regular Diet Adult Thin Liquids (level 0)    DVT Prophylaxis: Pneumatic Compression Devices  [place]iff Catheter: Not present  Central Lines: None  Cardiac Monitoring: None  Code Status: Full Code      Disposition Plan   Expected Discharge: 03/11/2022   Anticipated discharge location: home;inpatient rehabilitation facility    Delays:            The patient's care was discussed with the Bedside Nurse, Patient and transplant pulm  Consultant.    Sharri Townsend MD  Hospitalist Service, GOLD TEAM [address] [place] [address]  Securely message with the Vocera Web Console (learn more here)  Text page via [facility] Paging/Directory   Please see  signed in provider for up to date coverage information      Clinically Significant Risk Factors Present on Admission                    ______________________________________________________________________    Interval History     Feeling well today  Still having diarrhea  No abdominal pain, nausea, or vomiting  Had some questions about discharge from the hospital  Otherwise no concerns     Four point ROS completed and negative unless listed above     Data reviewed today: I reviewed all medications, new labs and imaging results over the last 24 hours.     Physical Exam   Vital Signs: Temp: 97.7  F (36.5  C) Temp src: Axillary BP: 132/65 Pulse: 86   Resp: 25 SpO2: 99 % O2 Device: BiPAP/CPAP Oxygen Delivery: 25 LPM  Weight: 161 lbs 9.55 oz    Constitutional: Sitting up in bed, awake and alert   HEENT: MMM. Sclera clear.   PULM: Diminished bases bilaterally. Stronger cough today.  No crackles, no rhonchi, or wheezes.  Chest tubes in place.  CV: Normal S1 and S2.  Tachycardia.  No murmur, gallop, or rub.   ABD: BS hypoactive, soft, nontender, mild distension   MSK: Moves all extremities.  No apparent muscle wasting.   NEURO: Alert and oriented, conversant.   SKIN: Warm, dry.  No rash on limited exam.   EXT: Pitting edema of the bilateral LE, improved from previous exams  PSYCH: Mood stable.     Data   Recent Labs   Lab 03/07/22  0600 03/07/22  0339 03/07/22  0002 03/06/22  1931 03/06/22  0823 03/06/22  0626 03/05/22  0801 03/05/22  0525 03/04/22  0741 03/04/22  0553   WBC 11.9*  --   --   --   --  14.5*  --  20.4*  --  19.9*   HGB 7.0*  --   --   --   --  7.0*  --  7.5*  --  7.3*   MCV 95  --   --   --   --  93  --  94  --  96     --   --   --   --  301  --  315  --  309     --   --   --   --  136  --  136  --  136   POTASSIUM 4.7  --   --   --   --  4.3  --  4.8   < > 5.8*   CHLORIDE 98  --   --   --   --  93*  --  92*  --  94   CO2  --   --   --   --   --  41*  --  40*  --  39*   BUN  --   --   --   --    --  25  --  29  --  38*   CR  --   --   --   --   --  0.52  --  0.54  --  0.52   ANIONGAP  --   --   --   --   --  2*  --  4  --  3   MINISTERIO  --   --   --   --   --  8.1*  --  7.9*  --  8.4*   GLC  --  165* 160* 161*   < > 191*   < > 202*   < > 232*   ALBUMIN  --   --   --   --   --  1.8*  --  1.9*  --  1.9*   PROTTOTAL  --   --   --   --   --  4.8*  --  5.1*  --  5.2*   BILITOTAL  --   --   --   --   --  0.2  --  0.2  --  0.2   ALKPHOS  --   --   --   --   --  107  --  120  --  131   ALT  --   --   --   --   --  58*  --  67*  --  84*   AST  --   --   --   --   --  20  --  23  --  22    < > = values in this interval not displayed.     Recent Results (from the past 24 hour(s))   XR Chest 2 Views    Narrative    EXAMINATION: XR CHEST 2 VW, 3/6/2022 10:47 AM    COMPARISON: 3/5/2022    HISTORY: interval follow up, post-op lung transplant    FINDINGS: Heart size is normal. Changes of bilateral lung transplant.  Bilateral chest tubes are unchanged. Mild basilar atelectasis.  Pneumothorax      Impression    IMPRESSION: Postop lung transplant with chest tubes in place and no  significant pneumothorax.      TONO DUNLAP MD         SYSTEM ID:  [id number]   CT Chest/Abdomen/Pelvis w Contrast    Narrative    EXAMINATION: CT CHEST/ABDOMEN/PELVIS W CONTRAST, 3/6/2022 4:40 PM    TECHNIQUE:  Helical CT images from the thoracic inlet through the  symphysis pubis were obtained  with contrast. Contrast dose: iopamidol  (ISOVUE-370) solution 100mL    COMPARISON: CT chest and CT abdomen 3/2/2022    HISTORY: Abdominal distension    FINDINGS:    Devices: Two left and one right pigtail pleural chest tubes. Feeding  tube tip is in the distal duodenum.    Lower neck/axillary: Normal thyroid. No supraclavicular or axillary  lymphadenopathy.    Mediastinum: Unchanged fluid density in the anterior  mediastinum/superior anterior pericardium. Decreased fluid and tiny  focus of air in the right infrahilar region, likely postsurgical.  Normal  heart size. Mild aortic annulus calcifications. Stable  prominence right hilar lymph nodes.    Lungs: Surgical changes of bilateral lung transplant. Trace bilateral  pneumothoraces with chest tubes in place. Decreased small volume  bilateral pleural effusions with some anterior loculation on the  right. Mild bibasilar atelectasis. The central tracheobronchial tree  is clear. Interlobular septal thickening in the lingula and right  middle lobe.    Hepatobiliary: Focal low-density lesion in the right hepatic lobe near  the fossa for ligament, likely represents focal fatty infiltration  versus hemangioma. Mild periportal edema, similar to prior.    Spleen: Unremarkable.    Pancreas: Unremarkable.    Adrenals: Unremarkable.    Kidneys: Stable 1.2 cm fat density lesion in the lateral right renal  cortex, likely angiomyolipoma. No stones or hydronephrosis.    Pelvic organs: Left adnexal cyst measuring 8.2 x 5 cm, previously 8.6  x 5.2 cm. Trace free fluid in the pelvis. Unremarkable bladder.    Lymph nodes: No lymphadenopathy.    Vessels: Moderate atherosclerotic calcifications of the abdominal  aorta and iliac arteries without aneurysmal dilation.    Bowel/mesentery: The stomach is distended, although not significantly  changed compared to 3/2/2022. Periampullary duodenal diverticulum.  Decreased small bowel distention. Mild perienteric fat stranding  associated with the proximal to mid jejunum in the left midabdomen  slightly increased since prior also partially accentuated by motion  artifact at this level. Liquid stool throughout the colon. Endoscopy  clips in the ascending and transverse colon. Normal appendix.    Bones and soft tissues: No acute or suspicious osseous abnormalities.  Clamshell sternotomy wires are intact. Slightly increased subcutaneous  edema in the abdominal wall.        Impression    IMPRESSION:   1. Decrease in small bowel distention since 3/2/2022 without evidence  of bowel obstruction. Mild  perienteric fat stranding associated with  the proximal to mid jejunum in the left midabdomen, which is slightly  increased since 3/2/2022, though this may be accentuated by motion  artifact at this level. These findings may reflect an infectious or  inflammatory enteritis.  2. Persistent mild periportal edema and gastric distention.  3. Trace bilateral pneumothoraces and decreased small volume pleural  effusions with bilateral pleural chest tubes in place. Bibasilar  atelectasis.  4. Stable anterior mediastinal fluid and decreased right infrahilar  fluid and air, likely postsurgical.  5. Stable left adnexal cyst.    I have personally reviewed the examination and initial interpretation  and I agree with the findings.    CLAU PRATER,          SYSTEM ID:  [id number]     Medications     dextrose Stopped (03/01/22 1125)     - MEDICATION INSTRUCTIONS -       - MEDICATION INSTRUCTIONS -       - MEDICATION INSTRUCTIONS -         acetylcysteine  2 mL Nebulization 4x Daily     aspirin  81 mg Oral Daily     calcium carbonate 600 mg-vitamin D 400 units  1 tablet Oral BID w/meals     cefTRIAXone  2 g Intravenous Q24H     dapsone  50 mg Oral Daily     diltiazem  30 mg Oral Q6H JUANA     doxycycline (VIBRAMYCIN) IV  100 mg Intravenous Q12H     famotidine  10 mg Oral BID     fiber modular (NUTRISOURCE FIBER)  1 packet Per Feeding Tube TID     [Held by provider] furosemide  60 mg Intravenous BID     gabapentin  100 mg Oral or Feeding Tube At Bedtime     heparin ANTICOAGULANT  5,000 Units Subcutaneous Q8H     insulin aspart  1-9 Units Subcutaneous Q4H     insulin aspart   Subcutaneous TID w/meals     insulin NPH  22 Units Subcutaneous Q24H     insulin NPH  22 Units Subcutaneous Daily     levalbuterol  1.25 mg Nebulization 4x Daily     levofloxacin  500 mg Intravenous Q24H     loratadine  10 mg Oral Daily     magnesium oxide  400 mg Oral BID     methocarbamol  500 mg Oral 4x Daily     metoprolol tartrate  25 mg Oral or Feeding Tube  BID     multivitamins w/minerals  15 mL Oral Daily     mycophenolate  1,000 mg Oral BID    Or     mycophenolate  1,000 mg Oral or NG Tube BID     nystatin  1,000,000 Units Swish & Swallow 4x Daily     pantoprazole  40 mg Oral or Feeding Tube Daily     [START ON 3/8/2022] predniSONE  12.5 mg Oral or Feeding Tube QPM     [START ON 3/8/2022] predniSONE  15 mg Oral or Feeding Tube Daily     predniSONE  15 mg Oral or Feeding Tube BID     protein modular  1 packet Per Feeding Tube TID     [Held by provider] rosuvastatin  40 mg Oral Daily     sodium chloride (PF)  3 mL Intracatheter Q8H     sodium chloride (PF)  3 mL Intracatheter Q8H     tacrolimus  5 mg Per Feeding Tube BID IS    Or     tacrolimus  5 mg Oral BID IS     valACYclovir  1,000 mg Oral Q8H

## 2022-03-08 ENCOUNTER — APPOINTMENT (OUTPATIENT)
Dept: OCCUPATIONAL THERAPY | Facility: CLINIC | Age: 67
DRG: 007 | End: 2022-03-08
Attending: SURGERY
Payer: MEDICARE

## 2022-03-08 ENCOUNTER — APPOINTMENT (OUTPATIENT)
Dept: GENERAL RADIOLOGY | Facility: CLINIC | Age: 67
DRG: 007 | End: 2022-03-08
Attending: SURGERY
Payer: MEDICARE

## 2022-03-08 ENCOUNTER — APPOINTMENT (OUTPATIENT)
Dept: GENERAL RADIOLOGY | Facility: CLINIC | Age: 67
DRG: 007 | End: 2022-03-08
Attending: NURSE PRACTITIONER
Payer: MEDICARE

## 2022-03-08 LAB
ALBUMIN SERPL-MCNC: 2.2 G/DL (ref 3.4–5)
ALP SERPL-CCNC: 114 U/L (ref 40–150)
ALT SERPL W P-5'-P-CCNC: 45 U/L (ref 0–50)
ANION GAP SERPL CALCULATED.3IONS-SCNC: 2 MMOL/L (ref 3–14)
AST SERPL W P-5'-P-CCNC: 16 U/L (ref 0–45)
ATRIAL RATE - MUSE: 98 BPM
BACTERIA PLR CULT: NO GROWTH
BACTERIA PLR CULT: NO GROWTH
BASE EXCESS BLDV CALC-SCNC: 12.2 MMOL/L (ref -7.7–1.9)
BASOPHILS # BLD AUTO: 0 10E3/UL (ref 0–0.2)
BASOPHILS NFR BLD AUTO: 0 %
BILIRUB SERPL-MCNC: 0.3 MG/DL (ref 0.2–1.3)
BUN SERPL-MCNC: 28 MG/DL (ref 7–30)
CALCIUM SERPL-MCNC: 8.3 MG/DL (ref 8.5–10.1)
CHLORIDE BLD-SCNC: 98 MMOL/L (ref 94–109)
CO2 SERPL-SCNC: 38 MMOL/L (ref 20–32)
CREAT SERPL-MCNC: 0.5 MG/DL (ref 0.52–1.04)
DIASTOLIC BLOOD PRESSURE - MUSE: NORMAL MMHG
EOSINOPHIL # BLD AUTO: 0 10E3/UL (ref 0–0.7)
EOSINOPHIL NFR BLD AUTO: 0 %
ERYTHROCYTE [DISTWIDTH] IN BLOOD BY AUTOMATED COUNT: 17.4 % (ref 10–15)
GFR SERPL CREATININE-BSD FRML MDRD: >90 ML/MIN/1.73M2
GLUCOSE BLD-MCNC: 169 MG/DL (ref 70–99)
GLUCOSE BLDC GLUCOMTR-MCNC: 144 MG/DL (ref 70–99)
GLUCOSE BLDC GLUCOMTR-MCNC: 160 MG/DL (ref 70–99)
GLUCOSE BLDC GLUCOMTR-MCNC: 162 MG/DL (ref 70–99)
GLUCOSE BLDC GLUCOMTR-MCNC: 165 MG/DL (ref 70–99)
GLUCOSE BLDC GLUCOMTR-MCNC: 166 MG/DL (ref 70–99)
GLUCOSE BLDC GLUCOMTR-MCNC: 176 MG/DL (ref 70–99)
GLUCOSE BLDC GLUCOMTR-MCNC: 183 MG/DL (ref 70–99)
GRAM STAIN RESULT: NORMAL
GRAM STAIN RESULT: NORMAL
HCO3 BLDV-SCNC: 39 MMOL/L (ref 21–28)
HCT VFR BLD AUTO: 23.9 % (ref 35–47)
HGB BLD-MCNC: 7.1 G/DL (ref 11.7–15.7)
IMM GRANULOCYTES # BLD: 0.2 10E3/UL
IMM GRANULOCYTES NFR BLD: 2 %
INTERPRETATION ECG - MUSE: NORMAL
LYMPHOCYTES # BLD AUTO: 0.6 10E3/UL (ref 0.8–5.3)
LYMPHOCYTES NFR BLD AUTO: 5 %
MAGNESIUM SERPL-MCNC: 1.7 MG/DL (ref 1.6–2.3)
MAGNESIUM SERPL-MCNC: 1.8 MG/DL (ref 1.6–2.3)
MCH RBC QN AUTO: 29 PG (ref 26.5–33)
MCHC RBC AUTO-ENTMCNC: 29.7 G/DL (ref 31.5–36.5)
MCV RBC AUTO: 98 FL (ref 78–100)
MONOCYTES # BLD AUTO: 0.6 10E3/UL (ref 0–1.3)
MONOCYTES NFR BLD AUTO: 5 %
NEUTROPHILS # BLD AUTO: 10.5 10E3/UL (ref 1.6–8.3)
NEUTROPHILS NFR BLD AUTO: 88 %
NRBC # BLD AUTO: 0 10E3/UL
NRBC BLD AUTO-RTO: 0 /100
O2/TOTAL GAS SETTING VFR VENT: 0 %
P AXIS - MUSE: 68 DEGREES
PCO2 BLDV: 65 MM HG (ref 40–50)
PH BLDV: 7.39 [PH] (ref 7.32–7.43)
PHOSPHATE SERPL-MCNC: 4.3 MG/DL (ref 2.5–4.5)
PLATELET # BLD AUTO: 345 10E3/UL (ref 150–450)
PO2 BLDV: 60 MM HG (ref 25–47)
POTASSIUM BLD-SCNC: 4.8 MMOL/L (ref 3.4–5.3)
PR INTERVAL - MUSE: 128 MS
PROT SERPL-MCNC: 5.2 G/DL (ref 6.8–8.8)
QRS DURATION - MUSE: 120 MS
QT - MUSE: 386 MS
QTC - MUSE: 492 MS
R AXIS - MUSE: 86 DEGREES
RADIOLOGIST FLAGS: ABNORMAL
RBC # BLD AUTO: 2.45 10E6/UL (ref 3.8–5.2)
SODIUM SERPL-SCNC: 138 MMOL/L (ref 133–144)
SYSTOLIC BLOOD PRESSURE - MUSE: NORMAL MMHG
T AXIS - MUSE: -15 DEGREES
VENTRICULAR RATE- MUSE: 98 BPM
WBC # BLD AUTO: 11.9 10E3/UL (ref 4–11)

## 2022-03-08 PROCEDURE — 94668 MNPJ CHEST WALL SBSQ: CPT

## 2022-03-08 PROCEDURE — 250N000009 HC RX 250: Performed by: SURGERY

## 2022-03-08 PROCEDURE — 36415 COLL VENOUS BLD VENIPUNCTURE: CPT | Performed by: NURSE PRACTITIONER

## 2022-03-08 PROCEDURE — 250N000011 HC RX IP 250 OP 636: Performed by: STUDENT IN AN ORGANIZED HEALTH CARE EDUCATION/TRAINING PROGRAM

## 2022-03-08 PROCEDURE — 82040 ASSAY OF SERUM ALBUMIN: CPT | Performed by: NURSE PRACTITIONER

## 2022-03-08 PROCEDURE — 250N000013 HC RX MED GY IP 250 OP 250 PS 637: Performed by: STUDENT IN AN ORGANIZED HEALTH CARE EDUCATION/TRAINING PROGRAM

## 2022-03-08 PROCEDURE — 99233 SBSQ HOSP IP/OBS HIGH 50: CPT | Mod: GC | Performed by: INTERNAL MEDICINE

## 2022-03-08 PROCEDURE — 999N000215 HC STATISTIC HFNC ADULT NON-CPAP

## 2022-03-08 PROCEDURE — 250N000013 HC RX MED GY IP 250 OP 250 PS 637: Performed by: NURSE PRACTITIONER

## 2022-03-08 PROCEDURE — 250N000013 HC RX MED GY IP 250 OP 250 PS 637: Performed by: PHYSICIAN ASSISTANT

## 2022-03-08 PROCEDURE — 120N000002 HC R&B MED SURG/OB UMMC

## 2022-03-08 PROCEDURE — 83735 ASSAY OF MAGNESIUM: CPT | Performed by: STUDENT IN AN ORGANIZED HEALTH CARE EDUCATION/TRAINING PROGRAM

## 2022-03-08 PROCEDURE — 94660 CPAP INITIATION&MGMT: CPT

## 2022-03-08 PROCEDURE — 94640 AIRWAY INHALATION TREATMENT: CPT | Mod: 76

## 2022-03-08 PROCEDURE — 85025 COMPLETE CBC W/AUTO DIFF WBC: CPT | Performed by: NURSE PRACTITIONER

## 2022-03-08 PROCEDURE — 82803 BLOOD GASES ANY COMBINATION: CPT | Performed by: NURSE PRACTITIONER

## 2022-03-08 PROCEDURE — 999N000157 HC STATISTIC RCP TIME EA 10 MIN

## 2022-03-08 PROCEDURE — 71045 X-RAY EXAM CHEST 1 VIEW: CPT | Mod: 26 | Performed by: RADIOLOGY

## 2022-03-08 PROCEDURE — 250N000009 HC RX 250: Performed by: STUDENT IN AN ORGANIZED HEALTH CARE EDUCATION/TRAINING PROGRAM

## 2022-03-08 PROCEDURE — 999N000128 HC STATISTIC PERIPHERAL IV START W/O US GUIDANCE

## 2022-03-08 PROCEDURE — 94640 AIRWAY INHALATION TREATMENT: CPT

## 2022-03-08 PROCEDURE — 250N000013 HC RX MED GY IP 250 OP 250 PS 637: Performed by: PEDIATRICS

## 2022-03-08 PROCEDURE — 250N000012 HC RX MED GY IP 250 OP 636 PS 637: Performed by: PHYSICIAN ASSISTANT

## 2022-03-08 PROCEDURE — 250N000012 HC RX MED GY IP 250 OP 636 PS 637: Performed by: STUDENT IN AN ORGANIZED HEALTH CARE EDUCATION/TRAINING PROGRAM

## 2022-03-08 PROCEDURE — 84100 ASSAY OF PHOSPHORUS: CPT | Performed by: NURSE PRACTITIONER

## 2022-03-08 PROCEDURE — 97535 SELF CARE MNGMENT TRAINING: CPT | Mod: GO

## 2022-03-08 PROCEDURE — 71046 X-RAY EXAM CHEST 2 VIEWS: CPT | Mod: 26 | Performed by: RADIOLOGY

## 2022-03-08 PROCEDURE — 99233 SBSQ HOSP IP/OBS HIGH 50: CPT | Mod: 24 | Performed by: NURSE PRACTITIONER

## 2022-03-08 PROCEDURE — 250N000012 HC RX MED GY IP 250 OP 636 PS 637: Performed by: NURSE PRACTITIONER

## 2022-03-08 PROCEDURE — 250N000011 HC RX IP 250 OP 636: Performed by: PEDIATRICS

## 2022-03-08 PROCEDURE — 71046 X-RAY EXAM CHEST 2 VIEWS: CPT

## 2022-03-08 PROCEDURE — 250N000013 HC RX MED GY IP 250 OP 250 PS 637: Performed by: SURGERY

## 2022-03-08 PROCEDURE — 250N000011 HC RX IP 250 OP 636: Performed by: PHYSICIAN ASSISTANT

## 2022-03-08 PROCEDURE — 83735 ASSAY OF MAGNESIUM: CPT | Performed by: NURSE PRACTITIONER

## 2022-03-08 PROCEDURE — 99233 SBSQ HOSP IP/OBS HIGH 50: CPT | Performed by: PEDIATRICS

## 2022-03-08 PROCEDURE — 97530 THERAPEUTIC ACTIVITIES: CPT | Mod: GO

## 2022-03-08 PROCEDURE — 71045 X-RAY EXAM CHEST 1 VIEW: CPT

## 2022-03-08 PROCEDURE — 99233 SBSQ HOSP IP/OBS HIGH 50: CPT | Mod: 24 | Performed by: CLINICAL NURSE SPECIALIST

## 2022-03-08 PROCEDURE — 80053 COMPREHEN METABOLIC PANEL: CPT | Performed by: NURSE PRACTITIONER

## 2022-03-08 RX ORDER — DOCUSATE SODIUM 100 MG/1
100 CAPSULE, LIQUID FILLED ORAL 2 TIMES DAILY
Status: DISCONTINUED | OUTPATIENT
Start: 2022-03-08 | End: 2022-03-10

## 2022-03-08 RX ORDER — ACETAZOLAMIDE 500 MG/5ML
500 INJECTION, POWDER, LYOPHILIZED, FOR SOLUTION INTRAVENOUS ONCE
Status: COMPLETED | OUTPATIENT
Start: 2022-03-08 | End: 2022-03-08

## 2022-03-08 RX ORDER — MAGNESIUM SULFATE HEPTAHYDRATE 40 MG/ML
2 INJECTION, SOLUTION INTRAVENOUS ONCE
Status: COMPLETED | OUTPATIENT
Start: 2022-03-08 | End: 2022-03-08

## 2022-03-08 RX ORDER — AMOXICILLIN 500 MG/1
500 CAPSULE ORAL EVERY 8 HOURS SCHEDULED
Status: DISCONTINUED | OUTPATIENT
Start: 2022-03-08 | End: 2022-03-17 | Stop reason: HOSPADM

## 2022-03-08 RX ADMIN — Medication 1 PACKET: at 16:27

## 2022-03-08 RX ADMIN — VALACYCLOVIR HYDROCHLORIDE 1000 MG: 1 TABLET, FILM COATED ORAL at 11:57

## 2022-03-08 RX ADMIN — ACETAZOLAMIDE 500 MG: 500 INJECTION, POWDER, LYOPHILIZED, FOR SOLUTION INTRAVENOUS at 14:46

## 2022-03-08 RX ADMIN — DILTIAZEM HYDROCHLORIDE 60 MG: 60 TABLET ORAL at 12:03

## 2022-03-08 RX ADMIN — METHOCARBAMOL TABLETS 500 MG: 500 TABLET, COATED ORAL at 07:51

## 2022-03-08 RX ADMIN — INSULIN ASPART 1 UNITS: 100 INJECTION, SOLUTION INTRAVENOUS; SUBCUTANEOUS at 11:55

## 2022-03-08 RX ADMIN — NYSTATIN 1000000 UNITS: 100000 SUSPENSION ORAL at 16:27

## 2022-03-08 RX ADMIN — METOPROLOL TARTRATE 25 MG: 25 TABLET, FILM COATED ORAL at 07:53

## 2022-03-08 RX ADMIN — MAGNESIUM SULFATE HEPTAHYDRATE 2 G: 40 INJECTION, SOLUTION INTRAVENOUS at 08:58

## 2022-03-08 RX ADMIN — HEPARIN SODIUM 5000 UNITS: 5000 INJECTION, SOLUTION INTRAVENOUS; SUBCUTANEOUS at 14:48

## 2022-03-08 RX ADMIN — FAMOTIDINE 20 MG: 20 TABLET ORAL at 07:53

## 2022-03-08 RX ADMIN — Medication 1 PACKET: at 20:07

## 2022-03-08 RX ADMIN — MYCOPHENOLATE MOFETIL 1000 MG: 250 CAPSULE ORAL at 07:53

## 2022-03-08 RX ADMIN — NYSTATIN 1000000 UNITS: 100000 SUSPENSION ORAL at 11:57

## 2022-03-08 RX ADMIN — METHOCARBAMOL TABLETS 500 MG: 500 TABLET, COATED ORAL at 16:27

## 2022-03-08 RX ADMIN — TACROLIMUS 4 MG: 1 CAPSULE ORAL at 17:49

## 2022-03-08 RX ADMIN — FAMOTIDINE 20 MG: 20 TABLET ORAL at 20:05

## 2022-03-08 RX ADMIN — PREDNISONE 12.5 MG: 2.5 TABLET ORAL at 20:04

## 2022-03-08 RX ADMIN — HEPARIN SODIUM 5000 UNITS: 5000 INJECTION, SOLUTION INTRAVENOUS; SUBCUTANEOUS at 05:37

## 2022-03-08 RX ADMIN — HEPARIN SODIUM 5000 UNITS: 5000 INJECTION, SOLUTION INTRAVENOUS; SUBCUTANEOUS at 23:02

## 2022-03-08 RX ADMIN — ACETYLCYSTEINE 2 ML: 200 SOLUTION ORAL; RESPIRATORY (INHALATION) at 19:47

## 2022-03-08 RX ADMIN — OXYCODONE HYDROCHLORIDE 5 MG: 5 TABLET ORAL at 14:45

## 2022-03-08 RX ADMIN — Medication 1 PACKET: at 08:51

## 2022-03-08 RX ADMIN — NYSTATIN 1000000 UNITS: 100000 SUSPENSION ORAL at 20:04

## 2022-03-08 RX ADMIN — METHOCARBAMOL TABLETS 500 MG: 500 TABLET, COATED ORAL at 20:04

## 2022-03-08 RX ADMIN — CALCIUM CARBONATE 600 MG (1,500 MG)-VITAMIN D3 400 UNIT TABLET 1 TABLET: at 17:49

## 2022-03-08 RX ADMIN — LORATADINE 10 MG: 10 TABLET ORAL at 07:53

## 2022-03-08 RX ADMIN — GABAPENTIN 100 MG: 100 CAPSULE ORAL at 23:02

## 2022-03-08 RX ADMIN — PREDNISONE 15 MG: 5 TABLET ORAL at 07:53

## 2022-03-08 RX ADMIN — DOXYCYCLINE 100 MG: 100 INJECTION, POWDER, LYOPHILIZED, FOR SOLUTION INTRAVENOUS at 11:54

## 2022-03-08 RX ADMIN — DAPSONE 50 MG: 25 TABLET ORAL at 07:56

## 2022-03-08 RX ADMIN — AMOXICILLIN 500 MG: 500 CAPSULE ORAL at 23:02

## 2022-03-08 RX ADMIN — DOXYCYCLINE 100 MG: 100 INJECTION, POWDER, LYOPHILIZED, FOR SOLUTION INTRAVENOUS at 23:02

## 2022-03-08 RX ADMIN — AMOXICILLIN 500 MG: 500 CAPSULE ORAL at 16:27

## 2022-03-08 RX ADMIN — METOPROLOL TARTRATE 25 MG: 25 TABLET, FILM COATED ORAL at 20:05

## 2022-03-08 RX ADMIN — Medication 40 MG: at 07:51

## 2022-03-08 RX ADMIN — VALACYCLOVIR HYDROCHLORIDE 1000 MG: 1 TABLET, FILM COATED ORAL at 20:07

## 2022-03-08 RX ADMIN — INSULIN ASPART 4 UNITS: 100 INJECTION, SOLUTION INTRAVENOUS; SUBCUTANEOUS at 17:56

## 2022-03-08 RX ADMIN — CALCIUM CARBONATE 600 MG (1,500 MG)-VITAMIN D3 400 UNIT TABLET 1 TABLET: at 07:51

## 2022-03-08 RX ADMIN — LEVALBUTEROL HYDROCHLORIDE 1.25 MG: 1.25 SOLUTION RESPIRATORY (INHALATION) at 12:27

## 2022-03-08 RX ADMIN — Medication 1 TABLET: at 11:57

## 2022-03-08 RX ADMIN — FUROSEMIDE 40 MG: 10 INJECTION, SOLUTION INTRAMUSCULAR; INTRAVENOUS at 08:06

## 2022-03-08 RX ADMIN — LEVALBUTEROL HYDROCHLORIDE 1.25 MG: 1.25 SOLUTION RESPIRATORY (INHALATION) at 18:09

## 2022-03-08 RX ADMIN — TACROLIMUS 4 MG: 1 CAPSULE ORAL at 07:53

## 2022-03-08 RX ADMIN — ASPIRIN 81 MG: 81 TABLET, COATED ORAL at 07:53

## 2022-03-08 RX ADMIN — CEFTRIAXONE SODIUM 2 G: 2 INJECTION, POWDER, FOR SOLUTION INTRAMUSCULAR; INTRAVENOUS at 08:06

## 2022-03-08 RX ADMIN — VALACYCLOVIR HYDROCHLORIDE 1000 MG: 1 TABLET, FILM COATED ORAL at 05:37

## 2022-03-08 RX ADMIN — FUROSEMIDE 40 MG: 10 INJECTION, SOLUTION INTRAMUSCULAR; INTRAVENOUS at 14:46

## 2022-03-08 RX ADMIN — NYSTATIN 1000000 UNITS: 100000 SUSPENSION ORAL at 07:58

## 2022-03-08 RX ADMIN — DILTIAZEM HYDROCHLORIDE 60 MG: 60 TABLET ORAL at 05:37

## 2022-03-08 RX ADMIN — DILTIAZEM HYDROCHLORIDE 60 MG: 60 TABLET ORAL at 23:02

## 2022-03-08 RX ADMIN — ACETYLCYSTEINE 2 ML: 200 SOLUTION ORAL; RESPIRATORY (INHALATION) at 18:09

## 2022-03-08 RX ADMIN — LEVALBUTEROL HYDROCHLORIDE 1.25 MG: 1.25 SOLUTION RESPIRATORY (INHALATION) at 09:17

## 2022-03-08 RX ADMIN — METHOCARBAMOL TABLETS 500 MG: 500 TABLET, COATED ORAL at 11:57

## 2022-03-08 RX ADMIN — ACETYLCYSTEINE 2 ML: 200 SOLUTION ORAL; RESPIRATORY (INHALATION) at 12:08

## 2022-03-08 RX ADMIN — CEFTRIAXONE SODIUM 2 G: 2 INJECTION, POWDER, FOR SOLUTION INTRAMUSCULAR; INTRAVENOUS at 08:03

## 2022-03-08 RX ADMIN — MYCOPHENOLATE MOFETIL 1000 MG: 250 CAPSULE ORAL at 20:04

## 2022-03-08 RX ADMIN — LEVALBUTEROL HYDROCHLORIDE 1.25 MG: 1.25 SOLUTION RESPIRATORY (INHALATION) at 19:47

## 2022-03-08 RX ADMIN — ACETYLCYSTEINE 2 ML: 200 SOLUTION ORAL; RESPIRATORY (INHALATION) at 09:17

## 2022-03-08 RX ADMIN — Medication 40 MG: at 20:28

## 2022-03-08 ASSESSMENT — ACTIVITIES OF DAILY LIVING (ADL)
ADLS_ACUITY_SCORE: 11

## 2022-03-08 NOTE — PROGRESS NOTES
Diabetes Consult Daily  Progress Note          Assessment/Plan:   Melissa Elder is a 66 year old female with PMHx HTN, HLD, paroxysmal Afib, osteoporosis, GERD, severe COPD, previously requiring 2-3L NC O2 at rest.  Underwent b/l lung transplant with Dr. Robin on 02/21  has ongoing issues with Hydropneumothorax, chest tubes in place. 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 Endocrinology is being consulted for DM management.    1) Steroid and TF induced hyperglycemia   2) Underlying pre-DM, versus steroid diabetes      Plan:     NPH q24h at 1800: 22 units at start of cycled feeds (Vital 1.5 at 55) x 14 hr    Continue Novolog CHO coverage-- 1 unit per 15 grams w/ meals and snacks    Novolog  custom correction 1 per 35 mg/dL Q4h --> schedule changed to align w/ start of TF at 1800    BG monitoring TID AC, HS, 0200    Hypoglycemia protocol    PRN D10W for hypoglycemia prevention if TF stops out side of the scheduled time-- rate is  70 to replace about 70% of TF carbs        Discussed w/ pt, , RN, primary team              Interval History:       Continuous tube feeds. Osmolite at 50-- provided about 10 grams carb/h.  ---> changed to: Vital 1.5 @ 55 mL/hr x 14 hours/evening (770 mL) will provide: 1155 Kcals, 52 gm protein, 143 gm CHO, 5 gm fiber, and 588 mL free water.   ** same grams of carbohydrate per hour.  TF schedule 6689-8626.  Already seeing benefit to the new TF formula and schedule--- she thinks stooling freq is less and felt more like eating when TF off.  New room yesterday.  Getting stronger.  Several days away from ARU still.          Pred 15 mg BID  Date AM dose (mg) PM dose (mg)   3/1 15 15   3/8 15 12.5   3/15 12.5 12.5   3/29 12.5 10   4/12 10 10   5/10 10 7.5   6/7 7.5 7.5   7/5 7.5 5   8/2 5 5   8/30 5 2.5       Expected Discharge: 03/08/2022   Anticipated discharge location: home;inpatient rehabilitation facility        Recent Labs   Lab 03/08/22  0975  "03/08/22  0744 03/08/22  0625 03/08/22  0545 03/08/22  0203 03/07/22  2310   * 144* 169* 166* 165* 134*               Review of Systems:   See interval hx          Medications:     Orders Placed This Encounter      Regular Diet Adult Thin Liquids (level 0)    Physical Exam:     /68 (BP Location: Right arm, Patient Position: Semi-Scott's)   Pulse 105   Temp 98.4  F (36.9  C) (Oral)   Resp 20   Ht 1.575 m (5' 2\")   Wt 73.3 kg (161 lb 9.6 oz)   SpO2 99%   BMI 29.56 kg/m  ;  General:  pleasant  resting in chair in no distress.  bedside and supportive.  HEENT: NC/AT, PER and anicteric, non-injected, oral mucous membranes moist.   Lungs: unlabored respiration, no cough observed  ABD: rounded  Skin: dry, no obvious lesions  MSK:  fluid movement of all extremities  Lymp:  no LE edema   Mental status:  alert, oriented x3, communicating clearly  Psych:  calm, even mood           Data:     Lab Results   Component Value Date    A1C 5.7 02/22/2022    A1C 5.8 02/20/2022     Last Comprehensive Metabolic Panel:  Sodium   Date Value Ref Range Status   03/08/2022 138 133 - 144 mmol/L Final   06/21/2021 137 133 - 144 mmol/L Final     Potassium   Date Value Ref Range Status   03/08/2022 4.8 3.4 - 5.3 mmol/L Final   06/21/2021 3.0 (L) 3.4 - 5.3 mmol/L Final     Chloride   Date Value Ref Range Status   03/08/2022 98 94 - 109 mmol/L Final   06/21/2021 104 94 - 109 mmol/L Final     Carbon Dioxide   Date Value Ref Range Status   06/21/2021 34 (H) 20 - 32 mmol/L Final     Carbon Dioxide (CO2)   Date Value Ref Range Status   03/08/2022 38 (H) 20 - 32 mmol/L Final     Anion Gap   Date Value Ref Range Status   03/08/2022 2 (L) 3 - 14 mmol/L Final   06/21/2021 <1 (L) 3 - 14 mmol/L Final     Glucose   Date Value Ref Range Status   03/08/2022 169 (H) 70 - 99 mg/dL Final   06/21/2021 131 (H) 70 - 99 mg/dL Final     GLUCOSE BY METER POCT   Date Value Ref Range Status   03/08/2022 162 (H) 70 - 99 mg/dL Final     Urea " Nitrogen   Date Value Ref Range Status   03/08/2022 28 7 - 30 mg/dL Final   06/21/2021 8 7 - 30 mg/dL Final     Creatinine   Date Value Ref Range Status   03/08/2022 0.50 (L) 0.52 - 1.04 mg/dL Final   06/21/2021 0.56 0.52 - 1.04 mg/dL Final     GFR Estimate   Date Value Ref Range Status   03/08/2022 >90 >60 mL/min/1.73m2 Final     Comment:     Effective December 21, 2021 eGFRcr in adults is calculated using the 2021 CKD-EPI creatinine equation which includes age and gender (Ines et al., NEJ, DOI: 10.1056/LJQClk6632356)   06/21/2021 >90 >60 mL/min/[1.73_m2] Final     Comment:     Non  GFR Calc  Starting 12/18/2018, serum creatinine based estimated GFR (eGFR) will be   calculated using the Chronic Kidney Disease Epidemiology Collaboration   (CKD-EPI) equation.       Calcium   Date Value Ref Range Status   03/08/2022 8.3 (L) 8.5 - 10.1 mg/dL Final   06/21/2021 8.5 8.5 - 10.1 mg/dL Final     Bilirubin Total   Date Value Ref Range Status   03/08/2022 0.3 0.2 - 1.3 mg/dL Final   06/21/2021 0.3 0.2 - 1.3 mg/dL Final     Alkaline Phosphatase   Date Value Ref Range Status   03/08/2022 114 40 - 150 U/L Final   06/21/2021 92 40 - 150 U/L Final     ALT   Date Value Ref Range Status   03/08/2022 45 0 - 50 U/L Final   06/21/2021 25 0 - 50 U/L Final     AST   Date Value Ref Range Status   03/08/2022 16 0 - 45 U/L Final   06/21/2021 17 0 - 45 U/L Final       I spent a total of 35 minutes bedside and on the inpatient unit managing the glycemic care of Melissa Elder. Over 50% of my time on the unit was spent counseling the patient  and/or coordinating care regarding acute glycemic management.  See note for details.        Zitadaniela Dominguez APRN -[phone]  To contact Endocrine Diabetes service:   From 8AM-4PM: page inpatient diabetes provider that is following the patient that day (see filed or incomplete progress notes.consult notes).  If uncertain of provider assignment: page job code [phone].  For questions or  updates from 4PM-8AM: page the diabetes job code for on call fellow: [phone]    Please notify inpatient diabetes service if changes are planned to steroids, enteral feeding, parenteral feeding, or if procedures are planned requiring prolonged NPO status.

## 2022-03-08 NOTE — PROGRESS NOTES
[facility]    Medicine Progress Note - Hospitalist Service, GOLD TEAM 10    Date of Admission:  2/20/2022    Assessment & Plan        Melissa Elder is a 66 year old female with PMHx HTN, HLD, paroxysmal Afib, osteoporosis, GERD, severe COPD, previously requiring 2-3L NC O2 at rest.  Underwent b/l lung transplant with Dr. Robin on 02/21. Got 1u pRBC post-op, no overt bleeding source, monitoring. Extubated 02//22 to O2 NC and transferred to the floor. Pericardial drain pulled 2/24/22 without issue. Continuing on antibiotics for cultures growing from donor and recipient lungs. Has bilateral pleural effusions and anterior hydropneumothorax requiring 3 chest tubes currently.       Changes today:  - continue Diltiazem to 60 mg q6H (PTA dose). Transplant Pulm aware (for Tacro dosing)   - restarted Lasix at lower dose 40 BID (from 60 BID).   - added 1 time diamox  - Discuss with nutrition whether TF can be cycled overnight  - Last day ceftriaxone 3/8. 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  Pressor weaned off 2/22 and pt. extubated. Prior history of infection/colonization with Haemophilus influenzae (2017).  IgG adequate (2/20).  MRSA nares negative 2/21.  Broad spectrum ABX empirically post-op with vanc and ceftaz (2/21-2/22), stopped after 24h per Dr. Lala to target known donor cultures on POD #1. Donor cultures ([facility]) with Strep mitis, Actinomyces odontolyticus, and Kenyatta kruseii. Recipient cultures with Actinomyces odonotolyticus.  Acid fast bacillus in sputum from 3/2/22  - Respiratory support with HFNC and BiPAP (discussed below)  -  Nebs: Levalbuterol and Mucomyst QID  - Aggressive pulmonary toilet with chest physiotherapy QID as well as Aerobika and incentive spirometry q1h w/a  - Chest tubes managed by surgical and IR team  - Daily CXR while chest tubes in   - Await explant pathology  - Continue ceftriaxone for 2 weeks through 3/8 followed by amoxicillin for 3 months  - monitoring sputum for AFB  - Immune suppression and microbial ppx per daily transplant pulm note  - Continue Valtrex to 1000 mg TID x 14 days for HSV (through 3/11) then complete ppx per protocol (see pulm note)   - Continue diuresis as needed with goal to stay net negative      Acute hypoxic and hypercarbic respiratory failure 3/1, improving   3/1 noted to have slowly rising bicarb on daily labs. Had inspection bronchoscopy 3/1 as well and became oversedated and required multiple doses of narcan. VBG obtained post procedure showed hypercapnea to 99. Hypercarbic respiratory failure likely multifactorial including oversedation, poor diaphragm excursion (SNIFF test 3/3), volume overload, and atelectasis. Started on BiPAP.   - Continue BiPAP at night, HFNC/NC during the day. Wean O2 to keep sats > 92%.   - Monitor AM VBG   - Continue diuresis to maintain net negative   - Repeat SNIFF test later this week per Pulm once chest tubes pulled     Acute toxic metabolic encephalopathy, likely due to hypercapnea, resolved  Mildly elevated ammonia, pleural fluid/sputum +Ureaplasma 3/4   Intermittent somnolence, hallucinations starting around 3/1 with rise in CO2. Ammonia level checked due to concern for post-transplant hyperammonemia which was mildly elevated at 57, but repeat 49.   - Mycoplasma/ureaplasma cultures collected and Transplant ID consulted               - Sputum/Pleural fluid +ureaplasma on 3/4. Started on Levofloxacin/Doxycycline for double coverage.               - ID recommends stopping Levofloxacin (3/7), continue Doxycycline with duration TBD. If mycoplasma grows in  addition to ureaplasma, may need to add back double coverage.      Leukocytosis  Afebrile, but WBC started rising 2/27 from 14.6 to 23.1 3/2. Evaluation included CT chest/abdomen/pelvis without overt evidence of infection, and pan-culture. Pleural and sputum culture did grow +Ureaplasma and she was started on therapy (as above). C. Diff checked and negative. Leukocytosis has since been improving.   - Repeat CT Abdomen/Pelvis 3/6/22 for interval follow up from initial scan 3/2, as there was concern for enteritis as well as area of pancreatic/kashif-hepatic artery inflammation vs. Infection vs. malignancy.               - Repeat CT with improved small bowel dilation, some persistent perienteric fat stranding accentuated by motion artifact, and unremarkable appearing pancreas   - Continue antibiotics as above      Diarrhea   Dilated bowel on imaging   Noted to have increased loose stools 2/25 likely due to mmf and TF. Fiber added to TF. C.diff checked and negative. Abdominal imaging including CT and XR have shown diffusely dilated bowel, without concern for obstruction. Possibly ileus or gastroparesis from transplant. However patient not experiencing nausea, vomiting, or decreased stool output.   - Repeat CT 3/6/22 with interval improvement   - Continue to monitor abdominal exam, stool output   - Okay to have immodium PRN      Post op pain   - Erector spinae catheters removed   - gabapentin 100 mg nightly  - tylenol 975 mg Q8H   - Dilaudid 0.2-0.4 mg Q2H PRN   - oxycodone 5-10 mg Q4H PRN   - robaxin 500 mg Q6H scheduled      Paroxysmal Afib   HTN  She was on diltiazem prior to lung transplantation and had not been on anticoagulation. She was  Started on metoprolol in the ICU.  - Continue Metoprolol tartrate to 25 mg BID   - Restarted PTA diltiazem now at 60 mg Q6H, if tolerated can transition back to  mg XL.   - No anticoagulation at this time      Moderate protein calorie malnutrition  - Nutrition following   -  On TF, will ask about cycling overnight      Stress induced hyperglycemia  Did not need insulin prior to admission but sugars have been rising despite sliding scale coverage.  - Endocrinology consulted for assistance, appreciate recommendations   - Please see DM team daily note      HLD  Mild CAD   Noted on transplant workup. Started rosuvastatin 40 mg daily--Held 2/28 due to rising LFTs    - restarted rosuvastatin 3/8 will follow LFTs     Acute blood loss anemia  Acute blood loss thrombocytopenia  Secondary to surgery. Will continue to monitor.   - Transfuse Hgb < 7, platelets < 50   - Hemolysis labs negative 3/3, on dapsone, continue to monitor      Sternotomy  Surgical Incision  - Sternal precautions  - Postoperative incision management per protocol  - PT/OT/CR      Elevated LFTs, resolved   Noted on labs 2/26 and rising. AST, ALP, Bili WNL. No RUQ or abdominal pain. Imaging including RUQ ultrasound WNL. Liza-portal edema noted on CT, and exam shows volume overload. Could be the result of congestion versus medication adverse effect. LFTs have since normalized. Will continue to monitor.  - CMP daily   - monitoring closely with restarting statin        Diet: Regular Diet Adult Thin Liquids (level 0)  Calorie Counts  Snacks/Supplements Adult: Glucerna; Between Meals  Adult Formula Drip Feeding: Specified Time Vital 1.5; Nasoduodenal tube; Goal Rate: 55; mL/hr; From: 6:00 PM; 8:00 AM; Medication - Feeding Tube Flush Frequency: At least 15-30 mL water before and after medication administration and with tube clog...    DVT Prophylaxis: Pneumatic Compression Devices  Ratliff Catheter: Not present  Central Lines: None  Cardiac Monitoring: None  Code Status: Full Code      Disposition Plan   Expected Discharge: 03/14/2022     Anticipated discharge location: home;inpatient rehabilitation facility    Delays:     once chest tubes out, and appropriate oxygenation level        The patient's care was discussed with the Bedside  Nurse, Patient, Patient's Family and pulm and ID Consultant.    Nehal Fry MD  Hospitalist Service, GOLD TEAM 10  [facility]  Securely message with the Vocera Web Console (learn more here)  Text page via [facility] Paging/Directory   Please see signed in provider for up to date coverage information      Clinically Significant Risk Factors Present on Admission                    ______________________________________________________________________    Interval History   No acute events overnight. She was very happy today, stating that she feels much better and is very happy to be able to get up and go to the bathroom. Her stools are still loose but improved. No vomiting. Still SOB but different and improved from previous. Chest pain from surgery improving.     Data reviewed today: I reviewed all medications, new labs and imaging results over the last 24 hours. I personally reviewed no images or EKG's today.    Physical Exam   Vital Signs: Temp: 98.4  F (36.9  C) Temp src: Oral BP: 130/65 Pulse: 96   Resp: 26 SpO2: 95 % O2 Device: None (Room air) Oxygen Delivery: 25 LPM  Weight: 162 lbs 4.14 oz  General Appearance: Pleasant woman in no acute distress  Respiratory: CTAB but diminished  Cardiovascular: rrr s1, s2, no mrg  GI: soft, nontender BS normoactive  Skin: no bruising, warm and dry  Other: Muscle wasting, neuro: grossly non focal     Data   Recent Labs   Lab 03/08/22  1628 03/08/22  1155 03/08/22  0744 03/08/22  0625 03/07/22  0717 03/07/22  0600 03/06/22  0823 03/06/22  0626   WBC  --   --   --  11.9*  --  11.9*  --  14.5*   HGB  --   --   --  7.1*  --  7.0*  --  7.0*   MCV  --   --   --  98  --  95  --  93   PLT  --   --   --  345  --  283  --  301   NA  --   --   --  138  --  136  --  136   POTASSIUM  --   --   --  4.8  --  4.7  --  4.3   CHLORIDE  --   --   --  98  --  98  --  93*   CO2  --   --   --  38*  --  35*  --  41*   BUN  --   --   --  28  --  23  --   25   CR  --   --   --  0.50*  --  0.49*  --  0.52   ANIONGAP  --   --   --  2*  --  3  --  2*   MINISTERIO  --   --   --  8.3*  --  8.0*  --  8.1*   * 162* 144* 169*   < > 183*   < > 191*   ALBUMIN  --   --   --  2.2*  --  2.0*  --  1.8*   PROTTOTAL  --   --   --  5.2*  --  4.9*  --  4.8*   BILITOTAL  --   --   --  0.3  --  0.2  --  0.2   ALKPHOS  --   --   --  114  --  105  --  107   ALT  --   --   --  45  --  48  --  58*   AST  --   --   --  16  --  18  --  20    < > = values in this interval not displayed.     Recent Results (from the past 24 hour(s))   XR Chest 2 Views    Narrative    Exam: XR CHEST 2 VW, 3/8/2022 8:28 AM    Indication: chest tubes in place    Comparison: 3/7/2022    Findings:   Clamshell sternotomy wires and bilateral lung transplants. A feeding  tube courses past the level of the diaphragm, tip is seen at the  suspected location of the duodenal jejunal junction. Bibasilar and  left apically directed chest tubes remain in place. No appreciable  pneumothoraces. Unchanged cardiac size. No definite effusion.  Continued streaky bibasilar opacities. No new focal air space  opacities. Partially identified prominent loops of colon and small  bowel in the upper abdomen.      Impression    Impression:   1. Bilateral chest tubes in place. No appreciable pneumothoraces or  effusions.  2. Bibasilar atelectasis. No new focal airspace opacities.  3. Partially imaged likely adynamic ileus.    I have personally reviewed the examination and initial interpretation  and I agree with the findings.    VARGAS DUKE MD         SYSTEM ID:  [id number]

## 2022-03-08 NOTE — PROGRESS NOTES
Shift Summary ([id number]):    Neuro: patient remains alert/oriented x4. PRN oxy given for pain.     Cardiac: NSR/ST 90-110s. BP WDL. Afebrile.     Respiratory: HFNC during the day, 25L 21%. Bipap at night. Fine crackles auscultated in lungs. 3 chest tubes to -20 suction. Clamshell incision from transplant intact.     GI/: Regular diet. TF running at night, [id number] at 55 ml/hr. Voiding regularly. Up to commode with stand by assist.

## 2022-03-08 NOTE — PROGRESS NOTES
SPIRITUAL HEALTH SERVICES  SPIRITUAL ASSESSMENT Progress Note  [facility] ([place]) 6D     Referral Source: Follow-up visit     PRIMARY FOCUS:     Emotional/spiritual/Evangelical distress    Support for coping    Reviewed documentation. Reflective conversation shared with Melissa and her spouse Tyrese which integrated elements of illness and family narratives.     Melissa stated she was feeling emotional. She was teary throughout the visit. Melissa expressed feelings of bozena and gratitude. For the care she has received and for the support of others. She mentioned specifically the card she received from family.    She is excited to get to spend time with her dog when she discharges.     Plan: I will follow patient while she remains on my units.     Spiritual health services remains available for any follow-up or requests.     [facility] remains available 24/7 for emergent requests/referrals, either by having the switchboard page the on-call  or by entering an ASAP/STAT consult in Epic (this will also page the on-call )    __    Rabbi Juaquin Chaidez  Chaplain Resident  Pager [phone]

## 2022-03-08 NOTE — PROGRESS NOTES
Pulmonary Medicine  Cystic Fibrosis - Lung Transplant Team  Progress Note  2022       Patient: Melissa Elder  MRN: [id number]  : 1955 (age 66 year old)  Transplant: 2022 (Lung), POD#15  Admission date: 2022    Assessment & Plan:     Melissa Elder is a 66 year old female with a PMH significant for severe COPD, HTN, mild non-obstructive CAD, paroxysmal afib, osteoporosis, GERD, and colonic polyps.  Pt. is now s/p BSLT on 22, surgery relatively uncomplicated.  The patient was extubated , post-op course complicated by right chest tube lodged into fissure and left chest tube displacement s/p bilateral pigtail chest tube placement in IR to drain anterior hydropneumothorax and bilateral effusions, disseminated Ureaplasma, and hyperammonemia.  Routine inspection bronch on 3/1 complicated by hypoventilation in setting of oversedation requiring multiple Narcan doses.  Slow improvement in hypercapnia, continues to require NIPPV overnight with HFNC during the day.       Today's recommendations:  - Continue aggressive airway clearance  - BiPAP overnight and with daytime naps, otherwise on HFNC vs NC as tolerated, wean as able  - VBG daily in the mornings for now (and additionally prn)   - DSA, CMV, EBV, and IgG due 3/21 (not yet ordered)  - CXR daily with chest tubes  - Defer lytics to right chest tube at this time d/t size of effusion on imaging, reevaluate daily  - IR has requested to be contacted prior to removal of left axillary chest tube(s), would continue at least left basilar chest tube given Ureaplasma empyema (discussed with CVTS today)  - Repeat SNIFF test this week (when chest tubes have been removed)  - Agree with resuming diuresis today (3/7)  - Tacrolimus level 3/9 to trend, ordered.  - Prednisone taper ordered today   - Ceftriaxone through today, then transition to amoxicillin for total of 3 months course (through ) for Actinomyces treatment  - Low threshold to treat  Candida if it recurs in respiratory cultures, per transplant ID  - Valacyclovir through 3/12, then resume acyclovir prophylaxis through 3/23 per protocol  - Doxycycline and levofloxacin for Ureaplasma for 2 week course through 3/17, QTc monitoring per primary  - Donor risk labs ordered 3/23  - Recommend bowel regimen      COPD s/p bilateral sequential lung transplant (BSLT):  Acute hypoxic/hypercapneic respiratory failure:   Bilateral pleural effusions:   Right apical PTX: Scant pleural-pleural adhesions and mild-moderate bilateral hilar lymphadenopathy per op note.  Pathology with CPFE.  Pressor weaned off and pt. extubated on 2/22.  Left chest tube inadvertently dislodged, f/u chest CT (2/25) with anterior hydroPTX, right chest tube in fissure.  Left chest tube removed and replaced with 2 left-sided pigtail chest tubes by IR (2/26).  DSA negative 2/28.  Hypoxia had generally resolved, only intermittently requiring supplemental oxygen up until bronch 3/1.  S/p bronch 3/1 without evidence of dehiscence, mild amount of thin pale/tan secretions noted in RLL bronchus.  Noted hypoventilating with oversedation during bronch, received Narcan x3 with improvement in sedation but hypercapnea initially severe (>90) and persisting with very gradual improvements.  Chest CT (3/2) with improved bilateral hydroPTX, bibasilar atectasis with mucous plugging, and increased right loculated pleural effusion, s/p right pigtail chest tube (3/3, exudative, 81%N, positive for Ureaplasma as below).  Sniff test 3/3 with poor diaphragm excursion bilaterally with extensive accessory respiratory chest wall musculature effort to aid in inspiration. Repeat chest CT (3/6) with trace bilateral pneumothoraces and decreased small volume pleural effusions.    - Nebs: levalbuterol and Mucomyst QID   - Aggressive pulmonary toilet with chest physiotherapy QID as well as Aerobika and incentive spirometry q1h w/a  - BiPAP overnight and with daytime naps,  otherwise on HFNC vs NC, wean as able  - VBG daily in the mornings for now (and additionally prn)  - DSA 3/21 (not yet ordered)  - CXR daily with chest tubes, today with stable bibasilar atelectasis (personally reviewed with Dr. Steele)  - Chest tube management by CVTS: defer lytics at this time to right chest tube d/t size of effusion on imaging, reevaluate daily       * IR has requested to be contacted prior to removal of left axillary chest tubes, left basilar to remain given Ureaplasma empyema (as below), discussed with CVTS 3/8  - Repeat SNIFF test this week (when chest tubes have been removed)  - Volume management per primary team, agree with ongoing diuresis  - Post-pyloric nasal FT, TF per RD and primary team  - SLP following for diet advacement     Immunosuppression:  S/p induction therapy with basiliximab x2 doses (with high dose IV steroid).  - Tacrolimus 4 mg BID (decreased 3/7, given supratherapeutic and diltiazem increased).  Goal level 8-12. Repeat level 3/9 to trend, ordered.  - MMF 1000 mg BID (decreased 2/25 due to diarrhea)  - Prednisone 15 mg qAM/ 12.5 qPM with taper per lung transplant protocol (due 3/15, not ordered):  Date AM dose (mg) PM dose (mg)   3/1 15 15   3/8 15 12.5   3/15 12.5 12.5   3/29 12.5 10   4/12 10 10   5/10 10 7.5   6/7 7.5 7.5   7/5 7.5 5   8/2 5 5   8/30 5 2.5      Prophylaxis:   - Dapsone for PJP ppx (started 2/23 at decreased MWF dose and increased to daily 3/1, G6PD 13.3 2/21)  - Nystatin for oral candidiasis ppx, 6 month course  - See below for serologies and viral ppx:    Donor Recipient Intervention   CMV status Negative Negative CMV monthly (3/21, not yet ordered)   EBV status Positive Positive EBV at one month (3/21, not yet ordered)   HSV status N/A (Newly) Positive As below      ID: Prior history of infection/colonization with Haemophilus influenzae (2017).  Broad spectrum ABX empirically post-op with vanc and ceftaz (2/21-2/22), stopped after 24h to target known  donor cultures at that time on POD #1 per Dr. Lala (transitioned to cefazolin).  Broadened again on POD #2 with culture updates as below.  Donor ([facility]) cultures with P-S MSSA; ([facility]) with Strep mitis, Actinomyces odontolyticus, MSSA x2, and C. kruseii (concern for possible aspiration).  Recipient cultures at time of transplant with Actinomyces odonotolyticus.  Bronch cultures (2/22) with Saccharomyces cerevisiae (no indication to treat per Dr. Ghosh unless pt. acutely declines clinically, 3/1) and C. albicans.  - IgG repeat at one month (3/21, not yet ordered)  - ABX: ceftriaxone (2/23-3/8), then transition to amoxicillin for total of 3 months course (through 5/23) for Actinomyces treatment  - Low threshold to treat Candida if it recurs in respiratory cultures, per transplant ID     HSV: Pt. with recent seroconversion at time of transplant.  HSV also noted on donor cultures.  Initial plan for 30 days of ppx with ACV post-op per Dr. Lala.  Then with HSV+ bronch at time of transplant (2/21), transitioned to treatment dosing with VACV   - Valacyclovir 1000 mg TID X 14d (2/27-3/12), then resume acyclovir prophylaxis (3/13-3/23) per protocol     Hyperammonemia:   Disseminated Ureaplasma:  Had been somnolent with some confusion/visual hallucinations in setting of hypercapnia as above.  Ammonia mildly elevated on 3/2 but quickly normalized.  Ureaplasma and Mycoplasma studies sent, pt. noted to have Ureaplasma in both pleural and sputum cultures from 3/2.  Repeat ammonia level remains normal (on the higher end) on 3/4.  - Doxycycline and levofloxacin (3/4-3/17), QTc monitoring per primary  - In light of normal ammonia level, defer more aggressive interventions for hyperammonemia at this time (stopping CNI, iHD, etc.)     Leukocytosis: WBC trending upward now on POD #10, frequently noted at this stage post-op (pathology not entirely clear, but may be at least partially d/t bone marrow surge post-transplant).  Procal  moderately elevated at 0.18, but may still be somewhat non-specific at this point post-op.  BDG fugitell and Aspergillus galactomannan negative 3/3.  - Blood cultures (3/1) NGTD  - Sputum cultures (3/2) as above, otherwise NGTD  - Pleural cultures (3/2, 3/3) as above, otherwise NGTD  - ABX as above     PHS risk criteria donor: Additional labs required post-transplant (between 4-8 weeks post-op): Hepatitis B, Hepatitis C, and HIV by COLT (ordered 3/23), also plan to repeat hepatitis B surface antibody at that time (ordered) given discrepancy with recent result.     Other relevant problems managed by primary team:     Diarrhea:   Concern for ileus: Likely 2/2 medications and tube feedings.  Fiber added to tube feeds.  MMF decreased as above.  Loose stools now improved.  Will consider transition to Myfortic if loose stools increase again, deferred for now.  Again having loose/watery stools, also noting some abdominal bloating/fullness.  AXR 3/5 with diffuse distention of small and large bowel suggestive of ileus.  C diff negative 3/6.  Abdomen/pelvis CT (3/6) with decrease in small bowel distention and perienteric fat stranding. CXR (3/8) with partially imaged likely adynamic ileus.  - Recommend bowel regimen     Elevated LFTs (resolved): Rising 3/1 despite medication adjustments (APAP and statin stopped 2/27).  Of note, pt. had a discrepant hep B surface Ab at time of transplant as above.  LFTs remain elevated although improved 3/2.  Also with elevated ammonia as above.  RUQ US (3/3) with only hepatic steatosis.  LFTs normalized 3/7.     CAD: Noted to have mild non-obstructive CAD without hemodynamically significant lesions on cardiac cath 3/27/19.  PTA ASA 81 mg and atorvastatin, started on rosuvastatin post-op but held 2/28 for elevated LFTs (as above).  ASA resumed 3/1.     Paroxysmal afib:   HTN: PTA diltiazem, not on AC.  Post-op tachycardia, off pressor since 2/22.  Metoprolol started 2/23.  Persistent low level  "tachycardia, but currently sinus tach with continued HTN.  Diltiazem resumed 3/2.     GERD: Not on PPI PTA.  Negative pH/manometry study 3/28/19, noted small hiatal hernia. On PPI daily since 2/21, H2 blocker bridge discontinued 3/2, some increase in reflux symptoms since, resumed 3/5, increased to BID 3/7     We appreciate the excellent care provided by the [place] [id number] team.  Recommendations communicated via in person rounding and this note.  Will continue to follow along closely, please do not hesitate to call with any questions or concerns.     Patient discussed with Dr. Steele.     Elsa Felix, APRN, CNP   Inpatient Nurse Practitioner  Pulmonary CF/Transplant  Pager #[phone]     Subjective & Interval History:     Remains on BiPAP overnight 18/6 30%, otherwise on HFNC 21%, Tolerating weaning to RA today. No SOB at rest, Mild LUTHER improving. Infrequent cough, minimally productive. Chest tubes x3 remain with output decreasing.  Stools remain loose.  Pain well managed, using minimal oxycodone    Review of Systems:     C: No fever, no chills, + gradual decrease in weight  INTEGUMENTARY/SKIN: No rash or obvious new lesions  ENT/MOUTH: No sore throat, no sinus pain, no nasal congestion or drainage  RESP: See interval history  CV: No chest pain, no palpitations, + peripheral edema  GI: No nausea, no vomiting, no reflux symptoms  : No dysuria  MUSCULOSKELETAL: No myalgias, no arthralgias  ENDOCRINE: + blood sugars intermittently elevated  NEURO: No headache, no numbness or tingling  PSYCHIATRIC: Mood stable    Physical Exam:     All notes, images, and labs from past 24 hours (at minimum) were reviewed.    Vital signs:  Temp: 98.4  F (36.9  C) Temp src: Oral BP: 135/68 Pulse: 105   Resp: 20 SpO2: 99 % O2 Device: High Flow Nasal Cannula (HFNC) Oxygen Delivery: 25 LPM Height: 157.5 cm (5' 2\") Weight: 73.3 kg (161 lb 9.6 oz)  I/O:     Intake/Output Summary (Last 24 hours) at 3/8/2022 1217  Last data filed at " 3/8/2022 0900  Gross per 24 hour   Intake 1855 ml   Output 2540 ml   Net -685 ml     Constitutional: sitting in chair, in no apparent distress.   HEENT: Eyes with pink conjunctivae, anicteric.  Oral mucosa moist without lesions.  Nasal FT  PULM: Diminished air flow bilaterally R>L.  No crackles, no rhonchi, no wheezes.   Non-labored breathing on RA.  CV: Normal S1 and S2.  RRR.  No murmur, gallop, or rub. 2+BLE edema,  wrapped.   ABD: NABS, soft, nontender, nondistended.    MSK: Moves all extremities.  + apparent muscle wasting.   NEURO: Alert and oriented, conversant.   SKIN: Warm, dry.  No rash on limited exam. Clamshell incision not visualized  PSYCH: Mood stable.     Lines, Drains, and Devices:  Peripheral IV 03/08/22 Left;Posterior Lower forearm (Active)   Site Assessment WDL 03/08/22 1000   Line Status Infusing 03/08/22 1000   Phlebitis Scale 0-->no symptoms 03/08/22 1000   Infiltration Scale 0 03/08/22 1000   Number of days: 0     Data:     LABS    CMP:   Recent Labs   Lab 03/08/22  1155 03/08/22  0744 03/08/22  0625 03/08/22  0545 03/07/22  0717 03/07/22  0600 03/06/22  0823 03/06/22  0626 03/05/22  0801 03/05/22  0525   NA  --   --  138  --   --  136  --  136  --  136   POTASSIUM  --   --  4.8  --   --  4.7  --  4.3  --  4.8   CHLORIDE  --   --  98  --   --  98  --  93*  --  92*   CO2  --   --  38*  --   --  35*  --  41*  --  40*   ANIONGAP  --   --  2*  --   --  3  --  2*  --  4   * 144* 169* 166*   < > 183*   < > 191*   < > 202*   BUN  --   --  28  --   --  23  --  25  --  29   CR  --   --  0.50*  --   --  0.49*  --  0.52  --  0.54   GFRESTIMATED  --   --  >90  --   --  >90  --  >90  --  >90   MINISTERIO  --   --  8.3*  --   --  8.0*  --  8.1*  --  7.9*   MAG  --   --  1.7  1.8  --   --  1.6  --  1.7  --  1.6   PHOS  --   --  4.3  --   --  3.4  --  3.5  --  3.8   PROTTOTAL  --   --  5.2*  --   --  4.9*  --  4.8*  --  5.1*   ALBUMIN  --   --  2.2*  --   --  2.0*  --  1.8*  --  1.9*   BILITOTAL  --   --   0.3  --   --  0.2  --  0.2  --  0.2   ALKPHOS  --   --  114  --   --  105  --  107  --  120   AST  --   --  16  --   --  18  --  20  --  23   ALT  --   --  45  --   --  48  --  58*  --  67*    < > = values in this interval not displayed.     CBC:   Recent Labs   Lab 03/08/22  0625 03/07/22  0600 03/06/22  0626 03/05/22  0525   WBC 11.9* 11.9* 14.5* 20.4*   RBC 2.45* 2.33* 2.34* 2.50*   HGB 7.1* 7.0* 7.0* 7.5*   HCT 23.9* 22.1* 21.8* 23.5*   MCV 98 95 93 94   MCH 29.0 30.0 29.9 30.0   MCHC 29.7* 31.7 32.1 31.9   RDW 17.4* 16.1* 14.8 14.1    283 301 315       INR: No lab results found in last 7 days.    Glucose:   Recent Labs   Lab 03/08/22  1155 03/08/22  0744 03/08/22  0625 03/08/22  0545 03/08/22  0203 03/07/22  2310   * 144* 169* 166* 165* 134*       Blood Gas:   Recent Labs   Lab 03/08/22  0625 03/07/22  0600 03/06/22  0626   PHV 7.39 7.41 7.43   PCO2V 65* 62* 64*   PO2V 60* 53* 81*   HCO3V 39* 39* 42*   ARIEL 12.2* 12.6* 16.1*   O2PER 0 30 30       Culture Data No results for input(s): CULT in the last 168 hours.    Virology Data: No results found for: INFLUA, FLUAH1, FLUAH3, XB0541, IFLUB, RSVA, RSVB, PIV1, PIV2, PIV3, HMPV, HRVS, ADVBE, ADVC    Historical CMV results (last 3 of prior testing):  No results found for: CMVQNT  No results found for: CMVLOG    Urine Studies    Recent Labs   Lab Test 03/01/22  1549 02/20/22  0248   URINEPH 6.0 7.0   NITRITE Negative Negative   LEUKEST Negative Negative   WBCU 0 <1       Most Recent [place] Pulmonary Function Testing (FVC/FEV1 only)  FVC-Pre   Date Value Ref Range Status   12/20/2021 1.81 L    06/21/2021 1.46 L    12/09/2019 1.59 L    06/03/2019 1.68 L      FVC-%Pred-Pre   Date Value Ref Range Status   12/20/2021 65 %    06/21/2021 52 %    12/09/2019 56 %    06/03/2019 58 %      FEV1-Pre   Date Value Ref Range Status   12/20/2021 0.49 L    06/21/2021 0.50 L    12/09/2019 0.49 L    06/03/2019 0.47 L      FEV1-%Pred-Pre   Date Value Ref Range Status    12/20/2021 22 %    06/21/2021 22 %    12/09/2019 21 %    06/03/2019 20 %        IMAGING    Recent Results (from the past 48 hour(s))   CT Chest/Abdomen/Pelvis w Contrast    Narrative    EXAMINATION: CT CHEST/ABDOMEN/PELVIS W CONTRAST, 3/6/2022 4:40 PM    TECHNIQUE:  Helical CT images from the thoracic inlet through the  symphysis pubis were obtained  with contrast. Contrast dose: iopamidol  (ISOVUE-370) solution 100mL    COMPARISON: CT chest and CT abdomen 3/2/2022    HISTORY: Abdominal distension    FINDINGS:    Devices: Two left and one right pigtail pleural chest tubes. Feeding  tube tip is in the distal duodenum.    Lower neck/axillary: Normal thyroid. No supraclavicular or axillary  lymphadenopathy.    Mediastinum: Unchanged fluid density in the anterior  mediastinum/superior anterior pericardium. Decreased fluid and tiny  focus of air in the right infrahilar region, likely postsurgical.  Normal heart size. Mild aortic annulus calcifications. Stable  prominence right hilar lymph nodes.    Lungs: Surgical changes of bilateral lung transplant. Trace bilateral  pneumothoraces with chest tubes in place. Decreased small volume  bilateral pleural effusions with some anterior loculation on the  right. Mild bibasilar atelectasis. The central tracheobronchial tree  is clear. Interlobular septal thickening in the lingula and right  middle lobe.    Hepatobiliary: Focal low-density lesion in the right hepatic lobe near  the fossa for ligament, likely represents focal fatty infiltration  versus hemangioma. Mild periportal edema, similar to prior.    Spleen: Unremarkable.    Pancreas: Unremarkable.    Adrenals: Unremarkable.    Kidneys: Stable 1.2 cm fat density lesion in the lateral right renal  cortex, likely angiomyolipoma. No stones or hydronephrosis.    Pelvic organs: Left adnexal cyst measuring 8.2 x 5 cm, previously 8.6  x 5.2 cm. Trace free fluid in the pelvis. Unremarkable bladder.    Lymph nodes: No  lymphadenopathy.    Vessels: Moderate atherosclerotic calcifications of the abdominal  aorta and iliac arteries without aneurysmal dilation.    Bowel/mesentery: The stomach is distended, although not significantly  changed compared to 3/2/2022. Periampullary duodenal diverticulum.  Decreased small bowel distention. Mild perienteric fat stranding  associated with the proximal to mid jejunum in the left midabdomen  slightly increased since prior also partially accentuated by motion  artifact at this level. Liquid stool throughout the colon. Endoscopy  clips in the ascending and transverse colon. Normal appendix.    Bones and soft tissues: No acute or suspicious osseous abnormalities.  Clamshell sternotomy wires are intact. Slightly increased subcutaneous  edema in the abdominal wall.        Impression    IMPRESSION:   1. Decrease in small bowel distention since 3/2/2022 without evidence  of bowel obstruction. Mild perienteric fat stranding associated with  the proximal to mid jejunum in the left midabdomen, which is slightly  increased since 3/2/2022, though this may be accentuated by motion  artifact at this level. These findings may reflect an infectious or  inflammatory enteritis.  2. Persistent mild periportal edema and gastric distention.  3. Trace bilateral pneumothoraces and decreased small volume pleural  effusions with bilateral pleural chest tubes in place. Bibasilar  atelectasis.  4. Stable anterior mediastinal fluid and decreased right infrahilar  fluid and air, likely postsurgical.  5. Stable left adnexal cyst.    I have personally reviewed the examination and initial interpretation  and I agree with the findings.    CLAU PRATER DO         SYSTEM ID:  [id number]   XR Chest 2 Views    Narrative    Exam: XR CHEST 2 VW, 3/7/2022 9:16 AM    Comparison: CT chest abdomen and pelvis 3/6/2022, chest x-ray  3/6/2022.    History: interval follow up, post-op lung transplant    Findings:  PA and lateral chest.  Postoperative chest status post bilateral lung  transplant with intact clam shell sternotomy wires. Right basilar  chest tube. Left chest tubes x2. Enteric tube with tip at the expected  location of the duodenal jejunal juncture.    Trachea is midline. Mediastinum is within normal limits.  Cardiopulmonary silhouette is within normal limits. Aortic knob  calcifications. Bibasilar opacities. There is no pneumothorax or  pleural effusion. Prominent gas-filled stomach.      Impression    Impression:   1. Postoperative changes of bilateral lung transplant.  2. Decreasing bibasilar opacities likely representing resolving  bibasilar atelectasis.  3. No radiographically apparent pneumothorax with bilateral chest  tubes in place.    I have personally reviewed the examination and initial interpretation  and I agree with the findings.    FELIPA MARIE MD         SYSTEM ID:  [id number]   XR Chest 2 Views    Narrative    Exam: XR CHEST 2 VW, 3/8/2022 8:28 AM    Indication: chest tubes in place    Comparison: 3/7/2022    Findings:   Clamshell sternotomy wires and bilateral lung transplants. A feeding  tube courses past the level of the diaphragm, tip is seen at the  suspected location of the duodenal jejunal junction. Bibasilar and  left apically directed chest tubes remain in place. No appreciable  pneumothoraces. Unchanged cardiac size. No definite effusion.  Continued streaky bibasilar opacities. No new focal air space  opacities. Partially identified prominent loops of colon and small  bowel in the upper abdomen.      Impression    Impression:   1. Bilateral chest tubes in place. No appreciable pneumothoraces or  effusions.  2. Bibasilar atelectasis. No new focal airspace opacities.  3. Partially imaged likely adynamic ileus.    I have personally reviewed the examination and initial interpretation  and I agree with the findings.    VARGAS DUKE MD         SYSTEM ID:  [id number]

## 2022-03-08 NOTE — PROGRESS NOTES
Right pleural chest tube pulled without immediate complications.    Occlusive dressing must remain in place for 24 hrs; reinforce prn.  Post-pull CXR ordered; will follow for result.    Ana Phan CNP, [facility]-  Cardiothoracic Surgery  Pager [phone]      4:28 PM ADDENDUM  Post-pull CXR personally reviewed and interpreted.  Small right apical PTX.  Repeat AM CXR ordered, repeat sooner for dyspnea or worsening hypoxia.

## 2022-03-08 NOTE — PROGRESS NOTES
Major shift events: Sputum culture positive for acid fast bacilli, MD notified, IV ABX adjusted. Right anterior chest tube removed.    Neuro: A&Ox4.   Cardiac: SR/ST. VSS.   Respiratory: Sating >90% on HFNC 21% and 25LPM and room air. Fine crackles in upper lobes. Denies SOB.   GI/: Adequate urine output. BM X1  Diet/appetite: Tolerating regular diet. Eating well.  Activity:  Assist of 1, up to commode and chair.   Pain: At acceptable level on current regimen. PRN oxycodone given x1.  Skin: No new deficits noted.  LDA's: Left side chest tubes to -20 suction, minimal output. Left PIV, TKO infusing.     Plan: Continue with POC. Notify primary team with changes.

## 2022-03-08 NOTE — PROGRESS NOTES
[facility]    Transplant Infectious Diseases Inpatient Progress Note      Melissa Elder MRN# [id number]   YOB: 1955 Age: 66 year old   Date of Admission and time: 2/20/2022  1:39 AM           Recommendations:     1. Continue Doxycycline for now, duration TBD based on further cultures and clinical course    2. No indication at this time for empirical treatment for AFB    3. Will follow closely AFB for speciation and susceptibility as well as other evidence of infection rather than contamination ( clinical, radiographic and microbiologic evidence)    Thank you for involving us in the care of this patient, ID will follow, please contact us if any situation arises where we may be of clinical assistance    Signed:  Bulmaro Ly MD , 03/07/22   Infectious Disease Fellow: Pager [phone]   If no response/after hours see [facility]->MEDICINE INFECTIOUS DISEASE ADULT/[facility]        Summary of Presentation:   Transplants:  2/21/2022 (Lung), Postoperative day:  15   This patient is a 66 year old female with COPD s/p Bi lung transplant 2/2022. Induction with high dose steroids and basiliximab. On TAC/MMF/prednidonse.   Now with leukocytosis and high ammonia.       Active Problems and Infectious Diseases Issues:     #AFB in sputum 3/2/22  AFB noted in sputum 3/2/2022.  Not yet identified in any other specimen to date including bronchoscopy 3/3, 2/21, 2/22.  Single culture positive for AFB difficult to determine significance of.    Diagnosis of mycobacterial disease requires combination of: #1-growth in multiple cultures, #2 typical radiographic finding of reticulonodular infiltrates or cavities #3 clinical symptoms of excessive sputum production or signs of acute pneumonia.    At this time patient does not clearly have radiographic or microbiologic criteria for diagnosis and as such will require very close clinical follow-up and repeat sampling to further clarify need for treatment.    #Culture  for Ureaplasma positive  #Hyperammonemia syndrome  Patient with mental status decompensation in setting of elevated CO2 as well as ammonia.  Patient cultures positive for Ureaplasma.  Patient started on empirical double coverage 3/3/2022.  Cultures speciated as Ureaplasma urealyticum.  This organism is almost universally susceptible to doxycycline and does not require double coverage as Mycoplasma hominis may sometimes.      Would continue doxycycline for now while susceptibilities are in process.      Duration to be determined based on subsequent cultures, clinical progress, radiologic progression of pleural effusions.      #Leukocytosis- resolving  # Acute respiratory failure with hypercapnia.   Etiology not known. Could be from respiratory depression from hyperammonemia or due to plerual effusion causing decreased ventilatory capacity. Pulmonary following.  The patient has significant LE edema and could be due to fluid overload.     #. 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   The most significant pathogens are the MSSA and the S constellatus and are covered by ceftriaxone which she completed a 14 day course of    Unless the donor aspirated, the significance of Actinomyces sp and S mitis is questionable since they represent oral michael and they did not grow on subsequent respiratory sample the next day.   The C krusei also did not grow again.  Saccharomyces sp is not typically pathogenic.     C albicans may or may not result in empyema. Will follow on pleural fluid studies.   If with further growth on subsequent BALs, would either treat with fluconazole or inhaled ampho B.     If the clinical picture deteriorates, may change antimicrobials to unasyn and micafungin from ceftriaxone.     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 On VACV empirically.       Other Infectious Disease issues include:  - on VACV for viral ppx.   - PCP prophylaxis: dapsone.   - Serostatus: CMV D-/R-, EBV D+/R+,  HSV1+/2-, VZV +  - Immunization status: This patient received the third dose of COVID-19 vaccine on 11/29/2021.  - Gamma globulin status: 1000 as of 2/20/2022.       Bulmaro Ly MD  [facility]  Contact information available via [facility] Paging/Directory    03/08/2022         Interim History:     No major clinical changes in last 24 hours.  Patient on slightly lower flow rate of high flow nasal cannula.  Patient reports she is feeling better diarrhea is resolved.  Patient without productive cough fevers chills or sweats.  Appetite is good.  She is looking forward to working with physical therapy.  She is eager to have chest tubes removed if able.         Review of Systems:      As mentioned in the HPI otherwise negative by reviewing constitutional symptoms, central and peripheral neurological systems, respiratory system, cardiac system, GI system,  system, musculoskeletal, skin, allergy, and lymphatics.                History of Present Illness:   Transplants:  2/21/2022 (Lung), Postoperative day:  15     This patient is a 66 year old female with COPD s/p Bi lung transplant.   The hospital course was not complicated until 3/1/2022 when she suffered from respiratory deterioration with hypercapnia and mental status changes. The encephalopathy resolved when the patient was placed on Bipap. The ammonia was checked and was elevated. The patent was started on doxycycline. ID consulted for double coverage advice in the setting of QTc prolongation.   The patient remains short of breath on Bipap. No significant cough or chest pain. No fever.   The  stated that his wife's mental status are at baseline.               Physical Exam:   Temp: 98.4  F (36.9  C) Temp src: Oral BP: 135/68 Pulse: 105   Resp: 20 SpO2: 99 % O2 Device: High Flow Nasal Cannula (HFNC) Oxygen Delivery: 25 LPM    Wt Readings from Last 4 Encounters:   03/07/22 73.3 kg (161 lb 9.6 oz)   12/20/21 66.7 kg (147  lb)   06/21/21 68 kg (150 lb)   06/03/19 69.4 kg (153 lb)     Constitutional: awake, alert, cooperative, wearing high flow, appears at stated age, well nourished.   Neurologic: Patient is moving all extremities without focal deficit, no focal sensory loss.   Lungs: coarse BS bilaterally  no accessory muscle use, Chest tubes in place x3 with no tidalling or bubbing in chambers.   CVS: RRR, normal S1/S2, no murmur, PMI was not displaced.   Abdomen: non-tender, non-distended, no masses, no bruit, no shifting dullness, normal BS.   Extremities: +2 pitting edema of bilateral lower extremities, no ulcers, normal ROM of all joints, no swelling or erythema of any of joints and no tenderness to palpation.   Skin: no induration, fluctuation or discharge at the surgical site, and no rash            Data:   No results found for: ACD4    Inflammatory Markers    Recent Labs   Lab Test 03/01/22  0318   CRP 66.0*       Immune Globulin Studies     Recent Labs   Lab Test 02/20/22  0617 03/25/19  0834   IGG 1,023 901   IGM  --  265   IGE  --  22   IGA  --  190   IGG1  --  435   IGG2  --  363   IGG3  --  131   IGG4  --  32       Metabolic Studies       Recent Labs   Lab Test 03/08/22  1155 03/08/22  0744 03/08/22  0625 03/08/22  0545 03/08/22  0203 03/07/22  2310 03/07/22  2047 03/07/22  0717 03/07/22  0600 03/07/22  0002 03/06/22  1941 03/06/22  0823 03/06/22  0626 03/05/22  0801 03/05/22  0525 03/04/22  1612 03/04/22  1124 03/04/22  0741 03/04/22  0553 03/03/22  1618 03/03/22  1223 03/03/22  0743 03/03/22  0509 02/22/22  1950 02/22/22  1946   NA  --   --  138  --   --   --   --   --  136  --   --   --  136  --  136  --   --   --  136  --   --   --  140   < >  --    POTASSIUM  --   --  4.8  --   --   --   --   --  4.7  --   --   --  4.3  --  4.8  --  4.4  --  5.8*  --   --   --  4.8   < >  --    CHLORIDE  --   --  98  --   --   --   --   --  98  --   --   --  93*  --  92*  --   --   --  94  --   --   --  98   < >  --    CO2  --   --   38*  --   --   --   --   --  35*  --   --   --  41*  --  40*  --   --   --  39*  --   --   --  40*   < >  --    ANIONGAP  --   --  2*  --   --   --   --   --  3  --   --   --  2*  --  4  --   --   --  3  --   --   --  2*   < >  --    BUN  --   --  28  --   --   --   --   --  23  --   --   --  25  --  29  --   --   --  38*  --   --   --  27   < >  --    CR  --   --  0.50*  --   --   --   --   --  0.49*  --   --   --  0.52  --  0.54  --   --   --  0.52  --   --   --  0.49*   < >  --    GFRESTIMATED  --   --  >90  --   --   --   --   --  >90  --   --   --  >90  --  >90  --   --   --  >90  --   --   --  >90   < >  --    * 144* 169* 166* 165* 134*  --    < > 183*   < >  --    < > 191*   < > 202*   < >  --    < > 232*   < >  --    < > 166*   < >  --    A1C  --   --   --   --   --   --   --   --   --   --   --   --   --   --   --   --   --   --   --   --   --   --   --   --  5.7*   MINISTERIO  --   --  8.3*  --   --   --   --   --  8.0*  --   --   --  8.1*  --  7.9*  --   --   --  8.4*  --   --   --  7.8*   < >  --    PHOS  --   --  4.3  --   --   --   --   --  3.4  --   --   --  3.5  --  3.8  --   --   --  4.1  --   --   --  3.2   < >  --    MAG  --   --  1.7  1.8  --   --   --   --   --  1.6  --   --   --  1.7  --  1.6  --   --   --  2.1  --   --   --  1.9   < >  --    LACT  --   --   --   --   --   --  1.7  --   --   --  1.4  --   --    < >  --   --   --    < >  --   --   --   --  0.8   < >  --    CKT  --   --   --   --   --   --   --   --   --   --   --   --   --   --   --   --   --   --   --   --  138  --   --   --   --     < > = values in this interval not displayed.       Hepatic Studies    Recent Labs   Lab Test 03/08/22  0625 03/07/22  0600 03/06/22  0626 03/05/22  0525 03/04/22  0553 03/03/22  0509 03/02/22  0455   BILITOTAL 0.3 0.2 0.2 0.2 0.2 0.1* 0.2   ALKPHOS 114 105 107 120 131 121 153*   ALBUMIN 2.2* 2.0* 1.8* 1.9* 1.9* 1.8* 1.9*   AST 16 18 20 23 22 26 41   ALT 45 48 58* 67* 84* 99* 130*   LDH  --   --    --   --   --  293* 344*       Pancreatitis testing    Recent Labs   Lab Test 03/25/19  0834   AMYLASE 63   TRIG 83       Hematology Studies      Recent Labs   Lab Test 03/08/22  0625 03/07/22  0600 03/06/22  0626 03/05/22  0525 03/04/22  0553 03/03/22  1223 03/03/22  0631 12/09/19  0923 03/25/19  0834   WBC 11.9* 11.9* 14.5* 20.4* 19.9*  --  20.1*   < > 8.1   ANEU  --   --   --   --   --   --   --   --  6.5   ALYM  --   --   --   --   --   --   --   --  1.0   EVAN  --   --   --   --   --   --   --   --  0.4   AEOS  --   --   --   --   --   --   --   --  0.1   HGB 7.1* 7.0* 7.0* 7.5* 7.3* 8.2* 7.6*   < > 13.4   HCT 23.9* 22.1* 21.8* 23.5* 23.8*  --  24.5*   < > 41.5    283 301 315 309  --  301   < > 207    < > = values in this interval not displayed.       Clotting Studies    Recent Labs   Lab Test 02/22/22  0355 02/21/22  0808 02/21/22  0714 02/21/22  0530 02/20/22  0617 02/20/22  0617 03/25/19  0834   INR 1.31* 1.58* 5.23* 1.96*   < > 1.02 0.96   PTT  --   --  106* 29  --  31 22    < > = values in this interval not displayed.       Arterial Blood Gas Testing    Recent Labs   Lab Test 03/08/22  0625 03/07/22  0600 03/06/22  0626 03/05/22  1716 03/02/22  0455 03/01/22  2347 03/01/22  1731 02/23/22  0834 02/23/22  0347 02/22/22  1747 02/22/22  1304   PH  --   --   --   --   --  7.26* 7.34*  --  7.30* 7.33* 7.39   PCO2  --   --   --   --   --  98* 79*  --  54* 47* 35   PO2  --   --   --   --   --  96 149*  --  142* 98 184*   HCO3  --   --   --   --   --  43* 43*  --  27 25 21   O2PER 0 30 30 0   < > 40 45   < > 2 30  30 40    < > = values in this interval not displayed.        Urine Studies     Recent Labs   Lab Test 03/01/22  1549 02/20/22  0248 03/25/19  1043   URINEPH 6.0 7.0 5.0   NITRITE Negative Negative Negative   LEUKEST Negative Negative Trace*   WBCU 0 <1 1       Tacrolimus levels    Invalid input(s): TACROLIMUS, TAC, TACR  Transplant Immunosuppression Labs Latest Ref Rng & Units 3/8/2022 3/7/2022  3/6/2022 3/5/2022 3/4/2022   Creat 0.52 - 1.04 mg/dL 0.50(L) 0.49(L) 0.52 0.54 0.52   BUN 7 - 30 mg/dL 28 23 25 29 38(H)   WBC 4.0 - 11.0 10e3/uL 11.9(H) 11.9(H) 14.5(H) 20.4(H) 19.9(H)   Neutrophil % 88 86 86 89 90   ANEU 1.6 - 8.3 10e9/L - - - - -       Microbiology:  Blood cx negative.   Last check of C difficile  C Difficile Toxin B by PCR   Date Value Ref Range Status   03/06/2022 Negative Negative Final     Comment:     A negative result does not exclude actual disease due to C. difficile and may be due to improper collection, handling and storage of the specimen or the number of organisms in the specimen is below the detection limit of the assay.       Virology:  CMV viral loads  No results found for: CMVQNT, CMVRESINST, 41927, 86858, 15139, 85117, CMVQAL  CMV viral loads  No lab results found.    CMV viral loads  No results found for: CMVQNT, CMVRESINST, CMVLOG, 23111, 42153, 57628, 81857    CMV resistance testing  No lab results found.  No results found for: CMVCID, CMVFOS, CMVGAN     No results found for: H6RES    No results found for: EBVDN, EBRES, EBVDN, EBVSP, EBVPC, EBVPCR    CMV Antibody IgG   Date Value Ref Range Status   02/20/2022 No detectable antibody. No detectable antibody.  Final   03/25/2019 0.2 0.0 - 0.8 AI Final     Comment:     Negative  Antibody index (AI) values reflect qualitative changes in antibody   concentration that cannot be directly associated with clinical condition or   disease state.         Toxoplasma Antibody IgG   Date Value Ref Range Status   03/25/2019 <3.0 0.0 - 7.1 IU/mL Final     Comment:     Negative- Absence of detectable Toxoplasma gondii IgG antibodies. A negative   result does not rule out acute infection.  The magnitude of the measured result is not indicative of the amount of   antibody present. The concentrations of anti-Toxoplasma gondii IgG in a given   specimen determined with assays from different manufacturers can vary due to   differences in assay methods  and reagent specificity.           Imaging:  CT c/a/p 3/2/22   IMPRESSION:   1. No central filling defect consistent with a pulmonary embolism.   2. Improvement in the bilateral hydropneumothorax. A right-sided chest  tube is located in the right major fissure. 2 left-sided chest tubes,  one in the apex and one in the left lung base.  3. Again noted is fluid and gas collection along the right hilum,  which may represent postsurgical fluid collection. Follow as needed to  exclude infection.  4. Bibasilar atelectasis with mucus plugging. Groundglass opacities in  the bilateral lung bases again noted.  IMPRESSION: Mild periportal edema. Cystic area in left hemipelvis,  likely adnexal in origin. Fluid-filled small bowel with distended  loops of bowel suggestive of enteritis/ileitis. Recommend continued  follow-up to exclude developing obstruction. Aortoiliac  atherosclerosis. Mild periportal edema with a benign focal fatty  infiltration in the liver. Soft tissue prominence surrounding the  common hepatic artery an above above the level of the pancreatic head,  differential of inflammation/infection as opposed to neoplasm.  Bilateral lung transplant. Possible postoperative changes versus  inflammation/infection no pericardial tissues and right infrahilar  tissues with right larger than left pleural effusions with associated  atelectasis, recommend follow-up to clearing to exclude infection.  CT chest 2/25/2022  Impression:   1. Increase in size of confluent hydropneumothorax with  intercommunication between both hemithoraces anteriorly, and  pneumomediastinum. The right-sided chest tube is located within the  major fissure and therefore may be nonfunctional. The left-sided chest  tube has been retracted and is in the most inferior aspect of the  anterior costophrenic sulcus with a sidehole in the subcutaneous soft  tissues and therefore is nonfunctional.  2. Somewhat loculated appearing fluid and gas collection along  the  right hilum measures up to 2 cm this may represents postsurgical fluid  collection short interval follow-up is recommended.  3. Bibasilar atelectasis and mucous plugging.     Attestation:  I interviewed the patient and obtained history from the patient, the  who's at the bedside, and by reviewing the patient's chart including outside records, microbiological data, and radiological data. All data are summarized in this notes.    Bulmaro Ly MD  [facility]  Contact information available via [facility] Paging/Directory     03/08/2022

## 2022-03-09 ENCOUNTER — APPOINTMENT (OUTPATIENT)
Dept: GENERAL RADIOLOGY | Facility: CLINIC | Age: 67
DRG: 007 | End: 2022-03-09
Attending: INTERNAL MEDICINE
Payer: MEDICARE

## 2022-03-09 ENCOUNTER — APPOINTMENT (OUTPATIENT)
Dept: PHYSICAL THERAPY | Facility: CLINIC | Age: 67
DRG: 007 | End: 2022-03-09
Attending: SURGERY
Payer: MEDICARE

## 2022-03-09 ENCOUNTER — APPOINTMENT (OUTPATIENT)
Dept: GENERAL RADIOLOGY | Facility: CLINIC | Age: 67
DRG: 007 | End: 2022-03-09
Attending: NURSE PRACTITIONER
Payer: MEDICARE

## 2022-03-09 LAB
ALBUMIN SERPL-MCNC: 2.2 G/DL (ref 3.4–5)
ALP SERPL-CCNC: 116 U/L (ref 40–150)
ALT SERPL W P-5'-P-CCNC: 40 U/L (ref 0–50)
ANION GAP SERPL CALCULATED.3IONS-SCNC: 4 MMOL/L (ref 3–14)
AST SERPL W P-5'-P-CCNC: 13 U/L (ref 0–45)
BACTERIA PLR CULT: ABNORMAL
BACTERIA UR CULT: NORMAL
BASE EXCESS BLDV CALC-SCNC: 7 MMOL/L (ref -7.7–1.9)
BASOPHILS # BLD AUTO: 0 10E3/UL (ref 0–0.2)
BASOPHILS NFR BLD AUTO: 0 %
BILIRUB SERPL-MCNC: 0.2 MG/DL (ref 0.2–1.3)
BUN SERPL-MCNC: 31 MG/DL (ref 7–30)
CALCIUM SERPL-MCNC: 7.9 MG/DL (ref 8.5–10.1)
CHLORIDE BLD-SCNC: 98 MMOL/L (ref 94–109)
CO2 SERPL-SCNC: 34 MMOL/L (ref 20–32)
CREAT SERPL-MCNC: 0.5 MG/DL (ref 0.52–1.04)
EOSINOPHIL # BLD AUTO: 0 10E3/UL (ref 0–0.7)
EOSINOPHIL NFR BLD AUTO: 0 %
ERYTHROCYTE [DISTWIDTH] IN BLOOD BY AUTOMATED COUNT: 18.3 % (ref 10–15)
GFR SERPL CREATININE-BSD FRML MDRD: >90 ML/MIN/1.73M2
GLUCOSE BLD-MCNC: 194 MG/DL (ref 70–99)
GLUCOSE BLDC GLUCOMTR-MCNC: 142 MG/DL (ref 70–99)
GLUCOSE BLDC GLUCOMTR-MCNC: 164 MG/DL (ref 70–99)
GLUCOSE BLDC GLUCOMTR-MCNC: 171 MG/DL (ref 70–99)
GLUCOSE BLDC GLUCOMTR-MCNC: 245 MG/DL (ref 70–99)
HCO3 BLDV-SCNC: 34 MMOL/L (ref 21–28)
HCT VFR BLD AUTO: 23.2 % (ref 35–47)
HGB BLD-MCNC: 7 G/DL (ref 11.7–15.7)
IMM GRANULOCYTES # BLD: 0.1 10E3/UL
IMM GRANULOCYTES NFR BLD: 1 %
LYMPHOCYTES # BLD AUTO: 0.6 10E3/UL (ref 0.8–5.3)
LYMPHOCYTES NFR BLD AUTO: 6 %
MAGNESIUM SERPL-MCNC: 1.9 MG/DL (ref 1.6–2.3)
MCH RBC QN AUTO: 29.7 PG (ref 26.5–33)
MCHC RBC AUTO-ENTMCNC: 30.2 G/DL (ref 31.5–36.5)
MCV RBC AUTO: 98 FL (ref 78–100)
MONOCYTES # BLD AUTO: 0.5 10E3/UL (ref 0–1.3)
MONOCYTES NFR BLD AUTO: 5 %
NEUTROPHILS # BLD AUTO: 9 10E3/UL (ref 1.6–8.3)
NEUTROPHILS NFR BLD AUTO: 88 %
NRBC # BLD AUTO: 0 10E3/UL
NRBC BLD AUTO-RTO: 0 /100
O2/TOTAL GAS SETTING VFR VENT: 25 %
PCO2 BLDV: 63 MM HG (ref 40–50)
PH BLDV: 7.34 [PH] (ref 7.32–7.43)
PHOSPHATE SERPL-MCNC: 3.8 MG/DL (ref 2.5–4.5)
PLATELET # BLD AUTO: 310 10E3/UL (ref 150–450)
PO2 BLDV: 60 MM HG (ref 25–47)
POTASSIUM BLD-SCNC: 4.3 MMOL/L (ref 3.4–5.3)
PROT SERPL-MCNC: 5 G/DL (ref 6.8–8.8)
RBC # BLD AUTO: 2.36 10E6/UL (ref 3.8–5.2)
SODIUM SERPL-SCNC: 136 MMOL/L (ref 133–144)
TACROLIMUS BLD-MCNC: 11.4 UG/L (ref 5–15)
TME LAST DOSE: NORMAL H
TME LAST DOSE: NORMAL H
WBC # BLD AUTO: 10.2 10E3/UL (ref 4–11)

## 2022-03-09 PROCEDURE — 94660 CPAP INITIATION&MGMT: CPT

## 2022-03-09 PROCEDURE — 99233 SBSQ HOSP IP/OBS HIGH 50: CPT | Mod: 24 | Performed by: PHYSICIAN ASSISTANT

## 2022-03-09 PROCEDURE — 250N000013 HC RX MED GY IP 250 OP 250 PS 637: Performed by: NURSE PRACTITIONER

## 2022-03-09 PROCEDURE — 250N000011 HC RX IP 250 OP 636: Performed by: PHYSICIAN ASSISTANT

## 2022-03-09 PROCEDURE — 250N000009 HC RX 250: Performed by: SURGERY

## 2022-03-09 PROCEDURE — 87206 SMEAR FLUORESCENT/ACID STAI: CPT | Performed by: PHYSICIAN ASSISTANT

## 2022-03-09 PROCEDURE — 99221 1ST HOSP IP/OBS SF/LOW 40: CPT | Performed by: PHYSICIAN ASSISTANT

## 2022-03-09 PROCEDURE — 80053 COMPREHEN METABOLIC PANEL: CPT | Performed by: NURSE PRACTITIONER

## 2022-03-09 PROCEDURE — 250N000013 HC RX MED GY IP 250 OP 250 PS 637: Performed by: PEDIATRICS

## 2022-03-09 PROCEDURE — 250N000012 HC RX MED GY IP 250 OP 636 PS 637: Performed by: PHYSICIAN ASSISTANT

## 2022-03-09 PROCEDURE — 83735 ASSAY OF MAGNESIUM: CPT | Performed by: PEDIATRICS

## 2022-03-09 PROCEDURE — 71046 X-RAY EXAM CHEST 2 VIEWS: CPT | Mod: 26 | Performed by: RADIOLOGY

## 2022-03-09 PROCEDURE — 97110 THERAPEUTIC EXERCISES: CPT | Mod: GP

## 2022-03-09 PROCEDURE — 99233 SBSQ HOSP IP/OBS HIGH 50: CPT | Performed by: STUDENT IN AN ORGANIZED HEALTH CARE EDUCATION/TRAINING PROGRAM

## 2022-03-09 PROCEDURE — 250N000009 HC RX 250: Performed by: STUDENT IN AN ORGANIZED HEALTH CARE EDUCATION/TRAINING PROGRAM

## 2022-03-09 PROCEDURE — 85025 COMPLETE CBC W/AUTO DIFF WBC: CPT | Performed by: NURSE PRACTITIONER

## 2022-03-09 PROCEDURE — 250N000013 HC RX MED GY IP 250 OP 250 PS 637: Performed by: STUDENT IN AN ORGANIZED HEALTH CARE EDUCATION/TRAINING PROGRAM

## 2022-03-09 PROCEDURE — 94640 AIRWAY INHALATION TREATMENT: CPT | Mod: 76

## 2022-03-09 PROCEDURE — 999N000157 HC STATISTIC RCP TIME EA 10 MIN

## 2022-03-09 PROCEDURE — 250N000012 HC RX MED GY IP 250 OP 636 PS 637: Performed by: STUDENT IN AN ORGANIZED HEALTH CARE EDUCATION/TRAINING PROGRAM

## 2022-03-09 PROCEDURE — 82803 BLOOD GASES ANY COMBINATION: CPT | Performed by: NURSE PRACTITIONER

## 2022-03-09 PROCEDURE — 250N000013 HC RX MED GY IP 250 OP 250 PS 637: Performed by: PHYSICIAN ASSISTANT

## 2022-03-09 PROCEDURE — 71046 X-RAY EXAM CHEST 2 VIEWS: CPT

## 2022-03-09 PROCEDURE — 250N000013 HC RX MED GY IP 250 OP 250 PS 637: Performed by: SURGERY

## 2022-03-09 PROCEDURE — 94640 AIRWAY INHALATION TREATMENT: CPT

## 2022-03-09 PROCEDURE — 120N000002 HC R&B MED SURG/OB UMMC

## 2022-03-09 PROCEDURE — 99232 SBSQ HOSP IP/OBS MODERATE 35: CPT | Mod: 24 | Performed by: CLINICAL NURSE SPECIALIST

## 2022-03-09 PROCEDURE — 80197 ASSAY OF TACROLIMUS: CPT | Performed by: NURSE PRACTITIONER

## 2022-03-09 PROCEDURE — 250N000011 HC RX IP 250 OP 636: Performed by: STUDENT IN AN ORGANIZED HEALTH CARE EDUCATION/TRAINING PROGRAM

## 2022-03-09 PROCEDURE — 84100 ASSAY OF PHOSPHORUS: CPT | Performed by: PEDIATRICS

## 2022-03-09 PROCEDURE — 94668 MNPJ CHEST WALL SBSQ: CPT

## 2022-03-09 PROCEDURE — 250N000012 HC RX MED GY IP 250 OP 636 PS 637: Performed by: NURSE PRACTITIONER

## 2022-03-09 PROCEDURE — 71046 X-RAY EXAM CHEST 2 VIEWS: CPT | Mod: 77

## 2022-03-09 PROCEDURE — 36415 COLL VENOUS BLD VENIPUNCTURE: CPT | Performed by: NURSE PRACTITIONER

## 2022-03-09 RX ORDER — MAGNESIUM OXIDE 400 MG/1
400 TABLET ORAL 2 TIMES DAILY
Status: COMPLETED | OUTPATIENT
Start: 2022-03-09 | End: 2022-03-10

## 2022-03-09 RX ADMIN — Medication 1 TABLET: at 11:26

## 2022-03-09 RX ADMIN — ACETYLCYSTEINE 2 ML: 200 SOLUTION ORAL; RESPIRATORY (INHALATION) at 20:26

## 2022-03-09 RX ADMIN — ACETYLCYSTEINE 2 ML: 200 SOLUTION ORAL; RESPIRATORY (INHALATION) at 16:02

## 2022-03-09 RX ADMIN — METHOCARBAMOL TABLETS 500 MG: 500 TABLET, COATED ORAL at 15:24

## 2022-03-09 RX ADMIN — METHOCARBAMOL TABLETS 500 MG: 500 TABLET, COATED ORAL at 08:47

## 2022-03-09 RX ADMIN — MYCOPHENOLATE MOFETIL 1000 MG: 250 CAPSULE ORAL at 08:47

## 2022-03-09 RX ADMIN — FUROSEMIDE 40 MG: 10 INJECTION, SOLUTION INTRAMUSCULAR; INTRAVENOUS at 08:59

## 2022-03-09 RX ADMIN — DAPSONE 50 MG: 25 TABLET ORAL at 08:46

## 2022-03-09 RX ADMIN — VALACYCLOVIR HYDROCHLORIDE 1000 MG: 1 TABLET, FILM COATED ORAL at 03:40

## 2022-03-09 RX ADMIN — HEPARIN SODIUM 5000 UNITS: 5000 INJECTION, SOLUTION INTRAVENOUS; SUBCUTANEOUS at 05:43

## 2022-03-09 RX ADMIN — METHOCARBAMOL TABLETS 500 MG: 500 TABLET, COATED ORAL at 11:26

## 2022-03-09 RX ADMIN — INSULIN HUMAN 26 UNITS: 100 INJECTION, SUSPENSION SUBCUTANEOUS at 18:19

## 2022-03-09 RX ADMIN — Medication 400 MG: at 08:47

## 2022-03-09 RX ADMIN — TACROLIMUS 4 MG: 1 CAPSULE ORAL at 08:47

## 2022-03-09 RX ADMIN — MYCOPHENOLATE MOFETIL 1000 MG: 250 CAPSULE ORAL at 19:30

## 2022-03-09 RX ADMIN — FAMOTIDINE 20 MG: 20 TABLET ORAL at 08:47

## 2022-03-09 RX ADMIN — HEPARIN SODIUM 5000 UNITS: 5000 INJECTION, SOLUTION INTRAVENOUS; SUBCUTANEOUS at 13:39

## 2022-03-09 RX ADMIN — OXYCODONE HYDROCHLORIDE 5 MG: 5 TABLET ORAL at 08:54

## 2022-03-09 RX ADMIN — Medication 1 PACKET: at 08:57

## 2022-03-09 RX ADMIN — Medication 1 PACKET: at 19:31

## 2022-03-09 RX ADMIN — METOPROLOL TARTRATE 25 MG: 25 TABLET, FILM COATED ORAL at 08:58

## 2022-03-09 RX ADMIN — ACETYLCYSTEINE 2 ML: 200 SOLUTION ORAL; RESPIRATORY (INHALATION) at 11:59

## 2022-03-09 RX ADMIN — Medication 1 PACKET: at 08:51

## 2022-03-09 RX ADMIN — ACETYLCYSTEINE 2 ML: 200 SOLUTION ORAL; RESPIRATORY (INHALATION) at 09:02

## 2022-03-09 RX ADMIN — NYSTATIN 1000000 UNITS: 100000 SUSPENSION ORAL at 19:30

## 2022-03-09 RX ADMIN — DILTIAZEM HYDROCHLORIDE 60 MG: 60 TABLET ORAL at 05:43

## 2022-03-09 RX ADMIN — PREDNISONE 12.5 MG: 2.5 TABLET ORAL at 19:30

## 2022-03-09 RX ADMIN — ASPIRIN 81 MG: 81 TABLET, COATED ORAL at 08:47

## 2022-03-09 RX ADMIN — GABAPENTIN 100 MG: 100 CAPSULE ORAL at 22:04

## 2022-03-09 RX ADMIN — PREDNISONE 15 MG: 5 TABLET ORAL at 08:46

## 2022-03-09 RX ADMIN — CALCIUM CARBONATE 600 MG (1,500 MG)-VITAMIN D3 400 UNIT TABLET 1 TABLET: at 08:54

## 2022-03-09 RX ADMIN — Medication 1 PACKET: at 15:24

## 2022-03-09 RX ADMIN — OXYCODONE HYDROCHLORIDE 5 MG: 5 TABLET ORAL at 15:24

## 2022-03-09 RX ADMIN — HEPARIN SODIUM 5000 UNITS: 5000 INJECTION, SOLUTION INTRAVENOUS; SUBCUTANEOUS at 22:04

## 2022-03-09 RX ADMIN — TACROLIMUS 4 MG: 1 CAPSULE ORAL at 18:19

## 2022-03-09 RX ADMIN — FAMOTIDINE 20 MG: 20 TABLET ORAL at 19:31

## 2022-03-09 RX ADMIN — METOPROLOL TARTRATE 25 MG: 25 TABLET, FILM COATED ORAL at 19:31

## 2022-03-09 RX ADMIN — NYSTATIN 1000000 UNITS: 100000 SUSPENSION ORAL at 15:24

## 2022-03-09 RX ADMIN — CALCIUM CARBONATE 600 MG (1,500 MG)-VITAMIN D3 400 UNIT TABLET 1 TABLET: at 18:18

## 2022-03-09 RX ADMIN — METHOCARBAMOL TABLETS 500 MG: 500 TABLET, COATED ORAL at 19:31

## 2022-03-09 RX ADMIN — FUROSEMIDE 40 MG: 10 INJECTION, SOLUTION INTRAMUSCULAR; INTRAVENOUS at 13:38

## 2022-03-09 RX ADMIN — LEVALBUTEROL HYDROCHLORIDE 1.25 MG: 1.25 SOLUTION RESPIRATORY (INHALATION) at 09:02

## 2022-03-09 RX ADMIN — INSULIN ASPART 2 UNITS: 100 INJECTION, SOLUTION INTRAVENOUS; SUBCUTANEOUS at 18:27

## 2022-03-09 RX ADMIN — Medication 40 MG: at 08:58

## 2022-03-09 RX ADMIN — DILTIAZEM HYDROCHLORIDE 60 MG: 60 TABLET ORAL at 11:25

## 2022-03-09 RX ADMIN — VALACYCLOVIR HYDROCHLORIDE 1000 MG: 1 TABLET, FILM COATED ORAL at 19:30

## 2022-03-09 RX ADMIN — LORATADINE 10 MG: 10 TABLET ORAL at 08:46

## 2022-03-09 RX ADMIN — LEVALBUTEROL HYDROCHLORIDE 1.25 MG: 1.25 SOLUTION RESPIRATORY (INHALATION) at 11:59

## 2022-03-09 RX ADMIN — DILTIAZEM HYDROCHLORIDE 60 MG: 60 TABLET ORAL at 18:18

## 2022-03-09 RX ADMIN — NYSTATIN 1000000 UNITS: 100000 SUSPENSION ORAL at 11:30

## 2022-03-09 RX ADMIN — ROSUVASTATIN CALCIUM 40 MG: 10 TABLET, FILM COATED ORAL at 08:49

## 2022-03-09 RX ADMIN — AMOXICILLIN 500 MG: 500 CAPSULE ORAL at 08:49

## 2022-03-09 RX ADMIN — AMOXICILLIN 500 MG: 500 CAPSULE ORAL at 15:24

## 2022-03-09 RX ADMIN — DOCUSATE SODIUM 100 MG: 100 CAPSULE, LIQUID FILLED ORAL at 19:31

## 2022-03-09 RX ADMIN — INSULIN ASPART 4 UNITS: 100 INJECTION, SOLUTION INTRAVENOUS; SUBCUTANEOUS at 13:36

## 2022-03-09 RX ADMIN — DOXYCYCLINE 100 MG: 100 INJECTION, POWDER, LYOPHILIZED, FOR SOLUTION INTRAVENOUS at 11:26

## 2022-03-09 RX ADMIN — VALACYCLOVIR HYDROCHLORIDE 1000 MG: 1 TABLET, FILM COATED ORAL at 11:25

## 2022-03-09 RX ADMIN — Medication 40 MG: at 19:30

## 2022-03-09 RX ADMIN — NYSTATIN 1000000 UNITS: 100000 SUSPENSION ORAL at 08:55

## 2022-03-09 RX ADMIN — Medication 400 MG: at 19:30

## 2022-03-09 RX ADMIN — LEVALBUTEROL HYDROCHLORIDE 1.25 MG: 1.25 SOLUTION RESPIRATORY (INHALATION) at 16:02

## 2022-03-09 RX ADMIN — LEVALBUTEROL HYDROCHLORIDE 1.25 MG: 1.25 SOLUTION RESPIRATORY (INHALATION) at 20:26

## 2022-03-09 ASSESSMENT — ACTIVITIES OF DAILY LIVING (ADL)
ADLS_ACUITY_SCORE: 11
ADLS_ACUITY_SCORE: 11
ADLS_ACUITY_SCORE: 9
ADLS_ACUITY_SCORE: 11
ADLS_ACUITY_SCORE: 9
ADLS_ACUITY_SCORE: 9
ADLS_ACUITY_SCORE: 11
ADLS_ACUITY_SCORE: 9
ADLS_ACUITY_SCORE: 11
ADLS_ACUITY_SCORE: 11
ADLS_ACUITY_SCORE: 9
ADLS_ACUITY_SCORE: 9
ADLS_ACUITY_SCORE: 11
ADLS_ACUITY_SCORE: 9
ADLS_ACUITY_SCORE: 11
ADLS_ACUITY_SCORE: 9
ADLS_ACUITY_SCORE: 9
ADLS_ACUITY_SCORE: 11

## 2022-03-09 NOTE — PROGRESS NOTES
Calorie Count  Intake recorded for: 3/8  Total Kcals: 917 Total Protein: 40g  Kcals from Hospital Food: 917  Protein: 40g  Kcals from Outside Food (average):0 Protein: 0g  # Meals Ordered from Kitchen: 2 meals   # Meals Recorded: 2 meals (First - 100% brownie, 50% pork quesadilla w/ sour cream, hot black tea w/ lemon)      (Second - 50% coffee w/ cream & sugar, omelet w/ veggies & cheese)  # Supplements Recorded: 100% 1 Glucerna

## 2022-03-09 NOTE — CONSULTS
"Otolaryngology Consult Note  March 9, 2022      CC: Persistent hoarseness    HPI: Melissa Elder is a 66 year old female with a past medical history of HTN, HLD, paroxysmal Afib, osteoporosis, and GERD. History also significant for severe COPD previously requiring 2-3L O2 at rest. S/p bilateral lung transplant on 02/21/2022. Extubated on 02/22/2022 to O2 NC she is currently on RA. She is on a regular diet with a nasoduodenal tube also placed. She currently has 2 chest tubes placed for bilateral pleural effusions and anterior hydropneumothroax. FEES performed on 02/23/2022 showed adequate movement of both vocal cords and bilateral vascularities on posterior portion of cords and bilateral swelling/fullness on small portion of vocal fold posteriorally appreciated.     Patient reports hoarseness that began after extubation. She states it has improved and is now intermittent. It gets worse with voice use. She denies throat pain, difficulty with swallowing, or difficulty clearing her throat/secretions. She demonstrates the ability to adequately clear her throat. She also reports intermittent \"crackling\" in her ears and endorses a history of sinus issues, post-nasal drip and allergies. She denies any sinus surgery but had a childhood tonsillectomy, no other head or neck surgeries. She believes the hoarseness may be attributed to a dry mouth or post-nasal drip.      Patient declined a scope exam.    Past Medical History:   Diagnosis Date     Atrial fibrillation with RVR (H)     hx of A fib related to severe COPD, does not meet anticoagulation criteria as noted     COPD, severe (H)      Osteoporosis        Past Surgical History:   Procedure Laterality Date     BRONCHOSCOPY FLEXIBLE AND RIGID N/A 3/1/2022    Procedure: BRONCHOSCOPY INSPECTION;  Surgeon: Melissa Landin MD;  Location:  GI     CV CORONARY ANGIOGRAM N/A 3/27/2019    Procedure: CV CORONARY ANGIOGRAM;  Surgeon: Thierry Serrano MD;  Location:  HEART " CARDIAC CATH LAB     CV RIGHT HEART CATH MEASUREMENTS RECORDED N/A 3/27/2019    Procedure: CV RIGHT HEART CATH;  Surgeon: Thierry Serrano MD;  Location:  HEART CARDIAC CATH LAB     ESOPHAGEAL IMPEDENCE FUNCTION TEST WITH 24 HOUR PH GREATER THAN 1 HOUR N/A 3/28/2019    Procedure: ESOPHAGEAL IMPEDENCE FUNCTION TEST WITH 24 HOUR PH GREATER THAN 1 HOUR;  Surgeon: Mike Henry MD;  Location:  GI     EXCISE PILONIDAL CYST, SIMPLE       IR CHEST TUBE PLACEMENT NON-TUNNELED RIGHT  3/3/2022     TONSILLECTOMY & ADENOIDECTOMY       TRANSPLANT LUNG RECIPIENT SINGLE X2 Bilateral 2022    Procedure: Bilateral Sequential Lung Transplantation, Clamshell Incision, Extracorporeal Membrane Oxgenation, Bronchoscopy, Cryoablation of Intercostal Nerves;  Surgeon: Raul Robin MD;  Location:  OR       No current outpatient medications on file.          Allergies   Allergen Reactions     Alendronic Acid Other (See Comments)     dizziness  Other reaction(s): Dizziness     Nickel Rash     Sulfa Drugs Rash       Social History     Socioeconomic History     Marital status:      Spouse name: Not on file     Number of children: Not on file     Years of education: Not on file     Highest education level: Not on file   Occupational History     Not on file   Tobacco Use     Smoking status: Former Smoker     Packs/day: 1.50     Years: 36.00     Pack years: 54.00     Types: Cigarettes     Quit date: 2006     Years since quittin.1     Smokeless tobacco: Never Used   Substance and Sexual Activity     Alcohol use: Yes     Comment: stated beer and wine occ     Drug use: Yes     Comment: stated hx of marijuana, quit in      Sexual activity: Not on file   Other Topics Concern     Parent/sibling w/ CABG, MI or angioplasty before 65F 55M? Not Asked   Social History Narrative     Not on file     Social Determinants of Health     Financial Resource Strain: Not on file   Food Insecurity: Not on file  "  Transportation Needs: Not on file   Physical Activity: Not on file   Stress: Not on file   Social Connections: Not on file   Intimate Partner Violence: Not on file   Housing Stability: Not on file       Family History   Problem Relation Age of Onset     Breast Cancer Mother      Colon Cancer Mother      Lung Cancer Mother      Lung Cancer Father      Lung Cancer Maternal Aunt      Pancreatic Cancer Maternal Uncle        ROS: 6 point review of systems is negative unless noted in HPI.    PHYSICAL EXAM:  General: sitting up in a chair, no acute distress  /61 (BP Location: Right arm)   Pulse 98   Temp 98.3  F (36.8  C) (Oral)   Resp 24   Ht 1.575 m (5' 2\")   Wt 72.8 kg (160 lb 7.9 oz)   SpO2 97%   BMI 29.35 kg/m    HEAD: normocephalic, atraumatic  Face: symmetrical, CN VII intact bilaterally (HB 1), no swelling, edema, or erythema. Sensation V1-V3 intact and equal bilaterally.   Eyes: EOMI without spontaneous or gaze evoked nystagmus, clear sclera  Ears: external ear canal open, normal external appearance, no drainage  Nose: no anterior drainage, nasoduodenal tube in right nasal passage  Mouth: moist, no ulcers, tongue midline and symmetric  Oropharynx: uvula midline, no oropharyngeal erythema  Neck: no LAD, trachea midline  Neuro: cranial nerves 2-12 grossly intact  Respiratory: breathing non-labored on RA, no stridor  Skin: no rashes or skin lesions of the face/neck  Psych: pleasant affect    Patient declined flexible laryngoscopy at this time.     ROUTINE IP LABS (Last four results)  BMP  Recent Labs   Lab 03/09/22  1106 03/09/22  0738 03/09/22  0601 03/08/22  2304 03/08/22  0744 03/08/22  0625 03/07/22  0717 03/07/22  0600 03/06/22  0823 03/06/22  0626   NA  --   --  136  --   --  138  --  136  --  136   POTASSIUM  --   --  4.3  --   --  4.8  --  4.7  --  4.3   CHLORIDE  --   --  98  --   --  98  --  98  --  93*   MINISTERIO  --   --  7.9*  --   --  8.3*  --  8.0*  --  8.1*   CO2  --   --  34*  --   --  38*  " --  35*  --  41*   BUN  --   --  31*  --   --  28  --  23  --  25   CR  --   --  0.50*  --   --  0.50*  --  0.49*  --  0.52   * 171* 194* 176*   < > 169*   < > 183*   < > 191*    < > = values in this interval not displayed.     CBC  Recent Labs   Lab 03/09/22  0601 03/08/22  0625 03/07/22  0600 03/06/22  0626   WBC 10.2 11.9* 11.9* 14.5*   RBC 2.36* 2.45* 2.33* 2.34*   HGB 7.0* 7.1* 7.0* 7.0*   HCT 23.2* 23.9* 22.1* 21.8*   MCV 98 98 95 93   MCH 29.7 29.0 30.0 29.9   MCHC 30.2* 29.7* 31.7 32.1   RDW 18.3* 17.4* 16.1* 14.8    345 283 301         Assessment and Plan  Melissa Elder is a 66 year old female with a past medical history of HTN, HLD, paroxysmal Afib, osteoporosis, GERD, severe COPD and is now s/p bilateral lung transplant on 02/21/2022. Extubated on 02/22/2022. ENT was consulted to evaluate the patient for ongoing hoarseness since extubation. Patient declined laryngoscopy today, so the vocal cords were unable to be assessed. SLP evaluation post-extubation recorded bilateral cord mobility with notable swelling and vascularities. This is reassuring that she likely has full vocal cord mobility and recurrent laryngeal nerve injury from surgery/intubation is unlikely.  Hoarseness more likely to be a result of trauma from intubation. Discussed risks to recurrent laryngeal nerves from lung transplants with patient as well as risk of aspiration from non-mobile vocal cords. Patient declined scope exam and is in agreement with plan to monitor improvement and follow-up outpatient pending progress.    -Recommend hydration, humidity  -Outpatient follow-up if no improvement. Could be a candidate for voice therapy in the future if she continues to have voice issues  -Contact ENT with any further questions or concerns    -- Patient and above plan discussed with Dr Jung.    Jamilah Rogers PA-C  Otolaryngology-Head & Neck Surgery  Please page ENT with questions by dialing [phone] and entering job code [phone]  when prompted.

## 2022-03-09 NOTE — PROGRESS NOTES
[facility]    Medicine Progress Note - Hospitalist Service, GOLD TEAM 10    Date of Admission:  2/20/2022    Assessment & Plan        Melissa Elder is a 66 year old female with PMHx HTN, HLD, paroxysmal Afib, osteoporosis, GERD, severe COPD, previously requiring 2-3L NC O2 at rest.  Underwent b/l lung transplant with Dr. Robin on 02/21. Got 1u pRBC post-op, no overt bleeding source, monitoring. Extubated 02//22 to O2 NC and transferred to the floor. Pericardial drain pulled 2/24/22.  post-op course complicated by right chest tube lodged into fissure and left chest tube displacement s/p bilateral pigtail chest tube placement in IR to drain anterior hydropneumothorax and bilateral effusions, disseminated Ureaplasma, and transient hyperammonemia.  Routine inspection bronch on 3/1 complicated by hypoventilation in setting of oversedation requiring multiple Narcan doses.  Slow improvement in hypercapnia with NIPPV overnight.    Had right chest tubes pulled 3/8. Left apical chest tube pulled 3/9/2022. Awaiting further care inpatient.     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  Pressor weaned off 2/22 and pt. extubated. Prior history of infection/colonization with Haemophilus influenzae (2017).  IgG adequate (2/20).  MRSA nares negative 2/21.  Broad spectrum ABX empirically post-op with vanc and ceftaz (2/21-2/22), stopped after 24h per Dr. Lala to target known donor cultures on POD #1. Donor cultures ([facility]) with Strep mitis, Actinomyces odontolyticus, and Candida  kruseii. Recipient cultures with Actinomyces odonotolyticus.  Acid fast bacillus in sputum from 3/2/22  - Respiratory support with HFNC and BiPAP (discussed below)  - Nebs: Levalbuterol and Mucomyst QID  - Aggressive pulmonary toilet with chest physiotherapy QID as well as Aerobika and incentive spirometry q1h w/a  - Chest tubes managed by surgical and IR team  - Daily CXR while chest tubes in   - Await explant pathology  - Continue ceftriaxone for 2 weeks through 3/8 followed by amoxicillin for 3 months  - monitoring sputum for AFB  - Immune suppression and microbial ppx per daily transplant pulm note  - Continue Valtrex to 1000 mg TID x 14 days for HSV (through 3/11) then complete ppx per protocol (see pulm note)   - Continue diuresis as needed with goal to stay net negative      Acute hypoxic and hypercarbic respiratory failure 3/1, improving   3/1 noted to have slowly rising bicarb on daily labs. Had inspection bronchoscopy 3/1 as well and became oversedated and required multiple doses of narcan. VBG obtained post procedure showed hypercapnea to 99. Hypercarbic respiratory failure likely multifactorial including oversedation, poor diaphragm excursion (SNIFF test 3/3), volume overload, and atelectasis. Started on BiPAP.   - Continue BiPAP at night, HFNC/NC during the day. Wean O2 to keep sats > 92%.   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 Ammonia level checked due to concern for post-transplant hyperammonemia which was mildly elevated at 57, but repeat 49.   - Mycoplasma/ureaplasma cultures collected and Transplant ID consulted               -  Sputum/Pleural fluid +ureaplasma on 3/4. Started on Levofloxacin/Doxycycline for double coverage.   ID   - ID recommends stopping Levofloxacin (3/7), continue Doxycycline through 3/17 .   -amoxicillin for total of 3 months course (through 5/23) for Actinomyces treatment     Leukocytosis  Afebrile, but WBC started rising 2/27 from 14.6 to 23.1 3/2. Evaluation included CT chest/abdomen/pelvis without overt evidence of infection, and pan-culture. Pleural and sputum culture did grow +Ureaplasma and she was started on therapy (as above). C. Diff checked and negative. Leukocytosis has since been improving.   - Repeat CT Abdomen/Pelvis 3/6/22 for interval follow up from initial scan 3/2, as there was concern for enteritis as well as area of pancreatic/kashif-hepatic artery inflammation vs. Infection vs. malignancy.               - Repeat CT with improved small bowel dilation, some persistent perienteric fat stranding accentuated by motion artifact, and unremarkable appearing pancreas   - Continue antibiotics as above      Diarrhea   Dilated bowel on imaging   Noted to have increased loose stools 2/25 likely due to mmf and TF. Fiber added to TF. C.diff checked and negative. Abdominal imaging including CT and XR have shown diffusely dilated bowel, without concern for obstruction. Possibly ileus or gastroparesis from transplant. However patient not experiencing nausea, vomiting, or decreased stool output.   - Repeat CT 3/6/22 with interval improvement   - Continue to monitor abdominal exam, stool output   - Okay to have immodium PRN      Post op pain   - Erector spinae catheters removed   - gabapentin 100 mg nightly  - tylenol 975 mg Q8H   - Dilaudid 0.2-0.4 mg Q2H PRN   - oxycodone 5-10 mg Q4H PRN   - robaxin 500 mg Q6H scheduled      Paroxysmal Afib   HTN  She was on diltiazem prior to lung transplantation and had not been on anticoagulation. She was  Started on metoprolol in the ICU.  - Continue Metoprolol tartrate to 25 mg  BID   - Restarted PTA diltiazem now at 60 mg Q6H, if tolerated can transition back to  mg XL.   - No anticoagulation at this time      Moderate protein calorie malnutrition  - Nutrition following   - On TF, will ask about cycling overnight      Stress induced hyperglycemia  Did not need insulin prior to admission but sugars have been rising despite sliding scale coverage.  - Endocrinology consulted for assistance, appreciate recommendations   - Please see DM team daily note      HLD  Mild CAD   Noted on transplant workup. Started rosuvastatin 40 mg daily--Held 2/28 due to rising LFTs    - restarted rosuvastatin 3/8 will follow LFTs     Acute blood loss anemia  Acute blood loss thrombocytopenia  Secondary to surgery. Will continue to monitor.   - Transfuse Hgb < 7, platelets < 50   - Hemolysis labs negative 3/3, on dapsone, continue to monitor      Sternotomy  Surgical Incision  - Sternal precautions  - Postoperative incision management per protocol  - PT/OT/CR      Elevated LFTs, resolved   Noted on labs 2/26 and rising. AST, ALP, Bili WNL. No RUQ or abdominal pain. Imaging including RUQ ultrasound WNL. Liza-portal edema noted on CT, and exam shows volume overload. Could be the result of congestion versus medication adverse effect. LFTs have since normalized. Will continue to monitor.  - CMP daily   - monitoring closely with restarting statin              Diet: Regular Diet Adult Thin Liquids (level 0)  Calorie Counts  Snacks/Supplements Adult: Glucerna; Between Meals  Adult Formula Drip Feeding: Specified Time Vital 1.5; Nasoduodenal tube; Goal Rate: 55; mL/hr; From: 6:00 PM; 8:00 AM; Medication - Feeding Tube Flush Frequency: At least 15-30 mL water before and after medication administration and with tube clog...    DVT Prophylaxis: Heparin SQ  Ratliff Catheter: Not present  Central Lines: None  Cardiac Monitoring: None  Code Status: Full Code      Disposition Plan   Expected Discharge: 03/14/2022      Anticipated discharge location: home;inpatient rehabilitation facility    Delays:            The patient's care was discussed with the Bedside Nurse and Patient.    Smith Weston MD  Hospitalist Service, GOLD TEAM 10  [facility]  Securely message with the Vocera Web Console (learn more here)  Text page via [facility] Paging/Directory   Please see signed in provider for up to date coverage information      Clinically Significant Risk Factors Present on Admission                    ______________________________________________________________________    Interval History   Patient has no new symptoms, No new abdominal pain, difficulty urinating, fevers or malaise,.     Data reviewed today: I reviewed all medications, new labs and imaging results over the last 24 hours. I personally reviewed no images or EKG's today.    Physical Exam   Vital Signs: Temp: 98.6  F (37  C) Temp src: Oral BP: 120/50 Pulse: 112   Resp: 10 SpO2: 94 % O2 Device: None (Room air) Oxygen Delivery: 25 LPM  Weight: 160 lbs 7.92 oz  Constitutional: awake, alert, cooperative, no apparent distress, and appears stated age  Eyes: Lids and lashes normal, pupils equal, round and reactive to light, extra ocular muscles intact, sclera clear, conjunctiva normal  ENT: Normocephalic, without obvious abnormality, atraumatic, sinuses nontender on palpation, external ears without lesions, oral pharynx with moist mucous membranes, tonsils without erythema or exudates, gums normal and good dentition.  Hematologic / Lymphatic: no cervical lymphadenopathy  Respiratory: No increased work of breathing, good air exchange, clear to auscultation bilaterally, no crackles or wheezing  Cardiovascular: Normal apical impulse, regular rate and rhythm, normal S1 and S2, no S3 or S4, and no murmur noted  GI: No scars, normal bowel sounds, soft, non-distended, non-tender, no masses palpated, no hepatosplenomegally  Genitounirinary:    Skin: no bruising or bleeding  Musculoskeletal: There is no redness, warmth, or swelling of the joints.  Full range of motion noted.  Motor strength is 5 out of 5 all extremities bilaterally.  Tone is normal.  Neurologic: Awake, alert, oriented to name, place and time.  Cranial nerves II-XII are grossly intact.  Motor is 5 out of 5 bilaterally.  Cerebellar finger to nose, heel to shin intact.  Sensory is intact.  Babinski down going, Romberg negative, and gait is normal.  Neuropsychiatric: General: normal, calm and normal eye contact    Data   Recent Labs   Lab 03/09/22  1528 03/09/22  1106 03/09/22  0738 03/09/22  0601 03/08/22  0744 03/08/22  0625 03/07/22  0717 03/07/22  0600   WBC  --   --   --  10.2  --  11.9*  --  11.9*   HGB  --   --   --  7.0*  --  7.1*  --  7.0*   MCV  --   --   --  98  --  98  --  95   PLT  --   --   --  310  --  345  --  283   NA  --   --   --  136  --  138  --  136   POTASSIUM  --   --   --  4.3  --  4.8  --  4.7   CHLORIDE  --   --   --  98  --  98  --  98   CO2  --   --   --  34*  --  38*  --  35*   BUN  --   --   --  31*  --  28  --  23   CR  --   --   --  0.50*  --  0.50*  --  0.49*   ANIONGAP  --   --   --  4  --  2*  --  3   MINISTERIO  --   --   --  7.9*  --  8.3*  --  8.0*   * 164* 171* 194*   < > 169*   < > 183*   ALBUMIN  --   --   --  2.2*  --  2.2*  --  2.0*   PROTTOTAL  --   --   --  5.0*  --  5.2*  --  4.9*   BILITOTAL  --   --   --  0.2  --  0.3  --  0.2   ALKPHOS  --   --   --  116  --  114  --  105   ALT  --   --   --  40  --  45  --  48   AST  --   --   --  13  --  16  --  18    < > = values in this interval not displayed.     Recent Results (from the past 24 hour(s))   XR Chest 2 Views    Narrative    EXAM: XR CHEST 2 VW  3/9/2022 9:40 AM     HISTORY:  interval follow up, post-op lung transplant       COMPARISON:  Chest radiograph 3/8/2022    FINDINGS  Technique: PA and lateral chest radiographs.    Devices: Left sided basilar and apical chest tubes  redemonstrated.  Feeding tube partially visualized with tip towards the DJ flexure,  unchanged. Catheter overlying the right thorax presumably feeding tube  outside of the patient. Oxygen tubing noted. Clamshell sternotomy  wires and scattered surgical clips.    Lungs: Right pleural effusion slightly increased from prior study with  basal atelectasis. The small right pneumothorax described on prior  study is not clearly delineated on current exam. No definite left  pneumothorax. Left basilar atelectasis. Slightly prominent  interstitial markings.    Cardiovascular: Heart size is within normal limits.      Bones: Postsurgical changes from lung transplant.    No acute abnormality in the upper abdomen.      Impression    IMPRESSION:   1.  Stable changes from bilateral lung transplant.  2.  The small right apical pneumothorax is not clearly identified on  the current study. Right pleural effusion with adjacent atelectasis  has increased.  3.  Stable left chest tubes with no left pneumothorax. Left basilar  atelectasis.  4.  Slightly prominent interstitial markings may represent mild edema.  5.  Stable support devices.    SATNAM SIMON MD         SYSTEM ID:  [id number]     Medications     dextrose Stopped (03/01/22 1125)     - MEDICATION INSTRUCTIONS -       - MEDICATION INSTRUCTIONS -       - MEDICATION INSTRUCTIONS -         acetylcysteine  2 mL Nebulization 4x Daily     amoxicillin  500 mg Oral Q8H JUANA     aspirin  81 mg Oral Daily     fiber modular (NUTRISOURCE FIBER)  1 packet Per Feeding Tube BID    And     banatrol plus  1 packet Per Feeding Tube Daily at 4 pm     calcium carbonate 600 mg-vitamin D 400 units  1 tablet Oral BID w/meals     dapsone  50 mg Oral Daily     diltiazem  60 mg Oral Q6H JUANA     docusate sodium  100 mg Oral BID     doxycycline (VIBRAMYCIN) IV  100 mg Intravenous Q12H     famotidine  20 mg Oral BID     furosemide  40 mg Intravenous BID     gabapentin  100 mg Oral or Feeding Tube At Bedtime      heparin ANTICOAGULANT  5,000 Units Subcutaneous Q8H     insulin aspart  1-9 Units Subcutaneous 6x Daily     insulin aspart   Subcutaneous TID w/meals     insulin NPH  26 Units Subcutaneous Q24H     levalbuterol  1.25 mg Nebulization 4x Daily     lidocaine 3%, phenylephrine 0.25% solution for irrigation  5 mL Irrigation Once     loratadine  10 mg Oral Daily     magnesium oxide  400 mg Oral BID     methocarbamol  500 mg Oral 4x Daily     metoprolol tartrate  25 mg Oral or Feeding Tube BID     multivitamin w/minerals  1 tablet Oral Daily     mycophenolate  1,000 mg Oral BID    Or     mycophenolate  1,000 mg Oral or NG Tube BID     nystatin  1,000,000 Units Swish & Swallow 4x Daily     pantoprazole  40 mg Oral or Feeding Tube BID     predniSONE  12.5 mg Oral or Feeding Tube QPM     predniSONE  15 mg Oral or Feeding Tube Daily     protein modular  1 packet Per Feeding Tube BID     rosuvastatin  40 mg Oral Daily     sodium chloride (PF)  3 mL Intracatheter Q8H     sodium chloride (PF)  3 mL Intracatheter Q8H     tacrolimus  4 mg Oral BID IS     valACYclovir  1,000 mg Oral Q8H

## 2022-03-09 NOTE — CONSULTS
IR was consulted by patient's medicine team to discuss chest tubes.  There was previous note from CVTS stating that any change in patient's chest tube should be reviewed with interventional radiology prior to removal.    Traditionally in lung transplant patients IR is happy to assist with chest tube placement but defers to the lung transplant team with respect to management and removal of those tubes.    It a discussion with the patient's medicine team and with the lung transplant team.  Moving forward the lung transplant team will plan to manage this patient's chest tubes including removal of patient's apical chest tube as it is no longer needed.    Case discussed with the lung transplant team and the patients hospitalist team. .    Leo Mckeon PA-C  Interventional Radiology  Phone: [phone]  Pager: [phone]

## 2022-03-09 NOTE — PROGRESS NOTES
"  Cardiovascular Surgery Progress Note  03/09/2022         Assessment and Plan:     Melissa Elder is a 66 year old female with PMHx HTN, HLD, paroxysmal Afib, osteoporosis, GERD, severe COPD, previously requiring 2-3L NC O2 at rest. She underwent b/l lung transplant with Dr. Robin on 02/21. Got 1u pRBC post-op, no overt bleeding source, monitoring. Extubated 02/22 to O2 NC.     CVTS is following in consideration of the clamshell incision as well as chest tube management.     - Right pleural chest tube removed 3/8   - Post-pull CXR showed small apical PTX that has since resolved   - Left chest tubes x2 placed by IR.  Managed by IR in concert with Medicine and Pulm Transplant teams; apical tube removed today by them.   - Continue sternal precautions   - Promote good nutrition with high protein intake   - Continue OOB/activity/ambulation    Discussed with Dr. Robin through written communication.     CVTS team will continue to follow clamshell incision peripherally; please call with questions.      Ana Phan, CNP, [facility]  Cardiothoracic Surgery  Pager [phone]        Interval History:     No overnight events.  Breathing well on RA.  Up in room with assistance to managed lines/devices.  Appropriately emotional when she thinks about her journey to transplant.  Expressing gratitude for the cares she has received.         Physical Exam:   Blood pressure 120/50, pulse 112, temperature 98.6  F (37  C), temperature source Oral, resp. rate 10, height 1.575 m (5' 2\"), weight 72.8 kg (160 lb 7.9 oz), SpO2 94 %, not currently breastfeeding.  Vitals:    03/07/22 0331 03/08/22 1700 03/09/22 0736   Weight: 73.3 kg (161 lb 9.6 oz) 73.6 kg (162 lb 4.1 oz) 72.8 kg (160 lb 7.9 oz)      Gen: A&Ox4, NAD  Neuro: no focal deficits   CV: warm and well-perfused  Pulm: unlabored breathing on RA, no cough, LEFT chest tube in place with serous output  Abd: obese, nondistended  Ext: no gross deformities, BLE lymphedema wraps in " place  Incision: clean, dry, intact, no erythema, mild maceration under the right breast without dehiscence, chest tube sites without surrounding erythema or drainage, medial sites scabbing over         Data:    Imaging:  reviewed recent imaging; personally reviewed and interpreted - agree with Radiologist's read.  No acute concerns - PTX noted on yesterday's post-chest tube pull resolved.    Recent Results (from the past 24 hour(s))   XR Chest 2 Views    Narrative    EXAM: XR CHEST 2 VW  3/9/2022 9:40 AM     HISTORY:  interval follow up, post-op lung transplant       COMPARISON:  Chest radiograph 3/8/2022    FINDINGS  Technique: PA and lateral chest radiographs.    Devices: Left sided basilar and apical chest tubes redemonstrated.  Feeding tube partially visualized with tip towards the DJ flexure,  unchanged. Catheter overlying the right thorax presumably feeding tube  outside of the patient. Oxygen tubing noted. Clamshell sternotomy  wires and scattered surgical clips.    Lungs: Right pleural effusion slightly increased from prior study with  basal atelectasis. The small right pneumothorax described on prior  study is not clearly delineated on current exam. No definite left  pneumothorax. Left basilar atelectasis. Slightly prominent  interstitial markings.    Cardiovascular: Heart size is within normal limits.      Bones: Postsurgical changes from lung transplant.    No acute abnormality in the upper abdomen.      Impression    IMPRESSION:   1.  Stable changes from bilateral lung transplant.  2.  The small right apical pneumothorax is not clearly identified on  the current study. Right pleural effusion with adjacent atelectasis  has increased.  3.  Stable left chest tubes with no left pneumothorax. Left basilar  atelectasis.  4.  Slightly prominent interstitial markings may represent mild edema.  5.  Stable support devices.    SATNAM SIMON MD         SYSTEM ID:  [id number]       Labs:  BMP  Recent Labs   Lab  03/09/22  1528 03/09/22  1106 03/09/22  0738 03/09/22  0601 03/08/22  0744 03/08/22  0625 03/07/22  0717 03/07/22 0600 03/06/22 0823 03/06/22 0626   NA  --   --   --  136  --  138  --  136  --  136   POTASSIUM  --   --   --  4.3  --  4.8  --  4.7  --  4.3   CHLORIDE  --   --   --  98  --  98  --  98  --  93*   MINISTERIO  --   --   --  7.9*  --  8.3*  --  8.0*  --  8.1*   CO2  --   --   --  34*  --  38*  --  35*  --  41*   BUN  --   --   --  31*  --  28  --  23  --  25   CR  --   --   --  0.50*  --  0.50*  --  0.49*  --  0.52   * 164* 171* 194*   < > 169*   < > 183*   < > 191*    < > = values in this interval not displayed.     CBC  Recent Labs   Lab 03/09/22 0601 03/08/22 0625 03/07/22 0600 03/06/22 0626   WBC 10.2 11.9* 11.9* 14.5*   RBC 2.36* 2.45* 2.33* 2.34*   HGB 7.0* 7.1* 7.0* 7.0*   HCT 23.2* 23.9* 22.1* 21.8*   MCV 98 98 95 93   MCH 29.7 29.0 30.0 29.9   MCHC 30.2* 29.7* 31.7 32.1   RDW 18.3* 17.4* 16.1* 14.8    345 283 301     INR  No lab results found in last 7 days.   Hepatic Panel  Recent Labs   Lab 03/09/22 0601 03/08/22 0625 03/07/22 0600 03/06/22 0626   AST 13 16 18 20   ALT 40 45 48 58*   ALKPHOS 116 114 105 107   BILITOTAL 0.2 0.3 0.2 0.2   ALBUMIN 2.2* 2.2* 2.0* 1.8*     GLUCOSE:   Recent Labs   Lab 03/09/22  1528 03/09/22  1106 03/09/22  0738 03/09/22  0601 03/08/22  2304 03/08/22  2018   * 164* 171* 194* 176* 183*

## 2022-03-09 NOTE — PROGRESS NOTES
Shift Summary (9835-3129):    Neuro: patient remains alert/oriented x4. Pain well controlled, no PRN medications given.     Cardiac: normal sinus rhythm/sinus tach, 90-100s. Afebrile. BP WDL.     Respiratory: on HFNC 25L 21% during the day, Bipap 18/6 25% at night. Lung sounds clear/diminished with occasional crackles. 2 chest tubes in place, suction set to -20, minimal output. Clamshell incision intact.     GI/: Regular diet. TF running from 1277-0351 at 55 ml/hr. Voiding regularly, up to commode with stand by assist. 1 loose BM.

## 2022-03-09 NOTE — PROGRESS NOTES
Pulmonary Medicine  Cystic Fibrosis - Lung Transplant Team  Progress Note  2022       Patient: Melissa Elder  MRN: [id number]  : 1955 (age 66 year old)  Transplant: 2022 (Lung), POD#16  Admission date: 2022    Assessment & Plan:     Melissa Elder is a 66 year old female with a PMH significant for severe COPD, HTN, mild non-obstructive CAD, paroxysmal afib, osteoporosis, GERD, and colonic polyps.  Pt. is now s/p BSLT on 22, surgery relatively uncomplicated.  The patient was extubated , post-op course complicated by right chest tube lodged into fissure and left chest tube displacement s/p bilateral pigtail chest tube placement in IR to drain anterior hydropneumothorax and bilateral effusions, disseminated Ureaplasma, and transient hyperammonemia.  Routine inspection bronch on 3/1 complicated by hypoventilation in setting of oversedation requiring multiple Narcan doses.  Slow improvement in hypercapnia with NIPPV overnight and HFNC during the day.       Today's recommendations:  - Continue aggressive airway clearance  - Continue BiPAP overnight and with daytime naps  - Supplemental oxygen as needed to maintain SpO2 >92% (okay to monitor off HFNC)  - VBG daily in the mornings for now (and additionally prn)   - DSA, CMV, EBV, and IgG due 3/21 (not yet ordered)  - CXR daily with chest tubes  - Discussed left axillary chest tube with IR, okay for our team to proceed with removal today  - Continue left basilar chest tube for now given Ureaplasma empyema   - Repeat SNIFF test once all chest tubes have been removed  - Agree with ongoing diuresis  - ENT consult to evaluate hoarseness  - Tacrolimus level to trend therapeutic, no dose adjustment, repeat level ordered 3/10  - Prednisone taper due 3/15 (not yet ordered)  - Continue amoxicillin for total of 3 months course (through ) for Actinomyces treatment  - Valacyclovir through 3/12, then resume acyclovir prophylaxis through 3/23 per  protocol  - Doxycycline for Ureaplasma for minimum 2 week course through 3/17  - Follow +AFB on sputum culture 3/2, AFB sputum culture (ordered)  - Donor risk labs ordered 3/23  - Recommend bowel regimen      COPD s/p bilateral sequential lung transplant (BSLT):  Acute hypoxic/hypercapneic respiratory failure:   Bilateral pleural effusions/PTX: Scant pleural-pleural adhesions and mild-moderate bilateral hilar lymphadenopathy per op note.  Pathology with CPFE.  Pressor weaned off and pt. extubated on 2/22.  Left chest tube inadvertently dislodged, f/u chest CT (2/25) with anterior hydroPTX, right chest tube in fissure.  Left chest tube removed and replaced with 2 left-sided pigtail chest tubes by IR (2/26).  DSA negative 2/28.  Hypoxia had generally resolved, only intermittently requiring supplemental oxygen up until bronch 3/1.  S/p bronch 3/1 without evidence of dehiscence, mild amount of thin pale/tan secretions noted in RLL bronchus.  Noted hypoventilating with oversedation during bronch, received Narcan x3 with improvement in sedation but hypercapnea initially severe (>90) and persisting with very gradual improvements.  Chest CT (3/2) with improved bilateral hydroPTX, bibasilar atectasis with mucous plugging, and increased right loculated pleural effusion, s/p right pigtail chest tube (3/3, exudative, 81%N, positive for Ureaplasma as below).  Sniff test (3/3) with poor diaphragm excursion bilaterally with extensive accessory respiratory chest wall musculature effort to aid in inspiration.  Repeat chest CT (3/6) with trace bilateral pneumothoraces and decreased small volume pleural effusions.  Right chest tube removed by CVTS 3/8.  - Nebs: levalbuterol and Mucomyst QID   - Aggressive pulmonary toilet with chest physiotherapy QID as well as Aerobika and incentive spirometry q1h w/a  - BiPAP overnight and with daytime naps, otherwise on HFNC vs NC, wean as able to maintain SpO2 >92%  - VBG daily in the mornings for  now (and additionally prn)  - DSA 3/21 (not yet ordered)  - CXR daily with chest tubes, today with increased right pleural effusion with adjacent atelectasis, left basilar atelectasis, and mild pulmonary edema (personally reviewed with Dr. Steele)  - Discussed left axillary chest tube with IR, billy for our team to proceed with removal 3/9  - Continue left basilar chest tube for now given Ureaplasma empyema   - Repeat SNIFF test once chest tubes have been removed  - Volume management per primary team, agree with ongoing diuresis  - Post-pyloric nasal FT, TF per RD and primary team  - ENT consult to evaluate hoarseness     Immunosuppression:  S/p induction therapy with basiliximab x2 doses (with high dose IV steroid).  - Tacrolimus 4 mg BID (decreased 3/7, given supratherapeutic and diltiazem increased).  Goal level 8-12. Repeat level 3/9 to trend, ordered.  - MMF 1000 mg BID (decreased 2/25 due to diarrhea)  - Prednisone 15 mg qAM/ 12.5 qPM with taper per lung transplant protocol (due 3/15, not ordered):  Date AM dose (mg) PM dose (mg)   3/1 15 15   3/8 15 12.5   3/15 12.5 12.5   3/29 12.5 10   4/12 10 10   5/10 10 7.5   6/7 7.5 7.5   7/5 7.5 5   8/2 5 5   8/30 5 2.5      Prophylaxis:   - Dapsone for PJP ppx (started 2/23 at decreased MWF dose and increased to daily 3/1, G6PD 13.3 2/21)  - Nystatin for oral candidiasis ppx, 6 month course  - See below for serologies and viral ppx:    Donor Recipient Intervention   CMV status Negative Negative CMV monthly (3/21, not yet ordered)   EBV status Positive Positive EBV at one month (3/21, not yet ordered)   HSV status N/A (Newly) Positive As below      ID: Prior history of infection/colonization with Haemophilus influenzae (2017).  Broad spectrum ABX empirically post-op with vanc and ceftaz (2/21-2/22), stopped after 24h to target known donor cultures at that time on POD #1 per Dr. Lala (transitioned to cefazolin).  Broadened again on POD #2 with culture updates as below.   Donor ([facility]) cultures with P-S MSSA; ([employer]) with Strep mitis, Actinomyces odontolyticus, MSSA x2, and C. kruseii (concern for possible aspiration).  Recipient cultures at time of transplant with Actinomyces odonotolyticus.  Bronch cultures (2/22) with Saccharomyces cerevisiae (no indication to treat per Dr. Ghosh unless pt. acutely declines clinically, 3/1) and C. albicans.  - IgG repeat at one month (3/21, not yet ordered)  - ABX: ceftriaxone (2/23-3/8), then transition to amoxicillin for total of 3 months course (through 5/23) for Actinomyces treatment  - Low threshold to treat Candida if it recurs in respiratory cultures, per transplant ID     HSV: Pt. with recent seroconversion at time of transplant.  HSV also noted on donor cultures.  Initial plan for 30 days of ppx with ACV post-op per Dr. Lala.  Then with HSV+ bronch at time of transplant (2/21), transitioned to treatment dosing with VACV   - Valacyclovir 1000 mg TID X 14d (2/27-3/12), then resume acyclovir prophylaxis (3/13-3/23) per protocol     Hyperammonemia:   Disseminated Ureaplasma:  Had been somnolent with some confusion/visual hallucinations in setting of hypercapnia as above.  Ammonia mildly elevated on 3/2 but quickly normalized.  Ureaplasma and Mycoplasma studies sent, pt. noted to have Ureaplasma in both pleural and sputum cultures from 3/2.  Repeat ammonia level remains normal (on the higher end) on 3/4.  - Doxycycline and levofloxacin (3/4-3/17), QTc monitoring per primary  - In light of normal ammonia level, defer more aggressive interventions for hyperammonemia at this time (stopping CNI, iHD, etc.)    +AFB on sputum culture: Noted on sputum culture 3/2.  Transplant ID following for speciation and susceptibility testing as well as other evidence of infection.   - AFB sputum culture (ordered)  - Obtain AFB cultures on all future bronchs     Leukocytosis: WBC trending upward now on POD #10, frequently noted at this stage post-op (pathology  not entirely clear, but may be at least partially d/t bone marrow surge post-transplant).  Procal moderately elevated at 0.18, but may still be somewhat non-specific at this point post-op.  BDG fugitell and Aspergillus galactomannan negative 3/3.  - Blood cultures (3/1) NGTD  - Sputum cultures (3/2) as above, otherwise NGTD  - Pleural cultures (3/2, 3/3) as above, otherwise NGTD  - ABX as above     PHS risk criteria donor: Additional labs required post-transplant (between 4-8 weeks post-op): Hepatitis B, Hepatitis C, and HIV by COLT (ordered 3/23), also plan to repeat hepatitis B surface antibody at that time (ordered) given discrepancy with recent result.     Other relevant problems managed by primary team:     Diarrhea:   Concern for ileus: Likely 2/2 medications and tube feedings.  Fiber added to tube feeds.  MMF decreased as above.  Loose stools now improved.  Will consider transition to Myfortic if loose stools increase again, deferred for now.  Again having loose/watery stools, also noting some abdominal bloating/fullness.  AXR 3/5 with diffuse distention of small and large bowel suggestive of ileus.  C diff negative 3/6.  Abdomen/pelvis CT (3/6) with decrease in small bowel distention and perienteric fat stranding.  CXR (3/8) with partially imaged likely adynamic ileus.  - Recommend bowel regimen     Elevated LFTs, Resolved: Rising 3/1 despite medication adjustments (APAP and statin stopped 2/27).  Of note, pt. had a discrepant hep B surface Ab at time of transplant as above.  LFTs remain elevated although improved 3/2.  Also with elevated ammonia as above.  RUQ US (3/3) with only hepatic steatosis.  LFTs normalized 3/7.     CAD: Noted to have mild non-obstructive CAD without hemodynamically significant lesions on cardiac cath 3/27/19.  PTA ASA 81 mg and atorvastatin, started on rosuvastatin post-op but held 2/28 for elevated LFTs (as above).  ASA resumed 3/1.     Paroxysmal afib:   HTN: PTA diltiazem, not on  AC.  Post-op tachycardia, off pressor since 2/22.  Metoprolol started 2/23.  Persistent low level tachycardia, but currently sinus tach with continued HTN.  Diltiazem resumed 3/2.     GERD: Not on PPI PTA.  Negative pH/manometry study 3/28/19, noted small hiatal hernia. On PPI daily since 2/21, H2 blocker bridge discontinued 3/2, some increase in reflux symptoms since, resumed 3/5.    We appreciate the excellent care provided by the [place] [id number] team.  Recommendations communicated via in person rounding and this note.  Will continue to follow along closely, please do not hesitate to call with any questions or concerns.    Patient discussed with Dr. Steele.    Lola Mann PA-C  [facility]  Pulmonary CF/Transplant     Subjective & Interval History:     On RA during visit this morning, ongoing improvement in dyspnea.  Remains on BiPAP overnight, VBG with stable hypercapnia.  Occasionally able to expectorate sputum.  Received chest physiotherapy TID yesterday.  Right chest tube removed.  Left chest tubes x2 remain with minimal output.  Incisional and chest tube site pain well managed.  Appetite improving, on cycled TF.  Having loose stools.  Net negative with diuresis yesterday.     Review of Systems:     C: No fever, no chills, + gradual decrease in weight  INTEGUMENTARY/SKIN: No rash or obvious new lesions  ENT/MOUTH: + hoarse voice, no sinus pain, no nasal congestion or drainage  RESP: See interval history  CV: No chest pain, no palpitations, + peripheral edema  GI: No nausea, no vomiting, no reflux symptoms  : No dysuria  MUSCULOSKELETAL: See interval history  ENDOCRINE: Blood sugars with adequate control  NEURO: No headache, no numbness or tingling  PSYCHIATRIC: Mood stable    Physical Exam:     All notes, images, and labs from past 24 hours (at minimum) were reviewed.    Vital signs:  Temp: 98.6  F (37  C) Temp src: Oral BP: 120/50 Pulse: 112   Resp: 10 SpO2: 94 % O2 Device: None (Room air) Oxygen  "Delivery: 25 LPM Height: 157.5 cm (5' 2\") Weight: 72.8 kg (160 lb 7.9 oz)  I/O:     Intake/Output Summary (Last 24 hours) at 3/9/2022 1705  Last data filed at 3/9/2022 1500  Gross per 24 hour   Intake 1545 ml   Output 3300 ml   Net -1755 ml     Constitutional: Sitting up, in no apparent distress.   HEENT: Eyes with pink conjunctivae, anicteric.  Oral mucosa moist without lesions.  Nasal FT.  PULM: Mildly diminished air flow bilaterally.  No crackles, no rhonchi, no wheezes.  Non-labored breathing on RA.  CV: Normal S1 and S2.  Tachycardic.  No murmur, gallop, or rub.  + BLE wrapped.   ABD: Soft, nondistended.    MSK: Moves all extremities.  + muscle wasting.   NEURO: Alert, conversant.   SKIN: Warm, dry.  No rash on limited exam.  Clamshell incision not visualized.   PSYCH: Mood stable.     Lines, Drains, and Devices:  Peripheral IV 03/08/22 Left;Posterior Lower forearm (Active)   Site Assessment WDL 03/09/22 1200   Line Status Infusing 03/09/22 1200   Phlebitis Scale 0-->no symptoms 03/09/22 1200   Infiltration Scale 0 03/09/22 1200   Number of days: 1     Data:     LABS    CMP:   Recent Labs   Lab 03/09/22  1528 03/09/22  1106 03/09/22  0738 03/09/22  0601 03/08/22  0744 03/08/22  0625 03/07/22  0717 03/07/22  0600 03/06/22  0823 03/06/22  0626   NA  --   --   --  136  --  138  --  136  --  136   POTASSIUM  --   --   --  4.3  --  4.8  --  4.7  --  4.3   CHLORIDE  --   --   --  98  --  98  --  98  --  93*   CO2  --   --   --  34*  --  38*  --  35*  --  41*   ANIONGAP  --   --   --  4  --  2*  --  3  --  2*   * 164* 171* 194*   < > 169*   < > 183*   < > 191*   BUN  --   --   --  31*  --  28  --  23  --  25   CR  --   --   --  0.50*  --  0.50*  --  0.49*  --  0.52   GFRESTIMATED  --   --   --  >90  --  >90  --  >90  --  >90   MINISTERIO  --   --   --  7.9*  --  8.3*  --  8.0*  --  8.1*   MAG  --   --   --  1.9  --  1.7  1.8  --  1.6  --  1.7   PHOS  --   --   --  3.8  --  4.3  --  3.4  --  3.5   PROTTOTAL  --   --   " --  5.0*  --  5.2*  --  4.9*  --  4.8*   ALBUMIN  --   --   --  2.2*  --  2.2*  --  2.0*  --  1.8*   BILITOTAL  --   --   --  0.2  --  0.3  --  0.2  --  0.2   ALKPHOS  --   --   --  116  --  114  --  105  --  107   AST  --   --   --  13  --  16  --  18  --  20   ALT  --   --   --  40  --  45  --  48  --  58*    < > = values in this interval not displayed.     CBC:   Recent Labs   Lab 03/09/22  0601 03/08/22  0625 03/07/22  0600 03/06/22  0626   WBC 10.2 11.9* 11.9* 14.5*   RBC 2.36* 2.45* 2.33* 2.34*   HGB 7.0* 7.1* 7.0* 7.0*   HCT 23.2* 23.9* 22.1* 21.8*   MCV 98 98 95 93   MCH 29.7 29.0 30.0 29.9   MCHC 30.2* 29.7* 31.7 32.1   RDW 18.3* 17.4* 16.1* 14.8    345 283 301       INR: No lab results found in last 7 days.    Glucose:   Recent Labs   Lab 03/09/22  1528 03/09/22  1106 03/09/22  0738 03/09/22  0601 03/08/22  2304 03/08/22  2018   * 164* 171* 194* 176* 183*       Blood Gas:   Recent Labs   Lab 03/09/22  0601 03/08/22  0625 03/07/22  0600   PHV 7.34 7.39 7.41   PCO2V 63* 65* 62*   PO2V 60* 60* 53*   HCO3V 34* 39* 39*   ARIEL 7.0* 12.2* 12.6*   O2PER 25 0 30       Culture Data No results for input(s): CULT in the last 168 hours.    Virology Data: No results found for: INFLUA, FLUAH1, FLUAH3, JM2812, IFLUB, RSVA, RSVB, PIV1, PIV2, PIV3, HMPV, HRVS, ADVBE, ADVC    Historical CMV results (last 3 of prior testing):  No results found for: CMVQNT  No results found for: CMVLOG    Urine Studies    Recent Labs   Lab Test 03/01/22  1549 02/20/22  0248   URINEPH 6.0 7.0   NITRITE Negative Negative   LEUKEST Negative Negative   WBCU 0 <1       Most Recent [facility] Pulmonary Function Testing (FVC/FEV1 only)  FVC-Pre   Date Value Ref Range Status   12/20/2021 1.81 L    06/21/2021 1.46 L    12/09/2019 1.59 L    06/03/2019 1.68 L      FVC-%Pred-Pre   Date Value Ref Range Status   12/20/2021 65 %    06/21/2021 52 %    12/09/2019 56 %    06/03/2019 58 %      FEV1-Pre   Date Value Ref Range Status   12/20/2021 0.49 L     06/21/2021 0.50 L    12/09/2019 0.49 L    06/03/2019 0.47 L      FEV1-%Pred-Pre   Date Value Ref Range Status   12/20/2021 22 %    06/21/2021 22 %    12/09/2019 21 %    06/03/2019 20 %        IMAGING    Recent Results (from the past 48 hour(s))   XR Chest 2 Views    Narrative    Exam: XR CHEST 2 VW, 3/8/2022 8:28 AM    Indication: chest tubes in place    Comparison: 3/7/2022    Findings:   Clamshell sternotomy wires and bilateral lung transplants. A feeding  tube courses past the level of the diaphragm, tip is seen at the  suspected location of the duodenal jejunal junction. Bibasilar and  left apically directed chest tubes remain in place. No appreciable  pneumothoraces. Unchanged cardiac size. No definite effusion.  Continued streaky bibasilar opacities. No new focal air space  opacities. Partially identified prominent loops of colon and small  bowel in the upper abdomen.      Impression    Impression:   1. Bilateral chest tubes in place. No appreciable pneumothoraces or  effusions.  2. Bibasilar atelectasis. No new focal airspace opacities.  3. Partially imaged likely adynamic ileus.    I have personally reviewed the examination and initial interpretation  and I agree with the findings.    VARGAS DUKE MD         SYSTEM ID:  [id number]   XR Chest Port 1 View   Result Value    Radiologist flags Small right apical pneumothorax (Urgent)    Narrative    EXAM: XR CHEST PORT 1 VIEW  3/8/2022 4:04 PM     HISTORY:  s/p R CT removal       COMPARISON:  3/8/2022    TECHNIQUE: Single frontal radiograph of the chest    FINDINGS:   Removal of right lung base chest tube. Small right apical  pneumothorax. Stable left hemithorax with left apical and left base  chest tube. Stable feeding tube position. Calcification of the aortic  knob.    Midline trachea. Stable postsurgical changes of lung transplant. No  acute consolidation.      Impression    IMPRESSION:   1. Small right apical pneumothorax.  2. Otherwise stable support  devices and postoperative changes.  3. Atherosclerosis.    [Urgent Result: Small right apical pneumothorax]    Finding was identified on 3/8/2022 5:09 PM.     Dr. Phan was contacted by Dr. Ortega at 3/8/2022 5:23 PM and verbalized  understanding of the urgent finding.     I have personally reviewed the examination and initial interpretation  and I agree with the findings.    FELIPA MARIE MD         SYSTEM ID:  [id number]   XR Chest 2 Views    Narrative    EXAM: XR CHEST 2 VW  3/9/2022 9:40 AM     HISTORY:  interval follow up, post-op lung transplant       COMPARISON:  Chest radiograph 3/8/2022    FINDINGS  Technique: PA and lateral chest radiographs.    Devices: Left sided basilar and apical chest tubes redemonstrated.  Feeding tube partially visualized with tip towards the DJ flexure,  unchanged. Catheter overlying the right thorax presumably feeding tube  outside of the patient. Oxygen tubing noted. Clamshell sternotomy  wires and scattered surgical clips.    Lungs: Right pleural effusion slightly increased from prior study with  basal atelectasis. The small right pneumothorax described on prior  study is not clearly delineated on current exam. No definite left  pneumothorax. Left basilar atelectasis. Slightly prominent  interstitial markings.    Cardiovascular: Heart size is within normal limits.      Bones: Postsurgical changes from lung transplant.    No acute abnormality in the upper abdomen.      Impression    IMPRESSION:   1.  Stable changes from bilateral lung transplant.  2.  The small right apical pneumothorax is not clearly identified on  the current study. Right pleural effusion with adjacent atelectasis  has increased.  3.  Stable left chest tubes with no left pneumothorax. Left basilar  atelectasis.  4.  Slightly prominent interstitial markings may represent mild edema.  5.  Stable support devices.    SATNAM SIMON MD         SYSTEM ID:  [id number]

## 2022-03-09 NOTE — PROGRESS NOTES
Diabetes Consult Daily  Progress Note          Assessment/Plan:   Melissa Elder is a 66 year old female with PMHx HTN, HLD, paroxysmal Afib, osteoporosis, GERD, severe COPD, previously requiring 2-3L NC O2 at rest.  Underwent b/l lung transplant with Dr. Robin on 02/21  has ongoing issues with Hydropneumothorax, chest tubes in place. 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 Endocrinology is being consulted for DM management.    1) Steroid and TF induced hyperglycemia   2) Underlying pre-DM, versus steroid diabetes      Plan:     NPH q24h at 1800: increase from 22 to 26 units at start of cycled feeds (Vital 1.5 at 55) x 14 hr    Continue Novolog CHO coverage-- 1 unit per 15 grams w/ meals and snacks    Novolog  custom correction 1 per 35 mg/dL Q4h --> schedule changed to align w/ start of TF at 1800    BG monitoring TID AC, HS, 0200    Hypoglycemia protocol    PRN D10W for hypoglycemia prevention if TF stops out side of the scheduled time-- rate is  70 to replace about 70% of TF carbs        Discussed w/ pt, , RN             Interval History:     Even with prednisone decrease last night, the BGs ran a bit higher.    : Vital 1.5 @ 55 mL/hr x 14 hours/evening (770 mL) will provide: 1155 Kcals, 52 gm protein, 143 gm CHO, 5 gm fiber, and 588 mL free water.   ** same grams of carbohydrate per hour as previous TF.  TF schedule 5761-7987.      Melissa feels she is doing well with eating, if she can just get the time in between cares, visits, tests.  Several days away from [facility] still.          Pred 15 mg BID---> to 15 + 12.5 last evening  Date AM dose (mg) PM dose (mg)   3/1 15 15   3/8 15 12.5   3/15 12.5 12.5   3/29 12.5 10   4/12 10 10   5/10 10 7.5   6/7 7.5 7.5   7/5 7.5 5   8/2 5 5   8/30 5 2.5       Expected Discharge: 03/08/2022   Anticipated discharge location: home;inpatient rehabilitation facility        Recent Labs   Lab 03/09/22  1106 03/09/22  0738 03/09/22  0601  "03/08/22  2304 03/08/22 2018 03/08/22  1628   * 171* 194* 176* 183* 160*               Review of Systems:   See interval hx          Medications:     Orders Placed This Encounter      Regular Diet Adult Thin Liquids (level 0)    Physical Exam:     /61 (BP Location: Right arm)   Pulse 98   Temp 98.3  F (36.8  C) (Oral)   Resp 24   Ht 1.575 m (5' 2\")   Wt 72.8 kg (160 lb 7.9 oz)   SpO2 97%   BMI 29.35 kg/m  ;  General:  pleasant   in no distress.  bedside and supportive.  HEENT: hearing intact to conversational volume.  Lungs: unlabored respiration, no cough observed  ABD:   Skin:   MSK:    Mental status:  alert, oriented x3, communicating clearly  Psych:  calm, even mood           Data:     Lab Results   Component Value Date    A1C 5.7 02/22/2022    A1C 5.8 02/20/2022     Last Comprehensive Metabolic Panel:  Sodium   Date Value Ref Range Status   03/09/2022 136 133 - 144 mmol/L Final   06/21/2021 137 133 - 144 mmol/L Final     Potassium   Date Value Ref Range Status   03/09/2022 4.3 3.4 - 5.3 mmol/L Final   06/21/2021 3.0 (L) 3.4 - 5.3 mmol/L Final     Chloride   Date Value Ref Range Status   03/09/2022 98 94 - 109 mmol/L Final   06/21/2021 104 94 - 109 mmol/L Final     Carbon Dioxide   Date Value Ref Range Status   06/21/2021 34 (H) 20 - 32 mmol/L Final     Carbon Dioxide (CO2)   Date Value Ref Range Status   03/09/2022 34 (H) 20 - 32 mmol/L Final     Anion Gap   Date Value Ref Range Status   03/09/2022 4 3 - 14 mmol/L Final   06/21/2021 <1 (L) 3 - 14 mmol/L Final     Glucose   Date Value Ref Range Status   03/09/2022 194 (H) 70 - 99 mg/dL Final   06/21/2021 131 (H) 70 - 99 mg/dL Final     GLUCOSE BY METER POCT   Date Value Ref Range Status   03/09/2022 164 (H) 70 - 99 mg/dL Final     Urea Nitrogen   Date Value Ref Range Status   03/09/2022 31 (H) 7 - 30 mg/dL Final   06/21/2021 8 7 - 30 mg/dL Final     Creatinine   Date Value Ref Range Status   03/09/2022 0.50 (L) 0.52 - 1.04 mg/dL Final "   06/21/2021 0.56 0.52 - 1.04 mg/dL Final     GFR Estimate   Date Value Ref Range Status   03/09/2022 >90 >60 mL/min/1.73m2 Final     Comment:     Effective December 21, 2021 eGFRcr in adults is calculated using the 2021 CKD-EPI creatinine equation which includes age and gender (Ines et al., NE, DOI: 10.1056/RGEIam5829236)   06/21/2021 >90 >60 mL/min/[1.73_m2] Final     Comment:     Non  GFR Calc  Starting 12/18/2018, serum creatinine based estimated GFR (eGFR) will be   calculated using the Chronic Kidney Disease Epidemiology Collaboration   (CKD-EPI) equation.       Calcium   Date Value Ref Range Status   03/09/2022 7.9 (L) 8.5 - 10.1 mg/dL Final   06/21/2021 8.5 8.5 - 10.1 mg/dL Final     Bilirubin Total   Date Value Ref Range Status   03/09/2022 0.2 0.2 - 1.3 mg/dL Final   06/21/2021 0.3 0.2 - 1.3 mg/dL Final     Alkaline Phosphatase   Date Value Ref Range Status   03/09/2022 116 40 - 150 U/L Final   06/21/2021 92 40 - 150 U/L Final     ALT   Date Value Ref Range Status   03/09/2022 40 0 - 50 U/L Final   06/21/2021 25 0 - 50 U/L Final     AST   Date Value Ref Range Status   03/09/2022 13 0 - 45 U/L Final   06/21/2021 17 0 - 45 U/L Final       I spent a total of 25 minutes bedside and on the inpatient unit managing the glycemic care of Melissa Elder. Over 50% of my time on the unit was spent counseling the patient  and/or coordinating care regarding acute glycemic management.  See note for details.        Zita Solomonpton APRN -[id number]  To contact Endocrine Diabetes service:   From 8AM-4PM: page inpatient diabetes provider that is following the patient that day (see filed or incomplete progress notes.consult notes).  If uncertain of provider assignment: page job code [phone].  For questions or updates from 4PM-8AM: page the diabetes job code for on call fellow: [phone]    Please notify inpatient diabetes service if changes are planned to steroids, enteral feeding, parenteral feeding, or if  procedures are planned requiring prolonged NPO status.

## 2022-03-10 ENCOUNTER — APPOINTMENT (OUTPATIENT)
Dept: OCCUPATIONAL THERAPY | Facility: CLINIC | Age: 67
DRG: 007 | End: 2022-03-10
Attending: SURGERY
Payer: MEDICARE

## 2022-03-10 ENCOUNTER — APPOINTMENT (OUTPATIENT)
Dept: ULTRASOUND IMAGING | Facility: CLINIC | Age: 67
DRG: 007 | End: 2022-03-10
Attending: STUDENT IN AN ORGANIZED HEALTH CARE EDUCATION/TRAINING PROGRAM
Payer: MEDICARE

## 2022-03-10 ENCOUNTER — APPOINTMENT (OUTPATIENT)
Dept: GENERAL RADIOLOGY | Facility: CLINIC | Age: 67
DRG: 007 | End: 2022-03-10
Attending: NURSE PRACTITIONER
Payer: MEDICARE

## 2022-03-10 ENCOUNTER — APPOINTMENT (OUTPATIENT)
Dept: PHYSICAL THERAPY | Facility: CLINIC | Age: 67
DRG: 007 | End: 2022-03-10
Attending: SURGERY
Payer: MEDICARE

## 2022-03-10 ENCOUNTER — APPOINTMENT (OUTPATIENT)
Dept: GENERAL RADIOLOGY | Facility: CLINIC | Age: 67
DRG: 007 | End: 2022-03-10
Attending: PHYSICIAN ASSISTANT
Payer: MEDICARE

## 2022-03-10 LAB
ALBUMIN SERPL-MCNC: 2.4 G/DL (ref 3.4–5)
ALP SERPL-CCNC: 120 U/L (ref 40–150)
ALT SERPL W P-5'-P-CCNC: 38 U/L (ref 0–50)
ANION GAP SERPL CALCULATED.3IONS-SCNC: 4 MMOL/L (ref 3–14)
AST SERPL W P-5'-P-CCNC: 13 U/L (ref 0–45)
BASE EXCESS BLDV CALC-SCNC: 9.6 MMOL/L (ref -7.7–1.9)
BASOPHILS # BLD AUTO: 0 10E3/UL (ref 0–0.2)
BASOPHILS NFR BLD AUTO: 0 %
BILIRUB SERPL-MCNC: 0.4 MG/DL (ref 0.2–1.3)
BUN SERPL-MCNC: 29 MG/DL (ref 7–30)
CALCIUM SERPL-MCNC: 8.4 MG/DL (ref 8.5–10.1)
CHLORIDE BLD-SCNC: 100 MMOL/L (ref 94–109)
CO2 SERPL-SCNC: 32 MMOL/L (ref 20–32)
CREAT SERPL-MCNC: 0.47 MG/DL (ref 0.52–1.04)
EOSINOPHIL # BLD AUTO: 0 10E3/UL (ref 0–0.7)
EOSINOPHIL NFR BLD AUTO: 0 %
ERYTHROCYTE [DISTWIDTH] IN BLOOD BY AUTOMATED COUNT: 19.1 % (ref 10–15)
GFR SERPL CREATININE-BSD FRML MDRD: >90 ML/MIN/1.73M2
GLUCOSE BLD-MCNC: 183 MG/DL (ref 70–99)
GLUCOSE BLDC GLUCOMTR-MCNC: 126 MG/DL (ref 70–99)
GLUCOSE BLDC GLUCOMTR-MCNC: 139 MG/DL (ref 70–99)
GLUCOSE BLDC GLUCOMTR-MCNC: 144 MG/DL (ref 70–99)
GLUCOSE BLDC GLUCOMTR-MCNC: 187 MG/DL (ref 70–99)
GLUCOSE BLDC GLUCOMTR-MCNC: 194 MG/DL (ref 70–99)
GLUCOSE BLDC GLUCOMTR-MCNC: 201 MG/DL (ref 70–99)
GLUCOSE BLDC GLUCOMTR-MCNC: 240 MG/DL (ref 70–99)
HCO3 BLDV-SCNC: 36 MMOL/L (ref 21–28)
HCT VFR BLD AUTO: 27.7 % (ref 35–47)
HGB BLD-MCNC: 8.5 G/DL (ref 11.7–15.7)
IMM GRANULOCYTES # BLD: 0.2 10E3/UL
IMM GRANULOCYTES NFR BLD: 2 %
LACTATE SERPL-SCNC: 1.4 MMOL/L (ref 0.7–2)
LYMPHOCYTES # BLD AUTO: 0.8 10E3/UL (ref 0.8–5.3)
LYMPHOCYTES NFR BLD AUTO: 7 %
MAGNESIUM SERPL-MCNC: 1.6 MG/DL (ref 1.6–2.3)
MCH RBC QN AUTO: 30.6 PG (ref 26.5–33)
MCHC RBC AUTO-ENTMCNC: 30.7 G/DL (ref 31.5–36.5)
MCV RBC AUTO: 100 FL (ref 78–100)
MONOCYTES # BLD AUTO: 0.7 10E3/UL (ref 0–1.3)
MONOCYTES NFR BLD AUTO: 6 %
NEUTROPHILS # BLD AUTO: 10.6 10E3/UL (ref 1.6–8.3)
NEUTROPHILS NFR BLD AUTO: 85 %
NRBC # BLD AUTO: 0 10E3/UL
NRBC BLD AUTO-RTO: 0 /100
O2/TOTAL GAS SETTING VFR VENT: 51 %
PCO2 BLDV: 63 MM HG (ref 40–50)
PH BLDV: 7.37 [PH] (ref 7.32–7.43)
PHOSPHATE SERPL-MCNC: 3 MG/DL (ref 2.5–4.5)
PLATELET # BLD AUTO: 386 10E3/UL (ref 150–450)
PO2 BLDV: 34 MM HG (ref 25–47)
POTASSIUM BLD-SCNC: 4.1 MMOL/L (ref 3.4–5.3)
PROT SERPL-MCNC: 5.6 G/DL (ref 6.8–8.8)
RBC # BLD AUTO: 2.78 10E6/UL (ref 3.8–5.2)
SODIUM SERPL-SCNC: 136 MMOL/L (ref 133–144)
TACROLIMUS BLD-MCNC: 12.4 UG/L (ref 5–15)
TME LAST DOSE: NORMAL H
TME LAST DOSE: NORMAL H
WBC # BLD AUTO: 12.4 10E3/UL (ref 4–11)

## 2022-03-10 PROCEDURE — 84100 ASSAY OF PHOSPHORUS: CPT | Performed by: NURSE PRACTITIONER

## 2022-03-10 PROCEDURE — 82803 BLOOD GASES ANY COMBINATION: CPT | Performed by: NURSE PRACTITIONER

## 2022-03-10 PROCEDURE — 94640 AIRWAY INHALATION TREATMENT: CPT

## 2022-03-10 PROCEDURE — 94668 MNPJ CHEST WALL SBSQ: CPT

## 2022-03-10 PROCEDURE — 99233 SBSQ HOSP IP/OBS HIGH 50: CPT | Mod: 24 | Performed by: PHYSICIAN ASSISTANT

## 2022-03-10 PROCEDURE — 250N000013 HC RX MED GY IP 250 OP 250 PS 637: Performed by: NURSE PRACTITIONER

## 2022-03-10 PROCEDURE — 250N000013 HC RX MED GY IP 250 OP 250 PS 637: Performed by: PEDIATRICS

## 2022-03-10 PROCEDURE — 250N000013 HC RX MED GY IP 250 OP 250 PS 637: Performed by: SURGERY

## 2022-03-10 PROCEDURE — 85025 COMPLETE CBC W/AUTO DIFF WBC: CPT | Performed by: NURSE PRACTITIONER

## 2022-03-10 PROCEDURE — 83605 ASSAY OF LACTIC ACID: CPT | Performed by: INTERNAL MEDICINE

## 2022-03-10 PROCEDURE — 999N000128 HC STATISTIC PERIPHERAL IV START W/O US GUIDANCE

## 2022-03-10 PROCEDURE — 250N000011 HC RX IP 250 OP 636: Performed by: STUDENT IN AN ORGANIZED HEALTH CARE EDUCATION/TRAINING PROGRAM

## 2022-03-10 PROCEDURE — 999N000157 HC STATISTIC RCP TIME EA 10 MIN

## 2022-03-10 PROCEDURE — 250N000013 HC RX MED GY IP 250 OP 250 PS 637: Performed by: STUDENT IN AN ORGANIZED HEALTH CARE EDUCATION/TRAINING PROGRAM

## 2022-03-10 PROCEDURE — 99233 SBSQ HOSP IP/OBS HIGH 50: CPT | Mod: 24 | Performed by: CLINICAL NURSE SPECIALIST

## 2022-03-10 PROCEDURE — 94660 CPAP INITIATION&MGMT: CPT

## 2022-03-10 PROCEDURE — 250N000012 HC RX MED GY IP 250 OP 636 PS 637: Performed by: STUDENT IN AN ORGANIZED HEALTH CARE EDUCATION/TRAINING PROGRAM

## 2022-03-10 PROCEDURE — 250N000012 HC RX MED GY IP 250 OP 636 PS 637: Performed by: PHYSICIAN ASSISTANT

## 2022-03-10 PROCEDURE — 99233 SBSQ HOSP IP/OBS HIGH 50: CPT | Performed by: STUDENT IN AN ORGANIZED HEALTH CARE EDUCATION/TRAINING PROGRAM

## 2022-03-10 PROCEDURE — 71046 X-RAY EXAM CHEST 2 VIEWS: CPT

## 2022-03-10 PROCEDURE — 36415 COLL VENOUS BLD VENIPUNCTURE: CPT | Performed by: INTERNAL MEDICINE

## 2022-03-10 PROCEDURE — 93970 EXTREMITY STUDY: CPT | Mod: 26 | Performed by: RADIOLOGY

## 2022-03-10 PROCEDURE — 71045 X-RAY EXAM CHEST 1 VIEW: CPT | Mod: 26 | Performed by: STUDENT IN AN ORGANIZED HEALTH CARE EDUCATION/TRAINING PROGRAM

## 2022-03-10 PROCEDURE — 250N000009 HC RX 250: Performed by: SURGERY

## 2022-03-10 PROCEDURE — 250N000012 HC RX MED GY IP 250 OP 636 PS 637: Performed by: CLINICAL NURSE SPECIALIST

## 2022-03-10 PROCEDURE — 250N000009 HC RX 250: Performed by: STUDENT IN AN ORGANIZED HEALTH CARE EDUCATION/TRAINING PROGRAM

## 2022-03-10 PROCEDURE — 36415 COLL VENOUS BLD VENIPUNCTURE: CPT | Performed by: PHYSICIAN ASSISTANT

## 2022-03-10 PROCEDURE — 97110 THERAPEUTIC EXERCISES: CPT | Mod: GP

## 2022-03-10 PROCEDURE — 120N000002 HC R&B MED SURG/OB UMMC

## 2022-03-10 PROCEDURE — 80197 ASSAY OF TACROLIMUS: CPT | Performed by: PHYSICIAN ASSISTANT

## 2022-03-10 PROCEDURE — 250N000013 HC RX MED GY IP 250 OP 250 PS 637: Performed by: PHYSICIAN ASSISTANT

## 2022-03-10 PROCEDURE — 71045 X-RAY EXAM CHEST 1 VIEW: CPT

## 2022-03-10 PROCEDURE — 97530 THERAPEUTIC ACTIVITIES: CPT | Mod: GP

## 2022-03-10 PROCEDURE — 94640 AIRWAY INHALATION TREATMENT: CPT | Mod: 76

## 2022-03-10 PROCEDURE — 93970 EXTREMITY STUDY: CPT

## 2022-03-10 PROCEDURE — 83735 ASSAY OF MAGNESIUM: CPT | Performed by: NURSE PRACTITIONER

## 2022-03-10 PROCEDURE — 80053 COMPREHEN METABOLIC PANEL: CPT | Performed by: NURSE PRACTITIONER

## 2022-03-10 PROCEDURE — 250N000011 HC RX IP 250 OP 636: Performed by: PHYSICIAN ASSISTANT

## 2022-03-10 PROCEDURE — 97116 GAIT TRAINING THERAPY: CPT | Mod: GP

## 2022-03-10 PROCEDURE — 250N000012 HC RX MED GY IP 250 OP 636 PS 637: Performed by: NURSE PRACTITIONER

## 2022-03-10 PROCEDURE — 71046 X-RAY EXAM CHEST 2 VIEWS: CPT | Mod: 26 | Performed by: RADIOLOGY

## 2022-03-10 PROCEDURE — 97535 SELF CARE MNGMENT TRAINING: CPT | Mod: GO | Performed by: OCCUPATIONAL THERAPIST

## 2022-03-10 RX ORDER — FLUTICASONE PROPIONATE 50 MCG
1 SPRAY, SUSPENSION (ML) NASAL DAILY
Status: DISCONTINUED | OUTPATIENT
Start: 2022-03-10 | End: 2022-03-17 | Stop reason: HOSPADM

## 2022-03-10 RX ORDER — DOXYCYCLINE 100 MG/1
100 CAPSULE ORAL EVERY 12 HOURS SCHEDULED
Status: DISCONTINUED | OUTPATIENT
Start: 2022-03-10 | End: 2022-03-17 | Stop reason: HOSPADM

## 2022-03-10 RX ORDER — AMOXICILLIN 250 MG
1 CAPSULE ORAL 2 TIMES DAILY
Status: DISCONTINUED | OUTPATIENT
Start: 2022-03-10 | End: 2022-03-11

## 2022-03-10 RX ORDER — TACROLIMUS 1 MG/1
3 CAPSULE ORAL
Status: DISCONTINUED | OUTPATIENT
Start: 2022-03-10 | End: 2022-03-17 | Stop reason: HOSPADM

## 2022-03-10 RX ORDER — MAGNESIUM OXIDE 400 MG/1
400 TABLET ORAL
Status: DISCONTINUED | OUTPATIENT
Start: 2022-03-11 | End: 2022-03-11

## 2022-03-10 RX ADMIN — Medication 1 PACKET: at 08:51

## 2022-03-10 RX ADMIN — ACETYLCYSTEINE 2 ML: 200 SOLUTION ORAL; RESPIRATORY (INHALATION) at 19:54

## 2022-03-10 RX ADMIN — LEVALBUTEROL HYDROCHLORIDE 1.25 MG: 1.25 SOLUTION RESPIRATORY (INHALATION) at 19:55

## 2022-03-10 RX ADMIN — VALACYCLOVIR HYDROCHLORIDE 1000 MG: 1 TABLET, FILM COATED ORAL at 21:21

## 2022-03-10 RX ADMIN — TACROLIMUS 4 MG: 1 CAPSULE ORAL at 08:49

## 2022-03-10 RX ADMIN — INSULIN ASPART 3 UNITS: 100 INJECTION, SOLUTION INTRAVENOUS; SUBCUTANEOUS at 09:10

## 2022-03-10 RX ADMIN — DOXYCYCLINE 100 MG: 100 INJECTION, POWDER, LYOPHILIZED, FOR SOLUTION INTRAVENOUS at 11:51

## 2022-03-10 RX ADMIN — DOXYCYCLINE 100 MG: 100 INJECTION, POWDER, LYOPHILIZED, FOR SOLUTION INTRAVENOUS at 01:00

## 2022-03-10 RX ADMIN — LEVALBUTEROL HYDROCHLORIDE 1.25 MG: 1.25 SOLUTION RESPIRATORY (INHALATION) at 17:20

## 2022-03-10 RX ADMIN — ACETYLCYSTEINE 2 ML: 200 SOLUTION ORAL; RESPIRATORY (INHALATION) at 08:59

## 2022-03-10 RX ADMIN — DILTIAZEM HYDROCHLORIDE 60 MG: 60 TABLET ORAL at 18:23

## 2022-03-10 RX ADMIN — DILTIAZEM HYDROCHLORIDE 60 MG: 60 TABLET ORAL at 05:00

## 2022-03-10 RX ADMIN — METOPROLOL TARTRATE 25 MG: 25 TABLET, FILM COATED ORAL at 21:22

## 2022-03-10 RX ADMIN — HEPARIN SODIUM 5000 UNITS: 5000 INJECTION, SOLUTION INTRAVENOUS; SUBCUTANEOUS at 05:00

## 2022-03-10 RX ADMIN — METHOCARBAMOL TABLETS 500 MG: 500 TABLET, COATED ORAL at 21:22

## 2022-03-10 RX ADMIN — AMOXICILLIN 500 MG: 500 CAPSULE ORAL at 01:00

## 2022-03-10 RX ADMIN — METOPROLOL TARTRATE 25 MG: 25 TABLET, FILM COATED ORAL at 08:49

## 2022-03-10 RX ADMIN — Medication 40 MG: at 08:50

## 2022-03-10 RX ADMIN — MYCOPHENOLATE MOFETIL 1000 MG: 250 CAPSULE ORAL at 08:48

## 2022-03-10 RX ADMIN — FUROSEMIDE 40 MG: 10 INJECTION, SOLUTION INTRAMUSCULAR; INTRAVENOUS at 08:49

## 2022-03-10 RX ADMIN — Medication 1 TABLET: at 11:52

## 2022-03-10 RX ADMIN — INSULIN ASPART 1 UNITS: 100 INJECTION, SOLUTION INTRAVENOUS; SUBCUTANEOUS at 18:17

## 2022-03-10 RX ADMIN — Medication 1 PACKET: at 21:20

## 2022-03-10 RX ADMIN — VALACYCLOVIR HYDROCHLORIDE 1000 MG: 1 TABLET, FILM COATED ORAL at 04:53

## 2022-03-10 RX ADMIN — CALCIUM CARBONATE 600 MG (1,500 MG)-VITAMIN D3 400 UNIT TABLET 1 TABLET: at 08:49

## 2022-03-10 RX ADMIN — DAPSONE 50 MG: 25 TABLET ORAL at 08:49

## 2022-03-10 RX ADMIN — VALACYCLOVIR HYDROCHLORIDE 1000 MG: 1 TABLET, FILM COATED ORAL at 11:53

## 2022-03-10 RX ADMIN — AMOXICILLIN 500 MG: 500 CAPSULE ORAL at 15:58

## 2022-03-10 RX ADMIN — NYSTATIN 1000000 UNITS: 100000 SUSPENSION ORAL at 11:51

## 2022-03-10 RX ADMIN — Medication 400 MG: at 08:48

## 2022-03-10 RX ADMIN — NYSTATIN 1000000 UNITS: 100000 SUSPENSION ORAL at 15:57

## 2022-03-10 RX ADMIN — NYSTATIN 1000000 UNITS: 100000 SUSPENSION ORAL at 21:21

## 2022-03-10 RX ADMIN — LORATADINE 10 MG: 10 TABLET ORAL at 08:48

## 2022-03-10 RX ADMIN — MYCOPHENOLATE MOFETIL 1000 MG: 250 CAPSULE ORAL at 21:22

## 2022-03-10 RX ADMIN — FAMOTIDINE 20 MG: 20 TABLET ORAL at 08:49

## 2022-03-10 RX ADMIN — LEVALBUTEROL HYDROCHLORIDE 1.25 MG: 1.25 SOLUTION RESPIRATORY (INHALATION) at 12:35

## 2022-03-10 RX ADMIN — METHOCARBAMOL TABLETS 500 MG: 500 TABLET, COATED ORAL at 11:52

## 2022-03-10 RX ADMIN — PREDNISONE 15 MG: 5 TABLET ORAL at 08:48

## 2022-03-10 RX ADMIN — DILTIAZEM HYDROCHLORIDE 60 MG: 60 TABLET ORAL at 01:00

## 2022-03-10 RX ADMIN — ACETYLCYSTEINE 2 ML: 200 SOLUTION ORAL; RESPIRATORY (INHALATION) at 12:35

## 2022-03-10 RX ADMIN — METHOCARBAMOL TABLETS 500 MG: 500 TABLET, COATED ORAL at 08:48

## 2022-03-10 RX ADMIN — ACETYLCYSTEINE 2 ML: 200 SOLUTION ORAL; RESPIRATORY (INHALATION) at 17:20

## 2022-03-10 RX ADMIN — AMOXICILLIN 500 MG: 500 CAPSULE ORAL at 08:50

## 2022-03-10 RX ADMIN — FUROSEMIDE 40 MG: 10 INJECTION, SOLUTION INTRAMUSCULAR; INTRAVENOUS at 14:23

## 2022-03-10 RX ADMIN — NYSTATIN 1000000 UNITS: 100000 SUSPENSION ORAL at 08:48

## 2022-03-10 RX ADMIN — ACETAMINOPHEN 975 MG: 325 TABLET, FILM COATED ORAL at 12:03

## 2022-03-10 RX ADMIN — LEVALBUTEROL HYDROCHLORIDE 1.25 MG: 1.25 SOLUTION RESPIRATORY (INHALATION) at 08:59

## 2022-03-10 RX ADMIN — FAMOTIDINE 20 MG: 20 TABLET ORAL at 21:21

## 2022-03-10 RX ADMIN — GABAPENTIN 100 MG: 100 CAPSULE ORAL at 21:21

## 2022-03-10 RX ADMIN — ASPIRIN 81 MG: 81 TABLET, COATED ORAL at 08:49

## 2022-03-10 RX ADMIN — METHOCARBAMOL TABLETS 500 MG: 500 TABLET, COATED ORAL at 15:57

## 2022-03-10 RX ADMIN — TACROLIMUS 3 MG: 1 CAPSULE ORAL at 18:16

## 2022-03-10 RX ADMIN — DILTIAZEM HYDROCHLORIDE 60 MG: 60 TABLET ORAL at 11:52

## 2022-03-10 RX ADMIN — OXYCODONE HYDROCHLORIDE 5 MG: 5 TABLET ORAL at 18:16

## 2022-03-10 RX ADMIN — Medication 1 PACKET: at 15:57

## 2022-03-10 RX ADMIN — Medication 400 MG: at 21:21

## 2022-03-10 RX ADMIN — HEPARIN SODIUM 5000 UNITS: 5000 INJECTION, SOLUTION INTRAVENOUS; SUBCUTANEOUS at 14:23

## 2022-03-10 RX ADMIN — INSULIN HUMAN 26 UNITS: 100 INJECTION, SUSPENSION SUBCUTANEOUS at 18:17

## 2022-03-10 RX ADMIN — ROSUVASTATIN CALCIUM 40 MG: 10 TABLET, FILM COATED ORAL at 08:50

## 2022-03-10 RX ADMIN — Medication 40 MG: at 21:21

## 2022-03-10 RX ADMIN — CALCIUM CARBONATE 600 MG (1,500 MG)-VITAMIN D3 400 UNIT TABLET 1 TABLET: at 18:16

## 2022-03-10 RX ADMIN — DOXYCYCLINE HYCLATE 100 MG: 100 CAPSULE ORAL at 21:21

## 2022-03-10 RX ADMIN — PREDNISONE 12.5 MG: 2.5 TABLET ORAL at 21:21

## 2022-03-10 RX ADMIN — HEPARIN SODIUM 5000 UNITS: 5000 INJECTION, SOLUTION INTRAVENOUS; SUBCUTANEOUS at 21:21

## 2022-03-10 RX ADMIN — ACETAMINOPHEN 975 MG: 325 TABLET, FILM COATED ORAL at 21:21

## 2022-03-10 ASSESSMENT — ACTIVITIES OF DAILY LIVING (ADL)
ADLS_ACUITY_SCORE: 11
ADLS_ACUITY_SCORE: 9
ADLS_ACUITY_SCORE: 11
ADLS_ACUITY_SCORE: 9
ADLS_ACUITY_SCORE: 11
ADLS_ACUITY_SCORE: 9
ADLS_ACUITY_SCORE: 9
ADLS_ACUITY_SCORE: 11
ADLS_ACUITY_SCORE: 9
ADLS_ACUITY_SCORE: 11
ADLS_ACUITY_SCORE: 9
ADLS_ACUITY_SCORE: 11
ADLS_ACUITY_SCORE: 9
ADLS_ACUITY_SCORE: 11
ADLS_ACUITY_SCORE: 9
ADLS_ACUITY_SCORE: 9

## 2022-03-10 NOTE — PROGRESS NOTES
"  Cardiovascular Surgery Progress Note  03/10/2022         Assessment and Plan:     Melissa Elder is a 66 year old female with PMHx HTN, HLD, paroxysmal Afib, osteoporosis, GERD, severe COPD, previously requiring 2-3L NC O2 at rest. She underwent b/l lung transplant with Dr. Robin on 02/21. Got 1u pRBC post-op, no overt bleeding source, monitoring. Extubated 02/22 to O2 NC.     CVTS is following in consideration of the clamshell incision.     - Right pleural chest tube removed 3/8   - Post-pull CXR showed small apical PTX that has since resolved   - Left chest tubes x2 placed by IR.  Managed by IR in concert with Medicine and Pulm Transplant teams. Apical tube removed 3/9 by them; basilar tube remains in place at this time.   - Clamshell incision slightly macerated under right breast, no drainage or evidence of wound dehiscence or infection.  Appreciate excellent nursing cares; continue daily wound cleanser, dry wound thoroughly and place InterDry to breast creases at HS and prn for moisture management.   - Continue sternal precautions   - Promote good nutrition with high protein intake   - Continue OOB/activity/ambulation    Discussed with Dr. Robin through written communication.     CVTS team will continue to follow clamshell incision peripherally; please call with questions.      Ana Phan, CNP, ACN-AG  Cardiothoracic Surgery  Pager [phone]        Interval History:     No overnight events.  Breathing well on RA, denies dyspnea.  Up in room with assistance.  Pain well controlled.  Denies sternal popping/clicking/snapping.         Physical Exam:   Blood pressure 133/51, pulse 101, temperature 97.8  F (36.6  C), temperature source Oral, resp. rate 23, height 1.575 m (5' 2\"), weight 72.8 kg (160 lb 7.9 oz), SpO2 97 %, not currently breastfeeding.  Vitals:    03/07/22 0331 03/08/22 1700 03/09/22 0736   Weight: 73.3 kg (161 lb 9.6 oz) 73.6 kg (162 lb 4.1 oz) 72.8 kg (160 lb 7.9 oz)      Gen: A&Ox4, " NAD, pleasant, calm, conversant  Neuro: no focal deficits   CV: warm and well-perfused  Pulm: unlabored breathing on RA, no cough, left chest tube in place with very small amount of serous output  Abd: obese, nondistended  Ext: no gross deformities, UMANZOR  Incision: clean, intact, no erythema or drainage, mild maceration under the right breast without dehiscence, chest tube sites without surrounding erythema or drainage, medial sites scabbing over         Data:    Imaging:  reviewed recent imaging, no acute concerns     Recent Results (from the past 24 hour(s))   XR Chest 2 Views    Narrative    EXAM: XR CHEST 2 VW  3/9/2022 8:04 PM     HISTORY:  F/u chest tube removal       COMPARISON:  3/9/2022    TECHNIQUE: PA and lateral radiographs of the chest    FINDINGS:   Interval removal of left apical chest tube. Stable left basilar chest  tube.    Midline trachea. Stable postsurgical changes in lung transplant.  Distinct pulmonary vasculature. No consolidation, pleural effusion or  appreciable pneumothorax.      Impression    IMPRESSION: No appreciable pneumothorax post chest tube removal.     I have personally reviewed the examination and initial interpretation  and I agree with the findings.    MURIEL WALLACE MD         SYSTEM ID:  [id number]       Labs:  [facility]  Recent Labs   Lab 03/10/22  0806 03/10/22  0801 03/10/22  0457 03/10/22  0106 03/09/22  0738 03/09/22  0601 03/08/22  0744 03/08/22  0625 03/07/22  0717 03/07/22  0600     --   --   --   --  136  --  138  --  136   POTASSIUM 4.1  --   --   --   --  4.3  --  4.8  --  4.7   CHLORIDE 100  --   --   --   --  98  --  98  --  98   MINISTERIO 8.4*  --   --   --   --  7.9*  --  8.3*  --  8.0*   CO2 32  --   --   --   --  34*  --  38*  --  35*   BUN 29  --   --   --   --  31*  --  28  --  23   CR 0.47*  --   --   --   --  0.50*  --  0.50*  --  0.49*   * 187* 201* 144*   < > 194*   < > 169*   < > 183*    < > = values in this interval not displayed.     CBC  Recent Labs    Lab 03/10/22  0806 03/09/22  0601 03/08/22  0625 03/07/22  0600   WBC 12.4* 10.2 11.9* 11.9*   RBC 2.78* 2.36* 2.45* 2.33*   HGB 8.5* 7.0* 7.1* 7.0*   HCT 27.7* 23.2* 23.9* 22.1*    98 98 95   MCH 30.6 29.7 29.0 30.0   MCHC 30.7* 30.2* 29.7* 31.7   RDW 19.1* 18.3* 17.4* 16.1*    310 345 283     INR  No lab results found in last 7 days.   Hepatic Panel  Recent Labs   Lab 03/10/22  0806 03/09/22  0601 03/08/22  0625 03/07/22  0600   AST 13 13 16 18   ALT 38 40 45 48   ALKPHOS 120 116 114 105   BILITOTAL 0.4 0.2 0.3 0.2   ALBUMIN 2.4* 2.2* 2.2* 2.0*     GLUCOSE:   Recent Labs   Lab 03/10/22  0806 03/10/22  0801 03/10/22  0457 03/10/22  0106 03/09/22  2017 03/09/22  1528   * 187* 201* 144* 142* 245*

## 2022-03-10 NOTE — PROGRESS NOTES
Shift Summary (4731-4651):    Neuro: patient remains alert and oriented x4. Pain well managed, no PRN meds given.     Cardiac: NSR/ST with BBB. BP WDL. Afebrile.     Respiratory: on RA or HFNC 25L 25% during the day, Bipap 18/6 25% while sleeping. Fine crackles in lungs. L chest tube in place set to -20 suction, 20 ml output. Xray completed after previous chest tube removal.     GI/: Regular diet. TF infusing at 55 ml/hr overnight. Voiding regularly. No BM.

## 2022-03-10 NOTE — PROGRESS NOTES
Shift Summary: Chest xray and US of BLE done. Ambulating frequently w/ SBA and walker. Vitals stable. Afebrile. Making good UOP. 1BM loose and watery.    Juan Head RN on 3/10/2022 at 2:44 PM

## 2022-03-10 NOTE — PROGRESS NOTES
Major shift events: Left superior chest tube removed by surgery team, tolerating well.     Neuro: A&Ox4.   Cardiac: SR with RBBB. VSS.   Respiratory: Sating >90% on RA or HFNC 21% and 25LPM.  GI/: Adequate urine output. No BM this shift.   Diet/appetite: Tolerating regular diet. Decreased appetite.  Activity:  Assist of 1, up to chair. Doing independent exercises, tolerating well.   Pain: At acceptable level on current regimen. PRN oxycodone administered x2.  Skin: No new deficits noted.  LDA's: Left chest tube, no notable output. Left PIV, saline locked.     Plan: Continue with POC. Notify primary team with changes.

## 2022-03-10 NOTE — PROGRESS NOTES
[facility]    Medicine Progress Note - Hospitalist Service, GOLD TEAM 10    Date of Admission:  2/20/2022    Assessment & Plan      Melissa Elder is a 66 year old female with PMHx HTN, HLD, paroxysmal Afib, osteoporosis, GERD, severe COPD, previously requiring 2-3L NC O2 at rest.  Underwent b/l lung transplant with Dr. Robin on 02/21. Got 1u pRBC post-op, no overt bleeding source, monitoring. Extubated 02//22 to O2 NC and transferred to the floor. Pericardial drain pulled 2/24/22.  post-op course complicated by right chest tube lodged into fissure and left chest tube displacement s/p bilateral pigtail chest tube placement in IR to drain anterior hydropneumothorax and bilateral effusions, disseminated Ureaplasma, and transient hyperammonemia.  Routine inspection bronch on 3/1 complicated by hypoventilation in setting of oversedation requiring multiple Narcan doses.  Slow improvement in hypercapnia with NIPPV overnight.    Had right chest tubes pulled 3/8. Left apical chest tube pulled 3/9/2022.left basal chest tube pulled 3/10. Awaiting further care inpatient.     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  Pressor weaned off 2/22 and pt. extubated. Prior history of infection/colonization with Haemophilus influenzae (2017).  IgG adequate (2/20).  MRSA nares negative 2/21.  Broad spectrum ABX empirically post-op with vanc and ceftaz (2/21-2/22), stopped after 24h per Dr. Lala to target known donor cultures on POD #1. Donor cultures ([facility]) with Strep mitis, Actinomyces  No odontolyticus, and Kenyatta kruseii. Recipient cultures with Actinomyces odonotolyticus.  Acid fast bacillus in sputum from 3/2/22  - Respiratory support with HFNC and BiPAP (discussed below)  - Nebs: Levalbuterol and Mucomyst QID  - Aggressive pulmonary toilet with chest physiotherapy QID as well as Aerobika and incentive spirometry q1h w/a  - Chest tubes managed by surgical and IR team  - Daily CXR while chest tubes in   - Await explant pathology  - Continue ceftriaxone for 2 weeks through 3/8 followed by amoxicillin for 3 months  - monitoring sputum for AFB  - Immune suppression and microbial ppx per daily transplant pulm note  - Continue Valtrex to 1000 mg TID x 14 days for HSV (through 3/11) then complete ppx per protocol (see pulm note)   - Continue diuresis as needed with goal to stay net negative      Acute hypoxic and hypercarbic respiratory failure 3/1, improving   3/1 noted to have slowly rising bicarb on daily labs. Had inspection bronchoscopy 3/1 as well and became oversedated and required multiple doses of narcan. VBG obtained post procedure showed hypercapnea to 99. Hypercarbic respiratory failure likely multifactorial including oversedation, poor diaphragm excursion (SNIFF test 3/3), volume overload, and atelectasis. Started on BiPAP.   - Continue BiPAP at night, HFNC/NC during the day. Wean O2 to keep sats > 92%.   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 Ammonia level checked due to concern for post-transplant hyperammonemia which was mildly elevated at 57, but repeat 49.   - Mycoplasma/ureaplasma cultures collected and Transplant ID consulted                - Sputum/Pleural fluid +ureaplasma on 3/4. Started on Levofloxacin/Doxycycline for double coverage.   ID   - ID recommends stopping Levofloxacin (3/7), continue Doxycycline through 3/17 .   -amoxicillin for total of 3 months course (through 5/23) for Actinomyces treatment     Leukocytosis  Afebrile, but WBC started rising 2/27 from 14.6 to 23.1 3/2. Evaluation included CT chest/abdomen/pelvis without overt evidence of infection, and pan-culture. Pleural and sputum culture did grow +Ureaplasma and she was started on therapy (as above). C. Diff checked and negative. Leukocytosis has since been improving.   - Repeat CT Abdomen/Pelvis 3/6/22 for interval follow up from initial scan 3/2, as there was concern for enteritis as well as area of pancreatic/kashif-hepatic artery inflammation vs. Infection vs. malignancy.               - Repeat CT with improved small bowel dilation, some persistent perienteric fat stranding accentuated by motion artifact, and unremarkable appearing pancreas   - Continue antibiotics as above      Diarrhea   Dilated bowel on imaging   Noted to have increased loose stools 2/25 likely due to mmf and TF. Fiber added to TF. C.diff checked and negative. Abdominal imaging including CT and XR have shown diffusely dilated bowel, without concern for obstruction. Possibly ileus or gastroparesis from transplant. However patient not experiencing nausea, vomiting, or decreased stool output.   - Repeat CT 3/6/22 with interval improvement   - Continue to monitor abdominal exam, stool output   - Okay to have immodium PRN      Post op pain   - Erector spinae catheters removed   - gabapentin 100 mg nightly  - tylenol 975 mg Q8H   - Dilaudid 0.2-0.4 mg Q2H PRN   - oxycodone 5-10 mg Q4H PRN   - robaxin 500 mg Q6H scheduled      Paroxysmal Afib   HTN  She was on diltiazem prior to lung transplantation and had not been on anticoagulation. She was  Started on metoprolol in the ICU.  - Continue Metoprolol  tartrate to 25 mg BID   - Restarted PTA diltiazem now at 60 mg Q6H, if tolerated can transition back to  mg XL.   - No anticoagulation at this time      Moderate protein calorie malnutrition  - Nutrition following   - On TF, will ask about cycling overnight      Stress induced hyperglycemia  Did not need insulin prior to admission but sugars have been rising despite sliding scale coverage.  - Endocrinology consulted for assistance, appreciate recommendations   - Please see DM team daily note      HLD  Mild CAD   Noted on transplant workup. Started rosuvastatin 40 mg daily--Held 2/28 due to rising LFTs    - restarted rosuvastatin 3/8 will follow LFTs     Acute blood loss anemia  Acute blood loss thrombocytopenia  Secondary to surgery. Will continue to monitor.   - Transfuse Hgb < 7, platelets < 50   - Hemolysis labs negative 3/3, on dapsone, continue to monitor      Sternotomy  Surgical Incision  - Sternal precautions  - Postoperative incision management per protocol  - PT/OT/CR      Elevated LFTs, resolved   Noted on labs 2/26 and rising. AST, ALP, Bili WNL. No RUQ or abdominal pain. Imaging including RUQ ultrasound WNL. Liza-portal edema noted on CT, and exam shows volume overload. Could be the result of congestion versus medication adverse effect. LFTs have since normalized. Will continue to monitor.  - CMP daily   - monitoring closely with restarting statin                Diet: Regular Diet Adult Thin Liquids (level 0)  Calorie Counts  Adult Formula Drip Feeding: Specified Time Vital 1.5; Nasoduodenal tube; Goal Rate: 55; mL/hr; From: 6:00 PM; 8:00 AM; Medication - Feeding Tube Flush Frequency: At least 15-30 mL water before and after medication administration and with tube clog...  Snacks/Supplements Adult: Glucerna; Between Meals  Calorie Counts    DVT Prophylaxis: Pneumatic Compression Devices  Ratliff Catheter: Not present  Central Lines: None  Cardiac Monitoring: None  Code Status: Full Code       Disposition Plan   Expected Discharge: 03/14/2022     Anticipated discharge location: home;inpatient rehabilitation facility    Delays:            The patient's care was discussed with the Bedside Nurse and Patient.    Smith Weston MD  Hospitalist Service, GOLD TEAM 10  [facility]  Securely message with the Vocera Web Console (learn more here)  Text page via [facility] Paging/Directory   Please see signed in provider for up to date coverage information      Clinically Significant Risk Factors Present on Admission                    ______________________________________________________________________    Interval History   Patient has no new symptoms, She appears well, No new abdominal pain, difficulty urinating, fevers or malaise.     Data reviewed today: I reviewed all medications, new labs and imaging results over the last 24 hours. I personally reviewed no images or EKG's today.    Physical Exam   Vital Signs: Temp: 98.3  F (36.8  C) Temp src: Oral BP: 118/54 Pulse: 101   Resp: (!) 37 SpO2: 95 % O2 Device: None (Room air)    Weight: 160 lbs 7.92 oz  Constitutional: awake, alert, cooperative, no apparent distress, and appears stated age  Eyes: Lids and lashes normal, pupils equal, round and reactive to light, extra ocular muscles intact, sclera clear, conjunctiva normal  ENT: Normocephalic, without obvious abnormality, atraumatic, sinuses nontender on palpation, external ears without lesions, oral pharynx with moist mucous membranes, tonsils without erythema or exudates, gums normal and good dentition.  Hematologic / Lymphatic: no cervical lymphadenopathy  Respiratory: No increased work of breathing, good air exchange, clear to auscultation bilaterally, no crackles or wheezing  Cardiovascular: Normal apical impulse, regular rate and rhythm, normal S1 and S2, no S3 or S4, and no murmur noted  GI: No scars, normal bowel sounds, soft, non-distended, non-tender, no masses  palpated, no hepatosplenomegally  Genitounirinary:   Skin: no bruising or bleeding  Musculoskeletal: There is no redness, warmth, or swelling of the joints.  Full range of motion noted.  Motor strength is 5 out of 5 all extremities bilaterally.  Tone is normal.  Neurologic: Awake, alert, oriented to name, place and time.  Cranial nerves II-XII are grossly intact.  Motor is 5 out of 5 bilaterally.  Cerebellar finger to nose, heel to shin intact.  Sensory is intact.  Babinski down going, Romberg negative, and gait is normal.  Neuropsychiatric: General: normal, calm and normal eye contact    Data   Recent Labs   Lab 03/10/22  1213 03/10/22  0806 03/10/22  0801 03/09/22  0738 03/09/22  0601 03/08/22  0744 03/08/22  0625   WBC  --  12.4*  --   --  10.2  --  11.9*   HGB  --  8.5*  --   --  7.0*  --  7.1*   MCV  --  100  --   --  98  --  98   PLT  --  386  --   --  310  --  345   NA  --  136  --   --  136  --  138   POTASSIUM  --  4.1  --   --  4.3  --  4.8   CHLORIDE  --  100  --   --  98  --  98   CO2  --  32  --   --  34*  --  38*   BUN  --  29  --   --  31*  --  28   CR  --  0.47*  --   --  0.50*  --  0.50*   ANIONGAP  --  4  --   --  4  --  2*   MINISTERIO  --  8.4*  --   --  7.9*  --  8.3*   * 183* 187*   < > 194*   < > 169*   ALBUMIN  --  2.4*  --   --  2.2*  --  2.2*   PROTTOTAL  --  5.6*  --   --  5.0*  --  5.2*   BILITOTAL  --  0.4  --   --  0.2  --  0.3   ALKPHOS  --  120  --   --  116  --  114   ALT  --  38  --   --  40  --  45   AST  --  13  --   --  13  --  16    < > = values in this interval not displayed.     Recent Results (from the past 24 hour(s))   XR Chest 2 Views    Narrative    EXAM: XR CHEST 2 VW  3/9/2022 8:04 PM     HISTORY:  F/u chest tube removal       COMPARISON:  3/9/2022    TECHNIQUE: PA and lateral radiographs of the chest    FINDINGS:   Interval removal of left apical chest tube. Stable left basilar chest  tube.    Midline trachea. Stable postsurgical changes in lung transplant.  Distinct  pulmonary vasculature. No consolidation, pleural effusion or  appreciable pneumothorax.      Impression    IMPRESSION: No appreciable pneumothorax post chest tube removal.     I have personally reviewed the examination and initial interpretation  and I agree with the findings.    MURIEL WALLACE MD         SYSTEM ID:  [id number]   XR Chest 2 Views    Narrative    Exam: XR CHEST 2 VW, 3/10/2022 8:33 AM    Comparison: 3/9/2022    History: interval follow up, post-op lung transplant    Findings:  PA and lateral views the chest. Status post bilateral lung transplant  with intact sternotomy wires. Enteric tube with tip at the expected  location of the duodenal jejunal juncture. Left basilar chest tube.    Trachea is midline. Mediastinum is within normal limits.  Cardiopulmonary silhouette is within normal limits. Aortic knob  calcifications. Prominent bilateral interstitial opacities.  Redemonstrated right basilar opacities in blunting of the right  costophrenic angle. No pneumothorax. No acute osseous abnormalities.      Impression    Impression:   1. Status post bilateral lung transplant with small stable right  pleural effusion and associated basilar atelectasis.  2. Stable bilateral interstitial opacities representing pulmonary  edema.  3. Orientation of the wires best seen on the lateral view may indicate  subluxation of the sternum.    I have personally reviewed the examination and initial interpretation  and I agree with the findings.    VARGAS DUKE MD         SYSTEM ID:  [id number]     Medications     dextrose Stopped (03/01/22 1125)     - MEDICATION INSTRUCTIONS -       - MEDICATION INSTRUCTIONS -       - MEDICATION INSTRUCTIONS -         acetylcysteine  2 mL Nebulization 4x Daily     amoxicillin  500 mg Oral Q8H JUANA     aspirin  81 mg Oral Daily     fiber modular (NUTRISOURCE FIBER)  1 packet Per Feeding Tube BID    And     banatrol plus  1 packet Per Feeding Tube Daily at 4 pm     calcium carbonate 600  mg-vitamin D 400 units  1 tablet Oral BID w/meals     dapsone  50 mg Oral Daily     diltiazem  60 mg Oral Q6H JUANA     doxycycline hyclate  100 mg Oral Q12H JUANA     famotidine  20 mg Oral BID     fluticasone  1 spray Both Nostrils Daily     furosemide  40 mg Intravenous BID     gabapentin  100 mg Oral or Feeding Tube At Bedtime     heparin ANTICOAGULANT  5,000 Units Subcutaneous Q8H     insulin aspart  1-9 Units Subcutaneous 6x Daily     insulin aspart   Subcutaneous TID w/meals     insulin NPH  26 Units Subcutaneous Q24H     levalbuterol  1.25 mg Nebulization 4x Daily     lidocaine 3%, phenylephrine 0.25% solution for irrigation  5 mL Irrigation Once     loratadine  10 mg Oral Daily     [START ON 3/11/2022] magnesium oxide  400 mg Oral Daily with lunch     magnesium oxide  400 mg Oral BID     methocarbamol  500 mg Oral 4x Daily     metoprolol tartrate  25 mg Oral or Feeding Tube BID     multivitamin w/minerals  1 tablet Oral Daily     mycophenolate  1,000 mg Oral BID    Or     mycophenolate  1,000 mg Oral or NG Tube BID     nystatin  1,000,000 Units Swish & Swallow 4x Daily     pantoprazole  40 mg Oral or Feeding Tube BID     [START ON 3/15/2022] predniSONE  12.5 mg Oral BID     predniSONE  12.5 mg Oral or Feeding Tube QPM     predniSONE  15 mg Oral or Feeding Tube Daily     protein modular  1 packet Per Feeding Tube BID     rosuvastatin  40 mg Oral Daily     senna-docusate  1 tablet Oral BID     sodium chloride (PF)  3 mL Intracatheter Q8H     sodium chloride (PF)  3 mL Intracatheter Q8H     tacrolimus  3 mg Oral QPM     [START ON 3/11/2022] tacrolimus  3.5 mg Oral QAM     valACYclovir  1,000 mg Oral Q8H

## 2022-03-10 NOTE — PROGRESS NOTES
Calorie Count  Intake recorded for: 3/9  Total Kcals: 616 Total Protein: 26g  Kcals from Hospital Food: 616  Protein: 26g  Kcals from Outside Food (average):0 Protein: 0g  # Meals Ordered from Kitchen: 1 meal   # Meals Recorded: 1 meal (First - 100% leger - lettuce - tomato sandwich, jello, 50% cottage cheese, 25% milk)  # Supplements Recorded: 0

## 2022-03-10 NOTE — PROGRESS NOTES
Pulmonary Medicine  Cystic Fibrosis - Lung Transplant Team  Progress Note  03/10/2022       Patient: Melissa Elder  MRN: [id number]  : 1955 (age 66 year old)  Transplant: 2022 (Lung), POD#17  Admission date: 2022    Assessment & Plan:     Melissa Elder is a 66 year old female with a PMH significant for severe COPD, HTN, mild non-obstructive CAD, paroxysmal afib, osteoporosis, GERD, and colonic polyps.  Pt. is now s/p BSLT on 22, surgery relatively uncomplicated.  The patient was extubated , post-op course complicated by right chest tube lodged into fissure and left chest tube displacement s/p bilateral pigtail chest tube placement in IR to drain anterior hydropneumothorax and bilateral effusions, disseminated Ureaplasma, and transient hyperammonemia.  Routine inspection bronch on 3/1 complicated by hypoventilation in setting of oversedation requiring multiple Narcan doses.  Slow improvement in hypercapnia with NIPPV overnight, weaned off HFNC 3/9.       Today's recommendations:  - Started Flonase for postnasal drip  - Continue BiPAP overnight  - Supplemental oxygen via NC as needed to maintain SpO2 >92% during the day  - VBG daily in the mornings for now (and additionally prn)   - DSA, EBV, and IgG due 3/21 (not yet ordered)  - CXR daily as below, discussed finding of ?subluxation of sternum with CVTS to review/provide additional recommendations  - Plan to remove left basilar chest tube today per Dr. Steele  - Repeat SNIFF test in 1-2 days once final chest tube removed  - Agree with ongoing diuresis  - Repeat BLE US to evaluate for DVT  - Calorie counts ordered through 3/14  - Tacrolimus level likely supratherapeutic, dose decreased, repeat level to trend ordered 3/12  - Prednisone taper ordered 3/15  - CMV ordered tomorrow  - Amoxicillin for total of 3 months course (through ) for Actinomyces treatment  - Valacyclovir through 3/12, then resume acyclovir  prophylaxis through 3/23 per protocol  - Doxycycline for Ureaplasma for minimum 2 week course through 3/17  - Follow +AFB on sputum culture 3/2, AFB sputum culture (3/9) pending  - Donor risk labs ordered 3/23  - Schedule ongoing oral magnesium supplementation       COPD s/p bilateral sequential lung transplant (BSLT):  Acute hypoxic/hypercapneic respiratory failure:   Bilateral pleural effusions/PTX: Scant pleural-pleural adhesions and mild-moderate bilateral hilar lymphadenopathy per op note.  Pathology with CPFE.  Extubated 2/22.  Left chest tube inadvertently dislodged, f/u chest CT (2/25) with anterior hydroPTX, right chest tube in fissure.  Left chest tube removed and replaced with 2 left-sided pigtail chest tubes by IR (2/26).  DSA negative 2/28.  Hypoxia had generally resolved, only intermittently requiring supplemental oxygen up until bronch 3/1.  S/p bronch 3/1 without evidence of dehiscence, mild amount of thin pale/tan secretions noted in RLL bronchus.  Noted hypoventilating with oversedation during bronch, received Narcan x3 with improvement in sedation but hypercapnea initially severe (>90) and persisting, improving with nocturnal BiPAP and daytime HFNC.  Chest CT (3/2) with improved bilateral hydroPTX, bibasilar atectasis with mucous plugging, and increased right loculated pleural effusion.  S/p right pigtail chest tube (3/3, exudative, 81%N, positive for Ureaplasma as below).  Sniff test (3/3) with poor diaphragm excursion bilaterally with extensive accessory respiratory chest wall musculature effort to aid in inspiration.  Repeat chest CT (3/6) with trace bilateral pneumothoraces and decreased small volume pleural effusions.  Right chest tube removed by CVTS 3/8, left axillary chest tube removed by our team 3/9.  Weaned off HFNC 3/9.  - Flonase for postnasal drip (ordered)  - Nebs: levalbuterol and Mucomyst QID   - Aggressive pulmonary toilet with chest physiotherapy QID as well as Aerobika and  incentive spirometry q1h w/a  - BiPAP overnight  - Supplemental oxygen via NC as needed to maintain SpO2 >92% during the day  - VBG daily in the mornings for now (and additionally prn)  - DSA 3/21 (not yet ordered)  - CXR daily with chest tubes, today with small stable right pleural effusion and associated basilar atelectasis, stable bilateral interstitial opacities, and orientation of wires (on lateral view) may indicate subluxation of sternum (personally reviewed with Dr. Steele) --> discussed with CVTS to review/provide additional recommendations  - Plan to remove left basilar chest tube 3/10 per Dr. Steele  - Repeat SNIFF test in 1-2 days once final chest tube removed  - Volume management per primary team, agree with ongoing diuresis  - Repeat BLE US to evaluate for DVT  - Post-pyloric nasal FT, cycled TF per RD and primary team, calorie counts through 3/14 (ordered)  - ENT consulted 3/9, Pt. declined scope exam, ENT OP follow up if no improvement     Immunosuppression:  S/p induction therapy with basiliximab x2 doses (with high dose IV steroid).  - Tacrolimus 4 mg BID (decreased 3/7, given supratherapeutic and diltiazem increased).  Goal level 8-12.  Level 3/10 supratherapeutic at 12.4 (14h), dose decreased to 3.5 mg qAM / 3 mg qPM, repeat level to trend 3/12 (ordered).   - MMF 1000 mg BID (decreased 2/25 due to diarrhea)  - Prednisone 15 mg qAM / 12.5 qPM with taper per lung transplant protocol (due 3/15, ordered):  Date AM dose (mg) PM dose (mg)   3/8 15 12.5   3/15 12.5 12.5   3/29 12.5 10   4/12 10 10   5/10 10 7.5   6/7 7.5 7.5   7/5 7.5 5   8/2 5 5   8/30 5 2.5      Prophylaxis:   - Dapsone for PJP ppx (started 2/23 at decreased MWF dose and increased to daily 3/1, G6PD 13.3 2/21)  - Nystatin for oral candidiasis ppx, 6 month course  - See below for serologies and viral ppx:    Donor Recipient Intervention   CMV status Negative Negative CMV monthly (ordered 3/11 as below)   EBV status Positive Positive  EBV at one month (3/21, not yet ordered)   HSV status N/A (Newly) Positive As below      ID: Prior history of infection/colonization with Haemophilus influenzae (2017).  Broad spectrum ABX empirically post-op with vanc and ceftaz (2/21-2/22), stopped after 24h to target known donor cultures at that time on POD #1 per Dr. Lala (transitioned to cefazolin).  Broadened again on POD #2 with culture updates as below.  Donor ([facility]) cultures with P-S MSSA; ([facility]) with Strep mitis, Actinomyces odontolyticus, MSSA x2, and C. kruseii (concern for possible aspiration).  Recipient cultures at time of transplant with Actinomyces odonotolyticus.  Bronch cultures (2/22) with Saccharomyces cerevisiae (no indication to treat per Dr. Ghosh unless pt. acutely declines clinically, 3/1) and C. albicans.  - IgG repeat at one month (3/21, not yet ordered)  - ABX: amoxicillin (3/8-5/23, s/p ceftriaxone 2/23-3/8) for 3 month course for Actinomyces treatment  - Low threshold to treat Candida if it recurs in respiratory cultures, per transplant ID     HSV: Pt. with recent seroconversion at time of transplant.  HSV also noted on donor cultures.  Initial plan for 30 days of ppx with ACV post-op per Dr. Lala.  Then with HSV+ bronch at time of transplant (2/21), transitioned to treatment dosing with VACV   - Valacyclovir 1000 mg TID X 14d (2/27-3/12), then resume acyclovir prophylaxis (3/13-3/23) per protocol     Hyperammonemia, Resolved:   Disseminated Ureaplasma:  Had been somnolent with some confusion/visual hallucinations in setting of hypercapnia as above.  Ammonia mildly elevated on 3/2 but quickly normalized, most recently 50 on 3/4.  Ureaplasma and Mycoplasma studies sent, pt. noted to have Ureaplasma in both pleural and sputum cultures from 3/2.  S/p levofloxacin 3/4-3/6.  - Doxycycline (3/4-3/17 minimally)  - In light of normal ammonia level, defer more aggressive interventions for hyperammonemia at this time (stopping CNI, iHD,  etc.)     Positive AFB on sputum culture: Noted on sputum culture 3/2.  Transplant ID following for speciation and susceptibility testing as well as other evidence of infection.   - AFB sputum culture (3/9) pending, follow  - Obtain AFB cultures on all future bronchs     Leukocytosis: WBC trending upward now on POD #10, frequently noted at this stage post-op (pathology not entirely clear, but may be at least partially d/t bone marrow surge post-transplant).  Procal moderately elevated at 0.18, but may still be somewhat non-specific at this point post-op.  BDG fugitell and Aspergillus galactomannan negative 3/3.  - Pleural cultures (3/2, 3/3) as above, otherwise NGTD  - ABX as above     PHS risk criteria donor: Additional labs required post-transplant (between 4-8 weeks post-op): Hepatitis B, Hepatitis C, and HIV by COLT (ordered 3/23), also plan to repeat hepatitis B surface antibody at that time (ordered) given discrepancy with recent result.     Other relevant problems managed by primary team:     Diarrhea:   Concern for ileus: Likely 2/2 medications and tube feedings.  Fiber added to tube feeds.  MMF decreased as above.  Loose stools now improved.  Will consider transition to Myfortic if loose stools increase again, deferred for now.  Again having loose/watery stools, also noting some abdominal bloating/fullness.  AXR 3/5 with diffuse distention of small and large bowel suggestive of ileus.  C diff negative 3/6.  Abdomen/pelvis CT (3/6) with decrease in small bowel distention and perienteric fat stranding.  CXR (3/8) with partially imaged likely adynamic ileus.  Bowel regimen initiated 3/8, increased 3/10.  - CMV level (ordered 3/11)  - Management per primary team     Elevated LFTs, Resolved: Rising 3/1 despite medication adjustments (APAP and statin stopped 2/27).  Of note, pt. had a discrepant hep B surface Ab at time of transplant as above.  LFTs remain elevated although improved 3/2.  Also with elevated  ammonia as above.  RUQ US (3/3) with only hepatic steatosis.  LFTs normalized 3/7.     CAD: Noted to have mild non-obstructive CAD without hemodynamically significant lesions on cardiac cath 3/27/19.  PTA ASA 81 mg and atorvastatin, started on rosuvastatin post-op but held 2/28 for elevated LFTs (as above), resumed 3/9.  ASA resumed 3/1.     Paroxysmal afib:   HTN: PTA diltiazem, not on AC.  Post-op tachycardia, off pressor since 2/22.  Metoprolol started 2/23.  Persistent low level tachycardia, but currently sinus tach with continued HTN.  Diltiazem resumed 3/2.     GERD: Not on PPI PTA.  Negative pH/manometry study 3/28/19, noted small hiatal hernia. On PPI daily since 2/21, H2 blocker bridge discontinued 3/2, some increase in reflux symptoms since, resumed 3/5.  PPI increased to BID 3/7.    Hypomagnesemia: Suppressed absorption d/t CNI.  Requiring almost daily IV/PO replacement.  - Mag-Ox 400 mg daily (to begin 3/11)  - Continue daily magnesium level with additional replacement protocol prn    We appreciate the excellent care provided by the [place] [id number] team.  Recommendations communicated via in person rounding and this note.  Will continue to follow along closely, please do not hesitate to call with any questions or concerns.    Patient discussed with Dr. Steele.    Lola Mann PA-C  [facility]  Pulmonary CF/Transplant     Subjective & Interval History:     Remains on BiPAP overnight and predominantly on RA during the day yesterday and this morning.  Went for a walk in the halls this morning, took breaks in part due to generalized weakness and some dyspnea.  Feels gradual improvement in strength.  Able to produce sputum for culture yesterday.  Received chest physiotherapy once yesterday.  Appetite slowly improving although felt full after eating bread yesterday, tolerated breakfast better this morning.  Remains on cycled TF.  Net negative 2L yesterday with diuresis.    Review of Systems:     C: No  "fever, no chills, no change in weight, no change in appetite  INTEGUMENTARY/SKIN: No rash or obvious new lesions  ENT/MOUTH: + hoarse voice, no sinus pain, + postnasal drip  RESP: See interval history  CV: No chest pain, no palpitations, + peripheral edema  GI: No nausea, no vomiting, + loose stools, no reflux symptoms  : No dysuria  MUSCULOSKELETAL: No myalgias, no arthralgias  ENDOCRINE: + blood sugars intermittently elevated  NEURO: No headache, no numbness or tingling  PSYCHIATRIC: Mood stable    Physical Exam:     All notes, images, and labs from past 24 hours (at minimum) were reviewed.    Vital signs:  Temp: 97.8  F (36.6  C) Temp src: Oral BP: 133/51 Pulse: 101   Resp: 23 SpO2: 97 % O2 Device: BiPAP/CPAP Oxygen Delivery: 25 LPM Height: 157.5 cm (5' 2\") Weight: 72.8 kg (160 lb 7.9 oz)  I/O:     Intake/Output Summary (Last 24 hours) at 3/10/2022 0838  Last data filed at 3/10/2022 0800  Gross per 24 hour   Intake 945 ml   Output 3220 ml   Net -2275 ml     Constitutional: Sitting up in chair, in no apparent distress.   HEENT: Eyes with pink conjunctivae, anicteric.  Oral mucosa moist without lesions.  Nasal FT.  PULM: Dimimished air flow to right > left base.  No crackles, no rhonchi, no wheezes.  Non-labored breathing on RA.  CV: Normal S1 and S2.  Tachycardic.  No murmur, gallop, or rub.  + BLE edema, compression stockings on.   ABD: NABS, soft, nontender, nondistended.    MSK: Moves all extremities.  + muscle wasting.   NEURO: Alert, conversant.   SKIN: Warm, dry.  No rash on limited exam.  Clamshell incision not visualized.   PSYCH: Mood stable.     Lines, Drains, and Devices:  Peripheral IV 03/08/22 Left;Posterior Lower forearm (Active)   Site Assessment WDL 03/10/22 0800   Line Status Saline locked 03/10/22 0800   Phlebitis Scale 0-->no symptoms 03/10/22 0800   Infiltration Scale 0 03/10/22 0800   Number of days: 2     Data:     LABS    CMP:   Recent Labs   Lab 03/10/22  0806 03/10/22  0801 " 03/10/22  0457 03/10/22  0106 03/09/22 2017 03/09/22  0738 03/09/22  0601 03/08/22  0744 03/08/22  0625 03/07/22  0717 03/07/22  0600 03/06/22  0823 03/06/22  0626     --   --   --   --   --  136  --  138  --  136  --  136   POTASSIUM 4.1  --   --   --   --   --  4.3  --  4.8  --  4.7  --  4.3   CHLORIDE 100  --   --   --   --   --  98  --  98  --  98  --  93*   CO2  --   --   --   --   --   --  34*  --  38*  --  35*  --  41*   ANIONGAP  --   --   --   --   --   --  4  --  2*  --  3  --  2*   GLC  --  187* 201* 144* 142*   < > 194*   < > 169*   < > 183*   < > 191*   BUN  --   --   --   --   --   --  31*  --  28  --  23  --  25   CR  --   --   --   --   --   --  0.50*  --  0.50*  --  0.49*  --  0.52   GFRESTIMATED  --   --   --   --   --   --  >90  --  >90  --  >90  --  >90   MINISTERIO  --   --   --   --   --   --  7.9*  --  8.3*  --  8.0*  --  8.1*   MAG  --   --   --   --   --   --  1.9  --  1.7  1.8  --  1.6  --  1.7   PHOS  --   --   --   --   --   --  3.8  --  4.3  --  3.4  --  3.5   PROTTOTAL  --   --   --   --   --   --  5.0*  --  5.2*  --  4.9*  --  4.8*   ALBUMIN  --   --   --   --   --   --  2.2*  --  2.2*  --  2.0*  --  1.8*   BILITOTAL  --   --   --   --   --   --  0.2  --  0.3  --  0.2  --  0.2   ALKPHOS  --   --   --   --   --   --  116  --  114  --  105  --  107   AST  --   --   --   --   --   --  13  --  16  --  18  --  20   ALT  --   --   --   --   --   --  40  --  45  --  48  --  58*    < > = values in this interval not displayed.     CBC:   Recent Labs   Lab 03/10/22  0806 03/09/22  0601 03/08/22  0625 03/07/22  0600   WBC 12.4* 10.2 11.9* 11.9*   RBC 2.78* 2.36* 2.45* 2.33*   HGB 8.5* 7.0* 7.1* 7.0*   HCT 27.7* 23.2* 23.9* 22.1*    98 98 95   MCH 30.6 29.7 29.0 30.0   MCHC 30.7* 30.2* 29.7* 31.7   RDW 19.1* 18.3* 17.4* 16.1*    310 345 283       INR: No lab results found in last 7 days.    Glucose:   Recent Labs   Lab 03/10/22  0801 03/10/22  0457 03/10/22  0106 03/09/22 2017  03/09/22  1528 03/09/22  1106   * 201* 144* 142* 245* 164*       Blood Gas:   Recent Labs   Lab 03/10/22  0806 03/09/22  0601 03/08/22  0625   PHV 7.37 7.34 7.39   PCO2V 63* 63* 65*   PO2V 34 60* 60*   HCO3V 36* 34* 39*   ARIEL 9.6* 7.0* 12.2*   O2PER 51 25 0       Culture Data No results for input(s): CULT in the last 168 hours.    Virology Data: No results found for: INFLUA, FLUAH1, FLUAH3, RD9771, IFLUB, RSVA, RSVB, PIV1, PIV2, PIV3, HMPV, HRVS, ADVBE, ADVC    Historical CMV results (last 3 of prior testing):  No results found for: CMVQNT  No results found for: CMVLOG    Urine Studies    Recent Labs   Lab Test 03/01/22  1549 02/20/22  0248   URINEPH 6.0 7.0   NITRITE Negative Negative   LEUKEST Negative Negative   WBCU 0 <1       Most Recent [place] Pulmonary Function Testing (FVC/FEV1 only)  FVC-Pre   Date Value Ref Range Status   12/20/2021 1.81 L    06/21/2021 1.46 L    12/09/2019 1.59 L    06/03/2019 1.68 L      FVC-%Pred-Pre   Date Value Ref Range Status   12/20/2021 65 %    06/21/2021 52 %    12/09/2019 56 %    06/03/2019 58 %      FEV1-Pre   Date Value Ref Range Status   12/20/2021 0.49 L    06/21/2021 0.50 L    12/09/2019 0.49 L    06/03/2019 0.47 L      FEV1-%Pred-Pre   Date Value Ref Range Status   12/20/2021 22 %    06/21/2021 22 %    12/09/2019 21 %    06/03/2019 20 %        IMAGING    Recent Results (from the past 48 hour(s))   XR Chest Port 1 View   Result Value    Radiologist flags Small right apical pneumothorax (Urgent)    Narrative    EXAM: XR CHEST PORT 1 VIEW  3/8/2022 4:04 PM     HISTORY:  s/p R CT removal       COMPARISON:  3/8/2022    TECHNIQUE: Single frontal radiograph of the chest    FINDINGS:   Removal of right lung base chest tube. Small right apical  pneumothorax. Stable left hemithorax with left apical and left base  chest tube. Stable feeding tube position. Calcification of the aortic  knob.    Midline trachea. Stable postsurgical changes of lung transplant. No  acute  consolidation.      Impression    IMPRESSION:   1. Small right apical pneumothorax.  2. Otherwise stable support devices and postoperative changes.  3. Atherosclerosis.    [Urgent Result: Small right apical pneumothorax]    Finding was identified on 3/8/2022 5:09 PM.     Dr. Phan was contacted by Dr. Ortega at 3/8/2022 5:23 PM and verbalized  understanding of the urgent finding.     I have personally reviewed the examination and initial interpretation  and I agree with the findings.    FELIPA MARIE MD         SYSTEM ID:  [id number]   XR Chest 2 Views    Narrative    EXAM: XR CHEST 2 VW  3/9/2022 9:40 AM     HISTORY:  interval follow up, post-op lung transplant       COMPARISON:  Chest radiograph 3/8/2022    FINDINGS  Technique: PA and lateral chest radiographs.    Devices: Left sided basilar and apical chest tubes redemonstrated.  Feeding tube partially visualized with tip towards the DJ flexure,  unchanged. Catheter overlying the right thorax presumably feeding tube  outside of the patient. Oxygen tubing noted. Clamshell sternotomy  wires and scattered surgical clips.    Lungs: Right pleural effusion slightly increased from prior study with  basal atelectasis. The small right pneumothorax described on prior  study is not clearly delineated on current exam. No definite left  pneumothorax. Left basilar atelectasis. Slightly prominent  interstitial markings.    Cardiovascular: Heart size is within normal limits.      Bones: Postsurgical changes from lung transplant.    No acute abnormality in the upper abdomen.      Impression    IMPRESSION:   1.  Stable changes from bilateral lung transplant.  2.  The small right apical pneumothorax is not clearly identified on  the current study. Right pleural effusion with adjacent atelectasis  has increased.  3.  Stable left chest tubes with no left pneumothorax. Left basilar  atelectasis.  4.  Slightly prominent interstitial markings may represent mild edema.  5.  Stable  support devices.    SATNAM SIMON MD         SYSTEM ID:  [id number]   XR Chest 2 Views    Narrative    EXAM: XR CHEST 2 VW  3/9/2022 8:04 PM     HISTORY:  F/u chest tube removal       COMPARISON:  3/9/2022    TECHNIQUE: PA and lateral radiographs of the chest    FINDINGS:   Interval removal of left apical chest tube. Stable left basilar chest  tube.    Midline trachea. Stable postsurgical changes in lung transplant.  Distinct pulmonary vasculature. No consolidation, pleural effusion or  appreciable pneumothorax.      Impression    IMPRESSION: No appreciable pneumothorax post chest tube removal.     I have personally reviewed the examination and initial interpretation  and I agree with the findings.    MURIEL WALLACE MD         SYSTEM ID:  [id number]

## 2022-03-10 NOTE — PROGRESS NOTES
Diabetes Consult Daily  Progress Note          Assessment/Plan:   Melissa Elder is a 66 year old female with PMHx HTN, HLD, paroxysmal Afib, osteoporosis, GERD, severe COPD, previously requiring 2-3L NC O2 at rest.  Underwent b/l lung transplant with Dr. Robin on 02/21  has ongoing issues with Hydropneumothorax, chest tubes in place. 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 Endocrinology is being consulted for DM management.    1) Steroid and TF induced hyperglycemia   2) Underlying pre-DM, versus steroid diabetes      Plan:     NPH q24h at 1800:  26 units at start of cycled feeds (Vital 1.5 at 55) x 14 hr    Continue Novolog CHO coverage-- 1 unit per 15 grams w/ meals and snacks    Novolog  custom correction 1 per 35 mg/dL Q4h --> schedule changed again to align w/ start of TF at 1800 and AC earlier in the day    BG monitoring TID AC, HS, 0200, 0600    Hypoglycemia protocol    Education needs to be assessed at acute rehab    PRN D10W for hypoglycemia prevention if TF stops out side of the scheduled time-- rate is  70 to replace about 70% of TF carbs  Outpatient follow up plan TBD, depending largely on nutrition by time of discharge      Discussed w/ pt, , dietician             Interval History:     Nursing notes and progress notes reviewed    : Vital 1.5 @ 55 mL/hr x 14 hours/evening (770 mL) will provide: 1155 Kcals, 52 gm protein, 143 gm CHO, 5 gm fiber, and 588 mL free water.   ** same grams of carbohydrate per hour as previous TF.  TF schedule 9497-7393.      Melissa feels she is doing well with eating, wants to know if she can aim for just one glucerna per day.  Dietician will have 3 days jennie count tomorrow and make decision on next steps,      Several days away from ARU still.  Maybe 3/14?            Date AM dose (mg) PM dose (mg)   3/1 15 15   3/8 15 12.5   3/15 12.5 12.5   3/29 12.5 10   4/12 10 10   5/10 10 7.5   6/7 7.5 7.5   7/5 7.5 5   8/2 5 5   8/30 5 2.5  "        Anticipated discharge location: home;inpatient rehabilitation facility        Recent Labs   Lab 03/10/22  1600 03/10/22  1213 03/10/22  0806 03/10/22  0801 03/10/22  0457 03/10/22  0106   * 139* 183* 187* 201* 144*               Review of Systems:   See interval hx          Medications:     Orders Placed This Encounter      Regular Diet Adult Thin Liquids (level 0)    Physical Exam:     /48 (BP Location: Right arm, Patient Position: Semi-Scott's)   Pulse 109   Temp 98.6  F (37  C) (Oral)   Resp 16   Ht 1.575 m (5' 2\")   Wt 72.8 kg (160 lb 7.9 oz)   SpO2 96%   BMI 29.35 kg/m  ;  General:  Pleasant, up in chair,   in no distress.  bedside and supportive.  HEENT: hearing intact to conversational volume.  Lungs: unlabored respiration, no cough observed  Mental status:  alert, oriented x3, communicating clearly  Psych:  calm, even mood           Data:     Lab Results   Component Value Date    A1C 5.7 02/22/2022    A1C 5.8 02/20/2022     Last Comprehensive Metabolic Panel:  Sodium   Date Value Ref Range Status   03/10/2022 136 133 - 144 mmol/L Final   06/21/2021 137 133 - 144 mmol/L Final     Potassium   Date Value Ref Range Status   03/10/2022 4.1 3.4 - 5.3 mmol/L Final   06/21/2021 3.0 (L) 3.4 - 5.3 mmol/L Final     Chloride   Date Value Ref Range Status   03/10/2022 100 94 - 109 mmol/L Final   06/21/2021 104 94 - 109 mmol/L Final     Carbon Dioxide   Date Value Ref Range Status   06/21/2021 34 (H) 20 - 32 mmol/L Final     Carbon Dioxide (CO2)   Date Value Ref Range Status   03/10/2022 32 20 - 32 mmol/L Final     Anion Gap   Date Value Ref Range Status   03/10/2022 4 3 - 14 mmol/L Final   06/21/2021 <1 (L) 3 - 14 mmol/L Final     Glucose   Date Value Ref Range Status   03/10/2022 183 (H) 70 - 99 mg/dL Final   06/21/2021 131 (H) 70 - 99 mg/dL Final     GLUCOSE BY METER POCT   Date Value Ref Range Status   03/10/2022 194 (H) 70 - 99 mg/dL Final     Urea Nitrogen   Date Value Ref Range " Status   03/10/2022 29 7 - 30 mg/dL Final   06/21/2021 8 7 - 30 mg/dL Final     Creatinine   Date Value Ref Range Status   03/10/2022 0.47 (L) 0.52 - 1.04 mg/dL Final   06/21/2021 0.56 0.52 - 1.04 mg/dL Final     GFR Estimate   Date Value Ref Range Status   03/10/2022 >90 >60 mL/min/1.73m2 Final     Comment:     Effective December 21, 2021 eGFRcr in adults is calculated using the 2021 CKD-EPI creatinine equation which includes age and gender (Ines et al., NEJ, DOI: 10.1056/SWLHjk8574991)   06/21/2021 >90 >60 mL/min/[1.73_m2] Final     Comment:     Non  GFR Calc  Starting 12/18/2018, serum creatinine based estimated GFR (eGFR) will be   calculated using the Chronic Kidney Disease Epidemiology Collaboration   (CKD-EPI) equation.       Calcium   Date Value Ref Range Status   03/10/2022 8.4 (L) 8.5 - 10.1 mg/dL Final   06/21/2021 8.5 8.5 - 10.1 mg/dL Final     Bilirubin Total   Date Value Ref Range Status   03/10/2022 0.4 0.2 - 1.3 mg/dL Final   06/21/2021 0.3 0.2 - 1.3 mg/dL Final     Alkaline Phosphatase   Date Value Ref Range Status   03/10/2022 120 40 - 150 U/L Final   06/21/2021 92 40 - 150 U/L Final     ALT   Date Value Ref Range Status   03/10/2022 38 0 - 50 U/L Final   06/21/2021 25 0 - 50 U/L Final     AST   Date Value Ref Range Status   03/10/2022 13 0 - 45 U/L Final   06/21/2021 17 0 - 45 U/L Final       I spent a total of 35 minutes bedside and on the inpatient unit managing the glycemic care of Melissa Elder. Over 50% of my time on the unit was spent counseling the patient  and/or coordinating care regarding acute glycemic management.  See note for details.        Zita JACKSON CNS   To contact Endocrine Diabetes service:   From 8AM-4PM: page inpatient diabetes provider that is following the patient that day (see filed or incomplete progress notes.consult notes).  If uncertain of provider assignment: page job code [phone].  For questions or updates from 4PM-8AM: page the diabetes job  code for on call fellow: [phone]    Please notify inpatient diabetes service if changes are planned to steroids, enteral feeding, parenteral feeding, or if procedures are planned requiring prolonged NPO status.

## 2022-03-11 ENCOUNTER — APPOINTMENT (OUTPATIENT)
Dept: GENERAL RADIOLOGY | Facility: CLINIC | Age: 67
DRG: 007 | End: 2022-03-11
Attending: NURSE PRACTITIONER
Payer: MEDICARE

## 2022-03-11 ENCOUNTER — APPOINTMENT (OUTPATIENT)
Dept: PHYSICAL THERAPY | Facility: CLINIC | Age: 67
DRG: 007 | End: 2022-03-11
Attending: SURGERY
Payer: MEDICARE

## 2022-03-11 ENCOUNTER — TELEPHONE (OUTPATIENT)
Dept: TRANSPLANT | Facility: CLINIC | Age: 67
End: 2022-03-11
Payer: COMMERCIAL

## 2022-03-11 ENCOUNTER — APPOINTMENT (OUTPATIENT)
Dept: GENERAL RADIOLOGY | Facility: CLINIC | Age: 67
DRG: 007 | End: 2022-03-11
Attending: PHYSICIAN ASSISTANT
Payer: MEDICARE

## 2022-03-11 LAB
ALBUMIN SERPL-MCNC: 2.4 G/DL (ref 3.4–5)
ALP SERPL-CCNC: 116 U/L (ref 40–150)
ALT SERPL W P-5'-P-CCNC: 32 U/L (ref 0–50)
ANION GAP SERPL CALCULATED.3IONS-SCNC: 3 MMOL/L (ref 3–14)
AST SERPL W P-5'-P-CCNC: 14 U/L (ref 0–45)
BACTERIA PLR CULT: ABNORMAL
BACTERIA SPT CULT: ABNORMAL
BASE EXCESS BLDV CALC-SCNC: 11.7 MMOL/L (ref -7.7–1.9)
BASOPHILS # BLD AUTO: 0 10E3/UL (ref 0–0.2)
BASOPHILS NFR BLD AUTO: 0 %
BILIRUB SERPL-MCNC: 0.4 MG/DL (ref 0.2–1.3)
BUN SERPL-MCNC: 32 MG/DL (ref 7–30)
CALCIUM SERPL-MCNC: 8.4 MG/DL (ref 8.5–10.1)
CHLORIDE BLD-SCNC: 98 MMOL/L (ref 94–109)
CMV DNA SPEC NAA+PROBE-ACNC: NOT DETECTED IU/ML
CO2 SERPL-SCNC: 35 MMOL/L (ref 20–32)
CREAT SERPL-MCNC: 0.47 MG/DL (ref 0.52–1.04)
EOSINOPHIL # BLD AUTO: 0 10E3/UL (ref 0–0.7)
EOSINOPHIL NFR BLD AUTO: 0 %
ERYTHROCYTE [DISTWIDTH] IN BLOOD BY AUTOMATED COUNT: 19.3 % (ref 10–15)
GFR SERPL CREATININE-BSD FRML MDRD: >90 ML/MIN/1.73M2
GLUCOSE BLD-MCNC: 196 MG/DL (ref 70–99)
GLUCOSE BLDC GLUCOMTR-MCNC: 131 MG/DL (ref 70–99)
GLUCOSE BLDC GLUCOMTR-MCNC: 142 MG/DL (ref 70–99)
GLUCOSE BLDC GLUCOMTR-MCNC: 154 MG/DL (ref 70–99)
GLUCOSE BLDC GLUCOMTR-MCNC: 159 MG/DL (ref 70–99)
GLUCOSE BLDC GLUCOMTR-MCNC: 181 MG/DL (ref 70–99)
GLUCOSE BLDC GLUCOMTR-MCNC: 187 MG/DL (ref 70–99)
HCO3 BLDV-SCNC: 37 MMOL/L (ref 21–28)
HCT VFR BLD AUTO: 24.2 % (ref 35–47)
HGB BLD-MCNC: 7.6 G/DL (ref 11.7–15.7)
HOLD SPECIMEN: NORMAL
IMM GRANULOCYTES # BLD: 0.2 10E3/UL
IMM GRANULOCYTES NFR BLD: 2 %
LACTATE SERPL-SCNC: 2 MMOL/L (ref 0.7–2)
LYMPHOCYTES # BLD AUTO: 0.6 10E3/UL (ref 0.8–5.3)
LYMPHOCYTES NFR BLD AUTO: 6 %
MAGNESIUM SERPL-MCNC: 1.5 MG/DL (ref 1.6–2.3)
MCH RBC QN AUTO: 31 PG (ref 26.5–33)
MCHC RBC AUTO-ENTMCNC: 31.4 G/DL (ref 31.5–36.5)
MCV RBC AUTO: 99 FL (ref 78–100)
MONOCYTES # BLD AUTO: 0.6 10E3/UL (ref 0–1.3)
MONOCYTES NFR BLD AUTO: 6 %
NEUTROPHILS # BLD AUTO: 9.2 10E3/UL (ref 1.6–8.3)
NEUTROPHILS NFR BLD AUTO: 86 %
NRBC # BLD AUTO: 0 10E3/UL
NRBC BLD AUTO-RTO: 0 /100
O2/TOTAL GAS SETTING VFR VENT: 21 %
PCO2 BLDV: 56 MM HG (ref 40–50)
PH BLDV: 7.43 [PH] (ref 7.32–7.43)
PHOSPHATE SERPL-MCNC: 3.2 MG/DL (ref 2.5–4.5)
PLATELET # BLD AUTO: 368 10E3/UL (ref 150–450)
PO2 BLDV: 73 MM HG (ref 25–47)
POTASSIUM BLD-SCNC: 4.7 MMOL/L (ref 3.4–5.3)
PROT SERPL-MCNC: 5.2 G/DL (ref 6.8–8.8)
RBC # BLD AUTO: 2.45 10E6/UL (ref 3.8–5.2)
SODIUM SERPL-SCNC: 136 MMOL/L (ref 133–144)
WBC # BLD AUTO: 10.6 10E3/UL (ref 4–11)

## 2022-03-11 PROCEDURE — 250N000011 HC RX IP 250 OP 636: Performed by: PHYSICIAN ASSISTANT

## 2022-03-11 PROCEDURE — 36415 COLL VENOUS BLD VENIPUNCTURE: CPT | Performed by: STUDENT IN AN ORGANIZED HEALTH CARE EDUCATION/TRAINING PROGRAM

## 2022-03-11 PROCEDURE — 80053 COMPREHEN METABOLIC PANEL: CPT | Performed by: NURSE PRACTITIONER

## 2022-03-11 PROCEDURE — 99233 SBSQ HOSP IP/OBS HIGH 50: CPT | Mod: 24 | Performed by: PHYSICIAN ASSISTANT

## 2022-03-11 PROCEDURE — 250N000011 HC RX IP 250 OP 636: Performed by: STUDENT IN AN ORGANIZED HEALTH CARE EDUCATION/TRAINING PROGRAM

## 2022-03-11 PROCEDURE — 250N000013 HC RX MED GY IP 250 OP 250 PS 637: Performed by: STUDENT IN AN ORGANIZED HEALTH CARE EDUCATION/TRAINING PROGRAM

## 2022-03-11 PROCEDURE — 94660 CPAP INITIATION&MGMT: CPT

## 2022-03-11 PROCEDURE — 250N000013 HC RX MED GY IP 250 OP 250 PS 637: Performed by: NURSE PRACTITIONER

## 2022-03-11 PROCEDURE — 97116 GAIT TRAINING THERAPY: CPT | Mod: GP

## 2022-03-11 PROCEDURE — 999N000157 HC STATISTIC RCP TIME EA 10 MIN

## 2022-03-11 PROCEDURE — 83605 ASSAY OF LACTIC ACID: CPT | Performed by: STUDENT IN AN ORGANIZED HEALTH CARE EDUCATION/TRAINING PROGRAM

## 2022-03-11 PROCEDURE — 36415 COLL VENOUS BLD VENIPUNCTURE: CPT | Performed by: NURSE PRACTITIONER

## 2022-03-11 PROCEDURE — 99233 SBSQ HOSP IP/OBS HIGH 50: CPT | Performed by: STUDENT IN AN ORGANIZED HEALTH CARE EDUCATION/TRAINING PROGRAM

## 2022-03-11 PROCEDURE — 250N000013 HC RX MED GY IP 250 OP 250 PS 637: Performed by: PHYSICIAN ASSISTANT

## 2022-03-11 PROCEDURE — 71046 X-RAY EXAM CHEST 2 VIEWS: CPT

## 2022-03-11 PROCEDURE — 94640 AIRWAY INHALATION TREATMENT: CPT

## 2022-03-11 PROCEDURE — 85041 AUTOMATED RBC COUNT: CPT | Performed by: NURSE PRACTITIONER

## 2022-03-11 PROCEDURE — 97530 THERAPEUTIC ACTIVITIES: CPT | Mod: GP

## 2022-03-11 PROCEDURE — 82803 BLOOD GASES ANY COMBINATION: CPT | Performed by: NURSE PRACTITIONER

## 2022-03-11 PROCEDURE — 120N000002 HC R&B MED SURG/OB UMMC

## 2022-03-11 PROCEDURE — 84100 ASSAY OF PHOSPHORUS: CPT | Performed by: NURSE PRACTITIONER

## 2022-03-11 PROCEDURE — 94668 MNPJ CHEST WALL SBSQ: CPT

## 2022-03-11 PROCEDURE — 250N000012 HC RX MED GY IP 250 OP 636 PS 637: Performed by: PHYSICIAN ASSISTANT

## 2022-03-11 PROCEDURE — 250N000012 HC RX MED GY IP 250 OP 636 PS 637: Performed by: STUDENT IN AN ORGANIZED HEALTH CARE EDUCATION/TRAINING PROGRAM

## 2022-03-11 PROCEDURE — 250N000009 HC RX 250: Performed by: PHYSICIAN ASSISTANT

## 2022-03-11 PROCEDURE — 250N000009 HC RX 250: Performed by: SURGERY

## 2022-03-11 PROCEDURE — 83735 ASSAY OF MAGNESIUM: CPT | Performed by: NURSE PRACTITIONER

## 2022-03-11 PROCEDURE — 99232 SBSQ HOSP IP/OBS MODERATE 35: CPT | Mod: 24 | Performed by: INTERNAL MEDICINE

## 2022-03-11 PROCEDURE — 250N000013 HC RX MED GY IP 250 OP 250 PS 637: Performed by: PEDIATRICS

## 2022-03-11 PROCEDURE — 71045 X-RAY EXAM CHEST 1 VIEW: CPT | Mod: 26 | Performed by: STUDENT IN AN ORGANIZED HEALTH CARE EDUCATION/TRAINING PROGRAM

## 2022-03-11 PROCEDURE — 250N000013 HC RX MED GY IP 250 OP 250 PS 637: Performed by: SURGERY

## 2022-03-11 PROCEDURE — 76000 FLUOROSCOPY <1 HR PHYS/QHP: CPT

## 2022-03-11 PROCEDURE — 94640 AIRWAY INHALATION TREATMENT: CPT | Mod: 76

## 2022-03-11 PROCEDURE — 71046 X-RAY EXAM CHEST 2 VIEWS: CPT | Mod: 26 | Performed by: RADIOLOGY

## 2022-03-11 RX ORDER — SENNOSIDES 8.6 MG
8.6 TABLET ORAL 2 TIMES DAILY
Status: DISCONTINUED | OUTPATIENT
Start: 2022-03-11 | End: 2022-03-15

## 2022-03-11 RX ORDER — MAGNESIUM OXIDE 400 MG/1
400 TABLET ORAL 2 TIMES DAILY
Status: DISCONTINUED | OUTPATIENT
Start: 2022-03-11 | End: 2022-03-11

## 2022-03-11 RX ORDER — DILTIAZEM HCL 60 MG
60 TABLET ORAL EVERY 6 HOURS SCHEDULED
Status: COMPLETED | OUTPATIENT
Start: 2022-03-11 | End: 2022-03-11

## 2022-03-11 RX ORDER — AMOXICILLIN 250 MG
1 CAPSULE ORAL 2 TIMES DAILY PRN
Status: DISCONTINUED | OUTPATIENT
Start: 2022-03-11 | End: 2022-03-15

## 2022-03-11 RX ORDER — PANTOPRAZOLE SODIUM 40 MG/1
40 TABLET, DELAYED RELEASE ORAL
Status: DISCONTINUED | OUTPATIENT
Start: 2022-03-11 | End: 2022-03-17 | Stop reason: HOSPADM

## 2022-03-11 RX ORDER — DILTIAZEM HYDROCHLORIDE 240 MG/1
240 CAPSULE, COATED, EXTENDED RELEASE ORAL DAILY
Status: DISCONTINUED | OUTPATIENT
Start: 2022-03-12 | End: 2022-03-17 | Stop reason: HOSPADM

## 2022-03-11 RX ORDER — MYCOPHENOLIC ACID 360 MG/1
720 TABLET, DELAYED RELEASE ORAL 2 TIMES DAILY
Status: DISCONTINUED | OUTPATIENT
Start: 2022-03-11 | End: 2022-03-14

## 2022-03-11 RX ORDER — POLYETHYLENE GLYCOL 3350 17 G/17G
17 POWDER, FOR SOLUTION ORAL DAILY PRN
Status: DISCONTINUED | OUTPATIENT
Start: 2022-03-11 | End: 2022-03-17 | Stop reason: HOSPADM

## 2022-03-11 RX ORDER — ACYCLOVIR 200 MG/1
400 CAPSULE ORAL 2 TIMES DAILY
Status: DISCONTINUED | OUTPATIENT
Start: 2022-03-13 | End: 2022-03-17 | Stop reason: HOSPADM

## 2022-03-11 RX ORDER — ACETYLCYSTEINE 200 MG/ML
2 SOLUTION ORAL; RESPIRATORY (INHALATION) 3 TIMES DAILY
Status: DISCONTINUED | OUTPATIENT
Start: 2022-03-11 | End: 2022-03-16

## 2022-03-11 RX ORDER — METOPROLOL TARTRATE 25 MG/1
25 TABLET, FILM COATED ORAL 2 TIMES DAILY
Status: DISCONTINUED | OUTPATIENT
Start: 2022-03-11 | End: 2022-03-17 | Stop reason: HOSPADM

## 2022-03-11 RX ORDER — LEVALBUTEROL INHALATION SOLUTION 1.25 MG/3ML
1.25 SOLUTION RESPIRATORY (INHALATION) 3 TIMES DAILY
Status: DISCONTINUED | OUTPATIENT
Start: 2022-03-11 | End: 2022-03-16

## 2022-03-11 RX ORDER — GABAPENTIN 100 MG/1
100 CAPSULE ORAL AT BEDTIME
Status: DISCONTINUED | OUTPATIENT
Start: 2022-03-11 | End: 2022-03-17 | Stop reason: HOSPADM

## 2022-03-11 RX ORDER — MAGNESIUM SULFATE HEPTAHYDRATE 40 MG/ML
2 INJECTION, SOLUTION INTRAVENOUS ONCE
Status: COMPLETED | OUTPATIENT
Start: 2022-03-11 | End: 2022-03-11

## 2022-03-11 RX ORDER — MAGNESIUM OXIDE 400 MG/1
400 TABLET ORAL 2 TIMES DAILY
Status: DISCONTINUED | OUTPATIENT
Start: 2022-03-11 | End: 2022-03-17 | Stop reason: HOSPADM

## 2022-03-11 RX ORDER — ACETAMINOPHEN 325 MG/1
975 TABLET ORAL EVERY 8 HOURS PRN
Status: DISCONTINUED | OUTPATIENT
Start: 2022-03-11 | End: 2022-03-17 | Stop reason: HOSPADM

## 2022-03-11 RX ORDER — VALACYCLOVIR HYDROCHLORIDE 1 G/1
1000 TABLET, FILM COATED ORAL EVERY 8 HOURS
Status: COMPLETED | OUTPATIENT
Start: 2022-03-11 | End: 2022-03-12

## 2022-03-11 RX ADMIN — METOPROLOL TARTRATE 25 MG: 25 TABLET, FILM COATED ORAL at 19:55

## 2022-03-11 RX ADMIN — PREDNISONE 12.5 MG: 2.5 TABLET ORAL at 19:56

## 2022-03-11 RX ADMIN — DILTIAZEM HYDROCHLORIDE 60 MG: 60 TABLET ORAL at 05:00

## 2022-03-11 RX ADMIN — METHOCARBAMOL TABLETS 500 MG: 500 TABLET, COATED ORAL at 19:55

## 2022-03-11 RX ADMIN — Medication 1 PACKET: at 20:44

## 2022-03-11 RX ADMIN — DILTIAZEM HYDROCHLORIDE 60 MG: 60 TABLET ORAL at 01:01

## 2022-03-11 RX ADMIN — NYSTATIN 1000000 UNITS: 100000 SUSPENSION ORAL at 11:50

## 2022-03-11 RX ADMIN — FLUTICASONE PROPIONATE 1 SPRAY: 50 SPRAY, METERED NASAL at 08:39

## 2022-03-11 RX ADMIN — INSULIN ASPART 5 UNITS: 100 INJECTION, SOLUTION INTRAVENOUS; SUBCUTANEOUS at 16:49

## 2022-03-11 RX ADMIN — TACROLIMUS 3.5 MG: 1 CAPSULE ORAL at 08:40

## 2022-03-11 RX ADMIN — AMOXICILLIN 500 MG: 500 CAPSULE ORAL at 23:08

## 2022-03-11 RX ADMIN — NYSTATIN 1000000 UNITS: 100000 SUSPENSION ORAL at 19:56

## 2022-03-11 RX ADMIN — DOXYCYCLINE HYCLATE 100 MG: 100 CAPSULE ORAL at 19:54

## 2022-03-11 RX ADMIN — DAPSONE 50 MG: 25 TABLET ORAL at 08:35

## 2022-03-11 RX ADMIN — METHOCARBAMOL TABLETS 500 MG: 500 TABLET, COATED ORAL at 16:34

## 2022-03-11 RX ADMIN — Medication 400 MG: at 18:11

## 2022-03-11 RX ADMIN — LORATADINE 10 MG: 10 TABLET ORAL at 08:35

## 2022-03-11 RX ADMIN — Medication 1 PACKET: at 20:07

## 2022-03-11 RX ADMIN — VALACYCLOVIR HYDROCHLORIDE 1000 MG: 1 TABLET, FILM COATED ORAL at 21:02

## 2022-03-11 RX ADMIN — ROSUVASTATIN CALCIUM 40 MG: 10 TABLET, FILM COATED ORAL at 08:43

## 2022-03-11 RX ADMIN — PANTOPRAZOLE SODIUM 40 MG: 40 TABLET, DELAYED RELEASE ORAL at 16:34

## 2022-03-11 RX ADMIN — AMOXICILLIN 500 MG: 500 CAPSULE ORAL at 08:42

## 2022-03-11 RX ADMIN — CALCIUM CARBONATE 600 MG (1,500 MG)-VITAMIN D3 400 UNIT TABLET 1 TABLET: at 18:11

## 2022-03-11 RX ADMIN — HEPARIN SODIUM 5000 UNITS: 5000 INJECTION, SOLUTION INTRAVENOUS; SUBCUTANEOUS at 21:02

## 2022-03-11 RX ADMIN — FAMOTIDINE 20 MG: 20 TABLET ORAL at 08:37

## 2022-03-11 RX ADMIN — FUROSEMIDE 40 MG: 10 INJECTION, SOLUTION INTRAMUSCULAR; INTRAVENOUS at 08:40

## 2022-03-11 RX ADMIN — LEVALBUTEROL HYDROCHLORIDE 1.25 MG: 1.25 SOLUTION RESPIRATORY (INHALATION) at 07:50

## 2022-03-11 RX ADMIN — FAMOTIDINE 20 MG: 20 TABLET ORAL at 19:54

## 2022-03-11 RX ADMIN — DILTIAZEM HYDROCHLORIDE 60 MG: 60 TABLET, FILM COATED ORAL at 18:10

## 2022-03-11 RX ADMIN — DILTIAZEM HYDROCHLORIDE 60 MG: 60 TABLET ORAL at 11:49

## 2022-03-11 RX ADMIN — Medication 1 PACKET: at 16:33

## 2022-03-11 RX ADMIN — HEPARIN SODIUM 5000 UNITS: 5000 INJECTION, SOLUTION INTRAVENOUS; SUBCUTANEOUS at 14:23

## 2022-03-11 RX ADMIN — Medication 1 PACKET: at 08:46

## 2022-03-11 RX ADMIN — MAGNESIUM SULFATE HEPTAHYDRATE 2 G: 40 INJECTION, SOLUTION INTRAVENOUS at 11:43

## 2022-03-11 RX ADMIN — CALCIUM CARBONATE 600 MG (1,500 MG)-VITAMIN D3 400 UNIT TABLET 1 TABLET: at 08:36

## 2022-03-11 RX ADMIN — Medication 40 MG: at 08:39

## 2022-03-11 RX ADMIN — ACETYLCYSTEINE 2 ML: 200 SOLUTION ORAL; RESPIRATORY (INHALATION) at 11:22

## 2022-03-11 RX ADMIN — LEVALBUTEROL HYDROCHLORIDE 1.25 MG: 1.25 SOLUTION RESPIRATORY (INHALATION) at 20:37

## 2022-03-11 RX ADMIN — NYSTATIN 1000000 UNITS: 100000 SUSPENSION ORAL at 16:34

## 2022-03-11 RX ADMIN — VALACYCLOVIR HYDROCHLORIDE 1000 MG: 1 TABLET, FILM COATED ORAL at 05:00

## 2022-03-11 RX ADMIN — GABAPENTIN 100 MG: 100 CAPSULE ORAL at 21:02

## 2022-03-11 RX ADMIN — METHOCARBAMOL TABLETS 500 MG: 500 TABLET, COATED ORAL at 08:36

## 2022-03-11 RX ADMIN — INSULIN ASPART 9 UNITS: 100 INJECTION, SOLUTION INTRAVENOUS; SUBCUTANEOUS at 08:26

## 2022-03-11 RX ADMIN — ACETYLCYSTEINE 2 ML: 200 SOLUTION ORAL; RESPIRATORY (INHALATION) at 20:37

## 2022-03-11 RX ADMIN — PREDNISONE 15 MG: 5 TABLET ORAL at 08:37

## 2022-03-11 RX ADMIN — ASPIRIN 81 MG: 81 TABLET, COATED ORAL at 08:37

## 2022-03-11 RX ADMIN — DOXYCYCLINE HYCLATE 100 MG: 100 CAPSULE ORAL at 08:36

## 2022-03-11 RX ADMIN — INSULIN ASPART 1 UNITS: 100 INJECTION, SOLUTION INTRAVENOUS; SUBCUTANEOUS at 11:41

## 2022-03-11 RX ADMIN — Medication 1 PACKET: at 08:45

## 2022-03-11 RX ADMIN — VALACYCLOVIR HYDROCHLORIDE 1000 MG: 1 TABLET, FILM COATED ORAL at 11:51

## 2022-03-11 RX ADMIN — LEVALBUTEROL HYDROCHLORIDE 1.25 MG: 1.25 SOLUTION RESPIRATORY (INHALATION) at 11:22

## 2022-03-11 RX ADMIN — AMOXICILLIN 500 MG: 500 CAPSULE ORAL at 16:34

## 2022-03-11 RX ADMIN — MYCOPHENOLATE MOFETIL 1000 MG: 250 CAPSULE ORAL at 08:33

## 2022-03-11 RX ADMIN — MYCOPHENOLIC ACID 720 MG: 360 TABLET, DELAYED RELEASE ORAL at 19:55

## 2022-03-11 RX ADMIN — FUROSEMIDE 40 MG: 10 INJECTION, SOLUTION INTRAMUSCULAR; INTRAVENOUS at 14:23

## 2022-03-11 RX ADMIN — METOPROLOL TARTRATE 25 MG: 25 TABLET, FILM COATED ORAL at 08:37

## 2022-03-11 RX ADMIN — ACETAMINOPHEN 975 MG: 325 TABLET, FILM COATED ORAL at 12:20

## 2022-03-11 RX ADMIN — HEPARIN SODIUM 5000 UNITS: 5000 INJECTION, SOLUTION INTRAVENOUS; SUBCUTANEOUS at 05:00

## 2022-03-11 RX ADMIN — METHOCARBAMOL TABLETS 500 MG: 500 TABLET, COATED ORAL at 11:49

## 2022-03-11 RX ADMIN — Medication 400 MG: at 11:49

## 2022-03-11 RX ADMIN — TACROLIMUS 3 MG: 1 CAPSULE ORAL at 18:11

## 2022-03-11 RX ADMIN — ACETYLCYSTEINE 2 ML: 200 SOLUTION ORAL; RESPIRATORY (INHALATION) at 07:50

## 2022-03-11 RX ADMIN — Medication 1 TABLET: at 11:49

## 2022-03-11 RX ADMIN — AMOXICILLIN 500 MG: 500 CAPSULE ORAL at 01:07

## 2022-03-11 ASSESSMENT — ACTIVITIES OF DAILY LIVING (ADL)
ADLS_ACUITY_SCORE: 11

## 2022-03-11 NOTE — PROGRESS NOTES
[facility]    Transplant Infectious Diseases Inpatient Progress Note      Melissa Elder MRN# [id number]   YOB: 1955 Age: 66 year old   Date of Admission and time: 2/20/2022  1:39 AM           Recommendations:     1. Continue Doxycycline, Advise 4 weeks (3/3-4/1) provided ureaplasma does not grow again on upcoming BAL    2. No indication at this time for empirical treatment for AFB, unless growing in multiple samples    3. Do not see clear indication to cover Actinomyces, regardless this organism is known to be reliably covered by doxycyline, so would stop Amoxicillin.    Thank you for involving us in the care of this patient, ID will sign off, please call if new culture data arises or clinical situation arises where we can be of assistance.    Signed:  Bulmaro Ly MD , 03/07/22   Infectious Disease Fellow: Pager [phone]   If no response/after hours see [facility]->MEDICINE INFECTIOUS DISEASE ADULT/[facility]        Summary of Presentation:   Transplants:  2/21/2022 (Lung), Postoperative day:  18   This patient is a 66 year old female with COPD s/p Bi lung transplant 2/2022. Induction with high dose steroids and basiliximab. On TAC/MMF/prednidonse. Treated for hyperammonemia syndrome with imrpovement. AFB found in sputum sample from 3/2/22.       Active Problems and Infectious Diseases Issues:     #AFB in sputum 3/2/22  AFB noted in sputum 3/2/2022.  Not yet identified in any other specimen to date including bronchoscopy 3/3, 2/21, 2/22.  Single culture positive for AFB difficult to determine significance of.    Diagnosis of mycobacterial disease requires combination of: #1-growth in multiple cultures, #2 typical radiographic finding of reticulonodular infiltrates or cavities #3 clinical symptoms of excessive sputum production or signs of acute pneumonia.    At this time patient does not clearly have radiographic or microbiologic criteria for diagnosis.    Unless her clinical  situation changes or further cultures are positive, we would not be in favour of treatment.    #Culture for Ureaplasma positive  #Hyperammonemia syndrome  Patient with mental status decompensation in setting of elevated CO2 as well as ammonia.  Patient cultures positive for Ureaplasma.  Patient started on empirical double coverage 3/3/2022.  Cultures speciated as Ureaplasma urealyticum.  This organism is almost universally susceptible to doxycycline and does not require double coverage as Mycoplasma hominis may sometimes.      Unless this organism is grown again or clinical situation changes greatly would plan for 4 weeks of treatment given this is isolated in pleural fluid ( although doubt this represents true empyema)    #Leukocytosis- resolving  # Acute respiratory failure with hypercapnia. - Off O2  Etiology not known. Could be from respiratory depression from hyperammonemia or due to plerual effusion causing decreased ventilatory capacity. Pulmonary following.    Resolved and on room air 03/11/22     #. 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   The most significant pathogens are the MSSA and the S constellatus and are covered by ceftriaxone which she completed a 14 day course of    Unless the donor aspirated, the significance of Actinomyces sp and S mitis is questionable since they represent oral michael and they did not grow on subsequent respiratory sample the next day. Additionally Actinomyces was not covered for many days post transplant as it is not covered by ceftriaxone, and is also covered by doxycycline which she is on for other reasons, so there is no indication for amoxicillin, would stop.    The C krusei also did not grow again.  Saccharomyces sp is not typically pathogenic.     C albicans may or may not result in empyema. Will follow on pleural fluid studies.   If with further growth on subsequent BALs, would either treat with fluconazole or inhaled ampho B.     HSV PCR  positive in a respiratory sample. On VACV empirically.       Other Infectious Disease issues include:  - on VACV for viral ppx.   - PCP prophylaxis: dapsone.   - Serostatus: CMV D-/R-, EBV D+/R+, HSV1+/2-, VZV +  - Immunization status: This patient received the third dose of COVID-19 vaccine on 11/29/2021.  - Gamma globulin status: 1000 as of 2/20/2022.       Bulmaro Ly MD  [facility]  Contact information available via [facility] Paging/Directory    03/11/2022         Interim History:     Patient improving, off O2, all chest tubes out. Working with PT. No symptoms really bothering her          Review of Systems:      As mentioned in the HPI otherwise negative by reviewing constitutional symptoms, central and peripheral neurological systems, respiratory system, cardiac system, GI system,  system, musculoskeletal, skin, allergy, and lymphatics.                History of Present Illness:   Transplants:  2/21/2022 (Lung), Postoperative day:  18     This patient is a 66 year old female with COPD s/p Bi lung transplant.   The hospital course was not complicated until 3/1/2022 when she suffered from respiratory deterioration with hypercapnia and mental status changes. The encephalopathy resolved when the patient was placed on Bipap. The ammonia was checked and was elevated. The patent was started on doxycycline. ID consulted for double coverage advice in the setting of QTc prolongation.   The patient remains short of breath on Bipap. No significant cough or chest pain. No fever.   The  stated that his wife's mental status are at baseline.               Physical Exam:   Temp: 98  F (36.7  C) Temp src: Oral BP: 129/64 Pulse: 100   Resp: 27 SpO2: 97 % O2 Device: BiPAP/CPAP      Wt Readings from Last 4 Encounters:   03/09/22 72.8 kg (160 lb 7.9 oz)   12/20/21 66.7 kg (147 lb)   06/21/21 68 kg (150 lb)   06/03/19 69.4 kg (153 lb)     Constitutional: awake, alert,  cooperative,off oxygen, appears at stated age, well nourished.   Neurologic: Patient is moving all extremities without focal deficit, no focal sensory loss.   Lungs: coarse BS bilaterally  no accessory muscle use, Chest tubes in place x3 with no tidalling or bubbing in chambers.   CVS: RRR, normal S1/S2, no murmur, PMI was not displaced.   Abdomen: non-tender, non-distended, no masses, no bruit, no shifting dullness, normal BS.   Extremities: +1 pitting edema of bilateral lower extremities, no ulcers, normal ROM of all joints, no swelling or erythema of any of joints and no tenderness to palpation.   Skin: no induration, fluctuation or discharge at the surgical site, and no rash            Data:   No results found for: ACD4    Inflammatory Markers    Recent Labs   Lab Test 03/01/22  0318   CRP 66.0*       Immune Globulin Studies     Recent Labs   Lab Test 02/20/22  0617 03/25/19  0834   IGG 1,023 901   IGM  --  265   IGE  --  22   IGA  --  190   IGG1  --  435   IGG2  --  363   IGG3  --  131   IGG4  --  32       Metabolic Studies       Recent Labs   Lab Test 03/11/22  0745 03/11/22  0710 03/11/22  0504 03/11/22  0105 03/10/22  2125 03/10/22  1822 03/10/22  1600 03/10/22  1213 03/10/22  1013 03/10/22  0806 03/09/22  0738 03/09/22  0601 03/08/22  0744 03/08/22  0625 03/07/22  2310 03/07/22  2047 03/07/22  0717 03/07/22  0600 03/06/22  0823 03/06/22  0626 03/05/22  0801 03/05/22  0525 03/03/22  1618 03/03/22  1223 02/22/22  1950 02/22/22  1946   NA  --  136  --   --   --   --   --   --   --  136  --  136  --  138  --   --   --  136  --  136  --  136   < >  --    < >  --    POTASSIUM  --  4.7  --   --   --   --   --   --   --  4.1  --  4.3  --  4.8  --   --   --  4.7  --  4.3  --  4.8   < >  --    < >  --    CHLORIDE  --  98  --   --   --   --   --   --   --  100  --  98  --  98  --   --   --  98  --  93*  --  92*   < >  --    < >  --    CO2  --   --   --   --   --   --   --   --   --  32  --  34*  --  38*  --   --   --   35*  --  41*  --  40*   < >  --    < >  --    ANIONGAP  --   --   --   --   --   --   --   --   --  4  --  4  --  2*  --   --   --  3  --  2*  --  4   < >  --    < >  --    BUN  --   --   --   --   --   --   --   --   --  29  --  31*  --  28  --   --   --  23  --  25  --  29   < >  --    < >  --    CR  --   --   --   --   --   --   --   --   --  0.47*  --  0.50*  --  0.50*  --   --   --  0.49*  --  0.52  --  0.54   < >  --    < >  --    GFRESTIMATED  --   --   --   --   --   --   --   --   --  >90  --  >90  --  >90  --   --   --  >90  --  >90  --  >90   < >  --    < >  --    *  --  159* 131* 126* 240* 194*   < >  --  183*   < > 194*   < > 169*   < >  --    < > 183*   < > 191*   < > 202*   < >  --    < >  --    A1C  --   --   --   --   --   --   --   --   --   --   --   --   --   --   --   --   --   --   --   --   --   --   --   --   --  5.7*   MINISTERIO  --   --   --   --   --   --   --   --   --  8.4*  --  7.9*  --  8.3*  --   --   --  8.0*  --  8.1*  --  7.9*   < >  --    < >  --    PHOS  --   --   --   --   --   --   --   --   --  3.0  --  3.8  --  4.3  --   --   --  3.4  --  3.5  --  3.8   < >  --    < >  --    MAG  --   --   --   --   --   --   --   --   --  1.6  --  1.9  --  1.7  1.8  --   --   --  1.6  --  1.7  --  1.6   < >  --    < >  --    LACT  --   --   --   --   --   --   --   --  1.4  --   --   --   --   --   --  1.7  --   --    < >  --    < >  --    < >  --    < >  --    CKT  --   --   --   --   --   --   --   --   --   --   --   --   --   --   --   --   --   --   --   --   --   --   --  138  --   --     < > = values in this interval not displayed.       Hepatic Studies    Recent Labs   Lab Test 03/10/22  0806 03/09/22  0601 03/08/22  0625 03/07/22  0600 03/06/22  0626 03/05/22  0525 03/04/22  0553 03/03/22  0509 03/02/22  0455   BILITOTAL 0.4 0.2 0.3 0.2 0.2 0.2   < > 0.1* 0.2   ALKPHOS 120 116 114 105 107 120   < > 121 153*   ALBUMIN 2.4* 2.2* 2.2* 2.0* 1.8* 1.9*   < > 1.8* 1.9*   AST 13 13 16 18  20 23   < > 26 41   ALT 38 40 45 48 58* 67*   < > 99* 130*   LDH  --   --   --   --   --   --   --  293* 344*    < > = values in this interval not displayed.       Pancreatitis testing    Recent Labs   Lab Test 03/25/19  0834   AMYLASE 63   TRIG 83       Hematology Studies      Recent Labs   Lab Test 03/11/22  0710 03/10/22  0806 03/09/22  0601 03/08/22  0625 03/07/22  0600 03/06/22  0626 12/09/19  0923 03/25/19  0834   WBC 10.6 12.4* 10.2 11.9* 11.9* 14.5*   < > 8.1   ANEU  --   --   --   --   --   --   --  6.5   ALYM  --   --   --   --   --   --   --  1.0   EVAN  --   --   --   --   --   --   --  0.4   AEOS  --   --   --   --   --   --   --  0.1   HGB 7.6* 8.5* 7.0* 7.1* 7.0* 7.0*   < > 13.4   HCT 24.2* 27.7* 23.2* 23.9* 22.1* 21.8*   < > 41.5    386 310 345 283 301   < > 207    < > = values in this interval not displayed.       Clotting Studies    Recent Labs   Lab Test 02/22/22  0355 02/21/22  0808 02/21/22  0714 02/21/22  0530 02/20/22  0617 02/20/22  0617 03/25/19  0834   INR 1.31* 1.58* 5.23* 1.96*   < > 1.02 0.96   PTT  --   --  106* 29  --  31 22    < > = values in this interval not displayed.       Arterial Blood Gas Testing    Recent Labs   Lab Test 03/11/22  0710 03/10/22  0806 03/09/22  0601 03/08/22  0625 03/02/22  0455 03/01/22  2347 03/01/22  1731 02/23/22  0834 02/23/22  0347 02/22/22  1747 02/22/22  1304   PH  --   --   --   --   --  7.26* 7.34*  --  7.30* 7.33* 7.39   PCO2  --   --   --   --   --  98* 79*  --  54* 47* 35   PO2  --   --   --   --   --  96 149*  --  142* 98 184*   HCO3  --   --   --   --   --  43* 43*  --  27 25 21   O2PER 21 51 25 0   < > 40 45   < > 2 30  30 40    < > = values in this interval not displayed.        Urine Studies     Recent Labs   Lab Test 03/01/22  1549 02/20/22  0248 03/25/19  1043   URINEPH 6.0 7.0 5.0   NITRITE Negative Negative Negative   LEUKEST Negative Negative Trace*   WBCU 0 <1 1       Tacrolimus levels    Invalid input(s): TACROLIMUS, TAC,  TACR  Transplant Immunosuppression Labs Latest Ref Rng & Units 3/11/2022 3/10/2022 3/9/2022 3/8/2022 3/7/2022   Creat 0.52 - 1.04 mg/dL - 0.47(L) 0.50(L) 0.50(L) 0.49(L)   BUN 7 - 30 mg/dL - 29 31(H) 28 23   WBC 4.0 - 11.0 10e3/uL 10.6 12.4(H) 10.2 11.9(H) 11.9(H)   Neutrophil % 86 85 88 88 86   ANEU 1.6 - 8.3 10e9/L - - - - -       Microbiology:  Blood cx negative.   Last check of C difficile  C Difficile Toxin B by PCR   Date Value Ref Range Status   03/06/2022 Negative Negative Final     Comment:     A negative result does not exclude actual disease due to C. difficile and may be due to improper collection, handling and storage of the specimen or the number of organisms in the specimen is below the detection limit of the assay.       Virology:  CMV viral loads  No results found for: CMVQNT, CMVRESINST, 16539, 26896, 23999, 89862, CMVQAL  CMV viral loads  No lab results found.    CMV viral loads  No results found for: CMVQNT, CMVRESINST, CMVLOG, 15610, 73126, 10917, 20143    CMV resistance testing  No lab results found.  No results found for: CMVCID, CMVFOS, CMVGAN     No results found for: H6RES    No results found for: EBVDN, EBRES, EBVDN, EBVSP, EBVPC, EBVPCR    CMV Antibody IgG   Date Value Ref Range Status   02/20/2022 No detectable antibody. No detectable antibody.  Final   03/25/2019 0.2 0.0 - 0.8 AI Final     Comment:     Negative  Antibody index (AI) values reflect qualitative changes in antibody   concentration that cannot be directly associated with clinical condition or   disease state.         Toxoplasma Antibody IgG   Date Value Ref Range Status   03/25/2019 <3.0 0.0 - 7.1 IU/mL Final     Comment:     Negative- Absence of detectable Toxoplasma gondii IgG antibodies. A negative   result does not rule out acute infection.  The magnitude of the measured result is not indicative of the amount of   antibody present. The concentrations of anti-Toxoplasma gondii IgG in a given   specimen determined with  assays from different manufacturers can vary due to   differences in assay methods and reagent specificity.           Imaging:  CT c/a/p 3/2/22   IMPRESSION:   1. No central filling defect consistent with a pulmonary embolism.   2. Improvement in the bilateral hydropneumothorax. A right-sided chest  tube is located in the right major fissure. 2 left-sided chest tubes,  one in the apex and one in the left lung base.  3. Again noted is fluid and gas collection along the right hilum,  which may represent postsurgical fluid collection. Follow as needed to  exclude infection.  4. Bibasilar atelectasis with mucus plugging. Groundglass opacities in  the bilateral lung bases again noted.  IMPRESSION: Mild periportal edema. Cystic area in left hemipelvis,  likely adnexal in origin. Fluid-filled small bowel with distended  loops of bowel suggestive of enteritis/ileitis. Recommend continued  follow-up to exclude developing obstruction. Aortoiliac  atherosclerosis. Mild periportal edema with a benign focal fatty  infiltration in the liver. Soft tissue prominence surrounding the  common hepatic artery an above above the level of the pancreatic head,  differential of inflammation/infection as opposed to neoplasm.  Bilateral lung transplant. Possible postoperative changes versus  inflammation/infection no pericardial tissues and right infrahilar  tissues with right larger than left pleural effusions with associated  atelectasis, recommend follow-up to clearing to exclude infection.  CT chest 2/25/2022  Impression:   1. Increase in size of confluent hydropneumothorax with  intercommunication between both hemithoraces anteriorly, and  pneumomediastinum. The right-sided chest tube is located within the  major fissure and therefore may be nonfunctional. The left-sided chest  tube has been retracted and is in the most inferior aspect of the  anterior costophrenic sulcus with a sidehole in the subcutaneous soft  tissues and therefore is  nonfunctional.  2. Somewhat loculated appearing fluid and gas collection along the  right hilum measures up to 2 cm this may represents postsurgical fluid  collection short interval follow-up is recommended.  3. Bibasilar atelectasis and mucous plugging.     Attestation:  I interviewed the patient and obtained history from the patient, the  who's at the bedside, and by reviewing the patient's chart including outside records, microbiological data, and radiological data. All data are summarized in this notes.    Bulmaro Ly MD  [facility]  Contact information available via [facility] Paging/Directory     03/11/2022

## 2022-03-11 NOTE — PROGRESS NOTES
Shift Summary (5547-8252):    Neuro: patient remains alert and oriented x4. PRN tylenol given for pain.     Cardiac: NSR/ST with BBB. BP WDL. Afebrile.     Respiratory: on RA during the day, Bipap 18/6 25% while sleeping. Fine crackles in lungs. All chest tubes previously removed.     GI/: Regular diet. TF infusing at 55 ml/hr overnight. Voiding regularly.

## 2022-03-11 NOTE — PROGRESS NOTES
Calorie Count  Intake recorded for: 3/10  Total Kcals: 1073 Total Protein: 57g  Kcals from Hospital Food: 0   Protein: 0g  Kcals from Outside Food (average):0 Protein: 0g  # Meals Ordered from Kitchen: 2 meals  # Meals Recorded: No food intake recorded  # Supplements Recorded: No food intake recorded    Addendum by RD 3/11 11:25am: Spoke with pt, she reports that she ate all of her cereal and hard boiled eggs for breakfast yesterday. For lunch she ate all of her chicken tenders and some of her potatoes. Melissa also reports that she drank 2 Glucerna. Updated totals above.     Ruth Orr, RD, LD  6D RD pager [phone]

## 2022-03-11 NOTE — TELEPHONE ENCOUNTER
A pharmacist spent 60 minutes on the phone providing medication teaching with Melissa Trevinonirmala and Tyrese () for discharge with a focus on new medications/dose changes.  The discharge medication list was reviewed with the patient/family and the following points were discussed, as applicable: Name, description, purpose, dose/strength, duration of medications, measurement of liquid medications, special storage requirements, common side effects, food/medications to avoid, action to be taken if dose is missed, when to call MD and how to obtain refills.  The patient will be responsible for managing medications, and Tyrese will assist. Additionally, the following transplant related education was covered: Purpose of medication card, Medication videos, Timing of medications and day of lab draw considerations , Emesis or missed dose, Prescription Insurance  and Discharge process for receiving meds   Patient will  transplant supplies including 7 day pill organizer at discharge pharmacy along with medications. (She already has a BP monitor.) Patient will use local pharmacy or other mail order for outpatient medications. While in town post-discharge (about 3 months) she will utilize the [facility]. When back home, she'll use the [facility] to get medications.  Clinical Pharmacy Consult:                                                      Transplant Specific:   Date of Transplant: 2/21/2022  Type of Transplant: lung  First Transplant: yes  History of rejection: no    Immunosuppression Regimen   TAC 3.5mg qAM & 3mg qPM, Prednisone 15mg qAM & 12.5mg qPM and MMF 1000mg qAM & 1000mg qPM  Patient specific goal: 8-12  Most recent level: 12.4, date 3/10/2022  Immunosuppressant Levels:  Supratherapeutic  Pt adherent to lab draws: yes  Scr:   Lab Results   Component Value Date    CR 0.47 03/11/2022    CR 0.56 06/21/2021     Side effects: Tremors, Diarrhea and Edema    Prophylactic  Medications  Antibacterial:  Dapsone 50mg daily  Scheduled Discontinue Date: Lifelong    Antifungal: Nystatin  Scheduled Discontinue Date: 6 months    Antiviral: Valacyclovir  Through 3/11, then possibly Acyclovir through 3/23 (Epic notes were contradictory)   Scheduled Discontinue Date:     Acid Reducer: Protonix (pantoprazole) and Pepcid (famotidine)  Scheduled Reviewed Date: reviewed in clinic    Thrombosis Prevention: Aspirin 81 mg PO daily  Scheduled Discontinue Date:     Blood Pressure Management  Frequency of home Blood Pressure checks: twice daily  Most recent home BP: 129/64  Patient Blood pressure goal:   Patient blood pressure at goal:  yes  Hospitalizations/ER visits since last assessment: 0      Med rec/DUR performed: yes  Med Rec Discrepancies: no    Reminders:    1. Bring to first clinic appt: med box, med card, bp monitor, all medications being taken, and lab book.  2.   MTM pharmacist visit on first clinic appt and if ok, again in 3 to 4 months during follow up appt.  3.   Avoid Grapefruit and Grapefruit juice.   4.   Avoid herbal supplements. If wish to take other medications or supplements, call your coordinator.   5.   Keep lab appts.   6.   Make sure you are protecting your skin by wearing long sleeves and applying sunscreen to exposed skin, for any significant time in the sun.     Transplant Coordinator is Lissett Gates.      Larissa Arguelles [facility]

## 2022-03-11 NOTE — PROGRESS NOTES
Diabetes Consult Daily  Progress Note          Assessment/Plan:   Melissa Elder is a 66 year old female with PMHx HTN, HLD, paroxysmal Afib, osteoporosis, GERD, severe COPD, previously requiring 2-3L NC O2 at rest.  Underwent b/l lung transplant with Dr. Robin on 02/21  has ongoing issues with Hydropneumothorax, chest tubes in place. 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 Endocrinology is being consulted for DM management.    1) Steroid and TF induced hyperglycemia   2) Underlying pre-DM, versus steroid diabetes     BG are predominantly at goal. Correction coverage appropriate.  Next steroid decrease planned for 3/15.     Plan:     NPH q24h - will move back to 2000 to match cycled tube feed start.  Will reduce dose to 18 units as will now be just 101g CHO given at 45 ml /hr x 12 hours (compared to 143g previously)     Continue Novolog CHO coverage-- 1 unit per 15 grams w/ meals and snacks    Novolog  custom correction 1 per 35 mg/dL Q4h --> schedule changed again to align w/ start of TF at 1800 and AC earlier in the day    BG monitoring TID AC, HS, 0200, 0600    Hypoglycemia protocol    Education needs to be assessed at acute rehab    PRN D10W for hypoglycemia prevention if TF stops out side of the scheduled time-- rate is  70 to replace about 70% of TF carbs  Outpatient follow up plan TBD, depending largely on nutrition by time of discharge                   Interval History:     Nursing notes and progress notes reviewed    : Vital 1.5 @ 45 mL/hr x 12 hours/evening  will provide: 101 gm CHO, LESS grams of carbohydrate per hour as previous TF.  TF schedule 1470-4490.      ,      [facility]  Maybe 3/14?    Melissa reports that she is feeling well.  She and her  are a bit overwhelmed following post transplant meeting this morning.  Notes will need all instructions written and clear at discharge.  In regards to her blood sugar she knows it is up and  "down a bit but she is hopeful that it will resolve as steroids are tapered.  She does have a good appetite.  She reports she had a good breakfast this morning.  She has a Ch[place] she hopes to eat when she has a moment.  Also it is her wedding anniversary and her  brought a crockpot full of chicken noodle soup which she is excited to eat.    Spoke with Ruth from nutrition and she will reduce the rate and duration of tube feeds tonight as appetite is picking up.  Nurse Vania reports that this has been going smoothly and has not had any difficulties with interruption.  We confirmed that indeed there is dextrose should there be interruption to the feedings.          Date AM dose (mg) PM dose (mg)   3/1 15 15   3/8 15 12.5   3/15 12.5 12.5   3/29 12.5 10   4/12 10 10   5/10 10 7.5   6/7 7.5 7.5   7/5 7.5 5   8/2 5 5   8/30 5 2.5         Anticipated discharge location: home;inpatient rehabilitation facility        Recent Labs   Lab 03/11/22  0745 03/11/22  0710 03/11/22  0504 03/11/22  0105 03/10/22  2125 03/10/22  1822   * 196* 159* 131* 126* 240*               Review of Systems:   See interval hx          Medications:     Orders Placed This Encounter      Regular Diet Adult Thin Liquids (level 0)    Physical Exam:     /64 (BP Location: Right arm)   Pulse 100   Temp 98  F (36.7  C) (Oral)   Resp 27   Ht 1.575 m (5' 2\")   Wt 72.8 kg (160 lb 7.9 oz)   SpO2 97%   BMI 29.35 kg/m  ;  General:  Pleasant, up in chair,   in no distress.  bedside and supportive.  HEENT: hearing intact to conversational volume.  Lungs: unlabored respiration, no cough observed  Mental status:  alert, oriented x3, communicating clearly  Psych:  calm, even mood           Data:     Lab Results   Component Value Date    A1C 5.7 02/22/2022    A1C 5.8 02/20/2022     Last Comprehensive Metabolic Panel:  Sodium   Date Value Ref Range Status   03/11/2022 136 133 - 144 mmol/L Final   06/21/2021 137 133 - 144 mmol/L Final "     Potassium   Date Value Ref Range Status   03/11/2022 4.7 3.4 - 5.3 mmol/L Final   06/21/2021 3.0 (L) 3.4 - 5.3 mmol/L Final     Chloride   Date Value Ref Range Status   03/11/2022 98 94 - 109 mmol/L Final   06/21/2021 104 94 - 109 mmol/L Final     Carbon Dioxide   Date Value Ref Range Status   06/21/2021 34 (H) 20 - 32 mmol/L Final     Carbon Dioxide (CO2)   Date Value Ref Range Status   03/11/2022 35 (H) 20 - 32 mmol/L Final     Anion Gap   Date Value Ref Range Status   03/11/2022 3 3 - 14 mmol/L Final   06/21/2021 <1 (L) 3 - 14 mmol/L Final     Glucose   Date Value Ref Range Status   03/11/2022 196 (H) 70 - 99 mg/dL Final   06/21/2021 131 (H) 70 - 99 mg/dL Final     GLUCOSE BY METER POCT   Date Value Ref Range Status   03/11/2022 187 (H) 70 - 99 mg/dL Final     Urea Nitrogen   Date Value Ref Range Status   03/11/2022 32 (H) 7 - 30 mg/dL Final   06/21/2021 8 7 - 30 mg/dL Final     Creatinine   Date Value Ref Range Status   03/11/2022 0.47 (L) 0.52 - 1.04 mg/dL Final   06/21/2021 0.56 0.52 - 1.04 mg/dL Final     GFR Estimate   Date Value Ref Range Status   03/11/2022 >90 >60 mL/min/1.73m2 Final     Comment:     Effective December 21, 2021 eGFRcr in adults is calculated using the 2021 CKD-EPI creatinine equation which includes age and gender (Ines et al., NEJM, DOI: 10.1056/CKPGec0032429)   06/21/2021 >90 >60 mL/min/[1.73_m2] Final     Comment:     Non  GFR Calc  Starting 12/18/2018, serum creatinine based estimated GFR (eGFR) will be   calculated using the Chronic Kidney Disease Epidemiology Collaboration   (CKD-EPI) equation.       Calcium   Date Value Ref Range Status   03/11/2022 8.4 (L) 8.5 - 10.1 mg/dL Final   06/21/2021 8.5 8.5 - 10.1 mg/dL Final     Bilirubin Total   Date Value Ref Range Status   03/11/2022 0.4 0.2 - 1.3 mg/dL Final   06/21/2021 0.3 0.2 - 1.3 mg/dL Final     Alkaline Phosphatase   Date Value Ref Range Status   03/11/2022 116 40 - 150 U/L Final   06/21/2021 92 40 - 150 U/L  Final     ALT   Date Value Ref Range Status   03/11/2022 32 0 - 50 U/L Final   06/21/2021 25 0 - 50 U/L Final     AST   Date Value Ref Range Status   03/11/2022 14 0 - 45 U/L Final   06/21/2021 17 0 - 45 U/L Final       I spent a total of 35 minutes bedside and on the inpatient unit managing the glycemic care of Melissa Elder. Over 50% of my time on the unit was spent counseling the patient  and/or coordinating care regarding acute glycemic management.  See note for details.        It is my privilege to be involved in the care of the above patient.     Lola Vazquez PA-C, [facility]  [facility]  Diabetes, Endocrinology, and Metabolism  [phone] Appointments/Nurse    [phone] pager  7 am - noon 3/11/22            To contact Endocrine Diabetes service:   From 8AM-4PM: page inpatient diabetes provider that is following the patient that day (see filed or incomplete progress notes.consult notes).  If uncertain of provider assignment: page job code [phone].  For questions or updates from 4PM-8AM: page the diabetes job code for on call fellow: [phone]    Please notify inpatient diabetes service if changes are planned to steroids, enteral feeding, parenteral feeding, or if procedures are planned requiring prolonged NPO status.

## 2022-03-11 NOTE — PROGRESS NOTES
CVTS:     Wound check;  - clamshell wound looks clean and dry today.   - Keep wound clean and dry, can cover with dry gauze and/or interdry sheets.    Sternal precautions for 3 months from surgery date (2/21/22).     Will sign off, please call if questions.    Luis A Nava PA-C  Cardiothoracic Surgery  Pager [phone]    11:35 AM   March 11, 2022

## 2022-03-11 NOTE — PROGRESS NOTES
CLINICAL NUTRITION SERVICES - REASSESSMENT NOTE     Nutrition Prescription    RECOMMENDATIONS FOR MDs/PROVIDERS TO ORDER:  Recommend continue nutrition support until pt able to consistently consume at least 75% estimated minimum nutrition needs (1200 Kcals and 60 gm protein daily)    Malnutrition Status:    Moderate malnutrition in the context of acute illness    Recommendations already ordered by Registered Dietitian (RD):  - Continue to cycle TF: Vital 1.5 @ 45mL/hr x 12 hours (540 mL). Total provisions: 810 kcal, 37 g PRO, and 101 g CHO. + 2 pkts Prosource, total provisions: 890 kcal (16 kcal/kg) and 59 g PRO (1 g/kg).   - Continue Glucerna BID    Future/Additional Recommendations:  - Monitor jennie cts, PO intake, supplement preferences, ability to further wean TFs  - Monitor stooling trends for improvement with transition to semi-elemental formula      EVALUATION OF THE PROGRESS TOWARD GOALS   Diet: Regular  Nutrition Support: Vital 1.5 @ 55 mL/hr x 14 hours/evening (770 mL) will provide: 1155 Kcals, 52 gm protein, 143 gm CHO, 5 gm fiber, and 588 mL free water, + 2 pkts Prosource daily for total provisions of 1235 Kcals (23 Kcal/kg), and 74 gm protein (1.4 gm/kg)    Intake: Average EN intake of 834 mL/day + 2 pkts Prosource daily over the past week. Provides 1331 kcal and 79 g PRO.   3-day calorie count average providing 869 kcals (16 kcal/kg) and 41 g PRO (0.8 g/kg), which meets 54% of estimated energy needs & 51% of estimated protein needs.   3/8         Total Kcals: 917       Total Protein: 40g  3/9         Total Kcals: 616       Total Protein: 26g  3/10       Total Kcals: 1073     Total Protein: 57g  EN + PO meeting 100% estimated nutrition needs.     Melissa reports her appetite is improving. At her bedside she has a pastry and is also having chicken noodle soup brought in. She is drinking ~2 Glucerna per day.     NEW FINDINGS   Weight: 72.8 kg on 3/9, weight stable.     Labs: BG 3/10-3/11: 131-240    Meds:    Caltrate 1 tablet BID  Nutrisource fiber 1 pkt BID  Banatrol 1 pkt  Lasix  Novolog- 1 unit per 15 grams of carbs  Novolog- correction scale  NPH- 18 units daily  Thera-vit-m    GI: x1-4 BM/day over the past week per I/O    MALNUTRITION  % Intake: No decreased intake noted- EN + PO  % Weight Loss: None noted  Subcutaneous Fat Loss: Facial region:  Mild, Upper arm:  Mild to moderate and Thoracic/intercostal:  Mild   Muscle Loss: Upper arm (bicep, tricep):  Mild and Lower arm  (forearm):  Mild  Fluid Accumulation/Edema: Mild  Malnutrition Diagnosis: Moderate malnutrition in the context of acute illness    Previous Goals   Total avg nutritional intake to meet a minimum of 30 kcal/kg and 1.5 g PRO/kg daily (per dosing wt 54 kg).  Evaluation: Met    Previous Nutrition Diagnosis  Increased nutrient needs (Kcals/protein) related to s/p bilateral lung txp as evidenced by est nutrition needs 30-35 Kcal/kg and 1.5-2 gm/kg protein daily.   Evaluation: No change    CURRENT NUTRITION DIAGNOSIS  Increased nutrient needs (Kcals/protein) related to s/p bilateral lung txp as evidenced by est nutrition needs 30-35 Kcal/kg and 1.5-2 gm/kg protein daily.     INTERVENTIONS  Implementation  Nutrition Education: Discussed current EN, calorie counts, and continuing to cycle EN with pt.   Collaboration with other providers: Discussed plan to continue cycling TF with endocrine and bedside RN. Paged primary team regarding changes.   Enteral Nutrition - Continue to cycle    Goals  Total avg nutritional intake to meet a minimum of 30 kcal/kg and 1.5 g PRO/kg daily (per dosing wt 54 kg).    Monitoring/Evaluation  Progress toward goals will be monitored and evaluated per protocol.    Ruth Orr, RD, LD  6D RD pager [phone]

## 2022-03-12 ENCOUNTER — APPOINTMENT (OUTPATIENT)
Dept: OCCUPATIONAL THERAPY | Facility: CLINIC | Age: 67
DRG: 007 | End: 2022-03-12
Attending: SURGERY
Payer: MEDICARE

## 2022-03-12 ENCOUNTER — APPOINTMENT (OUTPATIENT)
Dept: PHYSICAL THERAPY | Facility: CLINIC | Age: 67
DRG: 007 | End: 2022-03-12
Attending: SURGERY
Payer: MEDICARE

## 2022-03-12 ENCOUNTER — APPOINTMENT (OUTPATIENT)
Dept: GENERAL RADIOLOGY | Facility: CLINIC | Age: 67
DRG: 007 | End: 2022-03-12
Attending: PHYSICIAN ASSISTANT
Payer: MEDICARE

## 2022-03-12 ENCOUNTER — APPOINTMENT (OUTPATIENT)
Dept: GENERAL RADIOLOGY | Facility: CLINIC | Age: 67
DRG: 007 | End: 2022-03-12
Attending: STUDENT IN AN ORGANIZED HEALTH CARE EDUCATION/TRAINING PROGRAM
Payer: MEDICARE

## 2022-03-12 LAB
ALBUMIN SERPL-MCNC: 2.6 G/DL (ref 3.4–5)
ALP SERPL-CCNC: 117 U/L (ref 40–150)
ALT SERPL W P-5'-P-CCNC: 31 U/L (ref 0–50)
ANION GAP SERPL CALCULATED.3IONS-SCNC: 6 MMOL/L (ref 3–14)
AST SERPL W P-5'-P-CCNC: 12 U/L (ref 0–45)
BASE EXCESS BLDV CALC-SCNC: 13 MMOL/L (ref -7.7–1.9)
BASE EXCESS BLDV CALC-SCNC: 13.6 MMOL/L (ref -7.7–1.9)
BASOPHILS # BLD AUTO: 0 10E3/UL (ref 0–0.2)
BASOPHILS NFR BLD AUTO: 0 %
BILIRUB SERPL-MCNC: 0.4 MG/DL (ref 0.2–1.3)
BUN SERPL-MCNC: 26 MG/DL (ref 7–30)
CALCIUM SERPL-MCNC: 8.4 MG/DL (ref 8.5–10.1)
CHLORIDE BLD-SCNC: 100 MMOL/L (ref 94–109)
CO2 SERPL-SCNC: 32 MMOL/L (ref 20–32)
CREAT SERPL-MCNC: 0.52 MG/DL (ref 0.52–1.04)
EOSINOPHIL # BLD AUTO: 0 10E3/UL (ref 0–0.7)
EOSINOPHIL NFR BLD AUTO: 0 %
ERYTHROCYTE [DISTWIDTH] IN BLOOD BY AUTOMATED COUNT: 19.7 % (ref 10–15)
GFR SERPL CREATININE-BSD FRML MDRD: >90 ML/MIN/1.73M2
GLUCOSE BLD-MCNC: 144 MG/DL (ref 70–99)
GLUCOSE BLDC GLUCOMTR-MCNC: 133 MG/DL (ref 70–99)
GLUCOSE BLDC GLUCOMTR-MCNC: 134 MG/DL (ref 70–99)
GLUCOSE BLDC GLUCOMTR-MCNC: 154 MG/DL (ref 70–99)
GLUCOSE BLDC GLUCOMTR-MCNC: 164 MG/DL (ref 70–99)
GLUCOSE BLDC GLUCOMTR-MCNC: 177 MG/DL (ref 70–99)
HCO3 BLDV-SCNC: 38 MMOL/L (ref 21–28)
HCO3 BLDV-SCNC: 41 MMOL/L (ref 21–28)
HCT VFR BLD AUTO: 27.4 % (ref 35–47)
HGB BLD-MCNC: 8.1 G/DL (ref 11.7–15.7)
IMM GRANULOCYTES # BLD: 0.1 10E3/UL
IMM GRANULOCYTES NFR BLD: 1 %
LYMPHOCYTES # BLD AUTO: 1 10E3/UL (ref 0.8–5.3)
LYMPHOCYTES NFR BLD AUTO: 9 %
MAGNESIUM SERPL-MCNC: 1.8 MG/DL (ref 1.6–2.3)
MCH RBC QN AUTO: 30.3 PG (ref 26.5–33)
MCHC RBC AUTO-ENTMCNC: 29.6 G/DL (ref 31.5–36.5)
MCV RBC AUTO: 103 FL (ref 78–100)
MONOCYTES # BLD AUTO: 0.7 10E3/UL (ref 0–1.3)
MONOCYTES NFR BLD AUTO: 7 %
NEUTROPHILS # BLD AUTO: 8.6 10E3/UL (ref 1.6–8.3)
NEUTROPHILS NFR BLD AUTO: 83 %
NRBC # BLD AUTO: 0 10E3/UL
NRBC BLD AUTO-RTO: 0 /100
O2/TOTAL GAS SETTING VFR VENT: 21 %
O2/TOTAL GAS SETTING VFR VENT: 21 %
PCO2 BLDV: 54 MM HG (ref 40–50)
PCO2 BLDV: 80 MM HG (ref 40–50)
PH BLDV: 7.32 [PH] (ref 7.32–7.43)
PH BLDV: 7.46 [PH] (ref 7.32–7.43)
PHOSPHATE SERPL-MCNC: 3.9 MG/DL (ref 2.5–4.5)
PLATELET # BLD AUTO: 279 10E3/UL (ref 150–450)
PO2 BLDV: 21 MM HG (ref 25–47)
PO2 BLDV: 60 MM HG (ref 25–47)
POTASSIUM BLD-SCNC: 4.3 MMOL/L (ref 3.4–5.3)
PROT SERPL-MCNC: 5.7 G/DL (ref 6.8–8.8)
RBC # BLD AUTO: 2.67 10E6/UL (ref 3.8–5.2)
SODIUM SERPL-SCNC: 138 MMOL/L (ref 133–144)
TACROLIMUS BLD-MCNC: 11.4 UG/L (ref 5–15)
TME LAST DOSE: NORMAL H
TME LAST DOSE: NORMAL H
WBC # BLD AUTO: 10.4 10E3/UL (ref 4–11)

## 2022-03-12 PROCEDURE — 250N000013 HC RX MED GY IP 250 OP 250 PS 637: Performed by: PHYSICIAN ASSISTANT

## 2022-03-12 PROCEDURE — 250N000013 HC RX MED GY IP 250 OP 250 PS 637: Performed by: PEDIATRICS

## 2022-03-12 PROCEDURE — 250N000011 HC RX IP 250 OP 636: Performed by: PHYSICIAN ASSISTANT

## 2022-03-12 PROCEDURE — 250N000009 HC RX 250: Performed by: PHYSICIAN ASSISTANT

## 2022-03-12 PROCEDURE — 97116 GAIT TRAINING THERAPY: CPT | Mod: GP

## 2022-03-12 PROCEDURE — 250N000013 HC RX MED GY IP 250 OP 250 PS 637: Performed by: NURSE PRACTITIONER

## 2022-03-12 PROCEDURE — 250N000011 HC RX IP 250 OP 636: Performed by: STUDENT IN AN ORGANIZED HEALTH CARE EDUCATION/TRAINING PROGRAM

## 2022-03-12 PROCEDURE — 74018 RADEX ABDOMEN 1 VIEW: CPT

## 2022-03-12 PROCEDURE — 85025 COMPLETE CBC W/AUTO DIFF WBC: CPT | Performed by: NURSE PRACTITIONER

## 2022-03-12 PROCEDURE — 82803 BLOOD GASES ANY COMBINATION: CPT | Performed by: NURSE PRACTITIONER

## 2022-03-12 PROCEDURE — 71046 X-RAY EXAM CHEST 2 VIEWS: CPT | Mod: 26 | Performed by: RADIOLOGY

## 2022-03-12 PROCEDURE — 250N000013 HC RX MED GY IP 250 OP 250 PS 637: Performed by: STUDENT IN AN ORGANIZED HEALTH CARE EDUCATION/TRAINING PROGRAM

## 2022-03-12 PROCEDURE — 120N000002 HC R&B MED SURG/OB UMMC

## 2022-03-12 PROCEDURE — 94640 AIRWAY INHALATION TREATMENT: CPT

## 2022-03-12 PROCEDURE — 97535 SELF CARE MNGMENT TRAINING: CPT | Mod: GO

## 2022-03-12 PROCEDURE — 999N000128 HC STATISTIC PERIPHERAL IV START W/O US GUIDANCE

## 2022-03-12 PROCEDURE — 36415 COLL VENOUS BLD VENIPUNCTURE: CPT | Performed by: NURSE PRACTITIONER

## 2022-03-12 PROCEDURE — 80053 COMPREHEN METABOLIC PANEL: CPT | Performed by: NURSE PRACTITIONER

## 2022-03-12 PROCEDURE — 74018 RADEX ABDOMEN 1 VIEW: CPT | Mod: 26 | Performed by: RADIOLOGY

## 2022-03-12 PROCEDURE — 250N000013 HC RX MED GY IP 250 OP 250 PS 637: Performed by: SURGERY

## 2022-03-12 PROCEDURE — 250N000012 HC RX MED GY IP 250 OP 636 PS 637: Performed by: PHYSICIAN ASSISTANT

## 2022-03-12 PROCEDURE — 80197 ASSAY OF TACROLIMUS: CPT | Performed by: PHYSICIAN ASSISTANT

## 2022-03-12 PROCEDURE — 83735 ASSAY OF MAGNESIUM: CPT | Performed by: NURSE PRACTITIONER

## 2022-03-12 PROCEDURE — 99233 SBSQ HOSP IP/OBS HIGH 50: CPT | Performed by: STUDENT IN AN ORGANIZED HEALTH CARE EDUCATION/TRAINING PROGRAM

## 2022-03-12 PROCEDURE — 82040 ASSAY OF SERUM ALBUMIN: CPT | Performed by: NURSE PRACTITIONER

## 2022-03-12 PROCEDURE — 94668 MNPJ CHEST WALL SBSQ: CPT

## 2022-03-12 PROCEDURE — 97110 THERAPEUTIC EXERCISES: CPT | Mod: GP

## 2022-03-12 PROCEDURE — 71046 X-RAY EXAM CHEST 2 VIEWS: CPT

## 2022-03-12 PROCEDURE — 94640 AIRWAY INHALATION TREATMENT: CPT | Mod: 76

## 2022-03-12 PROCEDURE — 97530 THERAPEUTIC ACTIVITIES: CPT | Mod: GP

## 2022-03-12 PROCEDURE — 999N000157 HC STATISTIC RCP TIME EA 10 MIN

## 2022-03-12 PROCEDURE — 36415 COLL VENOUS BLD VENIPUNCTURE: CPT | Performed by: STUDENT IN AN ORGANIZED HEALTH CARE EDUCATION/TRAINING PROGRAM

## 2022-03-12 PROCEDURE — 82803 BLOOD GASES ANY COMBINATION: CPT | Performed by: STUDENT IN AN ORGANIZED HEALTH CARE EDUCATION/TRAINING PROGRAM

## 2022-03-12 PROCEDURE — 99233 SBSQ HOSP IP/OBS HIGH 50: CPT | Mod: 24 | Performed by: INTERNAL MEDICINE

## 2022-03-12 PROCEDURE — 84100 ASSAY OF PHOSPHORUS: CPT | Performed by: NURSE PRACTITIONER

## 2022-03-12 PROCEDURE — 250N000012 HC RX MED GY IP 250 OP 636 PS 637: Performed by: STUDENT IN AN ORGANIZED HEALTH CARE EDUCATION/TRAINING PROGRAM

## 2022-03-12 RX ORDER — ROSUVASTATIN CALCIUM 5 MG/1
5 TABLET, COATED ORAL DAILY
Status: DISCONTINUED | OUTPATIENT
Start: 2022-03-13 | End: 2022-03-17 | Stop reason: HOSPADM

## 2022-03-12 RX ORDER — MAGNESIUM SULFATE HEPTAHYDRATE 40 MG/ML
2 INJECTION, SOLUTION INTRAVENOUS ONCE
Status: COMPLETED | OUTPATIENT
Start: 2022-03-12 | End: 2022-03-12

## 2022-03-12 RX ORDER — MAGNESIUM OXIDE 400 MG/1
400 TABLET ORAL 2 TIMES DAILY
Status: DISCONTINUED | OUTPATIENT
Start: 2022-03-12 | End: 2022-03-12

## 2022-03-12 RX ORDER — LOPERAMIDE HCL 2 MG
2 CAPSULE ORAL 3 TIMES DAILY
Status: DISCONTINUED | OUTPATIENT
Start: 2022-03-12 | End: 2022-03-17 | Stop reason: HOSPADM

## 2022-03-12 RX ORDER — UREA 10 %
500 LOTION (ML) TOPICAL DAILY
Status: DISCONTINUED | OUTPATIENT
Start: 2022-03-12 | End: 2022-03-17 | Stop reason: HOSPADM

## 2022-03-12 RX ADMIN — MAGNESIUM SULFATE 2 G: 2 INJECTION INTRAVENOUS at 09:32

## 2022-03-12 RX ADMIN — HEPARIN SODIUM 5000 UNITS: 5000 INJECTION, SOLUTION INTRAVENOUS; SUBCUTANEOUS at 21:02

## 2022-03-12 RX ADMIN — DILTIAZEM HYDROCHLORIDE 240 MG: 240 CAPSULE, COATED, EXTENDED RELEASE ORAL at 09:30

## 2022-03-12 RX ADMIN — INSULIN ASPART 1 UNITS: 100 INJECTION, SOLUTION INTRAVENOUS; SUBCUTANEOUS at 09:32

## 2022-03-12 RX ADMIN — INSULIN ASPART 4 UNITS: 100 INJECTION, SOLUTION INTRAVENOUS; SUBCUTANEOUS at 17:47

## 2022-03-12 RX ADMIN — HEPARIN SODIUM 5000 UNITS: 5000 INJECTION, SOLUTION INTRAVENOUS; SUBCUTANEOUS at 06:13

## 2022-03-12 RX ADMIN — METHOCARBAMOL TABLETS 500 MG: 500 TABLET, COATED ORAL at 20:52

## 2022-03-12 RX ADMIN — ACETAMINOPHEN 975 MG: 325 TABLET ORAL at 22:05

## 2022-03-12 RX ADMIN — ASPIRIN 81 MG: 81 TABLET, COATED ORAL at 09:30

## 2022-03-12 RX ADMIN — PANTOPRAZOLE SODIUM 40 MG: 40 TABLET, DELAYED RELEASE ORAL at 15:55

## 2022-03-12 RX ADMIN — INSULIN ASPART 4 UNITS: 100 INJECTION, SOLUTION INTRAVENOUS; SUBCUTANEOUS at 12:25

## 2022-03-12 RX ADMIN — OXYCODONE HYDROCHLORIDE 5 MG: 5 TABLET ORAL at 02:09

## 2022-03-12 RX ADMIN — Medication 1 PACKET: at 15:59

## 2022-03-12 RX ADMIN — Medication 1 PACKET: at 22:04

## 2022-03-12 RX ADMIN — CALCIUM CARBONATE 600 MG (1,500 MG)-VITAMIN D3 400 UNIT TABLET 1 TABLET: at 17:46

## 2022-03-12 RX ADMIN — FLUTICASONE PROPIONATE 1 SPRAY: 50 SPRAY, METERED NASAL at 09:33

## 2022-03-12 RX ADMIN — DOXYCYCLINE HYCLATE 100 MG: 100 CAPSULE ORAL at 09:29

## 2022-03-12 RX ADMIN — ACETAMINOPHEN 975 MG: 325 TABLET ORAL at 06:14

## 2022-03-12 RX ADMIN — VALACYCLOVIR HYDROCHLORIDE 1000 MG: 1 TABLET, FILM COATED ORAL at 06:13

## 2022-03-12 RX ADMIN — PREDNISONE 12.5 MG: 2.5 TABLET ORAL at 20:54

## 2022-03-12 RX ADMIN — MYCOPHENOLIC ACID 720 MG: 360 TABLET, DELAYED RELEASE ORAL at 09:43

## 2022-03-12 RX ADMIN — Medication 500 MG: at 15:58

## 2022-03-12 RX ADMIN — FAMOTIDINE 20 MG: 20 TABLET ORAL at 09:28

## 2022-03-12 RX ADMIN — CALCIUM CARBONATE 600 MG (1,500 MG)-VITAMIN D3 400 UNIT TABLET 1 TABLET: at 09:28

## 2022-03-12 RX ADMIN — Medication 1 PACKET: at 09:44

## 2022-03-12 RX ADMIN — FUROSEMIDE 40 MG: 10 INJECTION, SOLUTION INTRAMUSCULAR; INTRAVENOUS at 15:54

## 2022-03-12 RX ADMIN — DOXYCYCLINE HYCLATE 100 MG: 100 CAPSULE ORAL at 20:53

## 2022-03-12 RX ADMIN — LORATADINE 10 MG: 10 TABLET ORAL at 09:28

## 2022-03-12 RX ADMIN — NYSTATIN 1000000 UNITS: 100000 SUSPENSION ORAL at 20:54

## 2022-03-12 RX ADMIN — Medication 400 MG: at 17:46

## 2022-03-12 RX ADMIN — PANTOPRAZOLE SODIUM 40 MG: 40 TABLET, DELAYED RELEASE ORAL at 09:27

## 2022-03-12 RX ADMIN — Medication 1 PACKET: at 21:11

## 2022-03-12 RX ADMIN — VALACYCLOVIR HYDROCHLORIDE 1000 MG: 1 TABLET, FILM COATED ORAL at 15:55

## 2022-03-12 RX ADMIN — NYSTATIN 1000000 UNITS: 100000 SUSPENSION ORAL at 15:53

## 2022-03-12 RX ADMIN — LEVALBUTEROL HYDROCHLORIDE 1.25 MG: 1.25 SOLUTION RESPIRATORY (INHALATION) at 20:25

## 2022-03-12 RX ADMIN — VALACYCLOVIR HYDROCHLORIDE 1000 MG: 1 TABLET, FILM COATED ORAL at 21:02

## 2022-03-12 RX ADMIN — TACROLIMUS 3 MG: 1 CAPSULE ORAL at 17:45

## 2022-03-12 RX ADMIN — FAMOTIDINE 20 MG: 20 TABLET ORAL at 20:53

## 2022-03-12 RX ADMIN — AMOXICILLIN 500 MG: 500 CAPSULE ORAL at 15:54

## 2022-03-12 RX ADMIN — METHOCARBAMOL TABLETS 500 MG: 500 TABLET, COATED ORAL at 15:54

## 2022-03-12 RX ADMIN — METOPROLOL TARTRATE 25 MG: 25 TABLET, FILM COATED ORAL at 20:55

## 2022-03-12 RX ADMIN — HEPARIN SODIUM 5000 UNITS: 5000 INJECTION, SOLUTION INTRAVENOUS; SUBCUTANEOUS at 15:54

## 2022-03-12 RX ADMIN — DAPSONE 50 MG: 25 TABLET ORAL at 09:27

## 2022-03-12 RX ADMIN — ACETYLCYSTEINE 2 ML: 200 SOLUTION ORAL; RESPIRATORY (INHALATION) at 07:38

## 2022-03-12 RX ADMIN — NYSTATIN 1000000 UNITS: 100000 SUSPENSION ORAL at 12:27

## 2022-03-12 RX ADMIN — METHOCARBAMOL TABLETS 500 MG: 500 TABLET, COATED ORAL at 09:43

## 2022-03-12 RX ADMIN — LOPERAMIDE HYDROCHLORIDE 2 MG: 2 CAPSULE ORAL at 20:53

## 2022-03-12 RX ADMIN — LEVALBUTEROL HYDROCHLORIDE 1.25 MG: 1.25 SOLUTION RESPIRATORY (INHALATION) at 07:38

## 2022-03-12 RX ADMIN — Medication 1 TABLET: at 12:26

## 2022-03-12 RX ADMIN — METOPROLOL TARTRATE 25 MG: 25 TABLET, FILM COATED ORAL at 09:29

## 2022-03-12 RX ADMIN — METHOCARBAMOL TABLETS 500 MG: 500 TABLET, COATED ORAL at 12:27

## 2022-03-12 RX ADMIN — PREDNISONE 15 MG: 5 TABLET ORAL at 09:27

## 2022-03-12 RX ADMIN — ACETYLCYSTEINE 2 ML: 200 SOLUTION ORAL; RESPIRATORY (INHALATION) at 20:26

## 2022-03-12 RX ADMIN — MYCOPHENOLIC ACID 720 MG: 360 TABLET, DELAYED RELEASE ORAL at 20:54

## 2022-03-12 RX ADMIN — NYSTATIN 1000000 UNITS: 100000 SUSPENSION ORAL at 09:43

## 2022-03-12 RX ADMIN — AMOXICILLIN 500 MG: 500 CAPSULE ORAL at 09:29

## 2022-03-12 RX ADMIN — LOPERAMIDE HYDROCHLORIDE 2 MG: 2 CAPSULE ORAL at 12:26

## 2022-03-12 RX ADMIN — TACROLIMUS 3.5 MG: 1 CAPSULE ORAL at 09:27

## 2022-03-12 RX ADMIN — GABAPENTIN 100 MG: 100 CAPSULE ORAL at 21:02

## 2022-03-12 RX ADMIN — ROSUVASTATIN CALCIUM 40 MG: 10 TABLET, FILM COATED ORAL at 09:29

## 2022-03-12 RX ADMIN — FUROSEMIDE 40 MG: 10 INJECTION, SOLUTION INTRAMUSCULAR; INTRAVENOUS at 09:30

## 2022-03-12 ASSESSMENT — ACTIVITIES OF DAILY LIVING (ADL)
ADLS_ACUITY_SCORE: 11

## 2022-03-12 NOTE — PROGRESS NOTES
Pulmonary Medicine  Cystic Fibrosis - Lung Transplant Team  Progress Note  2022       Patient: Melissa Elder  MRN: [id number]  : 1955 (age 66 year old)  Transplant: 2022 (Lung), POD#19  Admission date: 2022    Assessment & Plan:     Melissa Elder is a 66 year old female with a PMH significant for severe COPD, HTN, mild non-obstructive CAD, paroxysmal afib, osteoporosis, GERD, and colonic polyps.  Pt. is now s/p BSLT on 22, surgery relatively uncomplicated.  The patient was extubated , post-op course complicated by right chest tube lodged into fissure and left chest tube displacement s/p bilateral pigtail chest tube placement in IR to drain anterior hydropneumothorax and bilateral effusions, disseminated Ureaplasma, and transient hyperammonemia.  Routine inspection bronch on 3/1 complicated by hypoventilation in setting of oversedation requiring multiple Narcan doses.  Slow improvement in hypercapnia with NIPPV overnight, weaned off HFNC 3/9.       Today's recommendations:  - PCO2 increased overnight, suspect related to stopping bipap and receiving oxycodone   - Stop oxycodone, repeat VBG this afternoon is near her baseline, trial off bipap again tonight without oxy  - DSA, CMV, EBV, and IgG due 3/21 (not yet ordered)  - CVTS reviewing ?subluxation of sternum noted on imaging, awaiting any additional recommendations although likely no intervention needed  - Calorie counts ordered through 3/14  - Tacrolimus level trended today 11.4, no adjustment  - Adjusted MMF --> Myfortic given diarrhea  - Prednisone taper ordered 3/15  - Amoxicillin for total of 3 months course (through ) for Actinomyces treatment  - Valacyclovir through tomorrow, then acyclovir prophylaxis ordered through 3/23 per protocol  - Doxycycline for Ureaplasma for 4 week course through   - Follow +AFB on sputum culture 3/2, AFB sputum culture (3/9) pending  - Donor risk labs ordered 3/23  - AXR with improving  ileus  - Increased oral magnesium supplementation       COPD s/p bilateral sequential lung transplant (BSLT):  Acute hypoxic/hypercapneic respiratory failure:   Bilateral pleural effusions/PTX: Scant pleural-pleural adhesions and mild-moderate bilateral hilar lymphadenopathy per op note.  Pathology with CPFE.  Extubated 2/22.  DSA negative 2/28.  Hypoxia had generally resolved, only intermittently requiring supplemental oxygen up until bronch 3/1 with sedation. Noted hypoventilating with oversedation during bronch, received Narcan x3 with improvement in sedation but hypercapnea initially severe (>90) and persisting, improving with nocturnal BiPAP and daytime HFNC.  S/p right pigtail chest tube (3/3, exudative, 81%N, positive for Ureaplasma as below).   Has had multiple chest tubes for hypdropneumothoraces, which have been sequentially removed over the week of 3/7. Sniff test (3/3) with poor diaphragm excursion bilaterally with extensive accessory respiratory chest wall musculature effort to aid in inspiration.  Weaned off HFNC 3/9, on RA.  Repeat sniff test (3/11) without evidence of diaphragmatic palsy.  - Flonase for postnasal drip (3/10)  - Nebs: levalbuterol and Mucomyst TID  - Aggressive pulmonary toilet with chest physiotherapy TID  - PCO2 increased overnight, suspect related to stopping bipap and receiving oxycodone   - Stop oxycodone    - Repeat daytime VBG if normalizing will plan on trialing another night without bipap with repeat VBG   - If repeat VBG during the day is not normalizing will plan for BIPAP overnight  - Supplemental oxygen via NC as needed to maintain SpO2 >92%  - DSA 3/21 (not yet ordered)  - CXR today with improving aeration, no new effusions appreciated  - CVTS reviewing ?subluxation of sternum noted on imaging, awaiting any additional recommendations although likely no intervention needed   - Diuresis per primary team  - Post-pyloric nasal FT, cycled TF per RD and primary team, calorie  counts through 3/14 (briefly discussed PEG/J tube with Pt. 3/10, Pt. hoping to increase oral intake as appetite improving and avoid PEG/J tube placement)  - ENT consulted 3/9, Pt. declined scope exam, ENT OP follow up if no improvement     Immunosuppression:  S/p induction therapy with basiliximab x2 doses (with high dose IV steroid).  - Tacrolimus 3.5 mg qAM / 3 mg qPM (decreased 3/10, given supratherapeutic and diltiazem increased).  Goal level 8-12. Level 11.4 on 3/12, no adjustment  - Myfortic 720 mg BID on 3/11 for diarrhea  - Prednisone 15 mg qAM / 12.5 qPM with taper per lung transplant protocol (due 3/15, ordered):  Date AM dose (mg) PM dose (mg)   3/8 15 12.5   3/15 12.5 12.5   3/29 12.5 10   4/12 10 10   5/10 10 7.5   6/7 7.5 7.5   7/5 7.5 5   8/2 5 5   8/30 5 2.5      Prophylaxis:   - Dapsone for PJP ppx (started 2/23 at decreased MWF dose and increased to daily 3/1, G6PD 13.3 2/21)  - Nystatin for oral candidiasis ppx, 6 month course  - See below for serologies and viral ppx:    Donor Recipient Intervention   CMV status Negative Negative CMV monthly (3/21, not yet ordered, negative 3/11)   EBV status Positive Positive EBV at one month (3/21, not yet ordered)   HSV status N/A (Newly) Positive As below      ID: Prior history of infection/colonization with Haemophilus influenzae (2017).  Broad spectrum ABX empirically post-op with vanc and ceftaz (2/21-2/22), stopped after 24h to target known donor cultures at that time on POD #1 per Dr. Lala (transitioned to cefazolin).  Broadened again on POD #2 with culture updates as below.  Donor ([facility]) cultures with P-S MSSA; ([facility]) with Strep mitis, Actinomyces odontolyticus, MSSA x2, and C. kruseii (concern for possible aspiration).  Recipient cultures at time of transplant with Actinomyces odonotolyticus.  Bronch cultures (2/22) with Saccharomyces cerevisiae (no indication to treat per Dr. Ghosh unless pt. acutely declines clinically, 3/1) and C. albicans.  -  IgG repeat at one month (3/21, not yet ordered)  - ABX: amoxicillin (3/8-5/23, s/p ceftriaxone 2/23-3/8) for 3 month course for Actinomyces treatment  - Low threshold to treat Candida if it recurs in respiratory cultures, per transplant ID     HSV: Pt. with recent seroconversion at time of transplant.  HSV also noted on donor cultures.  Initial plan for 30 days of ppx with ACV post-op per Dr. Lala.  Then with HSV+ bronch at time of transplant (2/21), transitioned to treatment dosing with VACV   - Valacyclovir 1000 mg TID X 14d (2/27-3/12), then resume acyclovir prophylaxis (3/13-3/23) per protocol (ordered)     Hyperammonemia, Resolved:   Disseminated Ureaplasma:  Had been somnolent with some confusion/visual hallucinations in setting of hypercapnia as above.  Ammonia mildly elevated on 3/2 but quickly normalized, most recently 50 on 3/4.  Ureaplasma and Mycoplasma studies sent, pt. noted to have Ureaplasma in both pleural and sputum cultures from 3/2.  S/p levofloxacin 3/4-3/6.  - Doxycycline (3/4-4/1) per transplant ID  - In light of normal ammonia level, defer more aggressive interventions for hyperammonemia at this time (stopping CNI, iHD, etc.)     Positive AFB on sputum culture: Noted on sputum culture 3/2.  Transplant ID following for speciation and susceptibility testing as well as other evidence of infection.   - AFB sputum culture (3/9) pending, follow  - Obtain AFB cultures on all future bronchs     Leukocytosis: WBC trending upward now on POD #10, frequently noted at this stage post-op (pathology not entirely clear, but may be at least partially d/t bone marrow surge post-transplant).  Procal moderately elevated at 0.18, but may still be somewhat non-specific at this point post-op.  BDG fugitell and Aspergillus galactomannan negative 3/3.  - ABX as above     PHS risk criteria donor: Additional labs required post-transplant (between 4-8 weeks post-op): Hepatitis B, Hepatitis C, and HIV by COLT  (ordered 3/23), also plan to repeat hepatitis B surface antibody at that time (ordered) given discrepancy with recent result.     Other relevant problems managed by primary team:     Diarrhea:   Concern for ileus: Likely 2/2 medications and tube feedings.  Fiber added to tube feeds.  MMF decreased as above an switched to myfortic.  Loose stools now improved.  Again having loose/watery stools, also noting some abdominal bloating/fullness.  AXR 3/5 with diffuse distention of small and large bowel suggestive of ileus.  C diff negative 3/6.  Abdomen/pelvis CT (3/6) with decrease in small bowel distention and perienteric fat stranding.  CXR (3/8) with partially imaged likely adynamic ileus.  Bowel regimen initiated 3/8, adjusted 3/11.  - Management per primary team,   - Ileus improving by AXR on 3/12     Elevated LFTs, Resolved: Rising 3/1 despite medication adjustments (APAP and statin stopped 2/27).  Of note, pt. had a discrepant hep B surface Ab at time of transplant as above.  LFTs remain elevated although improved 3/2.  Also with elevated ammonia as above.  RUQ US (3/3) with only hepatic steatosis.  LFTs normalized 3/7.     CAD: Noted to have mild non-obstructive CAD without hemodynamically significant lesions on cardiac cath 3/27/19.  PTA ASA 81 mg and atorvastatin, started on rosuvastatin post-op but held 2/28 for elevated LFTs (as above), resumed 3/9.  ASA resumed 3/1.     Paroxysmal afib:   HTN: PTA diltiazem, not on AC.  Post-op tachycardia, off pressor since 2/22.  Metoprolol started 2/23.  Persistent low level tachycardia, but currently sinus tach with continued HTN.  Diltiazem resumed 3/2.     GERD: Not on PPI PTA.  Negative pH/manometry study 3/28/19, noted small hiatal hernia. On PPI daily since 2/21, H2 blocker bridge discontinued 3/2, some increase in reflux symptoms since, resumed 3/5.  PPI increased to BID 3/7.     Hypomagnesemia: Suppressed absorption d/t CNI.  Requiring almost daily IV/PO  "replacement.  - Mag-Ox 400 mg BID (ordered) switched to gluconate on 3/12   - Continue daily magnesium level with additional replacement protocol prn    We appreciate the excellent care provided by the [place] [id number] team.  Recommendations communicated via in person rounding and this note.  Will continue to follow along closely, please do not hesitate to call with any questions or concerns.    Nicole Knox MD  Pulmonary Transplant/CF Attending  Pager: [phone]       Subjective & Interval History:     Remains on RA during the day, and didn't use bipap overnight as planned. However, wasn't sleeping well and had some mild incisional pain and took oxycodone around 2 in the morning. PCO2 was quite elevated this morning, but she denies any fogginess or sob or headache this morning. When I saw her she was up in the room working with OT and was very pleasant and lucid. Breathing well on room air. Denies any n/v, still having a lot of diarrhea, mostly liquid. She denies any abdominal pain or distention or bloating. She denies any acid reflux, incisional pain is improved.     Review of Systems:     C: No fever, no chills  INTEGUMENTARY/SKIN: No rash or obvious new lesions  ENT/MOUTH: + hoarse voice, no sinus pain, + improving nasal congestion and drainage  RESP: See interval history  CV: No chest pain, no palpitations  GI: No nausea, no vomiting, no reflux symptoms  : No dysuria  MUSCULOSKELETAL: See interval history  ENDOCRINE: + blood sugars intermittently elevated  NEURO: No headache, no numbness or tingling  PSYCHIATRIC: Mood stable    Physical Exam:     All notes, images, and labs from past 24 hours (at minimum) were reviewed.    Vital signs:  Temp: 98.3  F (36.8  C) Temp src: Axillary BP: 115/66 Pulse: 97   Resp: 20 SpO2: 97 % O2 Device: None (Room air) Oxygen Delivery: 25 LPM Height: 157.5 cm (5' 2\") Weight: 69.7 kg (153 lb 11.2 oz)  I/O:       Intake/Output Summary (Last 24 hours) at 3/12/2022 1331  Last data " filed at 3/12/2022 1200  Gross per 24 hour   Intake 1720 ml   Output 2850 ml   Net -1130 ml       Constitutional: Sitting up in chair, in no apparent distress.   HEENT: Eyes with pink conjunctivae, anicteric.  Oral mucosa moist without lesions.  Nasal FT.  PULM: Diminished air flow to right > left base.  No crackles, no rhonchi, no wheezes.  Non-labored breathing on RA.  CV: Normal S1 and S2.  Tachycardic.  No murmur, gallop, or rub.  Significant edema under wraps, +2 pedal edema  ABD: NABS, soft, nontender, nondistended.    MSK: Moves all extremities.  + muscle wasting.   NEURO: Alert, conversant.   SKIN: Warm, dry.  No rash on limited exam.  Clamshell incision not visualized.   PSYCH: Mood stable.     Lines, Drains, and Devices:  Peripheral IV 03/10/22 Right;Posterior Lower forearm (Active)   Site Assessment WDL 03/11/22 1600   Line Status Saline locked 03/11/22 1600   Dressing Intervention New dressing  03/10/22 1200   Phlebitis Scale 0-->no symptoms 03/11/22 1600   Infiltration Scale 0 03/11/22 1600   Infiltration Site Treatment Method  None 03/11/22 1600   Number of days: 1     Data:     LABS    CMP:   Recent Labs   Lab 03/12/22  1155 03/12/22  0645 03/12/22  0613 03/12/22  0150 03/11/22  0745 03/11/22  0710 03/10/22  1213 03/10/22  0806 03/09/22  0738 03/09/22  0601   NA  --  138  --   --   --  136  --  136  --  136   POTASSIUM  --  4.3  --   --   --  4.7  --  4.1  --  4.3   CHLORIDE  --  100  --   --   --  98  --  100  --  98   CO2  --  32  --   --   --  35*  --  32  --  34*   ANIONGAP  --  6  --   --   --  3  --  4  --  4   * 144* 164* 154*   < > 196*   < > 183*   < > 194*   BUN  --  26  --   --   --  32*  --  29  --  31*   CR  --  0.52  --   --   --  0.47*  --  0.47*  --  0.50*   GFRESTIMATED  --  >90  --   --   --  >90  --  >90  --  >90   MINISTERIO  --  8.4*  --   --   --  8.4*  --  8.4*  --  7.9*   MAG  --  1.8  --   --   --  1.5*  --  1.6  --  1.9   PHOS  --  3.9  --   --   --  3.2  --  3.0  --  3.8    PROTTOTAL  --  5.7*  --   --   --  5.2*  --  5.6*  --  5.0*   ALBUMIN  --  2.6*  --   --   --  2.4*  --  2.4*  --  2.2*   BILITOTAL  --  0.4  --   --   --  0.4  --  0.4  --  0.2   ALKPHOS  --  117  --   --   --  116  --  120  --  116   AST  --  12  --   --   --  14  --  13  --  13   ALT  --  31  --   --   --  32  --  38  --  40    < > = values in this interval not displayed.     CBC:   Recent Labs   Lab 03/12/22  0645 03/11/22  0710 03/10/22  0806 03/09/22  0601   WBC 10.4 10.6 12.4* 10.2   RBC 2.67* 2.45* 2.78* 2.36*   HGB 8.1* 7.6* 8.5* 7.0*   HCT 27.4* 24.2* 27.7* 23.2*   * 99 100 98   MCH 30.3 31.0 30.6 29.7   MCHC 29.6* 31.4* 30.7* 30.2*   RDW 19.7* 19.3* 19.1* 18.3*    368 386 310       INR: No lab results found in last 7 days.    Glucose:   Recent Labs   Lab 03/12/22  1155 03/12/22  0645 03/12/22  0613 03/12/22  0150 03/11/22  1953 03/11/22  1631   * 144* 164* 154* 142* 181*       Blood Gas:   Recent Labs   Lab 03/12/22  1237 03/12/22  0645 03/11/22  0710   PHV 7.46* 7.32 7.43   PCO2V 54* 80* 56*   PO2V 60* 21* 73*   HCO3V 38* 41* 37*   ARIEL 13.0* 13.6* 11.7*   O2PER 21 21 21       Culture Data No results for input(s): CULT in the last 168 hours.    Virology Data: No results found for: INFLUA, FLUAH1, FLUAH3, WA3680, IFLUB, RSVA, RSVB, PIV1, PIV2, PIV3, HMPV, HRVS, ADVBE, ADVC    Historical CMV results (last 3 of prior testing):  Lab Results   Component Value Date    CMVQNT Not Detected 03/11/2022     No results found for: CMVLOG    Urine Studies    Recent Labs   Lab Test 03/01/22  1549 02/20/22  0248   URINEPH 6.0 7.0   NITRITE Negative Negative   LEUKEST Negative Negative   WBCU 0 <1       Most Recent [place] Pulmonary Function Testing (FVC/FEV1 only)  FVC-Pre   Date Value Ref Range Status   12/20/2021 1.81 L    06/21/2021 1.46 L    12/09/2019 1.59 L    06/03/2019 1.68 L      FVC-%Pred-Pre   Date Value Ref Range Status   12/20/2021 65 %    06/21/2021 52 %    12/09/2019 56 %     06/03/2019 58 %      FEV1-Pre   Date Value Ref Range Status   12/20/2021 0.49 L    06/21/2021 0.50 L    12/09/2019 0.49 L    06/03/2019 0.47 L      FEV1-%Pred-Pre   Date Value Ref Range Status   12/20/2021 22 %    06/21/2021 22 %    12/09/2019 21 %    06/03/2019 20 %        IMAGING    Recent Results (from the past 48 hour(s))   XR Chest 2 Views    Narrative    EXAM: XR CHEST 2 VW  3/9/2022 8:04 PM     HISTORY:  F/u chest tube removal       COMPARISON:  3/9/2022    TECHNIQUE: PA and lateral radiographs of the chest    FINDINGS:   Interval removal of left apical chest tube. Stable left basilar chest  tube.    Midline trachea. Stable postsurgical changes in lung transplant.  Distinct pulmonary vasculature. No consolidation, pleural effusion or  appreciable pneumothorax.      Impression    IMPRESSION: No appreciable pneumothorax post chest tube removal.     I have personally reviewed the examination and initial interpretation  and I agree with the findings.    MURIEL WALLACE MD         SYSTEM ID:  [id number]   XR Chest 2 Views    Narrative    Exam: XR CHEST 2 VW, 3/10/2022 8:33 AM    Comparison: 3/9/2022    History: interval follow up, post-op lung transplant    Findings:  PA and lateral views the chest. Status post bilateral lung transplant  with intact sternotomy wires. Enteric tube with tip at the expected  location of the duodenal jejunal juncture. Left basilar chest tube.    Trachea is midline. Mediastinum is within normal limits.  Cardiopulmonary silhouette is within normal limits. Aortic knob  calcifications. Prominent bilateral interstitial opacities.  Redemonstrated right basilar opacities in blunting of the right  costophrenic angle. No pneumothorax. No acute osseous abnormalities.      Impression    Impression:   1. Status post bilateral lung transplant with small stable right  pleural effusion and associated basilar atelectasis.  2. Stable bilateral interstitial opacities representing pulmonary  edema.  3.  Orientation of the wires best seen on the lateral view may indicate  subluxation of the sternum.    I have personally reviewed the examination and initial interpretation  and I agree with the findings.    VARGAS DUKE MD         SYSTEM ID:  [id number]   US Lower Extremity Venous Duplex Bilateral    Narrative    EXAMINATION: DOPPLER VENOUS ULTRASOUND OF BILATERAL LOWER EXTREMITIES,  3/10/2022 3:04 PM     COMPARISON: 2/26/2022    HISTORY: rule out dvt    TECHNIQUE:  Gray-scale evaluation with compression, spectral flow and  color Doppler assessment of the deep venous system of both legs from  groin to knee, and then at the ankles.    FINDINGS:  In both lower extremities, the common femoral, femoral, popliteal and  posterior tibial veins demonstrate normal compressibility and blood  flow.      Impression    IMPRESSION:  No evidence of deep venous thrombosis in either lower extremity.    I have personally reviewed the examination and initial interpretation  and I agree with the findings.    CLAU PRATER DO         SYSTEM ID:  [id number]   XR Chest Port 1 View    Narrative    EXAM: XR CHEST PORT 1 VIEW  3/10/2022 8:14 PM     HISTORY:  interval follow up, left basilar chest tube removal       COMPARISON:  3/10/2022    TECHNIQUE: Single frontal radiograph of the chest    FINDINGS:   Stable surgical changes of lung transplant. Stable feeding tube  position. Prominent gastric/colonic air bubble in the left upper  quadrant.    Midline trachea. Unchanged low lung volumes bilaterally. No acute  consolidation, pleural effusion or appreciable pneumothorax.      Impression    IMPRESSION: No appreciable pneumothorax since left chest tube removal.  No acute consolidation.     I have personally reviewed the examination and initial interpretation  and I agree with the findings.    RENY VAZQUEZ MD         SYSTEM ID:  [id number]   XR Chest 2 Views    Narrative    Exam: XR CHEST 2 VW, 3/11/2022 9:27 AM    Comparison:  3/10/2022    History: interval follow up, post-op lung transplant    Findings:  PA and lateral views the chest. Status post bilateral lung transplant  with intact clam shell sternotomy wires. Enteric tube traverses below  the field of view.    Trachea is midline. Mediastinum is within normal limits.  Cardiopulmonary silhouette is within normal limits. Diffuse  interstitial opacities. No pneumothorax. Blunting of the right  costophrenic angle. The upper abdomen is unremarkable.      Impression    Impression:   1. Status post bilateral lung transplant with stable diffuse  interstitial opacities likely representing mild pulmonary edema.  2. Blunting of the right costophrenic angle may represent small right  pleural effusion versus scarring.    I have personally reviewed the examination and initial interpretation  and I agree with the findings.    VARGAS DUKE MD         SYSTEM ID:  [id number]   XR Chest/Heart Fluoro    Narrative    Examination:  XR CHEST/HEART FLUORO 3/11/2022 9:39 AM     Comparison: Same day chest radiograph    History: evaluate diaphragm function    Fluoroscopy time: 0.6 minutes     Findings: Spot film demonstrates mild  elevation of the left  hemidiaphragm under quite respiration with prominent gastric and left  colonic gaseous distention. AP and LPO views obtained. Under quite  respiration, deep breathing and with sniffing maneuvers, the  hemidiaphragms move in a symmetric fashion. No paradoxical diaphragm  movement.       Impression    Impression: No evidence for diaphragmatic palsy.    I, ADRIANA VELAZQUEZ MD, attest that I was immediately available to  provide guidance and assistance during the entirety of the procedure.    I have personally reviewed the examination and initial interpretation  and I agree with the findings.    ADRIANA VELAZQUEZ MD         SYSTEM ID:  [id number]

## 2022-03-12 NOTE — PROGRESS NOTES
Diabetes Consult Daily  Progress Note          Assessment/Plan:   Melissa Elder is a 66 year old female with PMHx HTN, HLD, paroxysmal Afib, osteoporosis, GERD, severe COPD, previously requiring 2-3L NC O2 at rest.  Underwent b/l lung transplant with Dr. Robin on 02/21  has ongoing issues with Hydropneumothorax, chest tubes in place. 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 Endocrinology is being consulted for DM management.    1) Steroid and TF induced hyperglycemia   2) Underlying pre-DM, versus steroid diabetes     BG are predominantly at goal. Correction coverage appropriate.  Next steroid decrease planned for 3/15.     Plan:     NPH 18 units q24h at 2000 to match cycled tube feed start.      Continue Novolog CHO coverage-- 1 unit per 15 grams w/ meals and snacks    Novolog  custom correction 1 per 35 mg/dL Q4h --> schedule changed to align w/ start of TF at 1800 and AC earlier in the day    BG monitoring TID AC, HS, 0200, 0600    Hypoglycemia protocol    Education needs to be assessed at acute rehab    PRN D10W for hypoglycemia prevention if TF stops out side of the scheduled time-- rate is  70 to replace about 70% of TF carbs  Outpatient follow up plan TBD, depending largely on nutrition by time of discharge            Interval History:     Nursing notes and progress notes reviewed.  BG well controlled overnight.       : Vital 1.5 @ 45 mL/hr x 12 hours/evening  will provide: 101 gm CHO, LESS grams of carbohydrate per hour as previous TF.  TF schedule 6592-7753.    Several days away from ARU still.  Maybe 3/14?    Date AM dose (mg) PM dose (mg)   3/1 15 15   3/8 15 12.5   3/15 12.5 12.5   3/29 12.5 10   4/12 10 10   5/10 10 7.5   6/7 7.5 7.5   7/5 7.5 5   8/2 5 5   8/30 5 2.5       Anticipated discharge location: home;inpatient rehabilitation facility        Recent Labs   Lab 03/12/22  0613 03/12/22  0150 03/11/22  1953 03/11/22  1631 03/11/22  1137 03/11/22  0745   GLC  "164* 154* 142* 181* 154* 187*            Review of Systems:   See interval hx          Medications:     Orders Placed This Encounter      Regular Diet Adult Thin Liquids (level 0)    Physical Exam:     /71 (BP Location: Right arm)   Pulse 92   Temp 98.1  F (36.7  C) (Oral)   Resp 19   Ht 1.575 m (5' 2\")   Wt 69.4 kg (153 lb)   SpO2 99%   BMI 27.98 kg/m  ;  General:  Pleasant, up in chair,   in no distress.  bedside and supportive.  HEENT: hearing intact to conversational volume.  Lungs: unlabored respiration, no cough observed  Mental status:  alert, oriented x3, communicating clearly  Psych:  calm, even mood           Data:     Lab Results   Component Value Date    A1C 5.7 02/22/2022    A1C 5.8 02/20/2022     Last Comprehensive Metabolic Panel:  Sodium   Date Value Ref Range Status   03/11/2022 136 133 - 144 mmol/L Final   06/21/2021 137 133 - 144 mmol/L Final     Potassium   Date Value Ref Range Status   03/11/2022 4.7 3.4 - 5.3 mmol/L Final   06/21/2021 3.0 (L) 3.4 - 5.3 mmol/L Final     Chloride   Date Value Ref Range Status   03/11/2022 98 94 - 109 mmol/L Final   06/21/2021 104 94 - 109 mmol/L Final     Carbon Dioxide   Date Value Ref Range Status   06/21/2021 34 (H) 20 - 32 mmol/L Final     Carbon Dioxide (CO2)   Date Value Ref Range Status   03/11/2022 35 (H) 20 - 32 mmol/L Final     Anion Gap   Date Value Ref Range Status   03/11/2022 3 3 - 14 mmol/L Final   06/21/2021 <1 (L) 3 - 14 mmol/L Final     Glucose   Date Value Ref Range Status   03/11/2022 196 (H) 70 - 99 mg/dL Final   06/21/2021 131 (H) 70 - 99 mg/dL Final     GLUCOSE BY METER POCT   Date Value Ref Range Status   03/12/2022 164 (H) 70 - 99 mg/dL Final     Comment:     /RN Notified     Urea Nitrogen   Date Value Ref Range Status   03/11/2022 32 (H) 7 - 30 mg/dL Final   06/21/2021 8 7 - 30 mg/dL Final     Creatinine   Date Value Ref Range Status   03/11/2022 0.47 (L) 0.52 - 1.04 mg/dL Final   06/21/2021 0.56 0.52 - 1.04 mg/dL " Final     GFR Estimate   Date Value Ref Range Status   03/11/2022 >90 >60 mL/min/1.73m2 Final     Comment:     Effective December 21, 2021 eGFRcr in adults is calculated using the 2021 CKD-EPI creatinine equation which includes age and gender (Ines et al., NEJ, DOI: 10.1056/RVXBri3691128)   06/21/2021 >90 >60 mL/min/[1.73_m2] Final     Comment:     Non  GFR Calc  Starting 12/18/2018, serum creatinine based estimated GFR (eGFR) will be   calculated using the Chronic Kidney Disease Epidemiology Collaboration   (CKD-EPI) equation.       Calcium   Date Value Ref Range Status   03/11/2022 8.4 (L) 8.5 - 10.1 mg/dL Final   06/21/2021 8.5 8.5 - 10.1 mg/dL Final     Bilirubin Total   Date Value Ref Range Status   03/11/2022 0.4 0.2 - 1.3 mg/dL Final   06/21/2021 0.3 0.2 - 1.3 mg/dL Final     Alkaline Phosphatase   Date Value Ref Range Status   03/11/2022 116 40 - 150 U/L Final   06/21/2021 92 40 - 150 U/L Final     ALT   Date Value Ref Range Status   03/11/2022 32 0 - 50 U/L Final   06/21/2021 25 0 - 50 U/L Final     AST   Date Value Ref Range Status   03/11/2022 14 0 - 45 U/L Final   06/21/2021 17 0 - 45 U/L Final

## 2022-03-12 NOTE — PLAN OF CARE
Goal Outcome Evaluation:      VSS. Patient was off of BIPAP overnight per team, VBG to result this am. Pt worked with PT/OT yesterday, able to ambulate to and from bathroom on room air without shortness of breath. Tolerating PO meds.

## 2022-03-12 NOTE — PROGRESS NOTES
[facility] [place]    Medicine Progress Note - Hospitalist Service, GOLD TEAM 10    Date of Admission:  2/20/2022    Assessment & Plan      Melissa Elder is a 66 year old female with PMHx HTN, HLD, paroxysmal Afib, osteoporosis, GERD, severe COPD, previously requiring 2-3L NC O2 at rest.  Underwent b/l lung transplant with Dr. Robin on 02/21. Got 1u pRBC post-op, no overt bleeding source, monitoring. Extubated 02//22 to O2 NC and transferred to the floor. Pericardial drain pulled 2/24/22.  post-op course complicated by right chest tube lodged into fissure and left chest tube displacement s/p bilateral pigtail chest tube placement in IR to drain anterior hydropneumothorax and bilateral effusions, disseminated Ureaplasma, and transient hyperammonemia.  Routine inspection bronch on 3/1 complicated by hypoventilation in setting of oversedation requiring multiple Narcan doses.  Slow improvement in hypercapnia with NIPPV overnight.    Had right chest tubes pulled 3/8. Left apical chest tube pulled 3/9/2022.left basal chest tube pulled 3/10. Awaiting further care inpatient.     Today's plan   - Switch meds from G tube to PO  - Trend Hb  - Replace Mg  - Stop BIPAP, get AM VBG  - Cycle TFs and continue jennie counts  - Continue other cares as below.       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  Pressor weaned off 2/22 and pt. extubated. Prior history of infection/colonization with Haemophilus influenzae (2017).  IgG adequate (2/20).  MRSA nares negative 2/21.  Broad spectrum ABX empirically post-op with vanc and ceftaz (2/21-2/22), stopped after 24h per Dr. Lala to target known donor cultures on POD #1. Donor cultures ([facility]) with Strep mitis, Actinomyces odontolyticus, and Candida  kruseii. Recipient cultures with Actinomyces odonotolyticus.  Acid fast bacillus in sputum from 3/2/22  - Respiratory support with HFNC and BiPAP (discussed below)  - Nebs: Levalbuterol and Mucomyst QID  - Aggressive pulmonary toilet with chest physiotherapy QID as well as Aerobika and incentive spirometry q1h w/a  - Chest tubes managed by surgical and IR team  - Daily CXR while chest tubes in   - Await explant pathology  - Continue ceftriaxone for 2 weeks through 3/8 followed by amoxicillin for 3 months  - monitoring sputum for AFB  - Immune suppression and microbial ppx per daily transplant pulm note  - Continue Valtrex to 1000 mg TID x 14 days for HSV (through 3/11) then complete ppx per protocol (see pulm note)   - Continue diuresis as needed with goal to stay net negative      Acute hypoxic and hypercarbic respiratory failure 3/1, improving   3/1 noted to have slowly rising bicarb on daily labs. Had inspection bronchoscopy 3/1 as well and became oversedated and required multiple doses of narcan. VBG obtained post procedure showed hypercapnea to 99. Hypercarbic respiratory failure likely multifactorial including oversedation, poor diaphragm excursion (SNIFF test 3/3), volume overload, and atelectasis. Started on BiPAP.   - Continue BiPAP at night, HFNC/NC during the day. Wean O2 to keep sats > 92%.   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 Ammonia level checked due to concern for post-transplant hyperammonemia which was mildly elevated at 57, but repeat 49.   - Mycoplasma/ureaplasma cultures collected and Transplant ID consulted               -  Sputum/Pleural fluid +ureaplasma on 3/4. Started on Levofloxacin/Doxycycline for double coverage.   ID   - ID recommends stopping Levofloxacin (3/7), continue Doxycycline through 3/17 .   -amoxicillin for total of 3 months course (through 5/23) for Actinomyces treatment     Leukocytosis  Afebrile, but WBC started rising 2/27 from 14.6 to 23.1 3/2. Evaluation included CT chest/abdomen/pelvis without overt evidence of infection, and pan-culture. Pleural and sputum culture did grow +Ureaplasma and she was started on therapy (as above). C. Diff checked and negative. Leukocytosis has since been improving.   - Repeat CT Abdomen/Pelvis 3/6/22 for interval follow up from initial scan 3/2, as there was concern for enteritis as well as area of pancreatic/kashif-hepatic artery inflammation vs. Infection vs. malignancy.               - Repeat CT with improved small bowel dilation, some persistent perienteric fat stranding accentuated by motion artifact, and unremarkable appearing pancreas   - Continue antibiotics as above      Diarrhea   Dilated bowel on imaging   Noted to have increased loose stools 2/25 likely due to mmf and TF. Fiber added to TF. C.diff checked and negative. Abdominal imaging including CT and XR have shown diffusely dilated bowel, without concern for obstruction. Possibly ileus or gastroparesis from transplant. However patient not experiencing nausea, vomiting, or decreased stool output.   - Repeat CT 3/6/22 with interval improvement   - Continue to monitor abdominal exam, stool output   - Okay to have immodium PRN      Post op pain   - Erector spinae catheters removed   - gabapentin 100 mg nightly  - tylenol 975 mg Q8H   - Dilaudid 0.2-0.4 mg Q2H PRN   - oxycodone 5-10 mg Q4H PRN   - robaxin 500 mg Q6H scheduled      Paroxysmal Afib   HTN  She was on diltiazem prior to lung transplantation and had not been on anticoagulation. She was  Started on metoprolol in the ICU.  - Continue Metoprolol tartrate to 25 mg  BID   - Restarted PTA diltiazem now at 60 mg Q6H, if tolerated can transition back to  mg XL.   - No anticoagulation at this time      Moderate protein calorie malnutrition  - Nutrition following   - On TF, will ask about cycling overnight      Stress induced hyperglycemia  Did not need insulin prior to admission but sugars have been rising despite sliding scale coverage.  - Endocrinology consulted for assistance, appreciate recommendations   - Please see DM team daily note      HLD  Mild CAD   Noted on transplant workup. Started rosuvastatin 40 mg daily--Held 2/28 due to rising LFTs    - restarted rosuvastatin 3/8 will follow LFTs     Acute blood loss anemia  Acute blood loss thrombocytopenia  Secondary to surgery. Will continue to monitor.   - Transfuse Hgb < 7, platelets < 50   - Hemolysis labs negative 3/3, on dapsone, continue to monitor      Sternotomy  Surgical Incision  - Sternal precautions  - Postoperative incision management per protocol  - PT/OT/CR      Elevated LFTs, resolved   Noted on labs 2/26 and rising. AST, ALP, Bili WNL. No RUQ or abdominal pain. Imaging including RUQ ultrasound WNL. Liza-portal edema noted on CT, and exam shows volume overload. Could be the result of congestion versus medication adverse effect. LFTs have since normalized. Will continue to monitor.  - CMP daily   - monitoring closely with restarting statin                Diet: Regular Diet Adult Thin Liquids (level 0)  Snacks/Supplements Adult: Glucerna; Between Meals  Calorie Counts  Adult Formula Drip Feeding: Specified Time Vital 1.5; Nasoduodenal tube; Goal Rate: 45; mL/hr; From: 8:00 PM; 8:00 AM; Medication - Feeding Tube Flush Frequency: At least 15-30 mL water before and after medication administration and with tube clog...    DVT Prophylaxis: Pneumatic Compression Devices  Ratliff Catheter: Not present  Central Lines: None  Cardiac Monitoring: None  Code Status: Full Code      Disposition Plan   Expected Discharge:  03/14/2022     Anticipated discharge location: home;inpatient rehabilitation facility    Delays:            The patient's care was discussed with the Bedside Nurse and Patient.    Gianna Shah MD  Hospitalist Service, GOLD TEAM 10  [facility]  Securely message with the Vocera Web Console (learn more here)  Text page via [facility] Paging/Directory   Please see signed in provider for up to date coverage information      Clinically Significant Risk Factors Present on Admission                    ______________________________________________________________________    Interval History   Patient is feeling well.  Denies chest pain, sob, abd pain, fever.      Data reviewed today: I reviewed all medications, new labs and imaging results over the last 24 hours. I personally reviewed no images or EKG's today.    Physical Exam   Vital Signs: Temp: 98.4  F (36.9  C) Temp src: Oral BP: 139/67 Pulse: 109   Resp: 25 SpO2: 95 % O2 Device: None (Room air)    Weight: 160 lbs 7.92 oz     General Appearance: NAD  HEENT: No thursh / ulcer, EOMI  Respiratory: CTAB on RA   Cardiovascular: RRR, JVP  GI: soft, NTND   Extremities: + LE edema  Neuro: Moving all extremities  Skin: No rash on exposed areas   Psych: Alert and oriented, appropriate mood and affect, speech coherent / logical

## 2022-03-12 NOTE — PROGRESS NOTES
[facility]    Medicine Progress Note - Hospitalist Service, GOLD TEAM 10    Date of Admission:  2/20/2022    Assessment & Plan      Melissa Elder is a 66 year old female with PMHx HTN, HLD, paroxysmal Afib, osteoporosis, GERD, severe COPD, previously requiring 2-3L NC O2 at rest.  Underwent b/l lung transplant with Dr. Robin on 02/21. Got 1u pRBC post-op, no overt bleeding source, monitoring. Extubated 02//22 to O2 NC and transferred to the floor. Pericardial drain pulled 2/24/22.  post-op course complicated by right chest tube lodged into fissure and left chest tube displacement s/p bilateral pigtail chest tube placement in IR to drain anterior hydropneumothorax and bilateral effusions, disseminated Ureaplasma, and transient hyperammonemia.  Routine inspection bronch on 3/1 complicated by hypoventilation in setting of oversedation requiring multiple Narcan doses.  Slow improvement in hypercapnia with NIPPV overnight.    Had right chest tubes pulled 3/8. Left apical chest tube pulled 3/9/2022.left basal chest tube pulled 3/10. Awaiting further care inpatient.     Today's plan   - AM VBG with high CO2 but PM VBG is better -- continue off bipap  - Continue IV lasix BID -- could potentially switch to PO tomorrow  - Cycle TFs and continue jennie counts  - Given diarrhea, schedule immodium (C. Diff negative recently) and switch from Mg oxide to Mg gluc.   - Continue other cares as below.     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  Pressor weaned off 2/22 and pt. extubated. Prior history of infection/colonization with Haemophilus influenzae (2017).  IgG adequate (2/20).  MRSA nares negative 2/21.  Broad spectrum ABX empirically post-op with vanc and ceftaz (2/21-2/22), stopped  after 24h per Dr. Lala to target known donor cultures on POD #1. Donor cultures ([facility]) with Strep mitis, Actinomyces odontolyticus, and Kenyatta kruseii. Recipient cultures with Actinomyces odonotolyticus.  Acid fast bacillus in sputum from 3/2/22  - Respiratory support with HFNC and BiPAP (discussed below)  - Nebs: Levalbuterol and Mucomyst QID  - Aggressive pulmonary toilet with chest physiotherapy QID as well as Aerobika and incentive spirometry q1h w/a  - Chest tubes managed by surgical and IR team  - Daily CXR while chest tubes in   - Await explant pathology  - Continue ceftriaxone for 2 weeks through 3/8 followed by amoxicillin for 3 months  - monitoring sputum for AFB  - Immune suppression and microbial ppx per daily transplant pulm note  - Continue Valtrex to 1000 mg TID x 14 days for HSV (through 3/11) then complete ppx per protocol (see pulm note)   - Continue diuresis as needed with goal to stay net negative      Acute hypoxic and hypercarbic respiratory failure 3/1, improving   3/1 noted to have slowly rising bicarb on daily labs. Had inspection bronchoscopy 3/1 as well and became oversedated and required multiple doses of narcan. VBG obtained post procedure showed hypercapnea to 99. Hypercarbic respiratory failure likely multifactorial including oversedation, poor diaphragm excursion (SNIFF test 3/3), volume overload, and atelectasis. Started on BiPAP.   - Continue BiPAP at night, HFNC/NC during the day. Wean O2 to keep sats > 92%.   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 Ammonia level checked due to concern for post-transplant hyperammonemia  which was mildly elevated at 57, but repeat 49.   - Mycoplasma/ureaplasma cultures collected and Transplant ID consulted               - Sputum/Pleural fluid +ureaplasma on 3/4. Started on Levofloxacin/Doxycycline for double coverage.   ID   - ID recommends stopping Levofloxacin (3/7), continue Doxycycline through 3/17 .   -amoxicillin for total of 3 months course (through 5/23) for Actinomyces treatment     Leukocytosis  Afebrile, but WBC started rising 2/27 from 14.6 to 23.1 3/2. Evaluation included CT chest/abdomen/pelvis without overt evidence of infection, and pan-culture. Pleural and sputum culture did grow +Ureaplasma and she was started on therapy (as above). C. Diff checked and negative. Leukocytosis has since been improving.   - Repeat CT Abdomen/Pelvis 3/6/22 for interval follow up from initial scan 3/2, as there was concern for enteritis as well as area of pancreatic/kashif-hepatic artery inflammation vs. Infection vs. malignancy.               - Repeat CT with improved small bowel dilation, some persistent perienteric fat stranding accentuated by motion artifact, and unremarkable appearing pancreas   - Continue antibiotics as above      Diarrhea   Dilated bowel on imaging   Noted to have increased loose stools 2/25 likely due to mmf and TF. Fiber added to TF. C.diff checked and negative. Abdominal imaging including CT and XR have shown diffusely dilated bowel, without concern for obstruction. Possibly ileus or gastroparesis from transplant. However patient not experiencing nausea, vomiting, or decreased stool output.   - Repeat CT 3/6/22 with interval improvement   - Continue to monitor abdominal exam, stool output   - Okay to have immodium PRN      Post op pain   - Erector spinae catheters removed   - gabapentin 100 mg nightly  - tylenol 975 mg Q8H   - Dilaudid 0.2-0.4 mg Q2H PRN   - oxycodone 5-10 mg Q4H PRN   - robaxin 500 mg Q6H scheduled      Paroxysmal Afib   HTN  She was on diltiazem prior to lung  transplantation and had not been on anticoagulation. She was  Started on metoprolol in the ICU.  - Continue Metoprolol tartrate to 25 mg BID   - Restarted PTA diltiazem now at 60 mg Q6H, if tolerated can transition back to  mg XL.   - No anticoagulation at this time      Moderate protein calorie malnutrition  - Nutrition following   - On TF, will ask about cycling overnight      Stress induced hyperglycemia  Did not need insulin prior to admission but sugars have been rising despite sliding scale coverage.  - Endocrinology consulted for assistance, appreciate recommendations   - Please see DM team daily note      HLD  Mild CAD   Noted on transplant workup. Started rosuvastatin 40 mg daily--Held 2/28 due to rising LFTs    - restarted rosuvastatin 3/8 will follow LFTs     Acute blood loss anemia  Acute blood loss thrombocytopenia  Secondary to surgery. Will continue to monitor.   - Transfuse Hgb < 7, platelets < 50   - Hemolysis labs negative 3/3, on dapsone, continue to monitor      Sternotomy  Surgical Incision  - Sternal precautions  - Postoperative incision management per protocol  - PT/OT/CR      Elevated LFTs, resolved   Noted on labs 2/26 and rising. AST, ALP, Bili WNL. No RUQ or abdominal pain. Imaging including RUQ ultrasound WNL. Liza-portal edema noted on CT, and exam shows volume overload. Could be the result of congestion versus medication adverse effect. LFTs have since normalized. Will continue to monitor.  - CMP daily   - monitoring closely with restarting statin                Diet: Regular Diet Adult Thin Liquids (level 0)  Snacks/Supplements Adult: Glucerna; Between Meals  Calorie Counts  Adult Formula Drip Feeding: Specified Time Vital 1.5; Nasoduodenal tube; Goal Rate: 45; mL/hr; From: 8:00 PM; 8:00 AM; Medication - Feeding Tube Flush Frequency: At least 15-30 mL water before and after medication administration and with tube clog...    DVT Prophylaxis: Pneumatic Compression Devices  Ratliff  Catheter: Not present  Central Lines: None  Cardiac Monitoring: None  Code Status: Full Code      Disposition Plan   Expected Discharge: 03/14/2022     Anticipated discharge location: home;inpatient rehabilitation facility    Delays:            The patient's care was discussed with the Bedside Nurse and Patient.    Gianna Shah MD  Hospitalist Service, GOLD TEAM 10  [facility]  Securely message with the Vocera Web Console (learn more here)  Text page via [facility] Paging/Directory   Please see signed in provider for up to date coverage information      Clinically Significant Risk Factors Present on Admission                    ______________________________________________________________________    Interval History   Patient is feeling well.  Denies chest pain, sob, abd pain, fever.      Data reviewed today: I reviewed all medications, new labs and imaging results over the last 24 hours. I personally reviewed no images or EKG's today.    Physical Exam   Vital Signs: Temp: 98  F (36.7  C) Temp src: Oral BP: 137/71 Pulse: 99   Resp: 25 SpO2: 97 % O2 Device: None (Room air)    Weight: 153 lbs 11.2 oz     General Appearance: NAD  HEENT: No thursh / ulcer, EOMI  Respiratory: CTAB on RA   Cardiovascular: RRR, JVP  GI: soft, NTND   Extremities: + LE edema  Neuro: Moving all extremities  Skin: No rash on exposed areas   Psych: Alert and oriented, appropriate mood and affect, speech coherent / logical

## 2022-03-12 NOTE — PLAN OF CARE
Major Shift Events:  Pt ate 3 meals today. Pt had 4 loose stools. Pt did pulmonary hygiene. Old chest tube sites dressings were changed. Pt was diuresis and had adequate urine output. Pt was given tylenol for back pain. Pt had a chest x-ray.     Plan: Pt will trial being off BiPAP overnight but can use if needed. Pt's tube feedings will run from 2000 to 0800 at 45 mL (decreased from previous night). Pt will get an abdominal x-ray to monitor ileus.     For vital signs and complete assessments, please see documentation flowsheets.

## 2022-03-12 NOTE — PROGRESS NOTES
Pulmonary Medicine  Cystic Fibrosis - Lung Transplant Team  Progress Note  2022       Patient: Melissa Elder  MRN: [id number]  : 1955 (age 66 year old)  Transplant: 2022 (Lung), POD#18  Admission date: 2022    Assessment & Plan:     Melissa Elder is a 66 year old female with a PMH significant for severe COPD, HTN, mild non-obstructive CAD, paroxysmal afib, osteoporosis, GERD, and colonic polyps.  Pt. is now s/p BSLT on 22, surgery relatively uncomplicated.  The patient was extubated , post-op course complicated by right chest tube lodged into fissure and left chest tube displacement s/p bilateral pigtail chest tube placement in IR to drain anterior hydropneumothorax and bilateral effusions, disseminated Ureaplasma, and transient hyperammonemia.  Routine inspection bronch on 3/1 complicated by hypoventilation in setting of oversedation requiring multiple Narcan doses.  Slow improvement in hypercapnia with NIPPV overnight, weaned off HFNC 3/9.       Today's recommendations:  - Decreased nebs and chest physiotherapy to TID  - Repeat SNIFF test without evidence of diaphragmatic palsy, discontinue overnight BiPAP (discussed with RT), continue VBG daily in the morning  - DSA, CMV, EBV, and IgG due 3/21 (not yet ordered)  - Repeat CXR ordered tomorrow  - CVTS reviewing ?subluxation of sternum noted on imaging, awaiting any additional recommendations although likely no intervention needed  - Calorie counts ordered through 3/14  - Tacrolimus level to trend ordered tomorrow  - Adjusted MMF --> Myfortic given diarrhea  - Prednisone taper ordered 3/15  - Amoxicillin for total of 3 months course (through ) for Actinomyces treatment  - Valacyclovir through tomorrow, then acyclovir prophylaxis ordered through 3/23 per protocol  - Doxycycline for Ureaplasma for 4 week course through   - Follow +AFB on sputum culture 3/2, AFB sputum culture (3/9) pending  - Donor risk labs ordered 3/23  -  Repeat AXR tomorrow   - Increased oral magnesium supplementation       COPD s/p bilateral sequential lung transplant (BSLT):  Acute hypoxic/hypercapneic respiratory failure:   Bilateral pleural effusions/PTX: Scant pleural-pleural adhesions and mild-moderate bilateral hilar lymphadenopathy per op note.  Pathology with CPFE.  Extubated 2/22.  Left chest tube inadvertently dislodged, f/u chest CT (2/25) with anterior hydroPTX, right chest tube in fissure.  Left chest tube removed and replaced with 2 left-sided pigtail chest tubes by IR (2/26).  DSA negative 2/28.  Hypoxia had generally resolved, only intermittently requiring supplemental oxygen up until bronch 3/1.  S/p bronch 3/1 without evidence of dehiscence, mild amount of thin pale/tan secretions noted in RLL bronchus.  Noted hypoventilating with oversedation during bronch, received Narcan x3 with improvement in sedation but hypercapnea initially severe (>90) and persisting, improving with nocturnal BiPAP and daytime HFNC.  Chest CT (3/2) with improved bilateral hydroPTX, bibasilar atectasis with mucous plugging, and increased right loculated pleural effusion.  S/p right pigtail chest tube (3/3, exudative, 81%N, positive for Ureaplasma as below).  Sniff test (3/3) with poor diaphragm excursion bilaterally with extensive accessory respiratory chest wall musculature effort to aid in inspiration.  Repeat chest CT (3/6) with trace bilateral pneumothoraces and decreased small volume pleural effusions.  Right chest tube removed by CVTS 3/8, left axillary removed 3/9 and basilar removed 3/10 by our team.  Weaned off HFNC 3/9, on RA.  Repeat sniff test (3/11) without evidence of diaphragmatic palsy.  - Flonase for postnasal drip (3/10)  - Nebs: levalbuterol and Mucomyst QID --> decrease to TID (ordered)  - Aggressive pulmonary toilet with chest physiotherapy QID --> decrease to TID (ordered) as well as Aerobika and incentive spirometry q1h w/a  - Discontinue overnight  BiPAP overnight (discussed with RT), continue VBG daily in the morning  - Supplemental oxygen via NC as needed to maintain SpO2 >92%  - DSA 3/21 (not yet ordered)  - CXR today with stable diffuse interstitial opacities and small right pleural effusion vs scarring (personally reviewed), repeat 3/12 (ordered)  - CVTS reviewing ?subluxation of sternum noted on imaging, awaiting any additional recommendations although likely no intervention needed  - Diuresis per primary team  - Post-pyloric nasal FT, cycled TF per RD and primary team, calorie counts through 3/14 (briefly discussed PEG/J tube with Pt. 3/10, Pt. hoping to increase oral intake as appetite improving and avoid PEG/J tube placement)  - ENT consulted 3/9, Pt. declined scope exam, ENT OP follow up if no improvement     Immunosuppression:  S/p induction therapy with basiliximab x2 doses (with high dose IV steroid).  - Tacrolimus 3.5 mg qAM / 3 mg qPM (decreased 3/10, given supratherapeutic and diltiazem increased).  Goal level 8-12.  Repeat level to trend 3/12 (ordered).   - MMF 1000 mg BID (decreased 2/25 due to diarrhea) --> adjust to Myfortic 720 mg BID given diarrhea (ordered)  - Prednisone 15 mg qAM / 12.5 qPM with taper per lung transplant protocol (due 3/15, ordered):  Date AM dose (mg) PM dose (mg)   3/8 15 12.5   3/15 12.5 12.5   3/29 12.5 10   4/12 10 10   5/10 10 7.5   6/7 7.5 7.5   7/5 7.5 5   8/2 5 5   8/30 5 2.5      Prophylaxis:   - Dapsone for PJP ppx (started 2/23 at decreased MWF dose and increased to daily 3/1, G6PD 13.3 2/21)  - Nystatin for oral candidiasis ppx, 6 month course  - See below for serologies and viral ppx:    Donor Recipient Intervention   CMV status Negative Negative CMV monthly (3/21, not yet ordered, negative 3/11)   EBV status Positive Positive EBV at one month (3/21, not yet ordered)   HSV status N/A (Newly) Positive As below      ID: Prior history of infection/colonization with Haemophilus influenzae (2017).  Broad  spectrum ABX empirically post-op with vanc and ceftaz (2/21-2/22), stopped after 24h to target known donor cultures at that time on POD #1 per Dr. Lala (transitioned to cefazolin).  Broadened again on POD #2 with culture updates as below.  Donor ([facility]) cultures with P-S MSSA; ([facility]) with Strep mitis, Actinomyces odontolyticus, MSSA x2, and C. kruseii (concern for possible aspiration).  Recipient cultures at time of transplant with Actinomyces odonotolyticus.  Bronch cultures (2/22) with Saccharomyces cerevisiae (no indication to treat per Dr. Ghosh unless pt. acutely declines clinically, 3/1) and C. albicans.  - IgG repeat at one month (3/21, not yet ordered)  - ABX: amoxicillin (3/8-5/23, s/p ceftriaxone 2/23-3/8) for 3 month course for Actinomyces treatment  - Low threshold to treat Candida if it recurs in respiratory cultures, per transplant ID     HSV: Pt. with recent seroconversion at time of transplant.  HSV also noted on donor cultures.  Initial plan for 30 days of ppx with ACV post-op per Dr. Lala.  Then with HSV+ bronch at time of transplant (2/21), transitioned to treatment dosing with VACV   - Valacyclovir 1000 mg TID X 14d (2/27-3/12), then resume acyclovir prophylaxis (3/13-3/23) per protocol (ordered)     Hyperammonemia, Resolved:   Disseminated Ureaplasma:  Had been somnolent with some confusion/visual hallucinations in setting of hypercapnia as above.  Ammonia mildly elevated on 3/2 but quickly normalized, most recently 50 on 3/4.  Ureaplasma and Mycoplasma studies sent, pt. noted to have Ureaplasma in both pleural and sputum cultures from 3/2.  S/p levofloxacin 3/4-3/6.  - Doxycycline (3/4-4/1) per transplant ID  - In light of normal ammonia level, defer more aggressive interventions for hyperammonemia at this time (stopping CNI, iHD, etc.)     Positive AFB on sputum culture: Noted on sputum culture 3/2.  Transplant ID following for speciation and susceptibility testing as well as other evidence  of infection.   - AFB sputum culture (3/9) pending, follow  - Obtain AFB cultures on all future bronchs     Leukocytosis: WBC trending upward now on POD #10, frequently noted at this stage post-op (pathology not entirely clear, but may be at least partially d/t bone marrow surge post-transplant).  Procal moderately elevated at 0.18, but may still be somewhat non-specific at this point post-op.  BDG fugitell and Aspergillus galactomannan negative 3/3.  - ABX as above     PHS risk criteria donor: Additional labs required post-transplant (between 4-8 weeks post-op): Hepatitis B, Hepatitis C, and HIV by COLT (ordered 3/23), also plan to repeat hepatitis B surface antibody at that time (ordered) given discrepancy with recent result.     Other relevant problems managed by primary team:     Diarrhea:   Concern for ileus: Likely 2/2 medications and tube feedings.  Fiber added to tube feeds.  MMF decreased as above.  Loose stools now improved.  Will consider transition to Myfortic if loose stools increase again, deferred for now.  Again having loose/watery stools, also noting some abdominal bloating/fullness.  AXR 3/5 with diffuse distention of small and large bowel suggestive of ileus.  C diff negative 3/6.  Abdomen/pelvis CT (3/6) with decrease in small bowel distention and perienteric fat stranding.  CXR (3/8) with partially imaged likely adynamic ileus.  Bowel regimen initiated 3/8, adjusted 3/11.  - Management per primary team, repeat AXR 3/12     Elevated LFTs, Resolved: Rising 3/1 despite medication adjustments (APAP and statin stopped 2/27).  Of note, pt. had a discrepant hep B surface Ab at time of transplant as above.  LFTs remain elevated although improved 3/2.  Also with elevated ammonia as above.  RUQ US (3/3) with only hepatic steatosis.  LFTs normalized 3/7.     CAD: Noted to have mild non-obstructive CAD without hemodynamically significant lesions on cardiac cath 3/27/19.  PTA ASA 81 mg and atorvastatin,  started on rosuvastatin post-op but held 2/28 for elevated LFTs (as above), resumed 3/9.  ASA resumed 3/1.     Paroxysmal afib:   HTN: PTA diltiazem, not on AC.  Post-op tachycardia, off pressor since 2/22.  Metoprolol started 2/23.  Persistent low level tachycardia, but currently sinus tach with continued HTN.  Diltiazem resumed 3/2.     GERD: Not on PPI PTA.  Negative pH/manometry study 3/28/19, noted small hiatal hernia. On PPI daily since 2/21, H2 blocker bridge discontinued 3/2, some increase in reflux symptoms since, resumed 3/5.  PPI increased to BID 3/7.     Hypomagnesemia: Suppressed absorption d/t CNI.  Requiring almost daily IV/PO replacement.  - Mag-Ox 400 mg BID (ordered)  - Continue daily magnesium level with additional replacement protocol prn    We appreciate the excellent care provided by the [place] [id number] team.  Recommendations communicated via in person rounding and this note.  Will continue to follow along closely, please do not hesitate to call with any questions or concerns.    Patient discussed with Dr. Steele.    Lola Mann PA-C  Inpatient [facility]  Pulmonary CF/Transplant     Subjective & Interval History:     Remains on RA during the day, BiPAP overnight.  Dyspnea and cough improving.  Incisional pain well managed.  Appetite slowly improving, at 2 meals and 2 oral supplements yesterday and tolerated breakfast this morning.  Still with some early satiety although attributes mostly to medications and liquids via NJ tube.  Ongoing loose/watery stools, x3 occurrences yesterday.  Having LE pain, attributes to edema, US negative for DVT yesterday.    Review of Systems:     C: No fever, no chills  INTEGUMENTARY/SKIN: No rash or obvious new lesions  ENT/MOUTH: + hoarse voice, no sinus pain, + improving nasal congestion and drainage  RESP: See interval history  CV: No chest pain, no palpitations  GI: No nausea, no vomiting, no reflux symptoms  : No dysuria  MUSCULOSKELETAL: See interval  "history  ENDOCRINE: + blood sugars intermittently elevated  NEURO: No headache, no numbness or tingling  PSYCHIATRIC: Mood stable    Physical Exam:     All notes, images, and labs from past 24 hours (at minimum) were reviewed.    Vital signs:  Temp: 98.4  F (36.9  C) Temp src: Oral BP: 139/67 Pulse: 109   Resp: 25 SpO2: 95 % O2 Device: None (Room air) Oxygen Delivery: 25 LPM Height: 157.5 cm (5' 2\") Weight: 72.8 kg (160 lb 7.9 oz)  I/O:     Intake/Output Summary (Last 24 hours) at 3/11/2022 1825  Last data filed at 3/11/2022 1800  Gross per 24 hour   Intake 2442 ml   Output 2475 ml   Net -33 ml     Constitutional: Sitting up in chair, in no apparent distress.   HEENT: Eyes with pink conjunctivae, anicteric.  Oral mucosa moist without lesions.  Nasal FT.  PULM: Diminished air flow to right > left base.  No crackles, no rhonchi, no wheezes.  Non-labored breathing on RA.  CV: Normal S1 and S2.  Tachycardic.  No murmur, gallop, or rub.  2+ BLE edema.   ABD: NABS, soft, nontender, nondistended.    MSK: Moves all extremities.  + muscle wasting.   NEURO: Alert, conversant.   SKIN: Warm, dry.  No rash on limited exam.  Clamshell incision not visualized.   PSYCH: Mood stable.     Lines, Drains, and Devices:  Peripheral IV 03/10/22 Right;Posterior Lower forearm (Active)   Site Assessment WDL 03/11/22 1600   Line Status Saline locked 03/11/22 1600   Dressing Intervention New dressing  03/10/22 1200   Phlebitis Scale 0-->no symptoms 03/11/22 1600   Infiltration Scale 0 03/11/22 1600   Infiltration Site Treatment Method  None 03/11/22 1600   Number of days: 1     Data:     LABS    CMP:   Recent Labs   Lab 03/11/22  1631 03/11/22  1137 03/11/22  0745 03/11/22  0710 03/10/22  1213 03/10/22  0806 03/09/22  0738 03/09/22  0601 03/08/22  0744 03/08/22  0625   NA  --   --   --  136  --  136  --  136  --  138   POTASSIUM  --   --   --  4.7  --  4.1  --  4.3  --  4.8   CHLORIDE  --   --   --  98  --  100  --  98  --  98   CO2  --   --   " --  35*  --  32  --  34*  --  38*   ANIONGAP  --   --   --  3  --  4  --  4  --  2*   * 154* 187* 196*   < > 183*   < > 194*   < > 169*   BUN  --   --   --  32*  --  29  --  31*  --  28   CR  --   --   --  0.47*  --  0.47*  --  0.50*  --  0.50*   GFRESTIMATED  --   --   --  >90  --  >90  --  >90  --  >90   MINISTERIO  --   --   --  8.4*  --  8.4*  --  7.9*  --  8.3*   MAG  --   --   --  1.5*  --  1.6  --  1.9  --  1.7  1.8   PHOS  --   --   --  3.2  --  3.0  --  3.8  --  4.3   PROTTOTAL  --   --   --  5.2*  --  5.6*  --  5.0*  --  5.2*   ALBUMIN  --   --   --  2.4*  --  2.4*  --  2.2*  --  2.2*   BILITOTAL  --   --   --  0.4  --  0.4  --  0.2  --  0.3   ALKPHOS  --   --   --  116  --  120  --  116  --  114   AST  --   --   --  14  --  13  --  13  --  16   ALT  --   --   --  32  --  38  --  40  --  45    < > = values in this interval not displayed.     CBC:   Recent Labs   Lab 03/11/22  0710 03/10/22  0806 03/09/22  0601 03/08/22  0625   WBC 10.6 12.4* 10.2 11.9*   RBC 2.45* 2.78* 2.36* 2.45*   HGB 7.6* 8.5* 7.0* 7.1*   HCT 24.2* 27.7* 23.2* 23.9*   MCV 99 100 98 98   MCH 31.0 30.6 29.7 29.0   MCHC 31.4* 30.7* 30.2* 29.7*   RDW 19.3* 19.1* 18.3* 17.4*    386 310 345       INR: No lab results found in last 7 days.    Glucose:   Recent Labs   Lab 03/11/22  1631 03/11/22  1137 03/11/22  0745 03/11/22  0710 03/11/22  0504 03/11/22  0105   * 154* 187* 196* 159* 131*       Blood Gas:   Recent Labs   Lab 03/11/22  0710 03/10/22  0806 03/09/22  0601   PHV 7.43 7.37 7.34   PCO2V 56* 63* 63*   PO2V 73* 34 60*   HCO3V 37* 36* 34*   ARIEL 11.7* 9.6* 7.0*   O2PER 21 51 25       Culture Data No results for input(s): CULT in the last 168 hours.    Virology Data: No results found for: INFLUA, FLUAH1, FLUAH3, VV3075, IFLUB, RSVA, RSVB, PIV1, PIV2, PIV3, HMPV, HRVS, ADVBE, ADVC    Historical CMV results (last 3 of prior testing):  Lab Results   Component Value Date    CMVQNT Not Detected 03/11/2022     No results found  for: CMVLOG    Urine Studies    Recent Labs   Lab Test 03/01/22  1549 02/20/22  0248   URINEPH 6.0 7.0   NITRITE Negative Negative   LEUKEST Negative Negative   WBCU 0 <1       Most Recent [place] Pulmonary Function Testing (FVC/FEV1 only)  FVC-Pre   Date Value Ref Range Status   12/20/2021 1.81 L    06/21/2021 1.46 L    12/09/2019 1.59 L    06/03/2019 1.68 L      FVC-%Pred-Pre   Date Value Ref Range Status   12/20/2021 65 %    06/21/2021 52 %    12/09/2019 56 %    06/03/2019 58 %      FEV1-Pre   Date Value Ref Range Status   12/20/2021 0.49 L    06/21/2021 0.50 L    12/09/2019 0.49 L    06/03/2019 0.47 L      FEV1-%Pred-Pre   Date Value Ref Range Status   12/20/2021 22 %    06/21/2021 22 %    12/09/2019 21 %    06/03/2019 20 %        IMAGING    Recent Results (from the past 48 hour(s))   XR Chest 2 Views    Narrative    EXAM: XR CHEST 2 VW  3/9/2022 8:04 PM     HISTORY:  F/u chest tube removal       COMPARISON:  3/9/2022    TECHNIQUE: PA and lateral radiographs of the chest    FINDINGS:   Interval removal of left apical chest tube. Stable left basilar chest  tube.    Midline trachea. Stable postsurgical changes in lung transplant.  Distinct pulmonary vasculature. No consolidation, pleural effusion or  appreciable pneumothorax.      Impression    IMPRESSION: No appreciable pneumothorax post chest tube removal.     I have personally reviewed the examination and initial interpretation  and I agree with the findings.    MURIEL WALLACE MD         SYSTEM ID:  [id number]   XR Chest 2 Views    Narrative    Exam: XR CHEST 2 VW, 3/10/2022 8:33 AM    Comparison: 3/9/2022    History: interval follow up, post-op lung transplant    Findings:  PA and lateral views the chest. Status post bilateral lung transplant  with intact sternotomy wires. Enteric tube with tip at the expected  location of the duodenal jejunal juncture. Left basilar chest tube.    Trachea is midline. Mediastinum is within normal limits.  Cardiopulmonary  silhouette is within normal limits. Aortic knob  calcifications. Prominent bilateral interstitial opacities.  Redemonstrated right basilar opacities in blunting of the right  costophrenic angle. No pneumothorax. No acute osseous abnormalities.      Impression    Impression:   1. Status post bilateral lung transplant with small stable right  pleural effusion and associated basilar atelectasis.  2. Stable bilateral interstitial opacities representing pulmonary  edema.  3. Orientation of the wires best seen on the lateral view may indicate  subluxation of the sternum.    I have personally reviewed the examination and initial interpretation  and I agree with the findings.    VARGAS DUKE MD         SYSTEM ID:  [id number]   US Lower Extremity Venous Duplex Bilateral    Narrative    EXAMINATION: DOPPLER VENOUS ULTRASOUND OF BILATERAL LOWER EXTREMITIES,  3/10/2022 3:04 PM     COMPARISON: 2/26/2022    HISTORY: rule out dvt    TECHNIQUE:  Gray-scale evaluation with compression, spectral flow and  color Doppler assessment of the deep venous system of both legs from  groin to knee, and then at the ankles.    FINDINGS:  In both lower extremities, the common femoral, femoral, popliteal and  posterior tibial veins demonstrate normal compressibility and blood  flow.      Impression    IMPRESSION:  No evidence of deep venous thrombosis in either lower extremity.    I have personally reviewed the examination and initial interpretation  and I agree with the findings.    CLAU PRAETR DO         SYSTEM ID:  [id number]   XR Chest Port 1 View    Narrative    EXAM: XR CHEST PORT 1 VIEW  3/10/2022 8:14 PM     HISTORY:  interval follow up, left basilar chest tube removal       COMPARISON:  3/10/2022    TECHNIQUE: Single frontal radiograph of the chest    FINDINGS:   Stable surgical changes of lung transplant. Stable feeding tube  position. Prominent gastric/colonic air bubble in the left upper  quadrant.    Midline trachea. Unchanged low  lung volumes bilaterally. No acute  consolidation, pleural effusion or appreciable pneumothorax.      Impression    IMPRESSION: No appreciable pneumothorax since left chest tube removal.  No acute consolidation.     I have personally reviewed the examination and initial interpretation  and I agree with the findings.    RENY VAZQUEZ MD         SYSTEM ID:  [id number]   XR Chest 2 Views    Narrative    Exam: XR CHEST 2 VW, 3/11/2022 9:27 AM    Comparison: 3/10/2022    History: interval follow up, post-op lung transplant    Findings:  PA and lateral views the chest. Status post bilateral lung transplant  with intact clam shell sternotomy wires. Enteric tube traverses below  the field of view.    Trachea is midline. Mediastinum is within normal limits.  Cardiopulmonary silhouette is within normal limits. Diffuse  interstitial opacities. No pneumothorax. Blunting of the right  costophrenic angle. The upper abdomen is unremarkable.      Impression    Impression:   1. Status post bilateral lung transplant with stable diffuse  interstitial opacities likely representing mild pulmonary edema.  2. Blunting of the right costophrenic angle may represent small right  pleural effusion versus scarring.    I have personally reviewed the examination and initial interpretation  and I agree with the findings.    VARGAS DUKE MD         SYSTEM ID:  [id number]   XR Chest/Heart Fluoro    Narrative    Examination:  XR CHEST/HEART FLUORO 3/11/2022 9:39 AM     Comparison: Same day chest radiograph    History: evaluate diaphragm function    Fluoroscopy time: 0.6 minutes     Findings: Spot film demonstrates mild  elevation of the left  hemidiaphragm under quite respiration with prominent gastric and left  colonic gaseous distention. AP and LPO views obtained. Under quite  respiration, deep breathing and with sniffing maneuvers, the  hemidiaphragms move in a symmetric fashion. No paradoxical diaphragm  movement.       Impression     Impression: No evidence for diaphragmatic palsy.    I, ADRIANA VELAZQUEZ MD, attest that I was immediately available to  provide guidance and assistance during the entirety of the procedure.    I have personally reviewed the examination and initial interpretation  and I agree with the findings.    ADRIANA VELAZQUEZ MD         SYSTEM ID:  [id number]

## 2022-03-12 NOTE — PROGRESS NOTES
Calorie Count  Intake recorded for: 3/11  Total Kcals: 962 Total Protein: 33g  Kcals from Hospital Food: 792   Protein: 21g  Kcals from Outside Food (average):170 Protein: 12g  # Meals Ordered from Kitchen: 2 meals  # Meals Recorded: 3 meals (first - 50% pancake w/ syrup, pastry)      (second - 100% chicken noodle soup w/ crackers from outside the hospital)      (third - 50% peanut butter and jelly sandwich, jello, black tea)  # Supplements Recorded: 100% 1 Glucerna

## 2022-03-12 NOTE — PROGRESS NOTES
Major Shift Events    Pt tolerated taking meds PO. TF given via NJ from [id number], tolerated 45mL/hr overnight w/o nausea. Pt was given oxy and tylenol for back pain. Chest tube removal sites appear clean dry and intact, no drainage noted. Surgican incision glued, ANGELA without redness, warmth, or drainage. Interdry placed. Pt had 2 bouts of diarrhea. Adequate UOP. +3 BLE edema. Pt tolerated trial without BIPAP per team.       Please see flowsheets for documentation of vital signs and assessments.

## 2022-03-12 NOTE — PROGRESS NOTES
Per pulmonary team will hold off BIPAP for the night. Order as PRN if needed.     Monique Hamilton, RT on 3/11/2022 at 6:50 PM

## 2022-03-13 ENCOUNTER — APPOINTMENT (OUTPATIENT)
Dept: OCCUPATIONAL THERAPY | Facility: CLINIC | Age: 67
DRG: 007 | End: 2022-03-13
Attending: SURGERY
Payer: MEDICARE

## 2022-03-13 LAB
ALBUMIN SERPL-MCNC: 2.8 G/DL (ref 3.4–5)
ALP SERPL-CCNC: 132 U/L (ref 40–150)
ALT SERPL W P-5'-P-CCNC: 32 U/L (ref 0–50)
ANION GAP SERPL CALCULATED.3IONS-SCNC: 5 MMOL/L (ref 3–14)
AST SERPL W P-5'-P-CCNC: 18 U/L (ref 0–45)
BASE EXCESS BLDV CALC-SCNC: 13.2 MMOL/L (ref -7.7–1.9)
BASOPHILS # BLD AUTO: 0 10E3/UL (ref 0–0.2)
BASOPHILS NFR BLD AUTO: 0 %
BILIRUB SERPL-MCNC: 0.4 MG/DL (ref 0.2–1.3)
BUN SERPL-MCNC: 26 MG/DL (ref 7–30)
CALCIUM SERPL-MCNC: 8.8 MG/DL (ref 8.5–10.1)
CHLORIDE BLD-SCNC: 96 MMOL/L (ref 94–109)
CO2 SERPL-SCNC: 36 MMOL/L (ref 20–32)
CREAT SERPL-MCNC: 0.5 MG/DL (ref 0.52–1.04)
EOSINOPHIL # BLD AUTO: 0 10E3/UL (ref 0–0.7)
EOSINOPHIL NFR BLD AUTO: 0 %
ERYTHROCYTE [DISTWIDTH] IN BLOOD BY AUTOMATED COUNT: 19.9 % (ref 10–15)
GFR SERPL CREATININE-BSD FRML MDRD: >90 ML/MIN/1.73M2
GLUCOSE BLD-MCNC: 129 MG/DL (ref 70–99)
GLUCOSE BLDC GLUCOMTR-MCNC: 125 MG/DL (ref 70–99)
GLUCOSE BLDC GLUCOMTR-MCNC: 146 MG/DL (ref 70–99)
GLUCOSE BLDC GLUCOMTR-MCNC: 157 MG/DL (ref 70–99)
GLUCOSE BLDC GLUCOMTR-MCNC: 173 MG/DL (ref 70–99)
GLUCOSE BLDC GLUCOMTR-MCNC: 272 MG/DL (ref 70–99)
HCO3 BLDV-SCNC: 41 MMOL/L (ref 21–28)
HCT VFR BLD AUTO: 29.3 % (ref 35–47)
HGB BLD-MCNC: 9 G/DL (ref 11.7–15.7)
IMM GRANULOCYTES # BLD: 0.1 10E3/UL
IMM GRANULOCYTES NFR BLD: 1 %
LYMPHOCYTES # BLD AUTO: 1 10E3/UL (ref 0.8–5.3)
LYMPHOCYTES NFR BLD AUTO: 10 %
MAGNESIUM SERPL-MCNC: 1.7 MG/DL (ref 1.6–2.3)
MCH RBC QN AUTO: 30.5 PG (ref 26.5–33)
MCHC RBC AUTO-ENTMCNC: 30.7 G/DL (ref 31.5–36.5)
MCV RBC AUTO: 99 FL (ref 78–100)
MONOCYTES # BLD AUTO: 0.9 10E3/UL (ref 0–1.3)
MONOCYTES NFR BLD AUTO: 8 %
NEUTROPHILS # BLD AUTO: 8.3 10E3/UL (ref 1.6–8.3)
NEUTROPHILS NFR BLD AUTO: 81 %
NRBC # BLD AUTO: 0 10E3/UL
NRBC BLD AUTO-RTO: 0 /100
NT-PROBNP SERPL-MCNC: 1424 PG/ML (ref 0–900)
O2/TOTAL GAS SETTING VFR VENT: 21 %
PCO2 BLDV: 72 MM HG (ref 40–50)
PH BLDV: 7.37 [PH] (ref 7.32–7.43)
PHOSPHATE SERPL-MCNC: 3.6 MG/DL (ref 2.5–4.5)
PLAT MORPH BLD: ABNORMAL
PLATELET # BLD AUTO: 448 10E3/UL (ref 150–450)
PO2 BLDV: 16 MM HG (ref 25–47)
POTASSIUM BLD-SCNC: 4.2 MMOL/L (ref 3.4–5.3)
PROT SERPL-MCNC: 6.2 G/DL (ref 6.8–8.8)
RBC # BLD AUTO: 2.95 10E6/UL (ref 3.8–5.2)
RBC MORPH BLD: ABNORMAL
SARS-COV-2 RNA RESP QL NAA+PROBE: NEGATIVE
SODIUM SERPL-SCNC: 137 MMOL/L (ref 133–144)
STOMATOCYTES BLD QL SMEAR: SLIGHT
WBC # BLD AUTO: 10.4 10E3/UL (ref 4–11)

## 2022-03-13 PROCEDURE — 250N000013 HC RX MED GY IP 250 OP 250 PS 637: Performed by: PEDIATRICS

## 2022-03-13 PROCEDURE — 250N000011 HC RX IP 250 OP 636: Performed by: STUDENT IN AN ORGANIZED HEALTH CARE EDUCATION/TRAINING PROGRAM

## 2022-03-13 PROCEDURE — 250N000013 HC RX MED GY IP 250 OP 250 PS 637: Performed by: STUDENT IN AN ORGANIZED HEALTH CARE EDUCATION/TRAINING PROGRAM

## 2022-03-13 PROCEDURE — 250N000013 HC RX MED GY IP 250 OP 250 PS 637: Performed by: NURSE PRACTITIONER

## 2022-03-13 PROCEDURE — 94640 AIRWAY INHALATION TREATMENT: CPT

## 2022-03-13 PROCEDURE — 250N000013 HC RX MED GY IP 250 OP 250 PS 637: Performed by: SURGERY

## 2022-03-13 PROCEDURE — 85025 COMPLETE CBC W/AUTO DIFF WBC: CPT | Performed by: NURSE PRACTITIONER

## 2022-03-13 PROCEDURE — 250N000012 HC RX MED GY IP 250 OP 636 PS 637: Performed by: STUDENT IN AN ORGANIZED HEALTH CARE EDUCATION/TRAINING PROGRAM

## 2022-03-13 PROCEDURE — 94640 AIRWAY INHALATION TREATMENT: CPT | Mod: 76

## 2022-03-13 PROCEDURE — 250N000009 HC RX 250: Performed by: PHYSICIAN ASSISTANT

## 2022-03-13 PROCEDURE — 250N000013 HC RX MED GY IP 250 OP 250 PS 637: Performed by: PHYSICIAN ASSISTANT

## 2022-03-13 PROCEDURE — U0003 INFECTIOUS AGENT DETECTION BY NUCLEIC ACID (DNA OR RNA); SEVERE ACUTE RESPIRATORY SYNDROME CORONAVIRUS 2 (SARS-COV-2) (CORONAVIRUS DISEASE [COVID-19]), AMPLIFIED PROBE TECHNIQUE, MAKING USE OF HIGH THROUGHPUT TECHNOLOGIES AS DESCRIBED BY CMS-2020-01-R: HCPCS | Performed by: STUDENT IN AN ORGANIZED HEALTH CARE EDUCATION/TRAINING PROGRAM

## 2022-03-13 PROCEDURE — 250N000012 HC RX MED GY IP 250 OP 636 PS 637: Performed by: PHYSICIAN ASSISTANT

## 2022-03-13 PROCEDURE — 84100 ASSAY OF PHOSPHORUS: CPT | Performed by: NURSE PRACTITIONER

## 2022-03-13 PROCEDURE — 94668 MNPJ CHEST WALL SBSQ: CPT

## 2022-03-13 PROCEDURE — 999N000157 HC STATISTIC RCP TIME EA 10 MIN

## 2022-03-13 PROCEDURE — 36415 COLL VENOUS BLD VENIPUNCTURE: CPT | Performed by: NURSE PRACTITIONER

## 2022-03-13 PROCEDURE — 83880 ASSAY OF NATRIURETIC PEPTIDE: CPT | Performed by: STUDENT IN AN ORGANIZED HEALTH CARE EDUCATION/TRAINING PROGRAM

## 2022-03-13 PROCEDURE — 120N000002 HC R&B MED SURG/OB UMMC

## 2022-03-13 PROCEDURE — 250N000011 HC RX IP 250 OP 636: Performed by: PHYSICIAN ASSISTANT

## 2022-03-13 PROCEDURE — 83735 ASSAY OF MAGNESIUM: CPT | Performed by: NURSE PRACTITIONER

## 2022-03-13 PROCEDURE — 99233 SBSQ HOSP IP/OBS HIGH 50: CPT | Performed by: STUDENT IN AN ORGANIZED HEALTH CARE EDUCATION/TRAINING PROGRAM

## 2022-03-13 PROCEDURE — 82803 BLOOD GASES ANY COMBINATION: CPT | Performed by: NURSE PRACTITIONER

## 2022-03-13 PROCEDURE — 99233 SBSQ HOSP IP/OBS HIGH 50: CPT | Mod: 24 | Performed by: INTERNAL MEDICINE

## 2022-03-13 PROCEDURE — 80053 COMPREHEN METABOLIC PANEL: CPT | Performed by: NURSE PRACTITIONER

## 2022-03-13 PROCEDURE — 97535 SELF CARE MNGMENT TRAINING: CPT | Mod: GO

## 2022-03-13 RX ORDER — FUROSEMIDE 20 MG
40 TABLET ORAL DAILY
Status: DISCONTINUED | OUTPATIENT
Start: 2022-03-14 | End: 2022-03-17 | Stop reason: HOSPADM

## 2022-03-13 RX ORDER — FUROSEMIDE 20 MG
40 TABLET ORAL
Status: DISCONTINUED | OUTPATIENT
Start: 2022-03-13 | End: 2022-03-13

## 2022-03-13 RX ORDER — MAGNESIUM SULFATE HEPTAHYDRATE 40 MG/ML
2 INJECTION, SOLUTION INTRAVENOUS ONCE
Status: COMPLETED | OUTPATIENT
Start: 2022-03-13 | End: 2022-03-13

## 2022-03-13 RX ADMIN — NYSTATIN 1000000 UNITS: 100000 SUSPENSION ORAL at 09:40

## 2022-03-13 RX ADMIN — Medication 400 MG: at 18:16

## 2022-03-13 RX ADMIN — LOPERAMIDE HYDROCHLORIDE 2 MG: 2 CAPSULE ORAL at 20:19

## 2022-03-13 RX ADMIN — ACYCLOVIR 400 MG: 200 CAPSULE ORAL at 20:19

## 2022-03-13 RX ADMIN — LEVALBUTEROL HYDROCHLORIDE 1.25 MG: 1.25 SOLUTION RESPIRATORY (INHALATION) at 10:55

## 2022-03-13 RX ADMIN — HEPARIN SODIUM 5000 UNITS: 5000 INJECTION, SOLUTION INTRAVENOUS; SUBCUTANEOUS at 05:15

## 2022-03-13 RX ADMIN — AMOXICILLIN 500 MG: 500 CAPSULE ORAL at 15:48

## 2022-03-13 RX ADMIN — DAPSONE 50 MG: 25 TABLET ORAL at 09:25

## 2022-03-13 RX ADMIN — Medication 1 PACKET: at 15:48

## 2022-03-13 RX ADMIN — GABAPENTIN 100 MG: 100 CAPSULE ORAL at 22:09

## 2022-03-13 RX ADMIN — LORATADINE 10 MG: 10 TABLET ORAL at 09:29

## 2022-03-13 RX ADMIN — Medication 500 MG: at 09:28

## 2022-03-13 RX ADMIN — TACROLIMUS 3.5 MG: 1 CAPSULE ORAL at 09:40

## 2022-03-13 RX ADMIN — DILTIAZEM HYDROCHLORIDE 240 MG: 240 CAPSULE, COATED, EXTENDED RELEASE ORAL at 09:28

## 2022-03-13 RX ADMIN — NYSTATIN 1000000 UNITS: 100000 SUSPENSION ORAL at 13:26

## 2022-03-13 RX ADMIN — ACETYLCYSTEINE 2 ML: 200 SOLUTION ORAL; RESPIRATORY (INHALATION) at 15:04

## 2022-03-13 RX ADMIN — HEPARIN SODIUM 5000 UNITS: 5000 INJECTION, SOLUTION INTRAVENOUS; SUBCUTANEOUS at 22:09

## 2022-03-13 RX ADMIN — METHOCARBAMOL TABLETS 500 MG: 500 TABLET, COATED ORAL at 13:18

## 2022-03-13 RX ADMIN — AMOXICILLIN 500 MG: 500 CAPSULE ORAL at 00:29

## 2022-03-13 RX ADMIN — ROSUVASTATIN CALCIUM 5 MG: 5 TABLET, FILM COATED ORAL at 09:26

## 2022-03-13 RX ADMIN — ASPIRIN 81 MG: 81 TABLET, COATED ORAL at 09:25

## 2022-03-13 RX ADMIN — METHOCARBAMOL TABLETS 500 MG: 500 TABLET, COATED ORAL at 20:19

## 2022-03-13 RX ADMIN — Medication 1 PACKET: at 09:48

## 2022-03-13 RX ADMIN — Medication 1 PACKET: at 20:20

## 2022-03-13 RX ADMIN — ACETYLCYSTEINE 2 ML: 200 SOLUTION ORAL; RESPIRATORY (INHALATION) at 20:29

## 2022-03-13 RX ADMIN — ACYCLOVIR 400 MG: 200 CAPSULE ORAL at 09:26

## 2022-03-13 RX ADMIN — ACETAMINOPHEN 975 MG: 325 TABLET ORAL at 13:18

## 2022-03-13 RX ADMIN — METHOCARBAMOL TABLETS 500 MG: 500 TABLET, COATED ORAL at 15:52

## 2022-03-13 RX ADMIN — DOXYCYCLINE HYCLATE 100 MG: 100 CAPSULE ORAL at 09:22

## 2022-03-13 RX ADMIN — FLUTICASONE PROPIONATE 1 SPRAY: 50 SPRAY, METERED NASAL at 09:29

## 2022-03-13 RX ADMIN — PREDNISONE 15 MG: 5 TABLET ORAL at 09:26

## 2022-03-13 RX ADMIN — PANTOPRAZOLE SODIUM 40 MG: 40 TABLET, DELAYED RELEASE ORAL at 15:48

## 2022-03-13 RX ADMIN — LOPERAMIDE HYDROCHLORIDE 2 MG: 2 CAPSULE ORAL at 09:28

## 2022-03-13 RX ADMIN — FUROSEMIDE 40 MG: 20 TABLET ORAL at 15:48

## 2022-03-13 RX ADMIN — FAMOTIDINE 20 MG: 20 TABLET ORAL at 09:26

## 2022-03-13 RX ADMIN — INSULIN ASPART 2 UNITS: 100 INJECTION, SOLUTION INTRAVENOUS; SUBCUTANEOUS at 18:24

## 2022-03-13 RX ADMIN — HEPARIN SODIUM 5000 UNITS: 5000 INJECTION, SOLUTION INTRAVENOUS; SUBCUTANEOUS at 13:37

## 2022-03-13 RX ADMIN — METOPROLOL TARTRATE 25 MG: 25 TABLET, FILM COATED ORAL at 20:19

## 2022-03-13 RX ADMIN — Medication 400 MG: at 13:36

## 2022-03-13 RX ADMIN — METOPROLOL TARTRATE 25 MG: 25 TABLET, FILM COATED ORAL at 09:25

## 2022-03-13 RX ADMIN — MAGNESIUM SULFATE HEPTAHYDRATE 2 G: 40 INJECTION, SOLUTION INTRAVENOUS at 13:26

## 2022-03-13 RX ADMIN — DOXYCYCLINE HYCLATE 100 MG: 100 CAPSULE ORAL at 20:19

## 2022-03-13 RX ADMIN — NYSTATIN 1000000 UNITS: 100000 SUSPENSION ORAL at 15:48

## 2022-03-13 RX ADMIN — LEVALBUTEROL HYDROCHLORIDE 1.25 MG: 1.25 SOLUTION RESPIRATORY (INHALATION) at 15:05

## 2022-03-13 RX ADMIN — FUROSEMIDE 40 MG: 10 INJECTION, SOLUTION INTRAMUSCULAR; INTRAVENOUS at 09:22

## 2022-03-13 RX ADMIN — MYCOPHENOLIC ACID 720 MG: 360 TABLET, DELAYED RELEASE ORAL at 09:25

## 2022-03-13 RX ADMIN — FAMOTIDINE 20 MG: 20 TABLET ORAL at 20:19

## 2022-03-13 RX ADMIN — MYCOPHENOLIC ACID 720 MG: 360 TABLET, DELAYED RELEASE ORAL at 20:19

## 2022-03-13 RX ADMIN — AMOXICILLIN 500 MG: 500 CAPSULE ORAL at 09:25

## 2022-03-13 RX ADMIN — CALCIUM CARBONATE 600 MG (1,500 MG)-VITAMIN D3 400 UNIT TABLET 1 TABLET: at 09:25

## 2022-03-13 RX ADMIN — CALCIUM CARBONATE 600 MG (1,500 MG)-VITAMIN D3 400 UNIT TABLET 1 TABLET: at 18:16

## 2022-03-13 RX ADMIN — TACROLIMUS 3 MG: 1 CAPSULE ORAL at 18:16

## 2022-03-13 RX ADMIN — NYSTATIN 1000000 UNITS: 100000 SUSPENSION ORAL at 20:19

## 2022-03-13 RX ADMIN — PANTOPRAZOLE SODIUM 40 MG: 40 TABLET, DELAYED RELEASE ORAL at 09:25

## 2022-03-13 RX ADMIN — ACETYLCYSTEINE 2 ML: 200 SOLUTION ORAL; RESPIRATORY (INHALATION) at 10:55

## 2022-03-13 RX ADMIN — INSULIN ASPART 1 UNITS: 100 INJECTION, SOLUTION INTRAVENOUS; SUBCUTANEOUS at 09:50

## 2022-03-13 RX ADMIN — LEVALBUTEROL HYDROCHLORIDE 1.25 MG: 1.25 SOLUTION RESPIRATORY (INHALATION) at 20:30

## 2022-03-13 RX ADMIN — PREDNISONE 12.5 MG: 2.5 TABLET ORAL at 20:19

## 2022-03-13 ASSESSMENT — ACTIVITIES OF DAILY LIVING (ADL)
ADLS_ACUITY_SCORE: 11

## 2022-03-13 NOTE — PROGRESS NOTES
Calorie Count  Intake recorded for: 3/12  Total Kcals: 388 Total Protein: 17g  Kcals from Hospital Food: 388   Protein: 17g  Kcals from Outside Food (average):0 Protein: 0g  # Meals Ordered from Kitchen: 2 meals  # Meals Recorded: 1 meal (100% sugar free jello, 8oz Vitality water, 50% sausage & mushroom pizza)  # Supplements Recorded: 0

## 2022-03-13 NOTE — PROGRESS NOTES
[facility]    Medicine Progress Note - Hospitalist Service, GOLD TEAM 10    Date of Admission:  2/20/2022    Assessment & Plan      Melissa Elder is a 66 year old female with PMHx HTN, HLD, paroxysmal Afib, osteoporosis, GERD, severe COPD, previously requiring 2-3L NC O2 at rest.  Underwent b/l lung transplant with Dr. Robin on 02/21. Got 1u pRBC post-op, no overt bleeding source, monitoring. Extubated 02//22 to O2 NC and transferred to the floor. Pericardial drain pulled 2/24/22.  post-op course complicated by right chest tube lodged into fissure and left chest tube displacement s/p bilateral pigtail chest tube placement in IR to drain anterior hydropneumothorax and bilateral effusions, disseminated Ureaplasma, and transient hyperammonemia.  Routine inspection bronch on 3/1 complicated by hypoventilation in setting of oversedation requiring multiple Narcan doses.  Slow improvement in hypercapnia with NIPPV overnight.    Today's plan   - AM VBG with high CO2, put back on bipap at night.  Patient thinks she had some underlying HUMBEL prior to this but was always on oxygen and doesn't have a CPAP machine.   - Patient is approaching dry weight, around 150 lbs.  PTA lasix was 20mg daily.  Will cut down lasix from 40mg IV BID to 40mg PO daily, which is a big drop but is still double PTA regimen.  Closely monitor fluid status.  Will get BNP as a baseline.   - Cycle TFs and continue jennie counts.  Patient think calories are being under-counted.  Consider, stopping TFs altogether, will wait to see jennie counts tomorrow and decide.   - Given diarrhea, schedule immodium (C. Diff negative recently) and switch from Mg oxide to Mg gluc.   - Continue other cares as below.     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  Pressor weaned off 2/22 and pt. extubated. Prior history of infection/colonization with Haemophilus influenzae (2017).  IgG adequate (2/20).  MRSA nares negative 2/21.  Broad spectrum ABX empirically post-op with vanc and ceftaz (2/21-2/22), stopped after 24h per Dr. Lala to target known donor cultures on POD #1. Donor cultures ([facility]) with Strep mitis, Actinomyces odontolyticus, and Kenyatta kruseii. Recipient cultures with Actinomyces odonotolyticus.  Acid fast bacillus in sputum from 3/2/22  - Respiratory support with HFNC and BiPAP (discussed below)  - Nebs: Levalbuterol and Mucomyst QID  - Aggressive pulmonary toilet with chest physiotherapy QID as well as Aerobika and incentive spirometry q1h w/a  - Chest tubes managed by surgical and IR team  - Daily CXR while chest tubes in   - Await explant pathology  - Continue ceftriaxone for 2 weeks through 3/8 followed by amoxicillin for 3 months  - monitoring sputum for AFB  - Immune suppression and microbial ppx per daily transplant pulm note  - Continue Valtrex to 1000 mg TID x 14 days for HSV (through 3/11) then complete ppx per protocol (see pulm note)   - Continue diuresis as needed with goal to stay net negative      Acute hypoxic and hypercarbic respiratory failure 3/1, improving   3/1 noted to have slowly rising bicarb on daily labs. Had inspection bronchoscopy 3/1 as well and became oversedated and required multiple doses of narcan. VBG obtained post procedure showed hypercapnea to 99. Hypercarbic respiratory failure likely multifactorial including oversedation, poor diaphragm excursion (SNIFF test 3/3), volume overload, and atelectasis. Started on BiPAP.   - Continue BiPAP at night, HFNC/NC during the day. Wean O2 to keep sats > 92%.   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 Ammonia level checked due to concern for post-transplant hyperammonemia which was mildly elevated at 57, but repeat 49.   - Mycoplasma/ureaplasma cultures collected and Transplant ID consulted               - Sputum/Pleural fluid +ureaplasma on 3/4. Started on Levofloxacin/Doxycycline for double coverage.   ID   - ID recommends stopping Levofloxacin (3/7), continue Doxycycline through 3/17 .   -amoxicillin for total of 3 months course (through 5/23) for Actinomyces treatment     Leukocytosis  Afebrile, but WBC started rising 2/27 from 14.6 to 23.1 3/2. Evaluation included CT chest/abdomen/pelvis without overt evidence of infection, and pan-culture. Pleural and sputum culture did grow +Ureaplasma and she was started on therapy (as above). C. Diff checked and negative. Leukocytosis has since been improving.   - Repeat CT Abdomen/Pelvis 3/6/22 for interval follow up from initial scan 3/2, as there was concern for enteritis as well as area of pancreatic/kashif-hepatic artery inflammation vs. Infection vs. malignancy.               - Repeat CT with improved small bowel dilation, some persistent perienteric fat stranding accentuated by motion artifact, and unremarkable appearing pancreas   - Continue antibiotics as above      Diarrhea   Dilated bowel on imaging   Noted to have increased loose stools 2/25 likely due to mmf and TF. Fiber added to TF. C.diff checked and negative. Abdominal imaging including CT and XR have shown diffusely dilated bowel, without concern for obstruction. Possibly ileus or gastroparesis from transplant. However patient not experiencing nausea, vomiting, or decreased stool output.   - Repeat CT 3/6/22 with interval improvement   - Continue to monitor abdominal exam, stool output   - Okay  to have immodium PRN      Post op pain   - Erector spinae catheters removed   - gabapentin 100 mg nightly  - tylenol 975 mg Q8H   - Dilaudid 0.2-0.4 mg Q2H PRN   - oxycodone 5-10 mg Q4H PRN   - robaxin 500 mg Q6H scheduled      Paroxysmal Afib   HTN  She was on diltiazem prior to lung transplantation and had not been on anticoagulation. She was  Started on metoprolol in the ICU.  - Continue Metoprolol tartrate to 25 mg BID   - Restarted PTA diltiazem now at 60 mg Q6H, if tolerated can transition back to  mg XL.   - No anticoagulation at this time      Moderate protein calorie malnutrition  - Nutrition following   - On TF, will ask about cycling overnight      Stress induced hyperglycemia  Did not need insulin prior to admission but sugars have been rising despite sliding scale coverage.  - Endocrinology consulted for assistance, appreciate recommendations   - Please see DM team daily note      HLD  Mild CAD   Noted on transplant workup. Started rosuvastatin 40 mg daily--Held 2/28 due to rising LFTs    - restarted rosuvastatin 3/8 will follow LFTs     Acute blood loss anemia  Acute blood loss thrombocytopenia  Secondary to surgery. Will continue to monitor.   - Transfuse Hgb < 7, platelets < 50   - Hemolysis labs negative 3/3, on dapsone, continue to monitor      Sternotomy  Surgical Incision  - Sternal precautions  - Postoperative incision management per protocol  - PT/OT/CR      Elevated LFTs, resolved   Noted on labs 2/26 and rising. AST, ALP, Bili WNL. No RUQ or abdominal pain. Imaging including RUQ ultrasound WNL. Liza-portal edema noted on CT, and exam shows volume overload. Could be the result of congestion versus medication adverse effect. LFTs have since normalized. Will continue to monitor.  - CMP daily   - monitoring closely with restarting statin                Diet: Regular Diet Adult Thin Liquids (level 0)  Snacks/Supplements Adult: Glucerna; Between Meals  Calorie Counts  Adult Formula Drip  Feeding: Specified Time Vital 1.5; Nasoduodenal tube; Goal Rate: 45; mL/hr; From: 8:00 PM; 8:00 AM; Medication - Feeding Tube Flush Frequency: At least 15-30 mL water before and after medication administration and with tube clog...    DVT Prophylaxis: Pneumatic Compression Devices  Ratliff Catheter: Not present  Central Lines: None  Cardiac Monitoring: None  Code Status: Full Code      Disposition Plan   Expected Discharge: 03/14/2022     Anticipated discharge location: home;inpatient rehabilitation facility    Delays:            The patient's care was discussed with the Bedside Nurse and Patient.    Gianna Shah MD  Hospitalist Service, GOLD [address]  Securely message with the Vocera Web Console (learn more here)  Text page via [employer] Paging/Directory   Please see signed in provider for up to date coverage information      Clinically Significant Risk Factors Present on Admission                    ______________________________________________________________________    Interval History   Patient is feeling well.  Denies chest pain, sob, abd pain, fever.      Diarrhea a lot better with immodium.     Data reviewed today: I reviewed all medications, new labs and imaging results over the last 24 hours. I personally reviewed no images or EKG's today.    Physical Exam   Vital Signs: Temp: 98.4  F (36.9  C) Temp src: Oral BP: 136/61 Pulse: 95   Resp: 19 SpO2: 96 % O2 Device: None (Room air)    Weight: 153 lbs 11.2 oz     General Appearance: NAD  HEENT: No thursh / ulcer, EOMI  Respiratory: CTAB on RA   Cardiovascular: RRR, JVP  GI: soft, NTND   Extremities: + LE edema, improving  Neuro: Moving all extremities  Skin: No rash on exposed areas   Psych: Alert and oriented, appropriate mood and affect, speech coherent / logical

## 2022-03-13 NOTE — PROGRESS NOTES
Diabetes Consult Daily  Progress Note          Assessment/Plan:   Melissa Elder is a 66 year old female with PMHx HTN, HLD, paroxysmal Afib, osteoporosis, GERD, severe COPD, previously requiring 2-3L NC O2 at rest.  Underwent b/l lung transplant with Dr. Robin on 02/21  has ongoing issues with Hydropneumothorax, chest tubes in place. 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 Endocrinology is being consulted for DM management.    1) Steroid and TF induced hyperglycemia   2) Underlying pre-DM, versus steroid diabetes     BG are predominantly at goal. Correction coverage appropriate.  Next steroid decrease planned for 3/15.     Plan:     NPH 18 units q24h at 2000 to match cycled tube feed start.      Continue Novolog CHO coverage-- 1 unit per 15 grams w/ meals and snacks    Novolog  custom correction 1 per 35 mg/dL Q4h --> schedule changed to align w/ start of TF at 1800 and AC earlier in the day    BG monitoring TID AC, HS, 0200, 0600    Hypoglycemia protocol    Education needs to be assessed at acute rehab    PRN D10W for hypoglycemia prevention if TF stops out side of the scheduled time-- rate is  70 to replace about 70% of TF carbs  Outpatient follow up plan TBD, depending largely on nutrition by time of discharge  Case and recommendations discussed with Dr. Longoria, staff Endocrinologist.          Interval History:     Nursing notes and progress notes reviewed.  BG well controlled overnight.         : Vital 1.5 @ 45 mL/hr x 12 hours/evening  will provide: 101 gm CHO, LESS grams of carbohydrate per hour as previous TF.  TF schedule 1569-9159.    Several days away from ARU still.  Maybe 3/14?    Date AM dose (mg) PM dose (mg)   3/1 15 15   3/8 15 12.5   3/15 12.5 12.5   3/29 12.5 10   4/12 10 10   5/10 10 7.5   6/7 7.5 7.5   7/5 7.5 5   8/2 5 5   8/30 5 2.5       Anticipated discharge location: home;inpatient rehabilitation facility        Recent Labs   Lab 03/13/22  0808 03/13/22  0348  "03/12/22  2107 03/12/22  1708 03/12/22  1155 03/12/22  0645   * 173* 133* 177* 134* 144*            Review of Systems:   See interval hx          Medications:     Orders Placed This Encounter      Regular Diet Adult Thin Liquids (level 0)    Physical Exam:     BP (!) 150/81 (BP Location: Right arm)   Pulse 99   Temp 99  F (37.2  C) (Oral)   Resp 16   Ht 1.575 m (5' 2\")   Wt 69.7 kg (153 lb 11.2 oz)   SpO2 97%   BMI 28.11 kg/m  ;  General:  Pleasant, up in chair,   in no distress.  bedside and supportive.  HEENT: hearing intact to conversational volume.  Lungs: unlabored respiration, no cough observed  Mental status:  alert, oriented x3, communicating clearly  Psych:  calm, even mood           Data:     Lab Results   Component Value Date    A1C 5.7 02/22/2022    A1C 5.8 02/20/2022     Last Comprehensive Metabolic Panel:  Sodium   Date Value Ref Range Status   03/12/2022 138 133 - 144 mmol/L Final   06/21/2021 137 133 - 144 mmol/L Final     Potassium   Date Value Ref Range Status   03/12/2022 4.3 3.4 - 5.3 mmol/L Final   06/21/2021 3.0 (L) 3.4 - 5.3 mmol/L Final     Chloride   Date Value Ref Range Status   03/12/2022 100 94 - 109 mmol/L Final   06/21/2021 104 94 - 109 mmol/L Final     Carbon Dioxide   Date Value Ref Range Status   06/21/2021 34 (H) 20 - 32 mmol/L Final     Carbon Dioxide (CO2)   Date Value Ref Range Status   03/12/2022 32 20 - 32 mmol/L Final     Anion Gap   Date Value Ref Range Status   03/12/2022 6 3 - 14 mmol/L Final   06/21/2021 <1 (L) 3 - 14 mmol/L Final     Glucose   Date Value Ref Range Status   03/12/2022 144 (H) 70 - 99 mg/dL Final   06/21/2021 131 (H) 70 - 99 mg/dL Final     GLUCOSE BY METER POCT   Date Value Ref Range Status   03/13/2022 146 (H) 70 - 99 mg/dL Final     Urea Nitrogen   Date Value Ref Range Status   03/12/2022 26 7 - 30 mg/dL Final   06/21/2021 8 7 - 30 mg/dL Final     Creatinine   Date Value Ref Range Status   03/12/2022 0.52 0.52 - 1.04 mg/dL Final "   06/21/2021 0.56 0.52 - 1.04 mg/dL Final     GFR Estimate   Date Value Ref Range Status   03/12/2022 >90 >60 mL/min/1.73m2 Final     Comment:     Effective December 21, 2021 eGFRcr in adults is calculated using the 2021 CKD-EPI creatinine equation which includes age and gender (Ines marie al., [place], DOI: 10.1056/MECJnq1898092)   06/21/2021 >90 >60 mL/min/[1.73_m2] Final     Comment:     Non  GFR Calc  Starting 12/18/2018, serum creatinine based estimated GFR (eGFR) will be   calculated using the Chronic Kidney Disease Epidemiology Collaboration   (CKD-EPI) equation.       Calcium   Date Value Ref Range Status   03/12/2022 8.4 (L) 8.5 - 10.1 mg/dL Final   06/21/2021 8.5 8.5 - 10.1 mg/dL Final     Bilirubin Total   Date Value Ref Range Status   03/12/2022 0.4 0.2 - 1.3 mg/dL Final   06/21/2021 0.3 0.2 - 1.3 mg/dL Final     Alkaline Phosphatase   Date Value Ref Range Status   03/12/2022 117 40 - 150 U/L Final   06/21/2021 92 40 - 150 U/L Final     ALT   Date Value Ref Range Status   03/12/2022 31 0 - 50 U/L Final   06/21/2021 25 0 - 50 U/L Final     AST   Date Value Ref Range Status   03/12/2022 12 0 - 45 U/L Final   06/21/2021 17 0 - 45 U/L Final

## 2022-03-13 NOTE — PLAN OF CARE
Neuro: A&Ox4.   Cardiac: SR with BBB   Respiratory: Sating 90's on RA.  GI/: Adequate urine output. Pt received lasix IV and PO. Uses bathroom with walker and one person assistance.   Diet/appetite: Tolerating regular diet. Eating well. Last day of calorie count.   Activity:  Assist of 1, up to chair and in halls.  Pain: At acceptable level on current regimen. Robaxin and tylenol together work well for pt.   Skin: No new deficits noted.      Plan: Continue with POC. Notify primary team with changes.

## 2022-03-13 NOTE — PROGRESS NOTES
Pulmonary Medicine  Cystic Fibrosis - Lung Transplant Team  Progress Note  2022       Patient: Melissa Elder  MRN: [id number]  : 1955 (age 66 year old)  Transplant: 2022 (Lung), POD#20  Admission date: 2022    Assessment & Plan:     Melissa Elder is a 66 year old female with a PMH significant for severe COPD, HTN, mild non-obstructive CAD, paroxysmal afib, osteoporosis, GERD, and colonic polyps.  Pt. is now s/p BSLT on 22, surgery relatively uncomplicated.  The patient was extubated , post-op course complicated by right chest tube lodged into fissure and left chest tube displacement s/p bilateral pigtail chest tube placement in IR to drain anterior hydropneumothorax and bilateral effusions, disseminated Ureaplasma, and transient hyperammonemia.  Routine inspection bronch on 3/1 complicated by hypoventilation in setting of oversedation requiring multiple Narcan doses.  Slow improvement in hypercapnia with NIPPV overnight, weaned off HFNC 3/9.       Today's recommendations:  -  Restart bipap overnight tonight, repeat VBG tomorrow  - Consider holding TFs for 24 hours for better jennie count given persistent satiety with cycled TFs, may need NM emptying study  - DSA, CMV, EBV, and IgG due 3/21 (not yet ordered)  - CVTS reviewing ?subluxation of sternum noted on imaging, awaiting any additional recommendations although likely no intervention needed  - Calorie counts ordered through 3/14  - Tacrolimus level ordered for 3/14  - Prednisone taper ordered 3/15  - Amoxicillin for total of 3 months course (through ) for Actinomyces treatment  - Doxycycline for Ureaplasma for 4 week course through   - Follow +AFB on sputum culture 3/2, AFB sputum culture (3/9) pending  - Donor risk labs ordered 3/23      COPD s/p bilateral sequential lung transplant (BSLT):  Acute hypoxic/hypercapneic respiratory failure:   Bilateral pleural effusions/PTX: Scant pleural-pleural adhesions and  mild-moderate bilateral hilar lymphadenopathy per op note.  Pathology with CPFE.  Extubated 2/22.  DSA negative 2/28. Hypoxia had resolved, only intermittently requiring supplemental oxygen up until bronch 3/1 with sedation. Noted hypoventilating with oversedation during bronch, received Narcan x3 with improvement in sedation persistent hypercapnia. Improved with nocturnal BiPAP and daytime HFNC.  S/p right pigtail chest tube 3/3 with ureaplasma in exudative effusion.Had multiple chest tubes for hypdropneumothoraces, all sequentially removed over the week of 3/7. Sniff test (3/3) with poor diaphragm excursion bilaterally with extensive accessory respiratory chest wall musculature effort to aid in inspiration.  Weaned off HFNC 3/9, on RA.  Repeat sniff test (3/11) without evidence of diaphragmatic palsy. Trialed off bipap on 3/11 pm but received dose of oxycodone with increased PCO2 on 3/12, repeat during the day with normalization to her baseline. Trialed off bipap again on 3/12 pm but again with increased retention. Suspect requires positive pressure ventilation while sleeping until increased recovery of diaphragm excursion.  - Flonase for postnasal drip (3/10)  - Nebs: levalbuterol and Mucomyst TID  - Aggressive pulmonary toilet with chest physiotherapy TID  - Bipap to resume overnight 3/13, repeat VBG in am  - Supplemental oxygen via NC as needed to maintain SpO2 >92%  - DSA 3/21 (not yet ordered)  - CXR today with improving aeration, no new effusions appreciated  - CVTS reviewing ?subluxation of sternum noted on imaging, awaiting any additional recommendations although likely no intervention needed   - Diuresis per primary team  - Post-pyloric nasal FT, cycled TF per RD and primary team, calorie counts through 3/14, satiety despite cycled TFs and low jennie counts, could consider trial of held TFs for 24 hours to see what true intake would be.  (briefly discussed PEG/J tube with Pt. 3/10, Pt. hoping to increase  oral intake as appetite improving and avoid PEG/J tube placement)   - ENT consulted 3/9, Pt. declined scope exam, ENT OP follow up if no improvement     Immunosuppression:  S/p induction therapy with basiliximab x2 doses (with high dose IV steroid).  - Tacrolimus 3.5 mg qAM / 3 mg qPM (decreased 3/10, given supratherapeutic and diltiazem increased).  Goal level 8-12. Level 11.4 on 3/12, no adjustment  - Myfortic 720 mg BID on 3/11 for diarrhea  - Prednisone 15 mg qAM / 12.5 qPM with taper per lung transplant protocol (due 3/15, ordered):  Date AM dose (mg) PM dose (mg)   3/8 15 12.5   3/15 12.5 12.5   3/29 12.5 10   4/12 10 10   5/10 10 7.5   6/7 7.5 7.5   7/5 7.5 5   8/2 5 5   8/30 5 2.5      Prophylaxis:   - Dapsone for PJP ppx (started 2/23 at decreased MWF dose and increased to daily 3/1, G6PD 13.3 2/21)  - Nystatin for oral candidiasis ppx, 6 month course  - See below for serologies and viral ppx:    Donor Recipient Intervention   CMV status Negative Negative CMV monthly (3/21, not yet ordered, negative 3/11)   EBV status Positive Positive EBV at one month (3/21, not yet ordered)   HSV status N/A (Newly) Positive As below      ID: Prior history of infection/colonization with Haemophilus influenzae (2017). Donor ([facility]) cultures with P-S MSSA; ([facility]) with Strep mitis, Actinomyces odontolyticus, MSSA x2, and C. kruseii (concern for possible aspiration).  Recipient cultures at time of transplant with Actinomyces odonotolyticus.  Bronch cultures (2/22) with Saccharomyces cerevisiae (no indication to treat per Dr. Ghosh unless pt. acutely declines clinically, 3/1) and C. albicans.  - IgG repeat at one month (3/21, not yet ordered)  - ABX: amoxicillin (3/8-5/23, s/p ceftriaxone 2/23-3/8) for 3 month course for Actinomyces treatment  - Low threshold to treat Candida if it recurs in respiratory cultures, per transplant ID     HSV: Pt. with recent seroconversion at time of transplant.  HSV also noted on donor cultures.   Initial plan for 30 days of ppx with ACV post-op per Dr. Lala.  Then with HSV+ bronch at time of transplant (2/21), transitioned to treatment dosing with VACV  x 14 days through 3/12.   - Acyclovir prophylaxis 3/13-23 per protocol     Hyperammonemia, Resolved:   Pleural Fluid and sputum Ureaplasma:   Ammonia mildly elevated on 3/2 but quickly normalized.  Ureaplasma and Mycoplasma studies sent,when patient was somnolent with confusion/visual hallucinations in setting of hypercapnea, PCR for ureaplasma + on sputum and pleural fluid cultures on 3/2. .  - Doxycycline (3/4-4/1) per transplant ID     Positive AFB on sputum culture: Noted on sputum culture 3/2.  Transplant ID following for speciation and susceptibility testing as well as other evidence of infection.   - AFB sputum culture (3/9) pending, follow  - Obtain AFB cultures on all future bronchs        PHS risk criteria donor: Additional labs required post-transplant (between 4-8 weeks post-op): Hepatitis B, Hepatitis C, and HIV by COLT (ordered 3/23), also plan to repeat hepatitis B surface antibody at that time (ordered) given discrepancy with recent result.     Other relevant problems managed by primary team:     Diarrhea:   Concern for ileus: Likely 2/2 medications and tube feedings.  Fiber added to tube feeds.  MMF decreased as above an switched to myfortic.  Loose stools now improved.  Again having loose/watery stools, also noting some abdominal bloating/fullness.  AXR 3/5 with diffuse distention of small and large bowel suggestive of ileus.  C diff negative 3/6.  Abdomen/pelvis CT (3/6) with decrease in small bowel distention and perienteric fat stranding.  CXR (3/8) with partially imaged likely adynamic ileus.  Bowel regimen initiated 3/8, adjusted 3/11.  - Management per primary team,   - Ileus improving by AXR on 3/12     Elevated LFTs, Resolved: Rising 3/1 despite medication adjustments (APAP and statin stopped 2/27).  Of note, pt. had a discrepant  hep B surface Ab at time of transplant as above.  RUQ US (3/3) with only hepatic steatosis.  LFTs normalized 3/7.     CAD: Noted to have mild non-obstructive CAD without hemodynamically significant lesions on cardiac cath 3/27/19.  PTA ASA 81 mg and atorvastatin, started on rosuvastatin post-op but held 2/28 for elevated LFTs (as above), resumed 3/9.  ASA resumed 3/1.     Paroxysmal afib:   HTN: PTA diltiazem, not on AC.    - Metoprolol and PTA diltiazem with better rate control     GERD: Not on PPI PTA.  Negative pH/manometry study 3/28/19, noted small hiatal hernia. On PPI daily since 2/21, H2 blocker bridge discontinued 3/2, some increase in reflux symptoms since, resumed 3/5.  PPI increased to BID 3/7.     Hypomagnesemia: Suppressed absorption d/t CNI.  Requiring almost daily IV/PO replacement.  - Mag-Ox 400 mg BID (ordered) switched to gluconate on 3/12 for diarrhea  - Continue daily magnesium level with additional replacement protocol prn    We appreciate the excellent care provided by the [place] [id number] team.  Recommendations communicated via in person rounding and this note.  Will continue to follow along closely, please do not hesitate to call with any questions or concerns.    Nicole Knox MD  Pulmonary Transplant/CF Attending  Pager: [phone]       Subjective & Interval History:     Remains on RA during the day, and didn't use bipap overnight as planned and didn't take oxycodone overnight last night but PCO2 still quite elevated this morning. Denies any headaches, confusion, dyspnea. She feels overall quite well. Denies any reflux/heartburn. Still having a lot of diarrhea, but willing to trial imodium. She has a slight cough and a slight tickle in the back of her throat which is overall improving. She has been up and moving around the room. Reporting significant fullness with the TFs on overnight, but when asked if we stopped the TFs does she think she'd eat more she said she couldn't guarantee it.  "  Review of Systems:     C: No fever, no chills  INTEGUMENTARY/SKIN: No rash or obvious new lesions  ENT/MOUTH: + hoarse voice, no sinus pain, + improving nasal congestion and drainage  RESP: See interval history  CV: No chest pain, no palpitations  GI: No nausea, no vomiting, no reflux symptoms  : No dysuria  MUSCULOSKELETAL: See interval history  ENDOCRINE: + blood sugars intermittently elevated  NEURO: No headache, no numbness or tingling  PSYCHIATRIC: Mood stable    Physical Exam:     All notes, images, and labs from past 24 hours (at minimum) were reviewed.    Vital signs:  Temp: 98.4  F (36.9  C) Temp src: Oral BP: 136/61 Pulse: 95   Resp: 19 SpO2: 94 % O2 Device: None (Room air) Oxygen Delivery: 25 LPM Height: 157.5 cm (5' 2\") Weight: 69.7 kg (153 lb 11.2 oz)  I/O:       Intake/Output Summary (Last 24 hours) at 3/13/2022 1502  Last data filed at 3/13/2022 0800  Gross per 24 hour   Intake 830 ml   Output 1350 ml   Net -520 ml       Constitutional: Sitting up in chair, in no apparent distress.   HEENT: Eyes with pink conjunctivae, anicteric.  Oral mucosa moist without lesions.  Nasal FT.  PULM: Diminished air flow to right > left base.  No crackles, no rhonchi, no wheezes.  Non-labored breathing on RA. Not conversationally dyspneic  CV: Normal S1 and S2.  Tachycardic.  No murmur, gallop, or rub.  Significant edema under wraps, +2 pedal edema  ABD: NABS, soft, nontender, nondistended.    MSK: Moves all extremities.  + muscle wasting.   NEURO: Alert, conversant.   SKIN: Warm, dry.  No rash on limited exam.  Clamshell incision not visualized.   PSYCH: Mood stable.     Lines, Drains, and Devices:  Peripheral IV 03/10/22 Right;Posterior Lower forearm (Active)   Site Assessment WDL 03/11/22 1600   Line Status Saline locked 03/11/22 1600   Dressing Intervention New dressing  03/10/22 1200   Phlebitis Scale 0-->no symptoms 03/11/22 1600   Infiltration Scale 0 03/11/22 1600   Infiltration Site Treatment Method  None " 03/11/22 1600   Number of days: 1     Data:     LABS    CMP:   Recent Labs   Lab 03/13/22  1238 03/13/22  1025 03/13/22  0808 03/13/22  0349 03/12/22  1155 03/12/22  0645 03/11/22  0745 03/11/22  0710 03/10/22  1213 03/10/22  0806   NA  --  137  --   --   --  138  --  136  --  136   POTASSIUM  --  4.2  --   --   --  4.3  --  4.7  --  4.1   CHLORIDE  --  96  --   --   --  100  --  98  --  100   CO2  --  36*  --   --   --  32  --  35*  --  32   ANIONGAP  --  5  --   --   --  6  --  3  --  4   * 129* 146* 173*   < > 144*   < > 196*   < > 183*   BUN  --  26  --   --   --  26  --  32*  --  29   CR  --  0.50*  --   --   --  0.52  --  0.47*  --  0.47*   GFRESTIMATED  --  >90  --   --   --  >90  --  >90  --  >90   MINISTERIO  --  8.8  --   --   --  8.4*  --  8.4*  --  8.4*   MAG  --  1.7  --   --   --  1.8  --  1.5*  --  1.6   PHOS  --  3.6  --   --   --  3.9  --  3.2  --  3.0   PROTTOTAL  --  6.2*  --   --   --  5.7*  --  5.2*  --  5.6*   ALBUMIN  --  2.8*  --   --   --  2.6*  --  2.4*  --  2.4*   BILITOTAL  --  0.4  --   --   --  0.4  --  0.4  --  0.4   ALKPHOS  --  132  --   --   --  117  --  116  --  120   AST  --  18  --   --   --  12  --  14  --  13   ALT  --  32  --   --   --  31  --  32  --  38    < > = values in this interval not displayed.     CBC:   Recent Labs   Lab 03/13/22  1025 03/12/22  0645 03/11/22  0710 03/10/22  0806   WBC 10.4 10.4 10.6 12.4*   RBC 2.95* 2.67* 2.45* 2.78*   HGB 9.0* 8.1* 7.6* 8.5*   HCT 29.3* 27.4* 24.2* 27.7*   MCV 99 103* 99 100   MCH 30.5 30.3 31.0 30.6   MCHC 30.7* 29.6* 31.4* 30.7*   RDW 19.9* 19.7* 19.3* 19.1*    279 368 386       INR: No lab results found in last 7 days.    Glucose:   Recent Labs   Lab 03/13/22  1238 03/13/22  1025 03/13/22  0808 03/13/22  0349 03/12/22  2107 03/12/22  1708   * 129* 146* 173* 133* 177*       Blood Gas:   Recent Labs   Lab 03/13/22  1025 03/12/22  1237 03/12/22  0645   PHV 7.37 7.46* 7.32   PCO2V 72* 54* 80*   PO2V 16* 60* 21*    HCO3V 41* 38* 41*   ARIEL 13.2* 13.0* 13.6*   O2PER 21 21 21       Culture Data No results for input(s): CULT in the last 168 hours.    Virology Data: No results found for: INFLUA, FLUAH1, FLUAH3, FC1655, IFLUB, RSVA, RSVB, PIV1, PIV2, PIV3, HMPV, HRVS, ADVBE, ADVC    Historical CMV results (last 3 of prior testing):  Lab Results   Component Value Date    CMVQNT Not Detected 03/11/2022     No results found for: CMVLOG    Urine Studies    Recent Labs   Lab Test 03/01/22  1549 02/20/22  0248   URINEPH 6.0 7.0   NITRITE Negative Negative   LEUKEST Negative Negative   WBCU 0 <1       Most Recent [place] Pulmonary Function Testing (FVC/FEV1 only)  FVC-Pre   Date Value Ref Range Status   12/20/2021 1.81 L    06/21/2021 1.46 L    12/09/2019 1.59 L    06/03/2019 1.68 L      FVC-%Pred-Pre   Date Value Ref Range Status   12/20/2021 65 %    06/21/2021 52 %    12/09/2019 56 %    06/03/2019 58 %      FEV1-Pre   Date Value Ref Range Status   12/20/2021 0.49 L    06/21/2021 0.50 L    12/09/2019 0.49 L    06/03/2019 0.47 L      FEV1-%Pred-Pre   Date Value Ref Range Status   12/20/2021 22 %    06/21/2021 22 %    12/09/2019 21 %    06/03/2019 20 %        IMAGING    Recent Results (from the past 48 hour(s))   XR Chest 2 Views    Narrative    EXAM: XR CHEST 2 VW  3/9/2022 8:04 PM     HISTORY:  F/u chest tube removal       COMPARISON:  3/9/2022    TECHNIQUE: PA and lateral radiographs of the chest    FINDINGS:   Interval removal of left apical chest tube. Stable left basilar chest  tube.    Midline trachea. Stable postsurgical changes in lung transplant.  Distinct pulmonary vasculature. No consolidation, pleural effusion or  appreciable pneumothorax.      Impression    IMPRESSION: No appreciable pneumothorax post chest tube removal.     I have personally reviewed the examination and initial interpretation  and I agree with the findings.    MURIEL WALLACE MD         SYSTEM ID:  [id number]   XR Chest 2 Views    Narrative    Exam: XR CHEST 2  VW, 3/10/2022 8:33 AM    Comparison: 3/9/2022    History: interval follow up, post-op lung transplant    Findings:  PA and lateral views the chest. Status post bilateral lung transplant  with intact sternotomy wires. Enteric tube with tip at the expected  location of the duodenal jejunal juncture. Left basilar chest tube.    Trachea is midline. Mediastinum is within normal limits.  Cardiopulmonary silhouette is within normal limits. Aortic knob  calcifications. Prominent bilateral interstitial opacities.  Redemonstrated right basilar opacities in blunting of the right  costophrenic angle. No pneumothorax. No acute osseous abnormalities.      Impression    Impression:   1. Status post bilateral lung transplant with small stable right  pleural effusion and associated basilar atelectasis.  2. Stable bilateral interstitial opacities representing pulmonary  edema.  3. Orientation of the wires best seen on the lateral view may indicate  subluxation of the sternum.    I have personally reviewed the examination and initial interpretation  and I agree with the findings.    VARGAS DUKE MD         SYSTEM ID:  [id number]   US Lower Extremity Venous Duplex Bilateral    Narrative    EXAMINATION: DOPPLER VENOUS ULTRASOUND OF BILATERAL LOWER EXTREMITIES,  3/10/2022 3:04 PM     COMPARISON: 2/26/2022    HISTORY: rule out dvt    TECHNIQUE:  Gray-scale evaluation with compression, spectral flow and  color Doppler assessment of the deep venous system of both legs from  groin to knee, and then at the ankles.    FINDINGS:  In both lower extremities, the common femoral, femoral, popliteal and  posterior tibial veins demonstrate normal compressibility and blood  flow.      Impression    IMPRESSION:  No evidence of deep venous thrombosis in either lower extremity.    I have personally reviewed the examination and initial interpretation  and I agree with the findings.    CLAU PRATER DO         SYSTEM ID:  [id number]   XR Chest Port 1 View     Narrative    EXAM: XR CHEST PORT 1 VIEW  3/10/2022 8:14 PM     HISTORY:  interval follow up, left basilar chest tube removal       COMPARISON:  3/10/2022    TECHNIQUE: Single frontal radiograph of the chest    FINDINGS:   Stable surgical changes of lung transplant. Stable feeding tube  position. Prominent gastric/colonic air bubble in the left upper  quadrant.    Midline trachea. Unchanged low lung volumes bilaterally. No acute  consolidation, pleural effusion or appreciable pneumothorax.      Impression    IMPRESSION: No appreciable pneumothorax since left chest tube removal.  No acute consolidation.     I have personally reviewed the examination and initial interpretation  and I agree with the findings.    RENY VAZQUEZ MD         SYSTEM ID:  [id number]   XR Chest 2 Views    Narrative    Exam: XR CHEST 2 VW, 3/11/2022 9:27 AM    Comparison: 3/10/2022    History: interval follow up, post-op lung transplant    Findings:  PA and lateral views the chest. Status post bilateral lung transplant  with intact clam shell sternotomy wires. Enteric tube traverses below  the field of view.    Trachea is midline. Mediastinum is within normal limits.  Cardiopulmonary silhouette is within normal limits. Diffuse  interstitial opacities. No pneumothorax. Blunting of the right  costophrenic angle. The upper abdomen is unremarkable.      Impression    Impression:   1. Status post bilateral lung transplant with stable diffuse  interstitial opacities likely representing mild pulmonary edema.  2. Blunting of the right costophrenic angle may represent small right  pleural effusion versus scarring.    I have personally reviewed the examination and initial interpretation  and I agree with the findings.    VARGAS DUKE MD         SYSTEM ID:  [id number]   XR Chest/Heart Fluoro    Narrative    Examination:  XR CHEST/HEART FLUORO 3/11/2022 9:39 AM     Comparison: Same day chest radiograph    History: evaluate diaphragm  function    Fluoroscopy time: 0.6 minutes     Findings: Spot film demonstrates mild  elevation of the left  hemidiaphragm under quite respiration with prominent gastric and left  colonic gaseous distention. AP and LPO views obtained. Under quite  respiration, deep breathing and with sniffing maneuvers, the  hemidiaphragms move in a symmetric fashion. No paradoxical diaphragm  movement.       Impression    Impression: No evidence for diaphragmatic palsy.    I, ADRIANA VELAZQUEZ MD, attest that I was immediately available to  provide guidance and assistance during the entirety of the procedure.    I have personally reviewed the examination and initial interpretation  and I agree with the findings.    ADRIANA VELAZQUEZ MD         SYSTEM ID:  [id number]

## 2022-03-14 LAB
ALBUMIN SERPL-MCNC: 2.7 G/DL (ref 3.4–5)
ALP SERPL-CCNC: 117 U/L (ref 40–150)
ALT SERPL W P-5'-P-CCNC: 26 U/L (ref 0–50)
ANION GAP SERPL CALCULATED.3IONS-SCNC: 3 MMOL/L (ref 3–14)
AST SERPL W P-5'-P-CCNC: 14 U/L (ref 0–45)
BASE EXCESS BLDV CALC-SCNC: 16 MMOL/L (ref -7.7–1.9)
BASOPHILS # BLD AUTO: 0 10E3/UL (ref 0–0.2)
BASOPHILS NFR BLD AUTO: 0 %
BILIRUB SERPL-MCNC: 0.4 MG/DL (ref 0.2–1.3)
BUN SERPL-MCNC: 28 MG/DL (ref 7–30)
CALCIUM SERPL-MCNC: 8.6 MG/DL (ref 8.5–10.1)
CHLORIDE BLD-SCNC: 97 MMOL/L (ref 94–109)
CO2 SERPL-SCNC: 39 MMOL/L (ref 20–32)
CREAT SERPL-MCNC: 0.53 MG/DL (ref 0.52–1.04)
EOSINOPHIL # BLD AUTO: 0 10E3/UL (ref 0–0.7)
EOSINOPHIL NFR BLD AUTO: 0 %
ERYTHROCYTE [DISTWIDTH] IN BLOOD BY AUTOMATED COUNT: 20.3 % (ref 10–15)
GFR SERPL CREATININE-BSD FRML MDRD: >90 ML/MIN/1.73M2
GLUCOSE BLD-MCNC: 153 MG/DL (ref 70–99)
GLUCOSE BLDC GLUCOMTR-MCNC: 104 MG/DL (ref 70–99)
GLUCOSE BLDC GLUCOMTR-MCNC: 114 MG/DL (ref 70–99)
GLUCOSE BLDC GLUCOMTR-MCNC: 125 MG/DL (ref 70–99)
GLUCOSE BLDC GLUCOMTR-MCNC: 144 MG/DL (ref 70–99)
GLUCOSE BLDC GLUCOMTR-MCNC: 162 MG/DL (ref 70–99)
GLUCOSE BLDC GLUCOMTR-MCNC: 244 MG/DL (ref 70–99)
HCO3 BLDV-SCNC: 43 MMOL/L (ref 21–28)
HCT VFR BLD AUTO: 27.5 % (ref 35–47)
HGB BLD-MCNC: 8.3 G/DL (ref 11.7–15.7)
IMM GRANULOCYTES # BLD: 0.1 10E3/UL
IMM GRANULOCYTES NFR BLD: 1 %
LACTATE SERPL-SCNC: 1.4 MMOL/L (ref 0.7–2)
LYMPHOCYTES # BLD AUTO: 0.7 10E3/UL (ref 0.8–5.3)
LYMPHOCYTES NFR BLD AUTO: 8 %
MAGNESIUM SERPL-MCNC: 2 MG/DL (ref 1.6–2.3)
MCH RBC QN AUTO: 30.2 PG (ref 26.5–33)
MCHC RBC AUTO-ENTMCNC: 30.2 G/DL (ref 31.5–36.5)
MCV RBC AUTO: 100 FL (ref 78–100)
MONOCYTES # BLD AUTO: 0.7 10E3/UL (ref 0–1.3)
MONOCYTES NFR BLD AUTO: 8 %
NEUTROPHILS # BLD AUTO: 7.5 10E3/UL (ref 1.6–8.3)
NEUTROPHILS NFR BLD AUTO: 83 %
NRBC # BLD AUTO: 0 10E3/UL
NRBC BLD AUTO-RTO: 0 /100
O2/TOTAL GAS SETTING VFR VENT: 21 %
PCO2 BLDV: 66 MM HG (ref 40–50)
PH BLDV: 7.42 [PH] (ref 7.32–7.43)
PHOSPHATE SERPL-MCNC: 4.1 MG/DL (ref 2.5–4.5)
PLATELET # BLD AUTO: 413 10E3/UL (ref 150–450)
PO2 BLDV: 37 MM HG (ref 25–47)
POTASSIUM BLD-SCNC: 4.5 MMOL/L (ref 3.4–5.3)
PROT SERPL-MCNC: 5.7 G/DL (ref 6.8–8.8)
RBC # BLD AUTO: 2.75 10E6/UL (ref 3.8–5.2)
SODIUM SERPL-SCNC: 139 MMOL/L (ref 133–144)
TACROLIMUS BLD-MCNC: 10 UG/L (ref 5–15)
TME LAST DOSE: NORMAL H
TME LAST DOSE: NORMAL H
WBC # BLD AUTO: 9 10E3/UL (ref 4–11)

## 2022-03-14 PROCEDURE — 82803 BLOOD GASES ANY COMBINATION: CPT | Performed by: NURSE PRACTITIONER

## 2022-03-14 PROCEDURE — 999N000157 HC STATISTIC RCP TIME EA 10 MIN

## 2022-03-14 PROCEDURE — 84100 ASSAY OF PHOSPHORUS: CPT | Performed by: NURSE PRACTITIONER

## 2022-03-14 PROCEDURE — 99233 SBSQ HOSP IP/OBS HIGH 50: CPT | Mod: 24 | Performed by: NURSE PRACTITIONER

## 2022-03-14 PROCEDURE — 99233 SBSQ HOSP IP/OBS HIGH 50: CPT | Mod: 24 | Performed by: PHYSICIAN ASSISTANT

## 2022-03-14 PROCEDURE — 94640 AIRWAY INHALATION TREATMENT: CPT | Mod: 76

## 2022-03-14 PROCEDURE — 94660 CPAP INITIATION&MGMT: CPT

## 2022-03-14 PROCEDURE — 94668 MNPJ CHEST WALL SBSQ: CPT

## 2022-03-14 PROCEDURE — 80053 COMPREHEN METABOLIC PANEL: CPT | Performed by: NURSE PRACTITIONER

## 2022-03-14 PROCEDURE — 85025 COMPLETE CBC W/AUTO DIFF WBC: CPT | Performed by: NURSE PRACTITIONER

## 2022-03-14 PROCEDURE — 82040 ASSAY OF SERUM ALBUMIN: CPT | Performed by: NURSE PRACTITIONER

## 2022-03-14 PROCEDURE — 120N000002 HC R&B MED SURG/OB UMMC

## 2022-03-14 PROCEDURE — 250N000012 HC RX MED GY IP 250 OP 636 PS 637: Performed by: NURSE PRACTITIONER

## 2022-03-14 PROCEDURE — 36415 COLL VENOUS BLD VENIPUNCTURE: CPT | Performed by: STUDENT IN AN ORGANIZED HEALTH CARE EDUCATION/TRAINING PROGRAM

## 2022-03-14 PROCEDURE — 250N000013 HC RX MED GY IP 250 OP 250 PS 637: Performed by: PEDIATRICS

## 2022-03-14 PROCEDURE — 99233 SBSQ HOSP IP/OBS HIGH 50: CPT | Performed by: STUDENT IN AN ORGANIZED HEALTH CARE EDUCATION/TRAINING PROGRAM

## 2022-03-14 PROCEDURE — 250N000013 HC RX MED GY IP 250 OP 250 PS 637: Performed by: STUDENT IN AN ORGANIZED HEALTH CARE EDUCATION/TRAINING PROGRAM

## 2022-03-14 PROCEDURE — 83735 ASSAY OF MAGNESIUM: CPT | Performed by: STUDENT IN AN ORGANIZED HEALTH CARE EDUCATION/TRAINING PROGRAM

## 2022-03-14 PROCEDURE — 83605 ASSAY OF LACTIC ACID: CPT | Performed by: STUDENT IN AN ORGANIZED HEALTH CARE EDUCATION/TRAINING PROGRAM

## 2022-03-14 PROCEDURE — 250N000009 HC RX 250: Performed by: PHYSICIAN ASSISTANT

## 2022-03-14 PROCEDURE — 250N000012 HC RX MED GY IP 250 OP 636 PS 637: Performed by: PHYSICIAN ASSISTANT

## 2022-03-14 PROCEDURE — 250N000013 HC RX MED GY IP 250 OP 250 PS 637: Performed by: PHYSICIAN ASSISTANT

## 2022-03-14 PROCEDURE — 250N000012 HC RX MED GY IP 250 OP 636 PS 637: Performed by: STUDENT IN AN ORGANIZED HEALTH CARE EDUCATION/TRAINING PROGRAM

## 2022-03-14 PROCEDURE — 250N000013 HC RX MED GY IP 250 OP 250 PS 637: Performed by: NURSE PRACTITIONER

## 2022-03-14 PROCEDURE — 250N000011 HC RX IP 250 OP 636: Performed by: PHYSICIAN ASSISTANT

## 2022-03-14 PROCEDURE — 250N000013 HC RX MED GY IP 250 OP 250 PS 637: Performed by: SURGERY

## 2022-03-14 PROCEDURE — 36415 COLL VENOUS BLD VENIPUNCTURE: CPT | Performed by: NURSE PRACTITIONER

## 2022-03-14 PROCEDURE — 94640 AIRWAY INHALATION TREATMENT: CPT

## 2022-03-14 PROCEDURE — 80197 ASSAY OF TACROLIMUS: CPT | Performed by: INTERNAL MEDICINE

## 2022-03-14 RX ORDER — SIMETHICONE 80 MG
80 TABLET,CHEWABLE ORAL 4 TIMES DAILY
Status: DISCONTINUED | OUTPATIENT
Start: 2022-03-14 | End: 2022-03-17 | Stop reason: HOSPADM

## 2022-03-14 RX ORDER — MYCOPHENOLIC ACID 360 MG/1
360 TABLET, DELAYED RELEASE ORAL 2 TIMES DAILY
Status: DISCONTINUED | OUTPATIENT
Start: 2022-03-14 | End: 2022-03-16

## 2022-03-14 RX ADMIN — TACROLIMUS 3 MG: 1 CAPSULE ORAL at 17:55

## 2022-03-14 RX ADMIN — NYSTATIN 1000000 UNITS: 100000 SUSPENSION ORAL at 16:16

## 2022-03-14 RX ADMIN — Medication 1 PACKET: at 16:18

## 2022-03-14 RX ADMIN — LORATADINE 10 MG: 10 TABLET ORAL at 07:54

## 2022-03-14 RX ADMIN — METHOCARBAMOL TABLETS 500 MG: 500 TABLET, COATED ORAL at 12:08

## 2022-03-14 RX ADMIN — LOPERAMIDE HYDROCHLORIDE 2 MG: 2 CAPSULE ORAL at 14:19

## 2022-03-14 RX ADMIN — Medication 400 MG: at 12:08

## 2022-03-14 RX ADMIN — METOPROLOL TARTRATE 25 MG: 25 TABLET, FILM COATED ORAL at 07:54

## 2022-03-14 RX ADMIN — LEVALBUTEROL HYDROCHLORIDE 1.25 MG: 1.25 SOLUTION RESPIRATORY (INHALATION) at 20:18

## 2022-03-14 RX ADMIN — NYSTATIN 1000000 UNITS: 100000 SUSPENSION ORAL at 12:08

## 2022-03-14 RX ADMIN — DOXYCYCLINE HYCLATE 100 MG: 100 CAPSULE ORAL at 07:58

## 2022-03-14 RX ADMIN — DAPSONE 50 MG: 25 TABLET ORAL at 07:57

## 2022-03-14 RX ADMIN — INSULIN ASPART 5 UNITS: 100 INJECTION, SOLUTION INTRAVENOUS; SUBCUTANEOUS at 09:29

## 2022-03-14 RX ADMIN — METHOCARBAMOL TABLETS 500 MG: 500 TABLET, COATED ORAL at 07:58

## 2022-03-14 RX ADMIN — Medication 500 MG: at 07:59

## 2022-03-14 RX ADMIN — FLUTICASONE PROPIONATE 1 SPRAY: 50 SPRAY, METERED NASAL at 07:54

## 2022-03-14 RX ADMIN — DOXYCYCLINE HYCLATE 100 MG: 100 CAPSULE ORAL at 20:08

## 2022-03-14 RX ADMIN — INSULIN ASPART 4 UNITS: 100 INJECTION, SOLUTION INTRAVENOUS; SUBCUTANEOUS at 17:56

## 2022-03-14 RX ADMIN — Medication 400 MG: at 17:55

## 2022-03-14 RX ADMIN — FAMOTIDINE 20 MG: 20 TABLET ORAL at 20:09

## 2022-03-14 RX ADMIN — ACETYLCYSTEINE 2 ML: 200 SOLUTION ORAL; RESPIRATORY (INHALATION) at 13:02

## 2022-03-14 RX ADMIN — LEVALBUTEROL HYDROCHLORIDE 1.25 MG: 1.25 SOLUTION RESPIRATORY (INHALATION) at 07:34

## 2022-03-14 RX ADMIN — TACROLIMUS 3.5 MG: 1 CAPSULE ORAL at 07:59

## 2022-03-14 RX ADMIN — OXYCODONE HYDROCHLORIDE 2.5 MG: 5 TABLET ORAL at 18:28

## 2022-03-14 RX ADMIN — NYSTATIN 1000000 UNITS: 100000 SUSPENSION ORAL at 07:57

## 2022-03-14 RX ADMIN — LEVALBUTEROL HYDROCHLORIDE 1.25 MG: 1.25 SOLUTION RESPIRATORY (INHALATION) at 13:02

## 2022-03-14 RX ADMIN — PREDNISONE 12.5 MG: 2.5 TABLET ORAL at 20:08

## 2022-03-14 RX ADMIN — ACETAMINOPHEN 975 MG: 325 TABLET ORAL at 11:07

## 2022-03-14 RX ADMIN — PREDNISONE 15 MG: 5 TABLET ORAL at 07:58

## 2022-03-14 RX ADMIN — MYCOPHENOLIC ACID 360 MG: 360 TABLET, DELAYED RELEASE ORAL at 20:08

## 2022-03-14 RX ADMIN — ACYCLOVIR 400 MG: 200 CAPSULE ORAL at 20:08

## 2022-03-14 RX ADMIN — Medication 1 PACKET: at 20:17

## 2022-03-14 RX ADMIN — DILTIAZEM HYDROCHLORIDE 240 MG: 240 CAPSULE, COATED, EXTENDED RELEASE ORAL at 07:58

## 2022-03-14 RX ADMIN — LOPERAMIDE HYDROCHLORIDE 2 MG: 2 CAPSULE ORAL at 07:59

## 2022-03-14 RX ADMIN — CALCIUM CARBONATE 600 MG (1,500 MG)-VITAMIN D3 400 UNIT TABLET 1 TABLET: at 17:55

## 2022-03-14 RX ADMIN — ACETYLCYSTEINE 2 ML: 200 SOLUTION ORAL; RESPIRATORY (INHALATION) at 20:18

## 2022-03-14 RX ADMIN — GABAPENTIN 100 MG: 100 CAPSULE ORAL at 21:20

## 2022-03-14 RX ADMIN — PANTOPRAZOLE SODIUM 40 MG: 40 TABLET, DELAYED RELEASE ORAL at 07:58

## 2022-03-14 RX ADMIN — Medication 1 TABLET: at 12:08

## 2022-03-14 RX ADMIN — AMOXICILLIN 500 MG: 500 CAPSULE ORAL at 07:58

## 2022-03-14 RX ADMIN — METOPROLOL TARTRATE 25 MG: 25 TABLET, FILM COATED ORAL at 20:09

## 2022-03-14 RX ADMIN — ROSUVASTATIN CALCIUM 5 MG: 5 TABLET, FILM COATED ORAL at 07:59

## 2022-03-14 RX ADMIN — HEPARIN SODIUM 5000 UNITS: 5000 INJECTION, SOLUTION INTRAVENOUS; SUBCUTANEOUS at 06:41

## 2022-03-14 RX ADMIN — ASPIRIN 81 MG: 81 TABLET, COATED ORAL at 07:58

## 2022-03-14 RX ADMIN — SIMETHICONE 80 MG: 80 TABLET, CHEWABLE ORAL at 20:15

## 2022-03-14 RX ADMIN — METHOCARBAMOL TABLETS 500 MG: 500 TABLET, COATED ORAL at 16:16

## 2022-03-14 RX ADMIN — INSULIN ASPART 4 UNITS: 100 INJECTION, SOLUTION INTRAVENOUS; SUBCUTANEOUS at 12:07

## 2022-03-14 RX ADMIN — SIMETHICONE 80 MG: 80 TABLET, CHEWABLE ORAL at 12:08

## 2022-03-14 RX ADMIN — SIMETHICONE 80 MG: 80 TABLET, CHEWABLE ORAL at 16:16

## 2022-03-14 RX ADMIN — CALCIUM CARBONATE 600 MG (1,500 MG)-VITAMIN D3 400 UNIT TABLET 1 TABLET: at 07:59

## 2022-03-14 RX ADMIN — AMOXICILLIN 500 MG: 500 CAPSULE ORAL at 16:16

## 2022-03-14 RX ADMIN — AMOXICILLIN 500 MG: 500 CAPSULE ORAL at 00:18

## 2022-03-14 RX ADMIN — HEPARIN SODIUM 5000 UNITS: 5000 INJECTION, SOLUTION INTRAVENOUS; SUBCUTANEOUS at 21:20

## 2022-03-14 RX ADMIN — PANTOPRAZOLE SODIUM 40 MG: 40 TABLET, DELAYED RELEASE ORAL at 16:16

## 2022-03-14 RX ADMIN — NYSTATIN 1000000 UNITS: 100000 SUSPENSION ORAL at 20:07

## 2022-03-14 RX ADMIN — Medication 1 PACKET: at 08:02

## 2022-03-14 RX ADMIN — HEPARIN SODIUM 5000 UNITS: 5000 INJECTION, SOLUTION INTRAVENOUS; SUBCUTANEOUS at 14:19

## 2022-03-14 RX ADMIN — FUROSEMIDE 40 MG: 20 TABLET ORAL at 07:57

## 2022-03-14 RX ADMIN — FAMOTIDINE 20 MG: 20 TABLET ORAL at 07:54

## 2022-03-14 RX ADMIN — ACETYLCYSTEINE 2 ML: 200 SOLUTION ORAL; RESPIRATORY (INHALATION) at 07:35

## 2022-03-14 RX ADMIN — MYCOPHENOLIC ACID 720 MG: 360 TABLET, DELAYED RELEASE ORAL at 07:57

## 2022-03-14 RX ADMIN — METHOCARBAMOL TABLETS 500 MG: 500 TABLET, COATED ORAL at 20:09

## 2022-03-14 RX ADMIN — ACYCLOVIR 400 MG: 200 CAPSULE ORAL at 07:58

## 2022-03-14 ASSESSMENT — ACTIVITIES OF DAILY LIVING (ADL)
ADLS_ACUITY_SCORE: 11
ADLS_ACUITY_SCORE: 7
ADLS_ACUITY_SCORE: 11
ADLS_ACUITY_SCORE: 11
ADLS_ACUITY_SCORE: 7
ADLS_ACUITY_SCORE: 11
ADLS_ACUITY_SCORE: 7
ADLS_ACUITY_SCORE: 11
ADLS_ACUITY_SCORE: 11
ADLS_ACUITY_SCORE: 7
ADLS_ACUITY_SCORE: 11
ADLS_ACUITY_SCORE: 7
ADLS_ACUITY_SCORE: 7
ADLS_ACUITY_SCORE: 11

## 2022-03-14 NOTE — PROGRESS NOTES
[facility]    Medicine Progress Note - Hospitalist Service, GOLD TEAM 10    Date of Admission:  2/20/2022    Assessment & Plan      Melissa Elder is a 66 year old female with PMHx HTN, HLD, paroxysmal Afib, osteoporosis, GERD, severe COPD, previously requiring 2-3L NC O2 at rest.  Underwent b/l lung transplant with Dr. Robin on 02/21. Got 1u pRBC post-op, no overt bleeding source, monitoring. Extubated 02//22 to O2 NC and transferred to the floor. Pericardial drain pulled 2/24/22.  post-op course complicated by right chest tube lodged into fissure and left chest tube displacement s/p bilateral pigtail chest tube placement in IR to drain anterior hydropneumothorax and bilateral effusions, disseminated Ureaplasma, and transient hyperammonemia.  Routine inspection bronch on 3/1 complicated by hypoventilation in setting of oversedation requiring multiple Narcan doses.  Slow improvement in hypercapnia with NIPPV overnight.    Today's plan   - Continue bipap at night.  Will look for trilogy coverage for home  - Gastric emptying study  - NPO at midnight.  Will trial the next day or two without TFs.   - Continue calorie counts.  Per patient and , she is eating a lot more than what's being recorded.     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  Pressor weaned off 2/22 and pt. extubated. Prior history of infection/colonization with Haemophilus influenzae (2017).  IgG adequate (2/20).  MRSA nares negative 2/21.  Broad spectrum ABX empirically post-op with vanc and ceftaz (2/21-2/22), stopped after 24h per Dr. Lala to target known donor cultures on POD #1. Donor cultures ([facility]) with Strep mitis, Actinomyces odontolyticus, and Kenyatta kruseii. Recipient cultures with Actinomyces  odonotolyticus.  Acid fast bacillus in sputum from 3/2/22  - Respiratory support with HFNC and BiPAP (discussed below)  - Nebs: Levalbuterol and Mucomyst QID  - Aggressive pulmonary toilet with chest physiotherapy QID as well as Aerobika and incentive spirometry q1h w/a  - Chest tubes managed by surgical and IR team  - Daily CXR while chest tubes in   - Await explant pathology  - Continue ceftriaxone for 2 weeks through 3/8 followed by amoxicillin for 3 months  - monitoring sputum for AFB  - Immune suppression and microbial ppx per daily transplant pulm note  - Continue Valtrex to 1000 mg TID x 14 days for HSV (through 3/11) then complete ppx per protocol (see pulm note)   - Continue diuresis as needed with goal to stay net negative      Acute hypoxic and hypercarbic respiratory failure 3/1, improving   3/1 noted to have slowly rising bicarb on daily labs. Had inspection bronchoscopy 3/1 as well and became oversedated and required multiple doses of narcan. VBG obtained post procedure showed hypercapnea to 99. Hypercarbic respiratory failure likely multifactorial including oversedation, poor diaphragm excursion (SNIFF test 3/3), volume overload, and atelectasis. Started on BiPAP.   - Continue BiPAP at night, HFNC/NC during the day. Wean O2 to keep sats > 92%.   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 Ammonia level checked due to concern for post-transplant hyperammonemia which was mildly elevated at 57, but repeat 49.   - Mycoplasma/ureaplasma cultures collected and Transplant ID consulted               - Sputum/Pleural fluid +ureaplasma on 3/4. Started on  Levofloxacin/Doxycycline for double coverage.   ID   - ID recommends stopping Levofloxacin (3/7), continue Doxycycline through 3/17 .   -amoxicillin for total of 3 months course (through 5/23) for Actinomyces treatment     Leukocytosis  Afebrile, but WBC started rising 2/27 from 14.6 to 23.1 3/2. Evaluation included CT chest/abdomen/pelvis without overt evidence of infection, and pan-culture. Pleural and sputum culture did grow +Ureaplasma and she was started on therapy (as above). C. Diff checked and negative. Leukocytosis has since been improving.   - Repeat CT Abdomen/Pelvis 3/6/22 for interval follow up from initial scan 3/2, as there was concern for enteritis as well as area of pancreatic/kashif-hepatic artery inflammation vs. Infection vs. malignancy.               - Repeat CT with improved small bowel dilation, some persistent perienteric fat stranding accentuated by motion artifact, and unremarkable appearing pancreas   - Continue antibiotics as above      Diarrhea   Dilated bowel on imaging   Noted to have increased loose stools 2/25 likely due to mmf and TF. Fiber added to TF. C.diff checked and negative. Abdominal imaging including CT and XR have shown diffusely dilated bowel, without concern for obstruction. Possibly ileus or gastroparesis from transplant. However patient not experiencing nausea, vomiting, or decreased stool output.   - Repeat CT 3/6/22 with interval improvement   - Continue to monitor abdominal exam, stool output   - Okay to have immodium PRN      Post op pain   - Erector spinae catheters removed   - gabapentin 100 mg nightly  - tylenol 975 mg Q8H   - Dilaudid 0.2-0.4 mg Q2H PRN   - oxycodone 5-10 mg Q4H PRN   - robaxin 500 mg Q6H scheduled      Paroxysmal Afib   HTN  She was on diltiazem prior to lung transplantation and had not been on anticoagulation. She was  Started on metoprolol in the ICU.  - Continue Metoprolol tartrate to 25 mg BID   - Restarted PTA diltiazem now at 60 mg Q6H, if  tolerated can transition back to  mg XL.   - No anticoagulation at this time      Moderate protein calorie malnutrition  - Nutrition following   - On TF, will ask about cycling overnight      Stress induced hyperglycemia  Did not need insulin prior to admission but sugars have been rising despite sliding scale coverage.  - Endocrinology consulted for assistance, appreciate recommendations   - Please see DM team daily note      HLD  Mild CAD   Noted on transplant workup. Started rosuvastatin 40 mg daily--Held 2/28 due to rising LFTs    - restarted rosuvastatin 3/8 will follow LFTs     Acute blood loss anemia  Acute blood loss thrombocytopenia  Secondary to surgery. Will continue to monitor.   - Transfuse Hgb < 7, platelets < 50   - Hemolysis labs negative 3/3, on dapsone, continue to monitor      Sternotomy  Surgical Incision  - Sternal precautions  - Postoperative incision management per protocol  - PT/OT/CR      Elevated LFTs, resolved   Noted on labs 2/26 and rising. AST, ALP, Bili WNL. No RUQ or abdominal pain. Imaging including RUQ ultrasound WNL. Liza-portal edema noted on CT, and exam shows volume overload. Could be the result of congestion versus medication adverse effect. LFTs have since normalized. Will continue to monitor.  - CMP daily   - monitoring closely with restarting statin                Diet: Regular Diet Adult Thin Liquids (level 0)  Snacks/Supplements Adult: Glucerna; Between Meals  Calorie Counts  NPO for Medical/Clinical Reasons Except for: Meds, Ice Chips    DVT Prophylaxis: Pneumatic Compression Devices  Ratliff Catheter: Not present  Central Lines: None  Cardiac Monitoring: None  Code Status: Full Code      Disposition Plan   Expected Discharge: 03/15/2022     Anticipated discharge location: home;inpatient rehabilitation facility    Delays:            The patient's care was discussed with the Bedside Nurse and Patient.    Gianna Shah MD  Hospitalist Service, [address]  [facility]  Securely message with the [employer] Web Console (learn more here)  Text page via [facility] Paging/Directory   Please see signed in provider for up to date coverage information      Clinically Significant Risk Factors Present on Admission                    ______________________________________________________________________    Interval History   Patient is feeling well.  Denies chest pain, sob, abd pain, fever.      + Nausea    Diarrhea a lot better with immodium.     Data reviewed today: I reviewed all medications, new labs and imaging results over the last 24 hours. I personally reviewed no images or EKG's today.    Physical Exam   Vital Signs: Temp: 97.9  F (36.6  C) Temp src: Oral BP: 122/66 Pulse: 109   Resp: 20 SpO2: 95 % O2 Device: BiPAP/CPAP    Weight: 148 lbs 8 oz     General Appearance: NAD  HEENT: No thursh / ulcer, EOMI  Respiratory: CTAB on RA   Cardiovascular: RRR, JVP  GI: soft, NTND   Extremities: + LE edema, improving  Neuro: Moving all extremities  Skin: No rash on exposed areas   Psych: Alert and oriented, appropriate mood and affect, speech coherent / logical

## 2022-03-14 NOTE — PLAN OF CARE
Problem: Plan of Care - These are the overarching goals to be used throughout the patient stay.    Goal: Plan of Care Review/Shift Note      Problem: Plan of Care - These are the overarching goals to be used throughout the patient stay.    Goal: Plan of Care Review/Shift Note  Neuro: Alert and oriented x4. Follows commands.   Cardiac: SR. VSS. Afebrile.       Respiratory: Room air. Bipap over night.   GI/: Adequate urine output.   Diet/appetite: Regular diet. TF over night.   Activity:  Up with 1 and wheeled walker.   Skin: No new deficits noted.        Plan: Continue with POC. Notify team of any changes or concerns.

## 2022-03-14 NOTE — PROGRESS NOTES
Pulmonary Medicine  Cystic Fibrosis - Lung Transplant Team  Progress Note  2022       Patient: Melissa Elder  MRN: [id number]  : 1955 (age 66 year old)  Transplant: 2022 (Lung), POD#21  Admission date: 2022    Assessment & Plan:     Melissa Elder is a 66 year old female with a PMH significant for severe COPD, HTN, mild non-obstructive CAD, paroxysmal afib, osteoporosis, GERD, and colonic polyps.  Pt. is now s/p BSLT on , surgery relatively uncomplicated.  The patient was extubated , post-op course complicated by right chest tube lodged into fissure and left chest tube displacement, s/p bilateral pigtail chest tube placement drain anterior hydropneumothorax and bilateral effusions, disseminated Ureaplasma, and and transient hyperammonemia.  Routine inspection bronch on 3/1 complicated by hypoventilation in setting of oversedation requiring multiple Narcan doses.  Slow improvement in hypercapnia with NIPPV overnight, weaned off daytime hypoxemia resolved 3/9.  Nutrition independence still limited by symptoms of gastroparesis.     Today's recommendations:  - ABG ordered OFF NIPPV tonight (>2h off), nursing to release and then resume NIPPV when ABG has been collected (data collection to qualify for home BiPAP)  - Tacrolimus level therapeutic, no dose adjustment, repeat level 3/17 (ordered)  - Myfortic dose further decreased to see if this improves diarrhea  - Prednisone taper ordered 3/15  - DSA, IgG, EBV, and CMV due 3/21  - Amoxicillin for total of 3 months course (through ) for Actinomyces treatment  - Doxycycline for Ureaplasma for 4 week course through   - Donor risk labs ordered 3/23  - NM gastric emptying study ordered  - Trial of Reglan pending results of NM study  - Calorie counts extended through 3/16      COPD s/p bilateral sequential lung transplant (BSLT):  Acute hypoxic/hypercapneic respiratory failure:   Bilateral pleural effusions/PTX:   Subluxation of sternum:  Scant pleural-pleural adhesions and mild-moderate bilateral hilar lymphadenopathy per op note.  Pathology with CPFE.  Extubated 2/22.  DSA negative 2/28.  Noted hypoventilating with oversedation during bronch on 3/1, received Narcan x3 with improvement in sedation persistent hypercapnia.  Improved with nocturnal BiPAP and daytime HFNC (resolved 3/9).  S/p right pigtail chest tube 3/3 with ureaplasma in exudative effusion (now removed).  Sniff test (3/3) with poor diaphragm excursion bilaterally with extensive accessory respiratory chest wall musculature effort to aid in inspiration.  Repeat sniff test (3/11) without evidence of diaphragmatic palsy.  Trialed off NIPPV 3/11-3/13 with marked increase in hypercapnea.  No daytime hypoxemia.  CXR (3/12) with mild BLL opacities vs effusions (stable).  - Flonase for postnasal drip (3/10)  - Nebs: levalbuterol and Mucomyst TID  - Aggressive pulmonary toilet with chest physiotherapy TID  - ABG ordered OFF NIPPV tonight (>2h off), nursing to release and then resume NIPPV when ABG has been collected (data collection to qualify for home BiPAP)  - DSA 3/21 (not yet ordered)  - Diuresis per primary team     Immunosuppression:  S/p induction therapy with basiliximab x2 doses (with high dose IV steroid).  - Tacrolimus 3.5 mg qAM / 3 mg qPM.  Goal level 8-12.  Level today 10, no dose adjustment.  Repeat level 3/17 (ordered).  - Myfortic 720 mg BID further decreased to 360 mg BID to see if this improves diarrhea  - Prednisone 15 mg qAM / 12.5 qPM with taper per lung transplant protocol (ordered 3/15):  Date AM dose (mg) PM dose (mg)   3/8 15 12.5   3/15 12.5 12.5   3/29 12.5 10   4/12 10 10   5/10 10 7.5   6/7 7.5 7.5   7/5 7.5 5   8/2 5 5   8/30 5 2.5      Prophylaxis:   - Dapsone for PJP ppx (started 2/23 at decreased MWF dose and increased to daily 3/1, G6PD 13.3 2/21)  - Nystatin for oral candidiasis ppx, 6 month course  - See below for serologies and viral ppx:    Donor Recipient  Intervention   CMV status Negative Negative CMV monthly (3/21, not yet ordered, negative 3/11)   EBV status Positive Positive EBV at one month (3/21, not yet ordered)   HSV status N/A (Newly) Positive As below      ID: Prior history of infection/colonization with Haemophilus influenzae (2017). Donor ([facility]) cultures with P-S MSSA; ([facility]) with Strep mitis, Actinomyces odontolyticus, MSSA x2, and C. kruseii (concern for possible aspiration).  Recipient cultures at time of transplant with Actinomyces odonotolyticus.  Bronch cultures (2/22) with Saccharomyces cerevisiae (no indication to treat per Dr. Ghosh unless pt. acutely declines clinically, 3/1) and C. albicans.  - IgG repeat at one month (3/21, not yet ordered)  - ABX: amoxicillin (3/8-5/23, s/p ceftriaxone 2/23-3/8) for 3 month course for Actinomyces treatment  - Low threshold to treat Candida if it recurs in respiratory cultures, per transplant ID     HSV: Pt. with recent seroconversion at time of transplant.  HSV also noted on donor cultures.  Initial plan for 30 days of ppx with ACV post-op per Dr. Lala.  Then with HSV+ bronch at time of transplant (2/21), transitioned to treatment dosing with VACV  x 14 days through 3/12.   - Acyclovir prophylaxis 3/13-23 per protocol     Hyperammonemia, Resolved:   Pleural Fluid and sputum Ureaplasma:   Ammonia mildly elevated on 3/2 but quickly normalized.  Ureaplasma and Mycoplasma studies sent,when patient was somnolent with confusion/visual hallucinations in setting of hypercapnea, PCR for ureaplasma + on sputum and pleural fluid cultures on 3/2. .  - Doxycycline (3/4-4/1) per transplant ID     Positive AFB on sputum culture: Noted on sputum culture 3/2.  Transplant ID following for speciation and susceptibility testing as well as other evidence of infection.   - AFB sputum culture (3/9) NGTD  - Obtain AFB cultures on all future bronchs      PHS risk criteria donor: Additional labs required post-transplant (between 4-8  weeks post-op): Hepatitis B, Hepatitis C, and HIV by COLT (ordered 3/23), with repeat hepatitis B surface antibody ordered at that time given discrepancy with recent result.     Other relevant problems managed by primary team:     Concern for gastroparesis: Pt. c/o early satiety and persistent fullness t/o the day.  Tube feeds post-pyloric, not likely to be contributing significantly.    - NM gastric emptying study ordered  - Trial of Reglan pending results of NM study  - Calorie counts extended through 3/16    Diarrhea:   Concern for ileus: Likely 2/2 medications and tube feedings.  Fiber added to tube feeds.  MMF decreased as above an switched to myfortic.  Loose stools now improved.  Again having loose/watery stools, also noting some abdominal bloating/fullness.  AXR 3/5 with diffuse distention of small and large bowel suggestive of ileus.  C diff negative 3/6.  Abdomen/pelvis CT (3/6) with decrease in small bowel distention and perienteric fat stranding.  CXR (3/8) with partially imaged likely adynamic ileus.  Bowel regimen initiated 3/8, adjusted 3/11.  Ileus improving by AXR on 3/12.     Elevated LFTs, Resolved: Rising 3/1 despite medication adjustments (APAP and statin stopped 2/27).  Of note, pt. had a discrepant hep B surface Ab at time of transplant as above.  RUQ US (3/3) with only hepatic steatosis.  LFTs normalized 3/7.     CAD: Noted to have mild non-obstructive CAD without hemodynamically significant lesions on cardiac cath 3/27/19.  PTA ASA 81 mg and atorvastatin, started on rosuvastatin post-op but held 2/28 for elevated LFTs (as above), resumed 3/9.  ASA resumed 3/1.     Paroxysmal afib:   HTN: PTA diltiazem, not on AC.  Persistent tachycardia post-op, but now with better rate control on metoprolol and PTA diltiazem.     GERD: Not on PPI PTA.  Negative pH/manometry study 3/28/19, noted small hiatal hernia. On PPI daily since 2/21, H2 blocker bridge discontinued 3/2, some increase in reflux symptoms  "since, resumed 3/5.  PPI increased to BID 3/7.     Hypomagnesemia: Suppressed absorption d/t CNI.  Requiring almost daily IV/PO replacement so started on PO mag oxide, transitioned to gluconate for diarrhea.     We appreciate the excellent care provided by the [place] [id number] team.  Recommendations communicated via in person rounding and this note.  Will continue to follow along closely, please do not hesitate to call with any questions or concerns.    Patient discussed with Dr. Hernandez.    Iman Jane, DNP, APRN, CNP  Inpatient Nurse Practitioner  Pulmonary CF/Transplant     Subjective & Interval History:     Remains on RA during the day with improving LUTHER and activity tolerance.  On BiPAP 18/6 25% overnight with improvement in hypercapnia.  Cough effort fair, minimally productive, feels like CPT is helpful.  PO intake remains limited d/t persistent abdominal fullness with gaseous distention and belching.  Loose stools improved some.  Denies N/V, no GERD.    Review of Systems:     C: No fever, no chills, + decreasing weight  INTEGUMENTARY/SKIN: No rash or obvious new lesions  ENT/MOUTH: No sore throat, no sinus pain, no nasal congestion or drainage  RESP: See interval history  CV: No chest pain, no palpitations, no peripheral edema, no orthopnea  GI: See interval history  : No dysuria  MUSCULOSKELETAL: No myalgias, no arthralgias  ENDOCRINE: Blood sugars  intermittently elevated  NEURO: No headache, no numbness or tingling  PSYCHIATRIC: Mood stable    Physical Exam:     All notes, images, and labs from past 24 hours (at minimum) were reviewed.    Vital signs:  Temp: 97.6  F (36.4  C) Temp src: Axillary BP: 114/66 Pulse: 93   Resp: 30 SpO2: 94 % O2 Device: BiPAP/CPAP Oxygen Delivery: 25 LPM Height: 157.5 cm (5' 2\") Weight: 67.4 kg (148 lb 8 oz)  I/O:     Intake/Output Summary (Last 24 hours) at 3/14/2022 1225  Last data filed at 3/14/2022 1100  Gross per 24 hour   Intake 1555 ml   Output 900 ml   Net 655 ml "     Constitutional: Sitting up in a chair,  at bedside, in no apparent distress.   HEENT: Eyes with pink conjunctivae, anicteric.  Oral mucosa moist without lesions. Right nare NJ tube.  PULM: Good air flow bilaterally.  No crackles, no rhonchi, no wheezes.  Non-labored breathing on RA.  CV: Normal S1 and S2.  RRR.  No murmur, gallop, or rub.  No peripheral edema.   ABD: NABS, soft, nontender, nondistended.    MSK: Moves all extremities.  No apparent muscle wasting.   NEURO: Alert and oriented, conversant.   SKIN: Warm, dry.  No rash on limited exam.   PSYCH: Mood stable.     Lines, Drains, and Devices:  Peripheral IV 03/12/22 Right;Anterior Lower forearm (Active)   Site Assessment WDL 03/14/22 0800   Line Status Saline locked 03/14/22 0800   Dressing Intervention New dressing ;Other (Comment) 03/12/22 1600   Phlebitis Scale 0-->no symptoms 03/14/22 0800   Infiltration Scale 0 03/14/22 0800   Number of days: 2     Data:     LABS    CMP:   Recent Labs   Lab 03/14/22  1206 03/14/22  0808 03/14/22  0640 03/14/22  0551 03/13/22  1238 03/13/22  1025 03/12/22  1155 03/12/22  0645 03/11/22  0745 03/11/22  0710   NA  --   --   --  139  --  137  --  138  --  136   POTASSIUM  --   --   --  4.5  --  4.2  --  4.3  --  4.7   CHLORIDE  --   --   --  97  --  96  --  100  --  98   CO2  --   --   --  39*  --  36*  --  32  --  35*   ANIONGAP  --   --   --  3  --  5  --  6  --  3   * 125* 144* 153*   < > 129*   < > 144*   < > 196*   BUN  --   --   --  28  --  26  --  26  --  32*   CR  --   --   --  0.53  --  0.50*  --  0.52  --  0.47*   GFRESTIMATED  --   --   --  >90  --  >90  --  >90  --  >90   MINISTERIO  --   --   --  8.6  --  8.8  --  8.4*  --  8.4*   MAG  --   --   --  2.0  --  1.7  --  1.8  --  1.5*   PHOS  --   --   --  4.1  --  3.6  --  3.9  --  3.2   PROTTOTAL  --   --   --  5.7*  --  6.2*  --  5.7*  --  5.2*   ALBUMIN  --   --   --  2.7*  --  2.8*  --  2.6*  --  2.4*   BILITOTAL  --   --   --  0.4  --  0.4  --  0.4   --  0.4   ALKPHOS  --   --   --  117  --  132  --  117  --  116   AST  --   --   --  14  --  18  --  12  --  14   ALT  --   --   --  26  --  32  --  31  --  32    < > = values in this interval not displayed.     CBC:   Recent Labs   Lab 03/14/22  0551 03/13/22  1025 03/12/22  0645 03/11/22  0710   WBC 9.0 10.4 10.4 10.6   RBC 2.75* 2.95* 2.67* 2.45*   HGB 8.3* 9.0* 8.1* 7.6*   HCT 27.5* 29.3* 27.4* 24.2*    99 103* 99   MCH 30.2 30.5 30.3 31.0   MCHC 30.2* 30.7* 29.6* 31.4*   RDW 20.3* 19.9* 19.7* 19.3*    448 279 368       INR: No lab results found in last 7 days.    Glucose:   Recent Labs   Lab 03/14/22  1206 03/14/22  0808 03/14/22  0640 03/14/22  0551 03/14/22  0142 03/13/22  2209   * 125* 144* 153* 162* 157*       Blood Gas:   Recent Labs   Lab 03/14/22  0551 03/13/22  1025 03/12/22  1237   PHV 7.42 7.37 7.46*   PCO2V 66* 72* 54*   PO2V 37 16* 60*   HCO3V 43* 41* 38*   ARIEL 16.0* 13.2* 13.0*   O2PER 21 21 21       Culture Data No results for input(s): CULT in the last 168 hours.    Virology Data: No results found for: INFLUA, FLUAH1, FLUAH3, GA7174, IFLUB, RSVA, RSVB, PIV1, PIV2, PIV3, HMPV, HRVS, ADVBE, ADVC    Historical CMV results (last 3 of prior testing):  Lab Results   Component Value Date    CMVQNT Not Detected 03/11/2022     No results found for: CMVLOG    Urine Studies    Recent Labs   Lab Test 03/01/22  1549 02/20/22  0248   URINEPH 6.0 7.0   NITRITE Negative Negative   LEUKEST Negative Negative   WBCU 0 <1       Most Recent [place] Pulmonary Function Testing (FVC/FEV1 only)  FVC-Pre   Date Value Ref Range Status   12/20/2021 1.81 L    06/21/2021 1.46 L    12/09/2019 1.59 L    06/03/2019 1.68 L      FVC-%Pred-Pre   Date Value Ref Range Status   12/20/2021 65 %    06/21/2021 52 %    12/09/2019 56 %    06/03/2019 58 %      FEV1-Pre   Date Value Ref Range Status   12/20/2021 0.49 L    06/21/2021 0.50 L    12/09/2019 0.49 L    06/03/2019 0.47 L      FEV1-%Pred-Pre   Date Value Ref  Range Status   12/20/2021 22 %    06/21/2021 22 %    12/09/2019 21 %    06/03/2019 20 %        IMAGING    Recent Results (from the past 48 hour(s))   XR Chest 2 Views    Narrative    EXAM: XR CHEST 2 VW 3/12/2022    HISTORY: interval follow up, lung transplant.    COMPARISON: 3/11/2022.    TECHNIQUE: Upright frontal and lateral views of the chest.    FINDINGS: Feeding tube tip is beyond view inferiorly. Postsurgical  changes of bilateral lung transplant with intact clamshell sternotomy  wires. Cardiomediastinal silhouette is within normal limits. Unchanged  mixed hazy/streaky basilar opacities and trace pleural effusions. No  pneumothorax or appreciable new airspace disease. No acute  radiographic evidence for abdomen.      Impression    IMPRESSION:   Stable post-transplant lungs with mild basilar opacities and small  effusions. No convincing new airspace disease.    I have personally reviewed the examination and initial interpretation  and I agree with the findings.    MURIEL WALLACE MD         SYSTEM ID:  [id number]   XR Abdomen 1 View    Narrative    EXAMINATION:  XR ABDOMEN 1 VIEW 3/12/2022 11:34 AM     COMPARISON: CT 3/6/2022.    HISTORY: Follow Ileus.    TECHNIQUE: Frontal view of the abdomen.    FINDINGS: Feeding tube terminates in the distal duodenum. Mild gaseous  distention of the stomach. Mildly dilated small bowel loops in the  central abdomen with multiple air-fluid levels. Gas is present in the  colon. No abnormally dilated loops of bowel. No pneumatosis or portal  venous gas.       Impression    IMPRESSION:   Mildly dilated small bowel loops in the central abdomen with air-fluid  levels suggestive of improving ileus.    MURIEL WALLACE MD         SYSTEM ID:  [id number]

## 2022-03-14 NOTE — PROGRESS NOTES
CLINICAL NUTRITION SERVICES - BRIEF NOTE   (See RD note on 3/11 for full assessment)     Nutrition Prescription    RECOMMENDATIONS FOR MDs/PROVIDERS TO ORDER:  Hold tube feeding and continue calorie counts through 3/17    Future/Additional Recommendations:  Monitor labs and weight trends  Monitor PO intakes to assess need for EN  If intakes consistently <800 kcal recommend restarting cycled tube feeding: Vital 1.5 @ 45mL/hr x 12 hours (540 mL). Total provisions: 810 kcal, 37 g PRO, and 101 g CHO. + 2 pkts Prosource, total provisions: 890 kcal (16 kcal/kg) and 59 g PRO (1 g/kg).      Calorie Counts    3/13: 637 kcal and 33 g protein (2 meals, 1 supplements)  3/12: 388 kcal and 17 g protein (1 meals, 0 supplements)   With 100% intake recorder per nursing documentation, adds 332 kcal and 11 g protein = 720 kcal and 28 g protein total.   3/11: 962 kcal and 33 g protein (3 meals, 1 supplements)    3 day average PO intake = 773 kcal (48% minimum energy needs) and 31 g protein (39% minimum protein needs)    Also with 100% intake for lunch 3/12. No order in health touch but no recollection of bringing food from home.  brought food on 3/11 but not the next day.     Drinks maybe 1 glucerna/day if she does not eat much for a meal. Feels that calorie counts are not accurate, as she feels she is eating large portions of food. Now with plan to record her own intakes for calorie counts. Per discussion with RN, patient encouraged to communicate any possible issues with nursing staff.     Also states that she wakes up full from her cycled tube feeds and is then asked to take her pills and eat right away. Reports having tube feeding not turned off at the correct time, making her too full to eat.     Discussed with endocrinology regarding stopping tube feeds. Per discussion with MD, plans for gastric emptying study tomorrow. Recommend holding tube feeds and continuing calorie counts. Will monitor intakes and re-assess need for  TF.      Interventions:  Nutrition education for nutrition relationship to health/disease - Encouraged oral intakes and communication of intakes with staff  Collaboration with other providers - RN, Endocrinology, MD     Future/Additional Recommendations:  Recommend trial off tube feeds and encouraging oral intakes with calorie counts next 3-4 days.      Nutrition will continue to follow per protocol.    Ruth Giron MS, RDN, LDN  6D RD pager: [phone]  Weekend/Holiday RD pager: [phone]

## 2022-03-14 NOTE — PROGRESS NOTES
Calorie Count  Intake recorded for: 3/13  Total Kcals: 637 Total Protein: 33g  Kcals from Hospital Food: 637   Protein: 33g  Kcals from Outside Food (average):0 Protein: 0g  # Meals Ordered from Kitchen: 2 meals  # Meals Recorded: 2 meals  (First - 75% omelet w/ ham and cheese, 50% 1 8oz skim milk)  (Second - 100% fruit ice, 75% turkey w/ gravy, mashed potatoes w/ gravy, 1 8oz apple juice)  # Supplements Recorded: 100% 1 Glucerna

## 2022-03-14 NOTE — PROGRESS NOTES
BRIEF CROSSCOVER NOTE:    Paged about discontinuing nighttime tube feeds per dietitian recommendations. I've discontinued the order, plan for calorie counts for the next 3-4 days per RD note.    Susan Almonte MD  Internal Medicine-Pediatrics  [facility]  Pager: [phone]

## 2022-03-14 NOTE — PROGRESS NOTES
Pt A&O x4. Able to make needs known. Medications given as scheduled. Pt complaining of pain in back and lower chest, near incision sites. PRN tylenol given x1 and PRN oxycodone 2.5 ordered and given x1. Scheduled Robaxin also given. Pt up in chair most of the day. TF from [id number] discontinued to day. Pt to be NPO for nuc med test in am. Pt on calorie count for the next 3 days to monitor caloric intake. No other concerns at this time. NG still in place as this is a trial run off of TF. Will continue to monitor.

## 2022-03-14 NOTE — PROGRESS NOTES
IP Diabetes Management  Daily Note           Assessment and Plan:   HPI: Melissa is a 66 year old female with a past medical history of severe COPD, HTN, paroxysmal a fib, GERD, osteoporosis, who was admitted 2/20/22 for BSLT. Melissa does not carry a known prior history of diabetes, nor a family history. However, pre diabetic range A1c done preoperatively (5.8%). She has been intermittently on chronic steroids for years, due to her underlying COPD.     Assessment:   1)Pre-diabetes, versus steroid induced diabetes (A1c 5.8% prior to surgery)  2) current steroid and TF induced hyperglycemia     Plan:    -NPH 18 units daily at 2000- to be given with cycled TF, and HELD if TF are not to run overnight    -Novolog 1:15g CHO coverage with meals and snacks/supplements   -Novolog  custom 1/35 intensity sliding scale >140 TID AC, HS and 0200, on cycled TF overnight.   -PRN D10% order should be initiated if TF are interrupted with NPH on board- to run at 70 ml/hour to prevent hypoglycemia    -BG monitoring TID AC, HS, 0200   -hypoglycemia protocol   -carb counting protocol   -diabetes education needs will be assessed closer to discharge- depending on TF plan.     Outpatient follow up: will recommend outpatient follow up with [facility] Endocrinology service, if requiring insulin   Plan discussed with patient, bedside RN, and primary team paged      Interval History and Assessment: interval glucose trend reviewed:   BG are remaining stable with exception of trend >250 yesterday afternoon. She missed lunchtime + supplement carb coverage, per .      PO pred to taper from 15/12.5, to 12.5 mg BID, beginning tomorrow.  Pt's  reporting plan is now for discharge to local housing, possibly mid- week.     Per primary team, planning gastric emptying study for abd fullness. Not sure what time the procedure will be. Would need D10% to run when TF interrupted, if NPO at midnight for procedure with NPH on board.     Current  nutritional intake and type: Orders Placed This Encounter      Regular Diet Adult Thin Liquids (level 0)  Cycled TF: Vital 1.5 at 45cc/hour x12 hours (8P-8A), for 101g CHO.    Steroid planning:       PTA Diabetes Regimen: n/a, no prior history of diabetes    Discharge Planning: nearing readiness for discharge - local housing likely, when medically stable.            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           Medications:     Current Facility-Administered Medications   Medication     acetaminophen (TYLENOL) tablet 975 mg     acetylcysteine (MUCOMYST) 20 % nebulizer solution 2 mL     acyclovir (ZOVIRAX) capsule 400 mg     albuterol (PROVENTIL HFA/VENTOLIN HFA) inhaler     amoxicillin (AMOXIL) capsule 500 mg     aspirin EC tablet 81 mg     fiber modular (NUTRISOURCE FIBER) packet 1 packet    And     banatrol plus packet 1 packet     bisacodyl (DULCOLAX) Suppository 10 mg     calcium carbonate 600 mg-vitamin D 400 units (CALTRATE) per tablet 1 tablet     carboxymethylcellulose PF (REFRESH PLUS) 0.5 % ophthalmic solution 1 drop     dapsone (ACZONE) tablet 50 mg     dextrose 10% infusion     glucose gel 15-30 g    Or     dextrose 50 % injection 25-50 mL    Or     glucagon injection 1 mg     diltiazem ER COATED BEADS (CARDIZEM CD/CARTIA XT) 24 hr capsule 240 mg     doxycycline hyclate (VIBRAMYCIN) capsule 100 mg     famotidine (PEPCID) tablet 20 mg     fluticasone (FLONASE) 50 MCG/ACT spray 1 spray     furosemide (LASIX) tablet 40 mg     gabapentin (NEURONTIN) capsule 100 mg     heparin ANTICOAGULANT injection 5,000 Units     hydrOXYzine (ATARAX) tablet 25 mg     insulin aspart (NovoLOG) injection (RAPID ACTING)     insulin aspart (NovoLOG) injection (RAPID ACTING)     insulin aspart (NovoLOG)  injection (RAPID ACTING)     insulin NPH injection 18 Units     labetalol (NORMODYNE/TRANDATE) injection 10 mg     levalbuterol (XOPENEX) neb solution 1.25 mg     lidocaine (LMX4) cream     lidocaine 1 % 0.1-1 mL     lidocaine 3%, phenylephrine 0.25% solution for irrigation     loperamide (IMODIUM) capsule 2 mg     loratadine (CLARITIN) tablet 10 mg     magnesium gluconate (MAGONATE) tablet 500 mg     magnesium oxide (MAG-OX) tablet 400 mg     May take regular AM medications except those listed below.     methocarbamol (ROBAXIN) tablet 500 mg     metoprolol tartrate (LOPRESSOR) tablet 25 mg     multivitamin w/minerals (THERA-VIT-M) tablet 1 tablet     mycophenolic acid (GENERIC EQUIVALENT) EC tablet 720 mg     naloxone (NARCAN) injection 0.2 mg    Or     naloxone (NARCAN) injection 0.4 mg    Or     naloxone (NARCAN) injection 0.2 mg    Or     naloxone (NARCAN) injection 0.4 mg     No lozenges or gum should be given while patient on BIPAP/AVAPS/AVAPS AE     nystatin (MYCOSTATIN) suspension 1,000,000 Units     ondansetron (ZOFRAN-ODT) ODT tab 4 mg    Or     ondansetron (ZOFRAN) injection 4 mg     oxyCODONE IR (ROXICODONE) half-tab 2.5 mg     pantoprazole (PROTONIX) EC tablet 40 mg     Patient may continue current oral medications     Patient may continue current oral medications     polyethylene glycol (MIRALAX) Packet 17 g     predniSONE (DELTASONE) tablet 12.5 mg     [START ON 3/15/2022] predniSONE (DELTASONE) tablet 12.5 mg     protein modular (PROSOURCE TF) 1 packet     rosuvastatin (CRESTOR) tablet 5 mg     senna-docusate (SENOKOT-S/PERICOLACE) 8.6-50 MG per tablet 1 tablet     sennosides (SENOKOT) tablet 8.6 mg     simethicone (MYLICON) chewable tablet 80 mg     sodium chloride (PF) 0.9% PF flush 3 mL     sodium chloride (PF) 0.9% PF flush 3 mL     sodium chloride (PF) 0.9% PF flush 3 mL     sodium chloride (PF) 0.9% PF flush 3 mL     tacrolimus (GENERIC EQUIVALENT) capsule 3 mg     tacrolimus (GENERIC  "EQUIVALENT) capsule 3.5 mg     Facility-Administered Medications Ordered in Other Encounters   Medication     sodium chloride (PF) 0.9% PF flush 10 mL            Physical Exam:    BP (!) 141/57 (BP Location: Left arm)   Pulse 104   Temp 97.6  F (36.4  C) (Oral)   Resp 24   Ht 1.575 m (5' 2\")   Wt 67.4 kg (148 lb 8 oz)   SpO2 96%   BMI 27.16 kg/m    General: pleasant, in no distress, sitting up in chair.  present and attentive.    HEENT: normocephalic, atraumatic. Oral mucous membranes moist. NGT in place.   Lungs: unlabored respiration, no cough  ABD: rounded,  Mildly distended  Skin: warm and dry, no obvious lesions  MSK:  moves all extremities  Lymp:  no LE edema   Mental status:  alert, oriented to self, place, time  Psych:  bright affect, calm and appropriate interaction             Data:     Recent Labs   Lab 03/14/22  0808 03/14/22  0640 03/14/22  0551 03/14/22  0142 03/13/22  2209 03/13/22  1750   * 144* 153* 162* 157* 272*     Lab Results   Component Value Date    WBC 9.0 03/14/2022    WBC 10.4 03/13/2022    WBC 10.4 03/12/2022    HGB 8.3 (L) 03/14/2022    HGB 9.0 (L) 03/13/2022    HGB 8.1 (L) 03/12/2022    HCT 27.5 (L) 03/14/2022    HCT 29.3 (L) 03/13/2022    HCT 27.4 (L) 03/12/2022     03/14/2022    MCV 99 03/13/2022     (H) 03/12/2022     03/14/2022     03/13/2022     03/12/2022     Lab Results   Component Value Date     03/13/2022     03/12/2022     03/11/2022    POTASSIUM 4.2 03/13/2022    POTASSIUM 4.3 03/12/2022    POTASSIUM 4.7 03/11/2022    CHLORIDE 96 03/13/2022    CHLORIDE 100 03/12/2022    CHLORIDE 98 03/11/2022    CO2 36 (H) 03/13/2022    CO2 32 03/12/2022    CO2 35 (H) 03/11/2022     (H) 03/14/2022     (H) 03/13/2022     (H) 03/13/2022     Lab Results   Component Value Date    BUN 28 03/14/2022    BUN 26 03/13/2022    BUN 26 03/12/2022     Lab Results   Component Value Date    TSH 1.38 03/25/2019 "     Lab Results   Component Value Date    AST 14 03/14/2022    AST 18 03/13/2022    AST 12 03/12/2022    ALT 26 03/14/2022    ALT 32 03/13/2022    ALT 31 03/12/2022    ALKPHOS 117 03/14/2022    ALKPHOS 132 03/13/2022    ALKPHOS 117 03/12/2022       35 minutes spent on the date of the encounter doing chart review, history and exam, documentation and further activities per the note      Over 50% of my time on the unit was spent counseling the patient and/or coordinating care regarding acute hyperglycemia management.  See note for details.    To contact Endocrine Diabetes service:   From 8AM-4PM: page inpatient diabetes provider that is following the patient  For questions or updates from 4PM-8AM: page the diabetes job code for on call fellow: [phone]    Sherie Lira PA-C  Inpatient Diabetes Management Service  Pager [phone]

## 2022-03-15 ENCOUNTER — APPOINTMENT (OUTPATIENT)
Dept: PHYSICAL THERAPY | Facility: CLINIC | Age: 67
DRG: 007 | End: 2022-03-15
Attending: SURGERY
Payer: MEDICARE

## 2022-03-15 ENCOUNTER — APPOINTMENT (OUTPATIENT)
Dept: NUCLEAR MEDICINE | Facility: CLINIC | Age: 67
DRG: 007 | End: 2022-03-15
Attending: NURSE PRACTITIONER
Payer: MEDICARE

## 2022-03-15 ENCOUNTER — APPOINTMENT (OUTPATIENT)
Dept: OCCUPATIONAL THERAPY | Facility: CLINIC | Age: 67
DRG: 007 | End: 2022-03-15
Attending: SURGERY
Payer: MEDICARE

## 2022-03-15 LAB
ALBUMIN SERPL-MCNC: 2.5 G/DL (ref 3.4–5)
ALP SERPL-CCNC: 111 U/L (ref 40–150)
ALT SERPL W P-5'-P-CCNC: 25 U/L (ref 0–50)
ANION GAP SERPL CALCULATED.3IONS-SCNC: 5 MMOL/L (ref 3–14)
AST SERPL W P-5'-P-CCNC: 12 U/L (ref 0–45)
BASE EXCESS BLDA CALC-SCNC: 13.1 MMOL/L (ref -9–1.8)
BASE EXCESS BLDV CALC-SCNC: 12.5 MMOL/L (ref -7.7–1.9)
BASOPHILS # BLD AUTO: 0 10E3/UL (ref 0–0.2)
BASOPHILS NFR BLD AUTO: 0 %
BILIRUB SERPL-MCNC: 0.4 MG/DL (ref 0.2–1.3)
BUN SERPL-MCNC: 27 MG/DL (ref 7–30)
CALCIUM SERPL-MCNC: 8.3 MG/DL (ref 8.5–10.1)
CHLORIDE BLD-SCNC: 97 MMOL/L (ref 94–109)
CO2 SERPL-SCNC: 34 MMOL/L (ref 20–32)
CREAT SERPL-MCNC: 0.5 MG/DL (ref 0.52–1.04)
EOSINOPHIL # BLD AUTO: 0 10E3/UL (ref 0–0.7)
EOSINOPHIL NFR BLD AUTO: 0 %
ERYTHROCYTE [DISTWIDTH] IN BLOOD BY AUTOMATED COUNT: 19.9 % (ref 10–15)
GFR SERPL CREATININE-BSD FRML MDRD: >90 ML/MIN/1.73M2
GLUCOSE BLD-MCNC: 147 MG/DL (ref 70–99)
GLUCOSE BLDC GLUCOMTR-MCNC: 119 MG/DL (ref 70–99)
GLUCOSE BLDC GLUCOMTR-MCNC: 129 MG/DL (ref 70–99)
GLUCOSE BLDC GLUCOMTR-MCNC: 142 MG/DL (ref 70–99)
GLUCOSE BLDC GLUCOMTR-MCNC: 143 MG/DL (ref 70–99)
GLUCOSE BLDC GLUCOMTR-MCNC: 178 MG/DL (ref 70–99)
HCO3 BLD-SCNC: 40 MMOL/L (ref 21–28)
HCO3 BLDV-SCNC: 40 MMOL/L (ref 21–28)
HCT VFR BLD AUTO: 26.7 % (ref 35–47)
HGB BLD-MCNC: 7.9 G/DL (ref 11.7–15.7)
IMM GRANULOCYTES # BLD: 0.1 10E3/UL
IMM GRANULOCYTES NFR BLD: 1 %
LACTATE SERPL-SCNC: 1.3 MMOL/L (ref 0.7–2)
LYMPHOCYTES # BLD AUTO: 0.6 10E3/UL (ref 0.8–5.3)
LYMPHOCYTES NFR BLD AUTO: 8 %
MAGNESIUM SERPL-MCNC: 1.8 MG/DL (ref 1.6–2.3)
MCH RBC QN AUTO: 30.3 PG (ref 26.5–33)
MCHC RBC AUTO-ENTMCNC: 29.6 G/DL (ref 31.5–36.5)
MCV RBC AUTO: 102 FL (ref 78–100)
MONOCYTES # BLD AUTO: 0.5 10E3/UL (ref 0–1.3)
MONOCYTES NFR BLD AUTO: 6 %
NEUTROPHILS # BLD AUTO: 7 10E3/UL (ref 1.6–8.3)
NEUTROPHILS NFR BLD AUTO: 85 %
NRBC # BLD AUTO: 0 10E3/UL
NRBC BLD AUTO-RTO: 0 /100
O2/TOTAL GAS SETTING VFR VENT: 24 %
O2/TOTAL GAS SETTING VFR VENT: 25 %
PCO2 BLD: 72 MM HG (ref 35–45)
PCO2 BLDV: 68 MM HG (ref 40–50)
PH BLD: 7.36 [PH] (ref 7.35–7.45)
PH BLDV: 7.38 [PH] (ref 7.32–7.43)
PHOSPHATE SERPL-MCNC: 3.7 MG/DL (ref 2.5–4.5)
PLATELET # BLD AUTO: 361 10E3/UL (ref 150–450)
PO2 BLD: 69 MM HG (ref 80–105)
PO2 BLDV: 59 MM HG (ref 25–47)
POTASSIUM BLD-SCNC: 4 MMOL/L (ref 3.4–5.3)
PROT SERPL-MCNC: 5.2 G/DL (ref 6.8–8.8)
RBC # BLD AUTO: 2.61 10E6/UL (ref 3.8–5.2)
SODIUM SERPL-SCNC: 136 MMOL/L (ref 133–144)
WBC # BLD AUTO: 8.3 10E3/UL (ref 4–11)

## 2022-03-15 PROCEDURE — 99233 SBSQ HOSP IP/OBS HIGH 50: CPT | Performed by: STUDENT IN AN ORGANIZED HEALTH CARE EDUCATION/TRAINING PROGRAM

## 2022-03-15 PROCEDURE — 250N000013 HC RX MED GY IP 250 OP 250 PS 637: Performed by: PHYSICIAN ASSISTANT

## 2022-03-15 PROCEDURE — 80053 COMPREHEN METABOLIC PANEL: CPT | Performed by: NURSE PRACTITIONER

## 2022-03-15 PROCEDURE — 343N000001 HC RX 343: Performed by: STUDENT IN AN ORGANIZED HEALTH CARE EDUCATION/TRAINING PROGRAM

## 2022-03-15 PROCEDURE — 82803 BLOOD GASES ANY COMBINATION: CPT | Performed by: NURSE PRACTITIONER

## 2022-03-15 PROCEDURE — 97530 THERAPEUTIC ACTIVITIES: CPT | Mod: GP | Performed by: PHYSICAL THERAPIST

## 2022-03-15 PROCEDURE — 250N000013 HC RX MED GY IP 250 OP 250 PS 637: Performed by: STUDENT IN AN ORGANIZED HEALTH CARE EDUCATION/TRAINING PROGRAM

## 2022-03-15 PROCEDURE — 84100 ASSAY OF PHOSPHORUS: CPT | Performed by: NURSE PRACTITIONER

## 2022-03-15 PROCEDURE — 94660 CPAP INITIATION&MGMT: CPT

## 2022-03-15 PROCEDURE — 250N000012 HC RX MED GY IP 250 OP 636 PS 637: Performed by: PHYSICIAN ASSISTANT

## 2022-03-15 PROCEDURE — 78264 GASTRIC EMPTYING IMG STUDY: CPT | Mod: 26 | Performed by: RADIOLOGY

## 2022-03-15 PROCEDURE — 999N000157 HC STATISTIC RCP TIME EA 10 MIN

## 2022-03-15 PROCEDURE — 83605 ASSAY OF LACTIC ACID: CPT | Performed by: STUDENT IN AN ORGANIZED HEALTH CARE EDUCATION/TRAINING PROGRAM

## 2022-03-15 PROCEDURE — 85025 COMPLETE CBC W/AUTO DIFF WBC: CPT | Performed by: NURSE PRACTITIONER

## 2022-03-15 PROCEDURE — 83735 ASSAY OF MAGNESIUM: CPT | Performed by: NURSE PRACTITIONER

## 2022-03-15 PROCEDURE — 94668 MNPJ CHEST WALL SBSQ: CPT

## 2022-03-15 PROCEDURE — 120N000002 HC R&B MED SURG/OB UMMC

## 2022-03-15 PROCEDURE — 36415 COLL VENOUS BLD VENIPUNCTURE: CPT | Performed by: NURSE PRACTITIONER

## 2022-03-15 PROCEDURE — 250N000012 HC RX MED GY IP 250 OP 636 PS 637: Performed by: NURSE PRACTITIONER

## 2022-03-15 PROCEDURE — 97116 GAIT TRAINING THERAPY: CPT | Mod: GP | Performed by: PHYSICAL THERAPIST

## 2022-03-15 PROCEDURE — 999N000033 HC STATISTIC CHRONIC PULMONARY DISEASE SPECIALIST

## 2022-03-15 PROCEDURE — 99233 SBSQ HOSP IP/OBS HIGH 50: CPT | Mod: 24 | Performed by: NURSE PRACTITIONER

## 2022-03-15 PROCEDURE — 36415 COLL VENOUS BLD VENIPUNCTURE: CPT | Performed by: STUDENT IN AN ORGANIZED HEALTH CARE EDUCATION/TRAINING PROGRAM

## 2022-03-15 PROCEDURE — 250N000009 HC RX 250: Performed by: PHYSICIAN ASSISTANT

## 2022-03-15 PROCEDURE — 258N000001 HC RX 258: Performed by: CLINICAL NURSE SPECIALIST

## 2022-03-15 PROCEDURE — 250N000013 HC RX MED GY IP 250 OP 250 PS 637: Performed by: PEDIATRICS

## 2022-03-15 PROCEDURE — 94640 AIRWAY INHALATION TREATMENT: CPT

## 2022-03-15 PROCEDURE — A9541 TC99M SULFUR COLLOID: HCPCS | Performed by: STUDENT IN AN ORGANIZED HEALTH CARE EDUCATION/TRAINING PROGRAM

## 2022-03-15 PROCEDURE — 78264 GASTRIC EMPTYING IMG STUDY: CPT

## 2022-03-15 PROCEDURE — 999N000032 HC STATISTIC CHRONIC DISEASE SPECIALIST RT CONSULT

## 2022-03-15 PROCEDURE — 250N000013 HC RX MED GY IP 250 OP 250 PS 637: Performed by: NURSE PRACTITIONER

## 2022-03-15 PROCEDURE — 94640 AIRWAY INHALATION TREATMENT: CPT | Mod: 76

## 2022-03-15 PROCEDURE — 250N000011 HC RX IP 250 OP 636: Performed by: PHYSICIAN ASSISTANT

## 2022-03-15 PROCEDURE — 97535 SELF CARE MNGMENT TRAINING: CPT | Mod: GO

## 2022-03-15 PROCEDURE — 250N000013 HC RX MED GY IP 250 OP 250 PS 637: Performed by: SURGERY

## 2022-03-15 RX ORDER — SENNOSIDES 8.6 MG
8.6 TABLET ORAL 2 TIMES DAILY PRN
Status: DISCONTINUED | OUTPATIENT
Start: 2022-03-15 | End: 2022-03-17 | Stop reason: HOSPADM

## 2022-03-15 RX ADMIN — PREDNISONE 12.5 MG: 10 TABLET ORAL at 20:01

## 2022-03-15 RX ADMIN — METHOCARBAMOL TABLETS 500 MG: 500 TABLET, COATED ORAL at 20:01

## 2022-03-15 RX ADMIN — LORATADINE 10 MG: 10 TABLET ORAL at 09:50

## 2022-03-15 RX ADMIN — FAMOTIDINE 20 MG: 20 TABLET ORAL at 09:49

## 2022-03-15 RX ADMIN — NYSTATIN 1000000 UNITS: 100000 SUSPENSION ORAL at 09:50

## 2022-03-15 RX ADMIN — Medication 400 MG: at 13:43

## 2022-03-15 RX ADMIN — LEVALBUTEROL HYDROCHLORIDE 1.25 MG: 1.25 SOLUTION RESPIRATORY (INHALATION) at 14:43

## 2022-03-15 RX ADMIN — LEVALBUTEROL HYDROCHLORIDE 1.25 MG: 1.25 SOLUTION RESPIRATORY (INHALATION) at 19:36

## 2022-03-15 RX ADMIN — ACETAMINOPHEN 975 MG: 325 TABLET ORAL at 15:06

## 2022-03-15 RX ADMIN — NYSTATIN 1000000 UNITS: 100000 SUSPENSION ORAL at 16:59

## 2022-03-15 RX ADMIN — METHOCARBAMOL TABLETS 500 MG: 500 TABLET, COATED ORAL at 09:50

## 2022-03-15 RX ADMIN — DOXYCYCLINE HYCLATE 100 MG: 100 CAPSULE ORAL at 20:01

## 2022-03-15 RX ADMIN — PANTOPRAZOLE SODIUM 40 MG: 40 TABLET, DELAYED RELEASE ORAL at 09:50

## 2022-03-15 RX ADMIN — TACROLIMUS 3 MG: 1 CAPSULE ORAL at 17:00

## 2022-03-15 RX ADMIN — CALCIUM CARBONATE 600 MG (1,500 MG)-VITAMIN D3 400 UNIT TABLET 1 TABLET: at 17:01

## 2022-03-15 RX ADMIN — ROSUVASTATIN CALCIUM 5 MG: 5 TABLET, FILM COATED ORAL at 09:49

## 2022-03-15 RX ADMIN — FUROSEMIDE 40 MG: 20 TABLET ORAL at 09:48

## 2022-03-15 RX ADMIN — HEPARIN SODIUM 5000 UNITS: 5000 INJECTION, SOLUTION INTRAVENOUS; SUBCUTANEOUS at 05:28

## 2022-03-15 RX ADMIN — Medication 2 MILLICURIE: at 08:34

## 2022-03-15 RX ADMIN — FLUTICASONE PROPIONATE 1 SPRAY: 50 SPRAY, METERED NASAL at 09:58

## 2022-03-15 RX ADMIN — ASPIRIN 81 MG: 81 TABLET, COATED ORAL at 09:47

## 2022-03-15 RX ADMIN — SIMETHICONE 80 MG: 80 TABLET, CHEWABLE ORAL at 09:49

## 2022-03-15 RX ADMIN — NYSTATIN 1000000 UNITS: 100000 SUSPENSION ORAL at 13:39

## 2022-03-15 RX ADMIN — DILTIAZEM HYDROCHLORIDE 240 MG: 240 CAPSULE, COATED, EXTENDED RELEASE ORAL at 09:50

## 2022-03-15 RX ADMIN — AMOXICILLIN 500 MG: 500 CAPSULE ORAL at 00:00

## 2022-03-15 RX ADMIN — MYCOPHENOLIC ACID 360 MG: 360 TABLET, DELAYED RELEASE ORAL at 20:01

## 2022-03-15 RX ADMIN — GABAPENTIN 100 MG: 100 CAPSULE ORAL at 21:43

## 2022-03-15 RX ADMIN — METHOCARBAMOL TABLETS 500 MG: 500 TABLET, COATED ORAL at 16:58

## 2022-03-15 RX ADMIN — METOPROLOL TARTRATE 25 MG: 25 TABLET, FILM COATED ORAL at 09:50

## 2022-03-15 RX ADMIN — AMOXICILLIN 500 MG: 500 CAPSULE ORAL at 23:28

## 2022-03-15 RX ADMIN — SIMETHICONE 80 MG: 80 TABLET, CHEWABLE ORAL at 13:38

## 2022-03-15 RX ADMIN — AMOXICILLIN 500 MG: 500 CAPSULE ORAL at 16:59

## 2022-03-15 RX ADMIN — FAMOTIDINE 20 MG: 20 TABLET ORAL at 20:01

## 2022-03-15 RX ADMIN — DOXYCYCLINE HYCLATE 100 MG: 100 CAPSULE ORAL at 09:50

## 2022-03-15 RX ADMIN — PANTOPRAZOLE SODIUM 40 MG: 40 TABLET, DELAYED RELEASE ORAL at 16:58

## 2022-03-15 RX ADMIN — Medication 400 MG: at 17:01

## 2022-03-15 RX ADMIN — AMOXICILLIN 500 MG: 500 CAPSULE ORAL at 09:49

## 2022-03-15 RX ADMIN — LOPERAMIDE HYDROCHLORIDE 2 MG: 2 CAPSULE ORAL at 13:39

## 2022-03-15 RX ADMIN — ACYCLOVIR 400 MG: 200 CAPSULE ORAL at 20:01

## 2022-03-15 RX ADMIN — Medication 1 TABLET: at 13:37

## 2022-03-15 RX ADMIN — OXYCODONE HYDROCHLORIDE 2.5 MG: 5 TABLET ORAL at 15:06

## 2022-03-15 RX ADMIN — METOPROLOL TARTRATE 25 MG: 25 TABLET, FILM COATED ORAL at 20:01

## 2022-03-15 RX ADMIN — LOPERAMIDE HYDROCHLORIDE 2 MG: 2 CAPSULE ORAL at 09:49

## 2022-03-15 RX ADMIN — CALCIUM CARBONATE 600 MG (1,500 MG)-VITAMIN D3 400 UNIT TABLET 1 TABLET: at 09:50

## 2022-03-15 RX ADMIN — ACYCLOVIR 400 MG: 200 CAPSULE ORAL at 09:50

## 2022-03-15 RX ADMIN — Medication 500 MG: at 10:32

## 2022-03-15 RX ADMIN — ACETYLCYSTEINE 2 ML: 200 SOLUTION ORAL; RESPIRATORY (INHALATION) at 09:53

## 2022-03-15 RX ADMIN — DEXTROSE MONOHYDRATE: 100 INJECTION, SOLUTION INTRAVENOUS at 01:10

## 2022-03-15 RX ADMIN — SIMETHICONE 80 MG: 80 TABLET, CHEWABLE ORAL at 17:00

## 2022-03-15 RX ADMIN — LEVALBUTEROL HYDROCHLORIDE 1.25 MG: 1.25 SOLUTION RESPIRATORY (INHALATION) at 09:53

## 2022-03-15 RX ADMIN — ACETYLCYSTEINE 2 ML: 200 SOLUTION ORAL; RESPIRATORY (INHALATION) at 19:36

## 2022-03-15 RX ADMIN — METHOCARBAMOL TABLETS 500 MG: 500 TABLET, COATED ORAL at 13:43

## 2022-03-15 RX ADMIN — ACETYLCYSTEINE 2 ML: 200 SOLUTION ORAL; RESPIRATORY (INHALATION) at 14:44

## 2022-03-15 RX ADMIN — NYSTATIN 1000000 UNITS: 100000 SUSPENSION ORAL at 20:01

## 2022-03-15 RX ADMIN — MYCOPHENOLIC ACID 360 MG: 360 TABLET, DELAYED RELEASE ORAL at 09:50

## 2022-03-15 RX ADMIN — TACROLIMUS 3.5 MG: 1 CAPSULE ORAL at 09:48

## 2022-03-15 RX ADMIN — PREDNISONE 12.5 MG: 2.5 TABLET ORAL at 09:49

## 2022-03-15 RX ADMIN — SIMETHICONE 80 MG: 80 TABLET, CHEWABLE ORAL at 20:50

## 2022-03-15 RX ADMIN — HEPARIN SODIUM 5000 UNITS: 5000 INJECTION, SOLUTION INTRAVENOUS; SUBCUTANEOUS at 13:40

## 2022-03-15 RX ADMIN — HEPARIN SODIUM 5000 UNITS: 5000 INJECTION, SOLUTION INTRAVENOUS; SUBCUTANEOUS at 21:43

## 2022-03-15 RX ADMIN — DAPSONE 50 MG: 25 TABLET ORAL at 09:47

## 2022-03-15 ASSESSMENT — ACTIVITIES OF DAILY LIVING (ADL)
ADLS_ACUITY_SCORE: 7

## 2022-03-15 NOTE — PROGRESS NOTES
[facility]    Medicine Progress Note - Hospitalist Service, GOLD TEAM 10    Date of Admission:  2/20/2022    Assessment & Plan    Melissa Elder is a 66 year old female with PMHx HTN, HLD, paroxysmal Afib, osteoporosis, GERD, severe COPD, previously requiring 2-3L NC O2 at rest.  Underwent b/l lung transplant with Dr. Robin on 02/21. Got 1u pRBC post-op, no overt bleeding source, monitoring. Extubated 02//22 to O2 NC and transferred to the floor. Pericardial drain pulled 2/24/22.      Had left chest tube pulled by [facility] 2/25, 2/26 IR placed new left chest pigtail for hydropneumothorax,      Patient developed encephalopathy, NH3 levels elevated to 57, Rapid improvement with treatment with doxycycline, subsequently cultures from clamshell wound grew Ureaplasm on 3/3.     Right chest tube removed by [facility] on 3/3, later pigtail catheter placed by IR to drain ureaplasma related empyema.     Routine inspection bronch on 3/1 complicated by hypoventilation in setting of oversedation requiring multiple Narcan doses.  Slow improvement in hypercapnia with NIPPV overnight.     Had sniff test 3/11 that was negative, 3/15 had gastric emptying that was negative. 3/15 ordered nighttime oxymetry testing. Continues care inpatient before discharge home.          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  Pressor weaned off 2/22 and pt. extubated. Prior history of infection/colonization with Haemophilus influenzae (2017).  IgG adequate (2/20).  MRSA nares negative 2/21.  Broad spectrum ABX empirically post-op with vanc and ceftaz (2/21-2/22), stopped after 24h per Dr. Lala to  target known donor cultures on POD #1. Donor cultures ([facility]) with Strep mitis, Actinomyces odontolyticus, and Kenyatta kruseii. Recipient cultures with Actinomyces odonotolyticus.  Acid fast bacillus in sputum from 3/2/22  - Respiratory support with HFNC and BiPAP (discussed below)  - Nebs: Levalbuterol and Mucomyst QID  - Aggressive pulmonary toilet with chest physiotherapy QID as well as Aerobika and incentive spirometry q1h w/a  - Daily CXR while chest tubes in   - Await explant pathology  - Continue ceftriaxone for 2 weeks through 3/8 followed by amoxicillin for 3 months  - monitoring sputum for AFB  - Immune suppression and microbial ppx per daily transplant pulm note  - Continue Valtrex to 1000 mg TID x 14 days for HSV (through 3/11) then complete ppx per protocol (see pulm note)   - Continue diuresis as needed with goal to stay net negative      Acute hypoxic and hypercarbic respiratory failure 3/1, improving   3/1 noted to have slowly rising bicarb on daily labs. Had inspection bronchoscopy 3/1 as well and became oversedated and required multiple doses of narcan. VBG obtained post procedure showed hypercapnea to 99. Hypercarbic respiratory failure likely multifactorial including oversedation, poor diaphragm excursion (SNIFF test 3/3), volume overload, and atelectasis. Started on BiPAP.   - Continue BiPAP at night, HFNC/NC during the day. Wean O2 to keep sats > 92%.   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 Ammonia level checked due to concern for post-transplant hyperammonemia which was mildly elevated at 57, but repeat 49.   - Mycoplasma/ureaplasma cultures collected and Transplant ID consulted               -  Sputum/Pleural fluid +ureaplasma on 3/4. Started on Levofloxacin/Doxycycline for double coverage.     ID   Prior history of infection/colonization with Haemophilus influenzae (2017). Donor ([facility]) cultures with P-S MSSA; ([facility]) with Strep mitis, Actinomyces odontolyticus, MSSA x2, and C. kruseii (concern for possible aspiration).  Recipient cultures at time of transplant with Actinomyces odonotolyticus.  Bronch cultures (2/22) with Saccharomyces cerevisiae (no indication to treat per Dr. Ghosh unless pt. acutely declines clinically, 3/1) and C. albicans.  - IgG repeat at one month (3/21, not yet ordered)  - ABX: amoxicillin (3/8-5/23, s/p ceftriaxone 2/23-3/8) for 3 month course for Actinomyces treatment  Doxycycline for Ureaplasma for 4 week course through 4/1  - Low threshold to treat Candida if it recurs in respiratory cultures, per transplant ID     Leukocytosis  Afebrile, but WBC started rising 2/27 from 14.6 to 23.1 3/2. Evaluation included CT chest/abdomen/pelvis without overt evidence of infection, and pan-culture. Pleural and sputum culture did grow +Ureaplasma and she was started on therapy (as above). C. Diff checked and negative. Leukocytosis has since been improving.   - Repeat CT Abdomen/Pelvis 3/6/22 for interval follow up from initial scan 3/2, as there was concern for enteritis as well as area of pancreatic/kashif-hepatic artery inflammation vs. Infection vs. malignancy.               - Repeat CT with improved small bowel dilation, some persistent perienteric fat stranding accentuated by motion artifact, and unremarkable appearing pancreas   - Continue antibiotics as above      Diarrhea   Dilated bowel on imaging   Noted to have increased loose stools 2/25 likely due to mmf and TF. Fiber added to TF. C.diff checked and negative. Abdominal imaging including CT and XR have shown diffusely dilated bowel, without concern for obstruction. Possibly ileus or gastroparesis from transplant. However patient not  experiencing nausea, vomiting, or decreased stool output.   - Repeat CT 3/6/22 with interval improvement   - Continue to monitor abdominal exam, stool output   - Okay to have immodium PRN      Post op pain   - Erector spinae catheters removed   - gabapentin 100 mg nightly  - tylenol 975 mg Q8H   - Dilaudid 0.2-0.4 mg Q2H PRN   - oxycodone 5-10 mg Q4H PRN   - robaxin 500 mg Q6H scheduled      Paroxysmal Afib   HTN  She was on diltiazem prior to lung transplantation and had not been on anticoagulation. She was  Started on metoprolol in the ICU.  - Continue Metoprolol tartrate to 25 mg BID   - Restarted PTA diltiazem now at 60 mg Q6H, if tolerated can transition back to  mg XL.   - No anticoagulation at this time      Moderate protein calorie malnutrition  - Nutrition following   - On TF, will ask about cycling overnight      Stress induced hyperglycemia  Did not need insulin prior to admission but sugars have been rising despite sliding scale coverage.  - Endocrinology consulted for assistance, appreciate recommendations   - Please see DM team daily note      HLD  Mild CAD   Noted on transplant workup. Started rosuvastatin 40 mg daily--Held 2/28 due to rising LFTs    - restarted rosuvastatin 3/8 will follow LFTs     Acute blood loss anemia  Acute blood loss thrombocytopenia  Secondary to surgery. Will continue to monitor.   - Transfuse Hgb < 7, platelets < 50   - Hemolysis labs negative 3/3, on dapsone, continue to monitor      Sternotomy  Surgical Incision  - Sternal precautions  - Postoperative incision management per protocol  - PT/OT/CR      Elevated LFTs, resolved   Noted on labs 2/26 and rising. AST, ALP, Bili WNL. No RUQ or abdominal pain. Imaging including RUQ ultrasound WNL. Liza-portal edema noted on CT, and exam shows volume overload. Could be the result of congestion versus medication adverse effect. LFTs have since normalized. Will continue to monitor.  - CMP daily   - monitoring closely with  restarting statin       Diet: Snacks/Supplements Adult: Glucerna; Between Meals  Calorie Counts  Regular Diet Adult    DVT Prophylaxis: Pneumatic Compression Devices  Ratliff Catheter: Not present  Central Lines: None  Cardiac Monitoring: None  Code Status: Full Code      Disposition Plan   Expected Discharge: 03/16/2022     Anticipated discharge location: home;inpatient rehabilitation facility    Delays:            The patient's care was discussed with the Bedside Nurse and Patient.    Smith Weston MD  Hospitalist Service, [address]  Securely message with the Vocera Web Console (learn more here)  Text page via [facility] Paging/Directory   Please see signed in provider for up to date coverage information      Clinically Significant Risk Factors Present on Admission                    ______________________________________________________________________    Interval History   Patient has no new symptoms, She appears well, No new abdominal pain, difficulty urinating or fevers.     Data reviewed today: I reviewed all medications, new labs and imaging results over the last 24 hours. I personally reviewed no images or EKG's today.    Physical Exam   Vital Signs: Temp: 97.6  F (36.4  C) Temp src: Oral BP: 130/57 Pulse: 79   Resp: 22 SpO2: 96 % O2 Device: BiPAP/CPAP Oxygen Delivery: 1 LPM  Weight: 150 lbs 1.6 oz  Constitutional: awake, alert, cooperative, no apparent distress, and appears stated age  Eyes: Lids and lashes normal, pupils equal, round and reactive to light, extra ocular muscles intact, sclera clear, conjunctiva normal  ENT: Normocephalic, without obvious abnormality, atraumatic, sinuses nontender on palpation, external ears without lesions, oral pharynx with moist mucous membranes, tonsils without erythema or exudates, gums normal and good dentition.  Hematologic / Lymphatic: no cervical lymphadenopathy  Respiratory: No increased work of breathing, good  air exchange, clear to auscultation bilaterally, no crackles or wheezing  Cardiovascular: Normal apical impulse, regular rate and rhythm, normal S1 and S2, no S3 or S4, and no murmur noted  GI: No scars, normal bowel sounds, soft, non-distended, non-tender, no masses palpated, no hepatosplenomegally  Genitounirinary:   Skin: no bruising or bleeding  Musculoskeletal: There is no redness, warmth, or swelling of the joints.  Full range of motion noted.  Motor strength is 5 out of 5 all extremities bilaterally.  Tone is normal.  Neurologic: Awake, alert, oriented to name, place and time.  Cranial nerves II-XII are grossly intact.  Motor is 5 out of 5 bilaterally.  Cerebellar finger to nose, heel to shin intact.  Sensory is intact.  Babinski down going, Romberg negative, and gait is normal.  Neuropsychiatric: General: normal, calm and normal eye contact    Data   Recent Labs   Lab 03/15/22  1337 03/15/22  0643 03/15/22  0527 03/14/22  0640 03/14/22  0551 03/13/22  1238 03/13/22  1025   WBC  --  8.3  --   --  9.0  --  10.4   HGB  --  7.9*  --   --  8.3*  --  9.0*   MCV  --  102*  --   --  100  --  99   PLT  --  361  --   --  413  --  448   NA  --  136  --   --  139  --  137   POTASSIUM  --  4.0  --   --  4.5  --  4.2   CHLORIDE  --  97  --   --  97  --  96   CO2  --  34*  --   --  39*  --  36*   BUN  --  27  --   --  28  --  26   CR  --  0.50*  --   --  0.53  --  0.50*   ANIONGAP  --  5  --   --  3  --  5   MINISTERIO  --  8.3*  --   --  8.6  --  8.8   * 147* 142*   < > 153*   < > 129*   ALBUMIN  --  2.5*  --   --  2.7*  --  2.8*   PROTTOTAL  --  5.2*  --   --  5.7*  --  6.2*   BILITOTAL  --  0.4  --   --  0.4  --  0.4   ALKPHOS  --  111  --   --  117  --  132   ALT  --  25  --   --  26  --  32   AST  --  12  --   --  14  --  18    < > = values in this interval not displayed.       Medications     dextrose 70 mL/hr at 03/15/22 0110     - MEDICATION INSTRUCTIONS -       - MEDICATION INSTRUCTIONS -       - MEDICATION  INSTRUCTIONS -       - MEDICATION INSTRUCTIONS -         acetylcysteine  2 mL Nebulization TID     acyclovir  400 mg Oral BID     amoxicillin  500 mg Oral Q8H JUANA     aspirin  81 mg Oral Daily     [Held by provider] fiber modular (NUTRISOURCE FIBER)  1 packet Per Feeding Tube BID    And     [Held by provider] banatrol plus  1 packet Per Feeding Tube Daily at 4 pm     calcium carbonate 600 mg-vitamin D 400 units  1 tablet Oral BID w/meals     dapsone  50 mg Oral Daily     diltiazem ER COATED BEADS  240 mg Oral Daily     doxycycline hyclate  100 mg Oral Q12H JUANA     famotidine  20 mg Oral BID     fluticasone  1 spray Both Nostrils Daily     furosemide  40 mg Oral Daily     gabapentin  100 mg Oral At Bedtime     heparin ANTICOAGULANT  5,000 Units Subcutaneous Q8H     insulin aspart  1-9 Units Subcutaneous TID w/meals     insulin aspart  1-5 Units Subcutaneous At Bedtime     insulin aspart   Subcutaneous Daily with breakfast     insulin aspart   Subcutaneous Daily with lunch     insulin aspart   Subcutaneous Daily with supper     levalbuterol  1.25 mg Nebulization TID     lidocaine 3%, phenylephrine 0.25% solution for irrigation  5 mL Irrigation Once     loperamide  2 mg Oral TID     loratadine  10 mg Oral Daily     magnesium gluconate  500 mg Oral Daily     magnesium oxide  400 mg Oral BID     methocarbamol  500 mg Oral 4x Daily     metoprolol tartrate  25 mg Oral BID     multivitamin w/minerals  1 tablet Oral Daily     mycophenolic acid  360 mg Oral BID     nystatin  1,000,000 Units Swish & Swallow 4x Daily     pantoprazole  40 mg Oral BID AC     predniSONE  12.5 mg Oral BID     [Held by provider] protein modular  1 packet Per Feeding Tube BID     rosuvastatin  5 mg Oral Daily     simethicone  80 mg Oral 4x Daily     sodium chloride (PF)  3 mL Intracatheter Q8H     sodium chloride (PF)  3 mL Intracatheter Q8H     tacrolimus  3 mg Oral QPM     tacrolimus  3.5 mg Oral QAM

## 2022-03-15 NOTE — PLAN OF CARE
Major Shift Events:  A&Ox4, pleasant. SR-sinus tach, afebrile. Pt is on RA during day, a sleep study trial was completed for 2 hours - pt was placed on 1-2L NC (started at 2 and tapered to 1L) to help with some little desatting as pt fell asleep, ABG collected, then pt placed back on BiPAP 18/6 at 25%. Incision care was completed in the evening. Pt currently NPO for Nuc Med test this AM. TF discontinued yesterday per dietician recommendations, pt placed on jennie counts for next 3 days. NPH insulin was given @ 2000 and per endocrine note/order, D10 drip was started @ 70mL/hr to prevent hypoglycemia, will continue to monitor BG. Pt denied pain this shift.  Plan: monitor BG, nuc med test this AM, notify tean with any acute changes    For vital signs and complete assessments, please see documentation flowsheets.      Goal Outcome Evaluation:    Plan of Care Reviewed With: patient     Overall Patient Progress: no change    Outcome Evaluation: Pt trialed sleep study for 2 hours tonight, ABG collected and pt placed back on BiPAP. TF discontinued yesterday to trial intake without them. Pt on jennie counts for next 3 days.

## 2022-03-15 NOTE — CONSULTS
Chronic Pulmonary Disease Specialist  Progress Note       Patient: Melissa Elder                  MRN:  [id number]  Date of Admission:  2/20/2022    CPDS team was contacted by lung transplant team yesterday, 3/14/2022, regarding procurement of a NIV device (Trilogy) for home use at discharge for the above mentioned patient. Patient is on the hospital BIPAP with insp/exp pressures of 18/6, B/U RR 18, and 25% FIO2. Review of patient's chart completed. Patient is doing well with the S/T device with minimal oxygen. VBG/ABG's show patient is compensated on the S/T device.    Discussed with transplant team that Trilogy NIV may be too advanced for the patient's needs at this time. Patient is willing to go for a sleep study after discharge. Suggested qualifying patient for a home S/T BIPAP device prior to her discharge so she will have machine to bridge her until she has her study. ABG off BIPAP and overnight oximetry study was to be done overnight 3/14/2022. ABG completed with the following results 7.36/72/69/40 on 24% FIO2. However, overnight oximetry was not completed as no order for RT was placed.      Writer connected with team today and explained that study would need to be done on RA and that oxygen or BIPAP should not be placed back on patient unless she desaturates below 88% for a significant period of time. If patient has desaturation below 88% and is not recovering in short period of time then initiation of the lowest liter flow of oxygen could be applied. If patient is requiring more oxygen than her baseline then it is appropriate to place back on the BIPAP. Documentation by the RT and the RN should include all of this data for tracking. Patient needs to have a cumulative desaturation of <88% of at least 5 minutes. For optimal results the study should run for 7 hours, with a minimum time of 2 hours.     Overnight oximetry study to be completed bill [facility] RT was notified of the order. Will follow up  tomorrow to check patient's qualification status for device.    BINA Rabago, RRT, CTTS  Chronic Pulmonary Disease Specialist  Office: [phone]   Pager: [phone]

## 2022-03-15 NOTE — PROGRESS NOTES
Inpatient Lung Transplant Coordinator Note:      Met with Melissa and her , who will be primary caregiver upon discharge.  Provided patient with 'Adult Lung Transplant Guide' as well as 'Organ Transplant Handbook' with SOT office contact information both during and after office hours. Medication card in progress by nursing staff.    Reviewed the role of the caregiver, resources at home to review such as [facility] videos. Also reviewed transplant lab manual, home vitals and monitoring. Discussed what to expect for outpatient follow up visits such as drug level monitoring, labs, PFTs, chest x-ray, bronchoscopies, etc. Also reviewed organ rejection, incision care, physical restrictions related to sternotomy, infection, environmental modifications, and food safety post transplant.     Anticipate patient may be able to discharge by the weekend or early next week once nutrition plan finalized and patient is medically stable. Urged pt to call SOT office or speak with transplant team if any further questions or concerns arise once pt is discharged. Pt and family denied further questions related to transplant content and discharge instructions at this time. Will continue to check in throughout hospitalization.        Audrey Landa RN, BSN, PHN  Inpatient Lung Transplant Coordinator  Solid Organ Transplant  [facility]  Pager: [phone]

## 2022-03-15 NOTE — PROGRESS NOTES
Shift Summary: RA all day. Gastric study done. Walking hallways x4/day. Good nutritional intake. Off tube feeds. Vitals stable and afebrile. Plan to have oximetry test tonight.    Juan Head RN on 3/15/2022 at 5:28 PM

## 2022-03-15 NOTE — PROGRESS NOTES
Pulmonary Medicine  Cystic Fibrosis - Lung Transplant Team  Progress Note  03/15/2022       Patient: Melissa Elder  MRN: [id number]  : 1955 (age 66 year old)  Transplant: 2022 (Lung), POD#22  Admission date: 2022    Assessment & Plan:     Melissa Elder is a 66 year old female with a PMH significant for severe COPD, HTN, mild non-obstructive CAD, paroxysmal afib, osteoporosis, GERD, and colonic polyps.  Pt. is now s/p BSLT on , surgery relatively uncomplicated.  The patient was extubated , post-op course complicated by right chest tube lodged into fissure and left chest tube displacement, s/p bilateral pigtail chest tube placement drain anterior hydropneumothorax and bilateral effusions, disseminated Ureaplasma, and and transient hyperammonemia.  Routine inspection bronch on 3/1 complicated by hypoventilation in setting of oversedation requiring multiple Narcan doses.  Slow improvement in hypercapnia with NIPPV overnight, weaned off daytime hypoxemia resolved 3/9.  Nutrition independence still limited by symptoms of gastroparesis, discharge pending nutrition plan.     Today's recommendations:  - Overnight oximetry study (on RA if able, off NIPPV) for 2h tonight to complete data required for home NIPPV qualification  - Tacrolimus level 3/17 (ordered)  - Prednisone tapered today per protocol  - DSA, IgG, EBV, and CMV due 3/21  - Amoxicillin for total of 3 months course (through ) for Actinomyces treatment  - Doxycycline for Ureaplasma for 4 week course through   - Donor risk labs ordered 3/23  - NM gastric emptying study this morning  - Trial of Reglan pending results of NM study, will review tomorrow  - Calorie counts through 3/16 currently      COPD s/p bilateral sequential lung transplant (BSLT):  Acute hypoxic/hypercapneic respiratory failure:   Bilateral pleural effusions/PTX:   Subluxation of sternum: Scant pleural-pleural adhesions and mild-moderate bilateral hilar  lymphadenopathy per op note.  Pathology with CPFE.  Extubated 2/22.  DSA negative 2/28.  Noted hypoventilating with oversedation during bronch on 3/1, received Narcan x3 with improvement in sedation persistent hypercapnia.  Improved with nocturnal BiPAP and daytime HFNC (resolved 3/9).  S/p right pigtail chest tube 3/3 with ureaplasma in exudative effusion (now removed).  Sniff test (3/3) with poor diaphragm excursion bilaterally with extensive accessory respiratory chest wall musculature effort to aid in inspiration.  Repeat sniff test (3/11) without evidence of diaphragmatic palsy.  Trialed off NIPPV 3/11-3/13 with marked increase in hypercapnea.  No daytime hypoxemia.  CXR (3/12) with mild BLL opacities vs effusions (stable).  - Flonase for postnasal drip (3/10)  - Nebs: levalbuterol and Mucomyst TID  - Aggressive pulmonary toilet with chest physiotherapy TID  - Overnight oximetry study (on RA if able, off NIPPV) for 2h tonight to complete data required for home NIPPV qualification, ABG off NIPPV overnight with worsening hypoxemia/hypercapnia (contributing to home NIPPV qualification)  - DSA 3/21 (not yet ordered)  - Diuresis per primary team     Immunosuppression:  S/p induction therapy with basiliximab x2 doses (with high dose IV steroid).  - Tacrolimus 3.5 mg qAM / 3 mg qPM.  Goal level 8-12.  Repeat level 3/17 (ordered).  - Myfortic 360 mg BID (decreased 3/14 d/t diarrhea)  - Prednisone 12.5 mg BID with taper per lung transplant protocol (next due 3/29):  Date AM dose (mg) PM dose (mg)   3/15 12.5 12.5   3/29 12.5 10   4/12 10 10   5/10 10 7.5   6/7 7.5 7.5   7/5 7.5 5   8/2 5 5   8/30 5 2.5      Prophylaxis:   - Dapsone for PJP ppx  - Nystatin for oral candidiasis ppx, 6 month course  - See below for serologies and viral ppx:    Donor Recipient Intervention   CMV status Negative Negative CMV monthly (3/21, not yet ordered, negative 3/11)   EBV status Positive Positive EBV at one month (3/21, not yet ordered)    HSV status N/A (Newly) Positive As below      ID: Prior history of infection/colonization with Haemophilus influenzae (2017). Donor ([facility]) cultures with P-S MSSA; ([facility]) with Strep mitis, Actinomyces odontolyticus, MSSA x2, and C. kruseii (concern for possible aspiration).  Recipient cultures at time of transplant with Actinomyces odonotolyticus.  Bronch cultures (2/22) with Saccharomyces cerevisiae (no indication to treat per Dr. Ghosh unless pt. acutely declines clinically, 3/1) and C. albicans.  - IgG repeat at one month (3/21, not yet ordered)  - ABX: amoxicillin (3/8-5/23, s/p ceftriaxone 2/23-3/8) for 3 month course for Actinomyces treatment  - Low threshold to treat Candida if it recurs in respiratory cultures, per transplant ID     HSV: Pt. with recent seroconversion at time of transplant.  HSV also noted on donor cultures.  Initial plan for 30 days of ppx with ACV post-op per Dr. Lala.  Then with HSV+ bronch at time of transplant (2/21), transitioned to treatment dosing with VACV  x 14 days through 3/12.   - Acyclovir prophylaxis 3/13-23 per protocol     Hyperammonemia, Resolved:   Pleural Fluid and sputum Ureaplasma:   Ammonia mildly elevated on 3/2 but quickly normalized.  Ureaplasma and Mycoplasma studies sent,when patient was somnolent with confusion/visual hallucinations in setting of hypercapnea, PCR for ureaplasma + on sputum and pleural fluid cultures on 3/2. .  - Doxycycline (3/4-4/1) per transplant ID     Positive AFB on sputum culture: Noted on sputum culture 3/2.  Transplant ID following for speciation and susceptibility testing as well as other evidence of infection.   - AFB sputum culture (3/9) NGTD  - Obtain AFB cultures on all future bronchs      PHS risk criteria donor: Additional labs required post-transplant (between 4-8 weeks post-op): Hepatitis B, Hepatitis C, and HIV by COLT (ordered 3/23), with repeat hepatitis B surface antibody ordered at that time given discrepancy with recent  result.     Other relevant problems managed by primary team:     Concern for gastroparesis: Pt. c/o early satiety and persistent fullness t/o the day.  Tube feeds post-pyloric, not likely to be contributing significantly.    - NM gastric emptying study this morning  - Trial of Reglan pending results of NM study, will review tomorrow  - Calorie counts extended through 3/16 (plan to extend)     Diarrhea:   Concern for ileus: Likely 2/2 medications and tube feedings.  Fiber added to tube feeds.  MMF decreased as above an switched to myfortic.  Loose stools now improved.  Again having loose/watery stools, also noting some abdominal bloating/fullness.  AXR 3/5 with diffuse distention of small and large bowel suggestive of ileus.  C diff negative 3/6.  Abdomen/pelvis CT (3/6) with decrease in small bowel distention and perienteric fat stranding.  CXR (3/8) with partially imaged likely adynamic ileus.  Bowel regimen initiated 3/8, adjusted 3/11.  Ileus improving by AXR on 3/12.      CAD: Noted to have mild non-obstructive CAD without hemodynamically significant lesions on cardiac cath 3/27/19.  PTA ASA 81 mg and atorvastatin, started on rosuvastatin post-op but held 2/28 for elevated LFTs (as above), resumed 3/9.  ASA resumed 3/1.     Paroxysmal afib:   HTN: PTA diltiazem, not on AC.  Persistent tachycardia post-op, but now with better rate control on metoprolol and PTA diltiazem.     GERD: Not on PPI PTA.  Negative pH/manometry study 3/28/19, noted small hiatal hernia. On PPI daily since 2/21, H2 blocker bridge discontinued 3/2, some increase in reflux symptoms since, resumed 3/5.  PPI increased to BID 3/7.     Hypomagnesemia: Suppressed absorption d/t CNI.  Requiring almost daily IV/PO replacement so started on PO mag oxide, transitioned to gluconate for diarrhea.     We appreciate the excellent care provided by the [place] [id number] team.  Recommendations communicated via in person rounding and this note.  Will continue  "to follow along closely, please do not hesitate to call with any questions or concerns.     Patient discussed with Dr. Hernandez.    Iman Jane, DNP, APRN, CNP  Inpatient Nurse Practitioner  Pulmonary CF/Transplant     Subjective & Interval History:     Remains on RA during the day, BiPAP 18/6 25% overnight except for 2 hour period d/t ABG trial (working to qualify for home O2).  Feels like she is able to breathe a little deeper and with less LUTHER every day.  Cough strength gradually improving but still needing CPT to aid in airway clearance.  Appetite remains limited by early satiety and bloating, cycled TF stopped yesterday (NJ remains) in the middle of NM gastric emptying study this morning.  Up to the chair most of the day yesterday.  Continues on diuresis, UO not accurately documented.    Review of Systems:     C: No fever, no chills, no change in weight, no change in appetite  INTEGUMENTARY/SKIN: No rash or obvious new lesions  ENT/MOUTH: No sore throat, no sinus pain, no nasal congestion or drainage  RESP: See interval history  CV: No chest pain, no palpitations, + peripheral edema, no orthopnea  GI: No nausea, no vomiting, stable loose stools, no reflux symptoms  : No dysuria  MUSCULOSKELETAL: + back and chest incisional pain  ENDOCRINE: Blood sugars intermittently elevated  NEURO: No headache, no numbness or tingling  PSYCHIATRIC: Mood stable    Physical Exam:     All notes, images, and labs from past 24 hours (at minimum) were reviewed.    Vital signs:  Temp: 97.6  F (36.4  C) Temp src: Oral BP: 130/57 Pulse: 79   Resp: 22 SpO2: 98 % O2 Device: BiPAP/CPAP Oxygen Delivery: 1 LPM Height: 157.5 cm (5' 2\") Weight: 68.1 kg (150 lb 1.6 oz)  I/O:     Intake/Output Summary (Last 24 hours) at 3/15/2022 1004  Last data filed at 3/14/2022 2000  Gross per 24 hour   Intake 200 ml   Output 200 ml   Net 0 ml     Constitutional: Sitting up in a chair, in no apparent distress.   HEENT: Eyes with pink conjunctivae, " anicteric.  Oral mucosa moist without lesions.  Right nare NJ tube in place.  PULM: Diminished bases bilaterally.  No crackles, no rhonchi, no wheezes.  Non-labored breathing on RA.  CV: Normal S1 and S2.  RRR.  No murmur, gallop, or rub.  2+ BLE edema (wraps in place).   ABD: NABS, soft, nontender, mildly distended.    MSK: Moves all extremities.  No apparent muscle wasting.   NEURO: Alert and oriented, conversant.   SKIN: Warm, dry.  No rash on limited exam.   PSYCH: Mood stable.     Lines, Drains, and Devices:  Peripheral IV 03/12/22 Right;Anterior Lower forearm (Active)   Site Assessment WDL 03/15/22 0400   Line Status Infusing 03/15/22 0400   Dressing Intervention New dressing ;Other (Comment) 03/12/22 1600   Phlebitis Scale 0-->no symptoms 03/15/22 0400   Infiltration Scale 0 03/15/22 0400   Number of days: 3     Data:     LABS    CMP:   Recent Labs   Lab 03/15/22  0643 03/15/22  0527 03/15/22  0201 03/14/22  2119 03/14/22  0640 03/14/22  0551 03/13/22  1238 03/13/22  1025 03/12/22  1155 03/12/22  0645     --   --   --   --  139  --  137  --  138   POTASSIUM 4.0  --   --   --   --  4.5  --  4.2  --  4.3   CHLORIDE 97  --   --   --   --  97  --  96  --  100   CO2 34*  --   --   --   --  39*  --  36*  --  32   ANIONGAP 5  --   --   --   --  3  --  5  --  6   * 142* 143* 104*   < > 153*   < > 129*   < > 144*   BUN 27  --   --   --   --  28  --  26  --  26   CR 0.50*  --   --   --   --  0.53  --  0.50*  --  0.52   GFRESTIMATED >90  --   --   --   --  >90  --  >90  --  >90   MINISTERIO 8.3*  --   --   --   --  8.6  --  8.8  --  8.4*   MAG 1.8  --   --   --   --  2.0  --  1.7  --  1.8   PHOS 3.7  --   --   --   --  4.1  --  3.6  --  3.9   PROTTOTAL 5.2*  --   --   --   --  5.7*  --  6.2*  --  5.7*   ALBUMIN 2.5*  --   --   --   --  2.7*  --  2.8*  --  2.6*   BILITOTAL 0.4  --   --   --   --  0.4  --  0.4  --  0.4   ALKPHOS 111  --   --   --   --  117  --  132  --  117   AST 12  --   --   --   --  14  --  18   --  12   ALT 25  --   --   --   --  26  --  32  --  31    < > = values in this interval not displayed.     CBC:   Recent Labs   Lab 03/15/22  0643 03/14/22  0551 03/13/22  1025 03/12/22  0645   WBC 8.3 9.0 10.4 10.4   RBC 2.61* 2.75* 2.95* 2.67*   HGB 7.9* 8.3* 9.0* 8.1*   HCT 26.7* 27.5* 29.3* 27.4*   * 100 99 103*   MCH 30.3 30.2 30.5 30.3   MCHC 29.6* 30.2* 30.7* 29.6*   RDW 19.9* 20.3* 19.9* 19.7*    413 448 279       INR: No lab results found in last 7 days.    Glucose:   Recent Labs   Lab 03/15/22  0643 03/15/22  0527 03/15/22  0201 03/14/22  2119 03/14/22  1718 03/14/22  1206   * 142* 143* 104* 244* 114*       Blood Gas:   Recent Labs   Lab 03/15/22  0643 03/15/22  0037 03/14/22  0551 03/13/22  1025   PHV 7.38  --  7.42 7.37   PCO2V 68*  --  66* 72*   PO2V 59*  --  37 16*   HCO3V 40*  --  43* 41*   ARIEL 12.5*  --  16.0* 13.2*   O2PER 25 24 21 21       Culture Data No results for input(s): CULT in the last 168 hours.    Virology Data: No results found for: INFLUA, FLUAH1, FLUAH3, QY4758, IFLUB, RSVA, RSVB, PIV1, PIV2, PIV3, HMPV, HRVS, ADVBE, ADVC    Historical CMV results (last 3 of prior testing):  Lab Results   Component Value Date    CMVQNT Not Detected 03/11/2022     No results found for: CMVLOG    Urine Studies    Recent Labs   Lab Test 03/01/22  1549 02/20/22  0248   URINEPH 6.0 7.0   NITRITE Negative Negative   LEUKEST Negative Negative   WBCU 0 <1       Most Recent [place] Pulmonary Function Testing (FVC/FEV1 only)  FVC-Pre   Date Value Ref Range Status   12/20/2021 1.81 L    06/21/2021 1.46 L    12/09/2019 1.59 L    06/03/2019 1.68 L      FVC-%Pred-Pre   Date Value Ref Range Status   12/20/2021 65 %    06/21/2021 52 %    12/09/2019 56 %    06/03/2019 58 %      FEV1-Pre   Date Value Ref Range Status   12/20/2021 0.49 L    06/21/2021 0.50 L    12/09/2019 0.49 L    06/03/2019 0.47 L      FEV1-%Pred-Pre   Date Value Ref Range Status   12/20/2021 22 %    06/21/2021 22 %    12/09/2019 21  %    06/03/2019 20 %        IMAGING    No results found for this or any previous visit (from the past 48 hour(s)).

## 2022-03-15 NOTE — PROGRESS NOTES
IP Diabetes Management  Daily Note           Assessment and Plan:   HPI: Melissa is a 66 year old female with a past medical history of severe COPD, HTN, paroxysmal a fib, GERD, osteoporosis, who was admitted 2/20/22 for BSLT. Melissa does not carry a known prior history of diabetes, nor a family history. However, pre diabetic range A1c done preoperatively (5.8%). She has been intermittently on chronic steroids for years, due to her underlying COPD.     Assessment:   1)Pre-diabetes, versus steroid induced diabetes (A1c 5.8% prior to surgery)  2) current steroid and TF induced hyperglycemia     Plan:    -NPH 18 units daily discontinued. She is no longer on tube feeds   -Novolog adjusted to 1:15g CHO with breakfast and dinner, increase to 1:12g CHO with lunch.   -Novolog  custom 1/35 intensity sliding scale >140 TID AC, and moderate intensity sliding scale HS.   -BG monitoring TID AC, HS, 0200   -hypoglycemia protocol   -carb counting protocol   -diabetes education needs will be assessed closer to discharge- depending on TF plan.     Outpatient follow up: will recommend outpatient follow up with [facility] service, if requiring insulin   Plan discussed with patient, bedside RN, and primary team paged      Interval History and Assessment: interval glucose trend reviewed:   Received NPH last night but tube feeds did not run. D10% ran appropriately. BG stable.  Late afternoon BG spike yesterday, which is a pattern. Improved with interval increase in lunchtime CHO coverage today.    She ate eggs, toast this AM for gastric emptying study. Did not get carb coverage for this. Despite this, her pre-lunch BG was well controlled. Possibly related to tapering of PO Pred from 15 mg AM, to 12.5 mg today.    Cr stable, 0.50 with GFR consistently >90.    Current nutritional intake and type: Orders Placed This Encounter      Regular Diet Adult  Cycled TF: Vital 1.5 at 45cc/hour x12 hours (8P-8A), for 101g CHO.- stopped  3/14    Steroid planning:       PTA Diabetes Regimen: n/a, no prior history of diabetes    Discharge Planning: nearing readiness for discharge - local housing likely, when medically stable.            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           Medications:     Current Facility-Administered Medications   Medication     acetaminophen (TYLENOL) tablet 975 mg     acetylcysteine (MUCOMYST) 20 % nebulizer solution 2 mL     acyclovir (ZOVIRAX) capsule 400 mg     albuterol (PROVENTIL HFA/VENTOLIN HFA) inhaler     amoxicillin (AMOXIL) capsule 500 mg     aspirin EC tablet 81 mg     [Held by provider] fiber modular (NUTRISOURCE FIBER) packet 1 packet    And     [Held by provider] banatrol plus packet 1 packet     bisacodyl (DULCOLAX) Suppository 10 mg     calcium carbonate 600 mg-vitamin D 400 units (CALTRATE) per tablet 1 tablet     carboxymethylcellulose PF (REFRESH PLUS) 0.5 % ophthalmic solution 1 drop     dapsone (ACZONE) tablet 50 mg     dextrose 10% infusion     glucose gel 15-30 g    Or     dextrose 50 % injection 25-50 mL    Or     glucagon injection 1 mg     diltiazem ER COATED BEADS (CARDIZEM CD/CARTIA XT) 24 hr capsule 240 mg     doxycycline hyclate (VIBRAMYCIN) capsule 100 mg     famotidine (PEPCID) tablet 20 mg     fluticasone (FLONASE) 50 MCG/ACT spray 1 spray     furosemide (LASIX) tablet 40 mg     gabapentin (NEURONTIN) capsule 100 mg     heparin ANTICOAGULANT injection 5,000 Units     hydrOXYzine (ATARAX) tablet 25 mg     insulin aspart (NovoLOG) injection (RAPID ACTING)     insulin aspart (NovoLOG) injection (RAPID ACTING)     insulin aspart (NovoLOG) injection (RAPID ACTING)     insulin aspart (NovoLOG) injection (RAPID ACTING)     insulin aspart (NovoLOG) injection (RAPID ACTING)      insulin aspart (NovoLOG) injection (RAPID ACTING)     labetalol (NORMODYNE/TRANDATE) injection 10 mg     levalbuterol (XOPENEX) neb solution 1.25 mg     lidocaine (LMX4) cream     lidocaine 1 % 0.1-1 mL     lidocaine 3%, phenylephrine 0.25% solution for irrigation     loperamide (IMODIUM) capsule 2 mg     loratadine (CLARITIN) tablet 10 mg     magnesium gluconate (MAGONATE) tablet 500 mg     magnesium oxide (MAG-OX) tablet 400 mg     May take regular AM medications except those listed below.     methocarbamol (ROBAXIN) tablet 500 mg     metoprolol tartrate (LOPRESSOR) tablet 25 mg     multivitamin w/minerals (THERA-VIT-M) tablet 1 tablet     mycophenolic acid (GENERIC EQUIVALENT) EC tablet 360 mg     naloxone (NARCAN) injection 0.2 mg    Or     naloxone (NARCAN) injection 0.4 mg    Or     naloxone (NARCAN) injection 0.2 mg    Or     naloxone (NARCAN) injection 0.4 mg     No lozenges or gum should be given while patient on BIPAP/AVAPS/AVAPS AE     nystatin (MYCOSTATIN) suspension 1,000,000 Units     ondansetron (ZOFRAN-ODT) ODT tab 4 mg    Or     ondansetron (ZOFRAN) injection 4 mg     oxyCODONE IR (ROXICODONE) half-tab 2.5 mg     pantoprazole (PROTONIX) EC tablet 40 mg     Patient may continue current oral medications     Patient may continue current oral medications     polyethylene glycol (MIRALAX) Packet 17 g     predniSONE (DELTASONE) tablet 12.5 mg     [Held by provider] protein modular (PROSOURCE TF) 1 packet     rosuvastatin (CRESTOR) tablet 5 mg     sennosides (SENOKOT) tablet 8.6 mg     simethicone (MYLICON) chewable tablet 80 mg     sodium chloride (PF) 0.9% PF flush 3 mL     sodium chloride (PF) 0.9% PF flush 3 mL     sodium chloride (PF) 0.9% PF flush 3 mL     sodium chloride (PF) 0.9% PF flush 3 mL     tacrolimus (GENERIC EQUIVALENT) capsule 3 mg     tacrolimus (GENERIC EQUIVALENT) capsule 3.5 mg     Facility-Administered Medications Ordered in Other Encounters   Medication     sodium chloride (PF)  "0.9% PF flush 10 mL            Physical Exam:    BP (!) 146/57 (BP Location: Left arm)   Pulse 104   Temp 98.1  F (36.7  C) (Oral)   Resp 22   Ht 1.575 m (5' 2\")   Wt 68.1 kg (150 lb 1.6 oz)   SpO2 96%   BMI 27.45 kg/m    General: pleasant, in no distress, sitting up in chair.  present and attentive.    HEENT: normocephalic, atraumatic. Oral mucous membranes moist. NGT in place.   Lungs: unlabored respiration, no cough  ABD: rounded,  Mildly distended  Skin: warm and dry, no obvious lesions  MSK:  moves all extremities  Lymp:  no LE edema   Mental status:  alert, oriented to self, place, time  Psych:  bright affect, calm and appropriate interaction             Data:     Recent Labs   Lab 03/15/22  1705 03/15/22  1337 03/15/22  0643 03/15/22  0527 03/15/22  0201 03/14/22  2119   * 129* 147* 142* 143* 104*     Lab Results   Component Value Date    WBC 8.3 03/15/2022    WBC 9.0 03/14/2022    WBC 10.4 03/13/2022    HGB 7.9 (L) 03/15/2022    HGB 8.3 (L) 03/14/2022    HGB 9.0 (L) 03/13/2022    HCT 26.7 (L) 03/15/2022    HCT 27.5 (L) 03/14/2022    HCT 29.3 (L) 03/13/2022     (H) 03/15/2022     03/14/2022    MCV 99 03/13/2022     03/15/2022     03/14/2022     03/13/2022     Lab Results   Component Value Date     03/15/2022     03/14/2022     03/13/2022    POTASSIUM 4.0 03/15/2022    POTASSIUM 4.5 03/14/2022    POTASSIUM 4.2 03/13/2022    CHLORIDE 97 03/15/2022    CHLORIDE 97 03/14/2022    CHLORIDE 96 03/13/2022    CO2 34 (H) 03/15/2022    CO2 39 (H) 03/14/2022    CO2 36 (H) 03/13/2022     (H) 03/15/2022     (H) 03/15/2022     (H) 03/15/2022     Lab Results   Component Value Date    BUN 27 03/15/2022    BUN 28 03/14/2022    BUN 26 03/13/2022     Lab Results   Component Value Date    TSH 1.38 03/25/2019     Lab Results   Component Value Date    AST 12 03/15/2022    AST 14 03/14/2022    AST 18 03/13/2022    ALT 25 03/15/2022    ALT " 26 03/14/2022    ALT 32 03/13/2022    ALKPHOS 111 03/15/2022    ALKPHOS 117 03/14/2022    ALKPHOS 132 03/13/2022       35 minutes spent on the date of the encounter doing chart review, history and exam, documentation and further activities per the note      Over 50% of my time on the unit was spent counseling the patient and/or coordinating care regarding acute hyperglycemia management.  See note for details.    To contact Endocrine Diabetes service:   From 8AM-4PM: page inpatient diabetes provider that is following the patient  For questions or updates from 4PM-8AM: page the diabetes job code for on call fellow: [phone]    Sherie Lira PA-C  Inpatient Diabetes Management Service  Pager [phone]

## 2022-03-15 NOTE — PROGRESS NOTES
RT order for overnight oximetry. Will pass this along to the night RT.     Thank you     Elodia Rock, RT

## 2022-03-16 ENCOUNTER — APPOINTMENT (OUTPATIENT)
Dept: OCCUPATIONAL THERAPY | Facility: CLINIC | Age: 67
DRG: 007 | End: 2022-03-16
Attending: SURGERY
Payer: MEDICARE

## 2022-03-16 ENCOUNTER — APPOINTMENT (OUTPATIENT)
Dept: PHYSICAL THERAPY | Facility: CLINIC | Age: 67
DRG: 007 | End: 2022-03-16
Attending: SURGERY
Payer: MEDICARE

## 2022-03-16 DIAGNOSIS — Z94.2 LUNG REPLACED BY TRANSPLANT (H): Primary | ICD-10-CM

## 2022-03-16 DIAGNOSIS — Z79.899 ENCOUNTER FOR LONG-TERM (CURRENT) USE OF HIGH-RISK MEDICATION: ICD-10-CM

## 2022-03-16 PROBLEM — T38.0X5A STEROID-INDUCED HYPERGLYCEMIA: Status: ACTIVE | Noted: 2022-03-16

## 2022-03-16 PROBLEM — R73.9 STEROID-INDUCED HYPERGLYCEMIA: Status: ACTIVE | Noted: 2022-03-16

## 2022-03-16 LAB
ALBUMIN SERPL-MCNC: 2.7 G/DL (ref 3.4–5)
ALP SERPL-CCNC: 117 U/L (ref 40–150)
ALT SERPL W P-5'-P-CCNC: 25 U/L (ref 0–50)
ANION GAP SERPL CALCULATED.3IONS-SCNC: 5 MMOL/L (ref 3–14)
AST SERPL W P-5'-P-CCNC: 13 U/L (ref 0–45)
BASE EXCESS BLDV CALC-SCNC: 14.5 MMOL/L (ref -7.7–1.9)
BASOPHILS # BLD AUTO: 0 10E3/UL (ref 0–0.2)
BASOPHILS NFR BLD AUTO: 0 %
BILIRUB SERPL-MCNC: 0.5 MG/DL (ref 0.2–1.3)
BUN SERPL-MCNC: 27 MG/DL (ref 7–30)
CALCIUM SERPL-MCNC: 8.9 MG/DL (ref 8.5–10.1)
CHLORIDE BLD-SCNC: 98 MMOL/L (ref 94–109)
CO2 SERPL-SCNC: 35 MMOL/L (ref 20–32)
CREAT SERPL-MCNC: 0.52 MG/DL (ref 0.52–1.04)
EOSINOPHIL # BLD AUTO: 0 10E3/UL (ref 0–0.7)
EOSINOPHIL NFR BLD AUTO: 0 %
ERYTHROCYTE [DISTWIDTH] IN BLOOD BY AUTOMATED COUNT: 20 % (ref 10–15)
GFR SERPL CREATININE-BSD FRML MDRD: >90 ML/MIN/1.73M2
GLUCOSE BLD-MCNC: 139 MG/DL (ref 70–99)
GLUCOSE BLDC GLUCOMTR-MCNC: 109 MG/DL (ref 70–99)
GLUCOSE BLDC GLUCOMTR-MCNC: 134 MG/DL (ref 70–99)
GLUCOSE BLDC GLUCOMTR-MCNC: 143 MG/DL (ref 70–99)
GLUCOSE BLDC GLUCOMTR-MCNC: 156 MG/DL (ref 70–99)
HCO3 BLDV-SCNC: 41 MMOL/L (ref 21–28)
HCT VFR BLD AUTO: 28.2 % (ref 35–47)
HGB BLD-MCNC: 8.6 G/DL (ref 11.7–15.7)
IMM GRANULOCYTES # BLD: 0.1 10E3/UL
IMM GRANULOCYTES NFR BLD: 1 %
LYMPHOCYTES # BLD AUTO: 0.6 10E3/UL (ref 0.8–5.3)
LYMPHOCYTES NFR BLD AUTO: 7 %
MAGNESIUM SERPL-MCNC: 1.7 MG/DL (ref 1.6–2.3)
MCH RBC QN AUTO: 30.5 PG (ref 26.5–33)
MCHC RBC AUTO-ENTMCNC: 30.5 G/DL (ref 31.5–36.5)
MCV RBC AUTO: 100 FL (ref 78–100)
MONOCYTES # BLD AUTO: 0.5 10E3/UL (ref 0–1.3)
MONOCYTES NFR BLD AUTO: 6 %
NEUTROPHILS # BLD AUTO: 7.4 10E3/UL (ref 1.6–8.3)
NEUTROPHILS NFR BLD AUTO: 86 %
NRBC # BLD AUTO: 0 10E3/UL
NRBC BLD AUTO-RTO: 0 /100
O2/TOTAL GAS SETTING VFR VENT: 0 %
PCO2 BLDV: 64 MM HG (ref 40–50)
PH BLDV: 7.42 [PH] (ref 7.32–7.43)
PHOSPHATE SERPL-MCNC: 4 MG/DL (ref 2.5–4.5)
PLATELET # BLD AUTO: 378 10E3/UL (ref 150–450)
PO2 BLDV: 59 MM HG (ref 25–47)
POTASSIUM BLD-SCNC: 4.2 MMOL/L (ref 3.4–5.3)
PROT SERPL-MCNC: 5.6 G/DL (ref 6.8–8.8)
RBC # BLD AUTO: 2.82 10E6/UL (ref 3.8–5.2)
SODIUM SERPL-SCNC: 138 MMOL/L (ref 133–144)
WBC # BLD AUTO: 8.7 10E3/UL (ref 4–11)

## 2022-03-16 PROCEDURE — 80053 COMPREHEN METABOLIC PANEL: CPT | Performed by: NURSE PRACTITIONER

## 2022-03-16 PROCEDURE — 250N000009 HC RX 250: Performed by: PHYSICIAN ASSISTANT

## 2022-03-16 PROCEDURE — 85004 AUTOMATED DIFF WBC COUNT: CPT | Performed by: NURSE PRACTITIONER

## 2022-03-16 PROCEDURE — 97530 THERAPEUTIC ACTIVITIES: CPT | Mod: GP

## 2022-03-16 PROCEDURE — 94762 N-INVAS EAR/PLS OXIMTRY CONT: CPT

## 2022-03-16 PROCEDURE — 999N000128 HC STATISTIC PERIPHERAL IV START W/O US GUIDANCE

## 2022-03-16 PROCEDURE — 84100 ASSAY OF PHOSPHORUS: CPT | Performed by: NURSE PRACTITIONER

## 2022-03-16 PROCEDURE — 250N000013 HC RX MED GY IP 250 OP 250 PS 637: Performed by: PHYSICIAN ASSISTANT

## 2022-03-16 PROCEDURE — 83735 ASSAY OF MAGNESIUM: CPT | Performed by: NURSE PRACTITIONER

## 2022-03-16 PROCEDURE — 94668 MNPJ CHEST WALL SBSQ: CPT

## 2022-03-16 PROCEDURE — 99233 SBSQ HOSP IP/OBS HIGH 50: CPT | Mod: 24 | Performed by: NURSE PRACTITIONER

## 2022-03-16 PROCEDURE — 94640 AIRWAY INHALATION TREATMENT: CPT | Mod: 76

## 2022-03-16 PROCEDURE — 250N000012 HC RX MED GY IP 250 OP 636 PS 637: Performed by: NURSE PRACTITIONER

## 2022-03-16 PROCEDURE — 97535 SELF CARE MNGMENT TRAINING: CPT | Mod: GO

## 2022-03-16 PROCEDURE — 82040 ASSAY OF SERUM ALBUMIN: CPT | Performed by: NURSE PRACTITIONER

## 2022-03-16 PROCEDURE — 250N000013 HC RX MED GY IP 250 OP 250 PS 637: Performed by: SURGERY

## 2022-03-16 PROCEDURE — 250N000012 HC RX MED GY IP 250 OP 636 PS 637: Performed by: PHYSICIAN ASSISTANT

## 2022-03-16 PROCEDURE — 250N000013 HC RX MED GY IP 250 OP 250 PS 637: Performed by: PEDIATRICS

## 2022-03-16 PROCEDURE — 99233 SBSQ HOSP IP/OBS HIGH 50: CPT | Performed by: STUDENT IN AN ORGANIZED HEALTH CARE EDUCATION/TRAINING PROGRAM

## 2022-03-16 PROCEDURE — 120N000002 HC R&B MED SURG/OB UMMC

## 2022-03-16 PROCEDURE — 250N000013 HC RX MED GY IP 250 OP 250 PS 637: Performed by: STUDENT IN AN ORGANIZED HEALTH CARE EDUCATION/TRAINING PROGRAM

## 2022-03-16 PROCEDURE — 99233 SBSQ HOSP IP/OBS HIGH 50: CPT | Mod: 24 | Performed by: PHYSICIAN ASSISTANT

## 2022-03-16 PROCEDURE — 97116 GAIT TRAINING THERAPY: CPT | Mod: GP

## 2022-03-16 PROCEDURE — 97110 THERAPEUTIC EXERCISES: CPT | Mod: GP

## 2022-03-16 PROCEDURE — 250N000013 HC RX MED GY IP 250 OP 250 PS 637: Performed by: NURSE PRACTITIONER

## 2022-03-16 PROCEDURE — 250N000011 HC RX IP 250 OP 636: Performed by: PHYSICIAN ASSISTANT

## 2022-03-16 PROCEDURE — 82803 BLOOD GASES ANY COMBINATION: CPT | Performed by: NURSE PRACTITIONER

## 2022-03-16 PROCEDURE — 36415 COLL VENOUS BLD VENIPUNCTURE: CPT | Performed by: NURSE PRACTITIONER

## 2022-03-16 RX ORDER — AMOXICILLIN 500 MG/1
500 CAPSULE ORAL EVERY 8 HOURS
Qty: 204 CAPSULE | Refills: 0 | Status: SHIPPED | OUTPATIENT
Start: 2022-03-16 | End: 2022-03-16

## 2022-03-16 RX ORDER — ROSUVASTATIN CALCIUM 5 MG/1
5 TABLET, COATED ORAL DAILY
Qty: 30 TABLET | Refills: 0 | Status: SHIPPED | OUTPATIENT
Start: 2022-03-17 | End: 2022-03-16

## 2022-03-16 RX ORDER — LOPERAMIDE HCL 2 MG
2 CAPSULE ORAL 3 TIMES DAILY PRN
Qty: 30 CAPSULE | Refills: 0 | Status: SHIPPED | OUTPATIENT
Start: 2022-03-16 | End: 2022-04-27

## 2022-03-16 RX ORDER — PANTOPRAZOLE SODIUM 40 MG/1
40 TABLET, DELAYED RELEASE ORAL
Qty: 60 TABLET | Refills: 3 | Status: SHIPPED | OUTPATIENT
Start: 2022-03-16 | End: 2022-03-23

## 2022-03-16 RX ORDER — CARBOXYMETHYLCELLULOSE SODIUM 5 MG/ML
1 SOLUTION/ DROPS OPHTHALMIC
Qty: 1 EACH | Refills: 0 | Status: SHIPPED | OUTPATIENT
Start: 2022-03-16 | End: 2022-05-24

## 2022-03-16 RX ORDER — MYCOPHENOLIC ACID 360 MG/1
720 TABLET, DELAYED RELEASE ORAL 2 TIMES DAILY
Status: DISCONTINUED | OUTPATIENT
Start: 2022-03-16 | End: 2022-03-17 | Stop reason: HOSPADM

## 2022-03-16 RX ORDER — GLIPIZIDE 5 MG/1
5 TABLET ORAL
Qty: 30 TABLET | Refills: 3 | Status: SHIPPED | OUTPATIENT
Start: 2022-03-17 | End: 2022-03-21

## 2022-03-16 RX ORDER — TACROLIMUS 1 MG/1
3 CAPSULE ORAL 2 TIMES DAILY
Qty: 180 CAPSULE | Refills: 11 | Status: SHIPPED | OUTPATIENT
Start: 2022-03-16 | End: 2022-03-23

## 2022-03-16 RX ORDER — GABAPENTIN 100 MG/1
100 CAPSULE ORAL AT BEDTIME
Qty: 30 CAPSULE | Refills: 0 | Status: SHIPPED | OUTPATIENT
Start: 2022-03-16 | End: 2022-03-28

## 2022-03-16 RX ORDER — BLOOD PRESSURE TEST KIT
KIT MISCELLANEOUS
Qty: 100 EACH | Refills: 0 | Status: SHIPPED | OUTPATIENT
Start: 2022-03-16 | End: 2022-05-03

## 2022-03-16 RX ORDER — ACETYLCYSTEINE 200 MG/ML
2 SOLUTION ORAL; RESPIRATORY (INHALATION)
Status: DISCONTINUED | OUTPATIENT
Start: 2022-03-16 | End: 2022-03-17 | Stop reason: HOSPADM

## 2022-03-16 RX ORDER — ACETAMINOPHEN 325 MG/1
975 TABLET ORAL EVERY 8 HOURS PRN
Qty: 190 TABLET | Refills: 0 | Status: SHIPPED | OUTPATIENT
Start: 2022-03-16 | End: 2022-05-03

## 2022-03-16 RX ORDER — AMINO AC/PROTEIN HYDR/WHEY PRO 10G-100/30
1 LIQUID (ML) ORAL 2 TIMES DAILY
Qty: 60 PACKET | Refills: 0 | Status: SHIPPED | OUTPATIENT
Start: 2022-03-16 | End: 2022-03-16

## 2022-03-16 RX ORDER — MAGNESIUM GLUCONATE 27 MG(500)
500 TABLET ORAL DAILY
Qty: 30 TABLET | Refills: 0 | Status: SHIPPED | OUTPATIENT
Start: 2022-03-17 | End: 2022-03-16

## 2022-03-16 RX ORDER — LORATADINE 10 MG/1
10 TABLET ORAL DAILY
Qty: 30 TABLET | Refills: 0 | Status: SHIPPED | OUTPATIENT
Start: 2022-03-17 | End: 2022-03-16

## 2022-03-16 RX ORDER — METOPROLOL TARTRATE 25 MG/1
25 TABLET, FILM COATED ORAL 2 TIMES DAILY
Qty: 60 TABLET | Refills: 0 | Status: SHIPPED | OUTPATIENT
Start: 2022-03-16 | End: 2022-03-16

## 2022-03-16 RX ORDER — MULTIPLE VITAMINS W/ MINERALS TAB 9MG-400MCG
1 TAB ORAL DAILY
Qty: 30 TABLET | Refills: 0 | Status: SHIPPED | OUTPATIENT
Start: 2022-03-17 | End: 2022-04-14

## 2022-03-16 RX ORDER — FLUTICASONE PROPIONATE 50 MCG
1 SPRAY, SUSPENSION (ML) NASAL DAILY
Qty: 9.9 ML | Refills: 0 | Status: SHIPPED | OUTPATIENT
Start: 2022-03-17 | End: 2022-04-14

## 2022-03-16 RX ORDER — FUROSEMIDE 40 MG
40 TABLET ORAL DAILY
Qty: 30 TABLET | Refills: 0 | Status: SHIPPED | OUTPATIENT
Start: 2022-03-17 | End: 2022-03-28

## 2022-03-16 RX ORDER — NYSTATIN 100000/ML
1000000 SUSPENSION, ORAL (FINAL DOSE FORM) ORAL 4 TIMES DAILY
Qty: 946 ML | Refills: 4 | Status: SHIPPED | OUTPATIENT
Start: 2022-03-16 | End: 2022-04-14

## 2022-03-16 RX ORDER — PREDNISONE 5 MG/1
12.5 TABLET ORAL 2 TIMES DAILY
Qty: 150 TABLET | Refills: 11 | Status: SHIPPED | OUTPATIENT
Start: 2022-03-16 | End: 2022-03-23

## 2022-03-16 RX ORDER — HYDROXYZINE HYDROCHLORIDE 25 MG/1
25 TABLET, FILM COATED ORAL EVERY 6 HOURS PRN
Qty: 10 TABLET | Refills: 0 | Status: SHIPPED | OUTPATIENT
Start: 2022-03-16 | End: 2022-03-25

## 2022-03-16 RX ORDER — SIMETHICONE 80 MG
80 TABLET,CHEWABLE ORAL 4 TIMES DAILY
Qty: 120 TABLET | Refills: 0 | Status: SHIPPED | OUTPATIENT
Start: 2022-03-16 | End: 2022-03-25

## 2022-03-16 RX ORDER — FAMOTIDINE 20 MG/1
20 TABLET, FILM COATED ORAL 2 TIMES DAILY
Qty: 60 TABLET | Refills: 0 | Status: SHIPPED | OUTPATIENT
Start: 2022-03-16 | End: 2022-03-16

## 2022-03-16 RX ORDER — LEVALBUTEROL INHALATION SOLUTION 1.25 MG/3ML
1.25 SOLUTION RESPIRATORY (INHALATION)
Status: DISCONTINUED | OUTPATIENT
Start: 2022-03-16 | End: 2022-03-17 | Stop reason: HOSPADM

## 2022-03-16 RX ORDER — OXYCODONE HYDROCHLORIDE 5 MG/1
2.5 TABLET ORAL 3 TIMES DAILY PRN
Qty: 10 TABLET | Refills: 0 | Status: SHIPPED | OUTPATIENT
Start: 2022-03-16 | End: 2022-04-06

## 2022-03-16 RX ORDER — NYSTATIN 100000/ML
1000000 SUSPENSION, ORAL (FINAL DOSE FORM) ORAL 4 TIMES DAILY
Qty: 946 ML | Refills: 0 | Status: SHIPPED | OUTPATIENT
Start: 2022-03-16 | End: 2022-03-16

## 2022-03-16 RX ORDER — BISACODYL 10 MG
10 SUPPOSITORY, RECTAL RECTAL DAILY PRN
Qty: 30 SUPPOSITORY | Refills: 0 | Status: SHIPPED | OUTPATIENT
Start: 2022-03-16 | End: 2022-03-25

## 2022-03-16 RX ORDER — ONDANSETRON 4 MG/1
4 TABLET, ORALLY DISINTEGRATING ORAL EVERY 6 HOURS PRN
Qty: 30 TABLET | Refills: 0 | Status: SHIPPED | OUTPATIENT
Start: 2022-03-16 | End: 2022-03-25

## 2022-03-16 RX ORDER — LORATADINE 10 MG/1
10 TABLET ORAL DAILY
Qty: 30 TABLET | Refills: 3 | Status: SHIPPED | OUTPATIENT
Start: 2022-03-17 | End: 2022-03-21

## 2022-03-16 RX ORDER — ACYCLOVIR 200 MG/1
400 CAPSULE ORAL 2 TIMES DAILY
Qty: 28 CAPSULE | Refills: 0 | Status: SHIPPED | OUTPATIENT
Start: 2022-03-16 | End: 2022-03-28

## 2022-03-16 RX ORDER — ALBUTEROL SULFATE 90 UG/1
2 AEROSOL, METERED RESPIRATORY (INHALATION) EVERY 6 HOURS PRN
Qty: 18 G | Refills: 0 | Status: SHIPPED | OUTPATIENT
Start: 2022-03-16 | End: 2023-09-28

## 2022-03-16 RX ORDER — PANTOPRAZOLE SODIUM 40 MG/1
40 TABLET, DELAYED RELEASE ORAL
Qty: 60 TABLET | Refills: 0 | Status: SHIPPED | OUTPATIENT
Start: 2022-03-16 | End: 2022-03-16

## 2022-03-16 RX ORDER — MAGNESIUM GLUCONATE 27 MG(500)
500 TABLET ORAL DAILY
Qty: 30 TABLET | Refills: 3 | Status: SHIPPED | OUTPATIENT
Start: 2022-03-17 | End: 2022-03-22

## 2022-03-16 RX ORDER — TACROLIMUS 0.5 MG/1
0.5 CAPSULE ORAL EVERY MORNING
Qty: 60 CAPSULE | Refills: 5 | Status: SHIPPED | OUTPATIENT
Start: 2022-03-16 | End: 2022-03-23

## 2022-03-16 RX ORDER — AMOXICILLIN 500 MG/1
500 CAPSULE ORAL EVERY 8 HOURS
Qty: 204 CAPSULE | Refills: 0 | Status: SHIPPED | OUTPATIENT
Start: 2022-03-16 | End: 2022-05-23

## 2022-03-16 RX ORDER — DAPSONE 25 MG/1
50 TABLET ORAL DAILY
Qty: 60 TABLET | Refills: 0 | Status: SHIPPED | OUTPATIENT
Start: 2022-03-17 | End: 2022-03-16

## 2022-03-16 RX ORDER — MYCOPHENOLIC ACID 360 MG/1
720 TABLET, DELAYED RELEASE ORAL 2 TIMES DAILY
Qty: 120 TABLET | Refills: 11 | Status: SHIPPED | OUTPATIENT
Start: 2022-03-16 | End: 2022-04-14

## 2022-03-16 RX ORDER — DOXYCYCLINE 100 MG/1
100 CAPSULE ORAL EVERY 12 HOURS
Qty: 32 CAPSULE | Refills: 0 | Status: SHIPPED | OUTPATIENT
Start: 2022-03-16 | End: 2022-04-01

## 2022-03-16 RX ORDER — DAPSONE 25 MG/1
50 TABLET ORAL DAILY
Qty: 60 TABLET | Refills: 3 | Status: SHIPPED | OUTPATIENT
Start: 2022-03-17 | End: 2022-03-23

## 2022-03-16 RX ORDER — FAMOTIDINE 20 MG/1
20 TABLET, FILM COATED ORAL 2 TIMES DAILY
Qty: 60 TABLET | Refills: 3 | Status: SHIPPED | OUTPATIENT
Start: 2022-03-16 | End: 2022-03-23

## 2022-03-16 RX ORDER — ROSUVASTATIN CALCIUM 5 MG/1
5 TABLET, COATED ORAL DAILY
Qty: 30 TABLET | Refills: 3 | Status: SHIPPED | OUTPATIENT
Start: 2022-03-17 | End: 2022-03-23

## 2022-03-16 RX ORDER — CONTAINER,EMPTY
EACH MISCELLANEOUS
Qty: 1 EACH | Refills: 0 | Status: SHIPPED | OUTPATIENT
Start: 2022-03-16 | End: 2022-05-03

## 2022-03-16 RX ORDER — METHOCARBAMOL 500 MG/1
500 TABLET, FILM COATED ORAL 4 TIMES DAILY
Qty: 120 TABLET | Refills: 0 | Status: SHIPPED | OUTPATIENT
Start: 2022-03-16 | End: 2022-03-28

## 2022-03-16 RX ORDER — METOPROLOL TARTRATE 25 MG/1
25 TABLET, FILM COATED ORAL 2 TIMES DAILY
Qty: 60 TABLET | Refills: 1 | Status: SHIPPED | OUTPATIENT
Start: 2022-03-16 | End: 2022-03-23

## 2022-03-16 RX ORDER — GLIPIZIDE 5 MG/1
5 TABLET ORAL
Status: DISCONTINUED | OUTPATIENT
Start: 2022-03-17 | End: 2022-03-17 | Stop reason: HOSPADM

## 2022-03-16 RX ADMIN — DILTIAZEM HYDROCHLORIDE 240 MG: 240 CAPSULE, COATED, EXTENDED RELEASE ORAL at 08:24

## 2022-03-16 RX ADMIN — AMOXICILLIN 500 MG: 500 CAPSULE ORAL at 16:59

## 2022-03-16 RX ADMIN — ACETYLCYSTEINE 2 ML: 200 SOLUTION ORAL; RESPIRATORY (INHALATION) at 09:48

## 2022-03-16 RX ADMIN — NYSTATIN 1000000 UNITS: 100000 SUSPENSION ORAL at 08:26

## 2022-03-16 RX ADMIN — METOPROLOL TARTRATE 25 MG: 25 TABLET, FILM COATED ORAL at 20:26

## 2022-03-16 RX ADMIN — LORATADINE 10 MG: 10 TABLET ORAL at 08:26

## 2022-03-16 RX ADMIN — TACROLIMUS 3.5 MG: 1 CAPSULE ORAL at 08:22

## 2022-03-16 RX ADMIN — SIMETHICONE 80 MG: 80 TABLET, CHEWABLE ORAL at 11:31

## 2022-03-16 RX ADMIN — METHOCARBAMOL TABLETS 500 MG: 500 TABLET, COATED ORAL at 20:26

## 2022-03-16 RX ADMIN — CALCIUM CARBONATE 600 MG (1,500 MG)-VITAMIN D3 400 UNIT TABLET 1 TABLET: at 17:03

## 2022-03-16 RX ADMIN — MYCOPHENOLIC ACID 360 MG: 360 TABLET, DELAYED RELEASE ORAL at 08:25

## 2022-03-16 RX ADMIN — FAMOTIDINE 20 MG: 20 TABLET ORAL at 08:25

## 2022-03-16 RX ADMIN — MYCOPHENOLIC ACID 720 MG: 360 TABLET, DELAYED RELEASE ORAL at 20:26

## 2022-03-16 RX ADMIN — Medication 400 MG: at 17:03

## 2022-03-16 RX ADMIN — ACETAMINOPHEN 975 MG: 325 TABLET ORAL at 17:37

## 2022-03-16 RX ADMIN — DOXYCYCLINE HYCLATE 100 MG: 100 CAPSULE ORAL at 20:26

## 2022-03-16 RX ADMIN — NYSTATIN 1000000 UNITS: 100000 SUSPENSION ORAL at 20:27

## 2022-03-16 RX ADMIN — HEPARIN SODIUM 5000 UNITS: 5000 INJECTION, SOLUTION INTRAVENOUS; SUBCUTANEOUS at 21:44

## 2022-03-16 RX ADMIN — HEPARIN SODIUM 5000 UNITS: 5000 INJECTION, SOLUTION INTRAVENOUS; SUBCUTANEOUS at 14:35

## 2022-03-16 RX ADMIN — Medication 500 MG: at 11:30

## 2022-03-16 RX ADMIN — TACROLIMUS 3 MG: 1 CAPSULE ORAL at 17:03

## 2022-03-16 RX ADMIN — HEPARIN SODIUM 5000 UNITS: 5000 INJECTION, SOLUTION INTRAVENOUS; SUBCUTANEOUS at 06:01

## 2022-03-16 RX ADMIN — ROSUVASTATIN CALCIUM 5 MG: 5 TABLET, FILM COATED ORAL at 08:23

## 2022-03-16 RX ADMIN — METHOCARBAMOL TABLETS 500 MG: 500 TABLET, COATED ORAL at 11:30

## 2022-03-16 RX ADMIN — DOXYCYCLINE HYCLATE 100 MG: 100 CAPSULE ORAL at 08:24

## 2022-03-16 RX ADMIN — AMOXICILLIN 500 MG: 500 CAPSULE ORAL at 08:23

## 2022-03-16 RX ADMIN — FAMOTIDINE 20 MG: 20 TABLET ORAL at 20:26

## 2022-03-16 RX ADMIN — PANTOPRAZOLE SODIUM 40 MG: 40 TABLET, DELAYED RELEASE ORAL at 08:26

## 2022-03-16 RX ADMIN — LEVALBUTEROL HYDROCHLORIDE 1.25 MG: 1.25 SOLUTION RESPIRATORY (INHALATION) at 09:48

## 2022-03-16 RX ADMIN — OXYCODONE HYDROCHLORIDE 2.5 MG: 5 TABLET ORAL at 11:31

## 2022-03-16 RX ADMIN — NYSTATIN 1000000 UNITS: 100000 SUSPENSION ORAL at 11:32

## 2022-03-16 RX ADMIN — NYSTATIN 1000000 UNITS: 100000 SUSPENSION ORAL at 16:59

## 2022-03-16 RX ADMIN — SIMETHICONE 80 MG: 80 TABLET, CHEWABLE ORAL at 08:34

## 2022-03-16 RX ADMIN — ACYCLOVIR 400 MG: 200 CAPSULE ORAL at 08:25

## 2022-03-16 RX ADMIN — FUROSEMIDE 40 MG: 20 TABLET ORAL at 08:24

## 2022-03-16 RX ADMIN — METOPROLOL TARTRATE 25 MG: 25 TABLET, FILM COATED ORAL at 08:24

## 2022-03-16 RX ADMIN — LOPERAMIDE HYDROCHLORIDE 2 MG: 2 CAPSULE ORAL at 21:44

## 2022-03-16 RX ADMIN — PANTOPRAZOLE SODIUM 40 MG: 40 TABLET, DELAYED RELEASE ORAL at 16:57

## 2022-03-16 RX ADMIN — DAPSONE 50 MG: 25 TABLET ORAL at 08:24

## 2022-03-16 RX ADMIN — ACYCLOVIR 400 MG: 200 CAPSULE ORAL at 20:26

## 2022-03-16 RX ADMIN — METHOCARBAMOL TABLETS 500 MG: 500 TABLET, COATED ORAL at 16:58

## 2022-03-16 RX ADMIN — ASPIRIN 81 MG: 81 TABLET, COATED ORAL at 08:41

## 2022-03-16 RX ADMIN — SIMETHICONE 80 MG: 80 TABLET, CHEWABLE ORAL at 16:59

## 2022-03-16 RX ADMIN — SIMETHICONE 80 MG: 80 TABLET, CHEWABLE ORAL at 20:33

## 2022-03-16 RX ADMIN — GABAPENTIN 100 MG: 100 CAPSULE ORAL at 21:44

## 2022-03-16 RX ADMIN — LOPERAMIDE HYDROCHLORIDE 2 MG: 2 CAPSULE ORAL at 08:24

## 2022-03-16 RX ADMIN — FLUTICASONE PROPIONATE 1 SPRAY: 50 SPRAY, METERED NASAL at 08:26

## 2022-03-16 RX ADMIN — PREDNISONE 12.5 MG: 10 TABLET ORAL at 20:26

## 2022-03-16 RX ADMIN — AMOXICILLIN 500 MG: 500 CAPSULE ORAL at 23:54

## 2022-03-16 RX ADMIN — Medication 1 TABLET: at 11:30

## 2022-03-16 RX ADMIN — METHOCARBAMOL TABLETS 500 MG: 500 TABLET, COATED ORAL at 08:26

## 2022-03-16 RX ADMIN — CALCIUM CARBONATE 600 MG (1,500 MG)-VITAMIN D3 400 UNIT TABLET 1 TABLET: at 08:24

## 2022-03-16 RX ADMIN — PREDNISONE 12.5 MG: 10 TABLET ORAL at 08:25

## 2022-03-16 RX ADMIN — Medication 400 MG: at 11:32

## 2022-03-16 ASSESSMENT — ACTIVITIES OF DAILY LIVING (ADL)
ADLS_ACUITY_SCORE: 7
ADLS_ACUITY_SCORE: 7
ADLS_ACUITY_SCORE: 9
ADLS_ACUITY_SCORE: 7
ADLS_ACUITY_SCORE: 7
ADLS_ACUITY_SCORE: 9
ADLS_ACUITY_SCORE: 7
ADLS_ACUITY_SCORE: 9
ADLS_ACUITY_SCORE: 7
ADLS_ACUITY_SCORE: 9
ADLS_ACUITY_SCORE: 7
ADLS_ACUITY_SCORE: 7
ADLS_ACUITY_SCORE: 9
ADLS_ACUITY_SCORE: 7
ADLS_ACUITY_SCORE: 9
ADLS_ACUITY_SCORE: 7

## 2022-03-16 NOTE — PROGRESS NOTES
IP Diabetes Management  Daily Note           Assessment and Plan:   HPI: Melissa is a 66 year old female with a past medical history of severe COPD, HTN, paroxysmal a fib, GERD, osteoporosis, who was admitted 2/20/22 for BSLT. Melissa does not carry a known prior history of diabetes, nor a family history. However, pre diabetic range A1c done preoperatively (5.8%). She has been intermittently on chronic steroids for years, due to her underlying COPD.     Assessment:   1)Pre-diabetes, versus steroid induced diabetes (A1c 5.8% prior to surgery)  2) current steroid hyperglycemia   3) TF hyperglycemia-resolved.     Plan:    -Novolog reduced to 1:20g CHO with all meals, beginning with lunch today   -tomorrow, discontinue Novolog carb coverage, and initiate Glipizide IR 5 mg daily with breakfast.    -Novolog reduced to moderate intensity sliding scale AC/HS   -BG monitoring TID AC, HS, 0200   -hypoglycemia protocol   -carb counting protocol   -diabetes education completed 3/16 for BG monitor teaching    Recommendation for Discharge- will finalize midday tomorrow.    -Glipizide IR once daily AM (dose TBD). There will be a dose taper coincident with her steroid taper provided   -NO insulin needed for discharge.    -BG monitoring before meals and at bedtime.    Outpatient follow up: will recommend outpatient follow up with [facility] service intervally, then with PCP.     Plan discussed with patient, bedside RN, and primary team paged      Interval History and Assessment: interval glucose trend reviewed:   BG are remaining stable on current regimen. She is beginning to eat more. Received 9 units Novolog with breakfast and pre-lunch BG was 109. Ate 63g CHO lunch.    Cr stable, 0.50 with GFR consistently >90. She is having loose stools.     NPH pens are $340 until deductible is met, then $40.   Her needs are anticipated to be low. Glipizide would be appropriate substitute. She and her  would prefer pill over  injection, and were worried about cost. Reviewed that main concern with the use of Glipizide will be hypoglycemia if PO intake trails off. She felt great with the NGT removed today and does not think she'll have issues with eating well upon discharge to local housing.     Current nutritional intake and type: Orders Placed This Encounter      Regular Diet Adult      Diet  Cycled TF: Vital 1.5 at 45cc/hour x12 hours (8P-8A), for 101g CHO.- stopped 3/14    Steroid planning:       PTA Diabetes Regimen: n/a, no prior history of diabetes    Discharge Planning: nearing readiness for discharge - local housing likely, when medically stable.            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           Medications:     Current Facility-Administered Medications   Medication     acetaminophen (TYLENOL) tablet 975 mg     acetylcysteine (MUCOMYST) 20 % nebulizer solution 2 mL     acyclovir (ZOVIRAX) capsule 400 mg     albuterol (PROVENTIL HFA/VENTOLIN HFA) inhaler     amoxicillin (AMOXIL) capsule 500 mg     aspirin EC tablet 81 mg     [Held by provider] fiber modular (NUTRISOURCE FIBER) packet 1 packet    And     [Held by provider] banatrol plus packet 1 packet     bisacodyl (DULCOLAX) Suppository 10 mg     calcium carbonate 600 mg-vitamin D 400 units (CALTRATE) per tablet 1 tablet     carboxymethylcellulose PF (REFRESH PLUS) 0.5 % ophthalmic solution 1 drop     dapsone (ACZONE) tablet 50 mg     dextrose 10% infusion     glucose gel 15-30 g    Or     dextrose 50 % injection 25-50 mL    Or     glucagon injection 1 mg     diltiazem ER COATED BEADS (CARDIZEM CD/CARTIA XT) 24 hr capsule 240 mg     doxycycline hyclate (VIBRAMYCIN) capsule 100 mg     famotidine (PEPCID) tablet 20 mg     fluticasone (FLONASE) 50  MCG/ACT spray 1 spray     furosemide (LASIX) tablet 40 mg     gabapentin (NEURONTIN) capsule 100 mg     [START ON 3/17/2022] glipiZIDE (GLUCOTROL) tablet 5 mg     heparin ANTICOAGULANT injection 5,000 Units     hydrOXYzine (ATARAX) tablet 25 mg     insulin aspart (NovoLOG) injection (RAPID ACTING)     insulin aspart (NovoLOG) injection (RAPID ACTING)     insulin aspart (NovoLOG) injection (RAPID ACTING)     insulin aspart (NovoLOG) injection (RAPID ACTING)     labetalol (NORMODYNE/TRANDATE) injection 10 mg     levalbuterol (XOPENEX) neb solution 1.25 mg     lidocaine (LMX4) cream     lidocaine 1 % 0.1-1 mL     lidocaine 3%, phenylephrine 0.25% solution for irrigation     loperamide (IMODIUM) capsule 2 mg     loratadine (CLARITIN) tablet 10 mg     magnesium gluconate (MAGONATE) tablet 500 mg     magnesium oxide (MAG-OX) tablet 400 mg     May take regular AM medications except those listed below.     methocarbamol (ROBAXIN) tablet 500 mg     metoprolol tartrate (LOPRESSOR) tablet 25 mg     multivitamin w/minerals (THERA-VIT-M) tablet 1 tablet     mycophenolic acid (GENERIC EQUIVALENT) EC tablet 720 mg     naloxone (NARCAN) injection 0.2 mg    Or     naloxone (NARCAN) injection 0.4 mg    Or     naloxone (NARCAN) injection 0.2 mg    Or     naloxone (NARCAN) injection 0.4 mg     No lozenges or gum should be given while patient on BIPAP/AVAPS/AVAPS AE     nystatin (MYCOSTATIN) suspension 1,000,000 Units     ondansetron (ZOFRAN-ODT) ODT tab 4 mg    Or     ondansetron (ZOFRAN) injection 4 mg     oxyCODONE IR (ROXICODONE) half-tab 2.5 mg     pantoprazole (PROTONIX) EC tablet 40 mg     Patient may continue current oral medications     Patient may continue current oral medications     polyethylene glycol (MIRALAX) Packet 17 g     predniSONE (DELTASONE) tablet 12.5 mg     [Held by provider] protein modular (PROSOURCE TF) 1 packet     rosuvastatin (CRESTOR) tablet 5 mg     sennosides (SENOKOT) tablet 8.6 mg     simethicone  "(MYLICON) chewable tablet 80 mg     sodium chloride (PF) 0.9% PF flush 3 mL     sodium chloride (PF) 0.9% PF flush 3 mL     sodium chloride (PF) 0.9% PF flush 3 mL     sodium chloride (PF) 0.9% PF flush 3 mL     tacrolimus (GENERIC EQUIVALENT) capsule 3 mg     tacrolimus (GENERIC EQUIVALENT) capsule 3.5 mg     Facility-Administered Medications Ordered in Other Encounters   Medication     sodium chloride (PF) 0.9% PF flush 10 mL            Physical Exam:    BP (!) 142/68 (BP Location: Left arm)   Pulse 102   Temp 97.9  F (36.6  C) (Axillary)   Resp 20   Ht 1.575 m (5' 2\")   Wt 67.4 kg (148 lb 11.2 oz)   SpO2 95%   BMI 27.20 kg/m    General: pleasant, in no distress, sitting up in chair.  present and attentive.    HEENT: normocephalic, atraumatic. Oral mucous membranes moist. NGT removed.  Lungs: unlabored respiration, no cough  ABD: rounded,  Mildly distended  Skin: warm and dry, no obvious lesions  MSK:  moves all extremities  Lymp:  no LE edema   Mental status:  alert, oriented to self, place, time  Psych:  bright affect, calm and appropriate interaction             Data:     Recent Labs   Lab 03/16/22  1140 03/16/22  0619 03/16/22  0146 03/15/22  2142 03/15/22  1705 03/15/22  1337   * 139* 156* 119* 178* 129*     Lab Results   Component Value Date    WBC 8.7 03/16/2022    WBC 8.3 03/15/2022    WBC 9.0 03/14/2022    HGB 8.6 (L) 03/16/2022    HGB 7.9 (L) 03/15/2022    HGB 8.3 (L) 03/14/2022    HCT 28.2 (L) 03/16/2022    HCT 26.7 (L) 03/15/2022    HCT 27.5 (L) 03/14/2022     03/16/2022     (H) 03/15/2022     03/14/2022     03/16/2022     03/15/2022     03/14/2022     Lab Results   Component Value Date     03/16/2022     03/15/2022     03/14/2022    POTASSIUM 4.2 03/16/2022    POTASSIUM 4.0 03/15/2022    POTASSIUM 4.5 03/14/2022    CHLORIDE 98 03/16/2022    CHLORIDE 97 03/15/2022    CHLORIDE 97 03/14/2022    CO2 35 (H) 03/16/2022    CO2 " 34 (H) 03/15/2022    CO2 39 (H) 03/14/2022     (H) 03/16/2022     (H) 03/16/2022     (H) 03/16/2022     Lab Results   Component Value Date    BUN 27 03/16/2022    BUN 27 03/15/2022    BUN 28 03/14/2022     Lab Results   Component Value Date    TSH 1.38 03/25/2019     Lab Results   Component Value Date    AST 13 03/16/2022    AST 12 03/15/2022    AST 14 03/14/2022    ALT 25 03/16/2022    ALT 25 03/15/2022    ALT 26 03/14/2022    ALKPHOS 117 03/16/2022    ALKPHOS 111 03/15/2022    ALKPHOS 117 03/14/2022       35 minutes spent on the date of the encounter doing chart review, history and exam, documentation and further activities per the note      Over 50% of my time on the unit was spent counseling the patient and/or coordinating care regarding acute hyperglycemia management.  See note for details.    To contact Endocrine Diabetes service:   From 8AM-4PM: page inpatient diabetes provider that is following the patient  For questions or updates from 4PM-8AM: page the diabetes job code for on call fellow: [phone]    Sherie Lira PA-C  Inpatient Diabetes Management Service  Pager [phone]

## 2022-03-16 NOTE — PLAN OF CARE
Major Shift Events:  A&Ox4, pleasant. SR-sinus tach, afebrile. Pt on RA during day, pt completed oximetry sleep study overnight and remained on RA as she slept. Pt would desat in 80s briefly as she slept before the beeping woke her back up and she would return to low 90s. Good appetite. Denied pain overnight. Voiding adequately.   Plan: notify team of any acute changes    Goal Outcome Evaluation:    Plan of Care Reviewed With: patient     Overall Patient Progress: improving

## 2022-03-16 NOTE — CONSULTS
Discharge Pharmacy Test Claim    Patient has [facility] coverage [facility] and [employer] Part B. Patient's part D plan has an unmet deductible with $308.53 remaining. Once met, insulin will be $40/mo. Patient's [employer] part B insurance will cover accu-chek guide, contour next, or onetouch verio with $0 copay.    Test Claim Copay Note   glucometer and supplies 0.00 accu-chek guide, contour next, onetouch verio all covered   humalog kwikpens 348.53 $40/mo once deductible is met   humulin N kwikpens 348.53 $40/mo once deductible is met   novolin N flexpens not covered humulin N preferred   novolog flexpens not covered humalog preferred       Nicole Mackay  [facility] Pharmacy Liaison  Ph: [phone] Pager: [phone]

## 2022-03-16 NOTE — CONSULTS
Patient is a 66 year old female with a past medical history of severe COPD, HTN, paroxysmal a fib, GERD, osteoporosis, who was admitted 2/20/22 for BSLT. Melissa does not carry a known prior history of diabetes, nor a family history. However, pre diabetic range A1c done preoperatively (5.8%). She has been intermittently on chronic steroids for years, due to her underlying COPD.      Assessment:   1)Pre-diabetes, versus steroid induced diabetes (A1c 5.8% prior to surgery)  2) current steroid and TF induced hyperglycemia     Consult placed to review Meter and discharge plan    Patient provided with One Touch Verio meter starter kit. As per provider, patient will not be going home on insulin. Patient has been made aware that she will be discharging with glipizide, dose not finalized yet. Patient educated on taking blood glucose readings with meter. Patient was able to successfully load the lancing device, demonstrate how to use the device on self and eject the lancet when complete. Patient's significant other was included in the education at bedside.     Patient educated on frequency of blood glucose monitoring, AC/HS. Patient seems comfortable with her meter, significant other is familiar with glucose monitoring as well.     Patient encouraged to reach out to transplant coordinator/team regarding any concerns. Patietnt encouraged to bring meter to follow up appointments to provide blood glucose trend.     -Sue Gonzales MSN, RN [facility]- Diabetes *[phone]

## 2022-03-16 NOTE — PROGRESS NOTES
Calorie Count  Intake recorded for: 3/15  Total Kcals: 220 Total Protein: 10g  Kcals from Hospital Food: 220  Protein: 10g  Kcals from Outside Food (average):0 Protein: 0g  # Meals Ordered from Kitchen: 2 meals   # Meals Recorded: no food intake recorded.   # Supplements Recorded: 100% 1 Glucerna

## 2022-03-16 NOTE — PROGRESS NOTES
Pulmonary Medicine  Cystic Fibrosis - Lung Transplant Team  Progress Note  2022       Patient: Melissa Elder  MRN: [id number]  : 1955 (age 66 year old)  Transplant: 2022 (Lung), POD#23  Admission date: 2022    Assessment & Plan:     Melissa Elder is a 66 year old female with a PMH significant for severe COPD, HTN, mild non-obstructive CAD, paroxysmal afib, osteoporosis, GERD, and colonic polyps.  Pt. is now s/p BSLT on , surgery relatively uncomplicated.  The patient was extubated , post-op course complicated by right chest tube lodged into fissure and left chest tube displacement, s/p bilateral pigtail chest tube placement drain anterior hydropneumothorax and bilateral effusions, disseminated Ureaplasma, and and transient hyperammonemia.  Routine inspection bronch on 3/1 complicated by hypoventilation in setting of oversedation requiring multiple Narcan doses.  Slow improvement in hypercapnia with NIPPV overnight, weaned off daytime hypoxemia resolved 3/9.       Today's recommendations:  - Nebs decreased to BID (will not continue upon discharge), CPT order d/c'd  - Tacrolimus level 3/17 (ordered)  - Myfortic dose increased with improvement in diarrhea  - Prednisone tapered next due 3/29  - DSA, IgG, EBV, and CMV due 3/21  - Amoxicillin for total of 3 months course (through ) for Actinomyces treatment  - Doxycycline for Ureaplasma for 4 week course through   - Donor risk labs ordered 3/23  - Remove NJ tube today, continue to strongly encourage TID meals (portion sizes typically small for patient) and BID-TID supplemental shakes  - Possible discharge to local housing tomorrow, pulmonary f/u in clinic on 3/21      COPD s/p bilateral sequential lung transplant (BSLT):  Acute hypoxic/hypercapneic respiratory failure:   Bilateral pleural effusions/PTX:   Subluxation of sternum: Scant pleural-pleural adhesions and mild-moderate bilateral hilar lymphadenopathy per op  note.  Pathology with CPFE.  Extubated 2/22.  DSA negative 2/28.  Noted hypoventilating with oversedation during bronch on 3/1, received Narcan x3 with improvement in sedation persistent hypercapnia.  Improved with nocturnal BiPAP and daytime HFNC (resolved 3/9).  S/p right pigtail chest tube 3/3 with ureaplasma in exudative effusion (now removed).  Sniff test (3/3) with poor diaphragm excursion bilaterally with extensive accessory respiratory chest wall musculature effort to aid in inspiration.  Repeat sniff test (3/11) without evidence of diaphragmatic palsy.  Trialed off NIPPV 3/11-3/13 with marked increase in hypercapnea.  No daytime hypoxemia.  CXR (3/12) with mild BLL opacities vs effusions (stable).  - Flonase for postnasal drip (3/10)  - Nebs: levalbuterol and Mucomyst TID decreased to BID (will not continue upon discharge), CPT order d/c'd  - Overnight oximetry study tonight on RA with BED FLAT to complete data required for home NIPPV qualification, ABG off NIPPV overnight with worsening hypoxemia/hypercapnia (contributing to home NIPPV qualification)  - DSA 3/21 (not yet ordered)  - Diuresis per primary team  - One month bronch on 3/22 at 0900 as OP  - Sleep study as OP for HUMBLE concern given persistent hypercapnia     Immunosuppression:  S/p induction therapy with basiliximab x2 doses (with high dose IV steroid).  - Tacrolimus 3.5 mg qAM / 3 mg qPM.  Goal level 8-12.  Repeat level 3/17 (ordered).  - Myfortic 360 mg BID (decreased 3/14 d/t diarrhea) increased back to 720 mg BID given improvement in stools off of tube feeds  - Prednisone 12.5 mg BID with taper per lung transplant protocol (next due 3/29):  Date AM dose (mg) PM dose (mg)   3/15 12.5 12.5   3/29 12.5 10   4/12 10 10   5/10 10 7.5   6/7 7.5 7.5   7/5 7.5 5   8/2 5 5   8/30 5 2.5      Prophylaxis:   - Dapsone for PJP ppx  - Nystatin for oral candidiasis ppx, 6 month course  - See below for serologies and viral ppx:    Donor Recipient  Intervention   CMV status Negative Negative CMV monthly (3/21, not yet ordered, negative 3/11)   EBV status Positive Positive EBV at one month (3/21, not yet ordered)   HSV status N/A (Newly) Positive As below      ID: Prior history of infection/colonization with Haemophilus influenzae (2017). Donor (OSH) cultures with P-S MSSA; ([facility]) with Strep mitis, Actinomyces odontolyticus, MSSA x2, and C. kruseii (concern for possible aspiration).  Recipient cultures at time of transplant with Actinomyces odonotolyticus.  Bronch cultures (2/22) with Saccharomyces cerevisiae (no indication to treat per Dr. Ghosh unless pt. acutely declines clinically, 3/1) and C. albicans.  - IgG repeat at one month (3/21, not yet ordered)  - ABX: amoxicillin (3/8-5/23, s/p ceftriaxone 2/23-3/8) for 3 month course for Actinomyces treatment  - Low threshold to treat Candida if it recurs in respiratory cultures, per transplant ID     HSV: Pt. with recent seroconversion at time of transplant.  HSV also noted on donor cultures.  Initial plan for 30 days of ppx with ACV post-op per Dr. Lala.  Then with HSV+ bronch at time of transplant (2/21), transitioned to treatment dosing with VACV  x 14 days through 3/12.   - Acyclovir prophylaxis 3/13-23 per protocol     Hyperammonemia, Resolved:   Pleural Fluid and sputum Ureaplasma:   Ammonia mildly elevated on 3/2 but quickly normalized.  Ureaplasma and Mycoplasma studies sent,when patient was somnolent with confusion/visual hallucinations in setting of hypercapnea, PCR for ureaplasma + on sputum and pleural fluid cultures on 3/2. .  - Doxycycline (3/4-4/1) per transplant ID     Positive AFB on sputum culture: Noted on sputum culture 3/2.  Transplant ID following for speciation and susceptibility testing as well as other evidence of infection.   - AFB sputum culture (3/9) NGTD  - Obtain AFB cultures on all future bronchs      PHS risk criteria donor: Additional labs required post-transplant (between 4-8  weeks post-op): Hepatitis B, Hepatitis C, and HIV by COLT (ordered 3/23), with repeat hepatitis B surface antibody ordered at that time given discrepancy with recent result.     Other relevant problems managed by primary team:     Concern for gastroparesis: Pt. c/o early satiety and persistent fullness and bloating t/o the day.  Tube feeds post-pyloric, not likely to be contributing significantly.  NM gastric emptying study completed 3/15 and suprisingly normal.  Gastric distention likely attributed to gas.  - Continue scheduled Simethicone QID with frequent ambulation (as pt. has been doing)  - Remove NJ tube today  - Continue to strongly encourage TID meals (portion sizes typically small for patient) and BID-TID supplemental shakes     Diarrhea:   Concern for ileus: Likely 2/2 medications and tube feedings.  Fiber added to tube feeds.  MMF decreased as above an switched to myfortic.  Loose stools now improved.  Again having loose/watery stools, also noting some abdominal bloating/fullness.  AXR 3/5 with diffuse distention of small and large bowel suggestive of ileus.  C diff negative 3/6.  Abdomen/pelvis CT (3/6) with decrease in small bowel distention and perienteric fat stranding.  CXR (3/8) with partially imaged likely adynamic ileus.  Bowel regimen initiated 3/8, adjusted 3/11.  Ileus improving by AXR on 3/12.  Diarrhea improved with discontinuation of tube feedings.      CAD: Noted to have mild non-obstructive CAD without hemodynamically significant lesions on cardiac cath 3/27/19.  PTA ASA 81 mg and atorvastatin, started on rosuvastatin post-op but held 2/28 for elevated LFTs (as above), resumed 3/9.  ASA resumed 3/1.     Paroxysmal afib:   HTN: PTA diltiazem, not on AC.  Persistent tachycardia post-op, but now with better rate control on metoprolol and PTA diltiazem.     GERD: Not on PPI PTA.  Negative pH/manometry study 3/28/19, noted small hiatal hernia. On PPI daily since 2/21, H2 blocker bridge  "discontinued 3/2, some increase in reflux symptoms since, resumed 3/5.  PPI increased to BID 3/7.     Hypomagnesemia: Suppressed absorption d/t CNI.  Requiring almost daily IV/PO replacement so started on PO mag oxide, transitioned to gluconate for diarrhea.     We appreciate the excellent care provided by the [place] [id number] team.  Recommendations communicated via in person rounding and this note.  Will continue to follow along closely, please do not hesitate to call with any questions or concerns.     Patient discussed with Dr. Hernandez.    Iman Jane, DNP, APRN, CNP  Inpatient Nurse Practitioner  Pulmonary CF/Transplant     Subjective & Interval History:     Remains on RA, off BiPAP last night for overnight oximetry (but will need to be repeated in flat positioning to mimic normal sleeping pattern).  No new cough or sputum.  Ambulating frequently.  Still with some gastric distention but NM study normal, attributed to gas.  Loose stools improved, now soft.    Review of Systems:     C: No fever, no chills, no change in weight  INTEGUMENTARY/SKIN: No rash or obvious new lesions  ENT/MOUTH: No sore throat, no sinus pain, no nasal congestion or drainage  RESP: See interval history  CV: No chest pain, no palpitations, + peripheral edema, no orthopnea  GI: No nausea, no vomiting, no reflux symptoms  : No dysuria  MUSCULOSKELETAL: + back and chest incisional pain  ENDOCRINE: Blood sugars intermittently elevated  NEURO: No headache, no numbness or tingling  PSYCHIATRIC: Mood stable    Physical Exam:     All notes, images, and labs from past 24 hours (at minimum) were reviewed.    Vital signs:  Temp: 97.9  F (36.6  C) Temp src: Axillary BP: (!) 142/68 Pulse: 102   Resp: 20 SpO2: 95 % O2 Device: None (Room air) Oxygen Delivery: 1 LPM Height: 157.5 cm (5' 2\") Weight: 67.4 kg (148 lb 11.2 oz)  I/O:     Intake/Output Summary (Last 24 hours) at 3/16/2022 0946  Last data filed at 3/16/2022 0800  Gross per 24 hour   Intake 490 " ml   Output 300 ml   Net 190 ml     Constitutional: Sitting up in a chair, in no apparent distress.   HEENT: Eyes with pink conjunctivae, anicteric.  Oral mucosa moist without lesions.  Right nare NJ tube in place.  PULM: Diminished bases bilaterally.  No crackles, no rhonchi, no wheezes.  Non-labored breathing on RA.  CV: Normal S1 and S2.  RRR.  No murmur, gallop, or rub.  1-2+ BLE edema (wraps in place).   ABD: NABS, soft, nontender, mildly distended.    MSK: Moves all extremities.  No apparent muscle wasting.   NEURO: Alert and oriented, conversant.   SKIN: Warm, dry.  No rash on limited exam.   PSYCH: Mood stable.     Lines, Drains, and Devices:  Peripheral IV 03/12/22 Right;Anterior Lower forearm (Active)   Site Assessment WDL 03/16/22 0000   Line Status Saline locked 03/16/22 0000   Dressing Intervention New dressing ;Other (Comment) 03/12/22 1600   Phlebitis Scale 0-->no symptoms 03/16/22 0000   Infiltration Scale 0 03/16/22 0000   Number of days: 4     Data:     LABS    CMP:   Recent Labs   Lab 03/16/22  0619 03/16/22  0146 03/15/22  2142 03/15/22  1705 03/15/22  1337 03/15/22  0643 03/14/22  0640 03/14/22  0551 03/13/22  1238 03/13/22  1025     --   --   --   --  136  --  139  --  137   POTASSIUM 4.2  --   --   --   --  4.0  --  4.5  --  4.2   CHLORIDE 98  --   --   --   --  97  --  97  --  96   CO2 35*  --   --   --   --  34*  --  39*  --  36*   ANIONGAP 5  --   --   --   --  5  --  3  --  5   * 156* 119* 178*   < > 147*   < > 153*   < > 129*   BUN 27  --   --   --   --  27  --  28  --  26   CR 0.52  --   --   --   --  0.50*  --  0.53  --  0.50*   GFRESTIMATED >90  --   --   --   --  >90  --  >90  --  >90   MINISTERIO 8.9  --   --   --   --  8.3*  --  8.6  --  8.8   MAG 1.7  --   --   --   --  1.8  --  2.0  --  1.7   PHOS 4.0  --   --   --   --  3.7  --  4.1  --  3.6   PROTTOTAL 5.6*  --   --   --   --  5.2*  --  5.7*  --  6.2*   ALBUMIN 2.7*  --   --   --   --  2.5*  --  2.7*  --  2.8*   BILITOTAL  0.5  --   --   --   --  0.4  --  0.4  --  0.4   ALKPHOS 117  --   --   --   --  111  --  117  --  132   AST 13  --   --   --   --  12  --  14  --  18   ALT 25  --   --   --   --  25  --  26  --  32    < > = values in this interval not displayed.     CBC:   Recent Labs   Lab 03/16/22  0619 03/15/22  0643 03/14/22  0551 03/13/22  1025   WBC 8.7 8.3 9.0 10.4   RBC 2.82* 2.61* 2.75* 2.95*   HGB 8.6* 7.9* 8.3* 9.0*   HCT 28.2* 26.7* 27.5* 29.3*    102* 100 99   MCH 30.5 30.3 30.2 30.5   MCHC 30.5* 29.6* 30.2* 30.7*   RDW 20.0* 19.9* 20.3* 19.9*    361 413 448       INR: No lab results found in last 7 days.    Glucose:   Recent Labs   Lab 03/16/22  0619 03/16/22  0146 03/15/22  2142 03/15/22  1705 03/15/22  1337 03/15/22  0643   * 156* 119* 178* 129* 147*       Blood Gas:   Recent Labs   Lab 03/16/22  0619 03/15/22  0643 03/15/22  0037 03/14/22  0551   PHV 7.42 7.38  --  7.42   PCO2V 64* 68*  --  66*   PO2V 59* 59*  --  37   HCO3V 41* 40*  --  43*   ARIEL 14.5* 12.5*  --  16.0*   O2PER 0 25 24 21       Culture Data No results for input(s): CULT in the last 168 hours.    Virology Data: No results found for: INFLUA, FLUAH1, FLUAH3, YM0733, IFLUB, RSVA, RSVB, PIV1, PIV2, PIV3, HMPV, HRVS, ADVBE, ADVC    Historical CMV results (last 3 of prior testing):  Lab Results   Component Value Date    CMVQNT Not Detected 03/11/2022     No results found for: CMVLOG    Urine Studies    Recent Labs   Lab Test 03/01/22  1549 02/20/22  0248   URINEPH 6.0 7.0   NITRITE Negative Negative   LEUKEST Negative Negative   WBCU 0 <1       Most Recent [place] Pulmonary Function Testing (FVC/FEV1 only)  FVC-Pre   Date Value Ref Range Status   12/20/2021 1.81 L    06/21/2021 1.46 L    12/09/2019 1.59 L    06/03/2019 1.68 L      FVC-%Pred-Pre   Date Value Ref Range Status   12/20/2021 65 %    06/21/2021 52 %    12/09/2019 56 %    06/03/2019 58 %      FEV1-Pre   Date Value Ref Range Status   12/20/2021 0.49 L    06/21/2021 0.50 L     12/09/2019 0.49 L    06/03/2019 0.47 L      FEV1-%Pred-Pre   Date Value Ref Range Status   12/20/2021 22 %    06/21/2021 22 %    12/09/2019 21 %    06/03/2019 20 %        IMAGING    Recent Results (from the past 48 hour(s))   NM Gastric Emptying    Narrative    EXAM:  NM GASTRIC EMPTYING, 3/15/2022 12:57 PM  HISTORY: 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 Static images  were acquired at approximately 1 hour intervals out through 4 hours  using a dual head gamma camera in an anterior and posterior position.  The calculation of emptying was based on dual head imaging with  geometric mean calculations.  A Linear square fit was calculated to  the emptying curve to determine the amount of residual activity at  each time point.    FINDINGS:   Percent retention at 60 min = 67%.  Percent retention at 120 min = 42%.  Percent retention at 180 min = 16%.  Percent retention at 240 min =9%.    T 1/2 emptying time =  97 mins.      Impression    IMPRESSION: Normal gastric emptying.  =====================  Reference Range:  Gastric emptying  - 30 minutes: <70% of retention (> 30% emptying) suggests abnormally     fast emptying.  - 60 minutes: <90% retention (>10% emptying) is normal; less than 30%     retention (>70% emptying) suggests abnormally rapid emptying.  - 90 minutes: <65% retention (> 35% emptying) is normal.  - 120 minutes: <60% retention (> 40% emptying) is normal.  - 180 minutes: <30% retention (> 70% emptying) is normal.    Gastric emptying T-1/2:  Solid: The normal range is  minutes  Liquid only: Normal range is 10-45 minutes.  Liquid only-children: At 60 minutes, normal range is 44-58 % .  Liquid only-infants: At 60 minutes, normal range is 32-64 %.      I have personally reviewed the examination and initial interpretation  and I  agree with the findings.    ANGÉLICA FAITH MD         SYSTEM ID:  [id number]

## 2022-03-16 NOTE — PHARMACY-TRANSPLANT NOTE
Transplant Recipient Prior to Discharge Note    66 year old female s/p lung transplant on 2/21/22 for end stage COPD.    Pharmacy has monitored for medication interactions and immunosuppression levels in conjunction with the multidisciplinary team. In anticipation for discharge, medication therapy needs have been addressed daily throughout the current admission via multidisciplinary rounds and/or discussions, order verification, daily clinical pharmacy review, and communication with prescribers.    Abby Pagan, PharmD, BCPS

## 2022-03-16 NOTE — PROGRESS NOTES
NURSING PROGRESS NOTE  Shift Summary      Date: March 16, 2022     Neuro/Musculoskeletal:  A&Ox4. pleasant  Cardiac:  SR-ST.  VSS.  Mild-moderate pain.   Respiratory:  Sating in the 90s on room air.  GI/:  Adequate urine output.  LBM: 3/16/2022  Diet/Appetite:  Tolerating regular diet. Eating 100% with carb coverage and calorie counts  Activity:  Assist of standby with walker.   Pain:  At times, especially with activity. Oxy x1 and Tylenol x1 for pain 6/10  Skin:  No new deficits noted.   LDAs + Drips/IVF:  NG removed. PIV infiltrated, new IV ordred  Protocols/Labs:  K, Mg, Phos protocols done with redraw in morning ordered.    Pertinent Shift Updates:  Pt with NG removed at noon. Ate 100% of meals and discussed what low carb foods are. Will be going home with diabetes medications but not insulin. Pt up to shower and worked with PT. Pt had increase in pain with amount of activity, meds given per MAR.    Pt to have overnight oxymetry test tonight on ROOM AIR. Pt aware of plan.      Plan:  Possible discharge 3/17.  will be here tomorrow for teaching and to transport pt.       Vandana Downing RN  .................................................... March 16, 2022   5:57 PM  [facility] ([facility]): [facility]Unit [facility]

## 2022-03-16 NOTE — PROGRESS NOTES
Chronic Pulmonary Disease Specialist Progress note    Review of chart again completed in order to obtain overnight sleep study results. Patient with 4 minutes and 56 seconds of saturation below 88% on RA. Per insurance and Medicare requirements patient will need > 5 minutes of desaturation below 88%. Writer contacted transplant coordinator to discuss results. Writer let coordinator know that test could be repeated or a letter of medical necessity may be needed from patient's physician in order to obtain NIPPV device at discharge.     Will continue to follow patient and remain available if additional assistance is needed.    BINA Rabago, RRT, CTTS  Chronic Pulmonary Disease Specialist  Office: [phone]   Pager: [phone]

## 2022-03-17 ENCOUNTER — APPOINTMENT (OUTPATIENT)
Dept: OCCUPATIONAL THERAPY | Facility: CLINIC | Age: 67
DRG: 007 | End: 2022-03-17
Attending: SURGERY
Payer: MEDICARE

## 2022-03-17 VITALS
WEIGHT: 149 LBS | OXYGEN SATURATION: 97 % | HEIGHT: 62 IN | HEART RATE: 80 BPM | RESPIRATION RATE: 28 BRPM | BODY MASS INDEX: 27.42 KG/M2 | SYSTOLIC BLOOD PRESSURE: 109 MMHG | TEMPERATURE: 97.5 F | DIASTOLIC BLOOD PRESSURE: 72 MMHG

## 2022-03-17 LAB
ALBUMIN SERPL-MCNC: 2.7 G/DL (ref 3.4–5)
ALP SERPL-CCNC: 115 U/L (ref 40–150)
ALT SERPL W P-5'-P-CCNC: 23 U/L (ref 0–50)
ANION GAP SERPL CALCULATED.3IONS-SCNC: 6 MMOL/L (ref 3–14)
AST SERPL W P-5'-P-CCNC: 12 U/L (ref 0–45)
BASE EXCESS BLDV CALC-SCNC: 13.1 MMOL/L (ref -7.7–1.9)
BASOPHILS # BLD AUTO: 0 10E3/UL (ref 0–0.2)
BASOPHILS NFR BLD AUTO: 0 %
BILIRUB SERPL-MCNC: 1 MG/DL (ref 0.2–1.3)
BUN SERPL-MCNC: 30 MG/DL (ref 7–30)
CALCIUM SERPL-MCNC: 8.8 MG/DL (ref 8.5–10.1)
CHLORIDE BLD-SCNC: 98 MMOL/L (ref 94–109)
CO2 SERPL-SCNC: 34 MMOL/L (ref 20–32)
CREAT SERPL-MCNC: 0.53 MG/DL (ref 0.52–1.04)
EOSINOPHIL # BLD AUTO: 0 10E3/UL (ref 0–0.7)
EOSINOPHIL NFR BLD AUTO: 0 %
ERYTHROCYTE [DISTWIDTH] IN BLOOD BY AUTOMATED COUNT: 19.9 % (ref 10–15)
GFR SERPL CREATININE-BSD FRML MDRD: >90 ML/MIN/1.73M2
GLUCOSE BLD-MCNC: 138 MG/DL (ref 70–99)
GLUCOSE BLDC GLUCOMTR-MCNC: 140 MG/DL (ref 70–99)
GLUCOSE BLDC GLUCOMTR-MCNC: 178 MG/DL (ref 70–99)
HCO3 BLDV-SCNC: 39 MMOL/L (ref 21–28)
HCT VFR BLD AUTO: 29.6 % (ref 35–47)
HGB BLD-MCNC: 9 G/DL (ref 11.7–15.7)
IMM GRANULOCYTES # BLD: 0.1 10E3/UL
IMM GRANULOCYTES NFR BLD: 1 %
LYMPHOCYTES # BLD AUTO: 0.7 10E3/UL (ref 0.8–5.3)
LYMPHOCYTES NFR BLD AUTO: 8 %
MAGNESIUM SERPL-MCNC: 1.6 MG/DL (ref 1.6–2.3)
MCH RBC QN AUTO: 31.1 PG (ref 26.5–33)
MCHC RBC AUTO-ENTMCNC: 30.4 G/DL (ref 31.5–36.5)
MCV RBC AUTO: 102 FL (ref 78–100)
MONOCYTES # BLD AUTO: 0.6 10E3/UL (ref 0–1.3)
MONOCYTES NFR BLD AUTO: 6 %
NEUTROPHILS # BLD AUTO: 7.3 10E3/UL (ref 1.6–8.3)
NEUTROPHILS NFR BLD AUTO: 85 %
NRBC # BLD AUTO: 0 10E3/UL
NRBC BLD AUTO-RTO: 0 /100
O2/TOTAL GAS SETTING VFR VENT: 21 %
PCO2 BLDV: 59 MM HG (ref 40–50)
PH BLDV: 7.43 [PH] (ref 7.32–7.43)
PHOSPHATE SERPL-MCNC: 3.8 MG/DL (ref 2.5–4.5)
PLATELET # BLD AUTO: 348 10E3/UL (ref 150–450)
PO2 BLDV: 60 MM HG (ref 25–47)
POTASSIUM BLD-SCNC: 4.3 MMOL/L (ref 3.4–5.3)
PROT SERPL-MCNC: 5.7 G/DL (ref 6.8–8.8)
RBC # BLD AUTO: 2.89 10E6/UL (ref 3.8–5.2)
SODIUM SERPL-SCNC: 138 MMOL/L (ref 133–144)
TACROLIMUS BLD-MCNC: 9.7 UG/L (ref 5–15)
TME LAST DOSE: NORMAL H
TME LAST DOSE: NORMAL H
WBC # BLD AUTO: 8.7 10E3/UL (ref 4–11)

## 2022-03-17 PROCEDURE — 85025 COMPLETE CBC W/AUTO DIFF WBC: CPT | Performed by: NURSE PRACTITIONER

## 2022-03-17 PROCEDURE — 80053 COMPREHEN METABOLIC PANEL: CPT | Performed by: NURSE PRACTITIONER

## 2022-03-17 PROCEDURE — 250N000013 HC RX MED GY IP 250 OP 250 PS 637: Performed by: SURGERY

## 2022-03-17 PROCEDURE — 97530 THERAPEUTIC ACTIVITIES: CPT | Mod: GO | Performed by: OCCUPATIONAL THERAPIST

## 2022-03-17 PROCEDURE — 250N000013 HC RX MED GY IP 250 OP 250 PS 637: Performed by: NURSE PRACTITIONER

## 2022-03-17 PROCEDURE — 250N000013 HC RX MED GY IP 250 OP 250 PS 637: Performed by: STUDENT IN AN ORGANIZED HEALTH CARE EDUCATION/TRAINING PROGRAM

## 2022-03-17 PROCEDURE — 84100 ASSAY OF PHOSPHORUS: CPT | Performed by: INTERNAL MEDICINE

## 2022-03-17 PROCEDURE — 82803 BLOOD GASES ANY COMBINATION: CPT | Performed by: NURSE PRACTITIONER

## 2022-03-17 PROCEDURE — 250N000013 HC RX MED GY IP 250 OP 250 PS 637: Performed by: PEDIATRICS

## 2022-03-17 PROCEDURE — 36415 COLL VENOUS BLD VENIPUNCTURE: CPT | Performed by: NURSE PRACTITIONER

## 2022-03-17 PROCEDURE — 250N000013 HC RX MED GY IP 250 OP 250 PS 637: Performed by: PHYSICIAN ASSISTANT

## 2022-03-17 PROCEDURE — 94762 N-INVAS EAR/PLS OXIMTRY CONT: CPT

## 2022-03-17 PROCEDURE — 999N000033 HC STATISTIC CHRONIC PULMONARY DISEASE SPECIALIST

## 2022-03-17 PROCEDURE — 83735 ASSAY OF MAGNESIUM: CPT | Performed by: NURSE PRACTITIONER

## 2022-03-17 PROCEDURE — 250N000012 HC RX MED GY IP 250 OP 636 PS 637: Performed by: PHYSICIAN ASSISTANT

## 2022-03-17 PROCEDURE — 99233 SBSQ HOSP IP/OBS HIGH 50: CPT | Mod: 24 | Performed by: NURSE PRACTITIONER

## 2022-03-17 PROCEDURE — 250N000011 HC RX IP 250 OP 636: Performed by: PHYSICIAN ASSISTANT

## 2022-03-17 PROCEDURE — 99239 HOSP IP/OBS DSCHRG MGMT >30: CPT | Performed by: STUDENT IN AN ORGANIZED HEALTH CARE EDUCATION/TRAINING PROGRAM

## 2022-03-17 PROCEDURE — 80197 ASSAY OF TACROLIMUS: CPT | Performed by: NURSE PRACTITIONER

## 2022-03-17 PROCEDURE — 99233 SBSQ HOSP IP/OBS HIGH 50: CPT | Mod: 24 | Performed by: PHYSICIAN ASSISTANT

## 2022-03-17 PROCEDURE — 250N000012 HC RX MED GY IP 250 OP 636 PS 637: Performed by: NURSE PRACTITIONER

## 2022-03-17 RX ORDER — MAGNESIUM OXIDE 400 MG/1
400 TABLET ORAL 2 TIMES DAILY
Qty: 120 TABLET | Refills: 5 | Status: SHIPPED | OUTPATIENT
Start: 2022-03-17 | End: 2022-03-22

## 2022-03-17 RX ORDER — MAGNESIUM OXIDE 400 MG/1
400 TABLET ORAL 2 TIMES DAILY
Status: DISCONTINUED | OUTPATIENT
Start: 2022-03-17 | End: 2022-03-17 | Stop reason: HOSPADM

## 2022-03-17 RX ADMIN — DAPSONE 50 MG: 25 TABLET ORAL at 08:15

## 2022-03-17 RX ADMIN — PANTOPRAZOLE SODIUM 40 MG: 40 TABLET, DELAYED RELEASE ORAL at 08:18

## 2022-03-17 RX ADMIN — LORATADINE 10 MG: 10 TABLET ORAL at 08:14

## 2022-03-17 RX ADMIN — ASPIRIN 81 MG: 81 TABLET, COATED ORAL at 08:12

## 2022-03-17 RX ADMIN — DILTIAZEM HYDROCHLORIDE 240 MG: 240 CAPSULE, COATED, EXTENDED RELEASE ORAL at 08:10

## 2022-03-17 RX ADMIN — Medication 1 TABLET: at 12:53

## 2022-03-17 RX ADMIN — FUROSEMIDE 40 MG: 20 TABLET ORAL at 08:14

## 2022-03-17 RX ADMIN — SIMETHICONE 80 MG: 80 TABLET, CHEWABLE ORAL at 08:10

## 2022-03-17 RX ADMIN — SIMETHICONE 80 MG: 80 TABLET, CHEWABLE ORAL at 12:54

## 2022-03-17 RX ADMIN — MYCOPHENOLIC ACID 720 MG: 360 TABLET, DELAYED RELEASE ORAL at 08:16

## 2022-03-17 RX ADMIN — ROSUVASTATIN CALCIUM 5 MG: 5 TABLET, FILM COATED ORAL at 08:15

## 2022-03-17 RX ADMIN — FLUTICASONE PROPIONATE 1 SPRAY: 50 SPRAY, METERED NASAL at 08:22

## 2022-03-17 RX ADMIN — METOPROLOL TARTRATE 25 MG: 25 TABLET, FILM COATED ORAL at 08:17

## 2022-03-17 RX ADMIN — NYSTATIN 1000000 UNITS: 100000 SUSPENSION ORAL at 08:20

## 2022-03-17 RX ADMIN — DOXYCYCLINE HYCLATE 100 MG: 100 CAPSULE ORAL at 08:16

## 2022-03-17 RX ADMIN — CALCIUM CARBONATE 600 MG (1,500 MG)-VITAMIN D3 400 UNIT TABLET 1 TABLET: at 08:14

## 2022-03-17 RX ADMIN — METHOCARBAMOL TABLETS 500 MG: 500 TABLET, COATED ORAL at 08:17

## 2022-03-17 RX ADMIN — TACROLIMUS 3.5 MG: 1 CAPSULE ORAL at 08:12

## 2022-03-17 RX ADMIN — HEPARIN SODIUM 5000 UNITS: 5000 INJECTION, SOLUTION INTRAVENOUS; SUBCUTANEOUS at 05:45

## 2022-03-17 RX ADMIN — NYSTATIN 1000000 UNITS: 100000 SUSPENSION ORAL at 12:53

## 2022-03-17 RX ADMIN — GLIPIZIDE 5 MG: 5 TABLET ORAL at 08:10

## 2022-03-17 RX ADMIN — FAMOTIDINE 20 MG: 20 TABLET ORAL at 08:14

## 2022-03-17 RX ADMIN — Medication 400 MG: at 12:53

## 2022-03-17 RX ADMIN — METHOCARBAMOL TABLETS 500 MG: 500 TABLET, COATED ORAL at 12:53

## 2022-03-17 RX ADMIN — PREDNISONE 12.5 MG: 10 TABLET ORAL at 08:16

## 2022-03-17 RX ADMIN — Medication 500 MG: at 08:11

## 2022-03-17 RX ADMIN — AMOXICILLIN 500 MG: 500 CAPSULE ORAL at 08:12

## 2022-03-17 RX ADMIN — ACYCLOVIR 400 MG: 200 CAPSULE ORAL at 08:17

## 2022-03-17 RX ADMIN — LOPERAMIDE HYDROCHLORIDE 2 MG: 2 CAPSULE ORAL at 08:18

## 2022-03-17 RX ADMIN — ACETAMINOPHEN 975 MG: 325 TABLET ORAL at 10:34

## 2022-03-17 ASSESSMENT — ACTIVITIES OF DAILY LIVING (ADL)
ADLS_ACUITY_SCORE: 9

## 2022-03-17 NOTE — PROGRESS NOTES
CLINICAL NUTRITION SERVICES - BRIEF/DISCHARGE NOTE     Nutrition Prescription    Recommendations already ordered by Registered Dietitian (RD):  Diet education for post txp recommendations completed today with pt and .     Future/Additional Recommendations:  Minimize diet restrictions as able d/t high calorie/protein needs post-transplant.  Oral supplements as needed to help meet nutritional needs.     High protein food choices with meals to help meet high needs post-transplant over the next 6-8 weeks.     Heart-healthy diet (low saturated fat, low sodium, high fiber) and food safety precautions long term due to immunosuppression regimen post-transplant       EVALUATION OF THE PROGRESS TOWARD GOALS   Diet: Regular     Intake: Much improved; 100% intake documented, and drinking 2 Glucerna drinks daily to optimize Kcal/protein intake.        NEW FINDINGS   FT removed yesterday by team per improved PO intake. Pt to discharge today with . Reinforced diet education for Post SOT recs and tips for optimizing Kcal/protein intake.     Patient s discharge needs assessed and discharge planning has been conducted with the multidisciplinary transplant care team including physicians, pharmacy, social work and transplant coordinator.     Monitoring/Evaluation  Progress toward goals will be monitored and evaluated per protocol.    Once discharged, place outpatient nutrition consult via the transplant team if nutrition concerns arise.    Alen Johnson RDN, LD, CNSC  6B RD pager: [phone] 1P RD Phone: *[phone]

## 2022-03-17 NOTE — PROGRESS NOTES
Transplant Social Work Service Discharge Note      Patient Name:  Melissa Elder     Anticipated Discharge Date:  3/17/22    Discharge Disposition: Home to local apartment      Plan for 24 hour care for immediate post transplant period: , Tyrese will be her caregiver.    If not local, plans for short term stay:  Is renting [address] at [address]     Additional Services/Equipment Arranged:  Home medical equipment. See RNCC note for details.     Persons notified of above discharge plan:  The patient, the caregiver and the medical team.    Patient / Family response to discharge plan:  All in agreement.    Education and resources provided by SW at discharge: Educated the patient about the role of transplant  in out patient setting and provided contact info for , availability of support groups, and financial assistance that is available if she needs it.     Discussed anticipated pharmacy out of pocket costs: YES    Provided [employer] Donor Letter Writing packet : YES    Plan: SW will remain available should further needs arise.     Oanh Asif, LICSW  [phone]

## 2022-03-17 NOTE — DISCHARGE SUMMARY
[facility]  Hospitalist Discharge Summary      Date of Admission:  2/20/2022  Date of Discharge:  No discharge date for patient encounter.  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 Call [phone] if you haven't heard regarding   these appointments within 7 days of discharge.         Follow Up ([facility])      Please arrange follow up in diabetes/endocrine clinic with CHARLY in 2-3   weeks (needs to be before 4/12 steroid taper for sure).     Appointments on [place] and/or [facility] (with [facility] or [facility]   provider or service). Call [phone] if you haven't heard regarding   these appointments within 7 days of discharge.         {Additional follow-up instructions/to-do's for PCP    :lung transplant team     Unresulted Labs Ordered in the Past 30 Days of this Admission     Date and Time Order Name Status Description    3/16/2022  2:41 PM AFB ID and Susceptibility In process     3/9/2022  8:32 AM Acid-Fast Bacilli Culture and Stain In process     3/3/2022  2:44 PM Acid-Fast Bacilli Culture and Stain In process     3/3/2022  2:44 PM Fungus culture Preliminary     3/3/2022  6:00 AM Prepare red blood cells (unit) Preliminary     3/2/2022 12:04 PM Fungus Culture, non-blood Preliminary     3/2/2022 12:04 PM Fungus Culture, non-blood Preliminary     3/1/2022  2:09 PM Fungal or Yeast Culture Routine Preliminary     2/22/2022 10:26 AM Fungus Culture, non-blood - Washing Site 1 Preliminary     2/22/2022 10:26 AM Acid-Fast Bacilli Culture and Stain In process     2/21/2022  3:03 AM  Fungal or Yeast Culture Routine Preliminary     2/21/2022  3:03 AM Acid-Fast Bacilli Culture and Stain In process     2/21/2022  2:59 AM Fungal or Yeast Culture Routine Preliminary     2/21/2022  2:59 AM Acid-Fast Bacilli Culture and Stain In process     2/20/2022  9:46 AM Prepare red blood cells (unit) Preliminary       These results will be followed up by lung transplant team     Discharge Disposition   Discharged to home  Condition at discharge: Stable      Hospital Course    Melissa Elder is a 66 year old female with PMHx HTN, HLD, paroxysmal Afib, osteoporosis, GERD, severe COPD, previously requiring 2-3L NC O2 at rest.  Underwent b/l lung transplant with Dr. Robin on 02/21. Got 1u pRBC post-op, no overt bleeding source, monitoring. Extubated 02//22 to O2 NC and transferred to the floor. Pericardial drain pulled 2/24/22.      Had left chest tube pulled by [facility] 2/25, 2/26 IR placed new left chest pigtail for hydropneumothorax,      Patient developed encephalopathy 3/2 noted to have slowly rising white count from 2//27-3/2, Initial CT CAP on 3/2 with questionable enteritis, NH3 levels elevated to 57, Rapid improvement with treatment with doxycycline, subsequently cultures from clamshell wound grew Ureaplasm on 3/3. Subsequent CT cap 3/6 shows resolution of enteritis concerns.     Right chest tube removed by [facility] on 3/3, later pigtail catheter placed by IR to drain ureaplasma related empyema.     Routine inspection bronch on 3/1 complicated by hypoventilation in setting of oversedation requiring multiple Narcan doses.  Slow improvement in hypercapnia with NIPPV overnight.     Had sniff test 3/11 that was negative, 3/15 had gastric emptying that was negative. 3/15 ordered nighttime oxymetry testing, 3/16 reordered given oxygen needs were borderline on initial test. Now ruled out needing supplemental oxygen and been discharged home with appropriate follow-ups.    ID relevant course in the hospital  Prior  history of infection/colonization with Haemophilus influenzae (2017). Donor (OSH) cultures with P-S MSSA; ([facility]) with Strep mitis, Actinomyces odontolyticus, MSSA x2, and C. kruseii (concern for possible aspiration).  Recipient cultures at time of transplant with Actinomyces odonotolyticus.  Bronch cultures (2/22) with Saccharomyces cerevisiae (no indication to treat per Dr. Ghosh unless pt. acutely declines clinically, 3/1) and C. albicans.  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), Acyclovir prophylaxis 3/13-23 per protocol  -ureaplasma + on sputum and pleural fluid cultures on 3/2, Doxycycline (3/4-4/1) per transplant ID        Plan     S/p bilateral sequential lung transplant for COPD  Not on o2 on discharge   Immunosuppression   C/w tacro, mmf and prednisone   PPX  Dapsone for Pjp   Nystatin for 6 months        Diarrhea   Dilated bowel on imaging   - Okay to have immodium PRN      Post op pain   - gabapentin 100 mg nightly  - tylenol 975 mg Q8H   - robaxin 500 mg Q6H scheduled   - PRN oxycodone     Paroxysmal Afib   HTN  She was on diltiazem prior to lung transplantation and had not been on anticoagulation. She was  Started on metoprolol in the ICU.  - Continue Metoprolol tartrate to 25 mg BID   - Restarted PTA diltiazem now at 60 mg Q6H, if tolerated can transition back to  mg XL.   - No anticoagulation at this time consider in outpt setting      Moderate protein calorie malnutrition  - Nutrition following   - On TF, will ask about cycling overnight      Stress induced hyperglycemia  C/w glipizide and fingersticks      HLD  Mild CAD   Noted on transplant workup. Started rosuvastatin 40 mg daily--Held 2/28 due to rising LFTs    - restarted rosuvastatin 3/8 will follow LFTs           Consultations This Hospital Stay   VASCULAR ACCESS CARE ADULT IP CONSULT  VASCULAR ACCESS CARE ADULT IP CONSULT  [facility]  ANESTHESIA PAIN SERVICE ADULT IP CONSULT  SPEECH LANGUAGE PATH ADULT IP CONSULT  CARDIAC REHAB IP CONSULT  PULMONARY CF/TRANSPLANT ADULT IP CONSULT  OCCUPATIONAL THERAPY ADULT IP CONSULT  PHYSICAL THERAPY ADULT IP CONSULT  NUTRITION SERVICES ADULT IP CONSULT  SOT MEDICATION HISTORY IP PHARMACY CONSULT  PHARMACY IP CONSULT  NUTRITION SERVICES ADULT IP CONSULT  PHARMACY IP CONSULT  NUTRITION SERVICES ADULT IP CONSULT  CARE MANAGEMENT / SOCIAL WORK IP CONSULT  NUTRITION SERVICES ADULT IP CONSULT  SLP FEES FIBEROPTIC ENDOSCOPIC SWALLOW IP CONSULT  SLP FEES FIBEROPTIC ENDOSCOPIC SWALLOW IP CONSULT  VASCULAR ACCESS CARE ADULT IP CONSULT  ENDOCRINE DIABETES ADULT IP CONSULT  VASCULAR ACCESS CARE ADULT IP CONSULT  INTERVENTIONAL RADIOLOGY ADULT/PEDS IP CONSULT  INTERVENTIONAL RADIOLOGY ADULT/PEDS IP CONSULT  VASCULAR ACCESS CARE ADULT IP CONSULT  INFECTIOUS DISEASE TRANSPLANT SOT ADULT IP CONSULT  VASCULAR ACCESS CARE ADULT IP CONSULT  INTERVENTIONAL RADIOLOGY ADULT/PEDS IP CONSULT  LYMPHEDEMA THERAPY IP CONSULT  VASCULAR ACCESS CARE ADULT IP CONSULT  IV TEAM IP CONSULT  VASCULAR ACCESS CARE ADULT IP CONSULT  VASCULAR ACCESS CARE ADULT IP CONSULT  ENT IP CONSULT  INTERVENTIONAL RADIOLOGY ADULT/PEDS IP CONSULT  VASCULAR ACCESS CARE ADULT IP CONSULT  VASCULAR ACCESS CARE ADULT IP CONSULT  IP RESPIRATORY CARE CHRONIC PULMONARY DISEASE SPECIALIST  DIABETES EDUCATION IP CONSULT  PHARMACY LIAISON FOR MEDICATION COVERAGE CONSULT  PHARMACY LIAISON FOR MEDICATION COVERAGE CONSULT  VASCULAR ACCESS CARE ADULT IP CONSULT    Code Status   Full Code    Time Spent on this Encounter   I, Smith Weston MD, personally saw the patient today and spent greater than 30 minutes discharging this patient.       Smith Weston MD  [address] CARE  [address] [place] [id number]  Phone: [phone]  Fax: [phone]  ______________________________________________________________________    Physical Exam   Vital  Signs: Temp: 98.3  F (36.8  C) Temp src: Oral BP: 98/57 Pulse: 95   Resp: 28 SpO2: 92 % O2 Device: None (Room air)    Weight: 149 lbs 0 oz  Constitutional: awake, alert, cooperative, no apparent distress, and appears stated age  Eyes: Lids and lashes normal, pupils equal, round and reactive to light, extra ocular muscles intact, sclera clear, conjunctiva normal  ENT: Normocephalic, without obvious abnormality, atraumatic, sinuses nontender on palpation, external ears without lesions, oral pharynx with moist mucous membranes, tonsils without erythema or exudates, gums normal and good dentition.  Hematologic / Lymphatic: no cervical lymphadenopathy  Respiratory: No increased work of breathing, good air exchange, clear to auscultation bilaterally, no crackles or wheezing  Cardiovascular: Normal apical impulse, regular rate and rhythm, normal S1 and S2, no S3 or S4, and no murmur noted  GI: No scars, normal bowel sounds, soft, non-distended, non-tender, no masses palpated, no hepatosplenomegally  Genitounirinary:   Skin: no bruising or bleeding  Musculoskeletal: There is no redness, warmth, or swelling of the joints.  Full range of motion noted.  Motor strength is 5 out of 5 all extremities bilaterally.  Tone is normal.  Neurologic: Awake, alert, oriented to name, place and time.  Cranial nerves II-XII are grossly intact.  Motor is 5 out of 5 bilaterally.  Cerebellar finger to nose, heel to shin intact.  Sensory is intact.  Babinski down going, Romberg negative, and gait is normal.  Neuropsychiatric: General: normal, calm and normal eye contact       Primary Care Physician   Natasha Rm    Discharge Orders      Sleep Study Referral      Medication Therapy Management Referral      Reason for your hospital stay    Dear Melissa Elder,    Your were hospitalized at [facility] with dyspnea and treated with lung transplantation.  Over your hospitalization your condition improved and today you are ready  to be discharged.  You should continue to improve but if you develop fever, shortness of breath, nausea, vomiting or shortness of breath please seek medical attention.    We are suggesting the following medication changes:  Per lung transplant team     Please get the following tests done:  NA    Please set up an appointment with:  Please follow with your lung transplant team     It was a pleasure meeting with you today. Thank you for allowing me and my team the privilege of caring for you during your hospitalization. You are the reason we are here, and I truly hope we provided you with the excellent service you deserve. Please let us know if there is anything else we can do for you so that we can be sure you are leaving completely satisfied with your care experience.    Your hospital unit at the time of discharge is 6c so if you have any questions please call the hospital at [phone] and ask to talk to a nurse on 6c.     Sincerely,    mSith Weston MD  Internal Medicine Hospitalist  [facility]     Activity    Your activity upon discharge: activity as tolerated     Adult [facility] Follow-up and recommended labs and tests    F-up with your lung transplant team     Appointments on [place] and/or [facility] (with [facility] or [facility] provider or service). Call [phone] if you haven't heard regarding these appointments within 7 days of discharge.     Follow Up ([facility])    Please arrange follow up in diabetes/endocrine clinic with CHARLY in 2-3 weeks (needs to be before 4/12 steroid taper for sure).     Appointments on [place] and/or [facility] (with [facility] or [facility] provider or service). Call [phone] if you haven't heard regarding these appointments within 7 days of discharge.     Non Invasive Vent Order    Vent Documentation:   In effort to reduce and/or prevent hospitalizations and emergency room visits, we are recommending a(n) Non Invasive Ventilator for home use. Melissa Elder has had  numerous hospitalizations recently due to chronic respiratory failure. The patient would benefit from Non Invasive Ventilator with portability for continuous support at night.    I, the undersigned, certify that the above prescribed supplies are medically necessary for this patient and is both reasonable and necessary in reference to accepted standards of medical and necessary in reference to accepted standards of medical practice in the treatment of this patient's condition and is not prescribed as a convenience.     Diet    Follow this diet upon discharge: Orders Placed This Encounter      Calorie Counts      Snacks/Supplements Adult: Glucerna; Between Meals      Calorie Counts      Calorie Counts      Regular Diet Adult       Significant Results and Procedures   Most Recent 3 CBC's:Recent Labs   Lab Test 03/17/22  0704 03/16/22  0619 03/15/22  0643   WBC 8.7 8.7 8.3   HGB 9.0* 8.6* 7.9*   * 100 102*    378 361     Most Recent 3 BMP's:Recent Labs   Lab Test 03/17/22  1258 03/17/22  0735 03/17/22  0704 03/16/22  1140 03/16/22  0619 03/15/22  1337 03/15/22  0643   NA  --   --  138  --  138  --  136   POTASSIUM  --   --  4.3  --  4.2  --  4.0   CHLORIDE  --   --  98  --  98  --  97   CO2  --   --  34*  --  35*  --  34*   BUN  --   --  30  --  27  --  27   CR  --   --  0.53  --  0.52  --  0.50*   ANIONGAP  --   --  6  --  5  --  5   MINISTERIO  --   --  8.8  --  8.9  --  8.3*   * 178* 138*   < > 139*   < > 147*    < > = values in this interval not displayed.     Most Recent 2 LFT's:Recent Labs   Lab Test 03/17/22  0704 03/16/22  0619   AST 12 13   ALT 23 25   ALKPHOS 115 117   BILITOTAL 1.0 0.5   ,   Results for orders placed or performed during the hospital encounter of 02/20/22   XR Chest 2 Views    Narrative    EXAM: XR CHEST 2 VW  2/20/2022 7:29 AM     HISTORY:  pre-op lung transplant recipient       COMPARISON:  3/29/2019    TECHNIQUE: PA and lateral radiographs of the chest    FINDINGS:     Midline  trachea. Well-defined cardiomediastinal silhouette. Distinct  pulmonary vasculature. No consolidation, pleural effusion or  appreciable pneumothorax.      Impression    IMPRESSION: No acute consolidation.     I have personally reviewed the examination and initial interpretation  and I agree with the findings.    ADRIANA VELAZQUEZ MD         SYSTEM ID:  [id number]   XR Chest Port 1 View    Narrative    Exam: XR CHEST PORT 1 VIEW, 2/21/2022 7:48 AM    Comparison: 2/20/2022    History: post lung transplant    Findings:  AP view of the chest. Endotracheal tube projects just above the level  of the jaimee. Reynolds-Brenden catheter is in the pulmonary artery. Enteric  tube is in stomach. Right right chest tubes x2 projected over the  right hemithorax. Left chest tubes x2 with more lateral chest tube  with sidehole projecting over the subcutaneous tissue. Pericardial  chest tube along the base of the heart.    Trachea is midline. Cardiac silhouette is within normal limits. Aortic  knob calcifications.. Intimal perihilar and bibasilar opacities. There  is no pneumothorax or pleural effusion. Small amount of left chest  wall subcutaneous emphysema.      Impression    Impression:   1. Status post bilateral lung transplant  2. Endotracheal tube at the level of the jaimee, recommend slight  retraction.  3. Left chest tubes with left lateral chest tube sidehole projecting  over the subcutaneous tissue and associated subcutaneous emphysema.  Recommend reposition.  4. Reynolds-Brenden catheter with tip in the main pulmonary artery.  5. Minimal perihilar and bibasilar atelectasis.    Findings FINDINGS discussed with Dr. Raudel Webster by Dr. Meng Suh at 810 AM on 2/21/2022.    I have personally reviewed the examination and initial interpretation  and I agree with the findings.    FELIPA MARIE MD         SYSTEM ID:  [id number]   XR Abdomen Port 1 View    Narrative    Exam: XR ABDOMEN PORT 1 VIEWS, 2/21/2022 7:48 AM    Indication: OG  placement    Comparison: X-ray 3/25/2019    Findings:   Frontal radiograph of the abdomen. Patient is slightly rotated.  Gastric tube tip and sidehole project over the stomach. Ratliff  catheter. Mediastinal drain and chest tubes are partially visualized.  Nonobstructive bowel gas pattern. Scattered hemostatic clips overlying  the right and left mid abdomen. No pneumatosis or portal venous gas.  No aggressive osseous lesions.      Impression    Impression:   Gastric tube tip and sidehole project over the stomach.    I have personally reviewed the examination and initial interpretation  and I agree with the findings.    SATNAM SIMON MD         SYSTEM ID:  [id number]   POC US Guidance Needle Placement    Narrative    Bilateral erector spinae plane block   XR Chest Port 1 View    Narrative    Portable chest 2/21/22 at 1105 hours    INDICATION: Endotracheal tube pulled back    COMPARISON: Earlier same day 0735 hours    FINDINGS: Heart size normal. A few patchy densities in the lower lungs  appear similar. An endotracheal tube tip is approximately 2.2 cm above  the jaimee. Right IJ V[place]-Brenden catheter tip is in the main pulmonary  artery. There is calcification at the aortic knob. Clamshell  sternotomy. Enteric tube progress beyond the inferior margin of the  image.      Impression    IMPRESSION: Endotracheal tube tip approximately 2.2 cm above the  jaimee. Atherosclerosis. Few patchy densities in the lower lungs which  may indicate atelectasis or edema, recommend follow-up to clearing to  exclude infection. [place]-Brenden catheter tip in the main pulmonary  artery.    FELIPA MARIE MD         SYSTEM ID:  [id number]   XR Abdomen Port 1 View    Narrative    EXAMINATION:  XR ABDOMEN PORT 1 VIEWS 2/21/2022 3:31 PM     INDICATION: Verify small bowel feeding tube bedside placement    COMPARISON: Abdominal radiograph 2/21/2022    FINDINGS: Single AP supine view of the abdomen.     Feeding tube tip projects over the right upper  quadrant, not  definitively postpyloric. Enteric tube with tip projecting in the  stomach. The small intestine and colon are nondistended. Scattered  hemostatic colonoscopy clips in the bowel. No evidence of pneumatosis  or portal venous gas. Multiple chest tubes visualized. The lung bases  are unremarkable.      Impression    IMPRESSION:  Feeding tube with tip projected in the right upper quadrant. Not  definitively postpyloric.    I have personally reviewed the examination and initial interpretation  and I agree with the findings.    SATNAM SIMON MD         SYSTEM ID:  [id number]   XR Feeding Tube Reposition    Narrative    Feeding tube placement. 2/22/2022 12:59 PM    Comparison: Radiograph 2/21/2022. CT 3/29/2019.    History: Dietitian placed NG but needs to be advanced a postpyloric  positioning.    Fluoroscopy time: 0.3 minutes    Technique: After injection of Xylocaine gel into the right nostril,  the existing feeding tube was advanced under fluoroscopic guidance.    Findings: The feeding tube is advanced with the tip in the third  portion of the duodenum. A small amount of barium was injected to  demonstrate placement within the small bowel. The feeding tube was  then flushed with normal saline. The feeding tube was secured using a  nasal bridle.      Impression    Impression:   Uncomplicated feeding tube repositioning with tip in the distal third  portion of the duodenum.    I, KEN CHAVARRIA MD, attest that I was present for all critical  portions of the procedure and was immediately available to provide  guidance and assistance during the remainder of the procedure.    I have personally reviewed the examination and initial interpretation  and I agree with the findings.    KEN CHAVARRIA MD         SYSTEM ID:  [id number]   XR Chest Port 1 View    Narrative    EXAM: XR CHEST 1 VW 2/22/2022      HISTORY: Post-Op Lung.    COMPARISON: Previous day.     TECHNIQUE: Frontal view of the  chest.    FINDINGS/    Impression    IMPRESSION: Postoperative sequelae of bilateral lung transplant with  intact clamshell sternotomy wires and lines/tubes as below. Probable  small left pleural effusion. No new airspace disease, pneumothorax, or  overt abnormality of the heart, bones, or upper abdomen.    Lines/tubes/Devices as follows:   --Endotracheal tube tip is 6.2 cm above the jaimee.   --C[place]-Brenden catheter tip projects near the main pulmonary artery.   --Mediastinal and pleural drains.   --Partially visualized enteric and feeding tubes.   --Spinal analgesia catheters    I have personally reviewed the examination and initial interpretation  and I agree with the findings.    MURIEL WALLACE MD         SYSTEM ID:  [id number]   XR Chest Port 1 View    Narrative    EXAM: XR CHEST 1 VW 2/23/2022      HISTORY: Post-Op Lung.    COMPARISON: X-ray. 2/22/2022    TECHNIQUE: Frontal view of the chest.    FINDINGS/    Impression    IMPRESSION: Postoperative sequelae of  lung transplant with intact  clamshell sternotomy wires and lines/tubes as below. Unchanged small  left pleural effusion. Low lung volumes with increased streaky  opacities, likely atelectasis. No convincing new airspace disease,  pneumothorax, or overt abnormality of the heart, bones, or upper  abdomen.    Lines/tubes/Devices as follows:   --Endotracheal tube, [place]-Brenden catheter, and enteric tube have been  removed.   --Unchanged Mediastinal and pleural drains.   --Partially visualized feeding tube.   -- Unchanged Spinal analgesia catheters  -Right IJ venous sheath projecting over the brachiocephalic  confluence/proximal SVC.    I have personally reviewed the examination and initial interpretation  and I agree with the findings.    ADRIANA VELAZQUEZ MD         SYSTEM ID:  [id number]   XR Chest Port 1 View    Narrative    XR CHEST PORT 1 VIEW on 2/23/2022 5:56 PM.    INDICATION: Discontinuation of chest tubes.    COMPARISON: Radiograph same day    FINDINGS:    Portable AP semiupright radiograph of the chest. Clamshell sternotomy  wires are intact. Mediastinal surgical clips. Interval removal of the  bilateral vertically oriented chest tubes. Bibasilar chest tubes are  stable. Mediastinal drain. Analgesic catheters. Partially visualized  NG tube course into the left upper quadrant of the field-of-view.    Trachea is clear. Cardiac silhouette is stable. Pulmonary vasculature  is somewhat indistinct. Trace right apical pneumothorax. No  appreciable left pneumothorax. No large pleural effusion. Diffuse  interstitial and airspace opacities are unchanged. Upper abdomen is  unremarkable.      Impression    IMPRESSION:     1. Interval removal of bilateral vertically oriented chest tubes.  Increased tiny right apical pneumothorax. No appreciable left  pneumothorax.  2. Unchanged diffuse interstitial and airspace opacities.    I have personally reviewed the examination and initial interpretation  and I agree with the findings.    DANIELA BERKOWITZ MD         SYSTEM ID:  [id number]   XR Chest Port 1 View    Narrative    EXAM: XR CHEST 1 VW 2/23/2022      HISTORY: tachypnea, dyspnea s/p Lung transplant. please comment on  volume status.    COMPARISON: Multiple radiographs from same day    TECHNIQUE: Frontal view of the chest.    FINDINGS: Postoperative sequelae of  lung transplant with intact  clamshell sternotomy wires. Pericardial drains. Partially visualized  feeding tube. Spinal analgesia catheter. Change small left pleural  effusion. Unchanged streaky basilar atelectasis. No convincing new  airspace disease, pneumothorax, or overt abnormality of the heart,  bones. Partially visualized gaseous distention.      Impression    Impression: Lung volume appears adequate and unchanged.    I have personally reviewed the examination and initial interpretation  and I agree with the findings.    KEN CHAVARRIA MD         SYSTEM ID:  [id number]   XR Chest Port 1 View     Value    Radiologist  flags (Urgent)     Left basilar tube with sidehole projecting over the    Narrative    Exam: XR CHEST PORT 1 VIEW, 2/24/2022 8:57 AM    Comparison: 2/23/2019    History: Post-Op Lung    Findings:  AP view the chest. Status post bilateral lung transplant with intact  clam shell sternotomy wires. Right basilar chest tube. Left basilar  chest tube with sidehole not visualized Cardiomegaly. Enteric tube  traverses below the field of view.    Trachea is midline. Stable cardiomediastinal silhouette. Bilateral  perihilar and bibasilar opacities. Blunting of the right and left  costophrenic angles.. Small right pneumothorax. No left pneumothorax.  No acute osseous abnormalities.      Impression    Impression:   1. Low lung volumes with bibasilar and perihilar atelectasis/edema and  small right and left pleural effusions.   2. Small right pneumothorax.  3. Stable left basilar chest tube with sidehole not visualized and  likely projecting over the subcutaneous tissues.    [[facility] Center: Left basilar tube with sidehole projecting over the  subcutaneous tissues.]    This report will be copied to the [facility] to ensure a  provider acknowledges the finding. Access Center is available Monday  through Friday 8am-3:30 pm.     I have personally reviewed the examination and initial interpretation  and I agree with the findings.    VARGAS DUKE MD         SYSTEM ID:  [id number]   XR Chest Port 1 View    Narrative    XR CHEST PORT 1 VIEW  2/24/2022 2:14 PM     HISTORY:  chest tube removal       COMPARISON:  Checks x-ray 2/24/2022, 2/23/2022    FINDINGS:   Frontal view of the chest. Interval removal of left basilar chest  tube. Right basilar chest tube appears slightly retracted compared to  prior. Feeding tube courses below the diaphragm and beyond the  field-of-view. Post sternotomy wires. Mediastinum surgical  clips.Trachea is midline. Cardiomediastinal silhouette and pulmonary  vasculature are within normal limits.  Unchanged small right apical  pneumothorax. Mildly decreased perihilar opacities. Unchanged small  right pleural effusion with associated atelectasis.      Impression    IMPRESSION:    1. Interval removal of left chest tube without appreciable  pneumothorax.  2. Slight retraction of right basilar chest tube with unchanged right  hydropneumothorax with small apical pneumothorax component.  3. Mildly decreased perihilar opacities..    I have personally reviewed the examination and initial interpretation  and I agree with the findings.    ADRIANA VELAZQUEZ MD         SYSTEM ID:  [id number]   XR Chest 2 Views    Narrative    EXAM: XR CHEST 2 VW  2/25/2022 8:58 AM     HISTORY:  interval follow up, post-op lung transplant       COMPARISON:  Chest x-ray 2/24/2022    FINDINGS: AP radiograph of the chest. Postoperative changes bilateral  lung transplant with intact median clamshell sternotomy wires. Stable  positioning of bibasilar chest tubes. Partly visualized feeding tube  with the tip overlying the distal duodenum/proximal jejunum. Left  anterior loculated hydropneumothorax.    Stable cardiomediastinal silhouette. Atherosclerotic calcifications of  the aortic arch. Unchanged small right greater than left pleural  effusions. Decreased hazy and streaky opacities in the right lung base  with continued perihilar interstitial opacities and Kerley B lines at  the lung bases. Visualized upper abdomen is unremarkable.      Impression    IMPRESSION:  1. Decreased perihilar and right basilar interstitial opacities  suggesting improving pulmonary edema.  2. Unchanged small right greater than left pleural effusions.  3. Stable support devices.    I have personally reviewed the examination and initial interpretation  and I agree with the findings.    VARGAS DUKE MD         SYSTEM ID:  [id number]   CT Chest w/o Contrast     Value    Radiologist flags (Urgent)     intercommunicating pneumothorax and pneumomediastinum    Narrative    CT  CHEST W/O CONTRAST 2/25/2022 12:21 PM    History: Pneumothorax; Abnormal xray - pleural effusion; s/p lung  transplant 2/21/2022, new hydropneumothorax evolving on chest x-ray    Comparison: CT chest 3/29/2019    Technique: CT of the chest was obtained without intravenous contrast.  Axial, coronal, and sagittal reconstructions were obtained and  reviewed.    Contrast: None    Findings:     Lungs: Postoperative changes of bilateral lung transplantation,  clamshell sternotomy wires are intact. There is a confluent  hydropneumothorax and pneumomediastinum best appreciated on axial  series 4 image 105 which intercommunicates between each hemithorax and  the mediastinum. Bilateral chest tubes are in place, however the  right-sided chest tube is placed within the right major fissure and  the left-sided chest tube has been retracted is within the most  inferior portion of the anterior diaphragmatic recess with a sidehole  external to the pleural space. Small amount of bibasilar atelectasis.  Groundglass the lung bases may represent pulmonary edema in the  immediate postoperative period. No suspicious pulmonary nodule. Focus  of loculated fluid containing gas along the right hilum (series 2  image 30) measures 1.5 x 2 cm.  Airways: Mucous plugging within the lower lobes bilaterally (series 4  image 123 and series 4 image 125)  Vessels: Main pulmonary artery and aorta are normal in caliber. Normal  three-vessel arch. Aortic atherosclerosis  Heart: Heart size is normal without pericardial effusion mild coronary  calcium.  Lymph nodes: Mildly prominent mediastinal nodes are likely reactive.  Thyroid: Within normal limits.  Esophagus: Gastric tube courses through the esophagus.    Upper abdomen: Within normal limits.    Bones and soft tissues: No suspicious axillary lymphadenopathy or soft  tissue mass. No suspicious osseous lesion. Partially visualized spinal  stimulator.        Impression    Impression:   1. Increase in size  of confluent hydropneumothorax with  intercommunication between both hemithoraces anteriorly, and  pneumomediastinum. The right-sided chest tube is located within the  major fissure and therefore may be nonfunctional. The left-sided chest  tube has been retracted and is in the most inferior aspect of the  anterior costophrenic sulcus with a sidehole in the subcutaneous soft  tissues and therefore is nonfunctional.  2. Somewhat loculated appearing fluid and gas collection along the  right hilum measures up to 2 cm this may represents postsurgical fluid  collection short interval follow-up is recommended.  3. Bibasilar atelectasis and mucous plugging.      [Access Center: intercommunicating pneumothorax and pneumomediastinum  with both chest tubes suboptimally positioned, with the left below the  diaphragm]    This report will be copied to the [facility] to ensure a  provider acknowledges the finding. Access Center is available Monday  through Friday 8am-3:30 pm.     I have personally reviewed the examination and initial interpretation  and I agree with the findings.    VARGAS DUKE MD         SYSTEM ID:  [id number]   XR Chest Port 1 View    Narrative    XR CHEST PORT 1 VIEW  2/25/2022 4:30 PM     HISTORY:  Chest tube removal       COMPARISON:  Chest CT 2/25/2022, chest x-ray 2/25/2022    FINDINGS:   Frontal view of the chest. Interval removal of left basilar chest  tube. Possible residual small medial left pneumothorax. Small right  apical pneumothorax with right basilar chest tube in similar position.  Feeding tube courses below the diaphragm and beyond the field of view.  Postoperative changes of bilateral lung transplant with clamshell  sternotomy wires and mediastinal clips.  Trachea is midline. Cardiac silhouette is stable. Mild perihilar and  bibasilar streaky opacities. Possible trace bilateral pleural  effusions.      Impression    IMPRESSION:  1. Interval removal of left chest tube with residual  small medial left  pneumothorax.  2. Small right apical pneumothorax with right chest tube in place.  3. Trace bilateral pleural effusions with associated atelectasis.    I have personally reviewed the examination and initial interpretation  and I agree with the findings.    KEN CHAVARRIA MD         SYSTEM ID:  [id number]   XR Chest 2 Views    Narrative    Exam: XR CHEST 2 VW, 2/26/2022 9:08 AM    Indication: interval follow up, post-op lung transplant    Comparison: 2/25/2022 chest x-ray at 16:20    Findings:   Upright, PA and lateral views of the chest. Enteric tube tip appears  to be postpyloric. Right basilar chest tube is stable in position.  Midline trachea. Stable elevation of the left hemidiaphragm. Stable  cardiac silhouette. Mild pulmonary venous congestion with small  bilateral pleural effusions. Stable size of right apical pneumothorax.  No discernible left-sided pneumothorax.       Impression    Impression:   1. Stable size of right apical pneumothorax. No discernible left-sided  pneumothorax.  2. Small bilateral pleural effusions with new, mild pulmonary venous  congestion.     I have personally reviewed the examination and initial interpretation  and I agree with the findings.    FELIPA MARIE MD         SYSTEM ID:  [id number]   US Lower Extremity Venous Duplex Left    Narrative    EXAMINATION: DOPPLER VENOUS ULTRASOUND OF THE LEFT LOWER EXTREMITY,  2/26/2022 3:11 PM     COMPARISON: None.    HISTORY: Left greater than right lower extremity swelling concern for  DVT    TECHNIQUE:  Gray-scale evaluation with compression, spectral flow, and  color Doppler assessment of the deep venous system of the left leg  from groin to knee, and then at the ankle.    FINDINGS:  In the left lower extremity, the common femoral, femoral, popliteal  and posterior tibial veins demonstrate normal compressibility and  blood flow.      Impression    IMPRESSION:  No evidence left lower extremity deep venous  thrombosis.    I have personally reviewed the examination and initial interpretation  and I agree with the findings.    MURIEL WALLACE MD         SYSTEM ID:  [id number]   CT Chest Tube with Cath Placement    Narrative    PROCEDURES 2/26/22:  1. CT guided basal chest tube placement.  2. CT guided apical chest tube placement.     Clinical History: Left chest tube pulled out. Has residual fluid  post-transplant.     Comparisons: 2/26/22    Staff Radiologist: Selma Weems MD    Resident(s): Rangel John MD      Medications: The patient was placed on continuous monitoring. No  intravenous sedation administered. Vital signs monitored by nursing  staff under Interventional Radiologists supervision. The patient  remained stable throughout the procedure.    CTDIvol: 261.85 mGy.    Contrast: No intravenous contrast administered.    PROCEDURE: The patient understood the limitations, alternatives, and  risks of the procedure and requested the procedure be performed. Both  written and oral consent were obtained.    The patient was placed in the supine position. Preprocedural scanning  demonstrated adequate window for basilar and apical drain placement  with associated fluid and air.    The left chest was prepped and draped in the usual sterile fashion. 1%  lidocaine without epinephrine was used for local anesthesia.     Under CT fluoroscopy guidance, a 5 Egyptian Unbound centesis  catheter needle was advanced into the left basilar pleural  space.Needle removed. Guidewire coiled within the pleural cavity.  Tract was dilated up to 10 Egyptian over guidewire. Catheter exchanged  over guidewire for a 10 F catheter. Guidewire removed. Catheter placed  to water seal chest tube drainage. 2-0 nylon catheter-retaining suture  and sterile dressing applied.     Next attention was directed to the left apical air collection. Under  CT fluoroscopy guidance, a 5 Egyptian Unbound centesis  catheter needle was advanced into  the left apical pleural space.Needle  removed. Guidewire coiled within the pleural cavity. Tract was dilated  up to 10 [place]ian over guidewire. Catheter exchanged over guidewire for  a 12 F catheter. Guidewire removed. Catheter placed to water seal  chest tube drainage. 2-0 nylon catheter-retaining suture and sterile  dressing applied.     No immediate complication.      Impression    IMPRESSION:    1. Successful CT guided left chest tubes placed as detailed above.  Catheters placed to water seal chest drainage, 20 cm water suction.    PLAN:  -Return to patient care unit.  -Catheters placed to water seal chest drainage, 20 cm water suction.       XR Chest 2 Views    Narrative    Chest 2 views    INDICATION: Interval follow-up, post lung transplant    COMPARISON: 2/26/2022    FINDINGS: Clamshell sternotomy from prior bilateral lung transplant  again noted. Left chest catheters are present. Heart size normal.  Atherosclerosis at the aortic arch. Haziness and costophrenic angle  blunting bilaterally, suggestive of atelectasis and/or small pleural  effusions. Feeding tube tip appears in the duodenal/jejunal junction.      Impression    IMPRESSION: Bibasilar trace pleural effusions or atelectasis.  Atherosclerosis. Prior bilateral lung transplant.    FELIPA MARIE MD         SYSTEM ID:  [id number]   XR Chest 2 Views    Narrative    Exam: XR CHEST 2 VW, 2/28/2022 10:34 AM    Comparison: 2/27/2022    History: interval follow up, post-op lung transplant    Findings:  PA and lateral views the chest. Postoperative chest status post  bilateral lung transplant with intact clam shell sternotomy wires. 2  left chest tubes. Right basilar chest tube. Enteric tube traverses  below the field-of-view.    Trachea is midline. Mediastinum is within normal limits.  Cardiopulmonary silhouette is within normal limits. Aortic arch  calcifications. Perihilar and bibasilar opacities. No pneumothorax.  Probable small bilateral pleural  effusions.. The upper abdomen is  unremarkable.      Impression    Impression: Bibasilar and perihilar atelectasis/edema with probable  small bilateral pleural effusions. Status post bilateral lung  transplant.    I have personally reviewed the examination and initial interpretation  and I agree with the findings.    FELIPA MARIE MD         SYSTEM ID:  [id number]   XR Chest 2 Views    Narrative    Chest 2 views    INDICATION: Postop one transplant    COMPARISON: 2/28/2022    FINDINGS: Heart borderline enlarged. Clamshell sternotomy from prior  bilateral lung transplant. Left chest catheters again present.  Calcification at the aortic knob. Osteopenia. Bibasilar atelectasis  unchanged. Feeding tube beyond the inferior margin of the image.      Impression    IMPRESSION: Prior bilateral lung transplant. Atherosclerosis.    FELIPA MARIE MD         SYSTEM ID:  [id number]   XR Chest Port 1 View    Narrative    EXAM: XR CHEST PORT 1 VIEW  3/2/2022 6:10 AM     HISTORY:  Persistent htypoxia/hypercapnia post-bronch on 3/1/22-r/o  collapse/ PTX or other causes       COMPARISON:  Chest radiograph 3/1/2022    FINDINGS:     Portable semiupright view of the chest. Postsurgical changes of  bilateral lung transplant with intact sternotomy wires. Pleural drains  in similar position. Feeding tube tip courses below the diaphragm and  beyond field of view. Stable cardiac silhouette. Increased bibasilar  streaky opacities. Possible small pleural effusions. Small right  apical pneumothorax.    No acute osseous abnormality. Visualized upper abdomen shows partially  visualized prominent bowel loops.        Impression    IMPRESSION:     1. Small right apical pneumothorax. Right-sided chest tube in place.  2. Postoperative chest with increased bibasilar streaky opacities more  likely to represent atelectasis versus pulmonary edema or infection.      Findings discussed with patient's Nurse by B[place]don at 10:29 AM 3/2/2022     I have  personally reviewed the examination and initial interpretation  and I agree with the findings.    ADRIANA VELAZQUEZ MD         SYSTEM ID:  [id number]   US Abdomen Limited    Narrative    EXAMINATION: Limited Abdominal Ultrasound, 3/3/2022 7:44 AM     COMPARISON: 3/2/2022    HISTORY: Rising LFTs    FINDINGS:   Fluid: No evidence of ascites or pleural effusions.    Liver: The liver demonstrates hyperechoic and coarsened parenchyma,  measuring 17.7 cm in craniocaudal dimension. There is no focal mass.     Gallbladder: There is no wall thickening, pericholecystic fluid,  positive sonographic Guo's sign or evidence for cholelithiasis.    Bile Ducts: Both the intra- and extrahepatic biliary system are of  normal caliber.  The common bile duct measures mm in diameter.    Pancreas: Visualized portions of the head and body of the pancreas are  unremarkable.     Kidney: The right kidney measures 11.4 cm long. There is no  hydronephrosis or hydroureter, no shadowing renal calculi. 1.4 cm  cortical hyperechoic mass without significant acoustic shadowing. Most  likely angiomyolipoma. This correlates with the dominant CT  abnormality from yesterday which showed a partially fatty mass in this  area.    Stable small right pleural effusion.      Impression    IMPRESSION:   1.  Hepatic steatosis.  2.  Revisualization of right kidney angiomyolipoma.  3.  Small right pleural effusion.    I have personally reviewed the examination and initial interpretation  and I agree with the findings.    FELIPA MARIE MD         SYSTEM ID:  [id number]   CT Abdomen Pelvis w Contrast    Narrative    CT abdomen pelvis with contrast    INDICATION: Checking for intra-abdominal infection post transplant    COMPARISON: CT chest pulmonary angiogram today comment please  correlate with that report. No recent abdomen CT.    FINDINGS: 99 mL intravenous Isovue 370 contrast. Right larger than  left pleural effusions. Associated bilateral atelectasis,  recommend  follow-up to clearing to exclude infection. Thoracostomy tube on the  left. Benign focal fatty infiltration in the liver. Periportal edema.  Right thoracostomy tube. Feeding tube tip in the jejunum.  Calcification in the aorta. Iliac atherosclerosis. Fluid-filled small  bowel pelvis could indicate ileus and/or enteritis. Well-circumscribed  pelvic cystic area measuring 8.6 x 5.2 cm. Urinary bladder appears  normal. Pancreas appears grossly normal. Spleen appears normal.  Bilateral adrenals appear normal. 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 Mild body wall edema which may  indicate malnutrition/anasarca. No dominant lymphadenopathy. Bones  show osteopenia. Benign-appearing hemangioma of L2 as well as T12 and  T9.  Also noted is thickening in the right pericardium an right perihilar  region which could be fluid with without inflammation/infection. Air  density noted (series 3 image 13) which could represent small abscess  or necrotic tissue. Gallbladder incompletely distended. Soft tissue  stranding surrounding common hepatic artery adjacent to but above the  pancreatic head pelvis could indicate inflammatory or fibrotic tissue.  Follow-up to clearing however is recommended to exclude underlying  neoplasm. Bilateral lung transplant.      Impression    IMPRESSION: Mild periportal edema. Cystic area in left hemipelvis,  likely adnexal in origin. Fluid-filled small bowel with distended  loops of bowel suggestive of enteritis/ileitis. Recommend continued  follow-up to exclude developing obstruction. Aortoiliac  atherosclerosis. Mild periportal edema with a benign focal fatty  infiltration in the liver. Soft tissue prominence surrounding the  common hepatic artery an above above the level of the pancreatic head,  differential of inflammation/infection as opposed to neoplasm.  Bilateral lung transplant. Possible postoperative changes versus  inflammation/infection no pericardial  tissues and right infrahilar  tissues with right larger than left pleural effusions with associated  atelectasis, recommend follow-up to clearing to exclude infection.    FELIPA MARIE MD         SYSTEM ID:  [id number]   CT Chest Pulmonary Embolism w Contrast    Narrative    EXAMINATION: CT CHEST PULMONARY EMBOLISM W CONTRAST on 3/2/2022 5:44  PM.    INDICATION: PE suspected, high prob; Needs CT Chest with and without  contrast to look at lung parenchyma per Pulm    COMPARISON: CT chest 2/26/2022.    TECHNIQUE: CT imaging obtained through the chest with contrast.  Coronal and axial MIP reformatted images obtained. Three-dimensional  (3D) post-processed angiographic images were reconstructed, archived  to PACS and used in interpretation of this study.     CONTRAST: 99 ml isovue 370 IV    FINDINGS:    Lines and tubes: Right-sided chest tube with the tip in the right  major fissure. 2 left-sided chest tubes, one with the tip in the  superior left lung, one at the base. Enteric tube with the tip looping  around in the stomach.    Vessels: No central filling defect consistent with a pulmonary  embolism. Main pulmonary artery normal caliber of 3.0 cm. No right  heart strain. No reflux of contrast. No aortic aneurysm.     Mediastinum: No thyroid nodules. Mucous plugging within the lower  lobes bilaterally. Heart size is within normal limits. There is a  trace pericardial effusion.  Normal thoracic vasculature.  Atherosclerotic calcifications in the thoracic aorta. Mild prominent  mediastinal lymph nodes, likely reactive.     Lungs: Postoperative changes of bilateral lung transplant. Clamshell  sternotomy wires are intact. Again noted is a small fluid collection  containing gas in the right hilum. Improvement in the bilateral  hydropneumothorax. Small right greater than left pleural effusions  with associated bibasilar atelectasis. Groundglass opacities in the  lung bases.    Bones and soft tissues: No suspicious bone  findings.     Upper abdomen: Limited.      Impression    IMPRESSION:   1. No central filling defect consistent with a pulmonary embolism.     2. Improvement in the bilateral hydropneumothorax. A right-sided chest  tube is located in the right major fissure. 2 left-sided chest tubes,  one in the apex and one in the left lung base.    3. Again noted is fluid and gas collection along the right hilum,  which may represent postsurgical fluid collection. Follow as needed to  exclude infection.    4. Bibasilar atelectasis with mucus plugging. Groundglass opacities in  the bilateral lung bases again noted.    I have personally reviewed the examination and initial interpretation  and I agree with the findings.    FELIPA MARIE MD         SYSTEM ID:  [id number]   XR Chest 2 Views    Narrative    PA and lateral chest    HISTORY: Follow-up lung transplant    COMPARISON STUDY: 3/2/2022    FINDINGS: Cardiac silhouette is nonenlarged. Surgical changes  bilateral lung transplant. Pigtail chest tubes on the left. Right  chest tube is been removed. Feeding tube collimated off film abdomen.  Small pleural effusions with bibasilar atelectasis and consolidation      Impression    IMPRESSION: Small pleural effusions with bibasilar atelectasis and  consolidation.    VARGAS DUKE MD         SYSTEM ID:  [id number]   XR Chest/Heart Fluoro    Narrative    Chest fluoroscopy sniff test    INDICATION: Post lung transplant. Evaluate diaphragm function.    COMPARISON: Plain radiographs earlier same day.    FINDINGS: Patient was placed in supine position on the fluoroscopic  table. Clamshell sternotomy from prior bilateral lung transplant  noted. Left basilar chest catheter present. Apparent feeding tube also  present progressing into the upper abdomen beyond the inferior margin  of the image field.  On instruction, the patient attempted inspiratory/sniff maneuvers and  there is very little excursion of either hemidiaphragm. In fact,  upon  inspiration, the accessory chest wall musculature was the primary  method of respiratory motion.      Impression    IMPRESSION: Poor diaphragm excursion bilaterally with extensive  accessory respiratory chest wall musculature effort to aid in  inspiration.    FELIPA MARIE MD         SYSTEM ID:  [id number]   IR Chest Tube Place Non Tunneled Right    Narrative    PRE-PROCEDURE DIAGNOSIS: Pleural effusion    POST-PROCEDURE DIAGNOSIS: Same    PROCEDURE: Chest tube placement non-tunneled    Impression    IMPRESSION: Completed ultrasound-guided nontunneled chest tube  placement. 14 [place] chest tube placed in the right pleural space and  connected to water seal drainage. No immediate complication.    ----------    CLINICAL HISTORY: The patient has a history of pleural effusion. Chest  tube placement requested.    PERFORMED BY: Leo Mckeon PA-C    CONSENT: Written informed consent was obtained and is documented in  the patient record.    MEDICATIONS: 1% lidocaine for local anesthesia    NURSING: The patient was placed on continuous vital signs monitoring.  Vital signs were monitored by nursing staff under my supervision.      DESCRIPTION: The skin overlying the pleural effusion was prepped and  draped in the usual sterile fashion. Under continuous ultrasound  visualization, the skin and pleural was anesthetized before a small  skin nick was made with a #11 blade. A 5 [place] centesis  needle/catheter was advanced into the pleural space before the  catheter was advanced off the needle and the needle was removed. A  0.035 superstiff Amplatzwire was advanced through the catheter and 5  [place] catheter was exchanged over wire for a non-locking pigtail  chest tube after tissue dilation. The drain was secured to the skin  with a 2-0 nylon suture and a sterile bandage was applied. The chest  tube drain was connected to drainage system under water seal.  Ultrasound images were stored in the patient's  record.    COMPLICATIONS: No immediate concerns, the patient remained stable  throughout the procedure and tolerated it well.    ESTIMATED BLOOD LOSS: Minimal    SPECIMENS: None    SANDRA KOCH PA-C         SYSTEM ID:  [id number]   XR Chest 2 Views    Narrative    Exam: XR CHEST 2 VW, 3/4/2022 9:33 AM    Comparison: Chest x-ray    History: Interval f/u post chest tube placement, h/o lung transplant    Findings:  PA and lateral views of the chest. Post surgical chest status post  bilateral lung transplant with intact clam shell sternotomy wires.  Left chest tubes x2. Right chest tube x1. Enteric tube traverses below  the field of view.    Trachea is midline. Mediastinum is within normal limits.  Cardiopulmonary silhouette is within normal limits. Low lung volumes  with mild bibasilar opacities. No definite pneumothorax. Blunting of  the diaphragms bilaterally may demonstrate scarring versus small  pleural effusions.. Numerous distended loops of small bowel without  definite pneumatosis or portal venous gas.       Impression    Impression:   1. Postoperative chest status post bilateral lung transplant with  improving bibasilar atelectasis/edema.  2. Blunting of the diaphragms bilaterally may demonstrate scarring  versus small pleural effusions.  3. Numerous distended loops of small bowel without obvious obstructive  pattern may represent postoperative ileus. Attention on follow-up.    I have personally reviewed the examination and initial interpretation  and I agree with the findings.    VARGAS DUKE MD         SYSTEM ID:  [id number]   XR Chest 2 Views    Narrative    EXAMINATION: XR CHEST 2 VW, 3/5/2022 12:14 PM    COMPARISON: 3/4/2022    HISTORY: interval follow up, post-op lung transplant    FINDINGS: Changes of bilateral lung transplant. Bibasilar chest tubes  and left apical chest tube are unchanged in position. No significant  pneumothorax. Basilar opacities are unchanged. Small bowel distention  seen previously  has improved.      Impression    IMPRESSION: Changes of bilateral lung transplant with persistent  basilar atelectasis and chest tubes in place.     TONO DUNLAP MD         SYSTEM ID:  [id number]   XR Abdomen 1 View    Narrative    EXAMINATION: XR ABDOMEN 1 VIEW, 3/5/2022 12:14 PM    COMPARISON: 2/21/2022    HISTORY: concern for small bowel obstruction    FINDINGS: Diffuse distention of small and large bowel. Feeding tube  tip uncertain, favored to be within the distal duodenum near the  ligament of Treitz.      Impression    IMPRESSION: Diffuse distention of small and large bowel suggestive of  ileus.     TONO DUNLAP MD         SYSTEM ID:  [id number]   XR Chest 2 Views    Narrative    EXAMINATION: XR CHEST 2 VW, 3/6/2022 10:47 AM    COMPARISON: 3/5/2022    HISTORY: interval follow up, post-op lung transplant    FINDINGS: Heart size is normal. Changes of bilateral lung transplant.  Bilateral chest tubes are unchanged. Mild basilar atelectasis.  Pneumothorax      Impression    IMPRESSION: Postop lung transplant with chest tubes in place and no  significant pneumothorax.      TONO DUNLAP MD         SYSTEM ID:  [id number]   CT Chest/Abdomen/Pelvis w Contrast    Narrative    EXAMINATION: CT CHEST/ABDOMEN/PELVIS W CONTRAST, 3/6/2022 4:40 PM    TECHNIQUE:  Helical CT images from the thoracic inlet through the  symphysis pubis were obtained  with contrast. Contrast dose: iopamidol  (ISOVUE-370) solution 100mL    COMPARISON: CT chest and CT abdomen 3/2/2022    HISTORY: Abdominal distension    FINDINGS:    Devices: Two left and one right pigtail pleural chest tubes. Feeding  tube tip is in the distal duodenum.    Lower neck/axillary: Normal thyroid. No supraclavicular or axillary  lymphadenopathy.    Mediastinum: Unchanged fluid density in the anterior  mediastinum/superior anterior pericardium. Decreased fluid and tiny  focus of air in the right infrahilar region, likely postsurgical.  Normal heart size. Mild  aortic annulus calcifications. Stable  prominence right hilar lymph nodes.    Lungs: Surgical changes of bilateral lung transplant. Trace bilateral  pneumothoraces with chest tubes in place. Decreased small volume  bilateral pleural effusions with some anterior loculation on the  right. Mild bibasilar atelectasis. The central tracheobronchial tree  is clear. Interlobular septal thickening in the lingula and right  middle lobe.    Hepatobiliary: Focal low-density lesion in the right hepatic lobe near  the fossa for ligament, likely represents focal fatty infiltration  versus hemangioma. Mild periportal edema, similar to prior.    Spleen: Unremarkable.    Pancreas: Unremarkable.    Adrenals: Unremarkable.    Kidneys: Stable 1.2 cm fat density lesion in the lateral right renal  cortex, likely angiomyolipoma. No stones or hydronephrosis.    Pelvic organs: Left adnexal cyst measuring 8.2 x 5 cm, previously 8.6  x 5.2 cm. Trace free fluid in the pelvis. Unremarkable bladder.    Lymph nodes: No lymphadenopathy.    Vessels: Moderate atherosclerotic calcifications of the abdominal  aorta and iliac arteries without aneurysmal dilation.    Bowel/mesentery: The stomach is distended, although not significantly  changed compared to 3/2/2022. Periampullary duodenal diverticulum.  Decreased small bowel distention. Mild perienteric fat stranding  associated with the proximal to mid jejunum in the left midabdomen  slightly increased since prior also partially accentuated by motion  artifact at this level. Liquid stool throughout the colon. Endoscopy  clips in the ascending and transverse colon. Normal appendix.    Bones and soft tissues: No acute or suspicious osseous abnormalities.  Clamshell sternotomy wires are intact. Slightly increased subcutaneous  edema in the abdominal wall.        Impression    IMPRESSION:   1. Decrease in small bowel distention since 3/2/2022 without evidence  of bowel obstruction. Mild perienteric fat  stranding associated with  the proximal to mid jejunum in the left midabdomen, which is slightly  increased since 3/2/2022, though this may be accentuated by motion  artifact at this level. These findings may reflect an infectious or  inflammatory enteritis.  2. Persistent mild periportal edema and gastric distention.  3. Trace bilateral pneumothoraces and decreased small volume pleural  effusions with bilateral pleural chest tubes in place. Bibasilar  atelectasis.  4. Stable anterior mediastinal fluid and decreased right infrahilar  fluid and air, likely postsurgical.  5. Stable left adnexal cyst.    I have personally reviewed the examination and initial interpretation  and I agree with the findings.    CLAU PRATER DO         SYSTEM ID:  [id number]   XR Chest 2 Views    Narrative    Exam: XR CHEST 2 VW, 3/7/2022 9:16 AM    Comparison: CT chest abdomen and pelvis 3/6/2022, chest x-ray  3/6/2022.    History: interval follow up, post-op lung transplant    Findings:  PA and lateral chest. Postoperative chest status post bilateral lung  transplant with intact clam shell sternotomy wires. Right basilar  chest tube. Left chest tubes x2. Enteric tube with tip at the expected  location of the duodenal jejunal juncture.    Trachea is midline. Mediastinum is within normal limits.  Cardiopulmonary silhouette is within normal limits. Aortic knob  calcifications. Bibasilar opacities. There is no pneumothorax or  pleural effusion. Prominent gas-filled stomach.      Impression    Impression:   1. Postoperative changes of bilateral lung transplant.  2. Decreasing bibasilar opacities likely representing resolving  bibasilar atelectasis.  3. No radiographically apparent pneumothorax with bilateral chest  tubes in place.    I have personally reviewed the examination and initial interpretation  and I agree with the findings.    FELIPA MARIE MD         SYSTEM ID:  [id number]   XR Chest 2 Views    Narrative    Exam: XR CHEST 2 VW,  3/8/2022 8:28 AM    Indication: chest tubes in place    Comparison: 3/7/2022    Findings:   Clamshell sternotomy wires and bilateral lung transplants. A feeding  tube courses past the level of the diaphragm, tip is seen at the  suspected location of the duodenal jejunal junction. Bibasilar and  left apically directed chest tubes remain in place. No appreciable  pneumothoraces. Unchanged cardiac size. No definite effusion.  Continued streaky bibasilar opacities. No new focal air space  opacities. Partially identified prominent loops of colon and small  bowel in the upper abdomen.      Impression    Impression:   1. Bilateral chest tubes in place. No appreciable pneumothoraces or  effusions.  2. Bibasilar atelectasis. No new focal airspace opacities.  3. Partially imaged likely adynamic ileus.    I have personally reviewed the examination and initial interpretation  and I agree with the findings.    VARGAS DUKE MD         SYSTEM ID:  [id number]   XR Chest Port 1 View     Value    Radiologist flags Small right apical pneumothorax (Urgent)    Narrative    EXAM: XR CHEST PORT 1 VIEW  3/8/2022 4:04 PM     HISTORY:  s/p R CT removal       COMPARISON:  3/8/2022    TECHNIQUE: Single frontal radiograph of the chest    FINDINGS:   Removal of right lung base chest tube. Small right apical  pneumothorax. Stable left hemithorax with left apical and left base  chest tube. Stable feeding tube position. Calcification of the aortic  knob.    Midline trachea. Stable postsurgical changes of lung transplant. No  acute consolidation.      Impression    IMPRESSION:   1. Small right apical pneumothorax.  2. Otherwise stable support devices and postoperative changes.  3. Atherosclerosis.    [Urgent Result: Small right apical pneumothorax]    Finding was identified on 3/8/2022 5:09 PM.     Dr. Phan was contacted by Dr. Ortega at 3/8/2022 5:23 PM and verbalized  understanding of the urgent finding.     I have personally reviewed the  examination and initial interpretation  and I agree with the findings.    FELIPA MARIE MD         SYSTEM ID:  [id number]   XR Chest 2 Views    Narrative    EXAM: XR CHEST 2 VW  3/9/2022 9:40 AM     HISTORY:  interval follow up, post-op lung transplant       COMPARISON:  Chest radiograph 3/8/2022    FINDINGS  Technique: PA and lateral chest radiographs.    Devices: Left sided basilar and apical chest tubes redemonstrated.  Feeding tube partially visualized with tip towards the DJ flexure,  unchanged. Catheter overlying the right thorax presumably feeding tube  outside of the patient. Oxygen tubing noted. Clamshell sternotomy  wires and scattered surgical clips.    Lungs: Right pleural effusion slightly increased from prior study with  basal atelectasis. The small right pneumothorax described on prior  study is not clearly delineated on current exam. No definite left  pneumothorax. Left basilar atelectasis. Slightly prominent  interstitial markings.    Cardiovascular: Heart size is within normal limits.      Bones: Postsurgical changes from lung transplant.    No acute abnormality in the upper abdomen.      Impression    IMPRESSION:   1.  Stable changes from bilateral lung transplant.  2.  The small right apical pneumothorax is not clearly identified on  the current study. Right pleural effusion with adjacent atelectasis  has increased.  3.  Stable left chest tubes with no left pneumothorax. Left basilar  atelectasis.  4.  Slightly prominent interstitial markings may represent mild edema.  5.  Stable support devices.    SATNAM SIMON MD         SYSTEM ID:  [id number]   XR Chest 2 Views    Narrative    EXAM: XR CHEST 2 VW  3/9/2022 8:04 PM     HISTORY:  F/u chest tube removal       COMPARISON:  3/9/2022    TECHNIQUE: PA and lateral radiographs of the chest    FINDINGS:   Interval removal of left apical chest tube. Stable left basilar chest  tube.    Midline trachea. Stable postsurgical changes in lung  transplant.  Distinct pulmonary vasculature. No consolidation, pleural effusion or  appreciable pneumothorax.      Impression    IMPRESSION: No appreciable pneumothorax post chest tube removal.     I have personally reviewed the examination and initial interpretation  and I agree with the findings.    MURIEL WALLACE MD         SYSTEM ID:  [id number]   XR Chest 2 Views    Narrative    Exam: XR CHEST 2 VW, 3/10/2022 8:33 AM    Comparison: 3/9/2022    History: interval follow up, post-op lung transplant    Findings:  PA and lateral views the chest. Status post bilateral lung transplant  with intact sternotomy wires. Enteric tube with tip at the expected  location of the duodenal jejunal juncture. Left basilar chest tube.    Trachea is midline. Mediastinum is within normal limits.  Cardiopulmonary silhouette is within normal limits. Aortic knob  calcifications. Prominent bilateral interstitial opacities.  Redemonstrated right basilar opacities in blunting of the right  costophrenic angle. No pneumothorax. No acute osseous abnormalities.      Impression    Impression:   1. Status post bilateral lung transplant with small stable right  pleural effusion and associated basilar atelectasis.  2. Stable bilateral interstitial opacities representing pulmonary  edema.  3. Orientation of the wires best seen on the lateral view may indicate  subluxation of the sternum.    I have personally reviewed the examination and initial interpretation  and I agree with the findings.    VARGAS DUKE MD         SYSTEM ID:  [id number]   US Lower Extremity Venous Duplex Bilateral    Narrative    EXAMINATION: DOPPLER VENOUS ULTRASOUND OF BILATERAL LOWER EXTREMITIES,  3/10/2022 3:04 PM     COMPARISON: 2/26/2022    HISTORY: rule out dvt    TECHNIQUE:  Gray-scale evaluation with compression, spectral flow and  color Doppler assessment of the deep venous system of both legs from  groin to knee, and then at the ankles.    FINDINGS:  In both lower  extremities, the common femoral, femoral, popliteal and  posterior tibial veins demonstrate normal compressibility and blood  flow.      Impression    IMPRESSION:  No evidence of deep venous thrombosis in either lower extremity.    I have personally reviewed the examination and initial interpretation  and I agree with the findings.    CLAU PRATER DO         SYSTEM ID:  [id number]   XR Chest Port 1 View    Narrative    EXAM: XR CHEST PORT 1 VIEW  3/10/2022 8:14 PM     HISTORY:  interval follow up, left basilar chest tube removal       COMPARISON:  3/10/2022    TECHNIQUE: Single frontal radiograph of the chest    FINDINGS:   Stable surgical changes of lung transplant. Stable feeding tube  position. Prominent gastric/colonic air bubble in the left upper  quadrant.    Midline trachea. Unchanged low lung volumes bilaterally. No acute  consolidation, pleural effusion or appreciable pneumothorax.      Impression    IMPRESSION: No appreciable pneumothorax since left chest tube removal.  No acute consolidation.     I have personally reviewed the examination and initial interpretation  and I agree with the findings.    RENY VAZQUEZ MD         SYSTEM ID:  [id number]   XR Chest 2 Views    Narrative    Exam: XR CHEST 2 VW, 3/11/2022 9:27 AM    Comparison: 3/10/2022    History: interval follow up, post-op lung transplant    Findings:  PA and lateral views the chest. Status post bilateral lung transplant  with intact clam shell sternotomy wires. Enteric tube traverses below  the field of view.    Trachea is midline. Mediastinum is within normal limits.  Cardiopulmonary silhouette is within normal limits. Diffuse  interstitial opacities. No pneumothorax. Blunting of the right  costophrenic angle. The upper abdomen is unremarkable.      Impression    Impression:   1. Status post bilateral lung transplant with stable diffuse  interstitial opacities likely representing mild pulmonary edema.  2. Blunting of the right costophrenic  angle may represent small right  pleural effusion versus scarring.    I have personally reviewed the examination and initial interpretation  and I agree with the findings.    VARGAS DUKE MD         SYSTEM ID:  [id number]   XR Chest/Heart Fluoro    Narrative    Examination:  XR CHEST/HEART FLUORO 3/11/2022 9:39 AM     Comparison: Same day chest radiograph    History: evaluate diaphragm function    Fluoroscopy time: 0.6 minutes     Findings: Spot film demonstrates mild  elevation of the left  hemidiaphragm under quite respiration with prominent gastric and left  colonic gaseous distention. AP and LPO views obtained. Under quite  respiration, deep breathing and with sniffing maneuvers, the  hemidiaphragms move in a symmetric fashion. No paradoxical diaphragm  movement.       Impression    Impression: No evidence for diaphragmatic palsy.    I, ADRIANA VELAZQUEZ MD, attest that I was immediately available to  provide guidance and assistance during the entirety of the procedure.    I have personally reviewed the examination and initial interpretation  and I agree with the findings.    ADRIANA VELAZQUEZ MD         SYSTEM ID:  [id number]   XR Chest 2 Views    Narrative    EXAM: XR CHEST 2 VW 3/12/2022    HISTORY: interval follow up, lung transplant.    COMPARISON: 3/11/2022.    TECHNIQUE: Upright frontal and lateral views of the chest.    FINDINGS: Feeding tube tip is beyond view inferiorly. Postsurgical  changes of bilateral lung transplant with intact clamshell sternotomy  wires. Cardiomediastinal silhouette is within normal limits. Unchanged  mixed hazy/streaky basilar opacities and trace pleural effusions. No  pneumothorax or appreciable new airspace disease. No acute  radiographic evidence for abdomen.      Impression    IMPRESSION:   Stable post-transplant lungs with mild basilar opacities and small  effusions. No convincing new airspace disease.    I have personally reviewed the examination and initial  interpretation  and I agree with the findings.    MURIEL WALLACE MD         SYSTEM ID:  [id number]   XR Abdomen 1 View    Narrative    EXAMINATION:  XR ABDOMEN 1 VIEW 3/12/2022 11:34 AM     COMPARISON: CT 3/6/2022.    HISTORY: Follow Ileus.    TECHNIQUE: Frontal view of the abdomen.    FINDINGS: Feeding tube terminates in the distal duodenum. Mild gaseous  distention of the stomach. Mildly dilated small bowel loops in the  central abdomen with multiple air-fluid levels. Gas is present in the  colon. No abnormally dilated loops of bowel. No pneumatosis or portal  venous gas.       Impression    IMPRESSION:   Mildly dilated small bowel loops in the central abdomen with air-fluid  levels suggestive of improving ileus.    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 Static images  were acquired at approximately 1 hour intervals out through 4 hours  using a dual head gamma camera in an anterior and posterior position.  The calculation of emptying was based on dual head imaging with  geometric mean calculations.  A Linear square fit was calculated to  the emptying curve to determine the amount of residual activity at  each time point.    FINDINGS:   Percent retention at 60 min = 67%.  Percent retention at 120 min = 42%.  Percent retention at 180 min = 16%.  Percent retention at 240 min =9%.    T 1/2 emptying time =  97 mins.      Impression    IMPRESSION: Normal gastric emptying .  =====================  Reference Range:  Gastric emptying  - 30 minutes: <70% of retention (> 30% emptying) suggests abnormally     fast emptying.  - 60 minutes: <90% retention (>10% emptying) is normal; less than 30%     retention (>70%  emptying) suggests abnormally rapid emptying.  - 90 minutes: <65% retention (> 35% emptying) is normal.  - 120 minutes: <60% retention (> 40% emptying) is normal.  - 180 minutes: <30% retention (> 70% emptying) is normal.    Gastric emptying T-1/2:  Solid: The normal range is  minutes  Liquid only: Normal range is 10-45 minutes.  Liquid only-children: At 60 minutes, normal range is 44-58 % .  Liquid only-infants: At 60 minutes, normal range is 32-64 %.      I have personally reviewed the examination and initial interpretation  and I agree with the findings.    ANGÉLICA FAITH MD         SYSTEM ID:  [id number]       Discharge Medications   Current Discharge Medication List      START taking these medications    Details   acetaminophen (TYLENOL) 325 MG tablet Take 3 tablets (975 mg) by mouth every 8 hours as needed for mild pain or fever  Qty: 190 tablet, Refills: 0    Comments: As needed for pain control  Associated Diagnoses: S/P lung transplant (H)      acyclovir (ZOVIRAX) 200 MG capsule Take 2 capsules (400 mg) by mouth 2 times daily for 7 days  Qty: 28 capsule, Refills: 0    Comments: For HSV ppx per lung tx team  Associated Diagnoses: S/P lung transplant (H)      albuterol (PROAIR HFA/PROVENTIL HFA/VENTOLIN HFA) 108 (90 Base) MCG/ACT inhaler Inhale 2 puffs into the lungs every 6 hours as needed for shortness of breath / dyspnea or wheezing  Qty: 18 g, Refills: 0    Comments: Pharmacy may dispense brand covered by insurance (Proair, or proventil or ventolin or generic albuterol inhaler)  Associated Diagnoses: S/P lung transplant (H)      Alcohol Swabs PADS Use to swab the area of the injection or joselito as directed Per insurance coverage  Qty: 100 each, Refills: 0    Associated Diagnoses: S/P lung transplant (H)      amoxicillin (AMOXIL) 500 MG capsule Take 1 capsule (500 mg) by mouth every 8 hours  Qty: 204 capsule, Refills: 0    Associated Diagnoses: S/P lung transplant (H)      bisacodyl (DULCOLAX) 10 MG  suppository Place 1 suppository (10 mg) rectally daily as needed for constipation (Use if Magnesium hydroxide (MILK of MAGNESIA) not effective after 24 hours. May discontinue if patient having bowel movement.)  Qty: 30 suppository, Refills: 0    Associated Diagnoses: S/P lung transplant (H)      blood glucose (NO BRAND SPECIFIED) lancets standard To use to test glucose level in the blood Use to test blood sugar  4  times daily as directed. To accompany glucose monitor brands per insurance coverage.  Qty: 100 each, Refills: 0    Associated Diagnoses: S/P lung transplant (H)      blood glucose (NO BRAND SPECIFIED) test strip To use to test glucose level in the blood Use to test blood sugar  4 times daily as directed. To accompany glucose monitor brands per insurance coverage.  Qty: 120 strip, Refills: 0    Associated Diagnoses: S/P lung transplant (H)      blood glucose monitoring (NO BRAND SPECIFIED) meter device kit Use as directed Per insurance coverage  Qty: 1 kit, Refills: 0    Associated Diagnoses: S/P lung transplant (H)      calcium carbonate 600 mg-vitamin D 400 units (CALTRATE) 600-400 MG-UNIT per tablet Take 1 tablet by mouth 2 times daily (with meals)  Qty: 60 tablet, Refills: 3    Associated Diagnoses: S/P lung transplant (H)      carboxymethylcellulose PF (REFRESH PLUS) 0.5 % ophthalmic solution Place 1 drop into both eyes every hour as needed for dry eyes  Qty: 1 each, Refills: 0    Associated Diagnoses: S/P lung transplant (H)      dapsone (ACZONE) 25 MG tablet Take 2 tablets (50 mg) by mouth daily  Qty: 60 tablet, Refills: 3    Associated Diagnoses: S/P lung transplant (H)      doxycycline hyclate (VIBRAMYCIN) 100 MG capsule Take 1 capsule (100 mg) by mouth every 12 hours for 16 days  Qty: 32 capsule, Refills: 0    Associated Diagnoses: S/P lung transplant (H)      famotidine (PEPCID) 20 MG tablet Take 1 tablet (20 mg) by mouth 2 times daily  Qty: 60 tablet, Refills: 3    Associated Diagnoses: S/P  lung transplant (H)      fluticasone (FLONASE) 50 MCG/ACT nasal spray Spray 1 spray into both nostrils daily  Qty: 9.9 mL, Refills: 0    Associated Diagnoses: S/P lung transplant (H)      gabapentin (NEURONTIN) 100 MG capsule Take 1 capsule (100 mg) by mouth At Bedtime  Qty: 30 capsule, Refills: 0    Associated Diagnoses: S/P lung transplant (H)      glipiZIDE (GLUCOTROL) 5 MG tablet Take 1 tablet (5 mg) by mouth every morning (before breakfast)  Qty: 30 tablet, Refills: 3    Associated Diagnoses: Steroid-induced hyperglycemia      glucagon 1 MG kit Inject 1 mg Subcutaneous once as needed for low blood sugar (Use as directed by provider)  Qty: 1 each, Refills: 0    Associated Diagnoses: S/P lung transplant (H)      glucose (BD GLUCOSE) 4 g chewable tablet Take 4 tablets by mouth every 15 minutes as needed for low blood sugar  Qty: 50 tablet, Refills: 0    Associated Diagnoses: S/P lung transplant (H)      hydrOXYzine (ATARAX) 25 MG tablet Take 1 tablet (25 mg) by mouth every 6 hours as needed for anxiety (adjuvant pain)  Qty: 10 tablet, Refills: 0    Comments: As needed for pain control  Associated Diagnoses: S/P lung transplant (H)      loperamide (IMODIUM) 2 MG capsule Take 1 capsule (2 mg) by mouth 3 times daily as needed for diarrhea  Qty: 30 capsule, Refills: 0    Comments: As needed for diarrhea  Associated Diagnoses: S/P lung transplant (H)      loratadine (CLARITIN) 10 MG tablet Take 1 tablet (10 mg) by mouth daily  Qty: 30 tablet, Refills: 3    Associated Diagnoses: S/P lung transplant (H)      magnesium gluconate (MAGONATE) 500 MG tablet Take 1 tablet (500 mg) by mouth daily  Qty: 30 tablet, Refills: 3    Associated Diagnoses: S/P lung transplant (H)      magnesium oxide (MAG-OX) 400 MG tablet Take 1 tablet (400 mg) by mouth 2 times daily  Qty: 120 tablet, Refills: 5    Associated Diagnoses: Hypomagnesemia      methocarbamol (ROBAXIN) 500 MG tablet Take 1 tablet (500 mg) by mouth 4 times daily  Qty: 120  tablet, Refills: 0    Comments: Hold for sedation  Associated Diagnoses: S/P lung transplant (H)      metoprolol tartrate (LOPRESSOR) 25 MG tablet Take 1 tablet (25 mg) by mouth 2 times daily  Qty: 60 tablet, Refills: 1    Associated Diagnoses: S/P lung transplant (H)      multivitamin w/minerals (THERA-VIT-M) tablet Take 1 tablet by mouth daily  Qty: 30 tablet, Refills: 0    Associated Diagnoses: S/P lung transplant (H)      mycophenolic acid (GENERIC EQUIVALENT) 360 MG EC tablet Take 2 tablets (720 mg) by mouth 2 times daily  Qty: 120 tablet, Refills: 11    Associated Diagnoses: S/P lung transplant (H)      nystatin (MYCOSTATIN) 781378 UNIT/ML suspension Swish and swallow 10 mLs (1,000,000 Units) in mouth 4 times daily  Qty: 946 mL, Refills: 4    Associated Diagnoses: S/P lung transplant (H)      ondansetron (ZOFRAN-ODT) 4 MG ODT tab Take 1 tablet (4 mg) by mouth every 6 hours as needed for nausea or vomiting  Qty: 30 tablet, Refills: 0    Associated Diagnoses: S/P lung transplant (H)      oxyCODONE (ROXICODONE) 5 MG tablet Take 0.5 tablets (2.5 mg) by mouth 3 times daily as needed for moderate to severe pain  Qty: 10 tablet, Refills: 0    Associated Diagnoses: S/P lung transplant (H)      pantoprazole (PROTONIX) 40 MG EC tablet Take 1 tablet (40 mg) by mouth 2 times daily (before meals)  Qty: 60 tablet, Refills: 3    Associated Diagnoses: S/P lung transplant (H)      predniSONE (DELTASONE) 5 MG tablet Take 2.5 tablets (12.5 mg) by mouth 2 times daily then taper per recommended lung transplant protocol table  Qty: 150 tablet, Refills: 11    Associated Diagnoses: S/P lung transplant (H)      rosuvastatin (CRESTOR) 5 MG tablet Take 1 tablet (5 mg) by mouth daily  Qty: 30 tablet, Refills: 3    Associated Diagnoses: S/P lung transplant (H)      Sharps Container MISC Use as directed to dispose of needles, lancets and other sharps Per Insurance coverage  Qty: 1 each, Refills: 0    Associated Diagnoses: S/P lung  transplant (H)      simethicone (MYLICON) 80 MG chewable tablet Take 1 tablet (80 mg) by mouth 4 times daily  Qty: 120 tablet, Refills: 0    Associated Diagnoses: S/P lung transplant (H)      tacrolimus (GENERIC EQUIVALENT) 0.5 MG capsule Take 1 capsule (0.5 mg) by mouth every morning In addition to 3 mg BID for a total daily dose of 3.5 mg in the morning, 3 mg in the evening (further dose adjustments per transplant team based on drug levels)  Qty: 60 capsule, Refills: 5    Associated Diagnoses: S/P lung transplant (H)      tacrolimus (GENERIC EQUIVALENT) 1 MG capsule Take 3 capsules (3 mg) by mouth 2 times daily  Qty: 180 capsule, Refills: 11    Associated Diagnoses: S/P lung transplant (H)         CONTINUE these medications which have CHANGED    Details   furosemide (LASIX) 40 MG tablet Take 1 tablet (40 mg) by mouth daily  Qty: 30 tablet, Refills: 0    Associated Diagnoses: S/P lung transplant (H)         CONTINUE these medications which have NOT CHANGED    Details   aspirin 81 MG EC tablet Take 81 mg by mouth daily       diltiazem ER (TIAZAC) 240 MG 24 hr ER beaded capsule Take 240 mg by mouth daily      IBANdronate (BONIVA) 150 MG tablet Take 150 mg by mouth every 30 days      potassium chloride ER (KLOR-CON M) 20 MEQ CR tablet Take 20 mEq by mouth daily       OXYGEN-HELIUM IN 2 lpm per nasal canula nocturnal and with activity may use portable w/ conserving device         STOP taking these medications       arformoterol (BROVANA) 15 MCG/2ML NEBU neb solution Comments:   Reason for Stopping:         atorvastatin (LIPITOR) 10 MG tablet Comments:   Reason for Stopping:         budesonide (PULMICORT) 1 MG/2ML neb solution Comments:   Reason for Stopping:         calcium 500 MG CHEW Comments:   Reason for Stopping:         ipratropium - albuterol 0.5 mg/2.5 mg/3 mL (DUONEB) 0.5-2.5 (3) MG/3ML neb solution Comments:   Reason for Stopping:         levalbuterol (XOPENEX HFA) 45 MCG/ACT Inhaler Comments:   Reason for  Stopping:         tiotropium (SPIRIVA RESPIMAT) 2.5 MCG/ACT inhalation aerosol Comments:   Reason for Stopping:             Allergies   Allergies   Allergen Reactions     Alendronic Acid Other (See Comments)     dizziness  Other reaction(s): Dizziness     Nickel Rash     Sulfa Drugs Rash

## 2022-03-17 NOTE — PROGRESS NOTES
Chronic Pulmonary Disease Specialist Progress Note:    Communication completed today regarding BIPAP device at discharge:    Spoke with Transplant Coordinator, Audrey Landa, and RNCC, Nicole Degroot for updates on home DME company for Spontaneous BIPAP device orders.  Below is the contact information for [facility] [employer] company:    RT Anya Clinical Liaison       Office: [phone]       Fax: [phone]    Magdi is at a conference 3/17, so any contact for today will need to be with:       Demi Rosario     Office: [phone]    All orders, letters of medical necessity, or prior authorization communication should occur directly between medical team, RNCC and [employer].    CPDS will remain available for further assistance as needed.    BINA Rabago, RRT, CTTS  Chronic Pulmonary Disease Specialist  Office: [phone]   Pager: [phone]

## 2022-03-17 NOTE — DISCHARGE INSTRUCTIONS
"  Lung Transplant Discharge Information      Reminder for Clinic Visits:  Most times that you come into the lung transplant clinic, we will check your tacrolimus (Prograf) level.     Please remember:            1) Do NOT take your tacrolimus (Prograf) pills before your blood is drawn.  Ideally, we draw your blood about 12 hours after your last evening dose, which is drawn just before you take your next morning dose.            2) Bring your pills with you to your clinic visit.  Once your blood has been drawn, you should take your pills (bring a small snack with you if you need one)    EXAMPLE: Your clinic appointment is at 9:15 am.  You should have your blood drawn before your appointment, so that these results will be available to the provider.  Do NOT take your tacrolimus (Prograf) before going to the clinic lab to have your blood drawn around 8:00 am.  If your lab appointment is at 8:00 am, you would have taken your dose the evening at 8:00 pm the day before.  Please let the lab know what time you took your tacrolimus (Prograf) pills the night before.  After your lab draw, you will have time to take your pills, eat a snack, go to your chest x-ray or PFTs (if ordered) and be on time for your 9:15 am appointment.       Post-Lung Transplant Instructions:    Gradually continue to increase your activity each day.  You will have \"good days and bad days\", but overall you should be feeling better and more energetic from week to week. You should be walking to the restroom, showering each day, and making yourself a snack or light meal.  Walk every day, starting with a few minutes at a time and gradually increasing the time.  Remember, your caregiver is there to help you, not to do everything for you.     Monitor and record weight, temperature, pulse and blood pressure each day.  Record these numbers in your transplant book.  Bring your medication and Prednisone taper cards and transplant lab book with you to each clinic " visit.    Check your blood sugars before each meal and before bed.  Follow the insulin instructions provided to you.     No driving for one month after surgery.  You must also be off of narcotics before you can drive.    No lifting more than 10 pounds for 6 weeks after surgery (a gallon of milk weighs about 10 pounds).    You may shower 48 hours after your last chest tube was removed.  Leave incisions open to air (no dressings).  Wash your incisions gently with soap and water daily, pat dry.     Do not take a bath or soak your incisions.    No pools or hot tubs until cleared by your transplant provider.    Wear a mask over your mouth and nose any time you are in a crowd, for example in a mall or movie theater.     Be careful about handwashing.  Wash your own hands with soap and water or hand gel frequently, and encourage those around you to do the same.     Protect yourself from the sun.  Any time you are outside, wear sunscreen (SPF 50 or above) and a hat as well as long sleeves/pants when able.  Limit your time in the sunlight as much as possible.    Absolutely NO additional medications (over the counter, herbal, etc) without discussing directly with your provider.     Do not take any NSAID medications [examples: ibuprofen (Advil/Motrin), naproxen (Aleve)] as these medicines interact with your transplant medicines and may harm your kidneys.    Take all of your medications just as we have prescribed them.  If you are unable to take your medications (due to not feeling well, financial reasons, or any other reason), call your transplant coordinator right away.     If you do not live locally, you will be asked to remain living in a local apartment or hotel for 3 months after surgery.  Plan to stay this entire 3 months, and we may extend this longer than 3 months.  However, this will be determined based on your recovery and in discussion with your transplant provider.      Notify your Transplant Coordinator if you  "experience any of the following:    Oral Temperature greater then 100.5    Drainage from any incision that soaks more than 2 gauze pads in a day, or drainage that is thick and yellow/green/brown/tan/bloody    Redness and/or swelling around your incision    An area of your incision that seems to be opening, whether there is drainage or not    Pain that is getting worse instead of better    Clicking, popping, \"thumping\" feeling in your chest    Chills or night sweats    Chest pain    Increase in sputum (phlegm) amount, or change in color    Blood in your sputum (phlegm)    Increased shortness of breath or coughing    Swelling or pain in your arms or legs    Nausea and/or vomiting    Diarrhea or constipation    Change in the amount of exercise that you can tolerate      Your Pain Medication Plan:    On your day of discharge, you are taking Oxycodone 2.5mg every 8 hours as needed (up to three times daily) for post-surgical pain management.     We expect you to need (and take!) your pain medication when you get home.  This is important, because you need to be able to do your breathing exercises, exercise, and help to care for yourself once you get home.    We expect that you will have surgery-related pain for 4-6 weeks after your surgery, so this may be a slow process!       Activity:  Activity as tolerated.  Outpatient pulmonary rehab to start within one week of discharge.      Nutrition/Diet:  Regular diet as tolerated, or as recommended by your team/doctors  Diet recommendations post-transplant: High protein diet x 8 weeks.  Heart healthy dietary habits long term (low saturated/trans fat, low sodium). Practice food safety precautions. See nutrition handout and food safety booklet for more information.     *Post-Transplant Diet Guidelines - Follow recommendations on the Guide to Your Diet after Transplant handout (summary below).      Maintain a healthy weight    Eat a heart-healthy diet (low sodium, low saturated and " trans-fat) once your appetite improves to normal    Control blood sugar    Limit sodium (salt)    Take calcium and vitamin D supplement if your doctor or team orders    Eat more protein for six to eight weeks after transplant      Take measures to prevent food poisoning: store and prepare foods to the proper temperature, practice good handwashing, heat all deli meat (to 165 degrees Fahrenheit), avoid raw fish and meats (including uncooked eggs, non-pasteurized or raw milk, and non-pasteurized or raw cheeses [including brie, camembert, blue-veined cheese, and Mexican queso fresco]), avoid shellfish/seafood for three weeks after transplant, and throw out leftovers older than two days.     In some cases (but not in all cases), adjust potassium intake     Check your blood sugar before each meal and before bedtime.  Follow insulin instructions provided to you.    Blood sugar Plan for Discharge:  You were started on once daily Glipizide (pill) for management of blood sugars.   -take Glipizide IR 5 mg once daily with breakfast. If you have pre-lunch or pre-dinner blood sugars over 200 for two days in a row, then increase to 10 mg daily.   -we expect around the time your prednisone tapers to 10 mg in the morning (around 4/12) we can begin reducing the Glipizide dose. We will try to arrange a follow up in the endocrine clinic at the clinic and surgery center within 2-3 weeks.       Prednisone (Steroid) Taper:   Date AM dose (mg) PM dose (mg)   3/1 15 15   3/8 15 12.5   3/15 12.5 12.5   3/29 12.5 10   4/12 10 10   5/10 10 7.5   6/7 7.5 7.5   7/5 7.5 5   8/2 5 5   8/30 5 2.5     Blood Glucose Checks: three times daily before meals and at bedtime. If your sugars are stable, we can direct you to check less often in outpatient follow up appointments.     Endocrinology Outpatient follow up: An appointment request was sent to the [facility] coordinator to schedule your outpatient diabetes appointment 2-3 weeks  from discharge. Please call the clinic at [phone] if you do not have an appointment scheduled on discharge, or if you have non-urgent questions regarding your blood sugars or insulin.     If you have urgent questions or concerns regarding your blood sugars or insulin, you may contact [phone] (the main hospital ). Ask to speak with the endocrinologist on call.    Thank you for letting the Diabetes Management Team be involved in your care!          Anticoagulation:  ***      Upcoming Appointments and Plans:  (Please see your full discharge instructions for appointment dates and times)  After hospital discharge, you will go to a local apartment/hotel.  Once you leave the hospital, here is a general idea of what your next 6 weeks will look like:    Transplant clinic appointments twice a week for two weeks, then once a week for two weeks, then to be determined after that    Blood draws with most clinic appointments, and chest x-rays and PFTs (pulmonary function tests) with some clinic appointments.    Outpatient pulmonary rehab initial intake visit, followed by appointments twice a week for pulmonary rehab      Your transplant coordinator:   Lissett GRAJEDA     Notify your Transplant Coordinator  at [phone] Monday through Friday with questions or any new symptoms. For assistance after 5pm weekdays or during weekends and holidays, call the hospital at [phone] and  ask the hospital  to page the Lung Transplant Coordinator On Call pager # [phone]. Someone is available to you 24 hours a day, 7 days a week! Do not hesitate to call us.      Important Phone Numbers:  Lung Transplant Office (Coordinator Main Line): [phone]  [facility]: [phone]  After-hours/weekends/holidays: [phone] (ask  to page Lung Transplant Coordinator On Call - Pager # [phone])    Lung Transplant Team :  [place]  [phone].  [facility]:  [phone]  [facility]:  [phone]    Lung Transplant Clinic ([facility]) Location:  [facility] [place]  Pulmonary is on the 3rd floor  [address] [address]  [phone]  [place] [address]    ______________________

## 2022-03-17 NOTE — DISCHARGE SUMMARY
Pt vital signs stable at time of discharge. Discharge teaching and transplant packet/pill box set up with all scheduled medications with pt and . No questions regarding discharge at this time. Pt belongings sent with pt. Pt transferred via family car to [facility] [address] [facility].

## 2022-03-17 NOTE — PROGRESS NOTES
Care Management Follow Up    Length of Stay (days): 24    Expected Discharge Date: 03/17/2022     Concerns to be Addressed:     Home DME  Patient plan of care discussed at interdisciplinary rounds: Yes    Anticipated Discharge Disposition: Home- local for now     Anticipated Discharge Services: None  Anticipated Discharge DME:  Bipap    Additional Information:  This writer sent a referral to [facility] for review.   Phone: [phone]  Fax: [phone]  Attn: Magdi Delatorre    Please note that it was verified that patient will be staying local at [facility]  for about 3 months or until cleared from provider to discharge home to [place], [place].        Nicole Degroot, RN  [facility] RN Care Coordinator  Unit RNCC pager: [phone]    For Weekend & Holiday on call RN Care Coordinator:  (Home discharge with needs including home care, Assisted living facility returns, Durable medical equip, IV antibiotics)   [place]    Pager [phone] or dial * * [phone] and enter job code [phone] at prompt  [facility] (Sunday Only) Pager [phone]    For weekend social work needs, contact information below:  (Transitional care unit, Long-term care unit, Hospice, Counseling, Domestic violence,Vulnerable adult, Health Care Directive)  4A, 4C, 4E, 5A, 5B  Pager [phone]  6A, 6B, 6C, 6D   Pager [phone]  7A, 7B, 7C, 7D, 5C  Pager [phone]  [facility] (Saturday Only) Pager [phone]    After hours for all units- (only  is available -[phone]/everyday)  Pager [phone]    Addendum: [phone]  This writer called and spoke with Tyrese- spouse in the room regarding sending the needed paperwork for the Bipap. He understands that it is still being processed and there is not yet an approval but also understands that it may not be approved as well. Melissa is being discharged today and she will not any other medical equipment to discharge with.       Addendum: [phone]  This wirier left a message with Demi Rosario-  supervisor for DME for [employer]- to follow up on orders for the BIPAP.

## 2022-03-17 NOTE — PROGRESS NOTES
IP Diabetes Management  Daily Note           Assessment and Plan:   HPI: Melissa is a 66 year old female with a past medical history of severe COPD, HTN, paroxysmal a fib, GERD, osteoporosis, who was admitted 2/20/22 for BSLT. Melissa does not carry a known prior history of diabetes, nor a family history. However, pre diabetic range A1c done preoperatively (5.8%). She has been intermittently on chronic steroids for years, due to her underlying COPD.     Assessment:   1)Pre-diabetes, versus steroid induced diabetes (A1c 5.8% prior to surgery)  2) current steroid hyperglycemia   3) TF hyperglycemia-resolved.     Recommendation for Discharge-   -Glipizide IR 5 mg once daily AM. There will be a dose taper coincident with her steroid taper provided   -NO insulin needed for discharge.    -BG monitoring before meals and at bedtime.    Prescriptions to be ordered by primary team for discharge:    -she was given a One touch verio meter (does not need Rx)   -needs One touch verio test strips and lancets, alcohol swabs and sharps container   -will need Glipizide IR tablets prescribed (5 mg tablets)    Outpatient follow up: will recommend outpatient follow up with [facility] Endocrinology service intervally, then with PCP.     Plan discussed with patient, bedside RN, and primary team.     Interval History and Assessment: interval glucose trend reviewed:   BG are remaining stable on current regimen.   She ate lunch before noon BG could be checked, so will keep at 5mg dosing for discharge.     NPH pens are $340 until deductible is met, then $40.   Her needs are anticipated to be low. Glipizide would be appropriate substitute. She and her  would prefer pill over injection, and were worried about cost. Reviewed that main concern with the use of Glipizide will be hypoglycemia if PO intake trails off. She felt great with the NGT removed today and does not think she'll have issues with eating well upon discharge to local housing.      Current nutritional intake and type: Orders Placed This Encounter      Regular Diet Adult      Diet  Cycled TF: Vital 1.5 at 45cc/hour x12 hours (8P-8A), for 101g CHO.- stopped 3/14    Steroid planning:       PTA Diabetes Regimen: n/a, no prior history of diabetes    Discharge Planning: nearing readiness for discharge - local housing likely, when medically stable.            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           Medications:     Current Facility-Administered Medications   Medication     acetaminophen (TYLENOL) tablet 975 mg     acetylcysteine (MUCOMYST) 20 % nebulizer solution 2 mL     acyclovir (ZOVIRAX) capsule 400 mg     albuterol (PROVENTIL HFA/VENTOLIN HFA) inhaler     amoxicillin (AMOXIL) capsule 500 mg     aspirin EC tablet 81 mg     [Held by provider] fiber modular (NUTRISOURCE FIBER) packet 1 packet    And     [Held by provider] banatrol plus packet 1 packet     bisacodyl (DULCOLAX) Suppository 10 mg     calcium carbonate 600 mg-vitamin D 400 units (CALTRATE) per tablet 1 tablet     carboxymethylcellulose PF (REFRESH PLUS) 0.5 % ophthalmic solution 1 drop     dapsone (ACZONE) tablet 50 mg     dextrose 10% infusion     glucose gel 15-30 g    Or     dextrose 50 % injection 25-50 mL    Or     glucagon injection 1 mg     diltiazem ER COATED BEADS (CARDIZEM CD/CARTIA XT) 24 hr capsule 240 mg     doxycycline hyclate (VIBRAMYCIN) capsule 100 mg     famotidine (PEPCID) tablet 20 mg     fluticasone (FLONASE) 50 MCG/ACT spray 1 spray     furosemide (LASIX) tablet 40 mg     gabapentin (NEURONTIN) capsule 100 mg     glipiZIDE (GLUCOTROL) tablet 5 mg     heparin ANTICOAGULANT injection 5,000 Units     hydrOXYzine (ATARAX) tablet 25 mg     insulin aspart (NovoLOG) injection (RAPID  ACTING)     insulin aspart (NovoLOG) injection (RAPID ACTING)     insulin aspart (NovoLOG) injection (RAPID ACTING)     labetalol (NORMODYNE/TRANDATE) injection 10 mg     levalbuterol (XOPENEX) neb solution 1.25 mg     lidocaine (LMX4) cream     lidocaine 1 % 0.1-1 mL     lidocaine 3%, phenylephrine 0.25% solution for irrigation     loperamide (IMODIUM) capsule 2 mg     loratadine (CLARITIN) tablet 10 mg     magnesium gluconate (MAGONATE) tablet 500 mg     magnesium oxide (MAG-OX) tablet 400 mg     May take regular AM medications except those listed below.     methocarbamol (ROBAXIN) tablet 500 mg     metoprolol tartrate (LOPRESSOR) tablet 25 mg     multivitamin w/minerals (THERA-VIT-M) tablet 1 tablet     mycophenolic acid (GENERIC EQUIVALENT) EC tablet 720 mg     naloxone (NARCAN) injection 0.2 mg    Or     naloxone (NARCAN) injection 0.4 mg    Or     naloxone (NARCAN) injection 0.2 mg    Or     naloxone (NARCAN) injection 0.4 mg     No lozenges or gum should be given while patient on BIPAP/AVAPS/AVAPS AE     nystatin (MYCOSTATIN) suspension 1,000,000 Units     ondansetron (ZOFRAN-ODT) ODT tab 4 mg    Or     ondansetron (ZOFRAN) injection 4 mg     oxyCODONE IR (ROXICODONE) half-tab 2.5 mg     pantoprazole (PROTONIX) EC tablet 40 mg     Patient may continue current oral medications     Patient may continue current oral medications     polyethylene glycol (MIRALAX) Packet 17 g     predniSONE (DELTASONE) tablet 12.5 mg     [Held by provider] protein modular (PROSOURCE TF) 1 packet     rosuvastatin (CRESTOR) tablet 5 mg     sennosides (SENOKOT) tablet 8.6 mg     simethicone (MYLICON) chewable tablet 80 mg     sodium chloride (PF) 0.9% PF flush 3 mL     sodium chloride (PF) 0.9% PF flush 3 mL     sodium chloride (PF) 0.9% PF flush 3 mL     tacrolimus (GENERIC EQUIVALENT) capsule 3 mg     tacrolimus (GENERIC EQUIVALENT) capsule 3.5 mg     Facility-Administered Medications Ordered in Other Encounters   Medication      "sodium chloride (PF) 0.9% PF flush 10 mL            Physical Exam:    /64 (BP Location: Right arm)   Pulse 99   Temp 98.1  F (36.7  C) (Oral)   Resp 30   Ht 1.575 m (5' 2\")   Wt 67.6 kg (149 lb)   SpO2 94%   BMI 27.25 kg/m    General: pleasant, in no distress, sitting up in chair.  present and attentive.    HEENT: normocephalic, atraumatic. Oral mucous membranes moist. NGT removed.  Lungs: unlabored respiration, no cough  ABD: rounded,  Mildly distended  Skin: warm and dry, no obvious lesions  MSK:  moves all extremities  Lymp:  no LE edema   Mental status:  alert, oriented to self, place, time  Psych:  bright affect, calm and appropriate interaction             Data:     Recent Labs   Lab 03/17/22  0735 03/17/22  0704 03/16/22  2143 03/16/22  1702 03/16/22  1140 03/16/22  0619   * 138* 143* 134* 109* 139*     Lab Results   Component Value Date    WBC 8.7 03/17/2022    WBC 8.7 03/16/2022    WBC 8.3 03/15/2022    HGB 9.0 (L) 03/17/2022    HGB 8.6 (L) 03/16/2022    HGB 7.9 (L) 03/15/2022    HCT 29.6 (L) 03/17/2022    HCT 28.2 (L) 03/16/2022    HCT 26.7 (L) 03/15/2022     (H) 03/17/2022     03/16/2022     (H) 03/15/2022     03/17/2022     03/16/2022     03/15/2022     Lab Results   Component Value Date     03/17/2022     03/16/2022     03/15/2022    POTASSIUM 4.3 03/17/2022    POTASSIUM 4.2 03/16/2022    POTASSIUM 4.0 03/15/2022    CHLORIDE 98 03/17/2022    CHLORIDE 98 03/16/2022    CHLORIDE 97 03/15/2022    CO2 34 (H) 03/17/2022    CO2 35 (H) 03/16/2022    CO2 34 (H) 03/15/2022     (H) 03/17/2022     (H) 03/17/2022     (H) 03/16/2022     Lab Results   Component Value Date    BUN 30 03/17/2022    BUN 27 03/16/2022    BUN 27 03/15/2022     Lab Results   Component Value Date    TSH 1.38 03/25/2019     Lab Results   Component Value Date    AST 12 03/17/2022    AST 13 03/16/2022    AST 12 03/15/2022    ALT 23 " 03/17/2022    ALT 25 03/16/2022    ALT 25 03/15/2022    ALKPHOS 115 03/17/2022    ALKPHOS 117 03/16/2022    ALKPHOS 111 03/15/2022       35 minutes spent on the date of the encounter doing chart review, history and exam, documentation and further activities per the note      Over 50% of my time on the unit was spent counseling the patient and/or coordinating care regarding acute hyperglycemia management.  See note for details.    To contact Endocrine Diabetes service:   From 8AM-4PM: page inpatient diabetes provider that is following the patient  For questions or updates from 4PM-8AM: page the diabetes job code for on call fellow: [phone]    Sherie Lira PA-C  Inpatient Diabetes Management Service  Pager [phone]

## 2022-03-17 NOTE — PROGRESS NOTES
Pulmonary Medicine  Cystic Fibrosis - Lung Transplant Team  Progress Note  2022       Patient: Melissa Elder  MRN: [id number]  : 1955 (age 66 year old)  Transplant: 2022 (Lung), POD#24  Admission date: 2022    Assessment & Plan:     Melissa Elder is a 66 year old female with a PMH significant for severe COPD, HTN, mild non-obstructive CAD, paroxysmal afib, osteoporosis, GERD, and colonic polyps.  Pt. is now s/p BSLT on , surgery relatively uncomplicated.  The patient was extubated , post-op course complicated by right chest tube lodged into fissure and left chest tube displacement, s/p bilateral pigtail chest tube placement drain anterior hydropneumothorax and bilateral effusions, disseminated Ureaplasma, and and transient hyperammonemia.  Routine inspection bronch on 3/1 complicated by hypoventilation in setting of oversedation requiring multiple Narcan doses.  Slow improvement in hypercapnia with NIPPV overnight, weaned off daytime hypoxemia resolved 3/9.  Discharge to local housing today.     Discharge recommendations:  - One month bronch on 3/22 at 0900 as OP (COVID test scheduled 3/19), will need to obtain AFB cultures on all future bronchs  - RN CC working on home BiPAP for sleep  - Consider sleep study as OP for persistent hypercapnia if sleep disturbances and/or hypercapnia persist  - Pulmonary f/u on 3/21 with CXR, PFTs, and labs (standard labs plus DSA, CMV, EBV, IgG, tacro)  - Prednisone tapered next due 3/29  - Amoxicillin for total of 3 months course (through ) for Actinomyces treatment  - Acyclovir prophylaxis through 3/23 per protocol  - Doxycycline for Ureaplasma for 4 week course through   - Follow pending AFB culture (+ stain)  - Donor risk labs at OP f/u  - Continue to strongly encourage TID meals (portion sizes typically small for patient) and BID-TID supplemental shakes      COPD s/p bilateral sequential lung transplant (BSLT):  Acute  hypoxic/hypercapneic respiratory failure:   Bilateral pleural effusions/PTX:   Subluxation of sternum: Scant pleural-pleural adhesions and mild-moderate bilateral hilar lymphadenopathy per op note.  Pathology with CPFE.  Extubated 2/22.  DSA negative 2/28.  Noted hypoventilating with oversedation during bronch on 3/1, received Narcan x3 with improvement in sedation persistent hypercapnia.  Improved with nocturnal BiPAP and daytime HFNC (resolved 3/9).  S/p right pigtail chest tube 3/3 with ureaplasma in exudative effusion (now removed).  Sniff test (3/3) with poor diaphragm excursion bilaterally with extensive accessory respiratory chest wall musculature effort to aid in inspiration.  Repeat sniff test (3/11) without evidence of diaphragmatic palsy.  Trialed off NIPPV 3/11-3/13 with marked increase in hypercapnea.  No daytime hypoxemia.  CXR (3/12) with mild BLL opacities vs effusions (stable).  - Flonase for postnasal drip (3/10)  - Nebs discontinued upon discharge  - Diuresis per primary team  - One month bronch on 3/22 at 0900 as OP (COVID test scheduled 3/19)  - RN CC working on home BiPAP for sleep  - Consider sleep study as OP for persistent hypercapnia if sleep disturbances and/or hypercapnia persist  - Pulmonary f/u on 3/21 with CXR, PFTs, and labs (standard labs plus DSA, CMV, EBV, IgG, tacro)     Immunosuppression:  S/p induction therapy with basiliximab x2 doses (with high dose IV steroid).  - Tacrolimus 3.5 mg qAM / 3 mg qPM.  Goal level 8-12.  Level today presumed therapeutic, no dose adjustment  - Myfortic 720 mg BID  - Prednisone 12.5 mg BID with taper per lung transplant protocol (next due 3/29):  Date AM dose (mg) PM dose (mg)   3/15 12.5 12.5   3/29 12.5 10   4/12 10 10   5/10 10 7.5   6/7 7.5 7.5   7/5 7.5 5   8/2 5 5   8/30 5 2.5      Prophylaxis:   - Dapsone for PJP ppx  - Nystatin for oral candidiasis ppx, 6 month course  - See below for serologies and viral ppx:    Donor Recipient Intervention    CMV status Negative Negative CMV monthly   EBV status Positive Positive EBV at one month   HSV status N/A (Newly) Positive As below      ID: Prior history of infection/colonization with Haemophilus influenzae (2017). Donor (OSH) cultures with P-S MSSA; ([facility]) with Strep mitis, Actinomyces odontolyticus, MSSA x2, and C. kruseii (concern for possible aspiration).  Recipient cultures at time of transplant with Actinomyces odonotolyticus.  Bronch cultures (2/22) with Saccharomyces cerevisiae (no indication to treat per Dr. Ghosh unless pt. acutely declines clinically, 3/1) and C. albicans.  - IgG repeat at one month  - ABX: amoxicillin (3/8-5/23, s/p ceftriaxone 2/23-3/8) for 3 month course for Actinomyces treatment  - Low threshold to treat Candida if it recurs in respiratory cultures, per transplant ID     HSV: Pt. with recent seroconversion at time of transplant.  HSV also noted on donor cultures.  Initial plan for 30 days of ppx with ACV post-op per Dr. Lala.  Then with HSV+ bronch at time of transplant (2/21), transitioned to treatment dosing with VACV  x 14 days through 3/12.   - Acyclovir prophylaxis 3/13-23 per protocol     Hyperammonemia, Resolved:   Pleural Fluid and sputum Ureaplasma:   Ammonia mildly elevated on 3/2 but quickly normalized.  Ureaplasma and Mycoplasma studies sent,when patient was somnolent with confusion/visual hallucinations in setting of hypercapnea, PCR for ureaplasma + on sputum and pleural fluid cultures on 3/2. .  - Doxycycline (3/4-4/1) per transplant ID     Positive AFB on sputum culture: Noted on sputum culture 3/2.  Transplant ID following for speciation and susceptibility testing as well as other evidence of infection.   - AFB sputum culture (3/9) NGTD  - Obtain AFB cultures on all future bronchs      PHS risk criteria donor: Additional labs required post-transplant (between 4-8 weeks post-op): Hepatitis B, Hepatitis C, and HIV by COLT (due ~3/23), with repeat hepatitis B  surface antibody ordered at that time given discrepancy with recent result.     Other relevant problems managed by primary team:     Concern for gastroparesis: Pt. c/o early satiety and persistent fullness and bloating t/o the day.  Tube feeds post-pyloric, not likely to be contributing significantly.  NM gastric emptying study completed 3/15 and suprisingly normal.  Gastric distention likely attributed to gas.  NJ removed 3/16.  - Continue scheduled Simethicone QID with frequent ambulation (as pt. has been doing)  - Continue to strongly encourage TID meals (portion sizes typically small for patient) and BID-TID supplemental shakes     Diarrhea:   Concern for ileus: Likely 2/2 medications and tube feedings.  Fiber added to tube feeds.  MMF decreased as above an switched to myfortic.  Loose stools now improved.  Again having loose/watery stools, also noting some abdominal bloating/fullness.  AXR 3/5 with diffuse distention of small and large bowel suggestive of ileus.  C diff negative 3/6.  Abdomen/pelvis CT (3/6) with decrease in small bowel distention and perienteric fat stranding.  CXR (3/8) with partially imaged likely adynamic ileus.  Bowel regimen initiated 3/8, adjusted 3/11.  Ileus improving by AXR on 3/12.  Diarrhea improved with discontinuation of tube feedings.      CAD: Noted to have mild non-obstructive CAD without hemodynamically significant lesions on cardiac cath 3/27/19.  PTA ASA 81 mg and atorvastatin, started on rosuvastatin post-op but held 2/28 for elevated LFTs (as above), resumed 3/9.  ASA resumed 3/1.     Paroxysmal afib:   HTN: PTA diltiazem, not on AC.  Persistent tachycardia post-op, but now with better rate control on metoprolol and PTA diltiazem.     GERD: Not on PPI PTA.  Negative pH/manometry study 3/28/19, noted small hiatal hernia. On PPI daily since 2/21, H2 blocker bridge discontinued 3/2, some increase in reflux symptoms since, resumed 3/5.  PPI increased to BID  "3/7.     Hypomagnesemia: Suppressed absorption d/t CNI.  Requiring almost daily IV/PO replacement so started on PO mag oxide, gluconate added (d/t diarrhea) to increase total daily mag dosing.     We appreciate the excellent care provided by the [place] [id number] team.  Recommendations communicated via in person rounding and this note.  Will continue to follow along closely, please do not hesitate to call with any questions or concerns.     Patient discussed with Dr. Hernandez.    Iman Jane, DNP, APRN, CNP  Inpatient Nurse Practitioner  Pulmonary CF/Transplant     Subjective & Interval History:     Slept well and feels great today.  Remains on RA, infrequent cough and generally nonproductive.  Continued improvement with activity tolerance with frequent ambulation.  Working on PO intake with meals and shakes, no GI complaints.    Review of Systems:     C: No fever, no chills, no change in weight  INTEGUMENTARY/SKIN: No rash or obvious new lesions  ENT/MOUTH: No sore throat, no sinus pain, no nasal congestion or drainage  RESP: See interval history  CV: No chest pain, no palpitations, + peripheral edema, no orthopnea  GI: No nausea, no vomiting, no reflux symptoms  : No dysuria  MUSCULOSKELETAL: + back and chest incisional pain  ENDOCRINE: Blood sugars intermittently elevated  NEURO: No headache, no numbness or tingling  PSYCHIATRIC: Mood stable    Physical Exam:     All notes, images, and labs from past 24 hours (at minimum) were reviewed.    Vital signs:  Temp: 98.1  F (36.7  C) Temp src: Oral BP: 135/64 Pulse: 99   Resp: 30 SpO2: 94 % O2 Device: None (Room air) Oxygen Delivery: 1 LPM Height: 157.5 cm (5' 2\") Weight: 67.6 kg (149 lb)  I/O:     Intake/Output Summary (Last 24 hours) at 3/17/2022 0850  Last data filed at 3/17/2022 0400  Gross per 24 hour   Intake 1297 ml   Output 525 ml   Net 772 ml     Constitutional: Sitting up in a chair, in no apparent distress.   HEENT: Eyes with pink conjunctivae, anicteric. "  Oral mucosa moist without lesions.  PULM: Diminished bases bilaterally.  No crackles, no rhonchi, no wheezes.  Non-labored breathing on RA.  CV: Normal S1 and S2.  RRR.  No murmur, gallop, or rub.  1+ BLE edema (wraps in place).   ABD: NABS, soft, nontender, mildly distended.    MSK: Moves all extremities.  No apparent muscle wasting.   NEURO: Alert and oriented, conversant.   SKIN: Warm, dry.  No rash on limited exam.   PSYCH: Mood stable.     Lines, Drains, and Devices:  Peripheral IV 03/16/22 Left;Anterior Lower forearm (Active)   Site Assessment WDL 03/17/22 0400   Line Status Saline locked 03/17/22 0400   Phlebitis Scale 0-->no symptoms 03/17/22 0400   Infiltration Scale 0 03/17/22 0400   Infiltration Site Treatment Method  None 03/17/22 0400   Number of days: 1     Data:     LABS    CMP:   Recent Labs   Lab 03/17/22  0735 03/17/22  0704 03/16/22  2143 03/16/22  1702 03/16/22  1140 03/16/22  0619 03/15/22  1337 03/15/22  0643 03/14/22  0640 03/14/22  0551   NA  --  138  --   --   --  138  --  136  --  139   POTASSIUM  --  4.3  --   --   --  4.2  --  4.0  --  4.5   CHLORIDE  --  98  --   --   --  98  --  97  --  97   CO2  --  34*  --   --   --  35*  --  34*  --  39*   ANIONGAP  --  6  --   --   --  5  --  5  --  3   * 138* 143* 134*   < > 139*   < > 147*   < > 153*   BUN  --  30  --   --   --  27  --  27  --  28   CR  --  0.53  --   --   --  0.52  --  0.50*  --  0.53   GFRESTIMATED  --  >90  --   --   --  >90  --  >90  --  >90   MINISTERIO  --  8.8  --   --   --  8.9  --  8.3*  --  8.6   MAG  --  1.6  --   --   --  1.7  --  1.8  --  2.0   PHOS  --  3.8  --   --   --  4.0  --  3.7  --  4.1   PROTTOTAL  --  5.7*  --   --   --  5.6*  --  5.2*  --  5.7*   ALBUMIN  --  2.7*  --   --   --  2.7*  --  2.5*  --  2.7*   BILITOTAL  --  1.0  --   --   --  0.5  --  0.4  --  0.4   ALKPHOS  --  115  --   --   --  117  --  111  --  117   AST  --  12  --   --   --  13  --  12  --  14   ALT  --  23  --   --   --  25  --  25  --   26    < > = values in this interval not displayed.     CBC:   Recent Labs   Lab 03/17/22  0704 03/16/22  0619 03/15/22  0643 03/14/22  0551   WBC 8.7 8.7 8.3 9.0   RBC 2.89* 2.82* 2.61* 2.75*   HGB 9.0* 8.6* 7.9* 8.3*   HCT 29.6* 28.2* 26.7* 27.5*   * 100 102* 100   MCH 31.1 30.5 30.3 30.2   MCHC 30.4* 30.5* 29.6* 30.2*   RDW 19.9* 20.0* 19.9* 20.3*    [phone]       INR: No lab results found in last 7 days.    Glucose:   Recent Labs   Lab 03/17/22  0735 03/17/22  0704 03/16/22  2143 03/16/22  1702 03/16/22  1140 03/16/22  0619   * 138* 143* 134* 109* 139*       Blood Gas:   Recent Labs   Lab 03/17/22  0704 03/16/22  0619 03/15/22  0643   PHV 7.43 7.42 7.38   PCO2V 59* 64* 68*   PO2V 60* 59* 59*   HCO3V 39* 41* 40*   ARIEL 13.1* 14.5* 12.5*   O2PER 21 0 25       Culture Data No results for input(s): CULT in the last 168 hours.    Virology Data: No results found for: INFLUA, FLUAH1, FLUAH3, AF9107, IFLUB, RSVA, RSVB, PIV1, PIV2, PIV3, HMPV, HRVS, ADVBE, ADVC    Historical CMV results (last 3 of prior testing):  Lab Results   Component Value Date    CMVQNT Not Detected 03/11/2022     No results found for: CMVLOG    Urine Studies    Recent Labs   Lab Test 03/01/22  1549 02/20/22  0248   URINEPH 6.0 7.0   NITRITE Negative Negative   LEUKEST Negative Negative   WBCU 0 <1       Most Recent [place] Pulmonary Function Testing (FVC/FEV1 only)  FVC-Pre   Date Value Ref Range Status   12/20/2021 1.81 L    06/21/2021 1.46 L    12/09/2019 1.59 L    06/03/2019 1.68 L      FVC-%Pred-Pre   Date Value Ref Range Status   12/20/2021 65 %    06/21/2021 52 %    12/09/2019 56 %    06/03/2019 58 %      FEV1-Pre   Date Value Ref Range Status   12/20/2021 0.49 L    06/21/2021 0.50 L    12/09/2019 0.49 L    06/03/2019 0.47 L      FEV1-%Pred-Pre   Date Value Ref Range Status   12/20/2021 22 %    06/21/2021 22 %    12/09/2019 21 %    06/03/2019 20 %        IMAGING    Recent Results (from the past 48 hour(s))   NM Gastric  Emptying    Narrative    EXAM:  NM GASTRIC EMPTYING, 3/15/2022 12:57 PM  HISTORY: 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 Static images  were acquired at approximately 1 hour intervals out through 4 hours  using a dual head gamma camera in an anterior and posterior position.  The calculation of emptying was based on dual head imaging with  geometric mean calculations.  A Linear square fit was calculated to  the emptying curve to determine the amount of residual activity at  each time point.    FINDINGS:   Percent retention at 60 min = 67%.  Percent retention at 120 min = 42%.  Percent retention at 180 min = 16%.  Percent retention at 240 min =9%.    T 1/2 emptying time =  97 mins.      Impression    IMPRESSION: Normal gastric emptying.  =====================  Reference Range:  Gastric emptying  - 30 minutes: <70% of retention (> 30% emptying) suggests abnormally     fast emptying.  - 60 minutes: <90% retention (>10% emptying) is normal; less than 30%     retention (>70% emptying) suggests abnormally rapid emptying.  - 90 minutes: <65% retention (> 35% emptying) is normal.  - 120 minutes: <60% retention (> 40% emptying) is normal.  - 180 minutes: <30% retention (> 70% emptying) is normal.    Gastric emptying T-1/2:  Solid: The normal range is  minutes  Liquid only: Normal range is 10-45 minutes.  Liquid only-children: At 60 minutes, normal range is 44-58 % .  Liquid only-infants: At 60 minutes, normal range is 32-64 %.      I have personally reviewed the examination and initial interpretation  and I agree with the findings.    ANGÉLICA FAITH MD         SYSTEM ID:  [id number]

## 2022-03-17 NOTE — PROGRESS NOTES
Calorie Count  Intake recorded for: 3/16  Total Kcals: 1143 Total Protein: 52g  Kcals from Hospital Food: 1143  Protein: 52g  Kcals from Outside Food (average):0 Protein: 0g  # Meals Ordered from Kitchen: 3 meals   # Meals Recorded: 2 meals (First - 100% chicken Caesar salad w/ croutons & dressing, pudding, apple juice)      (Second - 100% beef pot roast w/ gravy, wheat bread w/ butter, applesauce milk)  # Supplements Recorded: 0

## 2022-03-17 NOTE — PLAN OF CARE
Major Shift Events:  On RA overnight, completed O2 sleep study, did not go below 88% so may not need O2 at home.   Plan: discharge home today.  For vital signs and complete assessments, please see documentation flowsheets.     Goal Outcome Evaluation: plan for discharge today, RT will go over what the O2 study captured to evaluate if pt will need O2 needs, but from spot check on O2, did not go below 88%.

## 2022-03-18 ENCOUNTER — TELEPHONE (OUTPATIENT)
Dept: TRANSPLANT | Facility: CLINIC | Age: 67
End: 2022-03-18
Payer: COMMERCIAL

## 2022-03-18 NOTE — PLAN OF CARE
Occupational Therapy Discharge Summary    Reason for therapy discharge:    Discharged to home with outpatient therapy.OP DE    Progress towards therapy goal(s). See goals on Care Plan in [facility] electronic health record for goal details.  Goals partially met.  Barriers to achieving goals:   discharge from facility.    Therapy recommendation(s):    Continue home exercise program.

## 2022-03-18 NOTE — TELEPHONE ENCOUNTER
Called patient and  to check in day after discharge from hospital.      says things are going well at home. patient slept well last night. Checking vitals and BGs. Patient walking up and down the halls.     Patient has appt with pharmacist next week.     Follow up appointments that still need to be set up:  - Pulmonary rehab - message sent to [place] [facility]  - appt with endocrine CHARLY before 4/12 (next steroid taper) - message sent to endocrine clinic coordinators to schedule.     Message from inpatient coordinator, patient failed two sleep studies, does not need positive pressure O2 NOC.      has no questions at this time. Reviewed how to draw tacrolimus trough level on Monday, reviewed appointments on Monday. Reviewed how to reach on call coordinator during off hours.

## 2022-03-18 NOTE — PLAN OF CARE
Physical Therapy Discharge Summary    Reason for therapy discharge:    Discharged to home.    Progress towards therapy goal(s). See goals on Care Plan in [facility] electronic health record for goal details.  Goals partially met.  Barriers to achieving goals:   discharge from facility.    Therapy recommendation(s):    Continue home exercise program.

## 2022-03-18 NOTE — TELEPHONE ENCOUNTER
Melissa is a recent lung transplant patient who discharged on 03/17/2022.     Melissa will be responsible for managing medications, and Tyrese (spouse) will assist.    Melissa was given transplant supplies including 7 day pill organizer at discharge pharmacy along with medications. (She already has a BP monitor.) While in town post-discharge (about 3 months), she will utilize the [facility]. When back home, she'll use the [facility] to get medications.    **CAN PATIENT FILL AT [facility] FOR MEDICATIONS LISTED? FOR IMMUNOS MEDICA PART B PATIENT CAN FILL WITH [facility] HOWEVER, PT HAS [facility] [employer], AND PER OUR CONTRACT WITH THEM, WE ARE ONLY ALLOWED TO SHIP TO PT IF THEY ARE PHYSICALLY UNABLE TO COME IN TO OUR PHARMACY TO  FOR [place] RESIDENTS ONLY. IF PT IS COMFORTABLE WITH US DOCUMENTING THIS IN THEIR RECORD, WE CAN SHIP OUT THEIR MEDS. IF NOT, THEY WILL NEED TO UTILIZE [facility]([phone]) OR  AT ANY [facility] SITE. IF PATIENT IS OUTSIDE OF [place] AND CANNOT  MEDICATION THEY WILL NEED TO UTILZE  [employer].    PER AARP PART D PATIENT CAN FILL MEDICATIONS WITH [facility] HOWEVER PT HAS AN INSURANCE PLAN THAT PROCESSES THROUGH [employer], AND PER OUR CONTRACT WITH THEM WE CANNOT SHIP OR MAIL OUT ORDERS FOR NON-[place] RESIDENTS. PT CAN EITHER  ORDER AT [facility] SITE [address] OR HAVE SITE 28 DELIVER TO LOCAL [facility] TO  THERE (PICKUP AT RETAIL). IF THEY PREFER DELIVERY OR MAIL THEY WILL NEED TO UTILIZE [facility] ([phone]).    HEALTH BENEFIT: (MEDICA) MEDICARE PART B   ID# [id number] GRP# [id number] (EFFECTIVE (DATE: 2/1/2018) )   PROCESSING INFO: ID# [id number] GRP# [id number] PCN# [id number] BIN# [id number] (EFFECTIVE (DATE: 2/1/2018) )  (DO NOT BILL TO ORIGINAL MEDICARE ID#[id number] (PART A EFFECTIVE (DATE: 5/1/2015) , PART B EFFECTIVE (DATE: 5/1/2015) )  SECONDARY BENEFIT:[facility] ID: [id number]     PHARMACY BENEFIT:  ([facility]) PART D  PROCESSING INFO: ID# [id number] [facility]# [id number] N# [id number] BIN# [id number] (EFFECTIVE (DATE: 2/1/2017) )   DEDUCTIBLE (480)    COPAY STRUCTURE:  INITIAL PHASE:  $ (6) FOR TIER 1  $ (12) FOR TIER 2  $ (40) FOR TIER 3  % (40) COINSURANCE FOR TIER 4  % (25) COINSURANCE FOR TIER 5 MEDICATIONS UNTIL TOTAL YEAR DRUG COSTS REACH $4430  COVERAGE GAP (DONUT HOLE):   %25 COINSURANCE FOR GENERIC  %25 COINSURANCE FOR BRAND UNTIL TOTAL YEARLY PATIENT OUT-OF-POCKET COSTS REACH $7050  CATASTROPHIC PHASE:  THE GREATER OF EITHER %5 COST OF MEDICATION OR  $3.95 FOR GENERIC  $9.85 FOR BRAND    TEST CLAIM SPECIALTY #28     MYCOPHENOLATE 250mg = MEDICA PART B COB [place] [employer] =$0 AT TIME OF SERVICE  PROGRAF 1mg = PRODUCT NOT ON FORMULARY  TACROLIMUS 1mg = MEDICA PART B COB [place] [employer] =$0 AT TIME OF SERVICE   CYCLOSPORINE 100mg =  MEDICA PART B COB [place] [employer] =$0 AT TIME OF SERVICE  VALGANCICLOVIR 450mg = $348.53  VALACYCLOVIR 1gm =$320.53        Waytt Ellison, Pharm.D.  [facility]  [phone]

## 2022-03-21 ENCOUNTER — OFFICE VISIT (OUTPATIENT)
Dept: PULMONOLOGY | Facility: CLINIC | Age: 67
End: 2022-03-21
Attending: INTERNAL MEDICINE
Payer: MEDICARE

## 2022-03-21 ENCOUNTER — TELEPHONE (OUTPATIENT)
Dept: TRANSPLANT | Facility: CLINIC | Age: 67
End: 2022-03-21

## 2022-03-21 ENCOUNTER — LAB (OUTPATIENT)
Dept: LAB | Facility: CLINIC | Age: 67
End: 2022-03-21
Payer: COMMERCIAL

## 2022-03-21 ENCOUNTER — ANCILLARY PROCEDURE (OUTPATIENT)
Dept: GENERAL RADIOLOGY | Facility: CLINIC | Age: 67
End: 2022-03-21
Attending: INTERNAL MEDICINE
Payer: COMMERCIAL

## 2022-03-21 VITALS
HEIGHT: 62 IN | RESPIRATION RATE: 17 BRPM | BODY MASS INDEX: 27.05 KG/M2 | DIASTOLIC BLOOD PRESSURE: 75 MMHG | WEIGHT: 147 LBS | HEART RATE: 110 BPM | SYSTOLIC BLOOD PRESSURE: 132 MMHG | OXYGEN SATURATION: 95 %

## 2022-03-21 DIAGNOSIS — Z94.2 LUNG REPLACED BY TRANSPLANT (H): Primary | ICD-10-CM

## 2022-03-21 DIAGNOSIS — Z79.899 ENCOUNTER FOR LONG-TERM (CURRENT) USE OF HIGH-RISK MEDICATION: ICD-10-CM

## 2022-03-21 DIAGNOSIS — Z94.2 S/P LUNG TRANSPLANT (H): Primary | ICD-10-CM

## 2022-03-21 DIAGNOSIS — Z94.2 S/P LUNG TRANSPLANT (H): ICD-10-CM

## 2022-03-21 DIAGNOSIS — Z94.2 LUNG REPLACED BY TRANSPLANT (H): ICD-10-CM

## 2022-03-21 DIAGNOSIS — Z11.59 ENCOUNTER FOR SCREENING FOR OTHER VIRAL DISEASES: ICD-10-CM

## 2022-03-21 DIAGNOSIS — D80.1 HYPOGAMMAGLOBULINEMIA (H): Primary | ICD-10-CM

## 2022-03-21 LAB
ANION GAP SERPL CALCULATED.3IONS-SCNC: 5 MMOL/L (ref 3–14)
BACTERIA SPEC CULT: ABNORMAL
BACTERIA SPEC CULT: NO GROWTH
BUN SERPL-MCNC: 23 MG/DL (ref 7–30)
CALCIUM SERPL-MCNC: 8.8 MG/DL (ref 8.5–10.1)
CHLORIDE BLD-SCNC: 101 MMOL/L (ref 94–109)
CMV DNA SPEC NAA+PROBE-ACNC: NOT DETECTED IU/ML
CMV DNA SPEC NAA+PROBE-ACNC: NOT DETECTED IU/ML
CO2 SERPL-SCNC: 34 MMOL/L (ref 20–32)
CREAT SERPL-MCNC: 0.55 MG/DL (ref 0.52–1.04)
ERYTHROCYTE [DISTWIDTH] IN BLOOD BY AUTOMATED COUNT: 18.8 % (ref 10–15)
EXPTIME-PRE: 4.9 SEC
FEF2575-%PRED-PRE: 102 %
FEF2575-PRE: 1.99 L/SEC
FEF2575-PRED: 1.93 L/SEC
FEFMAX-%PRED-PRE: 58 %
FEFMAX-PRE: 3.29 L/SEC
FEFMAX-PRED: 5.63 L/SEC
FEV1-%PRED-PRE: 47 %
FEV1-PRE: 1.04 L
FEV1FEV6-PRE: 94 %
FEV1FEV6-PRED: 80 %
FEV1FVC-PRE: 94 %
FEV1FVC-PRED: 79 %
FIFMAX-PRE: 1.86 L/SEC
FVC-%PRED-PRE: 39 %
FVC-PRE: 1.1 L
FVC-PRED: 2.77 L
GFR SERPL CREATININE-BSD FRML MDRD: >90 ML/MIN/1.73M2
GLUCOSE BLD-MCNC: 122 MG/DL (ref 70–99)
HCT VFR BLD AUTO: 30 % (ref 35–47)
HGB BLD-MCNC: 9.2 G/DL (ref 11.7–15.7)
IGG SERPL-MCNC: 497 MG/DL (ref 610–1616)
MAGNESIUM SERPL-MCNC: 1.7 MG/DL (ref 1.6–2.3)
MCH RBC QN AUTO: 30.9 PG (ref 26.5–33)
MCHC RBC AUTO-ENTMCNC: 30.7 G/DL (ref 31.5–36.5)
MCV RBC AUTO: 101 FL (ref 78–100)
PLATELET # BLD AUTO: 230 10E3/UL (ref 150–450)
POTASSIUM BLD-SCNC: 4.4 MMOL/L (ref 3.4–5.3)
RBC # BLD AUTO: 2.98 10E6/UL (ref 3.8–5.2)
SARS-COV-2 RNA RESP QL NAA+PROBE: NEGATIVE
SODIUM SERPL-SCNC: 140 MMOL/L (ref 133–144)
TACROLIMUS BLD-MCNC: 9 UG/L (ref 5–15)
TME LAST DOSE: NORMAL H
TME LAST DOSE: NORMAL H
WBC # BLD AUTO: 7.7 10E3/UL (ref 4–11)

## 2022-03-21 PROCEDURE — G0463 HOSPITAL OUTPT CLINIC VISIT: HCPCS | Mod: 25

## 2022-03-21 PROCEDURE — 94375 RESPIRATORY FLOW VOLUME LOOP: CPT | Performed by: PHYSICIAN ASSISTANT

## 2022-03-21 PROCEDURE — 36415 COLL VENOUS BLD VENIPUNCTURE: CPT | Performed by: PATHOLOGY

## 2022-03-21 PROCEDURE — U0003 INFECTIOUS AGENT DETECTION BY NUCLEIC ACID (DNA OR RNA); SEVERE ACUTE RESPIRATORY SYNDROME CORONAVIRUS 2 (SARS-COV-2) (CORONAVIRUS DISEASE [COVID-19]), AMPLIFIED PROBE TECHNIQUE, MAKING USE OF HIGH THROUGHPUT TECHNOLOGIES AS DESCRIBED BY CMS-2020-01-R: HCPCS | Performed by: INTERNAL MEDICINE

## 2022-03-21 PROCEDURE — 80197 ASSAY OF TACROLIMUS: CPT | Performed by: INTERNAL MEDICINE

## 2022-03-21 PROCEDURE — 82784 ASSAY IGA/IGD/IGG/IGM EACH: CPT | Performed by: INTERNAL MEDICINE

## 2022-03-21 PROCEDURE — 71046 X-RAY EXAM CHEST 2 VIEWS: CPT | Mod: GC | Performed by: RADIOLOGY

## 2022-03-21 PROCEDURE — 83735 ASSAY OF MAGNESIUM: CPT | Performed by: PATHOLOGY

## 2022-03-21 PROCEDURE — 99496 TRANSJ CARE MGMT HIGH F2F 7D: CPT | Mod: 24 | Performed by: PHYSICIAN ASSISTANT

## 2022-03-21 PROCEDURE — 87799 DETECT AGENT NOS DNA QUANT: CPT | Performed by: INTERNAL MEDICINE

## 2022-03-21 PROCEDURE — 85027 COMPLETE CBC AUTOMATED: CPT | Performed by: PATHOLOGY

## 2022-03-21 PROCEDURE — 80048 BASIC METABOLIC PNL TOTAL CA: CPT | Performed by: PATHOLOGY

## 2022-03-21 RX ORDER — DIPHENHYDRAMINE HCL 25 MG
50 CAPSULE ORAL ONCE
Status: CANCELLED
Start: 2022-03-21

## 2022-03-21 RX ORDER — DILTIAZEM HYDROCHLORIDE 240 MG/1
240 CAPSULE, EXTENDED RELEASE ORAL DAILY
Qty: 30 CAPSULE | Refills: 3 | Status: SHIPPED | OUTPATIENT
Start: 2022-03-21 | End: 2022-03-23

## 2022-03-21 RX ORDER — ALBUTEROL SULFATE 90 UG/1
1-2 AEROSOL, METERED RESPIRATORY (INHALATION)
Status: CANCELLED
Start: 2022-03-21

## 2022-03-21 RX ORDER — ALBUTEROL SULFATE 0.83 MG/ML
2.5 SOLUTION RESPIRATORY (INHALATION)
Status: CANCELLED | OUTPATIENT
Start: 2022-03-21

## 2022-03-21 RX ORDER — DIPHENHYDRAMINE HYDROCHLORIDE 50 MG/ML
50 INJECTION INTRAMUSCULAR; INTRAVENOUS
Status: CANCELLED
Start: 2022-03-21

## 2022-03-21 RX ORDER — METHYLPREDNISOLONE SODIUM SUCCINATE 125 MG/2ML
125 INJECTION, POWDER, LYOPHILIZED, FOR SOLUTION INTRAMUSCULAR; INTRAVENOUS
Status: CANCELLED
Start: 2022-03-21

## 2022-03-21 RX ORDER — HEPARIN SODIUM,PORCINE 10 UNIT/ML
5 VIAL (ML) INTRAVENOUS
Status: CANCELLED | OUTPATIENT
Start: 2022-03-21

## 2022-03-21 RX ORDER — HEPARIN SODIUM (PORCINE) LOCK FLUSH IV SOLN 100 UNIT/ML 100 UNIT/ML
5 SOLUTION INTRAVENOUS
Status: CANCELLED | OUTPATIENT
Start: 2022-03-21

## 2022-03-21 RX ORDER — MEPERIDINE HYDROCHLORIDE 25 MG/ML
25 INJECTION INTRAMUSCULAR; INTRAVENOUS; SUBCUTANEOUS EVERY 30 MIN PRN
Status: CANCELLED | OUTPATIENT
Start: 2022-03-21

## 2022-03-21 RX ORDER — EPINEPHRINE 1 MG/ML
0.3 INJECTION, SOLUTION, CONCENTRATE INTRAVENOUS EVERY 5 MIN PRN
Status: CANCELLED | OUTPATIENT
Start: 2022-03-21

## 2022-03-21 RX ORDER — NALOXONE HYDROCHLORIDE 0.4 MG/ML
0.2 INJECTION, SOLUTION INTRAMUSCULAR; INTRAVENOUS; SUBCUTANEOUS
Status: CANCELLED | OUTPATIENT
Start: 2022-03-21

## 2022-03-21 RX ORDER — ACETAMINOPHEN 325 MG/1
650 TABLET ORAL ONCE
Status: CANCELLED
Start: 2022-03-21

## 2022-03-21 RX ORDER — LORATADINE 10 MG/1
10 TABLET ORAL DAILY PRN
Qty: 30 TABLET | Refills: 3 | COMMUNITY
Start: 2022-03-21 | End: 2022-04-06

## 2022-03-21 ASSESSMENT — PAIN SCALES - GENERAL: PAINLEVEL: NO PAIN (0)

## 2022-03-21 NOTE — LETTER
3/21/2022     RE: Melissa Elder  [address]  [facility] [address]    Dear Colleague,    Thank you for referring your patient, Melissa Elder, to the [facility] [place]. Please see a copy of my visit note below.    [facility]  March 21, 2022         Assessment and Plan:   Melissa Elder is a 66 year old female with h/o bilateral lung transplant on 2/21 for severe COPD for who is seen today for her first post discharge follow up. Other history notable for HTN, mild non-obstructive CAD, paroxysmal afib, osteoporosis, GERD, and colonic polyps. Surgery uncomplicated, s/p bilateral pigtail chest tube placement for hydropneumothorax and bilateral effusions,  disseminated Ureaplasma and transient hyperammonemia. Slow improvement in hypercapnia with NIPPV overnight, weaned off daytimeO2 and did not qualify for BiPAP at discharge on 3/17.     COPD s/p bilateral sequential lung transplant (BSLT):  Acute hypoxic/hypercapneic respiratory failure:   Bilateral pleural effusions/PTX:   Subluxation of sternum: Scant pleural-pleural adhesions and mild-moderate bilateral hilar lymphadenopathy per op note. Pathology with CPFE. Extubated 2/22. S/p right pigtail chest tube 3/3 with ureaplasma in exudative effusion. Sniff test 3/3 with poor diaphragm excursion bilaterally with extensive accessory respiratory chest wall musculature effort to aid in inspiration. Repeat sniff test 3/11 without evidence of diaphragmatic palsy. Trialed off NIPPV 3/11-3/13 with marked increase in hypercapnea, but did not qualify for home BiPAP. DSA 2/28 and CMV 3/11 negative. CXR reviewed today and demonstrates stable to slightly improved R>L basilar opacities and small effusions. PFTs did not meet ATS guidelines, attempted 8 times.   - Flonase for postnasal drip   - Consider sleep study as OP     Immunosuppression:  S/p induction therapy with  basiliximab x 2 doses (with high dose IV steroid).  - Tacrolimus 3.5 mg qAM / 3 mg qPM.  Goal level 8-12  - Myfortic 720 mg BID  - Prednisone 12.5 mg BID with taper per lung transplant protocol (next due 3/29)    Prophylaxis:   - Dapsone for PJP ppx  - Nystatin for oral candidiasis ppx, 6 month course  - CMV (D-/R-)    ID: Prior history of infection/colonization with Haemophilus influenzae. Donor cultures with P-S MSSA; ([facility]) with Strep mitis, Actinomyces odontolyticus, MSSA x2, and C. kruseii (concern for possible aspiration).  Recipient cultures at time of transplant with Actinomyces odonotolyticus. Bronch cultures (2/22) with Saccharomyces cerevisiae (no indication to treat per Dr. Ghosh) and C. Albicans. S/p ceftriazone 2/23-3/8.   - IgG pending  - Amoxicillin (3/8-5/23) X 3 months for Actinomyces treatment  - Low threshold to treat Candida if it recurs per transplant ID    HSV: recent seroconversion at time of transplant. HSV also noted on donor cultures. HSV+ bronch at time of transplant 2/21, transitioned to treatment dosing with VACV  x 14 days through 3/12.   - Acyclovir prophylaxis 3/13-3/23 per protocol    Hyperammonemia, Resolved:   Pleural Fluid and sputum Ureaplasma: PCR for ureaplasma + on sputum and pleural fluid cultures on 3/2.   - Doxycycline 3/4-4/1 per transplant ID    Positive AFB on sputum culture: noted on sputum culture 3/2. Transplant ID following for speciation and susceptibility testing as well as other evidence of infection. AFB sputum culture 3/9 NGTD.  - AFB cultures on all future bronchs    PHS risk criteria donor: additional labs required post-transplant (between 4-8 weeks post-op): Hepatitis B, Hepatitis C, and HIV by COLT (due ~3/23), with repeat hepatitis B surface antibody ordered at that time given discrepancy with recent result.  - Labs ordered for next clinic visit    Concern for gastroparesis: c/o early satiety and persistent fullness and bloating t/o the day. NM gastric  emptying study completed 3/15 normal.  Gastric distention likely attributed to gas. NJ removed 3/16. Still has feelings of fullness.   - Scheduled Simethicone QID, famotidine 20 mg BID  - Continue to strongly encourage TID meals (portion sizes typically small for patient) and BID-TID supplemental shakes    Diarrhea:   Concern for ileus: likely 2/2 medications and tube feedings. Changed to Muyfortic, C diff negative. Loose stool now improving.    CAD: noted to have mild non-obstructive CAD without hemodynamically significant lesions on cardiac cath 3/27/19.   - Continue aspirin and rosuvastatin    Paroxysmal afib:  HTN: PTA diltiazem, not on AC. Persistent tachycardia post-op, but now with better rate control, no complaints today.  - Continue diltiazem and metoprolol    GERD: Not on PPI PTA. Negative pH/manometry study 3/28/19, noted small hiatal hernia. On PPI daily since 2/21, H2 blocker bridge discontinued 3/2, some increase in reflux symptoms since, resumed 3/5. PPI also increased to BID prior to discharge.  - Continue PPI BID and famotidine BID    Hypomagnesemia: suppressed absorption d/t CNI.  Requiring almost daily IV/PO replacement so started on PO mag oxide, gluconate added (d/t diarrhea) to increase total daily mag dosing.  - Continue Mg oxide and gluconate    BLE edema: has 2+ BLE edema, unchanged, remains on lasix.  - Continue furosemide 40 mg daily and K replacement, reevaluate ability to decrease dose at next f/u  - Encourage mobility and protein intake    Post op pain: intermittently controlled with a band like pain under her breasts and around her rib cage.  - Encourage scheduled Tylenol (not currently using), continue scheduled gabapentin and methocarbamol  - PRN oxycodone     Hyperglycemia: last AIC of 5.7 on 2/22. Possibly exacerbated by tube feeds (now stopped) and prednisone. BS have been between 49-mid 100s with a 63, many 80-90s and two 190s.   - Stop glipizide and monitor     RTC: Friday as  scheduled with Dr. Knox  Influenza and other vaccinations: will need Evusheld in the next week or so  Annual dermatology visit:    Sharlene Larios PA-C  Pulmonary, Allergy, Critical Care and Sleep Medicine        Interval History:     Weekend went well, was able to settle in,  notes BS are a little off. Pulm rehab starts Friday at 9:00 am. No fever or chills, no headaches. Breathing is good, needs an extra pillow. No O2 use. Does have some shortness of breath with walking, not at rest. Notes a clearly cough, not productive, no congestion. Incisional pain wraps around. Did have some mild chest vs incisional pain yesterday, no racing heart. If patient eats too much she feels full, no nausea or vomiting. Walking after eating helps her. Occasional bloating and gassy, having two BMs/day, feels they are getting slightly more formed.           Review of Systems:   Please see HPI, otherwise the complete 10 point ROS is negative.           Past Medical and Surgical History:     Past Medical History:   Diagnosis Date     Atrial fibrillation with RVR (H)     hx of A fib related to severe COPD, does not meet anticoagulation criteria as noted     COPD, severe (H)      Osteoporosis      Past Surgical History:   Procedure Laterality Date     BRONCHOSCOPY FLEXIBLE AND RIGID N/A 3/1/2022    Procedure: BRONCHOSCOPY INSPECTION;  Surgeon: Melissa Landin MD;  Location:  GI     CV CORONARY ANGIOGRAM N/A 3/27/2019    Procedure: CV CORONARY ANGIOGRAM;  Surgeon: Thierry Serrano MD;  Location:  HEART CARDIAC CATH LAB     CV RIGHT HEART CATH MEASUREMENTS RECORDED N/A 3/27/2019    Procedure: CV RIGHT HEART CATH;  Surgeon: Thierry Serrano MD;  Location:  HEART CARDIAC CATH LAB     ESOPHAGEAL IMPEDENCE FUNCTION TEST WITH 24 HOUR PH GREATER THAN 1 HOUR N/A 3/28/2019    Procedure: ESOPHAGEAL IMPEDENCE FUNCTION TEST WITH 24 HOUR PH GREATER THAN 1 HOUR;  Surgeon: Mike Henry MD;  Location:  GI     EXCISE PILONIDAL  CYST, SIMPLE       IR CHEST TUBE PLACEMENT NON-TUNNELED RIGHT  3/3/2022     TONSILLECTOMY & ADENOIDECTOMY       TRANSPLANT LUNG RECIPIENT SINGLE X2 Bilateral 2022    Procedure: Bilateral Sequential Lung Transplantation, Clamshell Incision, Extracorporeal Membrane Oxgenation, Bronchoscopy, Cryoablation of Intercostal Nerves;  Surgeon: Raul Robin MD;  Location:  OR           Family History:     Family History   Problem Relation Age of Onset     Breast Cancer Mother      Colon Cancer Mother      Lung Cancer Mother      Lung Cancer Father      Lung Cancer Maternal Aunt      Pancreatic Cancer Maternal Uncle             Social History:     Social History     Socioeconomic History     Marital status:      Spouse name: Not on file     Number of children: Not on file     Years of education: Not on file     Highest education level: Not on file   Occupational History     Not on file   Tobacco Use     Smoking status: Former Smoker     Packs/day: 1.50     Years: 36.00     Pack years: 54.00     Types: Cigarettes     Quit date: 2006     Years since quittin.2     Smokeless tobacco: Never Used   Substance and Sexual Activity     Alcohol use: Yes     Comment: stated beer and wine occ     Drug use: Yes     Comment: stated hx of marijuana, quit in      Sexual activity: Not on file   Other Topics Concern     Parent/sibling w/ CABG, MI or angioplasty before 65F 55M? Not Asked   Social History Narrative     Not on file     Social Determinants of Health     Financial Resource Strain: Not on file   Food Insecurity: Not on file   Transportation Needs: Not on file   Physical Activity: Not on file   Stress: Not on file   Social Connections: Not on file   Intimate Partner Violence: Not on file   Housing Stability: Not on file            Medications:     Current Outpatient Medications   Medication     acetaminophen (TYLENOL) 325 MG tablet     acyclovir (ZOVIRAX) 200 MG capsule     albuterol (PROAIR  HFA/PROVENTIL HFA/VENTOLIN HFA) 108 (90 Base) MCG/ACT inhaler     Alcohol Swabs PADS     amoxicillin (AMOXIL) 500 MG capsule     aspirin 81 MG EC tablet     bisacodyl (DULCOLAX) 10 MG suppository     blood glucose (NO BRAND SPECIFIED) lancets standard     blood glucose (NO BRAND SPECIFIED) test strip     blood glucose monitoring (NO BRAND SPECIFIED) meter device kit     calcium carbonate 600 mg-vitamin D 400 units (CALTRATE) 600-400 MG-UNIT per tablet     carboxymethylcellulose PF (REFRESH PLUS) 0.5 % ophthalmic solution     dapsone (ACZONE) 25 MG tablet     diltiazem ER (TIAZAC) 240 MG 24 hr ER beaded capsule     doxycycline hyclate (VIBRAMYCIN) 100 MG capsule     famotidine (PEPCID) 20 MG tablet     fluticasone (FLONASE) 50 MCG/ACT nasal spray     furosemide (LASIX) 40 MG tablet     gabapentin (NEURONTIN) 100 MG capsule     glucagon 1 MG kit     glucose (BD GLUCOSE) 4 g chewable tablet     hydrOXYzine (ATARAX) 25 MG tablet     IBANdronate (BONIVA) 150 MG tablet     loperamide (IMODIUM) 2 MG capsule     loratadine (CLARITIN) 10 MG tablet     magnesium gluconate (MAGONATE) 500 MG tablet     magnesium oxide (MAG-OX) 400 MG tablet     methocarbamol (ROBAXIN) 500 MG tablet     metoprolol tartrate (LOPRESSOR) 25 MG tablet     multivitamin w/minerals (THERA-VIT-M) tablet     mycophenolic acid (GENERIC EQUIVALENT) 360 MG EC tablet     nystatin (MYCOSTATIN) 809925 UNIT/ML suspension     ondansetron (ZOFRAN-ODT) 4 MG ODT tab     oxyCODONE (ROXICODONE) 5 MG tablet     OXYGEN-HELIUM IN     pantoprazole (PROTONIX) 40 MG EC tablet     potassium chloride ER (KLOR-CON M) 20 MEQ CR tablet     predniSONE (DELTASONE) 5 MG tablet     rosuvastatin (CRESTOR) 5 MG tablet     [employer]ar[employer] Container MISC     simethicone (MYLICON) 80 MG chewable tablet     tacrolimus (GENERIC EQUIVALENT) 0.5 MG capsule     tacrolimus (GENERIC EQUIVALENT) 1 MG capsule     No current facility-administered medications for this visit.     Facility-Administered  "Medications Ordered in Other Visits   Medication     sodium chloride (PF) 0.9% PF flush 10 mL            Physical Exam:   /75   Pulse 110   Resp 17   Ht 1.575 m (5' 2\")   Wt 66.7 kg (147 lb)   SpO2 95%   BMI 26.89 kg/m      GENERAL: alert, NAD  HEENT: NCAT, EOMI, no scleral icterus, oral mucosa moist and without lesions  Neck: no cervical or supraclavicular adenopathy  Lungs: decreased BS, mainly clear with few scattered basilar crackles  CV: RRR, S1S2, no murmurs noted  Abdomen: normoactive BS, soft, non tender and no organomegaly  Lymph:2+ BLE  Neuro: AAO X 3, CN 2-12 grossly intact  Psychiatric: normal affect, good eye contact  Skin: no rash, jaundice or lesions on limited exam         Data:   All laboratory and imaging data reviewed.      Recent Results (from the past 168 hour(s))   Glucose by meter    Collection Time: 03/14/22  5:18 PM   Result Value Ref Range    GLUCOSE BY METER POCT 244 (H) 70 - 99 mg/dL   Glucose by meter    Collection Time: 03/14/22  9:19 PM   Result Value Ref Range    GLUCOSE BY METER POCT 104 (H) 70 - 99 mg/dL   Blood gas arterial    Collection Time: 03/15/22 12:37 AM   Result Value Ref Range    pH Arterial 7.36 7.35 - 7.45    pCO2 Arterial 72 (H) 35 - 45 mm Hg    pO2 Arterial 69 (L) 80 - 105 mm Hg    FIO2 24     Bicarbonate Arterial 40 (H) 21 - 28 mmol/L    Base Excess/Deficit (+/-) 13.1 (H) -9.0 - 1.8 mmol/L   Glucose by meter    Collection Time: 03/15/22  2:01 AM   Result Value Ref Range    GLUCOSE BY METER POCT 143 (H) 70 - 99 mg/dL   Glucose by meter    Collection Time: 03/15/22  5:27 AM   Result Value Ref Range    GLUCOSE BY METER POCT 142 (H) 70 - 99 mg/dL   Comprehensive metabolic panel    Collection Time: 03/15/22  6:43 AM   Result Value Ref Range    Sodium 136 133 - 144 mmol/L    Potassium 4.0 3.4 - 5.3 mmol/L    Chloride 97 94 - 109 mmol/L    Carbon Dioxide (CO2) 34 (H) 20 - 32 mmol/L    Anion Gap 5 3 - 14 mmol/L    Urea Nitrogen 27 7 - 30 mg/dL    Creatinine 0.50 " (L) 0.52 - 1.04 mg/dL    Calcium 8.3 (L) 8.5 - 10.1 mg/dL    Glucose 147 (H) 70 - 99 mg/dL    Alkaline Phosphatase 111 40 - 150 U/L    AST 12 0 - 45 U/L    ALT 25 0 - 50 U/L    Protein Total 5.2 (L) 6.8 - 8.8 g/dL    Albumin 2.5 (L) 3.4 - 5.0 g/dL    Bilirubin Total 0.4 0.2 - 1.3 mg/dL    GFR Estimate >90 >60 mL/min/1.73m2   Magnesium    Collection Time: 03/15/22  6:43 AM   Result Value Ref Range    Magnesium 1.8 1.6 - 2.3 mg/dL   Phosphorus    Collection Time: 03/15/22  6:43 AM   Result Value Ref Range    Phosphorus 3.7 2.5 - 4.5 mg/dL   Blood gas venous    Collection Time: 03/15/22  6:43 AM   Result Value Ref Range    pH Venous 7.38 7.32 - 7.43    pCO2 Venous 68 (H) 40 - 50 mm Hg    pO2 Venous 59 (H) 25 - 47 mm Hg    Bicarbonate Venous 40 (H) 21 - 28 mmol/L    Base Excess/Deficit (+/-) 12.5 (H) -7.7 - 1.9 mmol/L    FIO2 25    CBC with platelets and differential    Collection Time: 03/15/22  6:43 AM   Result Value Ref Range    WBC Count 8.3 4.0 - 11.0 10e3/uL    RBC Count 2.61 (L) 3.80 - 5.20 10e6/uL    Hemoglobin 7.9 (L) 11.7 - 15.7 g/dL    Hematocrit 26.7 (L) 35.0 - 47.0 %     (H) 78 - 100 fL    MCH 30.3 26.5 - 33.0 pg    MCHC 29.6 (L) 31.5 - 36.5 g/dL    RDW 19.9 (H) 10.0 - 15.0 %    Platelet Count 361 150 - 450 10e3/uL    % Neutrophils 85 %    % Lymphocytes 8 %    % Monocytes 6 %    % Eosinophils 0 %    % Basophils 0 %    % Immature Granulocytes 1 %    NRBCs per 100 WBC 0 <1 /100    Absolute Neutrophils 7.0 1.6 - 8.3 10e3/uL    Absolute Lymphocytes 0.6 (L) 0.8 - 5.3 10e3/uL    Absolute Monocytes 0.5 0.0 - 1.3 10e3/uL    Absolute Eosinophils 0.0 0.0 - 0.7 10e3/uL    Absolute Basophils 0.0 0.0 - 0.2 10e3/uL    Absolute Immature Granulocytes 0.1 <=0.4 10e3/uL    Absolute NRBCs 0.0 10e3/uL   Glucose by meter    Collection Time: 03/15/22  1:37 PM   Result Value Ref Range    GLUCOSE BY METER POCT 129 (H) 70 - 99 mg/dL   Glucose by meter    Collection Time: 03/15/22  5:05 PM   Result Value Ref Range    GLUCOSE  BY METER POCT 178 (H) 70 - 99 mg/dL   Lactic Acid STAT    Collection Time: 03/15/22  9:05 PM   Result Value Ref Range    Lactic Acid 1.3 0.7 - 2.0 mmol/L   Glucose by meter    Collection Time: 03/15/22  9:42 PM   Result Value Ref Range    GLUCOSE BY METER POCT 119 (H) 70 - 99 mg/dL   Glucose by meter    Collection Time: 03/16/22  1:46 AM   Result Value Ref Range    GLUCOSE BY METER POCT 156 (H) 70 - 99 mg/dL   Comprehensive metabolic panel    Collection Time: 03/16/22  6:19 AM   Result Value Ref Range    Sodium 138 133 - 144 mmol/L    Potassium 4.2 3.4 - 5.3 mmol/L    Chloride 98 94 - 109 mmol/L    Carbon Dioxide (CO2) 35 (H) 20 - 32 mmol/L    Anion Gap 5 3 - 14 mmol/L    Urea Nitrogen 27 7 - 30 mg/dL    Creatinine 0.52 0.52 - 1.04 mg/dL    Calcium 8.9 8.5 - 10.1 mg/dL    Glucose 139 (H) 70 - 99 mg/dL    Alkaline Phosphatase 117 40 - 150 U/L    AST 13 0 - 45 U/L    ALT 25 0 - 50 U/L    Protein Total 5.6 (L) 6.8 - 8.8 g/dL    Albumin 2.7 (L) 3.4 - 5.0 g/dL    Bilirubin Total 0.5 0.2 - 1.3 mg/dL    GFR Estimate >90 >60 mL/min/1.73m2   Magnesium    Collection Time: 03/16/22  6:19 AM   Result Value Ref Range    Magnesium 1.7 1.6 - 2.3 mg/dL   Phosphorus    Collection Time: 03/16/22  6:19 AM   Result Value Ref Range    Phosphorus 4.0 2.5 - 4.5 mg/dL   Blood gas venous    Collection Time: 03/16/22  6:19 AM   Result Value Ref Range    pH Venous 7.42 7.32 - 7.43    pCO2 Venous 64 (H) 40 - 50 mm Hg    pO2 Venous 59 (H) 25 - 47 mm Hg    Bicarbonate Venous 41 (H) 21 - 28 mmol/L    Base Excess/Deficit (+/-) 14.5 (H) -7.7 - 1.9 mmol/L    FIO2 0    CBC with platelets and differential    Collection Time: 03/16/22  6:19 AM   Result Value Ref Range    WBC Count 8.7 4.0 - 11.0 10e3/uL    RBC Count 2.82 (L) 3.80 - 5.20 10e6/uL    Hemoglobin 8.6 (L) 11.7 - 15.7 g/dL    Hematocrit 28.2 (L) 35.0 - 47.0 %     78 - 100 fL    MCH 30.5 26.5 - 33.0 pg    MCHC 30.5 (L) 31.5 - 36.5 g/dL    RDW 20.0 (H) 10.0 - 15.0 %    Platelet Count 378  150 - 450 10e3/uL    % Neutrophils 86 %    % Lymphocytes 7 %    % Monocytes 6 %    % Eosinophils 0 %    % Basophils 0 %    % Immature Granulocytes 1 %    NRBCs per 100 WBC 0 <1 /100    Absolute Neutrophils 7.4 1.6 - 8.3 10e3/uL    Absolute Lymphocytes 0.6 (L) 0.8 - 5.3 10e3/uL    Absolute Monocytes 0.5 0.0 - 1.3 10e3/uL    Absolute Eosinophils 0.0 0.0 - 0.7 10e3/uL    Absolute Basophils 0.0 0.0 - 0.2 10e3/uL    Absolute Immature Granulocytes 0.1 <=0.4 10e3/uL    Absolute NRBCs 0.0 10e3/uL   Glucose by meter    Collection Time: 03/16/22 11:40 AM   Result Value Ref Range    GLUCOSE BY METER POCT 109 (H) 70 - 99 mg/dL   Glucose by meter    Collection Time: 03/16/22  5:02 PM   Result Value Ref Range    GLUCOSE BY METER POCT 134 (H) 70 - 99 mg/dL   Glucose by meter    Collection Time: 03/16/22  9:43 PM   Result Value Ref Range    GLUCOSE BY METER POCT 143 (H) 70 - 99 mg/dL   Phosphorus    Collection Time: 03/17/22  7:04 AM   Result Value Ref Range    Phosphorus 3.8 2.5 - 4.5 mg/dL   Comprehensive metabolic panel    Collection Time: 03/17/22  7:04 AM   Result Value Ref Range    Sodium 138 133 - 144 mmol/L    Potassium 4.3 3.4 - 5.3 mmol/L    Chloride 98 94 - 109 mmol/L    Carbon Dioxide (CO2) 34 (H) 20 - 32 mmol/L    Anion Gap 6 3 - 14 mmol/L    Urea Nitrogen 30 7 - 30 mg/dL    Creatinine 0.53 0.52 - 1.04 mg/dL    Calcium 8.8 8.5 - 10.1 mg/dL    Glucose 138 (H) 70 - 99 mg/dL    Alkaline Phosphatase 115 40 - 150 U/L    AST 12 0 - 45 U/L    ALT 23 0 - 50 U/L    Protein Total 5.7 (L) 6.8 - 8.8 g/dL    Albumin 2.7 (L) 3.4 - 5.0 g/dL    Bilirubin Total 1.0 0.2 - 1.3 mg/dL    GFR Estimate >90 >60 mL/min/1.73m2   Magnesium    Collection Time: 03/17/22  7:04 AM   Result Value Ref Range    Magnesium 1.6 1.6 - 2.3 mg/dL   Blood gas venous    Collection Time: 03/17/22  7:04 AM   Result Value Ref Range    pH Venous 7.43 7.32 - 7.43    pCO2 Venous 59 (H) 40 - 50 mm Hg    pO2 Venous 60 (H) 25 - 47 mm Hg    Bicarbonate Venous 39 (H) 21  - 28 mmol/L    Base Excess/Deficit (+/-) 13.1 (H) -7.7 - 1.9 mmol/L    FIO2 21    Tacrolimus by Tandem Mass Spectrometry    Collection Time: 03/17/22  7:04 AM   Result Value Ref Range    Tacrolimus by Tandem Mass Spectrometry 9.7 5.0 - 15.0 ug/L    Tacrolimus Last Dose Date      Tacrolimus Last Dose Time     CBC with platelets and differential    Collection Time: 03/17/22  7:04 AM   Result Value Ref Range    WBC Count 8.7 4.0 - 11.0 10e3/uL    RBC Count 2.89 (L) 3.80 - 5.20 10e6/uL    Hemoglobin 9.0 (L) 11.7 - 15.7 g/dL    Hematocrit 29.6 (L) 35.0 - 47.0 %     (H) 78 - 100 fL    MCH 31.1 26.5 - 33.0 pg    MCHC 30.4 (L) 31.5 - 36.5 g/dL    RDW 19.9 (H) 10.0 - 15.0 %    Platelet Count 348 150 - 450 10e3/uL    % Neutrophils 85 %    % Lymphocytes 8 %    % Monocytes 6 %    % Eosinophils 0 %    % Basophils 0 %    % Immature Granulocytes 1 %    NRBCs per 100 WBC 0 <1 /100    Absolute Neutrophils 7.3 1.6 - 8.3 10e3/uL    Absolute Lymphocytes 0.7 (L) 0.8 - 5.3 10e3/uL    Absolute Monocytes 0.6 0.0 - 1.3 10e3/uL    Absolute Eosinophils 0.0 0.0 - 0.7 10e3/uL    Absolute Basophils 0.0 0.0 - 0.2 10e3/uL    Absolute Immature Granulocytes 0.1 <=0.4 10e3/uL    Absolute NRBCs 0.0 10e3/uL   Glucose by meter    Collection Time: 03/17/22  7:35 AM   Result Value Ref Range    GLUCOSE BY METER POCT 178 (H) 70 - 99 mg/dL   Glucose by meter    Collection Time: 03/17/22 12:58 PM   Result Value Ref Range    GLUCOSE BY METER POCT 140 (H) 70 - 99 mg/dL   CBC with platelets    Collection Time: 03/21/22  7:20 AM   Result Value Ref Range    WBC Count 7.7 4.0 - 11.0 10e3/uL    RBC Count 2.98 (L) 3.80 - 5.20 10e6/uL    Hemoglobin 9.2 (L) 11.7 - 15.7 g/dL    Hematocrit 30.0 (L) 35.0 - 47.0 %     (H) 78 - 100 fL    MCH 30.9 26.5 - 33.0 pg    MCHC 30.7 (L) 31.5 - 36.5 g/dL    RDW 18.8 (H) 10.0 - 15.0 %    Platelet Count 230 150 - 450 10e3/uL   Magnesium    Collection Time: 03/21/22  7:20 AM   Result Value Ref Range    Magnesium 1.7 1.6 -  2.3 mg/dL   Basic metabolic panel    Collection Time: 03/21/22  7:20 AM   Result Value Ref Range    Sodium 140 133 - 144 mmol/L    Potassium 4.4 3.4 - 5.3 mmol/L    Chloride 101 94 - 109 mmol/L    Carbon Dioxide (CO2) 34 (H) 20 - 32 mmol/L    Anion Gap 5 3 - 14 mmol/L    Urea Nitrogen 23 7 - 30 mg/dL    Creatinine 0.55 0.52 - 1.04 mg/dL    Calcium 8.8 8.5 - 10.1 mg/dL    Glucose 122 (H) 70 - 99 mg/dL    GFR Estimate >90 >60 mL/min/1.73m2   Asymptomatic COVID-19 Virus (Coronavirus) by PCR Nose    Collection Time: 03/21/22  7:20 AM    Specimen: Nose; Swab   Result Value Ref Range    SARS CoV2 PCR Negative Negative, Testing sent to reference lab. Results will be returned via unsolicited result   IgG    Collection Time: 03/21/22  7:20 AM   Result Value Ref Range    Immunoglobulin G 497 (L) 610-1,616 mg/dL   General PFT Lab (Please always keep checked)    Collection Time: 03/21/22  7:31 AM   Result Value Ref Range    FVC-Pred 2.77 L    FVC-Pre 1.10 L    FVC-%Pred-Pre 39 %    FEV1-Pre 1.04 L    FEV1-%Pred-Pre 47 %    FEV1FVC-Pred 79 %    FEV1FVC-Pre 94 %    FEFMax-Pred 5.63 L/sec    FEFMax-Pre 3.29 L/sec    FEFMax-%Pred-Pre 58 %    FEF2575-Pred 1.93 L/sec    FEF2575-Pre 1.99 L/sec    DNX4046-%Pred-Pre 102 %    ExpTime-Pre 4.90 sec    FIFMax-Pre 1.86 L/sec    FEV1FEV6-Pred 80 %    FEV1FEV6-Pre 94 %     PFT interpretation:  Maneuver: valid, but did not meet ATS guidelines    Transplant Coordinator Note    Reason for visit: Post lung transplant follow up visit   Coordinator: Present   Caregiver:  , Tyrese    Health concerns addressed today:   1. Discharged last Thursday, visit post transplant  2. Respiratory - doing well, needs an extra pillow when lying down. Gets short of breath with activity.   3. GI: appetite okay. BM x2 daily - firming up   4. Incisional pain    Activity/rehab: pulmonary rehab eval Friday 9AM  Oxygen needs: room air, no BiPAP NOC  Pain management/RX: incisional pain  Diabetic management: a little  high and a little low  Next Bronch due: due 3/22  High risk donor:   Risk Criteria Labs:   CMV status:  Valcyte stopped:   EBV status:  DVT/PE:  Post op AFIB/follow up with EP:  AC/asa:   PJP prophylactic: Dapsone    COVID:  1. COVID-19 infection (yes/no, date of most recent positive test):   2. Status/instructions given about COVID-19 vaccine:     Pt Education: medications (use/dose/side effects), how/when to call coordinator, frequency of labs, s/s of infection/rejection, call prior to starting any new medications, lab/vital sign book    Health Maintenance:     Last colonoscopy:     Next colonoscopy due:     Dermatology:    Vaccinations this visit:     Labs, CXR, PFTs reviewed with patient  Medication record reviewed and reconciled  Questions and concerns addressed    Patient Instructions  1. Try to hydrate with 60-70 oz (4-5 bottles of water) fluids daily. Try to limit coffee and tea (unless decaffeinated).   2. Continue to exercise daily or most days of the week.  3. You are set up for pulmonary rehab Friday, 3/25 at 9AM   4. Try to limit your salt intake (try to limit processed foods as well).   5. We will have the dietitian talk to you guys about high protein diet.  6. It doesn't seem like the COVID vaccine is working well in lung transplant patients. A number of lung transplant patients have gotten sick with COVID even after receiving the vaccines.  Based on our recent experience, it can be life-threatening to get COVID  even after being vaccinated. Please continue to act like you did not get the COVID vaccine - social distancing, wearing a mask, good hand hygiene, etc. If the people around you are vaccinated, it will help reduce the risk of you getting COVID. All members of your household should be vaccinated.  7. Take Tylenol (3 regular strength or 2 extra strength) three times a day to keep the pain manageable.   8. Take Acyclovir 2 tablets twice a day until you run out.   9. Take doxycycline through 4/1.    10. Stop taking Glipizide.     Next transplant clinic appointment: later this week with CXR, labs.   Next lab draw: Later this week.     AVS printed at time of check out    Again, thank you for allowing me to participate in the care of your patient.      Sincerely,    Sharlene Larios PA-C   Protopic Pregnancy And Lactation Text: This medication is Pregnancy Category C. It is unknown if this medication is excreted in breast milk when applied topically.

## 2022-03-21 NOTE — PATIENT INSTRUCTIONS
Patient Instructions  1. Try to hydrate with 60-70 oz (4-5 bottles of water) fluids daily. Try to limit coffee and tea (unless decaffeinated).   2. Continue to exercise daily or most days of the week.  3. You are set up for pulmonary rehab Friday, 3/25 at 9AM   4. Try to limit your salt intake (try to limit processed foods as well).   5. We will have the dietitian talk to you guys about high protein diet.  6. It doesn't seem like the COVID vaccine is working well in lung transplant patients. A number of lung transplant patients have gotten sick with COVID even after receiving the vaccines.  Based on our recent experience, it can be life-threatening to get COVID  even after being vaccinated. Please continue to act like you did not get the COVID vaccine - social distancing, wearing a mask, good hand hygiene, etc. If the people around you are vaccinated, it will help reduce the risk of you getting COVID. All members of your household should be vaccinated.  7. Take Tylenol (3 regular strength or 2 extra strength) three times a day to keep the pain manageable.   8. Take Acyclovir 2 tablets twice a day until you run out.   9. Take doxycycline through 4/1.   10. Stop taking Glipizide. Continue watching your blood sugars.    Next transplant clinic appointment: later this week with CXR, labs.   Next lab draw: Later this week.         ~~~~~~~~~~~~~~~~~~~~~~~~~    Thoracic Transplant Office phone [phone], fax [phone]    Office Hours 8:30 - 5:00     For after-hours urgent issues, please dial [phone], and ask to speak with the Thoracic Transplant Coordinator On-Call.  --------------------  To expedite your medication refill(s), please contact your pharmacy and have them fax a refill request to: [phone]  .   *Please allow 3 business days for routine medication refills.  *Please allow 5 business days for controlled substance medication refills.    **For Diabetic medications and supplies refill(s), please  contact your pharmacy and have them contact your Endocrine team.  --------------------  For scheduling appointments call [phone].  --------------------  Please Note: If you are active on [facility], all future test results will be sent by [facility] message only, and will no longer be called to patient. You may also receive communication directly from your physician.

## 2022-03-21 NOTE — PROGRESS NOTES
[facility]  March 21, 2022         Assessment and Plan:   Melissa Elder is a 66 year old female with h/o bilateral lung transplant on 2/21 for severe COPD for who is seen today for her first post discharge follow up. Other history notable for HTN, mild non-obstructive CAD, paroxysmal afib, osteoporosis, GERD, and colonic polyps. Surgery uncomplicated, s/p bilateral pigtail chest tube placement for hydropneumothorax and bilateral effusions,  disseminated Ureaplasma and transient hyperammonemia. Slow improvement in hypercapnia with NIPPV overnight, weaned off daytimeO2 and did not qualify for BiPAP at discharge on 3/17.     COPD s/p bilateral sequential lung transplant (BSLT):  Acute hypoxic/hypercapneic respiratory failure:   Bilateral pleural effusions/PTX:   Subluxation of sternum: Scant pleural-pleural adhesions and mild-moderate bilateral hilar lymphadenopathy per op note. Pathology with CPFE. Extubated 2/22. S/p right pigtail chest tube 3/3 with ureaplasma in exudative effusion. Sniff test 3/3 with poor diaphragm excursion bilaterally with extensive accessory respiratory chest wall musculature effort to aid in inspiration. Repeat sniff test 3/11 without evidence of diaphragmatic palsy. Trialed off NIPPV 3/11-3/13 with marked increase in hypercapnea, but did not qualify for home BiPAP. DSA 2/28 and CMV 3/11 negative. CXR reviewed today and demonstrates stable to slightly improved R>L basilar opacities and small effusions. PFTs did not meet ATS guidelines, attempted 8 times.   - Flonase for postnasal drip   - Consider sleep study as OP     Immunosuppression:  S/p induction therapy with basiliximab x 2 doses (with high dose IV steroid).  - Tacrolimus 3.5 mg qAM / 3 mg qPM.  Goal level 8-12  - Myfortic 720 mg BID  - Prednisone 12.5 mg BID with taper per lung transplant protocol (next due 3/29)    Prophylaxis:   - Dapsone for PJP ppx  - Nystatin for oral  candidiasis ppx, 6 month course  - CMV (D-/R-)    ID: Prior history of infection/colonization with Haemophilus influenzae. Donor cultures with P-S MSSA; ([facility]) with Strep mitis, Actinomyces odontolyticus, MSSA x2, and C. kruseii (concern for possible aspiration).  Recipient cultures at time of transplant with Actinomyces odonotolyticus. Bronch cultures (2/22) with Saccharomyces cerevisiae (no indication to treat per Dr. Ghosh) and C. Albicans. S/p ceftriazone 2/23-3/8.   - IgG pending  - Amoxicillin (3/8-5/23) X 3 months for Actinomyces treatment  - Low threshold to treat Candida if it recurs per transplant ID    HSV: recent seroconversion at time of transplant. HSV also noted on donor cultures. HSV+ bronch at time of transplant 2/21, transitioned to treatment dosing with VACV  x 14 days through 3/12.   - Acyclovir prophylaxis 3/13-3/23 per protocol    Hyperammonemia, Resolved:   Pleural Fluid and sputum Ureaplasma: PCR for ureaplasma + on sputum and pleural fluid cultures on 3/2.   - Doxycycline 3/4-4/1 per transplant ID    Positive AFB on sputum culture: noted on sputum culture 3/2. Transplant ID following for speciation and susceptibility testing as well as other evidence of infection. AFB sputum culture 3/9 NGTD.  - AFB cultures on all future bronchs    PHS risk criteria donor: additional labs required post-transplant (between 4-8 weeks post-op): Hepatitis B, Hepatitis C, and HIV by COLT (due ~3/23), with repeat hepatitis B surface antibody ordered at that time given discrepancy with recent result.  - Labs ordered for next clinic visit    Concern for gastroparesis: c/o early satiety and persistent fullness and bloating t/o the day. NM gastric emptying study completed 3/15 normal.  Gastric distention likely attributed to gas. NJ removed 3/16. Still has feelings of fullness.   - Scheduled Simethicone QID, famotidine 20 mg BID  - Continue to strongly encourage TID meals (portion sizes typically small for patient)  and BID-TID supplemental shakes    Diarrhea:   Concern for ileus: likely 2/2 medications and tube feedings. Changed to Muyfortic, C diff negative. Loose stool now improving.    CAD: noted to have mild non-obstructive CAD without hemodynamically significant lesions on cardiac cath 3/27/19.   - Continue aspirin and rosuvastatin    Paroxysmal afib:  HTN: PTA diltiazem, not on AC. Persistent tachycardia post-op, but now with better rate control, no complaints today.  - Continue diltiazem and metoprolol    GERD: Not on PPI PTA. Negative pH/manometry study 3/28/19, noted small hiatal hernia. On PPI daily since 2/21, H2 blocker bridge discontinued 3/2, some increase in reflux symptoms since, resumed 3/5. PPI also increased to BID prior to discharge.  - Continue PPI BID and famotidine BID    Hypomagnesemia: suppressed absorption d/t CNI.  Requiring almost daily IV/PO replacement so started on PO mag oxide, gluconate added (d/t diarrhea) to increase total daily mag dosing.  - Continue Mg oxide and gluconate    BLE edema: has 2+ BLE edema, unchanged, remains on lasix.  - Continue furosemide 40 mg daily and K replacement, reevaluate ability to decrease dose at next f/u  - Encourage mobility and protein intake    Post op pain: intermittently controlled with a band like pain under her breasts and around her rib cage.  - Encourage scheduled Tylenol (not currently using), continue scheduled gabapentin and methocarbamol  - PRN oxycodone     Hyperglycemia: last AIC of 5.7 on 2/22. Possibly exacerbated by tube feeds (now stopped) and prednisone. BS have been between 49-mid 100s with a 63, many 80-90s and two 190s.   - Stop glipizide and monitor     RTC: Friday as scheduled with Dr. Knox  Influenza and other vaccinations: will need Evusheld in the next week or so  Annual dermatology visit:    Sharlene Larios PA-C  Pulmonary, Allergy, Critical Care and Sleep Medicine        Interval History:     Weekend went well, was able to settle in,   notes BS are a little off. Pulm rehab starts Friday at 9:00 am. No fever or chills, no headaches. Breathing is good, needs an extra pillow. No O2 use. Does have some shortness of breath with walking, not at rest. Notes a clearly cough, not productive, no congestion. Incisional pain wraps around. Did have some mild chest vs incisional pain yesterday, no racing heart. If patient eats too much she feels full, no nausea or vomiting. Walking after eating helps her. Occasional bloating and gassy, having two BMs/day, feels they are getting slightly more formed.           Review of Systems:   Please see HPI, otherwise the complete 10 point ROS is negative.           Past Medical and Surgical History:     Past Medical History:   Diagnosis Date     Atrial fibrillation with RVR (H)     hx of A fib related to severe COPD, does not meet anticoagulation criteria as noted     COPD, severe (H)      Osteoporosis      Past Surgical History:   Procedure Laterality Date     BRONCHOSCOPY FLEXIBLE AND RIGID N/A 3/1/2022    Procedure: BRONCHOSCOPY INSPECTION;  Surgeon: Melissa Landin MD;  Location:  GI     CV CORONARY ANGIOGRAM N/A 3/27/2019    Procedure: CV CORONARY ANGIOGRAM;  Surgeon: Thierry Serrano MD;  Location:  HEART CARDIAC CATH LAB     CV RIGHT HEART CATH MEASUREMENTS RECORDED N/A 3/27/2019    Procedure: CV RIGHT HEART CATH;  Surgeon: Thierry Serrano MD;  Location:  HEART CARDIAC CATH LAB     ESOPHAGEAL IMPEDENCE FUNCTION TEST WITH 24 HOUR PH GREATER THAN 1 HOUR N/A 3/28/2019    Procedure: ESOPHAGEAL IMPEDENCE FUNCTION TEST WITH 24 HOUR PH GREATER THAN 1 HOUR;  Surgeon: Mike Henry MD;  Location:  GI     EXCISE PILONIDAL CYST, SIMPLE       IR CHEST TUBE PLACEMENT NON-TUNNELED RIGHT  3/3/2022     TONSILLECTOMY & ADENOIDECTOMY       TRANSPLANT LUNG RECIPIENT SINGLE X2 Bilateral 2/20/2022    Procedure: Bilateral Sequential Lung Transplantation, Clamshell Incision, Extracorporeal Membrane  Oxgenation, Bronchoscopy, Cryoablation of Intercostal Nerves;  Surgeon: Raul Robin MD;  Location: [facility]           Family History:     Family History   Problem Relation Age of Onset     Breast Cancer Mother      Colon Cancer Mother      Lung Cancer Mother      Lung Cancer Father      Lung Cancer Maternal Aunt      Pancreatic Cancer Maternal Uncle             Social History:     Social History     Socioeconomic History     Marital status:      Spouse name: Not on file     Number of children: Not on file     Years of education: Not on file     Highest education level: Not on file   Occupational History     Not on file   Tobacco Use     Smoking status: Former Smoker     Packs/day: 1.50     Years: 36.00     Pack years: 54.00     Types: Cigarettes     Quit date: 2006     Years since quittin.2     Smokeless tobacco: Never Used   Substance and Sexual Activity     Alcohol use: Yes     Comment: stated beer and wine occ     Drug use: Yes     Comment: stated hx of marijuana, quit in      Sexual activity: Not on file   Other Topics Concern     Parent/sibling w/ CABG, MI or angioplasty before 65F 55M? Not Asked   Social History Narrative     Not on file     Social Determinants of Health     Financial Resource Strain: Not on file   Food Insecurity: Not on file   Transportation Needs: Not on file   Physical Activity: Not on file   Stress: Not on file   Social Connections: Not on file   Intimate Partner Violence: Not on file   Housing Stability: Not on file            Medications:     Current Outpatient Medications   Medication     acetaminophen (TYLENOL) 325 MG tablet     acyclovir (ZOVIRAX) 200 MG capsule     albuterol (PROAIR HFA/PROVENTIL HFA/VENTOLIN HFA) 108 (90 Base) MCG/ACT inhaler     Alcohol Swabs PADS     amoxicillin (AMOXIL) 500 MG capsule     aspirin 81 MG EC tablet     bisacodyl (DULCOLAX) 10 MG suppository     blood glucose (NO BRAND SPECIFIED) lancets standard     blood glucose (NO  "BRAND SPECIFIED) test strip     blood glucose monitoring (NO BRAND SPECIFIED) meter device kit     calcium carbonate 600 mg-vitamin D 400 units (CALTRATE) 600-400 MG-UNIT per tablet     carboxymethylcellulose PF (REFRESH PLUS) 0.5 % ophthalmic solution     dapsone (ACZONE) 25 MG tablet     diltiazem ER (TIAZAC) 240 MG 24 hr ER beaded capsule     doxycycline hyclate (VIBRAMYCIN) 100 MG capsule     famotidine (PEPCID) 20 MG tablet     fluticasone (FLONASE) 50 MCG/ACT nasal spray     furosemide (LASIX) 40 MG tablet     gabapentin (NEURONTIN) 100 MG capsule     glucagon 1 MG kit     glucose (BD GLUCOSE) 4 g chewable tablet     hydrOXYzine (ATARAX) 25 MG tablet     IBANdronate (BONIVA) 150 MG tablet     loperamide (IMODIUM) 2 MG capsule     loratadine (CLARITIN) 10 MG tablet     magnesium gluconate (MAGONATE) 500 MG tablet     magnesium oxide (MAG-OX) 400 MG tablet     methocarbamol (ROBAXIN) 500 MG tablet     metoprolol tartrate (LOPRESSOR) 25 MG tablet     multivitamin w/minerals (THERA-VIT-M) tablet     mycophenolic acid (GENERIC EQUIVALENT) 360 MG EC tablet     nystatin (MYCOSTATIN) 399254 UNIT/ML suspension     ondansetron (ZOFRAN-ODT) 4 MG ODT tab     oxyCODONE (ROXICODONE) 5 MG tablet     OXYGEN-HELIUM IN     pantoprazole (PROTONIX) 40 MG EC tablet     potassium chloride ER (KLOR-CON M) 20 MEQ CR tablet     predniSONE (DELTASONE) 5 MG tablet     rosuvastatin (CRESTOR) 5 MG tablet     [employer]ar[employer] Container MISC     simethicone (MYLICON) 80 MG chewable tablet     tacrolimus (GENERIC EQUIVALENT) 0.5 MG capsule     tacrolimus (GENERIC EQUIVALENT) 1 MG capsule     No current facility-administered medications for this visit.     Facility-Administered Medications Ordered in Other Visits   Medication     sodium chloride (PF) 0.9% PF flush 10 mL            Physical Exam:   /75   Pulse 110   Resp 17   Ht 1.575 m (5' 2\")   Wt 66.7 kg (147 lb)   SpO2 95%   BMI 26.89 kg/m      GENERAL: alert, NAD  HEENT: NCAT, EOMI, no " scleral icterus, oral mucosa moist and without lesions  Neck: no cervical or supraclavicular adenopathy  Lungs: decreased BS, mainly clear with few scattered basilar crackles  CV: RRR, S1S2, no murmurs noted  Abdomen: normoactive BS, soft, non tender and no organomegaly  Lymph:2+ BLE  Neuro: AAO X 3, CN 2-12 grossly intact  Psychiatric: normal affect, good eye contact  Skin: no rash, jaundice or lesions on limited exam         Data:   All laboratory and imaging data reviewed.      Recent Results (from the past 168 hour(s))   Glucose by meter    Collection Time: 03/14/22  5:18 PM   Result Value Ref Range    GLUCOSE BY METER POCT 244 (H) 70 - 99 mg/dL   Glucose by meter    Collection Time: 03/14/22  9:19 PM   Result Value Ref Range    GLUCOSE BY METER POCT 104 (H) 70 - 99 mg/dL   Blood gas arterial    Collection Time: 03/15/22 12:37 AM   Result Value Ref Range    pH Arterial 7.36 7.35 - 7.45    pCO2 Arterial 72 (H) 35 - 45 mm Hg    pO2 Arterial 69 (L) 80 - 105 mm Hg    FIO2 24     Bicarbonate Arterial 40 (H) 21 - 28 mmol/L    Base Excess/Deficit (+/-) 13.1 (H) -9.0 - 1.8 mmol/L   Glucose by meter    Collection Time: 03/15/22  2:01 AM   Result Value Ref Range    GLUCOSE BY METER POCT 143 (H) 70 - 99 mg/dL   Glucose by meter    Collection Time: 03/15/22  5:27 AM   Result Value Ref Range    GLUCOSE BY METER POCT 142 (H) 70 - 99 mg/dL   Comprehensive metabolic panel    Collection Time: 03/15/22  6:43 AM   Result Value Ref Range    Sodium 136 133 - 144 mmol/L    Potassium 4.0 3.4 - 5.3 mmol/L    Chloride 97 94 - 109 mmol/L    Carbon Dioxide (CO2) 34 (H) 20 - 32 mmol/L    Anion Gap 5 3 - 14 mmol/L    Urea Nitrogen 27 7 - 30 mg/dL    Creatinine 0.50 (L) 0.52 - 1.04 mg/dL    Calcium 8.3 (L) 8.5 - 10.1 mg/dL    Glucose 147 (H) 70 - 99 mg/dL    Alkaline Phosphatase 111 40 - 150 U/L    AST 12 0 - 45 U/L    ALT 25 0 - 50 U/L    Protein Total 5.2 (L) 6.8 - 8.8 g/dL    Albumin 2.5 (L) 3.4 - 5.0 g/dL    Bilirubin Total 0.4 0.2 - 1.3  mg/dL    GFR Estimate >90 >60 mL/min/1.73m2   Magnesium    Collection Time: 03/15/22  6:43 AM   Result Value Ref Range    Magnesium 1.8 1.6 - 2.3 mg/dL   Phosphorus    Collection Time: 03/15/22  6:43 AM   Result Value Ref Range    Phosphorus 3.7 2.5 - 4.5 mg/dL   Blood gas venous    Collection Time: 03/15/22  6:43 AM   Result Value Ref Range    pH Venous 7.38 7.32 - 7.43    pCO2 Venous 68 (H) 40 - 50 mm Hg    pO2 Venous 59 (H) 25 - 47 mm Hg    Bicarbonate Venous 40 (H) 21 - 28 mmol/L    Base Excess/Deficit (+/-) 12.5 (H) -7.7 - 1.9 mmol/L    FIO2 25    CBC with platelets and differential    Collection Time: 03/15/22  6:43 AM   Result Value Ref Range    WBC Count 8.3 4.0 - 11.0 10e3/uL    RBC Count 2.61 (L) 3.80 - 5.20 10e6/uL    Hemoglobin 7.9 (L) 11.7 - 15.7 g/dL    Hematocrit 26.7 (L) 35.0 - 47.0 %     (H) 78 - 100 fL    MCH 30.3 26.5 - 33.0 pg    MCHC 29.6 (L) 31.5 - 36.5 g/dL    RDW 19.9 (H) 10.0 - 15.0 %    Platelet Count 361 150 - 450 10e3/uL    % Neutrophils 85 %    % Lymphocytes 8 %    % Monocytes 6 %    % Eosinophils 0 %    % Basophils 0 %    % Immature Granulocytes 1 %    NRBCs per 100 WBC 0 <1 /100    Absolute Neutrophils 7.0 1.6 - 8.3 10e3/uL    Absolute Lymphocytes 0.6 (L) 0.8 - 5.3 10e3/uL    Absolute Monocytes 0.5 0.0 - 1.3 10e3/uL    Absolute Eosinophils 0.0 0.0 - 0.7 10e3/uL    Absolute Basophils 0.0 0.0 - 0.2 10e3/uL    Absolute Immature Granulocytes 0.1 <=0.4 10e3/uL    Absolute NRBCs 0.0 10e3/uL   Glucose by meter    Collection Time: 03/15/22  1:37 PM   Result Value Ref Range    GLUCOSE BY METER POCT 129 (H) 70 - 99 mg/dL   Glucose by meter    Collection Time: 03/15/22  5:05 PM   Result Value Ref Range    GLUCOSE BY METER POCT 178 (H) 70 - 99 mg/dL   Lactic Acid STAT    Collection Time: 03/15/22  9:05 PM   Result Value Ref Range    Lactic Acid 1.3 0.7 - 2.0 mmol/L   Glucose by meter    Collection Time: 03/15/22  9:42 PM   Result Value Ref Range    GLUCOSE BY METER POCT 119 (H) 70 - 99  mg/dL   Glucose by meter    Collection Time: 03/16/22  1:46 AM   Result Value Ref Range    GLUCOSE BY METER POCT 156 (H) 70 - 99 mg/dL   Comprehensive metabolic panel    Collection Time: 03/16/22  6:19 AM   Result Value Ref Range    Sodium 138 133 - 144 mmol/L    Potassium 4.2 3.4 - 5.3 mmol/L    Chloride 98 94 - 109 mmol/L    Carbon Dioxide (CO2) 35 (H) 20 - 32 mmol/L    Anion Gap 5 3 - 14 mmol/L    Urea Nitrogen 27 7 - 30 mg/dL    Creatinine 0.52 0.52 - 1.04 mg/dL    Calcium 8.9 8.5 - 10.1 mg/dL    Glucose 139 (H) 70 - 99 mg/dL    Alkaline Phosphatase 117 40 - 150 U/L    AST 13 0 - 45 U/L    ALT 25 0 - 50 U/L    Protein Total 5.6 (L) 6.8 - 8.8 g/dL    Albumin 2.7 (L) 3.4 - 5.0 g/dL    Bilirubin Total 0.5 0.2 - 1.3 mg/dL    GFR Estimate >90 >60 mL/min/1.73m2   Magnesium    Collection Time: 03/16/22  6:19 AM   Result Value Ref Range    Magnesium 1.7 1.6 - 2.3 mg/dL   Phosphorus    Collection Time: 03/16/22  6:19 AM   Result Value Ref Range    Phosphorus 4.0 2.5 - 4.5 mg/dL   Blood gas venous    Collection Time: 03/16/22  6:19 AM   Result Value Ref Range    pH Venous 7.42 7.32 - 7.43    pCO2 Venous 64 (H) 40 - 50 mm Hg    pO2 Venous 59 (H) 25 - 47 mm Hg    Bicarbonate Venous 41 (H) 21 - 28 mmol/L    Base Excess/Deficit (+/-) 14.5 (H) -7.7 - 1.9 mmol/L    FIO2 0    CBC with platelets and differential    Collection Time: 03/16/22  6:19 AM   Result Value Ref Range    WBC Count 8.7 4.0 - 11.0 10e3/uL    RBC Count 2.82 (L) 3.80 - 5.20 10e6/uL    Hemoglobin 8.6 (L) 11.7 - 15.7 g/dL    Hematocrit 28.2 (L) 35.0 - 47.0 %     78 - 100 fL    MCH 30.5 26.5 - 33.0 pg    MCHC 30.5 (L) 31.5 - 36.5 g/dL    RDW 20.0 (H) 10.0 - 15.0 %    Platelet Count 378 150 - 450 10e3/uL    % Neutrophils 86 %    % Lymphocytes 7 %    % Monocytes 6 %    % Eosinophils 0 %    % Basophils 0 %    % Immature Granulocytes 1 %    NRBCs per 100 WBC 0 <1 /100    Absolute Neutrophils 7.4 1.6 - 8.3 10e3/uL    Absolute Lymphocytes 0.6 (L) 0.8 - 5.3  10e3/uL    Absolute Monocytes 0.5 0.0 - 1.3 10e3/uL    Absolute Eosinophils 0.0 0.0 - 0.7 10e3/uL    Absolute Basophils 0.0 0.0 - 0.2 10e3/uL    Absolute Immature Granulocytes 0.1 <=0.4 10e3/uL    Absolute NRBCs 0.0 10e3/uL   Glucose by meter    Collection Time: 03/16/22 11:40 AM   Result Value Ref Range    GLUCOSE BY METER POCT 109 (H) 70 - 99 mg/dL   Glucose by meter    Collection Time: 03/16/22  5:02 PM   Result Value Ref Range    GLUCOSE BY METER POCT 134 (H) 70 - 99 mg/dL   Glucose by meter    Collection Time: 03/16/22  9:43 PM   Result Value Ref Range    GLUCOSE BY METER POCT 143 (H) 70 - 99 mg/dL   Phosphorus    Collection Time: 03/17/22  7:04 AM   Result Value Ref Range    Phosphorus 3.8 2.5 - 4.5 mg/dL   Comprehensive metabolic panel    Collection Time: 03/17/22  7:04 AM   Result Value Ref Range    Sodium 138 133 - 144 mmol/L    Potassium 4.3 3.4 - 5.3 mmol/L    Chloride 98 94 - 109 mmol/L    Carbon Dioxide (CO2) 34 (H) 20 - 32 mmol/L    Anion Gap 6 3 - 14 mmol/L    Urea Nitrogen 30 7 - 30 mg/dL    Creatinine 0.53 0.52 - 1.04 mg/dL    Calcium 8.8 8.5 - 10.1 mg/dL    Glucose 138 (H) 70 - 99 mg/dL    Alkaline Phosphatase 115 40 - 150 U/L    AST 12 0 - 45 U/L    ALT 23 0 - 50 U/L    Protein Total 5.7 (L) 6.8 - 8.8 g/dL    Albumin 2.7 (L) 3.4 - 5.0 g/dL    Bilirubin Total 1.0 0.2 - 1.3 mg/dL    GFR Estimate >90 >60 mL/min/1.73m2   Magnesium    Collection Time: 03/17/22  7:04 AM   Result Value Ref Range    Magnesium 1.6 1.6 - 2.3 mg/dL   Blood gas venous    Collection Time: 03/17/22  7:04 AM   Result Value Ref Range    pH Venous 7.43 7.32 - 7.43    pCO2 Venous 59 (H) 40 - 50 mm Hg    pO2 Venous 60 (H) 25 - 47 mm Hg    Bicarbonate Venous 39 (H) 21 - 28 mmol/L    Base Excess/Deficit (+/-) 13.1 (H) -7.7 - 1.9 mmol/L    FIO2 21    Tacrolimus by Tandem Mass Spectrometry    Collection Time: 03/17/22  7:04 AM   Result Value Ref Range    Tacrolimus by Tandem Mass Spectrometry 9.7 5.0 - 15.0 ug/L    Tacrolimus Last Dose  Date      Tacrolimus Last Dose Time     CBC with platelets and differential    Collection Time: 03/17/22  7:04 AM   Result Value Ref Range    WBC Count 8.7 4.0 - 11.0 10e3/uL    RBC Count 2.89 (L) 3.80 - 5.20 10e6/uL    Hemoglobin 9.0 (L) 11.7 - 15.7 g/dL    Hematocrit 29.6 (L) 35.0 - 47.0 %     (H) 78 - 100 fL    MCH 31.1 26.5 - 33.0 pg    MCHC 30.4 (L) 31.5 - 36.5 g/dL    RDW 19.9 (H) 10.0 - 15.0 %    Platelet Count 348 150 - 450 10e3/uL    % Neutrophils 85 %    % Lymphocytes 8 %    % Monocytes 6 %    % Eosinophils 0 %    % Basophils 0 %    % Immature Granulocytes 1 %    NRBCs per 100 WBC 0 <1 /100    Absolute Neutrophils 7.3 1.6 - 8.3 10e3/uL    Absolute Lymphocytes 0.7 (L) 0.8 - 5.3 10e3/uL    Absolute Monocytes 0.6 0.0 - 1.3 10e3/uL    Absolute Eosinophils 0.0 0.0 - 0.7 10e3/uL    Absolute Basophils 0.0 0.0 - 0.2 10e3/uL    Absolute Immature Granulocytes 0.1 <=0.4 10e3/uL    Absolute NRBCs 0.0 10e3/uL   Glucose by meter    Collection Time: 03/17/22  7:35 AM   Result Value Ref Range    GLUCOSE BY METER POCT 178 (H) 70 - 99 mg/dL   Glucose by meter    Collection Time: 03/17/22 12:58 PM   Result Value Ref Range    GLUCOSE BY METER POCT 140 (H) 70 - 99 mg/dL   CBC with platelets    Collection Time: 03/21/22  7:20 AM   Result Value Ref Range    WBC Count 7.7 4.0 - 11.0 10e3/uL    RBC Count 2.98 (L) 3.80 - 5.20 10e6/uL    Hemoglobin 9.2 (L) 11.7 - 15.7 g/dL    Hematocrit 30.0 (L) 35.0 - 47.0 %     (H) 78 - 100 fL    MCH 30.9 26.5 - 33.0 pg    MCHC 30.7 (L) 31.5 - 36.5 g/dL    RDW 18.8 (H) 10.0 - 15.0 %    Platelet Count 230 150 - 450 10e3/uL   Magnesium    Collection Time: 03/21/22  7:20 AM   Result Value Ref Range    Magnesium 1.7 1.6 - 2.3 mg/dL   Basic metabolic panel    Collection Time: 03/21/22  7:20 AM   Result Value Ref Range    Sodium 140 133 - 144 mmol/L    Potassium 4.4 3.4 - 5.3 mmol/L    Chloride 101 94 - 109 mmol/L    Carbon Dioxide (CO2) 34 (H) 20 - 32 mmol/L    Anion Gap 5 3 - 14 mmol/L     Urea Nitrogen 23 7 - 30 mg/dL    Creatinine 0.55 0.52 - 1.04 mg/dL    Calcium 8.8 8.5 - 10.1 mg/dL    Glucose 122 (H) 70 - 99 mg/dL    GFR Estimate >90 >60 mL/min/1.73m2   Asymptomatic COVID-19 Virus (Coronavirus) by PCR Nose    Collection Time: 03/21/22  7:20 AM    Specimen: Nose; Swab   Result Value Ref Range    SARS CoV2 PCR Negative Negative, Testing sent to reference lab. Results will be returned via unsolicited result   IgG    Collection Time: 03/21/22  7:20 AM   Result Value Ref Range    Immunoglobulin G 497 (L) 610-1,616 mg/dL   General PFT Lab (Please always keep checked)    Collection Time: 03/21/22  7:31 AM   Result Value Ref Range    FVC-Pred 2.77 L    FVC-Pre 1.10 L    FVC-%Pred-Pre 39 %    FEV1-Pre 1.04 L    FEV1-%Pred-Pre 47 %    FEV1FVC-Pred 79 %    FEV1FVC-Pre 94 %    FEFMax-Pred 5.63 L/sec    FEFMax-Pre 3.29 L/sec    FEFMax-%Pred-Pre 58 %    FEF2575-Pred 1.93 L/sec    FEF2575-Pre 1.99 L/sec    DWL3745-%Pred-Pre 102 %    ExpTime-Pre 4.90 sec    FIFMax-Pre 1.86 L/sec    FEV1FEV6-Pred 80 %    FEV1FEV6-Pre 94 %     PFT interpretation:  Maneuver: valid, but did not meet ATS guidelines

## 2022-03-21 NOTE — NURSING NOTE
Chief Complaint   Patient presents with     RECHECK     Return Lung TX     Medications reviewed and vital signs taken.   Tasia Oneill, CMA

## 2022-03-21 NOTE — TELEPHONE ENCOUNTER
Tacrolimus level 9 at 12 hours, on 3/21/22.  Goal 8-12.   No change, continue current dose. Recheck again on 3/25/22.     Discussed with patient and .    [facility] message sent.     IgG 497. Per Sharlene, give a dose of IVIG and recheck in 1 month given several infections. Reviewed with patient and told her to expect a call from the infusion center to schedule this appointment.     Appointments made for 3/25/22 with Dr. Knox. Her  reports they have access to [facility] now so will check there for final appointment times.

## 2022-03-22 ENCOUNTER — VIRTUAL VISIT (OUTPATIENT)
Dept: PHARMACY | Facility: CLINIC | Age: 67
End: 2022-03-22

## 2022-03-22 ENCOUNTER — TELEPHONE (OUTPATIENT)
Dept: TRANSPLANT | Facility: CLINIC | Age: 67
End: 2022-03-22

## 2022-03-22 DIAGNOSIS — K21.9 GASTROESOPHAGEAL REFLUX DISEASE, UNSPECIFIED WHETHER ESOPHAGITIS PRESENT: ICD-10-CM

## 2022-03-22 DIAGNOSIS — T38.0X5A STEROID-INDUCED HYPERGLYCEMIA: ICD-10-CM

## 2022-03-22 DIAGNOSIS — R19.7 DIARRHEA, UNSPECIFIED TYPE: ICD-10-CM

## 2022-03-22 DIAGNOSIS — R73.9 STEROID-INDUCED HYPERGLYCEMIA: ICD-10-CM

## 2022-03-22 DIAGNOSIS — R52 PAIN: ICD-10-CM

## 2022-03-22 DIAGNOSIS — R60.9 EDEMA, UNSPECIFIED TYPE: ICD-10-CM

## 2022-03-22 DIAGNOSIS — Z94.2 S/P LUNG TRANSPLANT (H): Primary | ICD-10-CM

## 2022-03-22 LAB
BACTERIA SPEC CULT: ABNORMAL
BACTERIA SPEC CULT: ABNORMAL

## 2022-03-22 PROCEDURE — 99607 MTMS BY PHARM ADDL 15 MIN: CPT | Performed by: PHARMACIST

## 2022-03-22 PROCEDURE — 99605 MTMS BY PHARM NP 15 MIN: CPT | Performed by: PHARMACIST

## 2022-03-22 NOTE — TELEPHONE ENCOUNTER
Tyrese (spouse) states that 3/23 at 3PM works for pulmonary rehab vs. 3/25. Confirmed this with Sheridan at Pulmonary rehab and she is going to switch appointment time

## 2022-03-22 NOTE — TELEPHONE ENCOUNTER
Patient Called:    Tyrese discuss Discharge and appts       Call back needed? Yes    Return Call Needed  Same as documented in contacts section  When to return call?: Same day: Route [facility]

## 2022-03-22 NOTE — TELEPHONE ENCOUNTER
Patient Call: Voicemail  Date/Time: 03/22 1:42 pm    Reason for call: Sheridan from pulmonary rehab called back to discuss patient needing time frame pushed back.

## 2022-03-22 NOTE — PROGRESS NOTES
Medication Therapy Management (MTM) Encounter    ASSESSMENT:                            Medication Adherence/Access: No issues identified    Lung Transplant:  See Diarrhea about magnesium changes.     GERD: If heartburn continues may have to try more potent PPI or change dose times to before meals. Per patient it is controlled at this time.     Diarrhea: Recommend trying Metamucil to help with loose stools. Will also switch her magnesium from oxide and gluconate to Doctor's Best High Absorption magnesium. This may improve her loose stools as well. Discussed this change with coordinator who agreed.     Pain: Would recommend reducing Acetaminophen to 650mg and take four times daily to keep it <3000mg per day. Similar efficacy with 650mg vs 975mg for Acetaminophen.     Edema: Stable.     Steroid induced Hyperglycemia:  BG well controlled, no longer taking glipizide due to hypoglycemia.     PLAN:                            1. Doctor's Best High Absorption Magnesium (magnesium glycinate) can replace both your magnesiums. Take 1 tablet twice daily. Stop your Magnesium oxide and gluconate. Either order at either [employer] or [employer].   2. Acetaminophen 650mg four times daily rather than 975mg. Max dose is 3000mg per day.   3. Let us know if heartburn does not resolve.   4. Try Metamucil (psyllium fiber) once daily to help with diarrhea.     Follow-up: 2-3 months    SUBJECTIVE/OBJECTIVE:                          Melissa Elder is a 66 year old female called for a transitions of care visit. She was discharged from [facility] on 3/17 for lung txp. Her  Sebastián joins us today.     Reason for visit: initial post txp med review.    Allergies/ADRs: Reviewed in chart  Past Medical History: Reviewed in chart  Tobacco: She reports that she quit smoking about 16 years ago. Her smoking use included cigarettes. She has a 54.00 pack-year smoking history. She has never used smokeless tobacco.  Alcohol: not currently using    Medication  Adherence/Access: Patient uses pill box(es) and has assistance with medication administration: family member, Tyrese.  Patient takes medications 4 time(s) per day. 7-8, 12, 5-7, 10  Per patient, misses medication 0 times per week.   Medication barriers: none.   The patient fills medications at [place]: NO, fills medications at [facility].    Lung Transplant:  Current immunosuppressants include TAC 3.5mg every morning and 3mg every evening, Prednisone 12.5mg twice daily, and Myfortic 720mg twice daily.  Pt reports Tremors mild.  Vascular prophylaxis: Aspirin 81mg daily. Had a nose bleed this morning, which clotted. No GIB sx.   Transplant date: 2/21/22  Estimated Creatinine Clearance: 90.1 mL/min (based on SCr of 0.55 mg/dL).   CMV prophylaxis: Acyclovir 400mg twice daily for 7 days. D-,R-  PJP prophylaxis: Dapsone 50mg daily  Thrush prophylaxis:Nystatin 6 months post tx  PPI use: Pantoprazole 40mg twice daily.   Supplements: Magnesium Gluconate 500mg daily, magnesium oxide 400mg twice daily, MVI daily, Calcium/D twice daily.   Tx Coordinator: Lissett Gates, Using Med Card: Yes  Recent Infections:  Ureaplasm: taking doxycycline 100mg twice daily until 4/1, Amoxicillin 500mg TID until 5/23.   Immunizations: annual flu shot 2021; Mcozzcxdx13:  2013, 2019; Prevnar 13: 2015; TDaP:  2013; Shingrix: 2019x2, HBV: no immunity per last titers, COVID: Moderna 3x2021  Magnesium   Date Value Ref Range Status   03/21/2022 1.7 1.6 - 2.3 mg/dL Final   03/25/2019 1.9 1.6 - 2.3 mg/dL Final     GERD: Current medications include: Protonix (pantoprazole) 40mg BID and Pepcid (famotidine) 20mg twice daily. Having some heartburn 5-6 x since discharge. Patient feels that current regimen is effective.    Diarrhea: Pt is taking imodium 2mg as needed. Having soft stools, has had 3 BMs today despite taking imodium this morning.     Pain: Pt is taking Methocarbamol 500mg about twice daily, Oxycodone 5mg prn, Acetaminophen 975mg QID, Gabapentin 100mg at  bedtime. Has pain around her back and chest. Pain has improved overall, can worsen if she over does it though.     Edema: Pt is taking Furosemide 40mg daily, Potassium Chloride 20mEq daily. Swelling in her feet, some improvement overnight, but not much. AM weights: 145.6#, 146.2#, 146.4#, 146.4#    Steroid induced Hyperglycemia:  Stopped glipizide yesterday.  Blood sugar monitoring: 3-4 time(s) daily. Ranges (patient reported):   Date FBG/ 2hours post Lunch/2hours post Dinner /2hours post    3/22 135 77     3/21 139 54 77 162   3/20 127 53/81 122 154   Symptoms of low blood sugar? none  Symptoms of high blood sugar? none  Lab Results   Component Value Date    A1C 5.7 02/22/2022    A1C 5.8 02/20/2022       Today's Vitals: There were no vitals taken for this visit.  ----------------  Post Discharge Medication Reconciliation Status: discharge medications reconciled and changed, per note/orders.    I spent 40 minutes with this patient today. All changes were made via collaborative practice agreement with Dr. Feng. A copy of the visit note was provided to the patient's provider(s).    The patient was sent via [employer] a summary of these recommendations.     Danilo FrancisD  [facility] Pharmacist    Phone: [phone]     Telemedicine Visit Details  Type of service:  Telephone visit  Start Time: 3:10 PM  End Time: 3:52 PM  Originating Location (patient location): [place]  Distant Location (provider location):  [facility]     Medication Therapy Recommendations  Diarrhea, unspecified type    Rationale: Synergistic therapy - Needs additional medication therapy - Indication   Recommendation: Start Medication - Metamucil 48.57 % Powd   Status: Accepted - no CPA Needed         Pain    Current Medication: acetaminophen (TYLENOL) 325 MG tablet   Rationale: Dose too high - Dosage too high - Safety   Recommendation: Decrease Dose   Status: Accepted - no CPA Needed         S/P lung transplant (H)    Current  Medication: magnesium gluconate (MAGONATE) 500 MG tablet (Discontinued)   Rationale: Undesirable effect - Adverse medication event - Safety   Recommendation: Change Medication Formulation  - Magnesium Glycinate 665 MG Caps   Status: Accepted - no CPA Needed

## 2022-03-22 NOTE — PATIENT INSTRUCTIONS
Recommendations from today's MTM visit:                                                       1. Doctor's Best High Absorption Magnesium (magnesium glycinate) can replace both your magnesiums. Take 1 tablet twice daily. Stop your Magnesium oxide and gluconate. Either order at either [employer] or [employer].   2. Acetaminophen 650mg four times daily rather than 975mg. Max dose is 3000mg per day.   3. Let us know if heartburn does not resolve.   4. Try Metamucil (psyllium fiber) once daily to help with diarrhea.     Follow-up: 2 months    It was great to speak with you today.  I value your experience and would be very thankful for your time with providing feedback on our clinic survey. You may receive a survey via email or text message in the next few days.     To schedule another MTM appointment, please call the clinic directly or you may call the MTM scheduling line at [phone] or toll-free at [phone].     My Clinical Pharmacist's contact information:                                                      Please feel free to contact me with any questions or concerns you have.      Edilson Fisher, PharmD  MTM Pharmacist    Phone: [phone]

## 2022-03-22 NOTE — TELEPHONE ENCOUNTER
Tyrese wanted to go over  Upcoming appointment for estrella He said there was some confusion with mychart vs. AV[facility] and where to look. I advised Tyrese to check both locations. We are taking a break from [facility] this week and will re-evaluate Friday when coming in RTC with Dr. Knox. Went over appointment today with Edilson Fisher and then 3/25 appointments with Dr. Knox. Also asked about Lasix and per notes to continue for now and re-evaluate at appt on Friday if to reduce dosing.     Left voicemail with Pulm rehab to see if we can move appointment to later time 3/25.

## 2022-03-23 ENCOUNTER — HOSPITAL ENCOUNTER (OUTPATIENT)
Dept: CARDIAC REHAB | Facility: CLINIC | Age: 67
Discharge: HOME | End: 2022-03-23
Attending: INTERNAL MEDICINE
Payer: MEDICARE

## 2022-03-23 DIAGNOSIS — Z94.2 LUNG REPLACED BY TRANSPLANT (H): ICD-10-CM

## 2022-03-23 DIAGNOSIS — Z94.2 S/P LUNG TRANSPLANT (H): ICD-10-CM

## 2022-03-23 LAB
EBV DNA COPIES/ML, INSTRUMENT: 614 COPIES/ML
EBV DNA SPEC NAA+PROBE-LOG#: 2.8 {LOG_COPIES}/ML

## 2022-03-23 PROCEDURE — G0238 OTH RESP PROC, INDIV: HCPCS

## 2022-03-23 RX ORDER — FAMOTIDINE 20 MG/1
20 TABLET, FILM COATED ORAL 2 TIMES DAILY
Qty: 60 TABLET | Refills: 11 | Status: SHIPPED | OUTPATIENT
Start: 2022-03-23 | End: 2022-05-24

## 2022-03-23 RX ORDER — PREDNISONE 5 MG/1
12.5 TABLET ORAL 2 TIMES DAILY
Qty: 150 TABLET | Refills: 11 | Status: SHIPPED | OUTPATIENT
Start: 2022-03-23 | End: 2022-03-28

## 2022-03-23 RX ORDER — ROSUVASTATIN CALCIUM 5 MG/1
5 TABLET, COATED ORAL DAILY
Qty: 30 TABLET | Refills: 11 | Status: SHIPPED | OUTPATIENT
Start: 2022-03-23 | End: 2022-05-24

## 2022-03-23 RX ORDER — METOPROLOL TARTRATE 25 MG/1
25 TABLET, FILM COATED ORAL 2 TIMES DAILY
Qty: 60 TABLET | Refills: 11 | Status: SHIPPED | OUTPATIENT
Start: 2022-03-23 | End: 2022-04-27

## 2022-03-23 RX ORDER — TACROLIMUS 0.5 MG/1
0.5 CAPSULE ORAL EVERY MORNING
Qty: 30 CAPSULE | Refills: 11 | Status: SHIPPED | OUTPATIENT
Start: 2022-03-23 | End: 2022-04-07

## 2022-03-23 RX ORDER — TACROLIMUS 1 MG/1
3 CAPSULE ORAL 2 TIMES DAILY
Qty: 180 CAPSULE | Refills: 11 | Status: SHIPPED | OUTPATIENT
Start: 2022-03-23 | End: 2022-04-07

## 2022-03-23 RX ORDER — PANTOPRAZOLE SODIUM 40 MG/1
40 TABLET, DELAYED RELEASE ORAL
Qty: 60 TABLET | Refills: 11 | Status: SHIPPED | OUTPATIENT
Start: 2022-03-23 | End: 2022-04-27

## 2022-03-23 RX ORDER — DILTIAZEM HYDROCHLORIDE 240 MG/1
240 CAPSULE, EXTENDED RELEASE ORAL DAILY
Qty: 30 CAPSULE | Refills: 11 | Status: SHIPPED | OUTPATIENT
Start: 2022-03-23 | End: 2022-05-24

## 2022-03-23 RX ORDER — DAPSONE 25 MG/1
50 TABLET ORAL DAILY
Qty: 60 TABLET | Refills: 11 | Status: SHIPPED | OUTPATIENT
Start: 2022-03-23 | End: 2022-05-11

## 2022-03-23 NOTE — PROGRESS NOTES
"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          Interval Histories:     March 25, 2022  Denies any sob at rest, does get a little \"winded\" while doing exertional activities. No low glucoses, BGs 113-157. Diarrhea has improved. Does have some tremors in the hands. No morning headaches. Not waking up at night short of breath. Pain is band like but only used oxycodone twice since being out of the hospital. Sleeping well. No nausea or vomiting. A little acid reflux here and there despite bid famotidine and protonix. Feels like appetite is okay, but doesn't usually finish most meals, has some early satiety. Doing two ensures a day.  Still has significant swelling in the lower extremities. Blood pressure running 135/72 in the morning.     Exercise  Walking 440 ft three times a day down the halls   Starting Pulm rehab next week    The patient was seen and examined by Nicole Knox MD     A complete ROS was otherwise negative except as noted in the HPI.           Medications:   Medications reviewed with patient and   Current Outpatient Medications   Medication     acetaminophen (TYLENOL) 325 MG tablet     acyclovir (ZOVIRAX) 200 MG capsule     albuterol (PROAIR HFA/PROVENTIL HFA/VENTOLIN HFA) 108 (90 Base) MCG/ACT inhaler     Alcohol Swabs PADS     amoxicillin (AMOXIL) 500 MG " capsule     aspirin 81 MG EC tablet     blood glucose (NO BRAND SPECIFIED) lancets standard     blood glucose (NO BRAND SPECIFIED) test strip     blood glucose monitoring (NO BRAND SPECIFIED) meter device kit     calcium carbonate 600 mg-vitamin D 400 units (CALTRATE) 600-400 MG-UNIT per tablet     carboxymethylcellulose PF (REFRESH PLUS) 0.5 % ophthalmic solution     dapsone (ACZONE) 25 MG tablet     diltiazem ER (TIAZAC) 240 MG 24 hr ER beaded capsule     doxycycline hyclate (VIBRAMYCIN) 100 MG capsule     famotidine (PEPCID) 20 MG tablet     fluticasone (FLONASE) 50 MCG/ACT nasal spray     furosemide (LASIX) 40 MG tablet     gabapentin (NEURONTIN) 100 MG capsule     glucagon 1 MG kit     glucose (BD GLUCOSE) 4 g chewable tablet     IBANdronate (BONIVA) 150 MG tablet     loperamide (IMODIUM) 2 MG capsule     loratadine (CLARITIN) 10 MG tablet     MAGNESIUM GLYCINATE PO     methocarbamol (ROBAXIN) 500 MG tablet     metoprolol tartrate (LOPRESSOR) 25 MG tablet     multivitamin w/minerals (THERA-VIT-M) tablet     mycophenolic acid (GENERIC EQUIVALENT) 360 MG EC tablet     nystatin (MYCOSTATIN) 617050 UNIT/ML suspension     oxyCODONE (ROXICODONE) 5 MG tablet     OXYGEN-HELIUM IN     pantoprazole (PROTONIX) 40 MG EC tablet     potassium chloride ER (KLOR-CON M) 20 MEQ CR tablet     predniSONE (DELTASONE) 5 MG tablet     psyllium (METAMUCIL/KONSYL) 58.6 % powder     rosuvastatin (CRESTOR) 5 MG tablet     Sharps Container MISC     tacrolimus (GENERIC EQUIVALENT) 0.5 MG capsule     tacrolimus (GENERIC EQUIVALENT) 1 MG capsule     No current facility-administered medications for this visit.     Facility-Administered Medications Ordered in Other Visits   Medication     sodium chloride (PF) 0.9% PF flush 10 mL            Allergies:     Allergies   Allergen Reactions     Alendronic Acid Other (See Comments)     dizziness  Other reaction(s): Dizziness     Nickel Rash     Sulfa Drugs Rash            Past Medical and Past  Surgical History:     Past Medical History:   Diagnosis Date     Atrial fibrillation with RVR (H)     hx of A fib related to severe COPD, does not meet anticoagulation criteria as noted     COPD, severe (H)      Osteoporosis        Past Surgical History:   Procedure Laterality Date     BRONCHOSCOPY FLEXIBLE AND RIGID N/A 3/1/2022    Procedure: BRONCHOSCOPY INSPECTION;  Surgeon: Melissa Landin MD;  Location:  GI     CV CORONARY ANGIOGRAM N/A 3/27/2019    Procedure: CV CORONARY ANGIOGRAM;  Surgeon: Thierry Serrano MD;  Location:  HEART CARDIAC CATH LAB     CV RIGHT HEART CATH MEASUREMENTS RECORDED N/A 3/27/2019    Procedure: CV RIGHT HEART CATH;  Surgeon: Thierry Serrano MD;  Location:  HEART CARDIAC CATH LAB     ESOPHAGEAL IMPEDENCE FUNCTION TEST WITH 24 HOUR PH GREATER THAN 1 HOUR N/A 3/28/2019    Procedure: ESOPHAGEAL IMPEDENCE FUNCTION TEST WITH 24 HOUR PH GREATER THAN 1 HOUR;  Surgeon: Mike Henry MD;  Location:  GI     EXCISE PILONIDAL CYST, SIMPLE       IR CHEST TUBE PLACEMENT NON-TUNNELED RIGHT  3/3/2022     TONSILLECTOMY & ADENOIDECTOMY       TRANSPLANT LUNG RECIPIENT SINGLE X2 Bilateral 2022    Procedure: Bilateral Sequential Lung Transplantation, Clamshell Incision, Extracorporeal Membrane Oxgenation, Bronchoscopy, Cryoablation of Intercostal Nerves;  Surgeon: Raul Robin MD;  Location:  OR             Social History:     Social History     Socioeconomic History     Marital status:      Spouse name: Not on file     Number of children: Not on file     Years of education: Not on file     Highest education level: Not on file   Occupational History     Not on file   Tobacco Use     Smoking status: Former Smoker     Packs/day: 1.50     Years: 36.00     Pack years: 54.00     Types: Cigarettes     Quit date: 2006     Years since quittin.2     Smokeless tobacco: Never Used   Substance and Sexual Activity     Alcohol use: Yes     Comment: stated beer  "and wine occ     Drug use: Yes     Comment: stated hx of marijuana, quit in 2006     Sexual activity: Not on file   Other Topics Concern     Parent/sibling w/ CABG, MI or angioplasty before 65F 55M? Not Asked   Social History Narrative     Not on file     Social Determinants of Health     Financial Resource Strain: Not on file   Food Insecurity: Not on file   Transportation Needs: Not on file   Physical Activity: Not on file   Stress: Not on file   Social Connections: Not on file   Intimate Partner Violence: Not on file   Housing Stability: Not on file     Social Updates:          Rejection and Infection History     Rejection Hx    DATE INDICATION  PATH BAL/MICRO TREATMENT                Infectious Hx           Exam:     BP (!) 142/104   Pulse 98   Resp 17   Ht 1.575 m (5' 2\")   Wt 65.3 kg (144 lb)   SpO2 96%   BMI 26.34 kg/m    Body mass index is 26.34 kg/m .    GENERAL APPEARANCE: Well developed, no apparent distress  EYES: PERRL, EOMI  HENT: Nasal mucosa with no edema and no hyperemia. No nasal polyps.  MOUTH: Oral mucosa is moist, without any lesions  RESP: normal percussion, good air flow throughout.  No crackles. No rhonchi. No wheezes.  CV: Normal S1, S2, regular rhythm, tachycardic. No murmur.  No rub. No gallop.+2 pedal and pretibial edema  Chest: Incision is clean and dry and intact without evidence of dehiscence, has some mild dampness under right submammary fold but incision without erythema   ABDOMEN:  Bowel sounds normal, soft, nontender, no HSM or masses.   MS: extremities normal. No clubbing. No cyanosis.  SKIN: no rash on limited exam  NEURO: Mentation intact, speech normal, mild tremor at rest  PSYCH: mentation appears normal. and affect normal/bright          Data:     Results:  Recent Results (from the past 168 hour(s))   Glucose by meter    Collection Time: 03/16/22  5:02 PM   Result Value Ref Range    GLUCOSE BY METER POCT 134 (H) 70 - 99 mg/dL   Glucose by meter    Collection Time: " 03/16/22  9:43 PM   Result Value Ref Range    GLUCOSE BY METER POCT 143 (H) 70 - 99 mg/dL   Phosphorus    Collection Time: 03/17/22  7:04 AM   Result Value Ref Range    Phosphorus 3.8 2.5 - 4.5 mg/dL   Comprehensive metabolic panel    Collection Time: 03/17/22  7:04 AM   Result Value Ref Range    Sodium 138 133 - 144 mmol/L    Potassium 4.3 3.4 - 5.3 mmol/L    Chloride 98 94 - 109 mmol/L    Carbon Dioxide (CO2) 34 (H) 20 - 32 mmol/L    Anion Gap 6 3 - 14 mmol/L    Urea Nitrogen 30 7 - 30 mg/dL    Creatinine 0.53 0.52 - 1.04 mg/dL    Calcium 8.8 8.5 - 10.1 mg/dL    Glucose 138 (H) 70 - 99 mg/dL    Alkaline Phosphatase 115 40 - 150 U/L    AST 12 0 - 45 U/L    ALT 23 0 - 50 U/L    Protein Total 5.7 (L) 6.8 - 8.8 g/dL    Albumin 2.7 (L) 3.4 - 5.0 g/dL    Bilirubin Total 1.0 0.2 - 1.3 mg/dL    GFR Estimate >90 >60 mL/min/1.73m2   Magnesium    Collection Time: 03/17/22  7:04 AM   Result Value Ref Range    Magnesium 1.6 1.6 - 2.3 mg/dL   Blood gas venous    Collection Time: 03/17/22  7:04 AM   Result Value Ref Range    pH Venous 7.43 7.32 - 7.43    pCO2 Venous 59 (H) 40 - 50 mm Hg    pO2 Venous 60 (H) 25 - 47 mm Hg    Bicarbonate Venous 39 (H) 21 - 28 mmol/L    Base Excess/Deficit (+/-) 13.1 (H) -7.7 - 1.9 mmol/L    FIO2 21    Tacrolimus by Tandem Mass Spectrometry    Collection Time: 03/17/22  7:04 AM   Result Value Ref Range    Tacrolimus by Tandem Mass Spectrometry 9.7 5.0 - 15.0 ug/L    Tacrolimus Last Dose Date      Tacrolimus Last Dose Time     CBC with platelets and differential    Collection Time: 03/17/22  7:04 AM   Result Value Ref Range    WBC Count 8.7 4.0 - 11.0 10e3/uL    RBC Count 2.89 (L) 3.80 - 5.20 10e6/uL    Hemoglobin 9.0 (L) 11.7 - 15.7 g/dL    Hematocrit 29.6 (L) 35.0 - 47.0 %     (H) 78 - 100 fL    MCH 31.1 26.5 - 33.0 pg    MCHC 30.4 (L) 31.5 - 36.5 g/dL    RDW 19.9 (H) 10.0 - 15.0 %    Platelet Count 348 150 - 450 10e3/uL    % Neutrophils 85 %    % Lymphocytes 8 %    % Monocytes 6 %    %  Eosinophils 0 %    % Basophils 0 %    % Immature Granulocytes 1 %    NRBCs per 100 WBC 0 <1 /100    Absolute Neutrophils 7.3 1.6 - 8.3 10e3/uL    Absolute Lymphocytes 0.7 (L) 0.8 - 5.3 10e3/uL    Absolute Monocytes 0.6 0.0 - 1.3 10e3/uL    Absolute Eosinophils 0.0 0.0 - 0.7 10e3/uL    Absolute Basophils 0.0 0.0 - 0.2 10e3/uL    Absolute Immature Granulocytes 0.1 <=0.4 10e3/uL    Absolute NRBCs 0.0 10e3/uL   Glucose by meter    Collection Time: 03/17/22  7:35 AM   Result Value Ref Range    GLUCOSE BY METER POCT 178 (H) 70 - 99 mg/dL   Glucose by meter    Collection Time: 03/17/22 12:58 PM   Result Value Ref Range    GLUCOSE BY METER POCT 140 (H) 70 - 99 mg/dL   Tacrolimus by Tandem Mass Spectrometry    Collection Time: 03/21/22  7:20 AM   Result Value Ref Range    Tacrolimus by Tandem Mass Spectrometry 9.0 5.0 - 15.0 ug/L    Tacrolimus Last Dose Date 3/20/2022     Tacrolimus Last Dose Time  7:00 PM    CBC with platelets    Collection Time: 03/21/22  7:20 AM   Result Value Ref Range    WBC Count 7.7 4.0 - 11.0 10e3/uL    RBC Count 2.98 (L) 3.80 - 5.20 10e6/uL    Hemoglobin 9.2 (L) 11.7 - 15.7 g/dL    Hematocrit 30.0 (L) 35.0 - 47.0 %     (H) 78 - 100 fL    MCH 30.9 26.5 - 33.0 pg    MCHC 30.7 (L) 31.5 - 36.5 g/dL    RDW 18.8 (H) 10.0 - 15.0 %    Platelet Count 230 150 - 450 10e3/uL   Magnesium    Collection Time: 03/21/22  7:20 AM   Result Value Ref Range    Magnesium 1.7 1.6 - 2.3 mg/dL   Basic metabolic panel    Collection Time: 03/21/22  7:20 AM   Result Value Ref Range    Sodium 140 133 - 144 mmol/L    Potassium 4.4 3.4 - 5.3 mmol/L    Chloride 101 94 - 109 mmol/L    Carbon Dioxide (CO2) 34 (H) 20 - 32 mmol/L    Anion Gap 5 3 - 14 mmol/L    Urea Nitrogen 23 7 - 30 mg/dL    Creatinine 0.55 0.52 - 1.04 mg/dL    Calcium 8.8 8.5 - 10.1 mg/dL    Glucose 122 (H) 70 - 99 mg/dL    GFR Estimate >90 >60 mL/min/1.73m2   Asymptomatic COVID-19 Virus (Coronavirus) by PCR Nose    Collection Time: 03/21/22  7:20 AM     Specimen: Nose; Swab   Result Value Ref Range    SARS CoV2 PCR Negative Negative, Testing sent to reference lab. Results will be returned via unsolicited result   CMV Quantitative, PCR (Blood)    Collection Time: 03/21/22  7:20 AM    Specimen: Hand, Right; Blood   Result Value Ref Range    CMV DNA IU/mL Not Detected Not Detected IU/mL   EBV DNA PCR Quantitative Whole Blood    Collection Time: 03/21/22  7:20 AM   Result Value Ref Range    EBV DNA Copies/mL 614 (H) <=0 copies/mL    EBV log 2.8    IgG    Collection Time: 03/21/22  7:20 AM   Result Value Ref Range    Immunoglobulin G 497 (L) 610-1,616 mg/dL   CMV Quantitative, PCR    Collection Time: 03/21/22  7:21 AM    Specimen: Hand, Right; Blood   Result Value Ref Range    CMV DNA IU/mL Not Detected Not Detected IU/mL   General PFT Lab (Please always keep checked)    Collection Time: 03/21/22  7:31 AM   Result Value Ref Range    FVC-Pred 2.77 L    FVC-Pre 1.10 L    FVC-%Pred-Pre 39 %    FEV1-Pre 1.04 L    FEV1-%Pred-Pre 47 %    FEV1FVC-Pred 79 %    FEV1FVC-Pre 94 %    FEFMax-Pred 5.63 L/sec    FEFMax-Pre 3.29 L/sec    FEFMax-%Pred-Pre 58 %    FEF2575-Pred 1.93 L/sec    FEF2575-Pre 1.99 L/sec    MGA6776-%Pred-Pre 102 %    ExpTime-Pre 4.90 sec    FIFMax-Pre 1.86 L/sec    FEV1FEV6-Pred 80 %    FEV1FEV6-Pre 94 %                       Date Place TLC (%) FVC (%) FEV1 (%) FEV1/FVC DLCO (%) Note   3/21/22 [facility]   1.10 39 1.04 47 94                                                    Results as noted above.             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       PULMONARY    Transplant:        Allograft Function  Explant path with CPFE. Extubated POD 1. Initially with some diaphragmatic palsy with multiple chest tubes in place, which improved and resolved after chest tubes were removed. Bicarb is increasing slightly although has also been on lasix. CXR reviewed without any significant infiltrates, slight costophrenic angle blunting appears very stable. She has some exertional dyspnea which is likely deconditioning related.   - PFTs on Monday   - Starting pulm rehab next week  - Repeat VBG on Monday  - Bronch on Tuesday this coming week    Immunosuppression:  S/p induction therapy with basiliximab x 2 doses with high dose IV steroids  - Tacro 3.5 mg am/3mg pm, goal 8-12  - Myfortic 720 mg bid  - Prednisone 12.5 mg bid with taper per protocol    Prophylaxis:   PJP: Dapsone for PJP  CMV: D-/R-:    EBV: D /R, 614 copies, 2.8 Log 3/21/22  Fungal: Nystatin     ID:   Prior history of infection/colonization with Haemophilus influenzae. Donor cultures with P-S MSSA; ([facility]) with Strep mitis, Actinomyces odontolyticus, MSSA x2, and C. kruseii (concern for possible aspiration).  Recipient cultures at time of transplant with Actinomyces odonotolyticus. Bronch cultures (2/22) with Saccharomyces cerevisiae (no indication to treat per Dr. Ghosh) and C. Albicans  Actinomyces: Recipient and donor cultures. Amoxicillin 3/5-5/23    HSV: Recent seroconversion at time of transplant, with HSV also present on donor cultures. HSV+ bronch at time of transplant.   - Treatment dosing VACV x 14 days through 3/12  - Acv prophylaxis 3/13, technically finished on 3/23 but will finish out the remaining pills    Pleural Fluid/Sputum Ureaplasma: PCR + on sputum and pleural fluid cultures. Finishing doxycyline course 3/4-4/1 per Txp ID    Positive AFB on Sputum culture: + on 3/2. TxpID following for speciation and susceptibility testing. AFB sputum culture on 3/9 NGTD.   - AFB to be collected on all future bronchs    Persistent  Satiety: NM Gastric emptying study normal on 3/15.   - TID meals  - Scheduled simethicone and famotidine 20 mg bid     CAD: Nonobstructive  - ASA  - Rosuvastatin    PAF  HTN: Both present pretransplant   - Dilt  -Metop    GERD: Attempted deescalation while hospitalized with increase in symptoms and still having some issues with intermittent acid reflux.   - PPID BID and famotidine BID    BLE Edema:   - Furosemide 40 mg daily and 20 mg in the afternoon, will see if they     Post Op Pain:   - Scheduled tylenol  - Methocarbamol - decreasing   - Prn oxy    Osteoporosis: Has been on a bisphosphonate, but usually gets it monthly will be switching to yearly formulation once she is discharged to home.   - Overdue for repeat DEXA, can be done in the next few weeks          Maintenance:  Derm Exam: Due Annually  DEXA: 3/25/19 - osteoporosis, due 3/25/21        CHANGES TODAY  - increase lasix to 40 mg in am and 20 mg in afternoon, reevaluate Monday regarding coming back down on it     - VBG on Monday to make sure PCO2 isn't climbing again     - PT lymphedema referral    - Trial off gabapentin at night    - Methocarbamol TID x 2 days and if symptoms are still well controlled go down to bid     - Bronch next Tuesday- will need AFB added to cultures    I personally spent 60 minutes in documentation, the interview and exam, and review of the chart/labs/imaging on 3/25/22.             Nicole Knox MD  [facility]  [facility]   Pulmonary Transplant   Pre Transplant Coordinator: Franki Loyola  Ph: [phone]  Post Transplant Coordinator: Zora Neville  Fax: [phone]  Ph: [phone]

## 2022-03-24 ENCOUNTER — TELEPHONE (OUTPATIENT)
Dept: TRANSPLANT | Facility: CLINIC | Age: 67
End: 2022-03-24
Payer: COMMERCIAL

## 2022-03-25 ENCOUNTER — OFFICE VISIT (OUTPATIENT)
Dept: PULMONOLOGY | Facility: CLINIC | Age: 67
End: 2022-03-25
Attending: INTERNAL MEDICINE
Payer: MEDICARE

## 2022-03-25 ENCOUNTER — TELEPHONE (OUTPATIENT)
Dept: TRANSPLANT | Facility: CLINIC | Age: 67
End: 2022-03-25

## 2022-03-25 ENCOUNTER — LAB (OUTPATIENT)
Dept: LAB | Facility: CLINIC | Age: 67
End: 2022-03-25
Payer: COMMERCIAL

## 2022-03-25 ENCOUNTER — ANCILLARY PROCEDURE (OUTPATIENT)
Dept: GENERAL RADIOLOGY | Facility: CLINIC | Age: 67
End: 2022-03-25
Attending: PHYSICIAN ASSISTANT
Payer: COMMERCIAL

## 2022-03-25 VITALS
SYSTOLIC BLOOD PRESSURE: 142 MMHG | BODY MASS INDEX: 26.5 KG/M2 | HEIGHT: 62 IN | DIASTOLIC BLOOD PRESSURE: 104 MMHG | RESPIRATION RATE: 17 BRPM | WEIGHT: 144 LBS | OXYGEN SATURATION: 96 % | HEART RATE: 98 BPM

## 2022-03-25 DIAGNOSIS — Z94.2 LUNG REPLACED BY TRANSPLANT (H): ICD-10-CM

## 2022-03-25 DIAGNOSIS — Z11.59 ENCOUNTER FOR SCREENING FOR OTHER VIRAL DISEASES: ICD-10-CM

## 2022-03-25 DIAGNOSIS — I89.0 LYMPHEDEMA: Primary | ICD-10-CM

## 2022-03-25 DIAGNOSIS — Z11.59 ENCOUNTER FOR SCREENING FOR OTHER VIRAL DISEASES: Primary | ICD-10-CM

## 2022-03-25 LAB
ANION GAP SERPL CALCULATED.3IONS-SCNC: 4 MMOL/L (ref 3–14)
BUN SERPL-MCNC: 19 MG/DL (ref 7–30)
CALCIUM SERPL-MCNC: 8.9 MG/DL (ref 8.5–10.1)
CHLORIDE BLD-SCNC: 99 MMOL/L (ref 94–109)
CMV DNA SPEC NAA+PROBE-ACNC: NOT DETECTED IU/ML
CO2 SERPL-SCNC: 37 MMOL/L (ref 20–32)
CREAT SERPL-MCNC: 0.7 MG/DL (ref 0.52–1.04)
ERYTHROCYTE [DISTWIDTH] IN BLOOD BY AUTOMATED COUNT: 18 % (ref 10–15)
GFR SERPL CREATININE-BSD FRML MDRD: >90 ML/MIN/1.73M2
GLUCOSE BLD-MCNC: 127 MG/DL (ref 70–99)
HBV CORE AB SERPL QL IA: NONREACTIVE
HBV SURFACE AG SERPL QL IA: NONREACTIVE
HCT VFR BLD AUTO: 33.4 % (ref 35–47)
HCV AB SERPL QL IA: NONREACTIVE
HGB BLD-MCNC: 10.3 G/DL (ref 11.7–15.7)
HIV 1+2 AB+HIV1 P24 AG SERPL QL IA: NONREACTIVE
MAGNESIUM SERPL-MCNC: 1.6 MG/DL (ref 1.6–2.3)
MCH RBC QN AUTO: 31.2 PG (ref 26.5–33)
MCHC RBC AUTO-ENTMCNC: 30.8 G/DL (ref 31.5–36.5)
MCV RBC AUTO: 101 FL (ref 78–100)
PLATELET # BLD AUTO: 189 10E3/UL (ref 150–450)
POTASSIUM BLD-SCNC: 4 MMOL/L (ref 3.4–5.3)
RBC # BLD AUTO: 3.3 10E6/UL (ref 3.8–5.2)
SODIUM SERPL-SCNC: 140 MMOL/L (ref 133–144)
TACROLIMUS BLD-MCNC: 9.5 UG/L (ref 5–15)
TME LAST DOSE: NORMAL H
TME LAST DOSE: NORMAL H
WBC # BLD AUTO: 7.9 10E3/UL (ref 4–11)

## 2022-03-25 PROCEDURE — 86832 HLA CLASS I HIGH DEFIN QUAL: CPT | Performed by: INTERNAL MEDICINE

## 2022-03-25 PROCEDURE — 99215 OFFICE O/P EST HI 40 MIN: CPT | Mod: 24 | Performed by: INTERNAL MEDICINE

## 2022-03-25 PROCEDURE — 86803 HEPATITIS C AB TEST: CPT | Performed by: PHYSICIAN ASSISTANT

## 2022-03-25 PROCEDURE — 86833 HLA CLASS II HIGH DEFIN QUAL: CPT | Performed by: INTERNAL MEDICINE

## 2022-03-25 PROCEDURE — G0463 HOSPITAL OUTPT CLINIC VISIT: HCPCS | Mod: 25

## 2022-03-25 PROCEDURE — 87389 HIV-1 AG W/HIV-1&-2 AB AG IA: CPT | Performed by: PHYSICIAN ASSISTANT

## 2022-03-25 PROCEDURE — 85027 COMPLETE CBC AUTOMATED: CPT | Performed by: PATHOLOGY

## 2022-03-25 PROCEDURE — 86704 HEP B CORE ANTIBODY TOTAL: CPT | Performed by: PHYSICIAN ASSISTANT

## 2022-03-25 PROCEDURE — 36415 COLL VENOUS BLD VENIPUNCTURE: CPT | Performed by: PATHOLOGY

## 2022-03-25 PROCEDURE — 71046 X-RAY EXAM CHEST 2 VIEWS: CPT | Mod: GC | Performed by: RADIOLOGY

## 2022-03-25 PROCEDURE — 80048 BASIC METABOLIC PNL TOTAL CA: CPT | Performed by: PATHOLOGY

## 2022-03-25 PROCEDURE — 87340 HEPATITIS B SURFACE AG IA: CPT | Mod: GZ | Performed by: PHYSICIAN ASSISTANT

## 2022-03-25 PROCEDURE — 83735 ASSAY OF MAGNESIUM: CPT | Performed by: PATHOLOGY

## 2022-03-25 PROCEDURE — 80197 ASSAY OF TACROLIMUS: CPT | Performed by: PHYSICIAN ASSISTANT

## 2022-03-25 ASSESSMENT — PAIN SCALES - GENERAL: PAINLEVEL: NO PAIN (0)

## 2022-03-25 NOTE — PATIENT INSTRUCTIONS
Patient Instructions  1. Continue to hydrate with 60-70 oz fluids daily.  2. Continue to exercise daily or most days of the week.  3. Follow up with your primary care provider for annual gender health maintenance procedures.  4. Follow up with colonoscopy schedule.  5. Follow up with annual dermatology visits.  6. It doesn't seem like the COVID vaccine is working well in lung transplant patients. A number of lung transplant patients have gotten sick with COVID even after receiving the vaccines.  Based on our recent experience, it can be life-threatening to get COVID  even after being vaccinated. Please continue to act like you did not get the COVID vaccine - social distancing, wearing a mask, good hand hygiene, etc. If the people around you are vaccinated, it will help reduce the risk of you getting COVID. All members of your household should be vaccinated.  7. Continue pantoprazole (Protonix) 40mg before breakfast and before dinner.   8. We are going to increase your furosemide (Lasix) to 40mg on the morning and 20mg at 2pm.   9. Stop taking gabapentin at bedtime.   10. For the next 2 days:Take methocarbamol three times a day  Two days later, if the pain is managed, try taking methocarbamol two times a day (morning and evening).     Next transplant clinic appointment: Monday 3/28 with CXR, labs and PFTs before appointment with Dr. Feng  Next lab draw: Monday        ~~~~~~~~~~~~~~~~~~~~~~~~~    Thoracic Transplant Office phone [phone], fax [phone]    Office Hours 8:30 - 5:00     For after-hours urgent issues, please dial [phone], and ask to speak with the Thoracic Transplant Coordinator On-Call.  --------------------  To expedite your medication refill(s), please contact your pharmacy and have them fax a refill request to: [phone]  .   *Please allow 3 business days for routine medication refills.  *Please allow 5 business days for controlled substance medication refills.    **For Diabetic  medications and supplies refill(s), please contact your pharmacy and have them contact your Endocrine team.  --------------------  For scheduling appointments call [phone].  --------------------  Please Note: If you are active on [employer], all future test results will be sent by [employer] message only, and will no longer be called to patient. You may also receive communication directly from your physician.

## 2022-03-25 NOTE — LETTER
"  3/25/2022     RE: Melissa Elder  [address] [facility] [address]    Dear Colleague,    Thank you for referring your patient, Melissa Elder, to the [facility] [place]. Please see a copy of my visit note below.    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          Interval Histories:     March 23, 2022  Denies any sob at rest, does get a little \"winded\" while doing exertional activities. No low glucoses, BGs 113-157. Diarrhea has improved. Does have some tremors in the hands. No morning headaches. Not waking up at night short of breath. Pain is band like but only used oxycodone twice since being out of the hospital. Sleeping well. No nausea or vomiting. A little acid reflux here and there despite bid famotidine and protonix. Feels like appetite is okay, but doesn't usually finish most meals, has some early satiety. Doing two ensures a day.  Still has significant swelling in the lower extremities. Blood pressure running 135/72 in the morning.     Exercise  Walking 440 ft three times a day down the halls   Starting Pulm rehab next week    The patient was seen and examined by Nicole Knox MD     A complete ROS was otherwise negative except as noted in the HPI.           Medications:   Medications reviewed with patient and "   Current Outpatient Medications   Medication     acetaminophen (TYLENOL) 325 MG tablet     acyclovir (ZOVIRAX) 200 MG capsule     albuterol (PROAIR HFA/PROVENTIL HFA/VENTOLIN HFA) 108 (90 Base) MCG/ACT inhaler     Alcohol Swabs PADS     amoxicillin (AMOXIL) 500 MG capsule     aspirin 81 MG EC tablet     blood glucose (NO BRAND SPECIFIED) lancets standard     blood glucose (NO BRAND SPECIFIED) test strip     blood glucose monitoring (NO BRAND SPECIFIED) meter device kit     calcium carbonate 600 mg-vitamin D 400 units (CALTRATE) 600-400 MG-UNIT per tablet     carboxymethylcellulose PF (REFRESH PLUS) 0.5 % ophthalmic solution     dapsone (ACZONE) 25 MG tablet     diltiazem ER (TIAZAC) 240 MG 24 hr ER beaded capsule     doxycycline hyclate (VIBRAMYCIN) 100 MG capsule     famotidine (PEPCID) 20 MG tablet     fluticasone (FLONASE) 50 MCG/ACT nasal spray     furosemide (LASIX) 40 MG tablet     gabapentin (NEURONTIN) 100 MG capsule     glucagon 1 MG kit     glucose (BD GLUCOSE) 4 g chewable tablet     IBANdronate (BONIVA) 150 MG tablet     loperamide (IMODIUM) 2 MG capsule     loratadine (CLARITIN) 10 MG tablet     MAGNESIUM GLYCINATE PO     methocarbamol (ROBAXIN) 500 MG tablet     metoprolol tartrate (LOPRESSOR) 25 MG tablet     multivitamin w/minerals (THERA-VIT-M) tablet     mycophenolic acid (GENERIC EQUIVALENT) 360 MG EC tablet     nystatin (MYCOSTATIN) 633603 UNIT/ML suspension     oxyCODONE (ROXICODONE) 5 MG tablet     OXYGEN-HELIUM IN     pantoprazole (PROTONIX) 40 MG EC tablet     potassium chloride ER (KLOR-CON M) 20 MEQ CR tablet     predniSONE (DELTASONE) 5 MG tablet     psyllium (METAMUCIL/KONSYL) 58.6 % powder     rosuvastatin (CRESTOR) 5 MG tablet     Sharps Container MISC     tacrolimus (GENERIC EQUIVALENT) 0.5 MG capsule     tacrolimus (GENERIC EQUIVALENT) 1 MG capsule     No current facility-administered medications for this visit.     Facility-Administered Medications Ordered in Other Visits    Medication     sodium chloride (PF) 0.9% PF flush 10 mL            Allergies:     Allergies   Allergen Reactions     Alendronic Acid Other (See Comments)     dizziness  Other reaction(s): Dizziness     Nickel Rash     Sulfa Drugs Rash            Past Medical and Past Surgical History:     Past Medical History:   Diagnosis Date     Atrial fibrillation with RVR (H)     hx of A fib related to severe COPD, does not meet anticoagulation criteria as noted     COPD, severe (H)      Osteoporosis        Past Surgical History:   Procedure Laterality Date     BRONCHOSCOPY FLEXIBLE AND RIGID N/A 3/1/2022    Procedure: BRONCHOSCOPY INSPECTION;  Surgeon: Melissa Landin MD;  Location:  GI     CV CORONARY ANGIOGRAM N/A 3/27/2019    Procedure: CV CORONARY ANGIOGRAM;  Surgeon: Thierry Serrano MD;  Location:  HEART CARDIAC CATH LAB     CV RIGHT HEART CATH MEASUREMENTS RECORDED N/A 3/27/2019    Procedure: CV RIGHT HEART CATH;  Surgeon: Thierry Serrano MD;  Location:  HEART CARDIAC CATH LAB     ESOPHAGEAL IMPEDENCE FUNCTION TEST WITH 24 HOUR PH GREATER THAN 1 HOUR N/A 3/28/2019    Procedure: ESOPHAGEAL IMPEDENCE FUNCTION TEST WITH 24 HOUR PH GREATER THAN 1 HOUR;  Surgeon: Mike Henry MD;  Location:  GI     EXCISE PILONIDAL CYST, SIMPLE       IR CHEST TUBE PLACEMENT NON-TUNNELED RIGHT  3/3/2022     TONSILLECTOMY & ADENOIDECTOMY       TRANSPLANT LUNG RECIPIENT SINGLE X2 Bilateral 2/20/2022    Procedure: Bilateral Sequential Lung Transplantation, Clamshell Incision, Extracorporeal Membrane Oxgenation, Bronchoscopy, Cryoablation of Intercostal Nerves;  Surgeon: Raul Robin MD;  Location:  OR             Social History:     Social History     Socioeconomic History     Marital status:      Spouse name: Not on file     Number of children: Not on file     Years of education: Not on file     Highest education level: Not on file   Occupational History     Not on file   Tobacco Use     Smoking  "status: Former Smoker     Packs/day: 1.50     Years: 36.00     Pack years: 54.00     Types: Cigarettes     Quit date: 2006     Years since quittin.2     Smokeless tobacco: Never Used   Substance and Sexual Activity     Alcohol use: Yes     Comment: stated beer and wine occ     Drug use: Yes     Comment: stated hx of marijuana, quit in      Sexual activity: Not on file   Other Topics Concern     Parent/sibling w/ CABG, MI or angioplasty before 65F 55M? Not Asked   Social History Narrative     Not on file     Social Determinants of Health     Financial Resource Strain: Not on file   Food Insecurity: Not on file   Transportation Needs: Not on file   Physical Activity: Not on file   Stress: Not on file   Social Connections: Not on file   Intimate Partner Violence: Not on file   Housing Stability: Not on file     Social Updates:          Rejection and Infection History     Rejection Hx    DATE INDICATION  PATH BAL/MICRO TREATMENT                Infectious Hx           Exam:     BP (!) 142/104   Pulse 98   Resp 17   Ht 1.575 m (5' 2\")   Wt 65.3 kg (144 lb)   SpO2 96%   BMI 26.34 kg/m    Body mass index is 26.34 kg/m .    GENERAL APPEARANCE: Well developed, no apparent distress  EYES: PERRL, EOMI  HENT: Nasal mucosa with no edema and no hyperemia. No nasal polyps.  MOUTH: Oral mucosa is moist, without any lesions  RESP: normal percussion, good air flow throughout.  No crackles. No rhonchi. No wheezes.  CV: Normal S1, S2, regular rhythm, tachycardic. No murmur.  No rub. No gallop.+2 pedal and pretibial edema  Chest: Incision is clean and dry and intact without evidence of dehiscence, has some mild dampness under right submammary fold but incision without erythema   ABDOMEN:  Bowel sounds normal, soft, nontender, no HSM or masses.   MS: extremities normal. No clubbing. No cyanosis.  SKIN: no rash on limited exam  NEURO: Mentation intact, speech normal, mild tremor at rest  PSYCH: mentation appears normal. " and affect normal/bright          Data:     Results:  Recent Results (from the past 168 hour(s))   Glucose by meter    Collection Time: 03/16/22  5:02 PM   Result Value Ref Range    GLUCOSE BY METER POCT 134 (H) 70 - 99 mg/dL   Glucose by meter    Collection Time: 03/16/22  9:43 PM   Result Value Ref Range    GLUCOSE BY METER POCT 143 (H) 70 - 99 mg/dL   Phosphorus    Collection Time: 03/17/22  7:04 AM   Result Value Ref Range    Phosphorus 3.8 2.5 - 4.5 mg/dL   Comprehensive metabolic panel    Collection Time: 03/17/22  7:04 AM   Result Value Ref Range    Sodium 138 133 - 144 mmol/L    Potassium 4.3 3.4 - 5.3 mmol/L    Chloride 98 94 - 109 mmol/L    Carbon Dioxide (CO2) 34 (H) 20 - 32 mmol/L    Anion Gap 6 3 - 14 mmol/L    Urea Nitrogen 30 7 - 30 mg/dL    Creatinine 0.53 0.52 - 1.04 mg/dL    Calcium 8.8 8.5 - 10.1 mg/dL    Glucose 138 (H) 70 - 99 mg/dL    Alkaline Phosphatase 115 40 - 150 U/L    AST 12 0 - 45 U/L    ALT 23 0 - 50 U/L    Protein Total 5.7 (L) 6.8 - 8.8 g/dL    Albumin 2.7 (L) 3.4 - 5.0 g/dL    Bilirubin Total 1.0 0.2 - 1.3 mg/dL    GFR Estimate >90 >60 mL/min/1.73m2   Magnesium    Collection Time: 03/17/22  7:04 AM   Result Value Ref Range    Magnesium 1.6 1.6 - 2.3 mg/dL   Blood gas venous    Collection Time: 03/17/22  7:04 AM   Result Value Ref Range    pH Venous 7.43 7.32 - 7.43    pCO2 Venous 59 (H) 40 - 50 mm Hg    pO2 Venous 60 (H) 25 - 47 mm Hg    Bicarbonate Venous 39 (H) 21 - 28 mmol/L    Base Excess/Deficit (+/-) 13.1 (H) -7.7 - 1.9 mmol/L    FIO2 21    Tacrolimus by Tandem Mass Spectrometry    Collection Time: 03/17/22  7:04 AM   Result Value Ref Range    Tacrolimus by Tandem Mass Spectrometry 9.7 5.0 - 15.0 ug/L    Tacrolimus Last Dose Date      Tacrolimus Last Dose Time     CBC with platelets and differential    Collection Time: 03/17/22  7:04 AM   Result Value Ref Range    WBC Count 8.7 4.0 - 11.0 10e3/uL    RBC Count 2.89 (L) 3.80 - 5.20 10e6/uL    Hemoglobin 9.0 (L) 11.7 - 15.7 g/dL     Hematocrit 29.6 (L) 35.0 - 47.0 %     (H) 78 - 100 fL    MCH 31.1 26.5 - 33.0 pg    MCHC 30.4 (L) 31.5 - 36.5 g/dL    RDW 19.9 (H) 10.0 - 15.0 %    Platelet Count 348 150 - 450 10e3/uL    % Neutrophils 85 %    % Lymphocytes 8 %    % Monocytes 6 %    % Eosinophils 0 %    % Basophils 0 %    % Immature Granulocytes 1 %    NRBCs per 100 WBC 0 <1 /100    Absolute Neutrophils 7.3 1.6 - 8.3 10e3/uL    Absolute Lymphocytes 0.7 (L) 0.8 - 5.3 10e3/uL    Absolute Monocytes 0.6 0.0 - 1.3 10e3/uL    Absolute Eosinophils 0.0 0.0 - 0.7 10e3/uL    Absolute Basophils 0.0 0.0 - 0.2 10e3/uL    Absolute Immature Granulocytes 0.1 <=0.4 10e3/uL    Absolute NRBCs 0.0 10e3/uL   Glucose by meter    Collection Time: 03/17/22  7:35 AM   Result Value Ref Range    GLUCOSE BY METER POCT 178 (H) 70 - 99 mg/dL   Glucose by meter    Collection Time: 03/17/22 12:58 PM   Result Value Ref Range    GLUCOSE BY METER POCT 140 (H) 70 - 99 mg/dL   Tacrolimus by Tandem Mass Spectrometry    Collection Time: 03/21/22  7:20 AM   Result Value Ref Range    Tacrolimus by Tandem Mass Spectrometry 9.0 5.0 - 15.0 ug/L    Tacrolimus Last Dose Date 3/20/2022     Tacrolimus Last Dose Time  7:00 PM    CBC with platelets    Collection Time: 03/21/22  7:20 AM   Result Value Ref Range    WBC Count 7.7 4.0 - 11.0 10e3/uL    RBC Count 2.98 (L) 3.80 - 5.20 10e6/uL    Hemoglobin 9.2 (L) 11.7 - 15.7 g/dL    Hematocrit 30.0 (L) 35.0 - 47.0 %     (H) 78 - 100 fL    MCH 30.9 26.5 - 33.0 pg    MCHC 30.7 (L) 31.5 - 36.5 g/dL    RDW 18.8 (H) 10.0 - 15.0 %    Platelet Count 230 150 - 450 10e3/uL   Magnesium    Collection Time: 03/21/22  7:20 AM   Result Value Ref Range    Magnesium 1.7 1.6 - 2.3 mg/dL   Basic metabolic panel    Collection Time: 03/21/22  7:20 AM   Result Value Ref Range    Sodium 140 133 - 144 mmol/L    Potassium 4.4 3.4 - 5.3 mmol/L    Chloride 101 94 - 109 mmol/L    Carbon Dioxide (CO2) 34 (H) 20 - 32 mmol/L    Anion Gap 5 3 - 14 mmol/L    Urea  Nitrogen 23 7 - 30 mg/dL    Creatinine 0.55 0.52 - 1.04 mg/dL    Calcium 8.8 8.5 - 10.1 mg/dL    Glucose 122 (H) 70 - 99 mg/dL    GFR Estimate >90 >60 mL/min/1.73m2   Asymptomatic COVID-19 Virus (Coronavirus) by PCR Nose    Collection Time: 03/21/22  7:20 AM    Specimen: Nose; Swab   Result Value Ref Range    SARS CoV2 PCR Negative Negative, Testing sent to reference lab. Results will be returned via unsolicited result   CMV Quantitative, PCR (Blood)    Collection Time: 03/21/22  7:20 AM    Specimen: Hand, Right; Blood   Result Value Ref Range    CMV DNA IU/mL Not Detected Not Detected IU/mL   EBV DNA PCR Quantitative Whole Blood    Collection Time: 03/21/22  7:20 AM   Result Value Ref Range    EBV DNA Copies/mL 614 (H) <=0 copies/mL    EBV log 2.8    IgG    Collection Time: 03/21/22  7:20 AM   Result Value Ref Range    Immunoglobulin G 497 (L) 610-1,616 mg/dL   CMV Quantitative, PCR    Collection Time: 03/21/22  7:21 AM    Specimen: Hand, Right; Blood   Result Value Ref Range    CMV DNA IU/mL Not Detected Not Detected IU/mL   General PFT Lab (Please always keep checked)    Collection Time: 03/21/22  7:31 AM   Result Value Ref Range    FVC-Pred 2.77 L    FVC-Pre 1.10 L    FVC-%Pred-Pre 39 %    FEV1-Pre 1.04 L    FEV1-%Pred-Pre 47 %    FEV1FVC-Pred 79 %    FEV1FVC-Pre 94 %    FEFMax-Pred 5.63 L/sec    FEFMax-Pre 3.29 L/sec    FEFMax-%Pred-Pre 58 %    FEF2575-Pred 1.93 L/sec    FEF2575-Pre 1.99 L/sec    WQS2988-%Pred-Pre 102 %    ExpTime-Pre 4.90 sec    FIFMax-Pre 1.86 L/sec    FEV1FEV6-Pred 80 %    FEV1FEV6-Pre 94 %       Date Place TLC (%) FVC (%) FEV1 (%) FEV1/FVC DLCO (%) Note   3/21/22 [facility]   1.10 39 1.04 47 94                                                  Results as noted above.           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       PULMONARY    Transplant:       Allograft Function  Explant path with CPFE. Extubated POD 1. Initially with some diaphragmatic palsy with multiple chest tubes in place, which improved and resolved after chest tubes were removed. Bicarb is increasing slightly although has also been on lasix. CXR reviewed without any significant infiltrates, slight costophrenic angle blunting appears very stable. She has some exertional dyspnea which is likely deconditioning related.   - PFTs on Monday   - Starting pulm rehab next week  - Repeat VBG on Monday  - Bronch on Tuesday this coming week    Immunosuppression:  S/p induction therapy with basiliximab x 2 doses with high dose IV steroids  - Tacro 3.5 mg am/3mg pm, goal 8-12  - Myfortic 720 mg bid  - Prednisone 12.5 mg bid with taper per protocol    Prophylaxis:   PJP: Dapsone for PJP  CMV: D-/R-:    EBV: D /R, 614 copies, 2.8 Log 3/21/22  Fungal: Nystatin     ID:   Prior history of infection/colonization with Haemophilus influenzae. Donor cultures with P-S MSSA; ([facility]) with Strep mitis, Actinomyces odontolyticus, MSSA x2, and C. kruseii (concern for possible aspiration).  Recipient cultures at time of transplant with Actinomyces odonotolyticus. Bronch cultures (2/22) with Saccharomyces cerevisiae (no indication to treat per Dr. Ghosh) and C. Albicans  Actinomyces: Recipient and donor cultures. Amoxicillin 3/5-5/23    HSV: Recent seroconversion at time of transplant, with HSV also present on donor cultures. HSV+ bronch at time of transplant.   - Treatment dosing VACV x 14 days through 3/12  - Acv prophylaxis 3/13, technically finished on 3/23 but will finish out the remaining pills    Pleural Fluid/Sputum Ureaplasma: PCR + on sputum and pleural fluid cultures. Finishing  doxycyline course 3/4-4/1 per Txp ID    Positive AFB on Sputum culture: + on 3/2. TxpID following for speciation and susceptibility testing. AFB sputum culture on 3/9 NGTD.   - AFB to be collected on all future bronchs    Persistent Satiety: NM Gastric emptying study normal on 3/15.   - TID meals  - Scheduled simethicone and famotidine 20 mg bid     CAD: Nonobstructive  - ASA  - Rosuvastatin    PAF  HTN: Both present pretransplant   - Dilt  -Metop    GERD: Attempted deescalation while hospitalized with increase in symptoms and still having some issues with intermittent acid reflux.   - PPID BID and famotidine BID    BLE Edema:   - Furosemide 40 mg daily and 20 mg in the afternoon, will see if they     Post Op Pain:   - Scheduled tylenol  - Methocarbamol - decreasing   - Prn oxy    Osteoporosis: Has been on a bisphosphonate, but usually gets it monthly will be switching to yearly formulation once she is discharged to home.   - Overdue for repeat DEXA, can be done in the next few weeks    Maintenance:  Derm Exam: Due Annually  DEXA: 3/25/19 - osteoporosis, due 3/25/21    CHANGES TODAY  - increase lasix to 40 mg in am and 20 mg in afternoon, reevaluate Monday regarding coming back down on it     - VBG on Monday to make sure PCO2 isn't climbing again     - PT lymphedema referral    - Trial off gabapentin at night    - Methocarbamol TID x 2 days and if symptoms are still well controlled go down to bid     - Bronch next Tuesday- will need AFB added to cultures    I personally spent 60 minutes in documentation, the interview and exam, and review of the chart/labs/imaging on 3/25/22.       Nicole Knox MD  [facility]  [facility]   Pulmonary Transplant   Pre Transplant Coordinator: Franki Loyola  Ph: [phone]  Post Transplant Coordinator: Zora Neville  Fax: [phone]  Ph: [phone]    Transplant Coordinator Note    Reason for visit: Post lung  transplant follow up visit   Coordinator: Present   Caregiver:       Health concerns addressed today:  1. Respiratory - still gets short of breath with activity - improving. Cough to clear throat  2. GI: diarrhea better with fiber, switch Mg to [facility]'s [facility]. No nausea/vomiting. Takes Tums for occasional heartburn.   3.  Shaking in hands - just here and there.   4. No popping/clicking in sternum.   5.  says patient sleeps   6. Home /70s    Activity/rehab: up ad shelia - pulmonary rehab today  Oxygen needs: room air  Pain management/RX: occasional pain around chest/incisional area - taking oxycodone occasional (4 since discharge a week ago).   Diabetic management: managed by [facility] pharmacist and endocrine  Next Bronch due: due - will schedule for next Tuesday  Risk Criteria Labs: due.   CMV status: D-/R-  EBV status: D+/R+  PJP prophylactic: dapsone    COVID:  1. COVID-19 infection (yes/no, date of most recent positive test): no  2. Status/instructions given about COVID-19 vaccine:     Pt Education: medications (use/dose/side effects), how/when to call coordinator, frequency of labs, s/s of infection/rejection, call prior to starting any new medications, lab/vital sign book    Health Maintenance:     Last colonoscopy:     Next colonoscopy due:     Dermatology:    Vaccinations this visit:     Labs, CXR, PFTs reviewed with patient  Medication record reviewed and reconciled  Questions and concerns addressed    Patient Instructions  1. Continue to hydrate with 60-70 oz fluids daily.  2. Continue to exercise daily or most days of the week.  3. Follow up with your primary care provider for annual gender health maintenance procedures.  4. Follow up with colonoscopy schedule.  5. Follow up with annual dermatology visits.  6. It doesn't seem like the COVID vaccine is working well in lung transplant patients. A number of lung transplant patients have gotten sick with COVID even after receiving the vaccines.   Based on our recent experience, it can be life-threatening to get COVID  even after being vaccinated. Please continue to act like you did not get the COVID vaccine - social distancing, wearing a mask, good hand hygiene, etc. If the people around you are vaccinated, it will help reduce the risk of you getting COVID. All members of your household should be vaccinated.  7. Continue pantoprazole (Protonix) 40mg before breakfast and before dinner.   8. We are going to increase your furosemide (Lasix) to 40mg on the morning and 20mg at 2pm.   9. Stop taking gabapentin at bedtime.   10. For the next 2 days:Take methocarbamol three times a day  Two days later, if the pain is managed, try taking methocarbamol two times a day (morning and evening).     Next transplant clinic appointment: Monday 3/28 with CXR, labs and PFTs before appointment with Dr. Feng  Next lab draw: Monday    AVS printed at time of check out    Again, thank you for allowing me to participate in the care of your patient.      Sincerely,    Nicole Knox MD

## 2022-03-25 NOTE — TELEPHONE ENCOUNTER
LM with Tyrese that next Tuesday is full for bronchs so we have Melissa scheduled for 8am on Wednesday, 7am arrival. To call back if that doesn't work, otherwise will review instructions during Monday appointment.

## 2022-03-25 NOTE — PROGRESS NOTES
Transplant Coordinator Note    Reason for visit: Post lung transplant follow up visit   Coordinator: Present   Caregiver:       Health concerns addressed today:  1. Respiratory - still gets short of breath with activity - improving. Cough to clear throat  2. GI: diarrhea better with fiber, switch Mg to [facility]'s Best. No nausea/vomiting. Takes Tums for occasional heartburn.   3.  Shaking in hands - just here and there.   4. No popping/clicking in sternum.   5.  says patient sleeps   6. Home /70s    Activity/rehab: up ad [facility]ia - pulmonary rehab today  Oxygen needs: room air  Pain management/RX: occasional pain around chest/incisional area - taking oxycodone occasional (4 since discharge a week ago).   Diabetic management: managed by [facility] pharmacist and endocrine  Next Bronch due: due - will schedule for next Tuesday  Risk Criteria Labs: due.   CMV status: D-/R-  EBV status: D+/R+  PJP prophylactic: dapsone    COVID:  1. COVID-19 infection (yes/no, date of most recent positive test): no  2. Status/instructions given about COVID-19 vaccine:     Pt Education: medications (use/dose/side effects), how/when to call coordinator, frequency of labs, s/s of infection/rejection, call prior to starting any new medications, lab/vital sign book    Health Maintenance:     Last colonoscopy:     Next colonoscopy due:     Dermatology:    Vaccinations this visit:     Labs, CXR, PFTs reviewed with patient  Medication record reviewed and reconciled  Questions and concerns addressed    Patient Instructions  1. Continue to hydrate with 60-70 oz fluids daily.  2. Continue to exercise daily or most days of the week.  3. Follow up with your primary care provider for annual gender health maintenance procedures.  4. Follow up with colonoscopy schedule.  5. Follow up with annual dermatology visits.  6. It doesn't seem like the COVID vaccine is working well in lung transplant patients. A number of lung transplant patients have  gotten sick with COVID even after receiving the vaccines.  Based on our recent experience, it can be life-threatening to get COVID  even after being vaccinated. Please continue to act like you did not get the COVID vaccine - social distancing, wearing a mask, good hand hygiene, etc. If the people around you are vaccinated, it will help reduce the risk of you getting COVID. All members of your household should be vaccinated.  7. Continue pantoprazole (Protonix) 40mg before breakfast and before dinner.   8. We are going to increase your furosemide (Lasix) to 40mg on the morning and 20mg at 2pm.   9. Stop taking gabapentin at bedtime.   10. For the next 2 days:Take methocarbamol three times a day  Two days later, if the pain is managed, try taking methocarbamol two times a day (morning and evening).     Next transplant clinic appointment: Monday 3/28 with CXR, labs and PFTs before appointment with Dr. Feng  Next lab draw: Monday      AVS printed at time of check out

## 2022-03-27 NOTE — PROGRESS NOTES
"Transplant Coordinator Note    Reason for visit: Post lung transplant follow up visit   Coordinator: Present   Caregiver:  , Tyrese    Health concerns addressed today:  1. Respiratory: breathing comfortable at rest. A little cough - not new \"sinus thing\" clear sputum.   2. Up and walking, doing more.   3.  ENT: post nasal drip - had before transplant. Takes claritin. No ear pain/sore throat.   4. GI: appetite improving. No abdominal pain. Regular BMs - soft, not loose, taking fiber to help firm up stools.  5. A little chest discomfort - managed with tylenol.   6. AM BGs 110-130, rest of day 120 - 170  7. Home BPs. 120-130/70s.     Activity/rehab: pulmonary rehab starts tomorrow  Oxygen needs: room air, did not qualify for NOC O2 x 2. Check VBG today  Pain management/RX: pain managed with robaxin BID and prn tylenol. Have not needed oxycodone.   Diabetic management: managed by [facility] pharmacist and endocrine   Next Bronch due: tomorrow, 3/29  Risk Criteria Labs: negative 3/25  CMV status:D-/R-  EBV status: D+/R+  PJP prophylactic: dapsone    COVID:  1. COVID-19 infection (yes/no, date of most recent positive test):   2. Status/instructions given about COVID-19 vaccine:     Pt Education: medications (use/dose/side effects), how/when to call coordinator, frequency of labs, s/s of infection/rejection, call prior to starting any new medications, lab/vital sign book    Health Maintenance:     Last colonoscopy:     Next colonoscopy due:     Dermatology:    Vaccinations this visit:     Labs, CXR, PFTs reviewed with patient  Medication record reviewed and reconciled  Questions and concerns addressed    Patient Instructions  1. Continue to hydrate with 60-70 oz fluids daily.  2. Continue to exercise daily or most days of the week.  3. Follow up with your primary care provider for annual gender health maintenance procedures.  4. Follow up with colonoscopy schedule.  5. Follow up with annual dermatology visits.  6. It " doesn't seem like the COVID vaccine is working well in lung transplant patients. A number of lung transplant patients have gotten sick with COVID even after receiving the vaccines.  Based on our recent experience, it can be life-threatening to get COVID  even after being vaccinated. Please continue to act like you did not get the COVID vaccine - social distancing, wearing a mask, good hand hygiene, etc. If the people around you are vaccinated, it will help reduce the risk of you getting COVID. All members of your household should be vaccinated.  7. Take Magnesium one tablet once a day.   8. If you aren't having pain or muscle spasms, you can continue to taper down your robaxin every couple days.   9. Check your blood sugars twice a day - first thing in the morning and before supper.   10. You are set up for an IVIG infusion next Monday, 4/4, at 9AM. It will be a 5 hour appointment on the 2nd floor of the clinic.   11. Increase your lasix to 40mg in the AM and 40mg at 2pm.   12. Hold your baby aspirin starting today.   13. Take Claritin every day.     Next transplant clinic appointment:  with CXR, labs and PFTs  Next lab draw:       AVS printed at time of check out

## 2022-03-28 ENCOUNTER — OFFICE VISIT (OUTPATIENT)
Dept: TRANSPLANT | Facility: CLINIC | Age: 67
End: 2022-03-28
Attending: INTERNAL MEDICINE
Payer: MEDICARE

## 2022-03-28 ENCOUNTER — ANCILLARY PROCEDURE (OUTPATIENT)
Dept: GENERAL RADIOLOGY | Facility: CLINIC | Age: 67
End: 2022-03-28
Attending: INTERNAL MEDICINE
Payer: COMMERCIAL

## 2022-03-28 ENCOUNTER — TELEPHONE (OUTPATIENT)
Dept: TRANSPLANT | Facility: CLINIC | Age: 67
End: 2022-03-28

## 2022-03-28 ENCOUNTER — LAB (OUTPATIENT)
Dept: LAB | Facility: CLINIC | Age: 67
End: 2022-03-28
Attending: INTERNAL MEDICINE
Payer: COMMERCIAL

## 2022-03-28 VITALS
BODY MASS INDEX: 25.99 KG/M2 | HEART RATE: 101 BPM | TEMPERATURE: 97.9 F | SYSTOLIC BLOOD PRESSURE: 138 MMHG | OXYGEN SATURATION: 95 % | DIASTOLIC BLOOD PRESSURE: 83 MMHG | WEIGHT: 142.1 LBS

## 2022-03-28 DIAGNOSIS — D84.9 IMMUNOSUPPRESSED STATUS (H): ICD-10-CM

## 2022-03-28 DIAGNOSIS — D80.1 HYPOGAMMAGLOBULINEMIA (H): Primary | ICD-10-CM

## 2022-03-28 DIAGNOSIS — Z94.2 S/P LUNG TRANSPLANT (H): ICD-10-CM

## 2022-03-28 DIAGNOSIS — Z11.59 ENCOUNTER FOR SCREENING FOR OTHER VIRAL DISEASES: Primary | ICD-10-CM

## 2022-03-28 DIAGNOSIS — R73.9 STEROID-INDUCED HYPERGLYCEMIA: ICD-10-CM

## 2022-03-28 DIAGNOSIS — T38.0X5A STEROID-INDUCED HYPERGLYCEMIA: ICD-10-CM

## 2022-03-28 DIAGNOSIS — Z79.899 ENCOUNTER FOR LONG-TERM (CURRENT) USE OF HIGH-RISK MEDICATION: ICD-10-CM

## 2022-03-28 DIAGNOSIS — Z94.2 LUNG REPLACED BY TRANSPLANT (H): ICD-10-CM

## 2022-03-28 DIAGNOSIS — Z11.59 ENCOUNTER FOR SCREENING FOR OTHER VIRAL DISEASES: ICD-10-CM

## 2022-03-28 LAB
ANION GAP SERPL CALCULATED.3IONS-SCNC: 9 MMOL/L (ref 3–14)
BASE EXCESS BLDV CALC-SCNC: 10.7 MMOL/L (ref -7.7–1.9)
BUN SERPL-MCNC: 17 MG/DL (ref 7–30)
CALCIUM SERPL-MCNC: 8.8 MG/DL (ref 8.5–10.1)
CHLORIDE BLD-SCNC: 97 MMOL/L (ref 94–109)
CMV DNA SPEC NAA+PROBE-ACNC: NOT DETECTED IU/ML
CO2 SERPL-SCNC: 34 MMOL/L (ref 20–32)
CREAT SERPL-MCNC: 0.61 MG/DL (ref 0.52–1.04)
ERYTHROCYTE [DISTWIDTH] IN BLOOD BY AUTOMATED COUNT: 17.1 % (ref 10–15)
EXPTIME-PRE: 5.02 SEC
FEF2575-%PRED-PRE: 132 %
FEF2575-PRE: 2.57 L/SEC
FEF2575-PRED: 1.93 L/SEC
FEFMAX-%PRED-PRE: 70 %
FEFMAX-PRE: 3.97 L/SEC
FEFMAX-PRED: 5.63 L/SEC
FEV1-%PRED-PRE: 55 %
FEV1-PRE: 1.21 L
FEV1FEV6-PRE: 99 %
FEV1FEV6-PRED: 80 %
FEV1FVC-PRE: 99 %
FEV1FVC-PRED: 79 %
FIFMAX-PRE: 1.39 L/SEC
FVC-%PRED-PRE: 44 %
FVC-PRE: 1.22 L
FVC-PRED: 2.77 L
GFR SERPL CREATININE-BSD FRML MDRD: >90 ML/MIN/1.73M2
GLUCOSE BLD-MCNC: 138 MG/DL (ref 70–99)
HCO3 BLDV-SCNC: 37 MMOL/L (ref 21–28)
HCT VFR BLD AUTO: 33.2 % (ref 35–47)
HGB BLD-MCNC: 10.4 G/DL (ref 11.7–15.7)
INR PPP: 1 (ref 0.85–1.15)
MAGNESIUM SERPL-MCNC: 1.4 MG/DL (ref 1.6–2.3)
MCH RBC QN AUTO: 31.4 PG (ref 26.5–33)
MCHC RBC AUTO-ENTMCNC: 31.3 G/DL (ref 31.5–36.5)
MCV RBC AUTO: 100 FL (ref 78–100)
O2/TOTAL GAS SETTING VFR VENT: 21 %
PCO2 BLDV: 55 MM HG (ref 40–50)
PH BLDV: 7.43 [PH] (ref 7.32–7.43)
PLATELET # BLD AUTO: 213 10E3/UL (ref 150–450)
PO2 BLDV: 44 MM HG (ref 25–47)
POTASSIUM BLD-SCNC: 3.8 MMOL/L (ref 3.4–5.3)
RBC # BLD AUTO: 3.31 10E6/UL (ref 3.8–5.2)
SARS-COV-2 RNA RESP QL NAA+PROBE: NEGATIVE
SODIUM SERPL-SCNC: 140 MMOL/L (ref 133–144)
TACROLIMUS BLD-MCNC: 5.8 UG/L (ref 5–15)
TME LAST DOSE: NORMAL H
TME LAST DOSE: NORMAL H
WBC # BLD AUTO: 11.8 10E3/UL (ref 4–11)

## 2022-03-28 PROCEDURE — U0003 INFECTIOUS AGENT DETECTION BY NUCLEIC ACID (DNA OR RNA); SEVERE ACUTE RESPIRATORY SYNDROME CORONAVIRUS 2 (SARS-COV-2) (CORONAVIRUS DISEASE [COVID-19]), AMPLIFIED PROBE TECHNIQUE, MAKING USE OF HIGH THROUGHPUT TECHNOLOGIES AS DESCRIBED BY CMS-2020-01-R: HCPCS | Performed by: STUDENT IN AN ORGANIZED HEALTH CARE EDUCATION/TRAINING PROGRAM

## 2022-03-28 PROCEDURE — 80197 ASSAY OF TACROLIMUS: CPT | Performed by: INTERNAL MEDICINE

## 2022-03-28 PROCEDURE — 94375 RESPIRATORY FLOW VOLUME LOOP: CPT | Performed by: INTERNAL MEDICINE

## 2022-03-28 PROCEDURE — 82803 BLOOD GASES ANY COMBINATION: CPT | Performed by: PATHOLOGY

## 2022-03-28 PROCEDURE — 85610 PROTHROMBIN TIME: CPT | Performed by: PATHOLOGY

## 2022-03-28 PROCEDURE — G0463 HOSPITAL OUTPT CLINIC VISIT: HCPCS

## 2022-03-28 PROCEDURE — 36415 COLL VENOUS BLD VENIPUNCTURE: CPT | Performed by: PATHOLOGY

## 2022-03-28 PROCEDURE — 83735 ASSAY OF MAGNESIUM: CPT | Performed by: PATHOLOGY

## 2022-03-28 PROCEDURE — 80048 BASIC METABOLIC PNL TOTAL CA: CPT | Performed by: PATHOLOGY

## 2022-03-28 PROCEDURE — 71046 X-RAY EXAM CHEST 2 VIEWS: CPT | Performed by: RADIOLOGY

## 2022-03-28 PROCEDURE — 99215 OFFICE O/P EST HI 40 MIN: CPT | Mod: 24 | Performed by: INTERNAL MEDICINE

## 2022-03-28 PROCEDURE — 85027 COMPLETE CBC AUTOMATED: CPT | Performed by: PATHOLOGY

## 2022-03-28 RX ORDER — FUROSEMIDE 40 MG
40 TABLET ORAL DAILY
Qty: 30 TABLET | Refills: 0 | COMMUNITY
Start: 2022-03-28 | End: 2022-03-28

## 2022-03-28 RX ORDER — FUROSEMIDE 40 MG
40 TABLET ORAL 2 TIMES DAILY
Qty: 60 TABLET | Refills: 3 | Status: SHIPPED | OUTPATIENT
Start: 2022-03-28 | End: 2022-04-06

## 2022-03-28 RX ORDER — METHOCARBAMOL 500 MG/1
500 TABLET, FILM COATED ORAL 2 TIMES DAILY PRN
Qty: 120 TABLET | Refills: 0 | COMMUNITY
Start: 2022-03-28 | End: 2022-04-06

## 2022-03-28 RX ORDER — PREDNISONE 5 MG/1
10 TABLET ORAL 2 TIMES DAILY
Qty: 150 TABLET | Refills: 11 | COMMUNITY
Start: 2022-03-28 | End: 2022-05-23

## 2022-03-28 RX ORDER — FUROSEMIDE 40 MG
40 TABLET ORAL 2 TIMES DAILY
Qty: 60 TABLET | Refills: 3 | Status: SHIPPED | OUTPATIENT
Start: 2022-03-28 | End: 2022-03-28

## 2022-03-28 ASSESSMENT — PAIN SCALES - GENERAL: PAINLEVEL: NO PAIN (0)

## 2022-03-28 NOTE — TELEPHONE ENCOUNTER
Tacrolimus level 5.8 at 13.5 hours, on 2/28/2022.  Goal 8-12.   Current dose 3.5 mg in AM, 3 mg in PM    Level recheck Thursday, 3/31  [email or web address] message sent     Reviewed appointments next week and bronch day/time.     No questions at this time. Will call with questions, concerns, updates.

## 2022-03-28 NOTE — LETTER
"    3/28/2022         RE: Melissa Elder  [address] [facility] [address]        Dear Colleague,    Thank you for referring your patient, Melissa Elder, to the [facility]. Please see a copy of my visit note below.    Transplant Coordinator Note    Reason for visit: Post lung transplant follow up visit   Coordinator: Present   Caregiver:  , Tyrese    Health concerns addressed today:  1. Respiratory: breathing comfortable at rest. A little cough - not new \"sinus thing\" clear sputum.   2. Up and walking, doing more.   3.  ENT: post nasal drip - had before transplant. Takes claritin. No ear pain/sore throat.   4. GI: appetite improving. No abdominal pain. Regular BMs - soft, not loose, taking fiber to help firm up stools.  5. A little chest discomfort - managed with tylenol.   6. AM BGs 110-130, rest of day 120 - 170  7. Home BPs. 120-130/70s.     Activity/rehab: pulmonary rehab starts tomorrow  Oxygen needs: room air, did not qualify for NOC O2 x 2. Check VBG today  Pain management/RX: pain managed with robaxin BID and prn tylenol. Have not needed oxycodone.   Diabetic management: managed by [facility] pharmacist and endocrine   Next Bronch due: tomorrow, 3/29  Risk Criteria Labs: negative 3/25  CMV status:D-/R-  EBV status: D+/R+  PJP prophylactic: dapsone    COVID:  1. COVID-19 infection (yes/no, date of most recent positive test):   2. Status/instructions given about COVID-19 vaccine:     Pt Education: medications (use/dose/side effects), how/when to call coordinator, frequency of labs, s/s of infection/rejection, call prior to starting any new medications, lab/vital sign book    Health Maintenance:     Last colonoscopy:     Next colonoscopy due:     Dermatology:    Vaccinations this visit:     Labs, CXR, PFTs reviewed with patient  Medication record reviewed and reconciled  Questions and concerns addressed    Patient Instructions  1. Continue to hydrate with 60-70 oz fluids daily.  2. " Continue to exercise daily or most days of the week.  3. Follow up with your primary care provider for annual gender health maintenance procedures.  4. Follow up with colonoscopy schedule.  5. Follow up with annual dermatology visits.  6. It doesn't seem like the COVID vaccine is working well in lung transplant patients. A number of lung transplant patients have gotten sick with COVID even after receiving the vaccines.  Based on our recent experience, it can be life-threatening to get COVID  even after being vaccinated. Please continue to act like you did not get the COVID vaccine - social distancing, wearing a mask, good hand hygiene, etc. If the people around you are vaccinated, it will help reduce the risk of you getting COVID. All members of your household should be vaccinated.  7. Take Magnesium one tablet once a day.   8. If you aren't having pain or muscle spasms, you can continue to taper down your robaxin every couple days.   9. Check your blood sugars twice a day - first thing in the morning and before supper.   10. You are set up for an IVIG infusion next Monday, 4/4, at 9AM. It will be a 5 hour appointment on the 2nd floor of the clinic.   11. Increase your lasix to 40mg in the AM and 40mg at 2pm.   12. Hold your baby aspirin starting today.   13. Take Claritin every day.     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  Pretransplant history significant for hypertension, mild nonobstructive coronary artery disease, paroxysmal atrial fibrillation, osteoporosis, reflux and colonic polyps.    Pulmonary/lung transplant: The patient reports improving exercise.  Occasional cough attributed to postnasal drip (see below).  Oxygenation is adequate.  Chest x-ray, reviewed by me with no acute infiltrate and no significant change from previous.  PFTs with severe restrictive ventilatory defect, improved from previous, best since transplant.  The patient appears to be doing well from a pulmonary standpoint.  Tacrolimus will be adjusted to maintain a level of 8-12.  Patient was switched from CellCept to Myfortic due to diarrhea, continue current Myfortic.  Continue standard prednisone taper.  Initiate pulmonary rehab as planned.  Patient will undergo surveillance bronchoscopy following her visit next week.  Aspirin will be held in anticipation of the bronchoscopy.  Of note, the patient's required reversal of her sedation with her last bronchoscopy due to CO2 retention.  Sedation should be dosed cautiously.    Date DSA mfi DSA mfi Biopsy (date) Treatment   2/28/2022  none        3/25/2022  DQB5  3876  DQB6  2138                                  With positive DSA, consider C4d staining with upcoming surveillance bronchoscopy.      Environmental allergies, postnasal drip: Patient was encouraged to continue her Flonase and reinitiate daily Claritin.    Lower extremity edema: Gradually improving.  Still limiting activities.  Lasix will be increased to 40 mg twice daily.    Hypomagnesemia: Begin magnesium 400 mg daily.    Prophylaxis: Continue dapsone for P JUVENCIO.  Patient is CMV-/-so does not require Valcyte.  Continue CMV monitoring.  Continue EBV [place] monitoring.  Nystatin for thrush.    ID: Actinomyces in donor and recipient.  Continue amoxicillin  through 5/23.  AFB noted on 3/2 sputum fungal culture.  Identity and sensitivity are still pending.  AFB culture should be performed on all bronchoscopy specimens.    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  Treated with valacyclovir for 14 days followed by prophylaxis.    Pleural fluid/sputum Ureaplasma: Doxycycline completed on 4/1.    Reflux: Adequately controlled with famotidine and pantoprazole.    Coronary artery disease: Nonobstructive.  Continue aspirin and rosuvastatin.  Aspirin held for next week's bronchoscopy.    Paroxysmal atrial fibrillation: Continue diltiazem and metoprolol.    Osteoporosis: Previously on bisphosphonate.  Planning to switch to yearly formulation after discharge home.    Maintenance:  Derm Exam: Due Annually  DEXA: 3/25/19 - osteoporosis, due 3/25/21      Follow-up in 1 week with labs, x-ray, PFTs and surveillance bronchoscopy.    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  Pretransplant history significant for hypertension, mild nonobstructive coronary artery disease, paroxysmal atrial fibrillation, osteoporosis, reflux and colonic polyps.    The patient reports gradual improvement.  Breathing is comfortable at rest.  Dyspnea with moderate exertion.  Exercise tolerance improving.  Patient is on walking for exercise 4 times daily, 440 feet each time patient  starts pulmonary rehab tomorrow.  Occasional cough which is unchanged, attributed to postnasal drip, present pretransplant, relieved with Claritin and Flonase.  Minimal clear sputum.  Incisional pain controlled with Tylenol.  Patient has reduced her Robaxin use.  No recent fever, chills or night sweats.  Persistent swelling in the feet requiring the patient to purchase new shoes which has permitted her to exercise more consistently.    Review of systems:  Appetite is improving  Postnasal drip  Question of a stye in her right eye which is improving with warm packs  No nausea, vomiting, abdominal pain soft stool improved from previous.  Occasional bloating.  Occasional low back pain  Easy bruising  Morning glucose 113-124.  Daytime glucose of 150-170  Home blood pressure /70-80        A complete ROS was otherwise negative except as noted in the HPI.    Current Outpatient Medications   Medication     acetaminophen (TYLENOL) 325 MG tablet     albuterol (PROAIR HFA/PROVENTIL HFA/VENTOLIN HFA) 108 (90 Base) MCG/ACT inhaler     Alcohol Swabs PADS     amoxicillin (AMOXIL) 500 MG capsule     aspirin 81 MG EC tablet     blood glucose (NO BRAND SPECIFIED) lancets standard     blood glucose (NO BRAND SPECIFIED) test strip     blood glucose monitoring (NO BRAND SPECIFIED) meter device kit     calcium carbonate 600 mg-vitamin D 400 units (CALTRATE) 600-400 MG-UNIT per tablet     carboxymethylcellulose PF (REFRESH PLUS) 0.5 % ophthalmic solution     dapsone (ACZONE) 25 MG tablet     diltiazem ER (TIAZAC) 240 MG 24 hr ER beaded capsule     doxycycline hyclate (VIBRAMYCIN) 100 MG capsule     famotidine (PEPCID) 20 MG tablet     fluticasone (FLONASE) 50 MCG/ACT nasal spray     furosemide (LASIX) 40 MG tablet     glucagon 1 MG kit     glucose (BD GLUCOSE) 4 g chewable tablet     IBANdronate (BONIVA) 150 MG tablet     loperamide (IMODIUM) 2 MG capsule     loratadine (CLARITIN) 10 MG tablet     MAGNESIUM GLYCINATE PO      methocarbamol (ROBAXIN) 500 MG tablet     metoprolol tartrate (LOPRESSOR) 25 MG tablet     multivitamin w/minerals (THERA-VIT-M) tablet     mycophenolic acid (GENERIC EQUIVALENT) 360 MG EC tablet     nystatin (MYCOSTATIN) 587970 UNIT/ML suspension     oxyCODONE (ROXICODONE) 5 MG tablet     OXYGEN-HELIUM IN     pantoprazole (PROTONIX) 40 MG EC tablet     potassium chloride ER (KLOR-CON M) 20 MEQ CR tablet     predniSONE (DELTASONE) 5 MG tablet     psyllium (METAMUCIL/KONSYL) 58.6 % powder     rosuvastatin (CRESTOR) 5 MG tablet     Sharps Container MISC     tacrolimus (GENERIC EQUIVALENT) 0.5 MG capsule     tacrolimus (GENERIC EQUIVALENT) 1 MG capsule     No current facility-administered medications for this visit.     Facility-Administered Medications Ordered in Other Visits   Medication     sodium chloride (PF) 0.9% PF flush 10 mL     Allergies   Allergen Reactions     Alendronic Acid Other (See Comments)     dizziness  Other reaction(s): Dizziness     Nickel Rash     Sulfa Drugs Rash     Past Medical History:   Diagnosis Date     Atrial fibrillation with RVR (H)     hx of A fib related to severe COPD, does not meet anticoagulation criteria as noted     COPD, severe (H)      Osteoporosis        Past Surgical History:   Procedure Laterality Date     BRONCHOSCOPY FLEXIBLE AND RIGID N/A 3/1/2022    Procedure: BRONCHOSCOPY INSPECTION;  Surgeon: Melissa Landin MD;  Location: [facility]     CV CORONARY ANGIOGRAM N/A 3/27/2019    Procedure: CV CORONARY ANGIOGRAM;  Surgeon: Thierry Serrano MD;  Location:  HEART CARDIAC CATH LAB     CV RIGHT HEART CATH MEASUREMENTS RECORDED N/A 3/27/2019    Procedure: CV RIGHT HEART CATH;  Surgeon: Thierry Serrano MD;  Location:  HEART CARDIAC CATH LAB     ESOPHAGEAL IMPEDENCE FUNCTION TEST WITH 24 HOUR PH GREATER THAN 1 HOUR N/A 3/28/2019    Procedure: ESOPHAGEAL IMPEDENCE FUNCTION TEST WITH 24 HOUR PH GREATER THAN 1 HOUR;  Surgeon: Mike Henry MD;  Location:  GI      EXCISE PILONIDAL CYST, SIMPLE       IR CHEST TUBE PLACEMENT NON-TUNNELED RIGHT  3/3/2022     TONSILLECTOMY & ADENOIDECTOMY       TRANSPLANT LUNG RECIPIENT SINGLE X2 Bilateral 2022    Procedure: Bilateral Sequential Lung Transplantation, Clamshell Incision, Extracorporeal Membrane Oxgenation, Bronchoscopy, Cryoablation of Intercostal Nerves;  Surgeon: Raul Robin MD;  Location:  OR       Social History     Socioeconomic History     Marital status:      Spouse name: Not on file     Number of children: Not on file     Years of education: Not on file     Highest education level: Not on file   Occupational History     Not on file   Tobacco Use     Smoking status: Former Smoker     Packs/day: 1.50     Years: 36.00     Pack years: 54.00     Types: Cigarettes     Quit date: 2006     Years since quittin.2     Smokeless tobacco: Never Used   Substance and Sexual Activity     Alcohol use: Yes     Comment: stated beer and wine occ     Drug use: Yes     Comment: stated hx of marijuana, quit in      Sexual activity: Not on file   Other Topics Concern     Parent/sibling w/ CABG, MI or angioplasty before 65F 55M? Not Asked   Social History Narrative     Not on file     Social Determinants of Health     Financial Resource Strain: Not on file   Food Insecurity: Not on file   Transportation Needs: Not on file   Physical Activity: Not on file   Stress: Not on file   Social Connections: Not on file   Intimate Partner Violence: Not on file   Housing Stability: Not on file         /83   Pulse 101   Temp 97.9  F (36.6  C)   Wt 64.5 kg (142 lb 1.6 oz)   SpO2 95%   BMI 25.99 kg/m    Body mass index is 25.99 kg/m .  Exam:   GENERAL APPEARANCE: Well developed, well nourished, alert, and in no apparent distress.  EYES: PERRL, EOMI  HENT: Nasal mucosa with no edema and no hyperemia. No nasal polyps.  EARS: Canals clear, TMs normal  MOUTH: Oral mucosa is moist, without any lesions, no tonsillar  enlargement, no oropharyngeal exudate.  NECK: supple, no masses, no thyromegaly.  LYMPHATICS: No significant axillary, cervical, or supraclavicular nodes.  RESP: normal percussion, diminished airflow in the bases.  No crackles. No rhonchi. No wheezes.  CV: Normal S1, S2, regular rhythm, normal rate. No murmur.  No rub. No gallop. 1+ LE edema.   ABDOMEN:  Bowel sounds normal, soft, nontender, no HSM or masses.   MS: extremities normal. (+) clubbing. No cyanosis.  SKIN: no rash on limited exam  NEURO: Mentation intact, speech normal, normal strength and tone, normal gait and stance  PSYCH: mentation appears normal. and affect normal/bright  Results:  Recent Results (from the past 168 hour(s))   Hepatitis B surface antigen    Collection Time: 03/25/22  8:13 AM   Result Value Ref Range    Hepatitis B Surface Antigen Nonreactive Nonreactive   Hepatitis B core antibody    Collection Time: 03/25/22  8:13 AM   Result Value Ref Range    Hepatitis B Core Antibody Total Nonreactive Nonreactive   Hepatitis C antibody    Collection Time: 03/25/22  8:13 AM   Result Value Ref Range    Hepatitis C Antibody Nonreactive Nonreactive   HIV Antigen Antibody Combo    Collection Time: 03/25/22  8:13 AM   Result Value Ref Range    HIV Antigen Antibody Combo Nonreactive Nonreactive   Basic metabolic panel    Collection Time: 03/25/22  8:13 AM   Result Value Ref Range    Sodium 140 133 - 144 mmol/L    Potassium 4.0 3.4 - 5.3 mmol/L    Chloride 99 94 - 109 mmol/L    Carbon Dioxide (CO2) 37 (H) 20 - 32 mmol/L    Anion Gap 4 3 - 14 mmol/L    Urea Nitrogen 19 7 - 30 mg/dL    Creatinine 0.70 0.52 - 1.04 mg/dL    Calcium 8.9 8.5 - 10.1 mg/dL    Glucose 127 (H) 70 - 99 mg/dL    GFR Estimate >90 >60 mL/min/1.73m2   Magnesium    Collection Time: 03/25/22  8:13 AM   Result Value Ref Range    Magnesium 1.6 1.6 - 2.3 mg/dL   CBC with platelets    Collection Time: 03/25/22  8:13 AM   Result Value Ref Range    WBC Count 7.9 4.0 - 11.0 10e3/uL    RBC Count  3.30 (L) 3.80 - 5.20 10e6/uL    Hemoglobin 10.3 (L) 11.7 - 15.7 g/dL    Hematocrit 33.4 (L) 35.0 - 47.0 %     (H) 78 - 100 fL    MCH 31.2 26.5 - 33.0 pg    MCHC 30.8 (L) 31.5 - 36.5 g/dL    RDW 18.0 (H) 10.0 - 15.0 %    Platelet Count 189 150 - 450 10e3/uL   CMV DNA quantification    Collection Time: 03/25/22  8:13 AM    Specimen: Arm, Right; Blood   Result Value Ref Range    CMV DNA IU/mL Not Detected Not Detected IU/mL   Tacrolimus level    Collection Time: 03/25/22  8:13 AM   Result Value Ref Range    Tacrolimus by Tandem Mass Spectrometry 9.5 5.0 - 15.0 ug/L    Tacrolimus Last Dose Date 3/24/2022     Tacrolimus Last Dose Time  7:00 PM    Blood gas venous    Collection Time: 03/28/22  7:36 AM   Result Value Ref Range    pH Venous 7.43 7.32 - 7.43    pCO2 Venous 55 (H) 40 - 50 mm Hg    pO2 Venous 44 25 - 47 mm Hg    Bicarbonate Venous 37 (H) 21 - 28 mmol/L    Base Excess/Deficit (+/-) 10.7 (H) -7.7 - 1.9 mmol/L    FIO2 21    Basic metabolic panel    Collection Time: 03/28/22  7:36 AM   Result Value Ref Range    Sodium 140 133 - 144 mmol/L    Potassium 3.8 3.4 - 5.3 mmol/L    Chloride 97 94 - 109 mmol/L    Carbon Dioxide (CO2) 34 (H) 20 - 32 mmol/L    Anion Gap 9 3 - 14 mmol/L    Urea Nitrogen 17 7 - 30 mg/dL    Creatinine 0.61 0.52 - 1.04 mg/dL    Calcium 8.8 8.5 - 10.1 mg/dL    Glucose 138 (H) 70 - 99 mg/dL    GFR Estimate >90 >60 mL/min/1.73m2   Magnesium    Collection Time: 03/28/22  7:36 AM   Result Value Ref Range    Magnesium 1.4 (L) 1.6 - 2.3 mg/dL   CBC with platelets    Collection Time: 03/28/22  7:36 AM   Result Value Ref Range    WBC Count 11.8 (H) 4.0 - 11.0 10e3/uL    RBC Count 3.31 (L) 3.80 - 5.20 10e6/uL    Hemoglobin 10.4 (L) 11.7 - 15.7 g/dL    Hematocrit 33.2 (L) 35.0 - 47.0 %     78 - 100 fL    MCH 31.4 26.5 - 33.0 pg    MCHC 31.3 (L) 31.5 - 36.5 g/dL    RDW 17.1 (H) 10.0 - 15.0 %    Platelet Count 213 150 - 450 10e3/uL   INR    Collection Time: 03/28/22  7:36 AM   Result Value Ref  Range    INR 1.00 0.85 - 1.15   General PFT Lab (Please always keep checked)    Collection Time: 03/28/22  7:53 AM   Result Value Ref Range    FVC-Pred 2.77 L    FVC-Pre 1.22 L    FVC-%Pred-Pre 44 %    FEV1-Pre 1.21 L    FEV1-%Pred-Pre 55 %    FEV1FVC-Pred 79 %    FEV1FVC-Pre 99 %    FEFMax-Pred 5.63 L/sec    FEFMax-Pre 3.97 L/sec    FEFMax-%Pred-Pre 70 %    FEF2575-Pred 1.93 L/sec    FEF2575-Pre 2.57 L/sec    FNJ9450-%Pred-Pre 132 %    ExpTime-Pre 5.02 sec    FIFMax-Pre 1.39 L/sec    FEV1FEV6-Pred 80 %    FEV1FEV6-Pre 99 %                         Results as noted above.    PFT Interpretation:  Severe restrictive ventilatory defect.  Increased from previous.  Best since lung transplantation  Valid Maneuver    Again, thank you for allowing me to participate in the care of your patient.        Sincerely,        Carson Feng MD

## 2022-03-28 NOTE — PROGRESS NOTES
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  Pretransplant history significant for hypertension, mild nonobstructive coronary artery disease, paroxysmal atrial fibrillation, osteoporosis, reflux and colonic polyps.    Pulmonary/lung transplant: The patient reports improving exercise.  Occasional cough attributed to postnasal drip (see below).  Oxygenation is adequate.  Chest x-ray, reviewed by me with no acute infiltrate and no significant change from previous.  PFTs with severe restrictive ventilatory defect, improved from previous, best since transplant.  The patient appears to be doing well from a pulmonary standpoint.  Tacrolimus will be adjusted to maintain a level of 8-12.  Patient was switched from CellCept to Myfortic due to diarrhea, continue current Myfortic.  Continue standard prednisone taper.  Initiate pulmonary rehab as planned.  Patient will undergo surveillance bronchoscopy following her visit next week.  Aspirin will be held in anticipation of the bronchoscopy.  Of note, the patient's required reversal of her sedation with her last bronchoscopy due to CO2 retention.  Sedation should be dosed cautiously.    Date DSA mfi DSA mfi Biopsy (date) Treatment   2/28/2022  none        3/25/2022  DQB5  3876  DQB6  2138                                  With positive DSA, consider C4d staining with upcoming surveillance bronchoscopy.      Environmental  allergies, postnasal drip: Patient was encouraged to continue her Flonase and reinitiate daily Claritin.    Lower extremity edema: Gradually improving.  Still limiting activities.  Lasix will be increased to 40 mg twice daily.    Hypomagnesemia: Begin magnesium 400 mg daily.    Prophylaxis: Continue dapsone for P JUVENCIO.  Patient is CMV-/-so does not require Valcyte.  Continue CMV monitoring.  Continue EBV [place] monitoring.  Nystatin for thrush.    ID: Actinomyces in donor and recipient.  Continue amoxicillin through 5/23.  AFB noted on 3/2 sputum fungal culture.  Identity and sensitivity are still pending.  AFB culture should be performed on all bronchoscopy specimens.    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  Treated with valacyclovir for 14 days followed by prophylaxis.    Pleural fluid/sputum Ureaplasma: Doxycycline completed on 4/1.    Reflux: Adequately controlled with famotidine and pantoprazole.    Coronary artery disease: Nonobstructive.  Continue aspirin and rosuvastatin.  Aspirin held for next week's bronchoscopy.    Paroxysmal atrial fibrillation: Continue diltiazem and metoprolol.    Osteoporosis: Previously on bisphosphonate.  Planning to switch to yearly formulation after discharge home.    Maintenance:  Derm Exam: Due Annually  DEXA: 3/25/19 - osteoporosis, due 3/25/21      Follow-up in 1 week with labs, x-ray, PFTs and surveillance bronchoscopy.    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  Pretransplant history significant for hypertension, mild nonobstructive coronary artery disease, paroxysmal atrial fibrillation, osteoporosis, reflux and colonic polyps.    The patient reports gradual improvement.  Breathing is comfortable at rest.  Dyspnea with moderate exertion.  Exercise tolerance improving.  Patient is on walking for exercise 4 times daily, 440 feet each time patient starts pulmonary rehab tomorrow.  Occasional cough which is unchanged, attributed to postnasal drip, present pretransplant, relieved with Claritin and Flonase.  Minimal clear sputum.  Incisional pain controlled with Tylenol.  Patient has reduced her Robaxin use.  No recent fever, chills or night sweats.  Persistent swelling in the feet requiring the patient to purchase new shoes which has permitted her to exercise more consistently.    Review of systems:  Appetite is improving  Postnasal drip  Question of a stye in her right eye which is improving with warm packs  No nausea, vomiting, abdominal pain soft stool improved from previous.  Occasional bloating.  Occasional low back pain  Easy bruising  Morning glucose 113-124.  Daytime glucose of 150-170  Home blood pressure /70-80        A complete ROS was otherwise negative except as noted in the HPI.    Current Outpatient Medications   Medication     acetaminophen (TYLENOL) 325 MG tablet     albuterol (PROAIR HFA/PROVENTIL HFA/VENTOLIN HFA) 108 (90 Base) MCG/ACT inhaler     Alcohol Swabs PADS     amoxicillin (AMOXIL) 500 MG capsule     aspirin 81 MG EC tablet     blood glucose (NO BRAND SPECIFIED) lancets standard     blood glucose (NO BRAND SPECIFIED) test strip     blood glucose monitoring (NO BRAND SPECIFIED) meter device kit     calcium carbonate 600 mg-vitamin D 400 units (CALTRATE) 600-400 MG-UNIT per tablet     carboxymethylcellulose PF (REFRESH PLUS)  0.5 % ophthalmic solution     dapsone (ACZONE) 25 MG tablet     diltiazem ER (TIAZAC) 240 MG 24 hr ER beaded capsule     doxycycline hyclate (VIBRAMYCIN) 100 MG capsule     famotidine (PEPCID) 20 MG tablet     fluticasone (FLONASE) 50 MCG/ACT nasal spray     furosemide (LASIX) 40 MG tablet     glucagon 1 MG kit     glucose (BD GLUCOSE) 4 g chewable tablet     IBANdronate (BONIVA) 150 MG tablet     loperamide (IMODIUM) 2 MG capsule     loratadine (CLARITIN) 10 MG tablet     MAGNESIUM GLYCINATE PO     methocarbamol (ROBAXIN) 500 MG tablet     metoprolol tartrate (LOPRESSOR) 25 MG tablet     multivitamin w/minerals (THERA-VIT-M) tablet     mycophenolic acid (GENERIC EQUIVALENT) 360 MG EC tablet     nystatin (MYCOSTATIN) 028109 UNIT/ML suspension     oxyCODONE (ROXICODONE) 5 MG tablet     OXYGEN-HELIUM IN     pantoprazole (PROTONIX) 40 MG EC tablet     potassium chloride ER (KLOR-CON M) 20 MEQ CR tablet     predniSONE (DELTASONE) 5 MG tablet     psyllium (METAMUCIL/KONSYL) 58.6 % powder     rosuvastatin (CRESTOR) 5 MG tablet     Sharps Container MISC     tacrolimus (GENERIC EQUIVALENT) 0.5 MG capsule     tacrolimus (GENERIC EQUIVALENT) 1 MG capsule     No current facility-administered medications for this visit.     Facility-Administered Medications Ordered in Other Visits   Medication     sodium chloride (PF) 0.9% PF flush 10 mL     Allergies   Allergen Reactions     Alendronic Acid Other (See Comments)     dizziness  Other reaction(s): Dizziness     Nickel Rash     Sulfa Drugs Rash     Past Medical History:   Diagnosis Date     Atrial fibrillation with RVR (H)     hx of A fib related to severe COPD, does not meet anticoagulation criteria as noted     COPD, severe (H)      Osteoporosis        Past Surgical History:   Procedure Laterality Date     BRONCHOSCOPY FLEXIBLE AND RIGID N/A 3/1/2022    Procedure: BRONCHOSCOPY INSPECTION;  Surgeon: Melissa Landin MD;  Location:  GI     CV CORONARY ANGIOGRAM N/A 3/27/2019     Procedure: CV CORONARY ANGIOGRAM;  Surgeon: Thierry Serrano MD;  Location:  HEART CARDIAC CATH LAB     CV RIGHT HEART CATH MEASUREMENTS RECORDED N/A 3/27/2019    Procedure: CV RIGHT HEART CATH;  Surgeon: Thierry Serrano MD;  Location:  HEART CARDIAC CATH LAB     ESOPHAGEAL IMPEDENCE FUNCTION TEST WITH 24 HOUR PH GREATER THAN 1 HOUR N/A 3/28/2019    Procedure: ESOPHAGEAL IMPEDENCE FUNCTION TEST WITH 24 HOUR PH GREATER THAN 1 HOUR;  Surgeon: Mike Henry MD;  Location:  GI     EXCISE PILONIDAL CYST, SIMPLE       IR CHEST TUBE PLACEMENT NON-TUNNELED RIGHT  3/3/2022     TONSILLECTOMY & ADENOIDECTOMY       TRANSPLANT LUNG RECIPIENT SINGLE X2 Bilateral 2022    Procedure: Bilateral Sequential Lung Transplantation, Clamshell Incision, Extracorporeal Membrane Oxgenation, Bronchoscopy, Cryoablation of Intercostal Nerves;  Surgeon: Raul Robin MD;  Location:  OR       Social History     Socioeconomic History     Marital status:      Spouse name: Not on file     Number of children: Not on file     Years of education: Not on file     Highest education level: Not on file   Occupational History     Not on file   Tobacco Use     Smoking status: Former Smoker     Packs/day: 1.50     Years: 36.00     Pack years: 54.00     Types: Cigarettes     Quit date: 2006     Years since quittin.2     Smokeless tobacco: Never Used   Substance and Sexual Activity     Alcohol use: Yes     Comment: stated beer and wine occ     Drug use: Yes     Comment: stated hx of marijuana, quit in      Sexual activity: Not on file   Other Topics Concern     Parent/sibling w/ CABG, MI or angioplasty before 65F 55M? Not Asked   Social History Narrative     Not on file     Social Determinants of Health     Financial Resource Strain: Not on file   Food Insecurity: Not on file   Transportation Needs: Not on file   Physical Activity: Not on file   Stress: Not on file   Social Connections: Not on file    Intimate Partner Violence: Not on file   Housing Stability: Not on file         /83   Pulse 101   Temp 97.9  F (36.6  C)   Wt 64.5 kg (142 lb 1.6 oz)   SpO2 95%   BMI 25.99 kg/m    Body mass index is 25.99 kg/m .  Exam:   GENERAL APPEARANCE: Well developed, well nourished, alert, and in no apparent distress.  EYES: PERRL, EOMI  HENT: Nasal mucosa with no edema and no hyperemia. No nasal polyps.  EARS: Canals clear, TMs normal  MOUTH: Oral mucosa is moist, without any lesions, no tonsillar enlargement, no oropharyngeal exudate.  NECK: supple, no masses, no thyromegaly.  LYMPHATICS: No significant axillary, cervical, or supraclavicular nodes.  RESP: normal percussion, diminished airflow in the bases.  No crackles. No rhonchi. No wheezes.  CV: Normal S1, S2, regular rhythm, normal rate. No murmur.  No rub. No gallop. 1+ LE edema.   ABDOMEN:  Bowel sounds normal, soft, nontender, no HSM or masses.   MS: extremities normal. (+) clubbing. No cyanosis.  SKIN: no rash on limited exam  NEURO: Mentation intact, speech normal, normal strength and tone, normal gait and stance  PSYCH: mentation appears normal. and affect normal/bright  Results:  Recent Results (from the past 168 hour(s))   Hepatitis B surface antigen    Collection Time: 03/25/22  8:13 AM   Result Value Ref Range    Hepatitis B Surface Antigen Nonreactive Nonreactive   Hepatitis B core antibody    Collection Time: 03/25/22  8:13 AM   Result Value Ref Range    Hepatitis B Core Antibody Total Nonreactive Nonreactive   Hepatitis C antibody    Collection Time: 03/25/22  8:13 AM   Result Value Ref Range    Hepatitis C Antibody Nonreactive Nonreactive   HIV Antigen Antibody Combo    Collection Time: 03/25/22  8:13 AM   Result Value Ref Range    HIV Antigen Antibody Combo Nonreactive Nonreactive   Basic metabolic panel    Collection Time: 03/25/22  8:13 AM   Result Value Ref Range    Sodium 140 133 - 144 mmol/L    Potassium 4.0 3.4 - 5.3 mmol/L    Chloride  99 94 - 109 mmol/L    Carbon Dioxide (CO2) 37 (H) 20 - 32 mmol/L    Anion Gap 4 3 - 14 mmol/L    Urea Nitrogen 19 7 - 30 mg/dL    Creatinine 0.70 0.52 - 1.04 mg/dL    Calcium 8.9 8.5 - 10.1 mg/dL    Glucose 127 (H) 70 - 99 mg/dL    GFR Estimate >90 >60 mL/min/1.73m2   Magnesium    Collection Time: 03/25/22  8:13 AM   Result Value Ref Range    Magnesium 1.6 1.6 - 2.3 mg/dL   CBC with platelets    Collection Time: 03/25/22  8:13 AM   Result Value Ref Range    WBC Count 7.9 4.0 - 11.0 10e3/uL    RBC Count 3.30 (L) 3.80 - 5.20 10e6/uL    Hemoglobin 10.3 (L) 11.7 - 15.7 g/dL    Hematocrit 33.4 (L) 35.0 - 47.0 %     (H) 78 - 100 fL    MCH 31.2 26.5 - 33.0 pg    MCHC 30.8 (L) 31.5 - 36.5 g/dL    RDW 18.0 (H) 10.0 - 15.0 %    Platelet Count 189 150 - 450 10e3/uL   CMV DNA quantification    Collection Time: 03/25/22  8:13 AM    Specimen: Arm, Right; Blood   Result Value Ref Range    CMV DNA IU/mL Not Detected Not Detected IU/mL   Tacrolimus level    Collection Time: 03/25/22  8:13 AM   Result Value Ref Range    Tacrolimus by Tandem Mass Spectrometry 9.5 5.0 - 15.0 ug/L    Tacrolimus Last Dose Date 3/24/2022     Tacrolimus Last Dose Time  7:00 PM    Blood gas venous    Collection Time: 03/28/22  7:36 AM   Result Value Ref Range    pH Venous 7.43 7.32 - 7.43    pCO2 Venous 55 (H) 40 - 50 mm Hg    pO2 Venous 44 25 - 47 mm Hg    Bicarbonate Venous 37 (H) 21 - 28 mmol/L    Base Excess/Deficit (+/-) 10.7 (H) -7.7 - 1.9 mmol/L    FIO2 21    Basic metabolic panel    Collection Time: 03/28/22  7:36 AM   Result Value Ref Range    Sodium 140 133 - 144 mmol/L    Potassium 3.8 3.4 - 5.3 mmol/L    Chloride 97 94 - 109 mmol/L    Carbon Dioxide (CO2) 34 (H) 20 - 32 mmol/L    Anion Gap 9 3 - 14 mmol/L    Urea Nitrogen 17 7 - 30 mg/dL    Creatinine 0.61 0.52 - 1.04 mg/dL    Calcium 8.8 8.5 - 10.1 mg/dL    Glucose 138 (H) 70 - 99 mg/dL    GFR Estimate >90 >60 mL/min/1.73m2   Magnesium    Collection Time: 03/28/22  7:36 AM   Result Value  Ref Range    Magnesium 1.4 (L) 1.6 - 2.3 mg/dL   CBC with platelets    Collection Time: 03/28/22  7:36 AM   Result Value Ref Range    WBC Count 11.8 (H) 4.0 - 11.0 10e3/uL    RBC Count 3.31 (L) 3.80 - 5.20 10e6/uL    Hemoglobin 10.4 (L) 11.7 - 15.7 g/dL    Hematocrit 33.2 (L) 35.0 - 47.0 %     78 - 100 fL    MCH 31.4 26.5 - 33.0 pg    MCHC 31.3 (L) 31.5 - 36.5 g/dL    RDW 17.1 (H) 10.0 - 15.0 %    Platelet Count 213 150 - 450 10e3/uL   INR    Collection Time: 03/28/22  7:36 AM   Result Value Ref Range    INR 1.00 0.85 - 1.15   General PFT Lab (Please always keep checked)    Collection Time: 03/28/22  7:53 AM   Result Value Ref Range    FVC-Pred 2.77 L    FVC-Pre 1.22 L    FVC-%Pred-Pre 44 %    FEV1-Pre 1.21 L    FEV1-%Pred-Pre 55 %    FEV1FVC-Pred 79 %    FEV1FVC-Pre 99 %    FEFMax-Pred 5.63 L/sec    FEFMax-Pre 3.97 L/sec    FEFMax-%Pred-Pre 70 %    FEF2575-Pred 1.93 L/sec    FEF2575-Pre 2.57 L/sec    EVP5083-%Pred-Pre 132 %    ExpTime-Pre 5.02 sec    FIFMax-Pre 1.39 L/sec    FEV1FEV6-Pred 80 %    FEV1FEV6-Pre 99 %                         Results as noted above.    PFT Interpretation:  Severe restrictive ventilatory defect.  Increased from previous.  Best since lung transplantation  Valid Maneuver

## 2022-03-28 NOTE — PATIENT INSTRUCTIONS
Patient Instructions  1. Continue to hydrate with 60-70 oz fluids daily.  2. Continue to exercise daily or most days of the week.  3. Follow up with your primary care provider for annual gender health maintenance procedures.  4. Follow up with colonoscopy schedule.  5. Follow up with annual dermatology visits.  6. It doesn't seem like the COVID vaccine is working well in lung transplant patients. A number of lung transplant patients have gotten sick with COVID even after receiving the vaccines.  Based on our recent experience, it can be life-threatening to get COVID  even after being vaccinated. Please continue to act like you did not get the COVID vaccine - social distancing, wearing a mask, good hand hygiene, etc. If the people around you are vaccinated, it will help reduce the risk of you getting COVID. All members of your household should be vaccinated.  7. Take Magnesium one tablet once a day.   8. If you aren't having pain or muscle spasms, you can continue to taper down your robaxin every couple days.   9. Check your blood sugars twice a day - first thing in the morning and before supper.   10. You are set up for an IVIG infusion next Monday, 4/4, at 9AM. It will be a 5 hour appointment on the 2nd floor of the clinic.   11. Increase your lasix to 40mg in the AM and 40mg at 2pm.   12. Hold your baby aspirin starting today.   13. Take Claritin every day.     Next transplant clinic appointment: 1 week with CXR, labs and PFTs  Next lab draw: Friday          ~~~~~~~~~~~~~~~~~~~~~~~~~    Thoracic Transplant Office phone [phone], fax [phone]    Office Hours 8:30 - 5:00     For after-hours urgent issues, please dial [phone], and ask to speak with the Thoracic Transplant Coordinator On-Call.  --------------------  To expedite your medication refill(s), please contact your pharmacy and have them fax a refill request to: [phone]  .   *Please allow 3 business days for routine medication  refills.  *Please allow 5 business days for controlled substance medication refills.    **For Diabetic medications and supplies refill(s), please contact your pharmacy and have them contact your Endocrine team.  --------------------  For scheduling appointments call [phone].  --------------------  Please Note: If you are active on [employer], all future test results will be sent by [employer] message only, and will no longer be called to patient. You may also receive communication directly from your physician.

## 2022-03-28 NOTE — NURSING NOTE
Chief Complaint   Patient presents with     RECHECK     3 day follow up     Blood pressure 138/83, pulse 101, temperature 97.9  F (36.6  C), weight 64.5 kg (142 lb 1.6 oz), SpO2 95 %, not currently breastfeeding.    Gali Chang MA

## 2022-03-29 ENCOUNTER — HOSPITAL ENCOUNTER (OUTPATIENT)
Dept: CARDIAC REHAB | Facility: CLINIC | Age: 67
Discharge: HOME | End: 2022-03-29
Attending: INTERNAL MEDICINE
Payer: MEDICARE

## 2022-03-29 PROCEDURE — G0239 OTH RESP PROC, GROUP: HCPCS

## 2022-03-30 LAB — BACTERIA PLR CULT: NORMAL

## 2022-03-31 ENCOUNTER — LAB (OUTPATIENT)
Dept: LAB | Facility: CLINIC | Age: 67
End: 2022-03-31
Payer: COMMERCIAL

## 2022-03-31 DIAGNOSIS — Z79.899 ENCOUNTER FOR LONG-TERM (CURRENT) USE OF HIGH-RISK MEDICATION: ICD-10-CM

## 2022-03-31 DIAGNOSIS — Z94.2 LUNG REPLACED BY TRANSPLANT (H): ICD-10-CM

## 2022-03-31 DIAGNOSIS — D80.1 HYPOGAMMAGLOBULINEMIA (H): ICD-10-CM

## 2022-03-31 DIAGNOSIS — Z94.2 S/P LUNG TRANSPLANT (H): Primary | ICD-10-CM

## 2022-03-31 DIAGNOSIS — Z94.2 S/P LUNG TRANSPLANT (H): ICD-10-CM

## 2022-03-31 DIAGNOSIS — T38.0X5A STEROID-INDUCED HYPERGLYCEMIA: ICD-10-CM

## 2022-03-31 DIAGNOSIS — D84.9 IMMUNOSUPPRESSED STATUS (H): ICD-10-CM

## 2022-03-31 DIAGNOSIS — R73.9 STEROID-INDUCED HYPERGLYCEMIA: ICD-10-CM

## 2022-03-31 LAB
ANION GAP SERPL CALCULATED.3IONS-SCNC: 6 MMOL/L (ref 3–14)
BACTERIA PLR CULT: NO GROWTH
BACTERIA PLR CULT: NO GROWTH
BUN SERPL-MCNC: 26 MG/DL (ref 7–30)
CALCIUM SERPL-MCNC: 8.8 MG/DL (ref 8.5–10.1)
CHLORIDE BLD-SCNC: 96 MMOL/L (ref 94–109)
CMV DNA SPEC NAA+PROBE-ACNC: NOT DETECTED IU/ML
CO2 SERPL-SCNC: 38 MMOL/L (ref 20–32)
CREAT SERPL-MCNC: 0.74 MG/DL (ref 0.52–1.04)
DONOR IDENTIFICATION: NORMAL
DQB5: 3876
DQB6: 2138
DSA COMMENTS: NORMAL
DSA PRESENT: YES
DSA TEST METHOD: NORMAL
ERYTHROCYTE [DISTWIDTH] IN BLOOD BY AUTOMATED COUNT: 16.4 % (ref 10–15)
GFR SERPL CREATININE-BSD FRML MDRD: 89 ML/MIN/1.73M2
GLUCOSE BLD-MCNC: 129 MG/DL (ref 70–99)
HCT VFR BLD AUTO: 36.1 % (ref 35–47)
HGB BLD-MCNC: 11 G/DL (ref 11.7–15.7)
IGG SERPL-MCNC: 647 MG/DL (ref 610–1616)
INR PPP: 0.95 (ref 0.85–1.15)
MAGNESIUM SERPL-MCNC: 1.5 MG/DL (ref 1.6–2.3)
MCH RBC QN AUTO: 31 PG (ref 26.5–33)
MCHC RBC AUTO-ENTMCNC: 30.5 G/DL (ref 31.5–36.5)
MCV RBC AUTO: 102 FL (ref 78–100)
ORGAN: NORMAL
PLATELET # BLD AUTO: 266 10E3/UL (ref 150–450)
POTASSIUM BLD-SCNC: 4 MMOL/L (ref 3.4–5.3)
RBC # BLD AUTO: 3.55 10E6/UL (ref 3.8–5.2)
SA 1 CELL: NORMAL
SA 1 TEST METHOD: NORMAL
SA 2 CELL: NORMAL
SA 2 TEST METHOD: NORMAL
SA1 HI RISK ABY: NORMAL
SA1 MOD RISK ABY: NORMAL
SA2 HI RISK ABY: NORMAL
SA2 MOD RISK ABY: NORMAL
SODIUM SERPL-SCNC: 140 MMOL/L (ref 133–144)
TACROLIMUS BLD-MCNC: 10.8 UG/L (ref 5–15)
TME LAST DOSE: NORMAL H
TME LAST DOSE: NORMAL H
UNACCEPTABLE ANTIGENS: NORMAL
UNOS CPRA: 73
WBC # BLD AUTO: 13.6 10E3/UL (ref 4–11)
ZZZSA 1  COMMENTS: NORMAL
ZZZSA 2 COMMENTS: NORMAL

## 2022-03-31 PROCEDURE — 80048 BASIC METABOLIC PNL TOTAL CA: CPT | Performed by: PATHOLOGY

## 2022-03-31 PROCEDURE — 86832 HLA CLASS I HIGH DEFIN QUAL: CPT | Performed by: INTERNAL MEDICINE

## 2022-03-31 PROCEDURE — 80197 ASSAY OF TACROLIMUS: CPT | Performed by: INTERNAL MEDICINE

## 2022-03-31 PROCEDURE — 82784 ASSAY IGA/IGD/IGG/IGM EACH: CPT | Performed by: PHYSICIAN ASSISTANT

## 2022-03-31 PROCEDURE — 85610 PROTHROMBIN TIME: CPT | Performed by: PATHOLOGY

## 2022-03-31 PROCEDURE — 83735 ASSAY OF MAGNESIUM: CPT | Performed by: PATHOLOGY

## 2022-03-31 PROCEDURE — 36415 COLL VENOUS BLD VENIPUNCTURE: CPT | Performed by: PATHOLOGY

## 2022-03-31 PROCEDURE — 85027 COMPLETE CBC AUTOMATED: CPT | Performed by: PATHOLOGY

## 2022-03-31 PROCEDURE — 86833 HLA CLASS II HIGH DEFIN QUAL: CPT | Performed by: INTERNAL MEDICINE

## 2022-03-31 NOTE — RESULT ENCOUNTER NOTE
Tacrolimus level 10.8 at 12 hours, on 3/31/2022.  Goal 8-12.   Current dose 3 mg in AM, 3 mg in PM    Level at goal, no change in dose.   [employer] message sent

## 2022-04-01 ENCOUNTER — HOSPITAL ENCOUNTER (OUTPATIENT)
Dept: CARDIAC REHAB | Facility: CLINIC | Age: 67
Discharge: HOME | End: 2022-04-01
Attending: INTERNAL MEDICINE
Payer: MEDICARE

## 2022-04-01 DIAGNOSIS — D84.9 IMMUNOSUPPRESSED STATUS (H): ICD-10-CM

## 2022-04-01 DIAGNOSIS — Z94.2 S/P LUNG TRANSPLANT (H): Primary | ICD-10-CM

## 2022-04-01 PROCEDURE — G0239 OTH RESP PROC, GROUP: HCPCS

## 2022-04-03 LAB
DONOR IDENTIFICATION: NORMAL
DQB5: 4824
DQB6: 2794
DR15: 833
DSA COMMENTS: NORMAL
DSA PRESENT: YES
DSA TEST METHOD: NORMAL
ORGAN: NORMAL
SA 1 CELL: NORMAL
SA 1 TEST METHOD: NORMAL
SA 2 CELL: NORMAL
SA 2 TEST METHOD: NORMAL
SA1 HI RISK ABY: NORMAL
SA1 MOD RISK ABY: NORMAL
SA2 HI RISK ABY: NORMAL
SA2 MOD RISK ABY: NORMAL
UNACCEPTABLE ANTIGENS: NORMAL
UNOS CPRA: 73
ZZZSA 1  COMMENTS: NORMAL
ZZZSA 2 COMMENTS: NORMAL

## 2022-04-03 NOTE — PROGRESS NOTES
Transplant Coordinator Note    Reason for visit: Post lung transplant follow up visit   Coordinator: Present   Caregiver:  , Tyrese    Health concerns addressed today:  1. Respiratory - no new/unexpected shortness of breath. Occasional cough - dry, same as a week ago.   2. No chest racing/fluttering.   3.  GI: no nausea, vomiting. Get full/bloated if eat too much - at baseline. BM 1-2/day, soft and semi-formed   4. ENT:     Activity/rehab: pulmonary rehab  Oxygen needs: room air, did not qualify for NOC O2  Pain management/RX: no chest/incsional pain  Diabetic management: checking BGs BID  Next Bronch due: Bronch tomorrow, 4/7  Risk Criteria Labs:  Negative 3/25/22  CMV status: D-/R-  EBV status: D+/R+  AC/asa: on ASA 81mg - on hold since 3/30 for bronch  PJP prophylactic: dapsone    COVID:  1. COVID-19 infection (yes/no, date of most recent positive test):   2. Status/instructions given about COVID-19 vaccine:     Pt Education: medications (use/dose/side effects), how/when to call coordinator, frequency of labs, s/s of infection/rejection, call prior to starting any new medications, lab/vital sign book    Health Maintenance:     Last colonoscopy:     Next colonoscopy due:     Dermatology:    Vaccinations this visit:     Labs, CXR, PFTs reviewed with patient  Medication record reviewed and re/conciled  Questions and concerns addressed  /  Patient Instructions  1. Continue to hydrate with 60-70 oz fluids daily.  2. Continue to exercise daily or most days of the week.  3. Decrease furosemide (Lasix) to 20mg twice a day.    Next transplant clinic appointment: 1 with CXR, labs and PFTs  Next lab draw: pending tacrolimus level, otherwise in 1 week      AVS printed at time of check out      /

## 2022-04-04 ENCOUNTER — INFUSION THERAPY VISIT (OUTPATIENT)
Dept: INFUSION THERAPY | Facility: CLINIC | Age: 67
End: 2022-04-04
Attending: PHYSICIAN ASSISTANT
Payer: MEDICARE

## 2022-04-04 VITALS
OXYGEN SATURATION: 96 % | SYSTOLIC BLOOD PRESSURE: 150 MMHG | TEMPERATURE: 98.3 F | WEIGHT: 141.54 LBS | RESPIRATION RATE: 18 BRPM | DIASTOLIC BLOOD PRESSURE: 69 MMHG | BODY MASS INDEX: 25.89 KG/M2 | HEART RATE: 85 BPM

## 2022-04-04 DIAGNOSIS — D80.1 HYPOGAMMAGLOBULINEMIA (H): Primary | ICD-10-CM

## 2022-04-04 PROCEDURE — 250N000011 HC RX IP 250 OP 636: Performed by: PHYSICIAN ASSISTANT

## 2022-04-04 PROCEDURE — 96365 THER/PROPH/DIAG IV INF INIT: CPT

## 2022-04-04 PROCEDURE — 96366 THER/PROPH/DIAG IV INF ADDON: CPT

## 2022-04-04 PROCEDURE — 250N000013 HC RX MED GY IP 250 OP 250 PS 637: Performed by: PHYSICIAN ASSISTANT

## 2022-04-04 PROCEDURE — 999N000128 HC STATISTIC PERIPHERAL IV START W/O US GUIDANCE

## 2022-04-04 PROCEDURE — 96375 TX/PRO/DX INJ NEW DRUG ADDON: CPT

## 2022-04-04 RX ORDER — ACETAMINOPHEN 325 MG/1
650 TABLET ORAL ONCE
Status: CANCELLED
Start: 2022-04-04

## 2022-04-04 RX ORDER — ACETAMINOPHEN 325 MG/1
650 TABLET ORAL ONCE
Status: COMPLETED | OUTPATIENT
Start: 2022-04-04 | End: 2022-04-04

## 2022-04-04 RX ORDER — METHYLPREDNISOLONE SODIUM SUCCINATE 125 MG/2ML
125 INJECTION, POWDER, LYOPHILIZED, FOR SOLUTION INTRAMUSCULAR; INTRAVENOUS
Status: CANCELLED
Start: 2022-04-04

## 2022-04-04 RX ORDER — HEPARIN SODIUM (PORCINE) LOCK FLUSH IV SOLN 100 UNIT/ML 100 UNIT/ML
5 SOLUTION INTRAVENOUS
Status: CANCELLED | OUTPATIENT
Start: 2022-04-04

## 2022-04-04 RX ORDER — ALBUTEROL SULFATE 0.83 MG/ML
2.5 SOLUTION RESPIRATORY (INHALATION)
Status: CANCELLED | OUTPATIENT
Start: 2022-04-04

## 2022-04-04 RX ORDER — NALOXONE HYDROCHLORIDE 0.4 MG/ML
0.2 INJECTION, SOLUTION INTRAMUSCULAR; INTRAVENOUS; SUBCUTANEOUS
Status: CANCELLED | OUTPATIENT
Start: 2022-04-04

## 2022-04-04 RX ORDER — ALBUTEROL SULFATE 90 UG/1
1-2 AEROSOL, METERED RESPIRATORY (INHALATION)
Status: CANCELLED
Start: 2022-04-04

## 2022-04-04 RX ORDER — DIPHENHYDRAMINE HYDROCHLORIDE 50 MG/ML
50 INJECTION INTRAMUSCULAR; INTRAVENOUS
Status: CANCELLED
Start: 2022-04-04

## 2022-04-04 RX ORDER — HEPARIN SODIUM,PORCINE 10 UNIT/ML
5 VIAL (ML) INTRAVENOUS
Status: CANCELLED | OUTPATIENT
Start: 2022-04-04

## 2022-04-04 RX ORDER — DIPHENHYDRAMINE HCL 25 MG
50 CAPSULE ORAL ONCE
Status: COMPLETED | OUTPATIENT
Start: 2022-04-04 | End: 2022-04-04

## 2022-04-04 RX ORDER — EPINEPHRINE 1 MG/ML
0.3 INJECTION, SOLUTION INTRAMUSCULAR; SUBCUTANEOUS EVERY 5 MIN PRN
Status: CANCELLED | OUTPATIENT
Start: 2022-04-04

## 2022-04-04 RX ORDER — MEPERIDINE HYDROCHLORIDE 25 MG/ML
25 INJECTION INTRAMUSCULAR; INTRAVENOUS; SUBCUTANEOUS EVERY 30 MIN PRN
Status: CANCELLED | OUTPATIENT
Start: 2022-04-04

## 2022-04-04 RX ORDER — DIPHENHYDRAMINE HCL 25 MG
50 CAPSULE ORAL ONCE
Status: CANCELLED
Start: 2022-04-04

## 2022-04-04 RX ADMIN — HYDROCORTISONE SODIUM SUCCINATE 100 MG: 100 INJECTION, POWDER, FOR SOLUTION INTRAMUSCULAR; INTRAVENOUS at 09:31

## 2022-04-04 RX ADMIN — HUMAN IMMUNOGLOBULIN G 25 G: 20 LIQUID INTRAVENOUS at 10:17

## 2022-04-04 RX ADMIN — DIPHENHYDRAMINE HYDROCHLORIDE 50 MG: 25 CAPSULE ORAL at 09:26

## 2022-04-04 RX ADMIN — ACETAMINOPHEN 650 MG: 325 TABLET ORAL at 09:26

## 2022-04-04 NOTE — LETTER
4/4/2022         RE: Melissa Elder  [address]  [facility] [address]        Dear Colleague,    Thank you for referring your patient, Melissa Elder, to the [facility]. Please see a copy of my visit note below.    Nursing Note  Melissa Elder presents [facility] for:   Chief Complaint   Patient presents with     Infusion     IVIG     During today's [facility] appointment, orders from ROSLYN Olsen were completed.  Frequency: once; first dose.    Progress note:  Patient identification verified by name and date of birth.  Assessment completed.  Vitals recorded in Doc Flowsheets.  Patient was provided with education regarding medication/procedure and possible side effects.  Patient verbalized understanding.      present during visit today: Not Applicable.    Treatment Conditions: Patient denies fevers, chills, signs of infection, recent illness, new antibiotics use (chronic ABX use 2/2 transplant), productive cough, elevated temperature, or upcoming surgeries.    Premedications: administered per order.    Drug Waste Record: No    Infusion length and rate:  infusion given over approximately 2 hours  infusion starts at 0.5mL/kg/hr (32 ml/hr), then increased by 0.5mL/kg/hr (32 ml/hr) every 15 minutes to final rate of 4mL/kg/hr (256 ml/hr).    Labs: were not ordered for this appointment.    Vascular access: peripheral IV was placed by vascular access nurse.    Is the next appt scheduled? NA  Asymptomatic COVID test completed? no    Post Infusion Assessment:  Patient tolerated infusion without incident.     Discharge Plan:   Follow up plan of care with: ongoing infusions at [facility]., transplant coordinator., ordering provider as scheduled. and after visit summary given to patient  Discharge instructions were reviewed with patient.  Patient/representative  verbalized understanding of discharge instructions and all questions answered.  Patient discharged from [facility] in stable condition.    Moni Odonnell RN       Administrations This Visit     acetaminophen (TYLENOL) tablet 650 mg     Admin Date  04/04/2022 Action  Given Dose  650 mg Route  Oral Administered By  Moni Odonnell RN          diphenhydrAMINE (BENADRYL) capsule 50 mg     Admin Date  04/04/2022 Action  Given Dose  50 mg Route  Oral Administered By  Moni Odonnell RN          hydrocortisone sodium succinate PF (solu-CORTEF) injection 100 mg     Admin Date  04/04/2022 Action  Given Dose  100 mg Route  Intravenous Administered By  Moni Odonnell RN          immune globulin (Privigen) - sucrose free 10 % injection 25 g     Admin Date  04/04/2022 Action  New Bag Dose  25 g Route  Intravenous Administered By  Moni Odonnell RN                /55   Pulse 92   Temp 99  F (37.2  C) (Oral)   Resp 20   Wt 64.2 kg (141 lb 8.6 oz)   SpO2 93%   BMI 25.89 kg/m          Again, thank you for allowing me to participate in the care of your patient.        Sincerely,        [facility]

## 2022-04-04 NOTE — PATIENT INSTRUCTIONS
Dear Melissa Elder    Thank you for choosing [facility] ([facility]) for your IVIG infusion.  The following information is a summary of our appointment as well as important reminders.      Patient Education     Immune Globulin Solution for injection  What is this medicine?  IMMUNE GLOBULIN (im MUNE GLOB maria elena jamila) helps to prevent or reduce the severity of certain infections in patients who are at risk. This medicine is collected from the pooled blood of many donors. It is used to treat immune system problems, thrombocytopenia, and Kawasaki syndrome.  This medicine may be used for other purposes; ask your health care provider or pharmacist if you have questions.  What should I tell my health care provider before I take this medicine?  They need to know if you have any of these conditions:    diabetes    extremely low or no immune antibodies in the blood    heart disease    history of blood clots    hyperprolinemia    infection in the blood, sepsis    kidney disease    taking medicine that may change kidney function - ask your health care provider about your medicine    an unusual or allergic reaction to human immune globulin, albumin, maltose, sucrose, polysorbate 80, other medicines, foods, dyes, or preservatives    pregnant or trying to get pregnant    breast-feeding  How should I use this medicine?  This medicine is for injection into a muscle or infusion into a vein or skin. It is usually given by a health care professional in a hospital or clinic setting.  In rare cases, some brands of this medicine might be given at home. You will be taught how to give this medicine. Use exactly as directed. Take your medicine at regular intervals. Do not take your medicine more often than directed.  Talk to your pediatrician regarding the use of this medicine in children. Special care may be needed.  Overdosage: If you think you have taken too much of this medicine  contact a poison control center or emergency room at once.  NOTE: This medicine is only for you. Do not share this medicine with others.  What if I miss a dose?  It is important not to miss your dose. Call your doctor or health care professional if you are unable to keep an appointment. If you give yourself the medicine and you miss a dose, take it as soon as you can. If it is almost time for your next dose, take only that dose. Do not take double or extra doses.  What may interact with this medicine?    aspirin and aspirin-like medicines    cisplatin    cyclosporine    medicines for infection like acyclovir, adefovir, amphotericin B, bacitracin, cidofovir, foscarnet, ganciclovir, gentamicin, pentamidine, vancomycin    NSAIDS, medicines for pain and inflammation, like ibuprofen or naproxen    pamidronate    vaccines    zoledronic acid  This list may not describe all possible interactions. Give your health care provider a list of all the medicines, herbs, non-prescription drugs, or dietary supplements you use. Also tell them if you smoke, drink alcohol, or use illegal drugs. Some items may interact with your medicine.  What should I watch for while using this medicine?  Your condition will be monitored carefully while you are receiving this medicine.  This medicine is made from pooled blood donations of many different people. It may be possible to pass an infection in this medicine. However, the donors are screened for infections and all products are tested for HIV and hepatitis. The medicine is treated to kill most or all bacteria and viruses. Talk to your doctor about the risks and benefits of this medicine.  Do not have vaccinations for at least 14 days before, or until at least 3 months after receiving this medicine.  What side effects may I notice from receiving this medicine?  Side effects that you should report to your doctor or health care professional as soon as possible:    allergic reactions like skin  rash, itching or hives, swelling of the face, lips, or tongue    breathing problems    chest pain or tightness    fever, chills    headache with nausea, vomiting    neck pain or difficulty moving neck    pain when moving eyes    pain, swelling, warmth in the leg    problems with balance, talking, walking    sudden weight gain    swelling of the ankles, feet, hands    trouble passing urine or change in the amount of urine  Side effects that usually do not require medical attention (report to your doctor or health care professional if they continue or are bothersome):    dizzy, drowsy    flushing    increased sweating    leg cramps    muscle aches and pains    pain at site where injected  This list may not describe all possible side effects. Call your doctor for medical advice about side effects. You may report side effects to FDA at 9-323-SLT-9154.  Where should I keep my medicine?  Keep out of the reach of children.  This drug is usually given in a hospital or clinic and will not be stored at home.  In rare cases, some brands of this medicine may be given at home. If you are using this medicine at home, you will be instructed on how to store this medicine. Throw away any unused medicine after the expiration date on the label.  NOTE: This sheet is a summary. It may not cover all possible information. If you have questions about this medicine, talk to your doctor, pharmacist, or health care provider.  NOTE:This sheet is a summary. It may not cover all possible information. If you have questions about this medicine, talk to your doctor, pharmacist, or health care provider. Copyright  2016 Gold Standard             We look forward in seeing you on your next appointment here at [facility] ([facility]).  Please don t hesitate to call us at [phone] to reschedule any of your appointments or to speak with one of the [facility] registered nurses.  It was a pleasure taking care of you  today.    Sincerely,    [facility]  [address] [place]  [address]  Phone:  [phone]

## 2022-04-04 NOTE — PROGRESS NOTES
[facility]  April 6, 2022         Assessment and Plan:   Melissa Elder is a 66 year old female with h/o bilateral lung transplant on 2/21 for severe COPD for who is seen today for routine follow up. Surgery uncomplicated, s/p bilateral pigtail chest tube placement for hydropneumothorax and bilateral effusions,  disseminated Ureaplasma and transient hyperammonemia. Other history notable for HTN, mild non-obstructive CAD, paroxysmal afib, osteoporosis, GERD, and colonic polyps. She is scheduled for a surveillance bronch/BAL and biopsies (with C4D staining) tomorrow. Per prior notes, patient is very sensitive to sedation and has required Narcan in the past.     1. S/p bilateral sequential lung transplant:  Subluxation of sternum: no new pulmonary complaints, feels she is progressive with pulmonary rehab. Repeat sniff test 3/11 without evidence of diaphragmatic palsy. Sating 95% on room air. CMV 3/31 negative. VBG at last check with decreased CO2, still with elevated bicarb on labs. CXR reviewed demonstrates stable transplant. PFTs did not meet ATS guidelines, unchanged from prior.   - Consider sleep study as OP   - Continue IS including Myfortic 720 mg BID, tacrolimus (goal 8-12) and prednisone taper, decreases again 4/12  - Dapsone for PJP ppx  - Nystatin for oral candidiasis ppx, 6 month course  - CMV (D-/R-)  - Bronch tomorrow with C4D staining (messaged Dr. Haddad)    2. ID: Prior history of infection/colonization with Haemophilus influenzae. Donor cultures with P-S MSSA; ([facility]) with Strep mitis, Actinomyces odontolyticus, MSSA x2, and C. kruseii (concern for possible aspiration).  Recipient cultures at time of transplant with Actinomyces odonotolyticus. Bronch cultures (2/22) with Saccharomyces cerevisiae (no indication to treat per Dr. Ghosh) and C. Albicans. S/p ceftriazone 2/23-3/8. IgG of 647 on 3/31.    - Amoxicillin (3/8-5/23) X 3 months  for Actinomyces treatment  - Low threshold to treat Candida if it recurs per transplant ID    3. 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   - DSA today with HLA antibody C1q assay (discussed with lab supervisor)    4. Positive AFB on sputum culture: noted on sputum culture 3/2. Transplant ID following for speciation and susceptibility testing as well as other evidence of infection. AFB sputum culture 3/9 NGTD.  - AFB cultures on all future bronchs    5. PHS risk criteria donor: additional labs required post-transplant (between 4-8 weeks post-op): Hepatitis B, Hepatitis C, and HIV by COLT, negative on 3/25.     6. Concern for gastroparesis:  GERD: c/o early satiety and persistent fullness and bloating t/o the day. NM gastric emptying study completed 3/15 normal. Negative pH/manometry study 3/28/19, noted small hiatal hernia. Still feels bloating, but improved  - Famotidine 20 mg BID and pantoprazole 40 mg BID    7. CAD: noted to have mild non-obstructive CAD without hemodynamically significant lesions on cardiac cath 3/27/19.   - Continue aspirin (on hold currently) and rosuvastatin    8. Paroxysmal afib:  HTN: PTA diltiazem, not on AC. Persistent tachycardia post-op, controlled.  - Continue diltiazem and metoprolol  - Will place Zio patch next week    9. Hypomagnesemia: suppressed absorption d/t CNI. Mg of 1.2 today, suspect related to loss from her lasix.   - Will give 4 g IV X 1 today  - Continue Mg oxide and gluconate    10. BLE edema: improved with Anson hose.  - Decrease furosemide to 20 mg BID  - Encourage mobility and protein intake    11. Hyperglycemia: last AIC of 5.7 on 2/22. No longer having low BS, have been between 120-170s, one 200. Steroids will decrease again on 4/12. No need for Endocrine follow up.   - Continue to monitor BS     RTC: next week  Influenza and other vaccinations: Antonio at next visit  Annual dermatology  visit:    Sharlene Larios PA-C  Pulmonary, Allergy, Critical Care and Sleep Medicine        Interval History:     Since her last visit, Melissa is doing well. Pulmonary rehab is going well, doing the treadmill and the Nustep and has started some weights. No new shortness of breath, mild cough, mainly dry, not new and feels it's higher up on her airway. No fever or chills, incision pain is controlled. No other chest pain or palpitations. Did have some nausea this am, but that resolved. Otherwise no nausea or vomiting, will have some bloating with eating too much. Having 1-2 stools per day, semi formed.          Review of Systems:   Please see HPI, otherwise the complete 10 point ROS is negative.           Past Medical and Surgical History:     Past Medical History:   Diagnosis Date     Atrial fibrillation with RVR (H)     hx of A fib related to severe COPD, does not meet anticoagulation criteria as noted     COPD, severe (H)      Osteoporosis      Past Surgical History:   Procedure Laterality Date     BRONCHOSCOPY FLEXIBLE AND RIGID N/A 3/1/2022    Procedure: BRONCHOSCOPY INSPECTION;  Surgeon: Melissa Landin MD;  Location:  GI     CV CORONARY ANGIOGRAM N/A 3/27/2019    Procedure: CV CORONARY ANGIOGRAM;  Surgeon: Thierry Serrano MD;  Location:  HEART CARDIAC CATH LAB     CV RIGHT HEART CATH MEASUREMENTS RECORDED N/A 3/27/2019    Procedure: CV RIGHT HEART CATH;  Surgeon: Thierry Serrano MD;  Location:  HEART CARDIAC CATH LAB     ESOPHAGEAL IMPEDENCE FUNCTION TEST WITH 24 HOUR PH GREATER THAN 1 HOUR N/A 3/28/2019    Procedure: ESOPHAGEAL IMPEDENCE FUNCTION TEST WITH 24 HOUR PH GREATER THAN 1 HOUR;  Surgeon: Mike Henry MD;  Location:  GI     EXCISE PILONIDAL CYST, SIMPLE       IR CHEST TUBE PLACEMENT NON-TUNNELED RIGHT  3/3/2022     TONSILLECTOMY & ADENOIDECTOMY       TRANSPLANT LUNG RECIPIENT SINGLE X2 Bilateral 2/20/2022    Procedure: Bilateral Sequential Lung Transplantation, Clamshell Incision,  Extracorporeal Membrane Oxgenation, Bronchoscopy, Cryoablation of Intercostal Nerves;  Surgeon: Raul Robin MD;  Location: [facility] OR           Family History:     Family History   Problem Relation Age of Onset     Breast Cancer Mother      Colon Cancer Mother      Lung Cancer Mother      Lung Cancer Father      Lung Cancer Maternal Aunt      Pancreatic Cancer Maternal Uncle             Social History:     Social History     Socioeconomic History     Marital status:      Spouse name: Not on file     Number of children: Not on file     Years of education: Not on file     Highest education level: Not on file   Occupational History     Not on file   Tobacco Use     Smoking status: Former Smoker     Packs/day: 1.50     Years: 36.00     Pack years: 54.00     Types: Cigarettes     Quit date: 2006     Years since quittin.2     Smokeless tobacco: Never Used   Substance and Sexual Activity     Alcohol use: Yes     Comment: stated beer and wine occ     Drug use: Yes     Comment: stated hx of marijuana, quit in      Sexual activity: Not on file   Other Topics Concern     Parent/sibling w/ CABG, MI or angioplasty before 65F 55M? Not Asked   Social History Narrative     Not on file     Social Determinants of Health     Financial Resource Strain: Not on file   Food Insecurity: Not on file   Transportation Needs: Not on file   Physical Activity: Not on file   Stress: Not on file   Social Connections: Not on file   Intimate Partner Violence: Not on file   Housing Stability: Not on file            Medications:     Current Outpatient Medications   Medication     furosemide (LASIX) 40 MG tablet     loratadine (CLARITIN) 10 MG tablet     acetaminophen (TYLENOL) 325 MG tablet     albuterol (PROAIR HFA/PROVENTIL HFA/VENTOLIN HFA) 108 (90 Base) MCG/ACT inhaler     Alcohol Swabs PADS     amoxicillin (AMOXIL) 500 MG capsule     aspirin 81 MG EC tablet     blood glucose (NO BRAND SPECIFIED) lancets standard      "blood glucose (NO BRAND SPECIFIED) test strip     blood glucose monitoring (NO BRAND SPECIFIED) meter device kit     calcium carbonate 600 mg-vitamin D 400 units (CALTRATE) 600-400 MG-UNIT per tablet     carboxymethylcellulose PF (REFRESH PLUS) 0.5 % ophthalmic solution     dapsone (ACZONE) 25 MG tablet     diltiazem ER (TIAZAC) 240 MG 24 hr ER beaded capsule     famotidine (PEPCID) 20 MG tablet     fluticasone (FLONASE) 50 MCG/ACT nasal spray     glucagon 1 MG kit     glucose (BD GLUCOSE) 4 g chewable tablet     IBANdronate (BONIVA) 150 MG tablet     loperamide (IMODIUM) 2 MG capsule     MAGNESIUM GLYCINATE PO     metoprolol tartrate (LOPRESSOR) 25 MG tablet     multivitamin w/minerals (THERA-VIT-M) tablet     mycophenolic acid (GENERIC EQUIVALENT) 360 MG EC tablet     nystatin (MYCOSTATIN) 548307 UNIT/ML suspension     OXYGEN-HELIUM IN     pantoprazole (PROTONIX) 40 MG EC tablet     potassium chloride ER (KLOR-CON M) 20 MEQ CR tablet     predniSONE (DELTASONE) 5 MG tablet     rosuvastatin (CRESTOR) 5 MG tablet     Sharps Container MISC     tacrolimus (GENERIC EQUIVALENT) 0.5 MG capsule     tacrolimus (GENERIC EQUIVALENT) 1 MG capsule     No current facility-administered medications for this visit.     Facility-Administered Medications Ordered in Other Visits   Medication     magnesium sulfate 4 g in 100 mL sterile water (premade)     sodium chloride (PF) 0.9% PF flush 10 mL            Physical Exam:   /77   Pulse 99   Ht 1.575 m (5' 2\")   Wt 64.4 kg (142 lb)   SpO2 94%   BMI 25.97 kg/m      GENERAL: alert, NAD  HEENT: NCAT, EOMI, no scleral icterus, oral mucosa moist and without lesions  Neck: no cervical or supraclavicular adenopathy  Lungs: decreased BS, mainly clear with few scattered basilar crackles  CV: RRR, S1S2, no murmurs noted  Abdomen: normoactive BS, soft, non tender and no organomegaly  Lymph:2+ BLE  Neuro: AAO X 3, CN 2-12 grossly intact  Psychiatric: normal affect, good eye contact  Skin: " no rash, jaundice or lesions on limited exam         Data:   All laboratory and imaging data reviewed.      Recent Results (from the past 168 hour(s))   IgG    Collection Time: 03/31/22  8:21 AM   Result Value Ref Range    Immunoglobulin G 647 610-1,616 mg/dL   Basic metabolic panel    Collection Time: 03/31/22  8:21 AM   Result Value Ref Range    Sodium 140 133 - 144 mmol/L    Potassium 4.0 3.4 - 5.3 mmol/L    Chloride 96 94 - 109 mmol/L    Carbon Dioxide (CO2) 38 (H) 20 - 32 mmol/L    Anion Gap 6 3 - 14 mmol/L    Urea Nitrogen 26 7 - 30 mg/dL    Creatinine 0.74 0.52 - 1.04 mg/dL    Calcium 8.8 8.5 - 10.1 mg/dL    Glucose 129 (H) 70 - 99 mg/dL    GFR Estimate 89 >60 mL/min/1.73m2   Magnesium    Collection Time: 03/31/22  8:21 AM   Result Value Ref Range    Magnesium 1.5 (L) 1.6 - 2.3 mg/dL   CBC with platelets    Collection Time: 03/31/22  8:21 AM   Result Value Ref Range    WBC Count 13.6 (H) 4.0 - 11.0 10e3/uL    RBC Count 3.55 (L) 3.80 - 5.20 10e6/uL    Hemoglobin 11.0 (L) 11.7 - 15.7 g/dL    Hematocrit 36.1 35.0 - 47.0 %     (H) 78 - 100 fL    MCH 31.0 26.5 - 33.0 pg    MCHC 30.5 (L) 31.5 - 36.5 g/dL    RDW 16.4 (H) 10.0 - 15.0 %    Platelet Count 266 150 - 450 10e3/uL   INR    Collection Time: 03/31/22  8:21 AM   Result Value Ref Range    INR 0.95 0.85 - 1.15   Tacrolimus level    Collection Time: 03/31/22  8:21 AM   Result Value Ref Range    Tacrolimus by Tandem Mass Spectrometry 10.8 5.0 - 15.0 ug/L    Tacrolimus Last Dose Date      Tacrolimus Last Dose Time     CMV DNA quantification    Collection Time: 03/31/22  8:21 AM    Specimen: Arm, Left; Blood   Result Value Ref Range    CMV DNA IU/mL Not Detected Not Detected IU/mL   HLA Donor Specific Antibody    Collection Time: 03/31/22  8:21 AM   Result Value Ref Range    Donor Identification 02/21/2022     Organ Lung     DSA Present YES     DR15 833     DQB5 4824     DQB6 2876     DSA Comments        Flow Single Antigen Beads assays are intended for  detection/identification of IgG anti-HLA antibodies. Mfi values may not accurately quantify donor-specific antibody levels in all instances.    DSA Test Method [facility]    HLA Xiomara Class I, Single Antigen    Collection Time: 03/31/22  8:21 AM   Result Value Ref Range    SA 1 TEST METHOD [facility]     SA 1 CELL Class I     SA1 HI RISK XIOMARA None     SA1 MOD RISK XIOMARA None     SA 1  COMMENTS        Test performed by modified procedure. Serum heat inactivated and tested by a modified ([place]) protocol including fetal calf serum addition. High-risk, mfi >3,000. Mod-risk, mfi 500-3,000.   HLA Xiomara Class II, Single Antigen    Collection Time: 03/31/22  8:21 AM   Result Value Ref Range     2 TEST METHOD [facility] 2 CELL Class II     SA2 HI RISK XIOMARA DQ:5 6 8     SA2 MOD RISK XIOMARA DR:15DQ:4     SA 2 COMMENTS        Test performed by modified procedure. Serum heat inactivated and tested by a modified ([place]) protocol including fetal calf serum addition. High-risk, mfi >3,000. Mod-risk, mfi 500-3,000.   HLA Xiomara, CPRA    Collection Time: 03/31/22  8:21 AM   Result Value Ref Range    UNOS CPRA 73     UNACCEPTABLE ANTIGENS DQ:5 6 8     Basic metabolic panel    Collection Time: 04/06/22  7:13 AM   Result Value Ref Range    Sodium 136 133 - 144 mmol/L    Potassium 3.9 3.4 - 5.3 mmol/L    Chloride 96 94 - 109 mmol/L    Carbon Dioxide (CO2) 36 (H) 20 - 32 mmol/L    Anion Gap 4 3 - 14 mmol/L    Urea Nitrogen 22 7 - 30 mg/dL    Creatinine 0.64 0.52 - 1.04 mg/dL    Calcium 8.5 8.5 - 10.1 mg/dL    Glucose 154 (H) 70 - 99 mg/dL    GFR Estimate >90 >60 mL/min/1.73m2   Magnesium    Collection Time: 04/06/22  7:13 AM   Result Value Ref Range    Magnesium 1.2 (L) 1.6 - 2.3 mg/dL   CBC with platelets    Collection Time: 04/06/22  7:13 AM   Result Value Ref Range    WBC Count 11.6 (H) 4.0 - 11.0 10e3/uL    RBC Count 3.59 (L) 3.80 - 5.20 10e6/uL    Hemoglobin 11.1 (L) 11.7 - 15.7 g/dL    Hematocrit 34.5 (L) 35.0 - 47.0 %    MCV 96 78 - 100 fL    MCH  30.9 26.5 - 33.0 pg    MCHC 32.2 31.5 - 36.5 g/dL    RDW 15.4 (H) 10.0 - 15.0 %    Platelet Count 333 150 - 450 10e3/uL   INR    Collection Time: 04/06/22  7:13 AM   Result Value Ref Range    INR 1.00 0.85 - 1.15   Tacrolimus level    Collection Time: 04/06/22  7:13 AM   Result Value Ref Range    Tacrolimus by Tandem Mass Spectrometry 13.4 5.0 - 15.0 ug/L    Tacrolimus Last Dose Date 4/5/2022     Tacrolimus Last Dose Time  6:00 PM    General PFT Lab (Please always keep checked)    Collection Time: 04/06/22 11:47 AM   Result Value Ref Range    FVC-Pred 2.77 L    FVC-Pre 1.20 L    FVC-%Pred-Pre 43 %    FEV1-Pre 1.20 L    FEV1-%Pred-Pre 55 %    FEV1FVC-Pred 79 %    FEV1FVC-Pre 100 %    FEFMax-Pred 5.63 L/sec    FEFMax-Pre 4.08 L/sec    FEFMax-%Pred-Pre 72 %    FEF2575-Pred 1.93 L/sec    FEF2575-Pre 3.57 L/sec    QKJ7835-%Pred-Pre 184 %    ExpTime-Pre 5.60 sec    FIFMax-Pre 1.33 L/sec    FEV1FEV6-Pred 80 %    FEV1FEV6-Pre 100 %     PFT interpretation:  Maneuver: valid, but did not meet ATS guidelines

## 2022-04-04 NOTE — PROGRESS NOTES
Nursing Note  Melissa Elder [facility] for:   Chief Complaint   Patient presents with     Infusion     IVIG     During today's [facility] appointment, orders from ROSLYN Olsen were completed.  Frequency: once; first dose.    Progress note:  Patient identification verified by name and date of birth.  Assessment completed.  Vitals recorded in Doc Flowsheets.  Patient was provided with education regarding medication/procedure and possible side effects.  Patient verbalized understanding.      present during visit today: Not Applicable.    Treatment Conditions: Patient denies fevers, chills, signs of infection, recent illness, new antibiotics use (chronic ABX use 2/2 transplant), productive cough, elevated temperature, or upcoming surgeries.    Premedications: administered per order.    Drug Waste Record: No    Infusion length and rate:  infusion given over approximately 2 hours  infusion starts at 0.5mL/kg/hr (32 ml/hr), then increased by 0.5mL/kg/hr (32 ml/hr) every 15 minutes to final rate of 4mL/kg/hr (256 ml/hr).    Labs: were not ordered for this appointment.    Vascular access: peripheral IV was placed by vascular access nurse.    Is the next appt scheduled? NA  Asymptomatic COVID test completed? no    Post Infusion Assessment:  Patient tolerated infusion without incident.     Discharge Plan:   Follow up plan of care with: ongoing infusions at [facility]., transplant coordinator., ordering provider as scheduled. and after visit summary given to patient  Discharge instructions were reviewed with patient.  Patient/representative verbalized understanding of discharge instructions and all questions answered.  Patient discharged from [facility] in stable condition.    Moni Velasco RN       Administrations This Visit     acetaminophen (TYLENOL) tablet 650 mg     Admin  Date  04/04/2022 Action  Given Dose  650 mg Route  Oral Administered By  Moni Odonnell RN          diphenhydrAMINE (BENADRYL) capsule 50 mg     Admin Date  04/04/2022 Action  Given Dose  50 mg Route  Oral Administered By  Moni Odonnell RN          hydrocortisone sodium succinate PF (solu-CORTEF) injection 100 mg     Admin Date  04/04/2022 Action  Given Dose  100 mg Route  Intravenous Administered By  Moni Odonnell RN          immune globulin (Privigen) - sucrose free 10 % injection 25 g     Admin Date  04/04/2022 Action  New Bag Dose  25 g Route  Intravenous Administered By  Moni Odonnell RN                /55   Pulse 92   Temp 99  F (37.2  C) (Oral)   Resp 20   Wt 64.2 kg (141 lb 8.6 oz)   SpO2 93%   BMI 25.89 kg/m

## 2022-04-05 ENCOUNTER — HOSPITAL ENCOUNTER (OUTPATIENT)
Dept: CARDIAC REHAB | Facility: CLINIC | Age: 67
Discharge: HOME | End: 2022-04-05
Attending: INTERNAL MEDICINE
Payer: MEDICARE

## 2022-04-05 PROCEDURE — G0239 OTH RESP PROC, GROUP: HCPCS

## 2022-04-05 NOTE — RESULT ENCOUNTER NOTE
3/2 bronch positive for AFB - waiting for identification  Will request repeat AFB testing on 4/7 bronch.

## 2022-04-06 ENCOUNTER — INFUSION THERAPY VISIT (OUTPATIENT)
Dept: TRANSPLANT | Facility: CLINIC | Age: 67
End: 2022-04-06
Attending: PHYSICIAN ASSISTANT
Payer: MEDICARE

## 2022-04-06 ENCOUNTER — LAB (OUTPATIENT)
Dept: LAB | Facility: CLINIC | Age: 67
End: 2022-04-06
Payer: COMMERCIAL

## 2022-04-06 ENCOUNTER — OFFICE VISIT (OUTPATIENT)
Dept: PULMONOLOGY | Facility: CLINIC | Age: 67
End: 2022-04-06
Attending: PHYSICIAN ASSISTANT
Payer: MEDICARE

## 2022-04-06 ENCOUNTER — ANCILLARY PROCEDURE (OUTPATIENT)
Dept: GENERAL RADIOLOGY | Facility: CLINIC | Age: 67
End: 2022-04-06
Attending: INTERNAL MEDICINE
Payer: COMMERCIAL

## 2022-04-06 ENCOUNTER — TELEPHONE (OUTPATIENT)
Dept: TRANSPLANT | Facility: CLINIC | Age: 67
End: 2022-04-06

## 2022-04-06 VITALS
HEIGHT: 62 IN | HEART RATE: 99 BPM | DIASTOLIC BLOOD PRESSURE: 77 MMHG | BODY MASS INDEX: 26.13 KG/M2 | OXYGEN SATURATION: 94 % | WEIGHT: 142 LBS | SYSTOLIC BLOOD PRESSURE: 124 MMHG

## 2022-04-06 VITALS
TEMPERATURE: 98.7 F | DIASTOLIC BLOOD PRESSURE: 81 MMHG | SYSTOLIC BLOOD PRESSURE: 124 MMHG | RESPIRATION RATE: 18 BRPM | OXYGEN SATURATION: 95 % | HEART RATE: 98 BPM

## 2022-04-06 DIAGNOSIS — T38.0X5A STEROID-INDUCED HYPERGLYCEMIA: ICD-10-CM

## 2022-04-06 DIAGNOSIS — Z94.2 S/P LUNG TRANSPLANT (H): ICD-10-CM

## 2022-04-06 DIAGNOSIS — Z94.2 LUNG REPLACED BY TRANSPLANT (H): Primary | ICD-10-CM

## 2022-04-06 DIAGNOSIS — R73.9 STEROID-INDUCED HYPERGLYCEMIA: ICD-10-CM

## 2022-04-06 DIAGNOSIS — Z79.899 ENCOUNTER FOR LONG-TERM (CURRENT) USE OF HIGH-RISK MEDICATION: ICD-10-CM

## 2022-04-06 DIAGNOSIS — D84.9 IMMUNOSUPPRESSED STATUS (H): ICD-10-CM

## 2022-04-06 DIAGNOSIS — D80.1 HYPOGAMMAGLOBULINEMIA (H): ICD-10-CM

## 2022-04-06 DIAGNOSIS — D80.1 HYPOGAMMAGLOBULINEMIA (H): Primary | ICD-10-CM

## 2022-04-06 DIAGNOSIS — Z94.2 LUNG REPLACED BY TRANSPLANT (H): ICD-10-CM

## 2022-04-06 LAB
ANION GAP SERPL CALCULATED.3IONS-SCNC: 4 MMOL/L (ref 3–14)
BUN SERPL-MCNC: 22 MG/DL (ref 7–30)
CALCIUM SERPL-MCNC: 8.5 MG/DL (ref 8.5–10.1)
CHLORIDE BLD-SCNC: 96 MMOL/L (ref 94–109)
CMV DNA SPEC NAA+PROBE-ACNC: NOT DETECTED IU/ML
CO2 SERPL-SCNC: 36 MMOL/L (ref 20–32)
CREAT SERPL-MCNC: 0.64 MG/DL (ref 0.52–1.04)
ERYTHROCYTE [DISTWIDTH] IN BLOOD BY AUTOMATED COUNT: 15.4 % (ref 10–15)
EXPTIME-PRE: 5.6 SEC
FEF2575-%PRED-PRE: 184 %
FEF2575-PRE: 3.57 L/SEC
FEF2575-PRED: 1.93 L/SEC
FEFMAX-%PRED-PRE: 72 %
FEFMAX-PRE: 4.08 L/SEC
FEFMAX-PRED: 5.63 L/SEC
FEV1-%PRED-PRE: 55 %
FEV1-PRE: 1.2 L
FEV1FEV6-PRE: 100 %
FEV1FEV6-PRED: 80 %
FEV1FVC-PRE: 100 %
FEV1FVC-PRED: 79 %
FIFMAX-PRE: 1.33 L/SEC
FVC-%PRED-PRE: 43 %
FVC-PRE: 1.2 L
FVC-PRED: 2.77 L
GFR SERPL CREATININE-BSD FRML MDRD: >90 ML/MIN/1.73M2
GLUCOSE BLD-MCNC: 154 MG/DL (ref 70–99)
HCT VFR BLD AUTO: 34.5 % (ref 35–47)
HGB BLD-MCNC: 11.1 G/DL (ref 11.7–15.7)
INR PPP: 1 (ref 0.85–1.15)
MAGNESIUM SERPL-MCNC: 1.2 MG/DL (ref 1.6–2.3)
MCH RBC QN AUTO: 30.9 PG (ref 26.5–33)
MCHC RBC AUTO-ENTMCNC: 32.2 G/DL (ref 31.5–36.5)
MCV RBC AUTO: 96 FL (ref 78–100)
PLATELET # BLD AUTO: 333 10E3/UL (ref 150–450)
POTASSIUM BLD-SCNC: 3.9 MMOL/L (ref 3.4–5.3)
RBC # BLD AUTO: 3.59 10E6/UL (ref 3.8–5.2)
SODIUM SERPL-SCNC: 136 MMOL/L (ref 133–144)
TACROLIMUS BLD-MCNC: 13.4 UG/L (ref 5–15)
TME LAST DOSE: NORMAL H
TME LAST DOSE: NORMAL H
WBC # BLD AUTO: 11.6 10E3/UL (ref 4–11)

## 2022-04-06 PROCEDURE — G0463 HOSPITAL OUTPT CLINIC VISIT: HCPCS | Mod: 25

## 2022-04-06 PROCEDURE — 84999 UNLISTED CHEMISTRY PROCEDURE: CPT | Mod: XU | Performed by: PHYSICIAN ASSISTANT

## 2022-04-06 PROCEDURE — 250N000011 HC RX IP 250 OP 636: Mod: JZ | Performed by: PHYSICIAN ASSISTANT

## 2022-04-06 PROCEDURE — 36415 COLL VENOUS BLD VENIPUNCTURE: CPT

## 2022-04-06 PROCEDURE — 80197 ASSAY OF TACROLIMUS: CPT | Performed by: INTERNAL MEDICINE

## 2022-04-06 PROCEDURE — 85027 COMPLETE CBC AUTOMATED: CPT | Performed by: PATHOLOGY

## 2022-04-06 PROCEDURE — 94375 RESPIRATORY FLOW VOLUME LOOP: CPT | Performed by: PHYSICIAN ASSISTANT

## 2022-04-06 PROCEDURE — 86833 HLA CLASS II HIGH DEFIN QUAL: CPT | Mod: XU | Performed by: PHYSICIAN ASSISTANT

## 2022-04-06 PROCEDURE — 86832 HLA CLASS I HIGH DEFIN QUAL: CPT

## 2022-04-06 PROCEDURE — 99214 OFFICE O/P EST MOD 30 MIN: CPT | Mod: 24 | Performed by: PHYSICIAN ASSISTANT

## 2022-04-06 PROCEDURE — 99000 SPECIMEN HANDLING OFFICE-LAB: CPT | Performed by: PATHOLOGY

## 2022-04-06 PROCEDURE — 96365 THER/PROPH/DIAG IV INF INIT: CPT

## 2022-04-06 PROCEDURE — 80048 BASIC METABOLIC PNL TOTAL CA: CPT | Performed by: PATHOLOGY

## 2022-04-06 PROCEDURE — 86833 HLA CLASS II HIGH DEFIN QUAL: CPT

## 2022-04-06 PROCEDURE — 96366 THER/PROPH/DIAG IV INF ADDON: CPT

## 2022-04-06 PROCEDURE — 83735 ASSAY OF MAGNESIUM: CPT | Performed by: PATHOLOGY

## 2022-04-06 PROCEDURE — 36415 COLL VENOUS BLD VENIPUNCTURE: CPT | Performed by: PATHOLOGY

## 2022-04-06 PROCEDURE — 86832 HLA CLASS I HIGH DEFIN QUAL: CPT | Mod: XU | Performed by: PHYSICIAN ASSISTANT

## 2022-04-06 PROCEDURE — 71046 X-RAY EXAM CHEST 2 VIEWS: CPT | Performed by: RADIOLOGY

## 2022-04-06 PROCEDURE — 999N000127 HC STATISTIC PERIPHERAL IV START W US GUIDANCE

## 2022-04-06 PROCEDURE — 85610 PROTHROMBIN TIME: CPT | Performed by: PATHOLOGY

## 2022-04-06 RX ORDER — ALBUTEROL SULFATE 0.83 MG/ML
2.5 SOLUTION RESPIRATORY (INHALATION)
Status: CANCELLED | OUTPATIENT
Start: 2022-04-06

## 2022-04-06 RX ORDER — MAGNESIUM SULFATE HEPTAHYDRATE 40 MG/ML
4 INJECTION, SOLUTION INTRAVENOUS ONCE
Status: CANCELLED
Start: 2022-04-06 | End: 2022-04-06

## 2022-04-06 RX ORDER — METHYLPREDNISOLONE SODIUM SUCCINATE 125 MG/2ML
125 INJECTION, POWDER, LYOPHILIZED, FOR SOLUTION INTRAMUSCULAR; INTRAVENOUS
Status: CANCELLED
Start: 2022-04-06

## 2022-04-06 RX ORDER — EPINEPHRINE 1 MG/ML
0.3 INJECTION, SOLUTION, CONCENTRATE INTRAVENOUS EVERY 5 MIN PRN
Status: CANCELLED | OUTPATIENT
Start: 2022-04-06

## 2022-04-06 RX ORDER — NALOXONE HYDROCHLORIDE 0.4 MG/ML
0.2 INJECTION, SOLUTION INTRAMUSCULAR; INTRAVENOUS; SUBCUTANEOUS
Status: CANCELLED | OUTPATIENT
Start: 2022-04-06

## 2022-04-06 RX ORDER — ALBUTEROL SULFATE 90 UG/1
1-2 AEROSOL, METERED RESPIRATORY (INHALATION)
Status: CANCELLED
Start: 2022-04-06

## 2022-04-06 RX ORDER — EPINEPHRINE 1 MG/ML
0.3 INJECTION, SOLUTION INTRAMUSCULAR; SUBCUTANEOUS EVERY 5 MIN PRN
Status: CANCELLED | OUTPATIENT
Start: 2022-04-06

## 2022-04-06 RX ORDER — DIPHENHYDRAMINE HYDROCHLORIDE 50 MG/ML
50 INJECTION INTRAMUSCULAR; INTRAVENOUS
Status: CANCELLED
Start: 2022-04-06

## 2022-04-06 RX ORDER — LORATADINE 10 MG/1
10 TABLET ORAL DAILY
Qty: 30 TABLET | Refills: 3 | COMMUNITY
Start: 2022-04-06 | End: 2022-04-14

## 2022-04-06 RX ORDER — MAGNESIUM SULFATE HEPTAHYDRATE 40 MG/ML
4 INJECTION, SOLUTION INTRAVENOUS ONCE
Status: COMPLETED | OUTPATIENT
Start: 2022-04-06 | End: 2022-04-06

## 2022-04-06 RX ORDER — FUROSEMIDE 40 MG
20 TABLET ORAL 2 TIMES DAILY
Qty: 60 TABLET | Refills: 3 | COMMUNITY
Start: 2022-04-06 | End: 2022-04-14

## 2022-04-06 RX ORDER — MEPERIDINE HYDROCHLORIDE 25 MG/ML
25 INJECTION INTRAMUSCULAR; INTRAVENOUS; SUBCUTANEOUS EVERY 30 MIN PRN
Status: CANCELLED | OUTPATIENT
Start: 2022-04-06

## 2022-04-06 RX ADMIN — MAGNESIUM SULFATE HEPTAHYDRATE 4 G: 40 INJECTION, SOLUTION INTRAVENOUS at 14:34

## 2022-04-06 ASSESSMENT — PAIN SCALES - GENERAL
PAINLEVEL: NO PAIN (0)
PAINLEVEL: NO PAIN (0)

## 2022-04-06 NOTE — LETTER
4/6/2022         RE: Melissa Elder  [address]  [facility] [address]        Dear Colleague,    Thank you for referring your patient, Melissa Elder, to the [facility] FOR LUNG SCIENCE AND [facility] [place]. Please see a copy of my visit note below.    Transplant Coordinator Note    Reason for visit: Post lung transplant follow up visit   Coordinator: Present   Caregiver:  , Tyrese    Health concerns addressed today:  1. Respiratory - no new/unexpected shortness of breath. Occasional cough - dry, same as a week ago.   2. No chest racing/fluttering.   3.  GI: no nausea, vomiting. Get full/bloated if eat too much - at baseline. BM 1-2/day, soft and semi-formed   4. ENT:     Activity/rehab: pulmonary rehab  Oxygen needs: room air, did not qualify for NOC O2  Pain management/RX: no chest/incsional pain  Diabetic management: checking BGs BID  Next Bronch due: Bronch tomorrow, 4/7  Risk Criteria Labs:  Negative 3/25/22  CMV status: D-/R-  EBV status: D+/R+  AC/asa: on ASA 81mg - on hold since 3/30 for bronch  PJP prophylactic: dapsone    COVID:  1. COVID-19 infection (yes/no, date of most recent positive test):   2. Status/instructions given about COVID-19 vaccine:     Pt Education: medications (use/dose/side effects), how/when to call coordinator, frequency of labs, s/s of infection/rejection, call prior to starting any new medications, lab/vital sign book    Health Maintenance:     Last colonoscopy:     Next colonoscopy due:     Dermatology:    Vaccinations this visit:     Labs, CXR, PFTs reviewed with patient  Medication record reviewed and re/conciled  Questions and concerns addressed  /  Patient Instructions  1. Continue to hydrate with 60-70 oz fluids daily.  2. Continue to exercise daily or most days of the week.  3. Decrease furosemide (Lasix) to 20mg twice a day.    Next transplant clinic appointment: 1 with CXR, labs and PFTs  Next lab draw: pending tacrolimus level, otherwise  in 1 week      AVS printed at time of check out      /    [facility]  April 6, 2022         Assessment and Plan:   Melissa Elder is a 66 year old female with h/o bilateral lung transplant on 2/21 for severe COPD for who is seen today for routine follow up. Surgery uncomplicated, s/p bilateral pigtail chest tube placement for hydropneumothorax and bilateral effusions,  disseminated Ureaplasma and transient hyperammonemia. Other history notable for HTN, mild non-obstructive CAD, paroxysmal afib, osteoporosis, GERD, and colonic polyps. She is scheduled for a surveillance bronch/BAL and biopsies (with C4D staining) tomorrow. Per prior notes, patient is very sensitive to sedation and has required Narcan in the past.     1. S/p bilateral sequential lung transplant:  Subluxation of sternum: no new pulmonary complaints, feels she is progressive with pulmonary rehab. Repeat sniff test 3/11 without evidence of diaphragmatic palsy. Sating 95% on room air. CMV 3/31 negative. VBG at last check with decreased CO2, still with elevated bicarb on labs. CXR reviewed demonstrates stable transplant. PFTs did not meet ATS guidelines, unchanged from prior.   - Consider sleep study as OP   - Continue IS including Myfortic 720 mg BID, tacrolimus (goal 8-12) and prednisone taper, decreases again 4/12  - Dapsone for PJP ppx  - Nystatin for oral candidiasis ppx, 6 month course  - CMV (D-/R-)  - Bronch tomorrow with C4D staining (messaged Dr. Haddad)    2. ID: Prior history of infection/colonization with Haemophilus influenzae. Donor cultures with P-S MSSA; ([facility]) with Strep mitis, Actinomyces odontolyticus, MSSA x2, and C. kruseii (concern for possible aspiration).  Recipient cultures at time of transplant with Actinomyces odonotolyticus. Bronch cultures (2/22) with Saccharomyces cerevisiae (no indication to treat per Dr. Ghosh) and C. Albicans. S/p ceftriazone 2/23-3/8. IgG of 647  on 3/31.    - Amoxicillin (3/8-5/23) X 3 months for Actinomyces treatment  - Low threshold to treat Candida if it recurs per transplant ID    3. 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   - DSA today with HLA antibody C1q assay (discussed with lab supervisor)    4. Positive AFB on sputum culture: noted on sputum culture 3/2. Transplant ID following for speciation and susceptibility testing as well as other evidence of infection. AFB sputum culture 3/9 NGTD.  - AFB cultures on all future bronchs    5. PHS risk criteria donor: additional labs required post-transplant (between 4-8 weeks post-op): Hepatitis B, Hepatitis C, and HIV by COLT, negative on 3/25.     6. Concern for gastroparesis:  GERD: c/o early satiety and persistent fullness and bloating t/o the day. NM gastric emptying study completed 3/15 normal. Negative pH/manometry study 3/28/19, noted small hiatal hernia. Still feels bloating, but improved  - Famotidine 20 mg BID and pantoprazole 40 mg BID    7. CAD: noted to have mild non-obstructive CAD without hemodynamically significant lesions on cardiac cath 3/27/19.   - Continue aspirin (on hold currently) and rosuvastatin    8. Paroxysmal afib:  HTN: PTA diltiazem, not on AC. Persistent tachycardia post-op, controlled.  - Continue diltiazem and metoprolol  - Will place Zio patch next week    9. Hypomagnesemia: suppressed absorption d/t CNI. Mg of 1.2 today, suspect related to loss from her lasix.   - Will give 4 g IV X 1 today  - Continue Mg oxide and gluconate    10. BLE edema: improved with [place]son hose.  - Decrease furosemide to 20 mg BID  - Encourage mobility and protein intake    11. Hyperglycemia: last AIC of 5.7 on 2/22. No longer having low BS, have been between 120-170s, one 200. Steroids will decrease again on 4/12. No need for Endocrine follow up.   - Continue to monitor BS     RTC: next week  Influenza and other vaccinations:  Antonio at next visit  Annual dermatology visit:    Sharlene Larios PA-C  Pulmonary, Allergy, Critical Care and Sleep Medicine        Interval History:     Since her last visit, Melissa is doing well. Pulmonary rehab is going well, doing the treadmill and the Nustep and has started some weights. No new shortness of breath, mild cough, mainly dry, not new and feels it's higher up on her airway. No fever or chills, incision pain is controlled. No other chest pain or palpitations. Did have some nausea this am, but that resolved. Otherwise no nausea or vomiting, will have some bloating with eating too much. Having 1-2 stools per day, semi formed.          Review of Systems:   Please see HPI, otherwise the complete 10 point ROS is negative.           Past Medical and Surgical History:     Past Medical History:   Diagnosis Date     Atrial fibrillation with RVR (H)     hx of A fib related to severe COPD, does not meet anticoagulation criteria as noted     COPD, severe (H)      Osteoporosis      Past Surgical History:   Procedure Laterality Date     BRONCHOSCOPY FLEXIBLE AND RIGID N/A 3/1/2022    Procedure: BRONCHOSCOPY INSPECTION;  Surgeon: Melissa Landin MD;  Location:  GI     CV CORONARY ANGIOGRAM N/A 3/27/2019    Procedure: CV CORONARY ANGIOGRAM;  Surgeon: Thierry Serrano MD;  Location:  HEART CARDIAC CATH LAB     CV RIGHT HEART CATH MEASUREMENTS RECORDED N/A 3/27/2019    Procedure: CV RIGHT HEART CATH;  Surgeon: Thierry Serrano MD;  Location:  HEART CARDIAC CATH LAB     ESOPHAGEAL IMPEDENCE FUNCTION TEST WITH 24 HOUR PH GREATER THAN 1 HOUR N/A 3/28/2019    Procedure: ESOPHAGEAL IMPEDENCE FUNCTION TEST WITH 24 HOUR PH GREATER THAN 1 HOUR;  Surgeon: Mike Henry MD;  Location:  GI     EXCISE PILONIDAL CYST, SIMPLE       IR CHEST TUBE PLACEMENT NON-TUNNELED RIGHT  3/3/2022     TONSILLECTOMY & ADENOIDECTOMY       TRANSPLANT LUNG RECIPIENT SINGLE X2 Bilateral 2/20/2022    Procedure: Bilateral  Sequential Lung Transplantation, Clamshell Incision, Extracorporeal Membrane Oxgenation, Bronchoscopy, Cryoablation of Intercostal Nerves;  Surgeon: Raul Robin MD;  Location:  OR           Family History:     Family History   Problem Relation Age of Onset     Breast Cancer Mother      Colon Cancer Mother      Lung Cancer Mother      Lung Cancer Father      Lung Cancer Maternal Aunt      Pancreatic Cancer Maternal Uncle             Social History:     Social History     Socioeconomic History     Marital status:      Spouse name: Not on file     Number of children: Not on file     Years of education: Not on file     Highest education level: Not on file   Occupational History     Not on file   Tobacco Use     Smoking status: Former Smoker     Packs/day: 1.50     Years: 36.00     Pack years: 54.00     Types: Cigarettes     Quit date: 2006     Years since quittin.2     Smokeless tobacco: Never Used   Substance and Sexual Activity     Alcohol use: Yes     Comment: stated beer and wine occ     Drug use: Yes     Comment: stated hx of marijuana, quit in      Sexual activity: Not on file   Other Topics Concern     Parent/sibling w/ CABG, MI or angioplasty before 65F 55M? Not Asked   Social History Narrative     Not on file     Social Determinants of Health     Financial Resource Strain: Not on file   Food Insecurity: Not on file   Transportation Needs: Not on file   Physical Activity: Not on file   Stress: Not on file   Social Connections: Not on file   Intimate Partner Violence: Not on file   Housing Stability: Not on file            Medications:     Current Outpatient Medications   Medication     furosemide (LASIX) 40 MG tablet     loratadine (CLARITIN) 10 MG tablet     acetaminophen (TYLENOL) 325 MG tablet     albuterol (PROAIR HFA/PROVENTIL HFA/VENTOLIN HFA) 108 (90 Base) MCG/ACT inhaler     Alcohol Swabs PADS     amoxicillin (AMOXIL) 500 MG capsule     aspirin 81 MG EC tablet      "blood glucose (NO BRAND SPECIFIED) lancets standard     blood glucose (NO BRAND SPECIFIED) test strip     blood glucose monitoring (NO BRAND SPECIFIED) meter device kit     calcium carbonate 600 mg-vitamin D 400 units (CALTRATE) 600-400 MG-UNIT per tablet     carboxymethylcellulose PF (REFRESH PLUS) 0.5 % ophthalmic solution     dapsone (ACZONE) 25 MG tablet     diltiazem ER (TIAZAC) 240 MG 24 hr ER beaded capsule     famotidine (PEPCID) 20 MG tablet     fluticasone (FLONASE) 50 MCG/ACT nasal spray     glucagon 1 MG kit     glucose (BD GLUCOSE) 4 g chewable tablet     IBANdronate (BONIVA) 150 MG tablet     loperamide (IMODIUM) 2 MG capsule     MAGNESIUM GLYCINATE PO     metoprolol tartrate (LOPRESSOR) 25 MG tablet     multivitamin w/minerals (THERA-VIT-M) tablet     mycophenolic acid (GENERIC EQUIVALENT) 360 MG EC tablet     nystatin (MYCOSTATIN) 564505 UNIT/ML suspension     OXYGEN-HELIUM IN     pantoprazole (PROTONIX) 40 MG EC tablet     potassium chloride ER (KLOR-CON M) 20 MEQ CR tablet     predniSONE (DELTASONE) 5 MG tablet     rosuvastatin (CRESTOR) 5 MG tablet     Sharps Container MISC     tacrolimus (GENERIC EQUIVALENT) 0.5 MG capsule     tacrolimus (GENERIC EQUIVALENT) 1 MG capsule     No current facility-administered medications for this visit.     Facility-Administered Medications Ordered in Other Visits   Medication     magnesium sulfate 4 g in 100 mL sterile water (premade)     sodium chloride (PF) 0.9% PF flush 10 mL            Physical Exam:   /77   Pulse 99   Ht 1.575 m (5' 2\")   Wt 64.4 kg (142 lb)   SpO2 94%   BMI 25.97 kg/m      GENERAL: alert, NAD  HEENT: NCAT, EOMI, no scleral icterus, oral mucosa moist and without lesions  Neck: no cervical or supraclavicular adenopathy  Lungs: decreased BS, mainly clear with few scattered basilar crackles  CV: RRR, S1S2, no murmurs noted  Abdomen: normoactive BS, soft, non tender and no organomegaly  Lymph:2+ BLE  Neuro: AAO X 3, CN 2-12 grossly " intact  Psychiatric: normal affect, good eye contact  Skin: no rash, jaundice or lesions on limited exam         Data:   All laboratory and imaging data reviewed.      Recent Results (from the past 168 hour(s))   IgG    Collection Time: 03/31/22  8:21 AM   Result Value Ref Range    Immunoglobulin G 647 610-1,616 mg/dL   Basic metabolic panel    Collection Time: 03/31/22  8:21 AM   Result Value Ref Range    Sodium 140 133 - 144 mmol/L    Potassium 4.0 3.4 - 5.3 mmol/L    Chloride 96 94 - 109 mmol/L    Carbon Dioxide (CO2) 38 (H) 20 - 32 mmol/L    Anion Gap 6 3 - 14 mmol/L    Urea Nitrogen 26 7 - 30 mg/dL    Creatinine 0.74 0.52 - 1.04 mg/dL    Calcium 8.8 8.5 - 10.1 mg/dL    Glucose 129 (H) 70 - 99 mg/dL    GFR Estimate 89 >60 mL/min/1.73m2   Magnesium    Collection Time: 03/31/22  8:21 AM   Result Value Ref Range    Magnesium 1.5 (L) 1.6 - 2.3 mg/dL   CBC with platelets    Collection Time: 03/31/22  8:21 AM   Result Value Ref Range    WBC Count 13.6 (H) 4.0 - 11.0 10e3/uL    RBC Count 3.55 (L) 3.80 - 5.20 10e6/uL    Hemoglobin 11.0 (L) 11.7 - 15.7 g/dL    Hematocrit 36.1 35.0 - 47.0 %     (H) 78 - 100 fL    MCH 31.0 26.5 - 33.0 pg    MCHC 30.5 (L) 31.5 - 36.5 g/dL    RDW 16.4 (H) 10.0 - 15.0 %    Platelet Count 266 150 - 450 10e3/uL   INR    Collection Time: 03/31/22  8:21 AM   Result Value Ref Range    INR 0.95 0.85 - 1.15   Tacrolimus level    Collection Time: 03/31/22  8:21 AM   Result Value Ref Range    Tacrolimus by Tandem Mass Spectrometry 10.8 5.0 - 15.0 ug/L    Tacrolimus Last Dose Date      Tacrolimus Last Dose Time     CMV DNA quantification    Collection Time: 03/31/22  8:21 AM    Specimen: Arm, Left; Blood   Result Value Ref Range    CMV DNA IU/mL Not Detected Not Detected IU/mL   HLA Donor Specific Antibody    Collection Time: 03/31/22  8:21 AM   Result Value Ref Range    Donor Identification 02/21/2022     Organ Lung     DSA Present YES     DR15 833     DQB5 4824     DQB6 2794     DSA Comments         Flow Single Antigen Beads assays are intended for detection/identification of IgG anti-HLA antibodies. Mfi values may not accurately quantify donor-specific antibody levels in all instances.    DSA Test Method [facility]    HLA [place] Class I, Single Antigen    Collection Time: 03/31/22  8:21 AM   Result Value Ref Range    SA 1 TEST METHOD [facility]     SA 1 CELL Class I     SA1 HI RISK XIOMARA None     SA1 MOD RISK XIOMARA None     SA 1  COMMENTS        Test performed by modified procedure. Serum heat inactivated and tested by a modified ([place]mer) protocol including fetal calf serum addition. High-risk, mfi >3,000. Mod-risk, mfi 500-3,000.   HLA Xiomara Class II, Single Antigen    Collection Time: 03/31/22  8:21 AM   Result Value Ref Range     2 TEST METHOD [facility] 2 CELL Class II     SA2 HI RISK XIOMARA DQ:5 6 8     SA2 MOD RISK XIOMARA DR:15DQ:4     SA 2 COMMENTS        Test performed by modified procedure. Serum heat inactivated and tested by a modified ([place]mer) protocol including fetal calf serum addition. High-risk, mfi >3,000. Mod-risk, mfi 500-3,000.   HLA Xi[place], CPRA    Collection Time: 03/31/22  8:21 AM   Result Value Ref Range    UNOS CPRA 73     UNACCEPTABLE ANTIGENS DQ:5 6 8     Basic metabolic panel    Collection Time: 04/06/22  7:13 AM   Result Value Ref Range    Sodium 136 133 - 144 mmol/L    Potassium 3.9 3.4 - 5.3 mmol/L    Chloride 96 94 - 109 mmol/L    Carbon Dioxide (CO2) 36 (H) 20 - 32 mmol/L    Anion Gap 4 3 - 14 mmol/L    Urea Nitrogen 22 7 - 30 mg/dL    Creatinine 0.64 0.52 - 1.04 mg/dL    Calcium 8.5 8.5 - 10.1 mg/dL    Glucose 154 (H) 70 - 99 mg/dL    GFR Estimate >90 >60 mL/min/1.73m2   Magnesium    Collection Time: 04/06/22  7:13 AM   Result Value Ref Range    Magnesium 1.2 (L) 1.6 - 2.3 mg/dL   CBC with platelets    Collection Time: 04/06/22  7:13 AM   Result Value Ref Range    WBC Count 11.6 (H) 4.0 - 11.0 10e3/uL    RBC Count 3.59 (L) 3.80 - 5.20 10e6/uL    Hemoglobin 11.1 (L) 11.7 - 15.7 g/dL     Hematocrit 34.5 (L) 35.0 - 47.0 %    MCV 96 78 - 100 fL    MCH 30.9 26.5 - 33.0 pg    MCHC 32.2 31.5 - 36.5 g/dL    RDW 15.4 (H) 10.0 - 15.0 %    Platelet Count 333 150 - 450 10e3/uL   INR    Collection Time: 04/06/22  7:13 AM   Result Value Ref Range    INR 1.00 0.85 - 1.15   Tacrolimus level    Collection Time: 04/06/22  7:13 AM   Result Value Ref Range    Tacrolimus by Tandem Mass Spectrometry 13.4 5.0 - 15.0 ug/L    Tacrolimus Last Dose Date 4/5/2022     Tacrolimus Last Dose Time  6:00 PM    General PFT Lab (Please always keep checked)    Collection Time: 04/06/22 11:47 AM   Result Value Ref Range    FVC-Pred 2.77 L    FVC-Pre 1.20 L    FVC-%Pred-Pre 43 %    FEV1-Pre 1.20 L    FEV1-%Pred-Pre 55 %    FEV1FVC-Pred 79 %    FEV1FVC-Pre 100 %    FEFMax-Pred 5.63 L/sec    FEFMax-Pre 4.08 L/sec    FEFMax-%Pred-Pre 72 %    FEF2575-Pred 1.93 L/sec    FEF2575-Pre 3.57 L/sec    OHP0793-%Pred-Pre 184 %    ExpTime-Pre 5.60 sec    FIFMax-Pre 1.33 L/sec    FEV1FEV6-Pred 80 %    FEV1FEV6-Pre 100 %     PFT interpretation:  Maneuver: valid, but did not meet ATS guidelines      Again, thank you for allowing me to participate in the care of your patient.        Sincerely,        Sharlene Larios PA-C

## 2022-04-06 NOTE — LETTER
4/6/2022         RE: Melissa Elder  [address] [facility] [address]        Dear Colleague,    Thank you for referring your patient, Melissa Elder, to the [facility] BLOOD AND MARROW TRANSPLANT PROGRAM [place]. Please see a copy of my visit note below.    Infusion Nursing Note:  Melissa Elder presents today for add-on infusion.    Patient seen by provider today: No   present during visit today: Not Applicable.    Note: Patient received 4 g Mg per provider's request.    Intravenous Access:  Peripheral IV placed.    Treatment Conditions:  Results reviewed, labs MET treatment parameters, ok to proceed with treatment.    Post Infusion Assessment:  Patient tolerated infusion without incident.     Discharge Plan:   Patient and/or family verbalized understanding of discharge instructions and all questions answered.    Cristina Qucik RN      Again, thank you for allowing me to participate in the care of your patient.      Sincerely,    [facility]

## 2022-04-06 NOTE — PROGRESS NOTES
Infusion Nursing Note:  Melissa Elder presents today for add-on infusion.    Patient seen by provider today: No   present during visit today: Not Applicable.    Note: Patient received 4 g Mg per provider's request.      Intravenous Access:  Peripheral IV placed.    Treatment Conditions:  Results reviewed, labs MET treatment parameters, ok to proceed with treatment.      Post Infusion Assessment:  Patient tolerated infusion without incident.       Discharge Plan:   Patient and/or family verbalized understanding of discharge instructions and all questions answered.      Cristina Quick RN

## 2022-04-06 NOTE — TELEPHONE ENCOUNTER
Pt  said they missed a call from [facility]. Contacted [facility], pt will need a COVID test in AM prior procedure. She will call them tomorrow to adjust Tac dose.     Enc to call with further questions.

## 2022-04-06 NOTE — PATIENT INSTRUCTIONS
Patient Instructions  1. Continue to hydrate with 60-70 oz fluids daily.  2. Continue to exercise daily or most days of the week.  3. Decrease furosemide (Lasix) to 20mg twice a day.    Next transplant clinic appointment: 1 with CXR, labs and PFTs  Next lab draw: pending tacrolimus level, otherwise in 1 week      You are scheduled for a bronchoscopy on 4/7 at 9AM at the [facility] on the 3rd floor. Arrive 1 hour early (at 8AM).     Instructions     1. Nothing to eat or drink 8 hours before procedure.  2. Hold your morning medications. Bring them with you to take after the procedure.  3. Have a  available to take you home.   4. Checking your blood sugar in the morning  5. Stop Aspirin 7 days before procedure. Restart Aspirin Sunday, April 10th.     ~~~~~~~~~~~~~~~~~~~~~~~~~    Thoracic Transplant Office phone [phone], fax [phone]    Office Hours 8:30 - 5:00     For after-hours urgent issues, please dial [phone], and ask to speak with the Thoracic Transplant Coordinator On-Call.  --------------------  To expedite your medication refill(s), please contact your pharmacy and have them fax a refill request to: [phone]  .   *Please allow 3 business days for routine medication refills.  *Please allow 5 business days for controlled substance medication refills.    **For Diabetic medications and supplies refill(s), please contact your pharmacy and have them contact your Endocrine team.  --------------------  For scheduling appointments call [phone].  --------------------  Please Note: If you are active on [employer], all future test results will be sent by [employer] message only, and will no longer be called to patient. You may also receive communication directly from your physician.

## 2022-04-06 NOTE — NURSING NOTE
Chief Complaint   Patient presents with     Lung Transplant     overbook per AL      Vitals were taken and medications were reconciled.     Eileen Stock RMA  12:36 PM

## 2022-04-06 NOTE — PROGRESS NOTES
Bronchoscopy with lavage and biopsies for lung transplant surivellence.     Date of transplant 2/21/2022   Transplant type bilateral lung transplant  Date of labs 4/6/2022  BUN 22 DDAVP no  Plt 333  INR 1.00 Anticoag therapy ASA 81mg Last dose 3/28/2022  Comment request CD4 staining and AFB cultures (along with routine cultures) with bronch. Patient particularly sensitive to sedation, required Narcan after last bronch.   Questions please page Dr. Feng [phone]

## 2022-04-07 ENCOUNTER — TELEPHONE (OUTPATIENT)
Dept: TRANSPLANT | Facility: CLINIC | Age: 67
End: 2022-04-07

## 2022-04-07 ENCOUNTER — APPOINTMENT (OUTPATIENT)
Dept: GENERAL RADIOLOGY | Facility: CLINIC | Age: 67
End: 2022-04-07
Attending: INTERNAL MEDICINE
Payer: MEDICARE

## 2022-04-07 ENCOUNTER — HOSPITAL ENCOUNTER (OUTPATIENT)
Facility: CLINIC | Age: 67
Discharge: HOME | End: 2022-04-07
Attending: INTERNAL MEDICINE | Admitting: INTERNAL MEDICINE
Payer: MEDICARE

## 2022-04-07 VITALS
DIASTOLIC BLOOD PRESSURE: 95 MMHG | SYSTOLIC BLOOD PRESSURE: 125 MMHG | RESPIRATION RATE: 18 BRPM | BODY MASS INDEX: 25.44 KG/M2 | OXYGEN SATURATION: 96 % | WEIGHT: 138.23 LBS | HEIGHT: 62 IN | HEART RATE: 107 BPM | TEMPERATURE: 97.8 F

## 2022-04-07 DIAGNOSIS — Z94.2 S/P LUNG TRANSPLANT (H): Primary | ICD-10-CM

## 2022-04-07 LAB
APPEARANCE FLD: CLEAR
BASOPHILS NFR FLD MANUAL: 1 %
BRONCHOSCOPY: NORMAL
CELL COUNT BODY FLUID SOURCE: NORMAL
COLOR FLD: COLORLESS
EOSINOPHIL NFR FLD MANUAL: 9 %
GRAM STAIN RESULT: NORMAL
GRAM STAIN RESULT: NORMAL
LYMPHOCYTES NFR FLD MANUAL: 21 %
MONOS+MACROS NFR FLD MANUAL: NORMAL %
NEUTS BAND NFR FLD MANUAL: 5 %
OTHER CELLS FLD MANUAL: 66 %
PATH REPORT.COMMENTS IMP SPEC: NORMAL
PATH REPORT.FINAL DX SPEC: NORMAL
PATH REPORT.FINAL DX SPEC: NORMAL
PATH REPORT.GROSS SPEC: NORMAL
PATH REPORT.GROSS SPEC: NORMAL
PATH REPORT.MICROSCOPIC SPEC OTHER STN: NORMAL
PATH REPORT.MICROSCOPIC SPEC OTHER STN: NORMAL
PATH REPORT.RELEVANT HX SPEC: NORMAL
PATH REPORT.RELEVANT HX SPEC: NORMAL
PHOTO IMAGE: NORMAL
WBC # FLD AUTO: 140 /UL

## 2022-04-07 PROCEDURE — 87102 FUNGUS ISOLATION CULTURE: CPT | Performed by: INTERNAL MEDICINE

## 2022-04-07 PROCEDURE — 88307 TISSUE EXAM BY PATHOLOGIST: CPT | Mod: 26 | Performed by: PATHOLOGY

## 2022-04-07 PROCEDURE — 99153 MOD SED SAME PHYS/QHP EA: CPT | Performed by: INTERNAL MEDICINE

## 2022-04-07 PROCEDURE — 88312 SPECIAL STAINS GROUP 1: CPT | Mod: TC | Performed by: INTERNAL MEDICINE

## 2022-04-07 PROCEDURE — 88350 IMFLUOR EA ADDL 1ANTB STN PX: CPT | Mod: TC | Performed by: INTERNAL MEDICINE

## 2022-04-07 PROCEDURE — 31628 BRONCHOSCOPY/LUNG BX EACH: CPT | Performed by: INTERNAL MEDICINE

## 2022-04-07 PROCEDURE — 88346 IMFLUOR 1ST 1ANTB STAIN PX: CPT | Mod: 26 | Performed by: PATHOLOGY

## 2022-04-07 PROCEDURE — 31624 DX BRONCHOSCOPE/LAVAGE: CPT | Performed by: INTERNAL MEDICINE

## 2022-04-07 PROCEDURE — 87205 SMEAR GRAM STAIN: CPT | Performed by: INTERNAL MEDICINE

## 2022-04-07 PROCEDURE — 87633 RESP VIRUS 12-25 TARGETS: CPT | Performed by: INTERNAL MEDICINE

## 2022-04-07 PROCEDURE — 250N000009 HC RX 250: Performed by: INTERNAL MEDICINE

## 2022-04-07 PROCEDURE — 88312 SPECIAL STAINS GROUP 1: CPT | Mod: 26 | Performed by: PATHOLOGY

## 2022-04-07 PROCEDURE — 88108 CYTOPATH CONCENTRATE TECH: CPT | Mod: 26 | Performed by: PATHOLOGY

## 2022-04-07 PROCEDURE — 87206 SMEAR FLUORESCENT/ACID STAI: CPT | Performed by: INTERNAL MEDICINE

## 2022-04-07 PROCEDURE — 89050 BODY FLUID CELL COUNT: CPT | Performed by: INTERNAL MEDICINE

## 2022-04-07 PROCEDURE — 31632 BRONCHOSCOPY/LUNG BX ADDL: CPT | Performed by: INTERNAL MEDICINE

## 2022-04-07 PROCEDURE — 88350 IMFLUOR EA ADDL 1ANTB STN PX: CPT | Mod: 26 | Performed by: PATHOLOGY

## 2022-04-07 PROCEDURE — 71046 X-RAY EXAM CHEST 2 VIEWS: CPT | Mod: 26 | Performed by: RADIOLOGY

## 2022-04-07 PROCEDURE — 999N000065 XR CHEST 2 VW

## 2022-04-07 PROCEDURE — 87070 CULTURE OTHR SPECIMN AEROBIC: CPT | Performed by: INTERNAL MEDICINE

## 2022-04-07 PROCEDURE — 250N000011 HC RX IP 250 OP 636: Performed by: INTERNAL MEDICINE

## 2022-04-07 PROCEDURE — G0500 MOD SEDAT ENDO SERVICE >5YRS: HCPCS | Performed by: INTERNAL MEDICINE

## 2022-04-07 PROCEDURE — 31628 BRONCHOSCOPY/LUNG BX EACH: CPT

## 2022-04-07 RX ORDER — TACROLIMUS 0.5 MG/1
0.5 CAPSULE ORAL EVERY EVENING
Qty: 30 CAPSULE | Refills: 11 | Status: SHIPPED | OUTPATIENT
Start: 2022-04-07 | End: 2022-04-20

## 2022-04-07 RX ORDER — TACROLIMUS 1 MG/1
CAPSULE ORAL
Qty: 150 CAPSULE | Refills: 11 | Status: SHIPPED | OUTPATIENT
Start: 2022-04-07 | End: 2022-04-20

## 2022-04-07 RX ORDER — LIDOCAINE HYDROCHLORIDE 10 MG/ML
INJECTION, SOLUTION INFILTRATION; PERINEURAL PRN
Status: COMPLETED | OUTPATIENT
Start: 2022-04-07 | End: 2022-04-07

## 2022-04-07 RX ORDER — FENTANYL CITRATE 50 UG/ML
INJECTION, SOLUTION INTRAMUSCULAR; INTRAVENOUS PRN
Status: COMPLETED | OUTPATIENT
Start: 2022-04-07 | End: 2022-04-07

## 2022-04-07 RX ORDER — LIDOCAINE HYDROCHLORIDE AND EPINEPHRINE 10; 10 MG/ML; UG/ML
INJECTION, SOLUTION INFILTRATION; PERINEURAL PRN
Status: COMPLETED | OUTPATIENT
Start: 2022-04-07 | End: 2022-04-07

## 2022-04-07 RX ORDER — NALOXONE HYDROCHLORIDE 0.4 MG/ML
0.2 INJECTION, SOLUTION INTRAMUSCULAR; INTRAVENOUS; SUBCUTANEOUS
Status: DISCONTINUED | OUTPATIENT
Start: 2022-04-07 | End: 2022-04-07 | Stop reason: HOSPADM

## 2022-04-07 RX ORDER — NALOXONE HYDROCHLORIDE 0.4 MG/ML
0.4 INJECTION, SOLUTION INTRAMUSCULAR; INTRAVENOUS; SUBCUTANEOUS
Status: DISCONTINUED | OUTPATIENT
Start: 2022-04-07 | End: 2022-04-07 | Stop reason: HOSPADM

## 2022-04-07 RX ORDER — LIDOCAINE HYDROCHLORIDE 40 MG/ML
INJECTION, SOLUTION RETROBULBAR PRN
Status: COMPLETED | OUTPATIENT
Start: 2022-04-07 | End: 2022-04-07

## 2022-04-07 RX ORDER — ONDANSETRON 2 MG/ML
4 INJECTION INTRAMUSCULAR; INTRAVENOUS EVERY 6 HOURS PRN
Status: DISCONTINUED | OUTPATIENT
Start: 2022-04-07 | End: 2022-04-07 | Stop reason: HOSPADM

## 2022-04-07 RX ORDER — ONDANSETRON 4 MG/1
4 TABLET, ORALLY DISINTEGRATING ORAL EVERY 6 HOURS PRN
Status: DISCONTINUED | OUTPATIENT
Start: 2022-04-07 | End: 2022-04-07 | Stop reason: HOSPADM

## 2022-04-07 RX ORDER — FLUMAZENIL 0.1 MG/ML
0.2 INJECTION, SOLUTION INTRAVENOUS
Status: DISCONTINUED | OUTPATIENT
Start: 2022-04-07 | End: 2022-04-07 | Stop reason: HOSPADM

## 2022-04-07 RX ADMIN — MIDAZOLAM 0.5 MG: 1 INJECTION INTRAMUSCULAR; INTRAVENOUS at 09:49

## 2022-04-07 RX ADMIN — LIDOCAINE HYDROCHLORIDE AND EPINEPHRINE 25 ML: 10; 10 INJECTION, SOLUTION INFILTRATION; PERINEURAL at 10:15

## 2022-04-07 RX ADMIN — LIDOCAINE HYDROCHLORIDE 12 ML: 40 INJECTION, SOLUTION RETROBULBAR; TOPICAL at 10:15

## 2022-04-07 RX ADMIN — LIDOCAINE HYDROCHLORIDE 15 ML: 10 INJECTION, SOLUTION INFILTRATION; PERINEURAL at 10:15

## 2022-04-07 RX ADMIN — FENTANYL CITRATE 25 MCG: 50 INJECTION, SOLUTION INTRAMUSCULAR; INTRAVENOUS at 09:44

## 2022-04-07 RX ADMIN — MIDAZOLAM 0.5 MG: 1 INJECTION INTRAMUSCULAR; INTRAVENOUS at 09:44

## 2022-04-07 NOTE — TELEPHONE ENCOUNTER
Patient Call: Voicemail  Date/Time: 04/07/2022 at 1:50PM  Please call back regarding patients IS Level   Tyrese [phone]

## 2022-04-07 NOTE — TELEPHONE ENCOUNTER
Tacrolimus level 13.4 at 12 hours, on 4/6.  Goal 8-12.   Current dose 3 mg in AM, 3 mg in PM    Dose changed to 3 mg in AM, 2.5 mg in PM   Recheck level on 4/14    Discussed with mart Xiong's    MyChart message sent

## 2022-04-07 NOTE — OR NURSING
Pt had bronchoscopy under moderate sedation, with lavage and biopsy. Pt tolerated procedure well. Pt stable at time of transfer to [address] [place] [address], transported by Lissett RODRIGUEZ on 3 lpm NC. Report given to Lissett RODRIGUEZ.

## 2022-04-08 ENCOUNTER — HOSPITAL ENCOUNTER (OUTPATIENT)
Dept: CARDIAC REHAB | Facility: CLINIC | Age: 67
Discharge: HOME | End: 2022-04-08
Attending: INTERNAL MEDICINE
Payer: MEDICARE

## 2022-04-08 LAB
CMV DNA SPEC NAA+PROBE-ACNC: NOT DETECTED IU/ML
DONOR IDENTIFICATION: NORMAL
DSA COMMENTS: NORMAL
DSA PRESENT: NORMAL
DSA TEST METHOD: NORMAL
FLUAV H1 2009 PAND RNA SPEC QL NAA+PROBE: NEGATIVE
FLUAV H1 RNA SPEC QL NAA+PROBE: NEGATIVE
FLUAV H3 RNA SPEC QL NAA+PROBE: NEGATIVE
FLUAV RNA SPEC QL NAA+PROBE: NEGATIVE
FLUBV RNA SPEC QL NAA+PROBE: NEGATIVE
HADV DNA SPEC QL NAA+PROBE: NEGATIVE
HADV DNA SPEC QL NAA+PROBE: NEGATIVE
HMPV RNA SPEC QL NAA+PROBE: NEGATIVE
HPIV1 RNA SPEC QL NAA+PROBE: NEGATIVE
HPIV2 RNA SPEC QL NAA+PROBE: NEGATIVE
HPIV3 RNA SPEC QL NAA+PROBE: NEGATIVE
ORGAN: NORMAL
RHINOVIRUS RNA SPEC QL NAA+PROBE: NEGATIVE
RSV RNA SPEC QL NAA+PROBE: NEGATIVE
RSV RNA SPEC QL NAA+PROBE: NEGATIVE
SA 1 CELL: NORMAL
SA 1 TEST METHOD: NORMAL
SA 2 CELL: NORMAL
SA 2 TEST METHOD: NORMAL
SA1 HI RISK ABY: NORMAL
SA1 MOD RISK ABY: NORMAL
SA2 HI RISK ABY: NORMAL
SA2 MOD RISK ABY: NORMAL
UNACCEPTABLE ANTIGENS: NORMAL
UNOS CPRA: 73
ZZZSA 1  COMMENTS: NORMAL
ZZZSA 2 COMMENTS: NORMAL

## 2022-04-08 PROCEDURE — G0239 OTH RESP PROC, GROUP: HCPCS

## 2022-04-09 LAB — BACTERIA BRONCH: NORMAL

## 2022-04-11 ENCOUNTER — LAB REQUISITION (OUTPATIENT)
Dept: LAB | Facility: CLINIC | Age: 67
End: 2022-04-11
Payer: MEDICARE

## 2022-04-11 DIAGNOSIS — Z94.2 S/P LUNG TRANSPLANT (H): Primary | ICD-10-CM

## 2022-04-11 DIAGNOSIS — D84.9 IMMUNOSUPPRESSED STATUS (H): ICD-10-CM

## 2022-04-11 NOTE — PROGRESS NOTES
[facility]  April 14, 2022         Assessment and Plan:   Melissa Elder is a 66 year old female with h/o bilateral lung transplant on 2/21 for severe COPD for who is seen today for routine follow up. Surgery uncomplicated, s/p bilateral pigtail chest tube placement for hydropneumothorax and bilateral effusions, disseminated Ureaplasma and transient hyperammonemia. Other history notable for HTN, mild non-obstructive CAD, paroxysmal afib, osteoporosis, GERD, and colonic polyps.     1. S/p bilateral sequential lung transplant:  Subluxation of sternum: notes increased congestion, rhinorrhea and PND related to her known allergies, but otherwise, denies pulmonary complaints. Continue to progress with pulmonary rehab. Sating 94% on room air. Repeat sniff test 3/11 without evidence of diaphragmatic palsy. CMV 4/7 negative. S/p bronch on 4/7, cultures NGTD, unable to assess for vascular rejection (ISHLT AXB0). CD4 staining was negative. Bicarb today down to 33. CXR reviewed demonstrates stable transplant with perihilar/basilar streaking. PFTs did not meet ATS guidelines (consistent with prior), but slightly improved.   - Continue IS including Myfortic 720 mg BID, tacrolimus (goal 8-12) and prednisone taper 10 mg BID, decreases again on 5/10  - Dapsone for PJP ppx  - Nystatin for oral candidiasis ppx, 6 month course  - CMV (D-/R-)  - Call and schedule sleep study  - Start Zytrec at bedtime, increase Flonase to 2 sprays BID    2. ID: donor cultures with P-S MSSA with Strep mitis, Actinomyces odontolyticus, MSSA x2, and C. kruseii.  Recipient cultures at time of transplant with Actinomyces odonotolyticus. S/p ceftriazone 2/23-3/8.   - Amoxicillin (3/8-5/23) X 3 months for Actinomyces treatment  - Low threshold to treat Candida if it recurs per transplant ID    3. 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 Repeat DSA unable to be interpreted secondary to recent IVIG, but C1q was sent off.  - F/u results of C1q  - DSA pending for today    4. Hypogammaglobulinemia: last IgG of 647, s/p IVIG (unclear indication) on 4/4.  - Recheck IgG ~ 5/4    5. Positive AFB on sputum culture: noted on sputum culture 3/2. Transplant ID following for speciation and susceptibility testing as well as other evidence of infection. AFB sputum culture 3/9 NGTD.  - AFB cultures on all future bronchs, pending from 4/7    6. PHS risk criteria donor: additional labs required post-transplant (between 4-8 weeks post-op): Hepatitis B, Hepatitis C, and HIV by COLT, negative on 3/25.   - Recheck per protocol, again at 3 months    7. Concern for gastroparesis:  GERD: NM gastric emptying study completed 3/15 normal. Negative pH/manometry study 3/28/19, noted small hiatal hernia. Overall bloating and appetite has improved.  - Famotidine 20 mg BID and pantoprazole 40 mg BID    8. CAD: noted to have mild non-obstructive CAD without hemodynamically significant lesions on cardiac cath 3/27/19.   - Continue aspirin and rosuvastatin    9. Paroxysmal afib:  HTN: PTA diltiazem, not on AC. Persistent tachycardia post-op, controlled.  - Continue diltiazem and metoprolol  - Will place Zio patch at next visit    10. Hypomagnesemia: suppressed absorption d/t CNI and probably loss secondary to diuretics. Mg of 1.3 today.  - Increase Mg glycinate to 2 tablets BID  - Will give IV Mg 4 grams X 1    11. BLE edema: improved with Anson hose.  - Decrease furosemide  To 20 mg daily X 3 days, then off  - Stop K replacement  - Encourage mobility and protein intake    12. Hyperglycemia: last AIC of 5.7 on 2/22. No longer having low BS, most BS in the mid 100s. Not on insulin, no need for Endocrine follow up.     We discussed the risks and benefits of receiving EVUSHELD, as well as alternative treatments. The Emergency Use Administration (EUA) was provided to the patient for  "review and an opportunity for questions was provided. Patient wishes to proceed with ANTONIO.    RTC: two weeks  Influenza and other vaccinations: Antonio today  Annual dermatology visit: will discuss    Sharlene Larios PA-C  Pulmonary, Allergy, Critical Care and Sleep Medicine        Interval History:     Did okay with her bronch, but not notes she is having some hoarseness. No fever or chills, has allergies and notes increased runny nose and PND with some cough. Feels most of her drainage/cough is \"above her neck\". Cough is dry, but she feels like she could something out. No new shortness of breath, incisional pain is controlled. No palpitations. Notes pulmonary rehab is going well, walking further distances outside. Occasional \"fullness\" after eating, but appetite is better. Stools are daily and soft/formed. Swelling in her legs has improved.          Review of Systems:   Please see HPI, otherwise the complete 10 point ROS is negative.           Past Medical and Surgical History:     Past Medical History:   Diagnosis Date     Atrial fibrillation with RVR (H)     hx of A fib related to severe COPD, does not meet anticoagulation criteria as noted     COPD, severe (H)      Osteoporosis      Past Surgical History:   Procedure Laterality Date     BRONCHOSCOPY (RIGID OR FLEXIBLE), DIAGNOSTIC N/A 4/7/2022    Procedure: BRONCHOSCOPY, WITH BRONCHOALVEOLAR LAVAGE and BIOPSIES;  Surgeon: Gerson Haddad MD;  Location:  GI     BRONCHOSCOPY FLEXIBLE AND RIGID N/A 3/1/2022    Procedure: BRONCHOSCOPY INSPECTION;  Surgeon: Melissa Landin MD;  Location:  GI     CV CORONARY ANGIOGRAM N/A 3/27/2019    Procedure: CV CORONARY ANGIOGRAM;  Surgeon: Thierry Serrano MD;  Location:  HEART CARDIAC CATH LAB     CV RIGHT HEART CATH MEASUREMENTS RECORDED N/A 3/27/2019    Procedure: CV RIGHT HEART CATH;  Surgeon: Thierry Serrano MD;  Location:  HEART CARDIAC CATH LAB     ESOPHAGEAL IMPEDENCE FUNCTION TEST WITH 24 HOUR PH " GREATER THAN 1 HOUR N/A 3/28/2019    Procedure: ESOPHAGEAL IMPEDENCE FUNCTION TEST WITH 24 HOUR PH GREATER THAN 1 HOUR;  Surgeon: Mike Henry MD;  Location:  GI     EXCISE PILONIDAL CYST, SIMPLE       IR CHEST TUBE PLACEMENT NON-TUNNELED RIGHT  3/3/2022     TONSILLECTOMY & ADENOIDECTOMY       TRANSPLANT LUNG RECIPIENT SINGLE X2 Bilateral 2022    Procedure: Bilateral Sequential Lung Transplantation, Clamshell Incision, Extracorporeal Membrane Oxgenation, Bronchoscopy, Cryoablation of Intercostal Nerves;  Surgeon: Raul Robin MD;  Location:  OR           Family History:     Family History   Problem Relation Age of Onset     Breast Cancer Mother      Colon Cancer Mother      Lung Cancer Mother      Lung Cancer Father      Lung Cancer Maternal Aunt      Pancreatic Cancer Maternal Uncle             Social History:     Social History     Socioeconomic History     Marital status:      Spouse name: Not on file     Number of children: Not on file     Years of education: Not on file     Highest education level: Not on file   Occupational History     Not on file   Tobacco Use     Smoking status: Former Smoker     Packs/day: 1.50     Years: 36.00     Pack years: 54.00     Types: Cigarettes     Quit date: 2006     Years since quittin.2     Smokeless tobacco: Never Used   Substance and Sexual Activity     Alcohol use: Yes     Comment: stated beer and wine occ     Drug use: Yes     Comment: stated hx of marijuana, quit in      Sexual activity: Not on file   Other Topics Concern     Parent/sibling w/ CABG, MI or angioplasty before 65F 55M? Not Asked   Social History Narrative     Not on file     Social Determinants of Health     Financial Resource Strain: Not on file   Food Insecurity: Not on file   Transportation Needs: Not on file   Physical Activity: Not on file   Stress: Not on file   Social Connections: Not on file   Intimate Partner Violence: Not on file   Housing  "Stability: Not on file            Medications:     Current Outpatient Medications   Medication     aspirin (ASA) 81 MG EC tablet     cetirizine (ZYRTEC) 10 MG tablet     fluticasone (FLONASE) 50 MCG/ACT nasal spray     furosemide (LASIX) 40 MG tablet     Magnesium Glycinate 665 MG CAPS     multivitamin w/minerals (THERA-VIT-M) tablet     mycophenolic acid (GENERIC EQUIVALENT) 360 MG EC tablet     nystatin (MYCOSTATIN) 737790 UNIT/ML suspension     acetaminophen (TYLENOL) 325 MG tablet     albuterol (PROAIR HFA/PROVENTIL HFA/VENTOLIN HFA) 108 (90 Base) MCG/ACT inhaler     Alcohol Swabs PADS     amoxicillin (AMOXIL) 500 MG capsule     blood glucose (NO BRAND SPECIFIED) lancets standard     blood glucose (NO BRAND SPECIFIED) test strip     blood glucose monitoring (NO BRAND SPECIFIED) meter device kit     calcium carbonate 600 mg-vitamin D 400 units (CALTRATE) 600-400 MG-UNIT per tablet     carboxymethylcellulose PF (REFRESH PLUS) 0.5 % ophthalmic solution     dapsone (ACZONE) 25 MG tablet     diltiazem ER (TIAZAC) 240 MG 24 hr ER beaded capsule     famotidine (PEPCID) 20 MG tablet     glucagon 1 MG kit     glucose (BD GLUCOSE) 4 g chewable tablet     IBANdronate (BONIVA) 150 MG tablet     loperamide (IMODIUM) 2 MG capsule     metoprolol tartrate (LOPRESSOR) 25 MG tablet     OXYGEN-HELIUM IN     pantoprazole (PROTONIX) 40 MG EC tablet     predniSONE (DELTASONE) 5 MG tablet     rosuvastatin (CRESTOR) 5 MG tablet     Sharps Container MISC     tacrolimus (GENERIC EQUIVALENT) 0.5 MG capsule     tacrolimus (GENERIC EQUIVALENT) 1 MG capsule     Current Facility-Administered Medications   Medication     cilgavimab 300 mg/tixagevimab 300 mg (EVUSHELD) - FULL DOSE *FOR CLINIC USE ONLY*     Facility-Administered Medications Ordered in Other Visits   Medication     sodium chloride (PF) 0.9% PF flush 10 mL            Physical Exam:   /75   Pulse 89   Ht 1.575 m (5' 2\")   Wt 62.1 kg (137 lb)   SpO2 94%   BMI 25.06 kg/m  "     GENERAL: alert, NAD  HEENT: NCAT, EOMI, no scleral icterus, oral mucosa moist and without lesions  Neck: no cervical or supraclavicular adenopathy  Lungs: moderate BS, decreased in right lower lobe with few scattered crackles  CV: RRR, S1S2, no murmurs noted  Abdomen: normoactive BS, soft, non tender   Lymph: 1+ BLE edema with reymundo hose  Neuro: AAO X 3, CN 2-12 grossly intact  Psychiatric: normal affect, good eye contact  Skin: no rash, jaundice or lesions on limited exam         Data:   All laboratory and imaging data reviewed.      Recent Results (from the past 168 hour(s))   BRONCHOSCOPY    Collection Time: 04/07/22  9:01 AM   Result Value Ref Range    Bronchoscopy       [facility]  Endoscopy Department-[facility]  _______________________________________________________________________________  Patient Name: Melissa Elder             Procedure Date: 4/7/2022 9:01 AM  MRN: [id number]                       Account #: [id number]  YOB: 1955             Admit Type: Outpatient  Age: 66                               Gender: Female  Note Status: Finalized                Attending MD: Gerson Haddad MD  Pause for the cause: Done             Total Sedation Time: 31 minutes 1:1   monitoring  _______________________________________________________________________________     Procedure:             Bronchoscopy  Indications:           Surveillance lung transplant  Providers:             Gerson Haddad MD, Adin Lilly MD (Fellow), Ivana Meyers RN  Medicines:             Midazolam 1 mg IV, Fentanyl 25 mcg IV  Requesting Physician:    Complications:         No immediate complications  ___ ____________________________________________________________________________  Procedure:             Pre-Anesthesia Assessment:                         - Universal Protocol:                         - Pre-procedure Verification: Prior to the procedure,                           the patient's identity was verified by full name, date                          of birth and medical record number. The patient's                          identity was verified on all pertinent medical                          records, including History and Physical, nursing                          assessment and pre-anesthesia assessment. Also prior                          to the procedure, a History and Physical was                          performed, and patient medications, allergies and                          sensitivities were reviewed. The patient's tolerance                          of previous anesthesia was reviewed. The risks and                          benefits of the procedure and the sedation op tions and                          risks were discussed with the patient. All questions                          were answered and informed consent was obtained.                         - Marking: The endoscopic procedure was visually                          marked on a patient wrist band delineating the patient                          name, proposed procedure and bronchoscopist's initials.                         - Time-Out: Prior to the start of the procedure, the                          patient's identification, proposed procedure, accurate                          signed consent, correctly labeled images and records,                          and need for prophylactic antibiotics were verified by                          the physician, the nurse and the anesthetist in the                          procedure room.                         After obtaining informed consent, the Bronchoscope was                          introduced through the right nostril and advanced to                           the tracheobronchial tree. The patient tolerated the                          procedure well.  Findings:       The endotracheal tube is in good position. The visualized portion of the        trachea  is of normal caliber. The jaimee is sharp. The tracheobronchial        tree was examined to at least the first subsegmental level. Bronchial        mucosa and anatomy are normal; there are no endobronchial lesions, and        no secretions.       Transbronchial biopsies were performed in the lingula and in the left        lower lobe using forceps and sent for histopathology examination. The        procedure was guided by fluoroscopy. Six biopsy passes were performed.        Twelve biopsy samples were obtained. BAL was perfomed from the lingula.  Impression:            - Surveillance lung transplant, intact anastomoses                          with exudate on the right                         - The airway examination was normal.                         - Transbronchial lung biops ies were performed.                         - BAL was perdormed from the lingula                         - The airway examination was normal.  Recommendation:        - Await BAL and biopsy results.  Attending Participation:       I was present and participated during the entire procedure, including        non-key portions.    Signed electronically by Gerson Haddad MD  _______________  Gerson Haddad MD  4/7/2022 10:26:10 AM  I was physically present for the entire viewing portion of the exam.  __________________________  Signature of teaching physician  [id number] Haddad MD  Number of Addenda: 0    Note Initiated On: 4/7/2022 9:01 AM     Gram Stain    Collection Time: 04/07/22  9:14 AM    Specimen: Bronchus; Bronchial Alveolar Lavage   Result Value Ref Range    Gram Stain Result >25 PMNs/low power field     Gram Stain Result 1+ Mixed michael    Nocardia species culture    Collection Time: 04/07/22  9:14 AM    Specimen: Bronchus; Bronchial Alveolar Lavage   Result Value Ref Range    Culture No growth after 6 days    Fungal or Yeast Culture Routine    Collection Time: 04/07/22  9:14 AM    Specimen: Bronchus; Bronchial Alveolar Lavage   Result  Value Ref Range    Culture No growth after 6 days    Respiratory Aerobic Bacterial Culture    Collection Time: 04/07/22  9:14 AM    Specimen: Bronchus; Bronchial Alveolar Lavage   Result Value Ref Range    Culture 2+ Normal michael    Acid-Fast Bacilli Culture and Stain    Collection Time: 04/07/22  9:14 AM    Specimen: Bronchus; Bronchial Alveolar Lavage   Result Value Ref Range    Acid Fast Stain No acid fast bacilli seen     Acid Fast Stain No acid fast bacilli seen    CMV Quantitative, PCR    Collection Time: 04/07/22  9:15 AM    Specimen: Bronchus; Bronchial Alveolar Lavage   Result Value Ref Range    CMV DNA IU/mL Not Detected Not Detected IU/mL   Cytology, non-gynecologic    Collection Time: 04/07/22  9:15 AM   Result Value Ref Range    Final Diagnosis       Specimen A     Interpretation:      Negative for malignancy     Other Findings:      No fungal or Pneumocystis organisms are identified on GMS stained preparations.    Viral cytopathic effect is absent.     Adequacy:     Satisfactory for evaluation          Comment       Case was reviewed by the following resident: DICK PRINCE      Clinical Information       66 year old woman with bilateral lung transplant and history of actinomyces and ureaplasma (pleural effusion).       Gross Description       A. Bronchus, lingula, Bronchial Alveolar Lavage with GMS:  Received 30 ml of cloudy, white fluid, concentrated, resuspended and processed as 2 Pap stained direct smears and 2 GMS stained direct smears.                   Microscopic Description       Microscopic examination was performed.    A resident/fellow in an ACGME accredited training program was involved in the initial review, preparation, and/or interpretation of this case.  I, Jeremie Betancourt MD, as the senior physician, attest that I: (i) reviewed patient clinical records if indicated; (ii) reviewed relevant lab test results; (iii) examined the relevant preparation(s) for the specimen(s); and (iv) agree  with the report, diagnosis(es), and interpretation as documented by the resident/fellow and edited/confirmed by me. Reporting resident/fellow: Alberto Mcfarland MD (PGY-3)      Other Findings Specimen A  Acute inflammation present., Chronic inflammation present., No fungal or Pneumocystis organisms are identified on GMS stained preparations.  Viral cytopathic effect is absent., Viral cytopathic change absent., Mucicarmine stain is negative for intrace...     No fungal or Pneumocystis organisms are identified on GMS stained preparations.  Viral cytopathic effect is absent.    Performing Labs       The technical component of this testing was completed at [facility]     Respiratory Viral Panel PCR - Bronch Wash    Collection Time: 04/07/22  9:15 AM    Specimen: Bronchus; Bronchial Alveolar Lavage   Result Value Ref Range    Influenza A Negative Negative    Influenza A, H1 Negative Negative    Influenza A, H3 Negative Negative    Influenza A 2009 H1N1 Negative Negative    Influenza B Negative Negative    Respiratory Syncytial Virus A Negative Negative    Respiratory Syncytial Virus B Negative Negative    Parainfluenza Virus 1 Negative Negative    Parainfluenza Virus 2 Negative Negative    Parainfluenza Virus 3 Negative Negative    Human Metapneumovirus Negative Negative    Human Rhinovirus Negative Negative    Adenovirus Species B/E Negative Negative    Adenovirus Species C Negative Negative   Cell Count Body Fluid    Collection Time: 04/07/22  9:16 AM   Result Value Ref Range    Color Colorless Colorless, Yellow    Clarity Clear Clear, Bloody    Total Nucleated Cells 140 /uL    Cell Count Fluid Source BRONCHUS    Differential Body Fluid    Collection Time: 04/07/22  9:16 AM   Result Value Ref Range    % Neutrophils 5 %    % Lymphocytes 21 %    % Monocyte/Macrophages      % Eosinophils 9 %    % Basophils 1 %    % Other Cells 66 %   Surgical  "Pathology Exam    Collection Time: 04/07/22 10:24 AM   Result Value Ref Range    Case Report       Surgical Pathology Report                         Case: [id number]                                  Authorizing Provider:  Gerson Haddad MD      Collected:           04/07/2022 10:24 AM          Ordering Location:     [facility]          Received:            04/07/2022 10:34 AM                                 Endoscopy                                                                    Pathologist:           Chyna Almonte MD                                                               Specimen:    Lung, Left, lingula and LLL Bx                                                             Final Diagnosis       Lung, allograft, lingula and the left lower lobe, transbronchial biopsies:  - Acute rejection: Limited lung parenchyma, cannot be graded (Ax, see comment)  - Airway inflammation: No significant airway inflammation, B0      Comment       Only 2 small alveolated lung parenchyma tissues including the sections of IF for CD31 and C4d are identified. No evidence of acute rejection is identified in these fragments. With appropriate controls, immunofluorescent stains for C4d and CD31 were performed. CD31 highlights vasculature. No capillary pattern of positivity for C4d is identified.       Clinical Information       Procedure:  BRONCHOSCOPY, WITH BRONCHOALVEOLAR LAVAGE and BIOPSIES  Pre-op Diagnosis: S/P lung transplant (H) [Z94.2]  Post-op Diagnosis: Z94.2 - S/P lung transplant (H) [ICD-10-CM]      Gross Description       A(2). Lung, Left, lingula and LLL Bx:  The specimen is received in formalin with proper patient identification, labeled \"lingula and LLL Biopsy\".  The specimen consists of 5 pink soft tissue fragments, 0.2 cm each. The specimen is wrapped and submitted entirely in cassette A1.      Also received with the specimen in a separate container is 2 pink-red soft tissue fragments, 0.1 cm each in Jabari " fixative, that are sent to histology.          Microscopic Description       Microscopic examination was performed.      Performing Labs       The technical component of this testing was completed at [facility]      Case Images     General PFT Lab (Please always keep checked)    Collection Time: 04/14/22  6:34 AM   Result Value Ref Range    FVC-Pred 2.77 L    FVC-Pre 1.26 L    FVC-%Pred-Pre 45 %    FEV1-Pre 1.26 L    FEV1-%Pred-Pre 58 %    FEV1FVC-Pred 79 %    FEV1FVC-Pre 100 %    FEFMax-Pred 5.63 L/sec    FEFMax-Pre 4.11 L/sec    FEFMax-%Pred-Pre 73 %    FEF2575-Pred 1.93 L/sec    FEF2575-Pre 3.65 L/sec    XSZ6598-%Pred-Pre 188 %    ExpTime-Pre 5.78 sec    FIFMax-Pre 2.04 L/sec    FEV1FEV6-Pred 80 %    FEV1FEV6-Pre 100 %   Basic metabolic panel    Collection Time: 04/14/22  7:44 AM   Result Value Ref Range    Sodium 137 133 - 144 mmol/L    Potassium 3.8 3.4 - 5.3 mmol/L    Chloride 97 94 - 109 mmol/L    Carbon Dioxide (CO2) 33 (H) 20 - 32 mmol/L    Anion Gap 7 3 - 14 mmol/L    Urea Nitrogen 23 7 - 30 mg/dL    Creatinine 0.74 0.52 - 1.04 mg/dL    Calcium 9.3 8.5 - 10.1 mg/dL    Glucose 145 (H) 70 - 99 mg/dL    GFR Estimate 89 >60 mL/min/1.73m2   Magnesium    Collection Time: 04/14/22  7:44 AM   Result Value Ref Range    Magnesium 1.3 (L) 1.6 - 2.3 mg/dL   CBC with platelets    Collection Time: 04/14/22  7:44 AM   Result Value Ref Range    WBC Count 11.2 (H) 4.0 - 11.0 10e3/uL    RBC Count 3.62 (L) 3.80 - 5.20 10e6/uL    Hemoglobin 11.0 (L) 11.7 - 15.7 g/dL    Hematocrit 34.1 (L) 35.0 - 47.0 %    MCV 94 78 - 100 fL    MCH 30.4 26.5 - 33.0 pg    MCHC 32.3 31.5 - 36.5 g/dL    RDW 14.1 10.0 - 15.0 %    Platelet Count 257 150 - 450 10e3/uL     PFT interpretation:  Maneuver: valid, but did not meet ATS guidelines

## 2022-04-12 ENCOUNTER — HOSPITAL ENCOUNTER (OUTPATIENT)
Dept: CARDIAC REHAB | Facility: CLINIC | Age: 67
Discharge: HOME | End: 2022-04-12
Attending: INTERNAL MEDICINE
Payer: MEDICARE

## 2022-04-12 PROCEDURE — G0239 OTH RESP PROC, GROUP: HCPCS

## 2022-04-14 ENCOUNTER — OFFICE VISIT (OUTPATIENT)
Dept: PULMONOLOGY | Facility: CLINIC | Age: 67
End: 2022-04-14
Attending: PHYSICIAN ASSISTANT
Payer: MEDICARE

## 2022-04-14 ENCOUNTER — INFUSION THERAPY VISIT (OUTPATIENT)
Dept: INFUSION THERAPY | Facility: CLINIC | Age: 67
End: 2022-04-14
Attending: PHYSICIAN ASSISTANT
Payer: MEDICARE

## 2022-04-14 ENCOUNTER — LAB (OUTPATIENT)
Dept: LAB | Facility: CLINIC | Age: 67
End: 2022-04-14
Attending: PHYSICIAN ASSISTANT
Payer: COMMERCIAL

## 2022-04-14 ENCOUNTER — ANCILLARY PROCEDURE (OUTPATIENT)
Dept: GENERAL RADIOLOGY | Facility: CLINIC | Age: 67
End: 2022-04-14
Attending: PHYSICIAN ASSISTANT
Payer: COMMERCIAL

## 2022-04-14 VITALS
RESPIRATION RATE: 16 BRPM | HEART RATE: 101 BPM | DIASTOLIC BLOOD PRESSURE: 78 MMHG | SYSTOLIC BLOOD PRESSURE: 123 MMHG | TEMPERATURE: 98.1 F

## 2022-04-14 VITALS
SYSTOLIC BLOOD PRESSURE: 118 MMHG | BODY MASS INDEX: 25.21 KG/M2 | OXYGEN SATURATION: 94 % | WEIGHT: 137 LBS | HEART RATE: 89 BPM | DIASTOLIC BLOOD PRESSURE: 75 MMHG | HEIGHT: 62 IN

## 2022-04-14 DIAGNOSIS — D84.9 IMMUNOSUPPRESSED STATUS (H): ICD-10-CM

## 2022-04-14 DIAGNOSIS — Z94.2 S/P LUNG TRANSPLANT (H): ICD-10-CM

## 2022-04-14 DIAGNOSIS — Z94.2 S/P LUNG TRANSPLANT (H): Primary | ICD-10-CM

## 2022-04-14 DIAGNOSIS — D80.1 HYPOGAMMAGLOBULINEMIA (H): Primary | ICD-10-CM

## 2022-04-14 LAB
ANION GAP SERPL CALCULATED.3IONS-SCNC: 7 MMOL/L (ref 3–14)
BUN SERPL-MCNC: 23 MG/DL (ref 7–30)
CALCIUM SERPL-MCNC: 9.3 MG/DL (ref 8.5–10.1)
CHLORIDE BLD-SCNC: 97 MMOL/L (ref 94–109)
CO2 SERPL-SCNC: 33 MMOL/L (ref 20–32)
CREAT SERPL-MCNC: 0.74 MG/DL (ref 0.52–1.04)
ERYTHROCYTE [DISTWIDTH] IN BLOOD BY AUTOMATED COUNT: 14.1 % (ref 10–15)
EXPTIME-PRE: 5.78 SEC
FEF2575-%PRED-PRE: 188 %
FEF2575-PRE: 3.65 L/SEC
FEF2575-PRED: 1.93 L/SEC
FEFMAX-%PRED-PRE: 73 %
FEFMAX-PRE: 4.11 L/SEC
FEFMAX-PRED: 5.63 L/SEC
FEV1-%PRED-PRE: 58 %
FEV1-PRE: 1.26 L
FEV1FEV6-PRE: 100 %
FEV1FEV6-PRED: 80 %
FEV1FVC-PRE: 100 %
FEV1FVC-PRED: 79 %
FIFMAX-PRE: 2.04 L/SEC
FVC-%PRED-PRE: 45 %
FVC-PRE: 1.26 L
FVC-PRED: 2.77 L
GFR SERPL CREATININE-BSD FRML MDRD: 89 ML/MIN/1.73M2
GLUCOSE BLD-MCNC: 145 MG/DL (ref 70–99)
HCT VFR BLD AUTO: 34.1 % (ref 35–47)
HGB BLD-MCNC: 11 G/DL (ref 11.7–15.7)
MAGNESIUM SERPL-MCNC: 1.3 MG/DL (ref 1.6–2.3)
MCH RBC QN AUTO: 30.4 PG (ref 26.5–33)
MCHC RBC AUTO-ENTMCNC: 32.3 G/DL (ref 31.5–36.5)
MCV RBC AUTO: 94 FL (ref 78–100)
PLATELET # BLD AUTO: 257 10E3/UL (ref 150–450)
POTASSIUM BLD-SCNC: 3.8 MMOL/L (ref 3.4–5.3)
RBC # BLD AUTO: 3.62 10E6/UL (ref 3.8–5.2)
SODIUM SERPL-SCNC: 137 MMOL/L (ref 133–144)
TACROLIMUS BLD-MCNC: 11.2 UG/L (ref 5–15)
TME LAST DOSE: NORMAL H
TME LAST DOSE: NORMAL H
WBC # BLD AUTO: 11.2 10E3/UL (ref 4–11)

## 2022-04-14 PROCEDURE — 250N000011 HC RX IP 250 OP 636: Performed by: PHYSICIAN ASSISTANT

## 2022-04-14 PROCEDURE — G0463 HOSPITAL OUTPT CLINIC VISIT: HCPCS | Mod: 25

## 2022-04-14 PROCEDURE — 86833 HLA CLASS II HIGH DEFIN QUAL: CPT | Performed by: PHYSICIAN ASSISTANT

## 2022-04-14 PROCEDURE — 87799 DETECT AGENT NOS DNA QUANT: CPT | Performed by: PHYSICIAN ASSISTANT

## 2022-04-14 PROCEDURE — 36415 COLL VENOUS BLD VENIPUNCTURE: CPT | Performed by: PATHOLOGY

## 2022-04-14 PROCEDURE — 71046 X-RAY EXAM CHEST 2 VIEWS: CPT | Mod: GC | Performed by: RADIOLOGY

## 2022-04-14 PROCEDURE — 80197 ASSAY OF TACROLIMUS: CPT | Performed by: PHYSICIAN ASSISTANT

## 2022-04-14 PROCEDURE — 94375 RESPIRATORY FLOW VOLUME LOOP: CPT | Performed by: PHYSICIAN ASSISTANT

## 2022-04-14 PROCEDURE — 85027 COMPLETE CBC AUTOMATED: CPT | Performed by: PATHOLOGY

## 2022-04-14 PROCEDURE — 86832 HLA CLASS I HIGH DEFIN QUAL: CPT | Performed by: PHYSICIAN ASSISTANT

## 2022-04-14 PROCEDURE — 80048 BASIC METABOLIC PNL TOTAL CA: CPT | Performed by: PATHOLOGY

## 2022-04-14 PROCEDURE — 83735 ASSAY OF MAGNESIUM: CPT | Performed by: PATHOLOGY

## 2022-04-14 PROCEDURE — 96365 THER/PROPH/DIAG IV INF INIT: CPT

## 2022-04-14 PROCEDURE — 99214 OFFICE O/P EST MOD 30 MIN: CPT | Mod: 24 | Performed by: PHYSICIAN ASSISTANT

## 2022-04-14 PROCEDURE — 96366 THER/PROPH/DIAG IV INF ADDON: CPT

## 2022-04-14 PROCEDURE — M0220 HC INJECTION TIXAGEVIMAB & CILGAVIMAB (EVUSHELD): HCPCS | Performed by: PHYSICIAN ASSISTANT

## 2022-04-14 PROCEDURE — 96372 THER/PROPH/DIAG INJ SC/IM: CPT | Performed by: PHYSICIAN ASSISTANT

## 2022-04-14 RX ORDER — CETIRIZINE HYDROCHLORIDE 10 MG/1
10 TABLET ORAL AT BEDTIME
Qty: 30 TABLET | Refills: 11 | Status: SHIPPED | OUTPATIENT
Start: 2022-04-14 | End: 2022-05-23

## 2022-04-14 RX ORDER — MAGNESIUM SULFATE HEPTAHYDRATE 40 MG/ML
4 INJECTION, SOLUTION INTRAVENOUS ONCE
Status: CANCELLED
Start: 2022-04-14 | End: 2022-04-14

## 2022-04-14 RX ORDER — ALBUTEROL SULFATE 0.83 MG/ML
2.5 SOLUTION RESPIRATORY (INHALATION)
Status: CANCELLED | OUTPATIENT
Start: 2022-04-14

## 2022-04-14 RX ORDER — EPINEPHRINE 1 MG/ML
0.3 INJECTION, SOLUTION INTRAMUSCULAR; SUBCUTANEOUS EVERY 5 MIN PRN
Status: CANCELLED | OUTPATIENT
Start: 2022-04-14

## 2022-04-14 RX ORDER — ALBUTEROL SULFATE 90 UG/1
1-2 AEROSOL, METERED RESPIRATORY (INHALATION)
Status: CANCELLED
Start: 2022-04-14

## 2022-04-14 RX ORDER — FLUTICASONE PROPIONATE 50 MCG
2 SPRAY, SUSPENSION (ML) NASAL 2 TIMES DAILY
Qty: 9.9 ML | Refills: 0 | COMMUNITY
Start: 2022-04-14 | End: 2022-04-27

## 2022-04-14 RX ORDER — DIPHENHYDRAMINE HYDROCHLORIDE 50 MG/ML
50 INJECTION INTRAMUSCULAR; INTRAVENOUS
Status: CANCELLED
Start: 2022-04-14

## 2022-04-14 RX ORDER — EPINEPHRINE 1 MG/ML
0.3 INJECTION, SOLUTION, CONCENTRATE INTRAVENOUS EVERY 5 MIN PRN
Status: CANCELLED | OUTPATIENT
Start: 2022-04-14

## 2022-04-14 RX ORDER — NALOXONE HYDROCHLORIDE 0.4 MG/ML
0.2 INJECTION, SOLUTION INTRAMUSCULAR; INTRAVENOUS; SUBCUTANEOUS
Status: CANCELLED | OUTPATIENT
Start: 2022-04-14

## 2022-04-14 RX ORDER — FUROSEMIDE 40 MG
20 TABLET ORAL DAILY
Qty: 60 TABLET | Refills: 3 | COMMUNITY
Start: 2022-04-14 | End: 2022-04-27

## 2022-04-14 RX ORDER — NYSTATIN 100000/ML
1000000 SUSPENSION, ORAL (FINAL DOSE FORM) ORAL 4 TIMES DAILY
Qty: 946 ML | Refills: 4 | Status: SHIPPED | OUTPATIENT
Start: 2022-04-14 | End: 2022-05-24

## 2022-04-14 RX ORDER — METHYLPREDNISOLONE SODIUM SUCCINATE 125 MG/2ML
125 INJECTION, POWDER, LYOPHILIZED, FOR SOLUTION INTRAMUSCULAR; INTRAVENOUS
Status: CANCELLED
Start: 2022-04-14

## 2022-04-14 RX ORDER — MULTIPLE VITAMINS W/ MINERALS TAB 9MG-400MCG
1 TAB ORAL DAILY
Qty: 30 TABLET | Refills: 11 | Status: SHIPPED | OUTPATIENT
Start: 2022-04-14 | End: 2022-05-24

## 2022-04-14 RX ORDER — MYCOPHENOLIC ACID 360 MG/1
720 TABLET, DELAYED RELEASE ORAL 2 TIMES DAILY
Qty: 120 TABLET | Refills: 11 | Status: SHIPPED | OUTPATIENT
Start: 2022-04-14 | End: 2022-05-24

## 2022-04-14 RX ORDER — MEPERIDINE HYDROCHLORIDE 25 MG/ML
25 INJECTION INTRAMUSCULAR; INTRAVENOUS; SUBCUTANEOUS EVERY 30 MIN PRN
Status: CANCELLED | OUTPATIENT
Start: 2022-04-14

## 2022-04-14 RX ORDER — MAGNESIUM SULFATE HEPTAHYDRATE 40 MG/ML
4 INJECTION, SOLUTION INTRAVENOUS ONCE
Status: COMPLETED | OUTPATIENT
Start: 2022-04-14 | End: 2022-04-14

## 2022-04-14 RX ADMIN — Medication 6 ML: at 08:53

## 2022-04-14 RX ADMIN — MAGNESIUM SULFATE HEPTAHYDRATE 4 G: 40 INJECTION, SOLUTION INTRAVENOUS at 13:26

## 2022-04-14 ASSESSMENT — PAIN SCALES - GENERAL: PAINLEVEL: NO PAIN (0)

## 2022-04-14 NOTE — PATIENT INSTRUCTIONS
Dear Melissa Elder    Thank you for choosing [facility] ([facility]) for your infusion.  The following information is a summary of our appointment as well as important reminders.      We look forward in seeing you on your next appointment here at [facility] ([facility]).  Please don t hesitate to call us at [phone] to reschedule any of your appointments or to speak with one of the [facility] registered nurses.  It was a pleasure taking care of you today.    Sincerely,    [facility]  [address] [place] [address]  [address]  Phone:  [phone]

## 2022-04-14 NOTE — PROGRESS NOTES
Infusion Nursing Note:  Melissa Trevinonirmala presents today for Magnesium infusion.    Patient seen by provider today: Yes: in clinic   present during visit today: Not Applicable.    Note:   -4g of Mag infused over 2 hrs    Intravenous Access:  Peripheral IV placed.    Treatment Conditions:  Not Applicable.      Post Infusion Assessment:  Patient tolerated infusion without incident.  Site patent and intact, free from redness, edema or discomfort.  No evidence of extravasations.  Access discontinued per protocol.       Discharge Plan:   AVS to patient via MYCHART.  Patient will return to PCP for next appointment.   Patient discharged in stable condition accompanied by: self.  Departure Mode: Ambulatory.    Sara Clark, RN    /78   Pulse 101   Temp 98.1  F (36.7  C) (Oral)   Resp 16     Administrations This Visit     magnesium sulfate 4 g in 100 mL sterile water (premade)     Admin Date  04/14/2022 Action  New Bag Dose  4 g Rate  50 mL/hr Route  Intravenous Administered By  Sara Clark, RN

## 2022-04-14 NOTE — PATIENT INSTRUCTIONS
Patient Instructions  1. Continue to hydrate with 60-70 oz fluids daily.  2. Continue to exercise daily or most days of the week.  3. Call sleep medicine back to set up an appointment. [phone]  4. Increase Flonase to 2 sprays in each nostril in the morning. Can also do this at night.   5. Decrease Lasix to 20 mg daily in the morning through Sunday, then stop.   6. Stop Claritin. Start Zyrtec 10 mg daily at bedtime.   7. 4 grams of IV magnesium today at 1pm.   8. Increase magnesium to 2 tablets twice daily.   9. Stop potassium supplement.     Next transplant clinic appointment: 2 weeks with CXR, labs and PFTs  Next lab draw: 1 week

## 2022-04-14 NOTE — LETTER
4/14/2022         RE: Melissa Elder  [address]  [facility] [id number]        Dear Colleague,    Thank you for referring your patient, Melissa Elder, to the [facility] [place]. Please see a copy of my visit note below.    [facility]  April 14, 2022         Assessment and Plan:   Melissa Elder is a 66 year old female with h/o bilateral lung transplant on 2/21 for severe COPD for who is seen today for routine follow up. Surgery uncomplicated, s/p bilateral pigtail chest tube placement for hydropneumothorax and bilateral effusions, disseminated Ureaplasma and transient hyperammonemia. Other history notable for HTN, mild non-obstructive CAD, paroxysmal afib, osteoporosis, GERD, and colonic polyps.     1. S/p bilateral sequential lung transplant:  Subluxation of sternum: notes increased congestion, rhinorrhea and PND related to her known allergies, but otherwise, denies pulmonary complaints. Continue to progress with pulmonary rehab. Sating 94% on room air. Repeat sniff test 3/11 without evidence of diaphragmatic palsy. CMV 4/7 negative. S/p bronch on 4/7, cultures NGTD, unable to assess for vascular rejection (ISHLT AXB0). CD4 staining was negative. Bicarb today down to 33. CXR reviewed demonstrates stable transplant with perihilar/basilar streaking. PFTs did not meet ATS guidelines (consistent with prior), but slightly improved.   - Continue IS including Myfortic 720 mg BID, tacrolimus (goal 8-12) and prednisone taper 10 mg BID, decreases again on 5/10  - Dapsone for PJP ppx  - Nystatin for oral candidiasis ppx, 6 month course  - CMV (D-/R-)  - Call and schedule sleep study  - Start Zytrec at bedtime, increase Flonase to 2 sprays BID    2. ID: donor cultures with P-S MSSA with Strep mitis, Actinomyces odontolyticus, MSSA x2, and C. kruseii.  Recipient cultures at time of transplant with Actinomyces  odonotolyticus. S/p ceftriazone 2/23-3/8.   - Amoxicillin (3/8-5/23) X 3 months for Actinomyces treatment  - Low threshold to treat Candida if it recurs per transplant ID    3. 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 Repeat DSA unable to be interpreted secondary to recent IVIG, but C1q was sent off.  - F/u results of C1q  - DSA pending for today    4. Hypogammaglobulinemia: last IgG of 647, s/p IVIG (unclear indication) on 4/4.  - Recheck IgG ~ 5/4    5. Positive AFB on sputum culture: noted on sputum culture 3/2. Transplant ID following for speciation and susceptibility testing as well as other evidence of infection. AFB sputum culture 3/9 NGTD.  - AFB cultures on all future bronchs, pending from 4/7    6. PHS risk criteria donor: additional labs required post-transplant (between 4-8 weeks post-op): Hepatitis B, Hepatitis C, and HIV by COLT, negative on 3/25.   - Recheck per protocol, again at 3 months    7. Concern for gastroparesis:  GERD: NM gastric emptying study completed 3/15 normal. Negative pH/manometry study 3/28/19, noted small hiatal hernia. Overall bloating and appetite has improved.  - Famotidine 20 mg BID and pantoprazole 40 mg BID    8. CAD: noted to have mild non-obstructive CAD without hemodynamically significant lesions on cardiac cath 3/27/19.   - Continue aspirin and rosuvastatin    9. Paroxysmal afib:  HTN: PTA diltiazem, not on AC. Persistent tachycardia post-op, controlled.  - Continue diltiazem and metoprolol  - Will place Zio patch at next visit    10. Hypomagnesemia: suppressed absorption d/t CNI and probably loss secondary to diuretics. Mg of 1.3 today.  - Increase Mg glycinate to 2 tablets BID  - Will give IV Mg 4 grams X 1    11. BLE edema: improved with Anson hose.  - Decrease furosemide  To 20 mg daily X 3 days, then off  - Stop K replacement  - Encourage mobility and protein intake    12. Hyperglycemia: last AIC of  "5.7 on 2/22. No longer having low BS, most BS in the mid 100s. Not on insulin, no need for Endocrine follow up.     We discussed the risks and benefits of receiving EVUSHELD, as well as alternative treatments. The Emergency Use Administration (EUA) was provided to the patient for review and an opportunity for questions was provided. Patient wishes to proceed with EVUSHELD.    RTC: two weeks  Influenza and other vaccinations: Evusheld today  Annual dermatology visit: will discuss    Sharlene Larios PA-C  Pulmonary, Allergy, Critical Care and Sleep Medicine        Interval History:     Did okay with her bronch, but not notes she is having some hoarseness. No fever or chills, has allergies and notes increased runny nose and PND with some cough. Feels most of her drainage/cough is \"above her neck\". Cough is dry, but she feels like she could something out. No new shortness of breath, incisional pain is controlled. No palpitations. Notes pulmonary rehab is going well, walking further distances outside. Occasional \"fullness\" after eating, but appetite is better. Stools are daily and soft/formed. Swelling in her legs has improved.          Review of Systems:   Please see HPI, otherwise the complete 10 point ROS is negative.           Past Medical and Surgical History:     Past Medical History:   Diagnosis Date     Atrial fibrillation with RVR (H)     hx of A fib related to severe COPD, does not meet anticoagulation criteria as noted     COPD, severe (H)      Osteoporosis      Past Surgical History:   Procedure Laterality Date     BRONCHOSCOPY (RIGID OR FLEXIBLE), DIAGNOSTIC N/A 4/7/2022    Procedure: BRONCHOSCOPY, WITH BRONCHOALVEOLAR LAVAGE and BIOPSIES;  Surgeon: Gerson Haddad MD;  Location:  GI     BRONCHOSCOPY FLEXIBLE AND RIGID N/A 3/1/2022    Procedure: BRONCHOSCOPY INSPECTION;  Surgeon: Melissa Landin MD;  Location: [facility] GI     CV CORONARY ANGIOGRAM N/A 3/27/2019    Procedure: CV CORONARY ANGIOGRAM;  Surgeon: " Thierry Serrano MD;  Location:  HEART CARDIAC CATH LAB     CV RIGHT HEART CATH MEASUREMENTS RECORDED N/A 3/27/2019    Procedure: CV RIGHT HEART CATH;  Surgeon: Thierry Serrano MD;  Location:  HEART CARDIAC CATH LAB     ESOPHAGEAL IMPEDENCE FUNCTION TEST WITH 24 HOUR PH GREATER THAN 1 HOUR N/A 3/28/2019    Procedure: ESOPHAGEAL IMPEDENCE FUNCTION TEST WITH 24 HOUR PH GREATER THAN 1 HOUR;  Surgeon: Mike Henry MD;  Location:  GI     EXCISE PILONIDAL CYST, SIMPLE       IR CHEST TUBE PLACEMENT NON-TUNNELED RIGHT  3/3/2022     TONSILLECTOMY & ADENOIDECTOMY       TRANSPLANT LUNG RECIPIENT SINGLE X2 Bilateral 2022    Procedure: Bilateral Sequential Lung Transplantation, Clamshell Incision, Extracorporeal Membrane Oxgenation, Bronchoscopy, Cryoablation of Intercostal Nerves;  Surgeon: Raul Robin MD;  Location:  OR           Family History:     Family History   Problem Relation Age of Onset     Breast Cancer Mother      Colon Cancer Mother      Lung Cancer Mother      Lung Cancer Father      Lung Cancer Maternal Aunt      Pancreatic Cancer Maternal Uncle             Social History:     Social History     Socioeconomic History     Marital status:      Spouse name: Not on file     Number of children: Not on file     Years of education: Not on file     Highest education level: Not on file   Occupational History     Not on file   Tobacco Use     Smoking status: Former Smoker     Packs/day: 1.50     Years: 36.00     Pack years: 54.00     Types: Cigarettes     Quit date: 2006     Years since quittin.2     Smokeless tobacco: Never Used   Substance and Sexual Activity     Alcohol use: Yes     Comment: stated beer and wine occ     Drug use: Yes     Comment: stated hx of marijuana, quit in      Sexual activity: Not on file   Other Topics Concern     Parent/sibling w/ CABG, MI or angioplasty before 65F 55M? Not Asked   Social History Narrative     Not on file     Social  Determinants of Health     Financial Resource Strain: Not on file   Food Insecurity: Not on file   Transportation Needs: Not on file   Physical Activity: Not on file   Stress: Not on file   Social Connections: Not on file   Intimate Partner Violence: Not on file   Housing Stability: Not on file            Medications:     Current Outpatient Medications   Medication     aspirin (ASA) 81 MG EC tablet     cetirizine (ZYRTEC) 10 MG tablet     fluticasone (FLONASE) 50 MCG/ACT nasal spray     furosemide (LASIX) 40 MG tablet     Magnesium Glycinate 665 MG CAPS     multivitamin w/minerals (THERA-VIT-M) tablet     mycophenolic acid (GENERIC EQUIVALENT) 360 MG EC tablet     nystatin (MYCOSTATIN) 099135 UNIT/ML suspension     acetaminophen (TYLENOL) 325 MG tablet     albuterol (PROAIR HFA/PROVENTIL HFA/VENTOLIN HFA) 108 (90 Base) MCG/ACT inhaler     Alcohol Swabs PADS     amoxicillin (AMOXIL) 500 MG capsule     blood glucose (NO BRAND SPECIFIED) lancets standard     blood glucose (NO BRAND SPECIFIED) test strip     blood glucose monitoring (NO BRAND SPECIFIED) meter device kit     calcium carbonate 600 mg-vitamin D 400 units (CALTRATE) 600-400 MG-UNIT per tablet     carboxymethylcellulose PF (REFRESH PLUS) 0.5 % ophthalmic solution     dapsone (ACZONE) 25 MG tablet     diltiazem ER (TIAZAC) 240 MG 24 hr ER beaded capsule     famotidine (PEPCID) 20 MG tablet     glucagon 1 MG kit     glucose (BD GLUCOSE) 4 g chewable tablet     IBANdronate (BONIVA) 150 MG tablet     loperamide (IMODIUM) 2 MG capsule     metoprolol tartrate (LOPRESSOR) 25 MG tablet     OXYGEN-HELIUM IN     pantoprazole (PROTONIX) 40 MG EC tablet     predniSONE (DELTASONE) 5 MG tablet     rosuvastatin (CRESTOR) 5 MG tablet     Sharps Container MISC     tacrolimus (GENERIC EQUIVALENT) 0.5 MG capsule     tacrolimus (GENERIC EQUIVALENT) 1 MG capsule     Current Facility-Administered Medications   Medication     cilgavimab 300 mg/tixagevimab 300 mg (EVUSHELD) - FULL  "DOSE *FOR CLINIC USE ONLY*     Facility-Administered Medications Ordered in Other Visits   Medication     sodium chloride (PF) 0.9% PF flush 10 mL            Physical Exam:   /75   Pulse 89   Ht 1.575 m (5' 2\")   Wt 62.1 kg (137 lb)   SpO2 94%   BMI 25.06 kg/m      GENERAL: alert, NAD  HEENT: NCAT, EOMI, no scleral icterus, oral mucosa moist and without lesions  Neck: no cervical or supraclavicular adenopathy  Lungs: moderate BS, decreased in right lower lobe with few scattered crackles  CV: RRR, S1S2, no murmurs noted  Abdomen: normoactive BS, soft, non tender   Lymph: 1+ BLE edema with reymundo hose  Neuro: AAO X 3, CN 2-12 grossly intact  Psychiatric: normal affect, good eye contact  Skin: no rash, jaundice or lesions on limited exam         Data:   All laboratory and imaging data reviewed.      Recent Results (from the past 168 hour(s))   BRONCHOSCOPY    Collection Time: 04/07/22  9:01 AM   Result Value Ref Range    Bronchoscopy       [facility]  Endoscopy Department-[facility]  _______________________________________________________________________________  Patient Name: Melissa Elder             Procedure Date: 4/7/2022 9:01 AM  MRN: [id number]                       Account #: [id number]  YOB: 1955             Admit Type: Outpatient  Age: 66                               Gender: Female  Note Status: Finalized                Attending MD: Gerson Haddad MD  Pause for the cause: Done             Total Sedation Time: 31 minutes 1:1   monitoring  _______________________________________________________________________________     Procedure:             Bronchoscopy  Indications:           Surveillance lung transplant  Providers:             Gerson Haddad MD, Adin Lilly MD (Fellow), Ivana Meyers RN  Medicines:             Midazolam 1 mg IV, Fentanyl 25 mcg IV  Requesting Physician:    Complications:         No immediate " complications  ___ ____________________________________________________________________________  Procedure:             Pre-Anesthesia Assessment:                         - Universal Protocol:                         - Pre-procedure Verification: Prior to the procedure,                          the patient's identity was verified by full name, date                          of birth and medical record number. The patient's                          identity was verified on all pertinent medical                          records, including History and Physical, nursing                          assessment and pre-anesthesia assessment. Also prior                          to the procedure, a History and Physical was                          performed, and patient medications, allergies and                          sensitivities were reviewed. The patient's tolerance                          of previous anesthesia was reviewed. The risks and                          benefits of the procedure and the sedation op tions and                          risks were discussed with the patient. All questions                          were answered and informed consent was obtained.                         - Marking: The endoscopic procedure was visually                          marked on a patient wrist band delineating the patient                          name, proposed procedure and bronchoscopist's initials.                         - Time-Out: Prior to the start of the procedure, the                          patient's identification, proposed procedure, accurate                          signed consent, correctly labeled images and records,                          and need for prophylactic antibiotics were verified by                          the physician, the nurse and the anesthetist in the                          procedure room.                         After obtaining informed consent, the Bronchoscope was                           introduced through the right nostril and advanced to                           the tracheobronchial tree. The patient tolerated the                          procedure well.  Findings:       The endotracheal tube is in good position. The visualized portion of the        trachea is of normal caliber. The jaimee is sharp. The tracheobronchial        tree was examined to at least the first subsegmental level. Bronchial        mucosa and anatomy are normal; there are no endobronchial lesions, and        no secretions.       Transbronchial biopsies were performed in the lingula and in the left        lower lobe using forceps and sent for histopathology examination. The        procedure was guided by fluoroscopy. Six biopsy passes were performed.        Twelve biopsy samples were obtained. BAL was perfomed from the lingula.  Impression:            - Surveillance lung transplant, intact anastomoses                          with exudate on the right                         - The airway examination was normal.                         - Transbronchial lung biops ies were performed.                         - BAL was perdormed from the lingula                         - The airway examination was normal.  Recommendation:        - Await BAL and biopsy results.  Attending Participation:       I was present and participated during the entire procedure, including        non-key portions.    Signed electronically by Gerson Haddad MD  _______________  Gerson Haddad MD  4/7/2022 10:26:10 AM  I was physically present for the entire viewing portion of the exam.  __________________________  Signature of teaching physician  [id number] Haddad MD  Number of Addenda: 0    Note Initiated On: 4/7/2022 9:01 AM     Gram Stain    Collection Time: 04/07/22  9:14 AM    Specimen: Bronchus; Bronchial Alveolar Lavage   Result Value Ref Range    Gram Stain Result >25 PMNs/low power field     Gram Stain Result 1+ Mixed michael    Nocardia species  culture    Collection Time: 04/07/22  9:14 AM    Specimen: Bronchus; Bronchial Alveolar Lavage   Result Value Ref Range    Culture No growth after 6 days    Fungal or Yeast Culture Routine    Collection Time: 04/07/22  9:14 AM    Specimen: Bronchus; Bronchial Alveolar Lavage   Result Value Ref Range    Culture No growth after 6 days    Respiratory Aerobic Bacterial Culture    Collection Time: 04/07/22  9:14 AM    Specimen: Bronchus; Bronchial Alveolar Lavage   Result Value Ref Range    Culture 2+ Normal michael    Acid-Fast Bacilli Culture and Stain    Collection Time: 04/07/22  9:14 AM    Specimen: Bronchus; Bronchial Alveolar Lavage   Result Value Ref Range    Acid Fast Stain No acid fast bacilli seen     Acid Fast Stain No acid fast bacilli seen    CMV Quantitative, PCR    Collection Time: 04/07/22  9:15 AM    Specimen: Bronchus; Bronchial Alveolar Lavage   Result Value Ref Range    CMV DNA IU/mL Not Detected Not Detected IU/mL   Cytology, non-gynecologic    Collection Time: 04/07/22  9:15 AM   Result Value Ref Range    Final Diagnosis       Specimen A     Interpretation:      Negative for malignancy     Other Findings:      No fungal or Pneumocystis organisms are identified on GMS stained preparations.    Viral cytopathic effect is absent.     Adequacy:     Satisfactory for evaluation          Comment       Case was reviewed by the following resident: DICK PRINCE      Clinical Information       66 year old woman with bilateral lung transplant and history of actinomyces and ureaplasma (pleural effusion).       Gross Description       A. Bronchus, lingula, Bronchial Alveolar Lavage with GMS:  Received 30 ml of cloudy, white fluid, concentrated, resuspended and processed as 2 Pap stained direct smears and 2 GMS stained direct smears.                   Microscopic Description       Microscopic examination was performed.    A resident/fellow in an ACGME accredited training program was involved in the initial review,  preparation, and/or interpretation of this case.  I, Jeremie Betancourt MD, as the senior physician, attest that I: (i) reviewed patient clinical records if indicated; (ii) reviewed relevant lab test results; (iii) examined the relevant preparation(s) for the specimen(s); and (iv) agree with the report, diagnosis(es), and interpretation as documented by the resident/fellow and edited/confirmed by me. Reporting resident/fellow: Alberto Mcfarland MD (PGY-3)      Other Findings Specimen A  Acute inflammation present., Chronic inflammation present., No fungal or Pneumocystis organisms are identified on GMS stained preparations.  Viral cytopathic effect is absent., Viral cytopathic change absent., Mucicarmine stain is negative for intrace...     No fungal or Pneumocystis organisms are identified on GMS stained preparations.  Viral cytopathic effect is absent.    Performing Labs       The technical component of this testing was completed at [facility]     Respiratory Viral Panel PCR - Bronch Wash    Collection Time: 04/07/22  9:15 AM    Specimen: Bronchus; Bronchial Alveolar Lavage   Result Value Ref Range    Influenza A Negative Negative    Influenza A, H1 Negative Negative    Influenza A, H3 Negative Negative    Influenza A 2009 H1N1 Negative Negative    Influenza B Negative Negative    Respiratory Syncytial Virus A Negative Negative    Respiratory Syncytial Virus B Negative Negative    Parainfluenza Virus 1 Negative Negative    Parainfluenza Virus 2 Negative Negative    Parainfluenza Virus 3 Negative Negative    Human Metapneumovirus Negative Negative    Human Rhinovirus Negative Negative    Adenovirus Species B/E Negative Negative    Adenovirus Species C Negative Negative   Cell Count Body Fluid    Collection Time: 04/07/22  9:16 AM   Result Value Ref Range    Color Colorless Colorless, Yellow    Clarity Clear Clear, Bloody    Total Nucleated Cells 140  /uL    Cell Count Fluid Source BRONCHUS    Differential Body Fluid    Collection Time: 04/07/22  9:16 AM   Result Value Ref Range    % Neutrophils 5 %    % Lymphocytes 21 %    % Monocyte/Macrophages      % Eosinophils 9 %    % Basophils 1 %    % Other Cells 66 %   Surgical Pathology Exam    Collection Time: 04/07/22 10:24 AM   Result Value Ref Range    Case Report       Surgical Pathology Report                         Case: [id number]                                  Authorizing Provider:  Gerson Haddad MD      Collected:           04/07/2022 10:24 AM          Ordering Location:     [facility]          Received:            04/07/2022 10:34 AM                                 Endoscopy                                                                    Pathologist:           Chyna Almonte MD                                                               Specimen:    Lung, Left, lingula and LLL Bx                                                             Final Diagnosis       Lung, allograft, lingula and the left lower lobe, transbronchial biopsies:  - Acute rejection: Limited lung parenchyma, cannot be graded (Ax, see comment)  - Airway inflammation: No significant airway inflammation, B0      Comment       Only 2 small alveolated lung parenchyma tissues including the sections of IF for CD31 and C4d are identified. No evidence of acute rejection is identified in these fragments. With appropriate controls, immunofluorescent stains for C4d and CD31 were performed. CD31 highlights vasculature. No capillary pattern of positivity for C4d is identified.       Clinical Information       Procedure:  BRONCHOSCOPY, WITH BRONCHOALVEOLAR LAVAGE and BIOPSIES  Pre-op Diagnosis: S/P lung transplant (H) [Z94.2]  Post-op Diagnosis: Z94.2 - S/P lung transplant (H) [ICD-10-CM]      Gross Description       A(2). Lung, Left, lingula and LLL Bx:  The specimen is received in formalin with proper patient identification, labeled  "\"lingula and LLL Biopsy\".  The specimen consists of 5 pink soft tissue fragments, 0.2 cm each. The specimen is wrapped and submitted entirely in cassette A1.      Also received with the specimen in a separate container is 2 pink-red soft tissue fragments, 0.1 cm each in Jabari fixative, that are sent to histology.          Microscopic Description       Microscopic examination was performed.      Performing Labs       The technical component of this testing was completed at [facility]      Case Images     General PFT Lab (Please always keep checked)    Collection Time: 04/14/22  6:34 AM   Result Value Ref Range    FVC-Pred 2.77 L    FVC-Pre 1.26 L    FVC-%Pred-Pre 45 %    FEV1-Pre 1.26 L    FEV1-%Pred-Pre 58 %    FEV1FVC-Pred 79 %    FEV1FVC-Pre 100 %    FEFMax-Pred 5.63 L/sec    FEFMax-Pre 4.11 L/sec    FEFMax-%Pred-Pre 73 %    FEF2575-Pred 1.93 L/sec    FEF2575-Pre 3.65 L/sec    GCG0863-%Pred-Pre 188 %    ExpTime-Pre 5.78 sec    FIFMax-Pre 2.04 L/sec    FEV1FEV6-Pred 80 %    FEV1FEV6-Pre 100 %   Basic metabolic panel    Collection Time: 04/14/22  7:44 AM   Result Value Ref Range    Sodium 137 133 - 144 mmol/L    Potassium 3.8 3.4 - 5.3 mmol/L    Chloride 97 94 - 109 mmol/L    Carbon Dioxide (CO2) 33 (H) 20 - 32 mmol/L    Anion Gap 7 3 - 14 mmol/L    Urea Nitrogen 23 7 - 30 mg/dL    Creatinine 0.74 0.52 - 1.04 mg/dL    Calcium 9.3 8.5 - 10.1 mg/dL    Glucose 145 (H) 70 - 99 mg/dL    GFR Estimate 89 >60 mL/min/1.73m2   Magnesium    Collection Time: 04/14/22  7:44 AM   Result Value Ref Range    Magnesium 1.3 (L) 1.6 - 2.3 mg/dL   CBC with platelets    Collection Time: 04/14/22  7:44 AM   Result Value Ref Range    WBC Count 11.2 (H) 4.0 - 11.0 10e3/uL    RBC Count 3.62 (L) 3.80 - 5.20 10e6/uL    Hemoglobin 11.0 (L) 11.7 - 15.7 g/dL    Hematocrit 34.1 (L) 35.0 - 47.0 %    MCV 94 78 - 100 fL    MCH 30.4 26.5 - 33.0 pg    MCHC 32.3 31.5 - 36.5 g/dL    RDW 14.1 " 10.0 - 15.0 %    Platelet Count 257 150 - 450 10e3/uL     PFT interpretation:  Maneuver: valid, but did not meet ATS guidelines      Again, thank you for allowing me to participate in the care of your patient.        Sincerely,        Sharlene Larios PA-C

## 2022-04-14 NOTE — NURSING NOTE
Chief Complaint   Patient presents with     Lung Transplant     Lung Return      Vitals were taken and medications were reconciled.     Eileen Stock RMA  7:58 AM

## 2022-04-14 NOTE — NURSING NOTE
Transplant Coordinator Note     Reason for visit: Post lung transplant follow up visit   Coordinator: Present (via phone)   Caregiver:  , Tyrese     Health concerns addressed today:  1. Bronch went well, voice a little better. Gurgling at night more since the bronch.   2. Allergy sx (has allergies), nasal drainage and PND.   3. GI: feels full quickly. Eats a lot, but not gaining weight.      Activity/rehab: pulmonary rehab  Oxygen needs: room air, did not qualify for NOC O2  Pain management/RX: no chest/incsional pain  Diabetic management: checking BGs BID  Next Bronch due: 6/22  Risk Criteria Labs:  Negative 3/25/22  CMV status: D-/R-  EBV status: D+/R+  AC/asa: on ASA 81mg  PJP prophylactic: dapsone    COVID:  1. COVID-19 infection (yes/no, date of most recent positive test):   2. Status/instructions given about COVID-19 vaccine: Evusheld today     Pt Education: medications (use/dose/side effects), how/when to call coordinator, frequency of labs, s/s of infection/rejection, call prior to starting any new medications, lab/vital sign book     Health Maintenance:     Last colonoscopy:     Next colonoscopy due:     Dermatology:    Vaccinations this visit:     Labs, CXR, PFTs reviewed with patient  Medication record reviewed and re/conciled  Questions and concerns addressed    Patient Instructions  1. Continue to hydrate with 60-70 oz fluids daily.  2. Continue to exercise daily or most days of the week.  3. Call sleep medicine back to set up an appointment. [phone]  4. Increase Flonase to 2 sprays in each nostril in the morning. Can also do this at night.   5. Decrease Lasix to 20 mg daily in the morning through Sunday, then stop.   6. Stop Claritin. Start Zyrtec 10 mg daily at bedtime.   7. 4 grams of IV magnesium today at 1pm.   8. Increase magnesium to 2 tablets twice daily.   9. Stop potassium supplement.     Next transplant clinic appointment: 2 weeks with CXR, labs and PFTs  Next lab draw: 1  week    AVS printed at time of check out

## 2022-04-14 NOTE — LETTER
4/14/2022         RE: Melissa Elder  [address]  [place] [address]        Dear Colleague,    Thank you for referring your patient, Melissa Elder, to the [facility]. Please see a copy of my visit note below.    Infusion Nursing Note:  Melissa Elder presents today for Magnesium infusion.    Patient seen by provider today: Yes: in clinic   present during visit today: Not Applicable.    Note:   -4g of Mag infused over 2 hrs    Intravenous Access:  Peripheral IV placed.    Treatment Conditions:  Not Applicable.      Post Infusion Assessment:  Patient tolerated infusion without incident.  Site patent and intact, free from redness, edema or discomfort.  No evidence of extravasations.  Access discontinued per protocol.       Discharge Plan:   AVS to patient via MYCHART.  Patient will return to PCP for next appointment.   Patient discharged in stable condition accompanied by: self.  Departure Mode: Ambulatory.    Sara Clark, RN    /78   Pulse 101   Temp 98.1  F (36.7  C) (Oral)   Resp 16     Administrations This Visit     magnesium sulfate 4 g in 100 mL sterile water (premade)     Admin Date  04/14/2022 Action  New Bag Dose  4 g Rate  50 mL/hr Route  Intravenous Administered By  Sara Clark, RN                  Again, thank you for allowing me to participate in the care of your patient.      Sincerely,    [facility]

## 2022-04-15 ENCOUNTER — HOSPITAL ENCOUNTER (OUTPATIENT)
Dept: CARDIAC REHAB | Facility: CLINIC | Age: 67
Discharge: HOME | End: 2022-04-15
Attending: INTERNAL MEDICINE
Payer: MEDICARE

## 2022-04-15 LAB
EBV DNA COPIES/ML, INSTRUMENT: 1014 COPIES/ML
EBV DNA SPEC NAA+PROBE-LOG#: 3 {LOG_COPIES}/ML

## 2022-04-15 PROCEDURE — G0239 OTH RESP PROC, GROUP: HCPCS

## 2022-04-16 LAB — CMV DNA SPEC NAA+PROBE-ACNC: NOT DETECTED IU/ML

## 2022-04-18 LAB
DONOR IDENTIFICATION: NORMAL
DQB5: 5812
DQB6: 2407
DR15: 811
DSA COMMENTS: NORMAL
DSA PRESENT: YES
DSA TEST METHOD: NORMAL
ORGAN: NORMAL
SA 1 CELL: NORMAL
SA 1 TEST METHOD: NORMAL
SA 2 CELL: NORMAL
SA 2 TEST METHOD: NORMAL
SA1 HI RISK ABY: NORMAL
SA1 MOD RISK ABY: NORMAL
SA2 HI RISK ABY: NORMAL
SA2 MOD RISK ABY: NORMAL
UNACCEPTABLE ANTIGENS: NORMAL
UNOS CPRA: 73
ZZZSA 1  COMMENTS: NORMAL
ZZZSA 2 COMMENTS: NORMAL

## 2022-04-19 ENCOUNTER — HOSPITAL ENCOUNTER (OUTPATIENT)
Dept: CARDIAC REHAB | Facility: CLINIC | Age: 67
Discharge: HOME | End: 2022-04-19
Attending: INTERNAL MEDICINE
Payer: MEDICARE

## 2022-04-19 ENCOUNTER — LAB (OUTPATIENT)
Dept: LAB | Facility: CLINIC | Age: 67
End: 2022-04-19
Payer: MEDICARE

## 2022-04-19 DIAGNOSIS — Z94.2 S/P LUNG TRANSPLANT (H): ICD-10-CM

## 2022-04-19 LAB
ACID FAST STAIN (ARUP): NORMAL
ANION GAP SERPL CALCULATED.3IONS-SCNC: 6 MMOL/L (ref 3–14)
BUN SERPL-MCNC: 19 MG/DL (ref 7–30)
CALCIUM SERPL-MCNC: 9.3 MG/DL (ref 8.5–10.1)
CHLORIDE BLD-SCNC: 101 MMOL/L (ref 94–109)
CO2 SERPL-SCNC: 29 MMOL/L (ref 20–32)
CREAT SERPL-MCNC: 0.57 MG/DL (ref 0.52–1.04)
ERYTHROCYTE [DISTWIDTH] IN BLOOD BY AUTOMATED COUNT: 13.8 % (ref 10–15)
GFR SERPL CREATININE-BSD FRML MDRD: >90 ML/MIN/1.73M2
GLUCOSE BLD-MCNC: 244 MG/DL (ref 70–99)
HCT VFR BLD AUTO: 32.5 % (ref 35–47)
HGB BLD-MCNC: 10.2 G/DL (ref 11.7–15.7)
MAGNESIUM SERPL-MCNC: 1.6 MG/DL (ref 1.6–2.3)
MCH RBC QN AUTO: 30.6 PG (ref 26.5–33)
MCHC RBC AUTO-ENTMCNC: 31.4 G/DL (ref 31.5–36.5)
MCV RBC AUTO: 98 FL (ref 78–100)
PLATELET # BLD AUTO: 214 10E3/UL (ref 150–450)
POTASSIUM BLD-SCNC: 4.2 MMOL/L (ref 3.4–5.3)
RBC # BLD AUTO: 3.33 10E6/UL (ref 3.8–5.2)
SODIUM SERPL-SCNC: 136 MMOL/L (ref 133–144)
TACROLIMUS BLD-MCNC: 12.2 UG/L (ref 5–15)
TME LAST DOSE: NORMAL H
TME LAST DOSE: NORMAL H
WBC # BLD AUTO: 14.5 10E3/UL (ref 4–11)

## 2022-04-19 PROCEDURE — 86832 HLA CLASS I HIGH DEFIN QUAL: CPT

## 2022-04-19 PROCEDURE — 83735 ASSAY OF MAGNESIUM: CPT

## 2022-04-19 PROCEDURE — 80197 ASSAY OF TACROLIMUS: CPT

## 2022-04-19 PROCEDURE — 86833 HLA CLASS II HIGH DEFIN QUAL: CPT

## 2022-04-19 PROCEDURE — 36415 COLL VENOUS BLD VENIPUNCTURE: CPT

## 2022-04-19 PROCEDURE — G0239 OTH RESP PROC, GROUP: HCPCS

## 2022-04-19 PROCEDURE — 80048 BASIC METABOLIC PNL TOTAL CA: CPT

## 2022-04-19 PROCEDURE — 85027 COMPLETE CBC AUTOMATED: CPT

## 2022-04-20 ENCOUNTER — TELEPHONE (OUTPATIENT)
Dept: TRANSPLANT | Facility: CLINIC | Age: 67
End: 2022-04-20
Payer: COMMERCIAL

## 2022-04-20 DIAGNOSIS — Z94.2 S/P LUNG TRANSPLANT (H): ICD-10-CM

## 2022-04-20 LAB
DONOR IDENTIFICATION: NORMAL
DQB5: 4518
DQB6: 1630
DR15: 586
DSA COMMENTS: NORMAL
DSA PRESENT: YES
DSA TEST METHOD: NORMAL
ORGAN: NORMAL
SA 1 CELL: NORMAL
SA 1 TEST METHOD: NORMAL
SA 2 CELL: NORMAL
SA 2 TEST METHOD: NORMAL
SA1 HI RISK ABY: NORMAL
SA1 MOD RISK ABY: NORMAL
SA2 HI RISK ABY: NORMAL
SA2 MOD RISK ABY: NORMAL
UNACCEPTABLE ANTIGENS: NORMAL
UNOS CPRA: 73
ZZZSA 1  COMMENTS: NORMAL
ZZZSA 2 COMMENTS: NORMAL

## 2022-04-20 RX ORDER — TACROLIMUS 1 MG/1
2 CAPSULE ORAL 2 TIMES DAILY
Qty: 120 CAPSULE | Refills: 11 | Status: SHIPPED | OUTPATIENT
Start: 2022-04-20 | End: 2022-05-11

## 2022-04-20 RX ORDER — TACROLIMUS 0.5 MG/1
0.5 CAPSULE ORAL 2 TIMES DAILY
Qty: 60 CAPSULE | Refills: 11 | Status: SHIPPED | OUTPATIENT
Start: 2022-04-20 | End: 2022-05-11

## 2022-04-20 NOTE — RESULT ENCOUNTER NOTE
Tacrolimus level 12.2 at 15 hours, on 4/19.  Goal 8-12.   Current dose 3 mg in AM, 2.5 mg in PM    Dose changed to 2.5 mg in AM, 2.5 mg in PM   Recheck level at clinic appt on 4/27    Discussed with Tyrese Sandoval message sent

## 2022-04-22 ENCOUNTER — HOSPITAL ENCOUNTER (OUTPATIENT)
Dept: CARDIAC REHAB | Facility: CLINIC | Age: 67
Discharge: HOME | End: 2022-04-22
Attending: INTERNAL MEDICINE
Payer: MEDICARE

## 2022-04-22 PROCEDURE — G0239 OTH RESP PROC, GROUP: HCPCS

## 2022-04-25 NOTE — PROGRESS NOTES
[facility]  April 27, 2022         Assessment and Plan:   Melissa Elder is a 66 year old female with h/o bilateral lung transplant on 2/21 for severe COPD for who is seen today for routine follow up. Surgery uncomplicated, s/p bilateral pigtail chest tube placement for hydropneumothorax and bilateral effusions, disseminated Ureaplasma and transient hyperammonemia. Other history notable for HTN, mild non-obstructive CAD, paroxysmal afib, osteoporosis, GERD, and colonic polyps.     1. S/p bilateral sequential lung transplant:  Subluxation of sternum: no new pulmonary complaints today, continues to work and progress with pulmonary rehab.  Continue to progress with pulmonary rehab, was able to walk in the hallway without her walker. Sating 94% on room air. Repeat sniff test 3/11 without evidence of diaphragmatic palsy. S/p bronch on 4/7, cultures NGTD, unable to assess for vascular rejection (ISHLT AXB0). CD4 staining was negative. CMV 4/14 negative. CXR reviewed after the visit and demonstrates stable transplant. PFTs completed after the visit did not meet ATS guidelines (consistent with prior), but stable.  - Continue IS including Myfortic 720 mg BID, tacrolimus (goal 8-12) and prednisone taper 10 mg BID, decreases again on 5/10  - Dapsone for PJP ppx  - Nystatin for oral candidiasis ppx, 6 month course  - CMV (D-/R-)  - Will schedule sleep study closer to home    2. ID: donor cultures with P-S MSSA with Strep mitis, Actinomyces odontolyticus, MSSA x2, and C. kruseii.  Recipient cultures at time of transplant with Actinomyces odonotolyticus. S/p ceftriazone 2/23-3/8.   - Amoxicillin (3/8-5/23) X 3 months for Actinomyces treatment  - Low threshold to treat Candida if it recurs per transplant ID    3. Positive DSA: last DSA on 4/19 with improved DR15 (586 down from 811), DQB5 (4518 down from 5812) and DQB6 (1630 down from 2407).   - F/u results of C1q,  pending from 4/6  - DSA pending for today    4. Hypogammaglobulinemia: last IgG of 647, s/p IVIG (unclear indication) on 4/4.  - IgG ordered for today    5. Positive AFB on sputum culture: noted on sputum culture 3/2. Transplant ID following for speciation and susceptibility testing as well as other evidence of infection. AFB sputum culture 3/9 NGTD.  - AFB cultures on all future bronchs, pending from 4/7    6. PHS risk criteria donor: additional labs required post-transplant (between 4-8 weeks post-op): Hepatitis B, Hepatitis C, and HIV by COLT, negative on 3/25.     7. Concern for gastroparesis:  GERD: NM gastric emptying study completed 3/15 normal. Negative pH/manometry study 3/28/19, noted small hiatal hernia. No GI complaints today, appetite is good.   - Famotidine 20 mg BID, decrease pantoprazole to 40 mg daily    8. CAD: noted to have mild non-obstructive CAD without hemodynamically significant lesions on cardiac cath 3/27/19.   - Continue aspirin and rosuvastatin  - Will need to hold aspirin 7 days prior to bronch    9. Paroxysmal afib:  HTN:  had been on diltiazem pre transplant, not on AC. Persistent tachycardia post-op, controlled.  - Continue diltiazem and metoprolol  - Zio patch today    10. Hypomagnesemia: suppressed absorption d/t CNI. Mg low at 1.2 today.  - Will give 4 grams IV mag  - Increase Mg glycinate to 2 tablets in the am and pm an done tablet at noon      11. BLE edema: was on lasix pre transplant for chronic edema.  states 5 mg daily. Notes increased edema, symmetric, notes she had tomato soup last night.  - Conservative measures with compression socks, increased protein and decreased Na intake and ongoing exercise    12. Low level EBV viremia: last level of 1014 (log 3.0) on 4/14.     13. Hoarseness: started after her last bronch, possibly worse today and patient feels could be related to Flonase.  - Stop Flonase and monitor    RTC: two weeks with bronch  Influenza and other  vaccinations: Antonio completed on 4/14  Annual dermatology visit: will discuss    Sharlene Larios PA-C  Pulmonary, Allergy, Critical Care and Sleep Medicine        Interval History:     Since her last visit, patient has continued with rehab, doing treadmill, stairs and weights. Feels like she is getting stronger, can walk the hallways without a walker. Has been down in the exercise room as well. No new or unexpected shortness of breath, does have a dry cough and some ongoing hoarseness. Notes the cough is related to activity. No chest congestion, no chest pain or palpitations. No nausea or vomiting, no bloating or gas. Stools are regular, good appetite. Finding fluid intake hard with everything she is doing in a day, gets in maybe 50 oz per day.          Review of Systems:   Please see HPI, otherwise the complete 10 point ROS is negative.           Past Medical and Surgical History:     Past Medical History:   Diagnosis Date     Atrial fibrillation with RVR (H)     hx of A fib related to severe COPD, does not meet anticoagulation criteria as noted     COPD, severe (H)      Osteoporosis      Past Surgical History:   Procedure Laterality Date     BRONCHOSCOPY (RIGID OR FLEXIBLE), DIAGNOSTIC N/A 4/7/2022    Procedure: BRONCHOSCOPY, WITH BRONCHOALVEOLAR LAVAGE and BIOPSIES;  Surgeon: Gerson Haddad MD;  Location:  GI     BRONCHOSCOPY FLEXIBLE AND RIGID N/A 3/1/2022    Procedure: BRONCHOSCOPY INSPECTION;  Surgeon: Melissa Landin MD;  Location:  GI     CV CORONARY ANGIOGRAM N/A 3/27/2019    Procedure: CV CORONARY ANGIOGRAM;  Surgeon: Thierry Serrano MD;  Location:  HEART CARDIAC CATH LAB     CV RIGHT HEART CATH MEASUREMENTS RECORDED N/A 3/27/2019    Procedure: CV RIGHT HEART CATH;  Surgeon: Thierry Serrano MD;  Location:  HEART CARDIAC CATH LAB     ESOPHAGEAL IMPEDENCE FUNCTION TEST WITH 24 HOUR PH GREATER THAN 1 HOUR N/A 3/28/2019    Procedure: ESOPHAGEAL IMPEDENCE FUNCTION TEST WITH 24 HOUR PH GREATER  THAN 1 HOUR;  Surgeon: Mike Henry MD;  Location:  GI     EXCISE PILONIDAL CYST, SIMPLE       IR CHEST TUBE PLACEMENT NON-TUNNELED RIGHT  3/3/2022     TONSILLECTOMY & ADENOIDECTOMY       TRANSPLANT LUNG RECIPIENT SINGLE X2 Bilateral 2022    Procedure: Bilateral Sequential Lung Transplantation, Clamshell Incision, Extracorporeal Membrane Oxgenation, Bronchoscopy, Cryoablation of Intercostal Nerves;  Surgeon: Raul Robin MD;  Location:  OR           Family History:     Family History   Problem Relation Age of Onset     Breast Cancer Mother      Colon Cancer Mother      Lung Cancer Mother      Lung Cancer Father      Lung Cancer Maternal Aunt      Pancreatic Cancer Maternal Uncle             Social History:     Social History     Socioeconomic History     Marital status:      Spouse name: Not on file     Number of children: Not on file     Years of education: Not on file     Highest education level: Not on file   Occupational History     Not on file   Tobacco Use     Smoking status: Former Smoker     Packs/day: 1.50     Years: 36.00     Pack years: 54.00     Types: Cigarettes     Quit date: 2006     Years since quittin.3     Smokeless tobacco: Never Used   Substance and Sexual Activity     Alcohol use: Yes     Comment: stated beer and wine occ     Drug use: Yes     Comment: stated hx of marijuana, quit in      Sexual activity: Not on file   Other Topics Concern     Parent/sibling w/ CABG, MI or angioplasty before 65F 55M? Not Asked   Social History Narrative     Not on file     Social Determinants of Health     Financial Resource Strain: Not on file   Food Insecurity: Not on file   Transportation Needs: Not on file   Physical Activity: Not on file   Stress: Not on file   Social Connections: Not on file   Intimate Partner Violence: Not on file   Housing Stability: Not on file            Medications:     Current Outpatient Medications   Medication     metoprolol  "tartrate (LOPRESSOR) 25 MG tablet     pantoprazole (PROTONIX) 40 MG EC tablet     acetaminophen (TYLENOL) 325 MG tablet     albuterol (PROAIR HFA/PROVENTIL HFA/VENTOLIN HFA) 108 (90 Base) MCG/ACT inhaler     Alcohol Swabs PADS     amoxicillin (AMOXIL) 500 MG capsule     aspirin (ASA) 81 MG EC tablet     blood glucose (NO BRAND SPECIFIED) lancets standard     blood glucose (NO BRAND SPECIFIED) test strip     blood glucose monitoring (NO BRAND SPECIFIED) meter device kit     calcium carbonate 600 mg-vitamin D 400 units (CALTRATE) 600-400 MG-UNIT per tablet     carboxymethylcellulose PF (REFRESH PLUS) 0.5 % ophthalmic solution     cetirizine (ZYRTEC) 10 MG tablet     dapsone (ACZONE) 25 MG tablet     diltiazem ER (TIAZAC) 240 MG 24 hr ER beaded capsule     famotidine (PEPCID) 20 MG tablet     glucagon 1 MG kit     glucose (BD GLUCOSE) 4 g chewable tablet     IBANdronate (BONIVA) 150 MG tablet     Magnesium Glycinate 665 MG CAPS     multivitamin w/minerals (THERA-VIT-M) tablet     mycophenolic acid (GENERIC EQUIVALENT) 360 MG EC tablet     nystatin (MYCOSTATIN) 057570 UNIT/ML suspension     OXYGEN-HELIUM IN     predniSONE (DELTASONE) 5 MG tablet     rosuvastatin (CRESTOR) 5 MG tablet     Sharps Container MISC     tacrolimus (GENERIC EQUIVALENT) 0.5 MG capsule     tacrolimus (GENERIC EQUIVALENT) 1 MG capsule     No current facility-administered medications for this visit.     Facility-Administered Medications Ordered in Other Visits   Medication     sodium chloride (PF) 0.9% PF flush 10 mL            Physical Exam:   /76   Pulse 90   Ht 1.575 m (5' 2\")   Wt 62.1 kg (137 lb)   SpO2 94%   BMI 25.06 kg/m      GENERAL: alert, NAD  HEENT: NCAT, EOMI, no scleral icterus, oral mucosa moist and without lesions  Neck: no cervical or supraclavicular adenopathy  Lungs: moderate BS, decreased in bases  CV: RRR, S1S2, no murmurs noted  Abdomen: normoactive BS, soft, non tender   Lymph: 1+ BLE edema with reymundo hose  Neuro: " AAO X 3, CN 2-12 grossly intact  Psychiatric: normal affect, good eye contact  Skin: no rash, jaundice or lesions on limited exam         Data:   All laboratory and imaging data reviewed.      No results found for this or any previous visit (from the past 168 hour(s)).  PFT interpretation:  Maneuver: valid, but did not meet ATS guidelines

## 2022-04-26 ENCOUNTER — HOSPITAL ENCOUNTER (OUTPATIENT)
Dept: CARDIAC REHAB | Facility: CLINIC | Age: 67
Discharge: HOME | End: 2022-04-26
Attending: INTERNAL MEDICINE
Payer: MEDICARE

## 2022-04-26 PROCEDURE — G0239 OTH RESP PROC, GROUP: HCPCS

## 2022-04-27 ENCOUNTER — LAB (OUTPATIENT)
Dept: LAB | Facility: CLINIC | Age: 67
End: 2022-04-27
Payer: COMMERCIAL

## 2022-04-27 ENCOUNTER — ANCILLARY PROCEDURE (OUTPATIENT)
Dept: CARDIOLOGY | Facility: CLINIC | Age: 67
End: 2022-04-27
Payer: MEDICARE

## 2022-04-27 ENCOUNTER — OFFICE VISIT (OUTPATIENT)
Dept: PULMONOLOGY | Facility: CLINIC | Age: 67
End: 2022-04-27
Attending: PHYSICIAN ASSISTANT
Payer: MEDICARE

## 2022-04-27 ENCOUNTER — INFUSION THERAPY VISIT (OUTPATIENT)
Dept: INFUSION THERAPY | Facility: CLINIC | Age: 67
End: 2022-04-27
Attending: PHYSICIAN ASSISTANT
Payer: MEDICARE

## 2022-04-27 VITALS — HEART RATE: 91 BPM | DIASTOLIC BLOOD PRESSURE: 79 MMHG | SYSTOLIC BLOOD PRESSURE: 135 MMHG

## 2022-04-27 VITALS
HEIGHT: 62 IN | DIASTOLIC BLOOD PRESSURE: 76 MMHG | BODY MASS INDEX: 25.21 KG/M2 | OXYGEN SATURATION: 94 % | HEART RATE: 90 BPM | WEIGHT: 137 LBS | SYSTOLIC BLOOD PRESSURE: 121 MMHG

## 2022-04-27 DIAGNOSIS — E83.42 HYPOMAGNESEMIA: Primary | ICD-10-CM

## 2022-04-27 DIAGNOSIS — I97.89 POSTOPERATIVE ATRIAL FIBRILLATION (H): ICD-10-CM

## 2022-04-27 DIAGNOSIS — Z11.59 ENCOUNTER FOR SCREENING FOR OTHER VIRAL DISEASES: Primary | ICD-10-CM

## 2022-04-27 DIAGNOSIS — D84.9 IMMUNOSUPPRESSED STATUS (H): ICD-10-CM

## 2022-04-27 DIAGNOSIS — Z94.2 S/P LUNG TRANSPLANT (H): ICD-10-CM

## 2022-04-27 DIAGNOSIS — Z79.899 ENCOUNTER FOR LONG-TERM (CURRENT) USE OF HIGH-RISK MEDICATION: ICD-10-CM

## 2022-04-27 DIAGNOSIS — I48.91 POSTOPERATIVE ATRIAL FIBRILLATION (H): ICD-10-CM

## 2022-04-27 DIAGNOSIS — Z94.2 S/P LUNG TRANSPLANT (H): Primary | ICD-10-CM

## 2022-04-27 DIAGNOSIS — Z94.2 LUNG REPLACED BY TRANSPLANT (H): ICD-10-CM

## 2022-04-27 LAB
ALBUMIN SERPL-MCNC: 3.4 G/DL (ref 3.4–5)
ALP SERPL-CCNC: 74 U/L (ref 40–150)
ALT SERPL W P-5'-P-CCNC: 30 U/L (ref 0–50)
ANION GAP SERPL CALCULATED.3IONS-SCNC: 6 MMOL/L (ref 3–14)
AST SERPL W P-5'-P-CCNC: 13 U/L (ref 0–45)
BILIRUB DIRECT SERPL-MCNC: 0.2 MG/DL (ref 0–0.2)
BILIRUB SERPL-MCNC: 0.7 MG/DL (ref 0.2–1.3)
BUN SERPL-MCNC: 25 MG/DL (ref 7–30)
CALCIUM SERPL-MCNC: 9.3 MG/DL (ref 8.5–10.1)
CHLORIDE BLD-SCNC: 102 MMOL/L (ref 94–109)
CMV DNA SPEC NAA+PROBE-ACNC: NOT DETECTED IU/ML
CO2 SERPL-SCNC: 29 MMOL/L (ref 20–32)
CREAT SERPL-MCNC: 0.49 MG/DL (ref 0.52–1.04)
ERYTHROCYTE [DISTWIDTH] IN BLOOD BY AUTOMATED COUNT: 14.4 % (ref 10–15)
EXPTIME-PRE: 5.95 SEC
FEF2575-%PRED-PRE: 155 %
FEF2575-PRE: 3.01 L/SEC
FEF2575-PRED: 1.93 L/SEC
FEFMAX-%PRED-PRE: 66 %
FEFMAX-PRE: 3.74 L/SEC
FEFMAX-PRED: 5.63 L/SEC
FEV1-%PRED-PRE: 58 %
FEV1-PRE: 1.28 L
FEV1FEV6-PRE: 100 %
FEV1FEV6-PRED: 80 %
FEV1FVC-PRE: 100 %
FEV1FVC-PRED: 79 %
FIFMAX-PRE: 1.41 L/SEC
FVC-%PRED-PRE: 46 %
FVC-PRE: 1.28 L
FVC-PRED: 2.77 L
GFR SERPL CREATININE-BSD FRML MDRD: >90 ML/MIN/1.73M2
GLUCOSE BLD-MCNC: 174 MG/DL (ref 70–99)
HCT VFR BLD AUTO: 35.6 % (ref 35–47)
HGB BLD-MCNC: 11.6 G/DL (ref 11.7–15.7)
MAGNESIUM SERPL-MCNC: 1.2 MG/DL (ref 1.6–2.3)
MCH RBC QN AUTO: 31.2 PG (ref 26.5–33)
MCHC RBC AUTO-ENTMCNC: 32.6 G/DL (ref 31.5–36.5)
MCV RBC AUTO: 96 FL (ref 78–100)
PLATELET # BLD AUTO: 185 10E3/UL (ref 150–450)
POTASSIUM BLD-SCNC: 4.3 MMOL/L (ref 3.4–5.3)
PROT SERPL-MCNC: 6.4 G/DL (ref 6.8–8.8)
RBC # BLD AUTO: 3.72 10E6/UL (ref 3.8–5.2)
SODIUM SERPL-SCNC: 137 MMOL/L (ref 133–144)
WBC # BLD AUTO: 10.5 10E3/UL (ref 4–11)

## 2022-04-27 PROCEDURE — 250N000011 HC RX IP 250 OP 636: Performed by: PHYSICIAN ASSISTANT

## 2022-04-27 PROCEDURE — 82784 ASSAY IGA/IGD/IGG/IGM EACH: CPT | Performed by: PHYSICIAN ASSISTANT

## 2022-04-27 PROCEDURE — 93248 EXT ECG>7D<15D REV&INTERPJ: CPT | Performed by: INTERNAL MEDICINE

## 2022-04-27 PROCEDURE — 85027 COMPLETE CBC AUTOMATED: CPT | Performed by: PATHOLOGY

## 2022-04-27 PROCEDURE — 999N000248 HC STATISTIC IV INSERT WITH US BY RN

## 2022-04-27 PROCEDURE — 36415 COLL VENOUS BLD VENIPUNCTURE: CPT | Performed by: PATHOLOGY

## 2022-04-27 PROCEDURE — 36415 COLL VENOUS BLD VENIPUNCTURE: CPT

## 2022-04-27 PROCEDURE — 80053 COMPREHEN METABOLIC PANEL: CPT | Performed by: PATHOLOGY

## 2022-04-27 PROCEDURE — 96365 THER/PROPH/DIAG IV INF INIT: CPT

## 2022-04-27 PROCEDURE — 99214 OFFICE O/P EST MOD 30 MIN: CPT | Mod: 24 | Performed by: PHYSICIAN ASSISTANT

## 2022-04-27 PROCEDURE — 93246 EXT ECG>7D<15D RECORDING: CPT

## 2022-04-27 PROCEDURE — 83735 ASSAY OF MAGNESIUM: CPT | Performed by: PATHOLOGY

## 2022-04-27 PROCEDURE — 86832 HLA CLASS I HIGH DEFIN QUAL: CPT | Performed by: PHYSICIAN ASSISTANT

## 2022-04-27 PROCEDURE — G0463 HOSPITAL OUTPT CLINIC VISIT: HCPCS | Mod: 25

## 2022-04-27 PROCEDURE — 86833 HLA CLASS II HIGH DEFIN QUAL: CPT | Performed by: PHYSICIAN ASSISTANT

## 2022-04-27 PROCEDURE — 96366 THER/PROPH/DIAG IV INF ADDON: CPT

## 2022-04-27 PROCEDURE — 82248 BILIRUBIN DIRECT: CPT | Performed by: PATHOLOGY

## 2022-04-27 PROCEDURE — 94375 RESPIRATORY FLOW VOLUME LOOP: CPT | Performed by: PHYSICIAN ASSISTANT

## 2022-04-27 RX ORDER — METHYLPREDNISOLONE SODIUM SUCCINATE 125 MG/2ML
125 INJECTION, POWDER, LYOPHILIZED, FOR SOLUTION INTRAMUSCULAR; INTRAVENOUS
Status: CANCELLED
Start: 2022-04-27

## 2022-04-27 RX ORDER — NALOXONE HYDROCHLORIDE 0.4 MG/ML
0.2 INJECTION, SOLUTION INTRAMUSCULAR; INTRAVENOUS; SUBCUTANEOUS
Status: CANCELLED | OUTPATIENT
Start: 2022-04-27

## 2022-04-27 RX ORDER — MEPERIDINE HYDROCHLORIDE 25 MG/ML
25 INJECTION INTRAMUSCULAR; INTRAVENOUS; SUBCUTANEOUS EVERY 30 MIN PRN
Status: CANCELLED | OUTPATIENT
Start: 2022-04-27

## 2022-04-27 RX ORDER — METOPROLOL TARTRATE 25 MG/1
TABLET, FILM COATED ORAL
Qty: 60 TABLET | Refills: 11 | Status: ON HOLD | COMMUNITY
Start: 2022-04-27 | End: 2022-05-16

## 2022-04-27 RX ORDER — ALBUTEROL SULFATE 90 UG/1
1-2 AEROSOL, METERED RESPIRATORY (INHALATION)
Status: CANCELLED
Start: 2022-04-27

## 2022-04-27 RX ORDER — MAGNESIUM SULFATE HEPTAHYDRATE 40 MG/ML
4 INJECTION, SOLUTION INTRAVENOUS ONCE
Status: CANCELLED
Start: 2022-04-27 | End: 2022-04-27

## 2022-04-27 RX ORDER — MAGNESIUM SULFATE HEPTAHYDRATE 40 MG/ML
4 INJECTION, SOLUTION INTRAVENOUS ONCE
Status: COMPLETED | OUTPATIENT
Start: 2022-04-27 | End: 2022-04-27

## 2022-04-27 RX ORDER — EPINEPHRINE 1 MG/ML
0.3 INJECTION, SOLUTION INTRAMUSCULAR; SUBCUTANEOUS EVERY 5 MIN PRN
Status: CANCELLED | OUTPATIENT
Start: 2022-04-27

## 2022-04-27 RX ORDER — ALBUTEROL SULFATE 0.83 MG/ML
2.5 SOLUTION RESPIRATORY (INHALATION)
Status: CANCELLED | OUTPATIENT
Start: 2022-04-27

## 2022-04-27 RX ORDER — DIPHENHYDRAMINE HYDROCHLORIDE 50 MG/ML
50 INJECTION INTRAMUSCULAR; INTRAVENOUS
Status: CANCELLED
Start: 2022-04-27

## 2022-04-27 RX ORDER — PANTOPRAZOLE SODIUM 40 MG/1
40 TABLET, DELAYED RELEASE ORAL DAILY
Qty: 60 TABLET | Refills: 11 | COMMUNITY
Start: 2022-04-27 | End: 2022-05-24

## 2022-04-27 RX ORDER — EPINEPHRINE 1 MG/ML
0.3 INJECTION, SOLUTION, CONCENTRATE INTRAVENOUS EVERY 5 MIN PRN
Status: CANCELLED | OUTPATIENT
Start: 2022-04-27

## 2022-04-27 RX ADMIN — MAGNESIUM SULFATE HEPTAHYDRATE 4 G: 40 INJECTION, SOLUTION INTRAVENOUS at 15:42

## 2022-04-27 ASSESSMENT — PAIN SCALES - GENERAL: PAINLEVEL: NO PAIN (0)

## 2022-04-27 NOTE — NURSING NOTE
Transplant Coordinator Note    Reason for visit: Post lung transplant follow up visit   Coordinator: Present (via telephone)   Caregiver: Tyrese     Health concerns addressed today:  1. Breathing/lungs: no new SOB, my voice is going away. I'm taking Flonase now. Dry Cough: doesn't feel like its related to eating/drinking or lying flat. Not having congestion   2. GI issues: No new sxs. Stools are stable for me  3.  Fluid intake. I am getting around 5 bottles of water (50 oz average)   4. Fluid in feet and legs: compression stockings. Elevated your legs during the day. No cramping or pain with walking.   5. Was on lasix prior to transplant we will see what kidney function shows today  6. BP high increase metoprolol evening dose  7. Weight is stable    Activity/rehab: pulmonary rehab: I feel like I'm getting stronger, Walk up and down without the walker; I use the treadmill  Oxygen needs: room air  Pain management/RX: no chest or incisional pain  Diabetic management:    Next Bronch due:   High risk donor:   Risk Criteria Labs:   CMV status:D-/R-  Valcyte stopped:   EBV status: D+/R+  DVT/PE:  Post op AFIB/follow up with EP:  AC/asa: 81 mg ASA  PJP prophylactic: Dapsone     COVID:  1. COVID-19 infection (yes/no, date of most recent positive test):   2. Status/instructions given about COVID-19 vaccine:     Pt Education: medications (use/dose/side effects), how/when to call coordinator, frequency of labs, s/s of infection/rejection, call prior to starting any new medications, lab/vital sign book    Health Maintenance:     Last colonoscopy:     Next colonoscopy due:     Dermatology:    Vaccinations this visit:     Labs, CXR, PFTs reviewed with patient  Medication record reviewed and reconciled  Questions and concerns addressed    Patient Instructions  1. Continue to hydrate with 60-70 oz fluids daily.  2. Continue to exercise daily or most days of the week.  3. Follow up with your primary care provider for annual gender  health maintenance procedures.  4. Follow up with colonoscopy schedule.  5. Follow up with annual dermatology visits.  6. It doesn't seem like the COVID vaccine is working well in lung transplant patients. A number of lung transplant patients have gotten sick with COVID even after receiving the vaccines.  Based on our recent experience, it can be life-threatening to get COVID  even after being vaccinated. Please continue to act like you did not get the COVID vaccine - social distancing, wearing a mask, good hand hygiene, etc. If the people around you are vaccinated, it will help reduce the risk of you getting COVID. All members of your household should be vaccinated.  7. Please get a sleep study scheduled closer to home.   8. Zio Patch today in clinic to monitor HR's for 14 days   9. Fluid in legs and ankles: continue to elevated legs during the day: Wear compression stockings during the day.   10. Minimize sodium and salt intake, increase movement and activity. Check labels with how much sodium. Increase Protein intake  11. We will check your protein level with other labs today.    12. Amoxicillin through May 23rd  13. Stop Flonase for now to see if this helps with hoarseness of voice  14. AVOID TOMATO BASED PRODUCTS, spicy, chocolate, caffeine these are contributors to Acid reflux.   15. DECREASE Pantoprazole 40 mg daily: Let us know if you have any acid reflux symptoms!   15. Please check in with check out desk and make sure you can get into Chest Xray, PFTs and lab TODAY after this visit  16. Let the Pharmacy know downstairs that you need a refill of the 0.5 mg tablets of Tacrolimus  17. Increase Metoprolol 1.5 tablets in evening (37.5 mg AT NIGHT). Continue the morning dose to 1 tablet (25 mg tablet)       Next transplant clinic appointment:  2 weeks with CXR, labs and PFTs WITH BRONCH afterwards  Next lab draw: TODAY after clinic appointment and with antibodies we will check tacrolimus later this week.        AVS printed at time of check out         minimal none

## 2022-04-27 NOTE — LETTER
4/27/2022         RE: Melissa Elder  [address]  [facility] [address]        Dear Colleague,    Thank you for referring your patient, Melissa Elder, to the [facility] [place]. Please see a copy of my visit note below.    [facility]  April 27, 2022         Assessment and Plan:   Melissa Elder is a 66 year old female with h/o bilateral lung transplant on 2/21 for severe COPD for who is seen today for routine follow up. Surgery uncomplicated, s/p bilateral pigtail chest tube placement for hydropneumothorax and bilateral effusions, disseminated Ureaplasma and transient hyperammonemia. Other history notable for HTN, mild non-obstructive CAD, paroxysmal afib, osteoporosis, GERD, and colonic polyps.     1. S/p bilateral sequential lung transplant:  Subluxation of sternum: no new pulmonary complaints today, continues to work and progress with pulmonary rehab.  Continue to progress with pulmonary rehab, was able to walk in the hallway without her walker. Sating 94% on room air. Repeat sniff test 3/11 without evidence of diaphragmatic palsy. S/p bronch on 4/7, cultures NGTD, unable to assess for vascular rejection (ISHLT AXB0). CD4 staining was negative. CMV 4/14 negative. CXR reviewed after the visit and demonstrates stable transplant. PFTs completed after the visit did not meet ATS guidelines (consistent with prior), but stable.  - Continue IS including Myfortic 720 mg BID, tacrolimus (goal 8-12) and prednisone taper 10 mg BID, decreases again on 5/10  - Dapsone for PJP ppx  - Nystatin for oral candidiasis ppx, 6 month course  - CMV (D-/R-)  - Will schedule sleep study closer to home    2. ID: donor cultures with P-S MSSA with Strep mitis, Actinomyces odontolyticus, MSSA x2, and C. kruseii.  Recipient cultures at time of transplant with Actinomyces odonotolyticus. S/p ceftriazone 2/23-3/8.    - Amoxicillin (3/8-5/23) X 3 months for Actinomyces treatment  - Low threshold to treat Candida if it recurs per transplant ID    3. Positive DSA: last DSA on 4/19 with improved DR15 (586 down from 811), DQB5 (4518 down from 5812) and DQB6 (1630 down from 2407).   - F/u results of C1q, pending from 4/6  - DSA pending for today    4. Hypogammaglobulinemia: last IgG of 647, s/p IVIG (unclear indication) on 4/4.  - IgG ordered for today    5. Positive AFB on sputum culture: noted on sputum culture 3/2. Transplant ID following for speciation and susceptibility testing as well as other evidence of infection. AFB sputum culture 3/9 NGTD.  - AFB cultures on all future bronchs, pending from 4/7    6. PHS risk criteria donor: additional labs required post-transplant (between 4-8 weeks post-op): Hepatitis B, Hepatitis C, and HIV by COLT, negative on 3/25.     7. Concern for gastroparesis:  GERD: NM gastric emptying study completed 3/15 normal. Negative pH/manometry study 3/28/19, noted small hiatal hernia. No GI complaints today, appetite is good.   - Famotidine 20 mg BID, decrease pantoprazole to 40 mg daily    8. CAD: noted to have mild non-obstructive CAD without hemodynamically significant lesions on cardiac cath 3/27/19.   - Continue aspirin and rosuvastatin  - Will need to hold aspirin 7 days prior to bronch    9. Paroxysmal afib:  HTN:  had been on diltiazem pre transplant, not on AC. Persistent tachycardia post-op, controlled.  - Continue diltiazem and metoprolol  - Zio patch today    10. Hypomagnesemia: suppressed absorption d/t CNI. Mg low at 1.2 today.  - Will give 4 grams IV mag  - Increase Mg glycinate to 2 tablets in the am and pm an done tablet at noon      11. BLE edema: was on lasix pre transplant for chronic edema.  states 5 mg daily. Notes increased edema, symmetric, notes she had tomato soup last night.  - Conservative measures with compression socks, increased protein and decreased Na intake and  ongoing exercise    12. Low level EBV viremia: last level of 1014 (log 3.0) on 4/14.     13. Hoarseness: started after her last bronch, possibly worse today and patient feels could be related to Flonase.  - Stop Flonase and monitor    RTC: two weeks with bronch  Influenza and other vaccinations: Antonio completed on 4/14  Annual dermatology visit: will discuss    Sharlene Larios PA-C  Pulmonary, Allergy, Critical Care and Sleep Medicine        Interval History:     Since her last visit, patient has continued with rehab, doing treadmill, stairs and weights. Feels like she is getting stronger, can walk the hallways without a walker. Has been down in the exercise room as well. No new or unexpected shortness of breath, does have a dry cough and some ongoing hoarseness. Notes the cough is related to activity. No chest congestion, no chest pain or palpitations. No nausea or vomiting, no bloating or gas. Stools are regular, good appetite. Finding fluid intake hard with everything she is doing in a day, gets in maybe 50 oz per day.          Review of Systems:   Please see HPI, otherwise the complete 10 point ROS is negative.           Past Medical and Surgical History:     Past Medical History:   Diagnosis Date     Atrial fibrillation with RVR (H)     hx of A fib related to severe COPD, does not meet anticoagulation criteria as noted     COPD, severe (H)      Osteoporosis      Past Surgical History:   Procedure Laterality Date     BRONCHOSCOPY (RIGID OR FLEXIBLE), DIAGNOSTIC N/A 4/7/2022    Procedure: BRONCHOSCOPY, WITH BRONCHOALVEOLAR LAVAGE and BIOPSIES;  Surgeon: Gerson Haddad MD;  Location:  GI     BRONCHOSCOPY FLEXIBLE AND RIGID N/A 3/1/2022    Procedure: BRONCHOSCOPY INSPECTION;  Surgeon: Melissa Landin MD;  Location:  GI     CV CORONARY ANGIOGRAM N/A 3/27/2019    Procedure: CV CORONARY ANGIOGRAM;  Surgeon: Thierry Serrano MD;  Location:  HEART CARDIAC CATH LAB     CV RIGHT HEART CATH MEASUREMENTS  RECORDED N/A 3/27/2019    Procedure: CV RIGHT HEART CATH;  Surgeon: Thierry Serrano MD;  Location:  HEART CARDIAC CATH LAB     ESOPHAGEAL IMPEDENCE FUNCTION TEST WITH 24 HOUR PH GREATER THAN 1 HOUR N/A 3/28/2019    Procedure: ESOPHAGEAL IMPEDENCE FUNCTION TEST WITH 24 HOUR PH GREATER THAN 1 HOUR;  Surgeon: Mike Henry MD;  Location:  GI     EXCISE PILONIDAL CYST, SIMPLE       IR CHEST TUBE PLACEMENT NON-TUNNELED RIGHT  3/3/2022     TONSILLECTOMY & ADENOIDECTOMY       TRANSPLANT LUNG RECIPIENT SINGLE X2 Bilateral 2022    Procedure: Bilateral Sequential Lung Transplantation, Clamshell Incision, Extracorporeal Membrane Oxgenation, Bronchoscopy, Cryoablation of Intercostal Nerves;  Surgeon: Raul Robin MD;  Location:  OR           Family History:     Family History   Problem Relation Age of Onset     Breast Cancer Mother      Colon Cancer Mother      Lung Cancer Mother      Lung Cancer Father      Lung Cancer Maternal Aunt      Pancreatic Cancer Maternal Uncle             Social History:     Social History     Socioeconomic History     Marital status:      Spouse name: Not on file     Number of children: Not on file     Years of education: Not on file     Highest education level: Not on file   Occupational History     Not on file   Tobacco Use     Smoking status: Former Smoker     Packs/day: 1.50     Years: 36.00     Pack years: 54.00     Types: Cigarettes     Quit date: 2006     Years since quittin.3     Smokeless tobacco: Never Used   Substance and Sexual Activity     Alcohol use: Yes     Comment: stated beer and wine occ     Drug use: Yes     Comment: stated hx of marijuana, quit in      Sexual activity: Not on file   Other Topics Concern     Parent/sibling w/ CABG, MI or angioplasty before 65F 55M? Not Asked   Social History Narrative     Not on file     Social Determinants of Health     Financial Resource Strain: Not on file   Food Insecurity: Not on file  "  Transportation Needs: Not on file   Physical Activity: Not on file   Stress: Not on file   Social Connections: Not on file   Intimate Partner Violence: Not on file   Housing Stability: Not on file            Medications:     Current Outpatient Medications   Medication     metoprolol tartrate (LOPRESSOR) 25 MG tablet     pantoprazole (PROTONIX) 40 MG EC tablet     acetaminophen (TYLENOL) 325 MG tablet     albuterol (PROAIR HFA/PROVENTIL HFA/VENTOLIN HFA) 108 (90 Base) MCG/ACT inhaler     Alcohol Swabs PADS     amoxicillin (AMOXIL) 500 MG capsule     aspirin (ASA) 81 MG EC tablet     blood glucose (NO BRAND SPECIFIED) lancets standard     blood glucose (NO BRAND SPECIFIED) test strip     blood glucose monitoring (NO BRAND SPECIFIED) meter device kit     calcium carbonate 600 mg-vitamin D 400 units (CALTRATE) 600-400 MG-UNIT per tablet     carboxymethylcellulose PF (REFRESH PLUS) 0.5 % ophthalmic solution     cetirizine (ZYRTEC) 10 MG tablet     dapsone (ACZONE) 25 MG tablet     diltiazem ER (TIAZAC) 240 MG 24 hr ER beaded capsule     famotidine (PEPCID) 20 MG tablet     glucagon 1 MG kit     glucose (BD GLUCOSE) 4 g chewable tablet     IBANdronate (BONIVA) 150 MG tablet     Magnesium Glycinate 665 MG CAPS     multivitamin w/minerals (THERA-VIT-M) tablet     mycophenolic acid (GENERIC EQUIVALENT) 360 MG EC tablet     nystatin (MYCOSTATIN) 145027 UNIT/ML suspension     OXYGEN-HELIUM IN     predniSONE (DELTASONE) 5 MG tablet     rosuvastatin (CRESTOR) 5 MG tablet     Sharps Container MISC     tacrolimus (GENERIC EQUIVALENT) 0.5 MG capsule     tacrolimus (GENERIC EQUIVALENT) 1 MG capsule     No current facility-administered medications for this visit.     Facility-Administered Medications Ordered in Other Visits   Medication     sodium chloride (PF) 0.9% PF flush 10 mL            Physical Exam:   /76   Pulse 90   Ht 1.575 m (5' 2\")   Wt 62.1 kg (137 lb)   SpO2 94%   BMI 25.06 kg/m      GENERAL: alert, " NAD  HEENT: NCAT, EOMI, no scleral icterus, oral mucosa moist and without lesions  Neck: no cervical or supraclavicular adenopathy  Lungs: moderate BS, decreased in bases  CV: RRR, S1S2, no murmurs noted  Abdomen: normoactive BS, soft, non tender   Lymph: 1+ BLE edema with reymundo hose  Neuro: AAO X 3, CN 2-12 grossly intact  Psychiatric: normal affect, good eye contact  Skin: no rash, jaundice or lesions on limited exam         Data:   All laboratory and imaging data reviewed.      No results found for this or any previous visit (from the past 168 hour(s)).  PFT interpretation:  Maneuver: valid, but did not meet ATS guidelines      Again, thank you for allowing me to participate in the care of your patient.        Sincerely,        Sharlene Larios PA-C

## 2022-04-27 NOTE — LETTER
4/27/2022     RE: Melissa Elder  [address]  [facility] [address]    Dear Colleague,    Thank you for referring your patient, Melissa Elder, to the [facility]. Please see a copy of my visit note below.    Nursing Note  Melissa Elder [facility]:   Chief Complaint   Patient presents with     Infusion     Magnesium replace       During today's [facility] appointment, orders from Sharlene Larios PA-C were completed.  Frequency: once    Progress note:  Patient identification verified by name and date of birth.  Assessment completed.  Vitals recorded in Doc Flowsheets.  Patient was provided with education regarding medication/procedure and possible side effects.  Patient verbalized understanding.     present during visit today: Not Applicable.    Treatment Conditions: Non-applicable.  Premedications: were not ordered.  Drug Waste Record: No  Infusion length and rate:  50 ml/hr. Over 2 hours (4 mg mag)  Labs: were not ordered for this appointment.  Vascular access: peripheral IV was placed by vascular access nurse.    Is the next appt scheduled? no  Asymptomatic COVID test completed? no    Post Infusion Assessment:  Patient tolerated infusion without incident.  Site patent and intact, free from redness, edema or discomfort.  No evidence of extravasations.  Access discontinued per protocol.     Discharge Plan:   Follow up plan of care with: transplant coordinator.  Discharge instructions were reviewed with patient.  Patient/representative verbalized understanding of discharge instructions and all questions answered.  [facility] in stable condition.    Melissa Calle RN    Administrations This Visit     magnesium sulfate 4 g in 100 mL sterile water (premade)     Admin Date  04/27/2022 Action  New Bag Dose  4 g Rate  50 mL/hr  Route  Intravenous Administered By  Melissa Calle RN                /79   Pulse 91         Again, thank you for allowing me to participate in the care of your patient.        Sincerely,        [facility]

## 2022-04-27 NOTE — NURSING NOTE
Chief Complaint   Patient presents with     Lung Transplant     Lung follow up      Vitals were taken and medications were reconciled.     Eileen Stock RMA  10:01 AM

## 2022-04-27 NOTE — PROGRESS NOTES
Nursing Note  Melissa Elder [facility] for:   Chief Complaint   Patient presents with     Infusion     Magnesium replace       During today's [facility] appointment, orders from Sharlene Larios PA-C were completed.  Frequency: once    Progress note:  Patient identification verified by name and date of birth.  Assessment completed.  Vitals recorded in Doc Flowsheets.  Patient was provided with education regarding medication/procedure and possible side effects.  Patient verbalized understanding.     present during visit today: Not Applicable.    Treatment Conditions: Non-applicable.  Premedications: were not ordered.  Drug Waste Record: No  Infusion length and rate:  50 ml/hr. Over 2 hours (4 mg mag)  Labs: were not ordered for this appointment.  Vascular access: peripheral IV was placed by vascular access nurse.    Is the next appt scheduled? no  Asymptomatic COVID test completed? no    Post Infusion Assessment:  Patient tolerated infusion without incident.  Site patent and intact, free from redness, edema or discomfort.  No evidence of extravasations.  Access discontinued per protocol.     Discharge Plan:   Follow up plan of care with: transplant coordinator.  Discharge instructions were reviewed with patient.  Patient/representative verbalized understanding of discharge instructions and all questions answered.  [facility] in stable condition.    Melissa Calle, JENNIFER    Administrations This Visit     magnesium sulfate 4 g in 100 mL sterile water (premade)     Admin Date  04/27/2022 Action  New Bag Dose  4 g Rate  50 mL/hr Route  Intravenous Administered By  Melissa Calle, RN                /79   Pulse 91

## 2022-04-27 NOTE — PATIENT INSTRUCTIONS
Patient Instructions  1. Continue to hydrate with 60-70 oz fluids daily.  2. Continue to exercise daily or most days of the week.  3. Follow up with your primary care provider for annual gender health maintenance procedures.  4. Follow up with colonoscopy schedule.  5. Follow up with annual dermatology visits.  6. It doesn't seem like the COVID vaccine is working well in lung transplant patients. A number of lung transplant patients have gotten sick with COVID even after receiving the vaccines.  Based on our recent experience, it can be life-threatening to get COVID  even after being vaccinated. Please continue to act like you did not get the COVID vaccine - social distancing, wearing a mask, good hand hygiene, etc. If the people around you are vaccinated, it will help reduce the risk of you getting COVID. All members of your household should be vaccinated.  7. Please get a sleep study scheduled closer to home.   8. Zio Patch today in clinic to monitor HR's for 14 days   9. Fluid in legs and ankles: continue to elevated legs during the day: Wear compression stockings during the day.   10. Minimize sodium and salt intake, increase movement and activity. Check labels with how much sodium. Increase Protein intake  11. We will check your protein level with other labs today.    12. Amoxicillin through May 23rd  13. Stop Flonase for now to see if this helps with hoarseness of voice  14. AVOID TOMATO BASED PRODUCTS, spicy, chocolate, caffeine these are contributors to Acid reflux.   15. DECREASE Pantoprazole 40 mg daily: Let us know if you have any acid reflux symptoms!   15. Please check in with check out desk and make sure you can get into Chest Xray, PFTs and lab TODAY after this visit  16. Let the Pharmacy know downstairs that you need a refill of the 0.5 mg tablets of Tacrolimus  17. Increase Metoprolol 1.5 tablets in evening (37.5 mg AT NIGHT). Continue the morning dose to 1 tablet (25 mg tablet)  18. We will follow  up with you for when to start holding ASA prior to Bronch week of 5/9        Next transplant clinic appointment:  2 weeks with CXR, labs and PFTs WITH BRONCH afterwards  Next lab draw: TODAY after clinic appointment and with antibodies we will check tacrolimus later this week.   ~~~~~~~~~~~~~~~~~~~~~~~~~    Thoracic Transplant Office phone [phone], fax [phone]    Office Hours 8:30 - 5:00     For after-hours urgent issues, please dial [phone], and ask to speak with the Thoracic Transplant Coordinator On-Call.  --------------------  To expedite your medication refill(s), please contact your pharmacy and have them fax a refill request to: [phone]  .   *Please allow 3 business days for routine medication refills.  *Please allow 5 business days for controlled substance medication refills.    **For Diabetic medications and supplies refill(s), please contact your pharmacy and have them contact your Endocrine team.  --------------------  For scheduling appointments call [phone].  --------------------  Please Note: If you are active on [employer], all future test results will be sent by [employer] message only, and will no longer be called to patient. You may also receive communication directly from your physician.

## 2022-04-27 NOTE — PROGRESS NOTES
Called Tyrese to go over some things from clinic:     1. Increase oral magnesium supplement to 2 tablets in the AM, 1 tablet in the afternoon and 2 tablets in the evening  2. Patient is getting 4 G IV magnesium today at [facility] at 3:30 confirmed with [facility] and Tyrese  3. Patient is rechecking Tacrolimus level on Friday 4/29 before rehab walk in clinic [facility]  4. Patient will do weekly labs next Friday 5/6/22  5. Bronchoscopy scheduled for 5/12 at 9 am with 8 am arrival pre-bronch appointment on 5/11 with labs  6. Advised patient to stop aspirin 1 week prior on 5/5

## 2022-04-27 NOTE — PROGRESS NOTES
"Per Sharlene Larios PA-C, patient to have Zio monitor placed.  Diagnosis: S/P Lung Transplant, Postoperative atrial fibrillation  Monitor placed: {YES / NO:419044::\"Yes\"}  Patient Instructed: {YES / NO:362020::\"Yes\"}  Patient verbalized understanding: {YES / NO:919330::\"Yes\"}  Holter # [id number]       "

## 2022-04-28 LAB — IGG SERPL-MCNC: 613 MG/DL (ref 610–1616)

## 2022-04-29 ENCOUNTER — HOSPITAL ENCOUNTER (OUTPATIENT)
Dept: CARDIAC REHAB | Facility: CLINIC | Age: 67
Discharge: HOME | End: 2022-04-29
Attending: INTERNAL MEDICINE
Payer: MEDICARE

## 2022-04-29 ENCOUNTER — LAB (OUTPATIENT)
Dept: LAB | Facility: CLINIC | Age: 67
End: 2022-04-29
Payer: MEDICARE

## 2022-04-29 DIAGNOSIS — Z79.899 ENCOUNTER FOR LONG-TERM (CURRENT) USE OF HIGH-RISK MEDICATION: ICD-10-CM

## 2022-04-29 DIAGNOSIS — D84.9 IMMUNOSUPPRESSED STATUS (H): ICD-10-CM

## 2022-04-29 DIAGNOSIS — Z94.2 S/P LUNG TRANSPLANT (H): ICD-10-CM

## 2022-04-29 LAB
ACID FAST STAIN (ARUP): NORMAL
ACID FAST STAIN (ARUP): NORMAL
CMV DNA SPEC NAA+PROBE-ACNC: NOT DETECTED IU/ML
DONOR IDENTIFICATION: NORMAL
DQB5: 4003
DQB6: 1816
DSA COMMENTS: NORMAL
DSA PRESENT: YES
DSA TEST METHOD: NORMAL
ORGAN: NORMAL
SA 1 CELL: NORMAL
SA 1 TEST METHOD: NORMAL
SA 2 CELL: NORMAL
SA 2 TEST METHOD: NORMAL
SA1 HI RISK ABY: NORMAL
SA1 MOD RISK ABY: NORMAL
SA2 HI RISK ABY: NORMAL
SA2 MOD RISK ABY: NORMAL
TACROLIMUS BLD-MCNC: 11.3 UG/L (ref 5–15)
TME LAST DOSE: NORMAL H
TME LAST DOSE: NORMAL H
UNACCEPTABLE ANTIGENS: NORMAL
UNOS CPRA: 73
ZZZSA 1  COMMENTS: NORMAL
ZZZSA 2 COMMENTS: NORMAL

## 2022-04-29 PROCEDURE — 36415 COLL VENOUS BLD VENIPUNCTURE: CPT

## 2022-04-29 PROCEDURE — 80197 ASSAY OF TACROLIMUS: CPT

## 2022-04-29 PROCEDURE — G0239 OTH RESP PROC, GROUP: HCPCS

## 2022-05-02 DIAGNOSIS — Z94.2 LUNG REPLACED BY TRANSPLANT (H): Primary | ICD-10-CM

## 2022-05-02 DIAGNOSIS — D84.9 IMMUNOSUPPRESSED STATUS (H): ICD-10-CM

## 2022-05-02 NOTE — RESULT ENCOUNTER NOTE
Tacrolimus level on 4/29 was a 16hr level.   Discussed with , will get labs drawn again tomorrow, 5/3 and get a 12hr level.

## 2022-05-03 ENCOUNTER — HOSPITAL ENCOUNTER (OUTPATIENT)
Dept: CARDIAC REHAB | Facility: CLINIC | Age: 67
Discharge: HOME | End: 2022-05-03
Attending: INTERNAL MEDICINE
Payer: MEDICARE

## 2022-05-03 ENCOUNTER — LAB (OUTPATIENT)
Dept: LAB | Facility: CLINIC | Age: 67
End: 2022-05-03
Payer: MEDICARE

## 2022-05-03 ENCOUNTER — VIRTUAL VISIT (OUTPATIENT)
Dept: PHARMACY | Facility: CLINIC | Age: 67
End: 2022-05-03

## 2022-05-03 DIAGNOSIS — Z94.2 S/P LUNG TRANSPLANT (H): Primary | ICD-10-CM

## 2022-05-03 DIAGNOSIS — K21.9 GASTROESOPHAGEAL REFLUX DISEASE, UNSPECIFIED WHETHER ESOPHAGITIS PRESENT: ICD-10-CM

## 2022-05-03 DIAGNOSIS — R73.9 STEROID-INDUCED HYPERGLYCEMIA: ICD-10-CM

## 2022-05-03 DIAGNOSIS — Z94.2 LUNG REPLACED BY TRANSPLANT (H): ICD-10-CM

## 2022-05-03 DIAGNOSIS — D84.9 IMMUNOSUPPRESSED STATUS (H): ICD-10-CM

## 2022-05-03 DIAGNOSIS — E78.5 DYSLIPIDEMIA: ICD-10-CM

## 2022-05-03 DIAGNOSIS — R19.7 DIARRHEA, UNSPECIFIED TYPE: ICD-10-CM

## 2022-05-03 DIAGNOSIS — I10 ESSENTIAL HYPERTENSION: ICD-10-CM

## 2022-05-03 DIAGNOSIS — T38.0X5A STEROID-INDUCED HYPERGLYCEMIA: ICD-10-CM

## 2022-05-03 LAB
ANION GAP SERPL CALCULATED.3IONS-SCNC: 8 MMOL/L (ref 3–14)
BUN SERPL-MCNC: 21 MG/DL (ref 7–30)
CALCIUM SERPL-MCNC: 8.7 MG/DL (ref 8.5–10.1)
CHLORIDE BLD-SCNC: 104 MMOL/L (ref 94–109)
CMV DNA SPEC NAA+PROBE-ACNC: NOT DETECTED IU/ML
CO2 SERPL-SCNC: 28 MMOL/L (ref 20–32)
CREAT SERPL-MCNC: 0.55 MG/DL (ref 0.52–1.04)
ERYTHROCYTE [DISTWIDTH] IN BLOOD BY AUTOMATED COUNT: 15.5 % (ref 10–15)
GFR SERPL CREATININE-BSD FRML MDRD: >90 ML/MIN/1.73M2
GLUCOSE BLD-MCNC: 177 MG/DL (ref 70–99)
HCT VFR BLD AUTO: 30.6 % (ref 35–47)
HGB BLD-MCNC: 9.8 G/DL (ref 11.7–15.7)
MAGNESIUM SERPL-MCNC: 1.6 MG/DL (ref 1.6–2.3)
MCH RBC QN AUTO: 30.9 PG (ref 26.5–33)
MCHC RBC AUTO-ENTMCNC: 32 G/DL (ref 31.5–36.5)
MCV RBC AUTO: 97 FL (ref 78–100)
PLATELET # BLD AUTO: 248 10E3/UL (ref 150–450)
POTASSIUM BLD-SCNC: 4.3 MMOL/L (ref 3.4–5.3)
RBC # BLD AUTO: 3.17 10E6/UL (ref 3.8–5.2)
SODIUM SERPL-SCNC: 140 MMOL/L (ref 133–144)
TACROLIMUS BLD-MCNC: 11.6 UG/L (ref 5–15)
TME LAST DOSE: NORMAL H
TME LAST DOSE: NORMAL H
WBC # BLD AUTO: 11 10E3/UL (ref 4–11)

## 2022-05-03 PROCEDURE — 83735 ASSAY OF MAGNESIUM: CPT

## 2022-05-03 PROCEDURE — 85027 COMPLETE CBC AUTOMATED: CPT

## 2022-05-03 PROCEDURE — 99606 MTMS BY PHARM EST 15 MIN: CPT | Performed by: PHARMACIST

## 2022-05-03 PROCEDURE — 82310 ASSAY OF CALCIUM: CPT

## 2022-05-03 PROCEDURE — 80197 ASSAY OF TACROLIMUS: CPT

## 2022-05-03 PROCEDURE — 99607 MTMS BY PHARM ADDL 15 MIN: CPT | Performed by: PHARMACIST

## 2022-05-03 PROCEDURE — 36415 COLL VENOUS BLD VENIPUNCTURE: CPT

## 2022-05-03 PROCEDURE — G0239 OTH RESP PROC, GROUP: HCPCS

## 2022-05-03 NOTE — PATIENT INSTRUCTIONS
"Recommendations from today's MTM visit:                                                       1. Follow-up Dr. Rm to discuss blood sugars.   2. You can get your COVID 19 booster 6 months after your Evusheld.   3. At 6 months you can a Hepatitis B series or booster:  -Engerix-B 20 mcg/mL: Administer 2 mL per dose at 0, 1, 2, and 6 months  -Recombivax HB 40 mcg/mL: Administer 1 mL per dose at 0, 1, and 6 months    Follow-up: as needed.    It was great speaking with you today.  I value your experience and would be very thankful for your time in providing feedback in our clinic survey. In the next few days, you may receive an email or text message from [employer] with a link to a survey related to your  clinical pharmacist.\"     To schedule another MTM appointment, please call the clinic directly or you may call the MTM scheduling line at [phone] or toll-free at [phone].     My Clinical Pharmacist's contact information:                                                      Please feel free to contact me with any questions or concerns you have.      Edilson Fisher, PharmD  MTM Pharmacist    Phone: [phone]     "

## 2022-05-03 NOTE — PROGRESS NOTES
"Medication Therapy Management (MTM) Encounter    ASSESSMENT:                            Medication Adherence/Access: No issues identified    Lung Transplant:  Discussed vaccinations.     GERD: Stable.     Diarrhea: Stable.    AFib post transplant surgery/ Hypertension: Stable.     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 Patient states these are after eating \"something sweet\" Recommended patient follow-up with PCP long term for management as she has diabetes, but it is managed by diet currently. She is continuing to taper her steroid which should improve blood sugars as well.     Hyperlipidemia: Stable.    PLAN:                            1. Follow-up Dr. Rm to discuss blood sugars.   2. You can get your COVID 19 booster 6 months after your Evusheld.   3. At 6 months you can a Hepatitis B series or boosters.  -Engerix-B 20 mcg/mL: Administer 2 mL per dose at 0, 1, 2, and 6 months  -Recombivax HB 40 mcg/mL: Administer 1 mL per dose at 0, 1, and 6 months    Follow-up: as needed.     SUBJECTIVE/OBJECTIVE:                          Melissa Elder is a 66 year old female called for a follow-up visit. Patient was accompanied by Tyrese. Today's visit is a follow-up MTM visit from 3/22     Reason for visit: 2-3 months post transplant.    Allergies/ADRs: Reviewed in chart  Past Medical History: Reviewed in chart  Tobacco: She reports that she quit smoking about 16 years ago. Her smoking use included cigarettes. She has a 54.00 pack-year smoking history. She has never used smokeless tobacco.  Alcohol: not currently using    Medication Adherence/Access: Per patient, misses medication 0 times per week.     Lung Transplant:  Current immunosuppressants include TAC 2.5mg twice daily, Prednisone 10mg twice daily, and Myfortic 720mg twice daily.  Pt reports Tremors mild.  Vascular prophylaxis: Aspirin 81mg daily. No bleed sx " reported. Holds before biopsies.   Transplant date: 22  Estimated Creatinine Clearance: 87.2 mL/min (based on SCr of 0.55 mg/dL).  CMV prophylaxis: D-,R-  PJP prophylaxis: Dapsone 50mg daily  Thrush prophylaxis:Nystatin QID 6 months post tx  PPI use: Pantoprazole 40mg twice daily.   Supplements: [employer] Magnesium Glycinate 200mg at lunch, 100mg at supper, and 200mg at bedtime (for the last 2 weeks), MVI daily, Calcium/D twice daily.   Tx Coordinator: Lissett Gates, Using Med Card: Yes  Recent Infections:  Ureaplasm: Amoxicillin 500mg TID until .   Immunizations: annual flu shot ; [id number]:  , ; Prevnar 13: ; TDaP:  ; Shingrix: 2019x2, HBV: no immunity per last titers, COVID: Moderna 3x2021, had a dose of Evusheld.  Magnesium   Date Value Ref Range Status   2022 1.6 1.6 - 2.3 mg/dL Final   2019 1.9 1.6 - 2.3 mg/dL Final     GERD: Current medications include: Protonix (pantoprazole) 40mg daily and Pepcid (famotidine) 20mg twice daily. Pt reports no current symptoms.  Patient feels that current regimen is effective.    Diarrhea: Pt is no longer taking imodium or fiber. Having more formed stools, has 2 BMs daily.     AFib post transplant surgery/ Hypertension: Current medications include Diltiazem ER 240mg daily, Metoprolol 25mg every morning and 37.5mg every evening.  Currently has ziopatch on.  Patient does self-monitor blood pressure.  Patient reports no current medication side effects.  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   Blood sugar monitoring: never, no longer checking per txp team. Ranges (patient reported): last fastin, ranged from   Urine Albumin: No results found for: UMALCR   Lab Results   Component Value Date    A1C 5.7 2022    A1C 5.8 2022     Hyperlipidemia: Current therapy includes Rosuvastatin 5mg  daily.  Patient reports no significant myalgias or other side effects.  The 10-year ASCVD risk score (Corvallissangeetha PARISH Jr., et al., 2013) is: 5.7%    Values used to calculate the score:      Age: 66 years      Sex: Female      Is Non- : No      Diabetic: No      Tobacco smoker: No      Systolic Blood Pressure: 135 mmHg      Is BP treated: No      HDL Cholesterol: 64 mg/dL      Total Cholesterol: 155 mg/dL  Recent Labs   Lab Test 03/25/19  0834   CHOL 155   HDL 64   LDL 74   TRIG 83     Today's Vitals: There were no vitals taken for this visit.  ----------------    I spent 27 minutes with this patient today. All changes were made via collaborative practice agreement with txp team. A copy of the visit note was provided to the patient's provider(s).    The patient was sent via [employer] a summary of these recommendations.     Danilo FrancisD  [facility] Pharmacist    Phone: [phone]     Telemedicine Visit Details  Type of service:  Telephone visit  Start Time: 12:28 PM  End Time: 12:55 PM  Originating Location (patient location): Home  Distant Location (provider location):  [facility]     Medication Therapy Recommendations  S/P lung transplant (H)    Rationale: Preventive therapy - Needs additional medication therapy - Indication   Recommendation: Order Vaccine - Engerix-B 10 MCG/0.5ML Susp   Status: Patient Agreed - Adherence/Education         Steroid-induced hyperglycemia    Current Medication: blood glucose monitoring (NO BRAND SPECIFIED) meter device kit   Rationale: Medication requires monitoring - Needs additional monitoring   Recommendation: Make Appt with PCP   Status: Patient Agreed - Adherence/Education

## 2022-05-04 LAB
ACID FAST STAIN (ARUP): NORMAL
ACID FAST STAIN (ARUP): NORMAL

## 2022-05-04 NOTE — RESULT ENCOUNTER NOTE
Tacrolimus level 11.6 at 12 hours, on 5/3/2022.  Goal 8-12.   Current dose 2.5 mg in AM, 2.5 mg in PM    Level at goal, no change in dose.   [employer] message sent

## 2022-05-05 LAB
BACTERIA BRONCH: NO GROWTH
BACTERIA BRONCH: NO GROWTH

## 2022-05-06 ENCOUNTER — HOSPITAL ENCOUNTER (OUTPATIENT)
Dept: CARDIAC REHAB | Facility: CLINIC | Age: 67
Discharge: HOME | End: 2022-05-06
Attending: INTERNAL MEDICINE
Payer: MEDICARE

## 2022-05-06 LAB
BACTERIA SPT CULT: ABNORMAL
BACTERIA SPT CULT: ABNORMAL

## 2022-05-06 PROCEDURE — G0239 OTH RESP PROC, GROUP: HCPCS

## 2022-05-08 NOTE — PROGRESS NOTES
[facility]  May 11, 2022         Assessment and Plan:   Melissa Elder is a 66 year old female with h/o bilateral lung transplant on 2/21 for severe COPD for who is seen today for routine follow up. Surgery uncomplicated, s/p bilateral pigtail chest tube placement for hydropneumothorax and bilateral effusions, disseminated Ureaplasma and transient hyperammonemia. Other history notable for HTN, mild non-obstructive CAD, paroxysmal afib, osteoporosis, GERD, and colonic polyps. She is scheduled for routine surveillance bronch/BAL and biopsies tomorrow.     1. S/p bilateral sequential lung transplant:  Subluxation of sternum: continues to work and progress with pulmonary rehab, denies new pulmonary complaints, no longer using a walker, but very weak when getting up to the exam table. Sating 95% on room air. Repeat sniff test 3/11 without evidence of diaphragmatic palsy. S/p bronch on 4/7, cultures NGTD, unable to assess for vascular rejection (ISHLT AXB0). CD4 staining was negative. CMV 5/3 negative. CXR reviewed today demonstrates stable transplant. PFTs unchanged from prior, suspect lower than expected secondary to ongoing weakness, deconditioning, but does have bronch tomorrow.   - Continue IS including Myfortic 720 mg BID, tacrolimus (goal 8-12) and prednisone taper, decreased yesterday 10/7.5  - Dapsone for PJP ppx  - Nystatin for oral candidiasis ppx, 6 month course  - CMV (D-/R-)  - Will schedule sleep study closer to home    2. ID: donor cultures with P-S MSSA with Strep mitis, Actinomyces odontolyticus, MSSA x2, and C. kruseii.  Recipient cultures at time of transplant with Actinomyces odonotolyticus. S/p ceftriazone 2/23-3/8.   - Amoxicillin (3/8-5/23) X 3 months for Actinomyces treatment  - Low threshold to treat Candida if it recurs per transplant ID    3. 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   - DSA pending for today    4. Hypogammaglobulinemia: last IgG of 647, s/p IVIG (unclear indication) on 4/4.  IgG of 613 on 4/27.    5. Positive AFB on sputum culture: noted on sputum culture 3/2. Transplant ID following for speciation and susceptibility testing as well as other evidence of infection. AFB sputum culture 3/9 and 4/7 NGTD.    6. PHS risk criteria donor: additional labs required post-transplant (between 4-8 weeks post-op): Hepatitis B, Hepatitis C, and HIV by COLT, negative on 3/25.     7. Concern for gastroparesis:  GERD: NM gastric emptying study completed 3/15 normal. Negative pH/manometry study 3/28/19, noted small hiatal hernia. No GI complaints today, appetite is good.   - Famotidine 20 mg BID and pantoprazole 40 mg daily    8. CAD: noted to have mild non-obstructive CAD without hemodynamically significant lesions on cardiac cath 3/27/19.   - Continue aspirin and rosuvastatin  - Aspirin on hold     9. Paroxysmal afib:  HTN: had been on diltiazem pre transplant, not on AC. Persistent tachycardia post-op, controlled. Completed Zio Patch and just sent in  - Continue diltiazem and metoprolol    10. Hypomagnesemia: secondary to CNI. Mg low at 1.4 today.  - Will give 3 grams IV mg tomorrow  - Continue Mg glycinate    11. BLE edema: was on lasix pre transplant for chronic edema.  states 5 mg daily. Ongoing 2L BLE edema, improved with the am, currently wearing compression socks.  - Reiterated low Na diet, elevation and compression socks  - May need to give short course of furosemide, monitor    12. Low level EBV viremia: last level of 1014 (log 3.0) on 4/14.     13. Hoarseness: feels the symptoms improved since stopping Flonase.    RTC: two weeks with plan to discharge to home  Influenza and other vaccinations: Antonio completed on 4/14  Annual dermatology visit: plans to follow up with Derm closer to home    Sharlene Larios PA-C  Pulmonary, Allergy, Critical Care and  Sleep Medicine        Interval History:     Therapy is going well with machines and arm weights, feels like she is getting stronger. No walker today, feels her balance has improved. No new or unexpected shortness of breath. Just sent in her Zio Patch. No fever or chills, does have a dry cough, not new, mainly when she uses her incentive spirometer. No new chest pain or palpitations. No allergy symptoms, no new GI complaints. Stools are two per harjeet in the am. Drinking 4 bottles of water per day.          Review of Systems:   Please see HPI, otherwise the complete 10 point ROS is negative.           Past Medical and Surgical History:     Past Medical History:   Diagnosis Date     Atrial fibrillation with RVR (H)     hx of A fib related to severe COPD, does not meet anticoagulation criteria as noted     COPD, severe (H)      Osteoporosis      Past Surgical History:   Procedure Laterality Date     BRONCHOSCOPY (RIGID OR FLEXIBLE), DIAGNOSTIC N/A 4/7/2022    Procedure: BRONCHOSCOPY, WITH BRONCHOALVEOLAR LAVAGE and BIOPSIES;  Surgeon: Gerson Haddad MD;  Location:  GI     BRONCHOSCOPY FLEXIBLE AND RIGID N/A 3/1/2022    Procedure: BRONCHOSCOPY INSPECTION;  Surgeon: Melissa Landin MD;  Location:  GI     CV CORONARY ANGIOGRAM N/A 3/27/2019    Procedure: CV CORONARY ANGIOGRAM;  Surgeon: Thierry Serrano MD;  Location:  HEART CARDIAC CATH LAB     CV RIGHT HEART CATH MEASUREMENTS RECORDED N/A 3/27/2019    Procedure: CV RIGHT HEART CATH;  Surgeon: Thierry Serrano MD;  Location:  HEART CARDIAC CATH LAB     ESOPHAGEAL IMPEDENCE FUNCTION TEST WITH 24 HOUR PH GREATER THAN 1 HOUR N/A 3/28/2019    Procedure: ESOPHAGEAL IMPEDENCE FUNCTION TEST WITH 24 HOUR PH GREATER THAN 1 HOUR;  Surgeon: Mike Henry MD;  Location:  GI     EXCISE PILONIDAL CYST, SIMPLE       IR CHEST TUBE PLACEMENT NON-TUNNELED RIGHT  3/3/2022     TONSILLECTOMY & ADENOIDECTOMY       TRANSPLANT LUNG RECIPIENT SINGLE X2 Bilateral  2022    Procedure: Bilateral Sequential Lung Transplantation, Clamshell Incision, Extracorporeal Membrane Oxgenation, Bronchoscopy, Cryoablation of Intercostal Nerves;  Surgeon: Raul Robin MD;  Location:  OR           Family History:     Family History   Problem Relation Age of Onset     Breast Cancer Mother      Colon Cancer Mother      Lung Cancer Mother      Lung Cancer Father      Lung Cancer Maternal Aunt      Pancreatic Cancer Maternal Uncle             Social History:     Social History     Socioeconomic History     Marital status:      Spouse name: Not on file     Number of children: Not on file     Years of education: Not on file     Highest education level: Not on file   Occupational History     Not on file   Tobacco Use     Smoking status: Former Smoker     Packs/day: 1.50     Years: 36.00     Pack years: 54.00     Types: Cigarettes     Quit date: 2006     Years since quittin.3     Smokeless tobacco: Never Used   Substance and Sexual Activity     Alcohol use: Yes     Comment: stated beer and wine occ     Drug use: Yes     Comment: stated hx of marijuana, quit in      Sexual activity: Not on file   Other Topics Concern     Parent/sibling w/ CABG, MI or angioplasty before 65F 55M? Not Asked   Social History Narrative     Not on file     Social Determinants of Health     Financial Resource Strain: Not on file   Food Insecurity: Not on file   Transportation Needs: Not on file   Physical Activity: Not on file   Stress: Not on file   Social Connections: Not on file   Intimate Partner Violence: Not on file   Housing Stability: Not on file            Medications:     Current Outpatient Medications   Medication     amoxicillin (AMOXIL) 500 MG capsule     aspirin (ASA) 81 MG EC tablet     calcium carbonate 600 mg-vitamin D 400 units (CALTRATE) 600-400 MG-UNIT per tablet     carboxymethylcellulose PF (REFRESH PLUS) 0.5 % ophthalmic solution     cetirizine (ZYRTEC) 10 MG  "tablet     dapsone (ACZONE) 25 MG tablet     diltiazem ER (TIAZAC) 240 MG 24 hr ER beaded capsule     famotidine (PEPCID) 20 MG tablet     IBANdronate (BONIVA) 150 MG tablet     Magnesium Glycinate 665 MG CAPS     metoprolol tartrate (LOPRESSOR) 25 MG tablet     multivitamin w/minerals (THERA-VIT-M) tablet     mycophenolic acid (GENERIC EQUIVALENT) 360 MG EC tablet     nystatin (MYCOSTATIN) 404570 UNIT/ML suspension     OXYGEN-HELIUM IN     pantoprazole (PROTONIX) 40 MG EC tablet     predniSONE (DELTASONE) 5 MG tablet     rosuvastatin (CRESTOR) 5 MG tablet     tacrolimus (GENERIC EQUIVALENT) 0.5 MG capsule     tacrolimus (GENERIC EQUIVALENT) 1 MG capsule     albuterol (PROAIR HFA/PROVENTIL HFA/VENTOLIN HFA) 108 (90 Base) MCG/ACT inhaler     blood glucose (NO BRAND SPECIFIED) lancets standard     blood glucose (NO BRAND SPECIFIED) test strip     blood glucose monitoring (NO BRAND SPECIFIED) meter device kit     No current facility-administered medications for this visit.     Facility-Administered Medications Ordered in Other Visits   Medication     sodium chloride (PF) 0.9% PF flush 10 mL            Physical Exam:   /72   Pulse 90   Resp 17   Ht 1.575 m (5' 2\")   Wt 62.1 kg (137 lb)   SpO2 95%   BMI 25.06 kg/m      GENERAL: alert, NAD  HEENT: NCAT, EOMI, no scleral icterus, oral mucosa moist and without lesions  Neck: no cervical or supraclavicular adenopathy  Lungs: moderate BS, decreased in bases  CV: RRR, S1S2, no murmurs noted  Abdomen: normoactive BS, soft  Lymph: 2+ BLE edema with reymundo hose  Neuro: AAO X 3, CN 2-12 grossly intact  Psychiatric: normal affect, good eye contact  Skin: no rash, jaundice or lesions on limited exam         Data:   All laboratory and imaging data reviewed.      Recent Results (from the past 168 hour(s))   Asymptomatic COVID-19 Virus (Coronavirus) by PCR Nose    Collection Time: 05/09/22  8:28 AM    Specimen: Nose; Swab   Result Value Ref Range    SARS CoV2 PCR Negative " Negative, Testing sent to reference lab. Results will be returned via unsolicited result   Basic metabolic panel    Collection Time: 05/10/22 10:05 AM   Result Value Ref Range    Sodium 137 133 - 144 mmol/L    Potassium 4.2 3.4 - 5.3 mmol/L    Chloride 102 94 - 109 mmol/L    Carbon Dioxide (CO2) 30 20 - 32 mmol/L    Anion Gap 5 3 - 14 mmol/L    Urea Nitrogen 25 7 - 30 mg/dL    Creatinine 0.51 (L) 0.52 - 1.04 mg/dL    Calcium 8.9 8.5 - 10.1 mg/dL    Glucose 189 (H) 70 - 99 mg/dL    GFR Estimate >90 >60 mL/min/1.73m2   Magnesium    Collection Time: 05/10/22 10:05 AM   Result Value Ref Range    Magnesium 1.4 (L) 1.6 - 2.3 mg/dL   CBC with platelets    Collection Time: 05/10/22 10:05 AM   Result Value Ref Range    WBC Count 11.2 (H) 4.0 - 11.0 10e3/uL    RBC Count 2.93 (L) 3.80 - 5.20 10e6/uL    Hemoglobin 9.3 (L) 11.7 - 15.7 g/dL    Hematocrit 27.8 (L) 35.0 - 47.0 %    MCV 95 78 - 100 fL    MCH 31.7 26.5 - 33.0 pg    MCHC 33.5 31.5 - 36.5 g/dL    RDW 16.7 (H) 10.0 - 15.0 %    Platelet Count 245 150 - 450 10e3/uL   Tacrolimus level    Collection Time: 05/10/22 10:05 AM   Result Value Ref Range    Tacrolimus by Tandem Mass Spectrometry 12.8 5.0 - 15.0 ug/L    Tacrolimus Last Dose Date 5/9/2022     Tacrolimus Last Dose Time 10:00 PM    INR    Collection Time: 05/10/22 10:05 AM   Result Value Ref Range    INR 0.95 0.85 - 1.15   General PFT Lab (Please always keep checked)    Collection Time: 05/11/22 10:57 AM   Result Value Ref Range    FVC-Pred 2.77 L    FVC-Pre 1.27 L    FVC-%Pred-Pre 45 %    FEV1-Pre 1.27 L    FEV1-%Pred-Pre 58 %    FEV1FVC-Pred 79 %    FEV1FVC-Pre 100 %    FEFMax-Pred 5.63 L/sec    FEFMax-Pre 4.40 L/sec    FEFMax-%Pred-Pre 78 %    FEF2575-Pred 1.93 L/sec    FEF2575-Pre 1.80 L/sec    NCC3165-%Pred-Pre 92 %    ExpTime-Pre 2.19 sec    FIFMax-Pre 1.91 L/sec    FEV1FEV6-Pred 80 %    FEV1FEV6-Pre 100 %   Asymptomatic COVID-19 Virus (Coronavirus) by PCR Nasopharyngeal    Collection Time: 05/11/22 11:32 AM     Specimen: Nasopharyngeal; Swab   Result Value Ref Range    SARS CoV2 PCR Negative Negative, Testing sent to reference lab. Results will be returned via unsolicited result     PFT interpretation:  Maneuver: valid, met ATS guidelines  Increase ratio with decreased FEV1 and FVC  Compared to prior FEV1 of 1.27 is 10 ml below prior  Decrease in FVC may be related to restriction; lung volumes would be necessary to determine

## 2022-05-09 ENCOUNTER — LAB (OUTPATIENT)
Dept: LAB | Facility: CLINIC | Age: 67
End: 2022-05-09
Attending: PHYSICIAN ASSISTANT

## 2022-05-09 DIAGNOSIS — Z94.2 LUNG REPLACED BY TRANSPLANT (H): ICD-10-CM

## 2022-05-09 LAB — SARS-COV-2 RNA RESP QL NAA+PROBE: NEGATIVE

## 2022-05-09 PROCEDURE — U0005 INFEC AGEN DETEC AMPLI PROBE: HCPCS | Performed by: PHYSICIAN ASSISTANT

## 2022-05-10 ENCOUNTER — HOSPITAL ENCOUNTER (OUTPATIENT)
Dept: CARDIAC REHAB | Facility: CLINIC | Age: 67
Discharge: HOME | End: 2022-05-10
Attending: INTERNAL MEDICINE
Payer: MEDICARE

## 2022-05-10 ENCOUNTER — TELEPHONE (OUTPATIENT)
Dept: TRANSPLANT | Facility: CLINIC | Age: 67
End: 2022-05-10

## 2022-05-10 DIAGNOSIS — D84.9 IMMUNOSUPPRESSED STATUS (H): ICD-10-CM

## 2022-05-10 DIAGNOSIS — Z79.899 ENCOUNTER FOR LONG-TERM (CURRENT) USE OF HIGH-RISK MEDICATION: ICD-10-CM

## 2022-05-10 DIAGNOSIS — I97.89 POSTOPERATIVE ATRIAL FIBRILLATION (H): ICD-10-CM

## 2022-05-10 DIAGNOSIS — Z94.2 LUNG REPLACED BY TRANSPLANT (H): Primary | ICD-10-CM

## 2022-05-10 DIAGNOSIS — I48.91 POSTOPERATIVE ATRIAL FIBRILLATION (H): ICD-10-CM

## 2022-05-10 DIAGNOSIS — Z94.2 S/P LUNG TRANSPLANT (H): ICD-10-CM

## 2022-05-10 DIAGNOSIS — Z94.2 LUNG REPLACED BY TRANSPLANT (H): ICD-10-CM

## 2022-05-10 LAB
ANION GAP SERPL CALCULATED.3IONS-SCNC: 5 MMOL/L (ref 3–14)
BUN SERPL-MCNC: 25 MG/DL (ref 7–30)
CALCIUM SERPL-MCNC: 8.9 MG/DL (ref 8.5–10.1)
CHLORIDE BLD-SCNC: 102 MMOL/L (ref 94–109)
CO2 SERPL-SCNC: 30 MMOL/L (ref 20–32)
CREAT SERPL-MCNC: 0.51 MG/DL (ref 0.52–1.04)
ERYTHROCYTE [DISTWIDTH] IN BLOOD BY AUTOMATED COUNT: 16.7 % (ref 10–15)
GFR SERPL CREATININE-BSD FRML MDRD: >90 ML/MIN/1.73M2
GLUCOSE BLD-MCNC: 189 MG/DL (ref 70–99)
HCT VFR BLD AUTO: 27.8 % (ref 35–47)
HGB BLD-MCNC: 9.3 G/DL (ref 11.7–15.7)
INR PPP: 0.95 (ref 0.85–1.15)
MAGNESIUM SERPL-MCNC: 1.4 MG/DL (ref 1.6–2.3)
MCH RBC QN AUTO: 31.7 PG (ref 26.5–33)
MCHC RBC AUTO-ENTMCNC: 33.5 G/DL (ref 31.5–36.5)
MCV RBC AUTO: 95 FL (ref 78–100)
PLATELET # BLD AUTO: 245 10E3/UL (ref 150–450)
POTASSIUM BLD-SCNC: 4.2 MMOL/L (ref 3.4–5.3)
RBC # BLD AUTO: 2.93 10E6/UL (ref 3.8–5.2)
SODIUM SERPL-SCNC: 137 MMOL/L (ref 133–144)
TACROLIMUS BLD-MCNC: 12.8 UG/L (ref 5–15)
TME LAST DOSE: NORMAL H
TME LAST DOSE: NORMAL H
WBC # BLD AUTO: 11.2 10E3/UL (ref 4–11)

## 2022-05-10 PROCEDURE — 86832 HLA CLASS I HIGH DEFIN QUAL: CPT | Performed by: PHYSICIAN ASSISTANT

## 2022-05-10 PROCEDURE — 83735 ASSAY OF MAGNESIUM: CPT | Performed by: INTERNAL MEDICINE

## 2022-05-10 PROCEDURE — 36415 COLL VENOUS BLD VENIPUNCTURE: CPT

## 2022-05-10 PROCEDURE — 82310 ASSAY OF CALCIUM: CPT | Performed by: INTERNAL MEDICINE

## 2022-05-10 PROCEDURE — 86833 HLA CLASS II HIGH DEFIN QUAL: CPT | Performed by: PHYSICIAN ASSISTANT

## 2022-05-10 PROCEDURE — 85027 COMPLETE CBC AUTOMATED: CPT | Performed by: INTERNAL MEDICINE

## 2022-05-10 PROCEDURE — 80197 ASSAY OF TACROLIMUS: CPT | Performed by: INTERNAL MEDICINE

## 2022-05-10 PROCEDURE — G0239 OTH RESP PROC, GROUP: HCPCS

## 2022-05-10 PROCEDURE — 85610 PROTHROMBIN TIME: CPT | Performed by: PHYSICIAN ASSISTANT

## 2022-05-10 PROCEDURE — 87799 DETECT AGENT NOS DNA QUANT: CPT | Performed by: INTERNAL MEDICINE

## 2022-05-10 RX ORDER — MAGNESIUM SULFATE HEPTAHYDRATE 40 MG/ML
2 INJECTION, SOLUTION INTRAVENOUS ONCE
Status: CANCELLED | OUTPATIENT
Start: 2022-05-12

## 2022-05-10 NOTE — Clinical Note
May 10, 2022      Melissa Elder  [address] [facility]  [place] [address]          SARS CoV2 PCR (no units)   Date Value   05/09/2022 Negative          This letter provides a written record that you were tested for COVID-19.     Your result was negative. This means that we didn t find the virus that causes COVID-19 in your sample. A test may show negative when you do actually have the virus. This can happen when the virus is in the early stages of infection before you feel symptoms.     If you were exposed to COVID-19: Follow the CDC s instructions for quarantine: www.cdc.gov/coronavirus/2019-ncov/your-health/quarantine-isolation.html     If you have symptoms:     Limit contact with others to avoid spreading your illness.    Clean  high touch  surfaces often (doorknobs, counters, handles, etc.). Use a  householdcleaning spray or wipes. You can find a full list on the EPA website  atwww.epa.gov/pesticide-registration/list-n-disinfectants-use-against-sars-cov-2.    Cover your mouth and nose with a mask or other face covering to avoid spreading  germs.    Wash your hands and face often with soap and water.    If symptoms get worse, or you still think you might have COVID, you may want to  gettested again in 48 hours.    Going back to work   Check with your employer for any guidelines to follow for going back to work.     Employers, schools and daycares: This document serves as formal notice that your employee tested negative for COVID-19, as of the testing date shown above.

## 2022-05-10 NOTE — PROGRESS NOTES
Transplant Coordinator Note    Reason for visit: Post lung transplant follow up visit   Coordinator: Present   Caregiver:  , Tyrese    Health concerns addressed today:  1. Respiratory - improving. Occasional dry cough.   2. No new chest pain. Just finished zio patch  3.  ENT: hoarseness improved after stopping Flonase.   4. GI: BM x 2 daily.   5.     Activity/rehab: pulmonary rehab, able to do more, feels like getting stronger.  Oxygen needs: room air  Pain management/RX:   Diabetic management: checking BGs  Next Bronch due: 5/12/2022  Risk Criteria Labs: negative 3/25/2022   CMV status: D-R/-  EBV status: D+/R+  AC/asa: on ASA 81mg, has been on hold x 10 days  PJP prophylactic: Dapsone    COVID:  1. COVID-19 infection (yes/no, date of most recent positive test): no  2. Status/instructions given about COVID-19 vaccine: has received full dose of Evusheld    Pt Education: medications (use/dose/side effects), how/when to call coordinator, frequency of labs, s/s of infection/rejection, call prior to starting any new medications, lab/vital sign book    Health Maintenance:     Last colonoscopy:     Next colonoscopy due:     Dermatology:    Vaccinations this visit:     Labs, CXR, PFTs reviewed with patient  Medication record reviewed and reconciled  Questions and concerns addressed    Patient Instructions  1. Continue to hydrate with 60-70 oz fluids daily.  2. Continue to exercise daily or most days of the week.  3. Follow up with your primary care provider for annual gender health maintenance procedures.  4. It doesn't seem like the COVID vaccine is working well in lung transplant patients. A number of lung transplant patients have gotten sick with COVID even after receiving the vaccines.  Based on our recent experience, it can be life-threatening to get COVID  even after being vaccinated. Please continue to act like you did not get the COVID vaccine - social distancing, wearing a mask, good hand hygiene, etc. If  the people around you are vaccinated, it will help reduce the risk of you getting COVID. All members of your household should be vaccinated.  5. You are on the Amoxicillin through 5/23.   6. Decrease your tacrolimus to 2.5mg in the AM and 2mg in the PM.      Next transplant clinic appointment: 2 weeks with CXR, labs and PFTs  Next lab draw: one week.       AVS printed at time of check out

## 2022-05-10 NOTE — TELEPHONE ENCOUNTER
Called pt and  to confirm that aspirin has been held for bronch Thursday, 5/12/22.  confirmed aspirin has been held past 9 days.

## 2022-05-10 NOTE — PROGRESS NOTES
Bronchoscopy with biopsies and lavage     Date of transplant 2/21/2022   Transplant type Bilateral lung  Date of labs 5/10/22  BUN 25 DDAVP No  Plt 245  INR 0.95 Anticoag therapy Aspirin Last dose 4/30/22  COVID: Negative on 5/9/22  Comment Pt will receive 2g Mag before procedure.     Page Sharlene Larios with any questions [id number]

## 2022-05-11 ENCOUNTER — LAB (OUTPATIENT)
Dept: LAB | Facility: CLINIC | Age: 67
End: 2022-05-11
Attending: PHYSICIAN ASSISTANT

## 2022-05-11 ENCOUNTER — MYC MEDICAL ADVICE (OUTPATIENT)
Dept: TRANSPLANT | Facility: CLINIC | Age: 67
End: 2022-05-11

## 2022-05-11 ENCOUNTER — OFFICE VISIT (OUTPATIENT)
Dept: PULMONOLOGY | Facility: CLINIC | Age: 67
End: 2022-05-11
Attending: PHYSICIAN ASSISTANT
Payer: MEDICARE

## 2022-05-11 VITALS
HEIGHT: 62 IN | WEIGHT: 137 LBS | HEART RATE: 90 BPM | DIASTOLIC BLOOD PRESSURE: 72 MMHG | SYSTOLIC BLOOD PRESSURE: 116 MMHG | RESPIRATION RATE: 17 BRPM | OXYGEN SATURATION: 95 % | BODY MASS INDEX: 25.21 KG/M2

## 2022-05-11 DIAGNOSIS — Z94.2 S/P LUNG TRANSPLANT (H): Primary | ICD-10-CM

## 2022-05-11 DIAGNOSIS — R79.9 ABNORMAL FINDING OF BLOOD CHEMISTRY, UNSPECIFIED: ICD-10-CM

## 2022-05-11 DIAGNOSIS — Z94.2 S/P LUNG TRANSPLANT (H): ICD-10-CM

## 2022-05-11 DIAGNOSIS — Z11.59 ENCOUNTER FOR SCREENING FOR OTHER VIRAL DISEASES: ICD-10-CM

## 2022-05-11 LAB
CMV DNA SPEC NAA+PROBE-ACNC: NOT DETECTED IU/ML
EXPTIME-PRE: 2.19 SEC
FEF2575-%PRED-PRE: 92 %
FEF2575-PRE: 1.8 L/SEC
FEF2575-PRED: 1.93 L/SEC
FEFMAX-%PRED-PRE: 78 %
FEFMAX-PRE: 4.4 L/SEC
FEFMAX-PRED: 5.63 L/SEC
FEV1-%PRED-PRE: 58 %
FEV1-PRE: 1.27 L
FEV1FEV6-PRE: 100 %
FEV1FEV6-PRED: 80 %
FEV1FVC-PRE: 100 %
FEV1FVC-PRED: 79 %
FIFMAX-PRE: 1.91 L/SEC
FVC-%PRED-PRE: 45 %
FVC-PRE: 1.27 L
FVC-PRED: 2.77 L
SARS-COV-2 RNA RESP QL NAA+PROBE: NEGATIVE

## 2022-05-11 PROCEDURE — 94375 RESPIRATORY FLOW VOLUME LOOP: CPT | Performed by: PHYSICIAN ASSISTANT

## 2022-05-11 PROCEDURE — 99214 OFFICE O/P EST MOD 30 MIN: CPT | Mod: 24 | Performed by: PHYSICIAN ASSISTANT

## 2022-05-11 PROCEDURE — U0005 INFEC AGEN DETEC AMPLI PROBE: HCPCS | Performed by: INTERNAL MEDICINE

## 2022-05-11 PROCEDURE — G0463 HOSPITAL OUTPT CLINIC VISIT: HCPCS | Mod: 25

## 2022-05-11 PROCEDURE — 36415 COLL VENOUS BLD VENIPUNCTURE: CPT

## 2022-05-11 RX ORDER — DAPSONE 25 MG/1
50 TABLET ORAL DAILY
Qty: 60 TABLET | Refills: 11 | Status: SHIPPED | OUTPATIENT
Start: 2022-05-11 | End: 2022-05-24

## 2022-05-11 RX ORDER — TACROLIMUS 1 MG/1
2 CAPSULE ORAL 2 TIMES DAILY
Qty: 120 CAPSULE | Refills: 11 | Status: SHIPPED | OUTPATIENT
Start: 2022-05-11 | End: 2022-05-18

## 2022-05-11 RX ORDER — TACROLIMUS 0.5 MG/1
0.5 CAPSULE ORAL DAILY
Qty: 30 CAPSULE | Refills: 11 | Status: SHIPPED | OUTPATIENT
Start: 2022-05-11 | End: 2022-05-18

## 2022-05-11 ASSESSMENT — PAIN SCALES - GENERAL: PAINLEVEL: NO PAIN (0)

## 2022-05-11 NOTE — PATIENT INSTRUCTIONS
Patient Instructions  1. Continue to hydrate with 60-70 oz fluids daily.  2. Continue to exercise daily or most days of the week.  3. Follow up with your primary care provider for annual gender health maintenance procedures.  4. It doesn't seem like the COVID vaccine is working well in lung transplant patients. A number of lung transplant patients have gotten sick with COVID even after receiving the vaccines.  Based on our recent experience, it can be life-threatening to get COVID  even after being vaccinated. Please continue to act like you did not get the COVID vaccine - social distancing, wearing a mask, good hand hygiene, etc. If the people around you are vaccinated, it will help reduce the risk of you getting COVID. All members of your household should be vaccinated.  5. You are on the Amoxicillin through 5/23.   6. Decrease your tacrolimus to 2.5mg in the AM and 2mg in the PM.      Next transplant clinic appointment: 2 weeks with CXR, labs and PFTs  Next lab draw: one week.           You are scheduled for a bronchoscopy on 5/12 at 9AM at the [facility] on the 3rd floor. Arrive 1 hour early (at 8AM).     Instructions     1. Nothing to eat or drink 8 hours before procedure.  2. Hold your morning medications. Bring them with you to take after the procedure.  3. Have a  available to take you home.   4. Restart Aspirin on Saturday.     ~~~~~~~~~~~~~~~~~~~~~~~~~    Thoracic Transplant Office phone [phone], fax [phone]    Office Hours 8:30 - 5:00     For after-hours urgent issues, please dial [phone], and ask to speak with the Thoracic Transplant Coordinator On-Call.  --------------------  To expedite your medication refill(s), please contact your pharmacy and have them fax a refill request to: [phone]  .   *Please allow 3 business days for routine medication refills.  *Please allow 5 business days for controlled substance medication refills.    **For  Diabetic medications and supplies refill(s), please contact your pharmacy and have them contact your Endocrine team.  --------------------  For scheduling appointments call [phone].  --------------------  Please Note: If you are active on [employer], all future test results will be sent by [employer] message only, and will no longer be called to patient. You may also receive communication directly from your physician.

## 2022-05-11 NOTE — LETTER
5/11/2022         RE: Melissa Elder  [address]  [facility] [address]        Dear Colleague,    Thank you for referring your patient, Melissa Elder, to the [facility]. Please see a copy of my visit note below.    [facility]  May 11, 2022         Assessment and Plan:   Melissa Elder is a 66 year old female with h/o bilateral lung transplant on 2/21 for severe COPD for who is seen today for routine follow up. Surgery uncomplicated, s/p bilateral pigtail chest tube placement for hydropneumothorax and bilateral effusions, disseminated Ureaplasma and transient hyperammonemia. Other history notable for HTN, mild non-obstructive CAD, paroxysmal afib, osteoporosis, GERD, and colonic polyps. She is scheduled for routine surveillance bronch/BAL and biopsies tomorrow.     1. S/p bilateral sequential lung transplant:  Subluxation of sternum: continues to work and progress with pulmonary rehab, denies new pulmonary complaints, no longer using a walker, but very weak when getting up to the exam table. Sating 95% on room air. Repeat sniff test 3/11 without evidence of diaphragmatic palsy. S/p bronch on 4/7, cultures NGTD, unable to assess for vascular rejection (ISHLT AXB0). CD4 staining was negative. CMV 5/3 negative. CXR reviewed today demonstrates stable transplant. PFTs unchanged from prior, suspect lower than expected secondary to ongoing weakness, deconditioning, but does have bronch tomorrow.   - Continue IS including Myfortic 720 mg BID, tacrolimus (goal 8-12) and prednisone taper, decreased yesterday 10/7.5  - Dapsone for PJP ppx  - Nystatin for oral candidiasis ppx, 6 month course  - CMV (D-/R-)  - Will schedule sleep study closer to home    2. ID: donor cultures with P-S MSSA with Strep mitis, Actinomyces odontolyticus, MSSA x2, and C. kruseii.  Recipient cultures at time of transplant with  Actinomyces odonotolyticus. S/p ceftriazone 2/23-3/8.   - Amoxicillin (3/8-5/23) X 3 months for Actinomyces treatment  - Low threshold to treat Candida if it recurs per transplant ID    3. 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   - DSA pending for today    4. Hypogammaglobulinemia: last IgG of 647, s/p IVIG (unclear indication) on 4/4.  IgG of 613 on 4/27.    5. Positive AFB on sputum culture: noted on sputum culture 3/2. Transplant ID following for speciation and susceptibility testing as well as other evidence of infection. AFB sputum culture 3/9 and 4/7 NGTD.    6. PHS risk criteria donor: additional labs required post-transplant (between 4-8 weeks post-op): Hepatitis B, Hepatitis C, and HIV by COLT, negative on 3/25.     7. Concern for gastroparesis:  GERD: NM gastric emptying study completed 3/15 normal. Negative pH/manometry study 3/28/19, noted small hiatal hernia. No GI complaints today, appetite is good.   - Famotidine 20 mg BID and pantoprazole 40 mg daily    8. CAD: noted to have mild non-obstructive CAD without hemodynamically significant lesions on cardiac cath 3/27/19.   - Continue aspirin and rosuvastatin  - Aspirin on hold     9. Paroxysmal afib:  HTN: had been on diltiazem pre transplant, not on AC. Persistent tachycardia post-op, controlled. Completed Zio Patch and just sent in  - Continue diltiazem and metoprolol    10. Hypomagnesemia: secondary to CNI. Mg low at 1.4 today.  - Will give 3 grams IV mg tomorrow  - Continue Mg glycinate    11. BLE edema: was on lasix pre transplant for chronic edema.  states 5 mg daily. Ongoing 2L BLE edema, improved with the am, currently wearing compression socks.  - Reiterated low Na diet, elevation and compression socks  - May need to give short course of furosemide, monitor    12. Low level EBV viremia: last level of 1014 (log 3.0) on 4/14.     13. Hoarseness: feels the  symptoms improved since stopping Flonase.    RTC: two weeks with plan to discharge to home  Influenza and other vaccinations: Antonio completed on 4/14  Annual dermatology visit: plans to follow up with Derm closer to home    Sharlene Larios PA-C  Pulmonary, Allergy, Critical Care and Sleep Medicine        Interval History:     Therapy is going well with machines and arm weights, feels like she is getting stronger. No walker today, feels her balance has improved. No new or unexpected shortness of breath. Just sent in her Zio Patch. No fever or chills, does have a dry cough, not new, mainly when she uses her incentive spirometer. No new chest pain or palpitations. No allergy symptoms, no new GI complaints. Stools are two per harjeet in the am. Drinking 4 bottles of water per day.          Review of Systems:   Please see HPI, otherwise the complete 10 point ROS is negative.           Past Medical and Surgical History:     Past Medical History:   Diagnosis Date     Atrial fibrillation with RVR (H)     hx of A fib related to severe COPD, does not meet anticoagulation criteria as noted     COPD, severe (H)      Osteoporosis      Past Surgical History:   Procedure Laterality Date     BRONCHOSCOPY (RIGID OR FLEXIBLE), DIAGNOSTIC N/A 4/7/2022    Procedure: BRONCHOSCOPY, WITH BRONCHOALVEOLAR LAVAGE and BIOPSIES;  Surgeon: Gerson Haddad MD;  Location:  GI     BRONCHOSCOPY FLEXIBLE AND RIGID N/A 3/1/2022    Procedure: BRONCHOSCOPY INSPECTION;  Surgeon: Melissa Landin MD;  Location:  GI     CV CORONARY ANGIOGRAM N/A 3/27/2019    Procedure: CV CORONARY ANGIOGRAM;  Surgeon: Thierry Serrano MD;  Location:  HEART CARDIAC CATH LAB     CV RIGHT HEART CATH MEASUREMENTS RECORDED N/A 3/27/2019    Procedure: CV RIGHT HEART CATH;  Surgeon: Thierry Serrano MD;  Location:  HEART CARDIAC CATH LAB     ESOPHAGEAL IMPEDENCE FUNCTION TEST WITH 24 HOUR PH GREATER THAN 1 HOUR N/A 3/28/2019    Procedure: ESOPHAGEAL IMPEDENCE FUNCTION  TEST WITH 24 HOUR PH GREATER THAN 1 HOUR;  Surgeon: Mike Henry MD;  Location:  GI     EXCISE PILONIDAL CYST, SIMPLE       IR CHEST TUBE PLACEMENT NON-TUNNELED RIGHT  3/3/2022     TONSILLECTOMY & ADENOIDECTOMY       TRANSPLANT LUNG RECIPIENT SINGLE X2 Bilateral 2022    Procedure: Bilateral Sequential Lung Transplantation, Clamshell Incision, Extracorporeal Membrane Oxgenation, Bronchoscopy, Cryoablation of Intercostal Nerves;  Surgeon: Raul Robin MD;  Location:  OR           Family History:     Family History   Problem Relation Age of Onset     Breast Cancer Mother      Colon Cancer Mother      Lung Cancer Mother      Lung Cancer Father      Lung Cancer Maternal Aunt      Pancreatic Cancer Maternal Uncle             Social History:     Social History     Socioeconomic History     Marital status:      Spouse name: Not on file     Number of children: Not on file     Years of education: Not on file     Highest education level: Not on file   Occupational History     Not on file   Tobacco Use     Smoking status: Former Smoker     Packs/day: 1.50     Years: 36.00     Pack years: 54.00     Types: Cigarettes     Quit date: 2006     Years since quittin.3     Smokeless tobacco: Never Used   Substance and Sexual Activity     Alcohol use: Yes     Comment: stated beer and wine occ     Drug use: Yes     Comment: stated hx of marijuana, quit in      Sexual activity: Not on file   Other Topics Concern     Parent/sibling w/ CABG, MI or angioplasty before 65F 55M? Not Asked   Social History Narrative     Not on file     Social Determinants of Health     Financial Resource Strain: Not on file   Food Insecurity: Not on file   Transportation Needs: Not on file   Physical Activity: Not on file   Stress: Not on file   Social Connections: Not on file   Intimate Partner Violence: Not on file   Housing Stability: Not on file            Medications:     Current Outpatient Medications  "  Medication     amoxicillin (AMOXIL) 500 MG capsule     aspirin (ASA) 81 MG EC tablet     calcium carbonate 600 mg-vitamin D 400 units (CALTRATE) 600-400 MG-UNIT per tablet     carboxymethylcellulose PF (REFRESH PLUS) 0.5 % ophthalmic solution     cetirizine (ZYRTEC) 10 MG tablet     dapsone (ACZONE) 25 MG tablet     diltiazem ER (TIAZAC) 240 MG 24 hr ER beaded capsule     famotidine (PEPCID) 20 MG tablet     IBANdronate (BONIVA) 150 MG tablet     Magnesium Glycinate 665 MG CAPS     metoprolol tartrate (LOPRESSOR) 25 MG tablet     multivitamin w/minerals (THERA-VIT-M) tablet     mycophenolic acid (GENERIC EQUIVALENT) 360 MG EC tablet     nystatin (MYCOSTATIN) 022842 UNIT/ML suspension     OXYGEN-HELIUM IN     pantoprazole (PROTONIX) 40 MG EC tablet     predniSONE (DELTASONE) 5 MG tablet     rosuvastatin (CRESTOR) 5 MG tablet     tacrolimus (GENERIC EQUIVALENT) 0.5 MG capsule     tacrolimus (GENERIC EQUIVALENT) 1 MG capsule     albuterol (PROAIR HFA/PROVENTIL HFA/VENTOLIN HFA) 108 (90 Base) MCG/ACT inhaler     blood glucose (NO BRAND SPECIFIED) lancets standard     blood glucose (NO BRAND SPECIFIED) test strip     blood glucose monitoring (NO BRAND SPECIFIED) meter device kit     No current facility-administered medications for this visit.     Facility-Administered Medications Ordered in Other Visits   Medication     sodium chloride (PF) 0.9% PF flush 10 mL            Physical Exam:   /72   Pulse 90   Resp 17   Ht 1.575 m (5' 2\")   Wt 62.1 kg (137 lb)   SpO2 95%   BMI 25.06 kg/m      GENERAL: alert, NAD  HEENT: NCAT, EOMI, no scleral icterus, oral mucosa moist and without lesions  Neck: no cervical or supraclavicular adenopathy  Lungs: moderate BS, decreased in bases  CV: RRR, S1S2, no murmurs noted  Abdomen: normoactive BS, soft  Lymph: 2+ BLE edema with reymunod hose  Neuro: AAO X 3, CN 2-12 grossly intact  Psychiatric: normal affect, good eye contact  Skin: no rash, jaundice or lesions on limited exam      "    Data:   All laboratory and imaging data reviewed.      Recent Results (from the past 168 hour(s))   Asymptomatic COVID-19 Virus (Coronavirus) by PCR Nose    Collection Time: 05/09/22  8:28 AM    Specimen: Nose; Swab   Result Value Ref Range    SARS CoV2 PCR Negative Negative, Testing sent to reference lab. Results will be returned via unsolicited result   Basic metabolic panel    Collection Time: 05/10/22 10:05 AM   Result Value Ref Range    Sodium 137 133 - 144 mmol/L    Potassium 4.2 3.4 - 5.3 mmol/L    Chloride 102 94 - 109 mmol/L    Carbon Dioxide (CO2) 30 20 - 32 mmol/L    Anion Gap 5 3 - 14 mmol/L    Urea Nitrogen 25 7 - 30 mg/dL    Creatinine 0.51 (L) 0.52 - 1.04 mg/dL    Calcium 8.9 8.5 - 10.1 mg/dL    Glucose 189 (H) 70 - 99 mg/dL    GFR Estimate >90 >60 mL/min/1.73m2   Magnesium    Collection Time: 05/10/22 10:05 AM   Result Value Ref Range    Magnesium 1.4 (L) 1.6 - 2.3 mg/dL   CBC with platelets    Collection Time: 05/10/22 10:05 AM   Result Value Ref Range    WBC Count 11.2 (H) 4.0 - 11.0 10e3/uL    RBC Count 2.93 (L) 3.80 - 5.20 10e6/uL    Hemoglobin 9.3 (L) 11.7 - 15.7 g/dL    Hematocrit 27.8 (L) 35.0 - 47.0 %    MCV 95 78 - 100 fL    MCH 31.7 26.5 - 33.0 pg    MCHC 33.5 31.5 - 36.5 g/dL    RDW 16.7 (H) 10.0 - 15.0 %    Platelet Count 245 150 - 450 10e3/uL   Tacrolimus level    Collection Time: 05/10/22 10:05 AM   Result Value Ref Range    Tacrolimus by Tandem Mass Spectrometry 12.8 5.0 - 15.0 ug/L    Tacrolimus Last Dose Date 5/9/2022     Tacrolimus Last Dose Time 10:00 PM    INR    Collection Time: 05/10/22 10:05 AM   Result Value Ref Range    INR 0.95 0.85 - 1.15   General PFT Lab (Please always keep checked)    Collection Time: 05/11/22 10:57 AM   Result Value Ref Range    FVC-Pred 2.77 L    FVC-Pre 1.27 L    FVC-%Pred-Pre 45 %    FEV1-Pre 1.27 L    FEV1-%Pred-Pre 58 %    FEV1FVC-Pred 79 %    FEV1FVC-Pre 100 %    FEFMax-Pred 5.63 L/sec    FEFMax-Pre 4.40 L/sec    FEFMax-%Pred-Pre 78 %     FEF2575-Pred 1.93 L/sec    FEF2575-Pre 1.80 L/sec    TOU8695-%Pred-Pre 92 %    ExpTime-Pre 2.19 sec    FIFMax-Pre 1.91 L/sec    FEV1FEV6-Pred 80 %    FEV1FEV6-Pre 100 %   Asymptomatic COVID-19 Virus (Coronavirus) by PCR Nasopharyngeal    Collection Time: 05/11/22 11:32 AM    Specimen: Nasopharyngeal; Swab   Result Value Ref Range    SARS CoV2 PCR Negative Negative, Testing sent to reference lab. Results will be returned via unsolicited result     PFT interpretation:  Maneuver: valid, met ATS guidelines  Increase ratio with decreased FEV1 and FVC  Compared to prior FEV1 of 1.27 is 10 ml below prior  Decrease in FVC may be related to restriction; lung volumes would be necessary to determine    Transplant Coordinator Note    Reason for visit: Post lung transplant follow up visit   Coordinator: Present   Caregiver:  , Tyrese    Health concerns addressed today:  1. Respiratory - improving. Occasional dry cough.   2. No new chest pain. Just finished zio patch  3.  ENT: hoarseness improved after stopping Flonase.   4. GI: BM x 2 daily.   5.     Activity/rehab: pulmonary rehab, able to do more, feels like getting stronger.  Oxygen needs: room air  Pain management/RX:   Diabetic management: checking BGs  Next Bronch due: 5/12/2022  Risk Criteria Labs: negative 3/25/2022   CMV status: D-R/-  EBV status: D+/R+  AC/asa: on ASA 81mg, has been on hold x 10 days  PJP prophylactic: Dapsone    COVID:  1. COVID-19 infection (yes/no, date of most recent positive test): no  2. Status/instructions given about COVID-19 vaccine: has received full dose of Evusheld    Pt Education: medications (use/dose/side effects), how/when to call coordinator, frequency of labs, s/s of infection/rejection, call prior to starting any new medications, lab/vital sign book    Health Maintenance:     Last colonoscopy:     Next colonoscopy due:     Dermatology:    Vaccinations this visit:     Labs, CXR, PFTs reviewed with patient  Medication record  reviewed and reconciled  Questions and concerns addressed    Patient Instructions  1. Continue to hydrate with 60-70 oz fluids daily.  2. Continue to exercise daily or most days of the week.  3. Follow up with your primary care provider for annual gender health maintenance procedures.  4. It doesn't seem like the COVID vaccine is working well in lung transplant patients. A number of lung transplant patients have gotten sick with COVID even after receiving the vaccines.  Based on our recent experience, it can be life-threatening to get COVID  even after being vaccinated. Please continue to act like you did not get the COVID vaccine - social distancing, wearing a mask, good hand hygiene, etc. If the people around you are vaccinated, it will help reduce the risk of you getting COVID. All members of your household should be vaccinated.  5. You are on the Amoxicillin through 5/23.   6. Decrease your tacrolimus to 2.5mg in the AM and 2mg in the PM.      Next transplant clinic appointment: 2 weeks with CXR, labs and PFTs  Next lab draw: one week.       AVS printed at time of check out      Again, thank you for allowing me to participate in the care of your patient.        Sincerely,        Sharlene Larios PA-C

## 2022-05-12 ENCOUNTER — HOSPITAL ENCOUNTER (OUTPATIENT)
Facility: CLINIC | Age: 67
Discharge: HOME | End: 2022-05-12
Attending: INTERNAL MEDICINE | Admitting: INTERNAL MEDICINE
Payer: MEDICARE

## 2022-05-12 ENCOUNTER — APPOINTMENT (OUTPATIENT)
Dept: GENERAL RADIOLOGY | Facility: CLINIC | Age: 67
End: 2022-05-12
Attending: INTERNAL MEDICINE
Payer: MEDICARE

## 2022-05-12 VITALS
BODY MASS INDEX: 25.84 KG/M2 | WEIGHT: 140.43 LBS | HEART RATE: 104 BPM | DIASTOLIC BLOOD PRESSURE: 65 MMHG | SYSTOLIC BLOOD PRESSURE: 136 MMHG | TEMPERATURE: 97.8 F | OXYGEN SATURATION: 92 % | HEIGHT: 62 IN | RESPIRATION RATE: 16 BRPM

## 2022-05-12 DIAGNOSIS — D84.9 IMMUNOSUPPRESSED STATUS (H): ICD-10-CM

## 2022-05-12 DIAGNOSIS — Z94.2 LUNG REPLACED BY TRANSPLANT (H): ICD-10-CM

## 2022-05-12 LAB
APPEARANCE FLD: CLEAR
BRONCHOSCOPY: NORMAL
CELL COUNT BODY FLUID SOURCE: NORMAL
COLOR FLD: COLORLESS
DONOR IDENTIFICATION: NORMAL
DQB5: 4496
DQB6: 2248
DSA COMMENTS: NORMAL
DSA PRESENT: YES
DSA TEST METHOD: NORMAL
EBV DNA # SPEC NAA+PROBE: <500 COPIES/ML
EBV DNA SPEC NAA+PROBE-LOG#: <2.7 {LOG_COPIES}/ML
GRAM STAIN RESULT: NORMAL
GRAM STAIN RESULT: NORMAL
LYMPHOCYTES NFR FLD MANUAL: 4 %
Lab: NORMAL
MONOS+MACROS NFR FLD MANUAL: NORMAL %
NEUTS BAND NFR FLD MANUAL: 3 %
ORGAN: NORMAL
OTHER CELLS FLD MANUAL: 93 %
PATH REPORT.COMMENTS IMP SPEC: NORMAL
PATH REPORT.COMMENTS IMP SPEC: NORMAL
PATH REPORT.FINAL DX SPEC: NORMAL
PATH REPORT.GROSS SPEC: NORMAL
PATH REPORT.MICROSCOPIC SPEC OTHER STN: NORMAL
PATH REPORT.RELEVANT HX SPEC: NORMAL
PERFORMING LABORATORY: NORMAL
SA 1 CELL: NORMAL
SA 1 TEST METHOD: NORMAL
SA 2 CELL: NORMAL
SA 2 TEST METHOD: NORMAL
SA1 HI RISK ABY: NORMAL
SA1 MOD RISK ABY: NORMAL
SA2 HI RISK ABY: NORMAL
SA2 MOD RISK ABY: NORMAL
SPECIMEN STATUS: NORMAL
TEST NAME: NORMAL
UNACCEPTABLE ANTIGENS: NORMAL
UNOS CPRA: 73
WBC # FLD AUTO: 134 /UL
ZZZSA 1  COMMENTS: NORMAL
ZZZSA 2 COMMENTS: NORMAL

## 2022-05-12 PROCEDURE — 87077 CULTURE AEROBIC IDENTIFY: CPT | Mod: 59 | Performed by: INTERNAL MEDICINE

## 2022-05-12 PROCEDURE — 84999 UNLISTED CHEMISTRY PROCEDURE: CPT | Performed by: INTERNAL MEDICINE

## 2022-05-12 PROCEDURE — 99153 MOD SED SAME PHYS/QHP EA: CPT | Performed by: INTERNAL MEDICINE

## 2022-05-12 PROCEDURE — 31628 BRONCHOSCOPY/LUNG BX EACH: CPT | Mod: GC | Performed by: INTERNAL MEDICINE

## 2022-05-12 PROCEDURE — 71045 X-RAY EXAM CHEST 1 VIEW: CPT | Mod: 26 | Performed by: RADIOLOGY

## 2022-05-12 PROCEDURE — 31628 BRONCHOSCOPY/LUNG BX EACH: CPT

## 2022-05-12 PROCEDURE — 89050 BODY FLUID CELL COUNT: CPT | Performed by: INTERNAL MEDICINE

## 2022-05-12 PROCEDURE — 31624 DX BRONCHOSCOPE/LAVAGE: CPT | Performed by: INTERNAL MEDICINE

## 2022-05-12 PROCEDURE — 88312 SPECIAL STAINS GROUP 1: CPT | Mod: 26 | Performed by: PATHOLOGY

## 2022-05-12 PROCEDURE — 88108 CYTOPATH CONCENTRATE TECH: CPT | Mod: 26 | Performed by: PATHOLOGY

## 2022-05-12 PROCEDURE — 250N000011 HC RX IP 250 OP 636: Performed by: INTERNAL MEDICINE

## 2022-05-12 PROCEDURE — 31624 DX BRONCHOSCOPE/LAVAGE: CPT | Mod: GC | Performed by: INTERNAL MEDICINE

## 2022-05-12 PROCEDURE — 87102 FUNGUS ISOLATION CULTURE: CPT | Performed by: INTERNAL MEDICINE

## 2022-05-12 PROCEDURE — 87205 SMEAR GRAM STAIN: CPT | Performed by: INTERNAL MEDICINE

## 2022-05-12 PROCEDURE — 999N000065 XR CHEST PORT 1 VIEW

## 2022-05-12 PROCEDURE — 250N000009 HC RX 250: Performed by: INTERNAL MEDICINE

## 2022-05-12 PROCEDURE — 88312 SPECIAL STAINS GROUP 1: CPT | Mod: TC | Performed by: INTERNAL MEDICINE

## 2022-05-12 PROCEDURE — 250N000011 HC RX IP 250 OP 636: Performed by: PHYSICIAN ASSISTANT

## 2022-05-12 PROCEDURE — 87116 MYCOBACTERIA CULTURE: CPT | Performed by: INTERNAL MEDICINE

## 2022-05-12 PROCEDURE — 88305 TISSUE EXAM BY PATHOLOGIST: CPT | Mod: TC | Performed by: INTERNAL MEDICINE

## 2022-05-12 PROCEDURE — 31632 BRONCHOSCOPY/LUNG BX ADDL: CPT | Mod: GC | Performed by: INTERNAL MEDICINE

## 2022-05-12 PROCEDURE — G0500 MOD SEDAT ENDO SERVICE >5YRS: HCPCS | Performed by: INTERNAL MEDICINE

## 2022-05-12 PROCEDURE — 258N000003 HC RX IP 258 OP 636: Performed by: PHYSICIAN ASSISTANT

## 2022-05-12 PROCEDURE — 0202U NFCT DS 22 TRGT SARS-COV-2: CPT | Performed by: INTERNAL MEDICINE

## 2022-05-12 RX ORDER — LIDOCAINE HYDROCHLORIDE 10 MG/ML
INJECTION, SOLUTION INFILTRATION; PERINEURAL PRN
Status: COMPLETED | OUTPATIENT
Start: 2022-05-12 | End: 2022-05-12

## 2022-05-12 RX ORDER — NALOXONE HYDROCHLORIDE 0.4 MG/ML
0.4 INJECTION, SOLUTION INTRAMUSCULAR; INTRAVENOUS; SUBCUTANEOUS
Status: DISCONTINUED | OUTPATIENT
Start: 2022-05-12 | End: 2022-05-17 | Stop reason: HOSPADM

## 2022-05-12 RX ORDER — NALOXONE HYDROCHLORIDE 0.4 MG/ML
0.2 INJECTION, SOLUTION INTRAMUSCULAR; INTRAVENOUS; SUBCUTANEOUS
Status: DISCONTINUED | OUTPATIENT
Start: 2022-05-12 | End: 2022-05-17 | Stop reason: HOSPADM

## 2022-05-12 RX ORDER — LIDOCAINE HYDROCHLORIDE 40 MG/ML
INJECTION, SOLUTION RETROBULBAR PRN
Status: COMPLETED | OUTPATIENT
Start: 2022-05-12 | End: 2022-05-12

## 2022-05-12 RX ORDER — ONDANSETRON 2 MG/ML
4 INJECTION INTRAMUSCULAR; INTRAVENOUS EVERY 6 HOURS PRN
Status: DISCONTINUED | OUTPATIENT
Start: 2022-05-12 | End: 2022-05-17 | Stop reason: HOSPADM

## 2022-05-12 RX ORDER — ONDANSETRON 4 MG/1
4 TABLET, ORALLY DISINTEGRATING ORAL EVERY 6 HOURS PRN
Status: DISCONTINUED | OUTPATIENT
Start: 2022-05-12 | End: 2022-05-17 | Stop reason: HOSPADM

## 2022-05-12 RX ORDER — MAGNESIUM SULFATE HEPTAHYDRATE 40 MG/ML
2 INJECTION, SOLUTION INTRAVENOUS ONCE
Status: DISCONTINUED | OUTPATIENT
Start: 2022-05-12 | End: 2022-05-12 | Stop reason: DRUGHIGH

## 2022-05-12 RX ORDER — FLUMAZENIL 0.1 MG/ML
0.2 INJECTION, SOLUTION INTRAVENOUS
Status: ACTIVE | OUTPATIENT
Start: 2022-05-12 | End: 2022-05-12

## 2022-05-12 RX ORDER — FENTANYL CITRATE 50 UG/ML
INJECTION, SOLUTION INTRAMUSCULAR; INTRAVENOUS PRN
Status: COMPLETED | OUTPATIENT
Start: 2022-05-12 | End: 2022-05-12

## 2022-05-12 RX ORDER — LIDOCAINE HYDROCHLORIDE AND EPINEPHRINE 10; 10 MG/ML; UG/ML
INJECTION, SOLUTION INFILTRATION; PERINEURAL PRN
Status: COMPLETED | OUTPATIENT
Start: 2022-05-12 | End: 2022-05-12

## 2022-05-12 RX ADMIN — MIDAZOLAM 0.5 MG: 1 INJECTION INTRAMUSCULAR; INTRAVENOUS at 09:35

## 2022-05-12 RX ADMIN — MIDAZOLAM 1 MG: 1 INJECTION INTRAMUSCULAR; INTRAVENOUS at 09:31

## 2022-05-12 RX ADMIN — LIDOCAINE HYDROCHLORIDE 12 ML: 10 INJECTION, SOLUTION INFILTRATION; PERINEURAL at 09:45

## 2022-05-12 RX ADMIN — MAGNESIUM SULFATE HEPTAHYDRATE 3 G: 500 INJECTION, SOLUTION INTRAMUSCULAR; INTRAVENOUS at 08:48

## 2022-05-12 RX ADMIN — FENTANYL CITRATE 25 MCG: 50 INJECTION, SOLUTION INTRAMUSCULAR; INTRAVENOUS at 09:31

## 2022-05-12 RX ADMIN — LIDOCAINE HYDROCHLORIDE AND EPINEPHRINE 10 ML: 10; 10 INJECTION, SOLUTION INFILTRATION; PERINEURAL at 09:55

## 2022-05-12 RX ADMIN — LIDOCAINE HYDROCHLORIDE 9 ML: 40 INJECTION, SOLUTION RETROBULBAR; TOPICAL at 09:45

## 2022-05-12 NOTE — DISCHARGE INSTRUCTIONS
Discharge Instructions after Bronchoscopy    Activity  ___ You had medicine to relax and for pain. You may feel dizzy or sleepy.  For 24 hours:   Do not drive or use heavy equipment.   Do not make important decisions.   Do not drink any alcohol.    Diet  ___ When you can swallow easily, you may go back to your regular diet, medicines  and light exercise.    Follow-up  ___ We took small tissue or fluid samples to study. We will call you with the results in about 10 business days.    Call right away if you have:   Unusual chest pain   Temperature above 100.6  F (37.5  C)   Coughing that does not stop.    If you have severe pain, bleeding, or shortness of breath, go to an emergency room.    If you have questions, call:  Monday to Friday, 8 a.m. to 4:30 p.m.  Adult Pulmonology Clinic: [phone]    After hours:  Hospital: [phone] (Ask for the pulmonary fellow on call)

## 2022-05-12 NOTE — OR NURSING
Pt had bronchoscopy with lavage and biopsy, under moderate sedation. Pt tolerated procedure very well. Pt stable at time of transport to  recovery. Report given to Lissett RODRIGUEZ for transport to .

## 2022-05-13 ENCOUNTER — HOSPITAL ENCOUNTER (OUTPATIENT)
Dept: CARDIAC REHAB | Facility: CLINIC | Age: 67
Discharge: HOME | End: 2022-05-13
Attending: INTERNAL MEDICINE
Payer: MEDICARE

## 2022-05-13 DIAGNOSIS — Z94.2 S/P LUNG TRANSPLANT (H): Primary | ICD-10-CM

## 2022-05-13 DIAGNOSIS — D84.9 IMMUNOSUPPRESSED STATUS (H): ICD-10-CM

## 2022-05-13 LAB
CMV DNA SPEC NAA+PROBE-ACNC: NOT DETECTED IU/ML
MAYO MISC RESULT: NORMAL

## 2022-05-13 PROCEDURE — G0239 OTH RESP PROC, GROUP: HCPCS

## 2022-05-15 LAB
PATH REPORT.COMMENTS IMP SPEC: NORMAL
PATH REPORT.COMMENTS IMP SPEC: NORMAL
PATH REPORT.FINAL DX SPEC: NORMAL
PATH REPORT.GROSS SPEC: NORMAL
PATH REPORT.MICROSCOPIC SPEC OTHER STN: NORMAL
PATH REPORT.RELEVANT HX SPEC: NORMAL
PHOTO IMAGE: NORMAL

## 2022-05-15 PROCEDURE — 88305 TISSUE EXAM BY PATHOLOGIST: CPT | Mod: 26 | Performed by: PATHOLOGY

## 2022-05-16 DIAGNOSIS — Z94.2 S/P LUNG TRANSPLANT (H): ICD-10-CM

## 2022-05-16 LAB
BACTERIA BRONCH: ABNORMAL

## 2022-05-16 RX ORDER — METOPROLOL TARTRATE 25 MG/1
TABLET, FILM COATED ORAL
Qty: 60 TABLET | Refills: 11 | Status: SHIPPED | OUTPATIENT
Start: 2022-05-16 | End: 2022-05-24

## 2022-05-17 ENCOUNTER — TELEPHONE (OUTPATIENT)
Dept: TRANSPLANT | Facility: CLINIC | Age: 67
End: 2022-05-17
Payer: COMMERCIAL

## 2022-05-17 ENCOUNTER — HOSPITAL ENCOUNTER (OUTPATIENT)
Dept: CARDIAC REHAB | Facility: CLINIC | Age: 67
Discharge: HOME | End: 2022-05-17
Attending: INTERNAL MEDICINE
Payer: MEDICARE

## 2022-05-17 PROCEDURE — G0239 OTH RESP PROC, GROUP: HCPCS

## 2022-05-17 NOTE — TELEPHONE ENCOUNTER
Tyrese called stating pt took tacro last evening around 6:00pm, had a lab draw scheduled today at 0945/ Writer canceled appointment and rescheduled labs for tomorrow 5/18/22 at 0845.

## 2022-05-18 ENCOUNTER — LAB (OUTPATIENT)
Dept: LAB | Facility: CLINIC | Age: 67
End: 2022-05-18
Payer: COMMERCIAL

## 2022-05-18 ENCOUNTER — TELEPHONE (OUTPATIENT)
Dept: TRANSPLANT | Facility: CLINIC | Age: 67
End: 2022-05-18

## 2022-05-18 DIAGNOSIS — Z79.899 ENCOUNTER FOR LONG-TERM (CURRENT) USE OF HIGH-RISK MEDICATION: ICD-10-CM

## 2022-05-18 DIAGNOSIS — Z94.2 S/P LUNG TRANSPLANT (H): ICD-10-CM

## 2022-05-18 DIAGNOSIS — D84.9 IMMUNOSUPPRESSED STATUS (H): ICD-10-CM

## 2022-05-18 DIAGNOSIS — E83.42 HYPOMAGNESEMIA: Primary | ICD-10-CM

## 2022-05-18 LAB
ANION GAP SERPL CALCULATED.3IONS-SCNC: 8 MMOL/L (ref 3–14)
BUN SERPL-MCNC: 28 MG/DL (ref 7–30)
CALCIUM SERPL-MCNC: 9.4 MG/DL (ref 8.5–10.1)
CHLORIDE BLD-SCNC: 101 MMOL/L (ref 94–109)
CMV DNA SPEC NAA+PROBE-ACNC: NOT DETECTED IU/ML
CO2 SERPL-SCNC: 31 MMOL/L (ref 20–32)
CREAT SERPL-MCNC: 0.66 MG/DL (ref 0.52–1.04)
ERYTHROCYTE [DISTWIDTH] IN BLOOD BY AUTOMATED COUNT: 16.3 % (ref 10–15)
FERRITIN SERPL-MCNC: 106 NG/ML (ref 8–252)
GFR SERPL CREATININE-BSD FRML MDRD: >90 ML/MIN/1.73M2
GLUCOSE BLD-MCNC: 138 MG/DL (ref 70–99)
HCT VFR BLD AUTO: 28.2 % (ref 35–47)
HGB BLD-MCNC: 9.1 G/DL (ref 11.7–15.7)
IRON SATN MFR SERPL: 41 % (ref 15–46)
IRON SERPL-MCNC: 126 UG/DL (ref 35–180)
MAGNESIUM SERPL-MCNC: 1.3 MG/DL (ref 1.6–2.3)
MCH RBC QN AUTO: 32.3 PG (ref 26.5–33)
MCHC RBC AUTO-ENTMCNC: 32.3 G/DL (ref 31.5–36.5)
MCV RBC AUTO: 100 FL (ref 78–100)
PLATELET # BLD AUTO: 223 10E3/UL (ref 150–450)
POTASSIUM BLD-SCNC: 4.2 MMOL/L (ref 3.4–5.3)
RBC # BLD AUTO: 2.82 10E6/UL (ref 3.8–5.2)
SODIUM SERPL-SCNC: 140 MMOL/L (ref 133–144)
TACROLIMUS BLD-MCNC: 13.2 UG/L (ref 5–15)
TIBC SERPL-MCNC: 311 UG/DL (ref 240–430)
TME LAST DOSE: NORMAL H
TME LAST DOSE: NORMAL H
WBC # BLD AUTO: 8.5 10E3/UL (ref 4–11)

## 2022-05-18 PROCEDURE — 80197 ASSAY OF TACROLIMUS: CPT | Performed by: INTERNAL MEDICINE

## 2022-05-18 PROCEDURE — 86833 HLA CLASS II HIGH DEFIN QUAL: CPT | Performed by: PHYSICIAN ASSISTANT

## 2022-05-18 PROCEDURE — 36415 COLL VENOUS BLD VENIPUNCTURE: CPT | Performed by: PATHOLOGY

## 2022-05-18 PROCEDURE — 80048 BASIC METABOLIC PNL TOTAL CA: CPT | Performed by: PATHOLOGY

## 2022-05-18 PROCEDURE — 86832 HLA CLASS I HIGH DEFIN QUAL: CPT | Performed by: PHYSICIAN ASSISTANT

## 2022-05-18 PROCEDURE — 85027 COMPLETE CBC AUTOMATED: CPT | Performed by: PATHOLOGY

## 2022-05-18 PROCEDURE — 82728 ASSAY OF FERRITIN: CPT

## 2022-05-18 PROCEDURE — 83735 ASSAY OF MAGNESIUM: CPT | Performed by: PATHOLOGY

## 2022-05-18 PROCEDURE — 83550 IRON BINDING TEST: CPT

## 2022-05-18 RX ORDER — MEPERIDINE HYDROCHLORIDE 25 MG/ML
25 INJECTION INTRAMUSCULAR; INTRAVENOUS; SUBCUTANEOUS EVERY 30 MIN PRN
Status: CANCELLED | OUTPATIENT
Start: 2022-05-18

## 2022-05-18 RX ORDER — TACROLIMUS 0.5 MG/1
0.5 CAPSULE ORAL DAILY
Qty: 30 CAPSULE | Refills: 11 | COMMUNITY
Start: 2022-05-18 | End: 2022-05-24

## 2022-05-18 RX ORDER — ALBUTEROL SULFATE 0.83 MG/ML
2.5 SOLUTION RESPIRATORY (INHALATION)
Status: CANCELLED | OUTPATIENT
Start: 2022-05-18

## 2022-05-18 RX ORDER — NALOXONE HYDROCHLORIDE 0.4 MG/ML
0.2 INJECTION, SOLUTION INTRAMUSCULAR; INTRAVENOUS; SUBCUTANEOUS
Status: CANCELLED | OUTPATIENT
Start: 2022-05-18

## 2022-05-18 RX ORDER — ALBUTEROL SULFATE 90 UG/1
1-2 AEROSOL, METERED RESPIRATORY (INHALATION)
Status: CANCELLED
Start: 2022-05-18

## 2022-05-18 RX ORDER — TACROLIMUS 1 MG/1
2 CAPSULE ORAL 2 TIMES DAILY
Qty: 120 CAPSULE | Refills: 11 | COMMUNITY
Start: 2022-05-18 | End: 2022-05-24

## 2022-05-18 RX ORDER — DIPHENHYDRAMINE HYDROCHLORIDE 50 MG/ML
50 INJECTION INTRAMUSCULAR; INTRAVENOUS
Status: CANCELLED
Start: 2022-05-18

## 2022-05-18 RX ORDER — METHYLPREDNISOLONE SODIUM SUCCINATE 125 MG/2ML
125 INJECTION, POWDER, LYOPHILIZED, FOR SOLUTION INTRAMUSCULAR; INTRAVENOUS
Status: CANCELLED
Start: 2022-05-18

## 2022-05-18 RX ORDER — EPINEPHRINE 1 MG/ML
0.3 INJECTION, SOLUTION, CONCENTRATE INTRAVENOUS EVERY 5 MIN PRN
Status: CANCELLED | OUTPATIENT
Start: 2022-05-18

## 2022-05-18 NOTE — TELEPHONE ENCOUNTER
Addendum regarding tacrolimus level    Tacrolimus level 13.2 at 12 hours, on 5/18.  Goal 8-12.   Current dose 2.5 mg in AM, 2 mg in PM    Dose changed to 2 mg in AM, 2 mg in PM   Recheck level at clinic visit next week    Discussed with pt and  maureen Sandoval message sent

## 2022-05-18 NOTE — TELEPHONE ENCOUNTER
Spoke with pt regarding Mg level 1.3. Pt denies any diarrhea. Increased her Mg to 3 tabs BID. Instructed pt to separate Mg and MMF by 2 hours. Pt will take Mg tabs with lunch and after dinner with small snack. Per Dr. Feng, pt to get 3gm IV Magnesium infusion either today or tomorrow, pt agreeable to plan. [facility] to reach out to pt and schedule infusion.

## 2022-05-18 NOTE — RESULT ENCOUNTER NOTE
Tacrolimus level 13.2 at 12 hours, on 5/18.  Goal 8-12.   Current dose 2.5 mg in AM, 2 mg in PM    Dose changed to 2 mg in AM, 2 mg in PM   Recheck level at clinic visit next week    Discussed with pt and  maureen Sandoval message sent

## 2022-05-19 ENCOUNTER — INFUSION THERAPY VISIT (OUTPATIENT)
Dept: INFUSION THERAPY | Facility: CLINIC | Age: 67
End: 2022-05-19
Attending: INTERNAL MEDICINE
Payer: MEDICARE

## 2022-05-19 VITALS
OXYGEN SATURATION: 98 % | TEMPERATURE: 98 F | HEART RATE: 78 BPM | DIASTOLIC BLOOD PRESSURE: 39 MMHG | SYSTOLIC BLOOD PRESSURE: 131 MMHG | RESPIRATION RATE: 16 BRPM

## 2022-05-19 DIAGNOSIS — Z94.2 S/P LUNG TRANSPLANT (H): ICD-10-CM

## 2022-05-19 DIAGNOSIS — E83.42 HYPOMAGNESEMIA: Primary | ICD-10-CM

## 2022-05-19 PROCEDURE — 96365 THER/PROPH/DIAG IV INF INIT: CPT

## 2022-05-19 PROCEDURE — 258N000003 HC RX IP 258 OP 636: Performed by: INTERNAL MEDICINE

## 2022-05-19 PROCEDURE — 250N000011 HC RX IP 250 OP 636: Performed by: INTERNAL MEDICINE

## 2022-05-19 RX ORDER — MEPERIDINE HYDROCHLORIDE 25 MG/ML
25 INJECTION INTRAMUSCULAR; INTRAVENOUS; SUBCUTANEOUS EVERY 30 MIN PRN
Status: CANCELLED | OUTPATIENT
Start: 2022-05-19

## 2022-05-19 RX ORDER — ALBUTEROL SULFATE 90 UG/1
1-2 AEROSOL, METERED RESPIRATORY (INHALATION)
Status: CANCELLED
Start: 2022-05-19

## 2022-05-19 RX ORDER — ALBUTEROL SULFATE 0.83 MG/ML
2.5 SOLUTION RESPIRATORY (INHALATION)
Status: CANCELLED | OUTPATIENT
Start: 2022-05-19

## 2022-05-19 RX ORDER — METHYLPREDNISOLONE SODIUM SUCCINATE 125 MG/2ML
125 INJECTION, POWDER, LYOPHILIZED, FOR SOLUTION INTRAMUSCULAR; INTRAVENOUS
Status: CANCELLED
Start: 2022-05-19

## 2022-05-19 RX ORDER — EPINEPHRINE 1 MG/ML
0.3 INJECTION, SOLUTION, CONCENTRATE INTRAVENOUS EVERY 5 MIN PRN
Status: CANCELLED | OUTPATIENT
Start: 2022-05-19

## 2022-05-19 RX ORDER — NALOXONE HYDROCHLORIDE 0.4 MG/ML
0.2 INJECTION, SOLUTION INTRAMUSCULAR; INTRAVENOUS; SUBCUTANEOUS
Status: CANCELLED | OUTPATIENT
Start: 2022-05-19

## 2022-05-19 RX ORDER — DIPHENHYDRAMINE HYDROCHLORIDE 50 MG/ML
50 INJECTION INTRAMUSCULAR; INTRAVENOUS
Status: CANCELLED
Start: 2022-05-19

## 2022-05-19 RX ADMIN — MAGNESIUM SULFATE HEPTAHYDRATE 3 G: 500 INJECTION, SOLUTION INTRAMUSCULAR; INTRAVENOUS at 08:53

## 2022-05-19 NOTE — PROGRESS NOTES
Infusion Nursing Note:  Melissa Elder presents today for scheduled Magnesium IV replacement.    Patient seen by provider today: No   present during visit today: Not Applicable.    Note: Patient states she is doing well.      Intravenous Access:  Peripheral IV placed.    Treatment Conditions:  Not Applicable.      Post Infusion Assessment:  Patient tolerated infusion without incident.  Blood return noted pre and post infusion.  Site patent and intact, free from redness, edema or discomfort.  No evidence of extravasations.  Access discontinued per protocol.       Discharge Plan:   Discharge instructions reviewed with: Patient.  Patient and/or family verbalized understanding of discharge instructions and all questions answered.  Patient discharged in stable condition accompanied by: self and .  Departure Mode: Ambulatory.      Karol Mckeon RN

## 2022-05-20 ENCOUNTER — HOSPITAL ENCOUNTER (OUTPATIENT)
Dept: CARDIAC REHAB | Facility: CLINIC | Age: 67
Discharge: HOME | End: 2022-05-20
Attending: INTERNAL MEDICINE
Payer: MEDICARE

## 2022-05-20 LAB — SCANNED LAB RESULT: NORMAL

## 2022-05-20 PROCEDURE — G0239 OTH RESP PROC, GROUP: HCPCS

## 2022-05-23 ENCOUNTER — LAB (OUTPATIENT)
Dept: LAB | Facility: CLINIC | Age: 67
End: 2022-05-23

## 2022-05-23 ENCOUNTER — OFFICE VISIT (OUTPATIENT)
Dept: TRANSPLANT | Facility: CLINIC | Age: 67
End: 2022-05-23
Attending: INTERNAL MEDICINE
Payer: MEDICARE

## 2022-05-23 ENCOUNTER — ANCILLARY PROCEDURE (OUTPATIENT)
Dept: GENERAL RADIOLOGY | Facility: CLINIC | Age: 67
End: 2022-05-23
Attending: PHYSICIAN ASSISTANT
Payer: COMMERCIAL

## 2022-05-23 ENCOUNTER — HOSPITAL ENCOUNTER (OUTPATIENT)
Facility: CLINIC | Age: 67
Discharge: HOME | End: 2022-05-23
Admitting: PHYSICIAN ASSISTANT
Payer: MEDICARE

## 2022-05-23 VITALS
BODY MASS INDEX: 25.79 KG/M2 | HEART RATE: 87 BPM | DIASTOLIC BLOOD PRESSURE: 67 MMHG | SYSTOLIC BLOOD PRESSURE: 130 MMHG | OXYGEN SATURATION: 94 % | WEIGHT: 141 LBS

## 2022-05-23 DIAGNOSIS — Z94.2 LUNG REPLACED BY TRANSPLANT (H): ICD-10-CM

## 2022-05-23 DIAGNOSIS — R79.9 ABNORMAL FINDING OF BLOOD CHEMISTRY, UNSPECIFIED: ICD-10-CM

## 2022-05-23 DIAGNOSIS — D64.9 ANEMIA, UNSPECIFIED TYPE: ICD-10-CM

## 2022-05-23 DIAGNOSIS — Z79.899 ENCOUNTER FOR LONG-TERM (CURRENT) USE OF HIGH-RISK MEDICATION: ICD-10-CM

## 2022-05-23 DIAGNOSIS — E83.42 HYPOMAGNESEMIA: ICD-10-CM

## 2022-05-23 DIAGNOSIS — D84.9 IMMUNOSUPPRESSED STATUS (H): ICD-10-CM

## 2022-05-23 DIAGNOSIS — Z94.2 S/P LUNG TRANSPLANT (H): ICD-10-CM

## 2022-05-23 DIAGNOSIS — D80.1 HYPOGAMMAGLOBULINEMIA (H): ICD-10-CM

## 2022-05-23 DIAGNOSIS — J43.2 CENTRILOBULAR EMPHYSEMA (H): ICD-10-CM

## 2022-05-23 DIAGNOSIS — D64.9 ANEMIA, UNSPECIFIED TYPE: Primary | ICD-10-CM

## 2022-05-23 LAB
ANION GAP SERPL CALCULATED.3IONS-SCNC: 8 MMOL/L (ref 3–14)
BILIRUB DIRECT SERPL-MCNC: 0.2 MG/DL (ref 0–0.2)
BILIRUB SERPL-MCNC: 0.7 MG/DL (ref 0.2–1.3)
BUN SERPL-MCNC: 33 MG/DL (ref 7–30)
CALCIUM SERPL-MCNC: 9.3 MG/DL (ref 8.5–10.1)
CHLORIDE BLD-SCNC: 101 MMOL/L (ref 94–109)
CMV DNA SPEC NAA+PROBE-ACNC: NOT DETECTED IU/ML
CO2 SERPL-SCNC: 29 MMOL/L (ref 20–32)
CREAT SERPL-MCNC: 0.59 MG/DL (ref 0.52–1.04)
DONOR IDENTIFICATION: NORMAL
DQB5: 3922
DQB6: 1734
DSA COMMENTS: NORMAL
DSA PRESENT: YES
DSA TEST METHOD: NORMAL
ERYTHROCYTE [DISTWIDTH] IN BLOOD BY AUTOMATED COUNT: 15.3 % (ref 10–15)
EXPTIME-PRE: 5.64 SEC
FEF2575-%PRED-PRE: 218 %
FEF2575-PRE: 4.22 L/SEC
FEF2575-PRED: 1.93 L/SEC
FEFMAX-%PRED-PRE: 84 %
FEFMAX-PRE: 4.77 L/SEC
FEFMAX-PRED: 5.63 L/SEC
FEV1-%PRED-PRE: 62 %
FEV1-PRE: 1.36 L
FEV1FEV6-PRE: 100 %
FEV1FEV6-PRED: 80 %
FEV1FVC-PRE: 100 %
FEV1FVC-PRED: 79 %
FIFMAX-PRE: 1.99 L/SEC
FOLATE SERPL-MCNC: 30 NG/ML
FVC-%PRED-PRE: 49 %
FVC-PRE: 1.36 L
FVC-PRED: 2.77 L
GFR SERPL CREATININE-BSD FRML MDRD: >90 ML/MIN/1.73M2
GLUCOSE BLD-MCNC: 208 MG/DL (ref 70–99)
HCT VFR BLD AUTO: 27.2 % (ref 35–47)
HGB BLD-MCNC: 8.7 G/DL (ref 11.7–15.7)
HGB FREE PLAS-MCNC: 30 MG/DL
LDH SERPL L TO P-CCNC: 378 U/L (ref 81–234)
MAGNESIUM SERPL-MCNC: 1.6 MG/DL (ref 1.6–2.3)
MCH RBC QN AUTO: 32.8 PG (ref 26.5–33)
MCHC RBC AUTO-ENTMCNC: 32 G/DL (ref 31.5–36.5)
MCV RBC AUTO: 103 FL (ref 78–100)
ORGAN: NORMAL
PLATELET # BLD AUTO: 226 10E3/UL (ref 150–450)
POTASSIUM BLD-SCNC: 4.4 MMOL/L (ref 3.4–5.3)
RBC # BLD AUTO: 2.65 10E6/UL (ref 3.8–5.2)
RETICS # AUTO: 0.3 10E6/UL (ref 0.03–0.1)
RETICS/RBC NFR AUTO: 11.3 % (ref 0.5–2)
SA 1 CELL: NORMAL
SA 1 TEST METHOD: NORMAL
SA 2 CELL: NORMAL
SA 2 TEST METHOD: NORMAL
SA1 HI RISK ABY: NORMAL
SA1 MOD RISK ABY: NORMAL
SA2 HI RISK ABY: NORMAL
SA2 MOD RISK ABY: NORMAL
SODIUM SERPL-SCNC: 138 MMOL/L (ref 133–144)
TACROLIMUS BLD-MCNC: 11.1 UG/L (ref 5–15)
TME LAST DOSE: NORMAL H
TME LAST DOSE: NORMAL H
UNACCEPTABLE ANTIGENS: NORMAL
UNOS CPRA: 73
VIT B12 SERPL-MCNC: 451 PG/ML (ref 193–986)
WBC # BLD AUTO: 9.5 10E3/UL (ref 4–11)
ZZZSA 1  COMMENTS: NORMAL
ZZZSA 2 COMMENTS: NORMAL

## 2022-05-23 PROCEDURE — 99207 PR RESPIRATORY FLOW VOLUME LOOP: CPT

## 2022-05-23 PROCEDURE — 83615 LACTATE (LD) (LDH) ENZYME: CPT | Performed by: INTERNAL MEDICINE

## 2022-05-23 PROCEDURE — 82607 VITAMIN B-12: CPT | Performed by: PHYSICIAN ASSISTANT

## 2022-05-23 PROCEDURE — 82248 BILIRUBIN DIRECT: CPT | Performed by: INTERNAL MEDICINE

## 2022-05-23 PROCEDURE — 999N000207 HC UMP OPEN ENCOUNTER >50 DAYS

## 2022-05-23 PROCEDURE — 36415 COLL VENOUS BLD VENIPUNCTURE: CPT | Performed by: PATHOLOGY

## 2022-05-23 PROCEDURE — 86833 HLA CLASS II HIGH DEFIN QUAL: CPT | Performed by: PHYSICIAN ASSISTANT

## 2022-05-23 PROCEDURE — 83051 HEMOGLOBIN PLASMA: CPT | Performed by: INTERNAL MEDICINE

## 2022-05-23 PROCEDURE — 85045 AUTOMATED RETICULOCYTE COUNT: CPT | Performed by: PATHOLOGY

## 2022-05-23 PROCEDURE — 83735 ASSAY OF MAGNESIUM: CPT | Performed by: PATHOLOGY

## 2022-05-23 PROCEDURE — 80048 BASIC METABOLIC PNL TOTAL CA: CPT | Performed by: PATHOLOGY

## 2022-05-23 PROCEDURE — 71046 X-RAY EXAM CHEST 2 VIEWS: CPT | Performed by: RADIOLOGY

## 2022-05-23 PROCEDURE — 85027 COMPLETE CBC AUTOMATED: CPT | Performed by: PATHOLOGY

## 2022-05-23 PROCEDURE — 82746 ASSAY OF FOLIC ACID SERUM: CPT | Performed by: PHYSICIAN ASSISTANT

## 2022-05-23 PROCEDURE — 84999 UNLISTED CHEMISTRY PROCEDURE: CPT | Mod: ORL | Performed by: PHYSICIAN ASSISTANT

## 2022-05-23 PROCEDURE — 86832 HLA CLASS I HIGH DEFIN QUAL: CPT | Performed by: PHYSICIAN ASSISTANT

## 2022-05-23 PROCEDURE — 80197 ASSAY OF TACROLIMUS: CPT | Performed by: INTERNAL MEDICINE

## 2022-05-23 RX ORDER — CETIRIZINE HYDROCHLORIDE 10 MG/1
10 TABLET ORAL DAILY PRN
Qty: 30 TABLET | Refills: 11 | COMMUNITY
Start: 2022-05-23

## 2022-05-23 RX ORDER — PREDNISONE 5 MG/1
TABLET ORAL
Qty: 150 TABLET | Refills: 11 | COMMUNITY
Start: 2022-05-23 | End: 2022-05-24

## 2022-05-23 RX ORDER — IBANDRONATE SODIUM 150 MG/1
150 TABLET, FILM COATED ORAL
COMMUNITY
Start: 2022-05-23 | End: 2022-12-19

## 2022-05-23 NOTE — NURSING NOTE
Chief Complaint   Patient presents with     RECHECK     2 week lung tx follow up      Blood pressure 130/67, pulse 87, weight 64 kg (141 lb), SpO2 94 %, not currently breastfeeding.    Sara Sheets, CMA

## 2022-05-23 NOTE — PROGRESS NOTES
Transplant Coordinator Note    Reason for visit: Post lung transplant follow up visit   Coordinator: Present   Caregiver:  Tyrese,     Health concerns addressed today:  1. Respiratory - compared to 2 weeks ago, feeling same/better. Able to walk up hills now. A little cough, rare sputum - clear/yellow-y.   2. GI: appetite good. Some days   3.  ENT: no issues, at baseline.   4. Dry eyes - using eye drops. No changes in vision.   5.     Activity/rehab: pulmonary rehab, working on walking up hills to work on leg strength.   Oxygen needs: room air  Pain management/RX: denies  Diabetic management: checking BGs  Next Bronch due: last bronch 5/12, next due mid June  Risk Criteria Labs: negative 3/25/22  CMV status: D-/R-  EBV status: D+/R+  AC/asa: ASA 81mg  PJP prophylactic: dapsone     COVID:  1. COVID-19 infection (yes/no, date of most recent positive test): no  2. Status/instructions given about COVID-19 vaccine: received Antonio    Pt Education: medications (use/dose/side effects), how/when to call coordinator, frequency of labs, s/s of infection/rejection, call prior to starting any new medications, lab/vital sign book    Health Maintenance:     Last colonoscopy:     Next colonoscopy due:     Dermatology:    Vaccinations this visit:     Labs, CXR, PFTs reviewed with patient  Medication record reviewed and reconciled  Questions and concerns addressed    Patient Instructions  1. Continue to hydrate with 60-70 oz fluids daily.  2. Continue to exercise daily or most days of the week.  3. Follow up with your primary care provider for annual gender health maintenance procedures.  4. When you get home, talk to your provider about Boniva.  5. Follow up with annual dermatology visits - have someone look at our moles.  6. It doesn't seem like the COVID vaccine is working well in lung transplant patients. A number of lung transplant patients have gotten sick with COVID even after receiving the vaccines.  Based on our  recent experience, it can be life-threatening to get COVID  even after being vaccinated. Please continue to act like you did not get the COVID vaccine - social distancing, wearing a mask, good hand hygiene, etc. If the people around you are vaccinated, it will help reduce the risk of you getting COVID. All members of your household should be vaccinated.  7. We are going to have you take Lasix 20mg in the morning.     Next transplant clinic appointment: one month with CXR, labs and PFTs  Next lab draw: end of the week, and then weekly      AVS printed at time of check out

## 2022-05-23 NOTE — PROGRESS NOTES
Reason for Visit  Melissa Elder is a 66 year old year old female who is being seen for RECHECK (2 week lung tx follow up )      Assessment and plan:   Melissa Elder is a 66-year-old female status post bilateral lung transplantation on 2/21/2022 for COPD with postoperative course complicated by hydropneumothorax and persistent pleural drainage, disseminated Ureaplasma with transient hyperammonemia.  Pretransplant history significant for hypertension, nonobstructive coronary artery disease, paroxysmal atrial fibrillation, osteoporosis, GERD and colonic polyps.    Pulmonary/lung transplant: Patient reports good exercise tolerance.  Oxygenation is adequate.  PFTs with a restrictive ventilatory defect, improved from previous but below the level expected at this point following transplantation.  Previous sniff test on 3/11 with no evidence of diaphragmatic palsy.  Chest x-ray, reviewed by me with no acute infiltrate and no change from previous.  Bronchoscopy from 5/12 with normal exam and biopsies with no evidence of rejection.  Continue current immunosuppression with standard prednisone taper, Myfortic and tacrolimus.  Tacrolimus will be adjusted to maintain a level of 8-12.  The patient will require sleep study when fully recovered.    Positive DSA    Date DSA mfi Biopsy (date) Treatment                                              Prophylaxis: The patient is allergic to sulfa drugs.  Labs with anemia, elevated LDH and serum free hemoglobin suggest hemolysis with dapsone.  Change to pentamadine nebs.  CMV negative/negative.  Continue monitoring.  pentamidine      Lung TX HPI  Transplants:  2/21/2022 (Lung), Postoperative day:  123    The patient was seen and examined by Carson Feng MD   Melissa Elder is a 66-year-old female status post bilateral lung transplantation on 2/21/2022 for COPD with postoperative course complicated by hydropneumothorax and persistent pleural drainage, disseminated Ureaplasma with  transient hyperammonemia.  Pretransplant history significant for hypertension, nonobstructive coronary artery disease, paroxysmal atrial fibrillation, osteoporosis, GERD and colonic polyps.    Breathing is comfortable at rest.  Dyspnea walking up hills.  Exercise tolerance is not significantly changed since her last visit.  She reports an occasional cough, mostly throat clearing which is unchanged.  Occasional clear-yellow sputum.  No chest pain.  No fever, chills or night sweats.    Review of systems:  Appetite is good  No ear pain, sore throat, sinus pain or rhinorrhea  Using eyedrops for dry eyes.  No nausea, vomiting or abdominal pain.  Occasional loose stool.  Leg pains after exercising.  Easy bruising.  A complete ROS was otherwise negative except as noted in the HPI.    Current Outpatient Medications   Medication     cetirizine (ZYRTEC) 10 MG tablet     IBANdronate (BONIVA) 150 MG tablet     acyclovir (ZOVIRAX) 200 MG capsule     albuterol (PROAIR HFA/PROVENTIL HFA/VENTOLIN HFA) 108 (90 Base) MCG/ACT inhaler     aspirin (ASA) 81 MG EC tablet     blood glucose (NO BRAND SPECIFIED) lancets standard     blood glucose (NO BRAND SPECIFIED) test strip     blood glucose monitoring (NO BRAND SPECIFIED) meter device kit     calcium carbonate 600 mg-vitamin D 400 units (CALTRATE) 600-400 MG-UNIT per tablet     carboxymethylcellulose PF (REFRESH PLUS) 0.5 % ophthalmic solution     diltiazem ER (TIAZAC) 240 MG 24 hr ER beaded capsule     famotidine (PEPCID) 20 MG tablet     furosemide (LASIX) 20 MG tablet     Magnesium Glycinate 665 MG CAPS     metoprolol tartrate (LOPRESSOR) 25 MG tablet     multivitamin w/minerals (THERA-VIT-M) tablet     mycophenolic acid (GENERIC EQUIVALENT) 360 MG EC tablet     nystatin (MYCOSTATIN) 841947 UNIT/ML suspension     pantoprazole (PROTONIX) 40 MG EC tablet     predniSONE (DELTASONE) 5 MG tablet     rosuvastatin (CRESTOR) 5 MG tablet     tacrolimus (GENERIC EQUIVALENT) 0.5 MG capsule      tacrolimus (GENERIC EQUIVALENT) 1 MG capsule     No current facility-administered medications for this visit.     Facility-Administered Medications Ordered in Other Visits   Medication     sodium chloride (PF) 0.9% PF flush 10 mL     Allergies   Allergen Reactions     Alendronic Acid Other (See Comments)     dizziness  Other reaction(s): Dizziness     Nickel Rash     Sulfa Drugs Rash     Past Medical History:   Diagnosis Date     Atrial fibrillation with RVR (H)     hx of A fib related to severe COPD, does not meet anticoagulation criteria as noted     COPD, severe (H)      Osteoporosis        Past Surgical History:   Procedure Laterality Date     BRONCHOSCOPY (RIGID OR FLEXIBLE), DIAGNOSTIC N/A 4/7/2022    Procedure: BRONCHOSCOPY, WITH BRONCHOALVEOLAR LAVAGE and BIOPSIES;  Surgeon: Gerson Haddad MD;  Location:  GI     BRONCHOSCOPY (RIGID OR FLEXIBLE), DIAGNOSTIC N/A 5/12/2022    Procedure: BRONCHOSCOPY, WITH BRONCHOALVEOLAR LAVAGE AND BIOPSY;  Surgeon: Melissa Landin MD;  Location:  GI     BRONCHOSCOPY FLEXIBLE AND RIGID N/A 3/1/2022    Procedure: BRONCHOSCOPY INSPECTION;  Surgeon: Melissa Landin MD;  Location:  GI     CV CORONARY ANGIOGRAM N/A 3/27/2019    Procedure: CV CORONARY ANGIOGRAM;  Surgeon: Thierry Serrano MD;  Location:  HEART CARDIAC CATH LAB     CV RIGHT HEART CATH MEASUREMENTS RECORDED N/A 3/27/2019    Procedure: CV RIGHT HEART CATH;  Surgeon: Thierry Serrano MD;  Location:  HEART CARDIAC CATH LAB     ESOPHAGEAL IMPEDENCE FUNCTION TEST WITH 24 HOUR PH GREATER THAN 1 HOUR N/A 3/28/2019    Procedure: ESOPHAGEAL IMPEDENCE FUNCTION TEST WITH 24 HOUR PH GREATER THAN 1 HOUR;  Surgeon: Mike Henry MD;  Location:  GI     EXCISE PILONIDAL CYST, SIMPLE       IR CHEST TUBE PLACEMENT NON-TUNNELED RIGHT  3/3/2022     TONSILLECTOMY & ADENOIDECTOMY       TRANSPLANT LUNG RECIPIENT SINGLE X2 Bilateral 2/20/2022    Procedure: Bilateral Sequential Lung Transplantation, Kat  Incision, Extracorporeal Membrane Oxgenation, Bronchoscopy, Cryoablation of Intercostal Nerves;  Surgeon: Raul Robin MD;  Location:  OR       Social History     Socioeconomic History     Marital status:      Spouse name: Not on file     Number of children: Not on file     Years of education: Not on file     Highest education level: Not on file   Occupational History     Not on file   Tobacco Use     Smoking status: Former Smoker     Packs/day: 1.50     Years: 36.00     Pack years: 54.00     Types: Cigarettes     Quit date: 2006     Years since quittin.4     Smokeless tobacco: Never Used   Substance and Sexual Activity     Alcohol use: Yes     Comment: stated beer and wine occ     Drug use: Yes     Comment: stated hx of marijuana, quit in      Sexual activity: Not on file   Other Topics Concern     Parent/sibling w/ CABG, MI or angioplasty before 65F 55M? Not Asked   Social History Narrative     Not on file     Social Determinants of Health     Financial Resource Strain: Not on file   Food Insecurity: Not on file   Transportation Needs: Not on file   Physical Activity: Not on file   Stress: Not on file   Social Connections: Not on file   Intimate Partner Violence: Not on file   Housing Stability: Not on file         /67   Pulse 87   Wt 64 kg (141 lb)   SpO2 94%   BMI 25.79 kg/m    Body mass index is 25.79 kg/m .  Exam:   GENERAL APPEARANCE: Well developed, well nourished, alert, and in no apparent distress.  EYES: PERRL, EOMI  EARS: Canals clear, TMs normal  MOUTH: Oral mucosa is moist, without any lesions, no tonsillar enlargement, no oropharyngeal exudate.  NECK: supple, no masses, no thyromegaly.  LYMPHATICS: No significant axillary, cervical, or supraclavicular nodes.  RESP: normal percussion, good air flow throughout.  No crackles. No rhonchi. No wheezes.  CV: Normal S1, S2, regular rhythm, normal rate. No murmur.  No rub. No gallop. 1+ LE edema.   ABDOMEN:  Bowel  sounds normal, soft, nontender, no HSM or masses.   MS: extremities normal. (+) clubbing. No cyanosis.  SKIN: no rash on limited exam  NEURO: Mentation intact, speech normal, normal strength and tone, normal gait and stance  PSYCH: mentation appears normal. and affect normal/bright    Results:           Latest Reference Range & Units 05/23/22 10:02   Sodium 133 - 144 mmol/L 138   Potassium 3.4 - 5.3 mmol/L 4.4   Chloride 94 - 109 mmol/L 101   Carbon Dioxide 20 - 32 mmol/L 29   Urea Nitrogen 7 - 30 mg/dL 33 (H)   Creatinine 0.52 - 1.04 mg/dL 0.59   GFR Estimate >60 mL/min/1.73m2 >90   Calcium 8.5 - 10.1 mg/dL 9.3   Anion Gap 3 - 14 mmol/L 8   Magnesium 1.6 - 2.3 mg/dL 1.6   Bilirubin Direct 0.0 - 0.2 mg/dL 0.2   Bilirubin Total 0.2 - 1.3 mg/dL 0.7   Folate >=5.4 ng/mL 30.0   Hemoglobin Plasma <30 mg/dL 30 (H)   Lactate Dehydrogenase 81 - 234 U/L 378 (H)   Vitamin B12 193 - 986 pg/mL 451   Glucose 70 - 99 mg/dL 208 (H)   WBC 4.0 - 11.0 10e3/uL 9.5   Hemoglobin 11.7 - 15.7 g/dL 8.7 (L)   Hematocrit 35.0 - 47.0 % 27.2 (L)   Platelet Count 150 - 450 10e3/uL 226   RBC Count 3.80 - 5.20 10e6/uL 2.65 (L)   MCV 78 - 100 fL 103 (H)   MCH 26.5 - 33.0 pg 32.8   MCHC 31.5 - 36.5 g/dL 32.0   RDW 10.0 - 15.0 % 15.3 (H)   % Retic 0.5 - 2.0 % 11.3 (H)   Absolute Retic 0.025 - 0.095 10e6/uL 0.299 (H)   CMV Quant IU/mL Not Detected IU/mL Not Detected   CMV QUANTITATIVE, PCR  Rpt   SA1 Test Method  SA FCS   SA1 Cell  Class I   SA1 Hi Risk Elisa  None   SA1 Mod Risk Elisa  None   SA1 Comments   Test performed by modified procedure. Serum heat inactivated and tested by a modified ([place]) protocol including fetal calf serum addition. High-risk, mfi >3,000. Mod-risk, mfi 500-3,000.   SA2 Test Method  SA FCS   SA2 Cell  Class II   SA2 Hi Risk Elisa  DQ:5   SA2 Mod Risk Elisa  None   SA2 Comments   Test performed by modified procedure. Serum heat inactivated and tested by a modified ([place]) protocol including fetal calf serum addition. High-risk,  mfi >3,000. Mod-risk, mfi 500-3,000.   Donor Identification  02/21/2022   Organ  Lung   DSA Present  YES   DSA Comments   Flow Single Antigen Beads assays are intended for detection/identification of IgG anti-HLA antibodies. Mfi values may not accurately quantify donor-specific antibody levels in all instances.   DSA Test Method  SA FCS   DQB5  2490   Unacceptable Antigen  DQ:5 6 8           UNOS cPRA  73   (H): Data is abnormally high  (L): Data is abnormally low  Rpt: View report in Results Review for more information     Latest Reference Range & Units 05/23/22 10:10   FVC-Pred L 2.77   FVC-Pre L 1.36   FVC-%Pred-Pre % 49   FEV1-Pre L 1.36   FEV1-%Pred-Pre % 62   FEV1FVC-Pred % 79   FEV1FVC-Pre % 100   FEV1FEV6-Pred % 80   FEV1FEV6-Pre % 100   FEFMax-Pred L/sec 5.63   FEFMax-Pre L/sec 4.77   FEFMax-%Pred-Pre % 84   FEF2575-Pred L/sec 1.93   FEF2575-Pre L/sec 4.22   OFV3622-%Pred-Pre % 218   FIFMax-Pre L/sec 1.99   ExpTime-Pre sec 5.64             Results as noted above.    PFT Interpretation:  Severe restrictive ventilatory defect.  Increased from previous.  Best since lung transplantation  Valid Maneuver

## 2022-05-23 NOTE — PATIENT INSTRUCTIONS
Patient Instructions  1. Continue to hydrate with 60-70 oz fluids daily.  2. Continue to exercise daily or most days of the week.  3. Follow up with your primary care provider for annual gender health maintenance procedures.  4. When you get home, talk to your provider about Boniva.  5. Follow up with annual dermatology visits - have someone look at our moles.  6. It doesn't seem like the COVID vaccine is working well in lung transplant patients. A number of lung transplant patients have gotten sick with COVID even after receiving the vaccines.  Based on our recent experience, it can be life-threatening to get COVID  even after being vaccinated. Please continue to act like you did not get the COVID vaccine - social distancing, wearing a mask, good hand hygiene, etc. If the people around you are vaccinated, it will help reduce the risk of you getting COVID. All members of your household should be vaccinated.  7. We are going to have you take Lasix 20mg in the morning.     Next transplant clinic appointment: one month with CXR, labs and PFTs  Next lab draw: end of the week, and then weekly        ~~~~~~~~~~~~~~~~~~~~~~~~~    Thoracic Transplant Office phone [phone], fax [phone]    Office Hours 8:30 - 5:00     For after-hours urgent issues, please dial [phone], and ask to speak with the Thoracic Transplant Coordinator On-Call.  --------------------  To expedite your medication refill(s), please contact your pharmacy and have them fax a refill request to: [phone]  .   *Please allow 3 business days for routine medication refills.  *Please allow 5 business days for controlled substance medication refills.    **For Diabetic medications and supplies refill(s), please contact your pharmacy and have them contact your Endocrine team.  --------------------  For scheduling appointments call [phone].  --------------------  Please Note: If you are active on [employer], all future test results will be sent  by [employer] message only, and will no longer be called to patient. You may also receive communication directly from your physician.

## 2022-05-24 DIAGNOSIS — D84.9 IMMUNOSUPPRESSED STATUS (H): ICD-10-CM

## 2022-05-24 DIAGNOSIS — Z94.2 S/P LUNG TRANSPLANT (H): Primary | ICD-10-CM

## 2022-05-24 DIAGNOSIS — Z94.2 LUNG REPLACED BY TRANSPLANT (H): ICD-10-CM

## 2022-05-24 RX ORDER — TACROLIMUS 1 MG/1
2 CAPSULE ORAL 2 TIMES DAILY
Qty: 120 CAPSULE | Refills: 11 | Status: SHIPPED | OUTPATIENT
Start: 2022-05-24 | End: 2022-05-26

## 2022-05-24 RX ORDER — ROSUVASTATIN CALCIUM 5 MG/1
5 TABLET, COATED ORAL DAILY
Qty: 30 TABLET | Refills: 11 | Status: SHIPPED | OUTPATIENT
Start: 2022-05-24 | End: 2022-05-26

## 2022-05-24 RX ORDER — PREDNISONE 5 MG/1
TABLET ORAL
Qty: 150 TABLET | Refills: 11 | Status: SHIPPED | OUTPATIENT
Start: 2022-05-24 | End: 2022-05-26

## 2022-05-24 RX ORDER — NYSTATIN 100000/ML
1000000 SUSPENSION, ORAL (FINAL DOSE FORM) ORAL 4 TIMES DAILY
Qty: 946 ML | Refills: 3 | Status: SHIPPED | OUTPATIENT
Start: 2022-05-24 | End: 2022-05-26

## 2022-05-24 RX ORDER — DAPSONE 25 MG/1
50 TABLET ORAL DAILY
Qty: 60 TABLET | Refills: 11 | Status: SHIPPED | OUTPATIENT
Start: 2022-05-24 | End: 2022-05-26

## 2022-05-24 RX ORDER — PANTOPRAZOLE SODIUM 40 MG/1
40 TABLET, DELAYED RELEASE ORAL DAILY
Qty: 60 TABLET | Refills: 11 | Status: SHIPPED | OUTPATIENT
Start: 2022-05-24 | End: 2022-05-26

## 2022-05-24 RX ORDER — METOPROLOL TARTRATE 25 MG/1
TABLET, FILM COATED ORAL
Qty: 60 TABLET | Refills: 11 | Status: SHIPPED | OUTPATIENT
Start: 2022-05-24 | End: 2022-05-26

## 2022-05-24 RX ORDER — TACROLIMUS 0.5 MG/1
0.5 CAPSULE ORAL DAILY
Qty: 30 CAPSULE | Refills: 11 | Status: SHIPPED | OUTPATIENT
Start: 2022-05-24 | End: 2022-05-26

## 2022-05-24 RX ORDER — CARBOXYMETHYLCELLULOSE SODIUM 5 MG/ML
1 SOLUTION/ DROPS OPHTHALMIC
Qty: 1 EACH | Refills: 0 | Status: SHIPPED | OUTPATIENT
Start: 2022-05-24 | End: 2022-05-26

## 2022-05-24 RX ORDER — MYCOPHENOLIC ACID 360 MG/1
720 TABLET, DELAYED RELEASE ORAL 2 TIMES DAILY
Qty: 120 TABLET | Refills: 11 | Status: SHIPPED | OUTPATIENT
Start: 2022-05-24 | End: 2022-05-26

## 2022-05-24 RX ORDER — FAMOTIDINE 20 MG/1
20 TABLET, FILM COATED ORAL 2 TIMES DAILY
Qty: 60 TABLET | Refills: 11 | Status: SHIPPED | OUTPATIENT
Start: 2022-05-24 | End: 2022-05-26

## 2022-05-24 RX ORDER — DILTIAZEM HYDROCHLORIDE 240 MG/1
240 CAPSULE, EXTENDED RELEASE ORAL DAILY
Qty: 30 CAPSULE | Refills: 11 | Status: SHIPPED | OUTPATIENT
Start: 2022-05-24 | End: 2022-05-26

## 2022-05-24 RX ORDER — MULTIPLE VITAMINS W/ MINERALS TAB 9MG-400MCG
1 TAB ORAL DAILY
Qty: 30 TABLET | Refills: 11 | Status: SHIPPED | OUTPATIENT
Start: 2022-05-24 | End: 2022-05-26

## 2022-05-24 NOTE — PROGRESS NOTES
Standing lab orders and cardiac rehab referral sent to [facility] in [place], [place].

## 2022-05-24 NOTE — LETTER
PHYSICIAN ORDERS      DATE & TIME ISSUED: May 24, 2022 11:06 AM  PATIENT NAME: Melissa Elder   : 1955     [facility] MR# [if applicable]: [id number]     DIAGNOSIS:  Lung Transplant  Z94.2 and CAD I25.10; Paroxysmal Afib I48.0    Please assess patient and arrange cardiac rehab for the patient.    Any questions please call: [place] [phone]    Please fax these results to [phone].      .

## 2022-05-24 NOTE — LETTER
PHYSICIAN ORDERS      DATE & TIME ISSUED: May 24, 2022 11:07 AM  PATIENT NAME: Melissa Elder   : 1955     [facility] MR# [if applicable]: [id number]     DIAGNOSIS:  Lung Transplant  Z94.2  ICD10 Z94.2, Z79.899  Lab Request      Item Frequency   CBC with platelets Every week and PRN   Basic metabolic panel (including:  BUN,  Creatinine, Sodium, Potassium, Chloride, CO2, Calcium,Magnesium, Phosphorus, and Glucose) Every week and PRN     Tacrolimus/Prograf level  (Should be mailed to the [facility] in the  provided to you by the patient.) 12 hour trough level Every week and PRN   EBV DNA PCR Quant Every week and PRN   CMV DNA Quant Every week and PRN     Any questions please call: [place] [phone]    Please fax these results to [phone].      .

## 2022-05-25 LAB
DONOR IDENTIFICATION: NORMAL
DQB5: 2490
DSA COMMENTS: NORMAL
DSA PRESENT: YES
DSA TEST METHOD: NORMAL
ORGAN: NORMAL
SA 1 CELL: NORMAL
SA 1 TEST METHOD: NORMAL
SA 2 CELL: NORMAL
SA 2 TEST METHOD: NORMAL
SA1 HI RISK ABY: NORMAL
SA1 MOD RISK ABY: NORMAL
SA2 HI RISK ABY: NORMAL
SA2 MOD RISK ABY: NORMAL
UNACCEPTABLE ANTIGENS: NORMAL
UNOS CPRA: 73
ZZZSA 1  COMMENTS: NORMAL
ZZZSA 2 COMMENTS: NORMAL

## 2022-05-25 NOTE — RESULT ENCOUNTER NOTE
Tacrolimus level 11.2 at 16 hours, on 5/23/2022.  Goal 8-12.   Current dose 2 mg in AM, 2 mg in PM    Not 12hr trough. Level recheck end of the week

## 2022-05-26 ENCOUNTER — TELEPHONE (OUTPATIENT)
Dept: TRANSPLANT | Facility: CLINIC | Age: 67
End: 2022-05-26
Payer: COMMERCIAL

## 2022-05-26 ENCOUNTER — LAB REQUISITION (OUTPATIENT)
Dept: LAB | Facility: CLINIC | Age: 67
End: 2022-05-26
Payer: MEDICARE

## 2022-05-26 RX ORDER — MYCOPHENOLIC ACID 360 MG/1
720 TABLET, DELAYED RELEASE ORAL 2 TIMES DAILY
Qty: 120 TABLET | Refills: 11 | Status: SHIPPED | OUTPATIENT
Start: 2022-05-26 | End: 2023-06-05

## 2022-05-26 RX ORDER — FAMOTIDINE 20 MG/1
20 TABLET, FILM COATED ORAL 2 TIMES DAILY
Qty: 60 TABLET | Refills: 11 | Status: SHIPPED | OUTPATIENT
Start: 2022-05-26 | End: 2023-06-05

## 2022-05-26 RX ORDER — DAPSONE 25 MG/1
50 TABLET ORAL EVERY OTHER DAY
Qty: 30 TABLET | Refills: 11 | Status: SHIPPED | OUTPATIENT
Start: 2022-05-26 | End: 2022-06-20

## 2022-05-26 RX ORDER — ROSUVASTATIN CALCIUM 5 MG/1
5 TABLET, COATED ORAL DAILY
Qty: 30 TABLET | Refills: 11 | Status: SHIPPED | OUTPATIENT
Start: 2022-05-26 | End: 2023-02-28

## 2022-05-26 RX ORDER — DILTIAZEM HYDROCHLORIDE 240 MG/1
240 CAPSULE, EXTENDED RELEASE ORAL DAILY
Qty: 30 CAPSULE | Refills: 11 | Status: SHIPPED | OUTPATIENT
Start: 2022-05-26 | End: 2023-06-05

## 2022-05-26 RX ORDER — CARBOXYMETHYLCELLULOSE SODIUM 5 MG/ML
1 SOLUTION/ DROPS OPHTHALMIC
Qty: 1 EACH | Refills: 0 | Status: SHIPPED | OUTPATIENT
Start: 2022-05-26 | End: 2023-09-28

## 2022-05-26 RX ORDER — METOPROLOL TARTRATE 25 MG/1
TABLET, FILM COATED ORAL
Qty: 60 TABLET | Refills: 11 | Status: SHIPPED | OUTPATIENT
Start: 2022-05-26 | End: 2022-07-05

## 2022-05-26 RX ORDER — NYSTATIN 100000/ML
1000000 SUSPENSION, ORAL (FINAL DOSE FORM) ORAL 4 TIMES DAILY
Qty: 946 ML | Refills: 3 | Status: SHIPPED | OUTPATIENT
Start: 2022-05-26 | End: 2022-12-19

## 2022-05-26 RX ORDER — PANTOPRAZOLE SODIUM 40 MG/1
40 TABLET, DELAYED RELEASE ORAL DAILY
Qty: 60 TABLET | Refills: 11 | Status: SHIPPED | OUTPATIENT
Start: 2022-05-26 | End: 2023-05-30

## 2022-05-26 RX ORDER — TACROLIMUS 1 MG/1
2 CAPSULE ORAL 2 TIMES DAILY
Qty: 120 CAPSULE | Refills: 11 | Status: SHIPPED | OUTPATIENT
Start: 2022-05-26 | End: 2022-06-21

## 2022-05-26 RX ORDER — PREDNISONE 5 MG/1
TABLET ORAL
Qty: 150 TABLET | Refills: 11 | Status: SHIPPED | OUTPATIENT
Start: 2022-05-26 | End: 2022-06-20

## 2022-05-26 RX ORDER — MULTIPLE VITAMINS W/ MINERALS TAB 9MG-400MCG
1 TAB ORAL DAILY
Qty: 30 TABLET | Refills: 11 | Status: SHIPPED | OUTPATIENT
Start: 2022-05-26

## 2022-05-26 RX ORDER — TACROLIMUS 0.5 MG/1
0.5 CAPSULE ORAL DAILY
Qty: 30 CAPSULE | Refills: 11 | Status: SHIPPED | OUTPATIENT
Start: 2022-05-26 | End: 2022-06-21

## 2022-05-26 NOTE — TELEPHONE ENCOUNTER
Patient hemoglobin downtrending. Worked up with labs, patient most likely having hemolysis from dapsone. Patient has allergy to sulfa drugs. Per Dr. Feng:  - switch go pentamidine nebs  - decrease dapsone to every other day until first dose of pentamidine neb.     Orders faxed to RT at [facility]     Orders faxed to take O2 equipment out of home -  [facility] fax: [phone]    Patient verbalized understanding and agreement of plan. Will call back with questions, concerns, updates.

## 2022-05-26 NOTE — LETTER
PHYSICIAN ORDERS      DATE & TIME ISSUED: May 26, 2022 9:06 AM  PATIENT NAME: Melissa Elder   : 1955     [facility] MR# [if applicable]: [id number]       Pentamidine Nebulizer Standing Order    ICD-10  Lung transplant Z94.2, PCP prophylaxis Z41.8        Start date: 2022 Expires: Life long    Pentamidine 300 mg by aerosol inhalation 1 time a month indefinitely.     The ordering dose is a 300 mg vial reconstituted in 6 ml of sterile water. The entire contents of the vial should be placed into the filtered (Respirgard II) nebulizer reservoir for administration.     Note: Inhaled Pentamidine may cause bronchial irritation with coughing and wheezing.   Give Albuterol neb 3 mg prior to Pentamidine for bronchodilation. Okay to use Xopenex neb if patient unable to tolerate Albuterol.     Administer in well ventilated room.        Any questions please call: [place] [phone]        .

## 2022-05-26 NOTE — LETTER
PHYSICIAN ORDERS      DATE & TIME ISSUED: May 26, 2022 9:51 AM  PATIENT NAME: Melissa Elder   : 1955     [facility] MR# [if applicable]: [id number]     DIAGNOSIS:  Lung Transplant  Z94.2  Patient no longer needs oxygen s/p lung transplant.   Please discontinue O2 and  O2 tanks and concentrators from patient's home.       Any questions please call: [place] [phone].    Thank you!     .

## 2022-05-27 ENCOUNTER — LAB (OUTPATIENT)
Dept: LAB | Facility: CLINIC | Age: 67
End: 2022-05-27
Payer: MEDICARE

## 2022-05-27 DIAGNOSIS — Z94.2 S/P LUNG TRANSPLANT (H): ICD-10-CM

## 2022-05-27 DIAGNOSIS — D84.9 IMMUNOSUPPRESSED STATUS (H): ICD-10-CM

## 2022-05-27 DIAGNOSIS — J43.2 CENTRILOBULAR EMPHYSEMA (H): ICD-10-CM

## 2022-05-27 DIAGNOSIS — D80.1 HYPOGAMMAGLOBULINEMIA (H): ICD-10-CM

## 2022-05-27 DIAGNOSIS — Z79.899 ENCOUNTER FOR LONG-TERM (CURRENT) USE OF HIGH-RISK MEDICATION: ICD-10-CM

## 2022-05-27 DIAGNOSIS — E83.42 HYPOMAGNESEMIA: ICD-10-CM

## 2022-05-27 DIAGNOSIS — D64.9 ANEMIA, UNSPECIFIED TYPE: ICD-10-CM

## 2022-05-27 PROCEDURE — 80197 ASSAY OF TACROLIMUS: CPT

## 2022-05-27 PROCEDURE — 36415 COLL VENOUS BLD VENIPUNCTURE: CPT

## 2022-05-29 LAB
TACROLIMUS BLD-MCNC: 10 UG/L (ref 5–15)
TME LAST DOSE: NORMAL H
TME LAST DOSE: NORMAL H

## 2022-05-31 NOTE — RESULT ENCOUNTER NOTE
Tacrolimus level 10.0 at 12.5 hours, on 5/27/2022.  Goal 8-12.   Current dose 2 mg in AM, 2 mg in PM    Level at goal, no change in dose.   [employer] message sent

## 2022-06-02 LAB
ACID FAST STAIN (ARUP): NORMAL

## 2022-06-03 ENCOUNTER — LAB (OUTPATIENT)
Dept: LAB | Facility: CLINIC | Age: 67
End: 2022-06-03

## 2022-06-03 DIAGNOSIS — Z94.2 S/P LUNG TRANSPLANT (H): ICD-10-CM

## 2022-06-03 DIAGNOSIS — Z79.899 ENCOUNTER FOR LONG-TERM (CURRENT) USE OF HIGH-RISK MEDICATION: ICD-10-CM

## 2022-06-03 DIAGNOSIS — D84.9 IMMUNOSUPPRESSED STATUS (H): ICD-10-CM

## 2022-06-03 PROCEDURE — 80197 ASSAY OF TACROLIMUS: CPT | Performed by: INTERNAL MEDICINE

## 2022-06-06 LAB
TACROLIMUS BLD-MCNC: 10.6 UG/L (ref 5–15)
TME LAST DOSE: NORMAL H
TME LAST DOSE: NORMAL H

## 2022-06-07 NOTE — RESULT ENCOUNTER NOTE
Tacrolimus level 10.6 at 12 hours, on 6/3/2022.  Goal 8-12.   Current dose 2 mg in AM, 2 mg in PM    Level at goal, no change in dose.   [employer] message sent

## 2022-06-09 LAB
BACTERIA BRONCH: NO GROWTH
BACTERIA BRONCH: NO GROWTH

## 2022-06-10 ENCOUNTER — LAB (OUTPATIENT)
Dept: LAB | Facility: CLINIC | Age: 67
End: 2022-06-10
Payer: COMMERCIAL

## 2022-06-10 DIAGNOSIS — Z79.899 ENCOUNTER FOR LONG-TERM (CURRENT) USE OF HIGH-RISK MEDICATION: ICD-10-CM

## 2022-06-10 DIAGNOSIS — D84.9 IMMUNOSUPPRESSED STATUS (H): ICD-10-CM

## 2022-06-10 DIAGNOSIS — Z94.2 S/P LUNG TRANSPLANT (H): ICD-10-CM

## 2022-06-10 PROCEDURE — 36415 COLL VENOUS BLD VENIPUNCTURE: CPT

## 2022-06-10 PROCEDURE — 80197 ASSAY OF TACROLIMUS: CPT | Performed by: INTERNAL MEDICINE

## 2022-06-11 LAB
TACROLIMUS BLD-MCNC: 9.2 UG/L (ref 5–15)
TME LAST DOSE: NORMAL H
TME LAST DOSE: NORMAL H

## 2022-06-16 NOTE — PROGRESS NOTES
Transplant Coordinator Note    Reason for visit: Post lung transplant follow up visit   Coordinator: Present   Caregiver:  , Tyrese    Health concerns addressed today:  1. Left hand - a little dermatitis - using cream, not helpful. Started a week ago - need to see dermatologist    2. Continues to have BLE swelling - improved with Lasix.   3. Respiratory - occasional dry cough (at baseline).   4. GI: appetite good  5. ENT: zyrtec for allergies, otherwise no issues, at baseline.     Activity/rehab: cardiac rehab at home, working on walking up hills to work on leg strength. two walks/day  Oxygen needs: room air  Pain management/RX: denies  Diabetic management: checking BGs  Next Bronch due: last bronch 5/12, next due mid July  Risk Criteria Labs: negative 3/25/22  CMV status: D-/R-  EBV status: D+/R+  AC/asa: ASA 81mg  PJP prophylactic: pentamidine neb (dapsone caused anemia/RBC hemolysis)     COVID:  1. COVID-19 infection (yes/no, date of most recent positive test): no  2. Status/instructions given about COVID-19 vaccine: Received full dose 4/14/2022    Pt Education: medications (use/dose/side effects), how/when to call coordinator, frequency of labs, s/s of infection/rejection, call prior to starting any new medications, lab/vital sign book    Health Maintenance:     Last colonoscopy:     Next colonoscopy due:     Dermatology:    Vaccinations this visit:     Labs, CXR, PFTs reviewed with patient  Medication record reviewed and reconciled  Questions and concerns addressed    Patient Instructions  1. Continue to hydrate with 60-70 oz fluids daily.  2. Continue to exercise daily or most days of the week.  3. It doesn't seem like the COVID vaccine is working well in lung transplant patients. A number of lung transplant patients have gotten sick with COVID even after receiving the vaccines.  Based on our recent experience, it can be life-threatening to get COVID  even after being vaccinated. Please continue to act  like you did not get the COVID vaccine - social distancing, wearing a mask, good hand hygiene, etc. If the people around you are vaccinated, it will help reduce the risk of you getting COVID. All members of your household should be vaccinated.  4. We are going to have you see dermatology for your hand.    5. You will have you do a bronchoscopy the day after your next visit.     Next transplant clinic appointment: 1 month with CXR, labs and PFTs  Next lab draw: pending tacrolimus level, otherwise every 2 weeks       AVS printed at time of check out

## 2022-06-18 ENCOUNTER — LAB (OUTPATIENT)
Dept: LAB | Facility: CLINIC | Age: 67
End: 2022-06-18
Payer: MEDICARE

## 2022-06-18 PROCEDURE — 80197 ASSAY OF TACROLIMUS: CPT | Performed by: INTERNAL MEDICINE

## 2022-06-18 PROCEDURE — 36415 COLL VENOUS BLD VENIPUNCTURE: CPT | Performed by: INTERNAL MEDICINE

## 2022-06-19 LAB
TACROLIMUS BLD-MCNC: 10.3 UG/L (ref 5–15)
TME LAST DOSE: NORMAL H
TME LAST DOSE: NORMAL H

## 2022-06-20 ENCOUNTER — OFFICE VISIT (OUTPATIENT)
Dept: TRANSPLANT | Facility: CLINIC | Age: 67
End: 2022-06-20
Attending: INTERNAL MEDICINE
Payer: MEDICARE

## 2022-06-20 ENCOUNTER — LAB (OUTPATIENT)
Dept: LAB | Facility: CLINIC | Age: 67
End: 2022-06-20

## 2022-06-20 ENCOUNTER — OFFICE VISIT (OUTPATIENT)
Dept: DERMATOLOGY | Facility: CLINIC | Age: 67
End: 2022-06-20
Payer: COMMERCIAL

## 2022-06-20 ENCOUNTER — ANCILLARY PROCEDURE (OUTPATIENT)
Dept: GENERAL RADIOLOGY | Facility: CLINIC | Age: 67
End: 2022-06-20
Attending: INTERNAL MEDICINE
Payer: COMMERCIAL

## 2022-06-20 VITALS
WEIGHT: 138.1 LBS | BODY MASS INDEX: 25.41 KG/M2 | DIASTOLIC BLOOD PRESSURE: 63 MMHG | HEART RATE: 79 BPM | OXYGEN SATURATION: 97 % | HEIGHT: 62 IN | SYSTOLIC BLOOD PRESSURE: 122 MMHG

## 2022-06-20 DIAGNOSIS — J43.2 CENTRILOBULAR EMPHYSEMA (H): ICD-10-CM

## 2022-06-20 DIAGNOSIS — Z94.2 S/P LUNG TRANSPLANT (H): ICD-10-CM

## 2022-06-20 DIAGNOSIS — D64.9 ANEMIA, UNSPECIFIED TYPE: ICD-10-CM

## 2022-06-20 DIAGNOSIS — D80.1 HYPOGAMMAGLOBULINEMIA (H): ICD-10-CM

## 2022-06-20 DIAGNOSIS — Z79.899 ENCOUNTER FOR LONG-TERM (CURRENT) USE OF HIGH-RISK MEDICATION: ICD-10-CM

## 2022-06-20 DIAGNOSIS — E83.42 HYPOMAGNESEMIA: ICD-10-CM

## 2022-06-20 DIAGNOSIS — R21 RASH: Primary | ICD-10-CM

## 2022-06-20 DIAGNOSIS — M79.89 BILATERAL SWELLING OF FEET: ICD-10-CM

## 2022-06-20 DIAGNOSIS — D84.9 IMMUNOSUPPRESSED STATUS (H): ICD-10-CM

## 2022-06-20 DIAGNOSIS — R23.8 VESICULAR RASH: ICD-10-CM

## 2022-06-20 DIAGNOSIS — R23.8 VESICULAR RASH: Primary | ICD-10-CM

## 2022-06-20 DIAGNOSIS — Z94.2 S/P LUNG TRANSPLANT (H): Primary | ICD-10-CM

## 2022-06-20 LAB
ANION GAP SERPL CALCULATED.3IONS-SCNC: 12 MMOL/L (ref 3–14)
BUN SERPL-MCNC: 31 MG/DL (ref 7–30)
CALCIUM SERPL-MCNC: 9.5 MG/DL (ref 8.5–10.1)
CHLORIDE BLD-SCNC: 99 MMOL/L (ref 94–109)
CMV DNA SPEC NAA+PROBE-ACNC: NOT DETECTED IU/ML
CO2 SERPL-SCNC: 33 MMOL/L (ref 20–32)
CREAT SERPL-MCNC: 0.65 MG/DL (ref 0.52–1.04)
ERYTHROCYTE [DISTWIDTH] IN BLOOD BY AUTOMATED COUNT: 12.4 % (ref 10–15)
EXPTIME-PRE: 5.34 SEC
FEF2575-%PRED-PRE: 171 %
FEF2575-PRE: 3.32 L/SEC
FEF2575-PRED: 1.93 L/SEC
FEFMAX-%PRED-PRE: 79 %
FEFMAX-PRE: 4.46 L/SEC
FEFMAX-PRED: 5.63 L/SEC
FEV1-%PRED-PRE: 63 %
FEV1-PRE: 1.38 L
FEV1FEV6-PRE: 99 %
FEV1FEV6-PRED: 80 %
FEV1FVC-PRE: 99 %
FEV1FVC-PRED: 79 %
FIFMAX-PRE: 2.06 L/SEC
FVC-%PRED-PRE: 49 %
FVC-PRE: 1.38 L
FVC-PRED: 2.77 L
GFR SERPL CREATININE-BSD FRML MDRD: >90 ML/MIN/1.73M2
GLUCOSE BLD-MCNC: 171 MG/DL (ref 70–99)
HAPTOGLOB SERPL-MCNC: 218 MG/DL (ref 32–197)
HCT VFR BLD AUTO: 33.5 % (ref 35–47)
HGB BLD-MCNC: 11.4 G/DL (ref 11.7–15.7)
MAGNESIUM SERPL-MCNC: 1.5 MG/DL (ref 1.6–2.3)
MCH RBC QN AUTO: 32.7 PG (ref 26.5–33)
MCHC RBC AUTO-ENTMCNC: 34 G/DL (ref 31.5–36.5)
MCV RBC AUTO: 96 FL (ref 78–100)
PLATELET # BLD AUTO: 207 10E3/UL (ref 150–450)
POTASSIUM BLD-SCNC: 3.4 MMOL/L (ref 3.4–5.3)
RBC # BLD AUTO: 3.49 10E6/UL (ref 3.8–5.2)
SODIUM SERPL-SCNC: 144 MMOL/L (ref 133–144)
TACROLIMUS BLD-MCNC: 15.9 UG/L (ref 5–15)
TME LAST DOSE: ABNORMAL H
TME LAST DOSE: ABNORMAL H
WBC # BLD AUTO: 8 10E3/UL (ref 4–11)

## 2022-06-20 PROCEDURE — 87798 DETECT AGENT NOS DNA AMP: CPT | Performed by: DERMATOLOGY

## 2022-06-20 PROCEDURE — 999N000207 HC UMP OPEN ENCOUNTER >50 DAYS

## 2022-06-20 PROCEDURE — 87799 DETECT AGENT NOS DNA QUANT: CPT | Performed by: INTERNAL MEDICINE

## 2022-06-20 PROCEDURE — 86832 HLA CLASS I HIGH DEFIN QUAL: CPT | Performed by: INTERNAL MEDICINE

## 2022-06-20 PROCEDURE — 99207 PR RESPIRATORY FLOW VOLUME LOOP: CPT

## 2022-06-20 PROCEDURE — 71046 X-RAY EXAM CHEST 2 VIEWS: CPT | Mod: GC | Performed by: RADIOLOGY

## 2022-06-20 PROCEDURE — 88341 IMHCHEM/IMCYTCHM EA ADD ANTB: CPT | Mod: 26 | Performed by: PATHOLOGY

## 2022-06-20 PROCEDURE — 83010 ASSAY OF HAPTOGLOBIN QUANT: CPT | Performed by: INTERNAL MEDICINE

## 2022-06-20 PROCEDURE — 86833 HLA CLASS II HIGH DEFIN QUAL: CPT | Performed by: INTERNAL MEDICINE

## 2022-06-20 PROCEDURE — 36415 COLL VENOUS BLD VENIPUNCTURE: CPT | Performed by: PATHOLOGY

## 2022-06-20 PROCEDURE — 87529 HSV DNA AMP PROBE: CPT | Performed by: DERMATOLOGY

## 2022-06-20 PROCEDURE — 88305 TISSUE EXAM BY PATHOLOGIST: CPT | Mod: 26 | Performed by: PATHOLOGY

## 2022-06-20 PROCEDURE — 80048 BASIC METABOLIC PNL TOTAL CA: CPT | Performed by: PATHOLOGY

## 2022-06-20 PROCEDURE — 80197 ASSAY OF TACROLIMUS: CPT | Performed by: INTERNAL MEDICINE

## 2022-06-20 PROCEDURE — 85027 COMPLETE CBC AUTOMATED: CPT | Performed by: PATHOLOGY

## 2022-06-20 PROCEDURE — 88341 IMHCHEM/IMCYTCHM EA ADD ANTB: CPT | Mod: TC | Performed by: DERMATOLOGY

## 2022-06-20 PROCEDURE — 83735 ASSAY OF MAGNESIUM: CPT | Performed by: PATHOLOGY

## 2022-06-20 PROCEDURE — 88342 IMHCHEM/IMCYTCHM 1ST ANTB: CPT | Mod: 26 | Performed by: PATHOLOGY

## 2022-06-20 PROCEDURE — 11104 PUNCH BX SKIN SINGLE LESION: CPT | Mod: GC | Performed by: DERMATOLOGY

## 2022-06-20 RX ORDER — PREDNISONE 5 MG/1
TABLET ORAL
Qty: 150 TABLET | Refills: 11 | COMMUNITY
Start: 2022-06-20 | End: 2022-08-01

## 2022-06-20 RX ORDER — ACYCLOVIR 200 MG/1
400 CAPSULE ORAL
Qty: 140 CAPSULE | Refills: 0 | Status: SHIPPED | OUTPATIENT
Start: 2022-06-20 | End: 2022-08-01

## 2022-06-20 RX ORDER — FUROSEMIDE 20 MG
40 TABLET ORAL DAILY
Qty: 28 TABLET | Refills: 0 | COMMUNITY
Start: 2022-06-20 | End: 2022-08-03

## 2022-06-20 ASSESSMENT — PAIN SCALES - GENERAL
PAINLEVEL: NO PAIN (0)
PAINLEVEL: NO PAIN (0)

## 2022-06-20 NOTE — NURSING NOTE
"Chief Complaint   Patient presents with     RECHECK     Lung TX       /63   Pulse 79   Ht 1.575 m (5' 2\")   Wt 62.6 kg (138 lb 1.6 oz)   SpO2 97%   BMI 25.26 kg/m        Sarah Triana MA  "

## 2022-06-20 NOTE — PATIENT INSTRUCTIONS
Start acyclovir 400 mg (2 pills) 5 times a day for 2 weeks with a big glass of water.     Wound Care After a Biopsy    What is a skin biopsy?  A skin biopsy allows the doctor to examine a very small piece of tissue under the microscope to determine the diagnosis and the best treatment for the skin condition. A local anesthetic (numbing medicine)  is injected with a very small needle into the skin area to be tested. A small piece of skin is taken from the area. Sometimes a suture (stitch) is used.     What are the risks of a skin biopsy?  I will experience scar, bleeding, swelling, pain, crusting and redness. I may experience incomplete removal or recurrence. Risks of this procedure are excessive bleeding, bruising, infection, nerve damage, numbness, thick (hypertrophic or keloidal) scar and non-diagnostic biopsy.    How should I care for my wound for the first 24 hours?  Keep the wound dry and covered for 24 hours  If it bleeds, hold direct pressure on the area for 15 minutes. If bleeding does not stop then go to the emergency room  Avoid strenuous exercise the first 1-2 days or as your doctor instructs you    How should I care for the wound after 24 hours?  After 24 hours, remove the bandage  You may bathe or shower as normal  If you had a scalp biopsy, you can shampoo as usual and can use shower water to clean the biopsy site daily  Clean the wound twice a day with gentle soap and water  Do not scrub, be gentle  Apply white petroleum/Vaseline after cleaning the wound with a cotton swab or a clean finger, and keep the site covered with a Bandaid /bandage. Bandages are not necessary with a scalp biopsy  If you are unable to cover the site with a Bandaid /bandage, re-apply ointment 2-3 times a day to keep the site moist. Moisture will help with healing  Avoid strenuous activity for first 1-2 days  Avoid lakes, rivers, pools, and oceans until the stitches are removed or the site is healed    How do I clean my  wound?  Wash hands thoroughly with soap or use hand  before all wound care  Clean the wound with gentle soap and water  Apply white petroleum/Vaseline  to wound after it is clean  Replace the Bandaid /bandage to keep the wound covered for the first few days or as instructed by your doctor  If you had a scalp biopsy, warm shower water to the area on a daily basis should suffice    What should I use to clean my wound?   Cotton-tipped applicators (Qtips )  White petroleum jelly (Vaseline ). Use a clean new container and use Q-tips to apply.  Bandaids   as needed  Gentle soap     How should I care for my wound long term?  Do not get your wound dirty  Keep up with wound care for one week or until the area is healed.  A small scab will form and fall off by itself when the area is completely healed. The area will be red and will become pink in color as it heals. Sun protection is very important for how your scar will turn out. Sunscreen with an SPF 30 or greater is recommended once the area is healed.  If you have stitches, stitches need to be removed in 14 days. You may return to our clinic for this or you may have it done locally at your doctor s office.  You should have some soreness but it should be mild and slowly go away over several days. Talk to your doctor about using tylenol for pain,    When should I call my doctor?  If you have increased:   Pain or swelling  Pus or drainage (clear or slightly yellow drainage is ok)  Temperature over 100F  Spreading redness or warmth around wound    When will I hear about my results?  The biopsy results can take 2 weeks to come back.  Your results will automatically release to [employer] before your provider has even reviewed them.  The clinic will call you with the results, send you a [employer] message, or have you schedule a follow-up clinic or phone time to discuss the results.  Contact our clinics if you do not hear from us in 2 weeks.    Who should I call with  questions?  [facility]: [phone]  [facility]: [phone]  For urgent needs outside of business hours call the [facility] at [phone] and ask for the dermatology resident on call

## 2022-06-20 NOTE — NURSING NOTE
Lidocaine-epinephrine 1-1:388947 % injection   1.5mL once for one use, starting 6/20/2022 ending 6/20/2022,  2mL disp, R-0, injection  Injected by Dr. Pagan

## 2022-06-20 NOTE — PATIENT INSTRUCTIONS
Patient Instructions  1. Continue to hydrate with 60-70 oz fluids daily.  2. Continue to exercise daily or most days of the week.  3. It doesn't seem like the COVID vaccine is working well in lung transplant patients. A number of lung transplant patients have gotten sick with COVID even after receiving the vaccines.  Based on our recent experience, it can be life-threatening to get COVID  even after being vaccinated. Please continue to act like you did not get the COVID vaccine - social distancing, wearing a mask, good hand hygiene, etc. If the people around you are vaccinated, it will help reduce the risk of you getting COVID. All members of your household should be vaccinated.  4. We are going to have you see dermatology for your hand.    5. You will have you do a bronchoscopy the day after your next visit.     Next transplant clinic appointment: 1 month with CXR, labs and PFTs  Next lab draw: pending tacrolimus level, otherwise every 2 weeks       ~~~~~~~~~~~~~~~~~~~~~~~~~    Thoracic Transplant Office phone [phone], fax [phone]    Office Hours 8:30 - 5:00     For after-hours urgent issues, please dial [phone], and ask to speak with the Thoracic Transplant Coordinator On-Call.  --------------------  To expedite your medication refill(s), please contact your pharmacy and have them fax a refill request to: [phone]  .   *Please allow 3 business days for routine medication refills.  *Please allow 5 business days for controlled substance medication refills.    **For Diabetic medications and supplies refill(s), please contact your pharmacy and have them contact your Endocrine team.  --------------------  For scheduling appointments call [phone].  --------------------  Please Note: If you are active on [employer], all future test results will be sent by [employer] message only, and will no longer be called to patient. You may also receive communication directly from your physician.

## 2022-06-20 NOTE — NURSING NOTE
Dermatology Rooming Note    Melissa Elder's goals for this visit include:   Chief Complaint   Patient presents with     Derm Problem     Lesion on hand     Sara Rudolph, Visit Facilitator

## 2022-06-20 NOTE — PROGRESS NOTES
Reason for Visit  Melissa Elder is a 66 year old year old female who is being seen for RECHECK (Lung TX)      Assessment and plan: ***    Lung TX HPI  Transplants:  2/21/2022 (Lung), Postoperative day:  119    The patient was seen and examined by Carson Feng MD     A complete ROS was otherwise negative except as noted in the HPI.    Current Outpatient Medications   Medication     aspirin (ASA) 81 MG EC tablet     calcium carbonate 600 mg-vitamin D 400 units (CALTRATE) 600-400 MG-UNIT per tablet     carboxymethylcellulose PF (REFRESH PLUS) 0.5 % ophthalmic solution     cetirizine (ZYRTEC) 10 MG tablet     diltiazem ER (TIAZAC) 240 MG 24 hr ER beaded capsule     famotidine (PEPCID) 20 MG tablet     furosemide (LASIX) 20 MG tablet     IBANdronate (BONIVA) 150 MG tablet     Magnesium Glycinate 665 MG CAPS     metoprolol tartrate (LOPRESSOR) 25 MG tablet     multivitamin w/minerals (THERA-VIT-M) tablet     mycophenolic acid (GENERIC EQUIVALENT) 360 MG EC tablet     nystatin (MYCOSTATIN) 346371 UNIT/ML suspension     pantoprazole (PROTONIX) 40 MG EC tablet     predniSONE (DELTASONE) 5 MG tablet     rosuvastatin (CRESTOR) 5 MG tablet     tacrolimus (GENERIC EQUIVALENT) 0.5 MG capsule     tacrolimus (GENERIC EQUIVALENT) 1 MG capsule     albuterol (PROAIR HFA/PROVENTIL HFA/VENTOLIN HFA) 108 (90 Base) MCG/ACT inhaler     blood glucose (NO BRAND SPECIFIED) lancets standard     blood glucose (NO BRAND SPECIFIED) test strip     blood glucose monitoring (NO BRAND SPECIFIED) meter device kit     No current facility-administered medications for this visit.     Facility-Administered Medications Ordered in Other Visits   Medication     sodium chloride (PF) 0.9% PF flush 10 mL     Allergies   Allergen Reactions     Alendronic Acid Other (See Comments)     dizziness  Other reaction(s): Dizziness     Nickel Rash     Sulfa Drugs Rash     Past Medical History:   Diagnosis Date     Atrial fibrillation with RVR (H)     hx of A  fib related to severe COPD, does not meet anticoagulation criteria as noted     COPD, severe (H)      Osteoporosis        Past Surgical History:   Procedure Laterality Date     BRONCHOSCOPY (RIGID OR FLEXIBLE), DIAGNOSTIC N/A 4/7/2022    Procedure: BRONCHOSCOPY, WITH BRONCHOALVEOLAR LAVAGE and BIOPSIES;  Surgeon: Gerson Haddad MD;  Location:  GI     BRONCHOSCOPY (RIGID OR FLEXIBLE), DIAGNOSTIC N/A 5/12/2022    Procedure: BRONCHOSCOPY, WITH BRONCHOALVEOLAR LAVAGE AND BIOPSY;  Surgeon: Melissa Landin MD;  Location:  GI     BRONCHOSCOPY FLEXIBLE AND RIGID N/A 3/1/2022    Procedure: BRONCHOSCOPY INSPECTION;  Surgeon: Melissa Landin MD;  Location:  GI     CV CORONARY ANGIOGRAM N/A 3/27/2019    Procedure: CV CORONARY ANGIOGRAM;  Surgeon: Thierry Serrano MD;  Location:  HEART CARDIAC CATH LAB     CV RIGHT HEART CATH MEASUREMENTS RECORDED N/A 3/27/2019    Procedure: CV RIGHT HEART CATH;  Surgeon: Thierry Serrano MD;  Location:  HEART CARDIAC CATH LAB     ESOPHAGEAL IMPEDENCE FUNCTION TEST WITH 24 HOUR PH GREATER THAN 1 HOUR N/A 3/28/2019    Procedure: ESOPHAGEAL IMPEDENCE FUNCTION TEST WITH 24 HOUR PH GREATER THAN 1 HOUR;  Surgeon: Mike Henry MD;  Location:  GI     EXCISE PILONIDAL CYST, SIMPLE       IR CHEST TUBE PLACEMENT NON-TUNNELED RIGHT  3/3/2022     TONSILLECTOMY & ADENOIDECTOMY       TRANSPLANT LUNG RECIPIENT SINGLE X2 Bilateral 2/20/2022    Procedure: Bilateral Sequential Lung Transplantation, Clamshell Incision, Extracorporeal Membrane Oxgenation, Bronchoscopy, Cryoablation of Intercostal Nerves;  Surgeon: Raul Robin MD;  Location:  OR       Social History     Socioeconomic History     Marital status:      Spouse name: Not on file     Number of children: Not on file     Years of education: Not on file     Highest education level: Not on file   Occupational History     Not on file   Tobacco Use     Smoking status: Former Smoker     Packs/day: 1.50  "    Years: 36.00     Pack years: 54.00     Types: Cigarettes     Quit date: 2006     Years since quittin.4     Smokeless tobacco: Never Used   Substance and Sexual Activity     Alcohol use: Yes     Comment: stated beer and wine occ     Drug use: Yes     Comment: stated hx of marijuana, quit in      Sexual activity: Not on file   Other Topics Concern     Parent/sibling w/ CABG, MI or angioplasty before 65F 55M? Not Asked   Social History Narrative     Not on file     Social Determinants of Health     Financial Resource Strain: Not on file   Food Insecurity: Not on file   Transportation Needs: Not on file   Physical Activity: Not on file   Stress: Not on file   Social Connections: Not on file   Intimate Partner Violence: Not on file   Housing Stability: Not on file         /63   Pulse 79   Ht 1.575 m (5' 2\")   Wt 62.6 kg (138 lb 1.6 oz)   SpO2 97%   BMI 25.26 kg/m    Body mass index is 25.26 kg/m .  Exam:   GENERAL APPEARANCE: Well developed, well nourished, alert, and in no apparent distress.  EYES: PERRL, EOMI  EARS: Canals partially obscured by cermen, TMs normal  MOUTH: Oral mucosa is moist, without any lesions, no tonsillar enlargement, no oropharyngeal exudate.  NECK: supple, no masses, no thyromegaly.  LYMPHATICS: No significant axillary, cervical, or supraclavicular nodes.  RESP: normal percussion, good air flow throughout.  No crackles. No rhonchi. No wheezes.  CV: Normal S1, S2, regular rhythm, normal rate. No murmur.  No rub. No gallop. No LE edema.   ABDOMEN:  Bowel sounds normal, soft, nontender, no HSM or masses.   MS: extremities normal. (+) clubbing. No cyanosis.  SKIN: vesicular rash left thenar eminence  NEURO: Mentation intact, speech normal, normal strength and tone, normal gait and stance  PSYCH: mentation appears normal. and affect normal/bright  Results:  Recent Results (from the past 168 hour(s))   Tacrolimus by [employer] Mass Spectrometry    Collection Time: 22  6:08 " PM   Result Value Ref Range    Tacrolimus by [employer] Mass Spectrometry 10.3 5.0 - 15.0 ug/L    Tacrolimus Last Dose Date 6/17/2022     Tacrolimus Last Dose Time  6:00 PM    Basic metabolic panel    Collection Time: 06/20/22  9:14 AM   Result Value Ref Range    Sodium 144 133 - 144 mmol/L    Potassium 3.4 3.4 - 5.3 mmol/L    Chloride 99 94 - 109 mmol/L    Carbon Dioxide (CO2) 33 (H) 20 - 32 mmol/L    Anion Gap 12 3 - 14 mmol/L    Urea Nitrogen 31 (H) 7 - 30 mg/dL    Creatinine 0.65 0.52 - 1.04 mg/dL    Calcium 9.5 8.5 - 10.1 mg/dL    Glucose 171 (H) 70 - 99 mg/dL    GFR Estimate >90 >60 mL/min/1.73m2   Magnesium    Collection Time: 06/20/22  9:14 AM   Result Value Ref Range    Magnesium 1.5 (L) 1.6 - 2.3 mg/dL   CBC with platelets    Collection Time: 06/20/22  9:14 AM   Result Value Ref Range    WBC Count 8.0 4.0 - 11.0 10e3/uL    RBC Count 3.49 (L) 3.80 - 5.20 10e6/uL    Hemoglobin 11.4 (L) 11.7 - 15.7 g/dL    Hematocrit 33.5 (L) 35.0 - 47.0 %    MCV 96 78 - 100 fL    MCH 32.7 26.5 - 33.0 pg    MCHC 34.0 31.5 - 36.5 g/dL    RDW 12.4 10.0 - 15.0 %    Platelet Count 207 150 - 450 10e3/uL   General PFT Lab (Please always keep checked)    Collection Time: 06/20/22  9:23 AM   Result Value Ref Range    FVC-Pred 2.77 L    FVC-Pre 1.38 L    FVC-%Pred-Pre 49 %    FEV1-Pre 1.38 L    FEV1-%Pred-Pre 63 %    FEV1FVC-Pred 79 %    FEV1FVC-Pre 99 %    FEFMax-Pred 5.63 L/sec    FEFMax-Pre 4.46 L/sec    FEFMax-%Pred-Pre 79 %    FEF2575-Pred 1.93 L/sec    FEF2575-Pre 3.32 L/sec    DRH2711-%Pred-Pre 171 %    ExpTime-Pre 5.34 sec    FIFMax-Pre 2.06 L/sec    FEV1FEV6-Pred 80 %    FEV1FEV6-Pre 99 %                         Results as noted above.    PFT Interpretation:  {Ping PFT interpretation:675327}  {Increase/decrease:374278}  {JDPFT:401348}  Valid Maneuver

## 2022-06-20 NOTE — PROGRESS NOTES
[facility] Dermatology Note  Encounter Date: Jun 20, 2022    Dermatology Problem List:  1. History of bilateral lung transplant on 2/21 for severe COPD   - Myfortic 720 mg BID, tacrolimus (goal 8-12) and prednisone taper  - Dapsone for PJP ppx  2. Grouped targetoid papules, palmar base of the L thumb   -  s/p punch bx 6/20/2022, pending   - s/p HSV/VZV PCR 6/20/22, pending   - empiric acyclovir started 6/20/22    Impression/Plan:  # Grouped targetoid papules x 6 (one with ulceration 2/2 trauma), palmar base of the L thumb   Present for one week.  Differential includes HSV versus VZV versus atypical EM (though unusual that only focal presentation)/atypical HFM (though again with unusual focal distribution)/atypical molluscum/atypical FDE (though no clear drug culprit) in the setting of immunocompromise.  Will obtain viral PCR, biopsy for diagnostic clarification, and start systemic antivirals empirically.  - follow-up HSV and VZV PCR  - Punch biopsy today, see procedure below  - Start acyclovir 400 mg 5 times daily for 2 weeks pending above studies    Procedures  - Punch biopsy procedure note, location(s): left palmar base of thumb. After discussion of benefits and risks including but not limited to bleeding, infection, scar, incomplete removal, recurrence, and non-diagnostic biopsy, written consent and photographs were obtained. The area was cleaned with isopropyl alcohol. 0.5mL of 1% lidocaine with epinephrine was injected to obtain adequate anesthesia and a 4 mm punch biopsy was performed at site(s). 4-0 Prolene sutures were utilized to approximate the epidermal edges. White petrolatum ointment and a bandage was applied to the wound. Explicit verbal and written wound care instructions were provided. The patient left the dermatology clinic in good condition.      Follow-up pending above    Dr. Tobar staffed the patient.    Staff Involved:  Danae Pagan MD ([id number])/Staff    Scribe  Disclosure:  I, Otoniel Carvajal, am serving as a scribe to document services personally performed by Fide Tobar MD based on data collection and the provider's statements to me.     Provider Disclosure:   The documentation recorded by the scribe accurately reflects the services I personally performed and the decisions made by me.    Patient was seen and examined with the medicine/dermatology resident. I agree with the history, review of systems, physical examination, assessments and plan. I was present for the key portion of the biopsy procedure.    Fide Tobar MD  Professor and  Chair  Department of Dermatology  [facility]    CC:   Chief Complaint   Patient presents with     Derm Problem     Lesion on hand     History of Present Illness:  Ms. Melissa Elder is a very pleasant 66 year old female who presents today as an urgent add on to clinic for lesions on the L hand.   Located on the left hand, at the base of the thumb.  Started about 1 week ago.  Noticed a tender spot while she was washing the dishes.  Since then has developed several new lesions and continuing to make more.  One of the lesions was traumatized when she was trying to unscrew a jar.  Combination of clear and bloody fluid came out.  Not itchy.  Applying triamcinolone without any improvement but the tube of medication is from 2008.  No other mucosal lesions.  No other similar rash elsewhere.  History of bilateral lung transplant on Myfortic, tacrolimus, and prednisone 7.5 mg daily.  No new medications recently.     Physical exam:  Vitals: There were no vitals taken for this visit.  SKIN: Focused examination of the hands and oral mucosa was performed.  - L palmar base of thumb with 6 grouped targetoid papules, one ulcerated/deroofed due to accidental trauma   - R palm clear   - Oral mucosa clear          Past Medical History:   Patient Active Problem List   Diagnosis     S/P lung transplant (H)     Steroid-induced  hyperglycemia     Hypogammaglobulinemia (H)     Encounter for long-term (current) use of high-risk medication     Immunosuppressed status (H)     Hypomagnesemia     Past Medical History:   Diagnosis Date     Atrial fibrillation with RVR (H)     hx of A fib related to severe COPD, does not meet anticoagulation criteria as noted     COPD, severe (H)      Osteoporosis      Past Surgical History:   Procedure Laterality Date     BRONCHOSCOPY (RIGID OR FLEXIBLE), DIAGNOSTIC N/A 4/7/2022    Procedure: BRONCHOSCOPY, WITH BRONCHOALVEOLAR LAVAGE and BIOPSIES;  Surgeon: Gerson Haddad MD;  Location:  GI     BRONCHOSCOPY (RIGID OR FLEXIBLE), DIAGNOSTIC N/A 5/12/2022    Procedure: BRONCHOSCOPY, WITH BRONCHOALVEOLAR LAVAGE AND BIOPSY;  Surgeon: Melissa Landin MD;  Location:  GI     BRONCHOSCOPY FLEXIBLE AND RIGID N/A 3/1/2022    Procedure: BRONCHOSCOPY INSPECTION;  Surgeon: Melissa Landin MD;  Location:  GI     CV CORONARY ANGIOGRAM N/A 3/27/2019    Procedure: CV CORONARY ANGIOGRAM;  Surgeon: Thierry Serrano MD;  Location:  HEART CARDIAC CATH LAB     CV RIGHT HEART CATH MEASUREMENTS RECORDED N/A 3/27/2019    Procedure: CV RIGHT HEART CATH;  Surgeon: Thierry Serrano MD;  Location:  HEART CARDIAC CATH LAB     ESOPHAGEAL IMPEDENCE FUNCTION TEST WITH 24 HOUR PH GREATER THAN 1 HOUR N/A 3/28/2019    Procedure: ESOPHAGEAL IMPEDENCE FUNCTION TEST WITH 24 HOUR PH GREATER THAN 1 HOUR;  Surgeon: Mike Henry MD;  Location:  GI     EXCISE PILONIDAL CYST, SIMPLE       IR CHEST TUBE PLACEMENT NON-TUNNELED RIGHT  3/3/2022     TONSILLECTOMY & ADENOIDECTOMY       TRANSPLANT LUNG RECIPIENT SINGLE X2 Bilateral 2/20/2022    Procedure: Bilateral Sequential Lung Transplantation, Clamshell Incision, Extracorporeal Membrane Oxgenation, Bronchoscopy, Cryoablation of Intercostal Nerves;  Surgeon: Raul Robin MD;  Location:  OR       Social History:  Patient reports that she quit smoking about 16  years ago. Her smoking use included cigarettes. She has a 54.00 pack-year smoking history. She has never used smokeless tobacco. She reports current alcohol use. She reports current drug use.    Family History:  Family History   Problem Relation Age of Onset     Breast Cancer Mother      Colon Cancer Mother      Lung Cancer Mother      Lung Cancer Father      Lung Cancer Maternal Aunt      Pancreatic Cancer Maternal Uncle        Medications:  Current Outpatient Medications   Medication Sig Dispense Refill     albuterol (PROAIR HFA/PROVENTIL HFA/VENTOLIN HFA) 108 (90 Base) MCG/ACT inhaler Inhale 2 puffs into the lungs every 6 hours as needed for shortness of breath / dyspnea or wheezing (Patient not taking: No sig reported) 18 g 0     aspirin (ASA) 81 MG EC tablet Take 1 tablet (81 mg) by mouth daily 30 tablet 11     blood glucose (NO BRAND SPECIFIED) lancets standard To use to test glucose level in the blood Use to test blood sugar  4  times daily as directed. To accompany glucose monitor brands per insurance coverage. (Patient not taking: No sig reported) 100 each 0     blood glucose (NO BRAND SPECIFIED) test strip To use to test glucose level in the blood Use to test blood sugar  4 times daily as directed. To accompany glucose monitor brands per insurance coverage. (Patient not taking: No sig reported) 120 strip 0     blood glucose monitoring (NO BRAND SPECIFIED) meter device kit Use as directed Per insurance coverage (Patient not taking: No sig reported) 1 kit 0     calcium carbonate 600 mg-vitamin D 400 units (CALTRATE) 600-400 MG-UNIT per tablet Take 1 tablet by mouth 2 times daily (with meals) 60 tablet 11     carboxymethylcellulose PF (REFRESH PLUS) 0.5 % ophthalmic solution Place 1 drop into both eyes every hour as needed for dry eyes 1 each 0     cetirizine (ZYRTEC) 10 MG tablet Take 1 tablet (10 mg) by mouth daily as needed for allergies 30 tablet 11     diltiazem ER (TIAZAC) 240 MG 24 hr ER beaded capsule  Take 1 capsule (240 mg) by mouth daily 30 capsule 11     famotidine (PEPCID) 20 MG tablet Take 1 tablet (20 mg) by mouth 2 times daily 60 tablet 11     furosemide (LASIX) 20 MG tablet Take 2 tablets (40 mg) by mouth daily 28 tablet 0     IBANdronate (BONIVA) 150 MG tablet Take 1 tablet (150 mg) by mouth every 30 days       Magnesium Glycinate 665 MG CAPS Take 3 tablets by mouth 2 times daily Using [employer] High Absorption Magnesium, 100mg elemental magnesium per tablet.     TOTAL DOSE: 3 tablets with lunch, 3 tablets after dinner 180 capsule 11     metoprolol tartrate (LOPRESSOR) 25 MG tablet Take 25 mg in the am and 37.5 mg in the pm 60 tablet 11     multivitamin w/minerals (THERA-VIT-M) tablet Take 1 tablet by mouth daily 30 tablet 11     mycophenolic acid (GENERIC EQUIVALENT) 360 MG EC tablet Take 2 tablets (720 mg) by mouth 2 times daily 120 tablet 11     nystatin (MYCOSTATIN) 703243 UNIT/ML suspension Swish and swallow 10 mLs (1,000,000 Units) in mouth 4 times daily 946 mL 3     pantoprazole (PROTONIX) 40 MG EC tablet Take 1 tablet (40 mg) by mouth daily 60 tablet 11     predniSONE (DELTASONE) 5 MG tablet Take 1.5 tablets (7.5 mg) by mouth every morning AND 1.5 tablets (7.5 mg) every evening. 150 tablet 11     rosuvastatin (CRESTOR) 5 MG tablet Take 1 tablet (5 mg) by mouth daily 30 tablet 11     tacrolimus (GENERIC EQUIVALENT) 0.5 MG capsule Take 1 capsule (0.5 mg) by mouth daily Take 2 mg in the AM and 2 mg in the PM 30 capsule 11     tacrolimus (GENERIC EQUIVALENT) 1 MG capsule Take 2 capsules (2 mg) by mouth 2 times daily Take 2 mg in the AM and 2 mg in the  capsule 11      Allergies   Allergen Reactions     Alendronic Acid Other (See Comments)     dizziness  Other reaction(s): Dizziness     Nickel Rash     Sulfa Drugs Rash

## 2022-06-20 NOTE — RESULT ENCOUNTER NOTE
Tacrolimus level 10.3 at 12 hours, on 6/18/22.  Goal 8-12.   Current dose 2 mg in AM, 2 mg in PM    No change in dose    "[employer]t message sent

## 2022-06-20 NOTE — LETTER
6/20/2022       RE: Melissa Elder  [address]  [facility] [address]     Dear Colleague,    Thank you for referring your patient, Melissa Elder, to the [facility] [place] at [facility]. Please see a copy of my visit note below.    [facility] Dermatology Note  Encounter Date: Jun 20, 2022    Dermatology Problem List:  1. History of bilateral lung transplant on 2/21 for severe COPD   - Myfortic 720 mg BID, tacrolimus (goal 8-12) and prednisone taper  - Dapsone for PJP ppx  2. Grouped targetoid papules, palmar base of the L thumb   -  s/p punch bx 6/20/2022, pending   - s/p HSV/VZV PCR 6/20/22, pending   - empiric acyclovir started 6/20/22    Impression/Plan:  # Grouped targetoid papules x 6 (one with ulceration 2/2 trauma), palmar base of the L thumb   Present for one week.  Differential includes HSV versus VZV versus atypical EM (though unusual that only focal presentation)/atypical HFM (though again with unusual focal distribution)/atypical molluscum/atypical FDE (though no clear drug culprit) in the setting of immunocompromise.  Will obtain viral PCR, biopsy for diagnostic clarification, and start systemic antivirals empirically.  - follow-up HSV and VZV PCR  - Punch biopsy today, see procedure below  - Start acyclovir 400 mg 5 times daily for 2 weeks pending above studies    Procedures  - Punch biopsy procedure note, location(s): left palmar base of thumb. After discussion of benefits and risks including but not limited to bleeding, infection, scar, incomplete removal, recurrence, and non-diagnostic biopsy, written consent and photographs were obtained. The area was cleaned with isopropyl alcohol. 0.5mL of 1% lidocaine with epinephrine was injected to obtain adequate anesthesia and a 4 mm punch biopsy was performed at site(s). 4-0 Prolene sutures were utilized to approximate the epidermal edges. White petrolatum  ointment and a bandage was applied to the wound. Explicit verbal and written wound care instructions were provided. The patient left the dermatology clinic in good condition.      Follow-up pending above    Dr. Tobar staffed the patient.    Staff Involved:  Danae Pagan MD ([id number])/Staff    Scribe Disclosure:  I, Otoniel Villavicencios, am serving as a scribe to document services personally performed by Fide Tobar MD based on data collection and the provider's statements to me.     Provider Disclosure:   The documentation recorded by the scribe accurately reflects the services I personally performed and the decisions made by me.    Patient was seen and examined with the medicine/dermatology resident. I agree with the history, review of systems, physical examination, assessments and plan. I was present for the key portion of the biopsy procedure.    Fide Tobar MD  Professor and  Chair  Department of Dermatology  [facility]    CC:   Chief Complaint   Patient presents with     Derm Problem     Lesion on hand     History of Present Illness:  Ms. Melissa Elder is a very pleasant 66 year old female who presents today as an urgent add on to clinic for lesions on the L hand.   Located on the left hand, at the base of the thumb.  Started about 1 week ago.  Noticed a tender spot while she was washing the dishes.  Since then has developed several new lesions and continuing to make more.  One of the lesions was traumatized when she was trying to unscrew a jar.  Combination of clear and bloody fluid came out.  Not itchy.  Applying triamcinolone without any improvement but the tube of medication is from 2008.  No other mucosal lesions.  No other similar rash elsewhere.  History of bilateral lung transplant on Myfortic, tacrolimus, and prednisone 7.5 mg daily.  No new medications recently.     Physical exam:  Vitals: There were no vitals taken for this visit.  SKIN: Focused examination of the  hands and oral mucosa was performed.  - L palmar base of thumb with 6 grouped targetoid papules, one ulcerated/deroofed due to accidental trauma   - R palm clear   - Oral mucosa clear          Past Medical History:   Patient Active Problem List   Diagnosis     S/P lung transplant (H)     Steroid-induced hyperglycemia     Hypogammaglobulinemia (H)     Encounter for long-term (current) use of high-risk medication     Immunosuppressed status (H)     Hypomagnesemia     Past Medical History:   Diagnosis Date     Atrial fibrillation with RVR (H)     hx of A fib related to severe COPD, does not meet anticoagulation criteria as noted     COPD, severe (H)      Osteoporosis      Past Surgical History:   Procedure Laterality Date     BRONCHOSCOPY (RIGID OR FLEXIBLE), DIAGNOSTIC N/A 4/7/2022    Procedure: BRONCHOSCOPY, WITH BRONCHOALVEOLAR LAVAGE and BIOPSIES;  Surgeon: Gerson Haddad MD;  Location:  GI     BRONCHOSCOPY (RIGID OR FLEXIBLE), DIAGNOSTIC N/A 5/12/2022    Procedure: BRONCHOSCOPY, WITH BRONCHOALVEOLAR LAVAGE AND BIOPSY;  Surgeon: Melissa Landin MD;  Location:  GI     BRONCHOSCOPY FLEXIBLE AND RIGID N/A 3/1/2022    Procedure: BRONCHOSCOPY INSPECTION;  Surgeon: Melissa Landin MD;  Location:  GI     CV CORONARY ANGIOGRAM N/A 3/27/2019    Procedure: CV CORONARY ANGIOGRAM;  Surgeon: Thierry Serrano MD;  Location:  HEART CARDIAC CATH LAB     CV RIGHT HEART CATH MEASUREMENTS RECORDED N/A 3/27/2019    Procedure: CV RIGHT HEART CATH;  Surgeon: Thierry Serrano MD;  Location:  HEART CARDIAC CATH LAB     ESOPHAGEAL IMPEDENCE FUNCTION TEST WITH 24 HOUR PH GREATER THAN 1 HOUR N/A 3/28/2019    Procedure: ESOPHAGEAL IMPEDENCE FUNCTION TEST WITH 24 HOUR PH GREATER THAN 1 HOUR;  Surgeon: Mike Henry MD;  Location:  GI     EXCISE PILONIDAL CYST, SIMPLE       IR CHEST TUBE PLACEMENT NON-TUNNELED RIGHT  3/3/2022     TONSILLECTOMY & ADENOIDECTOMY       TRANSPLANT LUNG RECIPIENT SINGLE X2 Bilateral  2/20/2022    Procedure: Bilateral Sequential Lung Transplantation, Clamshell Incision, Extracorporeal Membrane Oxgenation, Bronchoscopy, Cryoablation of Intercostal Nerves;  Surgeon: Raul Robin MD;  Location:  OR       Social History:  Patient reports that she quit smoking about 16 years ago. Her smoking use included cigarettes. She has a 54.00 pack-year smoking history. She has never used smokeless tobacco. She reports current alcohol use. She reports current drug use.    Family History:  Family History   Problem Relation Age of Onset     Breast Cancer Mother      Colon Cancer Mother      Lung Cancer Mother      Lung Cancer Father      Lung Cancer Maternal Aunt      Pancreatic Cancer Maternal Uncle        Medications:  Current Outpatient Medications   Medication Sig Dispense Refill     albuterol (PROAIR HFA/PROVENTIL HFA/VENTOLIN HFA) 108 (90 Base) MCG/ACT inhaler Inhale 2 puffs into the lungs every 6 hours as needed for shortness of breath / dyspnea or wheezing (Patient not taking: No sig reported) 18 g 0     aspirin (ASA) 81 MG EC tablet Take 1 tablet (81 mg) by mouth daily 30 tablet 11     blood glucose (NO BRAND SPECIFIED) lancets standard To use to test glucose level in the blood Use to test blood sugar  4  times daily as directed. To accompany glucose monitor brands per insurance coverage. (Patient not taking: No sig reported) 100 each 0     blood glucose (NO BRAND SPECIFIED) test strip To use to test glucose level in the blood Use to test blood sugar  4 times daily as directed. To accompany glucose monitor brands per insurance coverage. (Patient not taking: No sig reported) 120 strip 0     blood glucose monitoring (NO BRAND SPECIFIED) meter device kit Use as directed Per insurance coverage (Patient not taking: No sig reported) 1 kit 0     calcium carbonate 600 mg-vitamin D 400 units (CALTRATE) 600-400 MG-UNIT per tablet Take 1 tablet by mouth 2 times daily (with meals) 60 tablet 11      carboxymethylcellulose PF (REFRESH PLUS) 0.5 % ophthalmic solution Place 1 drop into both eyes every hour as needed for dry eyes 1 each 0     cetirizine (ZYRTEC) 10 MG tablet Take 1 tablet (10 mg) by mouth daily as needed for allergies 30 tablet 11     diltiazem ER (TIAZAC) 240 MG 24 hr ER beaded capsule Take 1 capsule (240 mg) by mouth daily 30 capsule 11     famotidine (PEPCID) 20 MG tablet Take 1 tablet (20 mg) by mouth 2 times daily 60 tablet 11     furosemide (LASIX) 20 MG tablet Take 2 tablets (40 mg) by mouth daily 28 tablet 0     IBANdronate (BONIVA) 150 MG tablet Take 1 tablet (150 mg) by mouth every 30 days       Magnesium Glycinate 665 MG CAPS Take 3 tablets by mouth 2 times daily Using [employer] High Absorption Magnesium, 100mg elemental magnesium per tablet.     TOTAL DOSE: 3 tablets with lunch, 3 tablets after dinner 180 capsule 11     metoprolol tartrate (LOPRESSOR) 25 MG tablet Take 25 mg in the am and 37.5 mg in the pm 60 tablet 11     multivitamin w/minerals (THERA-VIT-M) tablet Take 1 tablet by mouth daily 30 tablet 11     mycophenolic acid (GENERIC EQUIVALENT) 360 MG EC tablet Take 2 tablets (720 mg) by mouth 2 times daily 120 tablet 11     nystatin (MYCOSTATIN) 839300 UNIT/ML suspension Swish and swallow 10 mLs (1,000,000 Units) in mouth 4 times daily 946 mL 3     pantoprazole (PROTONIX) 40 MG EC tablet Take 1 tablet (40 mg) by mouth daily 60 tablet 11     predniSONE (DELTASONE) 5 MG tablet Take 1.5 tablets (7.5 mg) by mouth every morning AND 1.5 tablets (7.5 mg) every evening. 150 tablet 11     rosuvastatin (CRESTOR) 5 MG tablet Take 1 tablet (5 mg) by mouth daily 30 tablet 11     tacrolimus (GENERIC EQUIVALENT) 0.5 MG capsule Take 1 capsule (0.5 mg) by mouth daily Take 2 mg in the AM and 2 mg in the PM 30 capsule 11     tacrolimus (GENERIC EQUIVALENT) 1 MG capsule Take 2 capsules (2 mg) by mouth 2 times daily Take 2 mg in the AM and 2 mg in the  capsule 11      Allergies   Allergen  Reactions     Alendronic Acid Other (See Comments)     dizziness  Other reaction(s): Dizziness     Nickel Rash     Sulfa Drugs Rash       Again, thank you for allowing me to participate in the care of your patient.      Sincerely,    Fide Tobar MD

## 2022-06-21 ENCOUNTER — TELEPHONE (OUTPATIENT)
Dept: TRANSPLANT | Facility: CLINIC | Age: 67
End: 2022-06-21
Payer: COMMERCIAL

## 2022-06-21 DIAGNOSIS — Z94.2 S/P LUNG TRANSPLANT (H): ICD-10-CM

## 2022-06-21 LAB
EBV DNA COPIES/ML, INSTRUMENT: 866 COPIES/ML
EBV DNA SPEC NAA+PROBE-LOG#: 2.9 {LOG_COPIES}/ML
HSV1 DNA SPEC QL NAA+PROBE: DETECTED
HSV2 DNA SPEC QL NAA+PROBE: NOT DETECTED

## 2022-06-21 RX ORDER — TACROLIMUS 1 MG/1
CAPSULE ORAL
Qty: 120 CAPSULE | Refills: 11 | COMMUNITY
Start: 2022-06-21 | End: 2022-07-18

## 2022-06-21 RX ORDER — TACROLIMUS 0.5 MG/1
0.5 CAPSULE ORAL EVERY EVENING
Qty: 30 CAPSULE | Refills: 11 | COMMUNITY
Start: 2022-06-21 | End: 2022-09-26

## 2022-06-21 NOTE — TELEPHONE ENCOUNTER
Spoke with pt regarding tacrolimus level.    Tacrolimus level 15.9 at 12.5 hours, on 6/20.  Goal 8-12.   Current dose 2 mg in AM, 2 mg in PM    Dose changed to 2 mg in AM, 1.5 mg in PM   Recheck level on Friday.    Discussed with pt and Tyrese Sandoval message sent

## 2022-06-22 LAB
DONOR IDENTIFICATION: NORMAL
DQB5: 1644
DSA COMMENTS: NORMAL
DSA PRESENT: YES
DSA TEST METHOD: NORMAL
ORGAN: NORMAL
PATH REPORT.COMMENTS IMP SPEC: NORMAL
PATH REPORT.FINAL DX SPEC: NORMAL
PATH REPORT.GROSS SPEC: NORMAL
PATH REPORT.MICROSCOPIC SPEC OTHER STN: NORMAL
PATH REPORT.RELEVANT HX SPEC: NORMAL
SA 1 CELL: NORMAL
SA 1 TEST METHOD: NORMAL
SA 2 CELL: NORMAL
SA 2 TEST METHOD: NORMAL
SA1 HI RISK ABY: NORMAL
SA1 MOD RISK ABY: NORMAL
SA2 HI RISK ABY: NORMAL
SA2 MOD RISK ABY: NORMAL
UNACCEPTABLE ANTIGENS: NORMAL
UNOS CPRA: 73
VZV DNA SPEC QL NAA+PROBE: NEGATIVE
ZZZSA 1  COMMENTS: NORMAL
ZZZSA 2 COMMENTS: NORMAL

## 2022-06-24 ENCOUNTER — LAB (OUTPATIENT)
Dept: LAB | Facility: CLINIC | Age: 67
End: 2022-06-24
Payer: MEDICARE

## 2022-06-24 DIAGNOSIS — Z79.899 ENCOUNTER FOR LONG-TERM (CURRENT) USE OF HIGH-RISK MEDICATION: ICD-10-CM

## 2022-06-24 DIAGNOSIS — Z94.2 S/P LUNG TRANSPLANT (H): ICD-10-CM

## 2022-06-24 DIAGNOSIS — D84.9 IMMUNOSUPPRESSED STATUS (H): ICD-10-CM

## 2022-06-24 PROCEDURE — 80197 ASSAY OF TACROLIMUS: CPT

## 2022-06-27 LAB
TACROLIMUS BLD-MCNC: 8 UG/L (ref 5–15)
TME LAST DOSE: NORMAL H
TME LAST DOSE: NORMAL H

## 2022-06-28 ENCOUNTER — TELEPHONE (OUTPATIENT)
Dept: TRANSPLANT | Facility: CLINIC | Age: 67
End: 2022-06-28

## 2022-06-28 NOTE — TELEPHONE ENCOUNTER
Tacrolimus level 8.0 at 15.5 hours, on 6/24/22.  Goal 8-12.   Current dose 2 mg in AM, 1.5 mg in PM    No change in dose as level was not at 12 hours. Pt will get repeat labs done Friday 7/1/22.    Discussed with patient   MyChart message sent

## 2022-06-30 ENCOUNTER — LAB (OUTPATIENT)
Dept: LAB | Facility: CLINIC | Age: 67
End: 2022-06-30
Payer: COMMERCIAL

## 2022-06-30 DIAGNOSIS — Z79.899 ENCOUNTER FOR LONG-TERM (CURRENT) USE OF HIGH-RISK MEDICATION: ICD-10-CM

## 2022-06-30 DIAGNOSIS — D84.9 IMMUNOSUPPRESSED STATUS (H): ICD-10-CM

## 2022-06-30 DIAGNOSIS — Z94.2 S/P LUNG TRANSPLANT (H): ICD-10-CM

## 2022-07-01 ENCOUNTER — TELEPHONE (OUTPATIENT)
Dept: TRANSPLANT | Facility: CLINIC | Age: 67
End: 2022-07-01

## 2022-07-01 DIAGNOSIS — R21 RASH: ICD-10-CM

## 2022-07-01 PROCEDURE — 80197 ASSAY OF TACROLIMUS: CPT | Performed by: INTERNAL MEDICINE

## 2022-07-01 PROCEDURE — 36415 COLL VENOUS BLD VENIPUNCTURE: CPT

## 2022-07-01 NOTE — TELEPHONE ENCOUNTER
LVM for pt to call back transplant office. Checking in regarding the lesions on her hand.    Spoke with pt who reported her current lesions are getting smaller, no new lesions have formed while on acyclovir. Will finish med on 7/6, will touch base with pt on Tuesday 7/5 about need to possibly extend acyclovir.

## 2022-07-02 LAB
TACROLIMUS BLD-MCNC: 11.2 UG/L (ref 5–15)
TME LAST DOSE: NORMAL H
TME LAST DOSE: NORMAL H

## 2022-07-05 NOTE — RESULT ENCOUNTER NOTE
Tacrolimus level 11.2 at 12 hours, on 7/1/2022.  Goal 8-12.   Current dose 2 mg in AM, 1.5 mg in PM    Level at goal, no change in dose.   [employer] message sent

## 2022-07-07 LAB
ACID FAST STAIN (ARUP): NORMAL

## 2022-07-08 PROCEDURE — 36415 COLL VENOUS BLD VENIPUNCTURE: CPT

## 2022-07-08 PROCEDURE — 80197 ASSAY OF TACROLIMUS: CPT | Performed by: INTERNAL MEDICINE

## 2022-07-09 ENCOUNTER — LAB (OUTPATIENT)
Dept: LAB | Facility: CLINIC | Age: 67
End: 2022-07-09
Payer: MEDICARE

## 2022-07-09 DIAGNOSIS — Z79.899 ENCOUNTER FOR LONG-TERM (CURRENT) USE OF HIGH-RISK MEDICATION: ICD-10-CM

## 2022-07-09 DIAGNOSIS — D80.1 HYPOGAMMAGLOBULINEMIA (H): ICD-10-CM

## 2022-07-09 DIAGNOSIS — E83.42 HYPOMAGNESEMIA: ICD-10-CM

## 2022-07-09 DIAGNOSIS — Z94.2 S/P LUNG TRANSPLANT (H): ICD-10-CM

## 2022-07-09 DIAGNOSIS — M79.89 BILATERAL SWELLING OF FEET: ICD-10-CM

## 2022-07-09 DIAGNOSIS — D84.9 IMMUNOSUPPRESSED STATUS (H): ICD-10-CM

## 2022-07-09 DIAGNOSIS — R23.8 VESICULAR RASH: ICD-10-CM

## 2022-07-10 LAB
TACROLIMUS BLD-MCNC: 7.1 UG/L (ref 5–15)
TME LAST DOSE: NORMAL H
TME LAST DOSE: NORMAL H

## 2022-07-11 ENCOUNTER — TELEPHONE (OUTPATIENT)
Dept: TRANSPLANT | Facility: CLINIC | Age: 67
End: 2022-07-11

## 2022-07-11 NOTE — TELEPHONE ENCOUNTER
Tacrolimus level 7.1 at 12 hours, on 7/8/22.  Goal 8-12.   Current dose 2 mg in AM, 1.5 mg in PM    Dose changed to 2 mg in AM, 2 mg in PM   Recheck level in Friday 7/15    Discussed with Melissa and pt agreed with plan

## 2022-07-15 ENCOUNTER — LAB (OUTPATIENT)
Dept: LAB | Facility: CLINIC | Age: 67
End: 2022-07-15
Payer: MEDICARE

## 2022-07-15 PROCEDURE — 80197 ASSAY OF TACROLIMUS: CPT | Performed by: INTERNAL MEDICINE

## 2022-07-16 DIAGNOSIS — D84.9 IMMUNOSUPPRESSED STATUS (H): ICD-10-CM

## 2022-07-16 DIAGNOSIS — Z94.2 S/P LUNG TRANSPLANT (H): ICD-10-CM

## 2022-07-16 DIAGNOSIS — Z79.899 ENCOUNTER FOR LONG-TERM (CURRENT) USE OF HIGH-RISK MEDICATION: ICD-10-CM

## 2022-07-17 LAB
TACROLIMUS BLD-MCNC: 10.2 UG/L (ref 5–15)
TME LAST DOSE: NORMAL H
TME LAST DOSE: NORMAL H

## 2022-07-18 DIAGNOSIS — Z94.2 S/P LUNG TRANSPLANT (H): ICD-10-CM

## 2022-07-18 RX ORDER — TACROLIMUS 1 MG/1
CAPSULE ORAL
Qty: 120 CAPSULE | Refills: 11 | Status: SHIPPED | OUTPATIENT
Start: 2022-07-18 | End: 2022-09-13

## 2022-07-18 NOTE — RESULT ENCOUNTER NOTE
Tacrolimus level 10.2 at 12 hours, on 7/15/22.  Goal 8-12.   Current dose 2 mg in AM, 2 mg in PM    No change in dose  [employer] message sent

## 2022-07-22 ENCOUNTER — LAB (OUTPATIENT)
Dept: LAB | Facility: CLINIC | Age: 67
End: 2022-07-22
Payer: COMMERCIAL

## 2022-07-22 DIAGNOSIS — Z79.899 ENCOUNTER FOR LONG-TERM (CURRENT) USE OF HIGH-RISK MEDICATION: ICD-10-CM

## 2022-07-22 DIAGNOSIS — D84.9 IMMUNOSUPPRESSED STATUS (H): ICD-10-CM

## 2022-07-22 DIAGNOSIS — Z94.2 S/P LUNG TRANSPLANT (H): ICD-10-CM

## 2022-07-22 PROCEDURE — 36415 COLL VENOUS BLD VENIPUNCTURE: CPT

## 2022-07-22 PROCEDURE — 80197 ASSAY OF TACROLIMUS: CPT | Performed by: INTERNAL MEDICINE

## 2022-07-24 LAB
TACROLIMUS BLD-MCNC: 10.9 UG/L (ref 5–15)
TME LAST DOSE: NORMAL H
TME LAST DOSE: NORMAL H

## 2022-07-25 NOTE — RESULT ENCOUNTER NOTE
Tacrolimus level 10.9 at 12 hours, on 7/22/22.  Goal 8-12.   Current dose 2 mg in AM, 2 mg in PM    No change in dose  [employer] message sent

## 2022-07-29 ENCOUNTER — LAB (OUTPATIENT)
Dept: LAB | Facility: CLINIC | Age: 67
End: 2022-07-29

## 2022-07-29 DIAGNOSIS — D84.9 IMMUNOSUPPRESSED STATUS (H): ICD-10-CM

## 2022-07-29 DIAGNOSIS — Z79.899 ENCOUNTER FOR LONG-TERM (CURRENT) USE OF HIGH-RISK MEDICATION: ICD-10-CM

## 2022-07-29 DIAGNOSIS — Z94.2 S/P LUNG TRANSPLANT (H): ICD-10-CM

## 2022-07-29 PROCEDURE — 80197 ASSAY OF TACROLIMUS: CPT | Performed by: INTERNAL MEDICINE

## 2022-07-31 NOTE — PROGRESS NOTES
Transplant Coordinator Note    Reason for visit: Post lung transplant follow up visit   Coordinator: Present   Caregiver:  , Tyrese    Health concerns addressed today:  1. Respiratory - 1400 - 2000 steps/time, occasionally stops. Slight increase in cough, no sputum.   2. GI: appetite good. No nausea/vomiting. Regular BMs, no diarrhea.   3. ENT: nasal drainage, especially in AM. Clear/watery. Zyrtec not helping. No ear pain, no sore throat, occasionally mild headache.   4. Just saw ophthalmologist - eye drops (refresh) 4x/day for dry/itchy eyes. .   5. No fever, chills, night sweats.   6. Continues to have BLE swelling. L>R. Distant left ankle injury. Wear compression stockings.    Activity/rehab: cardiac rehab at home, working on walking up hills to work on leg strength. two walks/day  Oxygen needs: room air  Pain management/RX: denies  Diabetic management: checking BGs  Next Bronch due: last bronch 5/12, next due mid July  Risk Criteria Labs: negative 3/25/22  CMV status: D-/R-  EBV status: D+/R+  AC/asa: ASA 81mg  PJP prophylactic: pentamidine neb (dapsone caused anemia/RBC hemolysis) Last dose     COVID:  1. COVID-19 infection (yes/no, date of most recent positive test): no  2. Status/instructions given about COVID-19 vaccine: Received full dose 4/14/2022     Pt Education: medications (use/dose/side effects), how/when to call coordinator, frequency of labs, s/s of infection/rejection, call prior to starting any new medications, lab/vital sign book     Health Maintenance:     Last colonoscopy:     Next colonoscopy due:     Dermatology:    Vaccinations this visit:       Labs, CXR, PFTs reviewed with patient  Medication record reviewed and reconciled  Questions and concerns addressed    Patient Instructions  1. Continue to hydrate with 60-70 oz fluids daily.  2. Continue to exercise daily or most days of the week.  3. Follow up with your primary care provider for annual gender health maintenance  "procedures.  4. Follow up with colonoscopy schedule.  5. Follow up with annual dermatology visits.  6. It doesn't seem like the COVID vaccine is working well in lung transplant patients. A number of lung transplant patients have gotten sick with COVID even after receiving the vaccines.  Based on our recent experience, it can be life-threatening to get COVID  even after being vaccinated. Please continue to act like you did not get the COVID vaccine - social distancing, wearing a mask, good hand hygiene, etc. If the people around you are vaccinated, it will help reduce the risk of you getting COVID. All members of your household should be vaccinated.  7. Decrease your Calcium to once a day. We'll recheck a level in 1 month.   8. Increase Magnesium to 8 pills a day - 2 in the AM, 3 in the afternoon, and 3 in the evening.   9. Last day of Nystatin is 8/21/2022.   10. You will have a bronchoscopy the day after your next visit. Lissett will get that scheduled.  11. Make sure you are getting your pentamidine neb once a month. (Lissett will send orders to [facility] again).   12. Schedule a sleep study. There is no urgency.   13. Try Fexofenadine (Allegra) for your allergy symptoms. Use according to package directions. Do NOT use the decongestant (\"D\") formulation.  14. Try using Flonase again - 2 sprays in each nostril twice a day. If that doesn't work, let Lissett know and we can try another kind of nasal spray.     Next transplant clinic appointment: 2 months with CXR, labs and PFTs  Next lab draw: pending tacrolimus level, otherwise next week, then every 2 weeks. (Lissett will fax over orders again)      AVS printed at time of check out        "

## 2022-08-01 ENCOUNTER — OFFICE VISIT (OUTPATIENT)
Dept: TRANSPLANT | Facility: CLINIC | Age: 67
End: 2022-08-01
Attending: INTERNAL MEDICINE
Payer: MEDICARE

## 2022-08-01 ENCOUNTER — LAB (OUTPATIENT)
Dept: LAB | Facility: CLINIC | Age: 67
End: 2022-08-01
Payer: COMMERCIAL

## 2022-08-01 VITALS
DIASTOLIC BLOOD PRESSURE: 60 MMHG | WEIGHT: 144 LBS | OXYGEN SATURATION: 97 % | HEART RATE: 73 BPM | BODY MASS INDEX: 26.34 KG/M2 | SYSTOLIC BLOOD PRESSURE: 126 MMHG

## 2022-08-01 DIAGNOSIS — R23.8 VESICULAR RASH: ICD-10-CM

## 2022-08-01 DIAGNOSIS — Z94.2 S/P LUNG TRANSPLANT (H): Primary | ICD-10-CM

## 2022-08-01 DIAGNOSIS — E83.42 HYPOMAGNESEMIA: ICD-10-CM

## 2022-08-01 DIAGNOSIS — Z79.899 ENCOUNTER FOR LONG-TERM (CURRENT) USE OF HIGH-RISK MEDICATION: ICD-10-CM

## 2022-08-01 DIAGNOSIS — D80.1 HYPOGAMMAGLOBULINEMIA (H): ICD-10-CM

## 2022-08-01 DIAGNOSIS — Z79.899 ENCOUNTER FOR LONG-TERM (CURRENT) USE OF HIGH-RISK MEDICATION: Primary | ICD-10-CM

## 2022-08-01 DIAGNOSIS — J30.9 ALLERGIC RHINITIS, UNSPECIFIED SEASONALITY, UNSPECIFIED TRIGGER: ICD-10-CM

## 2022-08-01 DIAGNOSIS — R60.0 BILATERAL LOWER EXTREMITY EDEMA: ICD-10-CM

## 2022-08-01 DIAGNOSIS — M79.89 BILATERAL SWELLING OF FEET: ICD-10-CM

## 2022-08-01 DIAGNOSIS — Z94.2 S/P LUNG TRANSPLANT (H): ICD-10-CM

## 2022-08-01 DIAGNOSIS — N17.9 AKI (ACUTE KIDNEY INJURY) (H): ICD-10-CM

## 2022-08-01 DIAGNOSIS — D84.9 IMMUNOSUPPRESSED STATUS (H): ICD-10-CM

## 2022-08-01 LAB
ANION GAP SERPL CALCULATED.3IONS-SCNC: 10 MMOL/L (ref 3–14)
BUN SERPL-MCNC: 48 MG/DL (ref 7–30)
CALCIUM SERPL-MCNC: 10.4 MG/DL (ref 8.5–10.1)
CHLORIDE BLD-SCNC: 100 MMOL/L (ref 94–109)
CO2 SERPL-SCNC: 32 MMOL/L (ref 20–32)
CREAT SERPL-MCNC: 0.96 MG/DL (ref 0.52–1.04)
ERYTHROCYTE [DISTWIDTH] IN BLOOD BY AUTOMATED COUNT: 13.1 % (ref 10–15)
EXPTIME-PRE: 5.19 SEC
FEF2575-%PRED-PRE: 232 %
FEF2575-PRE: 4.5 L/SEC
FEF2575-PRED: 1.93 L/SEC
FEFMAX-%PRED-PRE: 91 %
FEFMAX-PRE: 5.16 L/SEC
FEFMAX-PRED: 5.63 L/SEC
FEV1-%PRED-PRE: 70 %
FEV1-PRE: 1.52 L
FEV1FEV6-PRE: 100 %
FEV1FEV6-PRED: 80 %
FEV1FVC-PRE: 100 %
FEV1FVC-PRED: 79 %
FIFMAX-PRE: 1.69 L/SEC
FVC-%PRED-PRE: 54 %
FVC-PRE: 1.52 L
FVC-PRED: 2.77 L
GFR SERPL CREATININE-BSD FRML MDRD: 65 ML/MIN/1.73M2
GLUCOSE BLD-MCNC: 183 MG/DL (ref 70–99)
HCT VFR BLD AUTO: 32.5 % (ref 35–47)
HGB BLD-MCNC: 11.1 G/DL (ref 11.7–15.7)
INR PPP: 0.92 (ref 0.85–1.15)
MAGNESIUM SERPL-MCNC: 1.4 MG/DL (ref 1.6–2.3)
MCH RBC QN AUTO: 31.3 PG (ref 26.5–33)
MCHC RBC AUTO-ENTMCNC: 34.2 G/DL (ref 31.5–36.5)
MCV RBC AUTO: 92 FL (ref 78–100)
PLATELET # BLD AUTO: 202 10E3/UL (ref 150–450)
POTASSIUM BLD-SCNC: 4 MMOL/L (ref 3.4–5.3)
RBC # BLD AUTO: 3.55 10E6/UL (ref 3.8–5.2)
SODIUM SERPL-SCNC: 142 MMOL/L (ref 133–144)
TACROLIMUS BLD-MCNC: 11.2 UG/L (ref 5–15)
TACROLIMUS BLD-MCNC: 9.4 UG/L (ref 5–15)
TME LAST DOSE: NORMAL H
WBC # BLD AUTO: 8.3 10E3/UL (ref 4–11)

## 2022-08-01 PROCEDURE — 87799 DETECT AGENT NOS DNA QUANT: CPT | Performed by: INTERNAL MEDICINE

## 2022-08-01 PROCEDURE — 99207 PR RESPIRATORY FLOW VOLUME LOOP: CPT

## 2022-08-01 PROCEDURE — 999N000207 HC UMP OPEN ENCOUNTER >50 DAYS

## 2022-08-01 PROCEDURE — 85027 COMPLETE CBC AUTOMATED: CPT | Performed by: PATHOLOGY

## 2022-08-01 PROCEDURE — 80048 BASIC METABOLIC PNL TOTAL CA: CPT | Performed by: PATHOLOGY

## 2022-08-01 PROCEDURE — 83735 ASSAY OF MAGNESIUM: CPT | Performed by: PATHOLOGY

## 2022-08-01 PROCEDURE — 36415 COLL VENOUS BLD VENIPUNCTURE: CPT | Performed by: PATHOLOGY

## 2022-08-01 PROCEDURE — 86832 HLA CLASS I HIGH DEFIN QUAL: CPT | Performed by: INTERNAL MEDICINE

## 2022-08-01 PROCEDURE — 80197 ASSAY OF TACROLIMUS: CPT | Performed by: INTERNAL MEDICINE

## 2022-08-01 PROCEDURE — 85610 PROTHROMBIN TIME: CPT | Performed by: PATHOLOGY

## 2022-08-01 PROCEDURE — 86833 HLA CLASS II HIGH DEFIN QUAL: CPT | Performed by: INTERNAL MEDICINE

## 2022-08-01 RX ORDER — PREDNISONE 5 MG/1
TABLET ORAL
Qty: 150 TABLET | Refills: 11 | COMMUNITY
Start: 2022-08-01 | End: 2023-02-28

## 2022-08-01 RX ORDER — PENTAMIDINE ISETHIONATE 300 MG/300MG
300 INHALANT RESPIRATORY (INHALATION)
COMMUNITY

## 2022-08-01 ASSESSMENT — PAIN SCALES - GENERAL: PAINLEVEL: NO PAIN (0)

## 2022-08-01 NOTE — PATIENT INSTRUCTIONS
"Patient Instructions  1. Continue to hydrate with 60-70 oz fluids daily.  2. Continue to exercise daily or most days of the week.  3. Follow up with your primary care provider for annual gender health maintenance procedures.  4. Follow up with colonoscopy schedule.  5. Follow up with annual dermatology visits.  6. It doesn't seem like the COVID vaccine is working well in lung transplant patients. A number of lung transplant patients have gotten sick with COVID even after receiving the vaccines.  Based on our recent experience, it can be life-threatening to get COVID  even after being vaccinated. Please continue to act like you did not get the COVID vaccine - social distancing, wearing a mask, good hand hygiene, etc. If the people around you are vaccinated, it will help reduce the risk of you getting COVID. All members of your household should be vaccinated.  7. Decrease your Calcium to once a day. We'll recheck a level in 1 month.   8. Increase Magnesium to 8 pills a day - 2 in the AM, 3 in the afternoon, and 3 in the evening.   9. Last day of Nystatin is 8/21/2022.   10. You will have a bronchoscopy the day after your next visit. Lissett will get that scheduled. Make sure to hold your aspirin 7 days before your bronchoscopy.   11. Make sure you are getting your pentamidine neb once a month. (Lissett will send orders to [facility] again).   12. Schedule a sleep study. There is no urgency.   13. Try Fexofenadine (Allegra) for your allergy symptoms. Use according to package directions. Do NOT use the decongestant (\"D\") formulation.  14. Try using Flonase again - 2 sprays in each nostril twice a day. If that doesn't work, let Lissett know and we can try another kind of nasal spray.     Next transplant clinic appointment: 2 months with CXR, labs and PFTs and a bronchoscopy the day after the visit.  Next lab draw: pending tacrolimus level, otherwise every 2 weeks.       You are scheduled for a bronchoscopy on 8/2 at 9AM at the " [facility] on the 3rd floor. Arrive 1 hour early (at 8AM).     Instructions     1. Nothing to eat or drink 8 hours before procedure.  2. Hold your morning medications. Bring them with you to take after the procedure.  3. Have a  available to take you home. 4. Stop Aspirin 7 days before procedure. You can restart Aspirin on Friday. .    ~~~~~~~~~~~~~~~~~~~~~~~~~    Thoracic Transplant Office phone [phone], fax [phone]    Office Hours 8:30 - 5:00     For after-hours urgent issues, please dial [phone], and ask to speak with the Thoracic Transplant Coordinator On-Call.  --------------------  To expedite your medication refill(s), please contact your pharmacy and have them fax a refill request to: [phone]  .   *Please allow 3 business days for routine medication refills.  *Please allow 5 business days for controlled substance medication refills.    **For Diabetic medications and supplies refill(s), please contact your pharmacy and have them contact your Endocrine team.  --------------------  For scheduling appointments call [phone].  --------------------  Please Note: If you are active on [employer], all future test results will be sent by [employer] message only, and will no longer be called to patient. You may also receive communication directly from your physician.

## 2022-08-01 NOTE — PROGRESS NOTES
Bronchoscopy with lavage and biopsy 8/2 at 0900.   Instructions given to patient in clinic.    Date of transplant 2/21/2022   Transplant type bilateral lung transplantn  Date of labs /1/2022  BUN 48 DDAVP yes  Plt 202  INR 0.92 Anticoag therapy ASA 81mg Last dose 7/25/2022  Comment home COVID test negative, has picture on phone. Questions please page Dr. Feng at [phone]

## 2022-08-01 NOTE — NURSING NOTE
Chief Complaint   Patient presents with     RECHECK     S/p lung tx     Blood pressure 126/60, pulse 73, weight 65.3 kg (144 lb), SpO2 97 %, not currently breastfeeding.    Chuckie Dietz on 8/1/2022 at 9:04 AM

## 2022-08-01 NOTE — PROGRESS NOTES
Reason for Visit  Melissa Elder is a 66 year old year old female who is being seen for RECHECK (S/p lung tx)      Assessment and plan:   *** ***  Melissa Elder is a 66 year old female with h/o bilateral lung transplant on 2/21 for severe COPD for who is seen today for routine follow up. Surgery uncomplicated, s/p bilateral pigtail chest tube placement for hydropneumothorax and bilateral effusions, disseminated Ureaplasma and transient hyperammonemia. Other history notable for HTN, mild non-obstructive CAD, paroxysmal afib, osteoporosis, GERD, and colonic polyps.        1. S/p bilateral sequential lung transplant:  Continued gradual clinical improvement.  Oxygenating well.  PFTs are the best since transplant.  Chest x-ray, reviewed by me with no acute infiltrate and no significant change from previous.  Continue current immunosuppression.  Tacrolimus will be adjusted to maintain a level of 8-10.  Surveillance bronchoscopy this week.    2. ID: donor cultures with P-S MSSA with Strep mitis, Actinomyces odontolyticus, MSSA x2, and C. kruseii.  Recipient cultures at time of transplant with Actinomyces odonotolyticus. S/p ceftriazone 2/23-3/8.   -Completed treatment for Actinomyces in May.  - Low threshold to treat Candida if it recurs per transplant ID  -For prophylaxis, nystatin can be discontinued on 8/21/2022.  She will continue monthly pentamadine nebs.     3. 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   Persistent low-level positive DSA.  No evidence of AMR.  Continue monitoring.     4. Hypogammaglobulinemia: last IgG of 647, s/p IVIG (unclear indication) on 4/4.  IgG of 613 on 4/27.     5. Positive AFB on sputum culture: noted on sputum culture 3/2. Transplant ID following for speciation and susceptibility testing as well as other evidence of infection.  Subsequent AFB cultures are negative to date.     6. PHS risk criteria  donor: additional labs required post-transplant (between 4-8 weeks post-op): Hepatitis B, Hepatitis C, and HIV by COLT, negative on 3/25.      7. Concern for gastroparesis:  GERD: NM gastric emptying study completed 3/15 normal. Negative pH/manometry study 3/28/19, noted small hiatal hernia. No GI complaints today, appetite is good.   - Famotidine 20 mg BID and pantoprazole 40 mg daily     8. CAD: noted to have mild non-obstructive CAD without hemodynamically significant lesions on cardiac cath 3/27/19.   - Continue aspirin and rosuvastatin  - Aspirin on hold      9. Paroxysmal afib:  HTN: had been on diltiazem pre transplant, not on AC. Persistent tachycardia post-op, controlled.  - Continue diltiazem and metoprolol     10. Hypomagnesemia:  Increase magnesium replacement to 8 pills daily, 2 in the morning, 3 in the afternoon and 3 in the evening.       12. Low level EBV viremia: Low-level.  Continue monitoring.    Follow-up in 2 months with labs, x-ray, PFTs and surveillance bronchoscopy.    Carson Feng MD     Lung TX HPI  Transplants:  2/21/2022 (Lung), Postoperative day:  161    The patient was seen and examined by Carson Feng MD   Melissa Elder is a 66 year old female with h/o bilateral lung transplant on 2/21 for severe COPD for who is seen today for routine follow up. Surgery uncomplicated, s/p bilateral pigtail chest tube placement for hydropneumothorax and bilateral effusions, disseminated Ureaplasma and transient hyperammonemia. Other history notable for HTN, mild non-obstructive CAD, paroxysmal afib, osteoporosis, GERD, and colonic polyps.       A complete ROS was otherwise negative except as noted in the HPI.    Current Outpatient Medications   Medication     pentamidine (NEBUPENT) 300 MG neb solution     predniSONE (DELTASONE) 5 MG tablet     albuterol (PROAIR HFA/PROVENTIL HFA/VENTOLIN HFA) 108 (90 Base) MCG/ACT inhaler     aspirin (ASA) 81 MG EC tablet     blood glucose (NO BRAND  SPECIFIED) lancets standard     blood glucose (NO BRAND SPECIFIED) test strip     blood glucose monitoring (NO BRAND SPECIFIED) meter device kit     calcium carbonate 600 mg-vitamin D 400 units (CALTRATE) 600-400 MG-UNIT per tablet     carboxymethylcellulose PF (REFRESH PLUS) 0.5 % ophthalmic solution     cetirizine (ZYRTEC) 10 MG tablet     diltiazem ER (TIAZAC) 240 MG 24 hr ER beaded capsule     famotidine (PEPCID) 20 MG tablet     furosemide (LASIX) 20 MG tablet     IBANdronate (BONIVA) 150 MG tablet     Magnesium Glycinate 665 MG CAPS     metoprolol tartrate (LOPRESSOR) 25 MG tablet     multivitamin w/minerals (THERA-VIT-M) tablet     mycophenolic acid (GENERIC EQUIVALENT) 360 MG EC tablet     nystatin (MYCOSTATIN) 190508 UNIT/ML suspension     pantoprazole (PROTONIX) 40 MG EC tablet     rosuvastatin (CRESTOR) 5 MG tablet     tacrolimus (GENERIC EQUIVALENT) 0.5 MG capsule     tacrolimus (GENERIC EQUIVALENT) 1 MG capsule     No current facility-administered medications for this visit.     Facility-Administered Medications Ordered in Other Visits   Medication     sodium chloride (PF) 0.9% PF flush 10 mL     Allergies   Allergen Reactions     Alendronic Acid Other (See Comments)     dizziness  Other reaction(s): Dizziness     Nickel Rash     Sulfa Drugs Rash     Past Medical History:   Diagnosis Date     Atrial fibrillation with RVR (H)     hx of A fib related to severe COPD, does not meet anticoagulation criteria as noted     COPD, severe (H)      Osteoporosis        Past Surgical History:   Procedure Laterality Date     BRONCHOSCOPY (RIGID OR FLEXIBLE), DIAGNOSTIC N/A 4/7/2022    Procedure: BRONCHOSCOPY, WITH BRONCHOALVEOLAR LAVAGE and BIOPSIES;  Surgeon: Gerson Haddad MD;  Location:  GI     BRONCHOSCOPY (RIGID OR FLEXIBLE), DIAGNOSTIC N/A 5/12/2022    Procedure: BRONCHOSCOPY, WITH BRONCHOALVEOLAR LAVAGE AND BIOPSY;  Surgeon: Melissa Landin MD;  Location:  GI     BRONCHOSCOPY FLEXIBLE AND RIGID N/A  3/1/2022    Procedure: BRONCHOSCOPY INSPECTION;  Surgeon: Melissa Landin MD;  Location:  GI     CV CORONARY ANGIOGRAM N/A 3/27/2019    Procedure: CV CORONARY ANGIOGRAM;  Surgeon: Thierry Serrano MD;  Location:  HEART CARDIAC CATH LAB     CV RIGHT HEART CATH MEASUREMENTS RECORDED N/A 3/27/2019    Procedure: CV RIGHT HEART CATH;  Surgeon: Thierry Serrano MD;  Location:  HEART CARDIAC CATH LAB     ESOPHAGEAL IMPEDENCE FUNCTION TEST WITH 24 HOUR PH GREATER THAN 1 HOUR N/A 3/28/2019    Procedure: ESOPHAGEAL IMPEDENCE FUNCTION TEST WITH 24 HOUR PH GREATER THAN 1 HOUR;  Surgeon: Mike Henry MD;  Location:  GI     EXCISE PILONIDAL CYST, SIMPLE       IR CHEST TUBE PLACEMENT NON-TUNNELED RIGHT  3/3/2022     TONSILLECTOMY & ADENOIDECTOMY       TRANSPLANT LUNG RECIPIENT SINGLE X2 Bilateral 2022    Procedure: Bilateral Sequential Lung Transplantation, Clamshell Incision, Extracorporeal Membrane Oxgenation, Bronchoscopy, Cryoablation of Intercostal Nerves;  Surgeon: Raul Robin MD;  Location:  OR       Social History     Socioeconomic History     Marital status:      Spouse name: Not on file     Number of children: Not on file     Years of education: Not on file     Highest education level: Not on file   Occupational History     Not on file   Tobacco Use     Smoking status: Former Smoker     Packs/day: 1.50     Years: 36.00     Pack years: 54.00     Types: Cigarettes     Quit date: 2006     Years since quittin.5     Smokeless tobacco: Never Used   Substance and Sexual Activity     Alcohol use: Yes     Comment: stated beer and wine occ     Drug use: Yes     Comment: stated hx of marijuana, quit in      Sexual activity: Not on file   Other Topics Concern     Parent/sibling w/ CABG, MI or angioplasty before 65F 55M? Not Asked   Social History Narrative     Not on file     Social Determinants of Health     Financial Resource Strain: Not on file   Food Insecurity:  Not on file   Transportation Needs: Not on file   Physical Activity: Not on file   Stress: Not on file   Social Connections: Not on file   Intimate Partner Violence: Not on file   Housing Stability: Not on file         /60 (BP Location: Left arm, Patient Position: Sitting, Cuff Size: Adult Large)   Pulse 73   Wt 65.3 kg (144 lb)   SpO2 97%   BMI 26.34 kg/m    Body mass index is 26.34 kg/m .  Exam:   GENERAL APPEARANCE: Well developed, well nourished, alert, and in no apparent distress.  EYES: PERRL, EOMI  HENT: Nasal mucosa with edema and no hyperemia. No nasal polyps.  EARS: Canals clear, TMs normal  MOUTH: Oral mucosa is moist, without any lesions, no tonsillar enlargement, no oropharyngeal exudate.  NECK: supple, no masses, no thyromegaly.  LYMPHATICS: No significant axillary, cervical, or supraclavicular nodes.  RESP: normal percussion, decreased air flow left base.  No crackles. No rhonchi. No wheezes.  CV: Normal S1, S2, regular rhythm, normal rate. No murmur.  No rub. No gallop. No LE edema.   ABDOMEN:  Bowel sounds normal, soft, nontender, no HSM or masses.   MS: extremities normal. (+) clubbing. No cyanosis.  SKIN: no rash on limited exam  NEURO: Mentation intact, speech normal, normal strength and tone, normal gait and stance  PSYCH: mentation appears normal. and affect normal/bright  Results:  Recent Results (from the past 168 hour(s))   INR    Collection Time: 08/01/22  8:22 AM   Result Value Ref Range    INR 0.92 0.85 - 1.15   CBC with platelets    Collection Time: 08/01/22  8:22 AM   Result Value Ref Range    WBC Count 8.3 4.0 - 11.0 10e3/uL    RBC Count 3.55 (L) 3.80 - 5.20 10e6/uL    Hemoglobin 11.1 (L) 11.7 - 15.7 g/dL    Hematocrit 32.5 (L) 35.0 - 47.0 %    MCV 92 78 - 100 fL    MCH 31.3 26.5 - 33.0 pg    MCHC 34.2 31.5 - 36.5 g/dL    RDW 13.1 10.0 - 15.0 %    Platelet Count 202 150 - 450 10e3/uL   Magnesium    Collection Time: 08/01/22  8:22 AM   Result Value Ref Range    Magnesium 1.4  (L) 1.6 - 2.3 mg/dL   Basic metabolic panel    Collection Time: 08/01/22  8:22 AM   Result Value Ref Range    Sodium 142 133 - 144 mmol/L    Potassium 4.0 3.4 - 5.3 mmol/L    Chloride 100 94 - 109 mmol/L    Carbon Dioxide (CO2) 32 20 - 32 mmol/L    Anion Gap 10 3 - 14 mmol/L    Urea Nitrogen 48 (H) 7 - 30 mg/dL    Creatinine 0.96 0.52 - 1.04 mg/dL    Calcium 10.4 (H) 8.5 - 10.1 mg/dL    Glucose 183 (H) 70 - 99 mg/dL    GFR Estimate 65 >60 mL/min/1.73m2   General PFT Lab (Please always keep checked)    Collection Time: 08/01/22  8:29 AM   Result Value Ref Range    FVC-Pred 2.77 L    FVC-Pre 1.52 L    FVC-%Pred-Pre 54 %    FEV1-Pre 1.52 L    FEV1-%Pred-Pre 70 %    FEV1FVC-Pred 79 %    FEV1FVC-Pre 100 %    FEFMax-Pred 5.63 L/sec    FEFMax-Pre 5.16 L/sec    FEFMax-%Pred-Pre 91 %    FEF2575-Pred 1.93 L/sec    FEF2575-Pre 4.50 L/sec    VLE8032-%Pred-Pre 232 %    ExpTime-Pre 5.19 sec    FIFMax-Pre 1.69 L/sec    FEV1FEV6-Pred 80 %    FEV1FEV6-Pre 100 %                         Results as noted above.    PFT Interpretation:  {Ping PFT interpretation:780865}  {Increase/decrease:889158}  {JDPFT:285263}  Valid Maneuver

## 2022-08-01 NOTE — RESULT ENCOUNTER NOTE
Tacrolimus level 9.4 at 12 hours, on 8/1/22.  Goal 8-12.   Current dose 2 mg in AM, 2 mg in PM    No change in dose  IP[employer] message sent

## 2022-08-02 ENCOUNTER — HOSPITAL ENCOUNTER (OUTPATIENT)
Facility: CLINIC | Age: 67
Discharge: HOME | End: 2022-08-02
Attending: INTERNAL MEDICINE | Admitting: INTERNAL MEDICINE
Payer: MEDICARE

## 2022-08-02 ENCOUNTER — APPOINTMENT (OUTPATIENT)
Dept: GENERAL RADIOLOGY | Facility: CLINIC | Age: 67
End: 2022-08-02
Attending: INTERNAL MEDICINE
Payer: MEDICARE

## 2022-08-02 VITALS
DIASTOLIC BLOOD PRESSURE: 72 MMHG | TEMPERATURE: 97.9 F | BODY MASS INDEX: 26.41 KG/M2 | HEIGHT: 62 IN | OXYGEN SATURATION: 94 % | SYSTOLIC BLOOD PRESSURE: 151 MMHG | WEIGHT: 143.52 LBS | RESPIRATION RATE: 20 BRPM | HEART RATE: 90 BPM

## 2022-08-02 DIAGNOSIS — D84.9 IMMUNOSUPPRESSED STATUS (H): ICD-10-CM

## 2022-08-02 DIAGNOSIS — Z79.899 ENCOUNTER FOR LONG-TERM (CURRENT) USE OF HIGH-RISK MEDICATION: ICD-10-CM

## 2022-08-02 DIAGNOSIS — Z94.2 S/P LUNG TRANSPLANT (H): ICD-10-CM

## 2022-08-02 DIAGNOSIS — N17.9 AKI (ACUTE KIDNEY INJURY) (H): ICD-10-CM

## 2022-08-02 LAB
APPEARANCE FLD: CLEAR
BRONCHOSCOPY: NORMAL
C PNEUM DNA SPEC QL NAA+PROBE: NOT DETECTED
CELL COUNT BODY FLUID SOURCE: NORMAL
CMV DNA SPEC NAA+PROBE-ACNC: NOT DETECTED IU/ML
COLOR FLD: COLORLESS
EBV DNA # SPEC NAA+PROBE: NOT DETECTED COPIES/ML
EOSINOPHIL NFR FLD MANUAL: 1 %
FLUAV H1 2009 PAND RNA SPEC QL NAA+PROBE: NOT DETECTED
FLUAV H1 RNA SPEC QL NAA+PROBE: NOT DETECTED
FLUAV H3 RNA SPEC QL NAA+PROBE: NOT DETECTED
FLUAV RNA SPEC QL NAA+PROBE: NOT DETECTED
FLUBV RNA SPEC QL NAA+PROBE: NOT DETECTED
GRAM STAIN RESULT: NORMAL
GRAM STAIN RESULT: NORMAL
HADV DNA SPEC QL NAA+PROBE: NOT DETECTED
HCOV PNL SPEC NAA+PROBE: NOT DETECTED
HMPV RNA SPEC QL NAA+PROBE: NOT DETECTED
HPIV1 RNA SPEC QL NAA+PROBE: NOT DETECTED
HPIV2 RNA SPEC QL NAA+PROBE: NOT DETECTED
HPIV3 RNA SPEC QL NAA+PROBE: NOT DETECTED
HPIV4 RNA SPEC QL NAA+PROBE: NOT DETECTED
LYMPHOCYTES NFR FLD MANUAL: 10 %
M PNEUMO DNA SPEC QL NAA+PROBE: NOT DETECTED
MONOS+MACROS NFR FLD MANUAL: NORMAL %
NEUTS BAND NFR FLD MANUAL: 4 %
OTHER CELLS FLD MANUAL: 85 %
RSV RNA SPEC QL NAA+PROBE: NOT DETECTED
RSV RNA SPEC QL NAA+PROBE: NOT DETECTED
RV+EV RNA SPEC QL NAA+PROBE: NOT DETECTED
WBC # FLD AUTO: 333 /UL

## 2022-08-02 PROCEDURE — 71045 X-RAY EXAM CHEST 1 VIEW: CPT | Mod: 26 | Performed by: STUDENT IN AN ORGANIZED HEALTH CARE EDUCATION/TRAINING PROGRAM

## 2022-08-02 PROCEDURE — 31624 DX BRONCHOSCOPE/LAVAGE: CPT | Performed by: INTERNAL MEDICINE

## 2022-08-02 PROCEDURE — 87102 FUNGUS ISOLATION CULTURE: CPT | Performed by: INTERNAL MEDICINE

## 2022-08-02 PROCEDURE — 31625 BRONCHOSCOPY W/BIOPSY(S): CPT | Performed by: INTERNAL MEDICINE

## 2022-08-02 PROCEDURE — 88312 SPECIAL STAINS GROUP 1: CPT | Mod: TC | Performed by: INTERNAL MEDICINE

## 2022-08-02 PROCEDURE — 999N000065 XR CHEST PORT 1 VIEW

## 2022-08-02 PROCEDURE — 2894A VOIDCORRECT: CPT | Mod: GC | Performed by: INTERNAL MEDICINE

## 2022-08-02 PROCEDURE — 250N000009 HC RX 250: Performed by: INTERNAL MEDICINE

## 2022-08-02 PROCEDURE — 87070 CULTURE OTHR SPECIMN AEROBIC: CPT | Performed by: INTERNAL MEDICINE

## 2022-08-02 PROCEDURE — 250N000011 HC RX IP 250 OP 636: Performed by: INTERNAL MEDICINE

## 2022-08-02 PROCEDURE — 88305 TISSUE EXAM BY PATHOLOGIST: CPT | Mod: TC | Performed by: INTERNAL MEDICINE

## 2022-08-02 PROCEDURE — 87015 SPECIMEN INFECT AGNT CONCNTJ: CPT | Performed by: INTERNAL MEDICINE

## 2022-08-02 PROCEDURE — 87633 RESP VIRUS 12-25 TARGETS: CPT | Performed by: INTERNAL MEDICINE

## 2022-08-02 PROCEDURE — 87486 CHLMYD PNEUM DNA AMP PROBE: CPT | Performed by: INTERNAL MEDICINE

## 2022-08-02 PROCEDURE — 89051 BODY FLUID CELL COUNT: CPT | Performed by: INTERNAL MEDICINE

## 2022-08-02 PROCEDURE — 258N000003 HC RX IP 258 OP 636: Performed by: INTERNAL MEDICINE

## 2022-08-02 PROCEDURE — 31632 BRONCHOSCOPY/LUNG BX ADDL: CPT | Mod: GC | Performed by: INTERNAL MEDICINE

## 2022-08-02 PROCEDURE — 87116 MYCOBACTERIA CULTURE: CPT | Performed by: INTERNAL MEDICINE

## 2022-08-02 PROCEDURE — 31624 DX BRONCHOSCOPE/LAVAGE: CPT | Mod: GC | Performed by: INTERNAL MEDICINE

## 2022-08-02 PROCEDURE — G0500 MOD SEDAT ENDO SERVICE >5YRS: HCPCS | Performed by: INTERNAL MEDICINE

## 2022-08-02 PROCEDURE — 31628 BRONCHOSCOPY/LUNG BX EACH: CPT | Mod: GC | Performed by: INTERNAL MEDICINE

## 2022-08-02 PROCEDURE — 99153 MOD SED SAME PHYS/QHP EA: CPT | Performed by: INTERNAL MEDICINE

## 2022-08-02 RX ORDER — FLUMAZENIL 0.1 MG/ML
0.2 INJECTION, SOLUTION INTRAVENOUS
Status: DISCONTINUED | OUTPATIENT
Start: 2022-08-02 | End: 2022-08-02 | Stop reason: HOSPADM

## 2022-08-02 RX ORDER — LIDOCAINE HYDROCHLORIDE 10 MG/ML
INJECTION, SOLUTION INFILTRATION; PERINEURAL PRN
Status: COMPLETED | OUTPATIENT
Start: 2022-08-02 | End: 2022-08-02

## 2022-08-02 RX ORDER — ONDANSETRON 2 MG/ML
4 INJECTION INTRAMUSCULAR; INTRAVENOUS EVERY 6 HOURS PRN
Status: DISCONTINUED | OUTPATIENT
Start: 2022-08-02 | End: 2022-08-02 | Stop reason: HOSPADM

## 2022-08-02 RX ORDER — NALOXONE HYDROCHLORIDE 0.4 MG/ML
0.4 INJECTION, SOLUTION INTRAMUSCULAR; INTRAVENOUS; SUBCUTANEOUS
Status: DISCONTINUED | OUTPATIENT
Start: 2022-08-02 | End: 2022-08-02 | Stop reason: HOSPADM

## 2022-08-02 RX ORDER — ONDANSETRON 4 MG/1
4 TABLET, ORALLY DISINTEGRATING ORAL EVERY 6 HOURS PRN
Status: DISCONTINUED | OUTPATIENT
Start: 2022-08-02 | End: 2022-08-02 | Stop reason: HOSPADM

## 2022-08-02 RX ORDER — MAGNESIUM HYDROXIDE 1200 MG/15ML
LIQUID ORAL PRN
Status: COMPLETED | OUTPATIENT
Start: 2022-08-02 | End: 2022-08-02

## 2022-08-02 RX ORDER — LIDOCAINE HYDROCHLORIDE AND EPINEPHRINE 10; 10 MG/ML; UG/ML
INJECTION, SOLUTION INFILTRATION; PERINEURAL PRN
Status: COMPLETED | OUTPATIENT
Start: 2022-08-02 | End: 2022-08-02

## 2022-08-02 RX ORDER — FENTANYL CITRATE 50 UG/ML
INJECTION, SOLUTION INTRAMUSCULAR; INTRAVENOUS PRN
Status: COMPLETED | OUTPATIENT
Start: 2022-08-02 | End: 2022-08-02

## 2022-08-02 RX ORDER — NALOXONE HYDROCHLORIDE 0.4 MG/ML
0.2 INJECTION, SOLUTION INTRAMUSCULAR; INTRAVENOUS; SUBCUTANEOUS
Status: DISCONTINUED | OUTPATIENT
Start: 2022-08-02 | End: 2022-08-02 | Stop reason: HOSPADM

## 2022-08-02 RX ORDER — LIDOCAINE HYDROCHLORIDE 40 MG/ML
INJECTION, SOLUTION RETROBULBAR PRN
Status: COMPLETED | OUTPATIENT
Start: 2022-08-02 | End: 2022-08-02

## 2022-08-02 RX ADMIN — FENTANYL CITRATE 25 MCG: 50 INJECTION, SOLUTION INTRAMUSCULAR; INTRAVENOUS at 09:56

## 2022-08-02 RX ADMIN — LIDOCAINE HYDROCHLORIDE 9 ML: 40 INJECTION, SOLUTION RETROBULBAR; TOPICAL at 10:12

## 2022-08-02 RX ADMIN — FENTANYL CITRATE 25 MCG: 50 INJECTION, SOLUTION INTRAMUSCULAR; INTRAVENOUS at 09:53

## 2022-08-02 RX ADMIN — MIDAZOLAM 0.5 MG: 1 INJECTION INTRAMUSCULAR; INTRAVENOUS at 09:56

## 2022-08-02 RX ADMIN — LIDOCAINE HYDROCHLORIDE AND EPINEPHRINE 5 ML: 10; 10 INJECTION, SOLUTION INFILTRATION; PERINEURAL at 10:07

## 2022-08-02 RX ADMIN — LIDOCAINE HYDROCHLORIDE AND EPINEPHRINE 5 ML: 10; 10 INJECTION, SOLUTION INFILTRATION; PERINEURAL at 10:16

## 2022-08-02 RX ADMIN — DESMOPRESSIN ACETATE 19.6 MCG: 4 INJECTION, SOLUTION INTRAVENOUS; SUBCUTANEOUS at 09:22

## 2022-08-02 RX ADMIN — MIDAZOLAM 0.5 MG: 1 INJECTION INTRAMUSCULAR; INTRAVENOUS at 09:53

## 2022-08-02 RX ADMIN — SODIUM CHLORIDE 120 ML: 900 IRRIGANT IRRIGATION at 10:13

## 2022-08-02 RX ADMIN — LIDOCAINE HYDROCHLORIDE 12 ML: 10 INJECTION, SOLUTION INFILTRATION; PERINEURAL at 10:12

## 2022-08-02 RX ADMIN — MIDAZOLAM 0.5 MG: 1 INJECTION INTRAMUSCULAR; INTRAVENOUS at 10:01

## 2022-08-02 NOTE — RESULT ENCOUNTER NOTE
Tacrolimus level 11.2 at 12 hours, on 7/29/22.  Goal 8-12.   Current dose 2 mg in AM, 2 mg in PM    No change in dose   [employer] message sent

## 2022-08-02 NOTE — DISCHARGE INSTRUCTIONS
[facility], [place]  Same-Day BRONCHOSCOPY Procedure (with or without biopsy and/or intervention)  Adult Discharge Orders & Instructions     You had a procedure known as an Bronchoscopy (Bronch). Your healthcare provider performed the Bronch to look directly into the airways of your trachea and/or lungs. This is done using a lighted camera called a bronchoscope. The bronchoscope allows your provider to see inside the tissue of the airways. Often biopsies, small samples of tissue, are taken to help diagnose and/or classify stages of disease growth. This procedure is used to diagnose diseases of the lungs and/or surrounding tissues.     After your procedure   Make sure to clarify with your healthcare provider any diet restrictions (For example, clear liquid, low fat, no caffeine, etc.) Resume pre-procedure diet  Do NOT take aspirin containing medications or any other blood-thinning medicines (anticoagulants) until your healthcare provider says it's OK. You may resume all your medications  You MAY be prescribed antibiotics, depending on what was done and/or found during your EUS, make sure to take antibiotics as prescribed by your healthcare provider    For 24 hours after BRONCHOSCOPY     You may cough up a small amount of blood for a day or two  Get plenty of rest.  A responsible adult must stay with you for at least 24 hours after you leave the hospital.   Do not drive or use heavy equipment.  If you have weakness or tingling, don't drive or use heavy equipment until this feeling goes away.  Do not drink alcohol.  Avoid strenuous or risky activities (gym, yoga, cycling, etc.).  Ask for help when climbing stairs.   You may feel lightheaded.  IF so, sit for a few minutes before standing.  Have someone help you get up.   If you have nausea (feel sick to your stomach): Drink only clear liquids such as apple juice, ginger ale, broth or 7-Up.  Rest may also help.  Be sure to drink enough  fluids.  Move to a regular diet as you feel able.  You may have a slight fever. Call the doctor if your fever is over 100 F (37.7 C) (taken under the tongue) or lasts longer than 24 hours.  You may have a dry mouth, a sore throat, muscle aches or trouble sleeping.  These are normal and should go away after 24 hours.  Do not make important or legal decisions.     Call your doctor  for any of the following:    If you begin to cough up bright red blood, or large clots of blood   If you become increasing more short of breath or begin to have chest pains  Difficulty swallowing or feeling as though food or liquids are getting stuck   Sore throat lasting more than 2 days or pain that has worsened over time  Nausea and/or vomiting that is not resolving or has not responded to anti-nausea medications if prescribed to you   If you experience a fever above 100.5 F (38 C) for more than 24 hours.   It has been over 8 to 10 hours since surgery and you are still not able to urinate (pass water).      To contact a doctor, call:  [ ] [facility] at [phone] (Monday thru Friday 8:00am to 4:30pm)  [ ] [phone] and ask for the PULMONARY MEDICINE resident on call (answered 24 hours a day)  [ ] Emergency Department: [facility]: [phone]    Take it easy when you get home.  Remember, same day surgery DOES NOT MEAN SAME DAY RECOVERY!  Healing is a gradual process.  You will need some time to recover - you may be more tired than you realize at first.  Rest and relax for at least the first 24 hours at home.  You'll feel better and heal faster if you take good care of yourself.

## 2022-08-03 DIAGNOSIS — D84.9 IMMUNOSUPPRESSED STATUS (H): ICD-10-CM

## 2022-08-03 DIAGNOSIS — E83.42 HYPOMAGNESEMIA: ICD-10-CM

## 2022-08-03 DIAGNOSIS — D84.9 IMMUNOSUPPRESSED STATUS (H): Primary | ICD-10-CM

## 2022-08-03 DIAGNOSIS — Z94.2 S/P LUNG TRANSPLANT (H): ICD-10-CM

## 2022-08-03 DIAGNOSIS — Z94.2 S/P LUNG TRANSPLANT (H): Primary | ICD-10-CM

## 2022-08-03 LAB
CMV DNA SPEC NAA+PROBE-ACNC: NOT DETECTED IU/ML
DONOR IDENTIFICATION: NORMAL
DQB5: 966
DSA COMMENTS: NORMAL
DSA PRESENT: YES
DSA TEST METHOD: NORMAL
ORGAN: NORMAL
PATH REPORT.COMMENTS IMP SPEC: NORMAL
PATH REPORT.FINAL DX SPEC: NORMAL
PATH REPORT.FINAL DX SPEC: NORMAL
PATH REPORT.GROSS SPEC: NORMAL
PATH REPORT.GROSS SPEC: NORMAL
PATH REPORT.MICROSCOPIC SPEC OTHER STN: NORMAL
PATH REPORT.MICROSCOPIC SPEC OTHER STN: NORMAL
PATH REPORT.RELEVANT HX SPEC: NORMAL
PATH REPORT.RELEVANT HX SPEC: NORMAL
PHOTO IMAGE: NORMAL
SA 1 CELL: NORMAL
SA 1 TEST METHOD: NORMAL
SA 2 CELL: NORMAL
SA 2 TEST METHOD: NORMAL
SA1 HI RISK ABY: NORMAL
SA1 MOD RISK ABY: NORMAL
SA2 HI RISK ABY: NORMAL
SA2 MOD RISK ABY: NORMAL
UNACCEPTABLE ANTIGENS: NORMAL
UNOS CPRA: 73
ZZZSA 1  COMMENTS: NORMAL
ZZZSA 2 COMMENTS: NORMAL

## 2022-08-03 PROCEDURE — 88305 TISSUE EXAM BY PATHOLOGIST: CPT | Mod: 26 | Performed by: PATHOLOGY

## 2022-08-03 PROCEDURE — 88312 SPECIAL STAINS GROUP 1: CPT | Mod: 26 | Performed by: PATHOLOGY

## 2022-08-03 PROCEDURE — 88108 CYTOPATH CONCENTRATE TECH: CPT | Mod: 26 | Performed by: PATHOLOGY

## 2022-08-03 NOTE — LETTER
PHYSICIAN ORDERS      DATE & TIME ISSUED: August 3, 2022 7:34 AM  PATIENT NAME: Melissa Elder   : 1955     [facility] MR# [if applicable]: [id number]       Pentamidine Nebulizer Standing Order    ICD-10  Lung transplant Z94.2, PCP prophylaxis Z41.8        Start date: 2022 Expires: Life long    Pentamidine 300 mg by aerosol inhalation 1 time a month indefinitely.     The ordering dose is a 300 mg vial reconstituted in 6 ml of sterile water. The entire contents of the vial should be placed into the filtered (Respirgard II) nebulizer reservoir for administration.     Note: Inhaled Pentamidine may cause bronchial irritation with coughing and wheezing.   Give Albuterol neb 3 mg prior to Pentamidine for bronchodilation. Okay to use Xopenex neb if patient unable to tolerate Albuterol.     Administer in well ventilated room.        Any questions please call: [place] [phone]        .

## 2022-08-03 NOTE — PROGRESS NOTES
eMar updated to reflect current dose of Ca/Vit D and Magnesium.     Orders refaxed for standing orders and pentamidine nebs.

## 2022-08-03 NOTE — LETTER
PHYSICIAN ORDERS  Standing Orders, good for 1 year    DATE & TIME ISSUED: August 3, 2022 0729 AM  PATIENT NAME: Melissa Elder   : 1955     [facility] MR# [if applicable]: [id number]     DIAGNOSIS:  Lung Transplant  Z94.2  ICD10 Z94.2, Z79.899  Lab Request      Item Frequency   CBC with platelets Every 2 weeks and PRN   Basic metabolic panel (including:  BUN,  Creatinine, Sodium, Potassium, Chloride, CO2, Calcium,Magnesium, Phosphorus, and Glucose) Every 2 weeks and PRN     Tacrolimus/Prograf level  (Should be mailed to the [facility] in the  provided to you by the patient.) 12 hour trough level Every 2 weeks and PRN   EBV DNA PCR Quant Monthly and PRN   CMV DNA Quant Monthly and PRN     Any questions please call: [place] [phone]    Please fax these results to [phone] and [phone].      .

## 2022-08-04 LAB — BACTERIA BRONCH: NO GROWTH

## 2022-08-11 ENCOUNTER — LAB (OUTPATIENT)
Dept: LAB | Facility: CLINIC | Age: 67
End: 2022-08-11
Payer: COMMERCIAL

## 2022-08-11 DIAGNOSIS — Z79.899 ENCOUNTER FOR LONG-TERM (CURRENT) USE OF HIGH-RISK MEDICATION: ICD-10-CM

## 2022-08-11 DIAGNOSIS — Z94.2 S/P LUNG TRANSPLANT (H): ICD-10-CM

## 2022-08-11 DIAGNOSIS — D84.9 IMMUNOSUPPRESSED STATUS (H): ICD-10-CM

## 2022-08-12 PROCEDURE — 36415 COLL VENOUS BLD VENIPUNCTURE: CPT

## 2022-08-12 PROCEDURE — 80197 ASSAY OF TACROLIMUS: CPT | Performed by: INTERNAL MEDICINE

## 2022-08-13 LAB
TACROLIMUS BLD-MCNC: 10.1 UG/L (ref 5–15)
TME LAST DOSE: NORMAL H
TME LAST DOSE: NORMAL H

## 2022-08-15 NOTE — RESULT ENCOUNTER NOTE
Tacrolimus level 10.1 at 12 hours, on 8/12/2022.  Goal 8-12.   Current dose 2 mg in AM, 2 mg in PM    Level at goal, no change in dose.   [email or web address] message sent

## 2022-08-25 ENCOUNTER — LAB (OUTPATIENT)
Dept: LAB | Facility: CLINIC | Age: 67
End: 2022-08-25
Payer: COMMERCIAL

## 2022-08-25 DIAGNOSIS — D84.9 IMMUNOSUPPRESSED STATUS (H): ICD-10-CM

## 2022-08-25 DIAGNOSIS — Z94.2 S/P LUNG TRANSPLANT (H): ICD-10-CM

## 2022-08-25 DIAGNOSIS — Z79.899 ENCOUNTER FOR LONG-TERM (CURRENT) USE OF HIGH-RISK MEDICATION: ICD-10-CM

## 2022-08-26 PROCEDURE — 80197 ASSAY OF TACROLIMUS: CPT | Performed by: INTERNAL MEDICINE

## 2022-08-26 PROCEDURE — 36415 COLL VENOUS BLD VENIPUNCTURE: CPT

## 2022-08-28 LAB
TACROLIMUS BLD-MCNC: 9.7 UG/L (ref 5–15)
TME LAST DOSE: NORMAL H
TME LAST DOSE: NORMAL H

## 2022-08-29 NOTE — RESULT ENCOUNTER NOTE
Tacrolimus level 9.7 at  12 hours, on 8/26/2022.  Goal 8-12.   Current dose 2 mg in AM, 2 mg in PM    Level at goal, no change in dose.   [employer]won message sent

## 2022-08-30 LAB
BACTERIA BRONCH: NO GROWTH
BACTERIA BRONCH: NO GROWTH

## 2022-09-09 ENCOUNTER — LAB (OUTPATIENT)
Dept: LAB | Facility: CLINIC | Age: 67
End: 2022-09-09
Payer: MEDICARE

## 2022-09-09 DIAGNOSIS — Z94.2 S/P LUNG TRANSPLANT (H): ICD-10-CM

## 2022-09-09 DIAGNOSIS — D84.9 IMMUNOSUPPRESSED STATUS (H): ICD-10-CM

## 2022-09-09 DIAGNOSIS — Z79.899 ENCOUNTER FOR LONG-TERM (CURRENT) USE OF HIGH-RISK MEDICATION: ICD-10-CM

## 2022-09-09 PROCEDURE — 80197 ASSAY OF TACROLIMUS: CPT

## 2022-09-12 LAB
TACROLIMUS BLD-MCNC: 6.8 UG/L (ref 5–15)
TME LAST DOSE: NORMAL H
TME LAST DOSE: NORMAL H

## 2022-09-13 ENCOUNTER — MYC MEDICAL ADVICE (OUTPATIENT)
Dept: TRANSPLANT | Facility: CLINIC | Age: 67
End: 2022-09-13

## 2022-09-13 DIAGNOSIS — Z94.2 S/P LUNG TRANSPLANT (H): Primary | ICD-10-CM

## 2022-09-13 DIAGNOSIS — Z94.2 S/P LUNG TRANSPLANT (H): ICD-10-CM

## 2022-09-13 DIAGNOSIS — D84.9 IMMUNOSUPPRESSED STATUS (H): ICD-10-CM

## 2022-09-13 RX ORDER — TACROLIMUS 1 MG/1
CAPSULE ORAL
Qty: 120 CAPSULE | Refills: 11 | COMMUNITY
Start: 2022-09-13 | End: 2022-09-26

## 2022-09-16 ENCOUNTER — LAB (OUTPATIENT)
Dept: LAB | Facility: CLINIC | Age: 67
End: 2022-09-16
Payer: MEDICARE

## 2022-09-16 DIAGNOSIS — Z79.899 ENCOUNTER FOR LONG-TERM (CURRENT) USE OF HIGH-RISK MEDICATION: ICD-10-CM

## 2022-09-16 DIAGNOSIS — D84.9 IMMUNOSUPPRESSED STATUS (H): ICD-10-CM

## 2022-09-16 DIAGNOSIS — Z94.2 S/P LUNG TRANSPLANT (H): ICD-10-CM

## 2022-09-16 PROCEDURE — 80197 ASSAY OF TACROLIMUS: CPT

## 2022-09-17 ENCOUNTER — HEALTH MAINTENANCE LETTER (OUTPATIENT)
Age: 67
End: 2022-09-17

## 2022-09-17 LAB
TACROLIMUS BLD-MCNC: 10.5 UG/L (ref 5–15)
TME LAST DOSE: NORMAL H
TME LAST DOSE: NORMAL H

## 2022-09-19 NOTE — RESULT ENCOUNTER NOTE
Tacrolimus level 10.5 at 12 hours, on 9/16/2022.  Goal 8-12.   Current dose 2 mg in AM, 2.5 mg in PM    Level at goal, no change in dose.   [employer] message sent

## 2022-09-26 DIAGNOSIS — Z94.2 S/P LUNG TRANSPLANT (H): ICD-10-CM

## 2022-09-26 RX ORDER — TACROLIMUS 1 MG/1
CAPSULE ORAL
Qty: 120 CAPSULE | Refills: 11 | Status: SHIPPED | OUTPATIENT
Start: 2022-09-26 | End: 2022-12-05

## 2022-09-26 RX ORDER — TACROLIMUS 0.5 MG/1
0.5 CAPSULE ORAL EVERY EVENING
Qty: 30 CAPSULE | Refills: 11 | Status: SHIPPED | OUTPATIENT
Start: 2022-09-26 | End: 2022-12-05

## 2022-09-27 ENCOUNTER — DOCUMENTATION ONLY (OUTPATIENT)
Dept: TRANSPLANT | Facility: CLINIC | Age: 67
End: 2022-09-27

## 2022-09-27 LAB
ACID FAST STAIN (ARUP): NORMAL

## 2022-09-27 ASSESSMENT — ENCOUNTER SYMPTOMS: NEW SYMPTOMS OF CORONARY ARTERY DISEASE: 0

## 2022-09-30 ENCOUNTER — LAB (OUTPATIENT)
Dept: LAB | Facility: CLINIC | Age: 67
End: 2022-09-30
Payer: MEDICARE

## 2022-09-30 DIAGNOSIS — Z94.2 S/P LUNG TRANSPLANT (H): ICD-10-CM

## 2022-09-30 DIAGNOSIS — D84.9 IMMUNOSUPPRESSED STATUS (H): ICD-10-CM

## 2022-09-30 DIAGNOSIS — Z79.899 ENCOUNTER FOR LONG-TERM (CURRENT) USE OF HIGH-RISK MEDICATION: ICD-10-CM

## 2022-09-30 PROCEDURE — 80197 ASSAY OF TACROLIMUS: CPT

## 2022-10-03 LAB
TACROLIMUS BLD-MCNC: 9.8 UG/L (ref 5–15)
TME LAST DOSE: NORMAL H
TME LAST DOSE: NORMAL H

## 2022-10-04 NOTE — RESULT ENCOUNTER NOTE
Tacrolimus level 9.8 at 12 hours, on 9/30/2022.  Goal 8-12.   Current dose 2 mg in AM, 2.5 mg in PM    Level at goal, no change in dose.   [employer] message sent

## 2022-10-07 ENCOUNTER — LAB (OUTPATIENT)
Dept: LAB | Facility: CLINIC | Age: 67
End: 2022-10-07
Payer: MEDICARE

## 2022-10-08 DIAGNOSIS — R60.0 BILATERAL LOWER EXTREMITY EDEMA: ICD-10-CM

## 2022-10-08 DIAGNOSIS — E83.42 HYPOMAGNESEMIA: ICD-10-CM

## 2022-10-08 DIAGNOSIS — Z79.899 ENCOUNTER FOR LONG-TERM (CURRENT) USE OF HIGH-RISK MEDICATION: ICD-10-CM

## 2022-10-08 DIAGNOSIS — J30.9 ALLERGIC RHINITIS, UNSPECIFIED SEASONALITY, UNSPECIFIED TRIGGER: ICD-10-CM

## 2022-10-08 DIAGNOSIS — Z94.2 S/P LUNG TRANSPLANT (H): ICD-10-CM

## 2022-10-08 DIAGNOSIS — D84.9 IMMUNOSUPPRESSED STATUS (H): ICD-10-CM

## 2022-10-08 PROCEDURE — 80197 ASSAY OF TACROLIMUS: CPT | Performed by: INTERNAL MEDICINE

## 2022-10-09 LAB
TACROLIMUS BLD-MCNC: 7.3 UG/L (ref 5–15)
TME LAST DOSE: NORMAL H
TME LAST DOSE: NORMAL H

## 2022-10-09 NOTE — PROGRESS NOTES
Transplant Coordinator Note    Reason for visit: Post lung transplant follow up visit   Coordinator: Present   Caregiver:  Tyrese,     Health concerns addressed today:  1. Respiratory - no shortness of breath with activity. Occasional coughing (clearing cough), no sputum.   2. Boniva  Monthly for bones - swelling?  3.  GI: no abdominal pain, nausea, vomiting, or diarrhea.   4. ENT: nasal drip - resolves with Flonase/allergy meds  5. BLE swelling    Activity/rehab: continues to do PT, walking 12 blocks 1-2/day.   Oxygen needs: room air  Pain management/RX: denies  Diabetic management: checking BGs  Next Bronch due: scheduled for tomorrow, 10/11  Risk Criteria Labs: negative 3/25/22  CMV status: D-/R-  EBV status: D+/R+  AC/asa: ASA 81mg  PJP prophylactic: pentamidine neb (dapsone caused anemia/RBC hemolysis) Last dose     COVID:  1. COVID-19 infection (yes/no, date of most recent positive test): no  1. Status/instructions given about COVID-19 vaccine: Received full dose 4/14/2022. Due for another dose of Evusheld today, 10/10/22    Pt Education: medications (use/dose/side effects), how/when to call coordinator, frequency of labs, s/s of infection/rejection, call prior to starting any new medications, lab/vital sign book    Health Maintenance:     Last colonoscopy:     Next colonoscopy due:     Dermatology:    Vaccinations this visit:       Labs, CXR, PFTs reviewed with patient  Medication record reviewed and reconciled  Questions and concerns addressed    Patient Instructions  1. Continue to hydrate with 60-70 oz fluids daily.  2. Continue to exercise daily or most days of the week.  3. Follow up with your primary care provider for annual gender health maintenance procedures.  4. Follow up with colonoscopy schedule.  5. Follow up with annual dermatology visits.  6. It doesn't seem like the COVID vaccine is working well in lung transplant patients. A number of lung transplant patients have gotten sick with COVID  even after receiving the vaccines.  Based on our recent experience, it can be life-threatening to get COVID  even after being vaccinated. Please continue to act like you did not get the COVID vaccine - social distancing, wearing a mask, good hand hygiene, etc. If the people around you are vaccinated, it will help reduce the risk of you getting COVID. All members of your household should be vaccinated.  7. Consider using Nioxin shampoo for your thinning hair.   8. The the swelling in your feet: We are going to get ultrasound of your legs and an echocardiogram (ultrasound of your heart).   9. If the ultrasounds don't show anything, talk to your primary care provider about holding Boniva for a couple months and see if it helps with the swelling.     Next transplant clinic appointment: 2 months with CXR, labs and PFTs  Next lab draw: pending tacrolimus level, otherwise every 2 weeks      AVS printed at time of check out

## 2022-10-10 ENCOUNTER — OFFICE VISIT (OUTPATIENT)
Dept: TRANSPLANT | Facility: CLINIC | Age: 67
End: 2022-10-10
Attending: INTERNAL MEDICINE
Payer: MEDICARE

## 2022-10-10 ENCOUNTER — LAB (OUTPATIENT)
Dept: LAB | Facility: CLINIC | Age: 67
End: 2022-10-10
Payer: COMMERCIAL

## 2022-10-10 ENCOUNTER — ANCILLARY PROCEDURE (OUTPATIENT)
Dept: GENERAL RADIOLOGY | Facility: CLINIC | Age: 67
End: 2022-10-10
Attending: INTERNAL MEDICINE
Payer: COMMERCIAL

## 2022-10-10 VITALS
HEART RATE: 74 BPM | OXYGEN SATURATION: 99 % | WEIGHT: 153 LBS | SYSTOLIC BLOOD PRESSURE: 122 MMHG | DIASTOLIC BLOOD PRESSURE: 75 MMHG | BODY MASS INDEX: 27.98 KG/M2

## 2022-10-10 DIAGNOSIS — Z94.2 S/P LUNG TRANSPLANT (H): ICD-10-CM

## 2022-10-10 DIAGNOSIS — Z94.2 S/P LUNG TRANSPLANT (H): Primary | ICD-10-CM

## 2022-10-10 DIAGNOSIS — J30.9 ALLERGIC RHINITIS, UNSPECIFIED SEASONALITY, UNSPECIFIED TRIGGER: ICD-10-CM

## 2022-10-10 DIAGNOSIS — Z79.899 ENCOUNTER FOR LONG-TERM (CURRENT) USE OF HIGH-RISK MEDICATION: ICD-10-CM

## 2022-10-10 DIAGNOSIS — D80.1 HYPOGAMMAGLOBULINEMIA (H): ICD-10-CM

## 2022-10-10 DIAGNOSIS — R60.0 BILATERAL LOWER EXTREMITY EDEMA: ICD-10-CM

## 2022-10-10 DIAGNOSIS — D84.9 IMMUNOSUPPRESSED STATUS (H): ICD-10-CM

## 2022-10-10 DIAGNOSIS — E83.42 HYPOMAGNESEMIA: ICD-10-CM

## 2022-10-10 DIAGNOSIS — M79.89 LEG SWELLING: ICD-10-CM

## 2022-10-10 DIAGNOSIS — D64.9 ANEMIA, UNSPECIFIED TYPE: ICD-10-CM

## 2022-10-10 LAB
ANION GAP SERPL CALCULATED.3IONS-SCNC: 10 MMOL/L (ref 7–15)
BUN SERPL-MCNC: 33.9 MG/DL (ref 8–23)
CALCIUM SERPL-MCNC: 9.3 MG/DL (ref 8.8–10.2)
CHLORIDE SERPL-SCNC: 101 MMOL/L (ref 98–107)
CMV DNA SPEC NAA+PROBE-ACNC: NOT DETECTED IU/ML
CREAT SERPL-MCNC: 1.02 MG/DL (ref 0.51–0.95)
DEPRECATED HCO3 PLAS-SCNC: 30 MMOL/L (ref 22–29)
ERYTHROCYTE [DISTWIDTH] IN BLOOD BY AUTOMATED COUNT: 13.4 % (ref 10–15)
EXPTIME-PRE: 5.25 SEC
FEF2575-%PRED-PRE: 235 %
FEF2575-PRE: 4.56 L/SEC
FEF2575-PRED: 1.93 L/SEC
FEFMAX-%PRED-PRE: 91 %
FEFMAX-PRE: 5.14 L/SEC
FEFMAX-PRED: 5.63 L/SEC
FEV1-%PRED-PRE: 82 %
FEV1-PRE: 1.78 L
FEV1FEV6-PRE: 100 %
FEV1FEV6-PRED: 80 %
FEV1FVC-PRE: 100 %
FEV1FVC-PRED: 79 %
FIFMAX-PRE: 2.19 L/SEC
FVC-%PRED-PRE: 64 %
FVC-PRE: 1.78 L
FVC-PRED: 2.77 L
GFR SERPL CREATININE-BSD FRML MDRD: 60 ML/MIN/1.73M2
GLUCOSE SERPL-MCNC: 113 MG/DL (ref 70–99)
HCT VFR BLD AUTO: 28.9 % (ref 35–47)
HGB BLD-MCNC: 9.5 G/DL (ref 11.7–15.7)
INR PPP: 0.96 (ref 0.85–1.15)
MAGNESIUM SERPL-MCNC: 1.8 MG/DL (ref 1.7–2.3)
MCH RBC QN AUTO: 30.4 PG (ref 26.5–33)
MCHC RBC AUTO-ENTMCNC: 32.9 G/DL (ref 31.5–36.5)
MCV RBC AUTO: 92 FL (ref 78–100)
PLATELET # BLD AUTO: 221 10E3/UL (ref 150–450)
POTASSIUM SERPL-SCNC: 4.1 MMOL/L (ref 3.4–5.3)
RBC # BLD AUTO: 3.13 10E6/UL (ref 3.8–5.2)
SARS-COV-2 RNA RESP QL NAA+PROBE: NEGATIVE
SODIUM SERPL-SCNC: 141 MMOL/L (ref 136–145)
TACROLIMUS BLD-MCNC: 9.9 UG/L (ref 5–15)
TME LAST DOSE: NORMAL H
TME LAST DOSE: NORMAL H
WBC # BLD AUTO: 6.5 10E3/UL (ref 4–11)

## 2022-10-10 PROCEDURE — 85610 PROTHROMBIN TIME: CPT | Performed by: PATHOLOGY

## 2022-10-10 PROCEDURE — 83735 ASSAY OF MAGNESIUM: CPT | Performed by: PATHOLOGY

## 2022-10-10 PROCEDURE — 94375 RESPIRATORY FLOW VOLUME LOOP: CPT | Performed by: INTERNAL MEDICINE

## 2022-10-10 PROCEDURE — 80048 BASIC METABOLIC PNL TOTAL CA: CPT | Performed by: PATHOLOGY

## 2022-10-10 PROCEDURE — 86832 HLA CLASS I HIGH DEFIN QUAL: CPT | Performed by: PATHOLOGY

## 2022-10-10 PROCEDURE — 99214 OFFICE O/P EST MOD 30 MIN: CPT | Mod: 25 | Performed by: INTERNAL MEDICINE

## 2022-10-10 PROCEDURE — 36415 COLL VENOUS BLD VENIPUNCTURE: CPT | Performed by: PATHOLOGY

## 2022-10-10 PROCEDURE — U0005 INFEC AGEN DETEC AMPLI PROBE: HCPCS | Performed by: INTERNAL MEDICINE

## 2022-10-10 PROCEDURE — 86833 HLA CLASS II HIGH DEFIN QUAL: CPT | Performed by: PATHOLOGY

## 2022-10-10 PROCEDURE — 85027 COMPLETE CBC AUTOMATED: CPT | Performed by: PATHOLOGY

## 2022-10-10 PROCEDURE — 71046 X-RAY EXAM CHEST 2 VIEWS: CPT | Mod: GC | Performed by: RADIOLOGY

## 2022-10-10 PROCEDURE — G0463 HOSPITAL OUTPT CLINIC VISIT: HCPCS | Performed by: INTERNAL MEDICINE

## 2022-10-10 PROCEDURE — 80197 ASSAY OF TACROLIMUS: CPT | Performed by: INTERNAL MEDICINE

## 2022-10-10 NOTE — PROGRESS NOTES
Attending attestation: I, Carson Feng M.D., have seen and examined the patient with Dr. Deena Ambriz MD. I have reviewed labs and radiographs. We have discussed the patient and I agree with the findings and plan of care as discussed below.    The patient reports ongoing lower extremity swelling.  She is otherwise been well since her last visit.  Breathing is comfortable at rest.  Exercise tolerance continues to improve.  Occasional cough attributed to postnasal drip.  No sputum.  No chest pain.  No fever chills or night sweats.  Appetite is good.  No nausea, vomiting or abdominal pain.  No palpitations.  Lungs are clear with good airflow throughout.  Heart sounds are normal.  Abdomen is soft and nontender.  Extremities with 3+ edema bilaterally equal.      Recent Results (from the past 168 hour(s))   Basic metabolic panel    Collection Time: 10/07/22  9:42 AM   Result Value Ref Range    Sodium (External) 140 134 - 143 mEq/L    Potassium (External) 4.5 3.4 - 5.1 mEq/L    Chloride (External) (External) 100 99 - 110 mEq/L    CO2 (External) 30 (H) 19 - 29 mEq/L    Anion Gap (External) 10.0 3.0 - 15.0 mEq/L    Urea Nitrogen (External) 30 (H) 5 - 24 mg/dL    Creatinine (External) 0.88 0.40 - 1.00 mg/dL    GFR Estimated (External) 72 >60 mL/min/1.73m2    Calcium (External) 9.4 8.4 - 10.5 mg/dL    Glucose (External) 150 (H) 70 - 99 mg/dL   CBC with platelets    Collection Time: 10/07/22  9:42 AM   Result Value Ref Range    WBC Count (External) 8.5 3.2 - 11.0 10*9/L    RBC Count (External) 3.27 (L) 3.77 5.24 10*12/L    Hemoglobin (External) 10.0 (L) 11.2 - 15.5 g/dL    Hematocrit (External) 30.3 (L) 34.3 - 46.0 %    MCV (External) 92.7 81.4 - 99.0 fL    MCH (External) 30.6 26.7 - 33.1 pg    MCHC (External) 33.0 31.5 - 35.5 g/dL    RDW (External) 13.2 11.3 - 14.6 %    Platelet Count (External) 244 130 - 375 10*9/L   Tacrolimus level    Collection Time: 10/08/22  3:55 PM   Result Value Ref Range    Tacrolimus by Tandem Mass  Spectrometry 7.3 5.0 - 15.0 ug/L    Tacrolimus Last Dose Date 10/6/2022     Tacrolimus Last Dose Time  9:30 PM    Basic metabolic panel    Collection Time: 10/10/22  7:01 AM   Result Value Ref Range    Sodium 141 136 - 145 mmol/L    Potassium 4.1 3.4 - 5.3 mmol/L    Chloride 101 98 - 107 mmol/L    Carbon Dioxide (CO2) 30 (H) 22 - 29 mmol/L    Anion Gap 10 7 - 15 mmol/L    Urea Nitrogen 33.9 (H) 8.0 - 23.0 mg/dL    Creatinine 1.02 (H) 0.51 - 0.95 mg/dL    Calcium 9.3 8.8 - 10.2 mg/dL    Glucose 113 (H) 70 - 99 mg/dL    GFR Estimate 60 (L) >60 mL/min/1.73m2   Magnesium    Collection Time: 10/10/22  7:01 AM   Result Value Ref Range    Magnesium 1.8 1.7 - 2.3 mg/dL   CBC with platelets    Collection Time: 10/10/22  7:01 AM   Result Value Ref Range    WBC Count 6.5 4.0 - 11.0 10e3/uL    RBC Count 3.13 (L) 3.80 - 5.20 10e6/uL    Hemoglobin 9.5 (L) 11.7 - 15.7 g/dL    Hematocrit 28.9 (L) 35.0 - 47.0 %    MCV 92 78 - 100 fL    MCH 30.4 26.5 - 33.0 pg    MCHC 32.9 31.5 - 36.5 g/dL    RDW 13.4 10.0 - 15.0 %    Platelet Count 221 150 - 450 10e3/uL   Tacrolimus level    Collection Time: 10/10/22  7:01 AM   Result Value Ref Range    Tacrolimus by Tandem Mass Spectrometry 9.9 5.0 - 15.0 ug/L    Tacrolimus Last Dose Date 10/9/2022     Tacrolimus Last Dose Time  7:00 PM    INR    Collection Time: 10/10/22  7:01 AM   Result Value Ref Range    INR 0.96 0.85 - 1.15   Asymptomatic COVID-19 Virus (Coronavirus) by PCR Nose    Collection Time: 10/10/22  7:01 AM    Specimen: Nose; Swab   Result Value Ref Range    SARS CoV2 PCR Negative Negative   General PFT Lab (Please always keep checked)    Collection Time: 10/10/22  7:33 AM   Result Value Ref Range    FVC-Pred 2.77 L    FVC-Pre 1.78 L    FVC-%Pred-Pre 64 %    FEV1-Pre 1.78 L    FEV1-%Pred-Pre 82 %    FEV1FVC-Pred 79 %    FEV1FVC-Pre 100 %    FEFMax-Pred 5.63 L/sec    FEFMax-Pre 5.14 L/sec    FEFMax-%Pred-Pre 91 %    FEF2575-Pred 1.93 L/sec    FEF2575-Pre 4.56 L/sec    TTJ2217-%Pred-Pre  235 %    ExpTime-Pre 5.25 sec    FIFMax-Pre 2.19 L/sec    FEV1FEV6-Pred 80 %    FEV1FEV6-Pre 100 %          Results as noted above.    PFT Interpretation:  Moderate restrictive ventilatory defect.  Increased from previous.  Best since lung transplantation  Valid Maneuver    Melissa Elder is a 66-year-old, almost 8 months status post bilateral lung transplantation for COPD.  She has continued improvement in exercise tolerance.  She is oxygenating well.  Chest x-ray, reviewed by me with no acute infiltrate and no significant change from previous.  PFTs with moderate restrictive ventilatory defect but improved from previous and best since transplant.  Last surveillance bronchoscopy showed no evidence of rejection.  She is due for her next surveillance bronchoscopy this week and should proceed as planned with lavage and biopsies.  Continue current immunosuppression was called and suggested for goal of 8-10.  DSA is positive but MFI continues to decline and the patient does not appear to have AMR clinically.  Etiology of the persistent lower extremity swelling is unclear.  We will check lower extremity Doppler to rule out DVT and echocardiogram to rule out cardiac causes.  If both are unrevealing, will asked patient to contact her primary care physician to discuss holding Boniva for a couple of months to see if that helps.  Her other medications are not typically associated with edema other than prednisone.  She was reminded to be cautious with her salt intake.  Given her stable renal function, if no other etiology is identified, Lasix will be increased to 40 mg in morning and 20 mg in the evening.  With persistently low hemoglobin, iron studies will be checked with the next visit.  Continue pentamadine for PJP prophylaxis.  Continue CMV monitoring.  Magnesium is at the low end of the therapeutic range despite high-level replacement.  Continue current dose.  The patient should receive the bivalent COVID-vaccine, then the  influenza vaccine and then Evusheld at least 2 weeks after the COVID-vaccine.  Follow-up in 2 months with labs, x-ray, PFTs and surveillance bronchoscopy.  Carson Feng MD     Reason for Visit  Melissa Elder is a 66 year old year old female who is being seen for Lung Transplant (2 month f/up /Legs and feet edema)      Assessment and plan:   Melissa Elder is a 66 year old female with h/o bilateral lung transplant on 2/21 for severe COPD for who is seen today for routine follow up. Surgery uncomplicated, s/p bilateral pigtail chest tube placement for hydropneumothorax and bilateral effusions, disseminated Ureaplasma and transient hyperammonemia. Other history notable for HTN, mild non-obstructive CAD, paroxysmal afib, osteoporosis, GERD, and colonic polyps.     1. S/p bilateral sequential lung transplant:  Continued gradual clinical improvement.  Oxygenating well.  PFTs are the best since transplant.  Chest x-ray, reviewed by me with no acute infiltrate and no significant change from previous.  Continue current immunosuppression.  Tacrolimus will be adjusted to maintain a level of 8-10.  Surveillance bronchoscopy this week.    2. ID: donor cultures with P-S MSSA with Strep mitis, Actinomyces odontolyticus, MSSA x2, and C. kruseii.  Recipient cultures at time of transplant with Actinomyces odonotolyticus. S/p ceftriazone 2/23-3/8.   -Completed treatment for Actinomyces in May.  - Low threshold to treat Candida if it recurs per transplant ID  -For prophylaxis, nystatin can be discontinued on 8/21/2022.  She will continue monthly pentamadine nebs.     3. 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   Persistent low-level positive DSA.  No evidence of AMR.  Continue monitoring.     4. Hypogammaglobulinemia: last IgG of 647, s/p IVIG (unclear indication) on 4/4.  IgG of 613 on 4/27.     5. Positive AFB on sputum culture: noted on  sputum culture 3/2. Transplant ID following for speciation and susceptibility testing as well as other evidence of infection.  Subsequent AFB cultures are negative to date.     6. PHS risk criteria donor: additional labs required post-transplant (between 4-8 weeks post-op): Hepatitis B, Hepatitis C, and HIV by COLT, negative on 3/25.      7. Concern for gastroparesis:  GERD: NM gastric emptying study completed 3/15 normal. Negative pH/manometry study 3/28/19, noted small hiatal hernia. No GI complaints today.  - Famotidine 20 mg BID and pantoprazole 40 mg daily     8. CAD: noted to have mild non-obstructive CAD without hemodynamically significant lesions on cardiac cath 3/27/19.   - Continue aspirin and rosuvastatin  - Aspirin on hold due to Bronch     9. Paroxysmal afib:  HTN: had been on diltiazem pre transplant, not on AC. Persistent tachycardia post-op, controlled.  - Continue diltiazem and metoprolol     10. Hypomagnesemia:  Continue magnesium replacement to 8 pills daily, 2 in the morning, 3 in the afternoon and 3 in the evening.    12. Low level EBV viremia: Low-level.  Continue monitoring.    13. BLE Edema: 3+ pitting edema which is only tender upon palpation. Will order BLE US and Echo to r/o DVT and heart failure, though I suspect these are less likely the etiology. Will also consider increasing Lasix dose if these tests are stable. Her Cr and Mag are currently stable.     14. Vaccinations: Needs C19 Bivalent and then Evushield 2 weeks later. Will have patient contact PCP and see if she can get Evushield at primary care office. If not, will do evushield at next transplant f/u in 2 months. Also needs Influenza vaccine.     Follow-up in 2 months with labs, x-ray, PFTs and surveillance bronchoscopy.    Patient seen and examined with attending physician, MD Deena Arceo MD  Internal Medicine  [facility] PGY2      Lung TX HPI  Transplants:  2/21/2022 (Lung), Postoperative  day:  231    The patient was seen and examined by Carson Feng MD and Deena Ambriz MD PGY2    Melissa Elder is a 66 year old female with h/o bilateral lung transplant on 2/21 for severe COPD for who is seen today for routine follow up. Surgery uncomplicated, s/p bilateral pigtail chest tube placement for hydropneumothorax and bilateral effusions, disseminated Ureaplasma and transient hyperammonemia. Other history notable for HTN, mild non-obstructive CAD, paroxysmal afib, osteoporosis, GERD, and colonic polyps.      Today, patient reports she is doing well. Walks daily for exercise. 12 blocks once to twice daily. Has some post nasal drip, but uses Flonase which helps her.     Endorses BLE edema which has been an issue since her surgery. Wonders if it is from the Boniva which she started in June. BLE are tender to the touch, but do not hurt when they are not palpated. She is taking Furosemide 40 mg daily. Was taking Lasix 40 mg in AM and 20 mg in PM at some point and reports that did help with some more of the swelling. Her magnesium has been low at times so she takes supplementation.  She is taking diltiazem, but was taken that prior to transplantation.    She needs to get her flu and COVID divalent.  Also needs Evushield.     She and her  just bought a new camper and are looking forward to traveling with it.    Denies fatigue,  fevers, chills, night sweats, CP, SOB, cough, wheeze, abd pain, N/V/D, sore throat, sick contacts.      A complete ROS was otherwise negative except as noted in the HPI.    Current Outpatient Medications   Medication     albuterol (PROAIR HFA/PROVENTIL HFA/VENTOLIN HFA) 108 (90 Base) MCG/ACT inhaler     aspirin (ASA) 81 MG EC tablet     blood glucose (NO BRAND SPECIFIED) lancets standard     blood glucose (NO BRAND SPECIFIED) test strip     blood glucose monitoring (NO BRAND SPECIFIED) meter device kit     calcium carbonate 600 mg-vitamin D 400 units (CALTRATE) 600-400  MG-UNIT per tablet     carboxymethylcellulose PF (REFRESH PLUS) 0.5 % ophthalmic solution     cetirizine (ZYRTEC) 10 MG tablet     diltiazem ER (TIAZAC) 240 MG 24 hr ER beaded capsule     famotidine (PEPCID) 20 MG tablet     furosemide (LASIX) 40 MG tablet     IBANdronate (BONIVA) 150 MG tablet     Magnesium Glycinate 665 MG CAPS     metoprolol tartrate (LOPRESSOR) 25 MG tablet     multivitamin w/minerals (THERA-VIT-M) tablet     mycophenolic acid (GENERIC EQUIVALENT) 360 MG EC tablet     nystatin (MYCOSTATIN) 520602 UNIT/ML suspension     pantoprazole (PROTONIX) 40 MG EC tablet     pentamidine (NEBUPENT) 300 MG neb solution     predniSONE (DELTASONE) 5 MG tablet     rosuvastatin (CRESTOR) 5 MG tablet     tacrolimus (GENERIC EQUIVALENT) 0.5 MG capsule     tacrolimus (GENERIC EQUIVALENT) 1 MG capsule     No current facility-administered medications for this visit.     Facility-Administered Medications Ordered in Other Visits   Medication     sodium chloride (PF) 0.9% PF flush 10 mL     Allergies   Allergen Reactions     Alendronic Acid Other (See Comments)     dizziness  Other reaction(s): Dizziness     Nickel Rash     Sulfa Drugs Rash     Past Medical History:   Diagnosis Date     Atrial fibrillation with RVR (H)     hx of A fib related to severe COPD, does not meet anticoagulation criteria as noted     COPD, severe (H)      Osteoporosis        Past Surgical History:   Procedure Laterality Date     BRONCHOSCOPY (RIGID OR FLEXIBLE), DIAGNOSTIC N/A 4/7/2022    Procedure: BRONCHOSCOPY, WITH BRONCHOALVEOLAR LAVAGE and BIOPSIES;  Surgeon: Gerson Haddad MD;  Location:  GI     BRONCHOSCOPY (RIGID OR FLEXIBLE), DIAGNOSTIC N/A 5/12/2022    Procedure: BRONCHOSCOPY, WITH BRONCHOALVEOLAR LAVAGE AND BIOPSY;  Surgeon: Melissa Landin MD;  Location:  GI     BRONCHOSCOPY (RIGID OR FLEXIBLE), DIAGNOSTIC N/A 8/2/2022    Procedure: BRONCHOSCOPY, WITH BRONCHOALVEOLAR LAVAGE;  Surgeon: Melissa Landin MD;  Location:   GI     BRONCHOSCOPY FLEXIBLE AND RIGID N/A 3/1/2022    Procedure: BRONCHOSCOPY INSPECTION;  Surgeon: Melissa Landin MD;  Location:  GI     CV CORONARY ANGIOGRAM N/A 3/27/2019    Procedure: CV CORONARY ANGIOGRAM;  Surgeon: Thierry Serrano MD;  Location:  HEART CARDIAC CATH LAB     CV RIGHT HEART CATH MEASUREMENTS RECORDED N/A 3/27/2019    Procedure: CV RIGHT HEART CATH;  Surgeon: Thierry Serrano MD;  Location:  HEART CARDIAC CATH LAB     ESOPHAGEAL IMPEDENCE FUNCTION TEST WITH 24 HOUR PH GREATER THAN 1 HOUR N/A 3/28/2019    Procedure: ESOPHAGEAL IMPEDENCE FUNCTION TEST WITH 24 HOUR PH GREATER THAN 1 HOUR;  Surgeon: Mike Henry MD;  Location:  GI     EXCISE PILONIDAL CYST, SIMPLE       IR CHEST TUBE PLACEMENT NON-TUNNELED RIGHT  3/3/2022     TONSILLECTOMY & ADENOIDECTOMY       TRANSPLANT LUNG RECIPIENT SINGLE X2 Bilateral 2022    Procedure: Bilateral Sequential Lung Transplantation, Clamshell Incision, Extracorporeal Membrane Oxgenation, Bronchoscopy, Cryoablation of Intercostal Nerves;  Surgeon: Raul Robin MD;  Location:  OR       Social History     Socioeconomic History     Marital status:      Spouse name: Not on file     Number of children: Not on file     Years of education: Not on file     Highest education level: Not on file   Occupational History     Not on file   Tobacco Use     Smoking status: Former     Packs/day: 1.50     Years: 36.00     Pack years: 54.00     Types: Cigarettes     Quit date: 2006     Years since quittin.7     Smokeless tobacco: Never   Substance and Sexual Activity     Alcohol use: Yes     Comment: stated beer and wine occ     Drug use: Yes     Comment: stated hx of marijuana, quit in      Sexual activity: Not on file   Other Topics Concern     Parent/sibling w/ CABG, MI or angioplasty before 65F 55M? Not Asked   Social History Narrative     Not on file     Social Determinants of Health     Financial Resource Strain:  Not on file   Food Insecurity: Not on file   Transportation Needs: Not on file   Physical Activity: Not on file   Stress: Not on file   Social Connections: Not on file   Intimate Partner Violence: Not on file   Housing Stability: Not on file         /75   Pulse 74   Wt 69.4 kg (153 lb)   SpO2 99%   BMI 27.98 kg/m    Body mass index is 27.98 kg/m .  Exam:   GENERAL APPEARANCE: Well developed, well nourished, alert, and in no apparent distress.  EYES: PERRL, EOMI  HENT: Nasal mucosa with edema and no hyperemia. No nasal polyps.  EARS: Canals clear, TMs normal  MOUTH: Oral mucosa is moist, without any lesions, no tonsillar enlargement, no oropharyngeal exudate.  NECK: supple, no masses, no thyromegaly.  LYMPHATICS: No significant axillary, cervical, or supraclavicular nodes.  RESP: normal percussion,  No crackles. No rhonchi. No wheezes.  CV: Normal S1, S2, regular rhythm, normal rate. No murmur.  No rub. No gallop. +3 BLE edema.   ABDOMEN:  Bowel sounds normal, soft, nontender, no HSM or masses.   MS: extremities normal.  No cyanosis.  SKIN: no rash on limited exam  NEURO: Mentation intact, speech normal, normal strength and tone, normal gait and stance  PSYCH: mentation appears normal. and affect normal/bright  Results:  Recent Results (from the past 168 hour(s))   Basic metabolic panel    Collection Time: 10/07/22  9:42 AM   Result Value Ref Range    Sodium (External) 140 134 - 143 mEq/L    Potassium (External) 4.5 3.4 - 5.1 mEq/L    Chloride (External) (External) 100 99 - 110 mEq/L    CO2 (External) 30 (H) 19 - 29 mEq/L    Anion Gap (External) 10.0 3.0 - 15.0 mEq/L    Urea Nitrogen (External) 30 (H) 5 - 24 mg/dL    Creatinine (External) 0.88 0.40 - 1.00 mg/dL    GFR Estimated (External) 72 >60 mL/min/1.73m2    Calcium (External) 9.4 8.4 - 10.5 mg/dL    Glucose (External) 150 (H) 70 - 99 mg/dL   CBC with platelets    Collection Time: 10/07/22  9:42 AM   Result Value Ref Range    WBC Count (External) 8.5  3.2 - 11.0 10*9/L    RBC Count (External) 3.27 (L) 3.77 5.24 10*12/L    Hemoglobin (External) 10.0 (L) 11.2 - 15.5 g/dL    Hematocrit (External) 30.3 (L) 34.3 - 46.0 %    MCV (External) 92.7 81.4 - 99.0 fL    MCH (External) 30.6 26.7 - 33.1 pg    MCHC (External) 33.0 31.5 - 35.5 g/dL    RDW (External) 13.2 11.3 - 14.6 %    Platelet Count (External) 244 130 - 375 10*9/L   Tacrolimus level    Collection Time: 10/08/22  3:55 PM   Result Value Ref Range    Tacrolimus by Tandem Mass Spectrometry 7.3 5.0 - 15.0 ug/L    Tacrolimus Last Dose Date 10/6/2022     Tacrolimus Last Dose Time  9:30 PM    Basic metabolic panel    Collection Time: 10/10/22  7:01 AM   Result Value Ref Range    Sodium 141 136 - 145 mmol/L    Potassium 4.1 3.4 - 5.3 mmol/L    Chloride 101 98 - 107 mmol/L    Carbon Dioxide (CO2) 30 (H) 22 - 29 mmol/L    Anion Gap 10 7 - 15 mmol/L    Urea Nitrogen 33.9 (H) 8.0 - 23.0 mg/dL    Creatinine 1.02 (H) 0.51 - 0.95 mg/dL    Calcium 9.3 8.8 - 10.2 mg/dL    Glucose 113 (H) 70 - 99 mg/dL    GFR Estimate 60 (L) >60 mL/min/1.73m2   Magnesium    Collection Time: 10/10/22  7:01 AM   Result Value Ref Range    Magnesium 1.8 1.7 - 2.3 mg/dL   CBC with platelets    Collection Time: 10/10/22  7:01 AM   Result Value Ref Range    WBC Count 6.5 4.0 - 11.0 10e3/uL    RBC Count 3.13 (L) 3.80 - 5.20 10e6/uL    Hemoglobin 9.5 (L) 11.7 - 15.7 g/dL    Hematocrit 28.9 (L) 35.0 - 47.0 %    MCV 92 78 - 100 fL    MCH 30.4 26.5 - 33.0 pg    MCHC 32.9 31.5 - 36.5 g/dL    RDW 13.4 10.0 - 15.0 %    Platelet Count 221 150 - 450 10e3/uL   INR    Collection Time: 10/10/22  7:01 AM   Result Value Ref Range    INR 0.96 0.85 - 1.15   Asymptomatic COVID-19 Virus (Coronavirus) by PCR Nose    Collection Time: 10/10/22  7:01 AM    Specimen: Nose; Swab   Result Value Ref Range    SARS CoV2 PCR Negative Negative   General PFT Lab (Please always keep checked)    Collection Time: 10/10/22  7:33 AM   Result Value Ref Range    FVC-Pred 2.77 L    FVC-Pre  1.78 L    FVC-%Pred-Pre 64 %    FEV1-Pre 1.78 L    FEV1-%Pred-Pre 82 %    FEV1FVC-Pred 79 %    FEV1FVC-Pre 100 %    FEFMax-Pred 5.63 L/sec    FEFMax-Pre 5.14 L/sec    FEFMax-%Pred-Pre 91 %    FEF2575-Pred 1.93 L/sec    FEF2575-Pre 4.56 L/sec    DLP4157-%Pred-Pre 235 %    ExpTime-Pre 5.25 sec    FIFMax-Pre 2.19 L/sec    FEV1FEV6-Pred 80 %    FEV1FEV6-Pre 100 %         Results as noted above.    PFT Interpretation:  Improved PFTs, increased from previous. No obstruction, FEV1/FVC ration 100% which is stable. FVC and FEV1 improved from 1.52-->1.78  Best since lung transplantation  Valid Maneuver

## 2022-10-10 NOTE — PROGRESS NOTES
Patient scheduled for bronchoscopy with lavage and biopsies 10/11 with Dr. Pederson.     COVID PCR pending 9:33 AM, 10/10.     Date of transplant 2/21/22   Transplant type bilateral lung  Date of labs 10/10/2022  BUN 33.9 DDAVP no  Plt 221  INR 1.02 Anticoag therapy ASA 81mg Last dose 9/30/2022  Comment questions please page Dr. Feng [phone]

## 2022-10-10 NOTE — RESULT ENCOUNTER NOTE
Tacrolimus level 9.9 at 12 hours, on 10/10/2022.  Goal 8-12.   Current dose 2 mg in AM, 2.5 mg in PM    Level at goal, no change in dose.   [employer] message sent

## 2022-10-10 NOTE — PATIENT INSTRUCTIONS
Patient Instructions  1. Continue to hydrate with 60-70 oz fluids daily.  2. Continue to exercise daily or most days of the week.  3. Follow up with your primary care provider for annual gender health maintenance procedures.  4. Follow up with colonoscopy schedule.  5. Follow up with annual dermatology visits.  6. It doesn't seem like the COVID vaccine is working well in lung transplant patients. A number of lung transplant patients have gotten sick with COVID even after receiving the vaccines.  Based on our recent experience, it can be life-threatening to get COVID  even after being vaccinated. Please continue to act like you did not get the COVID vaccine - social distancing, wearing a mask, good hand hygiene, etc. If the people around you are vaccinated, it will help reduce the risk of you getting COVID. All members of your household should be vaccinated.  7. Consider using Nioxin shampoo for your thinning hair.   8. The the swelling in your feet: We are going to get ultrasound of your legs and an echocardiogram (ultrasound of your heart).   9. If the ultrasounds don't show anything, talk to your primary care provider about holding Boniva for a couple months and see if it helps with the swelling.     Next transplant clinic appointment: 2 months with CXR, labs and PFTs  Next lab draw: pending tacrolimus level, otherwise every 2 weeks    You are scheduled for a bronchoscopy on 10/11 at 9AM at the [facility] on the 3rd floor. Arrive 1 hour early (8AM).     Instructions     1. Nothing to eat or drink 8 hours before procedure.  2. Hold your morning medications. Bring them with you to take after the procedure.  3. Have a  available to take you home.   4. Stop Aspirin 7 days before procedure. Restart Aspirin Thursday, 10/13.    ~~~~~~~~~~~~~~~~~~~~~~~~~    Thoracic Transplant Office phone [phone], fax [phone]    Office Hours 8:30 - 5:00     For after-hours urgent  issues, please dial [phone], and ask to speak with the Thoracic Transplant Coordinator On-Call.  --------------------  To expedite your medication refill(s), please contact your pharmacy and have them fax a refill request to: [phone]  .   *Please allow 3 business days for routine medication refills.  *Please allow 5 business days for controlled substance medication refills.    **For Diabetic medications and supplies refill(s), please contact your pharmacy and have them contact your Endocrine team.  --------------------  For scheduling appointments call [phone].  --------------------  Please Note: If you are active on [employer], all future test results will be sent by [employer] message only, and will no longer be called to patient. You may also receive communication directly from your physician.

## 2022-10-10 NOTE — LETTER
10/10/2022         RE: Melissa Elder  [address] [facility] [address]        Dear Colleague,    Thank you for referring your patient, Melissa Elder, to the [facility]. Please see a copy of my visit note below.    Transplant Coordinator Note    Reason for visit: Post lung transplant follow up visit   Coordinator: Present   Caregiver:  Tyrese,     Health concerns addressed today:  1. Respiratory - no shortness of breath with activity. Occasional coughing (clearing cough), no sputum.   2. Boniva  Monthly for bones - swelling?  3.  GI: no abdominal pain, nausea, vomiting, or diarrhea.   4. ENT: nasal drip - resolves with Flonase/allergy meds  5. BLE swelling    Activity/rehab: continues to do PT, walking 12 blocks 1-2/day.   Oxygen needs: room air  Pain management/RX: denies  Diabetic management: checking BGs  Next Bronch due: scheduled for tomorrow, 10/11  Risk Criteria Labs: negative 3/25/22  CMV status: D-/R-  EBV status: D+/R+  AC/asa: ASA 81mg  PJP prophylactic: pentamidine neb (dapsone caused anemia/RBC hemolysis) Last dose     COVID:  1. COVID-19 infection (yes/no, date of most recent positive test): no  1. Status/instructions given about COVID-19 vaccine: Received full dose 4/14/2022. Due for another dose of Evusheld today, 10/10/22    Pt Education: medications (use/dose/side effects), how/when to call coordinator, frequency of labs, s/s of infection/rejection, call prior to starting any new medications, lab/vital sign book    Health Maintenance:     Last colonoscopy:     Next colonoscopy due:     Dermatology:    Vaccinations this visit:       Labs, CXR, PFTs reviewed with patient  Medication record reviewed and reconciled  Questions and concerns addressed    Patient Instructions  1. Continue to hydrate with 60-70 oz fluids daily.  2. Continue to exercise daily or most days of the week.  3. Follow up with your primary care provider for annual gender health maintenance  procedures.  4. Follow up with colonoscopy schedule.  5. Follow up with annual dermatology visits.  6. It doesn't seem like the COVID vaccine is working well in lung transplant patients. A number of lung transplant patients have gotten sick with COVID even after receiving the vaccines.  Based on our recent experience, it can be life-threatening to get COVID  even after being vaccinated. Please continue to act like you did not get the COVID vaccine - social distancing, wearing a mask, good hand hygiene, etc. If the people around you are vaccinated, it will help reduce the risk of you getting COVID. All members of your household should be vaccinated.  7. Consider using Nioxin shampoo for your thinning hair.   8. The the swelling in your feet: We are going to get ultrasound of your legs and an echocardiogram (ultrasound of your heart).   9. If the ultrasounds don't show anything, talk to your primary care provider about holding Boniva for a couple months and see if it helps with the swelling.     Next transplant clinic appointment: 2 months with CXR, labs and PFTs  Next lab draw: pending tacrolimus level, otherwise every 2 weeks      AVS printed at time of check out          Attending attestation: I, Carson Feng M.D., have seen and examined the patient with Dr. Deena Ambriz MD. I have reviewed labs and radiographs. We have discussed the patient and I agree with the findings and plan of care as discussed below.    The patient reports ongoing lower extremity swelling.  She is otherwise been well since her last visit.  Breathing is comfortable at rest.  Exercise tolerance continues to improve.  Occasional cough attributed to postnasal drip.  No sputum.  No chest pain.  No fever chills or night sweats.  Appetite is good.  No nausea, vomiting or abdominal pain.  No palpitations.  Lungs are clear with good airflow throughout.  Heart sounds are normal.  Abdomen is soft and nontender.  Extremities with 3+ edema bilaterally  equal.      Recent Results (from the past 168 hour(s))   Basic metabolic panel    Collection Time: 10/07/22  9:42 AM   Result Value Ref Range    Sodium (External) 140 134 - 143 mEq/L    Potassium (External) 4.5 3.4 - 5.1 mEq/L    Chloride (External) (External) 100 99 - 110 mEq/L    CO2 (External) 30 (H) 19 - 29 mEq/L    Anion Gap (External) 10.0 3.0 - 15.0 mEq/L    Urea Nitrogen (External) 30 (H) 5 - 24 mg/dL    Creatinine (External) 0.88 0.40 - 1.00 mg/dL    GFR Estimated (External) 72 >60 mL/min/1.73m2    Calcium (External) 9.4 8.4 - 10.5 mg/dL    Glucose (External) 150 (H) 70 - 99 mg/dL   CBC with platelets    Collection Time: 10/07/22  9:42 AM   Result Value Ref Range    WBC Count (External) 8.5 3.2 - 11.0 10*9/L    RBC Count (External) 3.27 (L) 3.77 5.24 10*12/L    Hemoglobin (External) 10.0 (L) 11.2 - 15.5 g/dL    Hematocrit (External) 30.3 (L) 34.3 - 46.0 %    MCV (External) 92.7 81.4 - 99.0 fL    MCH (External) 30.6 26.7 - 33.1 pg    MCHC (External) 33.0 31.5 - 35.5 g/dL    RDW (External) 13.2 11.3 - 14.6 %    Platelet Count (External) 244 130 - 375 10*9/L   Tacrolimus level    Collection Time: 10/08/22  3:55 PM   Result Value Ref Range    Tacrolimus by Tandem Mass Spectrometry 7.3 5.0 - 15.0 ug/L    Tacrolimus Last Dose Date 10/6/2022     Tacrolimus Last Dose Time  9:30 PM    Basic metabolic panel    Collection Time: 10/10/22  7:01 AM   Result Value Ref Range    Sodium 141 136 - 145 mmol/L    Potassium 4.1 3.4 - 5.3 mmol/L    Chloride 101 98 - 107 mmol/L    Carbon Dioxide (CO2) 30 (H) 22 - 29 mmol/L    Anion Gap 10 7 - 15 mmol/L    Urea Nitrogen 33.9 (H) 8.0 - 23.0 mg/dL    Creatinine 1.02 (H) 0.51 - 0.95 mg/dL    Calcium 9.3 8.8 - 10.2 mg/dL    Glucose 113 (H) 70 - 99 mg/dL    GFR Estimate 60 (L) >60 mL/min/1.73m2   Magnesium    Collection Time: 10/10/22  7:01 AM   Result Value Ref Range    Magnesium 1.8 1.7 - 2.3 mg/dL   CBC with platelets    Collection Time: 10/10/22  7:01 AM   Result Value Ref Range     WBC Count 6.5 4.0 - 11.0 10e3/uL    RBC Count 3.13 (L) 3.80 - 5.20 10e6/uL    Hemoglobin 9.5 (L) 11.7 - 15.7 g/dL    Hematocrit 28.9 (L) 35.0 - 47.0 %    MCV 92 78 - 100 fL    MCH 30.4 26.5 - 33.0 pg    MCHC 32.9 31.5 - 36.5 g/dL    RDW 13.4 10.0 - 15.0 %    Platelet Count 221 150 - 450 10e3/uL   Tacrolimus level    Collection Time: 10/10/22  7:01 AM   Result Value Ref Range    Tacrolimus by Tandem Mass Spectrometry 9.9 5.0 - 15.0 ug/L    Tacrolimus Last Dose Date 10/9/2022     Tacrolimus Last Dose Time  7:00 PM    INR    Collection Time: 10/10/22  7:01 AM   Result Value Ref Range    INR 0.96 0.85 - 1.15   Asymptomatic COVID-19 Virus (Coronavirus) by PCR Nose    Collection Time: 10/10/22  7:01 AM    Specimen: Nose; Swab   Result Value Ref Range    SARS CoV2 PCR Negative Negative   General PFT Lab (Please always keep checked)    Collection Time: 10/10/22  7:33 AM   Result Value Ref Range    FVC-Pred 2.77 L    FVC-Pre 1.78 L    FVC-%Pred-Pre 64 %    FEV1-Pre 1.78 L    FEV1-%Pred-Pre 82 %    FEV1FVC-Pred 79 %    FEV1FVC-Pre 100 %    FEFMax-Pred 5.63 L/sec    FEFMax-Pre 5.14 L/sec    FEFMax-%Pred-Pre 91 %    FEF2575-Pred 1.93 L/sec    FEF2575-Pre 4.56 L/sec    GQJ9622-%Pred-Pre 235 %    ExpTime-Pre 5.25 sec    FIFMax-Pre 2.19 L/sec    FEV1FEV6-Pred 80 %    FEV1FEV6-Pre 100 %          Results as noted above.    PFT Interpretation:  Moderate restrictive ventilatory defect.  Increased from previous.  Best since lung transplantation  Valid Maneuver    Melissa Elder is a 66-year-old, almost 8 months status post bilateral lung transplantation for COPD.  She has continued improvement in exercise tolerance.  She is oxygenating well.  Chest x-ray, reviewed by me with no acute infiltrate and no significant change from previous.  PFTs with moderate restrictive ventilatory defect but improved from previous and best since transplant.  Last surveillance bronchoscopy showed no evidence of rejection.  She is due for her next  surveillance bronchoscopy this week and should proceed as planned with lavage and biopsies.  Continue current immunosuppression was called and suggested for goal of 8-10.  DSA is positive but MFI continues to decline and the patient does not appear to have AMR clinically.  Etiology of the persistent lower extremity swelling is unclear.  We will check lower extremity Doppler to rule out DVT and echocardiogram to rule out cardiac causes.  If both are unrevealing, will asked patient to contact her primary care physician to discuss holding Boniva for a couple of months to see if that helps.  Her other medications are not typically associated with edema other than prednisone.  She was reminded to be cautious with her salt intake.  Given her stable renal function, if no other etiology is identified, Lasix will be increased to 40 mg in morning and 20 mg in the evening.  With persistently low hemoglobin, iron studies will be checked with the next visit.  Continue pentamadine for PJP prophylaxis.  Continue CMV monitoring.  Magnesium is at the low end of the therapeutic range despite high-level replacement.  Continue current dose.  The patient should receive the bivalent COVID-vaccine, then the influenza vaccine and then Evusheld at least 2 weeks after the COVID-vaccine.  Follow-up in 2 months with labs, x-ray, PFTs and surveillance bronchoscopy.  Carson Feng MD     Reason for Visit  Melissa Elder is a 66 year old year old female who is being seen for Lung Transplant (2 month f/up /Legs and feet edema)      Assessment and plan:   Melissa Elder is a 66 year old female with h/o bilateral lung transplant on 2/21 for severe COPD for who is seen today for routine follow up. Surgery uncomplicated, s/p bilateral pigtail chest tube placement for hydropneumothorax and bilateral effusions, disseminated Ureaplasma and transient hyperammonemia. Other history notable for HTN, mild non-obstructive CAD, paroxysmal afib,  osteoporosis, GERD, and colonic polyps.     1. S/p bilateral sequential lung transplant:  Continued gradual clinical improvement.  Oxygenating well.  PFTs are the best since transplant.  Chest x-ray, reviewed by me with no acute infiltrate and no significant change from previous.  Continue current immunosuppression.  Tacrolimus will be adjusted to maintain a level of 8-10.  Surveillance bronchoscopy this week.    2. ID: donor cultures with P-S MSSA with Strep mitis, Actinomyces odontolyticus, MSSA x2, and C. kruseii.  Recipient cultures at time of transplant with Actinomyces odonotolyticus. S/p ceftriazone 2/23-3/8.   -Completed treatment for Actinomyces in May.  - Low threshold to treat Candida if it recurs per transplant ID  -For prophylaxis, nystatin can be discontinued on 8/21/2022.  She will continue monthly pentamadine nebs.     3. 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   Persistent low-level positive DSA.  No evidence of AMR.  Continue monitoring.     4. Hypogammaglobulinemia: last IgG of 647, s/p IVIG (unclear indication) on 4/4.  IgG of 613 on 4/27.     5. Positive AFB on sputum culture: noted on sputum culture 3/2. Transplant ID following for speciation and susceptibility testing as well as other evidence of infection.  Subsequent AFB cultures are negative to date.     6. PHS risk criteria donor: additional labs required post-transplant (between 4-8 weeks post-op): Hepatitis B, Hepatitis C, and HIV by COLT, negative on 3/25.      7. Concern for gastroparesis:  GERD: NM gastric emptying study completed 3/15 normal. Negative pH/manometry study 3/28/19, noted small hiatal hernia. No GI complaints today.  - Famotidine 20 mg BID and pantoprazole 40 mg daily     8. CAD: noted to have mild non-obstructive CAD without hemodynamically significant lesions on cardiac cath 3/27/19.   - Continue aspirin and rosuvastatin  - Aspirin on  hold due to Bronch     9. Paroxysmal afib:  HTN: had been on diltiazem pre transplant, not on AC. Persistent tachycardia post-op, controlled.  - Continue diltiazem and metoprolol     10. Hypomagnesemia:  Continue magnesium replacement to 8 pills daily, 2 in the morning, 3 in the afternoon and 3 in the evening.    12. Low level EBV viremia: Low-level.  Continue monitoring.    13. BLE Edema: 3+ pitting edema which is only tender upon palpation. Will order BLE US and Echo to r/o DVT and heart failure, though I suspect these are less likely the etiology. Will also consider increasing Lasix dose if these tests are stable. Her Cr and Mag are currently stable.     14. Vaccinations: Needs C19 Bivalent and then Evushield 2 weeks later. Will have patient contact PCP and see if she can get Evushield at primary care office. If not, will do evushield at next transplant f/u in 2 months. Also needs Influenza vaccine.     Follow-up in 2 months with labs, x-ray, PFTs and surveillance bronchoscopy.    Patient seen and examined with attending physician, MD Deena Arceo MD  Internal Medicine  [facility] PGY2      Lung TX HPI  Transplants:  2/21/2022 (Lung), Postoperative day:  231    The patient was seen and examined by Carson Feng MD and Deena Ambriz MD PGY2    Melissa Elder is a 66 year old female with h/o bilateral lung transplant on 2/21 for severe COPD for who is seen today for routine follow up. Surgery uncomplicated, s/p bilateral pigtail chest tube placement for hydropneumothorax and bilateral effusions, disseminated Ureaplasma and transient hyperammonemia. Other history notable for HTN, mild non-obstructive CAD, paroxysmal afib, osteoporosis, GERD, and colonic polyps.      Today, patient reports she is doing well. Walks daily for exercise. 12 blocks once to twice daily. Has some post nasal drip, but uses Flonase which helps her.     Endorses BLE edema which has been an issue  since her surgery. Wonders if it is from the Boniva which she started in June. BLE are tender to the touch, but do not hurt when they are not palpated. She is taking Furosemide 40 mg daily. Was taking Lasix 40 mg in AM and 20 mg in PM at some point and reports that did help with some more of the swelling. Her magnesium has been low at times so she takes supplementation.  She is taking diltiazem, but was taken that prior to transplantation.    She needs to get her flu and COVID divalent.  Also needs Evushield.     She and her  just bought a new camper and are looking forward to traveling with it.    Denies fatigue,  fevers, chills, night sweats, CP, SOB, cough, wheeze, abd pain, N/V/D, sore throat, sick contacts.      A complete ROS was otherwise negative except as noted in the HPI.    Current Outpatient Medications   Medication     albuterol (PROAIR HFA/PROVENTIL HFA/VENTOLIN HFA) 108 (90 Base) MCG/ACT inhaler     aspirin (ASA) 81 MG EC tablet     blood glucose (NO BRAND SPECIFIED) lancets standard     blood glucose (NO BRAND SPECIFIED) test strip     blood glucose monitoring (NO BRAND SPECIFIED) meter device kit     calcium carbonate 600 mg-vitamin D 400 units (CALTRATE) 600-400 MG-UNIT per tablet     carboxymethylcellulose PF (REFRESH PLUS) 0.5 % ophthalmic solution     cetirizine (ZYRTEC) 10 MG tablet     diltiazem ER (TIAZAC) 240 MG 24 hr ER beaded capsule     famotidine (PEPCID) 20 MG tablet     furosemide (LASIX) 40 MG tablet     IBANdronate (BONIVA) 150 MG tablet     Magnesium Glycinate 665 MG CAPS     metoprolol tartrate (LOPRESSOR) 25 MG tablet     multivitamin w/minerals (THERA-VIT-M) tablet     mycophenolic acid (GENERIC EQUIVALENT) 360 MG EC tablet     nystatin (MYCOSTATIN) 426524 UNIT/ML suspension     pantoprazole (PROTONIX) 40 MG EC tablet     pentamidine (NEBUPENT) 300 MG neb solution     predniSONE (DELTASONE) 5 MG tablet     rosuvastatin (CRESTOR) 5 MG tablet     tacrolimus (GENERIC  EQUIVALENT) 0.5 MG capsule     tacrolimus (GENERIC EQUIVALENT) 1 MG capsule     No current facility-administered medications for this visit.     Facility-Administered Medications Ordered in Other Visits   Medication     sodium chloride (PF) 0.9% PF flush 10 mL     Allergies   Allergen Reactions     Alendronic Acid Other (See Comments)     dizziness  Other reaction(s): Dizziness     Nickel Rash     Sulfa Drugs Rash     Past Medical History:   Diagnosis Date     Atrial fibrillation with RVR (H)     hx of A fib related to severe COPD, does not meet anticoagulation criteria as noted     COPD, severe (H)      Osteoporosis        Past Surgical History:   Procedure Laterality Date     BRONCHOSCOPY (RIGID OR FLEXIBLE), DIAGNOSTIC N/A 4/7/2022    Procedure: BRONCHOSCOPY, WITH BRONCHOALVEOLAR LAVAGE and BIOPSIES;  Surgeon: Gerson Haddad MD;  Location:  GI     BRONCHOSCOPY (RIGID OR FLEXIBLE), DIAGNOSTIC N/A 5/12/2022    Procedure: BRONCHOSCOPY, WITH BRONCHOALVEOLAR LAVAGE AND BIOPSY;  Surgeon: Melissa Landin MD;  Location:  GI     BRONCHOSCOPY (RIGID OR FLEXIBLE), DIAGNOSTIC N/A 8/2/2022    Procedure: BRONCHOSCOPY, WITH BRONCHOALVEOLAR LAVAGE;  Surgeon: Melissa Landin MD;  Location:  GI     BRONCHOSCOPY FLEXIBLE AND RIGID N/A 3/1/2022    Procedure: BRONCHOSCOPY INSPECTION;  Surgeon: Melissa Landin MD;  Location:  GI     CV CORONARY ANGIOGRAM N/A 3/27/2019    Procedure: CV CORONARY ANGIOGRAM;  Surgeon: Thierry Serrano MD;  Location:  HEART CARDIAC CATH LAB     CV RIGHT HEART CATH MEASUREMENTS RECORDED N/A 3/27/2019    Procedure: CV RIGHT HEART CATH;  Surgeon: Thierry Serrano MD;  Location:  HEART CARDIAC CATH LAB     ESOPHAGEAL IMPEDENCE FUNCTION TEST WITH 24 HOUR PH GREATER THAN 1 HOUR N/A 3/28/2019    Procedure: ESOPHAGEAL IMPEDENCE FUNCTION TEST WITH 24 HOUR PH GREATER THAN 1 HOUR;  Surgeon: Mike Henry MD;  Location:  GI     EXCISE PILONIDAL CYST, SIMPLE       IR CHEST TUBE  PLACEMENT NON-TUNNELED RIGHT  3/3/2022     TONSILLECTOMY & ADENOIDECTOMY       TRANSPLANT LUNG RECIPIENT SINGLE X2 Bilateral 2022    Procedure: Bilateral Sequential Lung Transplantation, Clamshell Incision, Extracorporeal Membrane Oxgenation, Bronchoscopy, Cryoablation of Intercostal Nerves;  Surgeon: Raul Robin MD;  Location:  OR       Social History     Socioeconomic History     Marital status:      Spouse name: Not on file     Number of children: Not on file     Years of education: Not on file     Highest education level: Not on file   Occupational History     Not on file   Tobacco Use     Smoking status: Former     Packs/day: 1.50     Years: 36.00     Pack years: 54.00     Types: Cigarettes     Quit date: 2006     Years since quittin.7     Smokeless tobacco: Never   Substance and Sexual Activity     Alcohol use: Yes     Comment: stated beer and wine occ     Drug use: Yes     Comment: stated hx of marijuana, quit in      Sexual activity: Not on file   Other Topics Concern     Parent/sibling w/ CABG, MI or angioplasty before 65F 55M? Not Asked   Social History Narrative     Not on file     Social Determinants of Health     Financial Resource Strain: Not on file   Food Insecurity: Not on file   Transportation Needs: Not on file   Physical Activity: Not on file   Stress: Not on file   Social Connections: Not on file   Intimate Partner Violence: Not on file   Housing Stability: Not on file         /75   Pulse 74   Wt 69.4 kg (153 lb)   SpO2 99%   BMI 27.98 kg/m    Body mass index is 27.98 kg/m .  Exam:   GENERAL APPEARANCE: Well developed, well nourished, alert, and in no apparent distress.  EYES: PERRL, EOMI  HENT: Nasal mucosa with edema and no hyperemia. No nasal polyps.  EARS: Canals clear, TMs normal  MOUTH: Oral mucosa is moist, without any lesions, no tonsillar enlargement, no oropharyngeal exudate.  NECK: supple, no masses, no thyromegaly.  LYMPHATICS: No  significant axillary, cervical, or supraclavicular nodes.  RESP: normal percussion,  No crackles. No rhonchi. No wheezes.  CV: Normal S1, S2, regular rhythm, normal rate. No murmur.  No rub. No gallop. +3 BLE edema.   ABDOMEN:  Bowel sounds normal, soft, nontender, no HSM or masses.   MS: extremities normal.  No cyanosis.  SKIN: no rash on limited exam  NEURO: Mentation intact, speech normal, normal strength and tone, normal gait and stance  PSYCH: mentation appears normal. and affect normal/bright  Results:  Recent Results (from the past 168 hour(s))   Basic metabolic panel    Collection Time: 10/07/22  9:42 AM   Result Value Ref Range    Sodium (External) 140 134 - 143 mEq/L    Potassium (External) 4.5 3.4 - 5.1 mEq/L    Chloride (External) (External) 100 99 - 110 mEq/L    CO2 (External) 30 (H) 19 - 29 mEq/L    Anion Gap (External) 10.0 3.0 - 15.0 mEq/L    Urea Nitrogen (External) 30 (H) 5 - 24 mg/dL    Creatinine (External) 0.88 0.40 - 1.00 mg/dL    GFR Estimated (External) 72 >60 mL/min/1.73m2    Calcium (External) 9.4 8.4 - 10.5 mg/dL    Glucose (External) 150 (H) 70 - 99 mg/dL   CBC with platelets    Collection Time: 10/07/22  9:42 AM   Result Value Ref Range    WBC Count (External) 8.5 3.2 - 11.0 10*9/L    RBC Count (External) 3.27 (L) 3.77 5.24 10*12/L    Hemoglobin (External) 10.0 (L) 11.2 - 15.5 g/dL    Hematocrit (External) 30.3 (L) 34.3 - 46.0 %    MCV (External) 92.7 81.4 - 99.0 fL    MCH (External) 30.6 26.7 - 33.1 pg    MCHC (External) 33.0 31.5 - 35.5 g/dL    RDW (External) 13.2 11.3 - 14.6 %    Platelet Count (External) 244 130 - 375 10*9/L   Tacrolimus level    Collection Time: 10/08/22  3:55 PM   Result Value Ref Range    Tacrolimus by Tandem Mass Spectrometry 7.3 5.0 - 15.0 ug/L    Tacrolimus Last Dose Date 10/6/2022     Tacrolimus Last Dose Time  9:30 PM    Basic metabolic panel    Collection Time: 10/10/22  7:01 AM   Result Value Ref Range    Sodium 141 136 - 145 mmol/L    Potassium 4.1 3.4 -  5.3 mmol/L    Chloride 101 98 - 107 mmol/L    Carbon Dioxide (CO2) 30 (H) 22 - 29 mmol/L    Anion Gap 10 7 - 15 mmol/L    Urea Nitrogen 33.9 (H) 8.0 - 23.0 mg/dL    Creatinine 1.02 (H) 0.51 - 0.95 mg/dL    Calcium 9.3 8.8 - 10.2 mg/dL    Glucose 113 (H) 70 - 99 mg/dL    GFR Estimate 60 (L) >60 mL/min/1.73m2   Magnesium    Collection Time: 10/10/22  7:01 AM   Result Value Ref Range    Magnesium 1.8 1.7 - 2.3 mg/dL   CBC with platelets    Collection Time: 10/10/22  7:01 AM   Result Value Ref Range    WBC Count 6.5 4.0 - 11.0 10e3/uL    RBC Count 3.13 (L) 3.80 - 5.20 10e6/uL    Hemoglobin 9.5 (L) 11.7 - 15.7 g/dL    Hematocrit 28.9 (L) 35.0 - 47.0 %    MCV 92 78 - 100 fL    MCH 30.4 26.5 - 33.0 pg    MCHC 32.9 31.5 - 36.5 g/dL    RDW 13.4 10.0 - 15.0 %    Platelet Count 221 150 - 450 10e3/uL   INR    Collection Time: 10/10/22  7:01 AM   Result Value Ref Range    INR 0.96 0.85 - 1.15   Asymptomatic COVID-19 Virus (Coronavirus) by PCR Nose    Collection Time: 10/10/22  7:01 AM    Specimen: Nose; Swab   Result Value Ref Range    SARS CoV2 PCR Negative Negative   General PFT Lab (Please always keep checked)    Collection Time: 10/10/22  7:33 AM   Result Value Ref Range    FVC-Pred 2.77 L    FVC-Pre 1.78 L    FVC-%Pred-Pre 64 %    FEV1-Pre 1.78 L    FEV1-%Pred-Pre 82 %    FEV1FVC-Pred 79 %    FEV1FVC-Pre 100 %    FEFMax-Pred 5.63 L/sec    FEFMax-Pre 5.14 L/sec    FEFMax-%Pred-Pre 91 %    FEF2575-Pred 1.93 L/sec    FEF2575-Pre 4.56 L/sec    [id number]-%Pred-Pre 235 %    ExpTime-Pre 5.25 sec    FIFMax-Pre 2.19 L/sec    FEV1FEV6-Pred 80 %    FEV1FEV6-Pre 100 %         Results as noted above.    PFT Interpretation:  Improved PFTs, increased from previous. No obstruction, FEV1/FVC ration 100% which is stable. FVC and FEV1 improved from 1.52-->1.78  Best since lung transplantation  Valid Maneuver  Again, thank you for allowing me to participate in the care of your patient.        Sincerely,        Carson Feng MD

## 2022-10-11 ENCOUNTER — HOSPITAL ENCOUNTER (OUTPATIENT)
Facility: CLINIC | Age: 67
Discharge: HOME | End: 2022-10-11
Attending: INTERNAL MEDICINE | Admitting: INTERNAL MEDICINE
Payer: MEDICARE

## 2022-10-11 ENCOUNTER — APPOINTMENT (OUTPATIENT)
Dept: GENERAL RADIOLOGY | Facility: CLINIC | Age: 67
End: 2022-10-11
Attending: INTERNAL MEDICINE
Payer: MEDICARE

## 2022-10-11 VITALS
DIASTOLIC BLOOD PRESSURE: 59 MMHG | RESPIRATION RATE: 34 BRPM | HEART RATE: 88 BPM | TEMPERATURE: 98.2 F | OXYGEN SATURATION: 100 % | SYSTOLIC BLOOD PRESSURE: 127 MMHG

## 2022-10-11 LAB
APPEARANCE FLD: CLEAR
C PNEUM DNA SPEC QL NAA+PROBE: NOT DETECTED
CELL COUNT BODY FLUID SOURCE: NORMAL
CMV DNA SPEC NAA+PROBE-ACNC: NOT DETECTED IU/ML
COLOR FLD: COLORLESS
FLUAV H1 2009 PAND RNA SPEC QL NAA+PROBE: NOT DETECTED
FLUAV H1 RNA SPEC QL NAA+PROBE: NOT DETECTED
FLUAV H3 RNA SPEC QL NAA+PROBE: NOT DETECTED
FLUAV RNA SPEC QL NAA+PROBE: NOT DETECTED
FLUBV RNA SPEC QL NAA+PROBE: NOT DETECTED
GRAM STAIN RESULT: NORMAL
GRAM STAIN RESULT: NORMAL
HADV DNA SPEC QL NAA+PROBE: NOT DETECTED
HCOV PNL SPEC NAA+PROBE: NOT DETECTED
HMPV RNA SPEC QL NAA+PROBE: NOT DETECTED
HPIV1 RNA SPEC QL NAA+PROBE: NOT DETECTED
HPIV2 RNA SPEC QL NAA+PROBE: NOT DETECTED
HPIV3 RNA SPEC QL NAA+PROBE: NOT DETECTED
HPIV4 RNA SPEC QL NAA+PROBE: NOT DETECTED
LYMPHOCYTES NFR FLD MANUAL: 12 %
M PNEUMO DNA SPEC QL NAA+PROBE: NOT DETECTED
MONOS+MACROS NFR FLD MANUAL: NORMAL %
NEUTS BAND NFR FLD MANUAL: 8 %
OTHER CELLS FLD MANUAL: 81 %
RSV RNA SPEC QL NAA+PROBE: NOT DETECTED
RSV RNA SPEC QL NAA+PROBE: NOT DETECTED
RV+EV RNA SPEC QL NAA+PROBE: NOT DETECTED
WBC # FLD AUTO: 113 /UL

## 2022-10-11 PROCEDURE — 87103 BLOOD FUNGUS CULTURE: CPT | Performed by: INTERNAL MEDICINE

## 2022-10-11 PROCEDURE — 87205 SMEAR GRAM STAIN: CPT | Performed by: INTERNAL MEDICINE

## 2022-10-11 PROCEDURE — 99153 MOD SED SAME PHYS/QHP EA: CPT | Performed by: INTERNAL MEDICINE

## 2022-10-11 PROCEDURE — 250N000011 HC RX IP 250 OP 636: Performed by: INTERNAL MEDICINE

## 2022-10-11 PROCEDURE — 89051 BODY FLUID CELL COUNT: CPT | Performed by: INTERNAL MEDICINE

## 2022-10-11 PROCEDURE — 31628 BRONCHOSCOPY/LUNG BX EACH: CPT

## 2022-10-11 PROCEDURE — 87102 FUNGUS ISOLATION CULTURE: CPT | Mod: 59 | Performed by: INTERNAL MEDICINE

## 2022-10-11 PROCEDURE — 250N000009 HC RX 250: Performed by: INTERNAL MEDICINE

## 2022-10-11 PROCEDURE — 71046 X-RAY EXAM CHEST 2 VIEWS: CPT | Mod: 26 | Performed by: RADIOLOGY

## 2022-10-11 PROCEDURE — 31628 BRONCHOSCOPY/LUNG BX EACH: CPT | Mod: GC | Performed by: INTERNAL MEDICINE

## 2022-10-11 PROCEDURE — 87077 CULTURE AEROBIC IDENTIFY: CPT | Performed by: INTERNAL MEDICINE

## 2022-10-11 PROCEDURE — G0500 MOD SEDAT ENDO SERVICE >5YRS: HCPCS | Performed by: INTERNAL MEDICINE

## 2022-10-11 PROCEDURE — 88305 TISSUE EXAM BY PATHOLOGIST: CPT | Mod: TC | Performed by: INTERNAL MEDICINE

## 2022-10-11 PROCEDURE — 88312 SPECIAL STAINS GROUP 1: CPT | Mod: TC | Performed by: INTERNAL MEDICINE

## 2022-10-11 PROCEDURE — 31632 BRONCHOSCOPY/LUNG BX ADDL: CPT | Mod: GC | Performed by: INTERNAL MEDICINE

## 2022-10-11 PROCEDURE — 87102 FUNGUS ISOLATION CULTURE: CPT | Performed by: INTERNAL MEDICINE

## 2022-10-11 PROCEDURE — 87633 RESP VIRUS 12-25 TARGETS: CPT | Performed by: INTERNAL MEDICINE

## 2022-10-11 PROCEDURE — 87116 MYCOBACTERIA CULTURE: CPT | Performed by: INTERNAL MEDICINE

## 2022-10-11 PROCEDURE — 31624 DX BRONCHOSCOPE/LAVAGE: CPT | Performed by: INTERNAL MEDICINE

## 2022-10-11 PROCEDURE — 2894A VOIDCORRECT: CPT | Mod: GC | Performed by: INTERNAL MEDICINE

## 2022-10-11 PROCEDURE — 31624 DX BRONCHOSCOPE/LAVAGE: CPT | Mod: GC | Performed by: INTERNAL MEDICINE

## 2022-10-11 PROCEDURE — 999N000065 XR CHEST 2 VIEWS

## 2022-10-11 RX ORDER — FENTANYL CITRATE 50 UG/ML
INJECTION, SOLUTION INTRAMUSCULAR; INTRAVENOUS PRN
Status: DISCONTINUED | OUTPATIENT
Start: 2022-10-11 | End: 2022-10-11 | Stop reason: HOSPADM

## 2022-10-11 RX ORDER — LIDOCAINE HYDROCHLORIDE 40 MG/ML
INJECTION, SOLUTION RETROBULBAR PRN
Status: DISCONTINUED | OUTPATIENT
Start: 2022-10-11 | End: 2022-10-11 | Stop reason: HOSPADM

## 2022-10-11 RX ORDER — LIDOCAINE HYDROCHLORIDE AND EPINEPHRINE 10; 10 MG/ML; UG/ML
INJECTION, SOLUTION INFILTRATION; PERINEURAL PRN
Status: DISCONTINUED | OUTPATIENT
Start: 2022-10-11 | End: 2022-10-11 | Stop reason: HOSPADM

## 2022-10-11 RX ORDER — LIDOCAINE HYDROCHLORIDE 10 MG/ML
INJECTION, SOLUTION INFILTRATION; PERINEURAL PRN
Status: DISCONTINUED | OUTPATIENT
Start: 2022-10-11 | End: 2022-10-11 | Stop reason: HOSPADM

## 2022-10-11 RX ORDER — MAGNESIUM HYDROXIDE 1200 MG/15ML
LIQUID ORAL PRN
Status: DISCONTINUED | OUTPATIENT
Start: 2022-10-11 | End: 2022-10-11 | Stop reason: HOSPADM

## 2022-10-11 ASSESSMENT — ACTIVITIES OF DAILY LIVING (ADL)
ADLS_ACUITY_SCORE: 35
ADLS_ACUITY_SCORE: 35

## 2022-10-11 NOTE — DISCHARGE INSTRUCTIONS
Discharge Instructions after Bronchoscopy    Activity  ___ You had medicine to relax and for pain. You may feel dizzy or sleepy.  For 24 hours:   Do not drive or use heavy equipment.   Do not make important decisions.   Do not drink any alcohol.    Diet  ___ When you can swallow easily, you may go back to your regular diet, medicines  and light exercise.    Follow-up  ___ We took small tissue or fluid samples to study. We will call you with the results in about 10 business days.    Call right away if you have:   Unusual chest pain   Temperature above 100.6  F (37.5  C)   Coughing that does not stop.    If you have severe pain, bleeding, or shortness of breath, go to an emergency room.    If you have questions, call:  Monday to Friday, 8 a.m. to 4:30 p.m.  Adult Pulmonology Clinic: [phone]    After hours:  Hospital: [phone] (Ask for the pulmonary fellow on call)

## 2022-10-12 LAB
BRONCHOSCOPY: NORMAL
PATH REPORT.COMMENTS IMP SPEC: NORMAL
PATH REPORT.FINAL DX SPEC: NORMAL
PATH REPORT.FINAL DX SPEC: NORMAL
PATH REPORT.GROSS SPEC: NORMAL
PATH REPORT.GROSS SPEC: NORMAL
PATH REPORT.MICROSCOPIC SPEC OTHER STN: NORMAL
PATH REPORT.RELEVANT HX SPEC: NORMAL
PATH REPORT.RELEVANT HX SPEC: NORMAL
PHOTO IMAGE: NORMAL

## 2022-10-12 PROCEDURE — 88305 TISSUE EXAM BY PATHOLOGIST: CPT | Mod: 26 | Performed by: PATHOLOGY

## 2022-10-12 PROCEDURE — 88312 SPECIAL STAINS GROUP 1: CPT | Mod: 26 | Performed by: PATHOLOGY

## 2022-10-12 PROCEDURE — 88108 CYTOPATH CONCENTRATE TECH: CPT | Mod: 26 | Performed by: PATHOLOGY

## 2022-10-12 NOTE — RESULT ENCOUNTER NOTE
Bronch biopsy result A1B0C0.  PFTs improving, patient feeling well.   No treatment at this time.   Will continue to monitor cultures.

## 2022-10-14 ENCOUNTER — TELEPHONE (OUTPATIENT)
Dept: PULMONOLOGY | Facility: CLINIC | Age: 67
End: 2022-10-14

## 2022-10-14 LAB
BACTERIA BRONCH: ABNORMAL
BACTERIA BRONCH: ABNORMAL

## 2022-10-14 NOTE — TELEPHONE ENCOUNTER
PA Initiation    Medication: Tobramycin - PA pending   Insurance Company: [employer] ([facility]) - Phone [phone] Fax [phone]  Pharmacy Filling the Rx:  unknown  Filling Pharmacy Phone:    Filling Pharmacy Fax:    Start Date: 10/14/2022

## 2022-10-17 LAB
DONOR IDENTIFICATION: NORMAL
DSA COMMENTS: NORMAL
DSA PRESENT: NO
DSA TEST METHOD: NORMAL
ORGAN: NORMAL
SA 1 CELL: NORMAL
SA 1 TEST METHOD: NORMAL
SA 2 CELL: NORMAL
SA 2 TEST METHOD: NORMAL
SA1 HI RISK ABY: NORMAL
SA1 MOD RISK ABY: NORMAL
SA2 HI RISK ABY: NORMAL
SA2 MOD RISK ABY: NORMAL
UNACCEPTABLE ANTIGENS: NORMAL
UNOS CPRA: 73
ZZZSA 1  COMMENTS: NORMAL
ZZZSA 2 COMMENTS: NORMAL

## 2022-10-17 NOTE — TELEPHONE ENCOUNTER
Prior Authorization Approval    Authorization Effective Date:    Authorization Expiration Date: 12/31/2023  Medication: Tobramycin - PA Approved  Approved Dose/Quantity: UD  Reference #:     Insurance Company: [employer] ([facility]) - Phone [phone] Fax [phone]  Expected CoPay:    186.67   CoPay Card Available:      Foundation Assistance Needed:    Which Pharmacy is filling the prescription (Not needed for infusion/clinic administered):    Pharmacy Notified: No  Patient Notified: No

## 2022-10-20 ENCOUNTER — TELEPHONE (OUTPATIENT)
Dept: TRANSPLANT | Facility: CLINIC | Age: 67
End: 2022-10-20

## 2022-10-20 DIAGNOSIS — Z94.2 S/P LUNG TRANSPLANT (H): Primary | ICD-10-CM

## 2022-10-20 DIAGNOSIS — A49.8 INFECTION, PSEUDOMONAS: ICD-10-CM

## 2022-10-20 DIAGNOSIS — D84.9 IMMUNOSUPPRESSED STATUS (H): ICD-10-CM

## 2022-10-20 RX ORDER — TOBRAMYCIN INHALATION SOLUTION 300 MG/5ML
300 INHALANT RESPIRATORY (INHALATION) 2 TIMES DAILY
Qty: 300 ML | Refills: 0 | Status: SHIPPED | OUTPATIENT
Start: 2022-10-20 | End: 2022-12-19

## 2022-10-20 RX ORDER — SOFT LENS DISINFECTANT
1 SOLUTION, NON-ORAL MISCELLANEOUS 2 TIMES DAILY
Qty: 1 EACH | Refills: 1 | Status: SHIPPED | OUTPATIENT
Start: 2022-10-20 | End: 2022-10-26

## 2022-10-20 RX ORDER — ALBUTEROL SULFATE 0.83 MG/ML
2.5 SOLUTION RESPIRATORY (INHALATION) 2 TIMES DAILY
Qty: 180 ML | Refills: 0 | Status: SHIPPED | OUTPATIENT
Start: 2022-10-20 | End: 2023-02-28

## 2022-10-20 NOTE — TELEPHONE ENCOUNTER
Patient 10/11 bronch biopsy A1B0.   Culture grew PsA.     Per Dr. Feng, treat PsA with caitlin nebs 300mg BID x 30 days.     Discussed with patient:  - will do test dose at [facility] (order faxed, spoke to RT, no issues)  - instructed patient to pre-med with albuterol nebs  - discussed potential out of pocket cost with patient. Will send to local pharmacy     Patient has neb machine at home.   Will have RT reach out to patient to discuss cleaning     MyChart message sent with plan and instructions.     Patient has no questions at this time. Called pharmacy, will be able to provide caitlin nebs and Roula cups. Patient will call back with questions, concerns, updates.

## 2022-10-20 NOTE — LETTER
PHYSICIAN ORDERS      DATE & TIME ISSUED: 2022 3:57 PM  PATIENT NAME: Melissa Elder   : 1955     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   Premedicate with albuterol neb.     Patient will bring medication for test dose.     Any questions please call: [place] [phone]    Thank you!      .

## 2022-10-21 ENCOUNTER — TELEPHONE (OUTPATIENT)
Dept: PHARMACY | Facility: CLINIC | Age: 67
End: 2022-10-21

## 2022-10-21 NOTE — TELEPHONE ENCOUNTER
Patient is eligible to fill with [facility].  Patient currently filling with [facility] pharmacy. Spoke with patient and she wanted to continue on filling with [facility] pharmacy due to our contract with [employer]     Pt has an insurance plan that processes through [employer], and per our contract with them we cannot ship or mail out orders. Pt can either  order at [facility] or have [address] deliver to local [facility] to  there (pickup at retail). If they prefer delivery or mail they will need to utilize [facility] ([phone]).

## 2022-10-23 ENCOUNTER — TELEPHONE (OUTPATIENT)
Dept: TRANSPLANT | Facility: CLINIC | Age: 67
End: 2022-10-23

## 2022-10-23 NOTE — TELEPHONE ENCOUNTER
"Patient called to report she had fallen at 0900 today, landing on her tailbone \"very hard\". Patient denies hitting her head, no loss of consciousness. Patient noted she has not been sick, reported working int he yard day before and went for walk. Denies any ill symptoms prior to the fall, no issues with balance, vision, or syncope. No visual injuries. Tailbone is tender to touch.     After fall she applied ice, rested, took IB for pain. At 100PM today she had one episode of vomiting.  Reported lying back down and had a second episode of nausea/vomiting.      Patient not local, encouraged her to be seen at local ER for imaging and to determine if their are any injuries to explain sudden onset of nausea. Discouraged continued use of IB and educated patient to take Tylenol.     Patient reported she would go to [facility] in [facility].     Transplant coordinator to follow up Monday.    "

## 2022-10-26 DIAGNOSIS — A49.8 INFECTION, PSEUDOMONAS: ICD-10-CM

## 2022-10-26 DIAGNOSIS — Z94.2 S/P LUNG TRANSPLANT (H): ICD-10-CM

## 2022-10-26 DIAGNOSIS — D84.9 IMMUNOSUPPRESSED STATUS (H): ICD-10-CM

## 2022-10-26 LAB — SCANNED LAB RESULT: NORMAL

## 2022-10-26 RX ORDER — SOFT LENS DISINFECTANT
2 SOLUTION, NON-ORAL MISCELLANEOUS
Qty: 1 EACH | Refills: 1 | Status: SHIPPED | OUTPATIENT
Start: 2022-10-26 | End: 2023-09-28

## 2022-11-04 ENCOUNTER — LAB (OUTPATIENT)
Dept: LAB | Facility: CLINIC | Age: 67
End: 2022-11-04
Payer: MEDICARE

## 2022-11-04 DIAGNOSIS — Z79.899 ENCOUNTER FOR LONG-TERM (CURRENT) USE OF HIGH-RISK MEDICATION: ICD-10-CM

## 2022-11-04 DIAGNOSIS — D84.9 IMMUNOSUPPRESSED STATUS (H): ICD-10-CM

## 2022-11-04 DIAGNOSIS — Z94.2 S/P LUNG TRANSPLANT (H): ICD-10-CM

## 2022-11-04 PROCEDURE — 80197 ASSAY OF TACROLIMUS: CPT

## 2022-11-07 LAB
TACROLIMUS BLD-MCNC: 12 UG/L (ref 5–15)
TME LAST DOSE: NORMAL H
TME LAST DOSE: NORMAL H

## 2022-11-07 NOTE — RESULT ENCOUNTER NOTE
Tacrolimus level 12.0 at 12 hours, on 11/4/2022.  Goal 8-12.   Current dose 2 mg in AM, 2.5 mg in PM    Level at goal, no change in dose.   [employer] message sent

## 2022-11-08 LAB
BACTERIA BRONCH: NO GROWTH
BACTERIA BRONCH: NO GROWTH

## 2022-11-18 ENCOUNTER — LAB (OUTPATIENT)
Dept: LAB | Facility: CLINIC | Age: 67
End: 2022-11-18
Payer: MEDICARE

## 2022-11-18 DIAGNOSIS — Z94.2 S/P LUNG TRANSPLANT (H): ICD-10-CM

## 2022-11-18 DIAGNOSIS — D84.9 IMMUNOSUPPRESSED STATUS (H): ICD-10-CM

## 2022-11-18 DIAGNOSIS — Z79.899 ENCOUNTER FOR LONG-TERM (CURRENT) USE OF HIGH-RISK MEDICATION: ICD-10-CM

## 2022-11-18 PROCEDURE — 80197 ASSAY OF TACROLIMUS: CPT

## 2022-11-21 LAB
TACROLIMUS BLD-MCNC: 8.1 UG/L (ref 5–15)
TME LAST DOSE: NORMAL H
TME LAST DOSE: NORMAL H

## 2022-11-22 NOTE — RESULT ENCOUNTER NOTE
Tacrolimus level 8.1 at 12 hours, on 11/18/2022.  Goal 8-12.   Current dose 2 mg in AM, 2.5 mg in PM    Level at goal, no change in dose.   [employer] message sent

## 2022-12-02 ENCOUNTER — LAB (OUTPATIENT)
Dept: LAB | Facility: CLINIC | Age: 67
End: 2022-12-02

## 2022-12-02 DIAGNOSIS — Z79.899 ENCOUNTER FOR LONG-TERM (CURRENT) USE OF HIGH-RISK MEDICATION: ICD-10-CM

## 2022-12-02 DIAGNOSIS — Z94.2 S/P LUNG TRANSPLANT (H): ICD-10-CM

## 2022-12-02 DIAGNOSIS — D84.9 IMMUNOSUPPRESSED STATUS (H): ICD-10-CM

## 2022-12-02 PROCEDURE — 80197 ASSAY OF TACROLIMUS: CPT | Performed by: INTERNAL MEDICINE

## 2022-12-04 LAB
TACROLIMUS BLD-MCNC: 12.6 UG/L (ref 5–15)
TME LAST DOSE: NORMAL H
TME LAST DOSE: NORMAL H

## 2022-12-05 DIAGNOSIS — Z94.2 S/P LUNG TRANSPLANT (H): ICD-10-CM

## 2022-12-05 RX ORDER — TACROLIMUS 0.5 MG/1
0.5 CAPSULE ORAL 2 TIMES DAILY
Qty: 30 CAPSULE | Refills: 11 | COMMUNITY
Start: 2022-12-05 | End: 2023-04-04

## 2022-12-05 RX ORDER — TACROLIMUS 1 MG/1
2 CAPSULE ORAL 2 TIMES DAILY
Qty: 120 CAPSULE | Refills: 11 | COMMUNITY
Start: 2022-12-05 | End: 2023-01-04

## 2022-12-06 LAB
ACID FAST STAIN (ARUP): NORMAL

## 2022-12-19 ENCOUNTER — OFFICE VISIT (OUTPATIENT)
Dept: TRANSPLANT | Facility: CLINIC | Age: 67
End: 2022-12-19
Attending: INTERNAL MEDICINE
Payer: MEDICARE

## 2022-12-19 ENCOUNTER — LAB (OUTPATIENT)
Dept: LAB | Facility: CLINIC | Age: 67
End: 2022-12-19
Payer: COMMERCIAL

## 2022-12-19 ENCOUNTER — TELEPHONE (OUTPATIENT)
Dept: TRANSPLANT | Facility: CLINIC | Age: 67
End: 2022-12-19

## 2022-12-19 ENCOUNTER — ANCILLARY PROCEDURE (OUTPATIENT)
Dept: GENERAL RADIOLOGY | Facility: CLINIC | Age: 67
End: 2022-12-19
Attending: INTERNAL MEDICINE
Payer: COMMERCIAL

## 2022-12-19 VITALS
SYSTOLIC BLOOD PRESSURE: 127 MMHG | RESPIRATION RATE: 22 BRPM | TEMPERATURE: 98.1 F | HEART RATE: 73 BPM | WEIGHT: 147.9 LBS | DIASTOLIC BLOOD PRESSURE: 71 MMHG | BODY MASS INDEX: 27.22 KG/M2 | HEIGHT: 62 IN | OXYGEN SATURATION: 98 %

## 2022-12-19 DIAGNOSIS — D84.9 IMMUNOSUPPRESSED STATUS (H): ICD-10-CM

## 2022-12-19 DIAGNOSIS — M79.89 LEG SWELLING: ICD-10-CM

## 2022-12-19 DIAGNOSIS — D64.9 ANEMIA, UNSPECIFIED TYPE: ICD-10-CM

## 2022-12-19 DIAGNOSIS — Z94.2 S/P LUNG TRANSPLANT (H): ICD-10-CM

## 2022-12-19 DIAGNOSIS — E83.42 HYPOMAGNESEMIA: ICD-10-CM

## 2022-12-19 DIAGNOSIS — Z79.899 ENCOUNTER FOR LONG-TERM (CURRENT) USE OF HIGH-RISK MEDICATION: ICD-10-CM

## 2022-12-19 DIAGNOSIS — Z94.2 S/P LUNG TRANSPLANT (H): Primary | ICD-10-CM

## 2022-12-19 DIAGNOSIS — D80.1 HYPOGAMMAGLOBULINEMIA (H): ICD-10-CM

## 2022-12-19 DIAGNOSIS — D64.9 ANEMIA, UNSPECIFIED TYPE: Primary | ICD-10-CM

## 2022-12-19 LAB
ANION GAP SERPL CALCULATED.3IONS-SCNC: 10 MMOL/L (ref 7–15)
BUN SERPL-MCNC: 30.4 MG/DL (ref 8–23)
CALCIUM SERPL-MCNC: 9.2 MG/DL (ref 8.8–10.2)
CHLORIDE SERPL-SCNC: 103 MMOL/L (ref 98–107)
CMV DNA SPEC NAA+PROBE-ACNC: NOT DETECTED IU/ML
CREAT SERPL-MCNC: 0.99 MG/DL (ref 0.51–0.95)
DEPRECATED HCO3 PLAS-SCNC: 28 MMOL/L (ref 22–29)
ERYTHROCYTE [DISTWIDTH] IN BLOOD BY AUTOMATED COUNT: 14.1 % (ref 10–15)
EXPTIME-PRE: 7.03 SEC
FEF2575-%PRED-PRE: 222 %
FEF2575-PRE: 4.01 L/SEC
FEF2575-PRED: 1.8 L/SEC
FEFMAX-%PRED-PRE: 85 %
FEFMAX-PRE: 4.75 L/SEC
FEFMAX-PRED: 5.56 L/SEC
FERRITIN SERPL-MCNC: 22 NG/ML (ref 11–328)
FEV1-%PRED-PRE: 87 %
FEV1-PRE: 1.74 L
FEV1FEV6-PRE: 98 %
FEV1FEV6-PRED: 80 %
FEV1FVC-PRE: 98 %
FEV1FVC-PRED: 80 %
FIFMAX-PRE: 2.4 L/SEC
FVC-%PRED-PRE: 70 %
FVC-PRE: 1.77 L
FVC-PRED: 2.52 L
GFR SERPL CREATININE-BSD FRML MDRD: 62 ML/MIN/1.73M2
GLUCOSE SERPL-MCNC: 177 MG/DL (ref 70–99)
HCT VFR BLD AUTO: 28.3 % (ref 35–47)
HGB BLD-MCNC: 9 G/DL (ref 11.7–15.7)
INR PPP: 0.99 (ref 0.85–1.15)
IRON BINDING CAPACITY (ROCHE): 332 UG/DL (ref 240–430)
IRON SATN MFR SERPL: 17 % (ref 15–46)
IRON SERPL-MCNC: 55 UG/DL (ref 37–145)
MAGNESIUM SERPL-MCNC: 2 MG/DL (ref 1.7–2.3)
MCH RBC QN AUTO: 28.4 PG (ref 26.5–33)
MCHC RBC AUTO-ENTMCNC: 31.8 G/DL (ref 31.5–36.5)
MCV RBC AUTO: 89 FL (ref 78–100)
PLATELET # BLD AUTO: 215 10E3/UL (ref 150–450)
POTASSIUM SERPL-SCNC: 4 MMOL/L (ref 3.4–5.3)
RBC # BLD AUTO: 3.17 10E6/UL (ref 3.8–5.2)
RETICS # AUTO: 0.08 10E6/UL (ref 0.03–0.1)
RETICS/RBC NFR AUTO: 2.5 % (ref 0.5–2)
SODIUM SERPL-SCNC: 141 MMOL/L (ref 136–145)
TACROLIMUS BLD-MCNC: 7.7 UG/L (ref 5–15)
TME LAST DOSE: NORMAL H
TME LAST DOSE: NORMAL H
VIT B12 SERPL-MCNC: 923 PG/ML (ref 232–1245)
WBC # BLD AUTO: 5.6 10E3/UL (ref 4–11)

## 2022-12-19 PROCEDURE — 80048 BASIC METABOLIC PNL TOTAL CA: CPT | Performed by: PATHOLOGY

## 2022-12-19 PROCEDURE — 82607 VITAMIN B-12: CPT | Performed by: PATHOLOGY

## 2022-12-19 PROCEDURE — 99214 OFFICE O/P EST MOD 30 MIN: CPT | Mod: 25 | Performed by: INTERNAL MEDICINE

## 2022-12-19 PROCEDURE — 85045 AUTOMATED RETICULOCYTE COUNT: CPT | Performed by: PATHOLOGY

## 2022-12-19 PROCEDURE — 86833 HLA CLASS II HIGH DEFIN QUAL: CPT | Performed by: PATHOLOGY

## 2022-12-19 PROCEDURE — 87799 DETECT AGENT NOS DNA QUANT: CPT | Performed by: INTERNAL MEDICINE

## 2022-12-19 PROCEDURE — G0463 HOSPITAL OUTPT CLINIC VISIT: HCPCS | Performed by: INTERNAL MEDICINE

## 2022-12-19 PROCEDURE — 85027 COMPLETE CBC AUTOMATED: CPT | Performed by: PATHOLOGY

## 2022-12-19 PROCEDURE — 83550 IRON BINDING TEST: CPT | Performed by: PATHOLOGY

## 2022-12-19 PROCEDURE — G0463 HOSPITAL OUTPT CLINIC VISIT: HCPCS

## 2022-12-19 PROCEDURE — 36415 COLL VENOUS BLD VENIPUNCTURE: CPT | Performed by: PATHOLOGY

## 2022-12-19 PROCEDURE — 83735 ASSAY OF MAGNESIUM: CPT | Performed by: PATHOLOGY

## 2022-12-19 PROCEDURE — 83540 ASSAY OF IRON: CPT | Performed by: PATHOLOGY

## 2022-12-19 PROCEDURE — 85610 PROTHROMBIN TIME: CPT | Performed by: PATHOLOGY

## 2022-12-19 PROCEDURE — 71046 X-RAY EXAM CHEST 2 VIEWS: CPT | Performed by: RADIOLOGY

## 2022-12-19 PROCEDURE — 80197 ASSAY OF TACROLIMUS: CPT | Performed by: INTERNAL MEDICINE

## 2022-12-19 PROCEDURE — 86832 HLA CLASS I HIGH DEFIN QUAL: CPT | Performed by: PATHOLOGY

## 2022-12-19 PROCEDURE — 94375 RESPIRATORY FLOW VOLUME LOOP: CPT | Performed by: INTERNAL MEDICINE

## 2022-12-19 PROCEDURE — 82728 ASSAY OF FERRITIN: CPT | Performed by: INTERNAL MEDICINE

## 2022-12-19 RX ORDER — IBANDRONATE SODIUM 150 MG/1
150 TABLET, FILM COATED ORAL
COMMUNITY
Start: 2022-12-19 | End: 2023-06-29 | Stop reason: ALTCHOICE

## 2022-12-19 ASSESSMENT — PAIN SCALES - GENERAL: PAINLEVEL: MODERATE PAIN (5)

## 2022-12-19 NOTE — PROGRESS NOTES
Bronchoscopy with lavage and biopsies scheduled for 12/20.   COVID test pending (done 12/19).     Date of transplant 2/21/2022  Transplant type bilateral lung transplant  Date of labs 12/19/2022  BUN 30.4 DDAVP no  Plt 215  INR 0.99 Anticoag therapy ASA Last dose 12/10  Comment questions please page Dr. Feng [phone]

## 2022-12-19 NOTE — PROGRESS NOTES
Attending attestation: I, Carson Feng M.D., have seen and examined the patient with Dr. Joaquin Mendez Jr., MD. I have reviewed labs and radiographs. We have discussed the patient and I agree with the findings and plan of care as discussed below.  The patient fell while carrying a bucket of water on 10/23 injuring her back.  Subsequent evaluation locally revealed a compression fracture.  She is being treated with a back brace and oxycodone.  She is scheduled for reevaluation locally in 1 week.  She is otherwise generally feeling well.  Her last transbronchial biopsies were A1B0C0 but she grew Pseudomonas and was treated with 1 month of FIDEL nebs.  Apart from her back injury, she has been feeling well.  Breathing is comfortable at rest and with all usual activity.  She reports occasional cough but no sputum and no chest pain.  Review of systems is negative except related to her back injury.  Exam lungs are clear with good airflow throughout, no rales, rhonchi or wheezes.  Heart with 1/6 systolic murmur, regular rate and rhythm.  Abdomen soft and nontender.  Extremities mild clubbing, 3+ lower extremity edema.  Chest x-ray, reviewed by me with no acute infiltrate and no significant change from previous.          Recent Results (from the past 168 hour(s))   COVID-19 Virus (Coronavirus) by PCR (External Result)    Collection Time: 12/13/22 10:13 AM   Result Value Ref Range    COVID-19 Virus by PCR (External Result) Negative Negative   Basic metabolic panel    Collection Time: 12/16/22  9:35 AM   Result Value Ref Range    Sodium (External) 139 134 - 143 mEq/L    Potassium (External) 3.6 3.4 - 5.1 mEq/L    Chloride (External) (External) 100 99 - 110 mEq/L    CO2 (External) 28 19 - 29 mEq/L    Anion Gap (External) 11.0 3.0 - 15.0 mEq/L    Urea Nitrogen (External) 37 (H) 5 - 24 mg/dL    Creatinine (External) 1.31 (H) 0.40 - 1.00 mg/dL    GFR Estimated (External) 45 (L) >50 ml/min/1.73m2    Calcium (External) 9.2  8.4 - 10.5 mg/dL    Glucose (External) 275 (H) 70 - 99 mg/dL   CBC with platelets    Collection Time: 12/16/22  9:35 AM   Result Value Ref Range    WBC Count (External) 5.9 3.2 - 11.0 10*9/L    RBC Count (External) 3.30 (L) 3.77 - 5.24 10*12/L    Hemoglobin (External) 9.4 (L) 11.2 - 15.5 g/dl    Hematocrit (External) 29.4 (L) 34.3 - 46.0 %    MCV (External) 89.1 81.4 - 99.0 fl    MCH (External) 28.5 26.7 - 33.1 pg    MCHC (External) 32.0 31.5 - 35.5 g/dl    RDW (External) 14.1 11.3 - 14.5 %    Platelet Count (External) 232 130 - 375 10*9/L   Basic metabolic panel    Collection Time: 12/19/22  8:21 AM   Result Value Ref Range    Sodium 141 136 - 145 mmol/L    Potassium 4.0 3.4 - 5.3 mmol/L    Chloride 103 98 - 107 mmol/L    Carbon Dioxide (CO2) 28 22 - 29 mmol/L    Anion Gap 10 7 - 15 mmol/L    Urea Nitrogen 30.4 (H) 8.0 - 23.0 mg/dL    Creatinine 0.99 (H) 0.51 - 0.95 mg/dL    Calcium 9.2 8.8 - 10.2 mg/dL    Glucose 177 (H) 70 - 99 mg/dL    GFR Estimate 62 >60 mL/min/1.73m2   Magnesium    Collection Time: 12/19/22  8:21 AM   Result Value Ref Range    Magnesium 2.0 1.7 - 2.3 mg/dL   CBC with platelets    Collection Time: 12/19/22  8:21 AM   Result Value Ref Range    WBC Count 5.6 4.0 - 11.0 10e3/uL    RBC Count 3.17 (L) 3.80 - 5.20 10e6/uL    Hemoglobin 9.0 (L) 11.7 - 15.7 g/dL    Hematocrit 28.3 (L) 35.0 - 47.0 %    MCV 89 78 - 100 fL    MCH 28.4 26.5 - 33.0 pg    MCHC 31.8 31.5 - 36.5 g/dL    RDW 14.1 10.0 - 15.0 %    Platelet Count 215 150 - 450 10e3/uL   INR    Collection Time: 12/19/22  8:21 AM   Result Value Ref Range    INR 0.99 0.85 - 1.15   Iron and iron binding capacity    Collection Time: 12/19/22  8:21 AM   Result Value Ref Range    Iron 55 37 - 145 ug/dL    Iron Sat Index 17 15 - 46 %    Iron Binding Capacity 332 240 - 430 ug/dL   Ferritin    Collection Time: 12/19/22  8:21 AM   Result Value Ref Range    Ferritin 22 11 - 328 ng/mL   Reticulocyte count    Collection Time: 12/19/22  8:21 AM   Result Value  Ref Range    % Reticulocyte 2.5 (H) 0.5 - 2.0 %    Absolute Reticulocyte 0.078 0.025 - 0.095 10e6/uL   Vitamin B12    Collection Time: 12/19/22  8:21 AM   Result Value Ref Range    Vitamin B12 923 232 - 1,245 pg/mL   General PFT Lab (Please always keep checked)    Collection Time: 12/19/22  8:38 AM   Result Value Ref Range    FVC-Pred 2.52 L    FVC-Pre 1.77 L    FVC-%Pred-Pre 70 %    FEV1-Pre 1.74 L    FEV1-%Pred-Pre 87 %    FEV1FVC-Pred 80 %    FEV1FVC-Pre 98 %    FEFMax-Pred 5.56 L/sec    FEFMax-Pre 4.75 L/sec    FEFMax-%Pred-Pre 85 %    FEF2575-Pred 1.80 L/sec    FEF2575-Pre 4.01 L/sec    [id number]-%Pred-Pre 222 %    ExpTime-Pre 7.03 sec    FIFMax-Pre 2.40 L/sec    FEV1FEV6-Pred 80 %    FEV1FEV6-Pre 98 %                   Results as noted above.    PFT Interpretation:  Mild restrictive ventilatory defect.  Unchanged from previous.   Similar to recent best.  Valid Maneuver      Impression:  Melissa Elder is a 66-year-old female 10 months status post bilateral lung transplantation for COPD.  She had a recent fall with vertebral compression fracture.  This is currently being evaluated and treated locally.  It does speak to the need for more effective therapy for her osteoporosis and she is scheduled for endocrine follow-up in February.  She appears to be doing well from a pulmonary standpoint.  She has very good exercise tolerance.  She is oxygenating well.  X-ray is clear.  PFTs with a mild restrictive ventilatory defect in a range similar to her recent baseline.  I suspect they might have even been better had she not been having her current back pain.  Last transbronchial biopsy did show in A1.  She will undergo repeat surveillance bronchoscopy this week.  She will continue her immunosuppression.  Tacrolimus will be adjusted to maintain a level of 8-12.  Continue current prednisone and Myfortic.  She does have persistent lower extremity edema.  Moderately controlled with diuretics and support stockings.  She will  continue efforts to limit her sodium intake.  Continue to pentamadine for PJP prophylaxis.  Continue CMV and EBV monitoring.  Persistent anemia, iron level within normal limits, will check reticulocyte count and B12.  Further evaluation depending on the results of those studies.      Follow-up in 2 months with surveillance bronchoscopy labs, x-ray and PFTs.      Carson Feng MD           Reason for Visit  Melissa Elder is a 67 year old year old female who is being seen for RECHECK (S/P Lung TX 2/21/2022)    Interval History:   Melissa Elder is a 66-year-old, 10 months status post bilateral lung transplantation for COPD.  She has continued improvement in exercise tolerance, denies cough, fevers, chills, or sweats.  She continues to breathe well.  Chest x-ray with no acute infiltrate and no significant change from previous. PFTs with moderate restrictive ventilatory defect. Bronchoscopy 10/11 with A1B0C0; focal minimal acute cellular vascular rejection without acute/chronic airway rejection. She denies chest pain, abdominal pain, but endorses back pain after she fell and had a compression fracture in 10/2022. Her pain has been improving and is now taking only 1 pill of oxycodone daily. She has been using a back brace, and she has an appointment in [place] to see either Neurology or Neurosurgery. Her appetite is good and stable, denies diarrhea or constipation, and denies joint pain. Lower extremity edema remains present but not worsened. Completed echocardiogram and venous duplex US after last meeting on 10/10, which were both normal. She has stopped taking Boniva since her last visit.   Assessment and plan:   Melissa Elder is a 67 year old female with h/o bilateral lung transplant on 2/21/2022 for severe COPD for who is seen today for routine follow up. Surgery uncomplicated, s/p bilateral pigtail chest tube placement for hydropneumothorax and bilateral effusions,  disseminated Ureaplasma and transient hyperammonemia. Other history notable for HTN, mild non-obstructive CAD, paroxysmal afib, osteoporosis, GERD, and colonic polyps.      1. S/p bilateral sequential lung transplant:  Continued gradual clinical improvement.  Oxygenating well.  PFTs are improving since transplant.  Chest x-ray, reviewed by me with no acute infiltrate and no significant change from previous.  Continue current immunosuppression.  Tacrolimus will be adjusted to maintain a level of 8-10.  Surveillance bronchoscopy this week.     2. ID: donor cultures with P-S MSSA with Strep mitis, Actinomyces odontolyticus, MSSA x2, and C. kruseii.  Recipient cultures at time of transplant with Actinomyces odonotolyticus. S/p ceftriazone 2/23-3/8. Completed treatment for Actinomyces in May. Nystatin can be discontinued on 8/21/2022.  - Low threshold to treat Candida if it recurs per transplant ID  - Continue monthly pentamadine nebs.     3. 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   Persistent low-level positive DSA. No evidence of AMR.  Continue monitoring.     4. Hypogammaglobulinemia: last IgG of 647, s/p IVIG (unclear indication) on 4/4.  IgG of 613 on 4/27.     5. Positive AFB on sputum culture: noted on sputum culture 3/2. Transplant ID following for speciation and susceptibility testing as well as other evidence of infection. Subsequent AFB cultures are negative to date.     6. PHS risk criteria donor: additional labs required post-transplant (between 4-8 weeks post-op): Hepatitis B, Hepatitis C, and HIV by COLT, negative on 3/25.      7. Concern for gastroparesis:  GERD: NM gastric emptying study completed 3/15 normal. Negative pH/manometry study 3/28/19, noted small hiatal hernia. No GI complaints today.  - Famotidine 20 mg BID and pantoprazole 40 mg daily     8. CAD: noted to have mild non-obstructive CAD without hemodynamically  significant lesions on cardiac cath 3/27/19.   - Continue aspirin and rosuvastatin  - Aspirin on hold due to Bronch     9. Paroxysmal afib:  HTN: had been on diltiazem pre transplant, not on AC. Persistent tachycardia post-op, controlled.  - Continue diltiazem and metoprolol     10. Hypomagnesemia:  Continue magnesium replacement to 8 pills daily, 2 pill QID.     12. Low level EBV viremia: Low-level.  Continue monitoring.     13. BLE Edema: 3+ pitting edema which is only tender upon palpation. BLE US and Echo normal.   - Continue Lasix 40 mg daily  - Continue compression stockings  - Encouraged leg raises     14. Vaccinations: Up to date with influenza, pneumococcal, and COVID vaccines. Also received Evushield.      15. Lumbar compression fracture: occurred in October 2022 after fall outside. Pain improving and patient using back brace.  - Has plan for Neurology appointment in [place] on 12/22/22; will update clinic    16. Normocytic anemia: Hgb at 9.0. Iron panel normal. Likely due to immunosuppression. Will add on reticulocyte count, B12, and folate levels.     Follow-up in 2 months with labs, x-ray, PFTs, 6 minutes walk test, and surveillance bronchoscopy.     Patient seen and examined with attending physician, MD Joaquin Arceo Jr., MD  Internal Medicine- PGY2  [facility]    Lung TX HPI  Transplants:  2/21/2022 (Lung), Postoperative day:  301    The patient was seen and examined by Carson Feng MD     A complete ROS was otherwise negative except as noted in the HPI.  Current Outpatient Medications   Medication     IBANdronate (BONIVA) 150 MG tablet     Magnesium Glycinate 665 MG CAPS     albuterol (PROAIR HFA/PROVENTIL HFA/VENTOLIN HFA) 108 (90 Base) MCG/ACT inhaler     albuterol (PROVENTIL) (2.5 MG/3ML) 0.083% neb solution     aspirin (ASA) 81 MG EC tablet     blood glucose (NO BRAND SPECIFIED) lancets standard     blood glucose (NO BRAND SPECIFIED)  test strip     blood glucose monitoring (NO BRAND SPECIFIED) meter device kit     calcium carbonate 600 mg-vitamin D 400 units (CALTRATE) 600-400 MG-UNIT per tablet     carboxymethylcellulose PF (REFRESH PLUS) 0.5 % ophthalmic solution     cetirizine (ZYRTEC) 10 MG tablet     diltiazem ER (TIAZAC) 240 MG 24 hr ER beaded capsule     famotidine (PEPCID) 20 MG tablet     furosemide (LASIX) 40 MG tablet     metoprolol tartrate (LOPRESSOR) 25 MG tablet     multivitamin w/minerals (THERA-VIT-M) tablet     mycophenolic acid (GENERIC EQUIVALENT) 360 MG EC tablet     Nebulizers (MARCIA LC PLUS) MISC     pantoprazole (PROTONIX) 40 MG EC tablet     pentamidine (NEBUPENT) 300 MG neb solution     predniSONE (DELTASONE) 5 MG tablet     rosuvastatin (CRESTOR) 5 MG tablet     tacrolimus (GENERIC EQUIVALENT) 0.5 MG capsule     tacrolimus (GENERIC EQUIVALENT) 1 MG capsule     No current facility-administered medications for this visit.     Facility-Administered Medications Ordered in Other Visits   Medication     sodium chloride (PF) 0.9% PF flush 10 mL     Allergies   Allergen Reactions     Alendronic Acid Other (See Comments)     dizziness  Other reaction(s): Dizziness     Nickel Rash     Sulfa Drugs Rash     Past Medical History:   Diagnosis Date     Atrial fibrillation with RVR (H)     hx of A fib related to severe COPD, does not meet anticoagulation criteria as noted     COPD, severe (H)      Osteoporosis        Past Surgical History:   Procedure Laterality Date     BRONCHOSCOPY (RIGID OR FLEXIBLE), DIAGNOSTIC N/A 4/7/2022    Procedure: BRONCHOSCOPY, WITH BRONCHOALVEOLAR LAVAGE and BIOPSIES;  Surgeon: Gerson Haddad MD;  Location:  GI     BRONCHOSCOPY (RIGID OR FLEXIBLE), DIAGNOSTIC N/A 5/12/2022    Procedure: BRONCHOSCOPY, WITH BRONCHOALVEOLAR LAVAGE AND BIOPSY;  Surgeon: Melissa Landin MD;  Location: [facility]     BRONCHOSCOPY (RIGID OR FLEXIBLE), DIAGNOSTIC N/A 8/2/2022    Procedure: BRONCHOSCOPY, WITH BRONCHOALVEOLAR  LAVAGE;  Surgeon: Melissa Landin MD;  Location:  GI     BRONCHOSCOPY (RIGID OR FLEXIBLE), DIAGNOSTIC N/A 10/11/2022    Procedure: BRONCHOSCOPY, WITH BRONCHOALVEOLAR LAVAGE AND BIOPSIES;  Surgeon: Angel Gallagher MD;  Location:  GI     BRONCHOSCOPY FLEXIBLE AND RIGID N/A 3/1/2022    Procedure: BRONCHOSCOPY INSPECTION;  Surgeon: Melissa Landin MD;  Location:  GI     CV CORONARY ANGIOGRAM N/A 3/27/2019    Procedure: CV CORONARY ANGIOGRAM;  Surgeon: Thierry Serrano MD;  Location:  HEART CARDIAC CATH LAB     CV RIGHT HEART CATH MEASUREMENTS RECORDED N/A 3/27/2019    Procedure: CV RIGHT HEART CATH;  Surgeon: Thierry Serrano MD;  Location:  HEART CARDIAC CATH LAB     ESOPHAGEAL IMPEDENCE FUNCTION TEST WITH 24 HOUR PH GREATER THAN 1 HOUR N/A 3/28/2019    Procedure: ESOPHAGEAL IMPEDENCE FUNCTION TEST WITH 24 HOUR PH GREATER THAN 1 HOUR;  Surgeon: Mike Henry MD;  Location:  GI     EXCISE PILONIDAL CYST, SIMPLE       IR CHEST TUBE PLACEMENT NON-TUNNELED RIGHT  3/3/2022     TONSILLECTOMY & ADENOIDECTOMY       TRANSPLANT LUNG RECIPIENT SINGLE X2 Bilateral 2022    Procedure: Bilateral Sequential Lung Transplantation, Clamshell Incision, Extracorporeal Membrane Oxgenation, Bronchoscopy, Cryoablation of Intercostal Nerves;  Surgeon: Raul Robin MD;  Location:  OR       Social History     Socioeconomic History     Marital status:      Spouse name: Not on file     Number of children: Not on file     Years of education: Not on file     Highest education level: Not on file   Occupational History     Not on file   Tobacco Use     Smoking status: Former     Packs/day: 1.50     Years: 36.00     Pack years: 54.00     Types: Cigarettes     Quit date: 2006     Years since quittin.9     Smokeless tobacco: Never   Substance and Sexual Activity     Alcohol use: Not Currently     Comment: none since transplant     Drug use: Not Currently     Comment: stated hx of marijuana,  "quit in 2006     Sexual activity: Not on file   Other Topics Concern     Parent/sibling w/ CABG, MI or angioplasty before 65F 55M? Not Asked   Social History Narrative     Not on file     Social Determinants of Health     Financial Resource Strain: Not on file   Food Insecurity: Not on file   Transportation Needs: Not on file   Physical Activity: Not on file   Stress: Not on file   Social Connections: Not on file   Intimate Partner Violence: Not on file   Housing Stability: Not on file     /71 (BP Location: Right arm, Patient Position: Sitting, Cuff Size: Adult Regular)   Pulse 73   Temp 98.1  F (36.7  C) (Oral)   Resp 22   Ht 1.575 m (5' 2\")   Wt 67.1 kg (147 lb 14.4 oz)   SpO2 98%   BMI 27.05 kg/m    Body mass index is 27.05 kg/m .  Exam:   GENERAL APPEARANCE: Well developed, well nourished, alert, and in no apparent distress.  EYES: PERRL, EOMI  HENT: Nasal mucosa with no edema and no hyperemia. No nasal polyps.  EARS: Canals clear, TMs normal  MOUTH: Oral mucosa is moist, without any lesions, no tonsillar enlargement, no oropharyngeal exudate.  NECK: supple, no masses, no thyromegaly.  LYMPHATICS: No significant axillary, cervical, or supraclavicular nodes.  RESP: normal percussion, good air flow throughout.  No crackles. No rhonchi. No wheezes.  CV: Normal S1, S2, regular rhythm, normal rate. No murmur.  No rub. No gallop. No LE edema.   ABDOMEN:  Bowel sounds normal, soft, nontender, no HSM or masses.   MS: extremities normal. No clubbing. No cyanosis.  SKIN: no rash on limited exam  NEURO: Mentation intact, speech normal, normal strength and tone, normal gait and stance  PSYCH: mentation appears normal. and affect normal/bright  Results:  Recent Results (from the past 168 hour(s))   COVID-19 Virus (Coronavirus) by PCR (External Result)    Collection Time: 12/13/22 10:13 AM   Result Value Ref Range    COVID-19 Virus by PCR (External Result) Negative Negative   Basic metabolic panel    Collection " Time: 12/16/22  9:35 AM   Result Value Ref Range    Sodium (External) 139 134 - 143 mEq/L    Potassium (External) 3.6 3.4 - 5.1 mEq/L    Chloride (External) (External) 100 99 - 110 mEq/L    CO2 (External) 28 19 - 29 mEq/L    Anion Gap (External) 11.0 3.0 - 15.0 mEq/L    Urea Nitrogen (External) 37 (H) 5 - 24 mg/dL    Creatinine (External) 1.31 (H) 0.40 - 1.00 mg/dL    GFR Estimated (External) 45 (L) >50 ml/min/1.73m2    Calcium (External) 9.2 8.4 - 10.5 mg/dL    Glucose (External) 275 (H) 70 - 99 mg/dL   CBC with platelets    Collection Time: 12/16/22  9:35 AM   Result Value Ref Range    WBC Count (External) 5.9 3.2 - 11.0 10*9/L    RBC Count (External) 3.30 (L) 3.77 - 5.24 10*12/L    Hemoglobin (External) 9.4 (L) 11.2 - 15.5 g/dl    Hematocrit (External) 29.4 (L) 34.3 - 46.0 %    MCV (External) 89.1 81.4 - 99.0 fl    MCH (External) 28.5 26.7 - 33.1 pg    MCHC (External) 32.0 31.5 - 35.5 g/dl    RDW (External) 14.1 11.3 - 14.5 %    Platelet Count (External) 232 130 - 375 10*9/L   Basic metabolic panel    Collection Time: 12/19/22  8:21 AM   Result Value Ref Range    Sodium 141 136 - 145 mmol/L    Potassium 4.0 3.4 - 5.3 mmol/L    Chloride 103 98 - 107 mmol/L    Carbon Dioxide (CO2) 28 22 - 29 mmol/L    Anion Gap 10 7 - 15 mmol/L    Urea Nitrogen 30.4 (H) 8.0 - 23.0 mg/dL    Creatinine 0.99 (H) 0.51 - 0.95 mg/dL    Calcium 9.2 8.8 - 10.2 mg/dL    Glucose 177 (H) 70 - 99 mg/dL    GFR Estimate 62 >60 mL/min/1.73m2   Magnesium    Collection Time: 12/19/22  8:21 AM   Result Value Ref Range    Magnesium 2.0 1.7 - 2.3 mg/dL   CBC with platelets    Collection Time: 12/19/22  8:21 AM   Result Value Ref Range    WBC Count 5.6 4.0 - 11.0 10e3/uL    RBC Count 3.17 (L) 3.80 - 5.20 10e6/uL    Hemoglobin 9.0 (L) 11.7 - 15.7 g/dL    Hematocrit 28.3 (L) 35.0 - 47.0 %    MCV 89 78 - 100 fL    MCH 28.4 26.5 - 33.0 pg    MCHC 31.8 31.5 - 36.5 g/dL    RDW 14.1 10.0 - 15.0 %    Platelet Count 215 150 - 450 10e3/uL   INR    Collection  Time: 12/19/22  8:21 AM   Result Value Ref Range    INR 0.99 0.85 - 1.15   Iron and iron binding capacity    Collection Time: 12/19/22  8:21 AM   Result Value Ref Range    Iron 55 37 - 145 ug/dL    Iron Sat Index 17 15 - 46 %    Iron Binding Capacity 332 240 - 430 ug/dL   General PFT Lab (Please always keep checked)    Collection Time: 12/19/22  8:38 AM   Result Value Ref Range    FVC-Pred 2.52 L    FVC-Pre 1.77 L    FVC-%Pred-Pre 70 %    FEV1-Pre 1.74 L    FEV1-%Pred-Pre 87 %    FEV1FVC-Pred 80 %    FEV1FVC-Pre 98 %    FEFMax-Pred 5.56 L/sec    FEFMax-Pre 4.75 L/sec    FEFMax-%Pred-Pre 85 %    FEF2575-Pred 1.80 L/sec    FEF2575-Pre 4.01 L/sec    [id number]-%Pred-Pre 222 %    ExpTime-Pre 7.03 sec    FIFMax-Pre 2.40 L/sec    FEV1FEV6-Pred 80 %    FEV1FEV6-Pre 98 %       Results as noted above.    PFT Interpretation:  Mild restrictive ventilatory defect.  Unchanged from previous.   Similar to recent best.  Valid Maneuver

## 2022-12-19 NOTE — NURSING NOTE
"Chief Complaint   Patient presents with     RECHECK     S/P Lung TX 2/21/2022     Vital signs:  Temp: 98.1  F (36.7  C) Temp src: Oral BP: 127/71 Pulse: 73   Resp: 22 SpO2: 98 % (RA)     Height: 157.5 cm (5' 2\") Weight: 67.1 kg (147 lb 14.4 oz)  Estimated body mass index is 27.05 kg/m  as calculated from the following:    Height as of this encounter: 1.575 m (5' 2\").    Weight as of this encounter: 67.1 kg (147 lb 14.4 oz).        Vandana Brown, [facility]  12/19/2022 9:01 AM      "

## 2022-12-19 NOTE — LETTER
12/19/2022         RE: Melissa Elder  [address] [facility] [id number]        Dear Colleague,    Thank you for referring your patient, Melissa Elder, to the [facility]. Please see a copy of my visit note below.    Attending attestation: I, Carson Feng M.D., have seen and examined the patient with Dr. Joaquin Mendez Jr., MD. I have reviewed labs and radiographs. We have discussed the patient and I agree with the findings and plan of care as discussed below.  The patient fell while carrying a bucket of water on 10/23 injuring her back.  Subsequent evaluation locally revealed a compression fracture.  She is being treated with a back brace and oxycodone.  She is scheduled for reevaluation locally in 1 week.  She is otherwise generally feeling well.  Her last transbronchial biopsies were A1B0C0 but she grew Pseudomonas and was treated with 1 month of FIDEL nebs.  Apart from her back injury, she has been feeling well.  Breathing is comfortable at rest and with all usual activity.  She reports occasional cough but no sputum and no chest pain.  Review of systems is negative except related to her back injury.  Exam lungs are clear with good airflow throughout, no rales, rhonchi or wheezes.  Heart with 1/6 systolic murmur, regular rate and rhythm.  Abdomen soft and nontender.  Extremities mild clubbing, 3+ lower extremity edema.  Chest x-ray, reviewed by me with no acute infiltrate and no significant change from previous.          Recent Results (from the past 168 hour(s))   COVID-19 Virus (Coronavirus) by PCR (External Result)    Collection Time: 12/13/22 10:13 AM   Result Value Ref Range    COVID-19 Virus by PCR (External Result) Negative Negative   Basic metabolic panel    Collection Time: 12/16/22  9:35 AM   Result Value Ref Range    Sodium (External) 139 134 - 143 mEq/L    Potassium (External) 3.6 3.4 - 5.1 mEq/L    Chloride (External) (External) 100 99 - 110 mEq/L    CO2  (External) 28 19 - 29 mEq/L    Anion Gap (External) 11.0 3.0 - 15.0 mEq/L    Urea Nitrogen (External) 37 (H) 5 - 24 mg/dL    Creatinine (External) 1.31 (H) 0.40 - 1.00 mg/dL    GFR Estimated (External) 45 (L) >50 ml/min/1.73m2    Calcium (External) 9.2 8.4 - 10.5 mg/dL    Glucose (External) 275 (H) 70 - 99 mg/dL   CBC with platelets    Collection Time: 12/16/22  9:35 AM   Result Value Ref Range    WBC Count (External) 5.9 3.2 - 11.0 10*9/L    RBC Count (External) 3.30 (L) 3.77 - 5.24 10*12/L    Hemoglobin (External) 9.4 (L) 11.2 - 15.5 g/dl    Hematocrit (External) 29.4 (L) 34.3 - 46.0 %    MCV (External) 89.1 81.4 - 99.0 fl    MCH (External) 28.5 26.7 - 33.1 pg    MCHC (External) 32.0 31.5 - 35.5 g/dl    RDW (External) 14.1 11.3 - 14.5 %    Platelet Count (External) 232 130 - 375 10*9/L   Basic metabolic panel    Collection Time: 12/19/22  8:21 AM   Result Value Ref Range    Sodium 141 136 - 145 mmol/L    Potassium 4.0 3.4 - 5.3 mmol/L    Chloride 103 98 - 107 mmol/L    Carbon Dioxide (CO2) 28 22 - 29 mmol/L    Anion Gap 10 7 - 15 mmol/L    Urea Nitrogen 30.4 (H) 8.0 - 23.0 mg/dL    Creatinine 0.99 (H) 0.51 - 0.95 mg/dL    Calcium 9.2 8.8 - 10.2 mg/dL    Glucose 177 (H) 70 - 99 mg/dL    GFR Estimate 62 >60 mL/min/1.73m2   Magnesium    Collection Time: 12/19/22  8:21 AM   Result Value Ref Range    Magnesium 2.0 1.7 - 2.3 mg/dL   CBC with platelets    Collection Time: 12/19/22  8:21 AM   Result Value Ref Range    WBC Count 5.6 4.0 - 11.0 10e3/uL    RBC Count 3.17 (L) 3.80 - 5.20 10e6/uL    Hemoglobin 9.0 (L) 11.7 - 15.7 g/dL    Hematocrit 28.3 (L) 35.0 - 47.0 %    MCV 89 78 - 100 fL    MCH 28.4 26.5 - 33.0 pg    MCHC 31.8 31.5 - 36.5 g/dL    RDW 14.1 10.0 - 15.0 %    Platelet Count 215 150 - 450 10e3/uL   INR    Collection Time: 12/19/22  8:21 AM   Result Value Ref Range    INR 0.99 0.85 - 1.15   Iron and iron binding capacity    Collection Time: 12/19/22  8:21 AM   Result Value Ref Range    Iron 55 37 - 145 ug/dL     Iron Sat Index 17 15 - 46 %    Iron Binding Capacity 332 240 - 430 ug/dL   Ferritin    Collection Time: 12/19/22  8:21 AM   Result Value Ref Range    Ferritin 22 11 - 328 ng/mL   Reticulocyte count    Collection Time: 12/19/22  8:21 AM   Result Value Ref Range    % Reticulocyte 2.5 (H) 0.5 - 2.0 %    Absolute Reticulocyte 0.078 0.025 - 0.095 10e6/uL   Vitamin B12    Collection Time: 12/19/22  8:21 AM   Result Value Ref Range    Vitamin B12 923 232 - 1,245 pg/mL   General PFT Lab (Please always keep checked)    Collection Time: 12/19/22  8:38 AM   Result Value Ref Range    FVC-Pred 2.52 L    FVC-Pre 1.77 L    FVC-%Pred-Pre 70 %    FEV1-Pre 1.74 L    FEV1-%Pred-Pre 87 %    FEV1FVC-Pred 80 %    FEV1FVC-Pre 98 %    FEFMax-Pred 5.56 L/sec    FEFMax-Pre 4.75 L/sec    FEFMax-%Pred-Pre 85 %    FEF2575-Pred 1.80 L/sec    FEF2575-Pre 4.01 L/sec    NDW3010-%Pred-Pre 222 %    ExpTime-Pre 7.03 sec    FIFMax-Pre 2.40 L/sec    FEV1FEV6-Pred 80 %    FEV1FEV6-Pre 98 %                   Results as noted above.    PFT Interpretation:  Mild restrictive ventilatory defect.  Unchanged from previous.   Similar to recent best.  Valid Maneuver      Impression:  Melissa Elder is a 66-year-old female 10 months status post bilateral lung transplantation for COPD.  She had a recent fall with vertebral compression fracture.  This is currently being evaluated and treated locally.  It does speak to the need for more effective therapy for her osteoporosis and she is scheduled for endocrine follow-up in February.  She appears to be doing well from a pulmonary standpoint.  She has very good exercise tolerance.  She is oxygenating well.  X-ray is clear.  PFTs with a mild restrictive ventilatory defect in a range similar to her recent baseline.  I suspect they might have even been better had she not been having her current back pain.  Last transbronchial biopsy did show in A1.  She will undergo repeat surveillance bronchoscopy this week.  She will  continue her immunosuppression.  Tacrolimus will be adjusted to maintain a level of 8-12.  Continue current prednisone and Myfortic.  She does have persistent lower extremity edema.  Moderately controlled with diuretics and support stockings.  She will continue efforts to limit her sodium intake.  Continue to pentamadine for PJP prophylaxis.  Continue CMV and EBV monitoring.  Persistent anemia, iron level within normal limits, will check reticulocyte count and B12.  Further evaluation depending on the results of those studies.      Follow-up in 2 months with surveillance bronchoscopy labs, x-ray and PFTs.      Carson Feng MD           Reason for Visit  Melissa Elder is a 67 year old year old female who is being seen for RECHECK (S/P Lung TX 2/21/2022)    Interval History:   Melissa Elder is a 66-year-old, 10 months status post bilateral lung transplantation for COPD.  She has continued improvement in exercise tolerance, denies cough, fevers, chills, or sweats.  She continues to breathe well.  Chest x-ray with no acute infiltrate and no significant change from previous. PFTs with moderate restrictive ventilatory defect. Bronchoscopy 10/11 with A1B0C0; focal minimal acute cellular vascular rejection without acute/chronic airway rejection. She denies chest pain, abdominal pain, but endorses back pain after she fell and had a compression fracture in 10/2022. Her pain has been improving and is now taking only 1 pill of oxycodone daily. She has been using a back brace, and she has an appointment in [place] to see either Neurology or Neurosurgery. Her appetite is good and stable, denies diarrhea or constipation, and denies joint pain. Lower extremity edema remains present but not worsened. Completed echocardiogram and venous duplex US after last meeting on 10/10, which were both normal. She has stopped taking Boniva since her last visit.   Assessment and plan:   Melissa Elder is a 67 year old female with h/o  bilateral lung transplant on 2/21/2022 for severe COPD for who is seen today for routine follow up. Surgery uncomplicated, s/p bilateral pigtail chest tube placement for hydropneumothorax and bilateral effusions, disseminated Ureaplasma and transient hyperammonemia. Other history notable for HTN, mild non-obstructive CAD, paroxysmal afib, osteoporosis, GERD, and colonic polyps.      1. S/p bilateral sequential lung transplant:  Continued gradual clinical improvement.  Oxygenating well.  PFTs are improving since transplant.  Chest x-ray, reviewed by me with no acute infiltrate and no significant change from previous.  Continue current immunosuppression.  Tacrolimus will be adjusted to maintain a level of 8-10.  Surveillance bronchoscopy this week.     2. ID: donor cultures with P-S MSSA with Strep mitis, Actinomyces odontolyticus, MSSA x2, and C. kruseii.  Recipient cultures at time of transplant with Actinomyces odonotolyticus. S/p ceftriazone 2/23-3/8. Completed treatment for Actinomyces in May. Nystatin can be discontinued on 8/21/2022.  - Low threshold to treat Candida if it recurs per transplant ID  - Continue monthly pentamadine nebs.     3. 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   Persistent low-level positive DSA. No evidence of AMR.  Continue monitoring.     4. Hypogammaglobulinemia: last IgG of 647, s/p IVIG (unclear indication) on 4/4.  IgG of 613 on 4/27.     5. Positive AFB on sputum culture: noted on sputum culture 3/2. Transplant ID following for speciation and susceptibility testing as well as other evidence of infection. Subsequent AFB cultures are negative to date.     6. PHS risk criteria donor: additional labs required post-transplant (between 4-8 weeks post-op): Hepatitis B, Hepatitis C, and HIV by COLT, negative on 3/25.      7. Concern for gastroparesis:  GERD: NM gastric emptying study completed 3/15 normal.  Negative pH/manometry study 3/28/19, noted small hiatal hernia. No GI complaints today.  - Famotidine 20 mg BID and pantoprazole 40 mg daily     8. CAD: noted to have mild non-obstructive CAD without hemodynamically significant lesions on cardiac cath 3/27/19.   - Continue aspirin and rosuvastatin  - Aspirin on hold due to Bronch     9. Paroxysmal afib:  HTN: had been on diltiazem pre transplant, not on AC. Persistent tachycardia post-op, controlled.  - Continue diltiazem and metoprolol     10. Hypomagnesemia:  Continue magnesium replacement to 8 pills daily, 2 pill QID.     12. Low level EBV viremia: Low-level.  Continue monitoring.     13. BLE Edema: 3+ pitting edema which is only tender upon palpation. BLE US and Echo normal.   - Continue Lasix 40 mg daily  - Continue compression stockings  - Encouraged leg raises     14. Vaccinations: Up to date with influenza, pneumococcal, and COVID vaccines. Also received Evushield.      15. Lumbar compression fracture: occurred in October 2022 after fall outside. Pain improving and patient using back brace.  - Has plan for Neurology appointment in [place] on 12/22/22; will update clinic    16. Normocytic anemia: Hgb at 9.0. Iron panel normal. Likely due to immunosuppression. Will add on reticulocyte count, B12, and folate levels.     Follow-up in 2 months with labs, x-ray, PFTs, 6 minutes walk test, and surveillance bronchoscopy.     Patient seen and examined with attending physician, MD Joaquin Arceo Jr., MD  Internal Medicine- PGY2  [facility]    Lung TX HPI  Transplants:  2/21/2022 (Lung), Postoperative day:  301    The patient was seen and examined by Carson Feng MD     A complete ROS was otherwise negative except as noted in the HPI.  Current Outpatient Medications   Medication     IBANdronate (BONIVA) 150 MG tablet     Magnesium Glycinate 665 MG CAPS     albuterol (PROAIR HFA/PROVENTIL HFA/VENTOLIN HFA)  108 (90 Base) MCG/ACT inhaler     albuterol (PROVENTIL) (2.5 MG/3ML) 0.083% neb solution     aspirin (ASA) 81 MG EC tablet     blood glucose (NO BRAND SPECIFIED) lancets standard     blood glucose (NO BRAND SPECIFIED) test strip     blood glucose monitoring (NO BRAND SPECIFIED) meter device kit     calcium carbonate 600 mg-vitamin D 400 units (CALTRATE) 600-400 MG-UNIT per tablet     carboxymethylcellulose PF (REFRESH PLUS) 0.5 % ophthalmic solution     cetirizine (ZYRTEC) 10 MG tablet     diltiazem ER (TIAZAC) 240 MG 24 hr ER beaded capsule     famotidine (PEPCID) 20 MG tablet     furosemide (LASIX) 40 MG tablet     metoprolol tartrate (LOPRESSOR) 25 MG tablet     multivitamin w/minerals (THERA-VIT-M) tablet     mycophenolic acid (GENERIC EQUIVALENT) 360 MG EC tablet     Nebulizers (MARCIA LC PLUS) MISC     pantoprazole (PROTONIX) 40 MG EC tablet     pentamidine (NEBUPENT) 300 MG neb solution     predniSONE (DELTASONE) 5 MG tablet     rosuvastatin (CRESTOR) 5 MG tablet     tacrolimus (GENERIC EQUIVALENT) 0.5 MG capsule     tacrolimus (GENERIC EQUIVALENT) 1 MG capsule     No current facility-administered medications for this visit.     Facility-Administered Medications Ordered in Other Visits   Medication     sodium chloride (PF) 0.9% PF flush 10 mL     Allergies   Allergen Reactions     Alendronic Acid Other (See Comments)     dizziness  Other reaction(s): Dizziness     Nickel Rash     Sulfa Drugs Rash     Past Medical History:   Diagnosis Date     Atrial fibrillation with RVR (H)     hx of A fib related to severe COPD, does not meet anticoagulation criteria as noted     COPD, severe (H)      Osteoporosis        Past Surgical History:   Procedure Laterality Date     BRONCHOSCOPY (RIGID OR FLEXIBLE), DIAGNOSTIC N/A 4/7/2022    Procedure: BRONCHOSCOPY, WITH BRONCHOALVEOLAR LAVAGE and BIOPSIES;  Surgeon: Gerson Haddad MD;  Location:  GI     BRONCHOSCOPY (RIGID OR FLEXIBLE), DIAGNOSTIC N/A 5/12/2022    Procedure:  BRONCHOSCOPY, WITH BRONCHOALVEOLAR LAVAGE AND BIOPSY;  Surgeon: Melissa Landin MD;  Location:  GI     BRONCHOSCOPY (RIGID OR FLEXIBLE), DIAGNOSTIC N/A 8/2/2022    Procedure: BRONCHOSCOPY, WITH BRONCHOALVEOLAR LAVAGE;  Surgeon: Melissa Landin MD;  Location:  GI     BRONCHOSCOPY (RIGID OR FLEXIBLE), DIAGNOSTIC N/A 10/11/2022    Procedure: BRONCHOSCOPY, WITH BRONCHOALVEOLAR LAVAGE AND BIOPSIES;  Surgeon: Angel Gallagher MD;  Location:  GI     BRONCHOSCOPY FLEXIBLE AND RIGID N/A 3/1/2022    Procedure: BRONCHOSCOPY INSPECTION;  Surgeon: Melissa Landin MD;  Location:  GI     CV CORONARY ANGIOGRAM N/A 3/27/2019    Procedure: CV CORONARY ANGIOGRAM;  Surgeon: Thierry Serrano MD;  Location:  HEART CARDIAC CATH LAB     CV RIGHT HEART CATH MEASUREMENTS RECORDED N/A 3/27/2019    Procedure: CV RIGHT HEART CATH;  Surgeon: Thierry Serrano MD;  Location:  HEART CARDIAC CATH LAB     ESOPHAGEAL IMPEDENCE FUNCTION TEST WITH 24 HOUR PH GREATER THAN 1 HOUR N/A 3/28/2019    Procedure: ESOPHAGEAL IMPEDENCE FUNCTION TEST WITH 24 HOUR PH GREATER THAN 1 HOUR;  Surgeon: Mike Henry MD;  Location:  GI     EXCISE PILONIDAL CYST, SIMPLE       IR CHEST TUBE PLACEMENT NON-TUNNELED RIGHT  3/3/2022     TONSILLECTOMY & ADENOIDECTOMY       TRANSPLANT LUNG RECIPIENT SINGLE X2 Bilateral 2/20/2022    Procedure: Bilateral Sequential Lung Transplantation, Clamshell Incision, Extracorporeal Membrane Oxgenation, Bronchoscopy, Cryoablation of Intercostal Nerves;  Surgeon: Raul Robin MD;  Location:  OR       Social History     Socioeconomic History     Marital status:      Spouse name: Not on file     Number of children: Not on file     Years of education: Not on file     Highest education level: Not on file   Occupational History     Not on file   Tobacco Use     Smoking status: Former     Packs/day: 1.50     Years: 36.00     Pack years: 54.00     Types: Cigarettes     Quit date: 1/1/2006      "Years since quittin.9     Smokeless tobacco: Never   Substance and Sexual Activity     Alcohol use: Not Currently     Comment: none since transplant     Drug use: Not Currently     Comment: stated hx of marijuana, quit in 2006     Sexual activity: Not on file   Other Topics Concern     Parent/sibling w/ CABG, MI or angioplasty before 65F 55M? Not Asked   Social History Narrative     Not on file     Social Determinants of Health     Financial Resource Strain: Not on file   Food Insecurity: Not on file   Transportation Needs: Not on file   Physical Activity: Not on file   Stress: Not on file   Social Connections: Not on file   Intimate Partner Violence: Not on file   Housing Stability: Not on file     /71 (BP Location: Right arm, Patient Position: Sitting, Cuff Size: Adult Regular)   Pulse 73   Temp 98.1  F (36.7  C) (Oral)   Resp 22   Ht 1.575 m (5' 2\")   Wt 67.1 kg (147 lb 14.4 oz)   SpO2 98%   BMI 27.05 kg/m    Body mass index is 27.05 kg/m .  Exam:   GENERAL APPEARANCE: Well developed, well nourished, alert, and in no apparent distress.  EYES: PERRL, EOMI  HENT: Nasal mucosa with no edema and no hyperemia. No nasal polyps.  EARS: Canals clear, TMs normal  MOUTH: Oral mucosa is moist, without any lesions, no tonsillar enlargement, no oropharyngeal exudate.  NECK: supple, no masses, no thyromegaly.  LYMPHATICS: No significant axillary, cervical, or supraclavicular nodes.  RESP: normal percussion, good air flow throughout.  No crackles. No rhonchi. No wheezes.  CV: Normal S1, S2, regular rhythm, normal rate. No murmur.  No rub. No gallop. No LE edema.   ABDOMEN:  Bowel sounds normal, soft, nontender, no HSM or masses.   MS: extremities normal. No clubbing. No cyanosis.  SKIN: no rash on limited exam  NEURO: Mentation intact, speech normal, normal strength and tone, normal gait and stance  PSYCH: mentation appears normal. and affect normal/bright  Results:  Recent Results (from the past 168 hour(s)) "   COVID-19 Virus (Coronavirus) by PCR (External Result)    Collection Time: 12/13/22 10:13 AM   Result Value Ref Range    COVID-19 Virus by PCR (External Result) Negative Negative   Basic metabolic panel    Collection Time: 12/16/22  9:35 AM   Result Value Ref Range    Sodium (External) 139 134 - 143 mEq/L    Potassium (External) 3.6 3.4 - 5.1 mEq/L    Chloride (External) (External) 100 99 - 110 mEq/L    CO2 (External) 28 19 - 29 mEq/L    Anion Gap (External) 11.0 3.0 - 15.0 mEq/L    Urea Nitrogen (External) 37 (H) 5 - 24 mg/dL    Creatinine (External) 1.31 (H) 0.40 - 1.00 mg/dL    GFR Estimated (External) 45 (L) >50 ml/min/1.73m2    Calcium (External) 9.2 8.4 - 10.5 mg/dL    Glucose (External) 275 (H) 70 - 99 mg/dL   CBC with platelets    Collection Time: 12/16/22  9:35 AM   Result Value Ref Range    WBC Count (External) 5.9 3.2 - 11.0 10*9/L    RBC Count (External) 3.30 (L) 3.77 - 5.24 10*12/L    Hemoglobin (External) 9.4 (L) 11.2 - 15.5 g/dl    Hematocrit (External) 29.4 (L) 34.3 - 46.0 %    MCV (External) 89.1 81.4 - 99.0 fl    MCH (External) 28.5 26.7 - 33.1 pg    MCHC (External) 32.0 31.5 - 35.5 g/dl    RDW (External) 14.1 11.3 - 14.5 %    Platelet Count (External) 232 130 - 375 10*9/L   Basic metabolic panel    Collection Time: 12/19/22  8:21 AM   Result Value Ref Range    Sodium 141 136 - 145 mmol/L    Potassium 4.0 3.4 - 5.3 mmol/L    Chloride 103 98 - 107 mmol/L    Carbon Dioxide (CO2) 28 22 - 29 mmol/L    Anion Gap 10 7 - 15 mmol/L    Urea Nitrogen 30.4 (H) 8.0 - 23.0 mg/dL    Creatinine 0.99 (H) 0.51 - 0.95 mg/dL    Calcium 9.2 8.8 - 10.2 mg/dL    Glucose 177 (H) 70 - 99 mg/dL    GFR Estimate 62 >60 mL/min/1.73m2   Magnesium    Collection Time: 12/19/22  8:21 AM   Result Value Ref Range    Magnesium 2.0 1.7 - 2.3 mg/dL   CBC with platelets    Collection Time: 12/19/22  8:21 AM   Result Value Ref Range    WBC Count 5.6 4.0 - 11.0 10e3/uL    RBC Count 3.17 (L) 3.80 - 5.20 10e6/uL    Hemoglobin 9.0 (L) 11.7  - 15.7 g/dL    Hematocrit 28.3 (L) 35.0 - 47.0 %    MCV 89 78 - 100 fL    MCH 28.4 26.5 - 33.0 pg    MCHC 31.8 31.5 - 36.5 g/dL    RDW 14.1 10.0 - 15.0 %    Platelet Count 215 150 - 450 10e3/uL   INR    Collection Time: 12/19/22  8:21 AM   Result Value Ref Range    INR 0.99 0.85 - 1.15   Iron and iron binding capacity    Collection Time: 12/19/22  8:21 AM   Result Value Ref Range    Iron 55 37 - 145 ug/dL    Iron Sat Index 17 15 - 46 %    Iron Binding Capacity 332 240 - 430 ug/dL   General PFT Lab (Please always keep checked)    Collection Time: 12/19/22  8:38 AM   Result Value Ref Range    FVC-Pred 2.52 L    FVC-Pre 1.77 L    FVC-%Pred-Pre 70 %    FEV1-Pre 1.74 L    FEV1-%Pred-Pre 87 %    FEV1FVC-Pred 80 %    FEV1FVC-Pre 98 %    FEFMax-Pred 5.56 L/sec    FEFMax-Pre 4.75 L/sec    FEFMax-%Pred-Pre 85 %    FEF2575-Pred 1.80 L/sec    FEF2575-Pre 4.01 L/sec    UEF1866-%Pred-Pre 222 %    ExpTime-Pre 7.03 sec    FIFMax-Pre 2.40 L/sec    FEV1FEV6-Pred 80 %    FEV1FEV6-Pre 98 %       Results as noted above.    PFT Interpretation:  Mild restrictive ventilatory defect.  Unchanged from previous.   Similar to recent best.  Valid Maneuver                Transplant Coordinator Note    Reason for visit: Post lung transplant follow up visit   Coordinator: Present   Caregiver:  , Tyrese    Health concerns addressed today:  1. Respiratory - no feeling ill. No shortness of breath at rest, not getting winded with activities. Purposeful cough occasionally, no sputum.   2. Fall end of October - compression fracture, manage pain with oxycodone x 1 daily. Thursday - see neurology in [place]  3.  GI: appetite good. Regular BM.   4. BLE swelling slightly improving. Wearing compression stockings and continues lasix 40mg daily. Did not improve when stopped Boniva.   5. Off Boniva now - sees endocrine for osteoporosis   6. ENT: no issues, at baseline.   7. No chest pain, no chest flutter.     Activity/rehab: cardiac rehab at home, working  on walking up hills to work on leg strength. two walks/day  Oxygen needs: room air  Pain management/RX: Back pain - compression fracture.   Diabetic management: checking BGs  Next Bronch due: last bronch 5/12, next due mid July  Risk Criteria Labs: negative 3/25/22  CMV status: D-/R-  EBV status: D+/R+  AC/asa: ASA 81mg - started holding 10 days ago.   PJP prophylactic: pentamidine neb (dapsone caused anemia/RBC hemolysis) done locally     COVID:  1. COVID-19 infection (yes/no, date of most recent positive test): no  2. Status/instructions given about COVID-19 vaccine: Received full dose 4/14/2022, received 2nd dose in October locally     Pt Education: medications (use/dose/side effects), how/when to call coordinator, frequency of labs, s/s of infection/rejection, call prior to starting any new medications, lab/vital sign book     Health Maintenance:     Last colonoscopy:     Next colonoscopy due:     Dermatology: sees provider locally    Vaccinations this visit:     Labs, CXR, PFTs reviewed with patient  Medication record reviewed and reconciled  Questions and concerns addressed    Patient Instructions  1. Continue to hydrate with 60-70 oz fluids daily.  2. Continue to exercise daily or most days of the week.  3. Follow up with your primary care provider for annual gender health maintenance procedures.  4. Follow up with colonoscopy schedule.  5. Follow up with annual dermatology visits.  6. It doesn't seem like the COVID vaccine is working well in lung transplant patients. A number of lung transplant patients have gotten sick with COVID even after receiving the vaccines.  Based on our recent experience, it can be life-threatening to get COVID  even after being vaccinated. Please continue to act like you did not get the COVID vaccine - social distancing, wearing a mask, good hand hygiene, etc. If the people around you are vaccinated, it will help reduce the risk of you getting COVID. All members of your household  should be vaccinated.  7. Keep [place] updated on what the other appointment/providers want to do.   8. Bronchoscopy in 2 months and a longer visit.   9. It is okay for you to drink caffeine. Try not to drank any after noon so it doesn't keep you up at night.   10. We don't like whirlpools.     Next transplant clinic appointment: 2 months with CXR, 6 minute walk test, labs and full PFTs  Next lab draw: pending tacrolimus level, otherwise monthly      AVS printed at time of check out            Again, thank you for allowing me to participate in the care of your patient.        Sincerely,        Carson Feng MD

## 2022-12-19 NOTE — PROGRESS NOTES
Transplant Coordinator Note    Reason for visit: Post lung transplant follow up visit   Coordinator: Present   Caregiver:  , Tyrese    Health concerns addressed today:  1. Respiratory - no feeling ill. No shortness of breath at rest, not getting winded with activities. Purposeful cough occasionally, no sputum.   2. Fall end of October - compression fracture, manage pain with oxycodone x 1 daily. Thursday - see neurology in [place]  3.  GI: appetite good. Regular BM.   4. BLE swelling slightly improving. Wearing compression stockings and continues lasix 40mg daily. Did not improve when stopped Boniva.   5. Off Boniva now - sees endocrine for osteoporosis   6. ENT: no issues, at baseline.   7. No chest pain, no chest flutter.     Activity/rehab: cardiac rehab at home, working on walking up hills to work on leg strength. two walks/day  Oxygen needs: room air  Pain management/RX: Back pain - compression fracture.   Diabetic management: checking BGs  Next Bronch due: last bronch 5/12, next due mid July  Risk Criteria Labs: negative 3/25/22  CMV status: D-/R-  EBV status: D+/R+  AC/asa: ASA 81mg - started holding 10 days ago.   PJP prophylactic: pentamidine neb (dapsone caused anemia/RBC hemolysis) done locally     COVID:  1. COVID-19 infection (yes/no, date of most recent positive test): no  2. Status/instructions given about COVID-19 vaccine: Received full dose 4/14/2022, received 2nd dose in October locally     Pt Education: medications (use/dose/side effects), how/when to call coordinator, frequency of labs, s/s of infection/rejection, call prior to starting any new medications, lab/vital sign book     Health Maintenance:     Last colonoscopy:     Next colonoscopy due:     Dermatology: sees provider locally    Vaccinations this visit:     Labs, CXR, PFTs reviewed with patient  Medication record reviewed and reconciled  Questions and concerns addressed    Patient Instructions  1. Continue to hydrate with 60-70 oz  fluids daily.  2. Continue to exercise daily or most days of the week.  3. Follow up with your primary care provider for annual gender health maintenance procedures.  4. Follow up with colonoscopy schedule.  5. Follow up with annual dermatology visits.  6. It doesn't seem like the COVID vaccine is working well in lung transplant patients. A number of lung transplant patients have gotten sick with COVID even after receiving the vaccines.  Based on our recent experience, it can be life-threatening to get COVID  even after being vaccinated. Please continue to act like you did not get the COVID vaccine - social distancing, wearing a mask, good hand hygiene, etc. If the people around you are vaccinated, it will help reduce the risk of you getting COVID. All members of your household should be vaccinated.  7. Keep Lissett updated on what the other appointment/providers want to do.   8. Bronchoscopy in 2 months and a longer visit.   9. It is okay for you to drink caffeine. Try not to drank any after noon so it doesn't keep you up at night.   10. We don't like whirlpools.     Next transplant clinic appointment: 2 months with CXR, 6 minute walk test, labs and full PFTs  Next lab draw: pending tacrolimus level, otherwise monthly      AVS printed at time of check out

## 2022-12-19 NOTE — PATIENT INSTRUCTIONS
Patient Instructions  1. Continue to hydrate with 60-70 oz fluids daily.  2. Continue to exercise daily or most days of the week.  3. Follow up with your primary care provider for annual gender health maintenance procedures.  4. Follow up with colonoscopy schedule.  5. Follow up with annual dermatology visits.  6. It doesn't seem like the COVID vaccine is working well in lung transplant patients. A number of lung transplant patients have gotten sick with COVID even after receiving the vaccines.  Based on our recent experience, it can be life-threatening to get COVID  even after being vaccinated. Please continue to act like you did not get the COVID vaccine - social distancing, wearing a mask, good hand hygiene, etc. If the people around you are vaccinated, it will help reduce the risk of you getting COVID. All members of your household should be vaccinated.  7. Keep Lissett updated on what the other appointment/providers want to do.   8. Bronchoscopy in 2 months and a longer visit.   9. It is okay for you to drink caffeine. Try not to drank any after noon so it doesn't keep you up at night.   10. We don't like whirlpools.     Next transplant clinic appointment: 2 months with CXR, 6 minute walk test, labs and full PFTs  Next lab draw: pending tacrolimus level, otherwise monthly    You are scheduled for a bronchoscopy on 12/20 at 9AM at the [facility] on the 3rd floor. Arrive 1 hour early (8AM).     Instructions     1. Nothing to eat or drink 8 hours before procedure.  2. Hold your morning medications. Bring them with you to take after the procedure.  3. Have a  available to take you home.   4. Stop Aspirin 7 days before procedure. Restart Aspirin on Thursday morning.     ~~~~~~~~~~~~~~~~~~~~~~~~~    Thoracic Transplant Office phone [phone], fax [phone]    Office Hours 8:30 - 5:00     For after-hours urgent issues, please dial [phone], and ask to speak with  the Thoracic Transplant Coordinator On-Call.  --------------------  To expedite your medication refill(s), please contact your pharmacy and have them fax a refill request to: [phone]  .   *Please allow 3 business days for routine medication refills.  *Please allow 5 business days for controlled substance medication refills.    **For Diabetic medications and supplies refill(s), please contact your pharmacy and have them contact your Endocrine team.  --------------------  For scheduling appointments call [phone].  --------------------  Please Note: If you are active on [employer], all future test results will be sent by [employer] message only, and will no longer be called to patient. You may also receive communication directly from your physician.

## 2022-12-20 ENCOUNTER — HOSPITAL ENCOUNTER (OUTPATIENT)
Facility: CLINIC | Age: 67
Discharge: HOME | End: 2022-12-20
Attending: INTERNAL MEDICINE | Admitting: INTERNAL MEDICINE
Payer: MEDICARE

## 2022-12-20 ENCOUNTER — APPOINTMENT (OUTPATIENT)
Dept: GENERAL RADIOLOGY | Facility: CLINIC | Age: 67
End: 2022-12-20
Attending: INTERNAL MEDICINE
Payer: MEDICARE

## 2022-12-20 VITALS
WEIGHT: 146.8 LBS | RESPIRATION RATE: 16 BRPM | DIASTOLIC BLOOD PRESSURE: 61 MMHG | HEART RATE: 80 BPM | BODY MASS INDEX: 27.02 KG/M2 | OXYGEN SATURATION: 97 % | SYSTOLIC BLOOD PRESSURE: 158 MMHG | HEIGHT: 62 IN

## 2022-12-20 DIAGNOSIS — Z94.2 S/P LUNG TRANSPLANT (H): Primary | ICD-10-CM

## 2022-12-20 LAB
APPEARANCE FLD: ABNORMAL
C PNEUM DNA SPEC QL NAA+PROBE: NOT DETECTED
CELL COUNT BODY FLUID SOURCE: ABNORMAL
COLOR FLD: COLORLESS
DONOR IDENTIFICATION: NORMAL
DQB5: 543
DSA COMMENTS: NORMAL
DSA PRESENT: YES
DSA TEST METHOD: NORMAL
EBV DNA # SPEC NAA+PROBE: NOT DETECTED COPIES/ML
FLUAV H1 2009 PAND RNA SPEC QL NAA+PROBE: NOT DETECTED
FLUAV H1 RNA SPEC QL NAA+PROBE: NOT DETECTED
FLUAV H3 RNA SPEC QL NAA+PROBE: NOT DETECTED
FLUAV RNA SPEC QL NAA+PROBE: NOT DETECTED
FLUBV RNA SPEC QL NAA+PROBE: NOT DETECTED
GRAM STAIN RESULT: NORMAL
GRAM STAIN RESULT: NORMAL
HADV DNA SPEC QL NAA+PROBE: NOT DETECTED
HCOV PNL SPEC NAA+PROBE: NOT DETECTED
HMPV RNA SPEC QL NAA+PROBE: NOT DETECTED
HPIV1 RNA SPEC QL NAA+PROBE: NOT DETECTED
HPIV2 RNA SPEC QL NAA+PROBE: NOT DETECTED
HPIV3 RNA SPEC QL NAA+PROBE: NOT DETECTED
HPIV4 RNA SPEC QL NAA+PROBE: NOT DETECTED
LYMPHOCYTES NFR FLD MANUAL: 11 %
M PNEUMO DNA SPEC QL NAA+PROBE: NOT DETECTED
MONOS+MACROS NFR FLD MANUAL: 69 %
NEUTS BAND NFR FLD MANUAL: 20 %
ORGAN: NORMAL
PATH REPORT.COMMENTS IMP SPEC: ABNORMAL
PATH REPORT.COMMENTS IMP SPEC: ABNORMAL
PATH REPORT.COMMENTS IMP SPEC: YES
PATH REPORT.FINAL DX SPEC: ABNORMAL
PATH REPORT.GROSS SPEC: ABNORMAL
PATH REPORT.MICROSCOPIC SPEC OTHER STN: ABNORMAL
PATH REPORT.RELEVANT HX SPEC: ABNORMAL
RSV RNA SPEC QL NAA+PROBE: NOT DETECTED
RSV RNA SPEC QL NAA+PROBE: NOT DETECTED
RV+EV RNA SPEC QL NAA+PROBE: NOT DETECTED
SA 1 CELL: NORMAL
SA 1 TEST METHOD: NORMAL
SA 2 CELL: NORMAL
SA 2 TEST METHOD: NORMAL
SA1 HI RISK ABY: NORMAL
SA1 MOD RISK ABY: NORMAL
SA2 HI RISK ABY: NORMAL
SA2 MOD RISK ABY: NORMAL
UNACCEPTABLE ANTIGENS: NORMAL
UNOS CPRA: 73
WBC # FLD AUTO: 84 /UL
ZZZSA 1  COMMENTS: NORMAL
ZZZSA 2 COMMENTS: NORMAL

## 2022-12-20 PROCEDURE — 88312 SPECIAL STAINS GROUP 1: CPT | Mod: TC | Performed by: INTERNAL MEDICINE

## 2022-12-20 PROCEDURE — 87486 CHLMYD PNEUM DNA AMP PROBE: CPT | Performed by: INTERNAL MEDICINE

## 2022-12-20 PROCEDURE — 31628 BRONCHOSCOPY/LUNG BX EACH: CPT

## 2022-12-20 PROCEDURE — 87633 RESP VIRUS 12-25 TARGETS: CPT | Performed by: INTERNAL MEDICINE

## 2022-12-20 PROCEDURE — 87206 SMEAR FLUORESCENT/ACID STAI: CPT | Performed by: INTERNAL MEDICINE

## 2022-12-20 PROCEDURE — 31628 BRONCHOSCOPY/LUNG BX EACH: CPT | Performed by: INTERNAL MEDICINE

## 2022-12-20 PROCEDURE — 31624 DX BRONCHOSCOPE/LAVAGE: CPT | Performed by: INTERNAL MEDICINE

## 2022-12-20 PROCEDURE — 87205 SMEAR GRAM STAIN: CPT | Performed by: INTERNAL MEDICINE

## 2022-12-20 PROCEDURE — 88312 SPECIAL STAINS GROUP 1: CPT | Mod: 26 | Performed by: PATHOLOGY

## 2022-12-20 PROCEDURE — 88305 TISSUE EXAM BY PATHOLOGIST: CPT | Mod: TC | Performed by: INTERNAL MEDICINE

## 2022-12-20 PROCEDURE — 31632 BRONCHOSCOPY/LUNG BX ADDL: CPT | Performed by: INTERNAL MEDICINE

## 2022-12-20 PROCEDURE — 250N000011 HC RX IP 250 OP 636: Performed by: INTERNAL MEDICINE

## 2022-12-20 PROCEDURE — 87070 CULTURE OTHR SPECIMN AEROBIC: CPT | Performed by: INTERNAL MEDICINE

## 2022-12-20 PROCEDURE — 88108 CYTOPATH CONCENTRATE TECH: CPT | Mod: 26 | Performed by: PATHOLOGY

## 2022-12-20 PROCEDURE — 250N000009 HC RX 250: Performed by: INTERNAL MEDICINE

## 2022-12-20 PROCEDURE — 87102 FUNGUS ISOLATION CULTURE: CPT | Performed by: INTERNAL MEDICINE

## 2022-12-20 PROCEDURE — 89051 BODY FLUID CELL COUNT: CPT | Performed by: INTERNAL MEDICINE

## 2022-12-20 PROCEDURE — G0500 MOD SEDAT ENDO SERVICE >5YRS: HCPCS | Performed by: INTERNAL MEDICINE

## 2022-12-20 PROCEDURE — 999N000065 XR CHEST 2 VIEWS

## 2022-12-20 PROCEDURE — 71046 X-RAY EXAM CHEST 2 VIEWS: CPT | Mod: 26 | Performed by: RADIOLOGY

## 2022-12-20 PROCEDURE — 87116 MYCOBACTERIA CULTURE: CPT | Performed by: INTERNAL MEDICINE

## 2022-12-20 PROCEDURE — 99153 MOD SED SAME PHYS/QHP EA: CPT | Performed by: INTERNAL MEDICINE

## 2022-12-20 RX ORDER — ONDANSETRON 4 MG/1
4 TABLET, ORALLY DISINTEGRATING ORAL EVERY 6 HOURS PRN
Status: DISCONTINUED | OUTPATIENT
Start: 2022-12-20 | End: 2022-12-20 | Stop reason: HOSPADM

## 2022-12-20 RX ORDER — NALOXONE HYDROCHLORIDE 0.4 MG/ML
0.4 INJECTION, SOLUTION INTRAMUSCULAR; INTRAVENOUS; SUBCUTANEOUS
Status: DISCONTINUED | OUTPATIENT
Start: 2022-12-20 | End: 2022-12-20 | Stop reason: HOSPADM

## 2022-12-20 RX ORDER — NALOXONE HYDROCHLORIDE 0.4 MG/ML
0.2 INJECTION, SOLUTION INTRAMUSCULAR; INTRAVENOUS; SUBCUTANEOUS
Status: DISCONTINUED | OUTPATIENT
Start: 2022-12-20 | End: 2022-12-20 | Stop reason: HOSPADM

## 2022-12-20 RX ORDER — FLUMAZENIL 0.1 MG/ML
0.2 INJECTION, SOLUTION INTRAVENOUS
Status: DISCONTINUED | OUTPATIENT
Start: 2022-12-20 | End: 2022-12-20 | Stop reason: HOSPADM

## 2022-12-20 RX ORDER — LIDOCAINE HYDROCHLORIDE 10 MG/ML
INJECTION, SOLUTION INFILTRATION; PERINEURAL PRN
Status: DISCONTINUED | OUTPATIENT
Start: 2022-12-20 | End: 2022-12-20 | Stop reason: HOSPADM

## 2022-12-20 RX ORDER — FENTANYL CITRATE 50 UG/ML
INJECTION, SOLUTION INTRAMUSCULAR; INTRAVENOUS PRN
Status: DISCONTINUED | OUTPATIENT
Start: 2022-12-20 | End: 2022-12-20 | Stop reason: HOSPADM

## 2022-12-20 RX ORDER — LIDOCAINE HYDROCHLORIDE 40 MG/ML
INJECTION, SOLUTION RETROBULBAR PRN
Status: DISCONTINUED | OUTPATIENT
Start: 2022-12-20 | End: 2022-12-20 | Stop reason: HOSPADM

## 2022-12-20 RX ORDER — ONDANSETRON 2 MG/ML
4 INJECTION INTRAMUSCULAR; INTRAVENOUS EVERY 6 HOURS PRN
Status: DISCONTINUED | OUTPATIENT
Start: 2022-12-20 | End: 2022-12-20 | Stop reason: HOSPADM

## 2022-12-20 RX ORDER — LIDOCAINE HYDROCHLORIDE AND EPINEPHRINE 10; 10 MG/ML; UG/ML
INJECTION, SOLUTION INFILTRATION; PERINEURAL PRN
Status: DISCONTINUED | OUTPATIENT
Start: 2022-12-20 | End: 2022-12-20 | Stop reason: HOSPADM

## 2022-12-20 ASSESSMENT — ACTIVITIES OF DAILY LIVING (ADL)
ADLS_ACUITY_SCORE: 35
ADLS_ACUITY_SCORE: 35

## 2022-12-20 NOTE — DISCHARGE INSTRUCTIONS
Discharge Instructions after Bronchoscopy    Activity  __X_ You had medicine to relax and for pain. You may feel dizzy or sleepy.  For 24 hours:   Do not drive or use heavy equipment.   Do not make important decisions.   Do not drink any alcohol.    Diet  __X_ When you can swallow easily, you may go back to your regular diet, medicines  and light exercise.    Follow-up  __X_ We took small tissue or fluid samples to study. We will call you with the results in about 10 business days.    Call right away if you have:   Unusual chest pain   Temperature above 100.6  F (37.5  C)   Coughing that does not stop.    If you have severe pain, bleeding, or shortness of breath, go to an emergency room.    If you have questions, call:  Monday to Friday, 8 a.m. to 4:30 p.m.  [facility]: [phone]    After hours:  [facility]: [phone] (Ask for the pulmonary fellow on call)

## 2022-12-20 NOTE — RESULT ENCOUNTER NOTE
Tacrolimus level 7.7 at 12 hours. Goal 8-12. No change in dose as patient close to goal. Will repeat level with monthly lab draw.

## 2022-12-21 LAB
CMV DNA SPEC NAA+PROBE-ACNC: NOT DETECTED IU/ML
PATH REPORT.COMMENTS IMP SPEC: NORMAL
PATH REPORT.COMMENTS IMP SPEC: NORMAL
PATH REPORT.FINAL DX SPEC: NORMAL
PATH REPORT.GROSS SPEC: NORMAL
PATH REPORT.MICROSCOPIC SPEC OTHER STN: NORMAL
PATH REPORT.RELEVANT HX SPEC: NORMAL
PHOTO IMAGE: NORMAL

## 2022-12-21 PROCEDURE — 88305 TISSUE EXAM BY PATHOLOGIST: CPT | Mod: 26 | Performed by: PATHOLOGY

## 2022-12-22 LAB — BACTERIA BRONCH: NORMAL

## 2022-12-28 ENCOUNTER — TELEPHONE (OUTPATIENT)
Dept: TRANSPLANT | Facility: CLINIC | Age: 67
End: 2022-12-28

## 2022-12-28 NOTE — LETTER
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     Please continue monthly labs/standing orders.     Any questions please call: Lissett Morris at [phone]    Please fax these results to [phone].     Thank you!      .

## 2022-12-28 NOTE — TELEPHONE ENCOUNTER
Additional labs ordered to be drawn at next lab draw locally: LDH, haptoglobin, serum free hemoglobin, and peripheral blood smear (eval why hgb is low and iron level is elevated).

## 2023-01-04 DIAGNOSIS — Z94.2 S/P LUNG TRANSPLANT (H): ICD-10-CM

## 2023-01-04 RX ORDER — TACROLIMUS 1 MG/1
CAPSULE ORAL
Qty: 120 CAPSULE | Refills: 11 | Status: SHIPPED | OUTPATIENT
Start: 2023-01-04 | End: 2023-02-28

## 2023-01-06 ENCOUNTER — TELEPHONE (OUTPATIENT)
Dept: TRANSPLANT | Facility: CLINIC | Age: 68
End: 2023-01-06

## 2023-01-06 NOTE — LETTER
PHYSICIAN ORDERS      DATE & TIME ISSUED: 2023 3:34 PM  PATIENT NAME: Melissa Elder   : 1955     [facility] MR# [if applicable]: [id number]     DIAGNOSIS:  Lung Transplant  Z94.2  Please draw 1,3 beta D glucan fungitell, aspergillus galactomannan antigen, BMP, Mag, CBC with platelets.       Any questions please call: Arturo [phone]    Please fax these results to [phone].      .

## 2023-01-06 NOTE — TELEPHONE ENCOUNTER
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     Per Dr. Feng:  -check fungitel + galatomannan   -chest CT to check for fungal infection         Orders faxed to [facility]     
Normal vision: sees adequately in most situations; can see medication labels, newsprint

## 2023-01-06 NOTE — LETTER
PHYSICIAN ORDERS      DATE & TIME ISSUED: 2023 3:38 PM  PATIENT NAME: Melissa Elder   : 1955     [facility] MR# [if applicable]: [id number]     DIAGNOSIS:  Lung Transplant  Z94.2  Please call patient and schedule for a non-contrast chest CT.      Any questions please call: Arturo [phone]    Please fax these results to [phone].      .

## 2023-01-17 ENCOUNTER — TELEPHONE (OUTPATIENT)
Dept: TRANSPLANT | Facility: CLINIC | Age: 68
End: 2023-01-17

## 2023-01-17 LAB
BACTERIA BRONCH: ABNORMAL
BACTERIA BRONCH: NO GROWTH

## 2023-01-17 NOTE — LETTER
PHYSICIAN ORDERS      DATE & TIME ISSUED: 2023 8:57 AM  PATIENT NAME: Melissa Elder   : 1955     [facility] MR# [if applicable]: [id number]     DIAGNOSIS:  Lung Transplant  Z94.2  Please send Chest CT imaging done on 2023 via CD overnight to the address below.     [facility]   [address] [place] [address]      Any questions please call: Arturo [phone]    Please fax these results to [phone].      .

## 2023-01-17 NOTE — TELEPHONE ENCOUNTER
Fax sent to [facility] requesting images of chest CT done 1/16/23 be sent to [facility] via CD overnight.

## 2023-01-24 DIAGNOSIS — Z94.2 S/P LUNG TRANSPLANT (H): Primary | ICD-10-CM

## 2023-01-24 NOTE — PROGRESS NOTES
Chest CT and funigtel reviewed by Dr. Feng. No further intervention at this time. Will repeat fungitel with next clinic visit.

## 2023-01-27 ENCOUNTER — LAB (OUTPATIENT)
Dept: LAB | Facility: CLINIC | Age: 68
End: 2023-01-27
Payer: MEDICARE

## 2023-01-27 DIAGNOSIS — D84.9 IMMUNOSUPPRESSED STATUS (H): ICD-10-CM

## 2023-01-27 DIAGNOSIS — Z94.2 S/P LUNG TRANSPLANT (H): ICD-10-CM

## 2023-01-27 DIAGNOSIS — Z79.899 ENCOUNTER FOR LONG-TERM (CURRENT) USE OF HIGH-RISK MEDICATION: ICD-10-CM

## 2023-01-27 PROCEDURE — 80197 ASSAY OF TACROLIMUS: CPT

## 2023-01-30 LAB
TACROLIMUS BLD-MCNC: 8.1 UG/L (ref 5–15)
TME LAST DOSE: NORMAL H
TME LAST DOSE: NORMAL H

## 2023-01-31 NOTE — RESULT ENCOUNTER NOTE
Tacrolimus level 8.1 at 12 hours, on 1/27/23.  Goal 8-12.   Current dose 2 mg in AM, 2.5 mg in PM    No change in dose   [employer] message sent

## 2023-02-14 LAB
ACID FAST STAIN (ARUP): NORMAL

## 2023-02-24 ENCOUNTER — LAB (OUTPATIENT)
Dept: LAB | Facility: CLINIC | Age: 68
End: 2023-02-24
Payer: MEDICARE

## 2023-02-24 DIAGNOSIS — D84.9 IMMUNOSUPPRESSED STATUS (H): ICD-10-CM

## 2023-02-24 DIAGNOSIS — Z94.2 S/P LUNG TRANSPLANT (H): ICD-10-CM

## 2023-02-24 DIAGNOSIS — Z79.899 ENCOUNTER FOR LONG-TERM (CURRENT) USE OF HIGH-RISK MEDICATION: ICD-10-CM

## 2023-02-24 PROCEDURE — 80197 ASSAY OF TACROLIMUS: CPT

## 2023-02-27 LAB
TACROLIMUS BLD-MCNC: 8.6 UG/L (ref 5–15)
TME LAST DOSE: NORMAL H
TME LAST DOSE: NORMAL H

## 2023-02-27 NOTE — PROGRESS NOTES
Transplant Coordinator Note    Reason for visit: Post lung transplant follow up visit   Coordinator: Present (mookie- in person)   Caregiver:  Present     Health concerns addressed today:  1. Resp:Breathing comfortable. Coughing very infrequently, clear. Chest x-ray looks good. PFTs looked great.   2. ENT  3. GI: Good appetite. Diet dependant loose stools occasionally.   4. Mood:    5. Back pain improved.       Last dose of pentamidine?     Activity/rehab: walking outside a little bit.   Oxygen needs: room air  Pain management/RX: Back pain - compression fracture.   Diabetic management: checking BGs  Next Bronch due: last bronch 5/12, next due mid July  Risk Criteria Labs: negative 3/25/22  CMV status: D-/R-  EBV status: D+/R+  AC/asa: ASA 81mg - started holding 10 days ago.   PJP prophylactic: pentamidine neb (dapsone caused anemia/RBC hemolysis) done locally     COVID:  1. COVID-19 infection (yes/no, date of most recent positive test): no  2. Status/instructions given about COVID-19 vaccine: Received full dose 4/14/2022, received 2nd dose in October locally.   3. Bivalent booster given 11/21/23    Pt Education: medications (use/dose/side effects), how/when to call coordinator, frequency of labs, s/s of infection/rejection, call prior to starting any new medications, lab/vital sign book    Health Maintenance:     Last colonoscopy:     Next colonoscopy due:     Dermatology:    Vaccinations this visit:     Labs, CXR, PFTs reviewed with patient  Medication record reviewed and reconciled  Questions and concerns addressed    Patient Instructions  1. Continue to hydrate with 60-70 oz fluids daily.  2. Continue to exercise daily or most days of the week.  3. Follow up with your primary care provider for annual gender health maintenance procedures.  4. Follow up with colonoscopy schedule.  5. Follow up with annual dermatology visits.  6. It doesn't seem like the COVID vaccine is working well in lung transplant patients. A  number of lung transplant patients have gotten sick with COVID even after receiving the vaccines.  Based on our recent experience, it can be life-threatening to get COVID  even after being vaccinated. Please continue to act like you did not get the COVID vaccine - social distancing, wearing a mask, good hand hygiene, etc. If the people around you are vaccinated, it will help reduce the risk of you getting COVID. All members of your household should be vaccinated.  7. Bring you blood pressure readings with you to your next clinic visit. Make sure to take your blood pressure in the morning 5 minutes after sitting down.   8. Take a home COVID test before bronchoscopy tomorrow. Bring a picture of negative test with you to your bronchoscopy.   9. Your PFTs look great!! Keep up the good work.   10. Happy one year anniversary!! We are lowering our tacrolimus goal to 8-10. Arturo will let you know what to change your dose to when your level comes back from today.   11. Your cholesterol was efra today, increase your Rosuvastatin to 10mg daily.   12. You can start recording your weight weekly.   13. We are going to check your cholesterol again next visit, so make sure you fasting for your lab drawn next visit.     Next transplant clinic appointment: 3 months with CXR, labs and PFTs  Next lab draw: pending tacrolimus level       AVS printed at time of check out

## 2023-02-28 ENCOUNTER — ANCILLARY PROCEDURE (OUTPATIENT)
Dept: GENERAL RADIOLOGY | Facility: CLINIC | Age: 68
End: 2023-02-28
Attending: INTERNAL MEDICINE
Payer: COMMERCIAL

## 2023-02-28 ENCOUNTER — OFFICE VISIT (OUTPATIENT)
Dept: TRANSPLANT | Facility: CLINIC | Age: 68
End: 2023-02-28
Attending: INTERNAL MEDICINE
Payer: MEDICARE

## 2023-02-28 ENCOUNTER — LAB (OUTPATIENT)
Dept: LAB | Facility: CLINIC | Age: 68
End: 2023-02-28
Payer: COMMERCIAL

## 2023-02-28 VITALS
BODY MASS INDEX: 26.01 KG/M2 | TEMPERATURE: 98.4 F | OXYGEN SATURATION: 97 % | HEART RATE: 78 BPM | SYSTOLIC BLOOD PRESSURE: 119 MMHG | WEIGHT: 142.2 LBS | DIASTOLIC BLOOD PRESSURE: 63 MMHG

## 2023-02-28 DIAGNOSIS — E83.42 HYPOMAGNESEMIA: ICD-10-CM

## 2023-02-28 DIAGNOSIS — D84.9 IMMUNOSUPPRESSED STATUS (H): ICD-10-CM

## 2023-02-28 DIAGNOSIS — D64.9 ANEMIA, UNSPECIFIED TYPE: ICD-10-CM

## 2023-02-28 DIAGNOSIS — Z79.899 ENCOUNTER FOR LONG-TERM (CURRENT) USE OF HIGH-RISK MEDICATION: ICD-10-CM

## 2023-02-28 DIAGNOSIS — Z94.2 S/P LUNG TRANSPLANT (H): ICD-10-CM

## 2023-02-28 DIAGNOSIS — E83.42 HYPOMAGNESEMIA: Primary | ICD-10-CM

## 2023-02-28 DIAGNOSIS — Z94.2 S/P LUNG TRANSPLANT (H): Primary | ICD-10-CM

## 2023-02-28 DIAGNOSIS — A49.8 INFECTION, PSEUDOMONAS: ICD-10-CM

## 2023-02-28 DIAGNOSIS — E78.00 HYPERCHOLESTEROLEMIA: ICD-10-CM

## 2023-02-28 LAB
6 MIN WALK (FT): 1050 FT
6 MIN WALK (M): 320 M
ALBUMIN SERPL BCG-MCNC: 4.3 G/DL (ref 3.5–5.2)
ALP SERPL-CCNC: 68 U/L (ref 35–104)
ALT SERPL W P-5'-P-CCNC: 10 U/L (ref 10–35)
ANION GAP SERPL CALCULATED.3IONS-SCNC: 12 MMOL/L (ref 7–15)
AST SERPL W P-5'-P-CCNC: 15 U/L (ref 10–35)
BASOPHILS # BLD MANUAL: 0.1 10E3/UL (ref 0–0.2)
BASOPHILS NFR BLD MANUAL: 1 %
BILIRUB SERPL-MCNC: 0.3 MG/DL
BUN SERPL-MCNC: 26 MG/DL (ref 8–23)
CALCIUM SERPL-MCNC: 9.5 MG/DL (ref 8.8–10.2)
CHLORIDE SERPL-SCNC: 103 MMOL/L (ref 98–107)
CHOLEST SERPL-MCNC: 247 MG/DL
CMV DNA SPEC NAA+PROBE-ACNC: NOT DETECTED IU/ML
CREAT SERPL-MCNC: 1.08 MG/DL (ref 0.51–0.95)
DEPRECATED CALCIDIOL+CALCIFEROL SERPL-MC: 42 UG/L (ref 20–75)
DEPRECATED HCO3 PLAS-SCNC: 25 MMOL/L (ref 22–29)
DLCOUNC-%PRED-PRE: 101 %
DLCOUNC-PRE: 18.64 ML/MIN/MMHG
DLCOUNC-PRED: 18.37 ML/MIN/MMHG
EOSINOPHIL # BLD MANUAL: 0.1 10E3/UL (ref 0–0.7)
EOSINOPHIL NFR BLD MANUAL: 1 %
ERV-%PRED-PRE: 93 %
ERV-PRE: 0.6 L
ERV-PRED: 0.64 L
ERYTHROCYTE [DISTWIDTH] IN BLOOD BY AUTOMATED COUNT: 13.9 % (ref 10–15)
EXPTIME-PRE: 5.18 SEC
FEF2575-%PRED-PRE: 242 %
FEF2575-PRE: 4.37 L/SEC
FEF2575-PRED: 1.8 L/SEC
FEFMAX-%PRED-PRE: 98 %
FEFMAX-PRE: 5.45 L/SEC
FEFMAX-PRED: 5.56 L/SEC
FEV1-%PRED-PRE: 100 %
FEV1-PRE: 2.02 L
FEV1FEV6-PRE: 99 %
FEV1FEV6-PRED: 80 %
FEV1FVC-PRE: 99 %
FEV1FVC-PRED: 80 %
FEV1SVC-PRE: 105 %
FEV1SVC-PRED: 70 %
FIFMAX-PRE: 2.16 L/SEC
FRCPLETH-%PRED-PRE: 95 %
FRCPLETH-PRE: 2.49 L
FRCPLETH-PRED: 2.59 L
FVC-%PRED-PRE: 80 %
FVC-PRE: 2.04 L
FVC-PRED: 2.52 L
GFR SERPL CREATININE-BSD FRML MDRD: 56 ML/MIN/1.73M2
GLUCOSE SERPL-MCNC: 116 MG/DL (ref 70–99)
HBA1C MFR BLD: 7.1 %
HCT VFR BLD AUTO: 32.1 % (ref 35–47)
HDLC SERPL-MCNC: 69 MG/DL
HGB BLD-MCNC: 10.5 G/DL (ref 11.7–15.7)
IC-%PRED-PRE: 59 %
IC-PRE: 1.32 L
IC-PRED: 2.23 L
INR PPP: 0.9 (ref 0.85–1.15)
LDLC SERPL CALC-MCNC: 126 MG/DL
LYMPHOCYTES # BLD MANUAL: 1.5 10E3/UL (ref 0.8–5.3)
LYMPHOCYTES NFR BLD MANUAL: 27 %
MAGNESIUM SERPL-MCNC: 1.8 MG/DL (ref 1.7–2.3)
MCH RBC QN AUTO: 28.4 PG (ref 26.5–33)
MCHC RBC AUTO-ENTMCNC: 32.7 G/DL (ref 31.5–36.5)
MCV RBC AUTO: 87 FL (ref 78–100)
MONOCYTES # BLD MANUAL: 0.3 10E3/UL (ref 0–1.3)
MONOCYTES NFR BLD MANUAL: 5 %
NEUTROPHILS # BLD MANUAL: 3.8 10E3/UL (ref 1.6–8.3)
NEUTROPHILS NFR BLD MANUAL: 66 %
NONHDLC SERPL-MCNC: 178 MG/DL
PHOSPHATE SERPL-MCNC: 3.1 MG/DL (ref 2.5–4.5)
PLAT MORPH BLD: NORMAL
PLATELET # BLD AUTO: 233 10E3/UL (ref 150–450)
POTASSIUM SERPL-SCNC: 4.2 MMOL/L (ref 3.4–5.3)
PROT SERPL-MCNC: 6.6 G/DL (ref 6.4–8.3)
RBC # BLD AUTO: 3.7 10E6/UL (ref 3.8–5.2)
RBC MORPH BLD: NORMAL
RVPLETH-%PRED-PRE: 98 %
RVPLETH-PRE: 1.88 L
RVPLETH-PRED: 1.92 L
SODIUM SERPL-SCNC: 140 MMOL/L (ref 136–145)
TACROLIMUS BLD-MCNC: 10.1 UG/L (ref 5–15)
TLCPLETH-%PRED-PRE: 82 %
TLCPLETH-PRE: 3.81 L
TLCPLETH-PRED: 4.6 L
TME LAST DOSE: NORMAL H
TME LAST DOSE: NORMAL H
TRIGL SERPL-MCNC: 259 MG/DL
VA-%PRED-PRE: 75 %
VA-PRE: 3.3 L
VC-%PRED-PRE: 67 %
VC-PRE: 1.92 L
VC-PRED: 2.87 L
WBC # BLD AUTO: 5.7 10E3/UL (ref 4–11)

## 2023-02-28 PROCEDURE — 94618 PULMONARY STRESS TESTING: CPT | Performed by: INTERNAL MEDICINE

## 2023-02-28 PROCEDURE — 99215 OFFICE O/P EST HI 40 MIN: CPT | Mod: 25 | Performed by: INTERNAL MEDICINE

## 2023-02-28 PROCEDURE — G0463 HOSPITAL OUTPT CLINIC VISIT: HCPCS | Performed by: INTERNAL MEDICINE

## 2023-02-28 PROCEDURE — 86832 HLA CLASS I HIGH DEFIN QUAL: CPT | Performed by: INTERNAL MEDICINE

## 2023-02-28 PROCEDURE — 94729 DIFFUSING CAPACITY: CPT | Performed by: INTERNAL MEDICINE

## 2023-02-28 PROCEDURE — 83735 ASSAY OF MAGNESIUM: CPT | Performed by: PATHOLOGY

## 2023-02-28 PROCEDURE — 82306 VITAMIN D 25 HYDROXY: CPT | Performed by: INTERNAL MEDICINE

## 2023-02-28 PROCEDURE — 94726 PLETHYSMOGRAPHY LUNG VOLUMES: CPT | Performed by: INTERNAL MEDICINE

## 2023-02-28 PROCEDURE — 87799 DETECT AGENT NOS DNA QUANT: CPT | Performed by: INTERNAL MEDICINE

## 2023-02-28 PROCEDURE — 87449 NOS EACH ORGANISM AG IA: CPT | Mod: 90 | Performed by: PATHOLOGY

## 2023-02-28 PROCEDURE — 85007 BL SMEAR W/DIFF WBC COUNT: CPT | Performed by: PATHOLOGY

## 2023-02-28 PROCEDURE — 85610 PROTHROMBIN TIME: CPT | Performed by: PATHOLOGY

## 2023-02-28 PROCEDURE — 94375 RESPIRATORY FLOW VOLUME LOOP: CPT | Performed by: INTERNAL MEDICINE

## 2023-02-28 PROCEDURE — 80197 ASSAY OF TACROLIMUS: CPT | Performed by: INTERNAL MEDICINE

## 2023-02-28 PROCEDURE — 80061 LIPID PANEL: CPT | Performed by: PATHOLOGY

## 2023-02-28 PROCEDURE — 99417 PROLNG OP E/M EACH 15 MIN: CPT | Performed by: INTERNAL MEDICINE

## 2023-02-28 PROCEDURE — 84100 ASSAY OF PHOSPHORUS: CPT | Performed by: PATHOLOGY

## 2023-02-28 PROCEDURE — 83036 HEMOGLOBIN GLYCOSYLATED A1C: CPT | Performed by: INTERNAL MEDICINE

## 2023-02-28 PROCEDURE — 71046 X-RAY EXAM CHEST 2 VIEWS: CPT | Mod: GC | Performed by: RADIOLOGY

## 2023-02-28 PROCEDURE — 80053 COMPREHEN METABOLIC PANEL: CPT | Performed by: PATHOLOGY

## 2023-02-28 PROCEDURE — 85027 COMPLETE CBC AUTOMATED: CPT | Performed by: PATHOLOGY

## 2023-02-28 PROCEDURE — 36415 COLL VENOUS BLD VENIPUNCTURE: CPT | Performed by: PATHOLOGY

## 2023-02-28 PROCEDURE — 86833 HLA CLASS II HIGH DEFIN QUAL: CPT | Performed by: INTERNAL MEDICINE

## 2023-02-28 PROCEDURE — 99000 SPECIMEN HANDLING OFFICE-LAB: CPT | Performed by: PATHOLOGY

## 2023-02-28 RX ORDER — ROSUVASTATIN CALCIUM 5 MG/1
10 TABLET, COATED ORAL DAILY
Qty: 60 TABLET | Refills: 11 | Status: SHIPPED | OUTPATIENT
Start: 2023-02-28 | End: 2023-03-30

## 2023-02-28 RX ORDER — LIDOCAINE 40 MG/G
CREAM TOPICAL
Status: CANCELLED | OUTPATIENT
Start: 2023-02-28

## 2023-02-28 RX ORDER — TACROLIMUS 1 MG/1
2 CAPSULE ORAL 2 TIMES DAILY
Qty: 120 CAPSULE | Refills: 11
Start: 2023-02-28 | End: 2023-03-03

## 2023-02-28 RX ORDER — PREDNISONE 5 MG/1
TABLET ORAL
Qty: 150 TABLET | Refills: 11
Start: 2023-02-28 | End: 2023-06-26

## 2023-02-28 ASSESSMENT — PAIN SCALES - GENERAL: PAINLEVEL: NO PAIN (0)

## 2023-02-28 NOTE — NURSING NOTE
Chief Complaint   Patient presents with     RECHECK     Return visit.     Blood pressure 119/63, pulse 78, temperature 98.4  F (36.9  C), weight 64.5 kg (142 lb 3.2 oz), SpO2 97 %, not currently breastfeeding.    GABI MARROQUIN

## 2023-02-28 NOTE — PATIENT INSTRUCTIONS
Left pt a message to call office.    Patient Instructions  1. Continue to hydrate with 60-70 oz fluids daily.  2. Continue to exercise daily or most days of the week.  3. Follow up with your primary care provider for annual gender health maintenance procedures.  4. Follow up with colonoscopy schedule.  5. Follow up with annual dermatology visits.  6. It doesn't seem like the COVID vaccine is working well in lung transplant patients. A number of lung transplant patients have gotten sick with COVID even after receiving the vaccines.  Based on our recent experience, it can be life-threatening to get COVID  even after being vaccinated. Please continue to act like you did not get the COVID vaccine - social distancing, wearing a mask, good hand hygiene, etc. If the people around you are vaccinated, it will help reduce the risk of you getting COVID. All members of your household should be vaccinated.  7. Bring you blood pressure readings with you to your next clinic visit. Make sure to take your blood pressure in the morning 5 minutes after sitting down.   8. Take a home COVID test before bronchoscopy tomorrow. Bring a picture of negative test with you to your bronchoscopy.   9. Your PFTs look great!! Keep up the good work.   10. Happy one year anniversary!! We are lowering our tacrolimus goal to 8-10. Arturo will let you know what to change your dose to when your level comes back from today.   11. Your cholesterol was efra today, increase your Rosuvastatin to 10mg daily.   12. You can start recording your weight weekly.   13. We are going to check your cholesterol again next visit, so make sure you fasting for your lab drawn next visit.     Next transplant clinic appointment: 3 months with CXR, labs and PFTs  Next lab draw: pending tacrolimus level       AVS printed at time of check out          You are scheduled for a bronchoscopy on 3/1/23 at 9:00 AM at the [facility] on the 3rd floor. Arrive 1 hour early, 8:00  AM.    Instructions     1. Nothing to eat or drink 8 hours before procedure.  2. Hold your morning medications except for Diltiazem and metoprolol, take these in the morning with a small sip of water. Bring them with you to take after the procedure.  3. Have a  available to take you home.   4. Stop Aspirin 7 days before procedure.      ~~~~~~~~~~~~~~~~~~~~~~~~~    Thoracic Transplant Office phone [phone], fax [phone]    Office Hours 8:30 - 5:00     For after-hours urgent issues, please dial [phone], and ask to speak with the Thoracic Transplant Coordinator On-Call.  --------------------  To expedite your medication refill(s), please contact your pharmacy and have them fax a refill request to: [phone]  .   *Please allow 3 business days for routine medication refills.  *Please allow 5 business days for controlled substance medication refills.    **For Diabetic medications and supplies refill(s), please contact your pharmacy and have them contact your Endocrine team.  --------------------  For scheduling appointments call [phone].  --------------------  Please Note: If you are active on [employer], all future test results will be sent by [employer] message only, and will no longer be called to patient. You may also receive communication directly from your physician.

## 2023-02-28 NOTE — LETTER
2/28/2023     RE: Melissa Elder  [address]  [facility] [address]    Dear Colleague,    Thank you for referring your patient, Melissa Elder, to the [facility]. Please see a copy of my visit note below.    Transplant Coordinator Note    Reason for visit: Post lung transplant follow up visit   Coordinator: Present (mookie- in person)   Caregiver:  Present     Health concerns addressed today:  1. Resp:Breathing comfortable. Coughing very infrequently, clear. Chest x-ray looks good. PFTs looked great.   2. ENT  3. GI: Good appetite. Diet dependant loose stools occasionally.   4. Mood:    5. Back pain improved.       Last dose of pentamidine?     Activity/rehab: walking outside a little bit.   Oxygen needs: room air  Pain management/RX: Back pain - compression fracture.   Diabetic management: checking BGs  Next Bronch due: last bronch 5/12, next due mid July  Risk Criteria Labs: negative 3/25/22  CMV status: D-/R-  EBV status: D+/R+  AC/asa: ASA 81mg - started holding 10 days ago.   PJP prophylactic: pentamidine neb (dapsone caused anemia/RBC hemolysis) done locally     COVID:  1. COVID-19 infection (yes/no, date of most recent positive test): no  2. Status/instructions given about COVID-19 vaccine: Received full dose 4/14/2022, received 2nd dose in October locally.   3. Bivalent booster given 11/21/23    Pt Education: medications (use/dose/side effects), how/when to call coordinator, frequency of labs, s/s of infection/rejection, call prior to starting any new medications, lab/vital sign book    Health Maintenance:     Last colonoscopy:     Next colonoscopy due:     Dermatology:    Vaccinations this visit:     Labs, CXR, PFTs reviewed with patient  Medication record reviewed and reconciled  Questions and concerns addressed    Patient Instructions  1. Continue to hydrate with 60-70 oz fluids daily.  2. Continue to exercise daily or most days of the week.  3. Follow up with your primary care  provider for annual gender health maintenance procedures.  4. Follow up with colonoscopy schedule.  5. Follow up with annual dermatology visits.  6. It doesn't seem like the COVID vaccine is working well in lung transplant patients. A number of lung transplant patients have gotten sick with COVID even after receiving the vaccines.  Based on our recent experience, it can be life-threatening to get COVID  even after being vaccinated. Please continue to act like you did not get the COVID vaccine - social distancing, wearing a mask, good hand hygiene, etc. If the people around you are vaccinated, it will help reduce the risk of you getting COVID. All members of your household should be vaccinated.  7. Bring you blood pressure readings with you to your next clinic visit. Make sure to take your blood pressure in the morning 5 minutes after sitting down.   8. Take a home COVID test before bronchoscopy tomorrow. Bring a picture of negative test with you to your bronchoscopy.   9. Your PFTs look great!! Keep up the good work.   10. Happy one year anniversary!! We are lowering our tacrolimus goal to 8-10. Arturo will let you know what to change your dose to when your level comes back from today.   11. Your cholesterol was efra today, increase your Rosuvastatin to 10mg daily.   12. You can start recording your weight weekly.   13. We are going to check your cholesterol again next visit, so make sure you fasting for your lab drawn next visit.     Next transplant clinic appointment: 3 months with CXR, labs and PFTs  Next lab draw: pending tacrolimus level       AVS printed at time of check out          Reason for Visit  Melissa Elder is a 67 year old year old female who is being seen for RECHECK (Return visit.)      Assessment and plan:   Melissa Elder is a 67 year old female with h/o bilateral lung transplant on 2/21/2022 for severe COPD for who is seen today for routine follow up. Surgery uncomplicated, s/p bilateral pigtail  chest tube placement for hydropneumothorax and bilateral effusions, disseminated Ureaplasma and transient hyperammonemia. Other history notable for HTN, mild non-obstructive CAD, paroxysmal afib, osteoporosis, GERD, and colonic polyps.     Pulmonary/lung transplant: The patient reports very good exercise tolerance.  Occasional cough attributed to postnasal drip.  She was oxygenating well at 97%.  No significant desaturation with 6-minute walk.  Chest x-ray, reviewed by me with no acute infiltrate and no change from previous.  PFTs with normal spirometry, best since transplant.  The patient appears to be doing well from a pulmonary standpoint.  Tacrolimus goal will be reduced to 8-10.  She will continue on her current prednisone and Myfortic.  Proceed with surveillance bronchoscopy with lavage and biopsies as planned.      Positive DSA: Progressive decline.  Clinically improving.  Continue monitoring.  Date DSA mfi DSA mfi DSA mfi   2/28/2022  none             3/25/2022  DQB5  3876  DQB6  2138       3/21/2022 DQB5  4824  DQB6  2794  DR15 833    4/14/2022  DQB5 5812   DQB6 2704  DR15 811    4/19/2022 DQB5 4518 DQB6 1630 DR15 586   4/27/2022 DQB5 4003 DQB6 1816 DR15    5/10/2022 DQB5 4496 DQB6 2248 DR15    5/18/2022 DQB5 3922 DQB6 1734 DR15    5/23/2022 DQB5 2490 DQB6  DR15    6/20/2022 DQB5 1644 DQB6  DR15    8/1/2022 DQB5 966 DQB6  DR15    10/10/2022 DQB5 None DQB6  DR15    12/19/2022 DQB5 534 DQB6  DR15                  Hypertension: Blood pressure appears to be adequately controlled with current diltiazem, metoprolol and furosemide.  If blood pressure remains adequately controlled, may be able to reduce metoprolol to 25 mg twice a day.  The patient will monitor home blood pressures.    Hypercholesterolemia: Cholesterol, LDL and triglycerides are elevated.  Rosuvastatin will be increased to 10 mg daily.  Lipid battery and LFTs will be rechecked with next visit.    Dermatology: The patient saw a local dermatologist.   She reports 2 lesions on her nose were frozen otherwise no concerning findings.    Environmental allergies: Quiescent on current Zyrtec.    Lower extremity edema: Adequately controlled with current furosemide.    Hypomagnesemia: Magnesium level is adequate on current replacement.    Hypogammaglobulinemia: No indication for replacement currently.  Continue annual monitoring.    EBV viremia: Low-level positive in early 2022.  Not detected in August and December 2022.  Pending today.      Prophylaxis: Continue monthly pentamadine nebs.  Continue CMV monitoring.    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  Bronchoscopy in October 2022 with Pseudomonas, treated with FIDEL nebs with resolution.  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  Fungitell and galactomannan were negative and chest CT showed no evidence of fungus so no treatment was initiated.     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  Subsequent AFB cultures were negative.  No treatment indicated.    Reflux: Adequately controlled with current pantoprazole and famotidine.    Coronary artery disease: Nonobstructive.  Continue aspirin.  Increase rosuvastatin as noted above.    CKD: The patient has stage IIIa CKD.  Creatinine is at the low end of her recent range.  Tacrolimus goal was reduced as noted above.  The patient should maintain adequate hydration and avoid other nephrotoxins.    Glucose intolerance: Hemoglobin A1c is elevated at 7.1.  The patient will be referred to a local endocrinologist for further evaluation.      Paroxysmal atrial fibrillation: The patient appears to be in sinus rhythm on exam today.  Continue diltiazem and metoprolol.    Osteoporosis: Lumbar compression fracture in October 2022.  Boniva was held due to concern for possible contribution to lower extremity swelling.  Patient is scheduled to follow-up with local endocrinologist.      Healthcare maintenance: The  patient has received her influenza vaccine for this flu season.  She is up-to-date with COVID boosters.  Annual studies were completed today.    Follow-up in 3 months with labs, x-ray and PFTs.      I, Carson Feng, have spent 60 minutes on the day of the visit to review the chart, interview and examine the patient, review labs and imaging, formulate a plan, document and submit orders. Time documented is excluding time spent for PFT interpretation.     Carson Feng MD     Lung TX HPI  Transplants:  2/21/2022 (Lung), Postoperative day:  372    The patient was seen and examined by Carson Feng MD   Melissa Elder is a 67 year old female with h/o bilateral lung transplant on 2/21/2022 for severe COPD for who is seen today for routine follow up. Surgery uncomplicated, s/p bilateral pigtail chest tube placement for hydropneumothorax and bilateral effusions, disseminated Ureaplasma and transient hyperammonemia. Other history notable for HTN, mild non-obstructive CAD, paroxysmal afib, osteoporosis, GERD, and colonic polyps.     The patient has been well since her last visit.  Breathing is comfortable at rest and with all activity.  Exercise tolerance improving.  Occasional cough, attributed to postnasal drip.  Occasional clear sputum.  No hemoptysis.  No chest pain.  Previous back pain has resolved other than an occasional ache.  No fever, chills or night sweats.  The patient exercises on a treadmill and exercises outside if the weather permits.    Review of systems:  Appetite is good  Occasional sinus headaches  No nausea vomiting or abdominal pain occasional loose stool if eating fatty food.  Low back pain intermittent, no radiation  Easy bruising, unchanged.      A complete ROS was otherwise negative except as noted in the HPI.    Current Outpatient Medications   Medication     predniSONE (DELTASONE) 5 MG tablet     tacrolimus (GENERIC EQUIVALENT) 1 MG capsule     albuterol (PROAIR HFA/PROVENTIL  HFA/VENTOLIN HFA) 108 (90 Base) MCG/ACT inhaler     aspirin (ASA) 81 MG EC tablet     blood glucose (NO BRAND SPECIFIED) lancets standard     blood glucose (NO BRAND SPECIFIED) test strip     blood glucose monitoring (NO BRAND SPECIFIED) meter device kit     calcium carbonate 600 mg-vitamin D 400 units (CALTRATE) 600-400 MG-UNIT per tablet     carboxymethylcellulose PF (REFRESH PLUS) 0.5 % ophthalmic solution     cetirizine (ZYRTEC) 10 MG tablet     diltiazem ER (TIAZAC) 240 MG 24 hr ER beaded capsule     famotidine (PEPCID) 20 MG tablet     furosemide (LASIX) 40 MG tablet     IBANdronate (BONIVA) 150 MG tablet     Magnesium Glycinate 665 MG CAPS     metoprolol tartrate (LOPRESSOR) 25 MG tablet     multivitamin w/minerals (THERA-VIT-M) tablet     mycophenolic acid (GENERIC EQUIVALENT) 360 MG EC tablet     Nebulizers (MARCIA LC PLUS) MISC     pantoprazole (PROTONIX) 40 MG EC tablet     pentamidine (NEBUPENT) 300 MG neb solution     rosuvastatin (CRESTOR) 5 MG tablet     tacrolimus (GENERIC EQUIVALENT) 0.5 MG capsule     No current facility-administered medications for this visit.     Facility-Administered Medications Ordered in Other Visits   Medication     sodium chloride (PF) 0.9% PF flush 10 mL     Allergies   Allergen Reactions     Alendronic Acid Other (See Comments)     dizziness  Other reaction(s): Dizziness     Nickel Rash     Sulfa Drugs Rash     Past Medical History:   Diagnosis Date     Atrial fibrillation with RVR (H)     hx of A fib related to severe COPD, does not meet anticoagulation criteria as noted     COPD, severe (H)      Osteoporosis        Past Surgical History:   Procedure Laterality Date     BRONCHOSCOPY (RIGID OR FLEXIBLE), DIAGNOSTIC N/A 4/7/2022    Procedure: BRONCHOSCOPY, WITH BRONCHOALVEOLAR LAVAGE and BIOPSIES;  Surgeon: Gerson Haddad MD;  Location:  GI     BRONCHOSCOPY (RIGID OR FLEXIBLE), DIAGNOSTIC N/A 5/12/2022    Procedure: BRONCHOSCOPY, WITH BRONCHOALVEOLAR LAVAGE AND BIOPSY;   Surgeon: Melissa Landin MD;  Location:  GI     BRONCHOSCOPY (RIGID OR FLEXIBLE), DIAGNOSTIC N/A 8/2/2022    Procedure: BRONCHOSCOPY, WITH BRONCHOALVEOLAR LAVAGE;  Surgeon: Melissa Landin MD;  Location:  GI     BRONCHOSCOPY (RIGID OR FLEXIBLE), DIAGNOSTIC N/A 10/11/2022    Procedure: BRONCHOSCOPY, WITH BRONCHOALVEOLAR LAVAGE AND BIOPSIES;  Surgeon: Angel Gallagher MD;  Location:  GI     BRONCHOSCOPY (RIGID OR FLEXIBLE), DIAGNOSTIC N/A 12/20/2022    Procedure: BRONCHOSCOPY, WITH BRONCHOALVEOLAR LAVAGE AND BIOPSIES;  Surgeon: Perlman, David Morris, MD;  Location:  GI     BRONCHOSCOPY FLEXIBLE AND RIGID N/A 3/1/2022    Procedure: BRONCHOSCOPY INSPECTION;  Surgeon: Melissa Landin MD;  Location:  GI     CV CORONARY ANGIOGRAM N/A 3/27/2019    Procedure: CV CORONARY ANGIOGRAM;  Surgeon: Thierry Serrano MD;  Location:  HEART CARDIAC CATH LAB     CV RIGHT HEART CATH MEASUREMENTS RECORDED N/A 3/27/2019    Procedure: CV RIGHT HEART CATH;  Surgeon: Thierry Serrano MD;  Location:  HEART CARDIAC CATH LAB     ESOPHAGEAL IMPEDENCE FUNCTION TEST WITH 24 HOUR PH GREATER THAN 1 HOUR N/A 3/28/2019    Procedure: ESOPHAGEAL IMPEDENCE FUNCTION TEST WITH 24 HOUR PH GREATER THAN 1 HOUR;  Surgeon: Mike Henry MD;  Location:  GI     EXCISE PILONIDAL CYST, SIMPLE       IR CHEST TUBE PLACEMENT NON-TUNNELED RIGHT  3/3/2022     TONSILLECTOMY & ADENOIDECTOMY       TRANSPLANT LUNG RECIPIENT SINGLE X2 Bilateral 2/20/2022    Procedure: Bilateral Sequential Lung Transplantation, Clamshell Incision, Extracorporeal Membrane Oxgenation, Bronchoscopy, Cryoablation of Intercostal Nerves;  Surgeon: Raul Robin MD;  Location:  OR       Social History     Socioeconomic History     Marital status:      Spouse name: Not on file     Number of children: Not on file     Years of education: Not on file     Highest education level: Not on file   Occupational History     Not on file   Tobacco Use      Smoking status: Former     Packs/day: 1.50     Years: 36.00     Pack years: 54.00     Types: Cigarettes     Quit date: 2006     Years since quittin.1     Smokeless tobacco: Never   Substance and Sexual Activity     Alcohol use: Not Currently     Comment: none since transplant     Drug use: Not Currently     Comment: stated hx of marijuana, quit in      Sexual activity: Not on file   Other Topics Concern     Parent/sibling w/ CABG, MI or angioplasty before 65F 55M? Not Asked   Social History Narrative     Not on file     Social Determinants of Health     Financial Resource Strain: Not on file   Food Insecurity: Not on file   Transportation Needs: Not on file   Physical Activity: Not on file   Stress: Not on file   Social Connections: Not on file   Intimate Partner Violence: Not on file   Housing Stability: Not on file         /63   Pulse 78   Temp 98.4  F (36.9  C)   Wt 64.5 kg (142 lb 3.2 oz)   SpO2 97%   BMI 26.01 kg/m    Body mass index is 26.01 kg/m .  Exam:   GENERAL APPEARANCE: Well developed, well nourished, alert, and in no apparent distress.  EYES: PERRL, EOMI  EARS: Canals clear, TMs normal.  Partially obscured by cerumen  MOUTH: Oral mucosa is moist, without any lesions, no tonsillar enlargement, no oropharyngeal exudate.  NECK: supple, no masses, no thyromegaly.  LYMPHATICS: No significant axillary, cervical, or supraclavicular nodes.  RESP: normal percussion, good air flow throughout.  Minimal basilar crackles. No rhonchi. No wheezes.  CV: Normal S1, S2, regular rhythm, normal rate. No murmur.  No rub. No gallop. No LE edema.   ABDOMEN:  Bowel sounds normal, soft, nontender, no HSM or masses.   MS: extremities normal. (+) clubbing. No cyanosis.  SKIN: no rash on limited exam  NEURO: Mentation intact, speech normal, normal strength and tone, normal gait and stance  PSYCH: mentation appears normal. and affect normal/bright  Results:  Recent Results (from the past 168 hour(s))    Tacrolimus level    Collection Time: 02/24/23  9:25 AM   Result Value Ref Range    Tacrolimus by Tandem Mass Spectrometry 8.6 5.0 - 15.0 ug/L    Tacrolimus Last Dose Date 2/23/2023     Tacrolimus Last Dose Time  9:30 PM    Basic metabolic panel    Collection Time: 02/24/23  9:29 AM   Result Value Ref Range    Sodium (External) 142 134 - 143 mEq/L    Potassium (External) 3.6 3.4 - 5.1 mEq/L    Chloride (External) (External) 98 (L) 99 - 110 mEq/L    CO2 (External) 30 (H) 19 - 29 mEq/L    Anion Gap (External) 14.0 3.0 - 15.0 mEq/L    Urea Nitrogen (External) 31 (H) 5 - 24 mg/dL    Creatinine (External) 1.17 (H) 0.40 - 1.00 mg/dL    GFR Estimated (External) 51 (L) >60 ml/min/1.73m2    Calcium (External) 10.2 8.4 - 10.5 mg/dL    Glucose (External) 155 (H) 70 - 99 mg/dL   CBC with platelets    Collection Time: 02/24/23  9:29 AM   Result Value Ref Range    WBC Count (External) 7.0 3.2 - 11.0 10*9/L    RBC Count (External) 3.89 3.77 - 5.24 10*12/L    Hemoglobin (External) 11.3 11.2 - 15.5 g/dL    Hematocrit (External) 34.2 (L) 34.3 - 46.0 %    MCV (External) 87.9 81.4 - 99.0 fL    MCH (External) 29.0 26.7 - 33.1 pg    MCHC (External) 33.0 31.6 - 35.5 g/dL    RDW (External) 13.7 11.3 - 14.6 %    Platelet Count (External) 247 130 - 375 10*9/L   6 minute walk test    Collection Time: 02/28/23 12:00 AM   Result Value Ref Range    6 min walk (FT) 1,050 1,225 ft    6 Min Walk (M) 320 373 m   Magnesium    Collection Time: 02/28/23  6:31 AM   Result Value Ref Range    Magnesium 1.8 1.7 - 2.3 mg/dL   Phosphorus    Collection Time: 02/28/23  6:31 AM   Result Value Ref Range    Phosphorus 3.1 2.5 - 4.5 mg/dL   Comprehensive metabolic panel    Collection Time: 02/28/23  6:31 AM   Result Value Ref Range    Sodium 140 136 - 145 mmol/L    Potassium 4.2 3.4 - 5.3 mmol/L    Chloride 103 98 - 107 mmol/L    Carbon Dioxide (CO2) 25 22 - 29 mmol/L    Anion Gap 12 7 - 15 mmol/L    Urea Nitrogen 26.0 (H) 8.0 - 23.0 mg/dL    Creatinine 1.08 (H)  0.51 - 0.95 mg/dL    Calcium 9.5 8.8 - 10.2 mg/dL    Glucose 116 (H) 70 - 99 mg/dL    Alkaline Phosphatase 68 35 - 104 U/L    AST 15 10 - 35 U/L    ALT 10 10 - 35 U/L    Protein Total 6.6 6.4 - 8.3 g/dL    Albumin 4.3 3.5 - 5.2 g/dL    Bilirubin Total 0.3 <=1.2 mg/dL    GFR Estimate 56 (L) >60 mL/min/1.73m2   Lipid Profile    Collection Time: 02/28/23  6:31 AM   Result Value Ref Range    Cholesterol 247 (H) <200 mg/dL    Triglycerides 259 (H) <150 mg/dL    Direct Measure HDL 69 >=50 mg/dL    LDL Cholesterol Calculated 126 (H) <=100 mg/dL    Non HDL Cholesterol 178 (H) <130 mg/dL   INR    Collection Time: 02/28/23  6:31 AM   Result Value Ref Range    INR 0.90 0.85 - 1.15   CBC with platelets and differential    Collection Time: 02/28/23  6:31 AM   Result Value Ref Range    WBC Count 5.7 4.0 - 11.0 10e3/uL    RBC Count 3.70 (L) 3.80 - 5.20 10e6/uL    Hemoglobin 10.5 (L) 11.7 - 15.7 g/dL    Hematocrit 32.1 (L) 35.0 - 47.0 %    MCV 87 78 - 100 fL    MCH 28.4 26.5 - 33.0 pg    MCHC 32.7 31.5 - 36.5 g/dL    RDW 13.9 10.0 - 15.0 %    Platelet Count 233 150 - 450 10e3/uL   Manual Differential    Collection Time: 02/28/23  6:31 AM   Result Value Ref Range    % Neutrophils 66 %    % Lymphocytes 27 %    % Monocytes 5 %    % Eosinophils 1 %    % Basophils 1 %    Absolute Neutrophils 3.8 1.6 - 8.3 10e3/uL    Absolute Lymphocytes 1.5 0.8 - 5.3 10e3/uL    Absolute Monocytes 0.3 0.0 - 1.3 10e3/uL    Absolute Eosinophils 0.1 0.0 - 0.7 10e3/uL    Absolute Basophils 0.1 0.0 - 0.2 10e3/uL    RBC Morphology Confirmed RBC Indices     Platelet Assessment  Automated Count Confirmed. Platelet morphology is normal.     Automated Count Confirmed. Platelet morphology is normal.   General PFT Lab (Please always keep checked)    Collection Time: 02/28/23  6:58 AM   Result Value Ref Range    FVC-Pred 2.52 L    FVC-Pre 2.04 L    FVC-%Pred-Pre 80 %    FEV1-Pre 2.02 L    FEV1-%Pred-Pre 100 %    FEV1FVC-Pred 80 %    FEV1FVC-Pre 99 %    FEFMax-Pred  5.56 L/sec    FEFMax-Pre 5.45 L/sec    FEFMax-%Pred-Pre 98 %    FEF2575-Pred 1.80 L/sec    FEF2575-Pre 4.37 L/sec    HKF3311-%Pred-Pre 242 %    ExpTime-Pre 5.18 sec    FIFMax-Pre 2.16 L/sec    VC-Pred 2.87 L    VC-Pre 1.92 L    VC-%Pred-Pre 67 %    IC-Pred 2.23 L    IC-Pre 1.32 L    IC-%Pred-Pre 59 %    ERV-Pred 0.64 L    ERV-Pre 0.60 L    ERV-%Pred-Pre 93 %    FEV1FEV6-Pred 80 %    FEV1FEV6-Pre 99 %    FRCPleth-Pred 2.59 L    FRCPleth-Pre 2.49 L    FRCPleth-%Pred-Pre 95 %    RVPleth-Pred 1.92 L    RVPleth-Pre 1.88 L    RVPleth-%Pred-Pre 98 %    TLCPleth-Pred 4.60 L    TLCPleth-Pre 3.81 L    TLCPleth-%Pred-Pre 82 %    DLCOunc-Pred 18.37 ml/min/mmHg    DLCOunc-Pre 18.64 ml/min/mmHg    DLCOunc-%Pred-Pre 101 %    VA-Pre 3.30 L    VA-%Pred-Pre 75 %    FEV1SVC-Pred 70 %    FEV1SVC-Pre 105 %       Results as noted above.    PFT Interpretation:  Normal spirometry.  Increased from previous.  Best since lung transplantation  Valid Maneuver    On lung volumes, residual volume and total lung capacity are within normal limits.  This is the first lung volumes since the lung transplant.  Normal diffusing capacity.  First DLCO since lung transplant.    On 6-minute walk, the patient walked 1050 feet (lower limit of normal 1225 feet).  Oxygen saturation maintained % throughout. Walk distance is increased from previous but this is the first 6-minute walk since transplant.  Again, thank you for allowing me to participate in the care of your patient.      Sincerely,    Carson Feng MD

## 2023-02-28 NOTE — RESULT ENCOUNTER NOTE
Tacrolimus level 10.1 at 12 hours, on 2/28/23.  Goal 8-10.   Current dose 2 mg in AM, 2 mg in PM    No change in dose  MyChart message sent     Hemoglobin A1c 7.1, pt will see local endocrinologist April 7th and will discuss A1C level.

## 2023-02-28 NOTE — RESULT ENCOUNTER NOTE
Tacrolimus level 8.6 at 12 hours, on 2/24/2023.  Goal 8-10.   Current dose 2 mg in AM, 2.5 mg in PM    Level at goal, no change in dose.

## 2023-02-28 NOTE — PROGRESS NOTES
Reason for Visit  Melissa Elder is a 67 year old year old female who is being seen for RECHECK (Return visit.)      Assessment and plan:   Melissa Elder is a 67 year old female with h/o bilateral lung transplant on 2/21/2022 for severe COPD for who is seen today for routine follow up. Surgery uncomplicated, s/p bilateral pigtail chest tube placement for hydropneumothorax and bilateral effusions, disseminated Ureaplasma and transient hyperammonemia. Other history notable for HTN, mild non-obstructive CAD, paroxysmal afib, osteoporosis, GERD, and colonic polyps.     Pulmonary/lung transplant: The patient reports very good exercise tolerance.  Occasional cough attributed to postnasal drip.  She was oxygenating well at 97%.  No significant desaturation with 6-minute walk.  Chest x-ray, reviewed by me with no acute infiltrate and no change from previous.  PFTs with normal spirometry, best since transplant.  The patient appears to be doing well from a pulmonary standpoint.  Tacrolimus goal will be reduced to 8-10.  She will continue on her current prednisone and Myfortic.  Proceed with surveillance bronchoscopy with lavage and biopsies as planned.      Positive DSA: Progressive decline.  Clinically improving.  Continue monitoring.  Date DSA mfi DSA mfi DSA mfi   2/28/2022  none             3/25/2022  DQB5  3876  DQB6  2138       3/21/2022 DQB5  4824  DQB6  2794  DR15 833    4/14/2022  DQB5 5812   DQB6 2704  DR15 811    4/19/2022 DQB5 4518 DQB6 1630 DR15 586   4/27/2022 DQB5 4003 DQB6 1816 DR15    5/10/2022 DQB5 4496 DQB6 2248 DR15    5/18/2022 DQB5 3922 DQB6 1734 DR15    5/23/2022 DQB5 2490 DQB6  DR15    6/20/2022 DQB5 1644 DQB6  DR15    8/1/2022 DQB5 966 DQB6  DR15    10/10/2022 DQB5 None DQB6  DR15    12/19/2022 DQB5 534 DQB6  DR15                  Hypertension: Blood pressure appears to be adequately controlled with current diltiazem, metoprolol and furosemide.  If blood pressure remains adequately controlled, may be  able to reduce metoprolol to 25 mg twice a day.  The patient will monitor home blood pressures.    Hypercholesterolemia: Cholesterol, LDL and triglycerides are elevated.  Rosuvastatin will be increased to 10 mg daily.  Lipid battery and LFTs will be rechecked with next visit.    Dermatology: The patient saw a local dermatologist.  She reports 2 lesions on her nose were frozen otherwise no concerning findings.    Environmental allergies: Quiescent on current Zyrtec.    Lower extremity edema: Adequately controlled with current furosemide.    Hypomagnesemia: Magnesium level is adequate on current replacement.    Hypogammaglobulinemia: No indication for replacement currently.  Continue annual monitoring.    EBV viremia: Low-level positive in early 2022.  Not detected in August and December 2022.  Pending today.      Prophylaxis: Continue monthly pentamadine nebs.  Continue CMV monitoring.    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  Bronchoscopy in October 2022 with Pseudomonas, treated with FIDEL nebs with resolution.  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  Fungitell and galactomannan were negative and chest CT showed no evidence of fungus so no treatment was initiated.     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  Subsequent AFB cultures were negative.  No treatment indicated.    Reflux: Adequately controlled with current pantoprazole and famotidine.    Coronary artery disease: Nonobstructive.  Continue aspirin.  Increase rosuvastatin as noted above.    CKD: The patient has stage IIIa CKD.  Creatinine is at the low end of her recent range.  Tacrolimus goal was reduced as noted above.  The patient should maintain adequate hydration and avoid other nephrotoxins.    Glucose intolerance: Hemoglobin A1c is elevated at 7.1.  The patient will be referred to a local endocrinologist for further evaluation.      Paroxysmal atrial fibrillation: The  patient appears to be in sinus rhythm on exam today.  Continue diltiazem and metoprolol.    Osteoporosis: Lumbar compression fracture in October 2022.  Boniva was held due to concern for possible contribution to lower extremity swelling.  Patient is scheduled to follow-up with local endocrinologist.      Healthcare maintenance: The patient has received her influenza vaccine for this flu season.  She is up-to-date with COVID boosters.  Annual studies were completed today.    Follow-up in 3 months with labs, x-ray and PFTs.      ICarson, have spent 60 minutes on the day of the visit to review the chart, interview and examine the patient, review labs and imaging, formulate a plan, document and submit orders. Time documented is excluding time spent for PFT interpretation.     Carson Feng MD     Lung TX HPI  Transplants:  2/21/2022 (Lung), Postoperative day:  372    The patient was seen and examined by Carson Feng MD   Melissa Elder is a 67 year old female with h/o bilateral lung transplant on 2/21/2022 for severe COPD for who is seen today for routine follow up. Surgery uncomplicated, s/p bilateral pigtail chest tube placement for hydropneumothorax and bilateral effusions, disseminated Ureaplasma and transient hyperammonemia. Other history notable for HTN, mild non-obstructive CAD, paroxysmal afib, osteoporosis, GERD, and colonic polyps.     The patient has been well since her last visit.  Breathing is comfortable at rest and with all activity.  Exercise tolerance improving.  Occasional cough, attributed to postnasal drip.  Occasional clear sputum.  No hemoptysis.  No chest pain.  Previous back pain has resolved other than an occasional ache.  No fever, chills or night sweats.  The patient exercises on a treadmill and exercises outside if the weather permits.    Review of systems:  Appetite is good  Occasional sinus headaches  No nausea vomiting or abdominal pain occasional loose stool if  eating fatty food.  Low back pain intermittent, no radiation  Easy bruising, unchanged.      A complete ROS was otherwise negative except as noted in the HPI.    Current Outpatient Medications   Medication     predniSONE (DELTASONE) 5 MG tablet     tacrolimus (GENERIC EQUIVALENT) 1 MG capsule     albuterol (PROAIR HFA/PROVENTIL HFA/VENTOLIN HFA) 108 (90 Base) MCG/ACT inhaler     aspirin (ASA) 81 MG EC tablet     blood glucose (NO BRAND SPECIFIED) lancets standard     blood glucose (NO BRAND SPECIFIED) test strip     blood glucose monitoring (NO BRAND SPECIFIED) meter device kit     calcium carbonate 600 mg-vitamin D 400 units (CALTRATE) 600-400 MG-UNIT per tablet     carboxymethylcellulose PF (REFRESH PLUS) 0.5 % ophthalmic solution     cetirizine (ZYRTEC) 10 MG tablet     diltiazem ER (TIAZAC) 240 MG 24 hr ER beaded capsule     famotidine (PEPCID) 20 MG tablet     furosemide (LASIX) 40 MG tablet     IBANdronate (BONIVA) 150 MG tablet     Magnesium Glycinate 665 MG CAPS     metoprolol tartrate (LOPRESSOR) 25 MG tablet     multivitamin w/minerals (THERA-VIT-M) tablet     mycophenolic acid (GENERIC EQUIVALENT) 360 MG EC tablet     Nebulizers (MARCIA LC PLUS) MISC     pantoprazole (PROTONIX) 40 MG EC tablet     pentamidine (NEBUPENT) 300 MG neb solution     rosuvastatin (CRESTOR) 5 MG tablet     tacrolimus (GENERIC EQUIVALENT) 0.5 MG capsule     No current facility-administered medications for this visit.     Facility-Administered Medications Ordered in Other Visits   Medication     sodium chloride (PF) 0.9% PF flush 10 mL     Allergies   Allergen Reactions     Alendronic Acid Other (See Comments)     dizziness  Other reaction(s): Dizziness     Nickel Rash     Sulfa Drugs Rash     Past Medical History:   Diagnosis Date     Atrial fibrillation with RVR (H)     hx of A fib related to severe COPD, does not meet anticoagulation criteria as noted     COPD, severe (H)      Osteoporosis        Past Surgical History:   Procedure  Laterality Date     BRONCHOSCOPY (RIGID OR FLEXIBLE), DIAGNOSTIC N/A 4/7/2022    Procedure: BRONCHOSCOPY, WITH BRONCHOALVEOLAR LAVAGE and BIOPSIES;  Surgeon: Gerson Haddad MD;  [facility]     BRONCHOSCOPY (RIGID OR FLEXIBLE), DIAGNOSTIC N/A 5/12/2022    Procedure: BRONCHOSCOPY, WITH BRONCHOALVEOLAR LAVAGE AND BIOPSY;  Surgeon: Melissa Landin MD;  [facility]     BRONCHOSCOPY (RIGID OR FLEXIBLE), DIAGNOSTIC N/A 8/2/2022    Procedure: BRONCHOSCOPY, WITH BRONCHOALVEOLAR LAVAGE;  Surgeon: Melissa Landin MD;  [facility]     BRONCHOSCOPY (RIGID OR FLEXIBLE), DIAGNOSTIC N/A 10/11/2022    Procedure: BRONCHOSCOPY, WITH BRONCHOALVEOLAR LAVAGE AND BIOPSIES;  Surgeon: Angel Gallagher MD;  [facility]     BRONCHOSCOPY (RIGID OR FLEXIBLE), DIAGNOSTIC N/A 12/20/2022    Procedure: BRONCHOSCOPY, WITH BRONCHOALVEOLAR LAVAGE AND BIOPSIES;  Surgeon: Perlman, David Morris, MD;  Location:  GI     BRONCHOSCOPY FLEXIBLE AND RIGID N/A 3/1/2022    Procedure: BRONCHOSCOPY INSPECTION;  Surgeon: Melissa Landin MD;  [facility]     CV CORONARY ANGIOGRAM N/A 3/27/2019    Procedure: CV CORONARY ANGIOGRAM;  Surgeon: Thierry Serrano MD;  Location:  [facility]     CV RIGHT HEART CATH MEASUREMENTS RECORDED N/A 3/27/2019    Procedure: CV RIGHT HEART CATH;  Surgeon: Thierry Serrano MD;  Location:  [facility]     ESOPHAGEAL IMPEDENCE FUNCTION TEST WITH 24 HOUR PH GREATER THAN 1 HOUR N/A 3/28/2019    Procedure: ESOPHAGEAL IMPEDENCE FUNCTION TEST WITH 24 HOUR PH GREATER THAN 1 HOUR;  Surgeon: Mike Henry MD;  Location:  GI     EXCISE PILONIDAL CYST, SIMPLE       IR CHEST TUBE PLACEMENT NON-TUNNELED RIGHT  3/3/2022     TONSILLECTOMY & ADENOIDECTOMY       TRANSPLANT LUNG RECIPIENT SINGLE X2 Bilateral 2/20/2022    Procedure: Bilateral Sequential Lung Transplantation, Clamshell Incision, Extracorporeal Membrane Oxgenation, Bronchoscopy, Cryoablation of Intercostal Nerves;   Surgeon: Raul Robin MD;  Location:  OR       Social History     Socioeconomic History     Marital status:      Spouse name: Not on file     Number of children: Not on file     Years of education: Not on file     Highest education level: Not on file   Occupational History     Not on file   Tobacco Use     Smoking status: Former     Packs/day: 1.50     Years: 36.00     Pack years: 54.00     Types: Cigarettes     Quit date: 2006     Years since quittin.1     Smokeless tobacco: Never   Substance and Sexual Activity     Alcohol use: Not Currently     Comment: none since transplant     Drug use: Not Currently     Comment: stated hx of marijuana, quit in      Sexual activity: Not on file   Other Topics Concern     Parent/sibling w/ CABG, MI or angioplasty before 65F 55M? Not Asked   Social History Narrative     Not on file     Social Determinants of Health     Financial Resource Strain: Not on file   Food Insecurity: Not on file   Transportation Needs: Not on file   Physical Activity: Not on file   Stress: Not on file   Social Connections: Not on file   Intimate Partner Violence: Not on file   Housing Stability: Not on file         /63   Pulse 78   Temp 98.4  F (36.9  C)   Wt 64.5 kg (142 lb 3.2 oz)   SpO2 97%   BMI 26.01 kg/m    Body mass index is 26.01 kg/m .  Exam:   GENERAL APPEARANCE: Well developed, well nourished, alert, and in no apparent distress.  EYES: PERRL, EOMI  EARS: Canals clear, TMs normal.  Partially obscured by cerumen  MOUTH: Oral mucosa is moist, without any lesions, no tonsillar enlargement, no oropharyngeal exudate.  NECK: supple, no masses, no thyromegaly.  LYMPHATICS: No significant axillary, cervical, or supraclavicular nodes.  RESP: normal percussion, good air flow throughout.  Minimal basilar crackles. No rhonchi. No wheezes.  CV: Normal S1, S2, regular rhythm, normal rate. No murmur.  No rub. No gallop. No LE edema.   ABDOMEN:  Bowel sounds  normal, soft, nontender, no HSM or masses.   MS: extremities normal. (+) clubbing. No cyanosis.  SKIN: no rash on limited exam  NEURO: Mentation intact, speech normal, normal strength and tone, normal gait and stance  PSYCH: mentation appears normal. and affect normal/bright  Results:  Recent Results (from the past 168 hour(s))   Tacrolimus level    Collection Time: 02/24/23  9:25 AM   Result Value Ref Range    Tacrolimus by Tandem Mass Spectrometry 8.6 5.0 - 15.0 ug/L    Tacrolimus Last Dose Date 2/23/2023     Tacrolimus Last Dose Time  9:30 PM    Basic metabolic panel    Collection Time: 02/24/23  9:29 AM   Result Value Ref Range    Sodium (External) 142 134 - 143 mEq/L    Potassium (External) 3.6 3.4 - 5.1 mEq/L    Chloride (External) (External) 98 (L) 99 - 110 mEq/L    CO2 (External) 30 (H) 19 - 29 mEq/L    Anion Gap (External) 14.0 3.0 - 15.0 mEq/L    Urea Nitrogen (External) 31 (H) 5 - 24 mg/dL    Creatinine (External) 1.17 (H) 0.40 - 1.00 mg/dL    GFR Estimated (External) 51 (L) >60 ml/min/1.73m2    Calcium (External) 10.2 8.4 - 10.5 mg/dL    Glucose (External) 155 (H) 70 - 99 mg/dL   CBC with platelets    Collection Time: 02/24/23  9:29 AM   Result Value Ref Range    WBC Count (External) 7.0 3.2 - 11.0 10*9/L    RBC Count (External) 3.89 3.77 - 5.24 10*12/L    Hemoglobin (External) 11.3 11.2 - 15.5 g/dL    Hematocrit (External) 34.2 (L) 34.3 - 46.0 %    MCV (External) 87.9 81.4 - 99.0 fL    MCH (External) 29.0 26.7 - 33.1 pg    MCHC (External) 33.0 31.6 - 35.5 g/dL    RDW (External) 13.7 11.3 - 14.6 %    Platelet Count (External) 247 130 - 375 10*9/L   6 minute walk test    Collection Time: 02/28/23 12:00 AM   Result Value Ref Range    6 min walk (FT) 1,050 1,225 ft    6 Min Walk (M) 320 373 m   Magnesium    Collection Time: 02/28/23  6:31 AM   Result Value Ref Range    Magnesium 1.8 1.7 - 2.3 mg/dL   Phosphorus    Collection Time: 02/28/23  6:31 AM   Result Value Ref Range    Phosphorus 3.1 2.5 - 4.5 mg/dL    Comprehensive metabolic panel    Collection Time: 02/28/23  6:31 AM   Result Value Ref Range    Sodium 140 136 - 145 mmol/L    Potassium 4.2 3.4 - 5.3 mmol/L    Chloride 103 98 - 107 mmol/L    Carbon Dioxide (CO2) 25 22 - 29 mmol/L    Anion Gap 12 7 - 15 mmol/L    Urea Nitrogen 26.0 (H) 8.0 - 23.0 mg/dL    Creatinine 1.08 (H) 0.51 - 0.95 mg/dL    Calcium 9.5 8.8 - 10.2 mg/dL    Glucose 116 (H) 70 - 99 mg/dL    Alkaline Phosphatase 68 35 - 104 U/L    AST 15 10 - 35 U/L    ALT 10 10 - 35 U/L    Protein Total 6.6 6.4 - 8.3 g/dL    Albumin 4.3 3.5 - 5.2 g/dL    Bilirubin Total 0.3 <=1.2 mg/dL    GFR Estimate 56 (L) >60 mL/min/1.73m2   Lipid Profile    Collection Time: 02/28/23  6:31 AM   Result Value Ref Range    Cholesterol 247 (H) <200 mg/dL    Triglycerides 259 (H) <150 mg/dL    Direct Measure HDL 69 >=50 mg/dL    LDL Cholesterol Calculated 126 (H) <=100 mg/dL    Non HDL Cholesterol 178 (H) <130 mg/dL   INR    Collection Time: 02/28/23  6:31 AM   Result Value Ref Range    INR 0.90 0.85 - 1.15   CBC with platelets and differential    Collection Time: 02/28/23  6:31 AM   Result Value Ref Range    WBC Count 5.7 4.0 - 11.0 10e3/uL    RBC Count 3.70 (L) 3.80 - 5.20 10e6/uL    Hemoglobin 10.5 (L) 11.7 - 15.7 g/dL    Hematocrit 32.1 (L) 35.0 - 47.0 %    MCV 87 78 - 100 fL    MCH 28.4 26.5 - 33.0 pg    MCHC 32.7 31.5 - 36.5 g/dL    RDW 13.9 10.0 - 15.0 %    Platelet Count 233 150 - 450 10e3/uL   Manual Differential    Collection Time: 02/28/23  6:31 AM   Result Value Ref Range    % Neutrophils 66 %    % Lymphocytes 27 %    % Monocytes 5 %    % Eosinophils 1 %    % Basophils 1 %    Absolute Neutrophils 3.8 1.6 - 8.3 10e3/uL    Absolute Lymphocytes 1.5 0.8 - 5.3 10e3/uL    Absolute Monocytes 0.3 0.0 - 1.3 10e3/uL    Absolute Eosinophils 0.1 0.0 - 0.7 10e3/uL    Absolute Basophils 0.1 0.0 - 0.2 10e3/uL    RBC Morphology Confirmed RBC Indices     Platelet Assessment  Automated Count Confirmed. Platelet morphology is normal.      Automated Count Confirmed. Platelet morphology is normal.   General PFT Lab (Please always keep checked)    Collection Time: 02/28/23  6:58 AM   Result Value Ref Range    FVC-Pred 2.52 L    FVC-Pre 2.04 L    FVC-%Pred-Pre 80 %    FEV1-Pre 2.02 L    FEV1-%Pred-Pre 100 %    FEV1FVC-Pred 80 %    FEV1FVC-Pre 99 %    FEFMax-Pred 5.56 L/sec    FEFMax-Pre 5.45 L/sec    FEFMax-%Pred-Pre 98 %    FEF2575-Pred 1.80 L/sec    FEF2575-Pre 4.37 L/sec    UAE6393-%Pred-Pre 242 %    ExpTime-Pre 5.18 sec    FIFMax-Pre 2.16 L/sec    VC-Pred 2.87 L    VC-Pre 1.92 L    VC-%Pred-Pre 67 %    IC-Pred 2.23 L    IC-Pre 1.32 L    IC-%Pred-Pre 59 %    ERV-Pred 0.64 L    ERV-Pre 0.60 L    ERV-%Pred-Pre 93 %    FEV1FEV6-Pred 80 %    FEV1FEV6-Pre 99 %    FRCPleth-Pred 2.59 L    FRCPleth-Pre 2.49 L    FRCPleth-%Pred-Pre 95 %    RVPleth-Pred 1.92 L    RVPleth-Pre 1.88 L    RVPleth-%Pred-Pre 98 %    TLCPleth-Pred 4.60 L    TLCPleth-Pre 3.81 L    TLCPleth-%Pred-Pre 82 %    DLCOunc-Pred 18.37 ml/min/mmHg    DLCOunc-Pre 18.64 ml/min/mmHg    DLCOunc-%Pred-Pre 101 %    VA-Pre 3.30 L    VA-%Pred-Pre 75 %    FEV1SVC-Pred 70 %    FEV1SVC-Pre 105 %                         Results as noted above.    PFT Interpretation:  Normal spirometry.  Increased from previous.  Best since lung transplantation  Valid Maneuver    On lung volumes, residual volume and total lung capacity are within normal limits.  This is the first lung volumes since the lung transplant.  Normal diffusing capacity.  First DLCO since lung transplant.    On 6-minute walk, the patient walked 1050 feet (lower limit of normal 1225 feet).  Oxygen saturation maintained % throughout. Walk distance is increased from previous but this is the first 6-minute walk since transplant.

## 2023-02-28 NOTE — PROGRESS NOTES
Bronchoscopy with biopsies and lavage   Date of transplant 2/21/2023   Transplant type bilateral lungs  Date of labs 2/28/23  BUN 26 DDAVP No  Plt 233  INR 0.90 Anticoag therapy Aspirin 81mg, on hold   Comment: please page Dr. Feng with any questions or concerns [phone]

## 2023-03-01 ENCOUNTER — APPOINTMENT (OUTPATIENT)
Dept: GENERAL RADIOLOGY | Facility: CLINIC | Age: 68
End: 2023-03-01
Attending: INTERNAL MEDICINE
Payer: MEDICARE

## 2023-03-01 ENCOUNTER — HOSPITAL ENCOUNTER (OUTPATIENT)
Facility: CLINIC | Age: 68
Discharge: HOME | End: 2023-03-01
Attending: INTERNAL MEDICINE | Admitting: INTERNAL MEDICINE
Payer: MEDICARE

## 2023-03-01 VITALS
OXYGEN SATURATION: 92 % | RESPIRATION RATE: 16 BRPM | SYSTOLIC BLOOD PRESSURE: 128 MMHG | HEART RATE: 71 BPM | DIASTOLIC BLOOD PRESSURE: 56 MMHG

## 2023-03-01 DIAGNOSIS — Z94.2 S/P LUNG TRANSPLANT (H): ICD-10-CM

## 2023-03-01 LAB
1,3 BETA GLUCAN SER-MCNC: 131 PG/ML
APPEARANCE FLD: ABNORMAL
C PNEUM DNA SPEC QL NAA+PROBE: NOT DETECTED
CELL COUNT BODY FLUID SOURCE: ABNORMAL
CMV DNA SPEC NAA+PROBE-ACNC: NOT DETECTED IU/ML
COLOR FLD: COLORLESS
EBV DNA # SPEC NAA+PROBE: <500 COPIES/ML
EBV DNA SPEC NAA+PROBE-LOG#: <2.7 {LOG_COPIES}/ML
FLUAV H1 2009 PAND RNA SPEC QL NAA+PROBE: NOT DETECTED
FLUAV H1 RNA SPEC QL NAA+PROBE: NOT DETECTED
FLUAV H3 RNA SPEC QL NAA+PROBE: NOT DETECTED
FLUAV RNA SPEC QL NAA+PROBE: NOT DETECTED
FLUBV RNA SPEC QL NAA+PROBE: NOT DETECTED
GRAM STAIN RESULT: NORMAL
GRAM STAIN RESULT: NORMAL
HADV DNA SPEC QL NAA+PROBE: NOT DETECTED
HCOV PNL SPEC NAA+PROBE: NOT DETECTED
HMPV RNA SPEC QL NAA+PROBE: NOT DETECTED
HPIV1 RNA SPEC QL NAA+PROBE: NOT DETECTED
HPIV2 RNA SPEC QL NAA+PROBE: NOT DETECTED
HPIV3 RNA SPEC QL NAA+PROBE: NOT DETECTED
HPIV4 RNA SPEC QL NAA+PROBE: NOT DETECTED
LYMPHOCYTES NFR FLD MANUAL: 21 %
M PNEUMO DNA SPEC QL NAA+PROBE: NOT DETECTED
MONOS+MACROS NFR FLD MANUAL: 79 %
NEUTS BAND NFR FLD MANUAL: NORMAL %
OBSERVATION IMP: POSITIVE
PATH REPORT.COMMENTS IMP SPEC: NORMAL
PATH REPORT.FINAL DX SPEC: NORMAL
PATH REPORT.FINAL DX SPEC: NORMAL
PATH REPORT.GROSS SPEC: NORMAL
PATH REPORT.GROSS SPEC: NORMAL
PATH REPORT.MICROSCOPIC SPEC OTHER STN: NORMAL
PATH REPORT.MICROSCOPIC SPEC OTHER STN: NORMAL
PATH REPORT.RELEVANT HX SPEC: NORMAL
PATH REPORT.RELEVANT HX SPEC: NORMAL
PHOTO IMAGE: NORMAL
RSV RNA SPEC QL NAA+PROBE: NOT DETECTED
RSV RNA SPEC QL NAA+PROBE: NOT DETECTED
RV+EV RNA SPEC QL NAA+PROBE: NOT DETECTED
WBC # FLD AUTO: 52 /UL

## 2023-03-01 PROCEDURE — 31628 BRONCHOSCOPY/LUNG BX EACH: CPT | Performed by: INTERNAL MEDICINE

## 2023-03-01 PROCEDURE — 88312 SPECIAL STAINS GROUP 1: CPT | Mod: TC | Performed by: INTERNAL MEDICINE

## 2023-03-01 PROCEDURE — 99153 MOD SED SAME PHYS/QHP EA: CPT | Performed by: INTERNAL MEDICINE

## 2023-03-01 PROCEDURE — 88305 TISSUE EXAM BY PATHOLOGIST: CPT | Mod: 26 | Performed by: STUDENT IN AN ORGANIZED HEALTH CARE EDUCATION/TRAINING PROGRAM

## 2023-03-01 PROCEDURE — 87015 SPECIMEN INFECT AGNT CONCNTJ: CPT | Mod: XU | Performed by: INTERNAL MEDICINE

## 2023-03-01 PROCEDURE — 88312 SPECIAL STAINS GROUP 1: CPT | Mod: 26 | Performed by: STUDENT IN AN ORGANIZED HEALTH CARE EDUCATION/TRAINING PROGRAM

## 2023-03-01 PROCEDURE — 89051 BODY FLUID CELL COUNT: CPT | Performed by: INTERNAL MEDICINE

## 2023-03-01 PROCEDURE — 87102 FUNGUS ISOLATION CULTURE: CPT | Mod: 91 | Performed by: INTERNAL MEDICINE

## 2023-03-01 PROCEDURE — 88305 TISSUE EXAM BY PATHOLOGIST: CPT | Mod: TC | Performed by: INTERNAL MEDICINE

## 2023-03-01 PROCEDURE — 87102 FUNGUS ISOLATION CULTURE: CPT | Performed by: INTERNAL MEDICINE

## 2023-03-01 PROCEDURE — 71046 X-RAY EXAM CHEST 2 VIEWS: CPT | Mod: 26 | Performed by: RADIOLOGY

## 2023-03-01 PROCEDURE — 88108 CYTOPATH CONCENTRATE TECH: CPT | Mod: 26 | Performed by: STUDENT IN AN ORGANIZED HEALTH CARE EDUCATION/TRAINING PROGRAM

## 2023-03-01 PROCEDURE — 250N000009 HC RX 250: Performed by: INTERNAL MEDICINE

## 2023-03-01 PROCEDURE — 999N000065 XR CHEST 2 VIEWS

## 2023-03-01 PROCEDURE — 31632 BRONCHOSCOPY/LUNG BX ADDL: CPT | Performed by: INTERNAL MEDICINE

## 2023-03-01 PROCEDURE — 31624 DX BRONCHOSCOPE/LAVAGE: CPT | Performed by: INTERNAL MEDICINE

## 2023-03-01 PROCEDURE — 87486 CHLMYD PNEUM DNA AMP PROBE: CPT | Performed by: INTERNAL MEDICINE

## 2023-03-01 PROCEDURE — 87205 SMEAR GRAM STAIN: CPT | Performed by: INTERNAL MEDICINE

## 2023-03-01 PROCEDURE — 31628 BRONCHOSCOPY/LUNG BX EACH: CPT

## 2023-03-01 PROCEDURE — 250N000011 HC RX IP 250 OP 636: Performed by: INTERNAL MEDICINE

## 2023-03-01 PROCEDURE — G0500 MOD SEDAT ENDO SERVICE >5YRS: HCPCS | Performed by: INTERNAL MEDICINE

## 2023-03-01 PROCEDURE — 87206 SMEAR FLUORESCENT/ACID STAI: CPT | Performed by: INTERNAL MEDICINE

## 2023-03-01 RX ORDER — FENTANYL CITRATE 50 UG/ML
INJECTION, SOLUTION INTRAMUSCULAR; INTRAVENOUS PRN
Status: DISCONTINUED | OUTPATIENT
Start: 2023-03-01 | End: 2023-03-01 | Stop reason: HOSPADM

## 2023-03-01 RX ORDER — LIDOCAINE 40 MG/G
CREAM TOPICAL
Status: DISCONTINUED | OUTPATIENT
Start: 2023-03-01 | End: 2023-03-01 | Stop reason: HOSPADM

## 2023-03-01 RX ORDER — MAGNESIUM HYDROXIDE 1200 MG/15ML
LIQUID ORAL PRN
Status: DISCONTINUED | OUTPATIENT
Start: 2023-03-01 | End: 2023-03-01 | Stop reason: HOSPADM

## 2023-03-01 RX ORDER — LIDOCAINE HYDROCHLORIDE AND EPINEPHRINE 10; 10 MG/ML; UG/ML
INJECTION, SOLUTION INFILTRATION; PERINEURAL PRN
Status: DISCONTINUED | OUTPATIENT
Start: 2023-03-01 | End: 2023-03-01 | Stop reason: HOSPADM

## 2023-03-01 RX ORDER — LIDOCAINE HYDROCHLORIDE 40 MG/ML
INJECTION, SOLUTION RETROBULBAR PRN
Status: DISCONTINUED | OUTPATIENT
Start: 2023-03-01 | End: 2023-03-01 | Stop reason: HOSPADM

## 2023-03-01 ASSESSMENT — ACTIVITIES OF DAILY LIVING (ADL)
ADLS_ACUITY_SCORE: 35
ADLS_ACUITY_SCORE: 35

## 2023-03-01 NOTE — OR NURSING
Patient underwent Bronch w/ BAL and biopsies under conscious sedation. Specimens sent to lab. Xray ordered for 1030.  Report given to endo recovery nurse.     3.4 min fluoro time

## 2023-03-01 NOTE — DISCHARGE INSTRUCTIONS
Discharge Instructions after Bronchoscopy    Activity  ___ You had medicine to relax and for pain. You may feel dizzy or sleepy.  For 24 hours:   Do not drive or use heavy equipment.   Do not make important decisions.   Do not drink any alcohol.    Diet  ___ When you can swallow easily, you may go back to your regular diet, medicines  and light exercise.    Follow-up  ___ We took small tissue or fluid samples to study. We will call you with the results in about 10 business days.    Call right away if you have:   Unusual chest pain   Temperature above 100.6  F (37.5  C)   Coughing that does not stop.    If you have severe pain, bleeding, or shortness of breath, go to an emergency room.    If you have questions, call:  Monday to Friday, 8 a.m. to 4:30 p.m.  [facility]: [phone]    After hours:  Hospital: [phone] (Ask for the pulmonary fellow on call)

## 2023-03-02 LAB
DONOR IDENTIFICATION: NORMAL
DSA COMMENTS: NORMAL
DSA PRESENT: NO
DSA TEST METHOD: NORMAL
ORGAN: NORMAL
SA 1 CELL: NORMAL
SA 1 TEST METHOD: NORMAL
SA 2 CELL: NORMAL
SA 2 TEST METHOD: NORMAL
SA1 HI RISK ABY: NORMAL
SA1 MOD RISK ABY: NORMAL
SA2 HI RISK ABY: NORMAL
SA2 MOD RISK ABY: NORMAL
UNACCEPTABLE ANTIGENS: NORMAL
UNOS CPRA: 76
ZZZSA 1  COMMENTS: NORMAL
ZZZSA 2 COMMENTS: NORMAL

## 2023-03-03 ENCOUNTER — TELEPHONE (OUTPATIENT)
Dept: PULMONOLOGY | Facility: CLINIC | Age: 68
End: 2023-03-03
Payer: COMMERCIAL

## 2023-03-03 DIAGNOSIS — Z94.2 S/P LUNG TRANSPLANT (H): ICD-10-CM

## 2023-03-03 LAB — BACTERIA BRONCH: NORMAL

## 2023-03-03 RX ORDER — TACROLIMUS 1 MG/1
CAPSULE ORAL
Qty: 120 CAPSULE | Refills: 11 | Status: SHIPPED | OUTPATIENT
Start: 2023-03-03 | End: 2023-04-04

## 2023-03-03 NOTE — RESULT ENCOUNTER NOTE
Fungitell continues to be positive, 131. Discussed with Dr. Feng. Will away results from bronchoscopy done 3/1/23

## 2023-03-09 LAB
BRONCHOSCOPY: NORMAL
BRONCHOSCOPY: NORMAL

## 2023-03-28 ENCOUNTER — LAB (OUTPATIENT)
Dept: LAB | Facility: CLINIC | Age: 68
End: 2023-03-28

## 2023-03-28 DIAGNOSIS — Z79.899 ENCOUNTER FOR LONG-TERM (CURRENT) USE OF HIGH-RISK MEDICATION: ICD-10-CM

## 2023-03-28 DIAGNOSIS — D84.9 IMMUNOSUPPRESSED STATUS (H): ICD-10-CM

## 2023-03-28 DIAGNOSIS — E83.42 HYPOMAGNESEMIA: ICD-10-CM

## 2023-03-28 DIAGNOSIS — E78.00 HYPERCHOLESTEROLEMIA: ICD-10-CM

## 2023-03-28 DIAGNOSIS — Z94.2 S/P LUNG TRANSPLANT (H): ICD-10-CM

## 2023-03-28 PROCEDURE — 80197 ASSAY OF TACROLIMUS: CPT | Performed by: INTERNAL MEDICINE

## 2023-03-29 LAB
BACTERIA BRONCH: NO GROWTH
BACTERIA BRONCH: NO GROWTH

## 2023-03-30 DIAGNOSIS — Z94.2 S/P LUNG TRANSPLANT (H): Primary | ICD-10-CM

## 2023-03-30 DIAGNOSIS — E78.00 HYPERCHOLESTEROLEMIA: ICD-10-CM

## 2023-03-30 RX ORDER — ROSUVASTATIN CALCIUM 10 MG/1
10 TABLET, COATED ORAL DAILY
Qty: 30 TABLET | Refills: 11 | Status: SHIPPED | OUTPATIENT
Start: 2023-03-30 | End: 2023-06-29

## 2023-04-03 LAB
TACROLIMUS BLD-MCNC: 6.6 UG/L (ref 5–15)
TME LAST DOSE: NORMAL H
TME LAST DOSE: NORMAL H

## 2023-04-04 ENCOUNTER — TELEPHONE (OUTPATIENT)
Dept: TRANSPLANT | Facility: CLINIC | Age: 68
End: 2023-04-04
Payer: COMMERCIAL

## 2023-04-04 DIAGNOSIS — Z94.2 S/P LUNG TRANSPLANT (H): ICD-10-CM

## 2023-04-04 RX ORDER — TACROLIMUS 1 MG/1
CAPSULE ORAL
Qty: 120 CAPSULE | Refills: 11 | Status: SHIPPED | OUTPATIENT
Start: 2023-04-04 | End: 2023-08-15

## 2023-04-04 RX ORDER — TACROLIMUS 1 MG/1
CAPSULE ORAL
Qty: 120 CAPSULE | Refills: 11
Start: 2023-04-04 | End: 2023-04-04

## 2023-04-04 RX ORDER — TACROLIMUS 0.5 MG/1
0.5 CAPSULE ORAL 2 TIMES DAILY
Qty: 60 CAPSULE | Refills: 11 | Status: SHIPPED | OUTPATIENT
Start: 2023-04-04 | End: 2023-08-15

## 2023-04-04 NOTE — TELEPHONE ENCOUNTER
Provider Call: General  Route to LPN    Reason for call: pharmacist Franki called in because pt has reported a dose change to her tacrolimus. Pharmacy is requesting new order for new dose, please call pharmacist for medication clarification.    Call back needed? Yes    Return Call Needed  Same as documented in contacts section  When to return call?: Same day: Route High Priority

## 2023-04-04 NOTE — PROGRESS NOTES
Tacrolimus level 6.6 at 12 hours, on 3/28/23.  Goal 8-10.   Current dose 2 mg in AM, 2.5 mg in PM    Dose changed to 2.5 mg in AM, 2.5 mg in PM   Recheck level in a week    Discussed with patient    MyChart message sent

## 2023-04-10 ENCOUNTER — DOCUMENTATION ONLY (OUTPATIENT)
Dept: TRANSPLANT | Facility: CLINIC | Age: 68
End: 2023-04-10
Payer: COMMERCIAL

## 2023-04-10 ASSESSMENT — ENCOUNTER SYMPTOMS: NEW SYMPTOMS OF CORONARY ARTERY DISEASE: 0

## 2023-04-14 ENCOUNTER — LAB (OUTPATIENT)
Dept: LAB | Facility: CLINIC | Age: 68
End: 2023-04-14

## 2023-04-14 DIAGNOSIS — Z94.2 LUNG REPLACED BY TRANSPLANT (H): Primary | ICD-10-CM

## 2023-04-14 PROCEDURE — 80197 ASSAY OF TACROLIMUS: CPT | Performed by: INTERNAL MEDICINE

## 2023-04-17 LAB
TACROLIMUS BLD-MCNC: 9.9 UG/L (ref 5–15)
TME LAST DOSE: NORMAL H
TME LAST DOSE: NORMAL H

## 2023-04-18 NOTE — RESULT ENCOUNTER NOTE
Tacrolimus level 9.9 at 12 hours, on 4/14/23.  Goal 8-10.   Current dose 2.5 mg in AM, 2.5 mg in PM    Level at goal, no change in dose.  [facility] message sent

## 2023-04-27 LAB
ACID FAST STAIN (ARUP): NORMAL

## 2023-05-06 ENCOUNTER — HEALTH MAINTENANCE LETTER (OUTPATIENT)
Age: 68
End: 2023-05-06

## 2023-05-19 ENCOUNTER — LAB (OUTPATIENT)
Dept: LAB | Facility: CLINIC | Age: 68
End: 2023-05-19

## 2023-05-19 DIAGNOSIS — Z94.2 S/P LUNG TRANSPLANT (H): Primary | ICD-10-CM

## 2023-05-19 PROCEDURE — 80197 ASSAY OF TACROLIMUS: CPT | Performed by: INTERNAL MEDICINE

## 2023-05-22 LAB
TACROLIMUS BLD-MCNC: 9 UG/L (ref 5–15)
TME LAST DOSE: NORMAL H
TME LAST DOSE: NORMAL H

## 2023-05-23 NOTE — RESULT ENCOUNTER NOTE
Tacrolimus level 9.0 at 12 hours, on 5/19/23.  Goal 8-10.   Current dose 2.5 mg in AM, 2.5 mg in PM    Level at goal.  No dose change.    [employer] message sent

## 2023-05-30 DIAGNOSIS — Z94.2 S/P LUNG TRANSPLANT (H): ICD-10-CM

## 2023-05-30 RX ORDER — PANTOPRAZOLE SODIUM 40 MG/1
40 TABLET, DELAYED RELEASE ORAL DAILY
Qty: 90 TABLET | Refills: 11 | Status: SHIPPED | OUTPATIENT
Start: 2023-05-30 | End: 2024-05-30

## 2023-06-05 DIAGNOSIS — Z94.2 LUNG REPLACED BY TRANSPLANT (H): ICD-10-CM

## 2023-06-05 DIAGNOSIS — Z94.2 S/P LUNG TRANSPLANT (H): ICD-10-CM

## 2023-06-05 RX ORDER — FAMOTIDINE 20 MG/1
20 TABLET, FILM COATED ORAL 2 TIMES DAILY
Qty: 180 TABLET | Refills: 11 | Status: SHIPPED | OUTPATIENT
Start: 2023-06-05 | End: 2024-09-03

## 2023-06-05 RX ORDER — DILTIAZEM HYDROCHLORIDE 240 MG/1
CAPSULE, EXTENDED RELEASE ORAL
Qty: 90 CAPSULE | Refills: 11 | Status: SHIPPED | OUTPATIENT
Start: 2023-06-05 | End: 2024-08-28

## 2023-06-05 RX ORDER — MYCOPHENOLIC ACID 360 MG/1
720 TABLET, DELAYED RELEASE ORAL 2 TIMES DAILY
Qty: 120 TABLET | Refills: 11 | Status: SHIPPED | OUTPATIENT
Start: 2023-06-05 | End: 2024-05-29

## 2023-06-23 ENCOUNTER — LAB (OUTPATIENT)
Dept: LAB | Facility: CLINIC | Age: 68
End: 2023-06-23
Payer: MEDICARE

## 2023-06-23 DIAGNOSIS — Z94.2 S/P LUNG TRANSPLANT (H): Primary | ICD-10-CM

## 2023-06-23 PROCEDURE — 80197 ASSAY OF TACROLIMUS: CPT

## 2023-06-26 DIAGNOSIS — Z94.2 S/P LUNG TRANSPLANT (H): ICD-10-CM

## 2023-06-26 LAB
TACROLIMUS BLD-MCNC: 8.8 UG/L (ref 5–15)
TME LAST DOSE: NORMAL H
TME LAST DOSE: NORMAL H

## 2023-06-26 RX ORDER — METOPROLOL TARTRATE 25 MG/1
TABLET, FILM COATED ORAL
Qty: 75 TABLET | Refills: 11 | Status: SHIPPED | OUTPATIENT
Start: 2023-06-26 | End: 2023-09-28 | Stop reason: ALTCHOICE

## 2023-06-26 RX ORDER — PREDNISONE 5 MG/1
TABLET ORAL
Qty: 315 TABLET | Refills: 11 | Status: SHIPPED | OUTPATIENT
Start: 2023-06-26 | End: 2023-06-29

## 2023-06-27 ENCOUNTER — TELEPHONE (OUTPATIENT)
Dept: TRANSPLANT | Facility: CLINIC | Age: 68
End: 2023-06-27
Payer: COMMERCIAL

## 2023-06-27 DIAGNOSIS — M79.89 BILATERAL SWELLING OF FEET: ICD-10-CM

## 2023-06-27 DIAGNOSIS — Z94.2 S/P LUNG TRANSPLANT (H): ICD-10-CM

## 2023-06-27 DIAGNOSIS — D84.9 IMMUNOSUPPRESSED STATUS (H): ICD-10-CM

## 2023-06-27 RX ORDER — FUROSEMIDE 40 MG
20 TABLET ORAL DAILY PRN
Qty: 10 TABLET | Refills: 11 | COMMUNITY
Start: 2023-06-27 | End: 2023-11-07

## 2023-06-27 NOTE — PROGRESS NOTES
[facility]  Pulmonary Transplant Follow Up Visit  Jun 29, 2023    Reason for Visit  Melissa Elder is a 67 year old who is being seen for RECHECK               Lung Tx Summary:     Transplants:  2/21/2022 (Lung), Postoperative day:  491    Melissa Elder is a 67 year old who underwent bilateral lung transplant on 2/21/2022 (Lung) for severe COPD currently postoperative day:  491. Surgery uncomplicated, but did have bilateral hydropneumothoraces and bilateral effusions. 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 Other hx notable for HTN, mild nonbostructive CAD, paroxysmal afib, osteoporosis, GERD and colonic polyps.          Interval Histories:   Jun 29, 2023  Feels good overall. Up and walking. Minimal episodes of dyspnea unless really exerting herself going up stairs. No coughing/wheezing. Chronic nasal drip. No presyncope, no chest pain/pressure/palpitations. No n/v/acid reflux. No diarrhea/constipation. No swelling. No tremors/numbness/tingling. Sleeping is okay. Fluid intake has improved, to 10 glasses of water a day.     Exercise  Treadmill or walking outside    The patient was seen and examined by Nicole Knox MD     A complete ROS was otherwise negative except as noted in the HPI.           Medications:   Meds were reviewed during this visit and updated below    Outpatient Encounter Medications as of 6/29/2023   Medication Sig Dispense Refill     albuterol (PROAIR HFA/PROVENTIL HFA/VENTOLIN HFA) 108 (90 Base) MCG/ACT inhaler Inhale 2 puffs into the lungs every 6 hours as needed for shortness of breath / dyspnea or wheezing 18 g 0     aspirin (ASA) 81 MG EC tablet Take 1 tablet (81 mg) by mouth daily 30 tablet 11     calcium carbonate 600 mg-vitamin D 400 units (CALTRATE) 600-400 MG-UNIT per tablet Take 1 tablet by mouth daily 30 tablet 11     carboxymethylcellulose PF (REFRESH PLUS) 0.5 % ophthalmic  solution Place 1 drop into both eyes every hour as needed for dry eyes 1 each 0     cetirizine (ZYRTEC) 10 MG tablet Take 1 tablet (10 mg) by mouth daily as needed for allergies 30 tablet 11     DILT- MG 24 hr ER beaded capsule TAKE 1 CAPSULE (240 MG) BY MOUTH DAILY 90 capsule 11     famotidine (PEPCID) 20 MG tablet TAKE 1 TABLET (20 MG) BY MOUTH 2 TIMES DAILY 180 tablet 11     furosemide (LASIX) 40 MG tablet Take 20 mg by mouth daily as needed 10 tablet 11     metoprolol tartrate (LOPRESSOR) 25 MG tablet TAKE 1 TABLET (25 MG) BY MOUTH EVERY MORNING AND 1.5 TABLETS (37.5 MG) EVERY EVENING. 75 tablet 11     multivitamin w/minerals (THERA-VIT-M) tablet Take 1 tablet by mouth daily 30 tablet 11     mycophenolic acid (GENERIC EQUIVALENT) 360 MG EC tablet TAKE 2 TABLETS (720 MG) BY MOUTH 2 TIMES DAILY 120 tablet 11     Nebulizers (MARCIA LC PLUS) MISC 2 each every 30 days For tobramycin nebs 1 each 1     pantoprazole (PROTONIX) 40 MG EC tablet TAKE 1 TABLET (40 MG) BY MOUTH DAILY 90 tablet 11     pentamidine (NEBUPENT) 300 MG neb solution Inhale 300 mg into the lungs every 28 days       predniSONE (DELTASONE) 5 MG tablet TAKE 2 TABLETS (10 MG) BY MOUTH EVERY MORNING AND 1.5 TABLETS (7.5 MG) EVERY EVENING. 315 tablet 11     rosuvastatin (CRESTOR) 10 MG tablet Take 1 tablet (10 mg) by mouth daily 30 tablet 11     tacrolimus (GENERIC EQUIVALENT) 0.5 MG capsule Take 1 capsule (0.5 mg) by mouth 2 times daily Total dose: 2.5mg in AM and 2.5mg in PM 60 capsule 11     tacrolimus (GENERIC EQUIVALENT) 1 MG capsule Take 2 capsules (2 mg) by mouth every morning AND 2 capsules (2 mg) every evening. Total dose: 2.5mg in AM and 2.5mg in  capsule 11     [DISCONTINUED] blood glucose (NO BRAND SPECIFIED) lancets standard To use to test glucose level in the blood Use to test blood sugar  4  times daily as directed. To accompany glucose monitor brands per insurance coverage. (Patient not taking: No sig reported) 100 each 0      [DISCONTINUED] blood glucose (NO BRAND SPECIFIED) test strip To use to test glucose level in the blood Use to test blood sugar  4 times daily as directed. To accompany glucose monitor brands per insurance coverage. (Patient not taking: No sig reported) 120 strip 0     [DISCONTINUED] blood glucose monitoring (NO BRAND SPECIFIED) meter device kit Use as directed Per insurance coverage (Patient not taking: No sig reported) 1 kit 0     [DISCONTINUED] furosemide (LASIX) 40 MG tablet Take 1 tablet (40 mg) by mouth daily 30 tablet 11     [DISCONTINUED] IBANdronate (BONIVA) 150 MG tablet Take 1 tablet (150 mg) by mouth every 30 days HELD FOR LE SWELLING       [DISCONTINUED] Magnesium Glycinate 665 MG CAPS Take 2 tablets by mouth 4 times daily Using [employer] High Absorption Magnesium, 100mg elemental magnesium per tablet. 240 capsule 11     Facility-Administered Encounter Medications as of 6/29/2023   Medication Dose Route Frequency Provider Last Rate Last Admin     sodium chloride (PF) 0.9% PF flush 10 mL  10 mL Intravenous Once Dontae Chan MD                   Allergies:     Allergies   Allergen Reactions     Alendronate Other (See Comments)     dizziness  Other reaction(s): Dizziness     Nickel Rash     Sulfa Antibiotics Rash            Past Medical and Past Surgical History:     Past Medical History:   Diagnosis Date     Atrial fibrillation with RVR (H)     hx of A fib related to severe COPD, does not meet anticoagulation criteria as noted     COPD, severe (H)      Osteoporosis        Past Surgical History:   Procedure Laterality Date     BRONCHOSCOPY (RIGID OR FLEXIBLE), DIAGNOSTIC N/A 4/7/2022    Procedure: BRONCHOSCOPY, WITH BRONCHOALVEOLAR LAVAGE and BIOPSIES;  Surgeon: Gerson Haddad MD;  Location:  GI     BRONCHOSCOPY (RIGID OR FLEXIBLE), DIAGNOSTIC N/A 5/12/2022    Procedure: BRONCHOSCOPY, WITH BRONCHOALVEOLAR LAVAGE AND BIOPSY;  Surgeon: Melissa Landin MD;  Location: [facility]     BRONCHOSCOPY  (RIGID OR FLEXIBLE), DIAGNOSTIC N/A 8/2/2022    Procedure: BRONCHOSCOPY, WITH BRONCHOALVEOLAR LAVAGE;  Surgeon: Melissa Landin MD;  Location:  GI     BRONCHOSCOPY (RIGID OR FLEXIBLE), DIAGNOSTIC N/A 10/11/2022    Procedure: BRONCHOSCOPY, WITH BRONCHOALVEOLAR LAVAGE AND BIOPSIES;  Surgeon: Angel Gallagher MD;  Location:  GI     BRONCHOSCOPY (RIGID OR FLEXIBLE), DIAGNOSTIC N/A 12/20/2022    Procedure: BRONCHOSCOPY, WITH BRONCHOALVEOLAR LAVAGE AND BIOPSIES;  Surgeon: Perlman, David Morris, MD;  Location:  GI     BRONCHOSCOPY (RIGID OR FLEXIBLE), DIAGNOSTIC N/A 3/1/2023    Procedure: BRONCHOSCOPY, WITH BRONCHOALVEOLAR LAVAGE WITH BIOPSY;  Surgeon: Lucina Thompson MD;  Location:  GI     BRONCHOSCOPY FLEXIBLE AND RIGID N/A 3/1/2022    Procedure: BRONCHOSCOPY INSPECTION;  Surgeon: Melissa Landin MD;  Location:  GI     CV CORONARY ANGIOGRAM N/A 3/27/2019    Procedure: CV CORONARY ANGIOGRAM;  Surgeon: Thierry Serrano MD;  Location:  HEART CARDIAC CATH LAB     CV RIGHT HEART CATH MEASUREMENTS RECORDED N/A 3/27/2019    Procedure: CV RIGHT HEART CATH;  Surgeon: Thierry Serrano MD;  Location:  HEART CARDIAC CATH LAB     ESOPHAGEAL IMPEDENCE FUNCTION TEST WITH 24 HOUR PH GREATER THAN 1 HOUR N/A 3/28/2019    Procedure: ESOPHAGEAL IMPEDENCE FUNCTION TEST WITH 24 HOUR PH GREATER THAN 1 HOUR;  Surgeon: Mike Henry MD;  Location:  GI     EXCISE PILONIDAL CYST, SIMPLE       IR CHEST TUBE PLACEMENT NON-TUNNELED RIGHT  3/3/2022     TONSILLECTOMY & ADENOIDECTOMY       TRANSPLANT LUNG RECIPIENT SINGLE X2 Bilateral 2/20/2022    Procedure: Bilateral Sequential Lung Transplantation, Clamshell Incision, Extracorporeal Membrane Oxgenation, Bronchoscopy, Cryoablation of Intercostal Nerves;  Surgeon: Raul Robin MD;  Location:  OR             Social History:   Social Updates:          Rejection and Infection History     Rejection Hx    DATE INDICATION  PATH BAL/MICRO TREATMENT                 Infectious Hx  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  Bronchoscopy in October 2022 with Pseudomonas, treated with FIDEL nebs with resolution.  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  Fungitell and galactomannan were negative and chest CT showed no evidence of fungus so no treatment was initiated.      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  Subsequent AFB cultures were negative.  No treatment indicated.         Exam:     BP (!) 143/65   Pulse 71   Temp 98.2  F (36.8  C) (Oral)   Wt 67.4 kg (148 lb 9.6 oz)   SpO2 99%   BMI 27.18 kg/m    Body mass index is 27.18 kg/m .      GENERAL APPEARANCE: Well developed, well nourished, alert, and in no apparent distress.  EYES: PERRL, EOMI  HENT: Nasal mucosa with no edema and no hyperemia. No nasal polyps.  EARS: Canals clear, TMs normal  MOUTH: Oral mucosa is moist, without any lesions, no tonsillar enlargement, no oropharyngeal exudate.  NECK: supple, no masses, no thyromegaly.  LYMPHATICS: No significant cervical, or supraclavicular nodes.  RESP: normal percussion, good air flow throughout.  No crackles. No rhonchi. No wheezes.  CV: Normal S1, S2, regular rhythm, normal rate. No murmur.  No rub. No gallop. No LE edema.   ABDOMEN:  Bowel sounds normal, soft, nontender, no HSM or masses.   MS: extremities normal. No cyanosis.  SKIN: no rash on limited exam  NEURO: Mentation intact, speech normal, normal strength and tone, normal gait and stance, no significant tremor at rest   PSYCH: mentation appears normal. and affect normal/bright         Data:     Results:  Recent Results (from the past 168 hour(s))   Tacrolimus by [employer]andem Mass Spectrometry    Collection Time: 06/23/23  9:33 AM   Result Value Ref Range    Tacrolimus by Tandem Mass Spectrometry 8.8 5.0 - 15.0 ug/L    Tacrolimus Last Dose Date 6/22/2023     Tacrolimus Last Dose Time  9:30 PM            Date Place TLC (%) FVC (%) FEV1  (%) FEV1/FVC DLCO (%) Note                                                             Post Transplant Baseline: FEV1 1.9 FVC 1.91  FEV1: 1.9  2.02 2/28/23  1.78 10/10/22  FVC: 2.04 2/28/23, 1.78 (10/10/22)    Results as noted above.    Jun 29, 2023    Spirometry interpretation:  The spirometry is normal.  When compared to 2/28/23, the FEV1 and FVC have little change.  The testing meets ATS criteria.  Highest PFTs to date               Assessment and Plan:     Transplants:    Melissa Elder is a 67 year old who underwent bilateral lung transplant on 2/21/2022 (Lung) for severe COPD currently postoperative day:  491, Surgery uncomplicated, but did have bilateral hydropneumothoraces and bilateral effusions. 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 Other hx notable for HTN, mild nonbostructive CAD, paroxysmal afib, osteoporosis, GERD and colonic polyps.     PULMONARY    Transplant:       Allograft Function: Functionally without limitation, cough or dyspnea. PFTs today with the highest FEV1 and FVC to date. DSA to be checked at next visit. She is not limited in any way and her CXR remains very clear wtihout new infiltrates or changes.   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 Last checked 12/19/2022, without DR15 detected, or DQB6, DQB5 534 MFI.     EBV Viremia: low level positive in 2022, not detected on subsequent rechecks.     Reflux: Adequately controlled with current pantoprazole and famotidine.     Coronary artery disease: Nonobstructive.  Continue aspirin and rosuvastatin.     SOMMER CKD: The patient has stage IIIa CKD.   Tacrolimus goal was reduced as noted above. Cr up to 1.8 on 6/23 with lasix and not taking in enough fluids, improved on  today's labs after decreasing lasix and increasing fluid intake.      Glucose intolerance: Hemoglobin A1c is elevated at 7.3.  The patient will be referred to a local endocrinologist for further evaluation.   - Follow up with her primary      Paroxysmal atrial fibrillation: The patient appears to be in sinus rhythm on exam today.  Continue diltiazem and metoprolol. No palpitations of symptoms reported.      Osteoporosis: Lumbar compression fracture in October 2022.  Boniva was held due to concern for possible contribution to lower extremity swelling.    - Sees local endo  - Doing yearly infusions with reclast    Lower Extremity Edema: Controlled on lasix, but given SOMMER and improvement with decreasing to 20 mg will hold.  - Stop lasix, use prn     Hypomagnesemia: Due to CNI interference with absorption. Mg low today, taking mag ox 200 mg qid   - increase to 400 mg three times a day, if not able to get increased will consider going back to doctor's best mag glycinate    Hypertension: Blood pressure appears to be adequately controlled with current diltiazem, metoprolol but she's measuring them before meds so they appear higher.  - Will have them check BPs after meds and then consider titration     Hypercholesterolemia: Cholesterol, LDL and triglycerides are elevated.  Increasing rosuvastatin again to 20 mg nightly.        Maintenance:  Derm Exam: Saw recently in [place] June 2023  Colon Ca Screening: 3/2016 with sessile polyps in the transverse and sigmoid colon, 3mm and 4mm, due in 8/2024  Mammogram: Due 11/2023  DEXA: 4/7/23, due again in 4/2025  Imms: Up to date        CHANGES TODAY    - Increase rosuvastatin to 20 mg, repeat lipids at next visit    - Stop lasix, can use prn    - Increase mag oxide to 400 mg TID     - Address Diabetes with her primary care given Hgba1c of 7.3%    - Monitoring blood pressure after taking morning meds       I personally spent 40 minutes in documentation, the interview and exam, and  review of the chart/labs/imaging on Jun 29, 2023 not including time spent interpreting spirometry.       Nicole Knox MD  [facility]   Pulmonary Transplant   Post Transplant Coordinator: Arturo Miller  Fax: [phone]  Ph: [phone]

## 2023-06-27 NOTE — TELEPHONE ENCOUNTER
Creatinine 1.81    Writer called patient to check in.    No diarrhea. Has been drinking roughly 48 ounces daily of water. Will increase water intake to 60-70 ounces daily.  Lasix lowered to 10mg daily today by local PCP.   Tacrolimus level from 6/23/23 pending.     Repeat labs this Thursday in clinic.

## 2023-06-29 ENCOUNTER — OFFICE VISIT (OUTPATIENT)
Dept: TRANSPLANT | Facility: CLINIC | Age: 68
End: 2023-06-29
Attending: INTERNAL MEDICINE
Payer: MEDICARE

## 2023-06-29 ENCOUNTER — ANCILLARY PROCEDURE (OUTPATIENT)
Dept: GENERAL RADIOLOGY | Facility: CLINIC | Age: 68
End: 2023-06-29
Attending: INTERNAL MEDICINE
Payer: COMMERCIAL

## 2023-06-29 ENCOUNTER — LAB (OUTPATIENT)
Dept: LAB | Facility: CLINIC | Age: 68
End: 2023-06-29
Attending: INTERNAL MEDICINE
Payer: COMMERCIAL

## 2023-06-29 VITALS
SYSTOLIC BLOOD PRESSURE: 143 MMHG | WEIGHT: 148.6 LBS | HEART RATE: 71 BPM | TEMPERATURE: 98.2 F | DIASTOLIC BLOOD PRESSURE: 65 MMHG | BODY MASS INDEX: 27.18 KG/M2 | OXYGEN SATURATION: 99 %

## 2023-06-29 DIAGNOSIS — Z79.899 ENCOUNTER FOR LONG-TERM (CURRENT) USE OF HIGH-RISK MEDICATION: ICD-10-CM

## 2023-06-29 DIAGNOSIS — E11.9 TYPE 2 DIABETES MELLITUS WITHOUT COMPLICATION, WITHOUT LONG-TERM CURRENT USE OF INSULIN (H): Primary | ICD-10-CM

## 2023-06-29 DIAGNOSIS — E83.42 HYPOMAGNESEMIA: ICD-10-CM

## 2023-06-29 DIAGNOSIS — D84.9 IMMUNOSUPPRESSED STATUS (H): ICD-10-CM

## 2023-06-29 DIAGNOSIS — E78.00 HYPERCHOLESTEROLEMIA: ICD-10-CM

## 2023-06-29 DIAGNOSIS — Z94.2 S/P LUNG TRANSPLANT (H): ICD-10-CM

## 2023-06-29 DIAGNOSIS — A49.8 INFECTION, PSEUDOMONAS: ICD-10-CM

## 2023-06-29 DIAGNOSIS — Z94.2 S/P LUNG TRANSPLANT (H): Primary | ICD-10-CM

## 2023-06-29 LAB
ALBUMIN SERPL BCG-MCNC: 4.4 G/DL (ref 3.5–5.2)
ALP SERPL-CCNC: 73 U/L (ref 35–104)
ALT SERPL W P-5'-P-CCNC: 20 U/L (ref 0–50)
ANION GAP SERPL CALCULATED.3IONS-SCNC: 11 MMOL/L (ref 7–15)
AST SERPL W P-5'-P-CCNC: 15 U/L (ref 0–45)
BILIRUB DIRECT SERPL-MCNC: <0.2 MG/DL (ref 0–0.3)
BILIRUB SERPL-MCNC: 0.3 MG/DL
BUN SERPL-MCNC: 37 MG/DL (ref 8–23)
CALCIUM SERPL-MCNC: 9.7 MG/DL (ref 8.8–10.2)
CHLORIDE SERPL-SCNC: 99 MMOL/L (ref 98–107)
CHOLEST SERPL-MCNC: 230 MG/DL
CREAT SERPL-MCNC: 1.23 MG/DL (ref 0.51–0.95)
DEPRECATED HCO3 PLAS-SCNC: 28 MMOL/L (ref 22–29)
ERYTHROCYTE [DISTWIDTH] IN BLOOD BY AUTOMATED COUNT: 13.5 % (ref 10–15)
EXPTIME-PRE: 5.02 SEC
FEF2575-%PRED-PRE: 236 %
FEF2575-PRE: 4.26 L/SEC
FEF2575-PRED: 1.8 L/SEC
FEFMAX-%PRED-PRE: 105 %
FEFMAX-PRE: 5.84 L/SEC
FEFMAX-PRED: 5.56 L/SEC
FEV1-%PRED-PRE: 103 %
FEV1-PRE: 2.08 L
FEV1FEV6-PRE: 98 %
FEV1FEV6-PRED: 80 %
FEV1FVC-PRE: 98 %
FEV1FVC-PRED: 80 %
FIFMAX-PRE: 2.47 L/SEC
FVC-%PRED-PRE: 84 %
FVC-PRE: 2.12 L
FVC-PRED: 2.52 L
GFR SERPL CREATININE-BSD FRML MDRD: 48 ML/MIN/1.73M2
GLUCOSE SERPL-MCNC: 217 MG/DL (ref 70–99)
HCT VFR BLD AUTO: 29 % (ref 35–47)
HDLC SERPL-MCNC: 71 MG/DL
HGB BLD-MCNC: 9.5 G/DL (ref 11.7–15.7)
LDLC SERPL CALC-MCNC: 101 MG/DL
MAGNESIUM SERPL-MCNC: 1.5 MG/DL (ref 1.7–2.3)
MCH RBC QN AUTO: 29.2 PG (ref 26.5–33)
MCHC RBC AUTO-ENTMCNC: 32.8 G/DL (ref 31.5–36.5)
MCV RBC AUTO: 89 FL (ref 78–100)
NONHDLC SERPL-MCNC: 159 MG/DL
PLATELET # BLD AUTO: 190 10E3/UL (ref 150–450)
POTASSIUM SERPL-SCNC: 4.3 MMOL/L (ref 3.4–5.3)
PROT SERPL-MCNC: 6.5 G/DL (ref 6.4–8.3)
RBC # BLD AUTO: 3.25 10E6/UL (ref 3.8–5.2)
SODIUM SERPL-SCNC: 138 MMOL/L (ref 136–145)
TRIGL SERPL-MCNC: 292 MG/DL
WBC # BLD AUTO: 5.1 10E3/UL (ref 4–11)

## 2023-06-29 PROCEDURE — 36415 COLL VENOUS BLD VENIPUNCTURE: CPT | Performed by: PATHOLOGY

## 2023-06-29 PROCEDURE — 87799 DETECT AGENT NOS DNA QUANT: CPT | Performed by: INTERNAL MEDICINE

## 2023-06-29 PROCEDURE — 80053 COMPREHEN METABOLIC PANEL: CPT | Performed by: PATHOLOGY

## 2023-06-29 PROCEDURE — 85027 COMPLETE CBC AUTOMATED: CPT | Performed by: PATHOLOGY

## 2023-06-29 PROCEDURE — 82248 BILIRUBIN DIRECT: CPT | Performed by: PATHOLOGY

## 2023-06-29 PROCEDURE — 80061 LIPID PANEL: CPT | Performed by: PATHOLOGY

## 2023-06-29 PROCEDURE — 83735 ASSAY OF MAGNESIUM: CPT | Performed by: PATHOLOGY

## 2023-06-29 PROCEDURE — G0463 HOSPITAL OUTPT CLINIC VISIT: HCPCS | Performed by: INTERNAL MEDICINE

## 2023-06-29 PROCEDURE — 94375 RESPIRATORY FLOW VOLUME LOOP: CPT | Performed by: INTERNAL MEDICINE

## 2023-06-29 PROCEDURE — 99215 OFFICE O/P EST HI 40 MIN: CPT | Mod: 25 | Performed by: INTERNAL MEDICINE

## 2023-06-29 PROCEDURE — 99000 SPECIMEN HANDLING OFFICE-LAB: CPT | Performed by: PATHOLOGY

## 2023-06-29 PROCEDURE — 71046 X-RAY EXAM CHEST 2 VIEWS: CPT | Mod: GC | Performed by: RADIOLOGY

## 2023-06-29 PROCEDURE — 86833 HLA CLASS II HIGH DEFIN QUAL: CPT | Performed by: INTERNAL MEDICINE

## 2023-06-29 PROCEDURE — 86832 HLA CLASS I HIGH DEFIN QUAL: CPT | Performed by: INTERNAL MEDICINE

## 2023-06-29 RX ORDER — ROSUVASTATIN CALCIUM 10 MG/1
20 TABLET, COATED ORAL DAILY
Qty: 60 TABLET | Refills: 11 | Status: SHIPPED | OUTPATIENT
Start: 2023-06-29 | End: 2024-07-10

## 2023-06-29 RX ORDER — PREDNISONE 5 MG/1
7.5 TABLET ORAL DAILY
Qty: 315 TABLET | Refills: 11 | Status: SHIPPED | OUTPATIENT
Start: 2023-06-29 | End: 2023-09-11

## 2023-06-29 ASSESSMENT — PAIN SCALES - GENERAL: PAINLEVEL: NO PAIN (0)

## 2023-06-29 NOTE — LETTER
6/29/2023         RE: Melissa Elder  [address]  [facility] [address]        Dear Colleague,    Thank you for referring your patient, Melissa Elder, to the [facility]ANSPL[facility]. Please see a copy of my visit note below.    [facility]  Pulmonary Transplant Follow Up Visit  Jun 29, 2023    Reason for Visit  Melissa Elder is a 67 year old who is being seen for RECHECK               Lung Tx Summary:     Transplants:  2/21/2022 (Lung), Postoperative day:  491    Melissa Elder is a 67 year old who underwent bilateral lung transplant on 2/21/2022 (Lung) for severe COPD currently postoperative day:  491. Surgery uncomplicated, but did have bilateral hydropneumothoraces and bilateral effusions. 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 Other hx notable for HTN, mild nonbostructive CAD, paroxysmal afib, osteoporosis, GERD and colonic polyps.          Interval Histories:   Jun 29, 2023  Feels good overall. Up and walking. Minimal episodes of dyspnea unless really exerting herself going up stairs. No coughing/wheezing. Chronic nasal drip. No presyncope, no chest pain/pressure/palpitations. No n/v/acid reflux. No diarrhea/constipation. No swelling. No tremors/numbness/tingling. Sleeping is okay. Fluid intake has improved, to 10 glasses of water a day.     Exercise  Treadmill or walking outside    The patient was seen and examined by Nicole Knox MD     A complete ROS was otherwise negative except as noted in the HPI.           Medications:   Meds were reviewed during this visit and updated below    Outpatient Encounter Medications as of 6/29/2023   Medication Sig Dispense Refill    albuterol (PROAIR HFA/PROVENTIL HFA/VENTOLIN HFA) 108 (90 Base) MCG/ACT inhaler Inhale 2 puffs into the lungs every 6 hours as needed for shortness of breath / dyspnea or wheezing 18 g 0    aspirin (ASA) 81 MG EC tablet Take  1 tablet (81 mg) by mouth daily 30 tablet 11    calcium carbonate 600 mg-vitamin D 400 units (CALTRATE) 600-400 MG-UNIT per tablet Take 1 tablet by mouth daily 30 tablet 11    carboxymethylcellulose PF (REFRESH PLUS) 0.5 % ophthalmic solution Place 1 drop into both eyes every hour as needed for dry eyes 1 each 0    cetirizine (ZYRTEC) 10 MG tablet Take 1 tablet (10 mg) by mouth daily as needed for allergies 30 tablet 11    DILT- MG 24 hr ER beaded capsule TAKE 1 CAPSULE (240 MG) BY MOUTH DAILY 90 capsule 11    famotidine (PEPCID) 20 MG tablet TAKE 1 TABLET (20 MG) BY MOUTH 2 TIMES DAILY 180 tablet 11    furosemide (LASIX) 40 MG tablet Take 20 mg by mouth daily as needed 10 tablet 11    metoprolol tartrate (LOPRESSOR) 25 MG tablet TAKE 1 TABLET (25 MG) BY MOUTH EVERY MORNING AND 1.5 TABLETS (37.5 MG) EVERY EVENING. 75 tablet 11    multivitamin w/minerals (THERA-VIT-M) tablet Take 1 tablet by mouth daily 30 tablet 11    mycophenolic acid (GENERIC EQUIVALENT) 360 MG EC tablet TAKE 2 TABLETS (720 MG) BY MOUTH 2 TIMES DAILY 120 tablet 11    Nebulizers (MARCIA LC PLUS) MISC 2 each every 30 days For tobramycin nebs 1 each 1    pantoprazole (PROTONIX) 40 MG EC tablet TAKE 1 TABLET (40 MG) BY MOUTH DAILY 90 tablet 11    pentamidine (NEBUPENT) 300 MG neb solution Inhale 300 mg into the lungs every 28 days      predniSONE (DELTASONE) 5 MG tablet TAKE 2 TABLETS (10 MG) BY MOUTH EVERY MORNING AND 1.5 TABLETS (7.5 MG) EVERY EVENING. 315 tablet 11    rosuvastatin (CRESTOR) 10 MG tablet Take 1 tablet (10 mg) by mouth daily 30 tablet 11    tacrolimus (GENERIC EQUIVALENT) 0.5 MG capsule Take 1 capsule (0.5 mg) by mouth 2 times daily Total dose: 2.5mg in AM and 2.5mg in PM 60 capsule 11    tacrolimus (GENERIC EQUIVALENT) 1 MG capsule Take 2 capsules (2 mg) by mouth every morning AND 2 capsules (2 mg) every evening. Total dose: 2.5mg in AM and 2.5mg in  capsule 11    [DISCONTINUED] blood glucose (NO BRAND SPECIFIED) lancets  standard To use to test glucose level in the blood Use to test blood sugar  4  times daily as directed. To accompany glucose monitor brands per insurance coverage. (Patient not taking: No sig reported) 100 each 0    [DISCONTINUED] blood glucose (NO BRAND SPECIFIED) test strip To use to test glucose level in the blood Use to test blood sugar  4 times daily as directed. To accompany glucose monitor brands per insurance coverage. (Patient not taking: No sig reported) 120 strip 0    [DISCONTINUED] blood glucose monitoring (NO BRAND SPECIFIED) meter device kit Use as directed Per insurance coverage (Patient not taking: No sig reported) 1 kit 0    [DISCONTINUED] furosemide (LASIX) 40 MG tablet Take 1 tablet (40 mg) by mouth daily 30 tablet 11    [DISCONTINUED] IBANdronate (BONIVA) 150 MG tablet Take 1 tablet (150 mg) by mouth every 30 days HELD FOR LE SWELLING      [DISCONTINUED] Magnesium Glycinate 665 MG CAPS Take 2 tablets by mouth 4 times daily Using [employer] Best High Absorption Magnesium, 100mg elemental magnesium per tablet. 240 capsule 11     Facility-Administered Encounter Medications as of 6/29/2023   Medication Dose Route Frequency Provider Last Rate Last Admin    sodium chloride (PF) 0.9% PF flush 10 mL  10 mL Intravenous Once Dontae Chan MD                   Allergies:     Allergies   Allergen Reactions    Alendronate Other (See Comments)     dizziness  Other reaction(s): Dizziness    Nickel Rash    Sulfa Antibiotics Rash            Past Medical and Past Surgical History:     Past Medical History:   Diagnosis Date    Atrial fibrillation with RVR (H)     hx of A fib related to severe COPD, does not meet anticoagulation criteria as noted    COPD, severe (H)     Osteoporosis        Past Surgical History:   Procedure Laterality Date    BRONCHOSCOPY (RIGID OR FLEXIBLE), DIAGNOSTIC N/A 4/7/2022    Procedure: BRONCHOSCOPY, WITH BRONCHOALVEOLAR LAVAGE and BIOPSIES;  Surgeon: Gerson Haddad MD;  Location: [facility]     BRONCHOSCOPY (RIGID OR FLEXIBLE), DIAGNOSTIC N/A 5/12/2022    Procedure: BRONCHOSCOPY, WITH BRONCHOALVEOLAR LAVAGE AND BIOPSY;  Surgeon: Melissa Landin MD;  Location: [facility]    BRONCHOSCOPY (RIGID OR FLEXIBLE), DIAGNOSTIC N/A 8/2/2022    Procedure: BRONCHOSCOPY, WITH BRONCHOALVEOLAR LAVAGE;  Surgeon: Melissa Landin MD;  [facility]    BRONCHOSCOPY (RIGID OR FLEXIBLE), DIAGNOSTIC N/A 10/11/2022    Procedure: BRONCHOSCOPY, WITH BRONCHOALVEOLAR LAVAGE AND BIOPSIES;  Surgeon: Angel Gallagher MD;  Location: [facility]    BRONCHOSCOPY (RIGID OR FLEXIBLE), DIAGNOSTIC N/A 12/20/2022    Procedure: BRONCHOSCOPY, WITH BRONCHOALVEOLAR LAVAGE AND BIOPSIES;  Surgeon: Perlman, David Morris, MD;  [facility]    BRONCHOSCOPY (RIGID OR FLEXIBLE), DIAGNOSTIC N/A 3/1/2023    Procedure: BRONCHOSCOPY, WITH BRONCHOALVEOLAR LAVAGE WITH BIOPSY;  Surgeon: Lucina Thompson MD;  Location: [facility]    BRONCHOSCOPY FLEXIBLE AND RIGID N/A 3/1/2022    Procedure: BRONCHOSCOPY INSPECTION;  Surgeon: Melissa Landin MD;  Location: [facility]    CV CORONARY ANGIOGRAM N/A 3/27/2019    Procedure: CV CORONARY ANGIOGRAM;  Surgeon: Thierry Serrano MD;  Location:  [facility]ARD[facility] CATH LAB    CV RIGHT HEART CATH MEASUREMENTS RECORDED N/A 3/27/2019    Procedure: CV RIGHT HEART CATH;  Surgeon: Thierry Serrano MD;  Location:  HE[facility] CARD[facility] CATH LAB    ESOPHAGEAL IMPEDENCE FUNCTION TEST WITH 24 HOUR PH GREATER THAN 1 HOUR N/A 3/28/2019    Procedure: ESOPHAGEAL IMPEDENCE FUNCTION TEST WITH 24 HOUR PH GREATER THAN 1 HOUR;  Surgeon: Mike Henry MD;  Location:  GI    EXCISE PILONIDAL CYST, SIMPLE      IR CHEST TUBE PLACEMENT NON-TUNNELED RIGHT  3/3/2022    TONSILLECTOMY & ADENOIDECTOMY      TRANSPLANT LUNG RECIPIENT SINGLE X2 Bilateral 2/20/2022    Procedure: Bilateral Sequential Lung Transplantation, Clamshell Incision, Extracorporeal Membrane Oxgenation, Bronchoscopy, Cryoablation of Intercostal Nerves;  Surgeon: Raul Robin,  MD;  Location:  OR             Social History:   Social Updates:          Rejection and Infection History     Rejection Hx    DATE INDICATION  PATH BAL/MICRO TREATMENT                Infectious Hx  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  Bronchoscopy in October 2022 with Pseudomonas, treated with FIDEL nebs with resolution.  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  Fungitell and galactomannan were negative and chest CT showed no evidence of fungus so no treatment was initiated.      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  Subsequent AFB cultures were negative.  No treatment indicated.         Exam:     BP (!) 143/65   Pulse 71   Temp 98.2  F (36.8  C) (Oral)   Wt 67.4 kg (148 lb 9.6 oz)   SpO2 99%   BMI 27.18 kg/m    Body mass index is 27.18 kg/m .      GENERAL APPEARANCE: Well developed, well nourished, alert, and in no apparent distress.  EYES: PERRL, EOMI  HENT: Nasal mucosa with no edema and no hyperemia. No nasal polyps.  EARS: Canals clear, TMs normal  MOUTH: Oral mucosa is moist, without any lesions, no tonsillar enlargement, no oropharyngeal exudate.  NECK: supple, no masses, no thyromegaly.  LYMPHATICS: No significant cervical, or supraclavicular nodes.  RESP: normal percussion, good air flow throughout.  No crackles. No rhonchi. No wheezes.  CV: Normal S1, S2, regular rhythm, normal rate. No murmur.  No rub. No gallop. No LE edema.   ABDOMEN:  Bowel sounds normal, soft, nontender, no HSM or masses.   MS: extremities normal. No cyanosis.  SKIN: no rash on limited exam  NEURO: Mentation intact, speech normal, normal strength and tone, normal gait and stance, no significant tremor at rest   PSYCH: mentation appears normal. and affect normal/bright         Data:     Results:  Recent Results (from the past 168 hour(s))   Tacrolimus by Tandem Mass Spectrometry    Collection Time: 06/23/23  9:33 AM   Result Value Ref Range     Tacrolimus by Tandem Mass Spectrometry 8.8 5.0 - 15.0 ug/L    Tacrolimus Last Dose Date 6/22/2023     Tacrolimus Last Dose Time  9:30 PM            Date Place TLC (%) FVC (%) FEV1 (%) FEV1/FVC DLCO (%) Note                                                             Post Transplant Baseline: FEV1 1.9 FVC 1.91  FEV1: 1.9  2.02 2/28/23  1.78 10/10/22  FVC: 2.04 2/28/23, 1.78 (10/10/22)    Results as noted above.    Jun 29, 2023    Spirometry interpretation:  The spirometry is normal.  When compared to 2/28/23, the FEV1 and FVC have little change.  The testing meets ATS criteria.  Highest PFTs to date               Assessment and Plan:     Transplants:    Melissa Elder is a 67 year old who underwent bilateral lung transplant on 2/21/2022 (Lung) for severe COPD currently postoperative day:  491, Surgery uncomplicated, but did have bilateral hydropneumothoraces and bilateral effusions. 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 Other hx notable for HTN, mild nonbostructive CAD, paroxysmal afib, osteoporosis, GERD and colonic polyps.     PULMONARY    Transplant:       Allograft Function: Functionally without limitation, cough or dyspnea. PFTs today with the highest FEV1 and FVC to date. DSA to be checked at next visit. She is not limited in any way and her CXR remains very clear wtihout new infiltrates or changes.   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 Last checked 12/19/2022, without DR15 detected, or DQB6, DQB5 534 MFI.     EBV Viremia: low level positive in 2022, not detected on subsequent rechecks.     Reflux: Adequately controlled with current pantoprazole and famotidine.     Coronary artery disease: Nonobstructive.  Continue aspirin and  rosuvastatin.     SOMMER CKD: The patient has stage IIIa CKD.   Tacrolimus goal was reduced as noted above. Cr up to 1.8 on 6/23 with lasix and not taking in enough fluids, improved on today's labs after decreasing lasix and increasing fluid intake.      Glucose intolerance: Hemoglobin A1c is elevated at 7.3.  The patient will be referred to a local endocrinologist for further evaluation.   - Follow up with her primary      Paroxysmal atrial fibrillation: The patient appears to be in sinus rhythm on exam today.  Continue diltiazem and metoprolol. No palpitations of symptoms reported.      Osteoporosis: Lumbar compression fracture in October 2022.  Boniva was held due to concern for possible contribution to lower extremity swelling.    - Sees local endo  - Doing yearly infusions with reclast    Lower Extremity Edema: Controlled on lasix, but given SOMMER and improvement with decreasing to 20 mg will hold.  - Stop lasix, use prn     Hypomagnesemia: Due to CNI interference with absorption. Mg low today, taking mag ox 200 mg qid   - increase to 400 mg three times a day, if not able to get increased will consider going back to doctor's best mag glycinate    Hypertension: Blood pressure appears to be adequately controlled with current diltiazem, metoprolol but she's measuring them before meds so they appear higher.  - Will have them check BPs after meds and then consider titration     Hypercholesterolemia: Cholesterol, LDL and triglycerides are elevated.  Increasing rosuvastatin again to 20 mg nightly.        Maintenance:  Derm Exam: Saw recently in [place] June 2023  Colon Ca Screening: 3/2016 with sessile polyps in the transverse and sigmoid colon, 3mm and 4mm, due in 8/2024  Mammogram: Due 11/2023  DEXA: 4/7/23, due again in 4/2025  Imms: Up to date        CHANGES TODAY    - Increase rosuvastatin to 20 mg, repeat lipids at next visit    - Stop lasix, can use prn    - Increase mag oxide to 400 mg TID     - Address Diabetes  with her primary care given Hgba1c of 7.3%    - Monitoring blood pressure after taking morning meds       I personally spent 40 minutes in documentation, the interview and exam, and review of the chart/labs/imaging on Jun 29, 2023 not including time spent interpreting spirometry.       Nicole Knox MD  [facility]   Pulmonary Transplant   Post Transplant Coordinator: Arturo Farrgabriella  Fax: [phone]  Ph: [phone]        Transplant Coordinator Note    Reason for visit: Post lung transplant follow up visit   Coordinator: Present   Caregiver:  Present     Health concerns addressed today:  1. Resp: SOB every once in a while when she is really pushing it. No cough or wheeze.   2. GI: No nausea or heartburn. BM's at baseline.   3. ENT:   4. Following locally with endocrine, started on reclast.       Pentamidine?       Activity/rehab: walking inside as much as possible with air quality  Oxygen needs: room air  Pain management/RX: Back pain - compression fracture.   Diabetic management: checking BGs  Next Bronch due:   Risk Criteria Labs: negative 3/25/22  CMV status: D-/R-  EBV status: D+/R+  AC/asa: ASA 81mg - started holding 10 days ago.   PJP prophylactic: pentamidine neb (dapsone caused anemia/RBC hemolysis) done locally    COVID:  COVID-19 infection (yes/no, date of most recent positive test):   Status/instructions given about COVID-19 vaccine:     Pt Education: medications (use/dose/side effects), how/when to call coordinator, frequency of labs, s/s of infection/rejection, call prior to starting any new medications, lab/vital sign book    Health Maintenance:   Last colonoscopy: 8/31/2021  Next colonoscopy due: 8/2024  Dermatology: Following with Dermatology locally, last seen in June, up to date.   Vaccinations this visit:     Labs, CXR, PFTs reviewed with patient  Medication record reviewed and reconciled  Questions and concerns addressed    Patient  Instructions  1. Continue to hydrate with 60-70 oz fluids daily.  2. Continue to exercise daily or most days of the week.  3. Follow up with your primary care provider for annual gender health maintenance procedures.  4. Follow up with colonoscopy schedule.  5. Follow up with annual dermatology visits.  6. It doesn't seem like the COVID vaccine is working well in lung transplant patients. A number of lung transplant patients have gotten sick with COVID even after receiving the vaccines. Based on our recent experience, it can be life-threatening to get COVID  even after being vaccinated. Please continue to act like you did not get the COVID vaccine - social distancing, wearing a mask, good hand hygiene, etc. If the people around you are vaccinated, it will help reduce the risk of you getting COVID. All members of your household should be vaccinated.  7. Your magnesium is low today at 1.5. We are increasing your dose to 400mg three times daily of magnesium oxide, take this at 8 AM, 12 NOON, and 4 PM.  8. Increase your Rosuvastatin (crestor) to 20mg daily. We will recheck fasting labs with your next clinic visit so nothing to eat or drink before labs.   9. Stop your Lasix. Elevate your feet when sitting. Continue to wear your compression socks. Let Arturo know if your swelling get's worse.   10. Take your bleed pressure at least an hour after you have taken your morning medications. Let Arturo know what they are running in the next week.  11. Your A1C has been high. Follow up with your endocrinologist or your primary care provider about this.    Next transplant clinic appointment: 3-4 with CXR, labs and PFTs  Next lab draw: in a week     AVS printed at time of check out            Again, thank you for allowing me to participate in the care of your patient.        Sincerely,        Nicole Knox MD

## 2023-06-29 NOTE — PROGRESS NOTES
Transplant Coordinator Note    Reason for visit: Post lung transplant follow up visit   Coordinator: Present   Caregiver:  Present     Health concerns addressed today:  1. Resp: SOB every once in a while when she is really pushing it. No cough or wheeze.   2. GI: No nausea or heartburn. BM's at baseline.   3. ENT:   4. Following locally with endocrine, started on reclast.       Pentamidine?       Activity/rehab: walking inside as much as possible with air quality  Oxygen needs: room air  Pain management/RX: Back pain - compression fracture.   Diabetic management: checking BGs  Next Bronch due:   Risk Criteria Labs: negative 3/25/22  CMV status: D-/R-  EBV status: D+/R+  AC/asa: ASA 81mg - started holding 10 days ago.   PJP prophylactic: pentamidine neb (dapsone caused anemia/RBC hemolysis) done locally    COVID:  1. COVID-19 infection (yes/no, date of most recent positive test):   2. Status/instructions given about COVID-19 vaccine:     Pt Education: medications (use/dose/side effects), how/when to call coordinator, frequency of labs, s/s of infection/rejection, call prior to starting any new medications, lab/vital sign book    Health Maintenance:     Last colonoscopy: 8/31/2021    Next colonoscopy due: 8/2024    Dermatology: Following with Dermatology locally, last seen in June, up to date.     Vaccinations this visit:     Labs, CXR, PFTs reviewed with patient  Medication record reviewed and reconciled  Questions and concerns addressed    Patient Instructions  1. Continue to hydrate with 60-70 oz fluids daily.  2. Continue to exercise daily or most days of the week.  3. Follow up with your primary care provider for annual gender health maintenance procedures.  4. Follow up with colonoscopy schedule.  5. Follow up with annual dermatology visits.  6. It doesn't seem like the COVID vaccine is working well in lung transplant patients. A number of lung transplant patients have gotten sick with COVID even after receiving  the vaccines. Based on our recent experience, it can be life-threatening to get COVID  even after being vaccinated. Please continue to act like you did not get the COVID vaccine - social distancing, wearing a mask, good hand hygiene, etc. If the people around you are vaccinated, it will help reduce the risk of you getting COVID. All members of your household should be vaccinated.  7. Your magnesium is low today at 1.5. We are increasing your dose to 400mg three times daily of magnesium oxide, take this at 8 AM, 12 NOON, and 4 PM.  8. Increase your Rosuvastatin (crestor) to 20mg daily. We will recheck fasting labs with your next clinic visit so nothing to eat or drink before labs.   9. Stop your Lasix. Elevate your feet when sitting. Continue to wear your compression socks. Let Arturo know if your swelling get's worse.   10. Take your bleed pressure at least an hour after you have taken your morning medications. Let Arturo know what they are running in the next week.  11. Your A1C has been high. Follow up with your endocrinologist or your primary care provider about this.    Next transplant clinic appointment: 3-4 with CXR, labs and PFTs  Next lab draw: in a week     AVS printed at time of check out

## 2023-06-29 NOTE — PATIENT INSTRUCTIONS
Patient Instructions  1. Continue to hydrate with 60-70 oz fluids daily.  2. Continue to exercise daily or most days of the week.  3. Follow up with your primary care provider for annual gender health maintenance procedures.  4. Follow up with colonoscopy schedule.  5. Follow up with annual dermatology visits.  6. It doesn't seem like the COVID vaccine is working well in lung transplant patients. A number of lung transplant patients have gotten sick with COVID even after receiving the vaccines. Based on our recent experience, it can be life-threatening to get COVID  even after being vaccinated. Please continue to act like you did not get the COVID vaccine - social distancing, wearing a mask, good hand hygiene, etc. If the people around you are vaccinated, it will help reduce the risk of you getting COVID. All members of your household should be vaccinated.  7. Your magnesium is low today at 1.5. We are increasing your dose to 400mg three times daily of magnesium oxide, take this at 8 AM, 12 NOON, and 4 PM.  8. Increase your Rosuvastatin (crestor) to 20mg daily. We will recheck fasting labs with your next clinic visit so nothing to eat or drink before labs.   9. Stop your Lasix. Elevate your feet when sitting. Continue to wear your compression socks. Let Arturo know if your swelling get's worse.   10. Take your bleed pressure at least an hour after you have taken your morning medications. Let Arturo know what they are running in the next week.  11. Your A1C has been high. Follow up with your endocrinologist or your primary care provider about this.  12. We will check your labs next week for creatinine and magnesium. After that, continue to check labs monthly.     Next transplant clinic appointment: 3-4 with CXR, labs and PFTs  Next lab draw: in a week     AVS printed at time of check out          ~~~~~~~~~~~~~~~~~~~~~~~~~    Thoracic Transplant Office phone [phone], fax [phone]    Office Hours 8:30 - 5:00      For after-hours urgent issues, please dial [phone] and ask the  to page the Thoracic Transplant Coordinator On-Call.   --------------------  To expedite your medication refill(s), please contact your pharmacy and have them fax a refill request to: [phone]    *Please allow 3 business days for routine medication refills.  *Please allow 5 business days for controlled substance medication refills.    **For Diabetic medications and supplies refill(s), please contact your pharmacy and have them contact your Endocrine team.  --------------------  For scheduling appointments call [phone].  --------------------  Please Note: If you are active on [employer], all future test results will be sent by [employer] message only, and will no longer be called to patient. You may also receive communication directly from your physician.

## 2023-06-29 NOTE — NURSING NOTE
Chief Complaint   Patient presents with     RECHECK       BP (!) 143/65   Pulse 71   Temp 98.2  F (36.8  C) (Oral)   Wt 67.4 kg (148 lb 9.6 oz)   SpO2 99%   BMI 27.18 kg/m      Lewis Mendez on 6/29/2023 at 9:43 AM

## 2023-06-30 LAB
CMV DNA SPEC NAA+PROBE-ACNC: NOT DETECTED IU/ML
DONOR IDENTIFICATION: NORMAL
DSA COMMENTS: NORMAL
DSA PRESENT: NO
DSA TEST METHOD: NORMAL
EBV DNA # SPEC NAA+PROBE: NOT DETECTED COPIES/ML
ORGAN: NORMAL
SA 1 CELL: NORMAL
SA 1 TEST METHOD: NORMAL
SA 2 CELL: NORMAL
SA 2 TEST METHOD: NORMAL
SA1 HI RISK ABY: NORMAL
SA1 MOD RISK ABY: NORMAL
SA2 HI RISK ABY: NORMAL
SA2 MOD RISK ABY: NORMAL
UNACCEPTABLE ANTIGENS: NORMAL
UNOS CPRA: 76
ZZZSA 1  COMMENTS: NORMAL
ZZZSA 2 COMMENTS: NORMAL

## 2023-07-13 NOTE — LETTER
PHYSICIAN ORDERS      DATE & TIME ISSUED: 2023 11:53 AM  PATIENT NAME: Melissa Elder   : 1955     [facility] MR# [if applicable]: [id number]     DIAGNOSIS:  Lung Transplant  Z94.2  Please add on Magnesium level to labs done 23.       Any questions please call: Arturo [phone]    Please fax these results to [phone].         Nicole Knox MD  Pulmonary Disease

## 2023-07-18 NOTE — PROGRESS NOTES
Pt sends in the following blood pressures:   149/62  143/66  135/62  126/61  141/68  121/57  126/67  123/64  130/64    HR averaging 68.   Reviewed with Dr. Hernandez: No change to medications at this time

## 2023-07-20 DIAGNOSIS — Z94.2 S/P LUNG TRANSPLANT (H): ICD-10-CM

## 2023-07-20 RX ORDER — ASPIRIN 81 MG/1
81 TABLET ORAL DAILY
Qty: 30 TABLET | Refills: 11 | Status: SHIPPED | OUTPATIENT
Start: 2023-07-20 | End: 2024-08-06

## 2023-07-28 ENCOUNTER — LAB (OUTPATIENT)
Dept: LAB | Facility: CLINIC | Age: 68
End: 2023-07-28
Payer: MEDICARE

## 2023-07-28 DIAGNOSIS — Z94.2 S/P LUNG TRANSPLANT (H): ICD-10-CM

## 2023-07-28 DIAGNOSIS — Z79.899 ENCOUNTER FOR LONG-TERM (CURRENT) USE OF HIGH-RISK MEDICATION: ICD-10-CM

## 2023-07-28 DIAGNOSIS — E11.9 TYPE 2 DIABETES MELLITUS WITHOUT COMPLICATION, WITHOUT LONG-TERM CURRENT USE OF INSULIN (H): ICD-10-CM

## 2023-07-28 PROCEDURE — 80197 ASSAY OF TACROLIMUS: CPT

## 2023-07-30 ENCOUNTER — HEALTH MAINTENANCE LETTER (OUTPATIENT)
Age: 68
End: 2023-07-30

## 2023-07-31 LAB
TACROLIMUS BLD-MCNC: 10.4 UG/L (ref 5–15)
TME LAST DOSE: NORMAL H
TME LAST DOSE: NORMAL H

## 2023-08-02 ENCOUNTER — TELEPHONE (OUTPATIENT)
Dept: TRANSPLANT | Facility: CLINIC | Age: 68
End: 2023-08-02
Payer: COMMERCIAL

## 2023-08-02 NOTE — TELEPHONE ENCOUNTER
Joycelyn from [facility] calling regarding lab orders faxed over today, a couple of the test medicare doesn't cover the Ferritin, and Iron and Iron binding capacity, needing additional diagnosed code so it can be covered. And is available for a call back today.

## 2023-08-02 NOTE — TELEPHONE ENCOUNTER
Spoke to patient on the phone regarding tacrolimus level being high at 10.4 on 7/28 at 12-hour level.  Goal is 8-10.  Decrease tacrolimus dosing to 2.5 mg in the morning and 2 mg in the evening recheck labs next week.  Patient's creatinine level was 1.4, hemoglobin downtrending a bit will recheck iron studies next week as well.  Faxed orders to patient's local lab patient agreed with plan

## 2023-08-02 NOTE — TELEPHONE ENCOUNTER
Left message with another  regarding trying Anemia ICD 10 code for upcoming labs. They will call transplant office back if they run into any more issues.

## 2023-08-02 NOTE — LETTER
PHYSICIAN ORDERS      DATE & TIME ISSUED: 2023 10:18 AM  PATIENT NAME: Melissa Elder   : 1955     [facility] MR# [if applicable]: [id number]     DIAGNOSIS:  Lung Transplant  Z94.2  Please check the following labs week of 23:    BMP  Magnesium  CBC with platelets and differential  Tacrolimus level (please use kits provided by patient to send to [facility])  Iron and iron binding capacity   Ferritin   LDH   Peripheral blood smear       Any questions please call: [phone]    Please fax these results to [phone].         Nicole Knox MD  Pulmonary Disease

## 2023-08-11 ENCOUNTER — LAB (OUTPATIENT)
Dept: LAB | Facility: CLINIC | Age: 68
End: 2023-08-11
Payer: MEDICARE

## 2023-08-11 DIAGNOSIS — Z94.2 S/P LUNG TRANSPLANT (H): Primary | ICD-10-CM

## 2023-08-11 PROCEDURE — 80197 ASSAY OF TACROLIMUS: CPT | Performed by: INTERNAL MEDICINE

## 2023-08-14 LAB
TACROLIMUS BLD-MCNC: 12.4 UG/L (ref 5–15)
TME LAST DOSE: NORMAL H
TME LAST DOSE: NORMAL H

## 2023-08-15 ENCOUNTER — TELEPHONE (OUTPATIENT)
Dept: TRANSPLANT | Facility: CLINIC | Age: 68
End: 2023-08-15
Payer: COMMERCIAL

## 2023-08-15 DIAGNOSIS — Z94.2 S/P LUNG TRANSPLANT (H): ICD-10-CM

## 2023-08-15 RX ORDER — TACROLIMUS 1 MG/1
CAPSULE ORAL
Qty: 90 CAPSULE | Refills: 11 | Status: SHIPPED | OUTPATIENT
Start: 2023-08-15 | End: 2023-08-29

## 2023-08-15 RX ORDER — TACROLIMUS 0.5 MG/1
0.5 CAPSULE ORAL EVERY EVENING
Qty: 30 CAPSULE | Refills: 11 | Status: SHIPPED | OUTPATIENT
Start: 2023-08-15 | End: 2023-08-29

## 2023-08-15 NOTE — TELEPHONE ENCOUNTER
Tacrolimus level 12.4 at 12 hours, on 8/11/23.  Goal 8-10.   Current dose 2.5 mg in AM, 2.0 mg in PM    Dose changed to 2.0 mg in AM, 1.5 mg in PM   Recheck level in 7-10 days.    Confirmed plan with pt.

## 2023-08-17 NOTE — PROGRESS NOTES
Anemia labs from 8/11/23 reviewed by Dr. Knox:   Will add haptoglobin to blood draw next week      Labs at [facility]: Phone: [phone] Fax: [phone]

## 2023-08-17 NOTE — LETTER
PHYSICIAN ORDERS      DATE & TIME ISSUED: 2023 3:24 PM  PATIENT NAME: Melissa Elder   : 1955     [facility] MR# [if applicable]: [id number]     DIAGNOSIS:  Lung Transplant  Z94.2  Please draw haptoglobin and tacrolimus level week of 23      Any questions please call: Arturo [phone]    Please fax these results to [phone].         Nicole Knox MD  Pulmonary Disease

## 2023-08-25 ENCOUNTER — LAB (OUTPATIENT)
Dept: LAB | Facility: CLINIC | Age: 68
End: 2023-08-25
Payer: MEDICARE

## 2023-08-25 DIAGNOSIS — Z94.2 S/P LUNG TRANSPLANT (H): Primary | ICD-10-CM

## 2023-08-25 PROCEDURE — 80197 ASSAY OF TACROLIMUS: CPT | Performed by: INTERNAL MEDICINE

## 2023-08-28 LAB
TACROLIMUS BLD-MCNC: 6.8 UG/L (ref 5–15)
TME LAST DOSE: NORMAL H
TME LAST DOSE: NORMAL H

## 2023-08-29 DIAGNOSIS — Z94.2 S/P LUNG TRANSPLANT (H): ICD-10-CM

## 2023-08-29 RX ORDER — TACROLIMUS 0.5 MG/1
0.5 CAPSULE ORAL EVERY EVENING
Qty: 30 CAPSULE | Refills: 11
Start: 2023-08-29 | End: 2023-09-28

## 2023-08-29 RX ORDER — TACROLIMUS 1 MG/1
CAPSULE ORAL
Qty: 120 CAPSULE | Refills: 11
Start: 2023-08-29 | End: 2023-09-08

## 2023-08-29 NOTE — PROGRESS NOTES
Tacrolimus level 6.8 at 12 hours, on 8/25/23.  Goal 8-10   Current dose 2 mg in AM, 1.5 mg in PM    Dose changed to 2 mg in AM, 2 mg in PM   Recheck level in a week    Discussed with patient    MyChart message sent     Lab orders faxed to local lab

## 2023-08-29 NOTE — LETTER
PHYSICIAN ORDERS      DATE & TIME ISSUED: 2023 10:20 AM  PATIENT NAME: Melissa Elder   : 1955     [facility] MR# [if applicable]: [id number]     DIAGNOSIS:  Lung Transplant  Z94.2  Please check tacrolimus level and bmp on 23      Any questions please call: Arturo [phone]    Please fax these results to [phone].         Nicole Knox MD  Pulmonary Disease

## 2023-09-07 ENCOUNTER — LAB (OUTPATIENT)
Dept: LAB | Facility: CLINIC | Age: 68
End: 2023-09-07
Payer: COMMERCIAL

## 2023-09-08 ENCOUNTER — TELEPHONE (OUTPATIENT)
Dept: TRANSPLANT | Facility: CLINIC | Age: 68
End: 2023-09-08

## 2023-09-08 DIAGNOSIS — Z94.2 S/P LUNG TRANSPLANT (H): ICD-10-CM

## 2023-09-08 PROCEDURE — 80197 ASSAY OF TACROLIMUS: CPT | Performed by: INTERNAL MEDICINE

## 2023-09-08 RX ORDER — TACROLIMUS 1 MG/1
CAPSULE ORAL
Qty: 120 CAPSULE | Refills: 11 | Status: SHIPPED | OUTPATIENT
Start: 2023-09-08 | End: 2023-09-28

## 2023-09-08 NOTE — TELEPHONE ENCOUNTER
Patient Call: General  Route to LPN    Reason for call: [facility] calling to see if patient is still taking Tacrolimus 1 mg they received something from sure script stating it was canceled, if patient is suppose to take Tacrolimus they will need a new script sent over, and also questioning if patient is talking prednisone 5 mg it was canceled in their system.    Call back needed? Yes    Return Call Needed  Same as documented in contacts section  When to return call?: Same day: Route High Priority

## 2023-09-11 DIAGNOSIS — Z94.2 S/P LUNG TRANSPLANT (H): ICD-10-CM

## 2023-09-11 LAB
TACROLIMUS BLD-MCNC: 8.1 UG/L (ref 5–15)
TME LAST DOSE: NORMAL H
TME LAST DOSE: NORMAL H

## 2023-09-11 RX ORDER — PREDNISONE 5 MG/1
7.5 TABLET ORAL DAILY
Qty: 315 TABLET | Refills: 11 | Status: SHIPPED | OUTPATIENT
Start: 2023-09-11 | End: 2024-04-11

## 2023-09-22 ENCOUNTER — LAB (OUTPATIENT)
Dept: LAB | Facility: CLINIC | Age: 68
End: 2023-09-22

## 2023-09-22 PROCEDURE — 80197 ASSAY OF TACROLIMUS: CPT | Performed by: INTERNAL MEDICINE

## 2023-09-25 LAB
TACROLIMUS BLD-MCNC: 11.3 UG/L (ref 5–15)
TME LAST DOSE: NORMAL H
TME LAST DOSE: NORMAL H

## 2023-09-27 NOTE — PROGRESS NOTES
[facility]  Pulmonary Transplant Follow Up Visit    Sep 28, 2023      Reason for Visit  Melissa Elder is a 67 year old who is being seen for RECHECK      Presents to clinic with her  spouse          Lung Tx Summary:     Transplants:  2/21/2022 (Lung), Postoperative day:  584    Melissa Elder is a 67 year old who underwent bilateral transplant on 2/21/2022 (Lung), currently postoperative day:  584, (1 y, 7 m).  Surgery uncomplicated, but did have bilateral hydropneumothoraces and bilateral effusions. 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 Other hx notable for HTN, mild nonbostructive CAD, paroxysmal afib, osteoporosis, GERD and colonic polyps.           Assessment and Plan:     PULMONARY     Transplant:      Allograft Function:  No new pulmonary complaints, no recent illnesses.  Exercise tolerance adequate and improving since Oct. 2022 fall (mechanical, no loss of consciousness/did not hit head).   Sat'ing 98% on room air.  CXR today, personally reviewed, stable.  PFTs today stable/improved although saved FVC efforts does appear to be valid, ATS criteria was not met.   - Continue exercise, can order pulm rehab in the future although she is working on hre own to improve conditioning  - Drop tacro dose today given recently supratherapeutic level and new goal    Immunosuppression:  Tacrolimus, goal further reduced due to CKD to 6-8  Myfortic 720 bid  Prednisone 5/2.5    Prophylaxis:   PJP: Monthly pentamidine nebs  CMV: D-/R - (has been neg. pre- and post- txp., level today pending)  EBV: D+/R+     EBV Viremia: low level positive in 2022, not detected on subsequent rechecks other than <500 (log <2.7) on 2/28.  Negative since then, most recently 7/28.  EBV today log 2.7. .    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 On 12/19/2022 DQB5 534 MFI, without DR15  or DQB6 detected.  Last few DSAs have been negative, most recently 6/29.     Reflux: Adequately controlled with current pantoprazole daily and famotidine BID.     Coronary artery disease: Nonobstructive.  Continue aspirin and rosuvastatin.     SOMMER on CKD: Stage IIIa CKD.   Tacrolimus goal further reduced today 9/28. Cr most recently 1.05 on 9/22, today more elevated at 1.3, reports drinking ~64 oz. a day, but also at times does not hydrate properly (64 oz./day includes caffeinated and sugared beverages) and consumes sodium rich foods.  Water goal should be 64-80 oz./day.  - Repeat BMP in 1 week after adequate (quantity and quality) hydration with some additional diuresis    Lower extremity edema: Hasn't needed/used PRN lasix much at all at home, although quite swollen today.  Wears compression stockings and tries to avoid sodium-rich foods, although more difficult when traveling to appointments  given increased fast food consumption.  - Lasix 20mg x7 days, then repeat labs in 1 week    Paroxysmal atrial fibrillation: No palpitations or symptoms reported on diltiazem and beta blocker.  Recently HRs at home running 60s-70s.    - Given elevated BPs at home and already low-end HR, transition metoprolol to carvedilol 6.25 BID    Hypertension: BP elevated here in clinic today at 151/69.  BPs at home have been running 130s-140s systolic, with current regimen of diltiazem and metoprolol.  Additionally likely hypervolemic given BLE edema.    - Given elevated BPs at home and already low-end HR, transition metoprolol to carvedilol 6.25 BID and will uptitrate as needed for increased alpha component     Glucose intolerance: Hemoglobin A1c elevated at 7.3 on 2/28/23 (up from prior 5.7 on 2/22/22).  The patient was referred to a local endocrinologist for further evaluation, last seen ~April 2023 per [employer] messaging (visit note not found in CareEverywhere). Of note, not fasting this AM so elevated glucose level (223) this AM is  unsurprising.  - Follow up with PCP and local endocrinologist  - Next visit recheck A1c with annual studies    Hypercholesterolemia: Cholesterol, LDL and triglycerides elevated 2/28/23.  Rosuvastatin 20 mg nightly.    - Fasting lipid panel at next visit with annual studies      Osteoporosis: Lumbar compression fracture in October 2022 after a fall as above.  Boniva was held due to concern for possible contribution to lower extremity swelling.     - Vit D level today adequate at 34, no need for recheck with annual studies at next visit.    - Sees local endo, likely last ~April 2023  - Doing yearly infusions with Reclast, last in ~June 2023     Hypomagnesemia: Due to CNI interference with absorption. Mg 1.7 today, taking mag ox 400 mg TID (if not able to get increased will consider going back to doctor's best mag glycinate).  This incidentally dropped off her med list, txp coordinator to add back once we confirm the dose/form she is taking.    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  Level today 351, will replete x 1 with reheck in 1 mo.      Maintenance:  Derm Exam: Saw recently in [place] June 2023  Colon Ca Screening: 3/2016 with sessile polyps in the transverse and sigmoid colon, 3mm and 4mm, due in 8/2024  Mammogram: Due 11/2023, this is on her radar & she will schedule this with her PCP soon  DEXA: 4/7/23, due again in 4/2025  Imms: Up to date, will need seasonal influenza, COVID booster, and RSV vaccine in the near future      CHANGES TODAY:    - Decreasing tacrolimus goal to 6-8 (SOMMER on CKD, level within this range today    - Decrease tacrolimus dose to 1 mg qAM / 1 mg qPM given that last tac level of 11.3 (9/22) was a true 12-hour level    - One week of furosemide 20 mg and adequate hydration, then recheck BMP    - Switch metoprolol to carvedilol      RTC: 3 months  Transplant Coordinator: Arturo Miller       I personally spent 40 minutes in documentation,  the interview and exam, and review of the chart/labs/imaging on Sep 28, 2023 not including time spent interpreting spirometry.       Nicole Knox MD  [facility]   Pulmonary Transplant   Post Transplant Coordinator: Arturo Farrmilenadaniel  Fax: [phone]  Ph: [phone]    &     MANUEL Dacosta, CNP (training)  Pulmonary Transplant Nurse Practitioner       History of Present Illness / Interval Histories:     Sep 28, 2023    No new or increased dyspnea.  No cough.  With autumn allergy season, sinus congestion/pressure, rhinorrhea catia. in the AM, conjunctivitis, some PND.  Controlled with alternating Claritin, Zyrtec, Allegra, also using inhaler more frequently (2x).  Has nasal spray she can start using.      Wears compression stockings, notices extra BLE swelling with salty foods and chips.  Takes BPs at home, running 130-140s systolic, HRs 60-70.      Sleep: Adequate  Activity/Exercise: Fall back in Oct. 2022 is still slowing her down, walks on her treadmill or ~8 blocks a day.    Nutrition/Appetite/Weight Loss: Has recently gained weight unintentionally. Appetite is robust.  Adequate H2O intake, but does include caffeine and sugary beverages in her intake.         Review of Systems:     A complete 10-point ROS was otherwise negative except as noted in the HPI.         Medications:     Meds were reviewed during this visit and updated below:    Outpatient Encounter Medications as of 9/28/2023   Medication Sig Dispense Refill    albuterol (PROAIR HFA/PROVENTIL HFA/VENTOLIN HFA) 108 (90 Base) MCG/ACT inhaler Inhale 2 puffs into the lungs every 6 hours as needed for shortness of breath or wheezing 18 g 1    aspirin 81 MG EC tablet Take 1 tablet (81 mg) by mouth daily 30 tablet 11    calcium carbonate 600 mg-vitamin D 400 units (CALTRATE) 600-400 MG-UNIT per tablet Take 1 tablet by mouth daily 30 tablet 11    carvedilol (COREG) 6.25 MG tablet Take 1 tablet (6.25  mg) by mouth 2 times daily (with meals) 60 tablet 1    cetirizine (ZYRTEC) 10 MG tablet Take 1 tablet (10 mg) by mouth daily as needed for allergies 30 tablet 11    DILT- MG 24 hr ER beaded capsule TAKE 1 CAPSULE (240 MG) BY MOUTH DAILY 90 capsule 11    famotidine (PEPCID) 20 MG tablet TAKE 1 TABLET (20 MG) BY MOUTH 2 TIMES DAILY 180 tablet 11    furosemide (LASIX) 40 MG tablet Take 20 mg by mouth daily as needed 10 tablet 11    multivitamin w/minerals (THERA-VIT-M) tablet Take 1 tablet by mouth daily 30 tablet 11    mycophenolic acid (GENERIC EQUIVALENT) 360 MG EC tablet TAKE 2 TABLETS (720 MG) BY MOUTH 2 TIMES DAILY 120 tablet 11    pantoprazole (PROTONIX) 40 MG EC tablet TAKE 1 TABLET (40 MG) BY MOUTH DAILY 90 tablet 11    pentamidine (NEBUPENT) 300 MG neb solution Inhale 300 mg into the lungs every 28 days      predniSONE (DELTASONE) 5 MG tablet Take 1.5 tablets (7.5 mg) by mouth daily 5 mg in the morning 2.5 mg in the evening 315 tablet 11    rosuvastatin (CRESTOR) 10 MG tablet Take 2 tablets (20 mg) by mouth daily 60 tablet 11    tacrolimus (GENERIC EQUIVALENT) 0.5 MG capsule Take 1 capsule (0.5 mg) by mouth every evening Total dose: 2.0 mg in AM and 2 mg in PM on hold 8/29/23 for dose adjustments 30 capsule 11    tacrolimus (GENERIC EQUIVALENT) 1 MG capsule Take 2 capsules (2 mg) by mouth every morning AND 2 capsules (2 mg) every evening. Total dose: 2.0 mg in AM and 2 mg in  capsule 11    [DISCONTINUED] albuterol (PROAIR HFA/PROVENTIL HFA/VENTOLIN HFA) 108 (90 Base) MCG/ACT inhaler Inhale 2 puffs into the lungs every 6 hours as needed for shortness of breath / dyspnea or wheezing 18 g 0    [DISCONTINUED] carboxymethylcellulose PF (REFRESH PLUS) 0.5 % ophthalmic solution Place 1 drop into both eyes every hour as needed for dry eyes 1 each 0    [DISCONTINUED] metoprolol tartrate (LOPRESSOR) 25 MG tablet TAKE 1 TABLET (25 MG) BY MOUTH EVERY MORNING AND 1.5 TABLETS (37.5 MG) EVERY EVENING. 75 tablet  11    [DISCONTINUED] Nebulizers (MARCIA LC PLUS) MISC 2 each every 30 days For tobramycin nebs 1 each 1    [DISCONTINUED] sodium chloride (PF) 0.9% PF flush 10 mL        No facility-administered encounter medications on file as of 9/28/2023.               Allergies:     Allergies   Allergen Reactions    Alendronate Other (See Comments)     dizziness  Other reaction(s): Dizziness    Nickel Rash    Sulfa Antibiotics Rash            Past Medical and Past Surgical History:     Past Medical History:   Diagnosis Date    Atrial fibrillation with RVR (H)     hx of A fib related to severe COPD, does not meet anticoagulation criteria as noted    COPD, severe (H)     Osteoporosis        Past Surgical History:   Procedure Laterality Date    BRONCHOSCOPY (RIGID OR FLEXIBLE), DIAGNOSTIC N/A 4/7/2022    Procedure: BRONCHOSCOPY, WITH BRONCHOALVEOLAR LAVAGE and BIOPSIES;  Surgeon: Gerson Haddad MD;  Location: [facility]    BRONCHOSCOPY (RIGID OR FLEXIBLE), DIAGNOSTIC N/A 5/12/2022    Procedure: BRONCHOSCOPY, WITH BRONCHOALVEOLAR LAVAGE AND BIOPSY;  Surgeon: Melissa Landin MD;  Location: [facility]    BRONCHOSCOPY (RIGID OR FLEXIBLE), DIAGNOSTIC N/A 8/2/2022    Procedure: BRONCHOSCOPY, WITH BRONCHOALVEOLAR LAVAGE;  Surgeon: Melissa Landin MD;  Location: [facility]    BRONCHOSCOPY (RIGID OR FLEXIBLE), DIAGNOSTIC N/A 10/11/2022    Procedure: BRONCHOSCOPY, WITH BRONCHOALVEOLAR LAVAGE AND BIOPSIES;  Surgeon: Angel Gallagher MD;  Location: [facility]    BRONCHOSCOPY (RIGID OR FLEXIBLE), DIAGNOSTIC N/A 12/20/2022    Procedure: BRONCHOSCOPY, WITH BRONCHOALVEOLAR LAVAGE AND BIOPSIES;  Surgeon: Perlman, David Morris, MD;  Location: [facility]    BRONCHOSCOPY (RIGID OR FLEXIBLE), DIAGNOSTIC N/A 3/1/2023    Procedure: BRONCHOSCOPY, WITH BRONCHOALVEOLAR LAVAGE WITH BIOPSY;  Surgeon: Lucina Thompson MD;  [facility]    BRONCHOSCOPY FLEXIBLE AND RIGID N/A 3/1/2022    Procedure: BRONCHOSCOPY INSPECTION;  Surgeon: Melissa Landin MD;  [facility]     CV CORONARY ANGIOGRAM N/A 3/27/2019    Procedure: CV CORONARY ANGIOGRAM;  Surgeon: Thierry Serrano MD;  Location:  HEART CARDIAC CATH LAB    CV RIGHT HEART CATH MEASUREMENTS RECORDED N/A 3/27/2019    Procedure: CV RIGHT HEART CATH;  Surgeon: Thierry Serrano MD;  Location:  HEART CARDIAC CATH LAB    ESOPHAGEAL IMPEDENCE FUNCTION TEST WITH 24 HOUR PH GREATER THAN 1 HOUR N/A 3/28/2019    Procedure: ESOPHAGEAL IMPEDENCE FUNCTION TEST WITH 24 HOUR PH GREATER THAN 1 HOUR;  Surgeon: Mike Henry MD;  Location:  GI    EXCISE PILONIDAL CYST, SIMPLE      IR CHEST TUBE PLACEMENT NON-TUNNELED RIGHT  3/3/2022    TONSILLECTOMY & ADENOIDECTOMY      TRANSPLANT LUNG RECIPIENT SINGLE X2 Bilateral 2022    Procedure: Bilateral Sequential Lung Transplantation, Clamshell Incision, Extracorporeal Membrane Oxgenation, Bronchoscopy, Cryoablation of Intercostal Nerves;  Surgeon: Raul Robin MD;  Location:  OR             Social History:     Social History     Socioeconomic History    Marital status:      Spouse name: Not on file    Number of children: Not on file    Years of education: Not on file    Highest education level: Not on file   Occupational History    Not on file   Tobacco Use    Smoking status: Former     Packs/day: 1.50     Years: 36.00     Pack years: 54.00     Types: Cigarettes     Quit date: 2006     Years since quittin.7    Smokeless tobacco: Never   Substance and Sexual Activity    Alcohol use: Not Currently     Comment: none since transplant    Drug use: Not Currently     Comment: stated hx of marijuana, quit in     Sexual activity: Not on file   Other Topics Concern    Parent/sibling w/ CABG, MI or angioplasty before 65F 55M? 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  Bronchoscopy in October 2022 with Pseudomonas, treated with FIDEL nebs with resolution.  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  Fungitell and galactomannan were negative and chest CT showed no evidence of fungus so no treatment was initiated.     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 Subsequent AFB cultures were negative.  No treatment indicated.           Exam:     BP (!) 151/69 (BP Location: Left arm, Patient Position: Sitting, Cuff Size: Adult Regular)   Pulse 78   Wt 70.4 kg (155 lb 4 oz)   SpO2 98%   BMI 28.40 kg/m    Body mass index is 28.4 kg/m .    C: In no apparent distress, pleasant, cooperative.  NEURO: A&O. Speech normal.   Head: Normocephalic, atraumatic.  Eyes: Pupils round, EOMI, antiicteric.   Ears: Canals clear, TMs visualized/normal.   Nose: Nasal mucosa without edema and hyperemia.   Mouth/Throat: Oral mucosa moist without exudate.   Neck/Lymph: Supple, no masses, no thyromegaly. No cervical or supraclavicular lymphadenopathy.  PULM: Clear to auscultation bilaterally. No crackles, rhonchi, or wheezes. Non-labored breathing on room air.  CV: S1 and S2, RRR. No murmurs, gallops, or rubs.   EXT: No cyanosis, or clubbing. + BLE edema about +2 in the feet and +1 in the pretib  ABD: Normal active BS. Soft, nontender, nondistended. No HSM appreciated.  MSK: Moves all extremities. Normal gait and stance. Muscle tone, bulk, and strength appropriate. No apparent muscle wasting.   SKIN: Warm, dry, intact, ecchymotic. No rash, jaundice, or lesions visualized on limited exam.   PSYCH: Judgement and  insight appropriate. Mood stable.          Data:     Results:    Recent Results (from the past 168 hour(s))   Tacrolimus by [employer] Mass Spectrometry    Collection Time: 09/22/23  6:00 AM   Result Value Ref Range    Tacrolimus by [employer] Mass Spectrometry 11.3 5.0 - 15.0 ug/L    Tacrolimus Last Dose Date 9/21/2023     Tacrolimus Last Dose Time  9:30 PM    Basic metabolic panel    Collection Time: 09/22/23  9:37 AM   Result Value Ref Range    Sodium (External) 140 134 - 143 mEq/L    Potassium (External) 4.9 3.4 - 5.1 mEq/L    Chloride (External) 105 99 - 110 mEq/L    CO2 (External) 23 19 - 29 mEq/L    Anion Gap (External) 12.0 3.0 - 15.0 mEq/L    Urea Nitrogen (External) 27 (H) 5 - 24 mg/dL    Creatinine (External) 1.05 (H) 0.40 - 1.00 mg/dL    GFR Estimated (External) 58 (L) >60 ml/min/1.73m2    Calcium (External) 9.4 8.4 - 10.5 mg/dL    Glucose (External) 183 (H) 70 - 99 mg/dL   General PFT Lab (Please always keep checked)    Collection Time: 09/28/23  8:04 AM   Result Value Ref Range    FVC-Pred 2.50 L    FVC-Pre 2.16 L    FVC-%Pred-Pre 86 %    FEV1-Pre 2.13 L    FEV1-%Pred-Pre 107 %    FEV1FVC-Pred 80 %    FEV1FVC-Pre 99 %    FEFMax-Pred 5.50 L/sec    FEFMax-Pre 5.89 L/sec    FEFMax-%Pred-Pre 107 %    FEF2575-Pred 1.77 L/sec    FEF2575-Pre 4.37 L/sec    QAH8175-%Pred-Pre 247 %    ExpTime-Pre 5.73 sec    FIFMax-Pre 2.73 L/sec    FEV1FEV6-Pred 80 %    FEV1FEV6-Pre 99 %   Magnesium    Collection Time: 09/28/23  9:02 AM   Result Value Ref Range    Magnesium 1.7 1.7 - 2.3 mg/dL   Hepatic function panel    Collection Time: 09/28/23  9:02 AM   Result Value Ref Range    Protein Total 6.5 6.4 - 8.3 g/dL    Albumin 4.3 3.5 - 5.2 g/dL    Bilirubin Total 0.2 <=1.2 mg/dL    Alkaline Phosphatase 74 35 - 104 U/L    AST 12 0 - 45 U/L    ALT 17 0 - 50 U/L    Bilirubin Direct <0.20 0.00 - 0.30 mg/dL   Vitamin D Deficiency    Collection Time: 09/28/23  9:02 AM   Result Value Ref Range    Vitamin D, Total (25-Hydroxy) 34 20 - 50 ng/mL    Basic metabolic panel    Collection Time: 09/28/23  9:02 AM   Result Value Ref Range    Sodium 141 135 - 145 mmol/L    Potassium 4.5 3.4 - 5.3 mmol/L    Chloride 101 98 - 107 mmol/L    Carbon Dioxide (CO2) 28 22 - 29 mmol/L    Anion Gap 12 7 - 15 mmol/L    Urea Nitrogen 32.4 (H) 8.0 - 23.0 mg/dL    Creatinine 1.30 (H) 0.51 - 0.95 mg/dL    GFR Estimate 45 (L) >60 mL/min/1.73m2    Calcium 9.9 8.8 - 10.2 mg/dL    Glucose 223 (H) 70 - 99 mg/dL   CBC with platelets    Collection Time: 09/28/23  9:53 AM   Result Value Ref Range    WBC Count 4.9 4.0 - 11.0 10e3/uL    RBC Count 3.24 (L) 3.80 - 5.20 10e6/uL    Hemoglobin 9.6 (L) 11.7 - 15.7 g/dL    Hematocrit 29.5 (L) 35.0 - 47.0 %    MCV 91 78 - 100 fL    MCH 29.6 26.5 - 33.0 pg    MCHC 32.5 31.5 - 36.5 g/dL    RDW 13.2 10.0 - 15.0 %    Platelet Count 229 150 - 450 10e3/uL       Date FVC (%) FEV1 (%) FEV1/FVC Note   2/28/23 2.04 80 2.02 100 99    6/29/23 2.12 84 2.08 103 98    9/28/23 2.16 86 2.13 107 99 Saved FVC efforts does appear to be valid, although ATS was not met                         Post Transplant Baseline:   FEV1: 2.1  2.04 on 2/28/23  2.08 on 6/29/23  FVC: 2.14  1.78 on 2/28/23  2.12 on 6/29/23        Sep 28, 2023    Spirometry interpretation:  The results should be interpreted with caution as testing does not meet ATS criteria.  The spirometry is normal.  When compared to 6/29/23, the FEV1 and FVC have  slightly increased, although ATS criteria not fully met .  The current FEV1 is > 80% of post lung transplant baseline of 2.1 L. (This may suggest CLAD 0)

## 2023-09-28 ENCOUNTER — OFFICE VISIT (OUTPATIENT)
Dept: TRANSPLANT | Facility: CLINIC | Age: 68
End: 2023-09-28
Attending: INTERNAL MEDICINE
Payer: MEDICARE

## 2023-09-28 ENCOUNTER — LAB (OUTPATIENT)
Dept: LAB | Facility: CLINIC | Age: 68
End: 2023-09-28
Attending: INTERNAL MEDICINE
Payer: COMMERCIAL

## 2023-09-28 ENCOUNTER — ANCILLARY PROCEDURE (OUTPATIENT)
Dept: GENERAL RADIOLOGY | Facility: CLINIC | Age: 68
End: 2023-09-28
Attending: INTERNAL MEDICINE
Payer: COMMERCIAL

## 2023-09-28 VITALS
BODY MASS INDEX: 28.4 KG/M2 | WEIGHT: 155.25 LBS | HEART RATE: 78 BPM | SYSTOLIC BLOOD PRESSURE: 151 MMHG | OXYGEN SATURATION: 98 % | DIASTOLIC BLOOD PRESSURE: 69 MMHG

## 2023-09-28 DIAGNOSIS — E11.9 TYPE 2 DIABETES MELLITUS WITHOUT COMPLICATION, WITHOUT LONG-TERM CURRENT USE OF INSULIN (H): ICD-10-CM

## 2023-09-28 DIAGNOSIS — Z79.899 ENCOUNTER FOR LONG-TERM (CURRENT) USE OF HIGH-RISK MEDICATION: ICD-10-CM

## 2023-09-28 DIAGNOSIS — D84.9 IMMUNOSUPPRESSED STATUS (H): ICD-10-CM

## 2023-09-28 DIAGNOSIS — Z94.2 S/P LUNG TRANSPLANT (H): Primary | ICD-10-CM

## 2023-09-28 DIAGNOSIS — Z94.2 S/P LUNG TRANSPLANT (H): ICD-10-CM

## 2023-09-28 DIAGNOSIS — I48.0 PAROXYSMAL ATRIAL FIBRILLATION (H): ICD-10-CM

## 2023-09-28 LAB
ALBUMIN SERPL BCG-MCNC: 4.3 G/DL (ref 3.5–5.2)
ALP SERPL-CCNC: 74 U/L (ref 35–104)
ALT SERPL W P-5'-P-CCNC: 17 U/L (ref 0–50)
ANION GAP SERPL CALCULATED.3IONS-SCNC: 12 MMOL/L (ref 7–15)
AST SERPL W P-5'-P-CCNC: 12 U/L (ref 0–45)
BILIRUB DIRECT SERPL-MCNC: <0.2 MG/DL (ref 0–0.3)
BILIRUB SERPL-MCNC: 0.2 MG/DL
BUN SERPL-MCNC: 32.4 MG/DL (ref 8–23)
CALCIUM SERPL-MCNC: 9.9 MG/DL (ref 8.8–10.2)
CHLORIDE SERPL-SCNC: 101 MMOL/L (ref 98–107)
CMV DNA SPEC NAA+PROBE-ACNC: NOT DETECTED IU/ML
CREAT SERPL-MCNC: 1.3 MG/DL (ref 0.51–0.95)
EGFRCR SERPLBLD CKD-EPI 2021: 45 ML/MIN/1.73M2
ERYTHROCYTE [DISTWIDTH] IN BLOOD BY AUTOMATED COUNT: 13.2 % (ref 10–15)
EXPTIME-PRE: 5.73 SEC
FEF2575-%PRED-PRE: 247 %
FEF2575-PRE: 4.37 L/SEC
FEF2575-PRED: 1.77 L/SEC
FEFMAX-%PRED-PRE: 107 %
FEFMAX-PRE: 5.89 L/SEC
FEFMAX-PRED: 5.5 L/SEC
FEV1-%PRED-PRE: 107 %
FEV1-PRE: 2.13 L
FEV1FEV6-PRE: 99 %
FEV1FEV6-PRED: 80 %
FEV1FVC-PRE: 99 %
FEV1FVC-PRED: 80 %
FIFMAX-PRE: 2.73 L/SEC
FVC-%PRED-PRE: 86 %
FVC-PRE: 2.16 L
FVC-PRED: 2.5 L
GLUCOSE SERPL-MCNC: 223 MG/DL (ref 70–99)
HCO3 SERPL-SCNC: 28 MMOL/L (ref 22–29)
HCT VFR BLD AUTO: 29.5 % (ref 35–47)
HGB BLD-MCNC: 9.6 G/DL (ref 11.7–15.7)
IGG SERPL-MCNC: 351 MG/DL (ref 610–1616)
MAGNESIUM SERPL-MCNC: 1.7 MG/DL (ref 1.7–2.3)
MCH RBC QN AUTO: 29.6 PG (ref 26.5–33)
MCHC RBC AUTO-ENTMCNC: 32.5 G/DL (ref 31.5–36.5)
MCV RBC AUTO: 91 FL (ref 78–100)
PLATELET # BLD AUTO: 229 10E3/UL (ref 150–450)
POTASSIUM SERPL-SCNC: 4.5 MMOL/L (ref 3.4–5.3)
PROT SERPL-MCNC: 6.5 G/DL (ref 6.4–8.3)
RBC # BLD AUTO: 3.24 10E6/UL (ref 3.8–5.2)
SODIUM SERPL-SCNC: 141 MMOL/L (ref 135–145)
TACROLIMUS BLD-MCNC: 7.8 UG/L (ref 5–15)
TME LAST DOSE: NORMAL H
TME LAST DOSE: NORMAL H
VIT D+METAB SERPL-MCNC: 34 NG/ML (ref 20–50)
WBC # BLD AUTO: 4.9 10E3/UL (ref 4–11)

## 2023-09-28 PROCEDURE — 71046 X-RAY EXAM CHEST 2 VIEWS: CPT | Performed by: RADIOLOGY

## 2023-09-28 PROCEDURE — 80197 ASSAY OF TACROLIMUS: CPT | Performed by: INTERNAL MEDICINE

## 2023-09-28 PROCEDURE — 99000 SPECIMEN HANDLING OFFICE-LAB: CPT | Performed by: PATHOLOGY

## 2023-09-28 PROCEDURE — 94375 RESPIRATORY FLOW VOLUME LOOP: CPT | Performed by: INTERNAL MEDICINE

## 2023-09-28 PROCEDURE — 87799 DETECT AGENT NOS DNA QUANT: CPT | Performed by: INTERNAL MEDICINE

## 2023-09-28 PROCEDURE — 36415 COLL VENOUS BLD VENIPUNCTURE: CPT | Performed by: PATHOLOGY

## 2023-09-28 PROCEDURE — 36415 COLL VENOUS BLD VENIPUNCTURE: CPT | Performed by: INTERNAL MEDICINE

## 2023-09-28 PROCEDURE — 82248 BILIRUBIN DIRECT: CPT | Performed by: PATHOLOGY

## 2023-09-28 PROCEDURE — 82784 ASSAY IGA/IGD/IGG/IGM EACH: CPT | Performed by: INTERNAL MEDICINE

## 2023-09-28 PROCEDURE — 99215 OFFICE O/P EST HI 40 MIN: CPT | Mod: 25 | Performed by: INTERNAL MEDICINE

## 2023-09-28 PROCEDURE — 80053 COMPREHEN METABOLIC PANEL: CPT | Performed by: PATHOLOGY

## 2023-09-28 PROCEDURE — 82306 VITAMIN D 25 HYDROXY: CPT | Performed by: INTERNAL MEDICINE

## 2023-09-28 PROCEDURE — 83735 ASSAY OF MAGNESIUM: CPT | Performed by: PATHOLOGY

## 2023-09-28 PROCEDURE — 85014 HEMATOCRIT: CPT | Performed by: INTERNAL MEDICINE

## 2023-09-28 PROCEDURE — G0463 HOSPITAL OUTPT CLINIC VISIT: HCPCS | Performed by: INTERNAL MEDICINE

## 2023-09-28 RX ORDER — TACROLIMUS 1 MG/1
1 CAPSULE ORAL 2 TIMES DAILY
Qty: 60 CAPSULE | Refills: 11 | Status: SHIPPED | OUTPATIENT
Start: 2023-09-28 | End: 2024-02-28

## 2023-09-28 RX ORDER — ALBUTEROL SULFATE 90 UG/1
2 AEROSOL, METERED RESPIRATORY (INHALATION) EVERY 6 HOURS PRN
Qty: 18 G | Refills: 1 | Status: SHIPPED | OUTPATIENT
Start: 2023-09-28

## 2023-09-28 RX ORDER — CARVEDILOL 6.25 MG/1
6.25 TABLET ORAL 2 TIMES DAILY WITH MEALS
Qty: 60 TABLET | Refills: 1 | Status: SHIPPED | OUTPATIENT
Start: 2023-09-28 | End: 2024-01-04

## 2023-09-28 RX ORDER — TACROLIMUS 0.5 MG/1
0.5 CAPSULE ORAL EVERY MORNING
Qty: 30 CAPSULE | Refills: 11 | Status: SHIPPED | OUTPATIENT
Start: 2023-09-28 | End: 2024-02-28

## 2023-09-28 ASSESSMENT — PAIN SCALES - GENERAL: PAINLEVEL: NO PAIN (0)

## 2023-09-28 NOTE — LETTER
PHYSICIAN ORDERS      DATE & TIME ISSUED: 2023 11:18 AM  PATIENT NAME: Melissa Elder   : 1955     [facility] MR# [if applicable]: [id number]     DIAGNOSIS:  Lung Transplant  Z94.2    Labs early next week 10/2/23: BMP, tacrolimus level    Any questions please call: Jane [phone]    Please fax these results to [phone].         Nicole Knox MD  Pulmonary Disease

## 2023-09-28 NOTE — NURSING NOTE
Transplant Coordinator Note    Reason for visit: Post lung transplant follow up visit   Coordinator: Present   Caregiver: Present,     Health concerns addressed today:  1. Resp: runny nose, used inhaler a few times in the last week. Wonders about allergies.   2. Recently got a new puppy.   3. Tac level from last week was a 12 hour level, thought to be an 8 hour level so dose was not adjusted. Level pending from today.   4.     Activity/rehab: walking inside as much as possible with air quality, treadmill and bike.   Oxygen needs: room air  Pain management/RX: Back pain - compression fracture.   Diabetic management: checking BGs  Risk Criteria Labs: negative 3/25/22  CMV status: D-/R-  EBV status: D+/R+  AC/asa: ASA 81mg - started holding 10 days ago.   PJP prophylactic: pentamidine neb (dapsone caused anemia/RBC hemolysis) done locally     COVID:  COVID-19 infection (yes/no, date of most recent positive test):   Status/instructions given about COVID-19 vaccine:      Pt Education: medications (use/dose/side effects), how/when to call coordinator, frequency of labs, s/s of infection/rejection, call prior to starting any new medications, lab/vital sign book     Health Maintenance:   Last colonoscopy: 8/31/2021  Next colonoscopy due: 8/2024  Dermatology: Following with Dermatology locally, last seen in June, up to date.   Vaccinations this visit:      Labs, CXR, PFTs reviewed with patient  Medication record reviewed and reconciled  Questions and concerns addressed     Patient Instructions  1. Continue to hydrate with 64-80 oz fluids daily. Avoid caffeine.   2. Continue to exercise daily or most days of the week.  3. Follow up with your primary care provider for annual gender health maintenance procedures.  4. Follow up with colonoscopy schedule.  5. Follow up with annual dermatology visits.  6. We are going to lower your tacrolimus goal to 6-8.   7. Get the updated COVID booster, RSV and flu vaccines when you  can.   8. Stop metoprolol (once you get the new medication) and start carvedilol (Coreg) instead 6.25 mg twice daily. Monitor your blood pressure and heart rate and give us an update on the readings.   9. Decrease your tacrolimus to 1.5 mg in the AM and 1 mg in the PM.   10. Take lasix 20 mg daily until next week. If you aren't peeing a lot with this, then take 40 mg daily. If the swelling completely resolves, then can stop.     Next transplant clinic appointment: 4 months with CXR, labs and PFTs  Next lab draw: Monday or Tuesday next week    AVS printed at time of check out

## 2023-09-28 NOTE — LETTER
9/28/2023         RE: Melissa Elder  [address]  [facility] [address]        Dear Colleague,    Thank you for referring your patient, Melissa Elder, to the [facility]. Please see a copy of my visit note below.          [facility]  Pulmonary Transplant Follow Up Visit    Sep 28, 2023      Reason for Visit  Melissa Elder is a 67 year old who is being seen for RECHECK      Presents to clinic with her  spouse          Lung Tx Summary:     Transplants:  2/21/2022 (Lung), Postoperative day:  584    Melissa Elder is a 67 year old who underwent bilateral transplant on 2/21/2022 (Lung), currently postoperative day:  584, (1 y, 7 m).  Surgery uncomplicated, but did have bilateral hydropneumothoraces and bilateral effusions. 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 Other hx notable for HTN, mild nonbostructive CAD, paroxysmal afib, osteoporosis, GERD and colonic polyps.           Assessment and Plan:     PULMONARY     Transplant:      Allograft Function:  No new pulmonary complaints, no recent illnesses.  Exercise tolerance adequate and improving since Oct. 2022 fall (mechanical, no loss of consciousness/did not hit head).   Sat'ing 98% on room air.  CXR today, personally reviewed, stable.  PFTs today stable/improved although saved FVC efforts does appear to be valid, ATS criteria was not met.   - Continue exercise, can order pulm rehab in the future although she is working on hre own to improve conditioning  - Drop tacro dose today given recently supratherapeutic level and new goal    Immunosuppression:  Tacrolimus, goal further reduced due to CKD to 6-8  Myfortic 720 bid  Prednisone 5/2.5    Prophylaxis:   PJP: Monthly pentamidine nebs  CMV: D-/R - (has been neg. pre- and post- txp., level today pending)  EBV: D+/R+     EBV Viremia: low level positive in 2022, not detected on subsequent  rechecks other than <500 (log <2.7) on 2/28.  Negative since then, most recently 7/28.  EBV today log 2.7. .    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 On 12/19/2022 DQB5 534 MFI, without DR15 or DQB6 detected.  Last few DSAs have been negative, most recently 6/29.     Reflux: Adequately controlled with current pantoprazole daily and famotidine BID.     Coronary artery disease: Nonobstructive.  Continue aspirin and rosuvastatin.     SOMMER on CKD: Stage IIIa CKD.   Tacrolimus goal further reduced today 9/28. Cr most recently 1.05 on 9/22, today more elevated at 1.3, reports drinking ~64 oz. a day, but also at times does not hydrate properly (64 oz./day includes caffeinated and sugared beverages) and consumes sodium rich foods.  Water goal should be 64-80 oz./day.  - Repeat BMP in 1 week after adequate (quantity and quality) hydration with some additional diuresis    Lower extremity edema: Hasn't needed/used PRN lasix much at all at home, although quite swollen today.  Wears compression stockings and tries to avoid sodium-rich foods, although more difficult when traveling to appointments  given increased fast food consumption.  - Lasix 20mg x7 days, then repeat labs in 1 week    Paroxysmal atrial fibrillation: No palpitations or symptoms reported on diltiazem and beta blocker.  Recently HRs at home running 60s-70s.    - Given elevated BPs at home and already low-end HR, transition metoprolol to carvedilol 6.25 BID    Hypertension: BP elevated here in clinic today at 151/69.  BPs at home have been running 130s-140s systolic, with current regimen of diltiazem and metoprolol.  Additionally likely hypervolemic given BLE edema.    - Given elevated BPs at home and already low-end HR, transition metoprolol to carvedilol 6.25 BID and will uptitrate as needed for increased alpha component     Glucose intolerance: Hemoglobin A1c elevated at 7.3 on 2/28/23 (up from prior 5.7  on 2/22/22).  The patient was referred to a local endocrinologist for further evaluation, last seen ~April 2023 per [employer] messaging (visit note not found in CareEverywhere). Of note, not fasting this AM so elevated glucose level (223) this AM is unsurprising.  - Follow up with PCP and local endocrinologist  - Next visit recheck A1c with annual studies    Hypercholesterolemia: Cholesterol, LDL and triglycerides elevated 2/28/23.  Rosuvastatin 20 mg nightly.    - Fasting lipid panel at next visit with annual studies      Osteoporosis: Lumbar compression fracture in October 2022 after a fall as above.  Boniva was held due to concern for possible contribution to lower extremity swelling.     - Vit D level today adequate at 34, no need for recheck with annual studies at next visit.    - Sees local endo, likely last ~April 2023  - Doing yearly infusions with Reclast, last in ~June 2023     Hypomagnesemia: Due to CNI interference with absorption. Mg 1.7 today, taking mag ox 400 mg TID (if not able to get increased will consider going back to doctor's best mag glycinate).  This incidentally dropped off her med list, txp coordinator to add back once we confirm the dose/form she is taking.    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  Level today 351, will replete x 1 with reheck in 1 mo.      Maintenance:  Derm Exam: Saw recently in [place] June 2023  Colon Ca Screening: 3/2016 with sessile polyps in the transverse and sigmoid colon, 3mm and 4mm, due in 8/2024  Mammogram: Due 11/2023, this is on her radar & she will schedule this with her PCP soon  DEXA: 4/7/23, due again in 4/2025  Imms: Up to date, will need seasonal influenza, COVID booster, and RSV vaccine in the near future      CHANGES TODAY:    - Decreasing tacrolimus goal to 6-8 (SOMMER on CKD, level within this range today    - Decrease tacrolimus dose to 1 mg qAM / 1 mg qPM given that last tac level of 11.3 (9/22)  was a true 12-hour level    - One week of furosemide 20 mg and adequate hydration, then recheck BMP    - Switch metoprolol to carvedilol      RTC: 3 months  Transplant Coordinator: Arturo Miller       I personally spent 40 minutes in documentation, the interview and exam, and review of the chart/labs/imaging on Jun 29, 2023 not including time spent interpreting spirometry.       Nicole Knox MD  [facility]   Pulmonary Transplant   Post Transplant Coordinator: Arturo Miller  Fax: [phone]  Ph: [phone]    &     Jaquelin Paredes, APRN, CNP (training)  Pulmonary Transplant Nurse Practitioner       History of Present Illness / Interval Histories:     Sep 28, 2023    No new or increased dyspnea.  No cough.  With autumn allergy season, sinus congestion/pressure, rhinorrhea catia. in the AM, conjunctivitis, some PND.  Controlled with alternating Claritin, Zyrtec, Allegra, also using inhaler more frequently (2x).  Has nasal spray she can start using.      Wears compression stockings, notices extra BLE swelling with salty foods and chips.  Takes BPs at home, running 130-140s systolic, HRs 60-70.      Sleep: Adequate  Activity/Exercise: Fall back in Oct. 2022 is still slowing her down, walks on her treadmill or ~8 blocks a day.    Nutrition/Appetite/Weight Loss: Has recently gained weight unintentionally. Appetite is robust.  Adequate H2O intake, but does include caffeine and sugary beverages in her intake.         Review of Systems:     A complete 10-point ROS was otherwise negative except as noted in the HPI.         Medications:     Meds were reviewed during this visit and updated below:    Outpatient Encounter Medications as of 9/28/2023   Medication Sig Dispense Refill    albuterol (PROAIR HFA/PROVENTIL HFA/VENTOLIN HFA) 108 (90 Base) MCG/ACT inhaler Inhale 2 puffs into the lungs every 6 hours as needed for shortness of breath or wheezing 18 g 1    aspirin  81 MG EC tablet Take 1 tablet (81 mg) by mouth daily 30 tablet 11    calcium carbonate 600 mg-vitamin D 400 units (CALTRATE) 600-400 MG-UNIT per tablet Take 1 tablet by mouth daily 30 tablet 11    carvedilol (COREG) 6.25 MG tablet Take 1 tablet (6.25 mg) by mouth 2 times daily (with meals) 60 tablet 1    cetirizine (ZYRTEC) 10 MG tablet Take 1 tablet (10 mg) by mouth daily as needed for allergies 30 tablet 11    DILT- MG 24 hr ER beaded capsule TAKE 1 CAPSULE (240 MG) BY MOUTH DAILY 90 capsule 11    famotidine (PEPCID) 20 MG tablet TAKE 1 TABLET (20 MG) BY MOUTH 2 TIMES DAILY 180 tablet 11    furosemide (LASIX) 40 MG tablet Take 20 mg by mouth daily as needed 10 tablet 11    multivitamin w/minerals (THERA-VIT-M) tablet Take 1 tablet by mouth daily 30 tablet 11    mycophenolic acid (GENERIC EQUIVALENT) 360 MG EC tablet TAKE 2 TABLETS (720 MG) BY MOUTH 2 TIMES DAILY 120 tablet 11    pantoprazole (PROTONIX) 40 MG EC tablet TAKE 1 TABLET (40 MG) BY MOUTH DAILY 90 tablet 11    pentamidine (NEBUPENT) 300 MG neb solution Inhale 300 mg into the lungs every 28 days      predniSONE (DELTASONE) 5 MG tablet Take 1.5 tablets (7.5 mg) by mouth daily 5 mg in the morning 2.5 mg in the evening 315 tablet 11    rosuvastatin (CRESTOR) 10 MG tablet Take 2 tablets (20 mg) by mouth daily 60 tablet 11    tacrolimus (GENERIC EQUIVALENT) 0.5 MG capsule Take 1 capsule (0.5 mg) by mouth every evening Total dose: 2.0 mg in AM and 2 mg in PM on hold 8/29/23 for dose adjustments 30 capsule 11    tacrolimus (GENERIC EQUIVALENT) 1 MG capsule Take 2 capsules (2 mg) by mouth every morning AND 2 capsules (2 mg) every evening. Total dose: 2.0 mg in AM and 2 mg in  capsule 11    [DISCONTINUED] albuterol (PROAIR HFA/PROVENTIL HFA/VENTOLIN HFA) 108 (90 Base) MCG/ACT inhaler Inhale 2 puffs into the lungs every 6 hours as needed for shortness of breath / dyspnea or wheezing 18 g 0    [DISCONTINUED] carboxymethylcellulose PF (REFRESH PLUS) 0.5 %  ophthalmic solution Place 1 drop into both eyes every hour as needed for dry eyes 1 each 0    [DISCONTINUED] metoprolol tartrate (LOPRESSOR) 25 MG tablet TAKE 1 TABLET (25 MG) BY MOUTH EVERY MORNING AND 1.5 TABLETS (37.5 MG) EVERY EVENING. 75 tablet 11    [DISCONTINUED] Nebulizers (MARCIA LC PLUS) MISC 2 each every 30 days For tobramycin nebs 1 each 1    [DISCONTINUED] sodium chloride (PF) 0.9% PF flush 10 mL        No facility-administered encounter medications on file as of 9/28/2023.               Allergies:     Allergies   Allergen Reactions    Alendronate Other (See Comments)     dizziness  Other reaction(s): Dizziness    Nickel Rash    Sulfa Antibiotics Rash            Past Medical and Past Surgical History:     Past Medical History:   Diagnosis Date    Atrial fibrillation with RVR (H)     hx of A fib related to severe COPD, does not meet anticoagulation criteria as noted    COPD, severe (H)     Osteoporosis        Past Surgical History:   Procedure Laterality Date    BRONCHOSCOPY (RIGID OR FLEXIBLE), DIAGNOSTIC N/A 4/7/2022    Procedure: BRONCHOSCOPY, WITH BRONCHOALVEOLAR LAVAGE and BIOPSIES;  Surgeon: Gerson Haddad MD;  Location: [facility]    BRONCHOSCOPY (RIGID OR FLEXIBLE), DIAGNOSTIC N/A 5/12/2022    Procedure: BRONCHOSCOPY, WITH BRONCHOALVEOLAR LAVAGE AND BIOPSY;  Surgeon: Melissa Landin MD;  Location: [facility]    BRONCHOSCOPY (RIGID OR FLEXIBLE), DIAGNOSTIC N/A 8/2/2022    Procedure: BRONCHOSCOPY, WITH BRONCHOALVEOLAR LAVAGE;  Surgeon: Melissa Landin MD;  Location: [facility]    BRONCHOSCOPY (RIGID OR FLEXIBLE), DIAGNOSTIC N/A 10/11/2022    Procedure: BRONCHOSCOPY, WITH BRONCHOALVEOLAR LAVAGE AND BIOPSIES;  Surgeon: Angel Gallagher MD;  Location: [facility]    BRONCHOSCOPY (RIGID OR FLEXIBLE), DIAGNOSTIC N/A 12/20/2022    Procedure: BRONCHOSCOPY, WITH BRONCHOALVEOLAR LAVAGE AND BIOPSIES;  Surgeon: Perlman, David Morris, MD;  Location: [facility]    BRONCHOSCOPY (RIGID OR FLEXIBLE), DIAGNOSTIC N/A 3/1/2023     Procedure: BRONCHOSCOPY, WITH BRONCHOALVEOLAR LAVAGE WITH BIOPSY;  Surgeon: Lucina Thompson MD;  Location:  GI    BRONCHOSCOPY FLEXIBLE AND RIGID N/A 3/1/2022    Procedure: BRONCHOSCOPY INSPECTION;  Surgeon: Melissa Landin MD;  Location:  GI    CV CORONARY ANGIOGRAM N/A 3/27/2019    Procedure: CV CORONARY ANGIOGRAM;  Surgeon: Thierry Serrano MD;  Location:  HEART CARDIAC CATH LAB    CV RIGHT HEART CATH MEASUREMENTS RECORDED N/A 3/27/2019    Procedure: CV RIGHT HEART CATH;  Surgeon: Thierry Serrano MD;  Location:  HEART CARDIAC CATH LAB    ESOPHAGEAL IMPEDENCE FUNCTION TEST WITH 24 HOUR PH GREATER THAN 1 HOUR N/A 3/28/2019    Procedure: ESOPHAGEAL IMPEDENCE FUNCTION TEST WITH 24 HOUR PH GREATER THAN 1 HOUR;  Surgeon: Mike Henry MD;  Location:  GI    EXCISE PILONIDAL CYST, SIMPLE      IR CHEST TUBE PLACEMENT NON-TUNNELED RIGHT  3/3/2022    TONSILLECTOMY & ADENOIDECTOMY      TRANSPLANT LUNG RECIPIENT SINGLE X2 Bilateral 2022    Procedure: Bilateral Sequential Lung Transplantation, Clamshell Incision, Extracorporeal Membrane Oxgenation, Bronchoscopy, Cryoablation of Intercostal Nerves;  Surgeon: Raul Robin MD;  Location:  OR             Social History:     Social History     Socioeconomic History    Marital status:      Spouse name: Not on file    Number of children: Not on file    Years of education: Not on file    Highest education level: Not on file   Occupational History    Not on file   Tobacco Use    Smoking status: Former     Packs/day: 1.50     Years: 36.00     Pack years: 54.00     Types: Cigarettes     Quit date: 2006     Years since quittin.7    Smokeless tobacco: Never   Substance and Sexual Activity    Alcohol use: Not Currently     Comment: none since transplant    Drug use: Not Currently     Comment: stated hx of marijuana, quit in     Sexual activity: Not on file   Other Topics Concern    Parent/sibling w/ CABG, MI or angioplasty  before 65F 55M? 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  Bronchoscopy in October 2022 with Pseudomonas, treated with FIDEL nebs with resolution.  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  Fungitell and galactomannan were negative and chest CT showed no evidence of fungus so no treatment was initiated.     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 Subsequent AFB cultures were negative.  No treatment indicated.           Exam:     BP (!) 151/69 (BP Location: Left arm, Patient Position: Sitting, Cuff Size: Adult Regular)   Pulse 78   Wt 70.4 kg (155 lb 4 oz)   SpO2 98%   BMI 28.40 kg/m    Body mass index is 28.4 kg/m .    C: In no apparent distress, pleasant, cooperative.  NEURO: A&O. Speech normal.   Head: Normocephalic, atraumatic.  Eyes: Pupils round, EOMI, antiicteric.   Ears: Canals clear, TMs visualized/normal.   Nose: Nasal mucosa without edema and hyperemia.   Mouth/Throat: Oral mucosa moist without exudate.   Neck/Lymph: Supple, no masses, no thyromegaly. No cervical or supraclavicular lymphadenopathy.  PULM: Clear to auscultation bilaterally. No crackles, rhonchi, or wheezes. Non-labored breathing on room air.  CV: S1 and S2, RRR. No murmurs, gallops, or rubs.   EXT: No cyanosis, or clubbing. + BLE edema about +2 in the feet and +1 in the pretib  ABD: Normal active BS. Soft, nontender, nondistended. No HSM  appreciated.  MSK: Moves all extremities. Normal gait and stance. Muscle tone, bulk, and strength appropriate. No apparent muscle wasting.   SKIN: Warm, dry, intact, ecchymotic. No rash, jaundice, or lesions visualized on limited exam.   PSYCH: Judgement and insight appropriate. Mood stable.          Data:     Results:    Recent Results (from the past 168 hour(s))   Tacrolimus by Tandem Mass Spectrometry    Collection Time: 09/22/23  6:00 AM   Result Value Ref Range    Tacrolimus by Tandem Mass Spectrometry 11.3 5.0 - 15.0 ug/L    Tacrolimus Last Dose Date 9/21/2023     Tacrolimus Last Dose Time  9:30 PM    Basic metabolic panel    Collection Time: 09/22/23  9:37 AM   Result Value Ref Range    Sodium (External) 140 134 - 143 mEq/L    Potassium (External) 4.9 3.4 - 5.1 mEq/L    Chloride (External) 105 99 - 110 mEq/L    CO2 (External) 23 19 - 29 mEq/L    Anion Gap (External) 12.0 3.0 - 15.0 mEq/L    Urea Nitrogen (External) 27 (H) 5 - 24 mg/dL    Creatinine (External) 1.05 (H) 0.40 - 1.00 mg/dL    GFR Estimated (External) 58 (L) >60 ml/min/1.73m2    Calcium (External) 9.4 8.4 - 10.5 mg/dL    Glucose (External) 183 (H) 70 - 99 mg/dL   General PFT Lab (Please always keep checked)    Collection Time: 09/28/23  8:04 AM   Result Value Ref Range    FVC-Pred 2.50 L    FVC-Pre 2.16 L    FVC-%Pred-Pre 86 %    FEV1-Pre 2.13 L    FEV1-%Pred-Pre 107 %    FEV1FVC-Pred 80 %    FEV1FVC-Pre 99 %    FEFMax-Pred 5.50 L/sec    FEFMax-Pre 5.89 L/sec    FEFMax-%Pred-Pre 107 %    FEF2575-Pred 1.77 L/sec    FEF2575-Pre 4.37 L/sec    XTE6082-%Pred-Pre 247 %    ExpTime-Pre 5.73 sec    FIFMax-Pre 2.73 L/sec    FEV1FEV6-Pred 80 %    FEV1FEV6-Pre 99 %   Magnesium    Collection Time: 09/28/23  9:02 AM   Result Value Ref Range    Magnesium 1.7 1.7 - 2.3 mg/dL   Hepatic function panel    Collection Time: 09/28/23  9:02 AM   Result Value Ref Range    Protein Total 6.5 6.4 - 8.3 g/dL    Albumin 4.3 3.5 - 5.2 g/dL    Bilirubin Total 0.2 <=1.2 mg/dL     Alkaline Phosphatase 74 35 - 104 U/L    AST 12 0 - 45 U/L    ALT 17 0 - 50 U/L    Bilirubin Direct <0.20 0.00 - 0.30 mg/dL   Vitamin D Deficiency    Collection Time: 09/28/23  9:02 AM   Result Value Ref Range    Vitamin D, Total (25-Hydroxy) 34 20 - 50 ng/mL   Basic metabolic panel    Collection Time: 09/28/23  9:02 AM   Result Value Ref Range    Sodium 141 135 - 145 mmol/L    Potassium 4.5 3.4 - 5.3 mmol/L    Chloride 101 98 - 107 mmol/L    Carbon Dioxide (CO2) 28 22 - 29 mmol/L    Anion Gap 12 7 - 15 mmol/L    Urea Nitrogen 32.4 (H) 8.0 - 23.0 mg/dL    Creatinine 1.30 (H) 0.51 - 0.95 mg/dL    GFR Estimate 45 (L) >60 mL/min/1.73m2    Calcium 9.9 8.8 - 10.2 mg/dL    Glucose 223 (H) 70 - 99 mg/dL   CBC with platelets    Collection Time: 09/28/23  9:53 AM   Result Value Ref Range    WBC Count 4.9 4.0 - 11.0 10e3/uL    RBC Count 3.24 (L) 3.80 - 5.20 10e6/uL    Hemoglobin 9.6 (L) 11.7 - 15.7 g/dL    Hematocrit 29.5 (L) 35.0 - 47.0 %    MCV 91 78 - 100 fL    MCH 29.6 26.5 - 33.0 pg    MCHC 32.5 31.5 - 36.5 g/dL    RDW 13.2 10.0 - 15.0 %    Platelet Count 229 150 - 450 10e3/uL       Date FVC (%) FEV1 (%) FEV1/FVC Note   2/28/23 2.04 80 2.02 100 99    6/29/23 2.12 84 2.08 103 98    9/28/23 2.16 86 2.13 107 99 Saved FVC efforts does appear to be valid, although ATS was not met                         Post Transplant Baseline:   FEV1: 2.1  2.04 on 2/28/23  2.08 on 6/29/23  FVC: 2.14  1.78 on 2/28/23  2.12 on 6/29/23        Sep 28, 2023    Spirometry interpretation:  The results should be interpreted with caution as testing does not meet ATS criteria.  The spirometry is normal.  When compared to 6/29/23, the FEV1 and FVC have  slightly increased, although ATS criteria not fully met .  The current FEV1 is > 80% of post lung transplant baseline of 2.1 L. (This may suggest CLAD 0)           Again, thank you for allowing me to participate in the care of your patient.      Sincerely,    Nicole Knox MD

## 2023-09-28 NOTE — PATIENT INSTRUCTIONS
Patient Instructions  1. Continue to hydrate with 64-80 oz fluids daily. Avoid caffeine.   2. Continue to exercise daily or most days of the week.  3. Follow up with your primary care provider for annual gender health maintenance procedures.  4. Follow up with colonoscopy schedule.  5. Follow up with annual dermatology visits.  6. We are going to lower your tacrolimus goal to 6-8. We will let you know the result from today.   7. Get the updated COVID booster, RSV and flu vaccines when you can.   8. Stop metoprolol (once you get the new medication) and start carvedilol (Coreg) instead 6.25 mg twice daily. Monitor your blood pressure and heart rate and give us an update on the readings.   9. Decrease your tacrolimus to 1.5 mg in the AM and 1 mg in the PM.   10. Take lasix 20 mg daily until next week. If you aren't peeing a lot with this, then take 40 mg daily. If the swelling completely resolves, then can stop.   11. Fasting labs with your next visit with us.     Next transplant clinic appointment: 4 months with CXR, labs and PFTs  Next lab draw: Monday or Tuesday next week

## 2023-09-29 DIAGNOSIS — Z94.2 S/P LUNG TRANSPLANT (H): ICD-10-CM

## 2023-09-29 LAB
EBV DNA # SPEC NAA+PROBE: <500 COPIES/ML
EBV DNA SPEC NAA+PROBE-LOG#: <2.7 {LOG_COPIES}/ML

## 2023-09-29 RX ORDER — ACETAMINOPHEN 325 MG/1
650 TABLET ORAL ONCE
Status: CANCELLED
Start: 2023-10-02

## 2023-09-29 RX ORDER — EPINEPHRINE 1 MG/ML
0.3 INJECTION, SOLUTION, CONCENTRATE INTRAVENOUS EVERY 5 MIN PRN
Status: CANCELLED | OUTPATIENT
Start: 2023-10-02

## 2023-09-29 RX ORDER — DIPHENHYDRAMINE HYDROCHLORIDE 50 MG/ML
50 INJECTION INTRAMUSCULAR; INTRAVENOUS
Status: CANCELLED
Start: 2023-10-02

## 2023-09-29 RX ORDER — MEPERIDINE HYDROCHLORIDE 25 MG/ML
25 INJECTION INTRAMUSCULAR; INTRAVENOUS; SUBCUTANEOUS EVERY 30 MIN PRN
Status: CANCELLED | OUTPATIENT
Start: 2023-10-02

## 2023-09-29 RX ORDER — DIPHENHYDRAMINE HCL 25 MG
50 CAPSULE ORAL ONCE
Status: CANCELLED
Start: 2023-10-02

## 2023-09-29 RX ORDER — ALBUTEROL SULFATE 0.83 MG/ML
2.5 SOLUTION RESPIRATORY (INHALATION)
Status: CANCELLED | OUTPATIENT
Start: 2023-10-02

## 2023-09-29 RX ORDER — ALBUTEROL SULFATE 90 UG/1
1-2 AEROSOL, METERED RESPIRATORY (INHALATION)
Status: CANCELLED
Start: 2023-10-02

## 2023-09-29 RX ORDER — METHYLPREDNISOLONE SODIUM SUCCINATE 125 MG/2ML
125 INJECTION, POWDER, LYOPHILIZED, FOR SOLUTION INTRAMUSCULAR; INTRAVENOUS
Status: CANCELLED
Start: 2023-10-02

## 2023-10-02 DIAGNOSIS — D84.9 IMMUNOSUPPRESSED STATUS (H): ICD-10-CM

## 2023-10-02 DIAGNOSIS — Z94.2 S/P LUNG TRANSPLANT (H): Primary | ICD-10-CM

## 2023-10-03 ENCOUNTER — TELEPHONE (OUTPATIENT)
Dept: TRANSPLANT | Facility: CLINIC | Age: 68
End: 2023-10-03
Payer: COMMERCIAL

## 2023-10-03 ENCOUNTER — LAB (OUTPATIENT)
Dept: LAB | Facility: CLINIC | Age: 68
End: 2023-10-03
Payer: MEDICARE

## 2023-10-03 DIAGNOSIS — Z94.2 S/P LUNG TRANSPLANT (H): ICD-10-CM

## 2023-10-03 PROCEDURE — 80197 ASSAY OF TACROLIMUS: CPT

## 2023-10-03 NOTE — TELEPHONE ENCOUNTER
Spoke with Perla from [facility] regarding IVIG infusion today.  Patient was on her third set of IVIG left and all of a sudden felt dizzy infusion stopped blood pressure was 75/51.  Benadryl was given and started back IVIG infusion at low rate of 34 mils per hour patient was able to finish infusion.  Recheck blood pressure was 136/74.  Patient recently started Coreg switched from metoprolol.  We will have patient keep an eye twice a day on blood pressures and heart rates and let transplant office know if any other issues with the symptoms.  Patient has labs for October 31 and will recheck a IgG level then

## 2023-10-03 NOTE — TELEPHONE ENCOUNTER
Please call JENNIFER Ashford from [facility] # [phone] (Ext [phone])   Melissa had a reaction today

## 2023-10-05 ENCOUNTER — TELEPHONE (OUTPATIENT)
Dept: TRANSPLANT | Facility: CLINIC | Age: 68
End: 2023-10-05
Payer: COMMERCIAL

## 2023-10-05 LAB
TACROLIMUS BLD-MCNC: 6.6 UG/L (ref 5–15)
TME LAST DOSE: NORMAL H
TME LAST DOSE: NORMAL H

## 2023-10-05 NOTE — TELEPHONE ENCOUNTER
Spoke to Melissa, she has been feeling well. Foot and ankle swelling is down, can see ankle bones.   Started Coreg and taking blood pressures in morning and 6pm (always before medication).   Oct. 3rd: 94/46 and 130/63  Oct. 4th: 148/63 and 157/75  Oct. 5th: 166/74

## 2023-10-31 ENCOUNTER — LAB (OUTPATIENT)
Dept: LAB | Facility: CLINIC | Age: 68
End: 2023-10-31

## 2023-10-31 DIAGNOSIS — Z94.2 S/P LUNG TRANSPLANT (H): ICD-10-CM

## 2023-10-31 PROCEDURE — 80197 ASSAY OF TACROLIMUS: CPT | Performed by: INTERNAL MEDICINE

## 2023-11-01 DIAGNOSIS — D84.9 IMMUNOSUPPRESSED STATUS (H): ICD-10-CM

## 2023-11-01 DIAGNOSIS — Z94.2 LUNG REPLACED BY TRANSPLANT (H): Primary | ICD-10-CM

## 2023-11-02 LAB
TACROLIMUS BLD-MCNC: 5.8 UG/L (ref 5–15)
TME LAST DOSE: NORMAL H
TME LAST DOSE: NORMAL H

## 2023-11-06 ENCOUNTER — DOCUMENTATION ONLY (OUTPATIENT)
Dept: TRANSPLANT | Facility: CLINIC | Age: 68
End: 2023-11-06
Payer: COMMERCIAL

## 2023-11-06 DIAGNOSIS — I48.0 PAROXYSMAL ATRIAL FIBRILLATION (H): ICD-10-CM

## 2023-11-06 NOTE — PROGRESS NOTES
"Pressures per patient:  oct. 30, 140/79 and 150/80,   oct. 31, 156/84 and 135/64,   Nov. 1, 163/76 and 108/73,   nov.2, 146/83 and 161/69,   nov. 3, 139/71 in A.M.    nov. 5, 164/74, P.M.   nov. 6, 167/72, A.M.   Double numbers are AM + PM.     Dr. Lopez my Cardio  increased my Carvedilol to \"two 6.25 at 6am and two 6.25 at 6pm, which I started yesterday at 6pm. I will keep track of my pressures   "

## 2023-11-07 DIAGNOSIS — D84.9 IMMUNOSUPPRESSED STATUS (H): ICD-10-CM

## 2023-11-07 DIAGNOSIS — Z94.2 S/P LUNG TRANSPLANT (H): ICD-10-CM

## 2023-11-07 DIAGNOSIS — M79.89 BILATERAL SWELLING OF FEET: ICD-10-CM

## 2023-11-07 RX ORDER — FUROSEMIDE 40 MG
40 TABLET ORAL DAILY
COMMUNITY
Start: 2023-11-07 | End: 2023-11-27

## 2023-11-24 ENCOUNTER — LAB (OUTPATIENT)
Dept: LAB | Facility: CLINIC | Age: 68
End: 2023-11-24
Payer: MEDICARE

## 2023-11-24 DIAGNOSIS — Z94.2 S/P LUNG TRANSPLANT (H): ICD-10-CM

## 2023-11-24 PROCEDURE — 80197 ASSAY OF TACROLIMUS: CPT

## 2023-11-27 DIAGNOSIS — M79.89 BILATERAL SWELLING OF FEET: ICD-10-CM

## 2023-11-27 DIAGNOSIS — D84.9 IMMUNOSUPPRESSED STATUS (H): ICD-10-CM

## 2023-11-27 DIAGNOSIS — Z94.2 S/P LUNG TRANSPLANT (H): ICD-10-CM

## 2023-11-27 RX ORDER — FUROSEMIDE 40 MG
40 TABLET ORAL DAILY
Qty: 30 TABLET | Refills: 1 | Status: SHIPPED | OUTPATIENT
Start: 2023-11-27 | End: 2024-04-11

## 2023-12-01 ENCOUNTER — TELEPHONE (OUTPATIENT)
Dept: TRANSPLANT | Facility: CLINIC | Age: 68
End: 2023-12-01
Payer: COMMERCIAL

## 2023-12-01 DIAGNOSIS — Z94.2 S/P LUNG TRANSPLANT (H): Primary | ICD-10-CM

## 2023-12-01 DIAGNOSIS — M79.89 BILATERAL SWELLING OF FEET: ICD-10-CM

## 2023-12-01 LAB
TACROLIMUS BLD-MCNC: 5.5 UG/L (ref 5–15)
TME LAST DOSE: NORMAL H
TME LAST DOSE: NORMAL H

## 2023-12-01 NOTE — TELEPHONE ENCOUNTER
Tacrolimus level 5.5 at 12 hours, on 11/24/23.  Goal 6-8.   Current dose 1.5 mg in AM, 1 mg in PM    No change in dose as patient is close to goal. Will repeat labs week of 12/4/23

## 2023-12-04 ENCOUNTER — TELEPHONE (OUTPATIENT)
Dept: TRANSPLANT | Facility: CLINIC | Age: 68
End: 2023-12-04
Payer: COMMERCIAL

## 2023-12-12 ENCOUNTER — TELEPHONE (OUTPATIENT)
Dept: TRANSPLANT | Facility: CLINIC | Age: 68
End: 2023-12-12
Payer: COMMERCIAL

## 2023-12-17 ENCOUNTER — HEALTH MAINTENANCE LETTER (OUTPATIENT)
Age: 68
End: 2023-12-17

## 2023-12-27 NOTE — PROGRESS NOTES
[facility]  Pulmonary Transplant Follow Up Visit    Jan 4, 2024    Reason for Visit  Melissa Elder is a 68 year old who is being seen for RECHECK (3-4 month follow up)    Presents to clinic with her  spouse          Lung Tx Summary:     Transplants:  2/21/2022 (Lung), Postoperative day:  674    Melissa Elder is a 68 year old who underwent bilateral transplant on 2/21/2022 (Lung), currently postoperative day:  674.  Surgery uncomplicated, but did have bilateral hydropneumothoraces and bilateral effusions. 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 Other hx notable for HTN, mild nonbostructive CAD, paroxysmal afib, osteoporosis, GERD and colonic polyps.           Assessment and Plan:     PULMONARY     Transplant:      Allograft Function:  No new pulmonary complaints, no recent illnesses.  CXR reviewed without new infiltrates. PFTs are stable and FEV1 96% of post transplant baseline. 6MWT has increased by 100 ft from last year. DSA without dnDSA in 6/2023 and pending from today's visit.   - Continue exercise, can order pulm rehab in the future although she is working on her own to improve conditioning    Immunosuppression:  Tacrolimus, goal further reduced due to CKD to 6-8  Myfortic 720 bid  Prednisone 5/2.5    Prophylaxis:   PJP: Monthly pentamidine nebs  CMV: D-/R - , pending today  EBV: D+/R+     EBV Viremia: low level positive in 2022, not detected on subsequent rechecks other than <500 (log <2.7) on 2/28.  Negative since then, most recently 7/28.  Undetected 11/24/23    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 On 12/19/2022 DQB5 534 MFI, without DR15 or DQB6 detected.  Last few DSAs have been negative, most recently 6/29.     Reflux: Adequately controlled with current pantoprazole daily and famotidine BID.     Coronary artery disease: Nonobstructive.   Continue aspirin and rosuvastatin.     SOMMER on CKD: Stage IIIa CKD.   Tacrolimus goal further reduced today 9/28. Cr most recently 1.05 on 9/22, today more elevated at 1.3, reports drinking ~64 oz. a day, but also at times does not hydrate properly (64 oz./day includes caffeinated and sugared beverages) and consumes sodium rich foods.  Water goal should be 64-80 oz./day.  - Repeat BMP in 1 week after adequate (quantity and quality) hydration with some additional diuresis    Lower extremity edema: Hasn't needed/used PRN lasix much at all at home, had persistent edema (pitting) post transplant. Did not respond significantly to lasix and did improve with elevation and compression but never resolved.   - Referred to OT lymphedema therapy locally with significant improvement in edema.  - Trial of holding lasix (20 mg every day) and watching weights    Paroxysmal atrial fibrillation: No palpitations or symptoms reported on diltiazem and beta blocker.  Recently HRs at home running 60s-70s.    - On dilt and beta blockade    Hypertension: BP elevated here in clinic today at 151/69.  BPs at home running in the 140-160 range/70-80.   - Increase carvedilol to 12.5 mg bid  - Start lisinopril 2.5 mg daily ,repeat BMP in a week and then will uptitrate     DM II: Hemoglobin A1c elevated at 7.3 on 2/28/23 (up from prior 5.7 on 2/22/22).  The patient was referred to a local endocrinologist for further evaluation, last seen ~April 2023 per [email or web address] messaging (visit note not found in CareEverywhere). Hemoglobin A1c up to 9.3 on 1/4/24, definitely needs to be started on hypoglycemic therapy +/- insulin was on insulin post operatively but not discharged on insulin.   - Endo referral placed, sees a bone specialist in [place] but will see someone at [facility] for diabetes with virtual visits if required in between  - Having her check fasting BGs, night time and 2 hours after largest meal  - Refilling insulin supplies    Hypercholesterolemia:  Cholesterol, LDL and triglycerides elevated 2/28/23.  Rosuvastatin 20 mg nightly.        Osteoporosis: Lumbar compression fracture in October 2022 after a fall as above.  Boniva was held due to concern for possible contribution to lower extremity swelling.     - Vit D level today adequate at 34  - Sees local endo, last in 11/2023  - Doing yearly infusions with Reclast due again in 5/2024     Hypomagnesemia: Due to CNI interference with absorption. Mg 1.7 today, taking mag ox 400 mg TID (if not able to get increased will consider going back to doctor's best mag glycinate).  This incidentally dropped off her med list, txp coordinator to add back once we confirm the dose/form she is taking.    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  Level today 351, will replete x 1 with reheck in 1 mo.      Maintenance:  Derm Exam: Saw recently in [place] June 2023  Colon Ca Screening: 3/2016 with sessile polyps in the transverse and sigmoid colon, 3mm and 4mm, due in 8/2024  Mammogram: Done 12/8/23, negative repeat in 12/2024  DEXA: 4/7/23, due again in 4/2025  Imms: seasonal influenza, COVID booster, and RSV vaccine done      CHANGES TODAY:    - Trial off lasix    - Refer to endocrinologist     - Carvedilol increase to 12.5 mg bid    - Start lisinopril 2.5 mg  daily     - Will refill insulin supplies and gave instructions to check BGs, if unable to get into endo in next month will have PharmD assist          RTC: 3 months  Transplant Coordinator: Arturo Miller      Approximately 35 minutes of non face-to-face time were spent in review of the patient's medical record on 1/3/24.  This included review of previous: clinic visits, hospital records, lab results, imaging studies, and procedural documentation.  The findings from this review are summarized in the above note.      I personally spent 40 minutes in documentation, the interview and exam, and review of the chart/labs/imaging  on Jan 4, 2024 not including time spent interpreting spirometry.       Nicole Knox MD  [facility]   Pulmonary Transplant   Post Transplant Coordinator: Arturo Miller  Fax: [phone]  Ph: [phone]     History of Present Illness / Interval Histories:       1/4/24  Blood pressures at home are high 140s-160s/80s, HR in the 70s. No sob, no cough, no wheezing. No fevers. No chills.   BGs are quite high today. Her endocrinologist in [facility] only really works with her bone density. No diarrhea, acid rflux, or constipation. She's doing well and is very happy her lymphedema is improved.  Interval History  Persistent extremity edema, referred to OT lymphedema therapy with improvement in edema. Saw her cardiologist and coreg was increased to 12.5 mg bid. Endo visit and will continue on reclast.     9/28/23  No new or increased dyspnea.  No cough.  With autumn allergy season, sinus congestion/pressure, rhinorrhea catia. in the AM, conjunctivitis, some PND.  Controlled with alternating Claritin, Zyrtec, Allegra, also using inhaler more frequently (2x).  Has nasal spray she can start using.      Wears compression stockings, notices extra BLE swelling with salty foods and chips.  Takes BPs at home, running 130-140s systolic, HRs 60-70.      Sleep: Adequate  Activity/Exercise: Fall back in Oct. 2022 is still slowing her down, walks on her treadmill or ~8 blocks a day.    Nutrition/Appetite/Weight Loss: Has recently gained weight unintentionally. Appetite is robust.  Adequate H2O intake, but does include caffeine and sugary beverages in her intake.         Review of Systems:     A complete 10-point ROS was otherwise negative except as noted in the HPI.         Medications:     Meds were reviewed during this visit and updated below:    Outpatient Encounter Medications as of 1/4/2024   Medication Sig Dispense Refill    albuterol (PROAIR HFA/PROVENTIL HFA/VENTOLIN HFA)  108 (90 Base) MCG/ACT inhaler Inhale 2 puffs into the lungs every 6 hours as needed for shortness of breath or wheezing 18 g 1    aspirin 81 MG EC tablet Take 1 tablet (81 mg) by mouth daily 30 tablet 11    calcium carbonate 600 mg-vitamin D 400 units (CALTRATE) 600-400 MG-UNIT per tablet Take 1 tablet by mouth daily 30 tablet 11    carvedilol (COREG) 6.25 MG tablet Take 2 tablets (12.5 mg) by mouth 2 times daily (with meals) 60 tablet 3    cetirizine (ZYRTEC) 10 MG tablet Take 1 tablet (10 mg) by mouth daily as needed for allergies 30 tablet 11    DILT- MG 24 hr ER beaded capsule TAKE 1 CAPSULE (240 MG) BY MOUTH DAILY 90 capsule 11    famotidine (PEPCID) 20 MG tablet TAKE 1 TABLET (20 MG) BY MOUTH 2 TIMES DAILY 180 tablet 11    furosemide (LASIX) 40 MG tablet Take 1 tablet (40 mg) by mouth daily Taking daily, update by patient 30 tablet 1    lisinopril (ZESTRIL) 5 MG tablet Take 0.5 tablets (2.5 mg) by mouth daily 30 tablet 1    magnesium oxide (MAG-OX) 400 MG tablet Take 1 tablet (400 mg) by mouth 3 times daily 90 tablet 11    multivitamin w/minerals (THERA-VIT-M) tablet Take 1 tablet by mouth daily 30 tablet 11    mycophenolic acid (GENERIC EQUIVALENT) 360 MG EC tablet TAKE 2 TABLETS (720 MG) BY MOUTH 2 TIMES DAILY 120 tablet 11    pantoprazole (PROTONIX) 40 MG EC tablet TAKE 1 TABLET (40 MG) BY MOUTH DAILY 90 tablet 11    pentamidine (NEBUPENT) 300 MG neb solution Inhale 300 mg into the lungs every 28 days      predniSONE (DELTASONE) 5 MG tablet Take 1.5 tablets (7.5 mg) by mouth daily 5 mg in the morning 2.5 mg in the evening 315 tablet 11    rosuvastatin (CRESTOR) 10 MG tablet Take 2 tablets (20 mg) by mouth daily 60 tablet 11    tacrolimus (GENERIC EQUIVALENT) 0.5 MG capsule Take 1 capsule (0.5 mg) by mouth every morning Total dose: 1.5 mg in the AM and 1 mg in the PM 30 capsule 11    tacrolimus (GENERIC EQUIVALENT) 1 MG capsule Take 1 capsule (1 mg) by mouth 2 times daily Total dose: 1.5 mg in the AM  and 1 mg in the PM 60 capsule 11    [DISCONTINUED] carvedilol (COREG) 6.25 MG tablet Take 2 tablets (12.5 mg) by mouth 2 times daily (with meals) 60 tablet 3    [DISCONTINUED] carvedilol (COREG) 6.25 MG tablet Take 1 tablet (6.25 mg) by mouth 2 times daily (with meals) 60 tablet 1     No facility-administered encounter medications on file as of 1/4/2024.               Allergies:     Allergies   Allergen Reactions    Alendronate Other (See Comments)     dizziness  Other reaction(s): Dizziness    Nickel Rash    Sulfa Antibiotics Rash            Past Medical and Past Surgical History:     Past Medical History:   Diagnosis Date    Atrial fibrillation with RVR (H)     hx of A fib related to severe COPD, does not meet anticoagulation criteria as noted    COPD, severe (H)     Osteoporosis        Past Surgical History:   Procedure Laterality Date    BRONCHOSCOPY (RIGID OR FLEXIBLE), DIAGNOSTIC N/A 4/7/2022    Procedure: BRONCHOSCOPY, WITH BRONCHOALVEOLAR LAVAGE and BIOPSIES;  Surgeon: Gerson Haddad MD;  Location: [facility]    BRONCHOSCOPY (RIGID OR FLEXIBLE), DIAGNOSTIC N/A 5/12/2022    Procedure: BRONCHOSCOPY, WITH BRONCHOALVEOLAR LAVAGE AND BIOPSY;  Surgeon: Melissa Landin MD;  Location: [facility]    BRONCHOSCOPY (RIGID OR FLEXIBLE), DIAGNOSTIC N/A 8/2/2022    Procedure: BRONCHOSCOPY, WITH BRONCHOALVEOLAR LAVAGE;  Surgeon: Melissa Landin MD;  Location: [facility]    BRONCHOSCOPY (RIGID OR FLEXIBLE), DIAGNOSTIC N/A 10/11/2022    Procedure: BRONCHOSCOPY, WITH BRONCHOALVEOLAR LAVAGE AND BIOPSIES;  Surgeon: Angel Gallagher MD;  Location: [facility]    BRONCHOSCOPY (RIGID OR FLEXIBLE), DIAGNOSTIC N/A 12/20/2022    Procedure: BRONCHOSCOPY, WITH BRONCHOALVEOLAR LAVAGE AND BIOPSIES;  Surgeon: Perlman, David Morris, MD;  Location: [facility]    BRONCHOSCOPY (RIGID OR FLEXIBLE), DIAGNOSTIC N/A 3/1/2023    Procedure: BRONCHOSCOPY, WITH BRONCHOALVEOLAR LAVAGE WITH BIOPSY;  Surgeon: Lucina Thompson MD;  Location: [facility]    BRONCHOSCOPY FLEXIBLE  AND RIGID N/A 3/1/2022    Procedure: BRONCHOSCOPY INSPECTION;  Surgeon: Melissa Landin MD;  Location:  GI    CV CORONARY ANGIOGRAM N/A 3/27/2019    Procedure: CV CORONARY ANGIOGRAM;  Surgeon: Thierry Serrano MD;  Location:  HEART CARDIAC CATH LAB    CV RIGHT HEART CATH MEASUREMENTS RECORDED N/A 3/27/2019    Procedure: CV RIGHT HEART CATH;  Surgeon: Thierry Serrano MD;  Location:  HEART CARDIAC CATH LAB    ESOPHAGEAL IMPEDENCE FUNCTION TEST WITH 24 HOUR PH GREATER THAN 1 HOUR N/A 3/28/2019    Procedure: ESOPHAGEAL IMPEDENCE FUNCTION TEST WITH 24 HOUR PH GREATER THAN 1 HOUR;  Surgeon: Mike Henry MD;  Location:  GI    EXCISE PILONIDAL CYST, SIMPLE      IR CHEST TUBE PLACEMENT NON-TUNNELED RIGHT  3/3/2022    TONSILLECTOMY & ADENOIDECTOMY      TRANSPLANT LUNG RECIPIENT SINGLE X2 Bilateral 2022    Procedure: Bilateral Sequential Lung Transplantation, Clamshell Incision, Extracorporeal Membrane Oxgenation, Bronchoscopy, Cryoablation of Intercostal Nerves;  Surgeon: Raul Robin MD;  Location:  OR             Social History:     Social History     Socioeconomic History    Marital status:      Spouse name: Not on file    Number of children: Not on file    Years of education: Not on file    Highest education level: Not on file   Occupational History    Not on file   Tobacco Use    Smoking status: Former     Packs/day: 1.50     Years: 36.00     Additional pack years: 0.00     Total pack years: 54.00     Types: Cigarettes     Quit date: 2006     Years since quittin.9    Smokeless tobacco: Never   Substance and Sexual Activity    Alcohol use: Not Currently     Comment: none since transplant    Drug use: Not Currently     Comment: stated hx of marijuana, quit in     Sexual activity: Not on file   Other Topics Concern    Parent/sibling w/ CABG, MI or angioplasty before 65F 55M? 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  Bronchoscopy in October 2022 with Pseudomonas, treated with FIDEL nebs with resolution.  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  Fungitell and galactomannan were negative and chest CT showed no evidence of fungus so no treatment was initiated.     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 Subsequent AFB cultures were negative.  No treatment indicated.           Exam:     BP (!) 143/71 (BP Location: Right arm, Patient Position: Sitting, Cuff Size: Adult Regular)   Pulse 81   Wt 68.1 kg (150 lb 3.2 oz)   SpO2 97%   BMI 27.47 kg/m    Body mass index is 27.47 kg/m .    C: In no apparent distress, pleasant, cooperative.  NEURO: A&O. Speech normal.   Head: Normocephalic, atraumatic.  Eyes: Pupils round, EOMI, antiicteric.   Ears: Canals clear, TMs visualized/normal.   Nose: Nasal mucosa without edema and hyperemia.   Mouth/Throat: Oral mucosa moist without exudate.   Neck/Lymph: Supple, no masses, no thyromegaly. No cervical or supraclavicular lymphadenopathy.  PULM: Clear to auscultation bilaterally. No crackles, rhonchi, or wheezes. Non-labored breathing on room air.  CV: S1 and S2, RRR. No murmurs, gallops, or rubs.   EXT: No cyanosis, or clubbing. + BLE edema about +1 in the feet and +1 in the pretib  ABD: Normal active BS. Soft, nontender, nondistended. No HSM appreciated.  MSK: Moves all extremities. Normal gait and stance. Muscle tone, bulk, and strength  appropriate. No apparent muscle wasting.   SKIN: Warm, dry, intact, ecchymotic. No rash, jaundice, or lesions visualized on limited exam.   PSYCH: Judgement and insight appropriate. Mood stable.          Data:     Results:    Recent Results (from the past 168 hour(s))   6 minute walk test    Collection Time: 01/04/24 12:00 AM   Result Value Ref Range    6 min walk (FT) 1,150 1,179 ft    6 Min Walk (M) 351 360 m   General PFT Lab (Please always keep checked)    Collection Time: 01/04/24  6:23 AM   Result Value Ref Range    FVC-Pred 2.49 L    FVC-Pre 2.15 L    FVC-%Pred-Pre 86 %    FEV1-Pre 2.09 L    FEV1-%Pred-Pre 106 %    FEV1FVC-Pred 79 %    FEV1FVC-Pre 97 %    FEFMax-Pred 5.48 L/sec    FEFMax-Pre 5.72 L/sec    FEFMax-%Pred-Pre 104 %    FEF2575-Pred 1.76 L/sec    FEF2575-Pre 3.41 L/sec    PLM6704-%Pred-Pre 193 %    ExpTime-Pre 2.23 sec    FIFMax-Pre 2.42 L/sec    VC-Pred 2.91 L    VC-Pre 2.16 L    VC-%Pred-Pre 73 %    IC-Pred 1.86 L    IC-Pre 1.69 L    IC-%Pred-Pre 91 %    ERV-Pred 0.93 L    ERV-Pre 0.46 L    ERV-%Pred-Pre 49 %    FEV1FEV6-Pred 79 %    FEV1FEV6-Pre 97 %    FRCPleth-Pred 2.60 L    FRCPleth-Pre 1.92 L    FRCPleth-%Pred-Pre 74 %    RVPleth-Pred 1.94 L    RVPleth-Pre 1.46 L    RVPleth-%Pred-Pre 75 %    TLCPleth-Pred 4.60 L    TLCPleth-Pre 3.62 L    TLCPleth-%Pred-Pre 78 %    DLCOunc-Pred 18.17 ml/min/mmHg    DLCOunc-Pre 19.07 ml/min/mmHg    DLCOunc-%Pred-Pre 104 %    VA-Pre 3.50 L    VA-%Pred-Pre 81 %    FEV1SVC-Pred 68 %    FEV1SVC-Pre 97 %   Basic metabolic panel    Collection Time: 01/04/24  8:04 AM   Result Value Ref Range    Sodium 138 135 - 145 mmol/L    Potassium 4.2 3.4 - 5.3 mmol/L    Chloride 100 98 - 107 mmol/L    Carbon Dioxide (CO2) 27 22 - 29 mmol/L    Anion Gap 11 7 - 15 mmol/L    Urea Nitrogen 29.5 (H) 8.0 - 23.0 mg/dL    Creatinine 1.02 (H) 0.51 - 0.95 mg/dL    GFR Estimate 60 (L) >60 mL/min/1.73m2    Calcium 9.9 8.8 - 10.2 mg/dL    Glucose 241 (H) 70 - 99 mg/dL   Magnesium     Collection Time: 01/04/24  8:04 AM   Result Value Ref Range    Magnesium 1.7 1.7 - 2.3 mg/dL   CBC with platelets    Collection Time: 01/04/24  8:04 AM   Result Value Ref Range    WBC Count 4.7 4.0 - 11.0 10e3/uL    RBC Count 3.39 (L) 3.80 - 5.20 10e6/uL    Hemoglobin 9.7 (L) 11.7 - 15.7 g/dL    Hematocrit 29.8 (L) 35.0 - 47.0 %    MCV 88 78 - 100 fL    MCH 28.6 26.5 - 33.0 pg    MCHC 32.6 31.5 - 36.5 g/dL    RDW 14.3 10.0 - 15.0 %    Platelet Count 233 150 - 450 10e3/uL   Lipid Profile    Collection Time: 01/04/24  8:04 AM   Result Value Ref Range    Cholesterol 183 <200 mg/dL    Triglycerides 186 (H) <150 mg/dL    Direct Measure HDL 65 >=50 mg/dL    LDL Cholesterol Calculated 81 <=100 mg/dL    Non HDL Cholesterol 118 <130 mg/dL    Patient Fasting > 8hrs? Yes    Hemoglobin A1c    Collection Time: 01/04/24  8:04 AM   Result Value Ref Range    Hemoglobin A1C 9.4 (H) <5.7 %         Date FVC (%) FEV1 (%) FEV1/FVC Note   2/28/23 2.04 80 2.02 100 99    6/29/23 2.12 84 2.08 103 98    9/28/23 2.16 86 2.13 107 99 Saved FVC efforts does appear to be valid, although ATS was not met   1/4/24 2.15  2.09  97 TLC 3.62, DLCOunc 19.07                Post Transplant Baseline:   FEV1: 2.1  2.04 on 2/28/23  2.08 on 6/29/23  FVC: 2.14  1.78 on 2/28/23  2.12 on 6/29/23    Spirometry interpretation:  Jan 4, 2024  Spirometry interpretation:  The spirometry is normal.  When compared to 9/28/23, the FEV1 and FVC have little change.  The testing meets ATS criteria.  The current FEV1 is > 80% of post lung transplant baseline of 2.1 L. (This may suggest CLAD 0)     Lung volumes interpretation:  The lung volumes are normal.  When compared to 2/28/23, the TLC and RV have little change.    DLCO interpretation:  The diffusing capacity is normal.  When compared to 2/2023, the DLCO has little change.      Jan 4, 2024    6 minute walk interpretation:  The six minute walk distance is normal on room air., There is no significant desaturation., and  There is no hypoxemia.  When compared to 2023, the 6 minute walk distance has increased by 100 ft.  SPO2 yvonne: 98  HR max: 107  1150ft/351m

## 2024-01-03 PROCEDURE — 99358 PROLONG SERVICE W/O CONTACT: CPT | Performed by: INTERNAL MEDICINE

## 2024-01-04 ENCOUNTER — ANCILLARY PROCEDURE (OUTPATIENT)
Dept: CARDIOLOGY | Facility: CLINIC | Age: 69
End: 2024-01-04
Attending: INTERNAL MEDICINE
Payer: COMMERCIAL

## 2024-01-04 ENCOUNTER — OFFICE VISIT (OUTPATIENT)
Dept: TRANSPLANT | Facility: CLINIC | Age: 69
End: 2024-01-04
Attending: INTERNAL MEDICINE
Payer: MEDICARE

## 2024-01-04 ENCOUNTER — OFFICE VISIT (OUTPATIENT)
Dept: PULMONOLOGY | Facility: CLINIC | Age: 69
End: 2024-01-04
Payer: COMMERCIAL

## 2024-01-04 ENCOUNTER — TELEPHONE (OUTPATIENT)
Dept: TRANSPLANT | Facility: CLINIC | Age: 69
End: 2024-01-04

## 2024-01-04 ENCOUNTER — LAB (OUTPATIENT)
Dept: LAB | Facility: CLINIC | Age: 69
End: 2024-01-04
Payer: COMMERCIAL

## 2024-01-04 ENCOUNTER — ANCILLARY PROCEDURE (OUTPATIENT)
Dept: GENERAL RADIOLOGY | Facility: CLINIC | Age: 69
End: 2024-01-04
Payer: COMMERCIAL

## 2024-01-04 VITALS
OXYGEN SATURATION: 97 % | BODY MASS INDEX: 27.47 KG/M2 | WEIGHT: 150.2 LBS | SYSTOLIC BLOOD PRESSURE: 143 MMHG | DIASTOLIC BLOOD PRESSURE: 71 MMHG | HEART RATE: 81 BPM

## 2024-01-04 DIAGNOSIS — Z94.2 S/P LUNG TRANSPLANT (H): ICD-10-CM

## 2024-01-04 DIAGNOSIS — Z79.899 ENCOUNTER FOR LONG-TERM (CURRENT) USE OF HIGH-RISK MEDICATION: ICD-10-CM

## 2024-01-04 DIAGNOSIS — D84.9 IMMUNOSUPPRESSED STATUS (H): ICD-10-CM

## 2024-01-04 DIAGNOSIS — Z94.2 LUNG REPLACED BY TRANSPLANT (H): ICD-10-CM

## 2024-01-04 DIAGNOSIS — M79.89 BILATERAL SWELLING OF FEET: ICD-10-CM

## 2024-01-04 DIAGNOSIS — I48.0 PAROXYSMAL ATRIAL FIBRILLATION (H): ICD-10-CM

## 2024-01-04 DIAGNOSIS — E11.9 TYPE 2 DIABETES MELLITUS WITHOUT COMPLICATION, WITHOUT LONG-TERM CURRENT USE OF INSULIN (H): ICD-10-CM

## 2024-01-04 DIAGNOSIS — E11.9 TYPE 2 DIABETES MELLITUS WITHOUT COMPLICATION, WITHOUT LONG-TERM CURRENT USE OF INSULIN (H): Primary | ICD-10-CM

## 2024-01-04 DIAGNOSIS — I10 HYPERTENSION, UNSPECIFIED TYPE: ICD-10-CM

## 2024-01-04 LAB
6 MIN WALK (FT): 1150 FT
6 MIN WALK (M): 351 M
ANION GAP SERPL CALCULATED.3IONS-SCNC: 11 MMOL/L (ref 7–15)
BUN SERPL-MCNC: 29.5 MG/DL (ref 8–23)
CALCIUM SERPL-MCNC: 9.9 MG/DL (ref 8.8–10.2)
CHLORIDE SERPL-SCNC: 100 MMOL/L (ref 98–107)
CHOLEST SERPL-MCNC: 183 MG/DL
CMV DNA SPEC NAA+PROBE-ACNC: NOT DETECTED IU/ML
CREAT SERPL-MCNC: 1.02 MG/DL (ref 0.51–0.95)
DEPRECATED HCO3 PLAS-SCNC: 27 MMOL/L (ref 22–29)
DLCOUNC-%PRED-PRE: 104 %
DLCOUNC-PRE: 19.07 ML/MIN/MMHG
DLCOUNC-PRED: 18.17 ML/MIN/MMHG
EGFRCR SERPLBLD CKD-EPI 2021: 60 ML/MIN/1.73M2
ERV-%PRED-PRE: 49 %
ERV-PRE: 0.46 L
ERV-PRED: 0.93 L
ERYTHROCYTE [DISTWIDTH] IN BLOOD BY AUTOMATED COUNT: 14.3 % (ref 10–15)
EXPTIME-PRE: 2.23 SEC
FASTING STATUS PATIENT QL REPORTED: YES
FEF2575-%PRED-PRE: 193 %
FEF2575-PRE: 3.41 L/SEC
FEF2575-PRED: 1.76 L/SEC
FEFMAX-%PRED-PRE: 104 %
FEFMAX-PRE: 5.72 L/SEC
FEFMAX-PRED: 5.48 L/SEC
FEV1-%PRED-PRE: 106 %
FEV1-PRE: 2.09 L
FEV1FEV6-PRE: 97 %
FEV1FEV6-PRED: 79 %
FEV1FVC-PRE: 97 %
FEV1FVC-PRED: 79 %
FEV1SVC-PRE: 97 %
FEV1SVC-PRED: 68 %
FIFMAX-PRE: 2.42 L/SEC
FRCPLETH-%PRED-PRE: 74 %
FRCPLETH-PRE: 1.92 L
FRCPLETH-PRED: 2.6 L
FVC-%PRED-PRE: 86 %
FVC-PRE: 2.15 L
FVC-PRED: 2.49 L
GLUCOSE SERPL-MCNC: 241 MG/DL (ref 70–99)
HBA1C MFR BLD: 9.4 %
HCT VFR BLD AUTO: 29.8 % (ref 35–47)
HDLC SERPL-MCNC: 65 MG/DL
HGB BLD-MCNC: 9.7 G/DL (ref 11.7–15.7)
IC-%PRED-PRE: 91 %
IC-PRE: 1.69 L
IC-PRED: 1.86 L
IGG SERPL-MCNC: 378 MG/DL (ref 610–1616)
LDLC SERPL CALC-MCNC: 81 MG/DL
LVEF ECHO: NORMAL
MAGNESIUM SERPL-MCNC: 1.7 MG/DL (ref 1.7–2.3)
MCH RBC QN AUTO: 28.6 PG (ref 26.5–33)
MCHC RBC AUTO-ENTMCNC: 32.6 G/DL (ref 31.5–36.5)
MCV RBC AUTO: 88 FL (ref 78–100)
NONHDLC SERPL-MCNC: 118 MG/DL
PLATELET # BLD AUTO: 233 10E3/UL (ref 150–450)
POTASSIUM SERPL-SCNC: 4.2 MMOL/L (ref 3.4–5.3)
RBC # BLD AUTO: 3.39 10E6/UL (ref 3.8–5.2)
RVPLETH-%PRED-PRE: 75 %
RVPLETH-PRE: 1.46 L
RVPLETH-PRED: 1.94 L
SODIUM SERPL-SCNC: 138 MMOL/L (ref 135–145)
TACROLIMUS BLD-MCNC: 7.4 UG/L (ref 5–15)
TLCPLETH-%PRED-PRE: 78 %
TLCPLETH-PRE: 3.62 L
TLCPLETH-PRED: 4.6 L
TME LAST DOSE: NORMAL H
TME LAST DOSE: NORMAL H
TRIGL SERPL-MCNC: 186 MG/DL
VA-%PRED-PRE: 81 %
VA-PRE: 3.5 L
VC-%PRED-PRE: 73 %
VC-PRE: 2.16 L
VC-PRED: 2.91 L
WBC # BLD AUTO: 4.7 10E3/UL (ref 4–11)

## 2024-01-04 PROCEDURE — 94726 PLETHYSMOGRAPHY LUNG VOLUMES: CPT | Performed by: INTERNAL MEDICINE

## 2024-01-04 PROCEDURE — 94618 PULMONARY STRESS TESTING: CPT | Performed by: INTERNAL MEDICINE

## 2024-01-04 PROCEDURE — 80061 LIPID PANEL: CPT | Performed by: PATHOLOGY

## 2024-01-04 PROCEDURE — 99207 PR NO CHARGE LOS: CPT

## 2024-01-04 PROCEDURE — 80048 BASIC METABOLIC PNL TOTAL CA: CPT | Performed by: PATHOLOGY

## 2024-01-04 PROCEDURE — 94729 DIFFUSING CAPACITY: CPT | Performed by: INTERNAL MEDICINE

## 2024-01-04 PROCEDURE — 80197 ASSAY OF TACROLIMUS: CPT

## 2024-01-04 PROCEDURE — 86832 HLA CLASS I HIGH DEFIN QUAL: CPT

## 2024-01-04 PROCEDURE — 99000 SPECIMEN HANDLING OFFICE-LAB: CPT | Performed by: PATHOLOGY

## 2024-01-04 PROCEDURE — 71046 X-RAY EXAM CHEST 2 VIEWS: CPT | Mod: GC | Performed by: RADIOLOGY

## 2024-01-04 PROCEDURE — 83036 HEMOGLOBIN GLYCOSYLATED A1C: CPT

## 2024-01-04 PROCEDURE — 82784 ASSAY IGA/IGD/IGG/IGM EACH: CPT | Performed by: INTERNAL MEDICINE

## 2024-01-04 PROCEDURE — 94375 RESPIRATORY FLOW VOLUME LOOP: CPT | Performed by: INTERNAL MEDICINE

## 2024-01-04 PROCEDURE — 99215 OFFICE O/P EST HI 40 MIN: CPT | Mod: 25 | Performed by: INTERNAL MEDICINE

## 2024-01-04 PROCEDURE — G0463 HOSPITAL OUTPT CLINIC VISIT: HCPCS | Performed by: INTERNAL MEDICINE

## 2024-01-04 PROCEDURE — 85027 COMPLETE CBC AUTOMATED: CPT | Performed by: PATHOLOGY

## 2024-01-04 PROCEDURE — 86833 HLA CLASS II HIGH DEFIN QUAL: CPT

## 2024-01-04 PROCEDURE — 83735 ASSAY OF MAGNESIUM: CPT | Performed by: PATHOLOGY

## 2024-01-04 PROCEDURE — 93306 TTE W/DOPPLER COMPLETE: CPT | Performed by: STUDENT IN AN ORGANIZED HEALTH CARE EDUCATION/TRAINING PROGRAM

## 2024-01-04 PROCEDURE — 36415 COLL VENOUS BLD VENIPUNCTURE: CPT | Performed by: PATHOLOGY

## 2024-01-04 PROCEDURE — 87799 DETECT AGENT NOS DNA QUANT: CPT

## 2024-01-04 RX ORDER — CARVEDILOL 6.25 MG/1
12.5 TABLET ORAL 2 TIMES DAILY WITH MEALS
Qty: 60 TABLET | Refills: 3 | Status: SHIPPED | OUTPATIENT
Start: 2024-01-04 | End: 2024-01-04

## 2024-01-04 RX ORDER — CARVEDILOL 6.25 MG/1
12.5 TABLET ORAL 2 TIMES DAILY WITH MEALS
Qty: 60 TABLET | Refills: 3 | Status: SHIPPED | OUTPATIENT
Start: 2024-01-04 | End: 2024-04-11

## 2024-01-04 RX ORDER — LISINOPRIL 5 MG/1
2.5 TABLET ORAL DAILY
Qty: 30 TABLET | Refills: 1 | Status: SHIPPED | OUTPATIENT
Start: 2024-01-04 | End: 2024-04-11

## 2024-01-04 ASSESSMENT — PAIN SCALES - GENERAL: PAINLEVEL: NO PAIN (0)

## 2024-01-04 NOTE — PATIENT INSTRUCTIONS
Check blood sugars fasting in the morning before breakfast and then 2 hours after your largest during the day and then before bedtime and keep a log  Go home and take a photo of your glucometer so we can reorder the correct lancets and testing strips   Hold the lasix and monitor your weights  Carvedilol Increase 12.5 mg twice daily   Start lisinopril 2.5 mg for blood pressure, we will need to check your labs in a week to monitor your potassium and kidney function  Let us know if your systolic blood pressure (top number) <100 or if your heart rate is persistently <60.     ~~~~~~~~~~~~~~~~~~~~~~~~~    Thoracic Transplant Office phone [phone], fax [phone]    Office Hours 8:30 - 5:00     For after-hours urgent issues, please dial [phone] and ask the  to page the Thoracic Transplant Coordinator On-Call.   --------------------  To expedite your medication refill(s), please contact your pharmacy and have them fax a refill request to: [phone]    *Please allow 3 business days for routine medication refills.  *Please allow 5 business days for controlled substance medication refills.    **For Diabetic medications and supplies refill(s), please contact your pharmacy and have them contact your Endocrine team.  --------------------  For scheduling appointments call [phone].  --------------------  Please Note: If you are active on [employer], all future test results will be sent by [employer] message only, and will no longer be called to patient. You may also receive communication directly from your physician.

## 2024-01-04 NOTE — NURSING NOTE
Chief Complaint   Patient presents with    RECHECK     3-4 month follow up     BP (!) 143/71 (BP Location: Right arm, Patient Position: Sitting, Cuff Size: Adult Regular)   Pulse 81   Wt 68.1 kg (150 lb 3.2 oz)   SpO2 97%   BMI 27.47 kg/m    Larissa NICHOLAS

## 2024-01-04 NOTE — PROGRESS NOTES
Transplant Coordinator Note    Reason for visit: Post lung transplant follow up visit   Coordinator: Present (Zora in person)  Caregiver:  Pt's , Tyrese    Health concerns addressed today:  1. Respiratory: no concerns  2. GI/: appetite okay; regular BMs (soft, formed)  3. Activity: walking dog most days (treadmill in basement otherwise)  4. Edema - decreased lasix to 20 mg daily a week ago; edema improved  5. BPs at home - 140-150/60-70 - adjust BP meds?    Activity/rehab: walking inside as much as possible with air quality, treadmill and bike.   Oxygen needs: room air  Pain management/RX: Back pain - compression fracture.   Diabetic management: elevated A1c and blood sugars  Risk Criteria Labs: negative 3/25/22  CMV status: D-/R-  EBV status: D+/R+  AC/asa: ASA 81mg -  PJP prophylactic: pentamidine neb (dapsone caused anemia/RBC hemolysis) done locally (last done ~12/15, scheduled next week)    COVID:  COVID-19 infection (yes/no, date of most recent positive test):   Status/instructions given about COVID-19 vaccine:     Pt Education: medications (use/dose/side effects), how/when to call coordinator, frequency of labs, s/s of infection/rejection, call prior to starting any new medications, lab/vital sign book    Health Maintenance:   Last colonoscopy:  Next colonoscopy due: 8/2024  Dexa: last 4/7/23; due 4/2025  Dermatology:  06/2023 (locally)  Vaccinations this visit:     Labs, CXR, PFTs reviewed with patient  Medication record reviewed and reconciled  Questions and concerns addressed    Patient Instructions  1. Continue to hydrate with 70-80 oz fluids daily.  2. Continue to exercise daily or most days of the week.  3. Follow up with your primary care provider for annual gender health maintenance procedures.  4. Follow up with colonoscopy schedule.  5. Follow up with annual dermatology visits.    Next transplant clinic appointment:  3 months with CXR, labs and PFTs  Next lab draw: next week    AVS printed  at time of check out

## 2024-01-04 NOTE — LETTER
PHYSICIAN ORDERS      DATE & TIME ISSUED: 2024 4:14 PM  PATIENT NAME: Melissa Elder. Phone: [phone]   : 1955     [facility] MR# [if applicable]: [id number]     DIAGNOSIS:  Lung Transplant  Z94.2  Please call patient and schedule for x1 dose of IVIG. IVIG order set faxed separately       Any questions please call: Arturo [phone]    Please fax these results to [phone].         Nicole Knox MD  Pulmonary Disease

## 2024-01-04 NOTE — LETTER
1/4/2024         RE: Melissa Elder  [address]  [facility] [address]        Dear Colleague,    Thank you for referring your patient, Melissa Elder, to the [facility]ANSPL[facility]. Please see a copy of my visit note below.          [facility]  Pulmonary Transplant Follow Up Visit    Jan 4, 2024    Reason for Visit  Melissa Elder is a 68 year old who is being seen for RECHECK (3-4 month follow up)    Presents to clinic with her  spouse          Lung Tx Summary:     Transplants:  2/21/2022 (Lung), Postoperative day:  674    Melissa Elder is a 68 year old who underwent bilateral transplant on 2/21/2022 (Lung), currently postoperative day:  674.  Surgery uncomplicated, but did have bilateral hydropneumothoraces and bilateral effusions. 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 Other hx notable for HTN, mild nonbostructive CAD, paroxysmal afib, osteoporosis, GERD and colonic polyps.           Assessment and Plan:     PULMONARY     Transplant:      Allograft Function:  No new pulmonary complaints, no recent illnesses.  CXR reviewed without new infiltrates. PFTs are stable and FEV1 96% of post transplant baseline. 6MWT has increased by 100 ft from last year. DSA without dnDSA in 6/2023 and pending from today's visit.   - Continue exercise, can order pulm rehab in the future although she is working on her own to improve conditioning    Immunosuppression:  Tacrolimus, goal further reduced due to CKD to 6-8  Myfortic 720 bid  Prednisone 5/2.5    Prophylaxis:   PJP: Monthly pentamidine nebs  CMV: D-/R - , pending today  EBV: D+/R+     EBV Viremia: low level positive in 2022, not detected on subsequent rechecks other than <500 (log <2.7) on 2/28.  Negative since then, most recently 7/28.  Undetected 11/24/23    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 On  12/19/2022 DQB5 534 MFI, without DR15 or DQB6 detected.  Last few DSAs have been negative, most recently 6/29.     Reflux: Adequately controlled with current pantoprazole daily and famotidine BID.     Coronary artery disease: Nonobstructive.  Continue aspirin and rosuvastatin.     SOMMER on CKD: Stage IIIa CKD.   Tacrolimus goal further reduced today 9/28. Cr most recently 1.05 on 9/22, today more elevated at 1.3, reports drinking ~64 oz. a day, but also at times does not hydrate properly (64 oz./day includes caffeinated and sugared beverages) and consumes sodium rich foods.  Water goal should be 64-80 oz./day.  - Repeat BMP in 1 week after adequate (quantity and quality) hydration with some additional diuresis    Lower extremity edema: Hasn't needed/used PRN lasix much at all at home, had persistent edema (pitting) post transplant. Did not respond significantly to lasix and did improve with elevation and compression but never resolved.   - Referred to OT lymphedema therapy locally with significant improvement in edema.  - Trial of holding lasix (20 mg every day) and watching weights    Paroxysmal atrial fibrillation: No palpitations or symptoms reported on diltiazem and beta blocker.  Recently HRs at home running 60s-70s.    - On dilt and beta blockade    Hypertension: BP elevated here in clinic today at 151/69.  BPs at home running in the 140-160 range/70-80.   - Increase carvedilol to 12.5 mg bid  - Start lisinopril 2.5 mg daily ,repeat BMP in a week and then will uptitrate     DM II: Hemoglobin A1c elevated at 7.3 on 2/28/23 (up from prior 5.7 on 2/22/22).  The patient was referred to a local endocrinologist for further evaluation, last seen ~April 2023 per [employer] messaging (visit note not found in CareEverywhere). Hemoglobin A1c up to 9.3 on 1/4/24, definitely needs to be started on hypoglycemic therapy +/- insulin was on insulin post operatively but not discharged on insulin.   - Endo referral placed, sees a  bone specialist in [place] but will see someone at [facility] for diabetes with virtual visits if required in between  - Having her check fasting BGs, night time and 2 hours after largest meal  - Refilling insulin supplies    Hypercholesterolemia: Cholesterol, LDL and triglycerides elevated 2/28/23.  Rosuvastatin 20 mg nightly.        Osteoporosis: Lumbar compression fracture in October 2022 after a fall as above.  Boniva was held due to concern for possible contribution to lower extremity swelling.     - Vit D level today adequate at 34  - Sees local endo, last in 11/2023  - Doing yearly infusions with Reclast due again in 5/2024     Hypomagnesemia: Due to CNI interference with absorption. Mg 1.7 today, taking mag ox 400 mg TID (if not able to get increased will consider going back to doctor's best mag glycinate).  This incidentally dropped off her med list, txp coordinator to add back once we confirm the dose/form she is taking.    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  Level today 351, will replete x 1 with reheck in 1 mo.      Maintenance:  Derm Exam: Saw recently in [place] June 2023  Colon Ca Screening: 3/2016 with sessile polyps in the transverse and sigmoid colon, 3mm and 4mm, due in 8/2024  Mammogram: Done 12/8/23, negative repeat in 12/2024  DEXA: 4/7/23, due again in 4/2025  Imms: seasonal influenza, COVID booster, and RSV vaccine done      CHANGES TODAY:    - Trial off lasix    - Refer to endocrinologist     - Carvedilol increase to 12.5 mg bid    - Start lisinopril 2.5 mg  daily     - Will refill insulin supplies and gave instructions to check BGs, if unable to get into endo in next month will have PharmD assist          RTC: 3 months  Transplant Coordinator: Arturo Miller      Approximately 35 minutes of non face-to-face time were spent in review of the patient's medical record on 1/3/23.  This included review of previous: clinic visits, hospital records,  lab results, imaging studies, and procedural documentation.  The findings from this review are summarized in the above note.      I personally spent 40 minutes in documentation, the interview and exam, and review of the chart/labs/imaging on Jan 4, 2024 not including time spent interpreting spirometry.       Nicole Knox MD  [facility]   Pulmonary Transplant   Post Transplant Coordinator: Arturo Miller  Fax: [phone]  Ph: [phone]     History of Present Illness / Interval Histories:       1/4/24  Blood pressures at home are high 140s-160s/80s, HR in the 70s. No sob, no cough, no wheezing. No fevers. No chills.   BGs are quite high today. Her endocrinologist in [facility] only really works with her bone density. No diarrhea, acid rflux, or constipation. She's doing well and is very happy her lymphedema is improved.  Interval History  Persistent extremity edema, referred to OT lymphedema therapy with improvement in edema. Saw her cardiologist and coreg was increased to 12.5 mg bid. Endo visit and will continue on reclast.     9/28/23  No new or increased dyspnea.  No cough.  With moisés allergy season, sinus congestion/pressure, rhinorrhea catia. in the AM, conjunctivitis, some PND.  Controlled with alternating Claritin, Zyrtec, Allegra, also using inhaler more frequently (2x).  Has nasal spray she can start using.      Wears compression stockings, notices extra BLE swelling with salty foods and chips.  Takes BPs at home, running 130-140s systolic, HRs 60-70.      Sleep: Adequate  Activity/Exercise: Fall back in Oct. 2022 is still slowing her down, walks on her treadmill or ~8 blocks a day.    Nutrition/Appetite/Weight Loss: Has recently gained weight unintentionally. Appetite is robust.  Adequate H2O intake, but does include caffeine and sugary beverages in her intake.         Review of Systems:     A complete 10-point ROS was otherwise negative except  as noted in the HPI.         Medications:     Meds were reviewed during this visit and updated below:    Outpatient Encounter Medications as of 1/4/2024   Medication Sig Dispense Refill     albuterol (PROAIR HFA/PROVENTIL HFA/VENTOLIN HFA) 108 (90 Base) MCG/ACT inhaler Inhale 2 puffs into the lungs every 6 hours as needed for shortness of breath or wheezing 18 g 1     aspirin 81 MG EC tablet Take 1 tablet (81 mg) by mouth daily 30 tablet 11     calcium carbonate 600 mg-vitamin D 400 units (CALTRATE) 600-400 MG-UNIT per tablet Take 1 tablet by mouth daily 30 tablet 11     carvedilol (COREG) 6.25 MG tablet Take 2 tablets (12.5 mg) by mouth 2 times daily (with meals) 60 tablet 3     cetirizine (ZYRTEC) 10 MG tablet Take 1 tablet (10 mg) by mouth daily as needed for allergies 30 tablet 11     DILT- MG 24 hr ER beaded capsule TAKE 1 CAPSULE (240 MG) BY MOUTH DAILY 90 capsule 11     famotidine (PEPCID) 20 MG tablet TAKE 1 TABLET (20 MG) BY MOUTH 2 TIMES DAILY 180 tablet 11     furosemide (LASIX) 40 MG tablet Take 1 tablet (40 mg) by mouth daily Taking daily, update by patient 30 tablet 1     lisinopril (ZESTRIL) 5 MG tablet Take 0.5 tablets (2.5 mg) by mouth daily 30 tablet 1     magnesium oxide (MAG-OX) 400 MG tablet Take 1 tablet (400 mg) by mouth 3 times daily 90 tablet 11     multivitamin w/minerals (THERA-VIT-M) tablet Take 1 tablet by mouth daily 30 tablet 11     mycophenolic acid (GENERIC EQUIVALENT) 360 MG EC tablet TAKE 2 TABLETS (720 MG) BY MOUTH 2 TIMES DAILY 120 tablet 11     pantoprazole (PROTONIX) 40 MG EC tablet TAKE 1 TABLET (40 MG) BY MOUTH DAILY 90 tablet 11     pentamidine (NEBUPENT) 300 MG neb solution Inhale 300 mg into the lungs every 28 days       predniSONE (DELTASONE) 5 MG tablet Take 1.5 tablets (7.5 mg) by mouth daily 5 mg in the morning 2.5 mg in the evening 315 tablet 11     rosuvastatin (CRESTOR) 10 MG tablet Take 2 tablets (20 mg) by mouth daily 60 tablet 11     tacrolimus (GENERIC  EQUIVALENT) 0.5 MG capsule Take 1 capsule (0.5 mg) by mouth every morning Total dose: 1.5 mg in the AM and 1 mg in the PM 30 capsule 11     tacrolimus (GENERIC EQUIVALENT) 1 MG capsule Take 1 capsule (1 mg) by mouth 2 times daily Total dose: 1.5 mg in the AM and 1 mg in the PM 60 capsule 11     [DISCONTINUED] carvedilol (COREG) 6.25 MG tablet Take 2 tablets (12.5 mg) by mouth 2 times daily (with meals) 60 tablet 3     [DISCONTINUED] carvedilol (COREG) 6.25 MG tablet Take 1 tablet (6.25 mg) by mouth 2 times daily (with meals) 60 tablet 1     No facility-administered encounter medications on file as of 1/4/2024.               Allergies:     Allergies   Allergen Reactions     Alendronate Other (See Comments)     dizziness  Other reaction(s): Dizziness     Nickel Rash     Sulfa Antibiotics Rash            Past Medical and Past Surgical History:     Past Medical History:   Diagnosis Date     Atrial fibrillation with RVR (H)     hx of A fib related to severe COPD, does not meet anticoagulation criteria as noted     COPD, severe (H)      Osteoporosis        Past Surgical History:   Procedure Laterality Date     BRONCHOSCOPY (RIGID OR FLEXIBLE), DIAGNOSTIC N/A 4/7/2022    Procedure: BRONCHOSCOPY, WITH BRONCHOALVEOLAR LAVAGE and BIOPSIES;  Surgeon: Gerson Haddad MD;  Location: [facility]     BRONCHOSCOPY (RIGID OR FLEXIBLE), DIAGNOSTIC N/A 5/12/2022    Procedure: BRONCHOSCOPY, WITH BRONCHOALVEOLAR LAVAGE AND BIOPSY;  Surgeon: Melissa Landin MD;  Location: [facility]     BRONCHOSCOPY (RIGID OR FLEXIBLE), DIAGNOSTIC N/A 8/2/2022    Procedure: BRONCHOSCOPY, WITH BRONCHOALVEOLAR LAVAGE;  Surgeon: Melissa Landin MD;  Location: [facility]     BRONCHOSCOPY (RIGID OR FLEXIBLE), DIAGNOSTIC N/A 10/11/2022    Procedure: BRONCHOSCOPY, WITH BRONCHOALVEOLAR LAVAGE AND BIOPSIES;  Surgeon: Angel Gallagher MD;  Location: [facility]     BRONCHOSCOPY (RIGID OR FLEXIBLE), DIAGNOSTIC N/A 12/20/2022    Procedure: BRONCHOSCOPY, WITH BRONCHOALVEOLAR  LAVAGE AND BIOPSIES;  Surgeon: Perlman, David Morris, MD;  Location:  GI     BRONCHOSCOPY (RIGID OR FLEXIBLE), DIAGNOSTIC N/A 3/1/2023    Procedure: BRONCHOSCOPY, WITH BRONCHOALVEOLAR LAVAGE WITH BIOPSY;  Surgeon: Lucina Thompson MD;  Location:  GI     BRONCHOSCOPY FLEXIBLE AND RIGID N/A 3/1/2022    Procedure: BRONCHOSCOPY INSPECTION;  Surgeon: Melissa Landin MD;  Location:  GI     CV CORONARY ANGIOGRAM N/A 3/27/2019    Procedure: CV CORONARY ANGIOGRAM;  Surgeon: Thierry Serrano MD;  Location:  HEART CARDIAC CATH LAB     CV RIGHT HEART CATH MEASUREMENTS RECORDED N/A 3/27/2019    Procedure: CV RIGHT HEART CATH;  Surgeon: Thierry Serrano MD;  Location:  HEART CARDIAC CATH LAB     ESOPHAGEAL IMPEDENCE FUNCTION TEST WITH 24 HOUR PH GREATER THAN 1 HOUR N/A 3/28/2019    Procedure: ESOPHAGEAL IMPEDENCE FUNCTION TEST WITH 24 HOUR PH GREATER THAN 1 HOUR;  Surgeon: Mike Henry MD;  Location:  GI     EXCISE PILONIDAL CYST, SIMPLE       IR CHEST TUBE PLACEMENT NON-TUNNELED RIGHT  3/3/2022     TONSILLECTOMY & ADENOIDECTOMY       TRANSPLANT LUNG RECIPIENT SINGLE X2 Bilateral 2022    Procedure: Bilateral Sequential Lung Transplantation, Clamshell Incision, Extracorporeal Membrane Oxgenation, Bronchoscopy, Cryoablation of Intercostal Nerves;  Surgeon: Raul Robin MD;  Location:  OR             Social History:     Social History     Socioeconomic History     Marital status:      Spouse name: Not on file     Number of children: Not on file     Years of education: Not on file     Highest education level: Not on file   Occupational History     Not on file   Tobacco Use     Smoking status: Former     Packs/day: 1.50     Years: 36.00     Additional pack years: 0.00     Total pack years: 54.00     Types: Cigarettes     Quit date: 2006     Years since quittin.9     Smokeless tobacco: Never   Substance and Sexual Activity     Alcohol use: Not Currently     Comment: none  since transplant     Drug use: Not Currently     Comment: stated hx of marijuana, quit in 2006     Sexual activity: Not on file   Other Topics Concern     Parent/sibling w/ CABG, MI or angioplasty before 65F 55M? 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  Bronchoscopy in October 2022 with Pseudomonas, treated with FIDEL nebs with resolution.  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  Fungitell and galactomannan were negative and chest CT showed no evidence of fungus so no treatment was initiated.     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 Subsequent AFB cultures were negative.  No treatment indicated.           Exam:     BP (!) 143/71 (BP Location: Right arm, Patient Position: Sitting, Cuff Size: Adult Regular)   Pulse 81   Wt 68.1 kg (150 lb 3.2 oz)   SpO2 97%   BMI 27.47 kg/m    Body mass index is 27.47 kg/m .    C: In no apparent distress, pleasant, cooperative.  NEURO: A&O. Speech normal.   Head: Normocephalic, atraumatic.  Eyes: Pupils round, EOMI, antiicteric.   Ears: Canals clear, TMs visualized/normal.   Nose: Nasal mucosa without edema and hyperemia.   Mouth/Throat: Oral mucosa moist without exudate.   Neck/Lymph: Supple, no masses, no thyromegaly. No cervical or supraclavicular lymphadenopathy.  PULM: Clear to auscultation bilaterally. No crackles, rhonchi, or wheezes. Non-labored breathing on room air.  CV: S1  and S2, RRR. No murmurs, gallops, or rubs.   EXT: No cyanosis, or clubbing. + BLE edema about +1 in the feet and +1 in the pretib  ABD: Normal active BS. Soft, nontender, nondistended. No HSM appreciated.  MSK: Moves all extremities. Normal gait and stance. Muscle tone, bulk, and strength appropriate. No apparent muscle wasting.   SKIN: Warm, dry, intact, ecchymotic. No rash, jaundice, or lesions visualized on limited exam.   PSYCH: Judgement and insight appropriate. Mood stable.          Data:     Results:    Recent Results (from the past 168 hour(s))   6 minute walk test    Collection Time: 01/04/24 12:00 AM   Result Value Ref Range    6 min walk (FT) 1,150 1,179 ft    6 Min Walk (M) 351 360 m   General PFT Lab (Please always keep checked)    Collection Time: 01/04/24  6:23 AM   Result Value Ref Range    FVC-Pred 2.49 L    FVC-Pre 2.15 L    FVC-%Pred-Pre 86 %    FEV1-Pre 2.09 L    FEV1-%Pred-Pre 106 %    FEV1FVC-Pred 79 %    FEV1FVC-Pre 97 %    FEFMax-Pred 5.48 L/sec    FEFMax-Pre 5.72 L/sec    FEFMax-%Pred-Pre 104 %    FEF2575-Pred 1.76 L/sec    FEF2575-Pre 3.41 L/sec    QTY4880-%Pred-Pre 193 %    ExpTime-Pre 2.23 sec    FIFMax-Pre 2.42 L/sec    VC-Pred 2.91 L    VC-Pre 2.16 L    VC-%Pred-Pre 73 %    IC-Pred 1.86 L    IC-Pre 1.69 L    IC-%Pred-Pre 91 %    ERV-Pred 0.93 L    ERV-Pre 0.46 L    ERV-%Pred-Pre 49 %    FEV1FEV6-Pred 79 %    FEV1FEV6-Pre 97 %    FRCPleth-Pred 2.60 L    FRCPleth-Pre 1.92 L    FRCPleth-%Pred-Pre 74 %    RVPleth-Pred 1.94 L    RVPleth-Pre 1.46 L    RVPleth-%Pred-Pre 75 %    TLCPleth-Pred 4.60 L    TLCPleth-Pre 3.62 L    TLCPleth-%Pred-Pre 78 %    DLCOunc-Pred 18.17 ml/min/mmHg    DLCOunc-Pre 19.07 ml/min/mmHg    DLCOunc-%Pred-Pre 104 %    VA-Pre 3.50 L    VA-%Pred-Pre 81 %    FEV1SVC-Pred 68 %    FEV1SVC-Pre 97 %   Basic metabolic panel    Collection Time: 01/04/24  8:04 AM   Result Value Ref Range    Sodium 138 135 - 145 mmol/L    Potassium 4.2 3.4 - 5.3 mmol/L    Chloride 100 98 - 107  mmol/L    Carbon Dioxide (CO2) 27 22 - 29 mmol/L    Anion Gap 11 7 - 15 mmol/L    Urea Nitrogen 29.5 (H) 8.0 - 23.0 mg/dL    Creatinine 1.02 (H) 0.51 - 0.95 mg/dL    GFR Estimate 60 (L) >60 mL/min/1.73m2    Calcium 9.9 8.8 - 10.2 mg/dL    Glucose 241 (H) 70 - 99 mg/dL   Magnesium    Collection Time: 01/04/24  8:04 AM   Result Value Ref Range    Magnesium 1.7 1.7 - 2.3 mg/dL   CBC with platelets    Collection Time: 01/04/24  8:04 AM   Result Value Ref Range    WBC Count 4.7 4.0 - 11.0 10e3/uL    RBC Count 3.39 (L) 3.80 - 5.20 10e6/uL    Hemoglobin 9.7 (L) 11.7 - 15.7 g/dL    Hematocrit 29.8 (L) 35.0 - 47.0 %    MCV 88 78 - 100 fL    MCH 28.6 26.5 - 33.0 pg    MCHC 32.6 31.5 - 36.5 g/dL    RDW 14.3 10.0 - 15.0 %    Platelet Count 233 150 - 450 10e3/uL   Lipid Profile    Collection Time: 01/04/24  8:04 AM   Result Value Ref Range    Cholesterol 183 <200 mg/dL    Triglycerides 186 (H) <150 mg/dL    Direct Measure HDL 65 >=50 mg/dL    LDL Cholesterol Calculated 81 <=100 mg/dL    Non HDL Cholesterol 118 <130 mg/dL    Patient Fasting > 8hrs? Yes    Hemoglobin A1c    Collection Time: 01/04/24  8:04 AM   Result Value Ref Range    Hemoglobin A1C 9.4 (H) <5.7 %         Date FVC (%) FEV1 (%) FEV1/FVC Note   2/28/23 2.04 80 2.02 100 99    6/29/23 2.12 84 2.08 103 98    9/28/23 2.16 86 2.13 107 99 Saved FVC efforts does appear to be valid, although ATS was not met   1/4/24 2.15  2.09  97 TLC 3.62, DLCOunc 19.07                Post Transplant Baseline:   FEV1: 2.1  2.04 on 2/28/23  2.08 on 6/29/23  FVC: 2.14  1.78 on 2/28/23  2.12 on 6/29/23    Spirometry interpretation:  Jan 4, 2024  Spirometry interpretation:  The spirometry is normal.  When compared to 9/28/23, the FEV1 and FVC have little change.  The testing meets ATS criteria.  The current FEV1 is > 80% of post lung transplant baseline of 2.1 L. (This may suggest CLAD 0)     Lung volumes interpretation:  The lung volumes are normal.  When compared to 2/28/23, the TLC and RV  have little change.    DLCO interpretation:  The diffusing capacity is normal.  When compared to 2/2023, the DLCO has little change.      Jan 4, 2024    6 minute walk interpretation:  The six minute walk distance is normal on room air., There is no significant desaturation., and There is no hypoxemia.  When compared to 2023, the 6 minute walk distance has increased by 100 ft.  SPO2 yvonne: 98  HR max: 107  1150ft/351m              Transplant Coordinator Note    Reason for visit: Post lung transplant follow up visit   Coordinator: Present (Zora in person)  Caregiver:  Pt's , Tyrese    Health concerns addressed today:  1. Respiratory: no concerns  2. GI/: appetite okay; regular BMs (soft, formed)  3. Activity: walking dog most days (treadmill in basement otherwise)  4. Edema - decreased lasix to 20 mg daily a week ago; edema improved  5. BPs at home - 140-150/60-70 - adjust BP meds?    Activity/rehab: walking inside as much as possible with air quality, treadmill and bike.   Oxygen needs: room air  Pain management/RX: Back pain - compression fracture.   Diabetic management: elevated A1c and blood sugars  Risk Criteria Labs: negative 3/25/22  CMV status: D-/R-  EBV status: D+/R+  AC/asa: ASA 81mg -  PJP prophylactic: pentamidine neb (dapsone caused anemia/RBC hemolysis) done locally (last done ~12/15, scheduled next week)    COVID:  COVID-19 infection (yes/no, date of most recent positive test):   Status/instructions given about COVID-19 vaccine:     Pt Education: medications (use/dose/side effects), how/when to call coordinator, frequency of labs, s/s of infection/rejection, call prior to starting any new medications, lab/vital sign book    Health Maintenance:   Last colonoscopy:  Next colonoscopy due: 8/2024  Dexa: last 4/7/23; due 4/2025  Dermatology:  06/2023 (locally)  Vaccinations this visit:     Labs, CXR, PFTs reviewed with patient  Medication record reviewed and reconciled  Questions and concerns  addressed    Patient Instructions  1. Continue to hydrate with 70-80 oz fluids daily.  2. Continue to exercise daily or most days of the week.  3. Follow up with your primary care provider for annual gender health maintenance procedures.  4. Follow up with colonoscopy schedule.  5. Follow up with annual dermatology visits.    Next transplant clinic appointment:  3 months with CXR, labs and PFTs  Next lab draw: next week    AVS printed at time of check out        Again, thank you for allowing me to participate in the care of your patient.        Sincerely,        Nicole Knox MD

## 2024-01-05 ENCOUNTER — TELEPHONE (OUTPATIENT)
Dept: TRANSPLANT | Facility: CLINIC | Age: 69
End: 2024-01-05
Payer: COMMERCIAL

## 2024-01-05 LAB — EBV DNA # SPEC NAA+PROBE: NOT DETECTED COPIES/ML

## 2024-01-05 NOTE — TELEPHONE ENCOUNTER
IgG 378     X1 dose of IVIG replacement. Repeat level in a month   Orders faxed to local infusion center     Pt will reach out to PCP to manage diabetes.

## 2024-01-11 NOTE — LETTER
PHYSICIAN ORDERS      DATE & TIME ISSUED: 2024 8:06 AM  PATIENT NAME: Melissa Elder   : 1955     [facility] MR# [if applicable]: [id number]     DIAGNOSIS:  Lung Transplant  Z94.2    Standing Orders,  in 1 year, 2025       Item Frequency   x CMV Quantitative PCR (blood)  Every 1 month or prn    x CBC with platelets Every 1 month or prn   x Comprehensive Metabolic Panel  Every 1 month or prn    x  Tacrolimus/Prograf level  (Should be mailed to the [facility] in the  provided to you by the patient.) 12 hour trough level Every 1 month  Or 1 week after dose change       Please fax these results to [phone].      Any questions please call: Arturo [phone]            Nicole Knox MD  Pulmonary Disease

## 2024-01-15 ENCOUNTER — TELEPHONE (OUTPATIENT)
Dept: TRANSPLANT | Facility: CLINIC | Age: 69
End: 2024-01-15
Payer: COMMERCIAL

## 2024-01-15 NOTE — TELEPHONE ENCOUNTER
Writer called patient to check in   IVIG infusion scheduled for this week Wednesday   Endocrinology appointment 1/22/24, will also meet with a diabetic educator this day.

## 2024-01-19 ENCOUNTER — TELEPHONE (OUTPATIENT)
Dept: TRANSPLANT | Facility: CLINIC | Age: 69
End: 2024-01-19
Payer: COMMERCIAL

## 2024-01-19 NOTE — TELEPHONE ENCOUNTER
Melissa sent pictures of BP and BG logs from January 2024. Average 150-160/60-70 (reference Fotoupt exchange on 1/16)  Waiting on follow up labs after medication changes.

## 2024-01-26 ENCOUNTER — LAB (OUTPATIENT)
Dept: LAB | Facility: CLINIC | Age: 69
End: 2024-01-26
Payer: MEDICARE

## 2024-01-26 DIAGNOSIS — Z94.2 S/P LUNG TRANSPLANT (H): ICD-10-CM

## 2024-01-26 PROCEDURE — 80197 ASSAY OF TACROLIMUS: CPT

## 2024-01-29 LAB
TACROLIMUS BLD-MCNC: 8.1 UG/L (ref 5–15)
TME LAST DOSE: NORMAL H
TME LAST DOSE: NORMAL H

## 2024-01-30 NOTE — RESULT ENCOUNTER NOTE
Tacrolimus level 8.1 at 12 hours, on 1/26/24.  Goal 6-8.   Current dose 1.5 mg in AM, 1 mg in PM    Level at goal.  No dose change.    Event[employer]am message sent

## 2024-02-14 ENCOUNTER — TELEPHONE (OUTPATIENT)
Dept: TRANSPLANT | Facility: CLINIC | Age: 69
End: 2024-02-14
Payer: COMMERCIAL

## 2024-02-14 NOTE — TELEPHONE ENCOUNTER
Rach from cancer center calls regarding IVIG infusion.  Their parameters say administer if less than 600 but does not have a hold (upper limits).  Patient last received IVIG infusion on 1/17.  With another IgG level checked on 1/18 of 1181.  Explained to Rach that we typically wait 1 month after IVIG infusion to check another IgG level to determine if patient needs more.  Will recheck another IgG level on 2/19 to see where patient is at to determine further IVIG infusions.    If patient needs IVIG infusion next month we will send updated parameters and protocol orders for IVIG.    Faxed lab orders to [facility] for IGG on 2/19/24. Rach states she will make the appointment and let the patient know.     [employer] message sent to patient with update

## 2024-02-14 NOTE — TELEPHONE ENCOUNTER
Rach from the cancer center called back and wanted to verify when the labs should be drawn. Caller forgot to write it down.

## 2024-02-14 NOTE — LETTER
PHYSICIAN ORDERS      DATE & TIME ISSUED: 2024 10:39 AM  PATIENT NAME: Melissa Elder   : 1955     [facility] MR# [if applicable]: [id number]     DIAGNOSIS:  Lung Transplant  Z94.2  Please check the following labs on 24:    Tacrolimus level  IGG  CMP  Magnesium  CBC with platelets  CMV Quantitative PCR (blood)      Any questions please call: [phone]    Please fax these results to [phone].         Nicole Knox MD  Pulmonary Disease

## 2024-02-14 NOTE — TELEPHONE ENCOUNTER
Called and spoke with Rach again she just wanted to clarify that IgG level is checked 1 month after IVIG infusion.  Confirmed this information with Rach

## 2024-02-23 ENCOUNTER — LAB (OUTPATIENT)
Dept: LAB | Facility: CLINIC | Age: 69
End: 2024-02-23

## 2024-02-23 DIAGNOSIS — Z94.2 LUNG REPLACED BY TRANSPLANT (H): ICD-10-CM

## 2024-02-23 DIAGNOSIS — Z79.899 ENCOUNTER FOR LONG-TERM (CURRENT) USE OF HIGH-RISK MEDICATION: ICD-10-CM

## 2024-02-23 DIAGNOSIS — E11.9 TYPE 2 DIABETES MELLITUS WITHOUT COMPLICATION, WITHOUT LONG-TERM CURRENT USE OF INSULIN (H): ICD-10-CM

## 2024-02-23 PROCEDURE — 80197 ASSAY OF TACROLIMUS: CPT | Performed by: INTERNAL MEDICINE

## 2024-02-27 LAB
TACROLIMUS BLD-MCNC: 11.2 UG/L (ref 5–15)
TME LAST DOSE: NORMAL H
TME LAST DOSE: NORMAL H

## 2024-02-28 ENCOUNTER — TELEPHONE (OUTPATIENT)
Dept: TRANSPLANT | Facility: CLINIC | Age: 69
End: 2024-02-28
Payer: COMMERCIAL

## 2024-02-28 DIAGNOSIS — Z94.2 S/P LUNG TRANSPLANT (H): ICD-10-CM

## 2024-02-28 RX ORDER — TACROLIMUS 0.5 MG/1
CAPSULE ORAL
Qty: 30 CAPSULE | Refills: 11 | Status: SHIPPED | OUTPATIENT
Start: 2024-02-28 | End: 2024-03-12

## 2024-02-28 RX ORDER — TACROLIMUS 1 MG/1
1 CAPSULE ORAL 2 TIMES DAILY
Qty: 60 CAPSULE | Refills: 11 | Status: SHIPPED | OUTPATIENT
Start: 2024-02-28 | End: 2024-03-12

## 2024-02-28 NOTE — TELEPHONE ENCOUNTER
Spoke with ED Charge nurse at [facility] in [place], [place] regarding IV remdesivir as a potential option for this patient as she is COVID +. ED nurse states they do have this infusion and need orders from PCP. Dr. Natasha Rm is patient's PCP. RN states she will call PCP to get orders for this and contact the patient. Gave ED RN jabber number to call back if any questions.    ADDEND:    [place], ED RN called back to confirm patient is set up for IV Remdesivir for 2/29, 3/1 and 3/2.     Also confirmed with patient that she will change her tacrolimus dosing to 1 mg twice daily and recheck another level in 1 week.    Checking with Dr. Knox regarding IVIG infusions. Most recent igg level was 510. Will contact Infusion center to have them wait to schedule patient until we speak with Dr. Knox.

## 2024-02-29 ENCOUNTER — TELEPHONE (OUTPATIENT)
Dept: TRANSPLANT | Facility: CLINIC | Age: 69
End: 2024-02-29
Payer: COMMERCIAL

## 2024-02-29 NOTE — TELEPHONE ENCOUNTER
Per Dr. Knox, plan to give IVIG x1 for IgG 510 on 2/23/24 and recheck IgG in 1 month given low IgG and now COVID dx. Spoke with infusion center, billy to give next week after IVIG infusions are completed per Dr. Knox. She will contact the patient to schedule.

## 2024-03-11 ENCOUNTER — TELEPHONE (OUTPATIENT)
Dept: TRANSPLANT | Facility: CLINIC | Age: 69
End: 2024-03-11
Payer: COMMERCIAL

## 2024-03-11 DIAGNOSIS — Z94.2 S/P LUNG TRANSPLANT (H): ICD-10-CM

## 2024-03-11 NOTE — TELEPHONE ENCOUNTER
Tacrolimus level 9.6 at 12 hours, on 3/8/24.  Goal 6-8.   Current dose 1 mg in AM, 1 mg in PM    Dose changed to 1 mg in AM, 0.5 mg in PM   Recheck level in 7-10 days    Discussed with pt.

## 2024-03-12 RX ORDER — TACROLIMUS 0.5 MG/1
0.5 CAPSULE ORAL EVERY EVENING
Qty: 30 CAPSULE | Refills: 11 | Status: SHIPPED | OUTPATIENT
Start: 2024-03-12 | End: 2024-04-02

## 2024-03-12 RX ORDER — TACROLIMUS 1 MG/1
1 CAPSULE ORAL EVERY MORNING
Qty: 30 CAPSULE | Refills: 11 | Status: SHIPPED | OUTPATIENT
Start: 2024-03-12 | End: 2024-04-02

## 2024-03-29 ENCOUNTER — LAB (OUTPATIENT)
Dept: LAB | Facility: CLINIC | Age: 69
End: 2024-03-29

## 2024-03-29 ENCOUNTER — TELEPHONE (OUTPATIENT)
Dept: TRANSPLANT | Facility: CLINIC | Age: 69
End: 2024-03-29
Payer: COMMERCIAL

## 2024-03-29 DIAGNOSIS — Z94.2 S/P LUNG TRANSPLANT (H): ICD-10-CM

## 2024-03-29 PROCEDURE — 80197 ASSAY OF TACROLIMUS: CPT | Performed by: INTERNAL MEDICINE

## 2024-03-29 NOTE — LETTER
PHYSICIAN ORDERS      DATE & TIME ISSUED: 2024 3:52 PM  PATIENT NAME: Melissa Elder   : 1955     [facility] MR# [if applicable]: [id number]     DIAGNOSIS:  Lung Transplant  Z94.2    Please call patient and schedule for infusion: 4g IV magnesium.     Any questions please call: Kristel [phone]    Please fax these results to [phone].         Nicole Knox MD  Pulmonary Disease

## 2024-03-29 NOTE — TELEPHONE ENCOUNTER
JENNIFER Stewart from [place], [place]  the order that was faxed to them needs another ICD-10 code/Diagnosis. (FAX#[phone])  Cancer center is closing now 4:30PM and is open again Monday.  If patient needs IV Mag.,  we will need to call [place].,

## 2024-03-29 NOTE — PROGRESS NOTES
Mag 1.4  Per Dr. Knox:   -4g IV Mag   -Increase dose of Mag oxide to 800/400/800  -recheck level in a week     If Mag oxide causing diarrhea okay to switch to doctors best mag   Orders faxed to local infusion center

## 2024-03-29 NOTE — LETTER
PHYSICIAN ORDERS      DATE & TIME ISSUED: 2024 4:34 PM  PATIENT NAME: Melissa Elder   : 1955     [facility] MR# [if applicable]: [id number]     DIAGNOSIS:  Hypomagnesemia E83.42  Please call patient and schedule for infusion: 4g IV magnesium, ok to schedule       Any questions please call: Kristel [phone]        Please fax these results to [phone].         Nicole Knox MD  Pulmonary Disease

## 2024-03-30 ENCOUNTER — TELEPHONE (OUTPATIENT)
Dept: TRANSPLANT | Facility: CLINIC | Age: 69
End: 2024-03-30
Payer: COMMERCIAL

## 2024-03-31 NOTE — TELEPHONE ENCOUNTER
On call Coordinator note: per coordinators request - called to confirm patient received instruction regarding her low Mg level. Noted pt did confirm via MyChart that she will start new dose when she returns home on Sunday.

## 2024-04-01 LAB
TACROLIMUS BLD-MCNC: 4.7 UG/L (ref 5–15)
TME LAST DOSE: ABNORMAL H
TME LAST DOSE: ABNORMAL H

## 2024-04-02 DIAGNOSIS — Z94.2 S/P LUNG TRANSPLANT (H): ICD-10-CM

## 2024-04-02 RX ORDER — TACROLIMUS 1 MG/1
1 CAPSULE ORAL 2 TIMES DAILY
Qty: 60 CAPSULE | Refills: 11 | Status: SHIPPED | OUTPATIENT
Start: 2024-04-02 | End: 2024-05-29

## 2024-04-02 RX ORDER — TACROLIMUS 0.5 MG/1
CAPSULE ORAL
Qty: 30 CAPSULE | Refills: 11 | Status: SHIPPED | OUTPATIENT
Start: 2024-04-02 | End: 2024-06-04

## 2024-04-02 NOTE — PROGRESS NOTES
Tacrolimus level 4.7 at 12 hours, on 3/29/24.  Goal 6-8.   Current dose 1 mg in AM, 0.5 mg in PM    Dose changed to 1 mg in AM, 1 mg in PM   Recheck level in 7-10 days.     [employer] message sent and confirmed with pt.

## 2024-04-09 NOTE — PROGRESS NOTES
[facility]  Pulmonary Transplant Follow Up Visit    Apr 11, 2024    Reason for Visit  Melissa Elder is a 68 year old who is being seen for RECHECK (post lung transplant 2/21/2022)    Presents to clinic with her  spouse          Lung Tx Summary:     Transplants:  2/21/2022 (Lung), Postoperative day:  778    Melissa Elder is a 68 year old who underwent bilateral transplant on 2/21/2022 (Lung), currently postoperative day:  778.  Surgery uncomplicated, but did have bilateral hydropneumothoraces and bilateral effusions. 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 Other hx notable for HTN, mild nonbostructive CAD, paroxysmal afib, osteoporosis, GERD and colonic polyps.           Assessment and Plan:     PULMONARY     Transplant:      Allograft Function:  No new pulmonary complaints, no recent illnesses.  CXR reviewed without new infiltrates. PFTs are stable and FEV1 96% of post transplant baseline.  DSA without dnDSA in 1/2024. CMV undetected 2/23/24. She is doing very well and functionally doing well without any major symptoms.   - Continue exercise, can order pulm rehab in the future although she is working on her own to improve conditioning    Immunosuppression:  Tacrolimus, goal further reduced due to CKD to 6-8  Myfortic 720 bid  Prednisone 5/2.5    Prophylaxis:   PJP: Monthly pentamidine nebs (allergy to sulfa, and dapsone caused hemolysis)  CMV: D-/R - , pending today  EBV: D+/R+     EBV Viremia:  REsolved. low level positive in 2022, not detected on subsequent rechecks other than <500 (log <2.7) on 2/28.  Negative since then, most recently 7/28.  Undetected 11/24/23 and 3/29/24.     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 On 12/19/2022 DQB5 534 MFI, without DR15 or DQB6 detected.  Last few DSAs have been negative, most recently 1/2024.     Reflux:  Adequately controlled with current pantoprazole daily and famotidine BID.     Coronary artery disease: Nonobstructive.  Continue aspirin and rosuvastatin.     SOMMER on CKD: Stage IIIa CKD.   Baseline Cr around 1.1. Always trying to ensure that she \is hydrating properly (64 oz./day includes caffeinated and sugared beverages) and consumes sodium rich foods.  Water goal should be 64-80 oz./day    Normocytic Anemia: She's feeling a little fatigued and Hgb has remained in the 8.   - Check Epo  - Check LDH, periph smear haptoglobin  - Check iron panel  \  Lower extremity edema: Hasn't needed/used PRN lasix much at all at home, had persistent edema (pitting) post transplant. Did not respond significantly to lasix and did improve with elevation and compression but never resolved. Improved significantly with lymphedema therapy.   - Trial of holding lasix (20 mg every day) and watching weights    Paroxysmal atrial fibrillation: No palpitations or symptoms reported on diltiazem and beta blocker.  Recently HRs at home running 60s-70s.    - On dilt and beta blockade    Hypertension: BP elevated here in clinic today at 151/69.  BPs at home running in the 140-160 range/70-80.   - carvedilol 12.5 mg bid, had only been taking 6.25   - lisinopril 10 mg daily      DM II: Hemoglobin A1c elevated at 7.3 on 2/28/23 (up from prior 5.7 on 2/22/22).  The patient was referred to a local endocrinologist for further evaluation, last seen ~April 2023 per "[facility] messaging (visit note not found in CareEverywhere). Hemoglobin A1c up to 9.3 on 1/4/24, definitely needs to be started on hypoglycemic therapy +/- insulin was on insulin post operatively but not discharged on insulin. She is now down to 7.2 for A1c.   - Sees endo for bone and another for diabetes (dr. Mckeon) both in [place]  - Having her check fasting BGs, night time and 2 hours after largest meal  - Has glipizide    Hypercholesterolemia: Cholesterol, LDL and triglycerides elevated  2/28/23.  Rosuvastatin 20 mg nightly.        Osteoporosis: Lumbar compression fracture in October 2022 after a fall as above.  Boniva was held due to concern for possible contribution to lower extremity swelling.   Repeat DEXA in 4/2023 without significant change, repeat in 2025.   - Vit D level today adequate  - Sees local endo,last 3/4/24  - Doing yearly infusions with Reclast due again in 5/2024     Hypomagnesemia: Due to CNI interference with absorption. Mg 1.4 today, taking mag glycinate 1330 mg + 400 mg, this next week will be on 2000 mg daily.    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  Level today 351, will replete x 1 with reheck at next lab draw. No s/sx of infection     Maintenance:  Derm Exam: Saw recently in [place] March 2024  Colon Ca Screening: 3/2016 with sessile polyps in the transverse and sigmoid colon, 3mm and 4mm, due in 8/2024  Mammogram: Done 12/8/23, negative repeat in 12/2024  DEXA: 4/7/23, due again in 4/2025  Imms: seasonal influenza, COVID booster, and RSV vaccine done      CHANGES TODAY:  - Due for colonoscopy this August  - Check iron labs and epo and hemolysis labs, may refer to neph or start epo if epo low  - More lab supplies  - Check BPs   - Check Magnesium dosing, reeducated on spacing out  - Follow up in 4 months and then likely go to every 6 months if stable            RTC: 3 months  Transplant Coordinator: Arturo Miller     I personally spent 45 minutes in documentation, the interview and exam, and review of the chart/labs/imaging on Apr 11, 2024 not including time spent interpreting spirometry.      The longitudinal plan of care for lung transplant and complications of high risk medication management was addressed during this visit. Due to the added complexity in care, I will continue to support this patient in the subsequent management of this condition(s) and with the ongoing continuity of care of this condition      Nicole  MD Abilio  [facility]   Pulmonary Transplant   Post Transplant Coordinator: Arturo Miller  Fax: [phone]  Ph: [phone]     History of Present Illness / Interval Histories:     4/11/24  She's doing well with her swelling which is stlil present but much better controlled and managed with lymphedema therapy. She's currently off lasix. Blood pressures seem variable but she has both arm and wrist cuffs and wonders if those are different and if the two arms are also different. She's been watching her sodium. Still wearing compression socks.   Breathing feels good. No shortness of breath. No new LUTHER. No wheezing or coughing. No fevers or chills. No weight changes. No significant diarrhea, just softer stools. No n/v/ or acid reflux.     Interval hx:  Had COVID in Feb, but has fully recovered.    1/4/24  Blood pressures at home are high 140s-160s/80s, HR in the 70s. No sob, no cough, no wheezing. No fevers. No chills.   BGs are quite high today. Her endocrinologist in [facility] only really works with her bone density. No diarrhea, acid rflux, or constipation. She's doing well and is very happy her lymphedema is improved.  Interval History  Persistent extremity edema, referred to OT lymphedema therapy with improvement in edema. Saw her cardiologist and coreg was increased to 12.5 mg bid. Endo visit and will continue on reclast.            Review of Systems:     A complete 10-point ROS was otherwise negative except as noted in the HPI.         Medications:     Meds were reviewed during this visit and updated below:    Outpatient Encounter Medications as of 4/11/2024   Medication Sig Dispense Refill    albuterol (PROAIR HFA/PROVENTIL HFA/VENTOLIN HFA) 108 (90 Base) MCG/ACT inhaler Inhale 2 puffs into the lungs every 6 hours as needed for shortness of breath or wheezing 18 g 1    aspirin 81 MG EC tablet Take 1 tablet (81 mg) by mouth daily 30 tablet 11    calcium  carbonate 600 mg-vitamin D 400 units (CALTRATE) 600-400 MG-UNIT per tablet Take 1 tablet by mouth daily 30 tablet 11    carvedilol (COREG) 12.5 MG tablet Take 1 tablet (12.5 mg) by mouth 2 times daily (with meals) 60 tablet 11    cetirizine (ZYRTEC) 10 MG tablet Take 1 tablet (10 mg) by mouth daily as needed for allergies 30 tablet 11    DILT- MG 24 hr ER beaded capsule TAKE 1 CAPSULE (240 MG) BY MOUTH DAILY 90 capsule 11    famotidine (PEPCID) 20 MG tablet TAKE 1 TABLET (20 MG) BY MOUTH 2 TIMES DAILY 180 tablet 11    lisinopril (ZESTRIL) 10 MG tablet Take 1 tablet (10 mg) by mouth daily 30 tablet 11    multivitamin w/minerals (THERA-VIT-M) tablet Take 1 tablet by mouth daily 30 tablet 11    mycophenolic acid (GENERIC EQUIVALENT) 360 MG EC tablet TAKE 2 TABLETS (720 MG) BY MOUTH 2 TIMES DAILY 120 tablet 11    pantoprazole (PROTONIX) 40 MG EC tablet TAKE 1 TABLET (40 MG) BY MOUTH DAILY 90 tablet 11    pentamidine (NEBUPENT) 300 MG neb solution Inhale 300 mg into the lungs every 28 days      predniSONE (DELTASONE) 5 MG tablet Take 1.5 tablets (7.5 mg) by mouth daily 45 tablet 11    rosuvastatin (CRESTOR) 10 MG tablet Take 2 tablets (20 mg) by mouth daily 60 tablet 11    tacrolimus (GENERIC EQUIVALENT) 0.5 MG capsule Total dose: 1 mg in AM and 1 mg in PM (on hold for dose adjustments) 30 capsule 11    tacrolimus (GENERIC EQUIVALENT) 1 MG capsule Take 1 capsule (1 mg) by mouth 2 times daily Total dose: 1 mg in AM and 1 mg in PM 60 capsule 11    furosemide (LASIX) 40 MG tablet Take 1 tablet (40 mg) by mouth daily Taking daily, update by patient 30 tablet 1    [DISCONTINUED] carvedilol (COREG) 6.25 MG tablet Take 2 tablets (12.5 mg) by mouth 2 times daily (with meals) 60 tablet 3    [DISCONTINUED] lisinopril (ZESTRIL) 5 MG tablet Take 0.5 tablets (2.5 mg) by mouth daily 30 tablet 1    [DISCONTINUED] magnesium oxide (MAG-OX) 400 MG tablet Take 1 tablet (400 mg) by mouth 3 times daily 90 tablet 11    [DISCONTINUED]  predniSONE (DELTASONE) 5 MG tablet Take 1.5 tablets (7.5 mg) by mouth daily 5 mg in the morning 2.5 mg in the evening 315 tablet 11     No facility-administered encounter medications on file as of 4/11/2024.               Allergies:     Allergies   Allergen Reactions    Alendronate Other (See Comments)     dizziness  Other reaction(s): Dizziness    Nickel Rash    Sulfa Antibiotics Rash            Past Medical and Past Surgical History:     Past Medical History:   Diagnosis Date    Atrial fibrillation with RVR (H)     hx of A fib related to severe COPD, does not meet anticoagulation criteria as noted    COPD, severe (H)     Osteoporosis        Past Surgical History:   Procedure Laterality Date    BRONCHOSCOPY (RIGID OR FLEXIBLE), DIAGNOSTIC N/A 4/7/2022    Procedure: BRONCHOSCOPY, WITH BRONCHOALVEOLAR LAVAGE and BIOPSIES;  Surgeon: Gerson Haddad MD;  Location: [facility]    BRONCHOSCOPY (RIGID OR FLEXIBLE), DIAGNOSTIC N/A 5/12/2022    Procedure: BRONCHOSCOPY, WITH BRONCHOALVEOLAR LAVAGE AND BIOPSY;  Surgeon: Melissa Landin MD;  Location: [facility]    BRONCHOSCOPY (RIGID OR FLEXIBLE), DIAGNOSTIC N/A 8/2/2022    Procedure: BRONCHOSCOPY, WITH BRONCHOALVEOLAR LAVAGE;  Surgeon: Melissa Landin MD;  Location: [facility]    BRONCHOSCOPY (RIGID OR FLEXIBLE), DIAGNOSTIC N/A 10/11/2022    Procedure: BRONCHOSCOPY, WITH BRONCHOALVEOLAR LAVAGE AND BIOPSIES;  Surgeon: Angel Gallagher MD;  Location: [facility]    BRONCHOSCOPY (RIGID OR FLEXIBLE), DIAGNOSTIC N/A 12/20/2022    Procedure: BRONCHOSCOPY, WITH BRONCHOALVEOLAR LAVAGE AND BIOPSIES;  Surgeon: Perlman, David Morris, MD;  Location: [facility]    BRONCHOSCOPY (RIGID OR FLEXIBLE), DIAGNOSTIC N/A 3/1/2023    Procedure: BRONCHOSCOPY, WITH BRONCHOALVEOLAR LAVAGE WITH BIOPSY;  Surgeon: Lucina Thompson MD;  Location: [facility]    BRONCHOSCOPY FLEXIBLE AND RIGID N/A 3/1/2022    Procedure: BRONCHOSCOPY INSPECTION;  Surgeon: Melissa Landin MD;  Location: [facility]    CV CORONARY ANGIOGRAM N/A  3/27/2019    Procedure: CV CORONARY ANGIOGRAM;  Surgeon: Thierry Serrano MD;  Location:  HEART CARDIAC CATH LAB    CV RIGHT HEART CATH MEASUREMENTS RECORDED N/A 3/27/2019    Procedure: CV RIGHT HEART CATH;  Surgeon: Thierry Serrano MD;  Location:  HEART CARDIAC CATH LAB    ESOPHAGEAL IMPEDENCE FUNCTION TEST WITH 24 HOUR PH GREATER THAN 1 HOUR N/A 3/28/2019    Procedure: ESOPHAGEAL IMPEDENCE FUNCTION TEST WITH 24 HOUR PH GREATER THAN 1 HOUR;  Surgeon: Mike Henry MD;  Location:  GI    EXCISE PILONIDAL CYST, SIMPLE      IR CHEST TUBE PLACEMENT NON-TUNNELED RIGHT  3/3/2022    TONSILLECTOMY & ADENOIDECTOMY      TRANSPLANT LUNG RECIPIENT SINGLE X2 Bilateral 2022    Procedure: Bilateral Sequential Lung Transplantation, Clamshell Incision, Extracorporeal Membrane Oxgenation, Bronchoscopy, Cryoablation of Intercostal Nerves;  Surgeon: Raul Robin MD;  Location:  OR             Social History:     Social History     Socioeconomic History    Marital status:      Spouse name: Not on file    Number of children: Not on file    Years of education: Not on file    Highest education level: Not on file   Occupational History    Not on file   Tobacco Use    Smoking status: Former     Packs/day: 1.50     Years: 36.00     Additional pack years: 0.00     Total pack years: 54.00     Types: Cigarettes     Quit date: 2006     Years since quittin.2    Smokeless tobacco: Never   Substance and Sexual Activity    Alcohol use: Not Currently     Comment: none since transplant    Drug use: Not Currently     Comment: stated hx of marijuana, quit in     Sexual activity: Not on file   Other Topics Concern    Parent/sibling w/ CABG, MI or angioplasty before 65F 55M? 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  Bronchoscopy in October 2022 with Pseudomonas, treated with FIDEL nebs with resolution.  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  Fungitell and galactomannan were negative and chest CT showed no evidence of fungus so no treatment was initiated.     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 Subsequent AFB cultures were negative.  No treatment indicated.           Exam:     /65 (BP Location: Left arm, Patient Position: Sitting, Cuff Size: Adult Regular)   Pulse 75   Temp 98.1  F (36.7  C) (Oral)   Resp 20   Ht 1.575 m (5' 2\")   Wt 69.9 kg (154 lb)   SpO2 100%   BMI 28.17 kg/m    There is no height or weight on file to calculate BMI.    C: In no apparent distress, pleasant, cooperative.  NEURO: A&O. Speech normal.   Head: Normocephalic, atraumatic.  Eyes: Pupils round, EOMI, antiicteric.   Ears: Canals clear, TMs visualized/normal.   Nose: Nasal mucosa without edema and hyperemia.   Mouth/Throat: Oral mucosa moist without exudate. Cavities filled  Neck/Lymph: Supple, no masses, no thyromegaly. No cervical or supraclavicular lymphadenopathy.  PULM: Clear to auscultation bilaterally. No crackles, rhonchi, or wheezes. Non-labored breathing on room air.  CV: S1 and S2, RRR. No murmurs, gallops, or rubs.   EXT: No cyanosis, or clubbing. + BLE edema about +1 in the feet and +1 in the pretib which is overall significantly better than previously, no compression stockings on  ABD: Normal active BS. Soft, nontender, nondistended. No HSM appreciated.  MSK: Moves all extremities. Normal gait and stance. Muscle " tone, bulk, and strength appropriate. No apparent muscle wasting.   SKIN: Warm, dry, intact, ecchymotic. No rash, jaundice, or lesions visualized on limited exam.   PSYCH: Judgement and insight appropriate. Mood stable.          Data:     Results:    Recent Results (from the past 168 hour(s))   CBC with platelets    Collection Time: 04/11/24  7:37 AM   Result Value Ref Range    WBC Count 6.3 4.0 - 11.0 10e3/uL    RBC Count 3.11 (L) 3.80 - 5.20 10e6/uL    Hemoglobin 8.7 (L) 11.7 - 15.7 g/dL    Hematocrit 27.8 (L) 35.0 - 47.0 %    MCV 89 78 - 100 fL    MCH 28.0 26.5 - 33.0 pg    MCHC 31.3 (L) 31.5 - 36.5 g/dL    RDW 15.2 (H) 10.0 - 15.0 %    Platelet Count 219 150 - 450 10e3/uL   Hemoglobin A1c    Collection Time: 04/11/24  7:37 AM   Result Value Ref Range    Hemoglobin A1C 7.2 (H) <5.7 %   General PFT Lab (Please always keep checked)    Collection Time: 04/11/24  7:50 AM   Result Value Ref Range    FVC-Pred 2.48 L    FVC-Pre 2.08 L    FVC-%Pred-Pre 83 %    FEV1-Pre 2.02 L    FEV1-%Pred-Pre 102 %    FEV1FVC-Pred 79 %    FEV1FVC-Pre 97 %    FEFMax-Pred 5.46 L/sec    FEFMax-Pre 5.15 L/sec    FEFMax-%Pred-Pre 94 %    FEF2575-Pred 1.75 L/sec    FEF2575-Pre 3.87 L/sec    YYO8538-%Pred-Pre 220 %    ExpTime-Pre 5.05 sec    FIFMax-Pre 2.14 L/sec    FEV1FEV6-Pred 79 %    FEV1FEV6-Pre 97 %           Date FVC (%) FEV1 (%) FEV1/FVC Note   2/28/23 2.04 80 2.02 100 99    6/29/23 2.12 84 2.08 103 98    9/28/23 2.16 86 2.13 107 99 Saved FVC efforts does appear to be valid, although ATS was not met   1/4/24 2.15  2.09  97 TLC 3.62, DLCOunc 19.07                Post Transplant Baseline:   FEV1: 2.1  2.04 on 2/28/23  2.08 on 6/29/23  FVC: 2.14  1.78 on 2/28/23  2.12 on 6/29/23    Spirometry interpretation:  Apr 11, 2024  Spirometry interpretation:  The spirometry is normal.  When compared to 1/4/24, the FEV1 and FVC have little change.  The testing meets ATS criteria.  The current FEV1 is > 80% of post lung transplant baseline of 2.1 L.  (This may suggest CLAD 0)

## 2024-04-11 ENCOUNTER — LAB (OUTPATIENT)
Dept: LAB | Facility: CLINIC | Age: 69
End: 2024-04-11
Payer: COMMERCIAL

## 2024-04-11 ENCOUNTER — OFFICE VISIT (OUTPATIENT)
Dept: PULMONOLOGY | Facility: CLINIC | Age: 69
End: 2024-04-11
Payer: COMMERCIAL

## 2024-04-11 ENCOUNTER — ANCILLARY PROCEDURE (OUTPATIENT)
Dept: GENERAL RADIOLOGY | Facility: CLINIC | Age: 69
End: 2024-04-11
Attending: INTERNAL MEDICINE
Payer: COMMERCIAL

## 2024-04-11 ENCOUNTER — OFFICE VISIT (OUTPATIENT)
Dept: TRANSPLANT | Facility: CLINIC | Age: 69
End: 2024-04-11
Attending: INTERNAL MEDICINE
Payer: MEDICARE

## 2024-04-11 VITALS
BODY MASS INDEX: 28.34 KG/M2 | TEMPERATURE: 98.1 F | RESPIRATION RATE: 20 BRPM | OXYGEN SATURATION: 100 % | DIASTOLIC BLOOD PRESSURE: 65 MMHG | HEIGHT: 62 IN | SYSTOLIC BLOOD PRESSURE: 123 MMHG | HEART RATE: 75 BPM | WEIGHT: 154 LBS

## 2024-04-11 DIAGNOSIS — Z94.2 LUNG REPLACED BY TRANSPLANT (H): ICD-10-CM

## 2024-04-11 DIAGNOSIS — E11.9 TYPE 2 DIABETES MELLITUS WITHOUT COMPLICATION, WITHOUT LONG-TERM CURRENT USE OF INSULIN (H): ICD-10-CM

## 2024-04-11 DIAGNOSIS — Z94.2 S/P LUNG TRANSPLANT (H): ICD-10-CM

## 2024-04-11 DIAGNOSIS — Z79.899 ENCOUNTER FOR LONG-TERM (CURRENT) USE OF HIGH-RISK MEDICATION: ICD-10-CM

## 2024-04-11 DIAGNOSIS — Z94.2 LUNG REPLACED BY TRANSPLANT (H): Primary | ICD-10-CM

## 2024-04-11 DIAGNOSIS — I10 HYPERTENSION, UNSPECIFIED TYPE: ICD-10-CM

## 2024-04-11 LAB
CMV DNA SPEC NAA+PROBE-ACNC: NOT DETECTED IU/ML
EBV DNA SERPL NAA+PROBE-ACNC: NOT DETECTED IU/ML
ERYTHROCYTE [DISTWIDTH] IN BLOOD BY AUTOMATED COUNT: 15.2 % (ref 10–15)
EXPTIME-PRE: 5.05 SEC
FEF2575-%PRED-PRE: 220 %
FEF2575-PRE: 3.87 L/SEC
FEF2575-PRED: 1.75 L/SEC
FEFMAX-%PRED-PRE: 94 %
FEFMAX-PRE: 5.15 L/SEC
FEFMAX-PRED: 5.46 L/SEC
FEV1-%PRED-PRE: 102 %
FEV1-PRE: 2.02 L
FEV1FEV6-PRE: 97 %
FEV1FEV6-PRED: 79 %
FEV1FVC-PRE: 97 %
FEV1FVC-PRED: 79 %
FIFMAX-PRE: 2.14 L/SEC
FVC-%PRED-PRE: 83 %
FVC-PRE: 2.08 L
FVC-PRED: 2.48 L
HBA1C MFR BLD: 7.2 %
HCT VFR BLD AUTO: 27.8 % (ref 35–47)
HGB BLD-MCNC: 8.7 G/DL (ref 11.7–15.7)
MCH RBC QN AUTO: 28 PG (ref 26.5–33)
MCHC RBC AUTO-ENTMCNC: 31.3 G/DL (ref 31.5–36.5)
MCV RBC AUTO: 89 FL (ref 78–100)
PLATELET # BLD AUTO: 219 10E3/UL (ref 150–450)
RBC # BLD AUTO: 3.11 10E6/UL (ref 3.8–5.2)
TACROLIMUS BLD-MCNC: 5.9 UG/L (ref 5–15)
TME LAST DOSE: NORMAL H
TME LAST DOSE: NORMAL H
WBC # BLD AUTO: 6.3 10E3/UL (ref 4–11)

## 2024-04-11 PROCEDURE — G0463 HOSPITAL OUTPT CLINIC VISIT: HCPCS | Performed by: INTERNAL MEDICINE

## 2024-04-11 PROCEDURE — 71046 X-RAY EXAM CHEST 2 VIEWS: CPT | Mod: GC | Performed by: RADIOLOGY

## 2024-04-11 PROCEDURE — 85027 COMPLETE CBC AUTOMATED: CPT | Performed by: PATHOLOGY

## 2024-04-11 PROCEDURE — 99215 OFFICE O/P EST HI 40 MIN: CPT | Mod: 25 | Performed by: INTERNAL MEDICINE

## 2024-04-11 PROCEDURE — 80197 ASSAY OF TACROLIMUS: CPT | Performed by: INTERNAL MEDICINE

## 2024-04-11 PROCEDURE — 99000 SPECIMEN HANDLING OFFICE-LAB: CPT | Performed by: PATHOLOGY

## 2024-04-11 PROCEDURE — 86832 HLA CLASS I HIGH DEFIN QUAL: CPT | Performed by: INTERNAL MEDICINE

## 2024-04-11 PROCEDURE — 36415 COLL VENOUS BLD VENIPUNCTURE: CPT | Performed by: PATHOLOGY

## 2024-04-11 PROCEDURE — 94375 RESPIRATORY FLOW VOLUME LOOP: CPT | Performed by: INTERNAL MEDICINE

## 2024-04-11 PROCEDURE — 99207 PR DROP WITH A PROCEDURE: CPT | Mod: 25 | Performed by: INTERNAL MEDICINE

## 2024-04-11 PROCEDURE — 87799 DETECT AGENT NOS DNA QUANT: CPT | Performed by: INTERNAL MEDICINE

## 2024-04-11 PROCEDURE — 86833 HLA CLASS II HIGH DEFIN QUAL: CPT | Performed by: INTERNAL MEDICINE

## 2024-04-11 PROCEDURE — 83036 HEMOGLOBIN GLYCOSYLATED A1C: CPT | Performed by: INTERNAL MEDICINE

## 2024-04-11 RX ORDER — CARVEDILOL 12.5 MG/1
12.5 TABLET ORAL 2 TIMES DAILY WITH MEALS
Qty: 60 TABLET | Refills: 11 | Status: SHIPPED | OUTPATIENT
Start: 2024-04-11

## 2024-04-11 RX ORDER — LISINOPRIL 10 MG/1
10 TABLET ORAL DAILY
Qty: 30 TABLET | Refills: 11 | Status: SHIPPED | OUTPATIENT
Start: 2024-04-11

## 2024-04-11 RX ORDER — PREDNISONE 5 MG/1
7.5 TABLET ORAL DAILY
Qty: 45 TABLET | Refills: 11 | Status: SHIPPED | OUTPATIENT
Start: 2024-04-11

## 2024-04-11 ASSESSMENT — PAIN SCALES - GENERAL: PAINLEVEL: NO PAIN (0)

## 2024-04-11 NOTE — NURSING NOTE
"Chief Complaint   Patient presents with    RECHECK     post lung transplant 2/21/2022     Vital signs:  Temp: 98.1  F (36.7  C) Temp src: Oral BP: 123/65 Pulse: 75   Resp: 20 SpO2: 100 % (RA)     Height: 157.5 cm (5' 2\") Weight: 69.9 kg (154 lb)  Estimated body mass index is 28.17 kg/m  as calculated from the following:    Height as of this encounter: 1.575 m (5' 2\").    Weight as of this encounter: 69.9 kg (154 lb).      Vandana Brown, [facility]  4/11/2024 8:37 AM    "

## 2024-04-11 NOTE — PROGRESS NOTES
"Subjective     Zac Holder is a 37 y.o. male who presents with Employment Physical (Dot )            HPI    ROS           Objective     /68   Pulse 65   Temp 36.2 °C (97.1 °F) (Temporal)   Resp 14   Ht 1.803 m (5' 11\")   Wt 115 kg (254 lb)   SpO2 98%   BMI 35.43 kg/m²      Physical Exam                        Assessment & Plan        There are no diagnoses linked to this encounter.                " Blood sugar Plan for Discharge:  You were started on once daily Glipizide (pill) for management of blood sugars.   -take Glipizide IR 5 mg once daily with breakfast. If you have pre-lunch or pre-dinner blood sugars over 200 for two days in a row, then increase to 10 mg daily.   -we expect around the time your prednisone tapers to 10 mg in the morning (around 4/12) we can begin reducing the Glipizide dose. We will try to arrange a follow up in the endocrine clinic at the [facility] within 2-3 weeks.       Prednisone (Steroid) Taper:   Date AM dose (mg) PM dose (mg)   3/1 15 15   3/8 15 12.5   3/15 12.5 12.5   3/29 12.5 10   4/12 10 10   5/10 10 7.5   6/7 7.5 7.5   7/5 7.5 5   8/2 5 5   8/30 5 2.5     Blood Glucose Checks: three times daily before meals and at bedtime. If your sugars are stable, we can direct you to check less often in outpatient follow up appointments.     Endocrinology Outpatient follow up: An appointment request was sent to the [facility] coordinator to schedule your outpatient diabetes appointment 2-3 weeks from discharge. Please call the clinic at [phone] if you do not have an appointment scheduled on discharge, or if you have non-urgent questions regarding your blood sugars or insulin.     If you have urgent questions or concerns regarding your blood sugars or insulin, you may contact [phone] (the main hospital ). Ask to speak with the endocrinologist on call.    Thank you for letting the Diabetes Management Team be involved in your care!

## 2024-04-11 NOTE — LETTER
4/11/2024         RE: Melissa Elder  [address]  [place] [address]        Dear Colleague,    Thank you for referring your patient, Melissa Elder, to the [facility] TR[facility]PL[facility]. Please see a copy of my visit note below.          [facility]  Pulmonary Transplant Follow Up Visit    Apr 11, 2024    Reason for Visit  Melissa Elder is a 68 year old who is being seen for RECHECK (post lung transplant 2/21/2022)    Presents to clinic with her  spouse          Lung Tx Summary:     Transplants:  2/21/2022 (Lung), Postoperative day:  778    Melissa Elder is a 68 year old who underwent bilateral transplant on 2/21/2022 (Lung), currently postoperative day:  778.  Surgery uncomplicated, but did have bilateral hydropneumothoraces and bilateral effusions. 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 Other hx notable for HTN, mild nonbostructive CAD, paroxysmal afib, osteoporosis, GERD and colonic polyps.           Assessment and Plan:     PULMONARY     Transplant:      Allograft Function:  No new pulmonary complaints, no recent illnesses.  CXR reviewed without new infiltrates. PFTs are stable and FEV1 96% of post transplant baseline.  DSA without dnDSA in 1/2024. CMV undetected 2/23/24. She is doing very well and functionally doing well without any major symptoms.   - Continue exercise, can order pulm rehab in the future although she is working on her own to improve conditioning    Immunosuppression:  Tacrolimus, goal further reduced due to CKD to 6-8  Myfortic 720 bid  Prednisone 5/2.5    Prophylaxis:   PJP: Monthly pentamidine nebs (allergy to sulfa, and dapsone caused hemolysis)  CMV: D-/R - , pending today  EBV: D+/R+     EBV Viremia:  REsolved. low level positive in 2022, not detected on subsequent rechecks other than <500 (log <2.7) on 2/28.  Negative since then, most recently 7/28.  Undetected 11/24/23  and 3/29/24.     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 On 12/19/2022 DQB5 534 MFI, without DR15 or DQB6 detected.  Last few DSAs have been negative, most recently 1/2024.     Reflux: Adequately controlled with current pantoprazole daily and famotidine BID.     Coronary artery disease: Nonobstructive.  Continue aspirin and rosuvastatin.     SOMMER on CKD: Stage IIIa CKD.   Baseline Cr around 1.1. Always trying to ensure that she \is hydrating properly (64 oz./day includes caffeinated and sugared beverages) and consumes sodium rich foods.  Water goal should be 64-80 oz./day    Normocytic Anemia: She's feeling a little fatigued and Hgb has remained in the 8.   - Check Epo  - Check LDH, periph smear haptoglobin  - Check iron panel  \  Lower extremity edema: Hasn't needed/used PRN lasix much at all at home, had persistent edema (pitting) post transplant. Did not respond significantly to lasix and did improve with elevation and compression but never resolved. Improved significantly with lymphedema therapy.   - Trial of holding lasix (20 mg every day) and watching weights    Paroxysmal atrial fibrillation: No palpitations or symptoms reported on diltiazem and beta blocker.  Recently HRs at home running 60s-70s.    - On dilt and beta blockade    Hypertension: BP elevated here in clinic today at 151/69.  BPs at home running in the 140-160 range/70-80.   - carvedilol 12.5 mg bid, had only been taking 6.25   - lisinopril 10 mg daily      DM II: Hemoglobin A1c elevated at 7.3 on 2/28/23 (up from prior 5.7 on 2/22/22).  The patient was referred to a local endocrinologist for further evaluation, last seen ~April 2023 per [employer] messaging (visit note not found in CareEverywhere). Hemoglobin A1c up to 9.3 on 1/4/24, definitely needs to be started on hypoglycemic therapy +/- insulin was on insulin post operatively but not discharged on insulin. She is now down to 7.2 for A1c.    - Sees endo for bone and another for diabetes (dr. Mckeon) both in [place]  - Having her check fasting BGs, night time and 2 hours after largest meal  - Has glipizide    Hypercholesterolemia: Cholesterol, LDL and triglycerides elevated 2/28/23.  Rosuvastatin 20 mg nightly.        Osteoporosis: Lumbar compression fracture in October 2022 after a fall as above.  Boniva was held due to concern for possible contribution to lower extremity swelling.   Repeat DEXA in 4/2023 without significant change, repeat in 2025.   - Vit D level today adequate  - Sees local endo,last 3/4/24  - Doing yearly infusions with Reclast due again in 5/2024     Hypomagnesemia: Due to CNI interference with absorption. Mg 1.4 today, taking mag glycinate 1330 mg + 400 mg, this next week will be on 2000 mg daily.    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  Level today 351, will replete x 1 with reheck at next lab draw. No s/sx of infection     Maintenance:  Derm Exam: Saw recently in [place] March 2024  Colon Ca Screening: 3/2016 with sessile polyps in the transverse and sigmoid colon, 3mm and 4mm, due in 8/2024  Mammogram: Done 12/8/23, negative repeat in 12/2024  DEXA: 4/7/23, due again in 4/2025  Imms: seasonal influenza, COVID booster, and RSV vaccine done      CHANGES TODAY:  - Due for colonoscopy this August  - Check iron labs and epo and hemolysis labs, may refer to neph or start epo if epo low  - More lab supplies  - Check BPs   - Check Magnesium dosing, reeducated on spacing out  - Follow up in 4 months and then likely go to every 6 months if stable            RTC: 3 months  Transplant Coordinator: Arturo Miller     I personally spent 45 minutes in documentation, the interview and exam, and review of the chart/labs/imaging on Apr 11, 2024 not including time spent interpreting spirometry.      The longitudinal plan of care for lung transplant and complications of high risk medication  management was addressed during this visit. Due to the added complexity in care, I will continue to support this patient in the subsequent management of this condition(s) and with the ongoing continuity of care of this condition      Nicole Knox MD  [facility]   Pulmonary Transplant   Post Transplant Coordinator: Arturo Miller  Fax: [phone]  Ph: [phone]     History of Present Illness / Interval Histories:     4/11/24  She's doing well with her swelling which is stlil present but much better controlled and managed with lymphedema therapy. She's currently off lasix. Blood pressures seem variable but she has both arm and wrist cuffs and wonders if those are different and if the two arms are also different. She's been watching her sodium. Still wearing compression socks.   Breathing feels good. No shortness of breath. No new LUTHER. No wheezing or coughing. No fevers or chills. No weight changes. No significant diarrhea, just softer stools. No n/v/ or acid reflux.     Interval hx:  Had COVID in Feb, but has fully recovered.    1/4/24  Blood pressures at home are high 140s-160s/80s, HR in the 70s. No sob, no cough, no wheezing. No fevers. No chills.   BGs are quite high today. Her endocrinologist in [facility] only really works with her bone density. No diarrhea, acid rflux, or constipation. She's doing well and is very happy her lymphedema is improved.  Interval History  Persistent extremity edema, referred to OT lymphedema therapy with improvement in edema. Saw her cardiologist and coreg was increased to 12.5 mg bid. Endo visit and will continue on reclast.            Review of Systems:     A complete 10-point ROS was otherwise negative except as noted in the HPI.         Medications:     Meds were reviewed during this visit and updated below:    Outpatient Encounter Medications as of 4/11/2024   Medication Sig Dispense Refill     albuterol (PROAIR  HFA/PROVENTIL HFA/VENTOLIN HFA) 108 (90 Base) MCG/ACT inhaler Inhale 2 puffs into the lungs every 6 hours as needed for shortness of breath or wheezing 18 g 1     aspirin 81 MG EC tablet Take 1 tablet (81 mg) by mouth daily 30 tablet 11     calcium carbonate 600 mg-vitamin D 400 units (CALTRATE) 600-400 MG-UNIT per tablet Take 1 tablet by mouth daily 30 tablet 11     carvedilol (COREG) 12.5 MG tablet Take 1 tablet (12.5 mg) by mouth 2 times daily (with meals) 60 tablet 11     cetirizine (ZYRTEC) 10 MG tablet Take 1 tablet (10 mg) by mouth daily as needed for allergies 30 tablet 11     DILT- MG 24 hr ER beaded capsule TAKE 1 CAPSULE (240 MG) BY MOUTH DAILY 90 capsule 11     famotidine (PEPCID) 20 MG tablet TAKE 1 TABLET (20 MG) BY MOUTH 2 TIMES DAILY 180 tablet 11     lisinopril (ZESTRIL) 10 MG tablet Take 1 tablet (10 mg) by mouth daily 30 tablet 11     multivitamin w/minerals (THERA-VIT-M) tablet Take 1 tablet by mouth daily 30 tablet 11     mycophenolic acid (GENERIC EQUIVALENT) 360 MG EC tablet TAKE 2 TABLETS (720 MG) BY MOUTH 2 TIMES DAILY 120 tablet 11     pantoprazole (PROTONIX) 40 MG EC tablet TAKE 1 TABLET (40 MG) BY MOUTH DAILY 90 tablet 11     pentamidine (NEBUPENT) 300 MG neb solution Inhale 300 mg into the lungs every 28 days       predniSONE (DELTASONE) 5 MG tablet Take 1.5 tablets (7.5 mg) by mouth daily 45 tablet 11     rosuvastatin (CRESTOR) 10 MG tablet Take 2 tablets (20 mg) by mouth daily 60 tablet 11     tacrolimus (GENERIC EQUIVALENT) 0.5 MG capsule Total dose: 1 mg in AM and 1 mg in PM (on hold for dose adjustments) 30 capsule 11     tacrolimus (GENERIC EQUIVALENT) 1 MG capsule Take 1 capsule (1 mg) by mouth 2 times daily Total dose: 1 mg in AM and 1 mg in PM 60 capsule 11     furosemide (LASIX) 40 MG tablet Take 1 tablet (40 mg) by mouth daily Taking daily, update by patient 30 tablet 1     [DISCONTINUED] carvedilol (COREG) 6.25 MG tablet Take 2 tablets (12.5 mg) by mouth 2 times daily  (with meals) 60 tablet 3     [DISCONTINUED] lisinopril (ZESTRIL) 5 MG tablet Take 0.5 tablets (2.5 mg) by mouth daily 30 tablet 1     [DISCONTINUED] magnesium oxide (MAG-OX) 400 MG tablet Take 1 tablet (400 mg) by mouth 3 times daily 90 tablet 11     [DISCONTINUED] predniSONE (DELTASONE) 5 MG tablet Take 1.5 tablets (7.5 mg) by mouth daily 5 mg in the morning 2.5 mg in the evening 315 tablet 11     No facility-administered encounter medications on file as of 4/11/2024.               Allergies:     Allergies   Allergen Reactions     Alendronate Other (See Comments)     dizziness  Other reaction(s): Dizziness     Nickel Rash     Sulfa Antibiotics Rash            Past Medical and Past Surgical History:     Past Medical History:   Diagnosis Date     Atrial fibrillation with RVR (H)     hx of A fib related to severe COPD, does not meet anticoagulation criteria as noted     COPD, severe (H)      Osteoporosis        Past Surgical History:   Procedure Laterality Date     BRONCHOSCOPY (RIGID OR FLEXIBLE), DIAGNOSTIC N/A 4/7/2022    Procedure: BRONCHOSCOPY, WITH BRONCHOALVEOLAR LAVAGE and BIOPSIES;  Surgeon: Gerson Haddad MD;  Location: [facility]     BRONCHOSCOPY (RIGID OR FLEXIBLE), DIAGNOSTIC N/A 5/12/2022    Procedure: BRONCHOSCOPY, WITH BRONCHOALVEOLAR LAVAGE AND BIOPSY;  Surgeon: Melissa Landin MD;  Location: [facility]     BRONCHOSCOPY (RIGID OR FLEXIBLE), DIAGNOSTIC N/A 8/2/2022    Procedure: BRONCHOSCOPY, WITH BRONCHOALVEOLAR LAVAGE;  Surgeon: Melissa Landin MD;  Location: [facility]     BRONCHOSCOPY (RIGID OR FLEXIBLE), DIAGNOSTIC N/A 10/11/2022    Procedure: BRONCHOSCOPY, WITH BRONCHOALVEOLAR LAVAGE AND BIOPSIES;  Surgeon: Angel Gallagher MD;  Location: [facility]     BRONCHOSCOPY (RIGID OR FLEXIBLE), DIAGNOSTIC N/A 12/20/2022    Procedure: BRONCHOSCOPY, WITH BRONCHOALVEOLAR LAVAGE AND BIOPSIES;  Surgeon: Perlman, David Morris, MD;  Location: [facility]     BRONCHOSCOPY (RIGID OR FLEXIBLE), DIAGNOSTIC N/A 3/1/2023     Procedure: BRONCHOSCOPY, WITH BRONCHOALVEOLAR LAVAGE WITH BIOPSY;  Surgeon: Lucina Thompson MD;  Location:  GI     BRONCHOSCOPY FLEXIBLE AND RIGID N/A 3/1/2022    Procedure: BRONCHOSCOPY INSPECTION;  Surgeon: Melissa Landin MD;  Location:  GI     CV CORONARY ANGIOGRAM N/A 3/27/2019    Procedure: CV CORONARY ANGIOGRAM;  Surgeon: Thierry Serrano MD;  Location:  HEART CARDIAC CATH LAB     CV RIGHT HEART CATH MEASUREMENTS RECORDED N/A 3/27/2019    Procedure: CV RIGHT HEART CATH;  Surgeon: Thierry Serrano MD;  Location:  HEART CARDIAC CATH LAB     ESOPHAGEAL IMPEDENCE FUNCTION TEST WITH 24 HOUR PH GREATER THAN 1 HOUR N/A 3/28/2019    Procedure: ESOPHAGEAL IMPEDENCE FUNCTION TEST WITH 24 HOUR PH GREATER THAN 1 HOUR;  Surgeon: Mike Henry MD;  Location:  GI     EXCISE PILONIDAL CYST, SIMPLE       IR CHEST TUBE PLACEMENT NON-TUNNELED RIGHT  3/3/2022     TONSILLECTOMY & ADENOIDECTOMY       TRANSPLANT LUNG RECIPIENT SINGLE X2 Bilateral 2022    Procedure: Bilateral Sequential Lung Transplantation, Clamshell Incision, Extracorporeal Membrane Oxgenation, Bronchoscopy, Cryoablation of Intercostal Nerves;  Surgeon: Raul Robin MD;  Location:  OR             Social History:     Social History     Socioeconomic History     Marital status:      Spouse name: Not on file     Number of children: Not on file     Years of education: Not on file     Highest education level: Not on file   Occupational History     Not on file   Tobacco Use     Smoking status: Former     Packs/day: 1.50     Years: 36.00     Additional pack years: 0.00     Total pack years: 54.00     Types: Cigarettes     Quit date: 2006     Years since quittin.2     Smokeless tobacco: Never   Substance and Sexual Activity     Alcohol use: Not Currently     Comment: none since transplant     Drug use: Not Currently     Comment: stated hx of marijuana, quit in      Sexual activity: Not on file   Other Topics  "Concern     Parent/sibling w/ CABG, MI or angioplasty before 65F 55M? 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  Bronchoscopy in October 2022 with Pseudomonas, treated with FIDEL nebs with resolution.  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  Fungitell and galactomannan were negative and chest CT showed no evidence of fungus so no treatment was initiated.     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 Subsequent AFB cultures were negative.  No treatment indicated.           Exam:     /65 (BP Location: Left arm, Patient Position: Sitting, Cuff Size: Adult Regular)   Pulse 75   Temp 98.1  F (36.7  C) (Oral)   Resp 20   Ht 1.575 m (5' 2\")   Wt 69.9 kg (154 lb)   SpO2 100%   BMI 28.17 kg/m    There is no height or weight on file to calculate BMI.    C: In no apparent distress, pleasant, cooperative.  NEURO: A&O. Speech normal.   Head: Normocephalic, atraumatic.  Eyes: Pupils round, EOMI, antiicteric.   Ears: Canals clear, TMs visualized/normal.   Nose: Nasal mucosa without edema and hyperemia.   Mouth/Throat: Oral mucosa moist without exudate. Cavities filled  Neck/Lymph: Supple, no masses, no thyromegaly. No cervical or supraclavicular lymphadenopathy.  PULM: Clear to auscultation bilaterally. No crackles, rhonchi, or wheezes. Non-labored breathing on room air.  CV: S1 and S2, RRR. No murmurs, gallops, or rubs.   EXT: " No cyanosis, or clubbing. + BLE edema about +1 in the feet and +1 in the pretib which is overall significantly better than previously, no compression stockings on  ABD: Normal active BS. Soft, nontender, nondistended. No HSM appreciated.  MSK: Moves all extremities. Normal gait and stance. Muscle tone, bulk, and strength appropriate. No apparent muscle wasting.   SKIN: Warm, dry, intact, ecchymotic. No rash, jaundice, or lesions visualized on limited exam.   PSYCH: Judgement and insight appropriate. Mood stable.          Data:     Results:    Recent Results (from the past 168 hour(s))   CBC with platelets    Collection Time: 04/11/24  7:37 AM   Result Value Ref Range    WBC Count 6.3 4.0 - 11.0 10e3/uL    RBC Count 3.11 (L) 3.80 - 5.20 10e6/uL    Hemoglobin 8.7 (L) 11.7 - 15.7 g/dL    Hematocrit 27.8 (L) 35.0 - 47.0 %    MCV 89 78 - 100 fL    MCH 28.0 26.5 - 33.0 pg    MCHC 31.3 (L) 31.5 - 36.5 g/dL    RDW 15.2 (H) 10.0 - 15.0 %    Platelet Count 219 150 - 450 10e3/uL   Hemoglobin A1c    Collection Time: 04/11/24  7:37 AM   Result Value Ref Range    Hemoglobin A1C 7.2 (H) <5.7 %   General PFT Lab (Please always keep checked)    Collection Time: 04/11/24  7:50 AM   Result Value Ref Range    FVC-Pred 2.48 L    FVC-Pre 2.08 L    FVC-%Pred-Pre 83 %    FEV1-Pre 2.02 L    FEV1-%Pred-Pre 102 %    FEV1FVC-Pred 79 %    FEV1FVC-Pre 97 %    FEFMax-Pred 5.46 L/sec    FEFMax-Pre 5.15 L/sec    FEFMax-%Pred-Pre 94 %    FEF2575-Pred 1.75 L/sec    FEF2575-Pre 3.87 L/sec    SAG0183-%Pred-Pre 220 %    ExpTime-Pre 5.05 sec    FIFMax-Pre 2.14 L/sec    FEV1FEV6-Pred 79 %    FEV1FEV6-Pre 97 %           Date FVC (%) FEV1 (%) FEV1/FVC Note   2/28/23 2.04 80 2.02 100 99    6/29/23 2.12 84 2.08 103 98    9/28/23 2.16 86 2.13 107 99 Saved FVC efforts does appear to be valid, although ATS was not met   1/4/24 2.15  2.09  97 TLC 3.62, DLCOunc 19.07                Post Transplant Baseline:   FEV1: 2.1  2.04 on 2/28/23  2.08 on 6/29/23  FVC:  2.14  1.78 on 2/28/23  2.12 on 6/29/23    Spirometry interpretation:  Apr 11, 2024  Spirometry interpretation:  The spirometry is normal.  When compared to 1/4/24, the FEV1 and FVC have little change.  The testing meets ATS criteria.  The current FEV1 is > 80% of post lung transplant baseline of 2.1 L. (This may suggest CLAD 0)             Transplant Coordinator Note    Reason for visit: Post lung transplant follow up visit   Coordinator: Present   Caregiver:  , Tyrese    Health concerns addressed today:  1. Respiratory: no shortness of breath or cough  2. GI: no acid reflux. Normal BM's.  3. ENT: seasonal allergies    Activity/rehab: walking outside, treadmill and bike.   Oxygen needs: room air  Pain management/RX: Back pain - compression fracture.   Diabetic management: checking BGs  Risk Criteria Labs: negative 3/25/22  CMV status: D-/R-  EBV status: D+/R+  AC/asa: ASA 81 mg  PJP prophylactic: pentamidine neb (dapsone caused anemia/RBC hemolysis) done locally    COVID:  COVID-19 infection (yes/no, date of most recent positive test): Feb 2024  Status/instructions given about COVID-19 vaccine:     Pt Education: medications (use/dose/side effects), how/when to call coordinator, frequency of labs, s/s of infection/rejection, call prior to starting any new medications, lab/vital sign book    Health Maintenance:   Last colonoscopy:   Next colonoscopy due:   Dermatology:  Vaccinations this visit:     Labs, CXR, PFTs reviewed with patient  Medication record reviewed and reconciled  Questions and concerns addressed    Patient Instructions  1. Continue to hydrate with 60-70 oz fluids daily.  2. Continue to exercise daily or most days of the week.  3. Follow up with your primary care provider for annual gender health maintenance procedures.  4. Follow up with colonoscopy schedule.  5. Follow up with annual dermatology visits.  6. It doesn't seem like the COVID vaccine is working well in lung transplant patients. A  number of lung transplant patients have gotten sick with COVID even after receiving the vaccines. Based on our recent experience, it can be life-threatening to get COVID  even after being vaccinated. Please continue to act like you did not get the COVID vaccine - social distancing, wearing a mask, good hand hygiene, etc. If the people around you are vaccinated, it will help reduce the risk of you getting COVID. All members of your household should be vaccinated.  7. Please double check your coreg dose. Your dose should be 12.5 mg twice daily  8. Please send a [employer] message with a picture of your magnesium  9. For the next 3 days try without any sleeves on, checking BPs on both arms and recording and let us know if significantly different  10. You could also compare the arm and the wrist blood pressures'  11. Due for colonoscopy in August 2024   12. Change magnesium times to 10 am/ 2pm/ 8pm (2 hours away from immunosuppression)  13. Checking some labs for your low blood count    Next transplant clinic appointment:  4 months with CXR, labs and PFTs  Next lab draw: pending tacrolimus, otherwise 2 months    AVS printed at time of check out         Again, thank you for allowing me to participate in the care of your patient.        Sincerely,        Nicole Knox MD

## 2024-04-11 NOTE — PATIENT INSTRUCTIONS
Patient Instructions  1. Continue to hydrate with 60-70 oz fluids daily.  2. Continue to exercise daily or most days of the week.  3. Follow up with your primary care provider for annual gender health maintenance procedures.  4. Follow up with colonoscopy schedule.  5. Follow up with annual dermatology visits.  6. It doesn't seem like the COVID vaccine is working well in lung transplant patients. A number of lung transplant patients have gotten sick with COVID even after receiving the vaccines. Based on our recent experience, it can be life-threatening to get COVID  even after being vaccinated. Please continue to act like you did not get the COVID vaccine - social distancing, wearing a mask, good hand hygiene, etc. If the people around you are vaccinated, it will help reduce the risk of you getting COVID. All members of your household should be vaccinated.  7. Please double check your coreg dose. Your dose should be 12.5 mg twice daily  8. Please send a [employer] message with a picture of your magnesium, and calcium  9. For the next 3 days try without any sleeves on, checking BPs on both arms and recording and let us know if significantly different  10. You could also compare the arm and the wrist blood pressures'  11. Due for colonoscopy in August 2024   12. Change magnesium times to 10 am/ 2pm/ 8pm (2 hours away from immunosuppression)   13. Checking some labs for your low blood count          Next transplant clinic appointment:  4 months with CXR, labs and PFTs  Next lab draw: pending tacrolimus, otherwise 2 months    ~~~~~~~~~~~~~~~~~~~~~~~~~    Thoracic Transplant Office phone [phone] (alt [phone]), fax [phone]    Office Hours 8:30 - 5:00     For after-hours urgent issues, please dial [phone] (alt [phone]) and ask the  to page the Thoracic Transplant Coordinator On-Call.   --------------------  To expedite your medication refill(s), please contact your pharmacy and have them fax a  refill request to: [phone]    *Please allow 3 business days for routine medication refills.  *Please allow 5 business days for controlled substance medication refills.    **For Diabetic medications and supplies refill(s), please contact your pharmacy and have them contact your Endocrine team.  --------------------  For scheduling appointments call [phone] (alt [phone])  --------------------  Please Note: If you are active on [employer], all future test results will be sent by [employer] message only, and will no longer be called to patient. You may also receive communication directly from your physician.

## 2024-04-11 NOTE — PROGRESS NOTES
Transplant Coordinator Note    Reason for visit: Post lung transplant follow up visit   Coordinator: Present   Caregiver:  , Tyrese    Health concerns addressed today:  1. Respiratory: no shortness of breath or cough  2. GI: no acid reflux. Normal BM's.  3. ENT: seasonal allergies    Activity/rehab: walking outside, treadmill and bike.   Oxygen needs: room air  Pain management/RX: Back pain - compression fracture.   Diabetic management: checking BGs  Risk Criteria Labs: negative 3/25/22  CMV status: D-/R-  EBV status: D+/R+  AC/asa: ASA 81 mg  PJP prophylactic: pentamidine neb (dapsone caused anemia/RBC hemolysis) done locally    COVID:  COVID-19 infection (yes/no, date of most recent positive test): Feb 2024  Status/instructions given about COVID-19 vaccine:     Pt Education: medications (use/dose/side effects), how/when to call coordinator, frequency of labs, s/s of infection/rejection, call prior to starting any new medications, lab/vital sign book    Health Maintenance:   Last colonoscopy:   Next colonoscopy due:   Dermatology:  Vaccinations this visit:     Labs, CXR, PFTs reviewed with patient  Medication record reviewed and reconciled  Questions and concerns addressed    Patient Instructions  1. Continue to hydrate with 60-70 oz fluids daily.  2. Continue to exercise daily or most days of the week.  3. Follow up with your primary care provider for annual gender health maintenance procedures.  4. Follow up with colonoscopy schedule.  5. Follow up with annual dermatology visits.  6. It doesn't seem like the COVID vaccine is working well in lung transplant patients. A number of lung transplant patients have gotten sick with COVID even after receiving the vaccines. Based on our recent experience, it can be life-threatening to get COVID  even after being vaccinated. Please continue to act like you did not get the COVID vaccine - social distancing, wearing a mask, good hand hygiene, etc. If the people around  you are vaccinated, it will help reduce the risk of you getting COVID. All members of your household should be vaccinated.  7. Please double check your coreg dose. Your dose should be 12.5 mg twice daily  8. Please send a [employer] message with a picture of your magnesium  9. For the next 3 days try without any sleeves on, checking BPs on both arms and recording and let us know if significantly different  10. You could also compare the arm and the wrist blood pressures'  11. Due for colonoscopy in August 2024   12. Change magnesium times to 10 am/ 2pm/ 8pm (2 hours away from immunosuppression)  13. Checking some labs for your low blood count    Next transplant clinic appointment:  4 months with CXR, labs and PFTs  Next lab draw: pending tacrolimus, otherwise 2 months    AVS printed at time of check out

## 2024-04-15 DIAGNOSIS — Z94.2 LUNG REPLACED BY TRANSPLANT (H): Primary | ICD-10-CM

## 2024-04-15 DIAGNOSIS — E83.42 HYPOMAGNESEMIA: ICD-10-CM

## 2024-04-15 NOTE — PROGRESS NOTES
Updated pt's medication list to reflect current magnesium dose.  Pt is taking magnesium glycinate 665 mg TID.

## 2024-04-19 ENCOUNTER — TELEPHONE (OUTPATIENT)
Dept: TRANSPLANT | Facility: CLINIC | Age: 69
End: 2024-04-19
Payer: COMMERCIAL

## 2024-04-26 ENCOUNTER — TELEPHONE (OUTPATIENT)
Dept: TRANSPLANT | Facility: CLINIC | Age: 69
End: 2024-04-26
Payer: COMMERCIAL

## 2024-04-26 ENCOUNTER — LAB (OUTPATIENT)
Dept: LAB | Facility: CLINIC | Age: 69
End: 2024-04-26
Payer: MEDICARE

## 2024-04-26 DIAGNOSIS — Z94.2 S/P LUNG TRANSPLANT (H): ICD-10-CM

## 2024-04-26 PROCEDURE — 80197 ASSAY OF TACROLIMUS: CPT

## 2024-04-26 NOTE — LETTER
PHYSICIAN ORDERS      DATE & TIME ISSUED: 2024 4:13 PM  PATIENT NAME: Melissa Elder   : 1955     [facility] MR# [if applicable]: [id number]     DIAGNOSIS:  Lung Transplant  Z94.2    Please draw bmp and magnesium level week of     Any questions please call: Arturo [phone]    Please fax these results to [phone].         Nicole Knox MD  Pulmonary Disease

## 2024-04-26 NOTE — TELEPHONE ENCOUNTER
Mag 1.3  Reviewed with Dr. Hernandez  -increase mag glycinate to 2 tablets three times daily

## 2024-04-29 LAB
TACROLIMUS BLD-MCNC: 5.7 UG/L (ref 5–15)
TME LAST DOSE: NORMAL H
TME LAST DOSE: NORMAL H

## 2024-04-30 NOTE — RESULT ENCOUNTER NOTE
Tacrolimus level 5.7 at 12 hours, on 4/26/24.  Goal 6-8.   Current dose 1 mg in AM, 1 mg in PM    Level just below goal.  No dose change.    [employer] message sent

## 2024-05-24 PROBLEM — U07.1 CLINICAL DIAGNOSIS OF COVID-19: Status: ACTIVE | Noted: 2024-02-29

## 2024-05-28 ENCOUNTER — MYC MEDICAL ADVICE (OUTPATIENT)
Dept: TRANSPLANT | Facility: CLINIC | Age: 69
End: 2024-05-28
Payer: COMMERCIAL

## 2024-05-28 DIAGNOSIS — Z94.2 S/P LUNG TRANSPLANT (H): ICD-10-CM

## 2024-05-29 DIAGNOSIS — Z94.2 S/P LUNG TRANSPLANT (H): ICD-10-CM

## 2024-05-29 RX ORDER — TACROLIMUS 1 MG/1
1 CAPSULE ORAL 2 TIMES DAILY
Qty: 60 CAPSULE | Refills: 11 | Status: SHIPPED | OUTPATIENT
Start: 2024-05-29 | End: 2024-06-04

## 2024-05-29 RX ORDER — MYCOPHENOLIC ACID 360 MG/1
720 TABLET, DELAYED RELEASE ORAL 2 TIMES DAILY
Qty: 360 TABLET | Refills: 11 | Status: SHIPPED | OUTPATIENT
Start: 2024-05-29

## 2024-05-30 DIAGNOSIS — Z94.2 S/P LUNG TRANSPLANT (H): ICD-10-CM

## 2024-05-30 RX ORDER — PANTOPRAZOLE SODIUM 40 MG/1
40 TABLET, DELAYED RELEASE ORAL DAILY
Qty: 90 TABLET | Refills: 3 | Status: SHIPPED | OUTPATIENT
Start: 2024-05-30

## 2024-05-31 ENCOUNTER — LAB (OUTPATIENT)
Dept: LAB | Facility: CLINIC | Age: 69
End: 2024-05-31
Payer: MEDICARE

## 2024-05-31 DIAGNOSIS — Z94.2 S/P LUNG TRANSPLANT (H): ICD-10-CM

## 2024-05-31 PROCEDURE — 80197 ASSAY OF TACROLIMUS: CPT

## 2024-06-03 LAB
TACROLIMUS BLD-MCNC: 5.7 UG/L (ref 5–15)
TME LAST DOSE: NORMAL H
TME LAST DOSE: NORMAL H

## 2024-06-04 ENCOUNTER — MYC MEDICAL ADVICE (OUTPATIENT)
Dept: TRANSPLANT | Facility: CLINIC | Age: 69
End: 2024-06-04
Payer: COMMERCIAL

## 2024-06-04 DIAGNOSIS — Z94.2 S/P LUNG TRANSPLANT (H): ICD-10-CM

## 2024-06-04 RX ORDER — TACROLIMUS 1 MG/1
1 CAPSULE ORAL 2 TIMES DAILY
Qty: 60 CAPSULE | Refills: 11 | Status: SHIPPED | OUTPATIENT
Start: 2024-06-04

## 2024-06-04 RX ORDER — TACROLIMUS 0.5 MG/1
CAPSULE ORAL
Qty: 30 CAPSULE | Refills: 11 | Status: SHIPPED | OUTPATIENT
Start: 2024-06-04

## 2024-06-04 NOTE — TELEPHONE ENCOUNTER
Adithya Rush,      Your tacrolimus level came back at 5.7 on 5/31 at 12 hours   Goal 6-8  I know you are close to your goal but you've been slightly below for a little while now. Because of that, can we try increasing your dose to 1.5mg in AM and 1mg in PM?      Recheck level again in 7-10 days?   Let me know that you got this!     Pt understanding of plan

## 2024-06-28 ENCOUNTER — LAB (OUTPATIENT)
Dept: LAB | Facility: CLINIC | Age: 69
End: 2024-06-28
Payer: MEDICARE

## 2024-06-28 DIAGNOSIS — Z94.2 S/P LUNG TRANSPLANT (H): ICD-10-CM

## 2024-06-28 PROCEDURE — 80197 ASSAY OF TACROLIMUS: CPT

## 2024-07-01 LAB
TACROLIMUS BLD-MCNC: 6.7 UG/L (ref 5–15)
TME LAST DOSE: NORMAL H
TME LAST DOSE: NORMAL H

## 2024-07-05 ENCOUNTER — DOCUMENTATION ONLY (OUTPATIENT)
Dept: TRANSPLANT | Facility: CLINIC | Age: 69
End: 2024-07-05
Payer: COMMERCIAL

## 2024-07-05 ASSESSMENT — ENCOUNTER SYMPTOMS: NEW SYMPTOMS OF CORONARY ARTERY DISEASE: 0

## 2024-07-10 DIAGNOSIS — E78.00 HYPERCHOLESTEROLEMIA: ICD-10-CM

## 2024-07-10 DIAGNOSIS — Z94.2 S/P LUNG TRANSPLANT (H): ICD-10-CM

## 2024-07-10 RX ORDER — ROSUVASTATIN CALCIUM 10 MG/1
20 TABLET, COATED ORAL DAILY
Qty: 60 TABLET | Refills: 11 | Status: SHIPPED | OUTPATIENT
Start: 2024-07-10

## 2024-07-13 ENCOUNTER — HEALTH MAINTENANCE LETTER (OUTPATIENT)
Age: 69
End: 2024-07-13

## 2024-07-29 ENCOUNTER — LAB (OUTPATIENT)
Dept: LAB | Facility: CLINIC | Age: 69
End: 2024-07-29
Payer: COMMERCIAL

## 2024-07-29 DIAGNOSIS — Z94.2 LUNG REPLACED BY TRANSPLANT (H): ICD-10-CM

## 2024-07-29 DIAGNOSIS — E11.9 TYPE 2 DIABETES MELLITUS WITHOUT COMPLICATION, WITHOUT LONG-TERM CURRENT USE OF INSULIN (H): ICD-10-CM

## 2024-07-29 DIAGNOSIS — Z79.899 ENCOUNTER FOR LONG-TERM (CURRENT) USE OF HIGH-RISK MEDICATION: ICD-10-CM

## 2024-07-29 LAB
TACROLIMUS BLD-MCNC: 6.7 UG/L (ref 5–15)
TME LAST DOSE: NORMAL H
TME LAST DOSE: NORMAL H

## 2024-07-29 PROCEDURE — 80197 ASSAY OF TACROLIMUS: CPT

## 2024-08-06 DIAGNOSIS — Z94.2 S/P LUNG TRANSPLANT (H): ICD-10-CM

## 2024-08-06 RX ORDER — ASPIRIN 81 MG/1
81 TABLET ORAL DAILY
Qty: 30 TABLET | Refills: 11 | Status: SHIPPED | OUTPATIENT
Start: 2024-08-06

## 2024-08-15 ENCOUNTER — TELEPHONE (OUTPATIENT)
Dept: TRANSPLANT | Facility: CLINIC | Age: 69
End: 2024-08-15
Payer: COMMERCIAL

## 2024-08-15 NOTE — TELEPHONE ENCOUNTER
"Left Voicemail (1st Attempt) for the patient to call back and schedule the following:    Appointment type: LTX  Provider: CATA  Return date: 8/22/24  Specialty phone number: [phone]  Additional appointment(s) needed: Testing rescheduled  Additonal Notes: Per reschedule instructions, \"needs to be rescheduled, no urgency, any provider is fine\"   "

## 2024-08-19 NOTE — TELEPHONE ENCOUNTER
Left Voicemail (2nd Attempt) for the patient to call back and schedule the following:    Appointment type: LTX  Provider: MAVIS  Return date: 09/30/2024  Specialty phone number: [phone]  Additional appointment(s) needed: N/A  Additonal Notes: N/A

## 2024-08-28 DIAGNOSIS — Z94.2 LUNG REPLACED BY TRANSPLANT (H): ICD-10-CM

## 2024-08-28 RX ORDER — DILTIAZEM HYDROCHLORIDE 240 MG/1
CAPSULE, EXTENDED RELEASE ORAL
Qty: 90 CAPSULE | Refills: 11 | Status: SHIPPED | OUTPATIENT
Start: 2024-08-28

## 2024-08-30 ENCOUNTER — LAB (OUTPATIENT)
Dept: LAB | Facility: CLINIC | Age: 69
End: 2024-08-30
Payer: MEDICARE

## 2024-08-30 DIAGNOSIS — Z94.2 S/P LUNG TRANSPLANT (H): ICD-10-CM

## 2024-08-30 PROCEDURE — 80197 ASSAY OF TACROLIMUS: CPT

## 2024-09-03 DIAGNOSIS — Z94.2 S/P LUNG TRANSPLANT (H): ICD-10-CM

## 2024-09-03 LAB
TACROLIMUS BLD-MCNC: 7.7 UG/L (ref 5–15)
TME LAST DOSE: NORMAL H
TME LAST DOSE: NORMAL H

## 2024-09-03 RX ORDER — FAMOTIDINE 20 MG/1
20 TABLET, FILM COATED ORAL 2 TIMES DAILY
Qty: 180 TABLET | Refills: 11 | Status: SHIPPED | OUTPATIENT
Start: 2024-09-03

## 2024-09-04 NOTE — RESULT ENCOUNTER NOTE
Tacrolimus level 7.7 at 11.75 hours, on 8/30/24.  Goal 6-8.   Current dose 1.5 mg in AM, 1 mg in PM    Level at goal.  No dose change.    [employer] message sent

## 2024-09-26 DIAGNOSIS — Z94.2 LUNG REPLACED BY TRANSPLANT (H): Primary | ICD-10-CM

## 2024-09-30 ENCOUNTER — OFFICE VISIT (OUTPATIENT)
Dept: TRANSPLANT | Facility: CLINIC | Age: 69
End: 2024-09-30
Attending: INTERNAL MEDICINE
Payer: MEDICARE

## 2024-09-30 ENCOUNTER — LAB (OUTPATIENT)
Dept: LAB | Facility: CLINIC | Age: 69
End: 2024-09-30
Attending: INTERNAL MEDICINE
Payer: COMMERCIAL

## 2024-09-30 ENCOUNTER — OFFICE VISIT (OUTPATIENT)
Dept: PULMONOLOGY | Facility: CLINIC | Age: 69
End: 2024-09-30
Attending: INTERNAL MEDICINE
Payer: COMMERCIAL

## 2024-09-30 ENCOUNTER — ANCILLARY PROCEDURE (OUTPATIENT)
Dept: GENERAL RADIOLOGY | Facility: CLINIC | Age: 69
End: 2024-09-30
Attending: INTERNAL MEDICINE
Payer: COMMERCIAL

## 2024-09-30 VITALS
OXYGEN SATURATION: 99 % | BODY MASS INDEX: 27.22 KG/M2 | TEMPERATURE: 98.9 F | WEIGHT: 148.8 LBS | SYSTOLIC BLOOD PRESSURE: 123 MMHG | DIASTOLIC BLOOD PRESSURE: 66 MMHG | HEART RATE: 65 BPM

## 2024-09-30 DIAGNOSIS — E11.9 TYPE 2 DIABETES MELLITUS WITHOUT COMPLICATION, WITHOUT LONG-TERM CURRENT USE OF INSULIN (H): ICD-10-CM

## 2024-09-30 DIAGNOSIS — Z79.899 IMMUNOSUPPRESSION DUE TO DRUG THERAPY (H): ICD-10-CM

## 2024-09-30 DIAGNOSIS — Z79.899 ENCOUNTER FOR LONG-TERM (CURRENT) USE OF HIGH-RISK MEDICATION: ICD-10-CM

## 2024-09-30 DIAGNOSIS — D84.821 IMMUNOSUPPRESSION DUE TO DRUG THERAPY (H): ICD-10-CM

## 2024-09-30 DIAGNOSIS — Z94.2 LUNG REPLACED BY TRANSPLANT (H): ICD-10-CM

## 2024-09-30 DIAGNOSIS — E11.9 TYPE 2 DIABETES MELLITUS (H): ICD-10-CM

## 2024-09-30 LAB
ALBUMIN SERPL BCG-MCNC: 4.3 G/DL (ref 3.5–5.2)
ALP SERPL-CCNC: 60 U/L (ref 40–150)
ALT SERPL W P-5'-P-CCNC: 15 U/L (ref 0–50)
ANION GAP SERPL CALCULATED.3IONS-SCNC: 11 MMOL/L (ref 7–15)
AST SERPL W P-5'-P-CCNC: 15 U/L (ref 0–45)
BASOPHILS # BLD AUTO: 0 10E3/UL (ref 0–0.2)
BASOPHILS NFR BLD AUTO: 1 %
BILIRUB DIRECT SERPL-MCNC: <0.2 MG/DL (ref 0–0.3)
BILIRUB SERPL-MCNC: 0.3 MG/DL
BUN SERPL-MCNC: 30.8 MG/DL (ref 8–23)
CALCIUM SERPL-MCNC: 10 MG/DL (ref 8.8–10.4)
CHLORIDE SERPL-SCNC: 106 MMOL/L (ref 98–107)
CMV DNA SPEC NAA+PROBE-ACNC: NOT DETECTED IU/ML
CREAT SERPL-MCNC: 1.31 MG/DL (ref 0.51–0.95)
EGFRCR SERPLBLD CKD-EPI 2021: 44 ML/MIN/1.73M2
EOSINOPHIL # BLD AUTO: 0.1 10E3/UL (ref 0–0.7)
EOSINOPHIL NFR BLD AUTO: 2 %
ERYTHROCYTE [DISTWIDTH] IN BLOOD BY AUTOMATED COUNT: 15.3 % (ref 10–15)
EXPTIME-PRE: 2.4 SEC
FEF2575-%PRED-PRE: 235 %
FEF2575-PRE: 4 L/SEC
FEF2575-PRED: 1.7 L/SEC
FEFMAX-%PRED-PRE: 108 %
FEFMAX-PRE: 5.74 L/SEC
FEFMAX-PRED: 5.28 L/SEC
FEV1-%PRED-PRE: 116 %
FEV1-PRE: 2.21 L
FEV1FEV6-PRE: 98 %
FEV1FEV6-PRED: 79 %
FEV1FVC-PRE: 98 %
FEV1FVC-PRED: 80 %
FIFMAX-PRE: 2.31 L/SEC
FVC-%PRED-PRE: 94 %
FVC-PRE: 2.26 L
FVC-PRED: 2.38 L
GLUCOSE SERPL-MCNC: 144 MG/DL (ref 70–99)
HCO3 SERPL-SCNC: 26 MMOL/L (ref 22–29)
HCT VFR BLD AUTO: 29.9 % (ref 35–47)
HGB BLD-MCNC: 9.5 G/DL (ref 11.7–15.7)
IMM GRANULOCYTES # BLD: 0.1 10E3/UL
IMM GRANULOCYTES NFR BLD: 2 %
LYMPHOCYTES # BLD AUTO: 0.7 10E3/UL (ref 0.8–5.3)
LYMPHOCYTES NFR BLD AUTO: 12 %
MAGNESIUM SERPL-MCNC: 1.7 MG/DL (ref 1.7–2.3)
MCH RBC QN AUTO: 27.1 PG (ref 26.5–33)
MCHC RBC AUTO-ENTMCNC: 31.8 G/DL (ref 31.5–36.5)
MCV RBC AUTO: 85 FL (ref 78–100)
MONOCYTES # BLD AUTO: 0.2 10E3/UL (ref 0–1.3)
MONOCYTES NFR BLD AUTO: 4 %
NEUTROPHILS # BLD AUTO: 4.7 10E3/UL (ref 1.6–8.3)
NEUTROPHILS NFR BLD AUTO: 80 %
NRBC # BLD AUTO: 0 10E3/UL
NRBC BLD AUTO-RTO: 0 /100
PLATELET # BLD AUTO: 212 10E3/UL (ref 150–450)
POTASSIUM SERPL-SCNC: 4.9 MMOL/L (ref 3.4–5.3)
PROT SERPL-MCNC: 6.6 G/DL (ref 6.4–8.3)
RBC # BLD AUTO: 3.5 10E6/UL (ref 3.8–5.2)
SODIUM SERPL-SCNC: 143 MMOL/L (ref 135–145)
TACROLIMUS BLD-MCNC: 8 UG/L (ref 5–15)
TME LAST DOSE: NORMAL H
TME LAST DOSE: NORMAL H
VIT D+METAB SERPL-MCNC: 41 NG/ML (ref 20–50)
WBC # BLD AUTO: 5.9 10E3/UL (ref 4–11)

## 2024-09-30 PROCEDURE — 86833 HLA CLASS II HIGH DEFIN QUAL: CPT | Performed by: INTERNAL MEDICINE

## 2024-09-30 PROCEDURE — 83735 ASSAY OF MAGNESIUM: CPT | Performed by: PATHOLOGY

## 2024-09-30 PROCEDURE — 99000 SPECIMEN HANDLING OFFICE-LAB: CPT | Performed by: PATHOLOGY

## 2024-09-30 PROCEDURE — 86832 HLA CLASS I HIGH DEFIN QUAL: CPT | Performed by: INTERNAL MEDICINE

## 2024-09-30 PROCEDURE — 80053 COMPREHEN METABOLIC PANEL: CPT | Performed by: PATHOLOGY

## 2024-09-30 PROCEDURE — 36415 COLL VENOUS BLD VENIPUNCTURE: CPT | Performed by: PATHOLOGY

## 2024-09-30 PROCEDURE — 94375 RESPIRATORY FLOW VOLUME LOOP: CPT | Performed by: INTERNAL MEDICINE

## 2024-09-30 PROCEDURE — 82306 VITAMIN D 25 HYDROXY: CPT | Performed by: INTERNAL MEDICINE

## 2024-09-30 PROCEDURE — G0463 HOSPITAL OUTPT CLINIC VISIT: HCPCS | Performed by: INTERNAL MEDICINE

## 2024-09-30 PROCEDURE — 82784 ASSAY IGA/IGD/IGG/IGM EACH: CPT | Performed by: INTERNAL MEDICINE

## 2024-09-30 PROCEDURE — 82248 BILIRUBIN DIRECT: CPT | Performed by: PATHOLOGY

## 2024-09-30 PROCEDURE — 99215 OFFICE O/P EST HI 40 MIN: CPT | Mod: 25 | Performed by: INTERNAL MEDICINE

## 2024-09-30 PROCEDURE — 80197 ASSAY OF TACROLIMUS: CPT | Performed by: INTERNAL MEDICINE

## 2024-09-30 PROCEDURE — 71046 X-RAY EXAM CHEST 2 VIEWS: CPT | Mod: GC | Performed by: RADIOLOGY

## 2024-09-30 PROCEDURE — 85025 COMPLETE CBC W/AUTO DIFF WBC: CPT | Performed by: PATHOLOGY

## 2024-09-30 PROCEDURE — 87799 DETECT AGENT NOS DNA QUANT: CPT | Performed by: INTERNAL MEDICINE

## 2024-09-30 RX ORDER — GLIPIZIDE 10 MG/1
10 TABLET ORAL
COMMUNITY
Start: 2024-09-10

## 2024-09-30 ASSESSMENT — PAIN SCALES - GENERAL: PAINLEVEL: NO PAIN (0)

## 2024-09-30 NOTE — PATIENT INSTRUCTIONS
Patient Instructions  1. Continue to hydrate with 60-70 oz fluids daily.  2. Continue to exercise daily or most days of the week.  3. Follow up with your primary care provider for annual gender health maintenance procedures.  4. Follow up with colonoscopy schedule.  5. Follow up with annual dermatology visits.  6. It doesn't seem like the COVID vaccine is working well in lung transplant patients. A number of lung transplant patients have gotten sick with COVID even after receiving the vaccines. Based on our recent experience, it can be life-threatening to get COVID  even after being vaccinated. Please continue to act like you did not get the COVID vaccine - social distancing, wearing a mask, good hand hygiene, etc. If the people around you are vaccinated, it will help reduce the risk of you getting COVID. All members of your household should be vaccinated.  7. [facility] at next appointment, will do annuals in January.      Next transplant clinic appointment: 4 with CXR, labs and PFTs  Next lab draw: pending labs today.      AVS printed at time of check out

## 2024-09-30 NOTE — PROGRESS NOTES
Transplant Coordinator Note    Reason for visit: Post lung transplant follow up visit   Coordinator: Present   Caregiver: Tyrese    Health concerns addressed today:  1.  had a heart attack in August, doing well.   2. No shortness or breath or cough, no breathing concerns.   3. Taking Zyzol for allergies, Flonase. Minimal post nasal drip.  4. Stable vitals at home. BGs (DM2) 90.   5. Oct 18th, pent neb.   6. Infusion for bone health on 1/8.   7.    Activity/rehab: walking inside as much as possible with air quality, treadmill and bike.   Oxygen needs: room air  Pain management/RX: Back pain - compression fracture.   Diabetic management: NA  Risk Criteria Labs: negative 3/25/22  CMV status: D-/R-  EBV status: D+/R+  AC/asa: ASA 81mg -  PJP prophylactic: pentamidine nebs (dapsone caused anemia/RBC hemolysis) done locally.     COVID:  COVID-19 infection (yes/no, date of most recent positive test):   Status/instructions given about COVID-19 vaccine:     Pt Education: medications (use/dose/side effects), how/when to call coordinator, frequency of labs, s/s of infection/rejection, call prior to starting any new medications, lab/vital sign book    Health Maintenance:   Last colonoscopy:   Next colonoscopy due:   Dermatology:  Vaccinations this visit:     Labs, CXR, PFTs reviewed with patient  Medication record reviewed and reconciled  Questions and concerns addressed    Patient Instructions  1. Continue to hydrate with 60-70 oz fluids daily.  2. Continue to exercise daily or most days of the week.  3. Follow up with your primary care provider for annual gender health maintenance procedures.  4. Follow up with colonoscopy schedule.  5. Follow up with annual dermatology visits.  6. It doesn't seem like the COVID vaccine is working well in lung transplant patients. A number of lung transplant patients have gotten sick with COVID even after receiving the vaccines. Based on our recent experience, it can be life-threatening  to get COVID  even after being vaccinated. Please continue to act like you did not get the COVID vaccine - social distancing, wearing a mask, good hand hygiene, etc. If the people around you are vaccinated, it will help reduce the risk of you getting COVID. All members of your household should be vaccinated.  7. [facility] lab needed today, they will do this before you leave.     Next transplant clinic appointment:  4 months with annuals: Full PFTs, CT, labs, 6 minute walk test.     Next lab draw: pending today.    AVS printed at time of check out

## 2024-09-30 NOTE — NURSING NOTE
Chief Complaint   Patient presents with    RECHECK       /66   Pulse 65   Temp 98.9  F (37.2  C) (Oral)   Wt 67.5 kg (148 lb 12.8 oz)   SpO2 99%   BMI 27.22 kg/m      Lewis Mendez on 9/30/2024 at 11:26 AM

## 2024-09-30 NOTE — PROGRESS NOTES
[facility] Lung Transplant [facility]       DATE OF VISIT:  2024   Clinic location: [facility], [facility]      Reason for Visit    S/p lung transplantation    Lung TX HPI:    Melissa Elder   : 1955   Transplant: 2022 (Lung)-bilateral      ASSESSMENT AND PLAN:     Melissa Elder is a 68 year old who underwent bilateral transplant on 2022 (Lung), currently postoperative day:  778.  Surgery uncomplicated, but did have bilateral hydropneumothoraces and bilateral effusions. 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 Other hx notable for HTN, mild nonbostructive CAD, paroxysmal afib, osteoporosis, GERD and colonic polyps.       Transplant:      Allograft Function:  No new pulmonary complaints, no recent illnesses.  CXR reviewed without new infiltrates. PFTs are stable. She is doing very well and functionally doing well without any major symptoms.      Immunosuppression:  Tacrolimus, goal further reduced due to CKD to 6-8  Myfortic 720 bid  Prednisone 5/2.5     Prophylaxis:   PJP: Monthly pentamidine nebs (allergy to sulfa, and dapsone caused hemolysis)  CMV: D-/R - , pending today  EBV: D+/R+     EBV Viremia:  REsolved. low level positive in , not detected on subsequent rechecks other than <500 (log <2.7) on .  Negative since then, most recently .  Undetected 23 and 3/29/24.      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 On 2022 DQB5 534 MFI, without DR15 or DQB6 detected.  Last few DSAs have been negative.     Cell free DNA not drawn yet-ordered for next visit     Reflux: Adequately controlled with current pantoprazole daily and famotidine BID.     Coronary artery disease: Nonobstructive.  Continue aspirin and rosuvastatin.     SOMMER on CKD: Stage IIIa CKD.   Baseline Cr around 1.1.  Water goal should be 64-80 oz./day      Lower extremity edema: Hasn't needed/used PRN lasix much at all at home, had persistent edema (pitting) post transplant. Did not respond significantly to lasix and did improve with elevation and compression but never resolved. Improved significantly with lymphedema therapy.   - Trial of holding lasix (20 mg every day) and watching weights     Paroxysmal atrial fibrillation: No palpitations or symptoms reported on diltiazem and beta blocker.  Recently HRs at home running 60s-70s.    - On dilt and beta blockade     Hypertension:     - carvedilol and lisinopril     DM II: Management per PCP/Encodrinology.     Hypercholesterolemia: Cholesterol, LDL and triglycerides elevated 2/28/23.  Rosuvastatin 20 mg nightly.        Osteoporosis: Lumbar compression fracture in October 2022 after a fall as above.  Boniva was held due to concern for possible contribution to lower extremity swelling.   Repeat DEXA in 4/2023 without significant change, repeat in 2025.   - Vit D level today adequate  - Sees local endo,last 3/4/24  - Doing yearly infusions with Reclast     Hypomagnesemia: Due to CNI interference with absorption.  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  Level today 351, will replete x 1 with reheck at next lab draw. No s/sx of infection      Health care maintenance:     Derm Exam: Saw recently in [place] March 2024  Colon Ca Screening: History of polyps.  Last colonoscopy 2024 completed-good for 10 years  Annual influenza vaccination recommended per preventive care guidelines.  COVID vaccination recommended per current guidelines  RSV vaccination-now approved for age > 60, and immunosuppressed individuals  Pneumonia vaccination per guidelines        RTC: Feb 2025 for annual, needs [facility] drawn.    Solis Rubio MD FCCP  Associate Professor of Medicine  Pulmonary, Allergy & Critical Care Division  [facility]  [facility]  [place] [facility]     I spent a total of  45  minutes reviewing chart (previous notes), reviewing test results, reviewing chest x rays and CT scans, talking with and examining patient, formulating plan, adjusting medications, and documentation of my findings and plan on the day of the encounter. Time excludes time spent for PFT.       Interval history     Melissa Elder presents for scheduled follow-up visit.  She is accompanied by her  Tyrese.  She denies significant symptoms.  She denies any change in her breathing.  She continues to be active.  No fever, chills, night sweats, weight changes.  She completed her colonoscopy and was reportedly cleared for 10 years.  She is maintain follow-up with endocrinology for osteoporosis.    ROS Pulmonary    A complete ROS was otherwise negative except as noted in the HPI.    Past Medical History:   Diagnosis Date    Atrial fibrillation with RVR (H)     hx of A fib related to severe COPD, does not meet anticoagulation criteria as noted    Clinical diagnosis of COVID-19 02/29/2024    COPD, severe (H)     Osteoporosis               Past Surgical History:   Procedure Laterality Date    BRONCHOSCOPY (RIGID OR FLEXIBLE), DIAGNOSTIC N/A 4/7/2022    Procedure: BRONCHOSCOPY, WITH BRONCHOALVEOLAR LAVAGE and BIOPSIES;  Surgeon: Gerson Haddad MD;  Location: [facility]    BRONCHOSCOPY (RIGID OR FLEXIBLE), DIAGNOSTIC N/A 5/12/2022    Procedure: BRONCHOSCOPY, WITH BRONCHOALVEOLAR LAVAGE AND BIOPSY;  Surgeon: Melissa Landin MD;  Location: [facility]    BRONCHOSCOPY (RIGID OR FLEXIBLE), DIAGNOSTIC N/A 8/2/2022    Procedure: BRONCHOSCOPY, WITH BRONCHOALVEOLAR LAVAGE;  Surgeon: Melissa Landin MD;  Location: [facility]    BRONCHOSCOPY (RIGID OR FLEXIBLE), DIAGNOSTIC N/A 10/11/2022    Procedure: BRONCHOSCOPY, WITH BRONCHOALVEOLAR LAVAGE AND BIOPSIES;  Surgeon: Angel Gallagher MD;  Location: [facility]    BRONCHOSCOPY (RIGID OR FLEXIBLE), DIAGNOSTIC N/A 12/20/2022    Procedure:  BRONCHOSCOPY, WITH BRONCHOALVEOLAR LAVAGE AND BIOPSIES;  Surgeon: Perlman, David Morris, MD;  Location:  GI    BRONCHOSCOPY (RIGID OR FLEXIBLE), DIAGNOSTIC N/A 3/1/2023    Procedure: BRONCHOSCOPY, WITH BRONCHOALVEOLAR LAVAGE WITH BIOPSY;  Surgeon: Lucina Thompson MD;  Location:  GI    BRONCHOSCOPY FLEXIBLE AND RIGID N/A 3/1/2022    Procedure: BRONCHOSCOPY INSPECTION;  Surgeon: Melissa Landin MD;  Location:  GI    CV CORONARY ANGIOGRAM N/A 3/27/2019    Procedure: CV CORONARY ANGIOGRAM;  Surgeon: Thierry Serrano MD;  Location:  HEART CARDIAC CATH LAB    CV RIGHT HEART CATH MEASUREMENTS RECORDED N/A 3/27/2019    Procedure: CV RIGHT HEART CATH;  Surgeon: Thierry Serrano MD;  Location:  HEART CARDIAC CATH LAB    ESOPHAGEAL IMPEDENCE FUNCTION TEST WITH 24 HOUR PH GREATER THAN 1 HOUR N/A 3/28/2019    Procedure: ESOPHAGEAL IMPEDENCE FUNCTION TEST WITH 24 HOUR PH GREATER THAN 1 HOUR;  Surgeon: Mike Henry MD;  Location:  GI    EXCISE PILONIDAL CYST, SIMPLE      IR CHEST TUBE PLACEMENT NON-TUNNELED RIGHT  3/3/2022    TONSILLECTOMY & ADENOIDECTOMY      TRANSPLANT LUNG RECIPIENT SINGLE X2 Bilateral 2022    Procedure: Bilateral Sequential Lung Transplantation, Clamshell Incision, Extracorporeal Membrane Oxgenation, Bronchoscopy, Cryoablation of Intercostal Nerves;  Surgeon: Raul Robin MD;  Location:  OR        Social History     Tobacco Use    Smoking status: Former     Current packs/day: 0.00     Average packs/day: 1.5 packs/day for 36.0 years (54.0 ttl pk-yrs)     Types: Cigarettes     Start date: 1970     Quit date: 2006     Years since quittin.7    Smokeless tobacco: Never   Substance Use Topics    Alcohol use: Not Currently     Comment: none since transplant    Drug use: Not Currently     Comment: stated hx of marijuana, quit in                   Family History   Problem Relation Age of Onset    Breast Cancer Mother     Colon Cancer Mother     Lung Cancer  Mother     Lung Cancer Father     Lung Cancer Maternal Aunt     Pancreatic Cancer Maternal Uncle           Any family history of ILD:        Current Outpatient Medications   Medication Sig Dispense Refill    albuterol (PROAIR HFA/PROVENTIL HFA/VENTOLIN HFA) 108 (90 Base) MCG/ACT inhaler Inhale 2 puffs into the lungs every 6 hours as needed for shortness of breath or wheezing 18 g 1    aspirin 81 MG EC tablet Take 1 tablet (81 mg) by mouth daily 30 tablet 11    calcium carbonate 600 mg-vitamin D 400 units (CALTRATE) 600-400 MG-UNIT per tablet Take 1 tablet by mouth daily 30 tablet 11    carvedilol (COREG) 12.5 MG tablet Take 1 tablet (12.5 mg) by mouth 2 times daily (with meals) 60 tablet 11    cetirizine (ZYRTEC) 10 MG tablet Take 1 tablet (10 mg) by mouth daily as needed for allergies 30 tablet 11    DILT- MG 24 hr ER beaded capsule TAKE 1 CAPSULE (240 MG) BY MOUTH DAILY 90 capsule 11    famotidine (PEPCID) 20 MG tablet TAKE 1 TABLET (20 MG) BY MOUTH 2 TIMES DAILY 180 tablet 11    glipiZIDE (GLUCOTROL) 10 MG tablet Take 10 mg by mouth.      lisinopril (ZESTRIL) 10 MG tablet Take 1 tablet (10 mg) by mouth daily 30 tablet 11    Magnesium Glycinate 665 MG CAPS Take 2 tablets by mouth 3 times daily      multivitamin w/minerals (THERA-VIT-M) tablet Take 1 tablet by mouth daily 30 tablet 11    mycophenolic acid (GENERIC EQUIVALENT) 360 MG EC tablet TAKE 2 TABLETS (720 MG) BY MOUTH 2 TIMES DAILY 360 tablet 11    pantoprazole (PROTONIX) 40 MG EC tablet Take 1 tablet (40 mg) by mouth daily 90 tablet 3    pentamidine (NEBUPENT) 300 MG neb solution Inhale 300 mg into the lungs every 28 days      predniSONE (DELTASONE) 5 MG tablet Take 1.5 tablets (7.5 mg) by mouth daily 45 tablet 11    rosuvastatin (CRESTOR) 10 MG tablet Take 2 tablets (20 mg) by mouth daily 60 tablet 11    tacrolimus (GENERIC EQUIVALENT) 0.5 MG capsule Total dose: 1.5 mg in AM and 1 mg in PM 30 capsule 11    tacrolimus (GENERIC EQUIVALENT) 1 MG capsule  Take 1 capsule (1 mg) by mouth 2 times daily Total dose: 1.5 mg in AM and 1 mg in PM 60 capsule 11     No current facility-administered medications for this visit.                        Allergies   Allergen Reactions    Alendronate Other (See Comments)     dizziness  Other reaction(s): Dizziness    Nickel Rash    Sulfa Antibiotics Rash                 /66   Pulse 65   Temp 98.9  F (37.2  C) (Oral)   Wt 67.5 kg (148 lb 12.8 oz)   SpO2 99%   BMI 27.22 kg/m       Body mass index is 27.22 kg/m .        Physical Examination      GENERAL APPEARANCE: Well developed, well nourished, alert, and in no apparent distress.  EYES: PERRL, EOMI  HENT: Nasal mucosa with no edema   MOUTH: Oral mucosa is moist, without any lesions, no tonsillar enlargement, no oropharyngeal exudate.  NECK: supple, no masses, no thyromegaly.   RESP:  No crackles. No rhonchi. No wheezes.   CV: Normal S1, S2, regular rhythm, normal rate. No murmur.  No rub.  No LE edema.   ABDOMEN:  Bowel sounds normal, soft, nontender,   MS: extremities normal. No clubbing.  SKIN: no rash on limited exam  NEURO: Mentation intact, speech normal, normal strength and tone.            LABORATORY DATA:    Significant lab results today:    Labs reviewed from stable elevation of BUN and creatinine    DIAGNOSTIC TESTS:    Spirometry: personally reviewed the PFT results-my interpretation: Stable FEV1 and FVC.     Latest Reference Range & Units 09/28/23 08:04 01/04/24 06:23 04/11/24 07:50 09/30/24 11:03   FVC-Pred L 2.50 (P) 2.49 (P) 2.48 (P) 2.38 (P)   FVC-Pre L 2.16 (P) 2.15 (P) 2.08 (P) 2.26 (P)   FVC-%Pred-Pre % 86 (P) 86 (P) 83 (P) 94 (P)   FEV1-Pre L 2.13 (P) 2.09 (P) 2.02 (P) 2.21 (P)   FEV1-%Pred-Pre % 107 (P) 106 (P) 102 (P) 116 (P)   FEV1FVC-Pred % 80 (P) 79 (P) 79 (P) 80 (P)   FEV1FVC-Pre % 99 (P) 97 (P) 97 (P) 98 (P)   (P): Preliminary         IMAGING:    CXR: personally reviewed by me and demonstrates: Bilateral well-expanded lungs    The longitudinal  plan of care for post lung transplant and complications of post-transplant was addressed during this visit. Due to the added complexity in care, I will continue to support this patient in the subsequent management of this condition(s) and with the ongoing continuity of care of this condition

## 2024-09-30 NOTE — PROGRESS NOTES
Melissa Elder comes into clinic today at the request of Dr. Solis Rubio Ordering Provider for spirometry     Cedric Cobb, RRT

## 2024-09-30 NOTE — LETTER
9/30/2024      Melissa Elder  [address] [facility] [facility] [address]      Dear Colleague,    Thank you for referring your patient, Melissa Elder, to the [facility]. Please see a copy of my visit note below.    Transplant Coordinator Note    Reason for visit: Post lung transplant follow up visit   Coordinator: Present   Caregiver: Tyrese    Health concerns addressed today:  1.  had a heart attack in August, doing well.   2. No shortness or breath or cough, no breathing concerns.   3. Taking Zyzol for allergies, Flonase. Minimal post nasal drip.  4. Stable vitals at home. BGs (DM2) 90.   5. Oct 18th, pent neb.   6. Infusion for bone health on 1/8.   7.    Activity/rehab: walking inside as much as possible with air quality, treadmill and bike.   Oxygen needs: room air  Pain management/RX: Back pain - compression fracture.   Diabetic management: NA  Risk Criteria Labs: negative 3/25/22  CMV status: D-/R-  EBV status: D+/R+  AC/asa: ASA 81mg -  PJP prophylactic: pentamidine nebs (dapsone caused anemia/RBC hemolysis) done locally.     COVID:  COVID-19 infection (yes/no, date of most recent positive test):   Status/instructions given about COVID-19 vaccine:     Pt Education: medications (use/dose/side effects), how/when to call coordinator, frequency of labs, s/s of infection/rejection, call prior to starting any new medications, lab/vital sign book    Health Maintenance:   Last colonoscopy:   Next colonoscopy due:   Dermatology:  Vaccinations this visit:     Labs, CXR, PFTs reviewed with patient  Medication record reviewed and reconciled  Questions and concerns addressed    Patient Instructions  1. Continue to hydrate with 60-70 oz fluids daily.  2. Continue to exercise daily or most days of the week.  3. Follow up with your primary care provider for annual gender health maintenance procedures.  4. Follow up with colonoscopy schedule.  5. Follow up with annual dermatology visits.  6. It  doesn't seem like the COVID vaccine is working well in lung transplant patients. A number of lung transplant patients have gotten sick with COVID even after receiving the vaccines. Based on our recent experience, it can be life-threatening to get COVID  even after being vaccinated. Please continue to act like you did not get the COVID vaccine - social distancing, wearing a mask, good hand hygiene, etc. If the people around you are vaccinated, it will help reduce the risk of you getting COVID. All members of your household should be vaccinated.  7. [facility] lab needed today, they will do this before you leave.     Next transplant clinic appointment:  4 months with annuals: Full PFTs, CT, labs, 6 minute walk test.     Next lab draw: pending today.    AVS printed at time of check out              [facility] Lung Transplant Program       DATE OF VISIT:  2024   Cl[facility]      Reason for Visit    S/p lung transplantation    Lung TX HPI:    Melissa Elder   : 1955   Transplant: 2022 (Lung)-bilateral      ASSESSMENT AND PLAN:     Melissa Elder is a 68 year old who underwent bilateral transplant on 2022 (Lung), currently postoperative day:  778.  Surgery uncomplicated, but did have bilateral hydropneumothoraces and bilateral effusions. 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 Other hx notable for HTN, mild nonbostructive CAD, paroxysmal afib, osteoporosis, GERD and colonic polyps.       Transplant:      Allograft Function:  No new pulmonary complaints, no recent illnesses.  CXR reviewed without new infiltrates. PFTs are stable. She is doing very well and functionally doing well without any major symptoms.      Immunosuppression:  Tacrolimus, goal further reduced due to CKD to 6-8  Myfortic 720 bid  Prednisone 5/2.5     Prophylaxis:   PJP: Monthly pentamidine nebs (allergy to  sulfa, and dapsone caused hemolysis)  CMV: D-/R - , pending today  EBV: D+/R+     EBV Viremia:  REsolved. low level positive in 2022, not detected on subsequent rechecks other than <500 (log <2.7) on 2/28.  Negative since then, most recently 7/28.  Undetected 11/24/23 and 3/29/24.      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 On 12/19/2022 DQB5 534 MFI, without DR15 or DQB6 detected.  Last few DSAs have been negative.     Cell free DNA not drawn yet-ordered for next visit     Reflux: Adequately controlled with current pantoprazole daily and famotidine BID.     Coronary artery disease: Nonobstructive.  Continue aspirin and rosuvastatin.     SOMMER on CKD: Stage IIIa CKD.   Baseline Cr around 1.1.  Water goal should be 64-80 oz./day     Lower extremity edema: Hasn't needed/used PRN lasix much at all at home, had persistent edema (pitting) post transplant. Did not respond significantly to lasix and did improve with elevation and compression but never resolved. Improved significantly with lymphedema therapy.   - Trial of holding lasix (20 mg every day) and watching weights     Paroxysmal atrial fibrillation: No palpitations or symptoms reported on diltiazem and beta blocker.  Recently HRs at home running 60s-70s.    - On dilt and beta blockade     Hypertension:     - carvedilol and lisinopril     DM II: Management per PCP/Encodrinology.     Hypercholesterolemia: Cholesterol, LDL and triglycerides elevated 2/28/23.  Rosuvastatin 20 mg nightly.        Osteoporosis: Lumbar compression fracture in October 2022 after a fall as above.  Boniva was held due to concern for possible contribution to lower extremity swelling.   Repeat DEXA in 4/2023 without significant change, repeat in 2025.   - Vit D level today adequate  - Sees local endo,last 3/4/24  - Doing yearly infusions with Reclast     Hypomagnesemia: Due to CNI interference with absorption.  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  Level today 351, will replete x 1 with reheck at next lab draw. No s/sx of infection      Health care maintenance:     Derm Exam: Saw recently in [place] March 2024  Colon Ca Screening: History of polyps.  Last colonoscopy 2024 completed-good for 10 years  Annual influenza vaccination recommended per preventive care guidelines.  COVID vaccination recommended per current guidelines  RSV vaccination-now approved for age > 60, and immunosuppressed individuals  Pneumonia vaccination per guidelines        RTC: Feb 2025 for annual, needs prospera drawn.    Solis Rubio MD [facility]  Associate Professor of Medicine  Pulmonary, Allergy & Critical Care Division  [facility]  [facility] Lung Transplant Program     I spent a total of  45  minutes reviewing chart (previous notes), reviewing test results, reviewing chest x rays and CT scans, talking with and examining patient, formulating plan, adjusting medications, and documentation of my findings and plan on the day of the encounter. Time excludes time spent for PFT.       Interval history     Melissa Elder presents for scheduled follow-up visit.  She is accompanied by her  Tyrese.  She denies significant symptoms.  She denies any change in her breathing.  She continues to be active.  No fever, chills, night sweats, weight changes.  She completed her colonoscopy and was reportedly cleared for 10 years.  She is maintain follow-up with endocrinology for osteoporosis.    ROS Pulmonary    A complete ROS was otherwise negative except as noted in the HPI.    Past Medical History:   Diagnosis Date     Atrial fibrillation with RVR (H)     hx of A fib related to severe COPD, does not meet anticoagulation criteria as noted     Clinical diagnosis of COVID-19 02/29/2024     COPD, severe (H)      Osteoporosis               Past Surgical History:   Procedure  Laterality Date     BRONCHOSCOPY (RIGID OR FLEXIBLE), DIAGNOSTIC N/A 4/7/2022    Procedure: BRONCHOSCOPY, WITH BRONCHOALVEOLAR LAVAGE and BIOPSIES;  Surgeon: Gerson Haddad MD;  [facility]     BRONCHOSCOPY (RIGID OR FLEXIBLE), DIAGNOSTIC N/A 5/12/2022    Procedure: BRONCHOSCOPY, WITH BRONCHOALVEOLAR LAVAGE AND BIOPSY;  Surgeon: Melissa Landin MD;  [facility]     BRONCHOSCOPY (RIGID OR FLEXIBLE), DIAGNOSTIC N/A 8/2/2022    Procedure: BRONCHOSCOPY, WITH BRONCHOALVEOLAR LAVAGE;  Surgeon: Melissa Landin MD;  [facility]     BRONCHOSCOPY (RIGID OR FLEXIBLE), DIAGNOSTIC N/A 10/11/2022    Procedure: BRONCHOSCOPY, WITH BRONCHOALVEOLAR LAVAGE AND BIOPSIES;  Surgeon: Angel Gallagher MD;  [facility]     BRONCHOSCOPY (RIGID OR FLEXIBLE), DIAGNOSTIC N/A 12/20/2022    Procedure: BRONCHOSCOPY, WITH BRONCHOALVEOLAR LAVAGE AND BIOPSIES;  Surgeon: Perlman, David Morris, MD;  [facility]     BRONCHOSCOPY (RIGID OR FLEXIBLE), DIAGNOSTIC N/A 3/1/2023    Procedure: BRONCHOSCOPY, WITH BRONCHOALVEOLAR LAVAGE WITH BIOPSY;  Surgeon: Lucina Thompson MD;  Location:  GI     BRONCHOSCOPY FLEXIBLE AND RIGID N/A 3/1/2022    Procedure: BRONCHOSCOPY INSPECTION;  Surgeon: Melissa Landin MD;  [facility]: [facility]     CV CORONARY ANGIOGRAM N/A 3/27/2019    Procedure: CV CORONARY ANGIOGRAM;  Surgeon: Thierry Serrano MD;  Location:  HEART CARDIAC CATH LAB     CV RIGHT HEART CATH MEASUREMENTS RECORDED N/A 3/27/2019    Procedure: CV RIGHT HEART CATH;  Surgeon: Thierry Serrano MD;  Location:  HEART CARDIAC CATH LAB     ESOPHAGEAL IMPEDENCE FUNCTION TEST WITH 24 HOUR PH GREATER THAN 1 HOUR N/A 3/28/2019    Procedure: ESOPHAGEAL IMPEDENCE FUNCTION TEST WITH 24 HOUR PH GREATER THAN 1 HOUR;  Surgeon: Mike Henry MD;  Location:  GI     EXCISE PILONIDAL CYST, SIMPLE       IR CHEST TUBE PLACEMENT NON-TUNNELED RIGHT  3/3/2022     TONSILLECTOMY & ADENOIDECTOMY       TRANSPLANT LUNG RECIPIENT SINGLE X2 Bilateral  2022    Procedure: Bilateral Sequential Lung Transplantation, Clamshell Incision, Extracorporeal Membrane Oxgenation, Bronchoscopy, Cryoablation of Intercostal Nerves;  Surgeon: Raul Robin MD;  Location:  OR        Social History     Tobacco Use     Smoking status: Former     Current packs/day: 0.00     Average packs/day: 1.5 packs/day for 36.0 years (54.0 ttl pk-yrs)     Types: Cigarettes     Start date: 1970     Quit date: 2006     Years since quittin.7     Smokeless tobacco: Never   Substance Use Topics     Alcohol use: Not Currently     Comment: none since transplant     Drug use: Not Currently     Comment: stated hx of marijuana, quit in                   Family History   Problem Relation Age of Onset     Breast Cancer Mother      Colon Cancer Mother      Lung Cancer Mother      Lung Cancer Father      Lung Cancer Maternal Aunt      Pancreatic Cancer Maternal Uncle           Any family history of ILD:        Current Outpatient Medications   Medication Sig Dispense Refill     albuterol (PROAIR HFA/PROVENTIL HFA/VENTOLIN HFA) 108 (90 Base) MCG/ACT inhaler Inhale 2 puffs into the lungs every 6 hours as needed for shortness of breath or wheezing 18 g 1     aspirin 81 MG EC tablet Take 1 tablet (81 mg) by mouth daily 30 tablet 11     calcium carbonate 600 mg-vitamin D 400 units (CALTRATE) 600-400 MG-UNIT per tablet Take 1 tablet by mouth daily 30 tablet 11     carvedilol (COREG) 12.5 MG tablet Take 1 tablet (12.5 mg) by mouth 2 times daily (with meals) 60 tablet 11     cetirizine (ZYRTEC) 10 MG tablet Take 1 tablet (10 mg) by mouth daily as needed for allergies 30 tablet 11     DILT- MG 24 hr ER beaded capsule TAKE 1 CAPSULE (240 MG) BY MOUTH DAILY 90 capsule 11     famotidine (PEPCID) 20 MG tablet TAKE 1 TABLET (20 MG) BY MOUTH 2 TIMES DAILY 180 tablet 11     glipiZIDE (GLUCOTROL) 10 MG tablet Take 10 mg by mouth.       lisinopril (ZESTRIL) 10 MG tablet Take 1 tablet  (10 mg) by mouth daily 30 tablet 11     Magnesium Glycinate 665 MG CAPS Take 2 tablets by mouth 3 times daily       multivitamin w/minerals (THERA-VIT-M) tablet Take 1 tablet by mouth daily 30 tablet 11     mycophenolic acid (GENERIC EQUIVALENT) 360 MG EC tablet TAKE 2 TABLETS (720 MG) BY MOUTH 2 TIMES DAILY 360 tablet 11     pantoprazole (PROTONIX) 40 MG EC tablet Take 1 tablet (40 mg) by mouth daily 90 tablet 3     pentamidine (NEBUPENT) 300 MG neb solution Inhale 300 mg into the lungs every 28 days       predniSONE (DELTASONE) 5 MG tablet Take 1.5 tablets (7.5 mg) by mouth daily 45 tablet 11     rosuvastatin (CRESTOR) 10 MG tablet Take 2 tablets (20 mg) by mouth daily 60 tablet 11     tacrolimus (GENERIC EQUIVALENT) 0.5 MG capsule Total dose: 1.5 mg in AM and 1 mg in PM 30 capsule 11     tacrolimus (GENERIC EQUIVALENT) 1 MG capsule Take 1 capsule (1 mg) by mouth 2 times daily Total dose: 1.5 mg in AM and 1 mg in PM 60 capsule 11     No current facility-administered medications for this visit.                        Allergies   Allergen Reactions     Alendronate Other (See Comments)     dizziness  Other reaction(s): Dizziness     Nickel Rash     Sulfa Antibiotics Rash                 /66   Pulse 65   Temp 98.9  F (37.2  C) (Oral)   Wt 67.5 kg (148 lb 12.8 oz)   SpO2 99%   BMI 27.22 kg/m       Body mass index is 27.22 kg/m .        Physical Examination      GENERAL APPEARANCE: Well developed, well nourished, alert, and in no apparent distress.  EYES: PERRL, EOMI  HENT: Nasal mucosa with no edema   MOUTH: Oral mucosa is moist, without any lesions, no tonsillar enlargement, no oropharyngeal exudate.  NECK: supple, no masses, no thyromegaly.   RESP:  No crackles. No rhonchi. No wheezes.   CV: Normal S1, S2, regular rhythm, normal rate. No murmur.  No rub.  No LE edema.   ABDOMEN:  Bowel sounds normal, soft, nontender,   MS: extremities normal. No clubbing.  SKIN: no rash on limited exam  NEURO: Mentation  intact, speech normal, normal strength and tone.            LABORATORY DATA:    Significant lab results today:    Labs reviewed from stable elevation of BUN and creatinine    DIAGNOSTIC TESTS:    Spirometry: personally reviewed the PFT results-my interpretation: Stable FEV1 and FVC.     Latest Reference Range & Units 09/28/23 08:04 01/04/24 06:23 04/11/24 07:50 09/30/24 11:03   FVC-Pred L 2.50 (P) 2.49 (P) 2.48 (P) 2.38 (P)   FVC-Pre L 2.16 (P) 2.15 (P) 2.08 (P) 2.26 (P)   FVC-%Pred-Pre % 86 (P) 86 (P) 83 (P) 94 (P)   FEV1-Pre L 2.13 (P) 2.09 (P) 2.02 (P) 2.21 (P)   FEV1-%Pred-Pre % 107 (P) 106 (P) 102 (P) 116 (P)   FEV1FVC-Pred % 80 (P) 79 (P) 79 (P) 80 (P)   FEV1FVC-Pre % 99 (P) 97 (P) 97 (P) 98 (P)   (P): Preliminary         IMAGING:    CXR: personally reviewed by me and demonstrates: Bilateral well-expanded lungs    The longitudinal plan of care for post lung transplant and complications of post-transplant was addressed during this visit. Due to the added complexity in care, I will continue to support this patient in the subsequent management of this condition(s) and with the ongoing continuity of care of this condition       Again, thank you for allowing me to participate in the care of your patient.        Sincerely,        Solis Rubio MD

## 2024-10-01 ENCOUNTER — MYC MEDICAL ADVICE (OUTPATIENT)
Dept: TRANSPLANT | Facility: CLINIC | Age: 69
End: 2024-10-01
Payer: COMMERCIAL

## 2024-10-01 LAB
EBV DNA SERPL NAA+PROBE-ACNC: NOT DETECTED IU/ML
IGG SERPL-MCNC: 399 MG/DL (ref 610–1616)

## 2024-10-01 NOTE — RESULT ENCOUNTER NOTE
Tacrolimus level 8 at 12 hours, on 9/30.  Goal 6-8.   Current dose 1.5 mg in AM, 1 mg in PM    No dose change at this time.   Recheck level in 2 weeks.     Discussed with patient.  [employer] message sent

## 2024-10-02 DIAGNOSIS — D80.1 HYPOGAMMAGLOBULINEMIA (H): Primary | ICD-10-CM

## 2024-10-02 DIAGNOSIS — Z94.2 S/P LUNG TRANSPLANT (H): ICD-10-CM

## 2024-10-02 NOTE — PROGRESS NOTES
IGG level low, 399. Will plan for IVIG x1 at local outpatient infusion center. Patient contacted via phone, aware of plan.     Per protocol, will receive 500mg/kg (dose based on ideal body weight) w/appropriate pre-medications and monitoring. Therapy plan faxed to OP infusion center.     [facility]  Fax: [phone]  Phone: [phone]

## 2024-10-03 RX ORDER — HYDROCORTISONE SODIUM SUCCINATE 100 MG/2ML
100 INJECTION INTRAMUSCULAR; INTRAVENOUS ONCE
OUTPATIENT
Start: 2024-10-07

## 2024-10-03 RX ORDER — HEPARIN SODIUM,PORCINE 10 UNIT/ML
5-20 VIAL (ML) INTRAVENOUS DAILY PRN
OUTPATIENT
Start: 2024-10-07

## 2024-10-03 RX ORDER — EPINEPHRINE 1 MG/ML
0.3 INJECTION, SOLUTION, CONCENTRATE INTRAVENOUS EVERY 5 MIN PRN
OUTPATIENT
Start: 2024-10-07

## 2024-10-03 RX ORDER — MEPERIDINE HYDROCHLORIDE 25 MG/ML
25 INJECTION INTRAMUSCULAR; INTRAVENOUS; SUBCUTANEOUS EVERY 30 MIN PRN
OUTPATIENT
Start: 2024-10-07

## 2024-10-03 RX ORDER — ALBUTEROL SULFATE 0.83 MG/ML
2.5 SOLUTION RESPIRATORY (INHALATION)
OUTPATIENT
Start: 2024-10-07

## 2024-10-03 RX ORDER — HEPARIN SODIUM (PORCINE) LOCK FLUSH IV SOLN 100 UNIT/ML 100 UNIT/ML
5 SOLUTION INTRAVENOUS
OUTPATIENT
Start: 2024-10-07

## 2024-10-03 RX ORDER — ALBUTEROL SULFATE 90 UG/1
1-2 INHALANT RESPIRATORY (INHALATION)
Start: 2024-10-07

## 2024-10-03 RX ORDER — ACETAMINOPHEN 325 MG/1
650 TABLET ORAL ONCE
Start: 2024-10-07

## 2024-10-03 RX ORDER — METHYLPREDNISOLONE SODIUM SUCCINATE 125 MG/2ML
125 INJECTION INTRAMUSCULAR; INTRAVENOUS
Start: 2024-10-07

## 2024-10-03 RX ORDER — DIPHENHYDRAMINE HYDROCHLORIDE 50 MG/ML
50 INJECTION INTRAMUSCULAR; INTRAVENOUS
Start: 2024-10-07

## 2024-10-03 RX ORDER — DIPHENHYDRAMINE HCL 25 MG
50 CAPSULE ORAL ONCE
OUTPATIENT
Start: 2024-10-07

## 2024-10-11 LAB
DONOR IDENTIFICATION: NORMAL
DQB5: 755
DSA COMMENTS: NORMAL
DSA PRESENT: YES
DSA TEST METHOD: NORMAL
ORGAN: NORMAL
SA 1  COMMENTS: NORMAL
SA 1 CELL: NORMAL
SA 1 TEST METHOD: NORMAL
SA 2 CELL: NORMAL
SA 2 COMMENTS: NORMAL
SA 2 TEST METHOD: NORMAL
SA1 HI RISK ABY: NORMAL
SA1 MOD RISK ABY: NORMAL
SA2 HI RISK ABY: NORMAL
SA2 MOD RISK ABY: NORMAL
UNACCEPTABLE ANTIGENS: NORMAL
UNOS CPRA: 76

## 2024-10-14 NOTE — PROGRESS NOTES
DSA lab from 9/30 resulted, DSA present. Will recheck DSA and Prospera level in 1 month per Dr. Rubio recs. Orders faxed to [place], [place], patient updated via [employer] message and Prospera mail out kit requested.

## 2024-10-14 NOTE — LETTER
PHYSICIAN ORDERS      DATE & TIME ISSUED: 2024 8:34 AM  PATIENT NAME: Melissa Elder   : 1955     [facility] MR# [if applicable]: [id number]     DIAGNOSIS:  Lung Transplant  Z94.2    One time order for the following labs the week of 2024:    Donor specific antibody   Prospera (patient will bring kit to lab)        Any questions please call: JENNIFER Lara Transplant Coordinator: [phone]    Please fax these results to [phone].       Dr. Solis Rubio MD, [facility]  Associate Professor of Medicine  Medical Director, Lung Transplantation Program Pulmonary,  Allergy, Critical Care & Sleep Division   [facility]

## 2024-10-14 NOTE — LETTER
PHYSICIAN ORDERS      DATE & TIME ISSUED: 2024 8:27 AM  PATIENT NAME: Melissa Elder   : 1955     [facility] MR# [if applicable]: [id number]     DIAGNOSIS:  Lung Transplant  Z94.2    Order for one time tacrolimus level the week of 10/21.     Any questions please call: Jane Transplant Coordinator [phone]    Please fax these results to [phone].       Dr. Solis Rubio MD, [facility]  Associate Professor of Medicine  Medical Director, Lung Transplantation Program Pulmonary,  Allergy, Critical Care & Sleep Division   [facility]

## 2024-10-25 ENCOUNTER — LAB (OUTPATIENT)
Dept: LAB | Facility: CLINIC | Age: 69
End: 2024-10-25
Payer: MEDICARE

## 2024-10-25 DIAGNOSIS — Z94.2 LUNG REPLACED BY TRANSPLANT (H): ICD-10-CM

## 2024-10-25 PROCEDURE — 80197 ASSAY OF TACROLIMUS: CPT

## 2024-10-28 ENCOUNTER — MYC MEDICAL ADVICE (OUTPATIENT)
Dept: TRANSPLANT | Facility: CLINIC | Age: 69
End: 2024-10-28
Payer: COMMERCIAL

## 2024-10-30 DIAGNOSIS — Z94.2 S/P LUNG TRANSPLANT (H): ICD-10-CM

## 2024-10-30 LAB
TACROLIMUS BLD-MCNC: 9.3 UG/L (ref 5–15)
TME LAST DOSE: NORMAL H
TME LAST DOSE: NORMAL H

## 2024-10-30 RX ORDER — TACROLIMUS 1 MG/1
1 CAPSULE ORAL 2 TIMES DAILY
COMMUNITY
Start: 2024-10-30

## 2024-10-30 NOTE — RESULT ENCOUNTER NOTE
Tacrolimus level 9.3 at 12 hours, on 10/25.  Goal 6-8.   Current dose 1.5 mg in AM, 1 mg in PM    Dose changed to 1 mg in AM, 1 mg in PM   Recheck level in 1 week.     Discussed with patient vie MyChart message.

## 2024-11-04 ENCOUNTER — TELEPHONE (OUTPATIENT)
Dept: TRANSPLANT | Facility: CLINIC | Age: 69
End: 2024-11-04
Payer: COMMERCIAL

## 2024-11-04 NOTE — TELEPHONE ENCOUNTER
Health Call Center    Phone Message    May a detailed message be left on voicemail: yes     Reason for Call: Other: patients  called and stated that he would like the orders placed on 10/30 to be sent to . The fax number is [phone]. Patient is wanting to get these done on Friday. Please call and speak to  when these have been faxed.       Action Taken: Message routed to:  Other: RNCC    Travel Screening: Not Applicable     Date of Service:

## 2024-11-04 NOTE — CONFIDENTIAL NOTE
Called Tyrese Back regarding lab orders faxed to [facility]; [facility] did not receive these orders. Re-Faxed [phone] and called and confirmed with lab that they received them.

## 2024-11-08 ENCOUNTER — LAB (OUTPATIENT)
Dept: LAB | Facility: CLINIC | Age: 69
End: 2024-11-08
Payer: MEDICARE

## 2024-11-08 PROCEDURE — 80197 ASSAY OF TACROLIMUS: CPT | Performed by: INTERNAL MEDICINE

## 2024-11-13 LAB
TACROLIMUS BLD-MCNC: 5.2 UG/L (ref 5–15)
TME LAST DOSE: NORMAL H
TME LAST DOSE: NORMAL H

## 2024-11-14 ENCOUNTER — TELEPHONE (OUTPATIENT)
Dept: TRANSPLANT | Facility: CLINIC | Age: 69
End: 2024-11-14
Payer: COMMERCIAL

## 2024-11-14 DIAGNOSIS — Z94.2 LUNG REPLACED BY TRANSPLANT (H): ICD-10-CM

## 2024-11-14 DIAGNOSIS — N17.9 AKI (ACUTE KIDNEY INJURY) (H): Primary | ICD-10-CM

## 2024-11-14 RX ORDER — METHYLPREDNISOLONE SODIUM SUCCINATE 40 MG/ML
40 INJECTION INTRAMUSCULAR; INTRAVENOUS
Start: 2024-11-22

## 2024-11-14 RX ORDER — MEPERIDINE HYDROCHLORIDE 25 MG/ML
25 INJECTION INTRAMUSCULAR; INTRAVENOUS; SUBCUTANEOUS
OUTPATIENT
Start: 2024-11-22

## 2024-11-14 RX ORDER — EPINEPHRINE 1 MG/ML
0.3 INJECTION, SOLUTION, CONCENTRATE INTRAVENOUS EVERY 5 MIN PRN
OUTPATIENT
Start: 2024-11-22

## 2024-11-14 RX ORDER — DIPHENHYDRAMINE HYDROCHLORIDE 50 MG/ML
25 INJECTION INTRAMUSCULAR; INTRAVENOUS
Start: 2024-11-22

## 2024-11-14 RX ORDER — TACROLIMUS 0.5 MG/1
0.5 CAPSULE ORAL EVERY EVENING
Qty: 30 CAPSULE | Refills: 11 | Status: SHIPPED | OUTPATIENT
Start: 2024-11-14

## 2024-11-14 RX ORDER — DIPHENHYDRAMINE HYDROCHLORIDE 50 MG/ML
50 INJECTION INTRAMUSCULAR; INTRAVENOUS
Start: 2024-11-22

## 2024-11-14 RX ORDER — ALBUTEROL SULFATE 90 UG/1
1-2 INHALANT RESPIRATORY (INHALATION)
Start: 2024-11-22

## 2024-11-14 RX ORDER — ALBUTEROL SULFATE 0.83 MG/ML
2.5 SOLUTION RESPIRATORY (INHALATION)
OUTPATIENT
Start: 2024-11-22

## 2024-11-14 NOTE — LETTER
PHYSICIAN ORDERS      DATE & TIME ISSUED: 2024 2:39 PM  PATIENT NAME: Melissa Elder   : 1955     [facility] MR# [if applicable]: [id number]     DIAGNOSIS:  Lung Transplant  Z94.2    One time order for tacrolimus level and BMP the week of 2024.       Any questions please call: JENNIFER Lara Transplant Coordinator     Please fax these results to [phone].       Dr. Solis Rubio MD, [facility]  Associate Professor of Medicine  Medical Director, Lung Transplantation Program Pulmonary,  Allergy, Critical Care & Sleep Division   [facility]

## 2024-11-14 NOTE — TELEPHONE ENCOUNTER
Call out to patient to discuss elevated creatinine and tacrolimus level/dosing. Patient understanding of plan noted below.     Pt's creatinine 173 on 10/25, recheck on 11/8 was 1.75. Tacrolimus level 5.4. Will plan for 1L IVF at local infusion center. Order faxed at this time. Lab orders for tacrolimus level and BMP faxed to [facility] in [place], [place].     [facility]  Phone: [phone]  Fax: [phone]  
Alert and oriented, no focal deficits, no motor or sensory deficits.

## 2024-11-18 ENCOUNTER — LAB (OUTPATIENT)
Dept: LAB | Facility: CLINIC | Age: 69
End: 2024-11-18
Payer: MEDICARE

## 2024-11-18 ENCOUNTER — TELEPHONE (OUTPATIENT)
Dept: TRANSPLANT | Facility: CLINIC | Age: 69
End: 2024-11-18
Payer: COMMERCIAL

## 2024-11-18 DIAGNOSIS — Z94.2 LUNG REPLACED BY TRANSPLANT (H): ICD-10-CM

## 2024-11-18 PROCEDURE — 80197 ASSAY OF TACROLIMUS: CPT

## 2024-11-18 NOTE — TELEPHONE ENCOUNTER
Lab called needing the order resent for BMP today, refaxed. They also report they missed doing the IgG last week but will do today.

## 2024-11-20 ENCOUNTER — TELEPHONE (OUTPATIENT)
Dept: TRANSPLANT | Facility: CLINIC | Age: 69
End: 2024-11-20
Payer: COMMERCIAL

## 2024-11-20 DIAGNOSIS — Z94.2 S/P LUNG TRANSPLANT (H): ICD-10-CM

## 2024-11-20 RX ORDER — TACROLIMUS 1 MG/1
1 CAPSULE ORAL 2 TIMES DAILY
Qty: 60 CAPSULE | Refills: 11 | Status: SHIPPED | OUTPATIENT
Start: 2024-11-20

## 2024-11-20 NOTE — TELEPHONE ENCOUNTER
Labs reviewed w/Dr. Rubio. Cr improved after 1x IVF to 1.49. Pt encouraged to continue goal of 80 oz of water daily.     IgG level on 11/18 was 578, improved from 399 on 9/30, s/p 1x dose of IVIG, pt updated.     Will plan for repeat standing labs at local clinic mid December. Orders faxed at this time to [facility].     Request sent for mail out tacrolimus kits. Pt updated.

## 2024-11-20 NOTE — LETTER
PHYSICIAN ORDERS      DATE & TIME ISSUED: 2024 11:03 AM  PATIENT NAME: Melissa Elder   : 1955     [facility] MR# [if applicable]: [id number]     DIAGNOSIS:  Lung Transplant  Z94.2    One time orders for the following labs to be drawn the week of 2024  -Tacrolimus level   -Comprehensive Metabolic Panel  -Magnesium  -Phosphorus  -CBC with platelets and differential      Any questions please call: Jane RODRIGUEZ Transplant Coordinator: [phone]    Please fax these results to [phone].       Dr. Solis Rubio MD, [facility]  Associate Professor of Medicine  Medical Director, Lung Transplantation Program Pulmonary,  Allergy, Critical Care & Sleep Division   [facility]

## 2024-11-21 LAB
TACROLIMUS BLD-MCNC: 6.2 UG/L (ref 5–15)
TME LAST DOSE: NORMAL H
TME LAST DOSE: NORMAL H

## 2024-11-22 NOTE — RESULT ENCOUNTER NOTE
Tacrolimus level 6.2 at 12 hours, on 11/18.  Goal 6-8.   Current dose 1 mg in AM, 1.5 mg in PM    No dose change at this time.   Recheck level in 1 month.     Discussed with patient via MyChart.

## 2024-11-30 ENCOUNTER — HEALTH MAINTENANCE LETTER (OUTPATIENT)
Age: 69
End: 2024-11-30

## 2024-12-16 ENCOUNTER — LAB (OUTPATIENT)
Dept: LAB | Facility: CLINIC | Age: 69
End: 2024-12-16
Payer: MEDICARE

## 2024-12-16 DIAGNOSIS — Z94.2 LUNG REPLACED BY TRANSPLANT (H): ICD-10-CM

## 2024-12-16 PROCEDURE — 80197 ASSAY OF TACROLIMUS: CPT

## 2024-12-19 LAB
TACROLIMUS BLD-MCNC: 6.9 UG/L (ref 5–15)
TME LAST DOSE: NORMAL H
TME LAST DOSE: NORMAL H

## 2024-12-20 NOTE — RESULT ENCOUNTER NOTE
Tacrolimus level 6.9 at 12 hours, on 12/16.  Goal 6-8.   Current dose 1 mg in AM, 1.5 mg in PM    No dose change at this time.   Recheck level in 1 month.     Discussed with patient via [employer] message.

## 2025-01-04 NOTE — PROGRESS NOTES
"/48 (BP Location: Left arm)   Pulse 102   Temp 99  F (37.2  C) (Axillary)   Resp 26   Ht 1.575 m (5' 2\")   Wt 73.9 kg (162 lb 14.7 oz)   SpO2 97%   BMI 29.80 kg/m       Neuro: A&Ox4.   Cardiac: Afebrile, VSS.   Respiratory: BIPAP,    GI/: Voiding spontaneously. BM this shift. (loose.)  Diet/appetite: NPO. TFs @ 50cc/hr. Denies nausea   Activity:Bedrest.  Pain: Headache , tylenol given x1  Skin: No new deficits noted. Per MD incision under breasts need to be kept dry at all times.  Lines:PI V x2   Drains: Chest tubes L/R, NJ- tube feeds, Pure wick.    R chest tube Chest to be clamped 3/3 at in am per CVTS.    Please turn off TFs at 4 am( NPO) for Abd US scheduled in am.    No new complaints.Will continue to monitor and follow plan of care.       " 100.3

## 2025-01-28 NOTE — PROGRESS NOTES
[facility]  [facility]  January 30, 2025         Assessment and Plan:   Melissa Elder is a 69 year old female s/p bilateral transplant on 2/21/22 who is seen today for routine follow up. Post op course complicated by bilateral hydropneumothoraces and bilateral effusions, disseminated Ureaplasma and transient hyperammonemia. Other history notable for HTN, mild nonbostructive CAD, paroxysmal afib, osteoporosis, GERD and colonic polyps.      1. S/p bilateral lung transplant: no new pulmonary complaints, doing housework and walking the dog to the park daily. No recent illnesses and overall is feeling very well. Sating 99% on room air. CMV/EBV 9/30 negative. CT chest reviewed today with some GGO, possible areas of air trapping in LLL (no expiratory images). Melissa could not match her best PFTs efforts, but overall results were stable to her recent baseline. 6 MWT today on RA < LLN but without hypoxia or desaturation. No acute issues  - If PFTs would decline, consider CT with expiratory views vs starting azithromycin  - Continue IS including myfortic, tacrolimus (goal 6-8) and prednisone  - Pentamidine nebs for prophyalxis , completed on 1/22 (allergy to sulfa, and dapsone caused hemolysis)    2. Positive DSA: last DSA + on 9/30 (DQB5 ) with propsera of 0.1 on 11/11.   - DSA and propsera pending for today    3. Reflux: no issues.  - Continue pantoprazole daily and famotidine BID    4. CKD:  HTN: Stage IIIa CKD. BP is 96/63 today, lower than baseline, but asymptomatic. Cr improved from prior, perhaps related to increased fluid intake and stopping of her furosemide.    - Continue carvedilol and lisinopril  - Encourage hydration 70-80 oz daily    5. Osteoporosis: Lumbar compression fracture in October 2022 after a fall. Boniva was held due to concern for possible contribution to lower extremity swelling. Patent notes she got a DEXA scan last year with her yearly Reclast  infusion.  - Vitamin D level pending  - Continue calcium/vitamin D     6. Hypogammaglobinemia: IgG of 399 in September s/p IVIG. '  - IgG pending for today    Chronic non transplant issues:  1. Paroxysmal atrial fibrillation:  2. CAD  3. DM II  4. HLD: patient was not fasting today  5. BLE edema    RTC: 4 months prior to camping  Vaccinations: UTD with covid, flu and RSV  Preventative: DEXA UTD locally; colonoscopy completed summer of 2024 (will get records)    Sharlene Larios PA-C  Pulmonary, Allergy, Critical Care and Sleep Medicine        Interval History:     Overall doing well, drove up yesterday. Has been walking and doing some house cleaning, also walking at the park daily, about 8 blocks. Also has a treadmill and bicycle in the baseline. Breathing is good, no new shortness of breath, minimal cough/clearing of the throat. Does have some sinus congestion/drainage, has been a week off claritin, using her nose spray. No chest pain or racing heart. Occasional bloating and gas related to diet, has been watching her diet with salt and sugar. Normally her BMs are soft, getting in about 70 oz water daily.          Review of Systems:   Please see HPI, otherwise the complete 10 point ROS is negative.           Past Medical and Surgical History:     Past Medical History:   Diagnosis Date    Atrial fibrillation with RVR (H)     hx of A fib related to severe COPD, does not meet anticoagulation criteria as noted    Clinical diagnosis of COVID-19 02/29/2024    COPD, severe (H)     Osteoporosis      Past Surgical History:   Procedure Laterality Date    BRONCHOSCOPY (RIGID OR FLEXIBLE), DIAGNOSTIC N/A 4/7/2022    Procedure: BRONCHOSCOPY, WITH BRONCHOALVEOLAR LAVAGE and BIOPSIES;  Surgeon: Gerson Haddad MD;  Location:  GI    BRONCHOSCOPY (RIGID OR FLEXIBLE), DIAGNOSTIC N/A 5/12/2022    Procedure: BRONCHOSCOPY, WITH BRONCHOALVEOLAR LAVAGE AND BIOPSY;  Surgeon: Melissa Landin MD;  Location:  GI    BRONCHOSCOPY (RIGID  OR FLEXIBLE), DIAGNOSTIC N/A 8/2/2022    Procedure: BRONCHOSCOPY, WITH BRONCHOALVEOLAR LAVAGE;  Surgeon: Melissa Landin MD;  Location:  GI    BRONCHOSCOPY (RIGID OR FLEXIBLE), DIAGNOSTIC N/A 10/11/2022    Procedure: BRONCHOSCOPY, WITH BRONCHOALVEOLAR LAVAGE AND BIOPSIES;  Surgeon: Angel Gallagher MD;  Location:  GI    BRONCHOSCOPY (RIGID OR FLEXIBLE), DIAGNOSTIC N/A 12/20/2022    Procedure: BRONCHOSCOPY, WITH BRONCHOALVEOLAR LAVAGE AND BIOPSIES;  Surgeon: Perlman, David Morris, MD;  Location:  GI    BRONCHOSCOPY (RIGID OR FLEXIBLE), DIAGNOSTIC N/A 3/1/2023    Procedure: BRONCHOSCOPY, WITH BRONCHOALVEOLAR LAVAGE WITH BIOPSY;  Surgeon: Lucina Thompson MD;  Location:  GI    BRONCHOSCOPY FLEXIBLE AND RIGID N/A 3/1/2022    Procedure: BRONCHOSCOPY INSPECTION;  Surgeon: Melissa Landin MD;  Location:  GI    CV CORONARY ANGIOGRAM N/A 3/27/2019    Procedure: CV CORONARY ANGIOGRAM;  Surgeon: Thierry Serrano MD;  Location:  HEART CARDIAC CATH LAB    CV RIGHT HEART CATH MEASUREMENTS RECORDED N/A 3/27/2019    Procedure: CV RIGHT HEART CATH;  Surgeon: Thierry Serrano MD;  Location:  HEART CARDIAC CATH LAB    ESOPHAGEAL IMPEDENCE FUNCTION TEST WITH 24 HOUR PH GREATER THAN 1 HOUR N/A 3/28/2019    Procedure: ESOPHAGEAL IMPEDENCE FUNCTION TEST WITH 24 HOUR PH GREATER THAN 1 HOUR;  Surgeon: Mike Henry MD;  Location:  GI    EXCISE PILONIDAL CYST, SIMPLE      IR CHEST TUBE PLACEMENT NON-TUNNELED RIGHT  3/3/2022    TONSILLECTOMY & ADENOIDECTOMY      TRANSPLANT LUNG RECIPIENT SINGLE X2 Bilateral 2/20/2022    Procedure: Bilateral Sequential Lung Transplantation, Clamshell Incision, Extracorporeal Membrane Oxgenation, Bronchoscopy, Cryoablation of Intercostal Nerves;  Surgeon: Raul Robin MD;  Location:  OR           Family History:     Family History   Problem Relation Age of Onset    Breast Cancer Mother     Colon Cancer Mother     Lung Cancer Mother     Lung Cancer Father     Lung  Cancer Maternal Aunt     Pancreatic Cancer Maternal Uncle             Social History:     Social History     Socioeconomic History    Marital status:      Spouse name: Not on file    Number of children: Not on file    Years of education: Not on file    Highest education level: Not on file   Occupational History    Not on file   Tobacco Use    Smoking status: Former     Current packs/day: 0.00     Average packs/day: 1.5 packs/day for 36.0 years (54.0 ttl pk-yrs)     Types: Cigarettes     Start date: 1970     Quit date: 2006     Years since quittin.0    Smokeless tobacco: Never   Substance and Sexual Activity    Alcohol use: Not Currently     Comment: none since transplant    Drug use: Not Currently     Comment: stated hx of marijuana, quit in     Sexual activity: Not on file   Other Topics Concern    Parent/sibling w/ CABG, MI or angioplasty before 65F 55M? Not Asked   Social History Narrative    Not on file     Social Drivers of Health     Financial Resource Strain: Low Risk  (2021)    [facility]    Overall Financial Resource Strain (CARDIA)     Difficulty of Paying Living Expenses: Not very hard   Food Insecurity: No Food Insecurity (2021)    [facility]    Hunger Vital Sign     Worried About Running Out of Food in the Last Year: Never true     Ran Out of Food in the Last Year: Never true   Transportation Needs: No Transportation Needs (2021)    [facility]    PRAPARE - Transportation     Lack of Transportation (Medical): No     Lack of Transportation (Non-Medical): No   Physical Activity: Not on file   Stress: Not on file   Social Connections: Not on file   Interpersonal Safety: Not At  Risk (6/10/2024)    [facility]     IP Custom IPV     Do you feel UNSAFE in any of your personal relationships with your family members or any other acquaintances?: No   Housing Stability: Not on file            Medications:     Current Outpatient Medications   Medication Sig Dispense Refill    albuterol (PROAIR HFA/PROVENTIL HFA/VENTOLIN HFA) 108 (90 Base) MCG/ACT inhaler Inhale 2 puffs into the lungs every 6 hours as needed for shortness of breath or wheezing 18 g 1    aspirin 81 MG EC tablet Take 1 tablet (81 mg) by mouth daily 30 tablet 11    calcium carbonate 600 mg-vitamin D 400 units (CALTRATE) 600-400 MG-UNIT per tablet Take 1 tablet by mouth daily 30 tablet 11    carvedilol (COREG) 12.5 MG tablet Take 1 tablet (12.5 mg) by mouth 2 times daily (with meals) 60 tablet 11    DILT- MG 24 hr ER beaded capsule TAKE 1 CAPSULE (240 MG) BY MOUTH DAILY 90 capsule 11    famotidine (PEPCID) 20 MG tablet TAKE 1 TABLET (20 MG) BY MOUTH 2 TIMES DAILY 180 tablet 11    glipiZIDE (GLUCOTROL) 10 MG tablet Take 10 mg by mouth.      lisinopril (ZESTRIL) 10 MG tablet Take 1 tablet (10 mg) by mouth daily 30 tablet 11    Magnesium Glycinate 665 MG CAPS Take 2 tablets by mouth 3 times daily      multivitamin w/minerals (THERA-VIT-M) tablet Take 1 tablet by mouth daily 30 tablet 11    mycophenolic acid (GENERIC EQUIVALENT) 360 MG EC tablet TAKE 2 TABLETS (720 MG) BY MOUTH 2 TIMES DAILY 360 tablet 11    pantoprazole (PROTONIX) 40 MG EC tablet Take 1 tablet (40 mg) by mouth daily 90 tablet 3    pentamidine (NEBUPENT) 300 MG neb solution Inhale 300 mg into the lungs every 28 days      predniSONE (DELTASONE) 5 MG tablet Take 1.5 tablets (7.5 mg) by mouth daily 45 tablet 11    rosuvastatin (CRESTOR) 10 MG tablet Take 2 tablets (20 mg) by mouth daily 60 tablet 11    tacrolimus (GENERIC EQUIVALENT) 0.5 MG capsule Take 1 capsule (0.5 mg) by mouth every evening. Total dose: 1 mg in the  "AM and 1.5 mg in the PM. 30 capsule 11    tacrolimus (GENERIC EQUIVALENT) 1 MG capsule Take 1 capsule (1 mg) by mouth 2 times daily. Total dose: 1 mg in AM and 1.5 mg in PM 60 capsule 11     No current facility-administered medications for this visit.            Physical Exam:   BP 96/63   Pulse 97   Resp 18   Ht 1.549 m (5' 1\")   Wt 69.4 kg (153 lb)   SpO2 99%   BMI 28.91 kg/m      GENERAL: alert, NAD  HEENT: NCAT, EOMI, no scleral icterus, oral mucosa moist and without lesions  Neck: no cervical or supraclavicular adenopathy  Lungs: moderate airflow, very few scattered crackles  CV: RRR, S1S2, no murmurs noted  Abdomen: normoactive BS, soft  Lymph: trace edema L>R  Neuro: AAO X 3, CN 2-12 grossly intact  Psychiatric: normal affect, good eye contact  Skin: no rash, jaundice or lesions on limited exam         Data:   All laboratory and imaging data reviewed.      Recent Results (from the past week)   6 minute walk test    Collection Time: 01/30/25 12:00 AM   Result Value Ref Range    6 min walk (FT) 1,130 1,140 ft    6 Min Walk (M) 344 348 m   Comprehensive metabolic panel    Collection Time: 01/30/25  7:10 AM   Result Value Ref Range    Sodium 141 135 - 145 mmol/L    Potassium 4.4 3.4 - 5.3 mmol/L    Carbon Dioxide (CO2) 26 22 - 29 mmol/L    Anion Gap 10 7 - 15 mmol/L    Urea Nitrogen 35.4 (H) 8.0 - 23.0 mg/dL    Creatinine 1.30 (H) 0.51 - 0.95 mg/dL    GFR Estimate 44 (L) >60 mL/min/1.73m2    Calcium 9.3 8.8 - 10.4 mg/dL    Chloride 105 98 - 107 mmol/L    Glucose 125 (H) 70 - 99 mg/dL    Alkaline Phosphatase 57 40 - 150 U/L    AST 13 0 - 45 U/L    ALT 14 0 - 50 U/L    Protein Total 5.9 (L) 6.4 - 8.3 g/dL    Albumin 3.9 3.5 - 5.2 g/dL    Bilirubin Total <0.2 <=1.2 mg/dL    Patient Fasting > 8hrs? Yes    CBC with platelets    Collection Time: 01/30/25  7:10 AM   Result Value Ref Range    WBC Count 7.5 4.0 - 11.0 10e3/uL    RBC Count 3.17 (L) 3.80 - 5.20 10e6/uL    Hemoglobin 8.4 (L) 11.7 - 15.7 g/dL    " Hematocrit 26.9 (L) 35.0 - 47.0 %    MCV 85 78 - 100 fL    MCH 26.5 26.5 - 33.0 pg    MCHC 31.2 (L) 31.5 - 36.5 g/dL    RDW 14.1 10.0 - 15.0 %    Platelet Count 208 150 - 450 10e3/uL   INR    Collection Time: 01/30/25  7:10 AM   Result Value Ref Range    INR 0.90 0.85 - 1.15   Lipid Profile    Collection Time: 01/30/25  7:10 AM   Result Value Ref Range    Cholesterol 166 <200 mg/dL    Triglycerides 237 (H) <150 mg/dL    Direct Measure HDL 59 >=50 mg/dL    LDL Cholesterol Calculated 60 <100 mg/dL    Non HDL Cholesterol 107 <130 mg/dL    Patient Fasting > 8hrs? Yes    TSH with free T4 reflex    Collection Time: 01/30/25  7:10 AM   Result Value Ref Range    TSH 2.22 0.30 - 4.20 uIU/mL   General PFT Lab (Please always keep checked)    Collection Time: 01/30/25  7:15 AM   Result Value Ref Range    FVC-Pred 2.37 L    FVC-Pre 2.06 L    FVC-%Pred-Pre 86 %    FEV1-Pre 1.98 L    FEV1-%Pred-Pre 105 %    FEV1FVC-Pred 79 %    FEV1FVC-Pre 96 %    FEFMax-Pred 5.26 L/sec    FEFMax-Pre 4.88 L/sec    FEFMax-%Pred-Pre 92 %    FEF2575-Pred 1.69 L/sec    FEF2575-Pre 3.35 L/sec    AZW9043-%Pred-Pre 198 %    ExpTime-Pre 4.40 sec    FIFMax-Pre 2.52 L/sec    VC-Pred 2.79 L    VC-Pre 2.20 L    VC-%Pred-Pre 78 %    IC-Pred 1.76 L    IC-Pre 1.73 L    IC-%Pred-Pre 98 %    ERV-Pred 0.88 L    ERV-Pre 0.47 L    ERV-%Pred-Pre 53 %    FEV1FEV6-Pred 79 %    FEV1FEV6-Pre 96 %    DLCOunc-Pred 17.62 ml/min/mmHg    DLCOunc-Pre 15.18 ml/min/mmHg    DLCOunc-%Pred-Pre 86 %    DLCOcor-Pre 18.90 ml/min/mmHg    DLCOcor-%Pred-Pre 107 %    VA-Pre 3.08 L    VA-%Pred-Pre 74 %    FEV1SVC-Pred 67 %    FEV1SVC-Pre 90 %    FRCPleth-Pred 2.61 L    FRCPleth-Pre 1.89 L    FRCPleth-%Pred-Pre 72 %    RVPleth-Pred 1.91 L    RVPleth-Pre 1.43 L    RVPleth-%Pred-Pre 74 %    TLCPleth-Pred 4.44 L    TLCPleth-Pre 3.63 L    TLCPleth-%Pred-Pre 81 %     PFT interpretation:  Maneuver: valid, but could not match best efforts

## 2025-01-29 DIAGNOSIS — Z94.2 LUNG REPLACED BY TRANSPLANT (H): Primary | ICD-10-CM

## 2025-01-30 ENCOUNTER — MYC MEDICAL ADVICE (OUTPATIENT)
Dept: TRANSPLANT | Facility: CLINIC | Age: 70
End: 2025-01-30

## 2025-01-30 ENCOUNTER — OFFICE VISIT (OUTPATIENT)
Dept: PULMONOLOGY | Facility: CLINIC | Age: 70
End: 2025-01-30
Attending: INTERNAL MEDICINE
Payer: COMMERCIAL

## 2025-01-30 ENCOUNTER — LAB (OUTPATIENT)
Dept: LAB | Facility: CLINIC | Age: 70
End: 2025-01-30
Attending: INTERNAL MEDICINE
Payer: COMMERCIAL

## 2025-01-30 ENCOUNTER — OFFICE VISIT (OUTPATIENT)
Dept: PULMONOLOGY | Facility: CLINIC | Age: 70
End: 2025-01-30
Attending: INTERNAL MEDICINE
Payer: MEDICARE

## 2025-01-30 VITALS
OXYGEN SATURATION: 99 % | RESPIRATION RATE: 18 BRPM | HEART RATE: 97 BPM | SYSTOLIC BLOOD PRESSURE: 96 MMHG | DIASTOLIC BLOOD PRESSURE: 63 MMHG | BODY MASS INDEX: 28.89 KG/M2 | HEIGHT: 61 IN | WEIGHT: 153 LBS

## 2025-01-30 DIAGNOSIS — Z94.2 LUNG REPLACED BY TRANSPLANT (H): ICD-10-CM

## 2025-01-30 DIAGNOSIS — E11.9 TYPE 2 DIABETES MELLITUS (H): ICD-10-CM

## 2025-01-30 DIAGNOSIS — Z79.899 IMMUNOSUPPRESSION DUE TO DRUG THERAPY: ICD-10-CM

## 2025-01-30 DIAGNOSIS — D84.821 IMMUNOSUPPRESSION DUE TO DRUG THERAPY: ICD-10-CM

## 2025-01-30 LAB
6 MIN WALK (FT): 1130 FT
6 MIN WALK (M): 344 M
ALBUMIN SERPL BCG-MCNC: 3.9 G/DL (ref 3.5–5.2)
ALP SERPL-CCNC: 57 U/L (ref 40–150)
ALT SERPL W P-5'-P-CCNC: 14 U/L (ref 0–50)
ANION GAP SERPL CALCULATED.3IONS-SCNC: 10 MMOL/L (ref 7–15)
AST SERPL W P-5'-P-CCNC: 13 U/L (ref 0–45)
BILIRUB SERPL-MCNC: <0.2 MG/DL
BUN SERPL-MCNC: 35.4 MG/DL (ref 8–23)
CALCIUM SERPL-MCNC: 9.3 MG/DL (ref 8.8–10.4)
CHLORIDE SERPL-SCNC: 105 MMOL/L (ref 98–107)
CHOLEST SERPL-MCNC: 166 MG/DL
CMV DNA SPEC NAA+PROBE-ACNC: NOT DETECTED IU/ML
CREAT SERPL-MCNC: 1.3 MG/DL (ref 0.51–0.95)
DLCOCOR-%PRED-PRE: 107 %
DLCOCOR-PRE: 18.9 ML/MIN/MMHG
DLCOUNC-%PRED-PRE: 86 %
DLCOUNC-PRE: 15.18 ML/MIN/MMHG
DLCOUNC-PRED: 17.62 ML/MIN/MMHG
EBV DNA SERPL NAA+PROBE-ACNC: NOT DETECTED IU/ML
EGFRCR SERPLBLD CKD-EPI 2021: 44 ML/MIN/1.73M2
ERV-%PRED-PRE: 53 %
ERV-PRE: 0.47 L
ERV-PRED: 0.88 L
ERYTHROCYTE [DISTWIDTH] IN BLOOD BY AUTOMATED COUNT: 14.1 % (ref 10–15)
EST. AVERAGE GLUCOSE BLD GHB EST-MCNC: 148 MG/DL
EXPTIME-PRE: 4.4 SEC
FASTING STATUS PATIENT QL REPORTED: YES
FASTING STATUS PATIENT QL REPORTED: YES
FEF2575-%PRED-PRE: 198 %
FEF2575-PRE: 3.35 L/SEC
FEF2575-PRED: 1.69 L/SEC
FEFMAX-%PRED-PRE: 92 %
FEFMAX-PRE: 4.88 L/SEC
FEFMAX-PRED: 5.26 L/SEC
FEV1-%PRED-PRE: 105 %
FEV1-PRE: 1.98 L
FEV1FEV6-PRE: 96 %
FEV1FEV6-PRED: 79 %
FEV1FVC-PRE: 96 %
FEV1FVC-PRED: 79 %
FEV1SVC-PRE: 90 %
FEV1SVC-PRED: 67 %
FIFMAX-PRE: 2.52 L/SEC
FRCPLETH-%PRED-PRE: 72 %
FRCPLETH-PRE: 1.89 L
FRCPLETH-PRED: 2.61 L
FVC-%PRED-PRE: 86 %
FVC-PRE: 2.06 L
FVC-PRED: 2.37 L
GLUCOSE SERPL-MCNC: 125 MG/DL (ref 70–99)
HBA1C MFR BLD: 6.8 %
HCO3 SERPL-SCNC: 26 MMOL/L (ref 22–29)
HCT VFR BLD AUTO: 26.9 % (ref 35–47)
HDLC SERPL-MCNC: 59 MG/DL
HGB BLD-MCNC: 8.4 G/DL (ref 11.7–15.7)
IC-%PRED-PRE: 98 %
IC-PRE: 1.73 L
IC-PRED: 1.76 L
IGG SERPL-MCNC: 369 MG/DL (ref 610–1616)
INR PPP: 0.9 (ref 0.85–1.15)
LDLC SERPL CALC-MCNC: 60 MG/DL
MCH RBC QN AUTO: 26.5 PG (ref 26.5–33)
MCHC RBC AUTO-ENTMCNC: 31.2 G/DL (ref 31.5–36.5)
MCV RBC AUTO: 85 FL (ref 78–100)
NONHDLC SERPL-MCNC: 107 MG/DL
PLATELET # BLD AUTO: 208 10E3/UL (ref 150–450)
POTASSIUM SERPL-SCNC: 4.4 MMOL/L (ref 3.4–5.3)
PROT SERPL-MCNC: 5.9 G/DL (ref 6.4–8.3)
RBC # BLD AUTO: 3.17 10E6/UL (ref 3.8–5.2)
RVPLETH-%PRED-PRE: 74 %
RVPLETH-PRE: 1.43 L
RVPLETH-PRED: 1.91 L
SODIUM SERPL-SCNC: 141 MMOL/L (ref 135–145)
SPECIMEN TYPE: NORMAL
TACROLIMUS BLD-MCNC: 8 UG/L (ref 5–15)
TLCPLETH-%PRED-PRE: 81 %
TLCPLETH-PRE: 3.63 L
TLCPLETH-PRED: 4.44 L
TME LAST DOSE: NORMAL H
TME LAST DOSE: NORMAL H
TRIGL SERPL-MCNC: 237 MG/DL
TSH SERPL DL<=0.005 MIU/L-ACNC: 2.22 UIU/ML (ref 0.3–4.2)
VA-%PRED-PRE: 74 %
VA-PRE: 3.08 L
VC-%PRED-PRE: 78 %
VC-PRE: 2.2 L
VC-PRED: 2.79 L
VIT D+METAB SERPL-MCNC: 40 NG/ML (ref 20–50)
WBC # BLD AUTO: 7.5 10E3/UL (ref 4–11)

## 2025-01-30 PROCEDURE — 36415 COLL VENOUS BLD VENIPUNCTURE: CPT | Performed by: PATHOLOGY

## 2025-01-30 PROCEDURE — 85027 COMPLETE CBC AUTOMATED: CPT | Performed by: PATHOLOGY

## 2025-01-30 PROCEDURE — 80061 LIPID PANEL: CPT | Performed by: PATHOLOGY

## 2025-01-30 PROCEDURE — 80197 ASSAY OF TACROLIMUS: CPT | Performed by: INTERNAL MEDICINE

## 2025-01-30 PROCEDURE — 80053 COMPREHEN METABOLIC PANEL: CPT | Performed by: PATHOLOGY

## 2025-01-30 PROCEDURE — 82784 ASSAY IGA/IGD/IGG/IGM EACH: CPT | Performed by: INTERNAL MEDICINE

## 2025-01-30 PROCEDURE — 87799 DETECT AGENT NOS DNA QUANT: CPT | Performed by: INTERNAL MEDICINE

## 2025-01-30 PROCEDURE — 84443 ASSAY THYROID STIM HORMONE: CPT | Performed by: PATHOLOGY

## 2025-01-30 PROCEDURE — 99000 SPECIMEN HANDLING OFFICE-LAB: CPT | Performed by: PATHOLOGY

## 2025-01-30 PROCEDURE — 83036 HEMOGLOBIN GLYCOSYLATED A1C: CPT | Performed by: INTERNAL MEDICINE

## 2025-01-30 PROCEDURE — 85610 PROTHROMBIN TIME: CPT | Performed by: PATHOLOGY

## 2025-01-30 PROCEDURE — 82306 VITAMIN D 25 HYDROXY: CPT | Performed by: INTERNAL MEDICINE

## 2025-01-30 PROCEDURE — G0463 HOSPITAL OUTPT CLINIC VISIT: HCPCS | Performed by: PHYSICIAN ASSISTANT

## 2025-01-30 ASSESSMENT — PAIN SCALES - GENERAL: PAINLEVEL_OUTOF10: NO PAIN (0)

## 2025-01-30 NOTE — PROGRESS NOTES
Transplant Coordinator Note    Reason for visit: Post lung transplant follow up visit   Coordinator: Present   Caregiver: Spouse is present.     Health concerns addressed today:  1. No cough, no breathing concerns. Struggled just a little w/doing PFTs today. PFTs stable.   2. Some sinus congestion and drainage. Pt took herself off of loratadine for 1 week, then restarted.   3. No cardiac concerns.   4. GI: occasional discomfort, likely related to diet. Daily soft BM.  5. Hydration: 72 oz daily.   6. BP 96/63 in clinic this AM, asymptomatic.   7. Labs today were not fasting.   8. Some possible air trapping noted on annual CT. Sharlene will review. No plan for CLAD therapy at this time, will reassess at next visit.  9. Dexa scan last year, pt getting them annually w/local provider.     Activity/rehab: Staying active w/walking the dog, activities of daily living, has a treadmill and bike at home.   Oxygen needs: room air  Pain management/RX: Back pain - compression fracture.   Diabetic management: NA  Risk Criteria Labs: negative 3/25/22  CMV status: D-/R-  EBV status: D+/R+  AC/asa: ASA 81mg -  PJP prophylactic: pentamidine nebs (dapsone caused anemia/RBC hemolysis) done locally, up to date.     COVID:  COVID-19 infection (yes/no, date of most recent positive test):   Status/instructions given about COVID-19 vaccine:     Pt Education: medications (use/dose/side effects), how/when to call coordinator, frequency of labs, s/s of infection/rejection, call prior to starting any new medications, lab/vital sign book    Health Maintenance:   Last colonoscopy:   Next colonoscopy due:   Dermatology:  Vaccinations this visit:     Labs, CXR, PFTs reviewed with patient  Medication record reviewed and reconciled  Questions and concerns addressed    Patient Instructions  1. Continue to hydrate with 70-80 oz fluids daily.  2. Continue to exercise daily or most days of the week.  3. Follow up with your primary care provider for annual  gender health maintenance procedures.  4. Follow up with colonoscopy schedule.  5. Follow up with annual dermatology visits.  6. It doesn't seem like the COVID vaccine is working well in lung transplant patients. A number of lung transplant patients have gotten sick with COVID even after receiving the vaccines. Based on our recent experience, it can be life-threatening to get COVID  even after being vaccinated. Please continue to act like you did not get the COVID vaccine - social distancing, wearing a mask, good hand hygiene, etc. If the people around you are vaccinated, it will help reduce the risk of you getting COVID. All members of your household should be vaccinated.  7. Continue to check your blood pressure at home.   8. Sharlene will review your chest CT and will follow up with your coordinator if there are any medication changes.   9. Next time you are at your local clinic, please ask them to send the colonoscopy report to the transplant clinic (fax: [phone]).  10. Continue to watch your sodium intake and elevate your legs when needed.     Next transplant clinic appointment: 4 months with CXR, labs and PFTs  Next lab draw: Pending labs today.    AVS printed at time of check out

## 2025-01-30 NOTE — RESULT ENCOUNTER NOTE
Tacrolimus level 8.0 at 12 hours, on 1/30.  Goal 6-8.   Current dose 1 mg in AM, 1.5 mg in PM    No dose change at this time.   Recheck level in 6 weeks.     Discussed with patient via [employer] message.

## 2025-01-30 NOTE — PATIENT INSTRUCTIONS
Patient Instructions  1. Continue to hydrate with 70-80 oz fluids daily.  2. Continue to exercise daily or most days of the week.  3. Follow up with your primary care provider for annual gender health maintenance procedures.  4. Follow up with colonoscopy schedule.  5. Follow up with annual dermatology visits.  6. It doesn't seem like the COVID vaccine is working well in lung transplant patients. A number of lung transplant patients have gotten sick with COVID even after receiving the vaccines. Based on our recent experience, it can be life-threatening to get COVID  even after being vaccinated. Please continue to act like you did not get the COVID vaccine - social distancing, wearing a mask, good hand hygiene, etc. If the people around you are vaccinated, it will help reduce the risk of you getting COVID. All members of your household should be vaccinated.  7. Continue to check your blood pressure at home.   8. Sharlene will review your chest CT and will follow up with your coordinator if there are any medication changes.   9. Next time you are at your local clinic, please ask them to send the colonoscopy report to the transplant clinic (fax: [phone]).  10. Continue to watch your sodium intake and elevate your legs when needed.     Next transplant clinic appointment: 4 months with CXR, labs and PFTs  Next lab draw: Pending labs today.    AVS printed at time of check out

## 2025-01-30 NOTE — LETTER
1/30/2025      Melissa Elder  [address] [facility] [address]      Dear Colleague,    Thank you for referring your patient, Melissa Elder, to the [facility] [place]. Please see a copy of my visit note below.    [facility]  January 30, 2025         Assessment and Plan:   Melissa Elder is a 69 year old female s/p bilateral transplant on 2/21/22 who is seen today for routine follow up. Post op course complicated by bilateral hydropneumothoraces and bilateral effusions, disseminated Ureaplasma and transient hyperammonemia. Other history notable for HTN, mild nonbostructive CAD, paroxysmal afib, osteoporosis, GERD and colonic polyps.      1. S/p bilateral lung transplant: no new pulmonary complaints, doing housework and walking the dog to the park daily. No recent illnesses and overall is feeling very well. Sating 99% on room air. CMV/EBV 9/30 negative. CT chest reviewed today with some GGO, possible areas of air trapping in LLL (no expiratory images). Melissa could not match her best PFTs efforts, but overall results were stable to her recent baseline. 6 MWT today on RA < LLN but without hypoxia or desaturation. No acute issues  - If PFTs would decline, consider CT with expiratory views vs starting azithromycin  - Continue IS including myfortic, tacrolimus (goal 6-8) and prednisone  - Pentamidine nebs for prophyalxis , completed on 1/22 (allergy to sulfa, and dapsone caused hemolysis)    2. Positive DSA: last DSA + on 9/30 (DQB5 ) with propsera of 0.1 on 11/11.   - DSA and propsera pending for today    3. Reflux: no issues.  - Continue pantoprazole daily and famotidine BID    4. CKD:  HTN: Stage IIIa CKD. BP is 96/63 today, lower than baseline, but asymptomatic. Cr improved from prior, perhaps related to increased fluid intake and stopping of her furosemide.    - Continue carvedilol and  lisinopril  - Encourage hydration 70-80 oz daily    5. Osteoporosis: Lumbar compression fracture in October 2022 after a fall. Boniva was held due to concern for possible contribution to lower extremity swelling. Patent notes she got a DEXA scan last year with her yearly Reclast infusion.  - Vitamin D level pending  - Continue calcium/vitamin D     6. Hypogammaglobinemia: IgG of 399 in September s/p IVIG. '  - IgG pending for today    Chronic non transplant issues:  1. Paroxysmal atrial fibrillation:  2. CAD  3. DM II  4. HLD: patient was not fasting today  5. BLE edema    RTC: 4 months prior to camping  Vaccinations: UTD with covid, flu and RSV  Preventative: DEXA UTD locally; colonoscopy completed summer of 2024 (will get records)    Sharlene Larios PA-C  Pulmonary, Allergy, Critical Care and Sleep Medicine        Interval History:     Overall doing well, drove up yesterday. Has been walking and doing some house cleaning, also walking at the park daily, about 8 blocks. Also has a treadmill and bicycle in the baseline. Breathing is good, no new shortness of breath, minimal cough/clearing of the throat. Does have some sinus congestion/drainage, has been a week off claritin, using her nose spray. No chest pain or racing heart. Occasional bloating and gas related to diet, has been watching her diet with salt and sugar. Normally her BMs are soft, getting in about 70 oz water daily.          Review of Systems:   Please see HPI, otherwise the complete 10 point ROS is negative.           Past Medical and Surgical History:     Past Medical History:   Diagnosis Date     Atrial fibrillation with RVR (H)     hx of A fib related to severe COPD, does not meet anticoagulation criteria as noted     Clinical diagnosis of COVID-19 02/29/2024     COPD, severe (H)      Osteoporosis      Past Surgical History:   Procedure Laterality Date     BRONCHOSCOPY (RIGID OR FLEXIBLE), DIAGNOSTIC N/A 4/7/2022    Procedure: BRONCHOSCOPY, WITH  BRONCHOALVEOLAR LAVAGE and BIOPSIES;  Surgeon: Gerson Haddad MD;  Location: [facility]     BRONCHOSCOPY (RIGID OR FLEXIBLE), DIAGNOSTIC N/A 5/12/2022    Procedure: BRONCHOSCOPY, WITH BRONCHOALVEOLAR LAVAGE AND BIOPSY;  Surgeon: Melissa Landin MD;  Location: [facility]     BRONCHOSCOPY (RIGID OR FLEXIBLE), DIAGNOSTIC N/A 8/2/2022    Procedure: BRONCHOSCOPY, WITH BRONCHOALVEOLAR LAVAGE;  Surgeon: Melissa Landin MD;  Location: [facility]     BRONCHOSCOPY (RIGID OR FLEXIBLE), DIAGNOSTIC N/A 10/11/2022    Procedure: BRONCHOSCOPY, WITH BRONCHOALVEOLAR LAVAGE AND BIOPSIES;  Surgeon: Angel Gallagher MD;  Location: [facility]     BRONCHOSCOPY (RIGID OR FLEXIBLE), DIAGNOSTIC N/A 12/20/2022    Procedure: BRONCHOSCOPY, WITH BRONCHOALVEOLAR LAVAGE AND BIOPSIES;  Surgeon: Perlman, David Morris, MD;  Location: [facility]     BRONCHOSCOPY (RIGID OR FLEXIBLE), DIAGNOSTIC N/A 3/1/2023    Procedure: BRONCHOSCOPY, WITH BRONCHOALVEOLAR LAVAGE WITH BIOPSY;  Surgeon: Lcuina Thompson MD;  Location: [facility]     BRONCHOSCOPY FLEXIBLE AND RIGID N/A 3/1/2022    Procedure: BRONCHOSCOPY INSPECTION;  Surgeon: Melissa Landin MD;  Location: [facility]     CV CORONARY ANGIOGRAM N/A 3/27/2019    Procedure: CV CORONARY ANGIOGRAM;  Surgeon: Thierry Serrano MD;  Location:  HEART CARDIAC CATH LAB     CV RIGHT HEART CATH MEASUREMENTS RECORDED N/A 3/27/2019    Procedure: CV RIGHT HEART CATH;  Surgeon: Thierry Serrano MD;  Location:  HEART CARDIAC CATH LAB     ESOPHAGEAL IMPEDENCE FUNCTION TEST WITH 24 HOUR PH GREATER THAN 1 HOUR N/A 3/28/2019    Procedure: ESOPHAGEAL IMPEDENCE FUNCTION TEST WITH 24 HOUR PH GREATER THAN 1 HOUR;  Surgeon: Mike Henry MD;  Location:  GI     EXCISE PILONIDAL CYST, SIMPLE       IR CHEST TUBE PLACEMENT NON-TUNNELED RIGHT  3/3/2022     TONSILLECTOMY & ADENOIDECTOMY       TRANSPLANT LUNG RECIPIENT SINGLE X2 Bilateral 2/20/2022    Procedure: Bilateral Sequential Lung Transplantation, Clamshell Incision, Extracorporeal Membrane  Oxgenation, Bronchoscopy, Cryoablation of Intercostal Nerves;  Surgeon: Raul Robin MD;  Location:  OR           Family History:     Family History   Problem Relation Age of Onset     Breast Cancer Mother      Colon Cancer Mother      Lung Cancer Mother      Lung Cancer Father      Lung Cancer Maternal Aunt      Pancreatic Cancer Maternal Uncle             Social History:     Social History     Socioeconomic History     Marital status:      Spouse name: Not on file     Number of children: Not on file     Years of education: Not on file     Highest education level: Not on file   Occupational History     Not on file   Tobacco Use     Smoking status: Former     Current packs/day: 0.00     Average packs/day: 1.5 packs/day for 36.0 years (54.0 ttl pk-yrs)     Types: Cigarettes     Start date: 1970     Quit date: 2006     Years since quittin.0     Smokeless tobacco: Never   Substance and Sexual Activity     Alcohol use: Not Currently     Comment: none since transplant     Drug use: Not Currently     Comment: stated hx of marijuana, quit in      Sexual activity: Not on file   Other Topics Concern     Parent/sibling w/ CABG, MI or angioplasty before 65F 55M? Not Asked   Social History Narrative     Not on file     Social Drivers of Health     Financial Resource Strain: Low Risk  (2021)    Received from [facility], [facility], [facility] [employer] [facility], [facility]    Overall Financial Resource Strain (CARDIA)      Difficulty of Paying Living Expenses: Not very hard   Food Insecurity: No Food Insecurity (2021)    Received from [employer] [facility], [facility], [facility]    Hunger Vital Sign      Worried About Running Out of Food in the Last Year: Never true      Ran Out of Food in the Last Year: Never true   Transportation Needs: No Transportation Needs (2021)    [facility] from [employer]  [facility], [facility] and [facility]    PRAPARE - Transportation      Lack of Transportation (Medical): No      Lack of Transportation (Non-Medical): No   Physical Activity: Not on file   Stress: Not on file   Social Connections: Not on file   Interpersonal Safety: Not At Risk (6/10/2024)    Received from [facility] and [facility] [employer]      Do you feel UNSAFE in any of your personal relationships with your family members or any other acquaintances?: No   Housing Stability: Not on file            Medications:     Current Outpatient Medications   Medication Sig Dispense Refill     albuterol (PROAIR HFA/PROVENTIL HFA/VENTOLIN HFA) 108 (90 Base) MCG/ACT inhaler Inhale 2 puffs into the lungs every 6 hours as needed for shortness of breath or wheezing 18 g 1     aspirin 81 MG EC tablet Take 1 tablet (81 mg) by mouth daily 30 tablet 11     calcium carbonate 600 mg-vitamin D 400 units (CALTRATE) 600-400 MG-UNIT per tablet Take 1 tablet by mouth daily 30 tablet 11     carvedilol (COREG) 12.5 MG tablet Take 1 tablet (12.5 mg) by mouth 2 times daily (with meals) 60 tablet 11     DILT- MG 24 hr ER beaded capsule TAKE 1 CAPSULE (240 MG) BY MOUTH DAILY 90 capsule 11     famotidine (PEPCID) 20 MG tablet TAKE 1 TABLET (20 MG) BY MOUTH 2 TIMES DAILY 180 tablet 11     glipiZIDE (GLUCOTROL) 10 MG tablet Take 10 mg by mouth.       lisinopril (ZESTRIL) 10 MG tablet Take 1 tablet (10 mg) by mouth daily 30 tablet 11     Magnesium Glycinate 665 MG CAPS Take 2 tablets by mouth 3 times daily       multivitamin w/minerals (THERA-VIT-M) tablet Take 1 tablet by mouth daily 30 tablet 11     mycophenolic acid (GENERIC EQUIVALENT) 360 MG EC tablet TAKE 2 TABLETS (720 MG) BY MOUTH 2 TIMES DAILY 360 tablet 11     pantoprazole (PROTONIX) 40 MG EC tablet Take 1 tablet (40 mg) by mouth daily 90 tablet 3     pentamidine (NEBUPENT) 300 MG neb solution Inhale 300 mg  "into the lungs every 28 days       predniSONE (DELTASONE) 5 MG tablet Take 1.5 tablets (7.5 mg) by mouth daily 45 tablet 11     rosuvastatin (CRESTOR) 10 MG tablet Take 2 tablets (20 mg) by mouth daily 60 tablet 11     tacrolimus (GENERIC EQUIVALENT) 0.5 MG capsule Take 1 capsule (0.5 mg) by mouth every evening. Total dose: 1 mg in the AM and 1.5 mg in the PM. 30 capsule 11     tacrolimus (GENERIC EQUIVALENT) 1 MG capsule Take 1 capsule (1 mg) by mouth 2 times daily. Total dose: 1 mg in AM and 1.5 mg in PM 60 capsule 11     No current facility-administered medications for this visit.            Physical Exam:   BP 96/63   Pulse 97   Resp 18   Ht 1.549 m (5' 1\")   Wt 69.4 kg (153 lb)   SpO2 99%   BMI 28.91 kg/m      GENERAL: alert, NAD  HEENT: NCAT, EOMI, no scleral icterus, oral mucosa moist and without lesions  Neck: no cervical or supraclavicular adenopathy  Lungs: moderate airflow, very few scattered crackles  CV: RRR, S1S2, no murmurs noted  Abdomen: normoactive BS, soft  Lymph: trace edema L>R  Neuro: AAO X 3, CN 2-12 grossly intact  Psychiatric: normal affect, good eye contact  Skin: no rash, jaundice or lesions on limited exam         Data:   All laboratory and imaging data reviewed.      Recent Results (from the past week)   6 minute walk test    Collection Time: 01/30/25 12:00 AM   Result Value Ref Range    6 min walk (FT) 1,130 1,140 ft    6 Min Walk (M) 344 348 m   Comprehensive metabolic panel    Collection Time: 01/30/25  7:10 AM   Result Value Ref Range    Sodium 141 135 - 145 mmol/L    Potassium 4.4 3.4 - 5.3 mmol/L    Carbon Dioxide (CO2) 26 22 - 29 mmol/L    Anion Gap 10 7 - 15 mmol/L    Urea Nitrogen 35.4 (H) 8.0 - 23.0 mg/dL    Creatinine 1.30 (H) 0.51 - 0.95 mg/dL    GFR Estimate 44 (L) >60 mL/min/1.73m2    Calcium 9.3 8.8 - 10.4 mg/dL    Chloride 105 98 - 107 mmol/L    Glucose 125 (H) 70 - 99 mg/dL    Alkaline Phosphatase 57 40 - 150 U/L    AST 13 0 - 45 U/L    ALT 14 0 - 50 U/L    Protein " Total 5.9 (L) 6.4 - 8.3 g/dL    Albumin 3.9 3.5 - 5.2 g/dL    Bilirubin Total <0.2 <=1.2 mg/dL    Patient Fasting > 8hrs? Yes    CBC with platelets    Collection Time: 01/30/25  7:10 AM   Result Value Ref Range    WBC Count 7.5 4.0 - 11.0 10e3/uL    RBC Count 3.17 (L) 3.80 - 5.20 10e6/uL    Hemoglobin 8.4 (L) 11.7 - 15.7 g/dL    Hematocrit 26.9 (L) 35.0 - 47.0 %    MCV 85 78 - 100 fL    MCH 26.5 26.5 - 33.0 pg    MCHC 31.2 (L) 31.5 - 36.5 g/dL    RDW 14.1 10.0 - 15.0 %    Platelet Count 208 150 - 450 10e3/uL   INR    Collection Time: 01/30/25  7:10 AM   Result Value Ref Range    INR 0.90 0.85 - 1.15   Lipid Profile    Collection Time: 01/30/25  7:10 AM   Result Value Ref Range    Cholesterol 166 <200 mg/dL    Triglycerides 237 (H) <150 mg/dL    Direct Measure HDL 59 >=50 mg/dL    LDL Cholesterol Calculated 60 <100 mg/dL    Non HDL Cholesterol 107 <130 mg/dL    Patient Fasting > 8hrs? Yes    TSH with free T4 reflex    Collection Time: 01/30/25  7:10 AM   Result Value Ref Range    TSH 2.22 0.30 - 4.20 uIU/mL   General PFT Lab (Please always keep checked)    Collection Time: 01/30/25  7:15 AM   Result Value Ref Range    FVC-Pred 2.37 L    FVC-Pre 2.06 L    FVC-%Pred-Pre 86 %    FEV1-Pre 1.98 L    FEV1-%Pred-Pre 105 %    FEV1FVC-Pred 79 %    FEV1FVC-Pre 96 %    FEFMax-Pred 5.26 L/sec    FEFMax-Pre 4.88 L/sec    FEFMax-%Pred-Pre 92 %    FEF2575-Pred 1.69 L/sec    FEF2575-Pre 3.35 L/sec    DIT4928-%Pred-Pre 198 %    ExpTime-Pre 4.40 sec    FIFMax-Pre 2.52 L/sec    VC-Pred 2.79 L    VC-Pre 2.20 L    VC-%Pred-Pre 78 %    IC-Pred 1.76 L    IC-Pre 1.73 L    IC-%Pred-Pre 98 %    ERV-Pred 0.88 L    ERV-Pre 0.47 L    ERV-%Pred-Pre 53 %    FEV1FEV6-Pred 79 %    FEV1FEV6-Pre 96 %    DLCOunc-Pred 17.62 ml/min/mmHg    DLCOunc-Pre 15.18 ml/min/mmHg    DLCOunc-%Pred-Pre 86 %    DLCOcor-Pre 18.90 ml/min/mmHg    DLCOcor-%Pred-Pre 107 %    VA-Pre 3.08 L    VA-%Pred-Pre 74 %    FEV1SVC-Pred 67 %    FEV1SVC-Pre 90 %    FRCPleth-Pred 2.61 L     FRCPleth-Pre 1.89 L    FRCPleth-%Pred-Pre 72 %    RVPleth-Pred 1.91 L    RVPleth-Pre 1.43 L    RVPleth-%Pred-Pre 74 %    TLCPleth-Pred 4.44 L    TLCPleth-Pre 3.63 L    TLCPleth-%Pred-Pre 81 %     PFT interpretation:  Maneuver: valid, but could not match best efforts    Transplant Coordinator Note    Reason for visit: Post lung transplant follow up visit   Coordinator: Present   Caregiver: Spouse is present.     Health concerns addressed today:  1. No cough, no breathing concerns. Struggled just a little w/doing PFTs today. PFTs stable.   2. Some sinus congestion and drainage. Pt took herself off of loratadine for 1 week, then restarted.   3. No cardiac concerns.   4. GI: occasional discomfort, likely related to diet. Daily soft BM.  5. Hydration: 72 oz daily.   6. BP 96/63 in clinic this AM, asymptomatic.   7. Labs today were not fasting.   8. Some possible air trapping noted on annual CT. Sharlene will review. No plan for CLAD therapy at this time, will reassess at next visit.  9. Dexa scan last year, pt getting them annually w/local provider.     Activity/rehab: Staying active w/walking the dog, activities of daily living, has a treadmill and bike at home.   Oxygen needs: room air  Pain management/RX: Back pain - compression fracture.   Diabetic management: NA  Risk Criteria Labs: negative 3/25/22  CMV status: D-/R-  EBV status: D+/R+  AC/asa: ASA 81mg -  PJP prophylactic: pentamidine nebs (dapsone caused anemia/RBC hemolysis) done locally, up to date.     COVID:  COVID-19 infection (yes/no, date of most recent positive test):   Status/instructions given about COVID-19 vaccine:     Pt Education: medications (use/dose/side effects), how/when to call coordinator, frequency of labs, s/s of infection/rejection, call prior to starting any new medications, lab/vital sign book    Health Maintenance:   Last colonoscopy:   Next colonoscopy due:   Dermatology:  Vaccinations this visit:     Labs, CXR, PFTs reviewed with  patient  Medication record reviewed and reconciled  Questions and concerns addressed    Patient Instructions  1. Continue to hydrate with 70-80 oz fluids daily.  2. Continue to exercise daily or most days of the week.  3. Follow up with your primary care provider for annual gender health maintenance procedures.  4. Follow up with colonoscopy schedule.  5. Follow up with annual dermatology visits.  6. It doesn't seem like the COVID vaccine is working well in lung transplant patients. A number of lung transplant patients have gotten sick with COVID even after receiving the vaccines. Based on our recent experience, it can be life-threatening to get COVID  even after being vaccinated. Please continue to act like you did not get the COVID vaccine - social distancing, wearing a mask, good hand hygiene, etc. If the people around you are vaccinated, it will help reduce the risk of you getting COVID. All members of your household should be vaccinated.  7. Continue to check your blood pressure at home.   8. Sharlene will review your chest CT and will follow up with your coordinator if there are any medication changes.   9. Next time you are at your local clinic, please ask them to send the colonoscopy report to the transplant clinic (fax: [phone]).  10. Continue to watch your sodium intake and elevate your legs when needed.     Next transplant clinic appointment: 4 months with CXR, labs and PFTs  Next lab draw: Pending labs today.    AVS printed at time of check out        Again, thank you for allowing me to participate in the care of your patient.        Sincerely,        Sharlene Larios PA-C    Electronically signed

## 2025-01-30 NOTE — PROGRESS NOTES
IgG today 369. Plan for IVIG x1, order faxed to [facility] in [place], [place]. Pt updated via [employer] message.

## 2025-01-30 NOTE — NURSING NOTE
Chief Complaint   Patient presents with    Lung Transplant     Follow up on Melissa Lorenzo CMA CMA at 8:19 AM on 1/30/2025

## 2025-02-04 LAB — PROSPERA TRANSPLANT MONITORING: 0.09 %

## 2025-02-04 NOTE — TELEPHONE ENCOUNTER
MyChart message received from patient, she called the local infusion center to schedule IVIG, infusion center had not received orders that were faxed on 1/30. Orders and facesheet re-faxed at this time, patient updated.

## 2025-02-10 NOTE — LETTER
PHYSICIAN ORDERS      DATE & TIME ISSUED: February 10, 2025 10:35 AM  PATIENT NAME: Melissa Elder   : 1955     [facility] ED MR# [if applicable]: [id number]     DIAGNOSIS:  Lung Transplant  Z94.2    Please check tacrolimus level (note time of last dose) week of 2/10/25.    Any questions please call: Jane [phone]    Please fax these results to [phone].        Sharlene Larios PA-C

## 2025-02-10 NOTE — PROGRESS NOTES
Pt states she received last IVIG infusion on 2/5/25.    Plan for repeat tac level this week.  Lab order faxed to [facility] per pt request.

## 2025-02-11 ENCOUNTER — MYC MEDICAL ADVICE (OUTPATIENT)
Dept: TRANSPLANT | Facility: CLINIC | Age: 70
End: 2025-02-11
Payer: COMMERCIAL

## 2025-02-11 NOTE — LETTER
PHYSICIAN ORDERS      DATE & TIME ISSUED: 2025 2:34 PM  PATIENT NAME: Melissa Elder   : 1955     [facility] MR# [if applicable]: [id number]     DIAGNOSIS:  Lung Transplant  Z94.2    Order for tacrolimus level and IgG level the week of 3/10/2025.     Any questions please call: JENNIFER Lara Transplant Coordinator: [phone]    Please fax these results to [phone].         Nicole Knox MD  Pulmonary Disease

## 2025-02-11 NOTE — LETTER
PHYSICIAN ORDERS      DATE & TIME ISSUED: 2025 11:17 AM  PATIENT NAME: Melissa Elder   : 1955     [facility] MR# [if applicable]: [id number]     DIAGNOSIS:  Lung Transplant  Z94.2    Lab order for IgG level the week of 3/10/2025.       Any questions please call: JENNIFER Lara Transplant Coordinator: [phone]    Please fax these results to [phone].        Sharlene Larios PA-C

## 2025-02-11 NOTE — PROGRESS NOTES
Pt received IVIG on 2/5. Plan to recheck IgG level in 1 month. Lab order faxed to local lab, pt updated via [employer] message.

## 2025-02-12 ENCOUNTER — LAB (OUTPATIENT)
Dept: LAB | Facility: CLINIC | Age: 70
End: 2025-02-12
Payer: MEDICARE

## 2025-02-12 DIAGNOSIS — Z94.2 LUNG REPLACED BY TRANSPLANT (H): ICD-10-CM

## 2025-02-12 PROCEDURE — 80197 ASSAY OF TACROLIMUS: CPT

## 2025-02-14 LAB
TACROLIMUS BLD-MCNC: 6.2 UG/L (ref 5–15)
TME LAST DOSE: NORMAL H
TME LAST DOSE: NORMAL H

## 2025-02-17 NOTE — RESULT ENCOUNTER NOTE
Tacrolimus level 6.2 at 2 hours, on 2/12.  Goal 6-8.   Current dose 1 mg in AM, 1.5 mg in PM    No dose change at this time.   Recheck level in 6 weeks.     Discussed with patient via [employer] message.

## 2025-02-19 LAB
DONOR IDENTIFICATION: NORMAL
DSA COMMENTS: NORMAL
DSA PRESENT: NO
DSA TEST METHOD: NORMAL
ORGAN: NORMAL
SA 1  COMMENTS: NORMAL
SA 1 CELL: NORMAL
SA 1 TEST METHOD: NORMAL
SA 2 CELL: NORMAL
SA 2 COMMENTS: NORMAL
SA 2 TEST METHOD: NORMAL
SA1 HI RISK ABY: NORMAL
SA1 MOD RISK ABY: NORMAL
SA2 HI RISK ABY: NORMAL
SA2 MOD RISK ABY: NORMAL
UNACCEPTABLE ANTIGENS: NORMAL
UNOS CPRA: 76

## 2025-03-10 ENCOUNTER — LAB (OUTPATIENT)
Dept: LAB | Facility: CLINIC | Age: 70
End: 2025-03-10
Payer: MEDICARE

## 2025-03-10 DIAGNOSIS — Z94.2 LUNG REPLACED BY TRANSPLANT (H): ICD-10-CM

## 2025-03-10 PROCEDURE — 80197 ASSAY OF TACROLIMUS: CPT

## 2025-03-13 LAB
TACROLIMUS BLD-MCNC: 6.3 UG/L (ref 5–15)
TME LAST DOSE: NORMAL H
TME LAST DOSE: NORMAL H

## 2025-03-14 NOTE — RESULT ENCOUNTER NOTE
Tacrolimus level 6.3 at 12 hours, on 3/10.  Goal 6-8.   Current dose 1 mg in AM, 1.5 mg in PM    No dose change at this time.   Recheck level w/standing labs in April at local clinic.     Discussed with patient via [email or web address] message.

## 2025-03-17 ENCOUNTER — DOCUMENTATION ONLY (OUTPATIENT)
Dept: TRANSPLANT | Facility: CLINIC | Age: 70
End: 2025-03-17
Payer: COMMERCIAL

## 2025-03-17 ASSESSMENT — ENCOUNTER SYMPTOMS: NEW SYMPTOMS OF CORONARY ARTERY DISEASE: 0

## 2025-04-05 ENCOUNTER — HEALTH MAINTENANCE LETTER (OUTPATIENT)
Age: 70
End: 2025-04-05

## 2025-04-10 ENCOUNTER — LAB (OUTPATIENT)
Dept: LAB | Facility: CLINIC | Age: 70
End: 2025-04-10
Payer: MEDICARE

## 2025-04-10 DIAGNOSIS — Z94.2 LUNG REPLACED BY TRANSPLANT (H): ICD-10-CM

## 2025-04-10 PROCEDURE — 80197 ASSAY OF TACROLIMUS: CPT

## 2025-04-14 DIAGNOSIS — I10 HYPERTENSION, UNSPECIFIED TYPE: ICD-10-CM

## 2025-04-14 LAB
TACROLIMUS BLD-MCNC: 7.5 UG/L (ref 5–15)
TME LAST DOSE: NORMAL H
TME LAST DOSE: NORMAL H

## 2025-04-14 RX ORDER — CARVEDILOL 12.5 MG/1
12.5 TABLET ORAL 2 TIMES DAILY WITH MEALS
Qty: 60 TABLET | Refills: 11 | Status: SHIPPED | OUTPATIENT
Start: 2025-04-14

## 2025-04-15 NOTE — RESULT ENCOUNTER NOTE
Tacrolimus level 7.5 at 12 hours, on 4/10.  Goal 6-8.   Current dose 1 mg in AM, 1.5 mg in PM    No dose change.    Recheck level in 6 weeks at transplant clinic appt     Discussed with patient via [employer] message.

## 2025-04-28 DIAGNOSIS — Z94.2 S/P LUNG TRANSPLANT (H): ICD-10-CM

## 2025-04-28 RX ORDER — PREDNISONE 5 MG/1
7.5 TABLET ORAL DAILY
Qty: 45 TABLET | Refills: 11 | Status: SHIPPED | OUTPATIENT
Start: 2025-04-28

## 2025-05-17 ENCOUNTER — HEALTH MAINTENANCE LETTER (OUTPATIENT)
Age: 70
End: 2025-05-17

## 2025-05-27 DIAGNOSIS — Z94.2 S/P LUNG TRANSPLANT (H): ICD-10-CM

## 2025-05-27 RX ORDER — PANTOPRAZOLE SODIUM 40 MG/1
40 TABLET, DELAYED RELEASE ORAL DAILY
Qty: 90 TABLET | Refills: 3 | Status: SHIPPED | OUTPATIENT
Start: 2025-05-27

## 2025-06-01 NOTE — PROGRESS NOTES
[facility]  [facility]  June 2, 2025         Assessment and Plan:   Melissa Elder is a 69 year old female s/p bilateral transplant on 2/21/22 who is seen today for routine follow up. Post op course complicated by bilateral hydropneumothoraces and bilateral effusions, disseminated Ureaplasma and transient hyperammonemia. Other history notable for HTN, mild nonbostructive CAD, paroxysmal afib, osteoporosis, GERD and colonic polyps.      1. S/p bilateral lung transplant: notes increased cold symptoms for the last week on top of seasonal allergies. Increased PND and intermittently productive cough in the am only, doesn't fee like she can give a sputum sample today. Sating 97% on room air. CMV 1/30 negative. CXR reviewed today and demonstrates no new pathology (CT chest at last visit with some possible mild air trapping). PFTs today didn't meet ATS guidelines, but have been trending down since last fall. Does have a h/o PsA on cultures from 2022.  - Check labs today   - Levaquin every other day X 14 days to cover prior PsA; will plan to start azithromycin (with EKG prior) after levaquin complete  - Continue IS including myfortic, tacrolimus (goal 6-8) and prednisone  - Pentamidine nebs for prophylaxis, up to date (allergy to sulfa, and dapsone caused hemolysis)    2. H/o positive DSA: last DSA + on 9/3024 (DQB5 ), negative on 1/30 with propsera of 0.09.  - DSA and propsera ordered for today    3. Reflux: no issues.  - Continue pantoprazole daily and famotidine BID    4. CKD:  HTN: Stage IIIa CKD. BP is well controlled. Cr stable over last few months.   - Continue carvedilol and lisinopril  - Encourage hydration 70-80 oz daily    5. Osteoporosis: lumbar compression fracture in October 2022 after a fall. Boniva was held due to concern for possible contribution to lower extremity swelling. Patent notes she got a DEXA scan last year with her yearly Reclast infusion.  -  Continue calcium/vitamin D  - DEXA and Reclast locally     6. Hypogammaglobinemia: IgG of 399 in September s/p IVIG. Last IgG of 369 in January s/p IVIG on 2/5. Repeat IgG > 600 in March.  - IgG ordered for today    7. Seasonal allergies: has never seen an allergist, notes increase symptoms, but notes she is not wanting to see someone currently.  - Increase Flonase to 2 sprays BID  - Take Allegra or Claritin consistently    Chronic non transplant issues:  1. Paroxysmal atrial fibrillation:  2. CAD  3. DM II  4. HLD: patient was not fasting today  5. BLE edema    RTC: 1 month  Vaccinations: UTD with covid, flu and RSV  Preventative: DEXA UTD locally; colonoscopy completed summer of 2024 (will get records)    Sharlene Larios PA-C  Pulmonary, Allergy, Critical Care and Sleep Medicine        Interval History:     Feels like things are going well, trying to walk about 10-12 blocks per day, using a treadmill daily, even for 15-20 minutes. No illnesses over the winter. Does have some eye and nose congestion with some PND. Feels like she has had a cold for the last week, no fever, but did have some chills. Notes a lot of sinus/nasal congestion, using her spray and that does help. Also has a lot of nasal drainage. Cough is productive in the am only, but doesn't feel like she can give a sample now. Denies chest congestion, feels it's higher up in her neck. No shortness of breath. No chest pain or racing heart. No bloating or gas, occasional diarrhea, which she feels it related to the magnesium. Trying to get in 5 bottles of water daily.          Review of Systems:   Please see HPI, otherwise the complete 10 point ROS is negative.           Past Medical and Surgical History:     Past Medical History:   Diagnosis Date    Atrial fibrillation with RVR (H)     hx of A fib related to severe COPD, does not meet anticoagulation criteria as noted    Clinical diagnosis of COVID-19 02/29/2024    COPD, severe (H)     Osteoporosis      Past  Surgical History:   Procedure Laterality Date    BRONCHOSCOPY (RIGID OR FLEXIBLE), DIAGNOSTIC N/A 4/7/2022    Procedure: BRONCHOSCOPY, WITH BRONCHOALVEOLAR LAVAGE and BIOPSIES;  Surgeon: Gerson Haddad MD;  Location: [facility]    BRONCHOSCOPY (RIGID OR FLEXIBLE), DIAGNOSTIC N/A 5/12/2022    Procedure: BRONCHOSCOPY, WITH BRONCHOALVEOLAR LAVAGE AND BIOPSY;  Surgeon: Melissa Landin MD;  [facility]    BRONCHOSCOPY (RIGID OR FLEXIBLE), DIAGNOSTIC N/A 8/2/2022    Procedure: BRONCHOSCOPY, WITH BRONCHOALVEOLAR LAVAGE;  Surgeon: Melissa Landin MD;  Location: [facility]    BRONCHOSCOPY (RIGID OR FLEXIBLE), DIAGNOSTIC N/A 10/11/2022    Procedure: BRONCHOSCOPY, WITH BRONCHOALVEOLAR LAVAGE AND BIOPSIES;  Surgeon: Angel Gallagher MD;  Location: [facility]    BRONCHOSCOPY (RIGID OR FLEXIBLE), DIAGNOSTIC N/A 12/20/2022    Procedure: BRONCHOSCOPY, WITH BRONCHOALVEOLAR LAVAGE AND BIOPSIES;  Surgeon: Perlman, David Morris, MD;  [facility]    BRONCHOSCOPY (RIGID OR FLEXIBLE), DIAGNOSTIC N/A 3/1/2023    Procedure: BRONCHOSCOPY, WITH BRONCHOALVEOLAR LAVAGE WITH BIOPSY;  Surgeon: Lucina Thompson MD;  Location: [facility]    BRONCHOSCOPY FLEXIBLE AND RIGID N/A 3/1/2022    Procedure: BRONCHOSCOPY INSPECTION;  Surgeon: Melissa Landin MD;  [facility]    CV CORONARY ANGIOGRAM N/A 3/27/2019    Procedure: CV CORONARY ANGIOGRAM;  Surgeon: Thierry Serrano MD;  Location:  [facility]    CV RIGHT HEART CATH MEASUREMENTS RECORDED N/A 3/27/2019    Procedure: CV RIGHT HEART CATH;  Surgeon: Thierry Serrano MD;  Location:  HE[facility] CATH LAB    ESOPHAGEAL IMPEDENCE FUNCTION TEST WITH 24 HOUR PH GREATER THAN 1 HOUR N/A 3/28/2019    Procedure: ESOPHAGEAL IMPEDENCE FUNCTION TEST WITH 24 HOUR PH GREATER THAN 1 HOUR;  Surgeon: Mike Henry MD;  Location:  GI    EXCISE PILONIDAL CYST, SIMPLE      IR CHEST TUBE PLACEMENT NON-TUNNELED RIGHT  3/3/2022    TONSILLECTOMY & ADENOIDECTOMY      TRANSPLANT LUNG RECIPIENT  SINGLE X2 Bilateral 2022    Procedure: Bilateral Sequential Lung Transplantation, Clamshell Incision, Extracorporeal Membrane Oxgenation, Bronchoscopy, Cryoablation of Intercostal Nerves;  Surgeon: Raul Robin MD;  Location:  OR           Family History:     Family History   Problem Relation Age of Onset    Breast Cancer Mother     Colon Cancer Mother     Lung Cancer Mother     Lung Cancer Father     Lung Cancer Maternal Aunt     Pancreatic Cancer Maternal Uncle             Social History:     Social History     Socioeconomic History    Marital status:      Spouse name: Not on file    Number of children: Not on file    Years of education: Not on file    Highest education level: Not on file   Occupational History    Not on file   Tobacco Use    Smoking status: Former     Current packs/day: 0.00     Average packs/day: 1.5 packs/day for 36.0 years (54.0 ttl pk-yrs)     Types: Cigarettes     Start date: 1970     Quit date: 2006     Years since quittin.4    Smokeless tobacco: Never   Substance and Sexual Activity    Alcohol use: Not Currently     Comment: none since transplant    Drug use: Not Currently     Comment: stated hx of marijuana, quit in     Sexual activity: Not on file   Other Topics Concern    Parent/sibling w/ CABG, MI or angioplasty before 65F 55M? Not Asked   Social History Narrative    Not on file     Social Drivers of Health     Financial Resource Strain: Low Risk  (2021)    [facility] [facility]    Overall Financial Resource Strain (CARDIA)     Difficulty of Paying Living Expenses: Not very hard   Food Insecurity: No Food Insecurity (2021)    [facility]    Hunger Vital Sign     Worried About Running Out of Food in the Last Year: Never true     Ran Out of Food in the Last Year: Never true   Transportation Needs: No Transportation Needs (2021)    Received  from [facility] and [facility]    PRAPARE - Transportation     Lack of Transportation (Medical): No     Lack of Transportation (Non-Medical): No   Physical Activity: Not on file   Stress: Not on file   Social Connections: Not on file   Interpersonal Safety: Not At Risk (6/10/2024)    Received from [facility] and [facility]     IP Custom IPV     Do you feel UNSAFE in any of your personal relationships with your family members or any other acquaintances?: No   Housing Stability: Not on file            Medications:     Current Outpatient Medications   Medication Sig Dispense Refill    albuterol (PROAIR HFA/PROVENTIL HFA/VENTOLIN HFA) 108 (90 Base) MCG/ACT inhaler Inhale 2 puffs into the lungs every 6 hours as needed for shortness of breath or wheezing. 18 g 3    fluticasone (FLONASE) 50 MCG/ACT nasal spray Spray 2 sprays into both nostrils 2 times daily.      levofloxacin (LEVAQUIN) 750 MG tablet Take 1 tablet (750 mg) by mouth every other day for 14 days. 7 tablet 0    aspirin 81 MG EC tablet Take 1 tablet (81 mg) by mouth daily 30 tablet 11    calcium carbonate 600 mg-vitamin D 400 units (CALTRATE) 600-400 MG-UNIT per tablet Take 1 tablet by mouth daily 30 tablet 11    carvedilol (COREG) 12.5 MG tablet Take 1 tablet (12.5 mg) by mouth 2 times daily (with meals). 60 tablet 11    DILT- MG 24 hr ER beaded capsule TAKE 1 CAPSULE (240 MG) BY MOUTH DAILY 90 capsule 11    famotidine (PEPCID) 20 MG tablet TAKE 1 TABLET (20 MG) BY MOUTH 2 TIMES DAILY 180 tablet 11    glipiZIDE (GLUCOTROL) 10 MG tablet Take 10 mg by mouth.      lisinopril (ZESTRIL) 10 MG tablet Take 1 tablet (10 mg) by mouth daily 30 tablet 11    Magnesium Glycinate 665 MG CAPS Take 2 tablets by mouth 3 times daily      multivitamin w/minerals (THERA-VIT-M) tablet Take 1 tablet by mouth daily 30 tablet 11    mycophenolic acid (GENERIC EQUIVALENT) 360 MG EC tablet TAKE 2 TABLETS (720 MG) BY MOUTH 2 TIMES DAILY  "360 tablet 11    pantoprazole (PROTONIX) 40 MG EC tablet Take 1 tablet (40 mg) by mouth daily. 90 tablet 3    pentamidine (NEBUPENT) 300 MG neb solution Inhale 300 mg into the lungs every 28 days      predniSONE (DELTASONE) 5 MG tablet Take 1.5 tablets (7.5 mg) by mouth daily. 45 tablet 11    rosuvastatin (CRESTOR) 10 MG tablet Take 2 tablets (20 mg) by mouth daily 60 tablet 11    tacrolimus (GENERIC EQUIVALENT) 0.5 MG capsule Take 1 capsule (0.5 mg) by mouth every evening. Total dose: 1 mg in the AM and 1.5 mg in the PM. 30 capsule 11    tacrolimus (GENERIC EQUIVALENT) 1 MG capsule Take 1 capsule (1 mg) by mouth 2 times daily. Total dose: 1 mg in AM and 1.5 mg in PM 60 capsule 11     No current facility-administered medications for this visit.            Physical Exam:   /71 (BP Location: Right arm, Patient Position: Chair, Cuff Size: Adult Large)   Pulse 80   Ht 1.549 m (5' 1\")   Wt 71.4 kg (157 lb 6.6 oz)   SpO2 97%   BMI 29.74 kg/m      GENERAL: alert, NAD  HEENT: NCAT, EOMI, no scleral icterus, oral mucosa moist and without lesions  Neck: no cervical or supraclavicular adenopathy  Lungs: moderate airflow, scattered left lower crackles  CV: RRR, S1S2, no murmurs noted  Abdomen: normoactive BS, soft  Lymph: trace edema   Neuro: AAO X 3, CN 2-12 grossly intact  Psychiatric: normal affect, good eye contact  Skin: no rash, jaundice or lesions on limited exam         Data:   All laboratory and imaging data reviewed.      Recent Results (from the past week)   Comprehensive metabolic panel    Collection Time: 06/02/25  7:35 AM   Result Value Ref Range    Sodium 144 135 - 145 mmol/L    Potassium 4.3 3.4 - 5.3 mmol/L    Carbon Dioxide (CO2) 25 22 - 29 mmol/L    Anion Gap 12 7 - 15 mmol/L    Urea Nitrogen 37.1 (H) 8.0 - 23.0 mg/dL    Creatinine 1.36 (H) 0.51 - 0.95 mg/dL    GFR Estimate 42 (L) >60 mL/min/1.73m2    Calcium 9.6 8.8 - 10.4 mg/dL    Chloride 107 98 - 107 mmol/L    Glucose 161 (H) 70 - 99 mg/dL    " Alkaline Phosphatase 64 40 - 150 U/L    AST 13 0 - 45 U/L    ALT 15 0 - 50 U/L    Protein Total 6.0 (L) 6.4 - 8.3 g/dL    Albumin 3.8 3.5 - 5.2 g/dL    Bilirubin Total <0.2 <=1.2 mg/dL   Magnesium    Collection Time: 06/02/25  7:35 AM   Result Value Ref Range    Magnesium 1.4 (L) 1.7 - 2.3 mg/dL   Phosphorus    Collection Time: 06/02/25  7:35 AM   Result Value Ref Range    Phosphorus 4.7 (H) 2.5 - 4.5 mg/dL   CBC with platelets and differential    Collection Time: 06/02/25  7:35 AM   Result Value Ref Range    WBC Count 5.2 4.0 - 11.0 10e3/uL    RBC Count 3.23 (L) 3.80 - 5.20 10e6/uL    Hemoglobin 8.6 (L) 11.7 - 15.7 g/dL    Hematocrit 27.6 (L) 35.0 - 47.0 %    MCV 85 78 - 100 fL    MCH 26.6 26.5 - 33.0 pg    MCHC 31.2 (L) 31.5 - 36.5 g/dL    RDW 15.7 (H) 10.0 - 15.0 %    Platelet Count 201 150 - 450 10e3/uL    % Neutrophils 49 %    % Lymphocytes 29 %    % Monocytes 7 %    % Eosinophils 9 %    % Basophils 1 %    % Immature Granulocytes 5 %    NRBCs per 100 WBC 0 <1 /100    Absolute Neutrophils 2.5 1.6 - 8.3 10e3/uL    Absolute Lymphocytes 1.5 0.8 - 5.3 10e3/uL    Absolute Monocytes 0.4 0.0 - 1.3 10e3/uL    Absolute Eosinophils 0.5 0.0 - 0.7 10e3/uL    Absolute Basophils 0.0 0.0 - 0.2 10e3/uL    Absolute Immature Granulocytes 0.3 <=0.4 10e3/uL    Absolute NRBCs 0.0 10e3/uL   General PFT Lab (Please always keep checked)    Collection Time: 06/02/25  7:42 AM   Result Value Ref Range    FVC-Pred 2.36 L    FVC-Pre 2.04 L    FVC-%Pred-Pre 86 %    FEV1-Pre 1.71 L    FEV1-%Pred-Pre 91 %    FEV1FVC-Pred 79 %    FEV1FVC-Pre 84 %    FEFMax-Pred 5.23 L/sec    FEFMax-Pre 4.01 L/sec    FEFMax-%Pred-Pre 76 %    FEF2575-Pred 1.68 L/sec    FEF2575-Pre 2.03 L/sec    LXK1440-%Pred-Pre 121 %    ExpTime-Pre 3.52 sec    FIFMax-Pre 2.14 L/sec    VC-Pred 2.79 L    VC-Pre 1.71 L    VC-%Pred-Pre 61 %    IC-Pred 1.75 L    IC-Pre 1.22 L    IC-%Pred-Pre 69 %    ERV-Pred 0.88 L    ERV-Pre 0.49 L    ERV-%Pred-Pre 56 %    FEV1FEV6-Pred 79 %     FEV1FEV6-Pre 88 %    DLCOunc-Pred 17.60 ml/min/mmHg    DLCOunc-Pre 14.90 ml/min/mmHg    DLCOunc-%Pred-Pre 84 %    DLCOcor-Pre 18.32 ml/min/mmHg    DLCOcor-%Pred-Pre 104 %    VA-Pre 2.53 L    VA-%Pred-Pre 61 %    FEV1SVC-Pred 67 %    FEV1SVC-Pre 100 %     PFT interpretation:  Maneuver: valid, but ATS not met

## 2025-06-02 ENCOUNTER — OFFICE VISIT (OUTPATIENT)
Dept: PULMONOLOGY | Facility: CLINIC | Age: 70
End: 2025-06-02
Attending: PHYSICIAN ASSISTANT
Payer: MEDICARE

## 2025-06-02 ENCOUNTER — LAB (OUTPATIENT)
Dept: LAB | Facility: CLINIC | Age: 70
End: 2025-06-02
Payer: COMMERCIAL

## 2025-06-02 ENCOUNTER — RESULTS FOLLOW-UP (OUTPATIENT)
Dept: TRANSPLANT | Facility: CLINIC | Age: 70
End: 2025-06-02

## 2025-06-02 ENCOUNTER — LAB (OUTPATIENT)
Dept: LAB | Facility: CLINIC | Age: 70
End: 2025-06-02
Attending: PHYSICIAN ASSISTANT
Payer: COMMERCIAL

## 2025-06-02 ENCOUNTER — ANCILLARY PROCEDURE (OUTPATIENT)
Dept: GENERAL RADIOLOGY | Facility: CLINIC | Age: 70
End: 2025-06-02
Attending: PHYSICIAN ASSISTANT
Payer: COMMERCIAL

## 2025-06-02 VITALS
OXYGEN SATURATION: 97 % | BODY MASS INDEX: 29.72 KG/M2 | WEIGHT: 157.41 LBS | SYSTOLIC BLOOD PRESSURE: 127 MMHG | HEIGHT: 61 IN | DIASTOLIC BLOOD PRESSURE: 71 MMHG | HEART RATE: 80 BPM

## 2025-06-02 DIAGNOSIS — Z94.2 S/P LUNG TRANSPLANT (H): ICD-10-CM

## 2025-06-02 DIAGNOSIS — Z94.2 LUNG REPLACED BY TRANSPLANT (H): ICD-10-CM

## 2025-06-02 LAB
ALBUMIN SERPL BCG-MCNC: 3.8 G/DL (ref 3.5–5.2)
ALP SERPL-CCNC: 64 U/L (ref 40–150)
ALT SERPL W P-5'-P-CCNC: 15 U/L (ref 0–50)
ANION GAP SERPL CALCULATED.3IONS-SCNC: 12 MMOL/L (ref 7–15)
AST SERPL W P-5'-P-CCNC: 13 U/L (ref 0–45)
BASOPHILS # BLD AUTO: 0 10E3/UL (ref 0–0.2)
BASOPHILS NFR BLD AUTO: 1 %
BILIRUB SERPL-MCNC: <0.2 MG/DL
BUN SERPL-MCNC: 37.1 MG/DL (ref 8–23)
CALCIUM SERPL-MCNC: 9.6 MG/DL (ref 8.8–10.4)
CHLORIDE SERPL-SCNC: 107 MMOL/L (ref 98–107)
CMV DNA SPEC NAA+PROBE-ACNC: NOT DETECTED IU/ML
CREAT SERPL-MCNC: 1.36 MG/DL (ref 0.51–0.95)
DLCOCOR-%PRED-PRE: 104 %
DLCOCOR-PRE: 18.32 ML/MIN/MMHG
DLCOUNC-%PRED-PRE: 84 %
DLCOUNC-PRE: 14.9 ML/MIN/MMHG
DLCOUNC-PRED: 17.6 ML/MIN/MMHG
EBV DNA SERPL NAA+PROBE-ACNC: NOT DETECTED IU/ML
EBV DNA SERPL NAA+PROBE-ACNC: NOT DETECTED IU/ML
EGFRCR SERPLBLD CKD-EPI 2021: 42 ML/MIN/1.73M2
EOSINOPHIL # BLD AUTO: 0.5 10E3/UL (ref 0–0.7)
EOSINOPHIL NFR BLD AUTO: 9 %
ERV-%PRED-PRE: 56 %
ERV-PRE: 0.49 L
ERV-PRED: 0.88 L
ERYTHROCYTE [DISTWIDTH] IN BLOOD BY AUTOMATED COUNT: 15.7 % (ref 10–15)
EXPTIME-PRE: 3.52 SEC
FEF2575-%PRED-PRE: 121 %
FEF2575-PRE: 2.03 L/SEC
FEF2575-PRED: 1.68 L/SEC
FEFMAX-%PRED-PRE: 76 %
FEFMAX-PRE: 4.01 L/SEC
FEFMAX-PRED: 5.23 L/SEC
FEV1-%PRED-PRE: 91 %
FEV1-PRE: 1.71 L
FEV1FEV6-PRE: 88 %
FEV1FEV6-PRED: 79 %
FEV1FVC-PRE: 84 %
FEV1FVC-PRED: 79 %
FEV1SVC-PRE: 100 %
FEV1SVC-PRED: 67 %
FIFMAX-PRE: 2.14 L/SEC
FVC-%PRED-PRE: 86 %
FVC-PRE: 2.04 L
FVC-PRED: 2.36 L
GLUCOSE SERPL-MCNC: 161 MG/DL (ref 70–99)
HCO3 SERPL-SCNC: 25 MMOL/L (ref 22–29)
HCT VFR BLD AUTO: 27.6 % (ref 35–47)
HGB BLD-MCNC: 8.6 G/DL (ref 11.7–15.7)
IC-%PRED-PRE: 69 %
IC-PRE: 1.22 L
IC-PRED: 1.75 L
IMM GRANULOCYTES # BLD: 0.3 10E3/UL
IMM GRANULOCYTES NFR BLD: 5 %
LYMPHOCYTES # BLD AUTO: 1.5 10E3/UL (ref 0.8–5.3)
LYMPHOCYTES NFR BLD AUTO: 29 %
MAGNESIUM SERPL-MCNC: 1.4 MG/DL (ref 1.7–2.3)
MCH RBC QN AUTO: 26.6 PG (ref 26.5–33)
MCHC RBC AUTO-ENTMCNC: 31.2 G/DL (ref 31.5–36.5)
MCV RBC AUTO: 85 FL (ref 78–100)
MONOCYTES # BLD AUTO: 0.4 10E3/UL (ref 0–1.3)
MONOCYTES NFR BLD AUTO: 7 %
NEUTROPHILS # BLD AUTO: 2.5 10E3/UL (ref 1.6–8.3)
NEUTROPHILS NFR BLD AUTO: 49 %
NRBC # BLD AUTO: 0 10E3/UL
NRBC BLD AUTO-RTO: 0 /100
PHOSPHATE SERPL-MCNC: 4.7 MG/DL (ref 2.5–4.5)
PLATELET # BLD AUTO: 201 10E3/UL (ref 150–450)
POTASSIUM SERPL-SCNC: 4.3 MMOL/L (ref 3.4–5.3)
PROT SERPL-MCNC: 6 G/DL (ref 6.4–8.3)
RBC # BLD AUTO: 3.23 10E6/UL (ref 3.8–5.2)
SODIUM SERPL-SCNC: 144 MMOL/L (ref 135–145)
SPECIMEN TYPE: NORMAL
TACROLIMUS BLD-MCNC: 8.4 UG/L (ref 5–15)
TME LAST DOSE: NORMAL H
TME LAST DOSE: NORMAL H
VA-%PRED-PRE: 61 %
VA-PRE: 2.53 L
VC-%PRED-PRE: 61 %
VC-PRE: 1.71 L
VC-PRED: 2.79 L
WBC # BLD AUTO: 5.2 10E3/UL (ref 4–11)

## 2025-06-02 PROCEDURE — 71046 X-RAY EXAM CHEST 2 VIEWS: CPT | Mod: GC | Performed by: RADIOLOGY

## 2025-06-02 PROCEDURE — 80053 COMPREHEN METABOLIC PANEL: CPT | Performed by: PATHOLOGY

## 2025-06-02 PROCEDURE — 99000 SPECIMEN HANDLING OFFICE-LAB: CPT | Performed by: PATHOLOGY

## 2025-06-02 PROCEDURE — 87799 DETECT AGENT NOS DNA QUANT: CPT | Performed by: INTERNAL MEDICINE

## 2025-06-02 PROCEDURE — 3078F DIAST BP <80 MM HG: CPT | Performed by: PHYSICIAN ASSISTANT

## 2025-06-02 PROCEDURE — 83735 ASSAY OF MAGNESIUM: CPT | Performed by: PATHOLOGY

## 2025-06-02 PROCEDURE — 36415 COLL VENOUS BLD VENIPUNCTURE: CPT | Performed by: PATHOLOGY

## 2025-06-02 PROCEDURE — 3074F SYST BP LT 130 MM HG: CPT | Performed by: PHYSICIAN ASSISTANT

## 2025-06-02 PROCEDURE — 80197 ASSAY OF TACROLIMUS: CPT | Performed by: INTERNAL MEDICINE

## 2025-06-02 PROCEDURE — 82784 ASSAY IGA/IGD/IGG/IGM EACH: CPT | Performed by: PHYSICIAN ASSISTANT

## 2025-06-02 PROCEDURE — 84100 ASSAY OF PHOSPHORUS: CPT | Performed by: PATHOLOGY

## 2025-06-02 PROCEDURE — 94375 RESPIRATORY FLOW VOLUME LOOP: CPT | Performed by: PHYSICIAN ASSISTANT

## 2025-06-02 PROCEDURE — 94729 DIFFUSING CAPACITY: CPT | Performed by: PHYSICIAN ASSISTANT

## 2025-06-02 PROCEDURE — 87799 DETECT AGENT NOS DNA QUANT: CPT | Performed by: PHYSICIAN ASSISTANT

## 2025-06-02 PROCEDURE — 99214 OFFICE O/P EST MOD 30 MIN: CPT | Mod: 25 | Performed by: PHYSICIAN ASSISTANT

## 2025-06-02 PROCEDURE — G0463 HOSPITAL OUTPT CLINIC VISIT: HCPCS | Performed by: PHYSICIAN ASSISTANT

## 2025-06-02 PROCEDURE — 85025 COMPLETE CBC W/AUTO DIFF WBC: CPT | Performed by: PATHOLOGY

## 2025-06-02 PROCEDURE — 86352 CELL FUNCTION ASSAY W/STIM: CPT | Performed by: PHYSICIAN ASSISTANT

## 2025-06-02 PROCEDURE — 1126F AMNT PAIN NOTED NONE PRSNT: CPT | Performed by: PHYSICIAN ASSISTANT

## 2025-06-02 RX ORDER — ALBUTEROL SULFATE 90 UG/1
2 INHALANT RESPIRATORY (INHALATION) EVERY 6 HOURS PRN
Qty: 18 G | Refills: 3 | Status: SHIPPED | OUTPATIENT
Start: 2025-06-02

## 2025-06-02 RX ORDER — LEVOFLOXACIN 750 MG/1
750 TABLET, FILM COATED ORAL EVERY OTHER DAY
Qty: 7 TABLET | Refills: 0 | Status: SHIPPED | OUTPATIENT
Start: 2025-06-02 | End: 2025-06-16

## 2025-06-02 RX ORDER — FLUTICASONE PROPIONATE 50 MCG
2 SPRAY, SUSPENSION (ML) NASAL 2 TIMES DAILY
Status: SHIPPED
Start: 2025-06-02

## 2025-06-02 ASSESSMENT — PAIN SCALES - GENERAL: PAINLEVEL_OUTOF10: NO PAIN (0)

## 2025-06-02 NOTE — PATIENT INSTRUCTIONS
Patient Instructions  1. Continue to hydrate with 60-70 oz fluids daily.  2. Continue to exercise daily or most days of the week.  3. Follow up with your primary care provider for annual gender health maintenance procedures.  4. Follow up with colonoscopy schedule.  5. Follow up with annual dermatology visits.  6. It doesn't seem like the COVID vaccine is working well in lung transplant patients. A number of lung transplant patients have gotten sick with COVID even after receiving the vaccines. Based on our recent experience, it can be life-threatening to get COVID  even after being vaccinated. Please continue to act like you did not get the COVID vaccine - social distancing, wearing a mask, good hand hygiene, etc. If the people around you are vaccinated, it will help reduce the risk of you getting COVID. All members of your household should be vaccinated.  7. We will recheck a Magnesium level later this week at your local lab, we will follow up on your magnesium dose after that lab.   8. We are going to get additional labs on your way out: DSA, Prospera, Immuknow, and IgG.   9. Increased the Flonase to two sprays to each nostril twice daily while you are feeling like your allergies are bad. Consistently take an allergy medication.   10. Consider seeing an allergist provider locally.   11. Start the Levaquin antibiotic today, this will be every other day, once this is complete, we will do an EKG and start you on Azithromycin 250 mg daily to help stabilize your lung function. Jane will reach out to you in about 2 weeks for next steps.   12. We will work on your follow up appt in 1 month.     Next transplant clinic appointment:  1 month with CXR, labs and PFTs  Next lab draw: Later this week, Magnesium level.     AVS printed at time of check out

## 2025-06-02 NOTE — LETTER
6/2/2025      Melissa Elder  [address]  [facility] [address]      Dear Colleague,    Thank you for referring your patient, Melissa Elder, to the [facility] [place]. Please see a copy of my visit note below.    [facility]  June 2, 2025         Assessment and Plan:   Melissa Elder is a 69 year old female s/p bilateral transplant on 2/21/22 who is seen today for routine follow up. Post op course complicated by bilateral hydropneumothoraces and bilateral effusions, disseminated Ureaplasma and transient hyperammonemia. Other history notable for HTN, mild nonbostructive CAD, paroxysmal afib, osteoporosis, GERD and colonic polyps.      1. S/p bilateral lung transplant: notes increased cold symptoms for the last week on top of seasonal allergies. Increased PND and intermittently productive cough in the am only, doesn't fee like she can give a sputum sample today. Sating 97% on room air. CMV 1/30 negative. CXR reviewed today and demonstrates no new pathology (CT chest at last visit with some possible mild air trapping). PFTs today didn't meet ATS guidelines, but have been trending down since last fall. Does have a h/o PsA on cultures from 2022.  - Check labs today   - Levaquin every other day X 14 days to cover prior PsA; will plan to start azithromycin (with EKG prior) after levaquin complete  - Continue IS including myfortic, tacrolimus (goal 6-8) and prednisone  - Pentamidine nebs for prophylaxis, up to date (allergy to sulfa, and dapsone caused hemolysis)    2. H/o positive DSA: last DSA + on 9/3024 (DQB5 ), negative on 1/30 with propsera of 0.09.  - DSA and propsera ordered for today    3. Reflux: no issues.  - Continue pantoprazole daily and famotidine BID    4. CKD:  HTN: Stage IIIa CKD. BP is well controlled. Cr stable over last few months.   - Continue carvedilol and lisinopril  - Encourage  hydration 70-80 oz daily    5. Osteoporosis: lumbar compression fracture in October 2022 after a fall. Boniva was held due to concern for possible contribution to lower extremity swelling. Patent notes she got a DEXA scan last year with her yearly Reclast infusion.  - Continue calcium/vitamin D  - DEXA and Reclast locally     6. Hypogammaglobinemia: IgG of 399 in September s/p IVIG. Last IgG of 369 in January s/p IVIG on 2/5. Repeat IgG > 600 in March.  - IgG ordered for today    7. Seasonal allergies: has never seen an allergist, notes increase symptoms, but notes she is not wanting to see someone currently.  - Increase Flonase to 2 sprays BID  - Take Allegra or Claritin consistently    Chronic non transplant issues:  1. Paroxysmal atrial fibrillation:  2. CAD  3. DM II  4. HLD: patient was not fasting today  5. BLE edema    RTC: 1 month  Vaccinations: UTD with covid, flu and RSV  Preventative: DEXA UTD locally; colonoscopy completed summer of 2024 (will get records)    Sharlene Larios PA-C  Pulmonary, Allergy, Critical Care and Sleep Medicine        Interval History:     Feels like things are going well, trying to walk about 10-12 blocks per day, using a treadmill daily, even for 15-20 minutes. No illnesses over the winter. Does have some eye and nose congestion with some PND. Feels like she has had a cold for the last week, no fever, but did have some chills. Notes a lot of sinus/nasal congestion, using her spray and that does help. Also has a lot of nasal drainage. Cough is productive in the am only, but doesn't feel like she can give a sample now. Denies chest congestion, feels it's higher up in her neck. No shortness of breath. No chest pain or racing heart. No bloating or gas, occasional diarrhea, which she feels it related to the magnesium. Trying to get in 5 bottles of water daily.          Review of Systems:   Please see HPI, otherwise the complete 10 point ROS is negative.           Past Medical and Surgical  History:     Past Medical History:   Diagnosis Date     Atrial fibrillation with RVR (H)     hx of A fib related to severe COPD, does not meet anticoagulation criteria as noted     Clinical diagnosis of COVID-19 02/29/2024     COPD, severe (H)      Osteoporosis      Past Surgical History:   Procedure Laterality Date     BRONCHOSCOPY (RIGID OR FLEXIBLE), DIAGNOSTIC N/A 4/7/2022    Procedure: BRONCHOSCOPY, WITH BRONCHOALVEOLAR LAVAGE and BIOPSIES;  Surgeon: Gerson Haddad MD;  Location: [facility]     BRONCHOSCOPY (RIGID OR FLEXIBLE), DIAGNOSTIC N/A 5/12/2022    Procedure: BRONCHOSCOPY, WITH BRONCHOALVEOLAR LAVAGE AND BIOPSY;  Surgeon: Melissa Landin MD;  Location: [facility]     BRONCHOSCOPY (RIGID OR FLEXIBLE), DIAGNOSTIC N/A 8/2/2022    Procedure: BRONCHOSCOPY, WITH BRONCHOALVEOLAR LAVAGE;  Surgeon: Melissa Landin MD;  Location: [facility]     BRONCHOSCOPY (RIGID OR FLEXIBLE), DIAGNOSTIC N/A 10/11/2022    Procedure: BRONCHOSCOPY, WITH BRONCHOALVEOLAR LAVAGE AND BIOPSIES;  Surgeon: Angel Gallagher MD;  Location: [facility]     BRONCHOSCOPY (RIGID OR FLEXIBLE), DIAGNOSTIC N/A 12/20/2022    Procedure: BRONCHOSCOPY, WITH BRONCHOALVEOLAR LAVAGE AND BIOPSIES;  Surgeon: Perlman, David Morris, MD;  Location: [facility]     BRONCHOSCOPY (RIGID OR FLEXIBLE), DIAGNOSTIC N/A 3/1/2023    Procedure: BRONCHOSCOPY, WITH BRONCHOALVEOLAR LAVAGE WITH BIOPSY;  Surgeon: Lucina Thompson MD;  Location: [facility]     BRONCHOSCOPY FLEXIBLE AND RIGID N/A 3/1/2022    Procedure: BRONCHOSCOPY INSPECTION;  Surgeon: Melissa Landin MD;  Location: [facility]     CV CORONARY ANGIOGRAM N/A 3/27/2019    Procedure: CV CORONARY ANGIOGRAM;  Surgeon: Thierry Serrano MD;  Location:  HEART CARDIAC CATH LAB     CV RIGHT HEART CATH MEASUREMENTS RECORDED N/A 3/27/2019    Procedure: CV RIGHT HEART CATH;  Surgeon: Thierry Serrano MD;  Location:  HEART CARDIAC CATH LAB     ESOPHAGEAL IMPEDENCE FUNCTION TEST WITH 24 HOUR PH GREATER THAN 1 HOUR N/A 3/28/2019    Procedure:  ESOPHAGEAL IMPEDENCE FUNCTION TEST WITH 24 HOUR PH GREATER THAN 1 HOUR;  Surgeon: Mike Henry MD;  Location:  GI     EXCISE PILONIDAL CYST, SIMPLE       IR CHEST TUBE PLACEMENT NON-TUNNELED RIGHT  3/3/2022     TONSILLECTOMY & ADENOIDECTOMY       TRANSPLANT LUNG RECIPIENT SINGLE X2 Bilateral 2022    Procedure: Bilateral Sequential Lung Transplantation, Clamshell Incision, Extracorporeal Membrane Oxgenation, Bronchoscopy, Cryoablation of Intercostal Nerves;  Surgeon: Raul Robin MD;  Location:  OR           Family History:     Family History   Problem Relation Age of Onset     Breast Cancer Mother      Colon Cancer Mother      Lung Cancer Mother      Lung Cancer Father      Lung Cancer Maternal Aunt      Pancreatic Cancer Maternal Uncle             Social History:     Social History     Socioeconomic History     Marital status:      Spouse name: Not on file     Number of children: Not on file     Years of education: Not on file     Highest education level: Not on file   Occupational History     Not on file   Tobacco Use     Smoking status: Former     Current packs/day: 0.00     Average packs/day: 1.5 packs/day for 36.0 years (54.0 ttl pk-yrs)     Types: Cigarettes     Start date: 1970     Quit date: 2006     Years since quittin.4     Smokeless tobacco: Never   Substance and Sexual Activity     Alcohol use: Not Currently     Comment: none since transplant     Drug use: Not Currently     Comment: stated hx of marijuana, quit in      Sexual activity: Not on file   Other Topics Concern     Parent/sibling w/ CABG, MI or angioplasty before 65F 55M? Not Asked   Social History Narrative     Not on file     Social Drivers of Health     Financial Resource Strain: Low Risk  (2021)    Received [facility]    Overall Financial Resource Strain (CARDIA)      Difficulty of Paying Living Expenses: Not very hard   Food Insecurity: No  Food Insecurity (9/24/2021)    Received from [facility]    Hunger Vital Sign      Worried About Running Out of Food in the Last Year: Never true      Ran Out of Food in the Last Year: Never true   Transportation Needs: No Transportation Needs (9/24/2021)    Received from [facility]    PRAPARE - Transportation      Lack of Transportation (Medical): No      Lack of Transportation (Non-Medical): No   Physical Activity: Not on file   Stress: Not on file   Social Connections: Not on file   Interpersonal Safety: Not At Risk (6/10/2024)    Received from [facility] IP Custom IPV      Do you feel UNSAFE in any of your personal relationships with your family members or any other acquaintances?: No   Housing Stability: Not on file            Medications:     Current Outpatient Medications   Medication Sig Dispense Refill     albuterol (PROAIR HFA/PROVENTIL HFA/VENTOLIN HFA) 108 (90 Base) MCG/ACT inhaler Inhale 2 puffs into the lungs every 6 hours as needed for shortness of breath or wheezing. 18 g 3     fluticasone (FLONASE) 50 MCG/ACT nasal spray Spray 2 sprays into both nostrils 2 times daily.       levofloxacin (LEVAQUIN) 750 MG tablet Take 1 tablet (750 mg) by mouth every other day for 14 days. 7 tablet 0     aspirin 81 MG EC tablet Take 1 tablet (81 mg) by mouth daily 30 tablet 11     calcium carbonate 600 mg-vitamin D 400 units (CALTRATE) 600-400 MG-UNIT per tablet Take 1 tablet by mouth daily 30 tablet 11     carvedilol (COREG) 12.5 MG tablet Take 1 tablet (12.5 mg) by mouth 2 times daily (with meals). 60 tablet 11     DILT- MG 24 hr ER beaded capsule TAKE 1 CAPSULE (240 MG) BY MOUTH DAILY 90 capsule 11     famotidine (PEPCID) 20 MG tablet TAKE 1 TABLET (20 MG) BY MOUTH 2 TIMES DAILY 180 tablet 11     glipiZIDE (GLUCOTROL) 10 MG tablet Take 10 mg by mouth.       lisinopril (ZESTRIL) 10 MG tablet Take 1  "tablet (10 mg) by mouth daily 30 tablet 11     Magnesium Glycinate 665 MG CAPS Take 2 tablets by mouth 3 times daily       multivitamin w/minerals (THERA-VIT-M) tablet Take 1 tablet by mouth daily 30 tablet 11     mycophenolic acid (GENERIC EQUIVALENT) 360 MG EC tablet TAKE 2 TABLETS (720 MG) BY MOUTH 2 TIMES DAILY 360 tablet 11     pantoprazole (PROTONIX) 40 MG EC tablet Take 1 tablet (40 mg) by mouth daily. 90 tablet 3     pentamidine (NEBUPENT) 300 MG neb solution Inhale 300 mg into the lungs every 28 days       predniSONE (DELTASONE) 5 MG tablet Take 1.5 tablets (7.5 mg) by mouth daily. 45 tablet 11     rosuvastatin (CRESTOR) 10 MG tablet Take 2 tablets (20 mg) by mouth daily 60 tablet 11     tacrolimus (GENERIC EQUIVALENT) 0.5 MG capsule Take 1 capsule (0.5 mg) by mouth every evening. Total dose: 1 mg in the AM and 1.5 mg in the PM. 30 capsule 11     tacrolimus (GENERIC EQUIVALENT) 1 MG capsule Take 1 capsule (1 mg) by mouth 2 times daily. Total dose: 1 mg in AM and 1.5 mg in PM 60 capsule 11     No current facility-administered medications for this visit.            Physical Exam:   /71 (BP Location: Right arm, Patient Position: Chair, Cuff Size: Adult Large)   Pulse 80   Ht 1.549 m (5' 1\")   Wt 71.4 kg (157 lb 6.6 oz)   SpO2 97%   BMI 29.74 kg/m      GENERAL: alert, NAD  HEENT: NCAT, EOMI, no scleral icterus, oral mucosa moist and without lesions  Neck: no cervical or supraclavicular adenopathy  Lungs: moderate airflow, scattered left lower crackles  CV: RRR, S1S2, no murmurs noted  Abdomen: normoactive BS, soft  Lymph: trace edema   Neuro: AAO X 3, CN 2-12 grossly intact  Psychiatric: normal affect, good eye contact  Skin: no rash, jaundice or lesions on limited exam         Data:   All laboratory and imaging data reviewed.      Recent Results (from the past week)   Comprehensive metabolic panel    Collection Time: 06/02/25  7:35 AM   Result Value Ref Range    Sodium 144 135 - 145 mmol/L    " Potassium 4.3 3.4 - 5.3 mmol/L    Carbon Dioxide (CO2) 25 22 - 29 mmol/L    Anion Gap 12 7 - 15 mmol/L    Urea Nitrogen 37.1 (H) 8.0 - 23.0 mg/dL    Creatinine 1.36 (H) 0.51 - 0.95 mg/dL    GFR Estimate 42 (L) >60 mL/min/1.73m2    Calcium 9.6 8.8 - 10.4 mg/dL    Chloride 107 98 - 107 mmol/L    Glucose 161 (H) 70 - 99 mg/dL    Alkaline Phosphatase 64 40 - 150 U/L    AST 13 0 - 45 U/L    ALT 15 0 - 50 U/L    Protein Total 6.0 (L) 6.4 - 8.3 g/dL    Albumin 3.8 3.5 - 5.2 g/dL    Bilirubin Total <0.2 <=1.2 mg/dL   Magnesium    Collection Time: 06/02/25  7:35 AM   Result Value Ref Range    Magnesium 1.4 (L) 1.7 - 2.3 mg/dL   Phosphorus    Collection Time: 06/02/25  7:35 AM   Result Value Ref Range    Phosphorus 4.7 (H) 2.5 - 4.5 mg/dL   CBC with platelets and differential    Collection Time: 06/02/25  7:35 AM   Result Value Ref Range    WBC Count 5.2 4.0 - 11.0 10e3/uL    RBC Count 3.23 (L) 3.80 - 5.20 10e6/uL    Hemoglobin 8.6 (L) 11.7 - 15.7 g/dL    Hematocrit 27.6 (L) 35.0 - 47.0 %    MCV 85 78 - 100 fL    MCH 26.6 26.5 - 33.0 pg    MCHC 31.2 (L) 31.5 - 36.5 g/dL    RDW 15.7 (H) 10.0 - 15.0 %    Platelet Count 201 150 - 450 10e3/uL    % Neutrophils 49 %    % Lymphocytes 29 %    % Monocytes 7 %    % Eosinophils 9 %    % Basophils 1 %    % Immature Granulocytes 5 %    NRBCs per 100 WBC 0 <1 /100    Absolute Neutrophils 2.5 1.6 - 8.3 10e3/uL    Absolute Lymphocytes 1.5 0.8 - 5.3 10e3/uL    Absolute Monocytes 0.4 0.0 - 1.3 10e3/uL    Absolute Eosinophils 0.5 0.0 - 0.7 10e3/uL    Absolute Basophils 0.0 0.0 - 0.2 10e3/uL    Absolute Immature Granulocytes 0.3 <=0.4 10e3/uL    Absolute NRBCs 0.0 10e3/uL   General PFT Lab (Please always keep checked)    Collection Time: 06/02/25  7:42 AM   Result Value Ref Range    FVC-Pred 2.36 L    FVC-Pre 2.04 L    FVC-%Pred-Pre 86 %    FEV1-Pre 1.71 L    FEV1-%Pred-Pre 91 %    FEV1FVC-Pred 79 %    FEV1FVC-Pre 84 %    FEFMax-Pred 5.23 L/sec    FEFMax-Pre 4.01 L/sec    FEFMax-%Pred-Pre 76 %     FEF2575-Pred 1.68 L/sec    FEF2575-Pre 2.03 L/sec    VNG0512-%Pred-Pre 121 %    ExpTime-Pre 3.52 sec    FIFMax-Pre 2.14 L/sec    VC-Pred 2.79 L    VC-Pre 1.71 L    VC-%Pred-Pre 61 %    IC-Pred 1.75 L    IC-Pre 1.22 L    IC-%Pred-Pre 69 %    ERV-Pred 0.88 L    ERV-Pre 0.49 L    ERV-%Pred-Pre 56 %    FEV1FEV6-Pred 79 %    FEV1FEV6-Pre 88 %    DLCOunc-Pred 17.60 ml/min/mmHg    DLCOunc-Pre 14.90 ml/min/mmHg    DLCOunc-%Pred-Pre 84 %    DLCOcor-Pre 18.32 ml/min/mmHg    DLCOcor-%Pred-Pre 104 %    VA-Pre 2.53 L    VA-%Pred-Pre 61 %    FEV1SVC-Pred 67 %    FEV1SVC-Pre 100 %     PFT interpretation:  Maneuver: valid, but ATS not met    Transplant Coordinator Note    Reason for visit: Post lung transplant follow up visit   Coordinator: Present   Caregiver:      Health concerns addressed today:  1. ***  2. ***  3. ***     Activity/rehab: Staying active w/walking the dog, activities of daily living, has a treadmill and bike at home.   Oxygen needs: room air  Pain management/RX: Back pain - compression fracture.   Diabetic management: NA  Risk Criteria Labs: negative 3/25/22  CMV status: D-/R-  EBV status: D+/R+  AC/asa: ASA 81mg -  PJP prophylactic: pentamidine nebs (dapsone caused anemia/RBC hemolysis) done locally, up to date.     COVID:  COVID-19 infection (yes/no, date of most recent positive test):   Status/instructions given about COVID-19 vaccine:     Pt Education: medications (use/dose/side effects), how/when to call coordinator, frequency of labs, s/s of infection/rejection, call prior to starting any new medications, lab/vital sign book    Health Maintenance:   Last colonoscopy:   Next colonoscopy due:   Dermatology:  Vaccinations this visit:     Labs, CXR, PFTs reviewed with patient  Medication record reviewed and reconciled  Questions and concerns addressed    Patient Instructions  1. Continue to hydrate with 60-70 oz fluids daily.  2. Continue to exercise daily or most days of the week.  3. Follow up with your  primary care provider for annual gender health maintenance procedures.  4. Follow up with colonoscopy schedule.  5. Follow up with annual dermatology visits.  6. It doesn't seem like the COVID vaccine is working well in lung transplant patients. A number of lung transplant patients have gotten sick with COVID even after receiving the vaccines. Based on our recent experience, it can be life-threatening to get COVID  even after being vaccinated. Please continue to act like you did not get the COVID vaccine - social distancing, wearing a mask, good hand hygiene, etc. If the people around you are vaccinated, it will help reduce the risk of you getting COVID. All members of your household should be vaccinated.  7.     Next transplant clinic appointment:  with CXR, labs and PFTs  Next lab draw:     AVS printed at time of check out        Transplant Coordinator Note    Reason for visit: Post lung transplant follow up visit   Coordinator: Present   Caregiver:  Spouse, Tyrese.    Health concerns addressed today:  1. Walking daily, 10-12 blocks/day. Treadmill daily, 12-20 minutes.   2. Recent cold symptoms, chills. Reports seasonal allergies.   3. Nasal congestion/drainage, confirms post nasal drainage.   4. Productive cough in the AM, unable to produce sample in clinic visit.   5. Denies chest congestion.   6. No shortness of breath, no changes to breathing.   7. Diarrhea, pt thinks it is related to Mg.   8. Hydration: good, getting   9. Up to date on pentamidine nebs.   10.       Activity/rehab: Staying active w/walking the dog, activities of daily living, has a treadmill and bike at home.   Oxygen needs: room air  Pain management/RX: Back pain - compression fracture.   Diabetic management: NA  Risk Criteria Labs: negative 3/25/22  CMV status: D-/R-  EBV status: D+/R+  AC/asa: ASA 81mg -  PJP prophylactic: pentamidine nebs (dapsone caused anemia/RBC hemolysis) done locally, up to date.     COVID:  COVID-19 infection (yes/no,  date of most recent positive test):   Status/instructions given about COVID-19 vaccine:     Pt Education: medications (use/dose/side effects), how/when to call coordinator, frequency of labs, s/s of infection/rejection, call prior to starting any new medications, lab/vital sign book    Health Maintenance:   Last colonoscopy:   Next colonoscopy due:   Dermatology:  Vaccinations this visit:     Labs, CXR, PFTs reviewed with patient  Medication record reviewed and reconciled  Questions and concerns addressed    Patient Instructions  1. Continue to hydrate with 60-70 oz fluids daily.  2. Continue to exercise daily or most days of the week.  3. Follow up with your primary care provider for annual gender health maintenance procedures.  4. Follow up with colonoscopy schedule.  5. Follow up with annual dermatology visits.  6. It doesn't seem like the COVID vaccine is working well in lung transplant patients. A number of lung transplant patients have gotten sick with COVID even after receiving the vaccines. Based on our recent experience, it can be life-threatening to get COVID  even after being vaccinated. Please continue to act like you did not get the COVID vaccine - social distancing, wearing a mask, good hand hygiene, etc. If the people around you are vaccinated, it will help reduce the risk of you getting COVID. All members of your household should be vaccinated.  7. We will recheck a Magnesium level later this week at your local lab, we will follow up on your magnesium dose after that lab.   8. We are going to get additional labs on your way out: DSA, Prospera, Immuknow, and IgG.   9. Increased the Flonase to two sprays to each nostril twice daily while you are feeling like your allergies are bad. Consistently take an allergy medication.   10. Consider seeing an allergist provider locally.   11. Start the Levaquin antibiotic today, this will be every other day, once this is complete, we will do an EKG and start you on  Azithromycin 250 mg daily to help stabilize your lung function. Jane will reach out to you in about 2 weeks for next steps.   12. We will work on your follow up appt in 1 month.     Next transplant clinic appointment:  1 month with CXR, labs and PFTs  Next lab draw: Later this week, Magnesium level.     AVS printed at time of check out        Again, thank you for allowing me to participate in the care of your patient.        Sincerely,        Sharlene Larios PA-C    Electronically signed

## 2025-06-02 NOTE — PROGRESS NOTES
Transplant Coordinator Note    Reason for visit: Post lung transplant follow up visit   Coordinator: Present   Caregiver:  Spouse, Tyrese.    Health concerns addressed today:  1. Walking daily, 10-12 blocks/day. Treadmill daily, 12-20 minutes.   2. Recent cold symptoms, chills. Reports seasonal allergies.   3. Nasal congestion/drainage, confirms post nasal drainage.   4. Productive cough in the AM, unable to produce sample in clinic visit.   5. Denies chest congestion.   6. No shortness of breath, no changes to breathing.   7. Diarrhea, pt thinks it is related to Mg.   8. Hydration: good, getting   9. Up to date on pentamidine nebs.   10.       Activity/rehab: Staying active w/walking the dog, activities of daily living, has a treadmill and bike at home.   Oxygen needs: room air  Pain management/RX: Back pain - compression fracture.   Diabetic management: NA  Risk Criteria Labs: negative 3/25/22  CMV status: D-/R-  EBV status: D+/R+  AC/asa: ASA 81mg -  PJP prophylactic: pentamidine nebs (dapsone caused anemia/RBC hemolysis) done locally, up to date.     COVID:  COVID-19 infection (yes/no, date of most recent positive test):   Status/instructions given about COVID-19 vaccine:     Pt Education: medications (use/dose/side effects), how/when to call coordinator, frequency of labs, s/s of infection/rejection, call prior to starting any new medications, lab/vital sign book    Health Maintenance:   Last colonoscopy:   Next colonoscopy due:   Dermatology:  Vaccinations this visit:     Labs, CXR, PFTs reviewed with patient  Medication record reviewed and reconciled  Questions and concerns addressed    Patient Instructions  1. Continue to hydrate with 60-70 oz fluids daily.  2. Continue to exercise daily or most days of the week.  3. Follow up with your primary care provider for annual gender health maintenance procedures.  4. Follow up with colonoscopy schedule.  5. Follow up with annual dermatology visits.  6. It doesn't  seem like the COVID vaccine is working well in lung transplant patients. A number of lung transplant patients have gotten sick with COVID even after receiving the vaccines. Based on our recent experience, it can be life-threatening to get COVID  even after being vaccinated. Please continue to act like you did not get the COVID vaccine - social distancing, wearing a mask, good hand hygiene, etc. If the people around you are vaccinated, it will help reduce the risk of you getting COVID. All members of your household should be vaccinated.  7. We will recheck a Magnesium level later this week at your local lab, we will follow up on your magnesium dose after that lab.   8. We are going to get additional labs on your way out: DSA, Prospera, Immuknow, and IgG.   9. Increased the Flonase to two sprays to each nostril twice daily while you are feeling like your allergies are bad. Consistently take an allergy medication.   10. Consider seeing an allergist provider locally.   11. Start the Levaquin antibiotic today, this will be every other day, once this is complete, we will do an EKG and start you on Azithromycin 250 mg daily to help stabilize your lung function. Jane will reach out to you in about 2 weeks for next steps.   12. We will work on your follow up appt in 1 month.     Next transplant clinic appointment:  1 month with CXR, labs and PFTs  Next lab draw: Later this week, Magnesium level.     AVS printed at time of check out

## 2025-06-02 NOTE — LETTER
PHYSICIAN ORDERS      DATE & TIME ISSUED: 2025 8:41 AM  PATIENT NAME: Melissa Elder   : 1955     [facility] MR# [if applicable]: [id number]     DIAGNOSIS:  Lung Transplant  Z94.2      Order for magnesium level the week of 2025.       Any questions please call: JENNIFER Lara Transplant Coordinator: [phone]    Please fax these results to [phone].        Sharlene Larios PA-C

## 2025-06-03 ENCOUNTER — RESULTS FOLLOW-UP (OUTPATIENT)
Dept: TRANSPLANT | Facility: CLINIC | Age: 70
End: 2025-06-03

## 2025-06-03 LAB — IGG SERPL-MCNC: 395 MG/DL (ref 610–1616)

## 2025-06-04 ENCOUNTER — TELEPHONE (OUTPATIENT)
Dept: TRANSPLANT | Facility: CLINIC | Age: 70
End: 2025-06-04
Payer: COMMERCIAL

## 2025-06-04 LAB — IMMUKNOW IMMUNE CELL FUNCTION: 253 NG/ML

## 2025-06-04 NOTE — LETTER
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   Please see orders attached.     Please call patient at [phone] to schedule appt.     Any questions please call: Transplant office at [phone]    Thank you!        Sharlene Larios PA-C

## 2025-06-04 NOTE — TELEPHONE ENCOUNTER
Patient 6/2/25 IgG level 395.     Per Sharlene Larios, patient to get one dose of IVIG.   Level recheck 1 month after infusion.    Talked to Patient , Tyrese, who verbalized understanding and agreement of plan. Will send orders to [facility], request they call patient to set up  appt.     Tyrese will call back with questions, concerns, updates

## 2025-06-04 NOTE — LETTER
PHYSICIAN ORDERS    DATE & TIME ISSUED: 2025 11:28 AM  PATIENT NAME: Melissa Elder   : 1955     [facility] MR# [if applicable]: [id number]     DIAGNOSIS:  Lung Transplant  Z94.2    Please check the following labs in Mid July:  - CBC with diff  - CMP  - Mg  - Tacrolimus (12 hour trough)  - IGG level      Any questions please call: Jane LUNA RNCC [phone]      Please fax these results to [phone] as soon as they are available.         Nicole Knox MD  Pulmonary Disease

## 2025-06-09 LAB
DONOR IDENTIFICATION: NORMAL
DQB5: 928
DSA COMMENTS: NORMAL
DSA PRESENT: YES
DSA TEST METHOD: NORMAL
ORGAN: NORMAL
PROSPERA TRANSPLANT MONITORING: 0.18 %
SA 1  COMMENTS: NORMAL
SA 1 CELL: NORMAL
SA 1 TEST METHOD: NORMAL
SA 2 CELL: NORMAL
SA 2 COMMENTS: NORMAL
SA 2 TEST METHOD: NORMAL
SA1 HI RISK ABY: NORMAL
SA1 MOD RISK ABY: NORMAL
SA2 HI RISK ABY: NORMAL
SA2 MOD RISK ABY: NORMAL
UNACCEPTABLE ANTIGENS: NORMAL
UNOS CPRA: 76

## 2025-06-10 DIAGNOSIS — Z94.2 LUNG REPLACED BY TRANSPLANT (H): Primary | ICD-10-CM

## 2025-06-10 DIAGNOSIS — Z79.899 IMMUNOSUPPRESSION DUE TO DRUG THERAPY: ICD-10-CM

## 2025-06-10 DIAGNOSIS — D84.821 IMMUNOSUPPRESSION DUE TO DRUG THERAPY: ICD-10-CM

## 2025-06-10 DIAGNOSIS — Z94.2 S/P LUNG TRANSPLANT (H): ICD-10-CM

## 2025-06-10 NOTE — PROGRESS NOTES
Patient 6/2 DQB5 level present (past present 9/30/2024).   Per Sharlene Larios, recheck in 1 month.     Patient has appt 7/1, will add PRA to labs.

## 2025-06-17 ENCOUNTER — TELEPHONE (OUTPATIENT)
Dept: TRANSPLANT | Facility: CLINIC | Age: 70
End: 2025-06-17
Payer: COMMERCIAL

## 2025-06-17 NOTE — TELEPHONE ENCOUNTER
Mg level reviewed by Sharlene, continue current magnesium supplement regimen for now, will reassess at next clinic visit. Cr slightly elevated at 1.48, although within her baseline of recent labs at local clinic. Writer encouraged patient to push hydration, goal of 80 oz daily.

## 2025-06-23 DIAGNOSIS — Z94.2 LUNG REPLACED BY TRANSPLANT (H): Primary | ICD-10-CM

## 2025-06-29 NOTE — PROGRESS NOTES
[facility]  July 1, 2025         Assessment and Plan:   Melissa Elder is a 69 year old female s/p bilateral transplant on 2/21/22 who is seen today for follow up. Post op course complicated by bilateral hydropneumothoraces and bilateral effusions, disseminated Ureaplasma and transient hyperammonemia. Other history notable for HTN, mild nonbostructive CAD, paroxysmal afib, osteoporosis, GERD and colonic polyps. She was treated with a course of levaquin at her last visit.     # S/p bilateral lung transplant: symptoms have resolved with the antibiotics and she is feeling well. Trying to walk and use her treadmill/bike daily. Sating 97% on room air. CMV 6/2 negative. CXR reviewed today and demonstrates no new pathology. PFTs today improved significantly from prior, about 200 ml below her post transplant best.   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 Prospera of 0.18 at that time.  - DSA and prospera pending    # Reflux: no issues.  - Continue pantoprazole daily and famotidine BID    # CKD:  HTN: Stage IIIa CKD. BP is well controlled. Cr stable over last few months.   - Continue carvedilol and lisinopril  - Encourage hydration 70-80 oz daily    # Osteoporosis: lumbar compression fracture in October 2022 after a fall. Boniva was held due to concern for possible contribution to lower extremity swelling. Patent notes she got a DEXA scan last year with her yearly Reclast infusion.  - Continue calcium/vitamin D  - DEXA and Reclast locally     # Hypogammaglobinemia: IgG of 399 in September s/p IVIG. Last IgG of 369 in January s/p IVIG on 2/5.  "Repeat IgG > 600 in March. IgG of 395 on 6/2 s/p IVIF on 6/27.   - IgG due end of July    # Seasonal allergies: notes her allergies are better controlled.   - Continue Flonase and antihistamine    Chronic non transplant issues:  1. Paroxysmal atrial fibrillation:  2. CAD  3. DM II  4. HLD: patient was not fasting today    RTC: 4 months  Vaccinations:   Preventative: DEXA UTD locally; colonoscopy completed summer of 2024 (will get records); following with Derm annually    Sharlene Larios PA-C  Pulmonary, Allergy, Critical Care and Sleep Medicine        Interval History:     Feeling better since her last visit, antibiotics did the trick. Nasal congestion/drainage has improved. No cough, no congestion or tightness. No shortness of breath. Walking every day with the dog, then rides her bike and does the treadmill for 30 minutes after supper. No chest pain or racing heart. Feeling \"pudgy\" and wonders about her diet and activity level. Feels a little bit bloated/gassy, stools are stable. Drinking around 70 oz per day.          Review of Systems:   Please see HPI, otherwise the complete 10 point ROS is negative.           Past Medical and Surgical History:     Past Medical History:   Diagnosis Date    Atrial fibrillation with RVR (H)     hx of A fib related to severe COPD, does not meet anticoagulation criteria as noted    Clinical diagnosis of COVID-19 02/29/2024    COPD, severe (H)     Osteoporosis      Past Surgical History:   Procedure Laterality Date    BRONCHOSCOPY (RIGID OR FLEXIBLE), DIAGNOSTIC N/A 4/7/2022    Procedure: BRONCHOSCOPY, WITH BRONCHOALVEOLAR LAVAGE and BIOPSIES;  Surgeon: Gerson Haddad MD;  Location: [facility]    BRONCHOSCOPY (RIGID OR FLEXIBLE), DIAGNOSTIC N/A 5/12/2022    Procedure: BRONCHOSCOPY, WITH BRONCHOALVEOLAR LAVAGE AND BIOPSY;  Surgeon: Melissa Landin MD;  Location: [facility]    BRONCHOSCOPY (RIGID OR FLEXIBLE), DIAGNOSTIC N/A 8/2/2022    Procedure: BRONCHOSCOPY, WITH BRONCHOALVEOLAR " LAVAGE;  Surgeon: Melissa Landin MD;  [facility]    BRONCHOSCOPY (RIGID OR FLEXIBLE), DIAGNOSTIC N/A 10/11/2022    Procedure: BRONCHOSCOPY, WITH BRONCHOALVEOLAR LAVAGE AND BIOPSIES;  Surgeon: Angel Gallagher MD;  [facility]    BRONCHOSCOPY (RIGID OR FLEXIBLE), DIAGNOSTIC N/A 12/20/2022    Procedure: BRONCHOSCOPY, WITH BRONCHOALVEOLAR LAVAGE AND BIOPSIES;  Surgeon: Perlman, David Morris, MD;  Location:  GI    BRONCHOSCOPY (RIGID OR FLEXIBLE), DIAGNOSTIC N/A 3/1/2023    Procedure: BRONCHOSCOPY, WITH BRONCHOALVEOLAR LAVAGE WITH BIOPSY;  Surgeon: Lucina Thompson MD;  [facility]    BRONCHOSCOPY FLEXIBLE AND RIGID N/A 3/1/2022    Procedure: BRONCHOSCOPY INSPECTION;  Surgeon: Melissa Landin MD;  [facility]    CV CORONARY ANGIOGRAM N/A 3/27/2019    Procedure: CV CORONARY ANGIOGRAM;  Surgeon: Thierry Serrano MD;  Location:  HEART CARDIAC CATH LAB    CV RIGHT HEART CATH MEASUREMENTS RECORDED N/A 3/27/2019    Procedure: CV RIGHT HEART CATH;  Surgeon: Thierry Serrano MD;  Location:  HEART CARDIAC CATH LAB    ESOPHAGEAL IMPEDENCE FUNCTION TEST WITH 24 HOUR PH GREATER THAN 1 HOUR N/A 3/28/2019    Procedure: ESOPHAGEAL IMPEDENCE FUNCTION TEST WITH 24 HOUR PH GREATER THAN 1 HOUR;  Surgeon: Mike Henry MD;  Location:  GI    EXCISE PILONIDAL CYST, SIMPLE      IR CHEST TUBE PLACEMENT NON-TUNNELED RIGHT  3/3/2022    TONSILLECTOMY & ADENOIDECTOMY      TRANSPLANT LUNG RECIPIENT SINGLE X2 Bilateral 2/20/2022    Procedure: Bilateral Sequential Lung Transplantation, Clamshell Incision, Extracorporeal Membrane Oxgenation, Bronchoscopy, Cryoablation of Intercostal Nerves;  Surgeon: Raul Robin MD;  Location:  OR           Family History:     Family History   Problem Relation Age of Onset    Breast Cancer Mother     Colon Cancer Mother     Lung Cancer Mother     Lung Cancer Father     Lung Cancer Maternal Aunt     Pancreatic Cancer Maternal Uncle             Social History:      Social History     Socioeconomic History    Marital status:      Spouse name: Not on file    Number of children: Not on file    Years of education: Not on file    Highest education level: Not on file   Occupational History    Not on file   Tobacco Use    Smoking status: Former     Current packs/day: 0.00     Average packs/day: 1.5 packs/day for 36.0 years (54.0 ttl pk-yrs)     Types: Cigarettes     Start date: 1970     Quit date: 2006     Years since quittin.5    Smokeless tobacco: Never   Substance and Sexual Activity    Alcohol use: Not Currently     Comment: none since transplant    Drug use: Not Currently     Comment: stated hx of marijuana, quit in     Sexual activity: Not on file   Other Topics Concern    Parent/sibling w/ CABG, MI or angioplasty before 65F 55M? Not Asked   Social History Narrative    Not on file     Social Drivers of Health     Financial Resource Strain: Low Risk  (2021)    Received from [facility]    Overall Financial Resource Strain (CARDIA)     Difficulty of Paying Living Expenses: Not very hard   Food Insecurity: No Food Insecurity (2021)    Received from [facility] [employer] [facility]    Hunger Vital Sign     Worried About Running Out of Food in the Last Year: Never true     Ran Out of Food in the Last Year: Never true   Transportation Needs: No Transportation Needs (2021)    Received from [facility] [employer] [facility]    PRAPARE - Transportation     Lack of Transportation (Medical): No     Lack of Transportation (Non-Medical): No   Physical Activity: Not on file   Stress: Not on file   Social Connections: Not on file   Interpersonal Safety: Not At Risk (6/10/2024)    Received from [facility] [employer]     Do you feel UNSAFE in any of your personal relationships with your family members or any other acquaintances?: No   Housing  Stability: Not on file            Medications:     Current Outpatient Medications   Medication Sig Dispense Refill    albuterol (PROAIR HFA/PROVENTIL HFA/VENTOLIN HFA) 108 (90 Base) MCG/ACT inhaler Inhale 2 puffs into the lungs every 6 hours as needed for shortness of breath or wheezing. 18 g 3    aspirin 81 MG EC tablet Take 1 tablet (81 mg) by mouth daily 30 tablet 11    calcium carbonate 600 mg-vitamin D 400 units (CALTRATE) 600-400 MG-UNIT per tablet Take 1 tablet by mouth daily 30 tablet 11    carvedilol (COREG) 12.5 MG tablet Take 1 tablet (12.5 mg) by mouth 2 times daily (with meals). 60 tablet 11    DILT- MG 24 hr ER beaded capsule TAKE 1 CAPSULE (240 MG) BY MOUTH DAILY 90 capsule 11    famotidine (PEPCID) 20 MG tablet TAKE 1 TABLET (20 MG) BY MOUTH 2 TIMES DAILY 180 tablet 11    fluticasone (FLONASE) 50 MCG/ACT nasal spray Spray 2 sprays into both nostrils 2 times daily.      glipiZIDE (GLUCOTROL) 10 MG tablet Take 10 mg by mouth.      lisinopril (ZESTRIL) 10 MG tablet Take 1 tablet (10 mg) by mouth daily 30 tablet 11    Magnesium Glycinate 665 MG CAPS Take 3 tablets by mouth 3 times daily.      multivitamin w/minerals (THERA-VIT-M) tablet Take 1 tablet by mouth daily 30 tablet 11    mycophenolic acid (GENERIC EQUIVALENT) 360 MG EC tablet TAKE 2 TABLETS (720 MG) BY MOUTH 2 TIMES DAILY 360 tablet 11    pantoprazole (PROTONIX) 40 MG EC tablet Take 1 tablet (40 mg) by mouth daily. 90 tablet 3    pentamidine (NEBUPENT) 300 MG neb solution Inhale 300 mg into the lungs every 28 days      predniSONE (DELTASONE) 5 MG tablet Take 1.5 tablets (7.5 mg) by mouth daily. 45 tablet 11    rosuvastatin (CRESTOR) 10 MG tablet Take 2 tablets (20 mg) by mouth daily 60 tablet 11    tacrolimus (GENERIC EQUIVALENT) 0.5 MG capsule Take 1 capsule (0.5 mg) by mouth every evening. Total dose: 1 mg in the AM and 1.5 mg in the PM. 30 capsule 11    tacrolimus (GENERIC EQUIVALENT) 1 MG capsule Take 1 capsule (1 mg) by mouth 2 times  daily. Total dose: 1 mg in AM and 1.5 mg in PM 60 capsule 11     No current facility-administered medications for this visit.            Physical Exam:   /72 (BP Location: Right arm, Patient Position: Chair, Cuff Size: Adult Large)   Pulse 72   SpO2 97%     GENERAL: alert, NAD  HEENT: NCAT, EOMI, no scleral icterus, oral mucosa moist and without lesions  Neck: no cervical or supraclavicular adenopathy  Lungs: moderate airflow, scattered left lower crackles  CV: RRR, S1S2, no murmurs noted  Abdomen: normoactive BS, soft  Lymph: trace edema   Neuro: AAO X 3, CN 2-12 grossly intact  Psychiatric: normal affect, good eye contact  Skin: no rash, jaundice or lesions on limited exam         Data:   All laboratory and imaging data reviewed.      Recent Results (from the past week)   Comprehensive metabolic panel    Collection Time: 07/01/25 10:46 AM   Result Value Ref Range    Sodium 142 135 - 145 mmol/L    Potassium 4.5 3.4 - 5.3 mmol/L    Carbon Dioxide (CO2) 23 22 - 29 mmol/L    Anion Gap 14 7 - 15 mmol/L    Urea Nitrogen 41.3 (H) 8.0 - 23.0 mg/dL    Creatinine 1.43 (H) 0.51 - 0.95 mg/dL    GFR Estimate 40 (L) >60 mL/min/1.73m2    Calcium 10.1 8.8 - 10.4 mg/dL    Chloride 105 98 - 107 mmol/L    Glucose 134 (H) 70 - 99 mg/dL    Alkaline Phosphatase 59 40 - 150 U/L    AST 20 0 - 45 U/L    ALT 20 0 - 50 U/L    Protein Total 7.0 6.4 - 8.3 g/dL    Albumin 4.3 3.5 - 5.2 g/dL    Bilirubin Total 0.3 <=1.2 mg/dL   Magnesium    Collection Time: 07/01/25 10:46 AM   Result Value Ref Range    Magnesium 1.4 (L) 1.7 - 2.3 mg/dL   Phosphorus    Collection Time: 07/01/25 10:46 AM   Result Value Ref Range    Phosphorus 4.3 2.5 - 4.5 mg/dL   CBC with platelets and differential    Collection Time: 07/01/25 10:46 AM   Result Value Ref Range    WBC Count 6.7 4.0 - 11.0 10e3/uL    RBC Count 3.44 (L) 3.80 - 5.20 10e6/uL    Hemoglobin 9.2 (L) 11.7 - 15.7 g/dL    Hematocrit 29.5 (L) 35.0 - 47.0 %    MCV 86 78 - 100 fL    MCH 26.7 26.5 -  33.0 pg    MCHC 31.2 (L) 31.5 - 36.5 g/dL    RDW 15.2 (H) 10.0 - 15.0 %    Platelet Count 195 150 - 450 10e3/uL    % Neutrophils 78 %    % Lymphocytes 12 %    % Monocytes 5 %    % Eosinophils 3 %    % Basophils 0 %    % Immature Granulocytes 2 %    NRBCs per 100 WBC 0 <1 /100    Absolute Neutrophils 5.2 1.6 - 8.3 10e3/uL    Absolute Lymphocytes 0.8 0.8 - 5.3 10e3/uL    Absolute Monocytes 0.3 0.0 - 1.3 10e3/uL    Absolute Eosinophils 0.2 0.0 - 0.7 10e3/uL    Absolute Basophils 0.0 0.0 - 0.2 10e3/uL    Absolute Immature Granulocytes 0.1 <=0.4 10e3/uL    Absolute NRBCs 0.0 10e3/uL   General PFT Lab (Please always keep checked)    Collection Time: 07/01/25 10:51 AM   Result Value Ref Range    FVC-Pred 2.36 L    FVC-Pre 2.10 L    FVC-%Pred-Pre 89 %    FEV1-Pre 2.01 L    FEV1-%Pred-Pre 107 %    FEV1FVC-Pred 79 %    FEV1FVC-Pre 95 %    FEFMax-Pred 5.23 L/sec    FEFMax-Pre 5.12 L/sec    FEFMax-%Pred-Pre 98 %    FEF2575-Pred 1.67 L/sec    FEF2575-Pre 3.35 L/sec    UQS3865-%Pred-Pre 200 %    ExpTime-Pre 6.67 sec    FIFMax-Pre 2.85 L/sec    FEV1FEV6-Pred 79 %    FEV1FEV6-Pre 95 %     PFT interpretation:  Maneuver: valid and ATS met  Normal spirometry

## 2025-06-30 DIAGNOSIS — D80.1 HYPOGAMMAGLOBULINEMIA: Primary | ICD-10-CM

## 2025-07-01 ENCOUNTER — OFFICE VISIT (OUTPATIENT)
Dept: PULMONOLOGY | Facility: CLINIC | Age: 70
End: 2025-07-01
Attending: PHYSICIAN ASSISTANT
Payer: COMMERCIAL

## 2025-07-01 ENCOUNTER — RESULTS FOLLOW-UP (OUTPATIENT)
Dept: TRANSPLANT | Facility: CLINIC | Age: 70
End: 2025-07-01

## 2025-07-01 ENCOUNTER — ANCILLARY PROCEDURE (OUTPATIENT)
Dept: GENERAL RADIOLOGY | Facility: CLINIC | Age: 70
End: 2025-07-01
Attending: PHYSICIAN ASSISTANT
Payer: COMMERCIAL

## 2025-07-01 ENCOUNTER — LAB (OUTPATIENT)
Dept: LAB | Facility: CLINIC | Age: 70
End: 2025-07-01
Attending: PHYSICIAN ASSISTANT
Payer: COMMERCIAL

## 2025-07-01 VITALS — SYSTOLIC BLOOD PRESSURE: 133 MMHG | OXYGEN SATURATION: 97 % | DIASTOLIC BLOOD PRESSURE: 72 MMHG | HEART RATE: 72 BPM

## 2025-07-01 DIAGNOSIS — Z94.2 LUNG REPLACED BY TRANSPLANT (H): ICD-10-CM

## 2025-07-01 DIAGNOSIS — D84.821 IMMUNOSUPPRESSION DUE TO DRUG THERAPY: ICD-10-CM

## 2025-07-01 DIAGNOSIS — D80.1 HYPOGAMMAGLOBULINEMIA: ICD-10-CM

## 2025-07-01 DIAGNOSIS — D84.9 IMMUNOSUPPRESSED STATUS: ICD-10-CM

## 2025-07-01 DIAGNOSIS — Z79.899 ENCOUNTER FOR LONG-TERM (CURRENT) USE OF HIGH-RISK MEDICATION: Primary | ICD-10-CM

## 2025-07-01 DIAGNOSIS — Z79.899 IMMUNOSUPPRESSION DUE TO DRUG THERAPY: ICD-10-CM

## 2025-07-01 DIAGNOSIS — Z94.2 S/P LUNG TRANSPLANT (H): ICD-10-CM

## 2025-07-01 LAB
ALBUMIN SERPL BCG-MCNC: 4.3 G/DL (ref 3.5–5.2)
ALP SERPL-CCNC: 59 U/L (ref 40–150)
ALT SERPL W P-5'-P-CCNC: 20 U/L (ref 0–50)
ANION GAP SERPL CALCULATED.3IONS-SCNC: 14 MMOL/L (ref 7–15)
AST SERPL W P-5'-P-CCNC: 20 U/L (ref 0–45)
BASOPHILS # BLD AUTO: 0 10E3/UL (ref 0–0.2)
BASOPHILS NFR BLD AUTO: 0 %
BILIRUB SERPL-MCNC: 0.3 MG/DL
BUN SERPL-MCNC: 41.3 MG/DL (ref 8–23)
CALCIUM SERPL-MCNC: 10.1 MG/DL (ref 8.8–10.4)
CHLORIDE SERPL-SCNC: 105 MMOL/L (ref 98–107)
CMV DNA SPEC NAA+PROBE-ACNC: NOT DETECTED IU/ML
CREAT SERPL-MCNC: 1.43 MG/DL (ref 0.51–0.95)
EGFRCR SERPLBLD CKD-EPI 2021: 40 ML/MIN/1.73M2
EOSINOPHIL # BLD AUTO: 0.2 10E3/UL (ref 0–0.7)
EOSINOPHIL NFR BLD AUTO: 3 %
ERYTHROCYTE [DISTWIDTH] IN BLOOD BY AUTOMATED COUNT: 15.2 % (ref 10–15)
EXPTIME-PRE: 6.67 SEC
FEF2575-%PRED-PRE: 200 %
FEF2575-PRE: 3.35 L/SEC
FEF2575-PRED: 1.67 L/SEC
FEFMAX-%PRED-PRE: 98 %
FEFMAX-PRE: 5.12 L/SEC
FEFMAX-PRED: 5.23 L/SEC
FEV1-%PRED-PRE: 107 %
FEV1-PRE: 2.01 L
FEV1FEV6-PRE: 95 %
FEV1FEV6-PRED: 79 %
FEV1FVC-PRE: 95 %
FEV1FVC-PRED: 79 %
FIFMAX-PRE: 2.85 L/SEC
FVC-%PRED-PRE: 89 %
FVC-PRE: 2.1 L
FVC-PRED: 2.36 L
GLUCOSE SERPL-MCNC: 134 MG/DL (ref 70–99)
HCO3 SERPL-SCNC: 23 MMOL/L (ref 22–29)
HCT VFR BLD AUTO: 29.5 % (ref 35–47)
HGB BLD-MCNC: 9.2 G/DL (ref 11.7–15.7)
IMM GRANULOCYTES # BLD: 0.1 10E3/UL
IMM GRANULOCYTES NFR BLD: 2 %
LYMPHOCYTES # BLD AUTO: 0.8 10E3/UL (ref 0.8–5.3)
LYMPHOCYTES NFR BLD AUTO: 12 %
MAGNESIUM SERPL-MCNC: 1.4 MG/DL (ref 1.7–2.3)
MCH RBC QN AUTO: 26.7 PG (ref 26.5–33)
MCHC RBC AUTO-ENTMCNC: 31.2 G/DL (ref 31.5–36.5)
MCV RBC AUTO: 86 FL (ref 78–100)
MONOCYTES # BLD AUTO: 0.3 10E3/UL (ref 0–1.3)
MONOCYTES NFR BLD AUTO: 5 %
NEUTROPHILS # BLD AUTO: 5.2 10E3/UL (ref 1.6–8.3)
NEUTROPHILS NFR BLD AUTO: 78 %
NRBC # BLD AUTO: 0 10E3/UL
NRBC BLD AUTO-RTO: 0 /100
PHOSPHATE SERPL-MCNC: 4.3 MG/DL (ref 2.5–4.5)
PLATELET # BLD AUTO: 195 10E3/UL (ref 150–450)
POTASSIUM SERPL-SCNC: 4.5 MMOL/L (ref 3.4–5.3)
PROT SERPL-MCNC: 7 G/DL (ref 6.4–8.3)
RBC # BLD AUTO: 3.44 10E6/UL (ref 3.8–5.2)
SODIUM SERPL-SCNC: 142 MMOL/L (ref 135–145)
SPECIMEN TYPE: NORMAL
TACROLIMUS BLD-MCNC: 7.5 UG/L (ref 5–15)
TME LAST DOSE: NORMAL H
TME LAST DOSE: NORMAL H
WBC # BLD AUTO: 6.7 10E3/UL (ref 4–11)

## 2025-07-01 PROCEDURE — G0463 HOSPITAL OUTPT CLINIC VISIT: HCPCS | Performed by: PHYSICIAN ASSISTANT

## 2025-07-01 PROCEDURE — 82784 ASSAY IGA/IGD/IGG/IGM EACH: CPT | Performed by: PHYSICIAN ASSISTANT

## 2025-07-01 PROCEDURE — 3078F DIAST BP <80 MM HG: CPT | Performed by: PHYSICIAN ASSISTANT

## 2025-07-01 PROCEDURE — 80053 COMPREHEN METABOLIC PANEL: CPT | Performed by: PATHOLOGY

## 2025-07-01 PROCEDURE — 99214 OFFICE O/P EST MOD 30 MIN: CPT | Mod: 25 | Performed by: PHYSICIAN ASSISTANT

## 2025-07-01 PROCEDURE — 1126F AMNT PAIN NOTED NONE PRSNT: CPT | Performed by: PHYSICIAN ASSISTANT

## 2025-07-01 PROCEDURE — 71046 X-RAY EXAM CHEST 2 VIEWS: CPT | Performed by: RADIOLOGY

## 2025-07-01 PROCEDURE — 99000 SPECIMEN HANDLING OFFICE-LAB: CPT | Performed by: PATHOLOGY

## 2025-07-01 PROCEDURE — 83735 ASSAY OF MAGNESIUM: CPT | Performed by: PATHOLOGY

## 2025-07-01 PROCEDURE — 80197 ASSAY OF TACROLIMUS: CPT | Performed by: PHYSICIAN ASSISTANT

## 2025-07-01 PROCEDURE — 36415 COLL VENOUS BLD VENIPUNCTURE: CPT | Performed by: PATHOLOGY

## 2025-07-01 PROCEDURE — 85025 COMPLETE CBC W/AUTO DIFF WBC: CPT | Performed by: PATHOLOGY

## 2025-07-01 PROCEDURE — 3075F SYST BP GE 130 - 139MM HG: CPT | Performed by: PHYSICIAN ASSISTANT

## 2025-07-01 PROCEDURE — 94375 RESPIRATORY FLOW VOLUME LOOP: CPT | Performed by: PHYSICIAN ASSISTANT

## 2025-07-01 PROCEDURE — 84100 ASSAY OF PHOSPHORUS: CPT | Performed by: PATHOLOGY

## 2025-07-01 ASSESSMENT — PAIN SCALES - GENERAL: PAINLEVEL_OUTOF10: NO PAIN (0)

## 2025-07-01 NOTE — LETTER
PHYSICIAN ORDERS      DATE & TIME ISSUED: 2025 12:24 PM  PATIENT NAME: Melissa Elder   : 1955     [facility] MR# [if applicable]: [id number]     DIAGNOSIS:  Lung Transplant  Z94.2    Please draw an IgG level the week of 2025.       Any questions please call: JENNIFER Lara Transplant Coordinator: [phone]    Please fax these results to [phone].        Sharlene Larios PA-C

## 2025-07-01 NOTE — PATIENT INSTRUCTIONS
Patient Instructions  1. Continue to hydrate with 60-70 oz fluids daily.  2. Continue to exercise daily or most days of the week.  3. Follow up with your primary care provider for annual gender health maintenance procedures.  4. Follow up with colonoscopy schedule.  5. Follow up with annual dermatology visits.  6. It doesn't seem like the COVID vaccine is working well in lung transplant patients. A number of lung transplant patients have gotten sick with COVID even after receiving the vaccines. Based on our recent experience, it can be life-threatening to get COVID  even after being vaccinated. Please continue to act like you did not get the COVID vaccine - social distancing, wearing a mask, good hand hygiene, etc. If the people around you are vaccinated, it will help reduce the risk of you getting COVID. All members of your household should be vaccinated.  7. Increase your magnesium to 3 capsules, 3 times a day  8. Recheck labs (IgG) at the end of July    Next transplant clinic appointment:  4 months with CXR, labs and PFTs  Next lab draw: pending tacrolimus, otherwise 2 months    ~~~~~~~~~~~~~~~~~~~~~~~~~    Thoracic Transplant Office phone [phone] (alt [phone]), fax [phone]    Office Hours 8:30 - 5:00     For after-hours urgent issues, please dial [phone] (alt [phone]) and ask the  to page the Thoracic Transplant Coordinator On-Call.   --------------------  To expedite your medication refill(s), please contact your pharmacy and have them fax a refill request to: [phone]    *Please allow 3 business days for routine medication refills.  *Please allow 5 business days for controlled substance medication refills.    **For Diabetic medications and supplies refill(s), please contact your pharmacy and have them contact your Endocrine team.  --------------------  For scheduling appointments call [phone] (alt [phone])  --------------------  Please Note: If you are active on  [employer], all future test results will be sent by Sky[employer] message only, and will no longer be called to patient. You may also receive communication directly from your physician.

## 2025-07-01 NOTE — PROGRESS NOTES
Transplant Coordinator Note    Reason for visit: Post lung transplant follow up visit, 1 month follow up  Coordinator: Present   Caregiver:  none    Health concerns addressed today:  1. Respiratory: no cough or shortness of breath  2. GI: no acid reflux. Some bloating.  3. Started on course of Levaquin after June visit, then was to start azithromycin 250 mg daily for CLAD therapy.   4. DQB5 antibody level up slightly at 928 from 6/2 visit, plan to recheck today.   5. Mg was low at visit (pt thought she had missed some doses, recheck was 1.5, per Sharlene, no plan to change Mg dosing at that time, recheck level today).   5. S/p IVIG 6/27, recheck IgG today  6. Creat 1.43  7. Magnesium 1.4 - increase to 300 mg TID    Activity/rehab: Staying active w/walking the dog, activities of daily living, has a treadmill and bike at home.   Oxygen needs: room air  Pain management/RX: Back pain - compression fracture  Diabetic management: NA  Risk Criteria Labs: negative 3/25/22  CMV status: D-/R-  EBV status: D+/R+  AC/asa: ASA 81mg -  PJP prophylactic: pentamidine nebs (dapsone caused anemia/RBC hemolysis) done locally, up to date.     COVID:  COVID-19 infection (yes/no, date of most recent positive test):   Status/instructions given about COVID-19 vaccine:     Pt Education: medications (use/dose/side effects), how/when to call coordinator, frequency of labs, s/s of infection/rejection, call prior to starting any new medications, lab/vital sign book    Health Maintenance:   Last colonoscopy:   Next colonoscopy due:   Dermatology:  Vaccinations this visit:     Labs, CXR, PFTs reviewed with patient  Medication record reviewed and reconciled  Questions and concerns addressed    Patient Instructions  1. Continue to hydrate with 60-70 oz fluids daily.  2. Continue to exercise daily or most days of the week.  3. Follow up with your primary care provider for annual gender health maintenance procedures.  4. Follow up with colonoscopy  schedule.  5. Follow up with annual dermatology visits.  6. It doesn't seem like the COVID vaccine is working well in lung transplant patients. A number of lung transplant patients have gotten sick with COVID even after receiving the vaccines. Based on our recent experience, it can be life-threatening to get COVID  even after being vaccinated. Please continue to act like you did not get the COVID vaccine - social distancing, wearing a mask, good hand hygiene, etc. If the people around you are vaccinated, it will help reduce the risk of you getting COVID. All members of your household should be vaccinated.  7. Increase your magnesium to 3 capsules, 3 times a day  8. Recheck labs (IgG) at the end of July    Next transplant clinic appointment:  4 months with CXR, labs and PFTs  Next lab draw: pending tacrolimus, otherwise 2 months    AVS printed at time of check out

## 2025-07-01 NOTE — LETTER
7/1/2025      Melissa Elder  [address]  [facility] [address]      Dear Colleague,    Thank you for referring your patient, Melissa Elder, to the [facility] [place]. Please see a copy of my visit note below.    [facility]  July 1, 2025         Assessment and Plan:   Melissa Elder is a 69 year old female s/p bilateral transplant on 2/21/22 who is seen today for follow up. Post op course complicated by bilateral hydropneumothoraces and bilateral effusions, disseminated Ureaplasma and transient hyperammonemia. Other history notable for HTN, mild nonbostructive CAD, paroxysmal afib, osteoporosis, GERD and colonic polyps. She was treated with a course of levaquin at her last visit.     # S/p bilateral lung transplant: symptoms have resolved with the antibiotics and she is feeling well. Trying to walk and use her treadmill/bike daily. Sating 97% on room air. CMV 6/2 negative. CXR reviewed today and demonstrates no new pathology. PFTs today improved significantly from prior, about 200 ml below her post transplant best.   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 Prospera of 0.18 at that time.  - DSA and prospera pending    # Reflux: no issues.  - Continue pantoprazole daily and famotidine BID    # CKD:  HTN: Stage IIIa CKD. BP is well controlled. Cr stable over last few months.   - Continue carvedilol and lisinopril  - Encourage hydration 70-80 oz daily    # Osteoporosis: lumbar compression fracture in October 2022 after a fall. Boniva was held due to concern for possible contribution to  "lower extremity swelling. Patent notes she got a DEXA scan last year with her yearly Reclast infusion.  - Continue calcium/vitamin D  - DEXA and Reclast locally     # Hypogammaglobinemia: IgG of 399 in September s/p IVIG. Last IgG of 369 in January s/p IVIG on 2/5. Repeat IgG > 600 in March. IgG of 395 on 6/2 s/p IVIF on 6/27.   - IgG due end of July    # Seasonal allergies: notes her allergies are better controlled.   - Continue Flonase and antihistamine    Chronic non transplant issues:  1. Paroxysmal atrial fibrillation:  2. CAD  3. DM II  4. HLD: patient was not fasting today    RTC: 4 months  Vaccinations:   Preventative: DEXA UTD locally; colonoscopy completed summer of 2024 (will get records); following with Derm annually    Sharlene Larios PA-C  Pulmonary, Allergy, Critical Care and Sleep Medicine        Interval History:     Feeling better since her last visit, antibiotics did the trick. Nasal congestion/drainage has improved. No cough, no congestion or tightness. No shortness of breath. Walking every day with the dog, then rides her bike and does the treadmill for 30 minutes after supper. No chest pain or racing heart. Feeling \"pudgy\" and wonders about her diet and activity level. Feels a little bit bloated/gassy, stools are stable. Drinking around 70 oz per day.          Review of Systems:   Please see HPI, otherwise the complete 10 point ROS is negative.           Past Medical and Surgical History:     Past Medical History:   Diagnosis Date     Atrial fibrillation with RVR (H)     hx of A fib related to severe COPD, does not meet anticoagulation criteria as noted     Clinical diagnosis of COVID-19 02/29/2024     COPD, severe (H)      Osteoporosis      Past Surgical History:   Procedure Laterality Date     BRONCHOSCOPY (RIGID OR FLEXIBLE), DIAGNOSTIC N/A 4/7/2022    Procedure: BRONCHOSCOPY, WITH BRONCHOALVEOLAR LAVAGE and BIOPSIES;  Surgeon: Gerson Haddad MD;  Location:  GI     BRONCHOSCOPY (RIGID OR " FLEXIBLE), DIAGNOSTIC N/A 5/12/2022    Procedure: BRONCHOSCOPY, WITH BRONCHOALVEOLAR LAVAGE AND BIOPSY;  Surgeon: Melissa Landin MD;  Location: [facility] GI     BRONCHOSCOPY (RIGID OR FLEXIBLE), DIAGNOSTIC N/A 8/2/2022    Procedure: BRONCHOSCOPY, WITH BRONCHOALVEOLAR LAVAGE;  Surgeon: Melissa Landin MD;  Location: [facility] GI     BRONCHOSCOPY (RIGID OR FLEXIBLE), DIAGNOSTIC N/A 10/11/2022    Procedure: BRONCHOSCOPY, WITH BRONCHOALVEOLAR LAVAGE AND BIOPSIES;  Surgeon: Angel Gallagher MD;  Location: [facility] GI     BRONCHOSCOPY (RIGID OR FLEXIBLE), DIAGNOSTIC N/A 12/20/2022    Procedure: BRONCHOSCOPY, WITH BRONCHOALVEOLAR LAVAGE AND BIOPSIES;  Surgeon: Perlman, David Morris, MD;  Location: [facility] GI     BRONCHOSCOPY (RIGID OR FLEXIBLE), DIAGNOSTIC N/A 3/1/2023    Procedure: BRONCHOSCOPY, WITH BRONCHOALVEOLAR LAVAGE WITH BIOPSY;  Surgeon: Lucina Thompson MD;  Location: [facility] GI     BRONCHOSCOPY FLEXIBLE AND RIGID N/A 3/1/2022    Procedure: BRONCHOSCOPY INSPECTION;  Surgeon: Melissa Landin MD;  Location: [facility] GI     CV CORONARY ANGIOGRAM N/A 3/27/2019    Procedure: CV CORONARY ANGIOGRAM;  Surgeon: Thierry Serrano MD;  Location:  HEART CARDIAC CATH LAB     CV RIGHT HEART CATH MEASUREMENTS RECORDED N/A 3/27/2019    Procedure: CV RIGHT HEART CATH;  Surgeon: Thierry Serrano MD;  Location:  HEART CARDIAC CATH LAB     ESOPHAGEAL IMPEDENCE FUNCTION TEST WITH 24 HOUR PH GREATER THAN 1 HOUR N/A 3/28/2019    Procedure: ESOPHAGEAL IMPEDENCE FUNCTION TEST WITH 24 HOUR PH GREATER THAN 1 HOUR;  Surgeon: Mike Henry MD;  Location:  GI     EXCISE PILONIDAL CYST, SIMPLE       IR CHEST TUBE PLACEMENT NON-TUNNELED RIGHT  3/3/2022     TONSILLECTOMY & ADENOIDECTOMY       TRANSPLANT LUNG RECIPIENT SINGLE X2 Bilateral 2/20/2022    Procedure: Bilateral Sequential Lung Transplantation, Clamshell Incision, Extracorporeal Membrane Oxgenation, Bronchoscopy, Cryoablation of Intercostal Nerves;  Surgeon: Raul Robin MD;  Location:  [facility] OR           Family History:     Family History   Problem Relation Age of Onset     Breast Cancer Mother      Colon Cancer Mother      Lung Cancer Mother      Lung Cancer Father      Lung Cancer Maternal Aunt      Pancreatic Cancer Maternal Uncle             Social History:     Social History     Socioeconomic History     Marital status:      Spouse name: Not on file     Number of children: Not on file     Years of education: Not on file     Highest education level: Not on file   Occupational History     Not on file   Tobacco Use     Smoking status: Former     Current packs/day: 0.00     Average packs/day: 1.5 packs/day for 36.0 years (54.0 ttl pk-yrs)     Types: Cigarettes     Start date: 1970     Quit date: 2006     Years since quittin.5     Smokeless tobacco: Never   Substance and Sexual Activity     Alcohol use: Not Currently     Comment: none since transplant     Drug use: Not Currently     Comment: stated hx of marijuana, quit in      Sexual activity: Not on file   Other Topics Concern     Parent/sibling w/ CABG, MI or angioplasty before 65F 55M? Not Asked   Social History Narrative     Not on file     Social Drivers of Health     Financial Resource Strain: Low Risk  (2021)    Received from [employer] [employer] Replaced by [facility]    Overall Financial Resource Strain (CARDIA)      Difficulty of Paying Living Expenses: Not very hard   Food Insecurity: No Food Insecurity (2021)    [facility] Replaced by [facility]    Hunger Vital Sign      Worried About Running Out of Food in the Last Year: Never true      Ran Out of Food in the Last Year: Never true   Transportation Needs: No Transportation Needs (2021)    [facility] Replaced by [facility]    PRAPARE - Transportation      Lack of Transportation (Medical): No      Lack of Transportation (Non-Medical): No   Physical Activity: Not on file   Stress: Not on file    Social Connections: Not on file   Interpersonal Safety: Not At Risk (6/10/2024)    Received from [facility]     IP Custom IPV      Do you feel UNSAFE in any of your personal relationships with your family members or any other acquaintances?: No   Housing Stability: Not on file            Medications:     Current Outpatient Medications   Medication Sig Dispense Refill     albuterol (PROAIR HFA/PROVENTIL HFA/VENTOLIN HFA) 108 (90 Base) MCG/ACT inhaler Inhale 2 puffs into the lungs every 6 hours as needed for shortness of breath or wheezing. 18 g 3     aspirin 81 MG EC tablet Take 1 tablet (81 mg) by mouth daily 30 tablet 11     calcium carbonate 600 mg-vitamin D 400 units (CALTRATE) 600-400 MG-UNIT per tablet Take 1 tablet by mouth daily 30 tablet 11     carvedilol (COREG) 12.5 MG tablet Take 1 tablet (12.5 mg) by mouth 2 times daily (with meals). 60 tablet 11     DILT- MG 24 hr ER beaded capsule TAKE 1 CAPSULE (240 MG) BY MOUTH DAILY 90 capsule 11     famotidine (PEPCID) 20 MG tablet TAKE 1 TABLET (20 MG) BY MOUTH 2 TIMES DAILY 180 tablet 11     fluticasone (FLONASE) 50 MCG/ACT nasal spray Spray 2 sprays into both nostrils 2 times daily.       glipiZIDE (GLUCOTROL) 10 MG tablet Take 10 mg by mouth.       lisinopril (ZESTRIL) 10 MG tablet Take 1 tablet (10 mg) by mouth daily 30 tablet 11     Magnesium Glycinate 665 MG CAPS Take 3 tablets by mouth 3 times daily.       multivitamin w/minerals (THERA-VIT-M) tablet Take 1 tablet by mouth daily 30 tablet 11     mycophenolic acid (GENERIC EQUIVALENT) 360 MG EC tablet TAKE 2 TABLETS (720 MG) BY MOUTH 2 TIMES DAILY 360 tablet 11     pantoprazole (PROTONIX) 40 MG EC tablet Take 1 tablet (40 mg) by mouth daily. 90 tablet 3     pentamidine (NEBUPENT) 300 MG neb solution Inhale 300 mg into the lungs every 28 days       predniSONE (DELTASONE) 5 MG tablet Take 1.5 tablets (7.5 mg) by mouth daily. 45 tablet 11     rosuvastatin (CRESTOR)  10 MG tablet Take 2 tablets (20 mg) by mouth daily 60 tablet 11     tacrolimus (GENERIC EQUIVALENT) 0.5 MG capsule Take 1 capsule (0.5 mg) by mouth every evening. Total dose: 1 mg in the AM and 1.5 mg in the PM. 30 capsule 11     tacrolimus (GENERIC EQUIVALENT) 1 MG capsule Take 1 capsule (1 mg) by mouth 2 times daily. Total dose: 1 mg in AM and 1.5 mg in PM 60 capsule 11     No current facility-administered medications for this visit.            Physical Exam:   /72 (BP Location: Right arm, Patient Position: Chair, Cuff Size: Adult Large)   Pulse 72   SpO2 97%     GENERAL: alert, NAD  HEENT: NCAT, EOMI, no scleral icterus, oral mucosa moist and without lesions  Neck: no cervical or supraclavicular adenopathy  Lungs: moderate airflow, scattered left lower crackles  CV: RRR, S1S2, no murmurs noted  Abdomen: normoactive BS, soft  Lymph: trace edema   Neuro: AAO X 3, CN 2-12 grossly intact  Psychiatric: normal affect, good eye contact  Skin: no rash, jaundice or lesions on limited exam         Data:   All laboratory and imaging data reviewed.      Recent Results (from the past week)   Comprehensive metabolic panel    Collection Time: 07/01/25 10:46 AM   Result Value Ref Range    Sodium 142 135 - 145 mmol/L    Potassium 4.5 3.4 - 5.3 mmol/L    Carbon Dioxide (CO2) 23 22 - 29 mmol/L    Anion Gap 14 7 - 15 mmol/L    Urea Nitrogen 41.3 (H) 8.0 - 23.0 mg/dL    Creatinine 1.43 (H) 0.51 - 0.95 mg/dL    GFR Estimate 40 (L) >60 mL/min/1.73m2    Calcium 10.1 8.8 - 10.4 mg/dL    Chloride 105 98 - 107 mmol/L    Glucose 134 (H) 70 - 99 mg/dL    Alkaline Phosphatase 59 40 - 150 U/L    AST 20 0 - 45 U/L    ALT 20 0 - 50 U/L    Protein Total 7.0 6.4 - 8.3 g/dL    Albumin 4.3 3.5 - 5.2 g/dL    Bilirubin Total 0.3 <=1.2 mg/dL   Magnesium    Collection Time: 07/01/25 10:46 AM   Result Value Ref Range    Magnesium 1.4 (L) 1.7 - 2.3 mg/dL   Phosphorus    Collection Time: 07/01/25 10:46 AM   Result Value Ref Range    Phosphorus 4.3  2.5 - 4.5 mg/dL   CBC with platelets and differential    Collection Time: 07/01/25 10:46 AM   Result Value Ref Range    WBC Count 6.7 4.0 - 11.0 10e3/uL    RBC Count 3.44 (L) 3.80 - 5.20 10e6/uL    Hemoglobin 9.2 (L) 11.7 - 15.7 g/dL    Hematocrit 29.5 (L) 35.0 - 47.0 %    MCV 86 78 - 100 fL    MCH 26.7 26.5 - 33.0 pg    MCHC 31.2 (L) 31.5 - 36.5 g/dL    RDW 15.2 (H) 10.0 - 15.0 %    Platelet Count 195 150 - 450 10e3/uL    % Neutrophils 78 %    % Lymphocytes 12 %    % Monocytes 5 %    % Eosinophils 3 %    % Basophils 0 %    % Immature Granulocytes 2 %    NRBCs per 100 WBC 0 <1 /100    Absolute Neutrophils 5.2 1.6 - 8.3 10e3/uL    Absolute Lymphocytes 0.8 0.8 - 5.3 10e3/uL    Absolute Monocytes 0.3 0.0 - 1.3 10e3/uL    Absolute Eosinophils 0.2 0.0 - 0.7 10e3/uL    Absolute Basophils 0.0 0.0 - 0.2 10e3/uL    Absolute Immature Granulocytes 0.1 <=0.4 10e3/uL    Absolute NRBCs 0.0 10e3/uL   General PFT Lab (Please always keep checked)    Collection Time: 07/01/25 10:51 AM   Result Value Ref Range    FVC-Pred 2.36 L    FVC-Pre 2.10 L    FVC-%Pred-Pre 89 %    FEV1-Pre 2.01 L    FEV1-%Pred-Pre 107 %    FEV1FVC-Pred 79 %    FEV1FVC-Pre 95 %    FEFMax-Pred 5.23 L/sec    FEFMax-Pre 5.12 L/sec    FEFMax-%Pred-Pre 98 %    FEF2575-Pred 1.67 L/sec    FEF2575-Pre 3.35 L/sec    IDK7037-%Pred-Pre 200 %    ExpTime-Pre 6.67 sec    FIFMax-Pre 2.85 L/sec    FEV1FEV6-Pred 79 %    FEV1FEV6-Pre 95 %     PFT interpretation:  Maneuver: valid and ATS met  Normal spirometry    Transplant Coordinator Note    Reason for visit: Post lung transplant follow up visit, 1 month follow up  Coordinator: Present   Caregiver:  none    Health concerns addressed today:  1. Respiratory: no cough or shortness of breath  2. GI: no acid reflux. Some bloating.  3. Started on course of Levaquin after June visit, then was to start azithromycin 250 mg daily for CLAD therapy.   4. DQB5 antibody level up slightly at 928 from 6/2 visit, plan to recheck today.   5. Mg  was low at visit (pt thought she had missed some doses, recheck was 1.5, per Sharlene, no plan to change Mg dosing at that time, recheck level today).   5. S/p IVIG 6/27, recheck IgG today  6. Creat 1.43  7. Magnesium 1.4 - increase to 300 mg TID    Activity/rehab: Staying active w/walking the dog, activities of daily living, has a treadmill and bike at home.   Oxygen needs: room air  Pain management/RX: Back pain - compression fracture  Diabetic management: NA  Risk Criteria Labs: negative 3/25/22  CMV status: D-/R-  EBV status: D+/R+  AC/asa: ASA 81mg -  PJP prophylactic: pentamidine nebs (dapsone caused anemia/RBC hemolysis) done locally, up to date.     COVID:  COVID-19 infection (yes/no, date of most recent positive test):   Status/instructions given about COVID-19 vaccine:     Pt Education: medications (use/dose/side effects), how/when to call coordinator, frequency of labs, s/s of infection/rejection, call prior to starting any new medications, lab/vital sign book    Health Maintenance:   Last colonoscopy:   Next colonoscopy due:   Dermatology:  Vaccinations this visit:     Labs, CXR, PFTs reviewed with patient  Medication record reviewed and reconciled  Questions and concerns addressed    Patient Instructions  1. Continue to hydrate with 60-70 oz fluids daily.  2. Continue to exercise daily or most days of the week.  3. Follow up with your primary care provider for annual gender health maintenance procedures.  4. Follow up with colonoscopy schedule.  5. Follow up with annual dermatology visits.  6. It doesn't seem like the COVID vaccine is working well in lung transplant patients. A number of lung transplant patients have gotten sick with COVID even after receiving the vaccines. Based on our recent experience, it can be life-threatening to get COVID  even after being vaccinated. Please continue to act like you did not get the COVID vaccine - social distancing, wearing a mask, good hand hygiene, etc. If the  people around you are vaccinated, it will help reduce the risk of you getting COVID. All members of your household should be vaccinated.  7. Increase your magnesium to 3 capsules, 3 times a day  8. Recheck labs (IgG) at the end of July    Next transplant clinic appointment:  4 months with CXR, labs and PFTs  Next lab draw: pending tacrolimus, otherwise 2 months    AVS printed at time of check out       Again, thank you for allowing me to participate in the care of your patient.        Sincerely,        Sharlene Larios PA-C    Electronically signed

## 2025-07-01 NOTE — NURSING NOTE
Chief Complaint   Patient presents with    Follow Up     Post Lung        Vitals were taken.    Jesus Nicholson, Clinic Assistant   11:38 AM

## 2025-07-02 DIAGNOSIS — Z94.2 LUNG REPLACED BY TRANSPLANT (H): ICD-10-CM

## 2025-07-02 DIAGNOSIS — E78.00 HYPERCHOLESTEROLEMIA: ICD-10-CM

## 2025-07-02 DIAGNOSIS — Z94.2 S/P LUNG TRANSPLANT (H): ICD-10-CM

## 2025-07-02 DIAGNOSIS — I10 HYPERTENSION, UNSPECIFIED TYPE: ICD-10-CM

## 2025-07-02 LAB — IGG SERPL-MCNC: 779 MG/DL (ref 610–1616)

## 2025-07-02 RX ORDER — PREDNISONE 5 MG/1
7.5 TABLET ORAL DAILY
Qty: 135 TABLET | Refills: 3 | Status: SHIPPED | OUTPATIENT
Start: 2025-07-02

## 2025-07-02 RX ORDER — TACROLIMUS 1 MG/1
1 CAPSULE ORAL 2 TIMES DAILY
Qty: 180 CAPSULE | Refills: 3 | Status: SHIPPED | OUTPATIENT
Start: 2025-07-02

## 2025-07-02 RX ORDER — ASPIRIN 81 MG/1
81 TABLET ORAL DAILY
Qty: 90 TABLET | Refills: 3 | Status: SHIPPED | OUTPATIENT
Start: 2025-07-02

## 2025-07-02 RX ORDER — ROSUVASTATIN CALCIUM 10 MG/1
20 TABLET, COATED ORAL DAILY
Qty: 180 TABLET | Refills: 3 | Status: SHIPPED | OUTPATIENT
Start: 2025-07-02

## 2025-07-02 RX ORDER — LISINOPRIL 10 MG/1
10 TABLET ORAL DAILY
Qty: 90 TABLET | Refills: 3 | Status: SHIPPED | OUTPATIENT
Start: 2025-07-02

## 2025-07-02 RX ORDER — DILTIAZEM HYDROCHLORIDE 240 MG/1
240 CAPSULE, EXTENDED RELEASE ORAL DAILY
Qty: 90 CAPSULE | Refills: 3 | Status: SHIPPED | OUTPATIENT
Start: 2025-07-02

## 2025-07-02 RX ORDER — FLUTICASONE PROPIONATE 50 MCG
2 SPRAY, SUSPENSION (ML) NASAL 2 TIMES DAILY
Qty: 11.1 ML | Refills: 3 | Status: SHIPPED | OUTPATIENT
Start: 2025-07-02

## 2025-07-02 RX ORDER — TACROLIMUS 0.5 MG/1
0.5 CAPSULE ORAL EVERY EVENING
Qty: 90 CAPSULE | Refills: 3 | Status: SHIPPED | OUTPATIENT
Start: 2025-07-02

## 2025-07-02 RX ORDER — FAMOTIDINE 20 MG/1
20 TABLET, FILM COATED ORAL 2 TIMES DAILY
Qty: 180 TABLET | Refills: 3 | Status: SHIPPED | OUTPATIENT
Start: 2025-07-02

## 2025-07-02 RX ORDER — CARVEDILOL 12.5 MG/1
12.5 TABLET ORAL 2 TIMES DAILY WITH MEALS
Qty: 180 TABLET | Refills: 3 | Status: SHIPPED | OUTPATIENT
Start: 2025-07-02

## 2025-07-02 RX ORDER — MYCOPHENOLIC ACID 360 MG/1
720 TABLET, DELAYED RELEASE ORAL 2 TIMES DAILY
Qty: 360 TABLET | Refills: 3 | Status: SHIPPED | OUTPATIENT
Start: 2025-07-02

## 2025-07-03 ENCOUNTER — TELEPHONE (OUTPATIENT)
Dept: TRANSPLANT | Facility: CLINIC | Age: 70
End: 2025-07-03
Payer: COMMERCIAL

## 2025-07-03 ENCOUNTER — MYC MEDICAL ADVICE (OUTPATIENT)
Dept: TRANSPLANT | Facility: CLINIC | Age: 70
End: 2025-07-03
Payer: COMMERCIAL

## 2025-07-03 NOTE — TELEPHONE ENCOUNTER
Medication refills sent for all transplant related meds for 90 day supply. [employer] message sent w/update, inquired if patient want mg glycinate or multi vitamin orders sent or if she get them OTC, awaiting response.

## 2025-07-05 LAB
NTRA CURRENT TEST RESULT: NORMAL
NTRA PATIENT TEST HISTORY: NORMAL

## 2025-07-07 LAB
DONOR IDENTIFICATION: NORMAL
DQB5: 921
DSA COMMENTS: NORMAL
DSA PRESENT: YES
DSA TEST METHOD: NORMAL
ORGAN: NORMAL
SA 1  COMMENTS: NORMAL
SA 1 CELL: NORMAL
SA 1 TEST METHOD: NORMAL
SA 2 CELL: NORMAL
SA 2 COMMENTS: NORMAL
SA 2 TEST METHOD: NORMAL
SA1 HI RISK ABY: NORMAL
SA1 MOD RISK ABY: NORMAL
SA2 HI RISK ABY: NORMAL
SA2 MOD RISK ABY: NORMAL
UNACCEPTABLE ANTIGENS: NORMAL
UNOS CPRA: 76

## 2025-07-07 NOTE — TELEPHONE ENCOUNTER
Patient confirmed message for refills for medications and that she gets Mg glycinate and multivitamin over the counter

## 2025-07-31 ENCOUNTER — MYC MEDICAL ADVICE (OUTPATIENT)
Dept: PULMONOLOGY | Facility: CLINIC | Age: 70
End: 2025-07-31
Payer: COMMERCIAL

## 2025-07-31 NOTE — TELEPHONE ENCOUNTER
Call out to patient to review [employer] message in which patient voiced confusion of orders needed. Per chart review, IgG level needed this week. Pt got labs done yesterday at primary care clinic, they did not draw an IgG. Orders refaxed for IgG next week- pt aware.     Pt notes she has slight shortness of breath and wheezing with her most recent pentamidine neb, it resolved by the time she got home. Will monitor at next treatment and follow up w/writer if repeat symptoms occur.

## 2025-07-31 NOTE — LETTER
PHYSICIAN ORDERS      DATE & TIME ISSUED: 2025 12:00 PM  PATIENT NAME: Melissa Elder   : 1955     [facility] MR# [if applicable]: [id number]     DIAGNOSIS:  Lung Transplant  Z94.2    Please check an IgG level the week of 2025.     Any questions please call: JENNIFER Lara Care Coordinator: [phone]      Please fax these results to [phone].        Sharlene Larios PA-C

## 2025-08-30 ENCOUNTER — HEALTH MAINTENANCE LETTER (OUTPATIENT)
Age: 70
End: 2025-08-30

## (undated) DEVICE — SU STEEL 6 CCS 4X18" M654G

## (undated) DEVICE — TIES BANDING T50R

## (undated) DEVICE — SU ETHIBOND 3-0 BBDA 36" X588H

## (undated) DEVICE — BLADE SAW SAGITTAL STERNOTOMY CV SM LINVATEC 5023-187

## (undated) DEVICE — ESU PENCIL SMOKE EVAC W/ROCKER SWITCH 0703-047-000

## (undated) DEVICE — DRSG MEDIPORE 3 1/2X13 3/4" 3573

## (undated) DEVICE — TUBING SUCTION 10'X3/16" N510

## (undated) DEVICE — PACK ADULT HEART UMMC PV15CG92D

## (undated) DEVICE — CATH ANGIO SUPERTORQUE PLUS JR4 6FRX100CM 533621

## (undated) DEVICE — SLEEVE TR BAND RADIAL COMPRESSION DEVICE 24CM TRB24-REG

## (undated) DEVICE — SU SILK 0 TIE 6X30" A306H

## (undated) DEVICE — SU ETHIBOND 5 LRDA 30" B499T

## (undated) DEVICE — INTRO SHEATH 7FRX10CM PINNACLE RSS702

## (undated) DEVICE — DRSG DRAIN 4X4" 7086

## (undated) DEVICE — SU VICRYL 3-0 FS-1 27" J442H

## (undated) DEVICE — ESU ELEC BLADE 2.75" COATED/INSULATED E1455

## (undated) DEVICE — SHTH INTRO 0.021IN ID 6FR DIA

## (undated) DEVICE — KIT ENDO FIRST STEP DISINFECTANT 200ML W/POUCH EP-4

## (undated) DEVICE — Device

## (undated) DEVICE — ESU BIPOLAR SEALER AQUAMANTYS 6MM 23-112-1

## (undated) DEVICE — DEFIB PRO-PADZ LVP LQD GEL ADULT 8900-2105-01

## (undated) DEVICE — NDL COUNTER 40CT  31142311

## (undated) DEVICE — SU PROLENE 5-0 RB-1DA 36"  8556H

## (undated) DEVICE — CLIP HORIZON LG ORANGE 004200

## (undated) DEVICE — PROTECTOR ARM ONE-STEP TRENDELENBURG 40418

## (undated) DEVICE — ESU HOLSTER PLASTIC DISP E2400

## (undated) DEVICE — SU PROLENE 4-0 SHDA 36" 8521H

## (undated) DEVICE — SOL NACL 0.9% 10ML VIAL 0409-4888-02

## (undated) DEVICE — TUBING SUCTION DRAINAGE PLEURAL DUAL 8884714200

## (undated) DEVICE — RX SURGIFLO HEMOSTATIC MATRIX W/THROMBIN 8ML 2994

## (undated) DEVICE — VESSEL LOOPS YELLOW MAXI 31145694

## (undated) DEVICE — WIRE GUIDE 0.035"X260CM SAFE-T-J EXCHANGE G00517

## (undated) DEVICE — ADH SKIN CLOSURE PREMIERPRO EXOFIN 1.0ML 3470

## (undated) DEVICE — SU ETHIBOND 0 CT-1 CR 8X18" CX21D

## (undated) DEVICE — STPL POWERED ECHELON VASC 35MM PVE35A

## (undated) DEVICE — CATH PROBE CRYOABLATION MALL FLEX 8MM BALL 180DEG CRYOS

## (undated) DEVICE — SU PROLENE 4-0 RB-1DA 36" 8557H

## (undated) DEVICE — ENDO VALVE SUCTION BRONCH EVIS MAJ-209

## (undated) DEVICE — TUBING SUCTION MEDI-VAC SOFT 3/16"X20' N520A

## (undated) DEVICE — CONNECTOR DRAIN CHEST Y EXTENSION SET 19909

## (undated) DEVICE — BLADE CLIPPER SGL USE 9680

## (undated) DEVICE — KIT HAND CONTROL ACIST 014644 AR-P54

## (undated) DEVICE — LINEN TOWEL PACK X6 WHITE 5487

## (undated) DEVICE — SPONGE LAP 18X18" X8435

## (undated) DEVICE — SURGICEL POWDER ABSORBABLE HEMOSTAT 3GM 3013SP

## (undated) DEVICE — ENDO VALVE BX EVIS MAJ-210

## (undated) DEVICE — SUCTION MANIFOLD NEPTUNE 2 SYS 4 PORT 0702-020-000

## (undated) DEVICE — SOL NACL 0.9% IRRIG 1000ML BOTTLE 2F7124

## (undated) DEVICE — SURGICEL HEMOSTAT 4X8" 1952

## (undated) DEVICE — DRAPE SHEET MED 44X70" 9355

## (undated) DEVICE — GLOVE LINER COTTON MED 32-2076

## (undated) DEVICE — SU PDS II 2-0 CT-1 27" Z339H

## (undated) DEVICE — KIT MINITIP UVT 1ML FLOCKED FLEX 220526

## (undated) DEVICE — SU PLEDGET SOFT TFE 3/8"X3/26"X1/16" PCP40

## (undated) DEVICE — LINEN TOWEL PACK X30 5481

## (undated) DEVICE — SU PROLENE 5-0 RB-2DA 30" 8710H

## (undated) DEVICE — ESU LIGASURE IMPACT OPEN SEALER/DVDR CVD LG JAW LF4418

## (undated) DEVICE — SUCTION TIP YANKAUER STR K87

## (undated) DEVICE — ENDO ADPT BRONCH SWIVEL Y A1002

## (undated) DEVICE — PREP CHLORAPREP 26ML TINTED ORANGE  260815

## (undated) DEVICE — ANTIFOG SOLUTION W/FOAM PAD CF-1001

## (undated) DEVICE — ESU ELEC BLADE 6" COATED/INSULATED E1455-6

## (undated) DEVICE — FASTENER CATH BALLOON CLAMPX2 STATLOCK 0684-00-493

## (undated) DEVICE — DRSG TELFA 3X8" 1238

## (undated) DEVICE — BLANKET BAIR ADLT PLYMR 60X36IN 63000

## (undated) DEVICE — VESSEL LOOPS BLUE SUPERMAXI 011022PBX

## (undated) DEVICE — DRAIN CHEST TUBE 28FR STR 8028

## (undated) DEVICE — SYR 30ML SLIP TIP W/O NDL 302833

## (undated) DEVICE — SU PROLENE 6-0 C-1DA 30" 8706H

## (undated) DEVICE — SPECIMEN TRAP MUCOUS 40ML LUKI C30200A

## (undated) DEVICE — CATH ANGIO SUPERTORQUE PLUS JL4 6FRX100CM 533620

## (undated) DEVICE — DRAPE IOBAN INCISE 23X17" 6650EZ

## (undated) DEVICE — CONNECTOR BLAKE DRAIN SGL BCC1

## (undated) DEVICE — DRAIN CHEST TUBE RIGHT ANGLED 28FR 8128

## (undated) DEVICE — SUCTION DRY CHEST DRAIN OASIS 3600-100

## (undated) DEVICE — BASIN SET SINGLE STERILE 13752-624

## (undated) DEVICE — POSITIONER ASSIST ESSTECH 3S T401210S

## (undated) DEVICE — CLIP HORIZON MED BLUE 002200

## (undated) DEVICE — LINEN GOWN XLG 5407

## (undated) DEVICE — MANIFOLD KIT ANGIO AUTOMATED 014613

## (undated) DEVICE — WIPES FOLEY CARE SURESTEP PROVON DFC100

## (undated) DEVICE — SUCTION CATH AIRLIFE TRI-FLO W/CONTROL PORT 14FR  T60C

## (undated) DEVICE — DRAPE SLUSH/WARMER 66X44" ORS-320

## (undated) DEVICE — PACK HEART LEFT CUSTOM

## (undated) DEVICE — SU ETHIBOND 2-0 SHDA 30" X563H

## (undated) DEVICE — GLOVE BIOGEL PI ULTRATOUCH SZ 7.0 41170

## (undated) RX ORDER — HEPARIN SODIUM 1000 [USP'U]/ML
INJECTION, SOLUTION INTRAVENOUS; SUBCUTANEOUS
Status: DISPENSED
Start: 2019-03-27

## (undated) RX ORDER — LIDOCAINE HYDROCHLORIDE 40 MG/ML
SOLUTION TOPICAL
Status: DISPENSED
Start: 2023-03-01

## (undated) RX ORDER — LIDOCAINE HYDROCHLORIDE AND EPINEPHRINE 10; 10 MG/ML; UG/ML
INJECTION, SOLUTION INFILTRATION; PERINEURAL
Status: DISPENSED
Start: 2022-10-11

## (undated) RX ORDER — MAGNESIUM SULFATE HEPTAHYDRATE 40 MG/ML
INJECTION, SOLUTION INTRAVENOUS
Status: DISPENSED
Start: 2022-05-12

## (undated) RX ORDER — LIDOCAINE HYDROCHLORIDE 10 MG/ML
INJECTION, SOLUTION EPIDURAL; INFILTRATION; INTRACAUDAL; PERINEURAL
Status: DISPENSED
Start: 2023-03-01

## (undated) RX ORDER — LIDOCAINE HYDROCHLORIDE 20 MG/ML
SOLUTION OROPHARYNGEAL
Status: DISPENSED
Start: 2023-03-01

## (undated) RX ORDER — FENTANYL CITRATE 50 UG/ML
INJECTION, SOLUTION INTRAMUSCULAR; INTRAVENOUS
Status: DISPENSED
Start: 2019-03-27

## (undated) RX ORDER — LIDOCAINE HYDROCHLORIDE AND EPINEPHRINE 10; 10 MG/ML; UG/ML
INJECTION, SOLUTION INFILTRATION; PERINEURAL
Status: DISPENSED
Start: 2022-05-12

## (undated) RX ORDER — LIDOCAINE HYDROCHLORIDE 10 MG/ML
INJECTION, SOLUTION EPIDURAL; INFILTRATION; INTRACAUDAL; PERINEURAL
Status: DISPENSED
Start: 2022-03-01

## (undated) RX ORDER — LIDOCAINE HYDROCHLORIDE 10 MG/ML
INJECTION, SOLUTION EPIDURAL; INFILTRATION; INTRACAUDAL; PERINEURAL
Status: DISPENSED
Start: 2022-12-20

## (undated) RX ORDER — FENTANYL CITRATE 50 UG/ML
INJECTION, SOLUTION INTRAMUSCULAR; INTRAVENOUS
Status: DISPENSED
Start: 2023-03-01

## (undated) RX ORDER — LIDOCAINE HYDROCHLORIDE 40 MG/ML
SOLUTION TOPICAL
Status: DISPENSED
Start: 2022-04-07

## (undated) RX ORDER — LIDOCAINE HYDROCHLORIDE 10 MG/ML
INJECTION, SOLUTION EPIDURAL; INFILTRATION; INTRACAUDAL; PERINEURAL
Status: DISPENSED
Start: 2022-02-26

## (undated) RX ORDER — MAGNESIUM SULFATE 1 G/100ML
INJECTION INTRAVENOUS
Status: DISPENSED
Start: 2022-05-12

## (undated) RX ORDER — LIDOCAINE HYDROCHLORIDE 20 MG/ML
SOLUTION OROPHARYNGEAL
Status: DISPENSED
Start: 2022-02-22

## (undated) RX ORDER — FENTANYL CITRATE 50 UG/ML
INJECTION, SOLUTION INTRAMUSCULAR; INTRAVENOUS
Status: DISPENSED
Start: 2022-05-12

## (undated) RX ORDER — ALBUMIN, HUMAN INJ 5% 5 %
SOLUTION INTRAVENOUS
Status: DISPENSED
Start: 2022-02-21

## (undated) RX ORDER — LIDOCAINE HYDROCHLORIDE 40 MG/ML
SOLUTION TOPICAL
Status: DISPENSED
Start: 2022-12-20

## (undated) RX ORDER — LIDOCAINE HYDROCHLORIDE 10 MG/ML
INJECTION, SOLUTION EPIDURAL; INFILTRATION; INTRACAUDAL; PERINEURAL
Status: DISPENSED
Start: 2022-04-07

## (undated) RX ORDER — ONDANSETRON 2 MG/ML
INJECTION INTRAMUSCULAR; INTRAVENOUS
Status: DISPENSED
Start: 2022-12-20

## (undated) RX ORDER — FENTANYL CITRATE 50 UG/ML
INJECTION, SOLUTION INTRAMUSCULAR; INTRAVENOUS
Status: DISPENSED
Start: 2022-04-07

## (undated) RX ORDER — LIDOCAINE HYDROCHLORIDE 10 MG/ML
INJECTION, SOLUTION EPIDURAL; INFILTRATION; INTRACAUDAL; PERINEURAL
Status: DISPENSED
Start: 2022-03-03

## (undated) RX ORDER — LIDOCAINE HYDROCHLORIDE 20 MG/ML
SOLUTION OROPHARYNGEAL
Status: DISPENSED
Start: 2022-08-02

## (undated) RX ORDER — LIDOCAINE HYDROCHLORIDE 20 MG/ML
SOLUTION OROPHARYNGEAL
Status: DISPENSED
Start: 2022-12-20

## (undated) RX ORDER — NICARDIPINE HYDROCHLORIDE 2.5 MG/ML
INJECTION INTRAVENOUS
Status: DISPENSED
Start: 2019-03-27

## (undated) RX ORDER — LIDOCAINE HYDROCHLORIDE 20 MG/ML
SOLUTION OROPHARYNGEAL
Status: DISPENSED
Start: 2022-05-12

## (undated) RX ORDER — FENTANYL CITRATE 50 UG/ML
INJECTION, SOLUTION INTRAMUSCULAR; INTRAVENOUS
Status: DISPENSED
Start: 2022-08-02

## (undated) RX ORDER — FENTANYL CITRATE 50 UG/ML
INJECTION, SOLUTION INTRAMUSCULAR; INTRAVENOUS
Status: DISPENSED
Start: 2022-02-21

## (undated) RX ORDER — NALOXONE HYDROCHLORIDE 0.4 MG/ML
INJECTION, SOLUTION INTRAMUSCULAR; INTRAVENOUS; SUBCUTANEOUS
Status: DISPENSED
Start: 2022-03-01

## (undated) RX ORDER — LIDOCAINE HYDROCHLORIDE 40 MG/ML
SOLUTION TOPICAL
Status: DISPENSED
Start: 2022-10-11

## (undated) RX ORDER — LIDOCAINE HYDROCHLORIDE AND EPINEPHRINE 10; 10 MG/ML; UG/ML
INJECTION, SOLUTION INFILTRATION; PERINEURAL
Status: DISPENSED
Start: 2022-04-07

## (undated) RX ORDER — LIDOCAINE HYDROCHLORIDE AND EPINEPHRINE 10; 10 MG/ML; UG/ML
INJECTION, SOLUTION INFILTRATION; PERINEURAL
Status: DISPENSED
Start: 2022-08-02

## (undated) RX ORDER — FENTANYL CITRATE 50 UG/ML
INJECTION, SOLUTION INTRAMUSCULAR; INTRAVENOUS
Status: DISPENSED
Start: 2022-10-11

## (undated) RX ORDER — LIDOCAINE HYDROCHLORIDE 10 MG/ML
INJECTION, SOLUTION EPIDURAL; INFILTRATION; INTRACAUDAL; PERINEURAL
Status: DISPENSED
Start: 2022-10-11

## (undated) RX ORDER — LIDOCAINE HYDROCHLORIDE AND EPINEPHRINE 10; 10 MG/ML; UG/ML
INJECTION, SOLUTION INFILTRATION; PERINEURAL
Status: DISPENSED
Start: 2023-03-01

## (undated) RX ORDER — LIDOCAINE HYDROCHLORIDE 40 MG/ML
SOLUTION TOPICAL
Status: DISPENSED
Start: 2022-03-01

## (undated) RX ORDER — FENTANYL CITRATE 50 UG/ML
INJECTION, SOLUTION INTRAMUSCULAR; INTRAVENOUS
Status: DISPENSED
Start: 2022-12-20

## (undated) RX ORDER — FENTANYL CITRATE 50 UG/ML
INJECTION, SOLUTION INTRAMUSCULAR; INTRAVENOUS
Status: DISPENSED
Start: 2022-03-01

## (undated) RX ORDER — LIDOCAINE HYDROCHLORIDE 40 MG/ML
SOLUTION TOPICAL
Status: DISPENSED
Start: 2022-05-12

## (undated) RX ORDER — SODIUM CHLORIDE 9 MG/ML
INJECTION, SOLUTION INTRAVENOUS
Status: DISPENSED
Start: 2019-03-27

## (undated) RX ORDER — LIDOCAINE HYDROCHLORIDE 20 MG/ML
SOLUTION OROPHARYNGEAL
Status: DISPENSED
Start: 2022-04-07

## (undated) RX ORDER — LIDOCAINE HYDROCHLORIDE 10 MG/ML
INJECTION, SOLUTION EPIDURAL; INFILTRATION; INTRACAUDAL; PERINEURAL
Status: DISPENSED
Start: 2022-05-12

## (undated) RX ORDER — NITROGLYCERIN 5 MG/ML
VIAL (ML) INTRAVENOUS
Status: DISPENSED
Start: 2019-03-27

## (undated) RX ORDER — LIDOCAINE HYDROCHLORIDE 20 MG/ML
SOLUTION OROPHARYNGEAL
Status: DISPENSED
Start: 2022-03-01